# Patient Record
Sex: MALE | Race: WHITE | NOT HISPANIC OR LATINO | Employment: OTHER | URBAN - METROPOLITAN AREA
[De-identification: names, ages, dates, MRNs, and addresses within clinical notes are randomized per-mention and may not be internally consistent; named-entity substitution may affect disease eponyms.]

---

## 2017-03-13 RX ORDER — LEVOTHYROXINE SODIUM 0.05 MG/1
TABLET ORAL
Qty: 90 TABLET | Refills: 0 | Status: SHIPPED | OUTPATIENT
Start: 2017-03-13 | End: 2017-06-12 | Stop reason: SDUPTHER

## 2017-03-13 RX ORDER — ESCITALOPRAM OXALATE 20 MG/1
TABLET ORAL
Qty: 90 TABLET | Refills: 0 | Status: SHIPPED | OUTPATIENT
Start: 2017-03-13 | End: 2017-07-21

## 2017-03-13 RX ORDER — ATORVASTATIN CALCIUM 20 MG/1
TABLET, FILM COATED ORAL
Qty: 90 TABLET | Refills: 0 | Status: SHIPPED | OUTPATIENT
Start: 2017-03-13 | End: 2017-06-12 | Stop reason: SDUPTHER

## 2017-03-13 RX ORDER — LISINOPRIL 10 MG/1
TABLET ORAL
Qty: 90 TABLET | Refills: 0 | Status: SHIPPED | OUTPATIENT
Start: 2017-03-13 | End: 2017-06-12 | Stop reason: SDUPTHER

## 2017-03-13 RX ORDER — AMLODIPINE BESYLATE 5 MG/1
TABLET ORAL
Qty: 90 TABLET | Refills: 0 | Status: SHIPPED | OUTPATIENT
Start: 2017-03-13 | End: 2017-06-12 | Stop reason: SDUPTHER

## 2017-04-05 ENCOUNTER — OFFICE VISIT (OUTPATIENT)
Dept: FAMILY MEDICINE CLINIC | Facility: CLINIC | Age: 62
End: 2017-04-05

## 2017-04-05 VITALS
TEMPERATURE: 98.2 F | BODY MASS INDEX: 28.79 KG/M2 | HEART RATE: 77 BPM | SYSTOLIC BLOOD PRESSURE: 124 MMHG | WEIGHT: 212.6 LBS | HEIGHT: 72 IN | DIASTOLIC BLOOD PRESSURE: 74 MMHG | OXYGEN SATURATION: 98 %

## 2017-04-05 DIAGNOSIS — M70.21 OLECRANON BURSITIS OF RIGHT ELBOW: Primary | ICD-10-CM

## 2017-04-05 PROCEDURE — 99212 OFFICE O/P EST SF 10 MIN: CPT | Performed by: NURSE PRACTITIONER

## 2017-04-05 NOTE — PROGRESS NOTES
"Subjective   Pro Mueller is a 62 y.o. male.     History of Present Illness   Here for evaluation of swelling right elbow x 5 days.  No known injury.   No pain.  He also has a hard bump on his chest he wants evaluated.      The following portions of the patient's history were reviewed and updated as appropriate: allergies, current medications, past family history, past medical history, past social history, past surgical history and problem list.    Review of Systems   Musculoskeletal:        Per HPI   All other systems reviewed and are negative.      Objective   Physical Exam   Constitutional: He appears well-developed and well-nourished.   Cardiovascular: Normal rate.    Pulmonary/Chest: Effort normal.   Musculoskeletal:        Right elbow: He exhibits swelling. He exhibits normal range of motion. No tenderness found.   There is no \"bump\" or abnormality of his chest wall.  It is normal anatomy of his sternum/rib cage.    Right elbow edema, no ecchymosis.         Assessment/Plan   Pro was seen today for joint swelling.    Diagnoses and all orders for this visit:    Olecranon bursitis of right elbow      Assured pt of normal anatomy ant chest.  Assured pt olecranon bursitis no concern.  No tx necessary. RTC if sx's change.            "

## 2017-04-21 RX ORDER — TRAMADOL HYDROCHLORIDE 50 MG/1
TABLET ORAL
Qty: 120 TABLET | Refills: 0 | OUTPATIENT
Start: 2017-04-21 | End: 2017-06-19 | Stop reason: SDUPTHER

## 2017-04-21 RX ORDER — QUETIAPINE FUMARATE 25 MG/1
TABLET, FILM COATED ORAL
Qty: 90 TABLET | Refills: 2 | OUTPATIENT
Start: 2017-04-21 | End: 2018-05-17

## 2017-05-11 DIAGNOSIS — G47.00 INSOMNIA, UNSPECIFIED TYPE: ICD-10-CM

## 2017-05-11 RX ORDER — ALPRAZOLAM 1 MG/1
TABLET ORAL
Qty: 30 TABLET | Refills: 0 | Status: SHIPPED | OUTPATIENT
Start: 2017-05-11 | End: 2017-05-12 | Stop reason: SDUPTHER

## 2017-05-12 DIAGNOSIS — G47.00 INSOMNIA, UNSPECIFIED TYPE: ICD-10-CM

## 2017-05-12 RX ORDER — ALPRAZOLAM 1 MG/1
TABLET ORAL
Qty: 30 TABLET | Refills: 0 | Status: SHIPPED | OUTPATIENT
Start: 2017-05-12 | End: 2017-07-06 | Stop reason: SDUPTHER

## 2017-05-22 RX ORDER — ACYCLOVIR 800 MG/1
TABLET ORAL
Qty: 90 TABLET | Refills: 0 | Status: SHIPPED | OUTPATIENT
Start: 2017-05-22 | End: 2017-11-10 | Stop reason: SDUPTHER

## 2017-06-12 RX ORDER — ESCITALOPRAM OXALATE 10 MG/1
TABLET ORAL
Qty: 30 TABLET | Refills: 4 | Status: SHIPPED | OUTPATIENT
Start: 2017-06-12 | End: 2018-05-03

## 2017-06-12 RX ORDER — AMLODIPINE BESYLATE 5 MG/1
TABLET ORAL
Qty: 90 TABLET | Refills: 0 | Status: SHIPPED | OUTPATIENT
Start: 2017-06-12 | End: 2017-11-10 | Stop reason: SDUPTHER

## 2017-06-12 RX ORDER — LEVOTHYROXINE SODIUM 0.05 MG/1
TABLET ORAL
Qty: 90 TABLET | Refills: 0 | Status: SHIPPED | OUTPATIENT
Start: 2017-06-12 | End: 2017-07-22 | Stop reason: SDUPTHER

## 2017-06-12 RX ORDER — LISINOPRIL 10 MG/1
TABLET ORAL
Qty: 90 TABLET | Refills: 0 | Status: SHIPPED | OUTPATIENT
Start: 2017-06-12 | End: 2017-11-10 | Stop reason: SDUPTHER

## 2017-06-12 RX ORDER — ATORVASTATIN CALCIUM 20 MG/1
TABLET, FILM COATED ORAL
Qty: 90 TABLET | Refills: 0 | Status: SHIPPED | OUTPATIENT
Start: 2017-06-12 | End: 2017-11-20 | Stop reason: SDUPTHER

## 2017-06-20 RX ORDER — TRAMADOL HYDROCHLORIDE 50 MG/1
TABLET ORAL
Qty: 120 TABLET | Refills: 0 | Status: SHIPPED | OUTPATIENT
Start: 2017-06-20 | End: 2017-07-23 | Stop reason: SDUPTHER

## 2017-06-26 RX ORDER — AZELASTINE HCL 205.5 UG/1
SPRAY NASAL
Qty: 30 ML | Refills: 1 | Status: SHIPPED | OUTPATIENT
Start: 2017-06-26 | End: 2019-01-02

## 2017-07-06 DIAGNOSIS — G47.00 INSOMNIA, UNSPECIFIED TYPE: ICD-10-CM

## 2017-07-06 RX ORDER — ALPRAZOLAM 1 MG/1
TABLET ORAL
Qty: 30 TABLET | Refills: 0 | Status: SHIPPED | OUTPATIENT
Start: 2017-07-06 | End: 2017-09-04 | Stop reason: SDUPTHER

## 2017-07-21 ENCOUNTER — OFFICE VISIT (OUTPATIENT)
Dept: FAMILY MEDICINE CLINIC | Facility: CLINIC | Age: 62
End: 2017-07-21

## 2017-07-21 VITALS
WEIGHT: 214 LBS | BODY MASS INDEX: 28.99 KG/M2 | OXYGEN SATURATION: 98 % | HEART RATE: 68 BPM | SYSTOLIC BLOOD PRESSURE: 130 MMHG | HEIGHT: 72 IN | RESPIRATION RATE: 18 BRPM | DIASTOLIC BLOOD PRESSURE: 80 MMHG

## 2017-07-21 DIAGNOSIS — E55.9 HYPOVITAMINOSIS D: ICD-10-CM

## 2017-07-21 DIAGNOSIS — F10.20 ALCOHOLISM (HCC): ICD-10-CM

## 2017-07-21 DIAGNOSIS — Z00.00 ANNUAL PHYSICAL EXAM: Primary | ICD-10-CM

## 2017-07-21 DIAGNOSIS — Z79.899 DRUG THERAPY: ICD-10-CM

## 2017-07-21 DIAGNOSIS — N40.0 BENIGN PROSTATIC HYPERPLASIA WITHOUT LOWER URINARY TRACT SYMPTOMS, UNSPECIFIED MORPHOLOGY: ICD-10-CM

## 2017-07-21 DIAGNOSIS — Z23 ENCOUNTER FOR IMMUNIZATION: ICD-10-CM

## 2017-07-21 DIAGNOSIS — R53.83 FATIGUE, UNSPECIFIED TYPE: ICD-10-CM

## 2017-07-21 DIAGNOSIS — Z12.11 COLON CANCER SCREENING: ICD-10-CM

## 2017-07-21 DIAGNOSIS — Z00.00 HEALTHCARE MAINTENANCE: ICD-10-CM

## 2017-07-21 DIAGNOSIS — E78.5 HYPERLIPIDEMIA, UNSPECIFIED HYPERLIPIDEMIA TYPE: ICD-10-CM

## 2017-07-21 LAB
DEVELOPER EXPIRATION DATE: NORMAL
DEVELOPER LOT NUMBER: NORMAL
EXPIRATION DATE: NORMAL
FECAL OCCULT BLOOD SCREEN, POC: NEGATIVE
Lab: NORMAL
NEGATIVE CONTROL: NEGATIVE
POSITIVE CONTROL: POSITIVE

## 2017-07-21 PROCEDURE — 90736 HZV VACCINE LIVE SUBQ: CPT | Performed by: FAMILY MEDICINE

## 2017-07-21 PROCEDURE — 90471 IMMUNIZATION ADMIN: CPT | Performed by: FAMILY MEDICINE

## 2017-07-21 PROCEDURE — 82274 ASSAY TEST FOR BLOOD FECAL: CPT | Performed by: FAMILY MEDICINE

## 2017-07-21 PROCEDURE — 90670 PCV13 VACCINE IM: CPT | Performed by: FAMILY MEDICINE

## 2017-07-21 PROCEDURE — 90472 IMMUNIZATION ADMIN EACH ADD: CPT | Performed by: FAMILY MEDICINE

## 2017-07-21 PROCEDURE — 99396 PREV VISIT EST AGE 40-64: CPT | Performed by: FAMILY MEDICINE

## 2017-07-21 RX ORDER — MELOXICAM 15 MG/1
TABLET ORAL
Refills: 1 | COMMUNITY
Start: 2017-05-22 | End: 2017-11-02

## 2017-07-21 NOTE — PROGRESS NOTES
"Subjective   Pro Mueller is a 62 y.o. male.     History of Present Illness Here for a CPE. Two uncles with prostate can and one younger than he.  Has been meditating. Admits he is drinking more.   He wants to \"unlearn\" the behavior.    The following portions of the patient's history were reviewed and updated as appropriate: allergies, current medications, past social history and problem list.    Review of Systems   Constitutional: Negative for appetite change, fever and unexpected weight change.   HENT: Negative for ear pain, facial swelling and sore throat.    Eyes: Negative for pain and visual disturbance.   Respiratory: Negative for chest tightness, shortness of breath and wheezing.    Cardiovascular: Negative for chest pain and palpitations.   Gastrointestinal: Negative for abdominal pain and blood in stool.   Endocrine: Negative.    Genitourinary: Negative for difficulty urinating and hematuria.   Musculoskeletal: Negative for joint swelling.   Neurological: Negative for tremors, seizures and syncope.   Hematological: Negative for adenopathy.   Psychiatric/Behavioral: Negative.         Claims only 3 drinks of wine per day       Objective   /80  Pulse 68  Resp 18  Ht 72\" (182.9 cm)  Wt 214 lb (97.1 kg)  SpO2 98%  BMI 29.02 kg/m2  Physical Exam   Constitutional: He is oriented to person, place, and time. He appears well-developed and well-nourished. No distress.   HENT:   Head: Normocephalic and atraumatic. Hair is normal.   Right Ear: Hearing, tympanic membrane, external ear and ear canal normal.   Left Ear: Hearing, tympanic membrane, external ear and ear canal normal.   Nose: No sinus tenderness or nasal deformity.   Mouth/Throat: Uvula is midline, oropharynx is clear and moist and mucous membranes are normal. No oral lesions. No uvula swelling.   Eyes: Conjunctivae, EOM and lids are normal. Pupils are equal, round, and reactive to light. Right eye exhibits no discharge. Left eye exhibits no " discharge. No scleral icterus. Right eye exhibits normal extraocular motion and no nystagmus. Left eye exhibits normal extraocular motion and no nystagmus.   Neck: Normal range of motion. Neck supple. No tracheal deviation present. No thyromegaly present.   Cardiovascular: Normal rate, regular rhythm, normal heart sounds, intact distal pulses and normal pulses.    No murmur heard.  Pulmonary/Chest: Effort normal and breath sounds normal. No respiratory distress. He has no wheezes. He has no rales. He exhibits no tenderness.   Abdominal: Soft. Bowel sounds are normal. He exhibits mass (oiver is enlarged to 5 inches below RCM and is tender). He exhibits no distension. There is no tenderness. There is no guarding. No hernia.   Genitourinary: Rectum normal and prostate normal.   Musculoskeletal: Normal range of motion. He exhibits no edema, tenderness or deformity.   Lymphadenopathy:     He has no cervical adenopathy.   Neurological: He is alert and oriented to person, place, and time. He has normal reflexes. He displays normal reflexes. No cranial nerve deficit. Abnormal muscle tone: moderate intention tremor. Coordination normal.   Skin: Skin is warm and dry. No rash noted. He is not diaphoretic.   Psychiatric: He has a normal mood and affect. His behavior is normal. Judgment and thought content normal.   Vitals reviewed.      Assessment/Plan   Problem List Items Addressed This Visit        Cardiovascular and Mediastinum    Hyperlipidemia    Relevant Orders    Lipid Panel With / Chol / HDL Ratio       Other    Alcoholism      Other Visit Diagnoses     Annual physical exam    -  Primary    Healthcare maintenance        Relevant Orders    Hepatitis C Antibody    Benign prostatic hyperplasia without lower urinary tract symptoms, unspecified morphology        Relevant Orders    PSA    Hypovitaminosis D        Relevant Orders    Vitamin D 25 Hydroxy    Fatigue, unspecified type        Relevant Orders    TSH    Drug therapy         Relevant Orders    CBC & Differential    Comprehensive Metabolic Panel    Colon cancer screening        Relevant Orders    POC Occult Blood Stool    Encounter for immunization        Relevant Orders    Pneumococcal Conjugate Vaccine 13-Valent All (Completed)    Varicella-Zoster Vaccine Subcutaneous (Completed)           Needs to go to AA. He should not be drinking any alcohol again, ever. He is not of that opinion, however, and thinks he is and always will control it. I did tell him that his liver is clearly damaged and he should not damage it further.

## 2017-07-22 DIAGNOSIS — E03.9 HYPOTHYROIDISM, UNSPECIFIED TYPE: Primary | ICD-10-CM

## 2017-07-22 LAB
25(OH)D3+25(OH)D2 SERPL-MCNC: 27.6 NG/ML (ref 30–100)
ALBUMIN SERPL-MCNC: 5 G/DL (ref 3.5–5.2)
ALBUMIN/GLOB SERPL: 2.2 G/DL
ALP SERPL-CCNC: 66 U/L (ref 39–117)
ALT SERPL-CCNC: 24 U/L (ref 1–41)
AST SERPL-CCNC: 36 U/L (ref 1–40)
BASOPHILS # BLD AUTO: 0.02 10*3/MM3 (ref 0–0.2)
BASOPHILS NFR BLD AUTO: 0.3 % (ref 0–1.5)
BILIRUB SERPL-MCNC: 0.5 MG/DL (ref 0.1–1.2)
BUN SERPL-MCNC: 12 MG/DL (ref 8–23)
BUN/CREAT SERPL: 12.9 (ref 7–25)
CALCIUM SERPL-MCNC: 10.2 MG/DL (ref 8.6–10.5)
CHLORIDE SERPL-SCNC: 101 MMOL/L (ref 98–107)
CHOLEST SERPL-MCNC: 221 MG/DL (ref 0–200)
CHOLEST/HDLC SERPL: 4.42 {RATIO}
CO2 SERPL-SCNC: 24.4 MMOL/L (ref 22–29)
CREAT SERPL-MCNC: 0.93 MG/DL (ref 0.76–1.27)
EOSINOPHIL # BLD AUTO: 0.08 10*3/MM3 (ref 0–0.7)
EOSINOPHIL NFR BLD AUTO: 1 % (ref 0.3–6.2)
ERYTHROCYTE [DISTWIDTH] IN BLOOD BY AUTOMATED COUNT: 13.4 % (ref 11.5–14.5)
GLOBULIN SER CALC-MCNC: 2.3 GM/DL
GLUCOSE SERPL-MCNC: 93 MG/DL (ref 65–99)
HCT VFR BLD AUTO: 45.7 % (ref 40.4–52.2)
HCV AB S/CO SERPL IA: 0.1 S/CO RATIO (ref 0–0.9)
HDLC SERPL-MCNC: 50 MG/DL (ref 40–60)
HGB BLD-MCNC: 14.7 G/DL (ref 13.7–17.6)
IMM GRANULOCYTES # BLD: 0.02 10*3/MM3 (ref 0–0.03)
IMM GRANULOCYTES NFR BLD: 0.3 % (ref 0–0.5)
LDLC SERPL CALC-MCNC: 134 MG/DL (ref 0–100)
LYMPHOCYTES # BLD AUTO: 2.15 10*3/MM3 (ref 0.9–4.8)
LYMPHOCYTES NFR BLD AUTO: 27.2 % (ref 19.6–45.3)
MCH RBC QN AUTO: 29.8 PG (ref 27–32.7)
MCHC RBC AUTO-ENTMCNC: 32.2 G/DL (ref 32.6–36.4)
MCV RBC AUTO: 92.5 FL (ref 79.8–96.2)
MONOCYTES # BLD AUTO: 0.58 10*3/MM3 (ref 0.2–1.2)
MONOCYTES NFR BLD AUTO: 7.3 % (ref 5–12)
NEUTROPHILS # BLD AUTO: 5.05 10*3/MM3 (ref 1.9–8.1)
NEUTROPHILS NFR BLD AUTO: 63.9 % (ref 42.7–76)
PLATELET # BLD AUTO: 278 10*3/MM3 (ref 140–500)
POTASSIUM SERPL-SCNC: 4.4 MMOL/L (ref 3.5–5.2)
PROT SERPL-MCNC: 7.3 G/DL (ref 6–8.5)
PSA SERPL-MCNC: 0.43 NG/ML (ref 0–4)
RBC # BLD AUTO: 4.94 10*6/MM3 (ref 4.6–6)
SODIUM SERPL-SCNC: 141 MMOL/L (ref 136–145)
TRIGL SERPL-MCNC: 187 MG/DL (ref 0–150)
TSH SERPL DL<=0.005 MIU/L-ACNC: 4.61 MIU/ML (ref 0.27–4.2)
VLDLC SERPL CALC-MCNC: 37.4 MG/DL (ref 5–40)
WBC # BLD AUTO: 7.9 10*3/MM3 (ref 4.5–10.7)

## 2017-07-22 RX ORDER — LEVOTHYROXINE SODIUM 0.07 MG/1
TABLET ORAL
Qty: 90 TABLET | Refills: 3 | Status: SHIPPED | OUTPATIENT
Start: 2017-07-22 | End: 2018-09-25 | Stop reason: SDUPTHER

## 2017-07-24 RX ORDER — TRAMADOL HYDROCHLORIDE 50 MG/1
TABLET ORAL
Qty: 120 TABLET | Refills: 2 | Status: SHIPPED | OUTPATIENT
Start: 2017-07-24 | End: 2017-10-24 | Stop reason: SDUPTHER

## 2017-09-04 DIAGNOSIS — G47.00 INSOMNIA, UNSPECIFIED TYPE: ICD-10-CM

## 2017-09-05 RX ORDER — ALPRAZOLAM 1 MG/1
TABLET ORAL
Qty: 30 TABLET | Refills: 5 | Status: SHIPPED | OUTPATIENT
Start: 2017-09-05 | End: 2017-12-15 | Stop reason: SDUPTHER

## 2017-10-24 RX ORDER — TRAMADOL HYDROCHLORIDE 50 MG/1
TABLET ORAL
Qty: 120 TABLET | Refills: 2 | Status: SHIPPED | OUTPATIENT
Start: 2017-10-24 | End: 2018-03-09 | Stop reason: SDUPTHER

## 2017-11-02 ENCOUNTER — OFFICE VISIT (OUTPATIENT)
Dept: FAMILY MEDICINE CLINIC | Facility: CLINIC | Age: 62
End: 2017-11-02

## 2017-11-02 VITALS
HEART RATE: 70 BPM | HEIGHT: 72 IN | DIASTOLIC BLOOD PRESSURE: 70 MMHG | RESPIRATION RATE: 18 BRPM | SYSTOLIC BLOOD PRESSURE: 140 MMHG | OXYGEN SATURATION: 98 % | WEIGHT: 222 LBS | BODY MASS INDEX: 30.07 KG/M2

## 2017-11-02 DIAGNOSIS — M75.41 IMPINGEMENT SYNDROME OF RIGHT SHOULDER REGION: Primary | ICD-10-CM

## 2017-11-02 DIAGNOSIS — E03.9 HYPOTHYROIDISM, UNSPECIFIED TYPE: ICD-10-CM

## 2017-11-02 PROCEDURE — 99213 OFFICE O/P EST LOW 20 MIN: CPT | Performed by: FAMILY MEDICINE

## 2017-11-02 NOTE — PROGRESS NOTES
"Subjective   Pro Mueller is a 62 y.o. male.     History of Present Illness bilateral shoulder pain, R>L. This has only been for about 2 weeks. Ant shoulder and burning shoulder blade muscle. Doing more, lifting heavy objects out of the car.Just wants to do more since he is not drinking  Taking 12 ibuprofen a day..    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  The following portions of the patient's history were reviewed and updated as appropriate: allergies, current medications, past social history and problem list.    Review of Systems   Constitutional: Negative for activity change, appetite change and unexpected weight change.   HENT: Negative for nosebleeds and trouble swallowing.    Eyes: Negative for pain and visual disturbance.   Respiratory: Negative for chest tightness, shortness of breath and wheezing.    Cardiovascular: Negative for chest pain and palpitations.   Gastrointestinal: Negative for abdominal pain and blood in stool.   Endocrine: Negative.    Genitourinary: Negative for difficulty urinating and hematuria.   Musculoskeletal: Positive for arthralgias (R shoulder, j luis ant) and myalgias (around R shoulder and upper arm.). Negative for joint swelling.   Skin: Negative for color change and rash.   Allergic/Immunologic: Negative.    Neurological: Negative for syncope and speech difficulty.   Hematological: Negative for adenopathy.   Psychiatric/Behavioral: Negative for agitation and confusion.   All other systems reviewed and are negative.      Objective   /70  Pulse 70  Resp 18  Ht 72\" (182.9 cm)  Wt 222 lb (101 kg)  SpO2 98%  BMI 30.11 kg/m2  Physical Exam   Constitutional: He appears well-developed.   Cardiovascular: Normal rate.    Pulmonary/Chest: Effort normal.   Musculoskeletal:   AC joint tender, also biceps tendon tender. SIT rotator cuff muscles tender, though less so. " FROM shoulder with pain . R rhomboid tender.   Neurological:   Typical intention tremor   Nursing note and vitals reviewed.      Assessment/Plan   Problem List Items Addressed This Visit        Endocrine    Hypothyroidism    Relevant Orders    TSH    T4, Free      Other Visit Diagnoses     Impingement syndrome of right shoulder region    -  Primary    Relevant Orders    Ambulatory Referral to Orthopedic Surgery            Ice to shoulder.  Neck and upper back exercise sheet taught.  He needs to temporarily stop loading equipment from his car, or rig up something to help him lift and move it (pulley system?) He understands this.

## 2017-11-03 LAB
T4 FREE SERPL-MCNC: 1.29 NG/DL (ref 0.93–1.7)
TSH SERPL DL<=0.005 MIU/L-ACNC: 3.04 MIU/ML (ref 0.27–4.2)

## 2017-11-13 RX ORDER — ACYCLOVIR 800 MG/1
TABLET ORAL
Qty: 90 TABLET | Refills: 0 | Status: SHIPPED | OUTPATIENT
Start: 2017-11-13 | End: 2018-07-24 | Stop reason: SDUPTHER

## 2017-11-13 RX ORDER — LISINOPRIL 10 MG/1
TABLET ORAL
Qty: 90 TABLET | Refills: 0 | Status: SHIPPED | OUTPATIENT
Start: 2017-11-13 | End: 2018-01-31 | Stop reason: SDUPTHER

## 2017-11-13 RX ORDER — AMLODIPINE BESYLATE 5 MG/1
TABLET ORAL
Qty: 90 TABLET | Refills: 0 | Status: SHIPPED | OUTPATIENT
Start: 2017-11-13 | End: 2018-01-31 | Stop reason: SDUPTHER

## 2017-11-20 ENCOUNTER — OFFICE VISIT (OUTPATIENT)
Dept: ORTHOPEDIC SURGERY | Facility: CLINIC | Age: 62
End: 2017-11-20

## 2017-11-20 VITALS — TEMPERATURE: 99 F | HEIGHT: 72 IN | WEIGHT: 225 LBS | BODY MASS INDEX: 30.48 KG/M2

## 2017-11-20 DIAGNOSIS — M75.101 TEAR OF RIGHT ROTATOR CUFF, UNSPECIFIED TEAR EXTENT: Primary | ICD-10-CM

## 2017-11-20 PROCEDURE — 99203 OFFICE O/P NEW LOW 30 MIN: CPT | Performed by: ORTHOPAEDIC SURGERY

## 2017-11-21 RX ORDER — ATORVASTATIN CALCIUM 20 MG/1
TABLET, FILM COATED ORAL
Qty: 90 TABLET | Refills: 0 | Status: SHIPPED | OUTPATIENT
Start: 2017-11-21 | End: 2018-02-16 | Stop reason: SDUPTHER

## 2017-11-26 NOTE — PROGRESS NOTES
Patient: Pro Mueller    YOB: 1955    Medical Record Number: [id number]    Chief Complaints:   Bilateral shoulder pain, weakness    History of Present Illness:     62 y.o. male patient who comes in today for evaluation of both shoulders, particularly the right.  He is right-hand-dominant and his right shoulder symptoms have significantly limited him.  He reports a several year history of intermittent pain in both shoulders, particularly the right.  He does not recall any specific inciting event or factor.  The symptoms have gotten significantly worse over the past couple of months.  He describes moderate, intermittent stabbing pain.  He especially notices the pain with reaching and lifting out away from his body.  He localizes the pain to the lateral aspect of both shoulders.  He has not noticed any alleviating factors other than rest.  Denies any radicular symptoms down the arm.    Allergies:   Allergies   Allergen Reactions   • Penicillins        Home Medications:    Current Outpatient Prescriptions:   •  acyclovir (ZOVIRAX) 800 MG tablet, TAKE 1 TABLET BY MOUTH THREE TIMES DAILY AS NEEDED, Disp: 90 tablet, Rfl: 0  •  ALPRAZolam (XANAX) 1 MG tablet, TAKE 1 TABLET BY MOUTH EVERY NIGHT AT BEDTIME AS NEEDED FOR ANXIETY, Disp: 30 tablet, Rfl: 5  •  amLODIPine (NORVASC) 5 MG tablet, TAKE 1 TABLET BY MOUTH DAILY, Disp: 90 tablet, Rfl: 0  •  azelastine (ASTEPRO) 0.15 % solution nasal spray, 2 squirts twice a day, Disp: 30 mL, Rfl: 1  •  escitalopram (LEXAPRO) 10 MG tablet, TAKE 1 TABLET BY MOUTH EVERY MORNING, Disp: 30 tablet, Rfl: 4  •  levothyroxine (SYNTHROID, LEVOTHROID) 75 MCG tablet, 1 per day, Disp: 90 tablet, Rfl: 3  •  lisinopril (PRINIVIL,ZESTRIL) 10 MG tablet, TAKE 1 TABLET BY MOUTH DAILY, Disp: 90 tablet, Rfl: 0  •  mirtazapine (REMERON) 15 MG tablet, Take  by mouth., Disp: , Rfl:   •  pantoprazole (PROTONIX) 40 MG EC tablet, Take 1 tablet by mouth Daily., Disp: 30 tablet, Rfl: 1  •   QUEtiapine (SEROquel) 25 MG tablet, TAKE 1 TABLET BY MOUTH EVERY EVENING, Disp: 90 tablet, Rfl: 2  •  SUMAtriptan (IMITREX) 100 MG tablet, 1 at start of migraine and repeat 2 hours later if nec., Disp: 13 tablet, Rfl: 11  •  traMADol (ULTRAM) 50 MG tablet, TAKE 1 TABLET BY MOUTH EVERY 6 HOURS AS NEEDED, Disp: 120 tablet, Rfl: 2  •  atorvastatin (LIPITOR) 20 MG tablet, TAKE 1 TABLET BY MOUTH DAILY, Disp: 90 tablet, Rfl: 0    Past Medical History:   Diagnosis Date   • Alcohol abuse    • Anxiety    • Arthritis    • Depression    • Encounter for removal of sutures    • Hyperlipidemia    • Hypertension    • Kidney stone    • Motion sickness    • Withdrawal symptoms, alcohol        Past Surgical History:   Procedure Laterality Date   • CYST REMOVAL     • TONSILLECTOMY         Social History     Occupational History   • Not on file.     Social History Main Topics   • Smoking status: Current Some Day Smoker   • Smokeless tobacco: Not on file   • Alcohol use 3.6 oz/week     6 Shots of liquor per week   • Drug use: No   • Sexual activity: Yes     Partners: Female      Social History     Social History Narrative       Family History   Problem Relation Age of Onset   • Alzheimer's disease Mother    • Pancreatic cancer Father        Review of Systems:      Constitutional: Denies fever, shaking or chills   Eyes: Denies change in visual acuity   HEENT: Denies nasal congestion or sore throat   Respiratory: Denies cough or shortness of breath   Cardiovascular: Denies chest pain or edema  Endocrine: Denies tremors, palpitations, intolerance of heat or cold, polyuria, polydipsia.  GI: Denies abdominal pain, nausea, vomiting, bloody stools or diarrhea  : Denies frequency, urgency, incontinence, retention, or nocturia.  Musculoskeletal: Denies numbness tingling or loss of motor function except as above  Integument: Denies rash, lesion or ulceration   Neurologic: Denies headache or focal weakness, deficits  Heme: Denies epistaxis,  "spontaneous or excessive bleeding, epistaxis, hematuria, melena, fatigue, enlarged or tender lymph nodes.      All other pertinent positives and negatives as noted above in HPI.    Physical Exam: 62 y.o. male  Vitals:    11/20/17 1343   Temp: 99 °F (37.2 °C)   TempSrc: Temporal Artery    Weight: 225 lb (102 kg)   Height: 72\" (182.9 cm)     General:  Patient is awake and alert.  Appears in no acute distress or discomfort.    Psych:  Affect and demeanor are appropriate.    Eyes:  Conjunctiva and sclera appear grossly normal.  Eyes track well and EOM seem to be intact.    Ears:  No gross abnormalities.  Hearing adequate for the exam.    Cardiovascular:  Regular rate and rhythm.    Lungs:  Good chest expansion.  Breathing unlabored.    Lymph:  No palpable adenopathy about neck or axilla.    Neck:  Supple.  Normal ROM.  Negative Spurling's for shoulder or arm pain.    Right upper extremity:  Skin benign and intact without evidence for swelling, masses or atrophy.  No palpable masses.  No focal areas of exquisite tenderness.  Full active ROM.  No evident instability or apprehension.  Positive Neer and Villareal impingement maneuvers.  Negative Speeds, Yergason's and active compression maneuvers.  Pain and weakness with resistive testing of elevation in scapular plane and external rotation.  Negative Hornblower's and ER lag sign.  Good strength in wrist and hand.  Intact sensation in arm, hand.  Palpable radial pulse with brisk cap refill.         Radiology:   Patient refused x-rays    Assessment/Plan:   Right rotator cuff tear    We discussed options for him.  His history and exam are both consistent with a diagnosis of a rotator cuff tear.  With his age, hand dominance, and activity level, he is potentially a candidate for repair.  He is interested in pursuing further workup and potentially surgery.  I'm going to set him up for an MRI of the right shoulder.  I told him to call for the results and then we will come up with " a plan.    Aj Mancilla MD    11/20/2017    CC to Mayela Babcock MD

## 2017-12-05 ENCOUNTER — TELEPHONE (OUTPATIENT)
Dept: ORTHOPEDIC SURGERY | Facility: CLINIC | Age: 62
End: 2017-12-05

## 2017-12-06 ENCOUNTER — TELEPHONE (OUTPATIENT)
Dept: ORTHOPEDIC SURGERY | Facility: CLINIC | Age: 62
End: 2017-12-06

## 2017-12-06 NOTE — TELEPHONE ENCOUNTER
I spoke with him and went over the results.  We discussed options.  I recommend an injection for the acromioclavicular joint and subacromial space.  I will have the office contact him about setting this up.

## 2017-12-15 ENCOUNTER — TELEPHONE (OUTPATIENT)
Dept: FAMILY MEDICINE CLINIC | Facility: CLINIC | Age: 62
End: 2017-12-15

## 2017-12-15 DIAGNOSIS — G47.00 INSOMNIA, UNSPECIFIED TYPE: ICD-10-CM

## 2017-12-15 RX ORDER — ALPRAZOLAM 1 MG/1
1 TABLET ORAL NIGHTLY PRN
Qty: 30 TABLET | Refills: 3 | OUTPATIENT
Start: 2017-12-15 | End: 2018-04-30 | Stop reason: SDUPTHER

## 2017-12-15 NOTE — TELEPHONE ENCOUNTER
Patient will be a transfer. No openings until March, 2018, he is on controlled meds will run out Tariq. 3, 2018. Please let me know if you will refill his meds.

## 2017-12-18 ENCOUNTER — CLINICAL SUPPORT (OUTPATIENT)
Dept: ORTHOPEDIC SURGERY | Facility: CLINIC | Age: 62
End: 2017-12-18

## 2017-12-18 VITALS — HEIGHT: 72 IN | WEIGHT: 229 LBS | BODY MASS INDEX: 31.02 KG/M2 | TEMPERATURE: 98.8 F

## 2017-12-18 DIAGNOSIS — G89.29 CHRONIC RIGHT SHOULDER PAIN: Primary | ICD-10-CM

## 2017-12-18 DIAGNOSIS — M25.511 CHRONIC RIGHT SHOULDER PAIN: Primary | ICD-10-CM

## 2017-12-18 PROCEDURE — 20610 DRAIN/INJ JOINT/BURSA W/O US: CPT | Performed by: ORTHOPAEDIC SURGERY

## 2017-12-18 PROCEDURE — 99214 OFFICE O/P EST MOD 30 MIN: CPT | Performed by: ORTHOPAEDIC SURGERY

## 2017-12-18 PROCEDURE — 20550 NJX 1 TENDON SHEATH/LIGAMENT: CPT | Performed by: ORTHOPAEDIC SURGERY

## 2017-12-18 RX ORDER — BUPIVACAINE HYDROCHLORIDE 5 MG/ML
1 INJECTION, SOLUTION PERINEURAL
Status: COMPLETED | OUTPATIENT
Start: 2017-12-18 | End: 2017-12-18

## 2017-12-18 RX ORDER — BUPIVACAINE HYDROCHLORIDE 5 MG/ML
2 INJECTION, SOLUTION PERINEURAL
Status: COMPLETED | OUTPATIENT
Start: 2017-12-18 | End: 2017-12-18

## 2017-12-18 RX ORDER — LIDOCAINE HYDROCHLORIDE 10 MG/ML
1 INJECTION, SOLUTION INFILTRATION; PERINEURAL
Status: COMPLETED | OUTPATIENT
Start: 2017-12-18 | End: 2017-12-18

## 2017-12-18 RX ORDER — METHYLPREDNISOLONE ACETATE 80 MG/ML
160 INJECTION, SUSPENSION INTRA-ARTICULAR; INTRALESIONAL; INTRAMUSCULAR; SOFT TISSUE
Status: COMPLETED | OUTPATIENT
Start: 2017-12-18 | End: 2017-12-18

## 2017-12-18 RX ORDER — LIDOCAINE HYDROCHLORIDE 10 MG/ML
2 INJECTION, SOLUTION INFILTRATION; PERINEURAL
Status: COMPLETED | OUTPATIENT
Start: 2017-12-18 | End: 2017-12-18

## 2017-12-18 RX ORDER — METHYLPREDNISOLONE ACETATE 80 MG/ML
80 INJECTION, SUSPENSION INTRA-ARTICULAR; INTRALESIONAL; INTRAMUSCULAR; SOFT TISSUE
Status: COMPLETED | OUTPATIENT
Start: 2017-12-18 | End: 2017-12-18

## 2017-12-18 RX ADMIN — BUPIVACAINE HYDROCHLORIDE 2 ML: 5 INJECTION, SOLUTION PERINEURAL at 19:17

## 2017-12-18 RX ADMIN — METHYLPREDNISOLONE ACETATE 160 MG: 80 INJECTION, SUSPENSION INTRA-ARTICULAR; INTRALESIONAL; INTRAMUSCULAR; SOFT TISSUE at 19:17

## 2017-12-18 RX ADMIN — METHYLPREDNISOLONE ACETATE 80 MG: 80 INJECTION, SUSPENSION INTRA-ARTICULAR; INTRALESIONAL; INTRAMUSCULAR; SOFT TISSUE at 19:17

## 2017-12-18 RX ADMIN — LIDOCAINE HYDROCHLORIDE 2 ML: 10 INJECTION, SOLUTION INFILTRATION; PERINEURAL at 19:17

## 2017-12-18 RX ADMIN — BUPIVACAINE HYDROCHLORIDE 1 ML: 5 INJECTION, SOLUTION PERINEURAL at 19:17

## 2017-12-18 RX ADMIN — LIDOCAINE HYDROCHLORIDE 1 ML: 10 INJECTION, SOLUTION INFILTRATION; PERINEURAL at 19:17

## 2017-12-18 NOTE — PROGRESS NOTES
Patient:Pro Mueller    YOB: 1955    Medical Record Number:[id number]    Chief Complaints:  Right shoulder pain    History of Present Illness:     62 y.o. male patient who presents for follow-up of his right shoulder.  He continues to have moderate intermittent stabbing pain in the shoulder which is worse with reaching and lifting.  He has not noticed any alleviating factors.  He did get his recent MRI and presents today to review that study.    Allergies:  Allergies   Allergen Reactions   • Penicillins        Home Medications:    Current Outpatient Prescriptions:   •  acyclovir (ZOVIRAX) 800 MG tablet, TAKE 1 TABLET BY MOUTH THREE TIMES DAILY AS NEEDED, Disp: 90 tablet, Rfl: 0  •  ALPRAZolam (XANAX) 1 MG tablet, Take 1 tablet by mouth At Night As Needed for Anxiety., Disp: 30 tablet, Rfl: 3  •  amLODIPine (NORVASC) 5 MG tablet, TAKE 1 TABLET BY MOUTH DAILY, Disp: 90 tablet, Rfl: 0  •  atorvastatin (LIPITOR) 20 MG tablet, TAKE 1 TABLET BY MOUTH DAILY, Disp: 90 tablet, Rfl: 0  •  azelastine (ASTEPRO) 0.15 % solution nasal spray, 2 squirts twice a day, Disp: 30 mL, Rfl: 1  •  escitalopram (LEXAPRO) 10 MG tablet, TAKE 1 TABLET BY MOUTH EVERY MORNING, Disp: 30 tablet, Rfl: 4  •  levothyroxine (SYNTHROID, LEVOTHROID) 75 MCG tablet, 1 per day, Disp: 90 tablet, Rfl: 3  •  lisinopril (PRINIVIL,ZESTRIL) 10 MG tablet, TAKE 1 TABLET BY MOUTH DAILY, Disp: 90 tablet, Rfl: 0  •  mirtazapine (REMERON) 15 MG tablet, Take  by mouth., Disp: , Rfl:   •  pantoprazole (PROTONIX) 40 MG EC tablet, Take 1 tablet by mouth Daily., Disp: 30 tablet, Rfl: 1  •  QUEtiapine (SEROquel) 25 MG tablet, TAKE 1 TABLET BY MOUTH EVERY EVENING, Disp: 90 tablet, Rfl: 2  •  SUMAtriptan (IMITREX) 100 MG tablet, 1 at start of migraine and repeat 2 hours later if nec., Disp: 13 tablet, Rfl: 11  •  traMADol (ULTRAM) 50 MG tablet, TAKE 1 TABLET BY MOUTH EVERY 6 HOURS AS NEEDED, Disp: 120 tablet, Rfl: 2    Past Medical History:   Diagnosis Date   •  "Alcohol abuse    • Anxiety    • Arthritis    • Depression    • Encounter for removal of sutures    • Hyperlipidemia    • Hypertension    • Kidney stone    • Motion sickness    • Withdrawal symptoms, alcohol        Past Surgical History:   Procedure Laterality Date   • CYST REMOVAL     • TONSILLECTOMY         Social History     Occupational History   • Not on file.     Social History Main Topics   • Smoking status: Current Some Day Smoker   • Smokeless tobacco: Not on file   • Alcohol use 3.6 oz/week     6 Shots of liquor per week   • Drug use: No   • Sexual activity: Yes     Partners: Female      Social History     Social History Narrative       Family History   Problem Relation Age of Onset   • Alzheimer's disease Mother    • Pancreatic cancer Father        Review of Systems:      Constitutional: Denies fever, shaking or chills   Eyes: Denies change in visual acuity   HEENT: Denies nasal congestion or sore throat   Respiratory: Denies cough or shortness of breath   Cardiovascular: Denies chest pain or edema  Endocrine: Denies tremors, palpitations, intolerance of heat or cold, polyuria, polydipsia.  GI: Denies abdominal pain, nausea, vomiting, bloody stools or diarrhea  : Denies frequency, urgency, incontinence, retention, or nocturia.  Musculoskeletal: Denies numbness tingling or loss of motor function except as above  Integument: Denies rash, lesion or ulceration   Neurologic: Denies headache or focal weakness, deficits  Heme: Denies epistaxis, spontaneous or excessive bleeding, epistaxis, hematuria, melena, fatigue, enlarged or tender lymph nodes.      All other pertinent positives and negatives as noted above in HPI.    Physical Exam:62 y.o. male  Vitals:    12/18/17 1519   Temp: 98.8 °F (37.1 °C)   TempSrc: Temporal Artery    Weight: 104 kg (229 lb)   Height: 182.9 cm (72.01\")       General:  Patient is awake and alert.  Appears in no acute distress or discomfort.    Psych:  Affect and demeanor are " appropriate.    Extremities:  Right shoulder is examined.  Skin is benign.  Moderate tenderness of the biceps.  Full motion.  Positive Neer and Villareal maneuvers.  Positive speeds maneuver.  Of note, I did examine his acromioclavicular joint.  No tenderness there.  Negative active compression maneuver.    Imaging:  A report of an outside MRI of the right shoulder is available for review.  The images themselves are not available at this time.  The report mentions diffuse rotator cuff tendinopathy but no jessee tears.  The report mentions moderate acromioclavicular arthritis with mild subarticular cystic change and edema.    Assessment/Plan:  Right rotator cuff tendonopathy, bicep tendonitis    I was unable to access the images of his MRI.  He has them on a disc and will bring them by the office for me to review at a later time.  The MRI seems to suggest that his acromioclavicular joint is inflamed.  That does not seem to match up with his exam.  His exam suggests that the rotator cuff is the primary source of his pain although he also seems to have some biceps symptoms today as well.  I think a combination of injections and therapy could be beneficial for him.  The risks, benefits, and alternatives to the injections were discussed.  He consented.  I've given him a referral to formal physical therapy.  Going forward, he will follow-up with me as needed.  If his symptoms persist, despite the injections, I told him I'll be happy to see him back at any point.    Large Joint Arthrocentesis  Date/Time: 12/18/2017 7:17 PM  Consent given by: patient  Site marked: site marked  Timeout: Immediately prior to procedure a time out was called to verify the correct patient, procedure, equipment, support staff and site/side marked as required   Supporting Documentation  Indications: pain and joint swelling   Procedure Details  Location: shoulder - R subacromial bursa  Preparation: Patient was prepped and draped in the usual sterile  fashion  Needle size: 25 G  Approach: posterior  Medications administered: 2 mL lidocaine 1 %; 160 mg methylPREDNISolone acetate 80 MG/ML; 2 mL bupivacaine 0.5 %  Patient tolerance: patient tolerated the procedure well with no immediate complications    Medium Joint Arthrocentesis  Date/Time: 12/18/2017 7:17 PM  Consent given by: patient  Site marked: site marked  Timeout: Immediately prior to procedure a time out was called to verify the correct patient, procedure, equipment, support staff and site/side marked as required   Supporting Documentation  Indications: pain   Procedure Details  Location: shoulder (Right biceps tendon sheath) -   Preparation: Patient was prepped and draped in the usual sterile fashion  Needle size: 25 G  Approach: anterolateral  Medications administered: 1 mL lidocaine 1 %; 80 mg methylPREDNISolone acetate 80 MG/ML; 1 mL bupivacaine 0.5 %  Patient tolerance: patient tolerated the procedure well with no immediate complications          Aj Mancilla MD    12/18/2017

## 2018-01-22 ENCOUNTER — TREATMENT (OUTPATIENT)
Dept: PHYSICAL THERAPY | Facility: CLINIC | Age: 63
End: 2018-01-22

## 2018-01-22 DIAGNOSIS — M25.511 CHRONIC RIGHT SHOULDER PAIN: Primary | ICD-10-CM

## 2018-01-22 DIAGNOSIS — G89.29 CHRONIC RIGHT SHOULDER PAIN: Primary | ICD-10-CM

## 2018-01-22 PROCEDURE — 97110 THERAPEUTIC EXERCISES: CPT | Performed by: PHYSICAL THERAPIST

## 2018-01-22 PROCEDURE — 97161 PT EVAL LOW COMPLEX 20 MIN: CPT | Performed by: PHYSICAL THERAPIST

## 2018-01-22 NOTE — PATIENT INSTRUCTIONS
Access Code: [email or web address]   URL: [email or web address]   Date: 01/22/2018   Prepared by: Margo Harden     Exercises  Shoulder External Rotation with Anchored Resistance - 10 reps - 2 sets - 1x daily  Shoulder Internal Rotation with Resistance - 10 reps - 2 sets - 1x daily  Shoulder External Rotation Reactive Isometrics - 10 reps - 1 sets - 1x daily  Shoulder External Rotation and Scapular Retraction - 15 reps - 1 sets - 5 second Hold - 2x daily  Seated Shoulder Inferior Glide - 2 reps - 1 sets - 20 hold - 1x daily  Gentle Levator Scapulae Stretch - 2 reps - 1 sets - 20 hold - 1x daily    Pt issued a green TB for HEP

## 2018-01-22 NOTE — PROGRESS NOTES
Physical Therapy Initial Evaluation and Plan of Care    Patient: Pro Mueller   : 1955  Diagnosis/ICD-10 Code:  Chronic right shoulder pain [M25.511, G89.29]  Referring practitioner: Aj Mancilla MD    Subjective Evaluation    History of Present Illness  Mechanism of injury: HX of B shoulder pain.  Recently had injections in R shoulder which has helped.  Began noticing symptoms last October about doing a lot of heavy lifting.  + sleep disturbance; likes to sleep on side.  Pt denies numbness/tingling    PMH of L hip bursitis, OA in B knees     30% Quick DASH      Patient Occupation: desk job - computer work Pain  Current pain ratin  At worst pain ratin  Location: R shoulder blade and ant/sup/post shoulder  Quality: burning  Relieving factors: medications (meloxicam and tramadol)  Aggravating factors: lifting and outstretched reach    Hand dominance: right    Diagnostic Tests  MRI studies: abnormal (RTC tendinopathy, AC OA and labral changes)    Treatments  Previous treatment: injection treatment and medication  Patient Goals  Patient goals for therapy: decreased pain             Objective       Palpation     Right Tenderness of the rhomboids.     Tenderness     Right Shoulder  Tenderness in the AC joint, biceps tendon (proximal), infraspinatus tendon, scapular spine and supraspinatus tendon. No tenderness in the clavicle.     Active Range of Motion     Right Shoulder   Flexion: 145 degrees with pain  Abduction: 158 degrees   External rotation 45°: WFL  Internal rotation 45°: WFL    Strength/Myotome Testing     Right Shoulder     Planes of Motion   Flexion: 5   Abduction: 4 (pain)   External rotation at 0°: 4+   Internal rotation at 0°: 4     Isolated Muscles   Supraspinatus: 4     Tests     Right Shoulder   Negative active compression (Bartow), drop arm, empty can and Speed's.          Assessment & Plan     Assessment  Impairments: abnormal or restricted ROM, activity intolerance, impaired  physical strength and pain with function  Assessment details:  Pro Mueller is a pleasant 63 y.o. male that presents with signs and symptoms consistent with the above diagnosis. Pt would benefit from skilled PT services in order to address listed impairments and increase tolerance to normal daily activities including ADLs, work and recreational activities.       Prognosis: good  Functional Limitations: carrying objects, lifting, sleeping, pushing, uncomfortable because of pain, reaching behind back and reaching overhead  Goals  Plan Goals: STG In 2-6 weeks  1. Pt to exhibit compliance/independence with HEP.  2. Pt to perform closed-chain strengthening activities with < = minimal increased pain   3.  Improved sleep tolerance  4.  Pt to report improved tolerance to reaching activities    LTG In 6-12 weeks  1. R shoulder ER/IR 5/5 and non-painful to allow for push/pull and lifting activities.  2. Pain not > than 2/10 with ADLs  3. Pt no longer exhibiting + impingement signs to allow for tolerance to OH activities.  4. Quick DASH < 15%  5. Pt to fasten seatbelt in passenger seat of car without pain.      Plan  Therapy options: will be seen for skilled physical therapy services  Planned modality interventions: electrical stimulation/Russian stimulation and cryotherapy  Planned therapy interventions: manual therapy, joint mobilization, neuromuscular re-education, strengthening, stretching and home exercise program  Frequency: 2x week  Duration in weeks: 12  Treatment plan discussed with: patient        Manual Therapy:    -     mins  95944;  Therapeutic Exercise:    12     mins  53033;     Neuromuscular Ryan:    -    mins  10587;    Therapeutic Activity:     -     mins  46689;     Gait Training:      -     mins  89434;     Ultrasound:     -     mins  40899;    Electrical Stimulation:    -     mins  79664 ( );  Dry Needling     -     mins self-pay        Timed Treatment:   12   mins   Total Treatment:     50    mins    PT SIGNATURE: Frieda Harden, PT   [place] License # [id number]  DATE TREATMENT INITIATED: 1/22/2018    Initial Certification  Certification Period: 4/22/2018  I certify that the therapy services are furnished while this patient is under my care.  The services outlined above are required by this patient, and will be reviewed every 90 days.     PHYSICIAN: Aj Mancilla MD      DATE:     Please sign and return via fax to [phone].. Thank you, [facility].

## 2018-01-26 ENCOUNTER — TREATMENT (OUTPATIENT)
Dept: PHYSICAL THERAPY | Facility: CLINIC | Age: 63
End: 2018-01-26

## 2018-01-26 DIAGNOSIS — M25.511 CHRONIC RIGHT SHOULDER PAIN: Primary | ICD-10-CM

## 2018-01-26 DIAGNOSIS — G89.29 CHRONIC RIGHT SHOULDER PAIN: Primary | ICD-10-CM

## 2018-01-26 PROCEDURE — 97014 ELECTRIC STIMULATION THERAPY: CPT | Performed by: PHYSICAL THERAPIST

## 2018-01-26 PROCEDURE — 97110 THERAPEUTIC EXERCISES: CPT | Performed by: PHYSICAL THERAPIST

## 2018-01-26 NOTE — PATIENT INSTRUCTIONS
Access Code: [email or web address]   URL: [email or web address]   Date: 01/26/2018   Prepared by: Margo Harden     Program Notes   elastogel ice packs     Exercises  Shoulder External Rotation with Anchored Resistance - 10 reps - 2 sets - 1x daily  Shoulder Internal Rotation with Resistance - 10 reps - 2 sets - 1x daily  Shoulder External Rotation Reactive Isometrics - 10 reps - 1 sets - 1x daily  Shoulder External Rotation and Scapular Retraction - 15 reps - 1 sets - 5 second Hold - 2x daily  Supine Shoulder Flexion with Dowel AAROM - Palms Up - 20 reps                   - 1 sets - 5 hold - 1x daily  Seated Shoulder Inferior Glide - 2 reps - 1 sets - 20 hold - 1x daily  Gentle Levator Scapulae Stretch - 2 reps - 1 sets - 20 hold - 1x daily

## 2018-01-26 NOTE — PROGRESS NOTES
Physical Therapy Daily Progress Note    Visit # : 2  Pro Mueller reports: shoulder has been achy rated constant 3/10 with ADLs.    Subjective     Objective   See Exercise, Manual, and Modality Logs for complete treatment.       Assessment/Plan  Pt exhibited good form with exercise program today; pt was cautioned to avoid exercising into pain.  Added estim today due to persistent pain.  Progress per Plan of Care           Manual Therapy:    5     mins  64367;  Therapeutic Exercise:    25     mins  39466;     Neuromuscular Ryan:    -    mins  37707;    Therapeutic Activity:     -     mins  60719;     Gait Training:      -     mins  56310;     Ultrasound:     -     mins  75246;    Electrical Stimulation:    15     mins  07302 ( );  Dry Needling     -     mins self-pay      Timed Treatment:   30   mins   Total Treatment:     48   mins        Frieda Harden PT  Physical Therapist  [place] License # [id number]

## 2018-01-29 ENCOUNTER — TREATMENT (OUTPATIENT)
Dept: PHYSICAL THERAPY | Facility: CLINIC | Age: 63
End: 2018-01-29

## 2018-01-29 DIAGNOSIS — G89.29 CHRONIC RIGHT SHOULDER PAIN: Primary | ICD-10-CM

## 2018-01-29 DIAGNOSIS — M25.511 CHRONIC RIGHT SHOULDER PAIN: Primary | ICD-10-CM

## 2018-01-29 PROCEDURE — 97014 ELECTRIC STIMULATION THERAPY: CPT | Performed by: PHYSICAL THERAPIST

## 2018-01-29 PROCEDURE — 97110 THERAPEUTIC EXERCISES: CPT | Performed by: PHYSICAL THERAPIST

## 2018-01-29 NOTE — PATIENT INSTRUCTIONS
Access Code: [email or web address]   URL: [email or web address]   Date: 01/29/2018   Prepared by: Margo Harden     Program Notes   elastogel ice packs     Exercises  Shoulder External Rotation with Anchored Resistance - 10 reps - 2 sets - 1x daily  Shoulder Internal Rotation with Resistance - 10 reps - 2 sets - 1x daily  Shoulder External Rotation Reactive Isometrics - 10 reps - 1 sets - 1x daily  Standing Shoulder Row with Anchored Resistance - 20 reps - 1 sets - 3 hold - 1x daily  Dynamic Hug with Resistance - 20 reps - 1 sets - 3 hold - 1x daily  Shoulder External Rotation and Scapular Retraction - 15 reps - 1 sets - 5 second Hold - 2x daily  Supine Shoulder Flexion with Dowel AAROM - Palms Up - 20 reps                   - 1 sets - 5 hold - 1x daily  Seated Shoulder Inferior Glide - 2 reps - 1 sets - 20 hold - 1x daily  Gentle Levator Scapulae Stretch - 2 reps - 1 sets - 20 hold - 1x daily    Pt issued blue TB for HEP

## 2018-01-29 NOTE — PROGRESS NOTES
Physical Therapy Daily Progress Note    Visit # : 3  Pro Mueller reports: worst pain is in the morning.  Overall better; e stim helped last visit with pain    Subjective     Objective   See Exercise, Manual, and Modality Logs for complete treatment.       Assessment/Plan  Good tolerance to exercise progression without exacerbation of symptoms.  Pt exhibiting improved postural awareness.  Progress per Plan of Care           Manual Therapy:    3     mins  24569;  Therapeutic Exercise:    27     mins  75883;     Neuromuscular Ryan:    -    mins  38242;    Therapeutic Activity:     -     mins  70261;     Gait Training:      -     mins  23409;     Ultrasound:     -     mins  28973;    Electrical Stimulation:    15     mins  26345 ( );  Dry Needling     -     mins self-pay      Timed Treatment:   30   mins   Total Treatment:     47   mins        Frieda Harden PT  Physical Therapist  [place] License # [id number]

## 2018-01-31 ENCOUNTER — TREATMENT (OUTPATIENT)
Dept: PHYSICAL THERAPY | Facility: CLINIC | Age: 63
End: 2018-01-31

## 2018-01-31 DIAGNOSIS — M25.511 CHRONIC RIGHT SHOULDER PAIN: Primary | ICD-10-CM

## 2018-01-31 DIAGNOSIS — G89.29 CHRONIC RIGHT SHOULDER PAIN: Primary | ICD-10-CM

## 2018-01-31 PROCEDURE — 97110 THERAPEUTIC EXERCISES: CPT | Performed by: PHYSICAL THERAPIST

## 2018-01-31 PROCEDURE — 97014 ELECTRIC STIMULATION THERAPY: CPT | Performed by: PHYSICAL THERAPIST

## 2018-01-31 PROCEDURE — 97140 MANUAL THERAPY 1/> REGIONS: CPT | Performed by: PHYSICAL THERAPIST

## 2018-01-31 RX ORDER — AMLODIPINE BESYLATE 5 MG/1
5 TABLET ORAL DAILY
Qty: 90 TABLET | Refills: 0 | Status: SHIPPED | OUTPATIENT
Start: 2018-01-31 | End: 2018-09-12 | Stop reason: SDUPTHER

## 2018-01-31 RX ORDER — LISINOPRIL 10 MG/1
10 TABLET ORAL DAILY
Qty: 90 TABLET | Refills: 0 | Status: SHIPPED | OUTPATIENT
Start: 2018-01-31 | End: 2018-11-14 | Stop reason: SDUPTHER

## 2018-01-31 NOTE — PATIENT INSTRUCTIONS
Access Code: [email or web address]   URL: [email or web address]   Date: 01/31/2018   Prepared by: Margo Harden     Exercises  Thoracic Mobilization on Foam Roll - 10 reps - 1 sets - 1x daily

## 2018-01-31 NOTE — PROGRESS NOTES
Physical Therapy Daily Progress Note    Visit # : 4  Pro Mueller reports: some increased soreness since last visit.  Pain rated 2.5/10 today.      Subjective     Objective   See Exercise, Manual, and Modality Logs for complete treatment.   Decreased segmental mobility T-S with T4 tenderness    Assessment/Plan  Good tolerance to exercises today with minimal verbal cuing.  Jt restriction at T4 likely contributing to shoulder pain.  Progress per Plan of Care           Manual Therapy:    8     mins  44287;  Therapeutic Exercise:    27     mins  33454;     Neuromuscular Ryan:    -    mins  93717;    Therapeutic Activity:     -     mins  43978;     Gait Training:      -     mins  73160;     Ultrasound:     -     mins  68259;    Electrical Stimulation:    15     mins  28048 ( );  Dry Needling     -     mins self-pay      Timed Treatment:   35   mins   Total Treatment:     50   mins        Frieda Harden PT  Physical Therapist  [place] License # [id number]

## 2018-02-05 ENCOUNTER — TREATMENT (OUTPATIENT)
Dept: PHYSICAL THERAPY | Facility: CLINIC | Age: 63
End: 2018-02-05

## 2018-02-05 DIAGNOSIS — G89.29 CHRONIC RIGHT SHOULDER PAIN: Primary | ICD-10-CM

## 2018-02-05 DIAGNOSIS — M25.511 CHRONIC RIGHT SHOULDER PAIN: Primary | ICD-10-CM

## 2018-02-05 PROCEDURE — 97014 ELECTRIC STIMULATION THERAPY: CPT | Performed by: PHYSICAL THERAPIST

## 2018-02-05 PROCEDURE — 97140 MANUAL THERAPY 1/> REGIONS: CPT | Performed by: PHYSICAL THERAPIST

## 2018-02-05 PROCEDURE — 97110 THERAPEUTIC EXERCISES: CPT | Performed by: PHYSICAL THERAPIST

## 2018-02-05 NOTE — PROGRESS NOTES
Physical Therapy Daily Progress Note    Visit # : 5  Pro Mueller reports: shoulder is feeling better.      Subjective     Objective   See Exercise, Manual, and Modality Logs for complete treatment.       Assessment/Plan  Pt reported tenderness with PA glides at T67.  Pt is responding favorably to treatment.     Progress per Plan of Care           Manual Therapy:    8     mins  49928;  Therapeutic Exercise:    26     mins  27461;     Neuromuscular Ryan:    -    mins  96228;    Therapeutic Activity:     -     mins  08602;     Gait Training:      -     mins  12583;     Ultrasound:     -     mins  86611;    Electrical Stimulation:    15     mins  99789 ( );  Dry Needling     -     mins self-pay      Timed Treatment:   34   mins   Total Treatment:     50   mins        Frieda Harden PT  Physical Therapist  [place] License # [id number]

## 2018-02-16 RX ORDER — ATORVASTATIN CALCIUM 20 MG/1
20 TABLET, FILM COATED ORAL DAILY
Qty: 90 TABLET | Refills: 0 | Status: SHIPPED | OUTPATIENT
Start: 2018-02-16 | End: 2018-05-15 | Stop reason: SDUPTHER

## 2018-02-19 ENCOUNTER — TREATMENT (OUTPATIENT)
Dept: PHYSICAL THERAPY | Facility: CLINIC | Age: 63
End: 2018-02-19

## 2018-02-19 DIAGNOSIS — M25.511 CHRONIC RIGHT SHOULDER PAIN: Primary | ICD-10-CM

## 2018-02-19 DIAGNOSIS — G89.29 CHRONIC RIGHT SHOULDER PAIN: Primary | ICD-10-CM

## 2018-02-19 PROCEDURE — 97110 THERAPEUTIC EXERCISES: CPT | Performed by: PHYSICAL THERAPIST

## 2018-02-19 PROCEDURE — 97014 ELECTRIC STIMULATION THERAPY: CPT | Performed by: PHYSICAL THERAPIST

## 2018-02-19 NOTE — PROGRESS NOTES
Physical Therapy Daily Progress Note    Visit # : 6  Pro Mueller reports: shoulder has been achy; thinks injection has worn off.      Subjective     Objective   See Exercise, Manual, and Modality Logs for complete treatment.       Assessment/Plan  Good tolerance to progression to CKC exercises.  Pt's HEP was updated to reflect changes.   Progress per Plan of Care           Manual Therapy:    5     mins  51596;  Therapeutic Exercise:    30     mins  93022;     Neuromuscular Ryan:    -    mins  13180;    Therapeutic Activity:     -     mins  93228;     Gait Training:      -     mins  00748;     Ultrasound:     -     mins  23930;    Electrical Stimulation:    15     mins  47850 ( );  Dry Needling     -     mins self-pay      Timed Treatment:   35   mins   Total Treatment:     50   mins        Frieda Harden PT  Physical Therapist  [place] License # [id number]

## 2018-02-19 NOTE — PATIENT INSTRUCTIONS
Access Code: [email or web address]   URL: [email or web address]   Date: 02/19/2018   Prepared by: Margo Harden     Program Notes   elastogel ice packs     Exercises  Shoulder External Rotation with Anchored Resistance - 10 reps - 2 sets - 1x daily  Shoulder Internal Rotation with Resistance - 10 reps - 2 sets - 1x daily  Shoulder External Rotation Reactive Isometrics - 10 reps - 1 sets - 1x daily  Standing Shoulder Row with Anchored Resistance - 20 reps - 1 sets - 3 hold - 1x daily  Dynamic Hug with Resistance - 20 reps - 1 sets - 3 hold - 1x daily  Shoulder Flexion Wall Slide with Towel - 15 reps - 1 sets - 1x daily                            Scaption Wall Slide with Towel - 15 reps - 1 sets - 1x daily  Shoulder External Rotation and Scapular Retraction - 15 reps - 1 sets - 5 second Hold - 2x daily  Seated Shoulder Inferior Glide - 2 reps - 1 sets - 20 hold - 1x daily  Gentle Levator Scapulae Stretch - 2 reps - 1 sets - 20 hold - 1x daily

## 2018-03-09 ENCOUNTER — OFFICE VISIT (OUTPATIENT)
Dept: FAMILY MEDICINE CLINIC | Facility: CLINIC | Age: 63
End: 2018-03-09

## 2018-03-09 VITALS
DIASTOLIC BLOOD PRESSURE: 78 MMHG | HEIGHT: 72 IN | WEIGHT: 234 LBS | HEART RATE: 78 BPM | SYSTOLIC BLOOD PRESSURE: 132 MMHG | OXYGEN SATURATION: 99 % | BODY MASS INDEX: 31.69 KG/M2

## 2018-03-09 DIAGNOSIS — E03.9 HYPOTHYROIDISM, UNSPECIFIED TYPE: ICD-10-CM

## 2018-03-09 DIAGNOSIS — M15.9 PRIMARY OSTEOARTHRITIS INVOLVING MULTIPLE JOINTS: ICD-10-CM

## 2018-03-09 DIAGNOSIS — F51.01 PRIMARY INSOMNIA: Primary | ICD-10-CM

## 2018-03-09 PROBLEM — M70.21 OLECRANON BURSITIS OF RIGHT ELBOW: Status: RESOLVED | Noted: 2017-04-05 | Resolved: 2018-03-09

## 2018-03-09 PROCEDURE — 99214 OFFICE O/P EST MOD 30 MIN: CPT | Performed by: FAMILY MEDICINE

## 2018-03-09 RX ORDER — TRAMADOL HYDROCHLORIDE 50 MG/1
50 TABLET ORAL EVERY 6 HOURS PRN
Qty: 120 TABLET | Refills: 2 | Status: SHIPPED | OUTPATIENT
Start: 2018-03-09 | End: 2018-05-03

## 2018-03-09 RX ORDER — RANITIDINE 150 MG/1
150 CAPSULE ORAL 2 TIMES DAILY PRN
Qty: 120 CAPSULE | Refills: 2 | Status: SHIPPED | OUTPATIENT
Start: 2018-03-09 | End: 2018-03-09 | Stop reason: SDUPTHER

## 2018-03-09 RX ORDER — MELOXICAM 15 MG/1
15 TABLET ORAL DAILY
COMMUNITY
End: 2018-06-12

## 2018-03-09 RX ORDER — MELOXICAM 15 MG/1
TABLET ORAL
COMMUNITY
Start: 2003-08-15 | End: 2018-03-09 | Stop reason: SDUPTHER

## 2018-03-09 NOTE — PROGRESS NOTES
Subjective   Pro Mueller is a 63 y.o. male. Previously seen by Dr. Babcock. Pt is new to me, problems are new to me.      Chief Complaint   Patient presents with   • Establish Care     dicuss xanax and tramadol increase        History of Present Illness  Has hx of alcoholism with alcohol withdrawal, insomnia, chronic pain, HTN and HLD.    Insomnia  Taking every night, taking for 5 years or more and does not work any more. Has tried TCAs, trazodone worked for about 3 months, temazepam went up on dose and was ineffective, ambien worked for a while but not well for long, lunesta didn't work at all. Sleeping has always been a problem for him.      Arthritis Pain  Arthritis in the knees, taking tramadol. Not helping much anymore. 12/2017 saw Dr. Mancilla and was diagnosed with arthritis in his bilateral shoulders. Did some PT after that to see if it would help, got steroid shots which helped for couple of months. PT went well, still hurts, unable to carry his camera equipment and go hiking. This is because of the shoulders and the knees. Pain when standing or stationary feels like there on fire, walking or sitting is not bad. Shoulders are painful when he tries to sleep, hard to go to sleep and can wake him up after he goes to sleep. Goes back and forth because sleeps on his side. Was seen by rheumatology for arthritis when having a lot of time, went on meloxicam and tramadol. This was about 15 years ago. Posterior patellar arthritis.    Herpes takes acyclovir suppressive therapy.    Hypothyroidism several years ago, was increased to the 75 mcg.  Hx of heavy drinking but several years ago just returned to recreational use again.  The following portions of the patient's history were reviewed and updated as appropriate: allergies, current medications, past family history, past medical history, past social history, past surgical history and problem list.    Review of Systems   Constitutional: Positive for activity change  "and unexpected weight change. Negative for appetite change.   Respiratory: Negative for shortness of breath.    Cardiovascular: Negative for chest pain and leg swelling.   Gastrointestinal: Negative for blood in stool, constipation and diarrhea.   Musculoskeletal: Positive for arthralgias.   Psychiatric/Behavioral: Positive for sleep disturbance. Negative for dysphoric mood. The patient is not nervous/anxious.        Objective   Blood pressure 132/78, pulse 78, height 182.9 cm (72.01\"), weight 106 kg (234 lb), SpO2 99 %.  Physical Exam   Constitutional: He is oriented to person, place, and time. He appears well-nourished. No distress.   HENT:   Mouth/Throat: Oropharynx is clear and moist.   Fair dentition   Eyes: Conjunctivae are normal. Right eye exhibits no discharge. Left eye exhibits no discharge. No scleral icterus.   Neck: Neck supple. No thyromegaly present.   Cardiovascular: Normal rate, regular rhythm, normal heart sounds and intact distal pulses.  Exam reveals no gallop and no friction rub.    No murmur heard.  Pulmonary/Chest: Effort normal and breath sounds normal. No respiratory distress. He has no wheezes.   Musculoskeletal: He exhibits no edema.   Lymphadenopathy:     He has no cervical adenopathy.   Neurological: He is alert and oriented to person, place, and time.   Psychiatric: He has a normal mood and affect. His behavior is normal.   Vitals reviewed.      Assessment/Plan   Pro was seen today for establish care.    Diagnoses and all orders for this visit:    Primary insomnia  -     Ambulatory Referral to Sleep Medicine  Patient wanted to try an increased dose of his alprazolam as he has become tolerant on the current dose in order to get better sleep.  He does have underlying insomnia.  She did see a neurologist but has never had a sleep study.  After extensive discussion with his neurologist was been formally diagnosed with late onset sleep.  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  Now he does on that lower extremity as he works days but has a very good job that he likes.  Patient plans to continue working for 6 more years before [employer] and so does require his sleep in order to do his job.  I expressed concern about increasing dose of alprazolam from 1 mg and even for him to be on this medication in general because of the risks that this class carries and those risks increase as his age does.  I assured him that I would not discontinue the medication but also that I did not feel comfortable increasing his dose.  As he has been on numerous sleep aids I recommended he see a sleep specialist.  Whether he needs a sleep study for more formal evaluation, alternative medication, or in fact increased doses of benzodiazepines I would recommend that come from a specialist.  He is agreeable to this plan.  I also expressed concern that his worsening arthralgias from osteoarthritis are limiting to his poor sleep and he should be reevaluated with orthopedics.  Whether he could benefit from further injections or maybe need to consider surgery at this time because of the significant problem he should be reevaluated.  His arthritis is affecting his daily living and therefore quality of life and his sleep quality.  Patient voiced understanding and was agreeable to the plan.    Hypothyroidism, unspecified type      Primary osteoarthritis involving multiple joints  Patient agrees because of worsening joint pain and he did have some benefit from steroid injections in the shoulders at least that it would be appropriate to go see the orthopedist again.  He's been wanting to get knee replacements for 15 years and has been counseled to wait as long as he can.  He is wondering if this is as long as he can.  He can't enjoy his hobbies the way he wants to.  Also his sleep is significantly affected by the joint pain.  He is already established with Dr. Mancilla's office  and voiced that he will on his way out go to their office and try to schedule follow-up to be seen there.  He wanted an increase in his tramadol dose to help with pain relief.  I was concerned regarding dependence and therefore need for increased dosing of an opioid that can be problematic to be on as he gets older.  Especially when the dose increases.  Also dangerous to have benzodiazepines and opioids coadministered.  Recommended he be seen by orthopedics.  I will not discontinue the medications but I also will not raise the doses at this time.  Patient voiced understanding and was agreeable to the plan.    Other orders  -     traMADol (ULTRAM) 50 MG tablet; Take 1 tablet by mouth Every 6 (Six) Hours As Needed for Moderate Pain .  -     ranitidine (ZANTAC) 150 MG capsule; Take 1 capsule by mouth 2 (Two) Times a Day As Needed for Indigestion or Heartburn.

## 2018-03-13 RX ORDER — RANITIDINE 150 MG/1
CAPSULE ORAL
Qty: 180 CAPSULE | Refills: 2 | Status: SHIPPED | OUTPATIENT
Start: 2018-03-13 | End: 2018-08-04

## 2018-03-16 ENCOUNTER — DOCUMENTATION (OUTPATIENT)
Dept: PHYSICAL THERAPY | Facility: CLINIC | Age: 63
End: 2018-03-16

## 2018-03-16 NOTE — PROGRESS NOTES
Discharge Summary  Discharge Summary from Physical Therapy Report      Dates  PT visit: 1/22-2/19/18  Number of Visits: 6     Discharge Status of Patient: See progress Note dated 2/19/18    Goals: Partially Met    Discharge Plan: Continue with current home exercise program as instructed    Comments pt did not make additional PT appts so will d/c at this time    Date of Discharge 3/16/18        Frieda Harden, PT  Physical Therapist

## 2018-03-19 ENCOUNTER — OFFICE VISIT (OUTPATIENT)
Dept: ORTHOPEDIC SURGERY | Facility: CLINIC | Age: 63
End: 2018-03-19

## 2018-03-19 VITALS — HEIGHT: 72 IN | TEMPERATURE: 99 F | BODY MASS INDEX: 32.1 KG/M2 | WEIGHT: 237 LBS

## 2018-03-19 DIAGNOSIS — M76.892 HIP TENDINITIS, LEFT: ICD-10-CM

## 2018-03-19 DIAGNOSIS — M19.019 AC (ACROMIOCLAVICULAR) ARTHRITIS: Primary | ICD-10-CM

## 2018-03-19 DIAGNOSIS — IMO0002 BURSITIS/TENDONITIS, SHOULDER: ICD-10-CM

## 2018-03-19 PROCEDURE — 20610 DRAIN/INJ JOINT/BURSA W/O US: CPT | Performed by: ORTHOPAEDIC SURGERY

## 2018-03-19 PROCEDURE — 73030 X-RAY EXAM OF SHOULDER: CPT | Performed by: ORTHOPAEDIC SURGERY

## 2018-03-19 PROCEDURE — 72170 X-RAY EXAM OF PELVIS: CPT | Performed by: ORTHOPAEDIC SURGERY

## 2018-03-19 PROCEDURE — 99214 OFFICE O/P EST MOD 30 MIN: CPT | Performed by: ORTHOPAEDIC SURGERY

## 2018-03-19 PROCEDURE — 20605 DRAIN/INJ JOINT/BURSA W/O US: CPT | Performed by: ORTHOPAEDIC SURGERY

## 2018-03-19 RX ORDER — LIDOCAINE HYDROCHLORIDE 10 MG/ML
1 INJECTION, SOLUTION EPIDURAL; INFILTRATION; INTRACAUDAL; PERINEURAL
Status: COMPLETED | OUTPATIENT
Start: 2018-03-19 | End: 2018-03-19

## 2018-03-19 RX ORDER — METHYLPREDNISOLONE ACETATE 80 MG/ML
80 INJECTION, SUSPENSION INTRA-ARTICULAR; INTRALESIONAL; INTRAMUSCULAR; SOFT TISSUE
Status: COMPLETED | OUTPATIENT
Start: 2018-03-19 | End: 2018-03-19

## 2018-03-19 RX ORDER — TRAMADOL HYDROCHLORIDE 50 MG/1
50 TABLET ORAL EVERY 4 HOURS PRN
Qty: 60 TABLET | Refills: 0 | Status: SHIPPED | OUTPATIENT
Start: 2018-03-19 | End: 2018-04-18 | Stop reason: SDUPTHER

## 2018-03-19 RX ORDER — LIDOCAINE HYDROCHLORIDE 10 MG/ML
2 INJECTION, SOLUTION EPIDURAL; INFILTRATION; INTRACAUDAL; PERINEURAL
Status: COMPLETED | OUTPATIENT
Start: 2018-03-19 | End: 2018-03-19

## 2018-03-19 RX ORDER — MELOXICAM 15 MG/1
15 TABLET ORAL DAILY PRN
Qty: 30 TABLET | Refills: 2 | Status: SHIPPED | OUTPATIENT
Start: 2018-03-19 | End: 2018-10-23 | Stop reason: SDUPTHER

## 2018-03-19 RX ADMIN — METHYLPREDNISOLONE ACETATE 80 MG: 80 INJECTION, SUSPENSION INTRA-ARTICULAR; INTRALESIONAL; INTRAMUSCULAR; SOFT TISSUE at 14:39

## 2018-03-19 RX ADMIN — LIDOCAINE HYDROCHLORIDE 1 ML: 10 INJECTION, SOLUTION EPIDURAL; INFILTRATION; INTRACAUDAL; PERINEURAL at 14:39

## 2018-03-19 RX ADMIN — METHYLPREDNISOLONE ACETATE 80 MG: 80 INJECTION, SUSPENSION INTRA-ARTICULAR; INTRALESIONAL; INTRAMUSCULAR; SOFT TISSUE at 14:40

## 2018-03-19 RX ADMIN — LIDOCAINE HYDROCHLORIDE 2 ML: 10 INJECTION, SOLUTION EPIDURAL; INFILTRATION; INTRACAUDAL; PERINEURAL at 14:40

## 2018-03-19 RX ADMIN — METHYLPREDNISOLONE ACETATE 80 MG: 80 INJECTION, SUSPENSION INTRA-ARTICULAR; INTRALESIONAL; INTRAMUSCULAR; SOFT TISSUE at 14:46

## 2018-03-19 RX ADMIN — LIDOCAINE HYDROCHLORIDE 2 ML: 10 INJECTION, SOLUTION EPIDURAL; INFILTRATION; INTRACAUDAL; PERINEURAL at 14:46

## 2018-03-19 NOTE — PROGRESS NOTES
"  Patient: Pro Mueller    YOB: 1955    Medical Record Number: [id number]    Chief Complaints:  New complaint of left shoulder and left hip pain    History of Present Illness:     63 y.o. male patient who presents for a couple of issues.  The first is his left shoulder.  He reports that he is now experiencing similar symptoms in the left shoulder as those that he was having in the right shoulder prior to his injection.  He describes moderate constant aching pain which is worse with reaching and lifting.  The pain is both over the top and side of the shoulder.  Meloxicam and Ultram both helped.  He has requested refills of both of these medicines.    He also mentions a new complaint of lateral sided left hip pain.  This is been an issue for over 3 months, intermittently.  He does not recall any specific inciting event or factor.  He was told by his rheumatologist that this was \"bursitis\".  The pain as moderate, constant, and aching.  He has not noticed any alleviating factors.    Allergies:   Allergies   Allergen Reactions   • Penicillins        Home Medications:    Current Outpatient Prescriptions:   •  acyclovir (ZOVIRAX) 800 MG tablet, TAKE 1 TABLET BY MOUTH THREE TIMES DAILY AS NEEDED, Disp: 90 tablet, Rfl: 0  •  ALPRAZolam (XANAX) 1 MG tablet, Take 1 tablet by mouth At Night As Needed for Anxiety., Disp: 30 tablet, Rfl: 3  •  amLODIPine (NORVASC) 5 MG tablet, Take 1 tablet by mouth Daily., Disp: 90 tablet, Rfl: 0  •  atorvastatin (LIPITOR) 20 MG tablet, Take 1 tablet by mouth Daily., Disp: 90 tablet, Rfl: 0  •  azelastine (ASTEPRO) 0.15 % solution nasal spray, 2 squirts twice a day, Disp: 30 mL, Rfl: 1  •  escitalopram (LEXAPRO) 10 MG tablet, TAKE 1 TABLET BY MOUTH EVERY MORNING, Disp: 30 tablet, Rfl: 4  •  levothyroxine (SYNTHROID, LEVOTHROID) 75 MCG tablet, 1 per day, Disp: 90 tablet, Rfl: 3  •  lisinopril (PRINIVIL,ZESTRIL) 10 MG tablet, Take 1 tablet by mouth Daily., Disp: 90 tablet, Rfl: 0  • "  meloxicam (MOBIC) 15 MG tablet, Take 15 mg by mouth Daily., Disp: , Rfl:   •  QUEtiapine (SEROquel) 25 MG tablet, TAKE 1 TABLET BY MOUTH EVERY EVENING, Disp: 90 tablet, Rfl: 2  •  ranitidine (ZANTAC) 150 MG capsule, TAKE 1 CAPSULE BY MOUTH TWICE DAILY AS NEEDED FOR INDIGESTION OR HEARTBURN, Disp: 180 capsule, Rfl: 2  •  SUMAtriptan (IMITREX) 100 MG tablet, 1 at start of migraine and repeat 2 hours later if nec., Disp: 13 tablet, Rfl: 11  •  traMADol (ULTRAM) 50 MG tablet, Take 1 tablet by mouth Every 6 (Six) Hours As Needed for Moderate Pain ., Disp: 120 tablet, Rfl: 2    Past Medical History:   Diagnosis Date   • Alcohol abuse    • Anxiety    • Arthritis    • Depression    • Encounter for removal of sutures    • Hyperlipidemia    • Hypertension    • Kidney stone    • Motion sickness    • Motion sickness    • Olecranon bursitis, right elbow    • Withdrawal symptoms, alcohol        Past Surgical History:   Procedure Laterality Date   • CYST REMOVAL     • TONSILLECTOMY         Social History     Occupational History   • Not on file.     Social History Main Topics   • Smoking status: Former Smoker     Packs/day: 0.50     Years: 25.00   • Smokeless tobacco: Never Used   • Alcohol use 3.6 oz/week     6 Shots of liquor per week      Comment: several night weekly, few drinks per occasion   • Drug use: No   • Sexual activity: Yes     Partners: Female      Social History     Social History Narrative   • No narrative on file       Family History   Problem Relation Age of Onset   • Alzheimer's disease Mother    • Pancreatic cancer Father        Review of Systems:      Constitutional: Denies fever, shaking or chills   Eyes: Denies change in visual acuity   HEENT: Denies nasal congestion or sore throat   Respiratory: Denies cough or shortness of breath   Cardiovascular: Denies chest pain or edema  Endocrine: Denies tremors, palpitations, intolerance of heat or cold, polyuria, polydipsia.  GI: Denies abdominal pain, nausea,  "vomiting, bloody stools or diarrhea  : Denies frequency, urgency, incontinence, retention, or nocturia.  Musculoskeletal: Denies numbness tingling or loss of motor function except as above  Integument: Denies rash, lesion or ulceration   Neurologic: Denies headache or focal weakness, deficits  Heme: Denies epistaxis, spontaneous or excessive bleeding, epistaxis, hematuria, melena, fatigue, enlarged or tender lymph nodes.      All other pertinent positives and negatives as noted above in HPI.    Physical Exam: 63 y.o. male  Vitals:    03/19/18 1412   Temp: 99 °F (37.2 °C)   TempSrc: Temporal Artery    Weight: 108 kg (237 lb)   Height: 182.9 cm (72\")       General:  Patient is awake and alert.  Appears in no acute distress or discomfort.    Psych:  Affect and demeanor are appropriate.    Eyes:  Conjunctiva and sclera appear grossly normal.  Eyes track well and EOM seem to be intact.    Ears:  No gross abnormalities.  Hearing adequate for the exam.    Cardiovascular:  Regular rate and rhythm.    Lungs:  Good chest expansion.  Breathing unlabored.    Extremities: Left shoulder is examined.  Skin is benign.  No gross abnormalities on inspection including any atrophy, swellings, or masses.  No palpable masses or adenopathy.  Moderate tenderness over the acromioclavicular joint.  Full shoulder motion.  No evident instability or apprehension.  Positive Neer and Villareal manuevers.  Positive active compression maneuver.  Negative Speeds and Yergasons manuevers.  Resisted elevation in the scapular plane is uncomfortable in his strength is 5 minus out of 5.  Good internal and external rotation strength.  Good strength in the deltoid, biceps, triceps, and .  Intact sensation throughout the arm.  Brisk cap refill.    Left hip is also examined.  Skin is benign.  Focal tenderness into the piriformis fossa.  No effusion.  Good hip motion.  Negative Stinchfield maneuver.  Good strength with hip flexion and abduction.  No " instability.    Imaging:  AP, scapular Y and axillary views of the left shoulder are ordered and reviewed.  No comparison films are available.  There is moderate acromioclavicular osteoarthritis.  No significant glenohumeral arthrosis.  Acromiohumeral interval measures normal.    An AP pelvis is also ordered and reviewed.  Again, no comparison films are available.  The hip joint spaces are well-preserved.  No profound arthrosis.  No lesions, masses, or other concerning findings.    Assessment/Plan:  1.  Left shoulder symptomatic acromioclavicular osteoarthritis and rotator cuff tendinopathy versus tear  2.  Left piriformis tendinitis    We discussed options for him.  I did agree to refill the meloxicam and tramadol.  The risks were discussed.  He also requested that I agree to continue to refill his tramadol on an ongoing basis.  I told him that I will be happy to send him to pain management for that purpose but I cannot keep him on long-term pain medicine.    I recommended injections for both the shoulder and hip.  The risks, benefits, and alternatives were discussed and he consented.  Going forward, he will follow-up as needed.    Medium Joint Arthrocentesis  Date/Time: 3/19/2018 2:39 PM  Consent given by: patient  Site marked: site marked  Timeout: Immediately prior to procedure a time out was called to verify the correct patient, procedure, equipment, support staff and site/side marked as required   Supporting Documentation  Indications: pain   Procedure Details  Location: shoulder - L acromioclavicular  Preparation: Patient was prepped and draped in the usual sterile fashion  Needle size: 25 G  Approach: superior  Medications administered: 80 mg methylPREDNISolone acetate 80 MG/ML; 1 mL lidocaine PF 1% 1 %  Patient tolerance: patient tolerated the procedure well with no immediate complications    Large Joint Arthrocentesis  Date/Time: 3/19/2018 2:40 PM  Consent given by: patient  Site marked: site  marked  Timeout: Immediately prior to procedure a time out was called to verify the correct patient, procedure, equipment, support staff and site/side marked as required   Supporting Documentation  Indications: pain   Procedure Details  Location: shoulder - L subacromial bursa  Preparation: Patient was prepped and draped in the usual sterile fashion  Needle size: 25 G  Approach: posterior  Medications administered: 80 mg methylPREDNISolone acetate 80 MG/ML; 2 mL lidocaine PF 1% 1 %  Patient tolerance: patient tolerated the procedure well with no immediate complications    Large Joint Arthrocentesis  Date/Time: 3/19/2018 2:46 PM  Consent given by: patient  Site marked: site marked  Timeout: Immediately prior to procedure a time out was called to verify the correct patient, procedure, equipment, support staff and site/side marked as required   Supporting Documentation  Indications: pain   Procedure Details  Location: hip (Left piriformis fossa) -   Preparation: Patient was prepped and draped in the usual sterile fashion  Needle size: 25 G  Approach: anterolateral  Medications administered: 2 mL lidocaine PF 1% 1 %; 80 mg methylPREDNISolone acetate 80 MG/ML  Patient tolerance: patient tolerated the procedure well with no immediate complications          Aj Mancilla MD    03/19/2018

## 2018-04-10 DIAGNOSIS — G43.011 INTRACTABLE MIGRAINE WITHOUT AURA AND WITH STATUS MIGRAINOSUS: ICD-10-CM

## 2018-04-10 RX ORDER — SUMATRIPTAN 100 MG/1
TABLET, FILM COATED ORAL
Qty: 13 TABLET | Refills: 3 | Status: SHIPPED | OUTPATIENT
Start: 2018-04-10 | End: 2019-01-02

## 2018-04-18 RX ORDER — TRAMADOL HYDROCHLORIDE 50 MG/1
50 TABLET ORAL EVERY 4 HOURS PRN
Qty: 60 TABLET | Refills: 0 | Status: SHIPPED | OUTPATIENT
Start: 2018-04-18 | End: 2018-05-17 | Stop reason: SDUPTHER

## 2018-04-27 ENCOUNTER — OFFICE VISIT (OUTPATIENT)
Dept: SLEEP MEDICINE | Facility: HOSPITAL | Age: 63
End: 2018-04-27
Attending: INTERNAL MEDICINE

## 2018-04-27 VITALS
WEIGHT: 216 LBS | HEIGHT: 72 IN | BODY MASS INDEX: 29.26 KG/M2 | HEART RATE: 80 BPM | SYSTOLIC BLOOD PRESSURE: 172 MMHG | DIASTOLIC BLOOD PRESSURE: 89 MMHG

## 2018-04-27 DIAGNOSIS — G47.33 OSA (OBSTRUCTIVE SLEEP APNEA): Primary | ICD-10-CM

## 2018-04-27 DIAGNOSIS — G47.00 INSOMNIA, UNSPECIFIED TYPE: ICD-10-CM

## 2018-04-27 DIAGNOSIS — F41.9 ANXIETY: ICD-10-CM

## 2018-04-27 DIAGNOSIS — G47.10 HYPERSOMNOLENCE: ICD-10-CM

## 2018-04-27 DIAGNOSIS — E03.9 HYPOTHYROIDISM, UNSPECIFIED TYPE: ICD-10-CM

## 2018-04-27 PROCEDURE — G0463 HOSPITAL OUTPT CLINIC VISIT: HCPCS

## 2018-04-27 RX ORDER — ALPRAZOLAM 1 MG/1
1 TABLET ORAL NIGHTLY PRN
Qty: 30 TABLET | Refills: 0 | Status: CANCELLED | OUTPATIENT
Start: 2018-04-27

## 2018-04-30 DIAGNOSIS — G47.00 INSOMNIA, UNSPECIFIED TYPE: ICD-10-CM

## 2018-04-30 RX ORDER — ALPRAZOLAM 1 MG/1
1 TABLET ORAL NIGHTLY PRN
Qty: 30 TABLET | Refills: 0 | OUTPATIENT
Start: 2018-04-30 | End: 2018-05-24 | Stop reason: SDUPTHER

## 2018-05-02 ENCOUNTER — TELEPHONE (OUTPATIENT)
Dept: FAMILY MEDICINE CLINIC | Facility: CLINIC | Age: 63
End: 2018-05-02

## 2018-05-02 NOTE — TELEPHONE ENCOUNTER
Patient called stating a few days ago his wife told him he wants a divorce, since then the patient has had a hard time, panic attacks, cant eat, sleep, and running out of sick days at work due to not being able to focus on anything. Patient is scheduled with the NP for 5/3

## 2018-05-03 ENCOUNTER — OFFICE VISIT (OUTPATIENT)
Dept: FAMILY MEDICINE CLINIC | Facility: CLINIC | Age: 63
End: 2018-05-03

## 2018-05-03 VITALS
HEART RATE: 97 BPM | SYSTOLIC BLOOD PRESSURE: 145 MMHG | HEIGHT: 72 IN | DIASTOLIC BLOOD PRESSURE: 80 MMHG | TEMPERATURE: 98.5 F | WEIGHT: 216.5 LBS | OXYGEN SATURATION: 95 % | BODY MASS INDEX: 29.32 KG/M2

## 2018-05-03 DIAGNOSIS — F41.0 PANIC ATTACKS: ICD-10-CM

## 2018-05-03 DIAGNOSIS — F41.8 MIXED ANXIETY DEPRESSIVE DISORDER: Primary | ICD-10-CM

## 2018-05-03 PROCEDURE — 99213 OFFICE O/P EST LOW 20 MIN: CPT | Performed by: NURSE PRACTITIONER

## 2018-05-03 RX ORDER — HYDROXYZINE HYDROCHLORIDE 25 MG/1
25 TABLET, FILM COATED ORAL 2 TIMES DAILY PRN
Qty: 60 TABLET | Refills: 3 | Status: SHIPPED | OUTPATIENT
Start: 2018-05-03 | End: 2018-08-04

## 2018-05-03 RX ORDER — ESCITALOPRAM OXALATE 20 MG/1
20 TABLET ORAL DAILY
Qty: 30 TABLET | Refills: 3 | Status: SHIPPED | OUTPATIENT
Start: 2018-05-03 | End: 2019-01-02

## 2018-05-03 NOTE — PROGRESS NOTES
Subjective   Pro Mueller is a 63 y.o. male presents with a history of anxiety and depression, currently prescribed seroquel, escitalopram and xanax. Recently found out his wife wanted a divorce. Works as an  for [employer]. He is unable to complete mathematical equations. Has been missing work frequently. Feels down and depressed, having frequent panic attacks. Feels like his heart is bounding, mild shortness of breath but denies hyperventilation. Difficulty sleeping. Sleeps from 2-5 hours at night time. Denies any suicidal thoughts, plans or homicidal thoughts.     Panic Attack   This is a new problem. The current episode started 1 to 4 weeks ago. The problem occurs 2 to 4 times per day. The problem has been unchanged. Associated symptoms include fatigue. Pertinent negatives include no abdominal pain or chest pain. Nothing aggravates the symptoms. Treatments tried: seroquel, xanax at bedtime, escitalopram. The treatment provided no relief.   Anxiety   Presents for follow-up visit. Symptoms include decreased concentration, depressed mood, excessive worry, insomnia, nervous/anxious behavior, panic, restlessness and shortness of breath (infrequent). Patient reports no chest pain, hyperventilation, palpitations or suicidal ideas. Primary symptoms comment: decreased appetite, weight loss 20 lbs. Symptoms occur constantly. The severity of symptoms is interfering with daily activities and incapacitating. The quality of sleep is poor. Nighttime awakenings: several.     His past medical history is significant for anxiety/panic attacks. Compliance with medications is %.        The following portions of the patient's history were reviewed and updated as appropriate: allergies, current medications, past family history, past medical history, past social history, past surgical history and problem list.    Review of Systems   Constitutional: Positive for fatigue.   HENT: Negative.    Eyes: Negative.    Respiratory:  Positive for shortness of breath (infrequent).    Cardiovascular: Negative.  Negative for chest pain and palpitations.   Gastrointestinal: Negative.  Negative for abdominal pain.   Endocrine: Negative.    Genitourinary: Negative.    Musculoskeletal: Negative.    Skin: Negative.    Allergic/Immunologic: Negative.    Neurological: Negative.    Hematological: Negative.    Psychiatric/Behavioral: Positive for agitation, behavioral problems, decreased concentration, dysphoric mood and sleep disturbance. Negative for hallucinations, self-injury and suicidal ideas. The patient is nervous/anxious and has insomnia. The patient is not hyperactive.        Objective   Physical Exam   Constitutional: He is oriented to person, place, and time. He appears well-developed and well-nourished.   HENT:   Head: Normocephalic and atraumatic.   Cardiovascular: Normal rate, regular rhythm and normal heart sounds.  Exam reveals no gallop and no friction rub.    No murmur heard.  Pulmonary/Chest: Effort normal and breath sounds normal. No respiratory distress. He has no wheezes. He has no rales.   Neurological: He is alert and oriented to person, place, and time.   Psychiatric: His speech is normal and behavior is normal. Thought content normal. His mood appears anxious.   flat       Assessment/Plan   Pro was seen today for panic attack.    Diagnoses and all orders for this visit:    Mixed anxiety depressive disorder  -     Ambulatory Referral to Psychiatry    Panic attacks  -     Ambulatory Referral to Psychiatry    Other orders  -     escitalopram (LEXAPRO) 20 MG tablet; Take 1 tablet by mouth Daily.  -     hydrOXYzine (ATARAX) 25 MG tablet; Take 1 tablet by mouth 2 (Two) Times a Day As Needed for Itching or Anxiety.        Recommend short term disabillity, psych referral. During that time, will increase escitalopram to 20 mg, try atarax for acute anxiety attacks and have patient follow up with PCP in one week. Patient denies SI or HI,  will keep in contact and if need be, go to hospital for urgent mental health evaluation if symptoms worsen. Previous hospitalization in the past.  Patient to call [facility] or Dr. Lewis for an appointment and notify office of appointment date/time

## 2018-05-11 ENCOUNTER — HOSPITAL ENCOUNTER (OUTPATIENT)
Dept: SLEEP MEDICINE | Facility: HOSPITAL | Age: 63
End: 2018-05-11
Attending: INTERNAL MEDICINE

## 2018-05-11 ENCOUNTER — HOSPITAL ENCOUNTER (EMERGENCY)
Facility: HOSPITAL | Age: 63
Discharge: HOME | End: 2018-05-11
Attending: EMERGENCY MEDICINE | Admitting: EMERGENCY MEDICINE

## 2018-05-11 ENCOUNTER — TELEPHONE (OUTPATIENT)
Dept: FAMILY MEDICINE CLINIC | Facility: CLINIC | Age: 63
End: 2018-05-11

## 2018-05-11 VITALS
WEIGHT: 216 LBS | TEMPERATURE: 97.5 F | OXYGEN SATURATION: 97 % | HEIGHT: 72 IN | SYSTOLIC BLOOD PRESSURE: 134 MMHG | RESPIRATION RATE: 18 BRPM | DIASTOLIC BLOOD PRESSURE: 92 MMHG | BODY MASS INDEX: 29.26 KG/M2 | HEART RATE: 86 BPM

## 2018-05-11 DIAGNOSIS — F32.A DEPRESSION, UNSPECIFIED DEPRESSION TYPE: Primary | ICD-10-CM

## 2018-05-11 LAB
AMPHET+METHAMPHET UR QL: NEGATIVE
BARBITURATES UR QL SCN: NEGATIVE
BENZODIAZ UR QL SCN: POSITIVE
CANNABINOIDS SERPL QL: NEGATIVE
COCAINE UR QL: NEGATIVE
ETHANOL BLD-MCNC: <10 MG/DL (ref 0–10)
ETHANOL UR QL: <0.01 %
METHADONE UR QL SCN: NEGATIVE
OPIATES UR QL: NEGATIVE
OXYCODONE UR QL SCN: NEGATIVE

## 2018-05-11 PROCEDURE — 80307 DRUG TEST PRSMV CHEM ANLYZR: CPT

## 2018-05-11 PROCEDURE — 99283 EMERGENCY DEPT VISIT LOW MDM: CPT

## 2018-05-11 PROCEDURE — 36415 COLL VENOUS BLD VENIPUNCTURE: CPT

## 2018-05-11 PROCEDURE — 90791 PSYCH DIAGNOSTIC EVALUATION: CPT

## 2018-05-11 NOTE — TELEPHONE ENCOUNTER
Patient called stating that he is still having problems, cant eat,sleep, take care of his self, becomes disoriented at times, patient wants to know what he should do.

## 2018-05-11 NOTE — TELEPHONE ENCOUNTER
I called and spoke with the pt. Since it is late on Friday and I would not be able to see him myself I recommend he go to ED to be evaluated by psychiatric team. He is a risk to himself he feels like he is unable to care for himself and there is no one in the home to help him. I told him to ask for the psychiatry team and if he does not need to be admitted they can help set up an intensive outpatient therapy plan with counseling to help him. He voiced understanding and said he would go to [facility] it is closest to him. Encouraged him to go as soon as he was able.

## 2018-05-12 NOTE — ED PROVIDER NOTES
EMERGENCY DEPARTMENT ENCOUNTER    CHIEF COMPLAINT  Chief Complaint: Increased Depression  History given by: Patient  History limited by: None  Room Number: 35/35  PMD: Alla Beal MD      HPI:  Pt is a 63 y.o. male who presents complaining of increased depression from baseline since pt began dealing with divorce of his spouse one week ago. Pt c/o decreased appetite and decreased sleep, but denies SI or HI. Pt takes Xanax with no relief of sxs. Pt hx of insomnia and depression.    Duration: Since one week ago  Onset: Gradual  Timing: Constant  Intensity/Severity: Moderate  Progression: Increased  Associated Symptoms: Decreased appetite and decreased sleep  Aggravating Factors: Pt recent divorce with spouse has caused increased depression from basline  Previous Episodes: Pt hx of depression and insomnia  Treatment before arrival: Pt takes Xanax with no relief of sxs    PAST MEDICAL HISTORY  Active Ambulatory Problems     Diagnosis Date Noted   • Alcoholism 08/08/2016   • Atopic rhinitis 08/08/2016   • Mixed anxiety depressive disorder 08/08/2016   • Genital herpes simplex 08/08/2016   • Hyperlipidemia 08/08/2016   • Hypertension 08/08/2016   • Hypothyroidism 08/08/2016   • Insomnia 08/08/2016   • Panic disorder without agoraphobia 08/08/2016   • Persistent insomnia 08/08/2016   • Vitamin D deficiency 08/08/2016   • Alcohol withdrawal 11/04/2016   • Headache 11/05/2016   • Low back pain 11/11/2016     Resolved Ambulatory Problems     Diagnosis Date Noted   • Accidental fall 08/08/2016   • Impacted cerumen 08/08/2016   • Influenza 08/08/2016   • Motion sickness 08/08/2016   • Seasonal allergic rhinitis 08/08/2016   • Upper respiratory tract infection 08/08/2016   • Gastritis 11/05/2016   • Olecranon bursitis of right elbow 04/05/2017     Past Medical History:   Diagnosis Date   • Alcohol abuse    • Anxiety    • Arthritis    • Depression    • Encounter for removal of sutures    • Hyperlipidemia    • Hypertension    •  Kidney stone    • Motion sickness    • Motion sickness    • Olecranon bursitis, right elbow    • Withdrawal symptoms, alcohol        PAST SURGICAL HISTORY  Past Surgical History:   Procedure Laterality Date   • CYST REMOVAL     • EXTRACORPOREAL SHOCK WAVE LITHOTRIPSY (ESWL) Right 2002   • TONSILLECTOMY         FAMILY HISTORY  Family History   Problem Relation Age of Onset   • Alzheimer's disease Mother    • Pancreatic cancer Father        SOCIAL HISTORY  Social History     Social History   • Marital status:      Spouse name: N/A   • Number of children: N/A   • Years of education: N/A     Occupational History   • Not on file.     Social History Main Topics   • Smoking status: Former Smoker     Packs/day: 0.50     Years: 25.00   • Smokeless tobacco: Never Used   • Alcohol use 3.6 oz/week     6 Shots of liquor per week      Comment: several night weekly, few drinks per occasion   • Drug use: No   • Sexual activity: Yes     Partners: Female     Other Topics Concern   • Not on file     Social History Narrative   • No narrative on file       ALLERGIES  Penicillins    REVIEW OF SYSTEMS  Review of Systems   Constitutional: Positive for appetite change (decreased). Negative for activity change and fever.   HENT: Negative for congestion and sore throat.    Eyes: Negative.    Respiratory: Negative for cough and shortness of breath.    Cardiovascular: Negative for chest pain and leg swelling.   Gastrointestinal: Negative for abdominal pain, diarrhea and vomiting.   Endocrine: Negative.    Genitourinary: Negative for decreased urine volume and dysuria.   Musculoskeletal: Negative for neck pain.   Skin: Negative for rash and wound.   Allergic/Immunologic: Negative.    Neurological: Negative for weakness, numbness and headaches.   Hematological: Negative.    Psychiatric/Behavioral: Positive for sleep disturbance (decreased). Negative for suicidal ideas.        Increased depression   All other systems reviewed and are  negative.      PHYSICAL EXAM  ED Triage Vitals   Temp Heart Rate Resp BP SpO2   05/11/18 1657 05/11/18 1657 05/11/18 1657 05/11/18 1730 05/11/18 1657   97.4 °F (36.3 °C) 115 18 150/84 94 %      Temp src Heart Rate Source Patient Position BP Location FiO2 (%)   05/11/18 1657 05/11/18 1657 05/11/18 1730 05/11/18 1730 --   Tympanic Monitor Lying Left arm        Physical Exam   Constitutional: He is oriented to person, place, and time and well-developed, well-nourished, and in no distress. No distress.   HENT:   Head: Normocephalic and atraumatic.   Eyes: EOM are normal. Pupils are equal, round, and reactive to light.   Neck: Normal range of motion.   Musculoskeletal: Normal range of motion. He exhibits no deformity.   Neurological: He is alert and oriented to person, place, and time.   Skin: Skin is warm and dry.   Psychiatric: He exhibits a depressed mood. He has a flat affect.   Nursing note and vitals reviewed.      LAB RESULTS  Lab Results (last 24 hours)     Procedure Component Value Units Date/Time    Ethanol [[id number]] Collected:  05/11/18 1750    Specimen:  Blood Updated:  05/11/18 1821     Ethanol <10 mg/dL      Ethanol % <0.010 %     Urine Drug Screen - Urine, Clean Catch [[id number]]  (Abnormal) Collected:  05/11/18 1750    Specimen:  Urine from Urine, Clean Catch Updated:  05/11/18 1828     Amphet/Methamphet, Screen Negative     Barbiturates Screen, Urine Negative     Benzodiazepine Screen, Urine Positive (A)     Cocaine Screen, Urine Negative     Opiate Screen Negative     THC, Screen, Urine Negative     Methadone Screen, Urine Negative     Oxycodone Screen, Urine Negative    Narrative:       Negative Thresholds For Drugs Screened:     Amphetamines               500 ng/ml   Barbiturates               200 ng/ml   Benzodiazepines            100 ng/ml   Cocaine                    300 ng/ml   Methadone                  300 ng/ml   Opiates                    300 ng/ml   Oxycodone                  100 ng/ml    THC                        50 ng/ml    The Normal Value for all drugs tested is negative. This report includes final unconfirmed screening results to be used for medical treatment purposes only. Unconfirmed results must not be used for non-medical purposes such as employment or legal testing. Clinical consideration should be applied to any drug of abuse test, particulary when unconfirmed results are used.          I ordered the above labs and reviewed the results    PROCEDURES  Procedures      PROGRESS AND CONSULTS  ED Course   2045 Placed call to [facility] for pt consult.    2139 Discussed pt with Dr. Titus who agrees with plan of care.    2303 Pt has consulted with [facility] and will be discharged for outpatient f/u. Pt understands and agrees with the plan, all questions answered.    MEDICAL DECISION MAKING  Results were reviewed/discussed with the patient and they were also made aware of online access. Pt also made aware that some labs, such as cultures, will not be resulted during ER visit and follow up with PMD is necessary.     MDM  Number of Diagnoses or Management Options  Depression, unspecified depression type:      Amount and/or Complexity of Data Reviewed  Clinical lab tests: ordered and reviewed (UDS is positive for Benzodiazepine and EtOH is <10)  Decide to obtain previous medical records or to obtain history from someone other than the patient: yes  Review and summarize past medical records: yes    Patient Progress  Patient progress: stable         DIAGNOSIS  Final diagnoses:   Depression, unspecified depression type       DISPOSITION  DISCHARGE    Patient discharged in stable condition.    Reviewed implications of results, diagnosis, meds, responsibility to follow up, warning signs and symptoms of possible worsening, potential complications and reasons to return to ER, including new or worsening sxs.    Patient/Family voiced understanding of above instructions.    Discussed plan for discharge, as there is  no emergent indication for admission. Patient referred to primary care provider for BP management due to today's BP. Pt/family is agreeable and understands need for follow up and repeat testing.  Pt is aware that discharge does not mean that nothing is wrong but it indicates no emergency is present that requires admission and they must continue care with follow-up as given below or physician of their choice.     FOLLOW-UP  Contacts Provided    Schedule an appointment as soon as possible for a visit   For further evaluation and treatment         Medication List      No changes were made to your prescriptions during this visit.         Latest Documented Vital Signs:  As of 5:57 AM  BP- 134/92 HR- 86 Temp- 97.5 °F (36.4 °C) (Oral) O2 sat- 97%    --  Documentation assistance provided by priscila Iqbal for Nickolas PEGUERO.  Information recorded by the scribe was done at my direction and has been verified and validated by me.     Constanza Iqbal  05/11/18 5799       ONEIDA Solorzano III  05/12/18 5131

## 2018-05-12 NOTE — CONSULTS
"62 yo white male evaluated in ED (Room#35). Patient's wife Beth at bedside but stepped out of room during evaluation. Beth asked for a divorce during [place] and patient states he has been more depressed since then. States this is his 3rd wife. Patient with wife 12 years,  8 years. No children. Patient alert and oriented times 4. Pleasant, calm and cooperative. States his second divorce was very traumatic and \"we actually threw things at each other\". States he's been thinking about previous relationships and how they ended also. Patient denies alcohol abuse stating he only drinks 4-5 beer per week. Denies history of self harm. History of SI but has never acted on thoughts. Denies psychosis. Denies HI. 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 History of 30 days inpatient in [place] during his second divorce due to depression. Patient states he is not suicidal and would not harm himself. States he would like to speak with a therapist and psychiatrist outpatient. Patient also states he has been taking xanax 1mg at  for over a decade and it's not working anymore. Patient requesting more xanax.Spoke with patient re importance of following up with a psychiatrist re request for medication. Patient does not meet criteria for inpatient psychiatric admission. Gave patient referrals for Dr. Clark and [facility]. Patient verbalized understanding of dispo.  "

## 2018-05-12 NOTE — ED NOTES
"Pt states that he suffers from depression, insomnia and it is usually is controlled by medications. Pt denies SI/HI but states that his wife is wanting a divorce which is why his depression is worse today.  \"We are in the early stages and still trying to work things out.\"   Pt states wife is in lobby but left to get food.  Pt states \"I have also had no appetite for over a week now too.\"      Nayeli Baires, MARTI  05/11/18 2030    " Alert and oriented to person, place and time

## 2018-05-16 RX ORDER — ATORVASTATIN CALCIUM 20 MG/1
20 TABLET, FILM COATED ORAL DAILY
Qty: 90 TABLET | Refills: 0 | Status: SHIPPED | OUTPATIENT
Start: 2018-05-16 | End: 2019-01-02

## 2018-05-17 ENCOUNTER — OFFICE VISIT (OUTPATIENT)
Dept: FAMILY MEDICINE CLINIC | Facility: CLINIC | Age: 63
End: 2018-05-17

## 2018-05-17 VITALS
SYSTOLIC BLOOD PRESSURE: 148 MMHG | WEIGHT: 211 LBS | BODY MASS INDEX: 28.58 KG/M2 | OXYGEN SATURATION: 99 % | HEART RATE: 89 BPM | DIASTOLIC BLOOD PRESSURE: 88 MMHG | HEIGHT: 72 IN

## 2018-05-17 DIAGNOSIS — F41.8 MIXED ANXIETY DEPRESSIVE DISORDER: Primary | ICD-10-CM

## 2018-05-17 DIAGNOSIS — F51.04 PSYCHOPHYSIOLOGICAL INSOMNIA: ICD-10-CM

## 2018-05-17 DIAGNOSIS — M19.011 PRIMARY OSTEOARTHRITIS OF RIGHT SHOULDER: ICD-10-CM

## 2018-05-17 PROCEDURE — 99214 OFFICE O/P EST MOD 30 MIN: CPT | Performed by: FAMILY MEDICINE

## 2018-05-17 RX ORDER — TRAMADOL HYDROCHLORIDE 50 MG/1
50 TABLET ORAL EVERY 4 HOURS PRN
Qty: 180 TABLET | Refills: 0 | Status: SHIPPED | OUTPATIENT
Start: 2018-05-17 | End: 2018-07-11 | Stop reason: SDUPTHER

## 2018-05-17 RX ORDER — QUETIAPINE FUMARATE 100 MG/1
100 TABLET, FILM COATED ORAL NIGHTLY
Qty: 90 TABLET | Refills: 1 | Status: SHIPPED | OUTPATIENT
Start: 2018-05-17 | End: 2018-11-26

## 2018-05-17 NOTE — PROGRESS NOTES
Subjective   Pro Mueller is a 63 y.o. male.     Chief Complaint   Patient presents with   • Depression   • Anxiety   • Med Refill     zovirax        History of Present Illness  Depression and anxiety  Pt reports that his mind is on a loop. Can't think of anything productive. He is on short term disability. Forcing himself to eat about  Has a couple of friends here, one since high school, they check on him. Mostly he is staying alone. He does not feel much of anything. He denies suicidality. Even discussed trying to stay safe by avoiding his hobby of race car driving because it takes a lot of concentration and he does not have that right now, said in one moment he could really have a bad accident and does not want to risk it. He went to the ED on 5/11/18 after I spoke to him and there were concerns of him being able to take care of himself. There they made a plan for him to contact behavioral healt with Dr. Varner but pt stated he has not been able to get through to set something up to this point. Has tried the numbers he was given.     Insomnia   Already has lots of trouble sleeping. Was seen by sleep, likely sleep apnea and discussed this treatment but agreed that he would need psych evaluation because the anxiety affects the sleep significantly. The ED gave hydroxyzine and also recommended melatonin.     Arthritis  Shoulder and knee pain. Seen by ortho, had imaging and shoulder injections. Pt reported may get another injection but no more beyond that, will need an alternative plan to help the shoulder. Increased the tramadol from q6 to q4. He takes it about every 4-6 hours.     The following portions of the patient's history were reviewed and updated as appropriate: allergies, current medications and problem list.      Review of Systems   Constitutional: Positive for activity change, appetite change and unexpected weight change.        Pt is down 23 lbs since 3/2018   Gastrointestinal: Negative for constipation  "and diarrhea.   Psychiatric/Behavioral: Positive for decreased concentration, dysphoric mood and sleep disturbance. Negative for suicidal ideas. The patient is nervous/anxious.        Objective   Blood pressure 148/88, pulse 89, height 182.9 cm (72\"), weight 95.7 kg (211 lb), SpO2 99 %.  Physical Exam   Constitutional: He is oriented to person, place, and time. He appears well-nourished. No distress.   Pulmonary/Chest: Effort normal. No respiratory distress.   Neurological: He is alert and oriented to person, place, and time.   Psychiatric: He has a normal mood and affect. His behavior is normal. Judgment normal.   Vitals reviewed.      Assessment/Plan   Pro was seen today for depression, anxiety and med refill.    Diagnoses and all orders for this visit:    Mixed anxiety depressive disorder  Pt was started in the office earlier this month on the escitalopram. He is not well controlled and would benefit from intensive therapy and psych evaluation for help with anxiety control.  Psychophysiological insomnia  Will increase the quetiapine at night. Titrate up by 25 mg as often as daily to 100 mg then to call with update. Could go up to 200 mg to treat tx resistant anxiety and help with sleep.  Primary osteoarthritis of right shoulder  Seeing ortho, increased frequency of tramadol, discussed constipation.  Other orders  -     QUEtiapine (SEROquel) 100 MG tablet; Take 1 tablet by mouth Every Night.  -     traMADol (ULTRAM) 50 MG tablet; Take 1 tablet by mouth Every 4 (Four) Hours As Needed for Moderate Pain .               "

## 2018-05-24 DIAGNOSIS — G47.00 INSOMNIA, UNSPECIFIED TYPE: ICD-10-CM

## 2018-05-29 RX ORDER — ALPRAZOLAM 1 MG/1
TABLET ORAL
Qty: 30 TABLET | Refills: 0 | Status: SHIPPED | OUTPATIENT
Start: 2018-05-29 | End: 2018-06-27 | Stop reason: SDUPTHER

## 2018-06-12 ENCOUNTER — OFFICE VISIT (OUTPATIENT)
Dept: FAMILY MEDICINE CLINIC | Facility: CLINIC | Age: 63
End: 2018-06-12

## 2018-06-12 VITALS
DIASTOLIC BLOOD PRESSURE: 88 MMHG | OXYGEN SATURATION: 100 % | BODY MASS INDEX: 29.39 KG/M2 | HEART RATE: 88 BPM | WEIGHT: 217 LBS | SYSTOLIC BLOOD PRESSURE: 158 MMHG | HEIGHT: 72 IN

## 2018-06-12 DIAGNOSIS — F41.8 MIXED ANXIETY DEPRESSIVE DISORDER: ICD-10-CM

## 2018-06-12 DIAGNOSIS — F51.04 PSYCHOPHYSIOLOGICAL INSOMNIA: ICD-10-CM

## 2018-06-12 DIAGNOSIS — M54.32 SCIATICA OF LEFT SIDE: Primary | ICD-10-CM

## 2018-06-12 DIAGNOSIS — I10 ESSENTIAL HYPERTENSION: ICD-10-CM

## 2018-06-12 PROCEDURE — 99214 OFFICE O/P EST MOD 30 MIN: CPT | Performed by: FAMILY MEDICINE

## 2018-06-12 NOTE — PROGRESS NOTES
Subjective   Pro Mueller is a 63 y.o. male.     Chief Complaint   Patient presents with   • Hip Pain     shooting pain from hip to knee   • Med Refill     acyclovir        History of Present Illness  Left leg pain  New problem to me. Dull numb ache, sometimes shooting pain from hip down to ankle. Started several years ago but so intermittent it wasn't a major problem. Asked ortho Dr. Mancilla to do imaging of the hip and said there was arthritis, did steroid injection back in 2/2018 but this didn't help. Can prevent him for easily going to sleep at night but not waking him up once he is sleeping. Can't pinpoint aggravating factor for it to get worse other than getting up from seated or lying position. Does not have alleviating factors. meloxicam and tramadol do not make a difference. This increase in pain and frequency has been going on for about 4-5 months.     [facility] going to see Nacho Earl for one on one counseling, going to see Say Coats today with psychiatry. Reports panic attacks and forcing himself to eat, no interest in doing any activities. He did say he was in his garage working on his car the other day, nice little 20 year old BMW.      Insomnia  Tried up to 200 mg quetiapine for one week but has not improved. Pt asking for increase to the alprazolam but was evaluated by sleep and is going to see Dr. Coats with psychiatry today that was requested by PCP and sleep to help address medications for sleep.    The following portions of the patient's history were reviewed and updated as appropriate: allergies, current medications and problem list.    Review of Systems   Constitutional: Positive for activity change, appetite change and fatigue.   Musculoskeletal: Negative for back pain and gait problem.   Neurological: Positive for numbness.   Psychiatric/Behavioral: Positive for dysphoric mood and sleep disturbance. Negative for suicidal ideas. The patient is nervous/anxious.   "      Objective   Blood pressure 158/88, pulse 88, height 182.9 cm (72\"), weight 98.4 kg (217 lb), SpO2 100 %.  Physical Exam   Constitutional: He is oriented to person, place, and time. He appears well-nourished. No distress.   Pulmonary/Chest: Effort normal. No respiratory distress.   Neurological: He is alert and oriented to person, place, and time.   Psychiatric: He has a normal mood and affect. His behavior is normal.   Vitals reviewed.    Repeat /90  Assessment/Plan   Pro was seen today for hip pain and med refill.    Diagnoses and all orders for this visit:    Sciatica of left side  -     Ambulatory Referral to Physical Therapy Evaluate and treat; Heat, Electrotherapy; Tens (Home); Stretching, Strengthening, ROM; Full weight bearing  Symptoms are chronic and consistent with sciatica, no injury or concerning neurological symptoms, will send for PT, encouraged to continue the PT at home and follow up in the office in 4-6 weeks.   Mixed anxiety depressive disorder  Continues to have difficult time. Not getting to go to counseling routinely to this point. Going to see psychiatrist today and encouraged him to address the frequency of counseling. Pt prefers one on one to group. He did gain weight and has been working on his car in the garage so maybe some slight improvements.   Essential hypertension  Elevated today, improved on repeat. He is having pain with the sciatica symptoms. Will not make any changes today but if elevated again in one month will increase the amlodipine to 10 mg for better control  Psychophysiological insomnia  Pt requesting 2 mg of alprazolam. Explained that both sleep medicine doctor and myself do not think that is the best long term plan and we both wanted him to be evaluated by psychiatry to weigh in on sleep medication/psych medication changes to help with sleep most effectively. The increase in quetiapine did not help.             "

## 2018-06-27 DIAGNOSIS — G47.00 INSOMNIA, UNSPECIFIED TYPE: ICD-10-CM

## 2018-06-28 RX ORDER — ALPRAZOLAM 1 MG/1
TABLET ORAL
Qty: 30 TABLET | Refills: 0 | Status: SHIPPED | OUTPATIENT
Start: 2018-06-28 | End: 2018-07-24

## 2018-07-12 RX ORDER — TRAMADOL HYDROCHLORIDE 50 MG/1
TABLET ORAL
Qty: 180 TABLET | Refills: 0 | Status: SHIPPED | OUTPATIENT
Start: 2018-07-12 | End: 2018-10-22 | Stop reason: SDUPTHER

## 2018-07-24 ENCOUNTER — OFFICE VISIT (OUTPATIENT)
Dept: FAMILY MEDICINE CLINIC | Facility: CLINIC | Age: 63
End: 2018-07-24

## 2018-07-24 VITALS
OXYGEN SATURATION: 98 % | HEIGHT: 72 IN | BODY MASS INDEX: 28.44 KG/M2 | DIASTOLIC BLOOD PRESSURE: 78 MMHG | SYSTOLIC BLOOD PRESSURE: 160 MMHG | WEIGHT: 210 LBS | HEART RATE: 88 BPM

## 2018-07-24 DIAGNOSIS — F41.8 MIXED ANXIETY DEPRESSIVE DISORDER: ICD-10-CM

## 2018-07-24 DIAGNOSIS — G47.00 INSOMNIA, UNSPECIFIED TYPE: ICD-10-CM

## 2018-07-24 DIAGNOSIS — F33.2 SEVERE EPISODE OF RECURRENT MAJOR DEPRESSIVE DISORDER, WITHOUT PSYCHOTIC FEATURES (HCC): Primary | ICD-10-CM

## 2018-07-24 PROCEDURE — 99214 OFFICE O/P EST MOD 30 MIN: CPT | Performed by: FAMILY MEDICINE

## 2018-07-24 RX ORDER — ALPRAZOLAM 2 MG/1
2 TABLET ORAL NIGHTLY
Refills: 2 | COMMUNITY
Start: 2018-07-11 | End: 2018-09-25 | Stop reason: ALTCHOICE

## 2018-07-24 RX ORDER — ACYCLOVIR 800 MG/1
800 TABLET ORAL 3 TIMES DAILY
Qty: 90 TABLET | Refills: 0 | Status: SHIPPED | OUTPATIENT
Start: 2018-07-24 | End: 2019-01-02

## 2018-07-24 RX ORDER — ALPRAZOLAM 1 MG/1
1 TABLET ORAL NIGHTLY PRN
Qty: 30 TABLET | Refills: 0 | Status: CANCELLED | OUTPATIENT
Start: 2018-07-24

## 2018-07-24 NOTE — PROGRESS NOTES
"Subjective   Pro Mueller is a 63 y.o. male.     Chief Complaint   Patient presents with   • Anxiety   • Med Refill     xanax, acyclovir        History of Present Illness  Anxiety   Have return to work date as 8/3, recommended part time. He is seeing the psychiatrist and counselor. Had 7 lb weight loss since last visit. Still having trouble getting more regular appointments still. Wishing he could see someone more often. Concerned about his degree of depression on some days he does not even get up to take care of self, or eat. Not suicide, extreme ambivalence. Took him off the lexapro and started 10 mg of Trintellix 10 mg by Dr. Coats with psychiatry on 7/10. Pt does not eat most of the day. Trouble with sleep. Sleeping more during the day. Can't get on sleep schedule. Feel apathetic.    The following portions of the patient's history were reviewed and updated as appropriate: allergies, current medications and problem list.          Review of Systems   Constitutional: Positive for activity change, appetite change and unexpected weight change.   Psychiatric/Behavioral: Positive for dysphoric mood and sleep disturbance. Negative for self-injury and suicidal ideas. The patient is nervous/anxious.        Objective   Blood pressure 160/78, pulse 88, height 182.9 cm (72\"), weight 95.3 kg (210 lb), SpO2 98 %.  Physical Exam   Constitutional: He is oriented to person, place, and time. He appears well-nourished. No distress.   Pulmonary/Chest: Effort normal. No respiratory distress.   Neurological: He is alert and oriented to person, place, and time.   Psychiatric: His behavior is normal.   Flat affect, speech is slow.   Vitals reviewed.      Assessment/Plan   Pro was seen today for anxiety and med refill.    Diagnoses and all orders for this visit:    Severe episode of recurrent major depressive disorder, without psychotic features (CMS/HCC)    Insomnia, unspecified type    Mixed anxiety depressive disorder    Other " orders  -     Cancel: ALPRAZolam (XANAX) 1 MG tablet; Take 1 tablet by mouth At Night As Needed for Anxiety or Sleep.  -     acyclovir (ZOVIRAX) 800 MG tablet; Take 1 tablet by mouth 3 (Three) Times a Day.    I am more concerned about the pt report and appearance today. He appears and reports to be doing worse than last visit. He has lost 7 lbs and still not having very frequent counseling appointments set up. I have placed a call to his psychiatry office to help get him enrolled in an intensive outpatient program. I think he needs this and it would be helpful for his treatment while psychiatry is working on medication changes. He requires more structure and more talk therapy while going through emotional hardship and working on medication. I left a message with Dr. Jorge L Andrade' nurse in this regard. I will follow up with their office and the pt tomorrow to complete the plan. I am recommending we restart the clock on his time away from work and give another 2 months while the above is going into effect and taking action for improving pt outcome.    7/25/18 Could not get through to psychiatry office but note that pt met with his therapist 7/24/18 in evening [facility] and discussed options for [facility] outpatient therapy, [facility]. Spoke with pt. He is going to go most likely with [facility] and is glad to be going to an intensive program. We will follow up in 2-3 weeks.

## 2018-08-04 ENCOUNTER — HOSPITAL ENCOUNTER (EMERGENCY)
Facility: HOSPITAL | Age: 63
Discharge: HOME | End: 2018-08-04
Attending: EMERGENCY MEDICINE | Admitting: EMERGENCY MEDICINE

## 2018-08-04 VITALS
DIASTOLIC BLOOD PRESSURE: 99 MMHG | HEART RATE: 85 BPM | OXYGEN SATURATION: 92 % | WEIGHT: 220 LBS | BODY MASS INDEX: 29.8 KG/M2 | RESPIRATION RATE: 18 BRPM | SYSTOLIC BLOOD PRESSURE: 168 MMHG | HEIGHT: 72 IN | TEMPERATURE: 98.6 F

## 2018-08-04 DIAGNOSIS — F10.10 ALCOHOL ABUSE: ICD-10-CM

## 2018-08-04 DIAGNOSIS — F33.9 EPISODE OF RECURRENT MAJOR DEPRESSIVE DISORDER, UNSPECIFIED DEPRESSION EPISODE SEVERITY (HCC): Primary | ICD-10-CM

## 2018-08-04 LAB
ALBUMIN SERPL-MCNC: 4.7 G/DL (ref 3.5–5.2)
ALBUMIN/GLOB SERPL: 1.8 G/DL
ALP SERPL-CCNC: 83 U/L (ref 39–117)
ALT SERPL W P-5'-P-CCNC: 63 U/L (ref 1–41)
AMPHET+METHAMPHET UR QL: NEGATIVE
ANION GAP SERPL CALCULATED.3IONS-SCNC: 21.9 MMOL/L
AST SERPL-CCNC: 160 U/L (ref 1–40)
BACTERIA UR QL AUTO: ABNORMAL /HPF
BARBITURATES UR QL SCN: NEGATIVE
BASOPHILS # BLD AUTO: 0.02 10*3/MM3 (ref 0–0.2)
BASOPHILS NFR BLD AUTO: 0.4 % (ref 0–1.5)
BENZODIAZ UR QL SCN: POSITIVE
BILIRUB SERPL-MCNC: 0.9 MG/DL (ref 0.1–1.2)
BILIRUB UR QL STRIP: NEGATIVE
BUN BLD-MCNC: 10 MG/DL (ref 8–23)
BUN/CREAT SERPL: 14.9 (ref 7–25)
CALCIUM SPEC-SCNC: 9.7 MG/DL (ref 8.6–10.5)
CANNABINOIDS SERPL QL: NEGATIVE
CHLORIDE SERPL-SCNC: 94 MMOL/L (ref 98–107)
CLARITY UR: ABNORMAL
CO2 SERPL-SCNC: 22.1 MMOL/L (ref 22–29)
COCAINE UR QL: NEGATIVE
COLOR UR: ABNORMAL
CREAT BLD-MCNC: 0.67 MG/DL (ref 0.76–1.27)
DEPRECATED RDW RBC AUTO: 50.7 FL (ref 37–54)
EOSINOPHIL # BLD AUTO: 0.03 10*3/MM3 (ref 0–0.7)
EOSINOPHIL NFR BLD AUTO: 0.6 % (ref 0.3–6.2)
ERYTHROCYTE [DISTWIDTH] IN BLOOD BY AUTOMATED COUNT: 14.7 % (ref 11.5–14.5)
ETHANOL BLD-MCNC: <10 MG/DL (ref 0–10)
ETHANOL UR QL: <0.01 %
GFR SERPL CREATININE-BSD FRML MDRD: 121 ML/MIN/1.73
GLOBULIN UR ELPH-MCNC: 2.6 GM/DL
GLUCOSE BLD-MCNC: 107 MG/DL (ref 65–99)
GLUCOSE UR STRIP-MCNC: NEGATIVE MG/DL
HCT VFR BLD AUTO: 44.3 % (ref 40.4–52.2)
HGB BLD-MCNC: 14.6 G/DL (ref 13.7–17.6)
HGB UR QL STRIP.AUTO: NEGATIVE
HYALINE CASTS UR QL AUTO: ABNORMAL /LPF
IMM GRANULOCYTES # BLD: 0 10*3/MM3 (ref 0–0.03)
IMM GRANULOCYTES NFR BLD: 0 % (ref 0–0.5)
KETONES UR QL STRIP: ABNORMAL
LEUKOCYTE ESTERASE UR QL STRIP.AUTO: NEGATIVE
LYMPHOCYTES # BLD AUTO: 1.28 10*3/MM3 (ref 0.9–4.8)
LYMPHOCYTES NFR BLD AUTO: 26.4 % (ref 19.6–45.3)
MCH RBC QN AUTO: 31.3 PG (ref 27–32.7)
MCHC RBC AUTO-ENTMCNC: 33 G/DL (ref 32.6–36.4)
MCV RBC AUTO: 95.1 FL (ref 79.8–96.2)
METHADONE UR QL SCN: POSITIVE
MONOCYTES # BLD AUTO: 0.35 10*3/MM3 (ref 0.2–1.2)
MONOCYTES NFR BLD AUTO: 7.2 % (ref 5–12)
NEUTROPHILS # BLD AUTO: 3.16 10*3/MM3 (ref 1.9–8.1)
NEUTROPHILS NFR BLD AUTO: 65.4 % (ref 42.7–76)
NITRITE UR QL STRIP: NEGATIVE
OPIATES UR QL: NEGATIVE
OXYCODONE UR QL SCN: NEGATIVE
PH UR STRIP.AUTO: 7 [PH] (ref 5–8)
PLATELET # BLD AUTO: 127 10*3/MM3 (ref 140–500)
PMV BLD AUTO: 9.9 FL (ref 6–12)
POTASSIUM BLD-SCNC: 3.6 MMOL/L (ref 3.5–5.2)
PROT SERPL-MCNC: 7.3 G/DL (ref 6–8.5)
PROT UR QL STRIP: ABNORMAL
RBC # BLD AUTO: 4.66 10*6/MM3 (ref 4.6–6)
RBC # UR: ABNORMAL /HPF
REF LAB TEST METHOD: ABNORMAL
SODIUM BLD-SCNC: 138 MMOL/L (ref 136–145)
SP GR UR STRIP: 1.02 (ref 1–1.03)
SQUAMOUS #/AREA URNS HPF: ABNORMAL /HPF
UROBILINOGEN UR QL STRIP: ABNORMAL
WBC NRBC COR # BLD: 4.84 10*3/MM3 (ref 4.5–10.7)
WBC UR QL AUTO: ABNORMAL /HPF

## 2018-08-04 PROCEDURE — 81001 URINALYSIS AUTO W/SCOPE: CPT | Performed by: NURSE PRACTITIONER

## 2018-08-04 PROCEDURE — 80307 DRUG TEST PRSMV CHEM ANLYZR: CPT | Performed by: NURSE PRACTITIONER

## 2018-08-04 PROCEDURE — 25010000002 LORAZEPAM PER 2 MG: Performed by: NURSE PRACTITIONER

## 2018-08-04 PROCEDURE — 96365 THER/PROPH/DIAG IV INF INIT: CPT

## 2018-08-04 PROCEDURE — 96375 TX/PRO/DX INJ NEW DRUG ADDON: CPT

## 2018-08-04 PROCEDURE — 25010000002 THIAMINE PER 100 MG: Performed by: NURSE PRACTITIONER

## 2018-08-04 PROCEDURE — 96366 THER/PROPH/DIAG IV INF ADDON: CPT

## 2018-08-04 PROCEDURE — 99283 EMERGENCY DEPT VISIT LOW MDM: CPT

## 2018-08-04 PROCEDURE — 80053 COMPREHEN METABOLIC PANEL: CPT | Performed by: NURSE PRACTITIONER

## 2018-08-04 PROCEDURE — 90791 PSYCH DIAGNOSTIC EVALUATION: CPT

## 2018-08-04 PROCEDURE — 85025 COMPLETE CBC W/AUTO DIFF WBC: CPT | Performed by: NURSE PRACTITIONER

## 2018-08-04 RX ORDER — CHLORDIAZEPOXIDE HYDROCHLORIDE 25 MG/1
50 CAPSULE, GELATIN COATED ORAL ONCE
Status: COMPLETED | OUTPATIENT
Start: 2018-08-04 | End: 2018-08-04

## 2018-08-04 RX ORDER — LORAZEPAM 2 MG/ML
1 INJECTION INTRAMUSCULAR ONCE
Status: COMPLETED | OUTPATIENT
Start: 2018-08-04 | End: 2018-08-04

## 2018-08-04 RX ORDER — TRAMADOL HYDROCHLORIDE 50 MG/1
50 TABLET ORAL ONCE
Status: COMPLETED | OUTPATIENT
Start: 2018-08-04 | End: 2018-08-04

## 2018-08-04 RX ORDER — CHLORDIAZEPOXIDE HYDROCHLORIDE 25 MG/1
25 CAPSULE, GELATIN COATED ORAL 4 TIMES DAILY PRN
Qty: 12 CAPSULE | Refills: 0 | Status: SHIPPED | OUTPATIENT
Start: 2018-08-04 | End: 2019-01-02

## 2018-08-04 RX ADMIN — TRAMADOL HYDROCHLORIDE 50 MG: 50 TABLET, FILM COATED ORAL at 20:51

## 2018-08-04 RX ADMIN — CHLORDIAZEPOXIDE HYDROCHLORIDE 50 MG: 25 CAPSULE ORAL at 20:51

## 2018-08-04 RX ADMIN — FOLIC ACID 1000 ML/HR: 5 INJECTION, SOLUTION INTRAMUSCULAR; INTRAVENOUS; SUBCUTANEOUS at 20:11

## 2018-08-04 RX ADMIN — LORAZEPAM 1 MG: 2 INJECTION INTRAMUSCULAR; INTRAVENOUS at 18:29

## 2018-08-04 NOTE — ED NOTES
"Pt states \"I'm really depressed and feeling lightheaded. I can't seem to eat. It's been going on for several days.\" Patient denies SI and HI.     Karen Gale, RN  08/04/18 [id number]    "

## 2018-08-04 NOTE — CONSULTS
"Pt has friend of 20 years, Linwood Barba, [phone]. Pt reports he was BIB friend today, \"I been really, really depressed, haven't been able to eat much of anything for the past several days. It was trigged by a divorce\". Pt reports that he and wife are still in the process of divorce, dividing assets, but living separately since March - prior to [place], wife went to [place] for a month, and then flew back into [place] on day of Thunder and told him she wanted divorce.     Pt reports he sees Dr. Coats, psychiatrist, last saw about a month ago. Also sees a therapist, can't remember his name (looks up on phone), Nacho Earl - last saw \"a couple of weeks ago, I think\". Reports he has a f/u with Dr. Coats in next weeks, and plans to go (future oriented).     Reports he was admitted to [facility] a few years ago for depression and alcoholism, and \"that was the scariest darn place I've ever been\", and that employees screamed at patients, and at each other - his only inpatient psychiatric (dual diagnosis) admit. Reports that [facility] and here in 2016, are his only 2 detox admissions.     Denies hx of self harm behavior or suicide attempts. Asked if he has ever thought about killing himself, \"in a fit of rage or something\" - last time was \"5, 6 years, maybe\" ago.     Reports that the last time he went to  was \"a couple of years ago, probably, it's just not my thing\". Reports that \"I tend to be Mandaen\", and uncomfortable with higher power language.     Reports his primary support is Florentin (above), \"I've managed to piss off, just about everyone in my family\", largely because of the drinking. 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     Reports last drink was yesterday. Reports he was drinking \"3 to 4\" vodkas daily, but on f/u, reports that there were \"4 or 5\" shots per glass (12-20 drinks daily). Reports he was buying a 1.75 L vodka, " "\"a few times a week\". Denies street drug use.     I spoke with pt about our IOP program, pt replies, \"I'm not a morning person\" and declines IOP. Also states he doesn't want to go back to [facility], but asks me to enquire about whether [facility] has an evening IOP.     I then spoke with Linwood (by above number), who reports that pt called friend at work today, and that all he told Florentin was that pt  from wife d/t pt's alcoholism. Florentin denies any recent statements indicating SI/HI. Reports pt told Florentin he hasn't been eating or drinking much water.     [facility] confirms to me that they have an evening IOP program.     Plan to d/c with IOP referrals and recommendation to f/u with current mental health care providers. Dr Titus agrees with plan.                             "

## 2018-08-04 NOTE — ED NOTES
"Pt appears shaky, tremors. C/o headache, no light sensitivity, not diaphoretic. Pt states last drink was yesterday. States \"shaking and headaches\" when detoxing. Pt states he usually drinks 3 - 4 vodka drinks per day.      Carly Wilson RN  08/04/18 [id number]    "

## 2018-08-05 NOTE — DISCHARGE INSTRUCTIONS
Start IOP on Monday as discussed with Shilo from [facility].

## 2018-08-05 NOTE — ED PROVIDER NOTES
" EMERGENCY DEPARTMENT ENCOUNTER    CHIEF COMPLAINT  Chief Complaint: psychiatric evaluation   History given by: pt  History limited by: none  Room Number: 21/21  PMD: Alla Beal MD      HPI:  Pt is a 63 y.o. male who presents for a psychiatric evaluation due to depression and alcoholism.   Pt states that he has been dizzy, headache, and generalized weakness. Reports pretty heavy alcohol consumption and is now feeling shaky.  No specific SI.  No current plan to harm self or others.      Duration:  Since today  Onset: gradual  Timing: constant  Radiation: none stated   Quality: \" Dizzy and weak\"  Intensity/Severity: moderate   Progression: unchanged  Associated Symptoms: generalized weakness, headache and dizziness  Aggravating Factors: none stated  Alleviating Factors: none stated   Previous Episodes: Pt has history of drinking EtOH  Treatment before arrival: none stated    PAST MEDICAL HISTORY  Active Ambulatory Problems     Diagnosis Date Noted   • Alcoholism (CMS/[facility]) 08/08/2016   • Atopic rhinitis 08/08/2016   • Mixed anxiety depressive disorder 08/08/2016   • Genital herpes simplex 08/08/2016   • Hyperlipidemia 08/08/2016   • Hypertension 08/08/2016   • Hypothyroidism 08/08/2016   • Insomnia 08/08/2016   • Panic disorder without agoraphobia 08/08/2016   • Persistent insomnia 08/08/2016   • Vitamin D deficiency 08/08/2016   • Alcohol withdrawal (CMS/[facility]) 11/04/2016   • Headache 11/05/2016   • Low back pain 11/11/2016   • Sciatica of left side 06/12/2018     Resolved Ambulatory Problems     Diagnosis Date Noted   • Accidental fall 08/08/2016   • Impacted cerumen 08/08/2016   • Influenza 08/08/2016   • Motion sickness 08/08/2016   • Seasonal allergic rhinitis 08/08/2016   • Upper respiratory tract infection 08/08/2016   • Gastritis 11/05/2016   • Olecranon bursitis of right elbow 04/05/2017     Past Medical History:   Diagnosis Date   • Alcohol abuse    • Anxiety    • Arthritis    • Depression    • Encounter " for removal of sutures    • Hyperlipidemia    • Hypertension    • Kidney stone    • Motion sickness    • Motion sickness    • Olecranon bursitis, right elbow    • Withdrawal symptoms, alcohol (CMS/HCC)        PAST SURGICAL HISTORY  Past Surgical History:   Procedure Laterality Date   • CYST REMOVAL     • EXTRACORPOREAL SHOCK WAVE LITHOTRIPSY (ESWL) Right 2002   • TONSILLECTOMY         FAMILY HISTORY  Family History   Problem Relation Age of Onset   • Alzheimer's disease Mother    • Pancreatic cancer Father        SOCIAL HISTORY  Social History     Social History   • Marital status:      Spouse name: N/A   • Number of children: N/A   • Years of education: N/A     Occupational History   • Not on file.     Social History Main Topics   • Smoking status: Former Smoker     Packs/day: 0.50     Years: 25.00   • Smokeless tobacco: Never Used   • Alcohol use 3.6 oz/week     6 Shots of liquor per week      Comment: several night weekly, few drinks per occasion   • Drug use: No   • Sexual activity: Yes     Partners: Female     Other Topics Concern   • Not on file     Social History Narrative   • No narrative on file       ALLERGIES  Penicillins    REVIEW OF SYSTEMS  Review of Systems   Constitutional: Negative for activity change, appetite change and fever.   HENT: Negative for congestion and sore throat.    Eyes: Negative.    Respiratory: Negative for cough and shortness of breath.    Cardiovascular: Negative for chest pain and leg swelling.   Gastrointestinal: Negative for abdominal pain, diarrhea and vomiting.   Endocrine: Negative.    Genitourinary: Negative for decreased urine volume and dysuria.   Musculoskeletal: Negative for neck pain.   Skin: Negative for rash and wound.   Allergic/Immunologic: Negative.    Neurological: Positive for dizziness, weakness (generalized) and headaches. Negative for numbness.   Hematological: Negative.    Psychiatric/Behavioral: Negative for suicidal ideas.        Depressed     All  other systems reviewed and are negative.      PHYSICAL EXAM  ED Triage Vitals   Temp Heart Rate Resp BP SpO2   08/04/18 1745 08/04/18 1744 08/04/18 1744 08/04/18 1803 08/04/18 1744   98.6 °F (37 °C) 103 20 (!) 177/102 92 %      Temp src Heart Rate Source Patient Position BP Location FiO2 (%)   08/04/18 1745 -- -- -- --   Tympanic           Physical Exam   Constitutional: He is oriented to person, place, and time. No distress.   HENT:   Head: Normocephalic and atraumatic.   Eyes: Pupils are equal, round, and reactive to light. EOM are normal.   Neck: Normal range of motion. Neck supple.   Cardiovascular: Normal rate, regular rhythm and normal heart sounds.    Pulmonary/Chest: Effort normal and breath sounds normal. No respiratory distress.   Abdominal: Soft. There is no tenderness. There is no rebound and no guarding.   Musculoskeletal: Normal range of motion. He exhibits no edema.   Neurological: He is alert and oriented to person, place, and time. He has normal sensation and normal strength. He displays tremor.   Skin: Skin is warm and dry.   Psychiatric: Mood and affect normal.   Nursing note and vitals reviewed.      LAB RESULTS  Lab Results (last 24 hours)     Procedure Component Value Units Date/Time    Ethanol [[id number]] Collected:  08/04/18 1804    Specimen:  Blood Updated:  08/04/18 1837     Ethanol <10 mg/dL      Ethanol % <0.010 %     Comprehensive Metabolic Panel [[id number]]  (Abnormal) Collected:  08/04/18 1804    Specimen:  Blood Updated:  08/04/18 1907     Glucose 107 (H) mg/dL      BUN 10 mg/dL      Creatinine 0.67 (L) mg/dL      Sodium 138 mmol/L      Potassium 3.6 mmol/L      Chloride 94 (L) mmol/L      CO2 22.1 mmol/L      Calcium 9.7 mg/dL      Total Protein 7.3 g/dL      Albumin 4.70 g/dL      ALT (SGPT) 63 (H) U/L      AST (SGOT) 160 (H) U/L      Alkaline Phosphatase 83 U/L      Total Bilirubin 0.9 mg/dL      eGFR Non African Amer 121 mL/min/1.73      Globulin 2.6 gm/dL      A/G Ratio 1.8 g/dL       BUN/Creatinine Ratio 14.9     Anion Gap 21.9 mmol/L     Urine Drug Screen - Urine, Clean Catch [[id number]]  (Abnormal) Collected:  08/04/18 1826    Specimen:  Urine from Urine, Clean Catch Updated:  08/04/18 1930     Amphet/Methamphet, Screen Negative     Barbiturates Screen, Urine Negative     Benzodiazepine Screen, Urine Positive (A)     Cocaine Screen, Urine Negative     Opiate Screen Negative     THC, Screen, Urine Negative     Methadone Screen, Urine Positive (A)     Oxycodone Screen, Urine Negative    Narrative:       Negative Thresholds For Drugs Screened:     Amphetamines               500 ng/ml   Barbiturates               200 ng/ml   Benzodiazepines            100 ng/ml   Cocaine                    300 ng/ml   Methadone                  300 ng/ml   Opiates                    300 ng/ml   Oxycodone                  100 ng/ml   THC                        50 ng/ml    The Normal Value for all drugs tested is negative. This report includes final unconfirmed screening results to be used for medical treatment purposes only. Unconfirmed results must not be used for non-medical purposes such as employment or legal testing. Clinical consideration should be applied to any drug of abuse test, particulary when unconfirmed results are used.    Urinalysis With Microscopic If Indicated (No Culture) - Urine, Clean Catch [[id number]]  (Abnormal) Collected:  08/04/18 1826    Specimen:  Urine from Urine, Clean Catch Updated:  08/04/18 1921     Color, UA Dark Yellow (A)     Appearance, UA Cloudy (A)     pH, UA 7.0     Specific Gravity, UA 1.024     Glucose, UA Negative     Ketones, UA 80 mg/dL (3+) (A)     Bilirubin, UA Negative     Blood, UA Negative     Protein, UA 30 mg/dL (1+) (A)     Leuk Esterase, UA Negative     Nitrite, UA Negative     Urobilinogen, UA 1.0 E.U./dL    Urinalysis, Microscopic Only - Urine, Clean Catch [[id number]]  (Abnormal) Collected:  08/04/18 1826    Specimen:  Urine from Urine, Clean Catch Updated:   08/04/18 1950     RBC, UA 0-2 /HPF      WBC, UA 3-5 (A) /HPF      Bacteria, UA None Seen /HPF      Squamous Epithelial Cells, UA 0-2 /HPF      Hyaline Casts, UA None Seen /LPF      Methodology Manual Light Microscopy    CBC & Differential [[id number]] Collected:  08/04/18 1858    Specimen:  Blood Updated:  08/04/18 1901    Narrative:       The following orders were created for panel order CBC & Differential.  Procedure                               Abnormality         Status                     ---------                               -----------         ------                     CBC Auto Differential[[id number]]        Abnormal            Final result                 Please view results for these tests on the individual orders.    CBC Auto Differential [[id number]]  (Abnormal) Collected:  08/04/18 1858    Specimen:  Blood Updated:  08/04/18 1901     WBC 4.84 10*3/mm3      RBC 4.66 10*6/mm3      Hemoglobin 14.6 g/dL      Hematocrit 44.3 %      MCV 95.1 fL      MCH 31.3 pg      MCHC 33.0 g/dL      RDW 14.7 (H) %      RDW-SD 50.7 fl      MPV 9.9 fL      Platelets 127 (L) 10*3/mm3      Neutrophil % 65.4 %      Lymphocyte % 26.4 %      Monocyte % 7.2 %      Eosinophil % 0.6 %      Basophil % 0.4 %      Immature Grans % 0.0 %      Neutrophils, Absolute 3.16 10*3/mm3      Lymphocytes, Absolute 1.28 10*3/mm3      Monocytes, Absolute 0.35 10*3/mm3      Eosinophils, Absolute 0.03 10*3/mm3      Basophils, Absolute 0.02 10*3/mm3      Immature Grans, Absolute 0.00 10*3/mm3           I ordered the above labs and reviewed the results        PROCEDURES  Procedures      PROGRESS AND CONSULTS  ED Course as of Aug 05 0039   Sat Aug 04, 2018   1832 Pt c/o worsening depression and wanting help with alcoholism.  Pt states that he is an every day drinker and his last drink was yesterday.  Pt states that he is currently going through a divorce.  Pt denies SI/HI.  Exam: Dry mucous membranes  Lungs clear  Tachycardic  Tremulous   Abdomen:  soft, non-tender, + BS  [MS]      ED Course User Index  [MS] Carly Matthews, APRN     2046- Ordered Librium for EtOH withdrawal and Ultram for the headache.     2046- Rechecked pt. Notified pt of the EtOH test and drug test was negative. Discussed the plan to discharge home with IOP follow up on Monday.. Pt understands and agrees with the plan, all questions answered.        MEDICAL DECISION MAKING  Results were reviewed/discussed with the patient and they were also made aware of online access. Pt also made aware that some labs, such as cultures, will not be resulted during ER visit and follow up with PMD is necessary.     MDM  Number of Diagnoses or Management Options     Amount and/or Complexity of Data Reviewed  Clinical lab tests: ordered and reviewed (EtOH negative  Drug test- positive for Benzodiazepine and Methadone  UA- negative  )    Patient Progress  Patient progress: stable         DIAGNOSIS  Final diagnoses:   Episode of recurrent major depressive disorder, unspecified depression episode severity (CMS/HCC)   Alcohol abuse       DISPOSITION  DISCHARGE    Patient discharged in stable condition.    Reviewed implications of results, diagnosis, meds, responsibility to follow up, warning signs and symptoms of possible worsening, potential complications and reasons to return to ER.    Patient/Family voiced understanding of above instructions.    Discussed plan for discharge, as there is no emergent indication for admission. Patient referred to primary care provider for BP management due to today's BP. Pt/family is agreeable and understands need for follow up and repeat testing.  Pt is aware that discharge does not mean that nothing is wrong but it indicates no emergency is present that requires admission and they must continue care with follow-up as given below or physician of their choice.     FOLLOW-UP  Alla Beal MD  [address] [facility]  [address] [place] [address]  [id number]  [phone]               Medication List      New Prescriptions    chlordiazePOXIDE 25 MG capsule  Commonly known as:  LIBRIUM  Take 1 capsule by mouth 4 (Four) Times a Day As Needed for Withdrawal.        Stop    hydrOXYzine 25 MG tablet  Commonly known as:  ATARAX     ranitidine 150 MG capsule  Commonly known as:  ZANTAC              Latest Documented Vital Signs:  As of 12:39 AM  BP- 168/99 HR- 85 Temp- 98.6 °F (37 °C) (Tympanic) O2 sat- 92%    --  Documentation assistance provided by priscila Hernandez for Dr. Titus.  Information recorded by the priscila was done at my direction and has been verified and validated by me.     Aj Hernandez  08/04/18 [id number]       Justin Titus MD  08/06/18 [id number]

## 2018-08-06 ENCOUNTER — TELEPHONE (OUTPATIENT)
Dept: SOCIAL WORK | Facility: HOSPITAL | Age: 63
End: 2018-08-06

## 2018-09-12 RX ORDER — AMLODIPINE BESYLATE 5 MG/1
5 TABLET ORAL DAILY
Qty: 90 TABLET | Refills: 0 | Status: SHIPPED | OUTPATIENT
Start: 2018-09-12 | End: 2018-12-22 | Stop reason: SDUPTHER

## 2018-09-25 ENCOUNTER — OFFICE VISIT (OUTPATIENT)
Dept: FAMILY MEDICINE CLINIC | Facility: CLINIC | Age: 63
End: 2018-09-25

## 2018-09-25 VITALS
OXYGEN SATURATION: 99 % | HEART RATE: 88 BPM | HEIGHT: 72 IN | SYSTOLIC BLOOD PRESSURE: 158 MMHG | DIASTOLIC BLOOD PRESSURE: 78 MMHG | BODY MASS INDEX: 27.22 KG/M2 | WEIGHT: 201 LBS

## 2018-09-25 DIAGNOSIS — E03.9 HYPOTHYROIDISM, UNSPECIFIED TYPE: ICD-10-CM

## 2018-09-25 DIAGNOSIS — G57.93 NEUROPATHIC PAIN OF BOTH FEET: Primary | ICD-10-CM

## 2018-09-25 PROCEDURE — 99214 OFFICE O/P EST MOD 30 MIN: CPT | Performed by: FAMILY MEDICINE

## 2018-09-25 RX ORDER — CHLORHEXIDINE GLUCONATE 0.12 MG/ML
RINSE ORAL
COMMUNITY
Start: 2018-09-18 | End: 2019-01-02

## 2018-09-25 RX ORDER — LEVOTHYROXINE SODIUM 0.07 MG/1
TABLET ORAL
Qty: 90 TABLET | Refills: 3 | Status: SHIPPED | OUTPATIENT
Start: 2018-09-25 | End: 2019-01-02

## 2018-09-25 RX ORDER — CLONAZEPAM 2 MG/1
2 TABLET ORAL NIGHTLY
COMMUNITY
Start: 2018-09-21 | End: 2020-07-02

## 2018-09-25 RX ORDER — HYDROCODONE BITARTRATE AND ACETAMINOPHEN 10; 325 MG/1; MG/1
TABLET ORAL
COMMUNITY
Start: 2018-09-19 | End: 2018-11-26

## 2018-09-25 RX ORDER — VORTIOXETINE 20 MG/1
TABLET, FILM COATED ORAL
COMMUNITY
Start: 2018-09-19 | End: 2019-01-02

## 2018-09-25 NOTE — PROGRESS NOTES
"Subjective   Pro Mueller is a 63 y.o. male.     Chief Complaint   Patient presents with   • Numbness     in both feet         History of Present Illness  Numbness in both feet  New problem to me. About one week ago, in the AM when woke up had numbness and tingling in both of his feet. Had dental surgery day prior and thought it might have been related but the symptoms have persisted. No skin changes or swelling. No increase in back pain, has baseline discomfort he just attributes to being 64 yo. Feels weird and annoying but not intrusive. If on carpet ok but if on tile feels like he is on ice, with burning cold sensation.     Depression   Improving with trintellix and over the time. He is drinking daily, about 2 oz of vodka in two drinks. He is working to find a new therapist.     Insomnia   Improving with clonazepam. Has been helping, takes 2 mg one hour before bed.     ED on 8/24/18  Was having depression and increased alcohol use. Was given librium with this episode. He had UDS + for benzodiazepines and methadone.    The following portions of the patient's history were reviewed and updated as appropriate: allergies, current medications and problem list.    Review of Systems   Constitutional: Positive for activity change, appetite change and unexpected weight change.   Endocrine: Negative for polydipsia and polyuria.   Genitourinary: Negative for frequency.   Musculoskeletal: Negative for back pain and gait problem.   Skin: Negative for color change, rash and wound.   Neurological: Positive for numbness.   Psychiatric/Behavioral: Positive for dysphoric mood and sleep disturbance.        Sleep and mood improving.       Objective   Blood pressure 158/78, pulse 88, height 182.9 cm (72\"), weight 91.2 kg (201 lb), SpO2 99 %.  Physical Exam   Constitutional: He is oriented to person, place, and time. He appears well-nourished. No distress.   Eyes: Conjunctivae are normal. No scleral icterus.   Pulmonary/Chest: Effort " normal. No respiratory distress.   Neurological: He is alert and oriented to person, place, and time. A sensory deficit is present. He exhibits normal muscle tone.   Noted to feel pressure in the toes and distal plantar portion of the bilateral feet.    Skin: Skin is warm and dry.   Psychiatric: He has a normal mood and affect. His behavior is normal.   Vitals reviewed.      Assessment/Plan   Pro was seen today for numbness.    Diagnoses and all orders for this visit:    Neuropathic pain of both feet  -     Hemoglobin A1c  -     Comprehensive Metabolic Panel  -     TSH Rfx On Abnormal To Free T4  -     HIV-1 / O / 2 Ag / Antibody 4th Generation  -     Vitamin B12  -     Protein Elec + Interp, Serum  -     Methylmalonic Acid, Serum; Future    Hypothyroidism, unspecified type  -     levothyroxine (SYNTHROID, LEVOTHROID) 75 MCG tablet; 1 per day  -     TSH Rfx On Abnormal To Free T4      Consider back imaging. On the way [place] the [place] pt reported that he had fall couple of weeks ago related to new bifocals and looking down, missed step, fell on his tailbone, said it rattled him. Was reportedly fine after that but could potentially be related to change after fall, increased inflammation in back. No back pain to sciatica symptoms. He does have hx of alcohol use and has been drinking recently, now reporting only 2 drinks per night, but regular alcohol use. Counseled that alcohol use can lead to neuropathy.

## 2018-09-26 LAB
ALBUMIN SERPL ELPH-MCNC: 4.1 G/DL (ref 2.9–4.4)
ALBUMIN SERPL-MCNC: 5 G/DL (ref 3.5–5.2)
ALBUMIN/GLOB SERPL: 1.1 {RATIO} (ref 0.7–1.7)
ALBUMIN/GLOB SERPL: 1.9 G/DL
ALP SERPL-CCNC: 96 U/L (ref 39–117)
ALPHA1 GLOB SERPL ELPH-MCNC: 0.3 G/DL (ref 0–0.4)
ALPHA2 GLOB SERPL ELPH-MCNC: 1.1 G/DL (ref 0.4–1)
ALT SERPL-CCNC: 62 U/L (ref 1–41)
AST SERPL-CCNC: 105 U/L (ref 1–40)
B-GLOBULIN SERPL ELPH-MCNC: 1.2 G/DL (ref 0.7–1.3)
BILIRUB SERPL-MCNC: 0.3 MG/DL (ref 0.1–1.2)
BUN SERPL-MCNC: 9 MG/DL (ref 8–23)
BUN/CREAT SERPL: 11.3 (ref 7–25)
CALCIUM SERPL-MCNC: 9.6 MG/DL (ref 8.6–10.5)
CHLORIDE SERPL-SCNC: 105 MMOL/L (ref 98–107)
CO2 SERPL-SCNC: 26.5 MMOL/L (ref 22–29)
CREAT SERPL-MCNC: 0.8 MG/DL (ref 0.76–1.27)
GAMMA GLOB SERPL ELPH-MCNC: 1 G/DL (ref 0.4–1.8)
GLOBULIN SER CALC-MCNC: 2.7 GM/DL
GLOBULIN SER CALC-MCNC: 3.6 G/DL (ref 2.2–3.9)
GLUCOSE SERPL-MCNC: 111 MG/DL (ref 65–99)
HBA1C MFR BLD: 5.1 % (ref 4.8–5.6)
HIV 1+2 AB+HIV1 P24 AG SERPL QL IA: NON REACTIVE
LABORATORY COMMENT REPORT: ABNORMAL
M PROTEIN SERPL ELPH-MCNC: ABNORMAL G/DL
POTASSIUM SERPL-SCNC: 4.4 MMOL/L (ref 3.5–5.2)
PROT PATTERN SERPL ELPH-IMP: ABNORMAL
PROT SERPL-MCNC: 7.7 G/DL (ref 6–8.5)
SODIUM SERPL-SCNC: 149 MMOL/L (ref 136–145)
TSH SERPL DL<=0.005 MIU/L-ACNC: 1.06 MIU/ML (ref 0.27–4.2)
VIT B12 SERPL-MCNC: 588 PG/ML (ref 211–946)

## 2018-09-27 DIAGNOSIS — D47.2 NEUROPATHY ASSOCIATED WITH MONOCLONAL GAMMOPATHY OF UNKNOWN SIGNIFICANCE (MGUS) (HCC): Primary | ICD-10-CM

## 2018-09-27 DIAGNOSIS — Z87.39 HX OF LOW BACK PAIN: ICD-10-CM
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
## 2018-09-27 DIAGNOSIS — G63 NEUROPATHY ASSOCIATED WITH MONOCLONAL GAMMOPATHY OF UNKNOWN SIGNIFICANCE (MGUS) (HCC): Primary | ICD-10-CM

## 2018-09-30 ENCOUNTER — RESULTS ENCOUNTER (OUTPATIENT)
Dept: FAMILY MEDICINE CLINIC | Facility: CLINIC | Age: 63
End: 2018-09-30

## 2018-09-30 DIAGNOSIS — G57.93 NEUROPATHIC PAIN OF BOTH FEET: ICD-10-CM

## 2018-10-09 ENCOUNTER — TELEPHONE (OUTPATIENT)
Dept: FAMILY MEDICINE CLINIC | Facility: CLINIC | Age: 63
End: 2018-10-09

## 2018-10-15 ENCOUNTER — TELEPHONE (OUTPATIENT)
Dept: FAMILY MEDICINE CLINIC | Facility: CLINIC | Age: 63
End: 2018-10-15

## 2018-10-15 NOTE — TELEPHONE ENCOUNTER
THIS ORDER WAS PUT IN ON 9/27 Methylmalonic Acid, Serum DOES THE PT STILL NEED THIS?  THANK YOU   THIS IS IN OVERDUE TASKS

## 2018-10-16 ENCOUNTER — TELEPHONE (OUTPATIENT)
Dept: FAMILY MEDICINE CLINIC | Facility: CLINIC | Age: 63
End: 2018-10-16

## 2018-10-16 NOTE — TELEPHONE ENCOUNTER
There is an order for XR Spine Lumbar 2 or 3 View on 9/27/18 does the pt still need this?  Thank you   This is in overdue task

## 2018-10-20 RX ORDER — TRAMADOL HYDROCHLORIDE 50 MG/1
TABLET ORAL
Qty: 180 TABLET | Refills: 0 | Status: CANCELLED | OUTPATIENT
Start: 2018-10-20

## 2018-10-22 RX ORDER — TRAMADOL HYDROCHLORIDE 50 MG/1
50 TABLET ORAL EVERY 8 HOURS PRN
Qty: 64 TABLET | Refills: 0 | Status: SHIPPED | OUTPATIENT
Start: 2018-10-22 | End: 2018-11-15 | Stop reason: SDUPTHER

## 2018-10-22 NOTE — TELEPHONE ENCOUNTER
Please get a JAMEY to update the chart. I did reduce the number of pills based on last refill and amount of time that  Has passed.

## 2018-10-23 ENCOUNTER — TELEPHONE (OUTPATIENT)
Dept: FAMILY MEDICINE CLINIC | Facility: CLINIC | Age: 63
End: 2018-10-23

## 2018-10-23 RX ORDER — MELOXICAM 15 MG/1
15 TABLET ORAL DAILY PRN
Qty: 30 TABLET | Refills: 2 | Status: SHIPPED | OUTPATIENT
Start: 2018-10-23 | End: 2019-06-27 | Stop reason: SDUPTHER

## 2018-10-23 NOTE — TELEPHONE ENCOUNTER
Patient called wanting to know why the decrease on his TRAMADOL, as well as the neuropathy in his feet has gotten worse and wants to know how to deal with it or fix it.

## 2018-10-25 DIAGNOSIS — G57.93 NEUROPATHY INVOLVING BOTH LOWER EXTREMITIES: Primary | ICD-10-CM

## 2018-11-01 ENCOUNTER — TELEPHONE (OUTPATIENT)
Dept: FAMILY MEDICINE CLINIC | Facility: CLINIC | Age: 63
End: 2018-11-01

## 2018-11-01 NOTE — TELEPHONE ENCOUNTER
Spoke with patient about his arthritis pain and my correspondence with Dr. Mancilla.  We discussed options for the treatment of arthritis in his shoulders.  We also discussed his interest in having arthritis in his knees addressed.  Reported that both knees are fairly equally involved in causing pain.  I expressed concern about him being on an opioid and benzodiazepine as this is a combination that is recommended against.  Also given that there is a tendency to develop tolerance and dependence on opioids requiring increases in dosage I am hesitant to increase the dose at a rate faster than we really need to.  Patient voiced understanding of this concern and plans to call Dr. Mancilla and discuss options for his knees.  Reported he did have a corticosteroid injection and Synvisc injection in his left knee which neither led to a great deal of relief.  Patient also reported that when he is working and sitting most of the time at the desk without much activity he does have less arthritic pain and seems to get by with just Mobicox for this relief.  Reports that when he is very active a couple days of the week is when he really needs the tramadol more regularly and so he is not necessarily even dosing tramadol daily.  He does carefully use the tramadol as needed for pain.

## 2018-11-14 RX ORDER — LISINOPRIL 10 MG/1
10 TABLET ORAL DAILY
Qty: 90 TABLET | Refills: 0 | Status: SHIPPED | OUTPATIENT
Start: 2018-11-14 | End: 2019-09-22 | Stop reason: SDUPTHER

## 2018-11-15 ENCOUNTER — HOSPITAL ENCOUNTER (OUTPATIENT)
Dept: INFUSION THERAPY | Facility: HOSPITAL | Age: 63
Discharge: HOME | End: 2018-11-15
Attending: PSYCHIATRY & NEUROLOGY | Admitting: PSYCHIATRY & NEUROLOGY

## 2018-11-15 DIAGNOSIS — G57.93 NEUROPATHY INVOLVING BOTH LOWER EXTREMITIES: ICD-10-CM

## 2018-11-15 PROCEDURE — 95910 NRV CNDJ TEST 7-8 STUDIES: CPT

## 2018-11-15 PROCEDURE — 95886 MUSC TEST DONE W/N TEST COMP: CPT | Performed by: PSYCHIATRY & NEUROLOGY

## 2018-11-15 PROCEDURE — 95910 NRV CNDJ TEST 7-8 STUDIES: CPT | Performed by: PSYCHIATRY & NEUROLOGY

## 2018-11-15 PROCEDURE — 95886 MUSC TEST DONE W/N TEST COMP: CPT

## 2018-11-15 NOTE — PROCEDURES
EMG and Nerve Conduction Studies    Please see data sheets for details on methods, temperatures and lab standards. EMG muscles tested for upper extremity studies include the deltoid, biceps, triceps, pronator teres, extensor digitorum communis, first dorsal interosseous and abductor pollicis brevis.  EMG muscles tested for lower extremity studies include the vastus lateralis, tibialis anterior, peroneus longus, medial gastrocnemius and extensor digitorum brevis.  Additional muscles tested as needed.  Paraspinal muscles tested as needed.  The complete report includes the data sheets.    Indication: Numbness tingling burning on the soles of the feet with weight loading  History: 63-year-old male with numbness tingling and burning on the soles of the weight loading area      Ht: 182.9 cm  Wt: Not reported  HbA1C:   Lab Results   Component Value Date    HGBA1C 5.10 09/25/2018     TSH:   Lab Results   Component Value Date    TSH 1.06 09/25/2018       Technical summary:  Nerve conduction studies were obtained in both lower extremities.  Skin temperatures were at least 32°C measured on the soles or the ankles.  Needle examination was obtained on selected muscles in both legs.    Results:  1.  Normal left sural sensory study.  2.  Normal left superficial peroneal sensory latency with mildly low amplitude of 4.7 µV.  Normal right superficial peroneal sensory study.  3.  Normal medial orthodromic mixed plantar latencies bilaterally.  4.  Normal left peroneal motor conduction velocities below the knee at 41.7 m/s and in the short segment across the fibular head at 46.2 m/s.  Normal distal latency.  Low amplitude of 1.2 mV.  5.  Normal tibial motor studies bilaterally with distal latencies along the medial and lateral plantar motor nerves.  6.  Needle examination of selected muscles in both legs showed some increased insertional activity in the left extensor digitorum brevis.  The motor units were normal in size.  Recruitment  was reduced in a ratcheting fashion with complaints of pain.  Remaining muscles tested showed normal insertional activities, motor units and normal firing patterns but reduced recruitment with complaints of pain    Impression:  Essentially normal study.  The left peroneal motor and sensory study showed low amplitudes without other evidence of a more diffuse polyneuropathy or more focal peroneal neuropathy.  No convincing evidence of a lumbosacral radiculopathy on either side.  No evidence of a tibial neuropathy at either ankle.  Study results were discussed with the patient.    Vivek Langston M.D.              Dictated utilizing Dragon dictation.

## 2018-11-16 RX ORDER — TRAMADOL HYDROCHLORIDE 50 MG/1
50 TABLET ORAL EVERY 6 HOURS PRN
Qty: 80 TABLET | Refills: 0 | Status: SHIPPED | OUTPATIENT
Start: 2018-11-16 | End: 2018-12-17 | Stop reason: SDUPTHER

## 2018-11-26 ENCOUNTER — OFFICE VISIT (OUTPATIENT)
Dept: FAMILY MEDICINE CLINIC | Facility: CLINIC | Age: 63
End: 2018-11-26

## 2018-11-26 VITALS
OXYGEN SATURATION: 97 % | BODY MASS INDEX: 27.77 KG/M2 | TEMPERATURE: 98.2 F | DIASTOLIC BLOOD PRESSURE: 62 MMHG | HEIGHT: 72 IN | SYSTOLIC BLOOD PRESSURE: 90 MMHG | HEART RATE: 74 BPM | WEIGHT: 205 LBS

## 2018-11-26 DIAGNOSIS — Z13.220 SCREENING FOR LIPID DISORDERS: ICD-10-CM

## 2018-11-26 DIAGNOSIS — Z00.00 ANNUAL PHYSICAL EXAM: Primary | ICD-10-CM

## 2018-11-26 DIAGNOSIS — Z12.5 SCREENING FOR PROSTATE CANCER: ICD-10-CM

## 2018-11-26 DIAGNOSIS — Z13.1 SCREENING FOR DIABETES MELLITUS: ICD-10-CM

## 2018-11-26 DIAGNOSIS — I95.1 ORTHOSTATIC HYPOTENSION: ICD-10-CM

## 2018-11-26 PROCEDURE — 90714 TD VACC NO PRESV 7 YRS+ IM: CPT | Performed by: NURSE PRACTITIONER

## 2018-11-26 PROCEDURE — 90471 IMMUNIZATION ADMIN: CPT | Performed by: NURSE PRACTITIONER

## 2018-11-26 PROCEDURE — 99396 PREV VISIT EST AGE 40-64: CPT | Performed by: NURSE PRACTITIONER

## 2018-11-26 PROCEDURE — 90472 IMMUNIZATION ADMIN EACH ADD: CPT | Performed by: NURSE PRACTITIONER

## 2018-11-26 PROCEDURE — 90674 CCIIV4 VAC NO PRSV 0.5 ML IM: CPT | Performed by: NURSE PRACTITIONER

## 2018-11-26 RX ORDER — PSEUDOEPHED/ACETAMINOPH/DIPHEN 30MG-500MG
TABLET ORAL
Refills: 0 | COMMUNITY
Start: 2018-09-18 | End: 2019-01-02

## 2018-11-26 NOTE — PROGRESS NOTES
Subjective   Pro Mueller is a 63 y.o. male presents for annual physical exam. He is overall doing well. He does report a syncopal episode. Upon waking at 4am, stood to go to the restroom and passed out. He believes he lost consciousness because he woke to carpet in his mouth. He did hit his head, minimal bleeding reported. He had not eaten after 9 or 10, states he usually has a bedtime snack around 10 but did not last night. Thought it may have been his blood sugar. Recently he has been elevated for hypertension and medications were increased. This occurred around the time of increased stress. He is currently taking amlodipine 5 and lisinopril 10 mg. Tolerating both well. States 126/74 is low for him.     He is here for a biometric screening for his insurance. He is fasting for labs. No other concerns today besides passing out upon standing early this am. Blood pressure upon standing revealed decreased readings at 90/62.    Hypertension   This is a chronic problem. The current episode started more than 1 month ago. The problem has been rapidly improving since onset. The problem is controlled. Associated symptoms include anxiety and headaches (due to sinus, at baseline). Pertinent negatives include no blurred vision, chest pain, malaise/fatigue, neck pain, orthopnea, palpitations, peripheral edema, PND, shortness of breath or sweats. Agents associated with hypertension include thyroid hormones. Risk factors for coronary artery disease include male gender. Past treatments include ACE inhibitors and calcium channel blockers. Current antihypertension treatment includes ACE inhibitors and calcium channel blockers. The current treatment provides significant improvement. There are no compliance problems.         The following portions of the patient's history were reviewed and updated as appropriate: allergies, current medications, past family history, past medical history, past social history, past surgical history and  problem list.    Review of Systems   Constitutional: Negative.  Negative for malaise/fatigue.   Eyes: Negative.  Negative for blurred vision.   Respiratory: Negative for shortness of breath.    Cardiovascular: Negative for chest pain, palpitations, orthopnea and PND.   Endocrine: Negative.    Genitourinary: Negative.    Musculoskeletal: Negative for neck pain.   Skin: Positive for wound (above left eye, due to fall this morning).   Allergic/Immunologic: Negative.    Neurological: Positive for headaches (due to sinus, at baseline).   Psychiatric/Behavioral: Positive for dysphoric mood (improved on trintellix) and sleep disturbance (chronic).       Objective   Physical Exam   Constitutional: He is oriented to person, place, and time. He appears well-developed and well-nourished.   Eyes: Conjunctivae are normal. Pupils are equal, round, and reactive to light.   Neck: Neck supple.   Cardiovascular: Normal rate, regular rhythm and normal heart sounds. Exam reveals no gallop and no friction rub.   No murmur heard.  Pulmonary/Chest: Effort normal and breath sounds normal. No stridor. No respiratory distress. He has no wheezes.   Abdominal: Soft. Bowel sounds are normal. He exhibits no distension. There is no tenderness.   Neurological: He is alert and oriented to person, place, and time.   Skin: Skin is warm and dry.   Psychiatric: He has a normal mood and affect.   Vitals reviewed.      Assessment/Plan   Pro was seen today for annual exam.    Diagnoses and all orders for this visit:    Annual physical exam  -     Comprehensive metabolic panel    Screening for diabetes mellitus  -     Hemoglobin A1c    Screening for lipid disorders  -     Lipid Panel With LDL / HDL Ratio    Screening for prostate cancer  -     PSA Screen    Orthostatic hypotension    Other orders  -     Flucelvax Quad=>4Years ([id number])  -     Td (adult) Vaccine Not Adsorbed        D/c amlodipine, check blood pressure over the next few weeks and call  office with readings.  If BP is increased, would consider amlodipine 2.5 or increasing lisinopril. Pt verbalized understanding.   Would like flu shot today. AGreeable to tetanus vaccine.   Up to date on colonoscopy, discussed next test due in 2023

## 2018-11-27 LAB
ALBUMIN SERPL-MCNC: 4.7 G/DL (ref 3.5–5.2)
ALBUMIN/GLOB SERPL: 1.6 G/DL
ALP SERPL-CCNC: 80 U/L (ref 39–117)
ALT SERPL-CCNC: 54 U/L (ref 1–41)
AST SERPL-CCNC: 82 U/L (ref 1–40)
BILIRUB SERPL-MCNC: 0.6 MG/DL (ref 0.1–1.2)
BUN SERPL-MCNC: 19 MG/DL (ref 8–23)
BUN/CREAT SERPL: 24.1 (ref 7–25)
CALCIUM SERPL-MCNC: 10.3 MG/DL (ref 8.6–10.5)
CHLORIDE SERPL-SCNC: 97 MMOL/L (ref 98–107)
CHOLEST SERPL-MCNC: 273 MG/DL (ref 0–200)
CO2 SERPL-SCNC: 25.8 MMOL/L (ref 22–29)
CREAT SERPL-MCNC: 0.79 MG/DL (ref 0.76–1.27)
GLOBULIN SER CALC-MCNC: 2.9 GM/DL
GLUCOSE SERPL-MCNC: 96 MG/DL (ref 65–99)
HBA1C MFR BLD: 5.33 % (ref 4.8–5.6)
HDLC SERPL-MCNC: 60 MG/DL (ref 40–60)
LDLC SERPL CALC-MCNC: 168 MG/DL (ref 0–100)
LDLC/HDLC SERPL: 2.8 {RATIO}
POTASSIUM SERPL-SCNC: 4.8 MMOL/L (ref 3.5–5.2)
PROT SERPL-MCNC: 7.6 G/DL (ref 6–8.5)
PSA SERPL-MCNC: 0.9 NG/ML (ref 0–4)
SODIUM SERPL-SCNC: 139 MMOL/L (ref 136–145)
TRIGL SERPL-MCNC: 225 MG/DL (ref 0–150)
VLDLC SERPL CALC-MCNC: 45 MG/DL (ref 5–40)

## 2018-12-05 ENCOUNTER — OFFICE VISIT (OUTPATIENT)
Dept: ORTHOPEDIC SURGERY | Facility: CLINIC | Age: 63
End: 2018-12-05

## 2018-12-05 VITALS — BODY MASS INDEX: 27.77 KG/M2 | HEIGHT: 72 IN | WEIGHT: 205 LBS

## 2018-12-05 DIAGNOSIS — M25.561 CHRONIC PAIN OF BOTH KNEES: Primary | ICD-10-CM

## 2018-12-05 DIAGNOSIS — M19.011 ARTHRITIS OF RIGHT ACROMIOCLAVICULAR JOINT: ICD-10-CM

## 2018-12-05 DIAGNOSIS — M19.012 ARTHRITIS OF LEFT ACROMIOCLAVICULAR JOINT: ICD-10-CM

## 2018-12-05 DIAGNOSIS — G89.29 CHRONIC PAIN OF BOTH KNEES: Primary | ICD-10-CM

## 2018-12-05 DIAGNOSIS — M25.562 CHRONIC PAIN OF BOTH KNEES: Primary | ICD-10-CM

## 2018-12-05 PROCEDURE — 20605 DRAIN/INJ JOINT/BURSA W/O US: CPT | Performed by: ORTHOPAEDIC SURGERY

## 2018-12-05 PROCEDURE — 99214 OFFICE O/P EST MOD 30 MIN: CPT | Performed by: ORTHOPAEDIC SURGERY

## 2018-12-05 PROCEDURE — 73562 X-RAY EXAM OF KNEE 3: CPT | Performed by: ORTHOPAEDIC SURGERY

## 2018-12-05 RX ORDER — LIDOCAINE HYDROCHLORIDE 20 MG/ML
1 INJECTION, SOLUTION EPIDURAL; INFILTRATION; INTRACAUDAL; PERINEURAL
Status: COMPLETED | OUTPATIENT
Start: 2018-12-05 | End: 2018-12-05

## 2018-12-05 RX ORDER — METHYLPREDNISOLONE ACETATE 80 MG/ML
80 INJECTION, SUSPENSION INTRA-ARTICULAR; INTRALESIONAL; INTRAMUSCULAR; SOFT TISSUE
Status: COMPLETED | OUTPATIENT
Start: 2018-12-05 | End: 2018-12-05

## 2018-12-05 RX ADMIN — METHYLPREDNISOLONE ACETATE 80 MG: 80 INJECTION, SUSPENSION INTRA-ARTICULAR; INTRALESIONAL; INTRAMUSCULAR; SOFT TISSUE at 11:19

## 2018-12-05 RX ADMIN — LIDOCAINE HYDROCHLORIDE 1 ML: 20 INJECTION, SOLUTION EPIDURAL; INFILTRATION; INTRACAUDAL; PERINEURAL at 11:18

## 2018-12-05 RX ADMIN — LIDOCAINE HYDROCHLORIDE 1 ML: 20 INJECTION, SOLUTION EPIDURAL; INFILTRATION; INTRACAUDAL; PERINEURAL at 11:19

## 2018-12-05 RX ADMIN — METHYLPREDNISOLONE ACETATE 80 MG: 80 INJECTION, SUSPENSION INTRA-ARTICULAR; INTRALESIONAL; INTRAMUSCULAR; SOFT TISSUE at 11:18

## 2018-12-05 NOTE — PROGRESS NOTES
"  Patient: Pro Mueller    YOB: 1955    Medical Record Number: [id number]    Chief Complaints:  Bilateral shoulder pain, new complaint of bilateral knee pain    History of Present Illness:     63 y.o. male patient who presents for evaluation of both shoulders and both knees.  The shoulders are his biggest issue.  He reports moderate to severe intermittent aching pain over the top of both shoulders.  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  He has tried therapy and injections in the past.  The injections have helped.  Denies any weakness, numbness or paresthesias.    He also comes in for evaluation of both knees.  He reports a 15 year history of problems.  He has seen another physician in the past.  He tells me that he was told that his x-rays were negative but his MRI confirmed \"wearing of the cartilage on the kneecaps\".  His pain is moderate, intermittent and burning.  He localizes the pain to the front of both knees.  Mobic, tramadol and Synvisc have helped in the past.  Denies any mechanical symptoms or instability.    Allergies:   Allergies   Allergen Reactions   • Penicillins        Home Medications:    Current Outpatient Medications:   •  ACETAMINOPHEN EXTRA STRENGTH 500 MG tablet, TK 2 TS PO IN BETWEEN MELOXICAM DOSES UTD, Disp: , Rfl: 0  •  acyclovir (ZOVIRAX) 800 MG tablet, Take 1 tablet by mouth 3 (Three) Times a Day., Disp: 90 tablet, Rfl: 0  •  amLODIPine (NORVASC) 5 MG tablet, Take 1 tablet by mouth Daily., Disp: 90 tablet, Rfl: 0  •  atorvastatin (LIPITOR) 20 MG tablet, TAKE 1 TABLET BY MOUTH DAILY, Disp: 90 tablet, Rfl: 0  •  azelastine (ASTEPRO) 0.15 % solution nasal spray, 2 squirts twice a day, Disp: 30 mL, Rfl: 1  •  chlordiazePOXIDE (LIBRIUM) 25 MG capsule, Take 1 capsule by mouth 4 (Four) Times a Day As Needed for Withdrawal., Disp: 12 capsule, Rfl: 0  •  chlorhexidine (PERIDEX) 0.12 % solution, , Disp: , " Rfl:   •  clonazePAM (KlonoPIN) 2 MG tablet, , Disp: , Rfl:   •  escitalopram (LEXAPRO) 20 MG tablet, Take 1 tablet by mouth Daily., Disp: 30 tablet, Rfl: 3  •  levothyroxine (SYNTHROID, LEVOTHROID) 75 MCG tablet, 1 per day, Disp: 90 tablet, Rfl: 3  •  lisinopril (PRINIVIL,ZESTRIL) 10 MG tablet, Take 1 tablet by mouth Daily., Disp: 90 tablet, Rfl: 0  •  meloxicam (MOBIC) 15 MG tablet, Take 1 tablet by mouth Daily As Needed for Moderate Pain . Take as directed with food., Disp: 30 tablet, Rfl: 2  •  SUMAtriptan (IMITREX) 100 MG tablet, 1 at start of migraine and repeat 2 hours later if nec., Disp: 13 tablet, Rfl: 3  •  traMADol (ULTRAM) 50 MG tablet, Take 1 tablet by mouth Every 6 (Six) Hours As Needed for Moderate Pain ., Disp: 80 tablet, Rfl: 0  •  TRINTELLIX 20 MG tablet, , Disp: , Rfl:     Past Medical History:   Diagnosis Date   • Alcohol abuse    • Anxiety    • Arthritis    • Depression    • Encounter for removal of sutures    • Hyperlipidemia    • Hypertension    • Kidney stone    • Motion sickness    • Motion sickness    • Olecranon bursitis, right elbow    • Withdrawal symptoms, alcohol (CMS/HCC)        Past Surgical History:   Procedure Laterality Date   • CYST REMOVAL     • EXTRACORPOREAL SHOCK WAVE LITHOTRIPSY (ESWL) Right 2002   • TONSILLECTOMY         Social History     Occupational History   • Not on file   Tobacco Use   • Smoking status: Former Smoker     Packs/day: 0.50     Years: 25.00     Pack years: 12.50   • Smokeless tobacco: Never Used   Substance and Sexual Activity   • Alcohol use: Yes     Alcohol/week: 3.6 oz     Types: 6 Shots of liquor per week     Comment: several night weekly, few drinks per occasion   • Drug use: No   • Sexual activity: Yes     Partners: Female      Social History     Social History Narrative   • Not on file       Family History   Problem Relation Age of Onset   • Alzheimer's disease Mother    • Pancreatic cancer Father        Review of Systems:      Constitutional:  "Denies fever, shaking or chills   Eyes: Denies change in visual acuity   HEENT: Denies nasal congestion or sore throat   Respiratory: Denies cough or shortness of breath   Cardiovascular: Denies chest pain or edema  Endocrine: Denies tremors, palpitations, intolerance of heat or cold, polyuria, polydipsia.  GI: Denies abdominal pain, nausea, vomiting, bloody stools or diarrhea  : Denies frequency, urgency, incontinence, retention, or nocturia.  Musculoskeletal: Denies numbness, tingling or loss of motor function except as above  Integument: Denies rash, lesion or ulceration   Neurologic: Denies headache or focal weakness, deficits  Heme: Denies spontaneous or excessive bleeding, epistaxis, hematuria, melena, fatigue, enlarged or tender lymph nodes.      All other pertinent positives and negatives as noted above in HPI.    Physical Exam: 63 y.o. male  Vitals:    12/05/18 1024   Weight: 93 kg (205 lb)   Height: 182.9 cm (72\")     General:  Patient is awake and alert.  Appears in no acute distress or discomfort.    Psych:  Affect and demeanor are appropriate.    Eyes:  Conjunctiva and sclera appear grossly normal.  Eyes track well and EOM seem to be intact.    Ears:  No gross abnormalities.  Hearing adequate for the exam.    Cardiovascular:  Regular rate and rhythm.    Lungs:  Good chest expansion.  Breathing unlabored.    Extremities: Both shoulders are examined and his exam is roughly symmetric.  Skin is benign.  Moderate tenderness over the acromioclavicular joint bilaterally.  Positive active compression maneuvers bilaterally.  Full symmetric motion.  Good strength in his rotator cuff bilaterally.  Good motor and sensory function of the lower arms and hands.  Palpable radial pulses.    Both knees are also examined.  Skin is benign.  No effusion.  Moderate tenderness over the patellofemoral retinaculum bilaterally.  He does have some anterior crepitus with knee range of motion.  Full symmetric motion.  No " instability.  Negative medial and lateral Melia's test.  Good motor and sensory function in both lower legs and feet.  He has palpable pulses bilaterally.    Imaging:  Bilateral AP, merchants and lateral views of the knees are ordered and reviewed.  No comparison films are available.  I do not see any significant degenerative changes.  No acute abnormalities, lesions, masses, malalignment or other concerning findings.    Assessment/Plan:  1.  Bilateral symptomatic acromioclavicular arthritis  2.  Bilateral patellofemoral chondromalacia    He says that the shoulders are by far his biggest issue today.  He requested injections of both shoulders.  The risks, benefits and alternatives were discussed.  He consented.  Both injections were performed as described below without complication.  He expressed interest in getting both knees injected as well.  I will set him up to come back in a couple weeks.    Aj Mancilla MD    12/05/2018    Medium Joint Arthrocentesis: R acromioclavicular  Date/Time: 12/5/2018 11:18 AM  Consent given by: patient  Site marked: site marked  Timeout: Immediately prior to procedure a time out was called to verify the correct patient, procedure, equipment, support staff and site/side marked as required   Supporting Documentation  Indications: pain   Procedure Details  Location: shoulder - R acromioclavicular  Preparation: Patient was prepped and draped in the usual sterile fashion  Needle size: 25 G  Approach: superior  Medications administered: 80 mg methylPREDNISolone acetate 80 MG/ML; 1 mL lidocaine PF 2% 2 %      Medium Joint Arthrocentesis: L acromioclavicular  Date/Time: 12/5/2018 11:19 AM  Consent given by: patient  Site marked: site marked  Timeout: Immediately prior to procedure a time out was called to verify the correct patient, procedure, equipment, support staff and site/side marked as required   Supporting Documentation  Indications: pain   Procedure Details  Location:  shoulder - L acromioclavicular  Preparation: Patient was prepped and draped in the usual sterile fashion  Needle size: 25 G  Approach: superior  Medications administered: 80 mg methylPREDNISolone acetate 80 MG/ML; 1 mL lidocaine PF 2% 2 %

## 2018-12-17 ENCOUNTER — CLINICAL SUPPORT (OUTPATIENT)
Dept: ORTHOPEDIC SURGERY | Facility: CLINIC | Age: 63
End: 2018-12-17

## 2018-12-17 VITALS — HEIGHT: 72 IN | WEIGHT: 205 LBS | BODY MASS INDEX: 27.77 KG/M2 | TEMPERATURE: 98.2 F

## 2018-12-17 DIAGNOSIS — M17.11 PRIMARY OSTEOARTHRITIS OF RIGHT KNEE: Primary | ICD-10-CM

## 2018-12-17 DIAGNOSIS — M17.12 PRIMARY OSTEOARTHRITIS OF LEFT KNEE: ICD-10-CM

## 2018-12-17 PROCEDURE — 20610 DRAIN/INJ JOINT/BURSA W/O US: CPT | Performed by: NURSE PRACTITIONER

## 2018-12-17 RX ORDER — METHYLPREDNISOLONE ACETATE 80 MG/ML
80 INJECTION, SUSPENSION INTRA-ARTICULAR; INTRALESIONAL; INTRAMUSCULAR; SOFT TISSUE
Status: COMPLETED | OUTPATIENT
Start: 2018-12-17 | End: 2018-12-17

## 2018-12-17 RX ORDER — LIDOCAINE HYDROCHLORIDE 20 MG/ML
2 INJECTION, SOLUTION EPIDURAL; INFILTRATION; INTRACAUDAL; PERINEURAL
Status: COMPLETED | OUTPATIENT
Start: 2018-12-17 | End: 2018-12-17

## 2018-12-17 RX ADMIN — METHYLPREDNISOLONE ACETATE 80 MG: 80 INJECTION, SUSPENSION INTRA-ARTICULAR; INTRALESIONAL; INTRAMUSCULAR; SOFT TISSUE at 08:18

## 2018-12-17 RX ADMIN — METHYLPREDNISOLONE ACETATE 80 MG: 80 INJECTION, SUSPENSION INTRA-ARTICULAR; INTRALESIONAL; INTRAMUSCULAR; SOFT TISSUE at 08:17

## 2018-12-17 RX ADMIN — LIDOCAINE HYDROCHLORIDE 2 ML: 20 INJECTION, SOLUTION EPIDURAL; INFILTRATION; INTRACAUDAL; PERINEURAL at 08:17

## 2018-12-17 RX ADMIN — LIDOCAINE HYDROCHLORIDE 2 ML: 20 INJECTION, SOLUTION EPIDURAL; INFILTRATION; INTRACAUDAL; PERINEURAL at 08:18

## 2018-12-17 NOTE — PROGRESS NOTES
Mr. Mueller comes in today for follow-up of bilateral knee pain.  Reports injections by another provider have worked well in the past.  The patient would like to get injections today.  The risks, benefits and alternatives were discussed and the patient consented.  Going forward, the patient will follow-up as needed.    BERONICA Ivory    12/17/2018      Large Joint Arthrocentesis: R knee  Date/Time: 12/17/2018 8:17 AM  Consent given by: patient  Site marked: site marked  Timeout: Immediately prior to procedure a time out was called to verify the correct patient, procedure, equipment, support staff and site/side marked as required   Supporting Documentation  Indications: pain and joint swelling   Procedure Details  Location: knee - R knee  Preparation: Patient was prepped and draped in the usual sterile fashion  Needle gauge: 21 G.  Approach: anterolateral  Medications administered: 2 mL lidocaine PF 2% 2 %; 80 mg methylPREDNISolone acetate 80 MG/ML  Patient tolerance: patient tolerated the procedure well with no immediate complications    Large Joint Arthrocentesis: L knee  Date/Time: 12/17/2018 8:18 AM  Consent given by: patient  Site marked: site marked  Timeout: Immediately prior to procedure a time out was called to verify the correct patient, procedure, equipment, support staff and site/side marked as required   Supporting Documentation  Indications: pain and joint swelling   Procedure Details  Location: knee - L knee  Preparation: Patient was prepped and draped in the usual sterile fashion  Needle gauge: 21 G.  Approach: anterolateral  Medications administered: 2 mL lidocaine PF 2% 2 %; 80 mg methylPREDNISolone acetate 80 MG/ML  Patient tolerance: patient tolerated the procedure well with no immediate complications

## 2018-12-18 ENCOUNTER — OFFICE VISIT (OUTPATIENT)
Dept: NEUROLOGY | Facility: CLINIC | Age: 63
End: 2018-12-18

## 2018-12-18 VITALS
BODY MASS INDEX: 28.44 KG/M2 | DIASTOLIC BLOOD PRESSURE: 90 MMHG | SYSTOLIC BLOOD PRESSURE: 152 MMHG | HEIGHT: 72 IN | WEIGHT: 210 LBS | OXYGEN SATURATION: 94 % | HEART RATE: 93 BPM

## 2018-12-18 DIAGNOSIS — G60.9 IDIOPATHIC PERIPHERAL NEUROPATHY: Primary | ICD-10-CM

## 2018-12-18 PROCEDURE — 99214 OFFICE O/P EST MOD 30 MIN: CPT | Performed by: PSYCHIATRY & NEUROLOGY

## 2018-12-18 RX ORDER — GABAPENTIN 300 MG/1
CAPSULE ORAL
Qty: 90 CAPSULE | Refills: 3 | Status: SHIPPED | OUTPATIENT
Start: 2018-12-18 | End: 2019-01-02

## 2018-12-18 RX ORDER — TRAMADOL HYDROCHLORIDE 50 MG/1
TABLET ORAL
Qty: 80 TABLET | Refills: 0 | Status: SHIPPED | OUTPATIENT
Start: 2018-12-18 | End: 2019-01-02

## 2018-12-18 RX ORDER — QUETIAPINE FUMARATE 100 MG/1
100 TABLET, FILM COATED ORAL NIGHTLY
COMMUNITY
End: 2019-01-02

## 2018-12-18 NOTE — PROGRESS NOTES
CC: Peripheral neuropathy    HPI:  Pro Mueller is a  63 y.o.  right-handed white male was sent for neurological consultation by Dr. Beal regarding peripheral neuropathy.  The patient describes on the soles of his feet numbness tingling burning and shooting pains for 3 or 4 months.  Symptoms are exacerbated by weightbearing.  He denies symptoms in the hands.  He denies symptoms on the tops of the feet or in the lower legs.  He has some numbness in the left lateral 5 at times.  He denies any low back pain and denies a previous history of surgery on the lumbar spine.  He does have osteoarthritis of the knees and shoulders and yesterday had cortisone shots in both knees.    The patient was sent for an EMG which I performed with nerve conductions and needle examination in both legs.  The left peroneal sensory and motor amplitudes were mildly low without other clear evidence of a more diffuse peripheral neuropathy.  Specifically the tibial motor studies included distal latencies along the medial and lateral plantar motor nerves which were normal and medial orthodromic plantar mixed studies were normal bilaterally.    The patient denies a family history of neuropathy and denies anyone in the family with stroke brain tumor brain hemorrhage or aneurysm of a brain artery.  He states that he has had a couple of syncopal episodes due to low blood pressure.  No history of meningitis seizures or stroke.        Past Medical History:   Diagnosis Date   • Alcohol abuse    • Anxiety    • Arthritis    • Depression    • Encounter for removal of sutures    • Headache, tension-type    • Hyperlipidemia    • Hypertension    • Kidney stone    • Migraine    • Motion sickness    • Motion sickness    • Olecranon bursitis, right elbow    • Peripheral neuropathy    • Withdrawal symptoms, alcohol (CMS/HCC)          Past Surgical History:   Procedure Laterality Date   • CYST REMOVAL     • EXTRACORPOREAL SHOCK WAVE LITHOTRIPSY (ESWL) Right 2002    • TONSILLECTOMY             Current Outpatient Medications:   •  acyclovir (ZOVIRAX) 800 MG tablet, Take 1 tablet by mouth 3 (Three) Times a Day., Disp: 90 tablet, Rfl: 0  •  amLODIPine (NORVASC) 5 MG tablet, Take 1 tablet by mouth Daily., Disp: 90 tablet, Rfl: 0  •  atorvastatin (LIPITOR) 20 MG tablet, TAKE 1 TABLET BY MOUTH DAILY, Disp: 90 tablet, Rfl: 0  •  clonazePAM (KlonoPIN) 2 MG tablet, , Disp: , Rfl:   •  levothyroxine (SYNTHROID, LEVOTHROID) 75 MCG tablet, 1 per day, Disp: 90 tablet, Rfl: 3  •  lisinopril (PRINIVIL,ZESTRIL) 10 MG tablet, Take 1 tablet by mouth Daily., Disp: 90 tablet, Rfl: 0  •  meloxicam (MOBIC) 15 MG tablet, Take 1 tablet by mouth Daily As Needed for Moderate Pain . Take as directed with food., Disp: 30 tablet, Rfl: 2  •  QUEtiapine (SEROquel) 100 MG tablet, Take 100 mg by mouth Every Night., Disp: , Rfl:   •  traMADol (ULTRAM) 50 MG tablet, TAKE 1 TABLET BY MOUTH EVERY 6 HOURS AS NEEDED FOR MODERATE PAIN, Disp: 80 tablet, Rfl: 0  •  TRINTELLIX 20 MG tablet, , Disp: , Rfl:   •  ACETAMINOPHEN EXTRA STRENGTH 500 MG tablet, TK 2 TS PO IN BETWEEN MELOXICAM DOSES UTD, Disp: , Rfl: 0  •  azelastine (ASTEPRO) 0.15 % solution nasal spray, 2 squirts twice a day, Disp: 30 mL, Rfl: 1  •  chlordiazePOXIDE (LIBRIUM) 25 MG capsule, Take 1 capsule by mouth 4 (Four) Times a Day As Needed for Withdrawal., Disp: 12 capsule, Rfl: 0  •  chlorhexidine (PERIDEX) 0.12 % solution, , Disp: , Rfl:   •  escitalopram (LEXAPRO) 20 MG tablet, Take 1 tablet by mouth Daily., Disp: 30 tablet, Rfl: 3  •  gabapentin (NEURONTIN) 300 MG capsule, 1 po nightly for 1 week then 1 po twice daily for 1 week then 1 po 3 times daily, Disp: 90 capsule, Rfl: 3  •  SUMAtriptan (IMITREX) 100 MG tablet, 1 at start of migraine and repeat 2 hours later if nec., Disp: 13 tablet, Rfl: 3      Family History   Problem Relation Age of Onset   • Alzheimer's disease Mother    • Pancreatic cancer Father          Social History     Socioeconomic  History   • Marital status:      Spouse name: Not on file   • Number of children: Not on file   • Years of education: Not on file   • Highest education level: Not on file   Social Needs   • Financial resource strain: Not on file   • Food insecurity - worry: Not on file   • Food insecurity - inability: Not on file   • Transportation needs - medical: Not on file   • Transportation needs - non-medical: Not on file   Occupational History   • Not on file   Tobacco Use   • Smoking status: Former Smoker     Packs/day: 0.50     Years: 25.00     Pack years: 12.50   • Smokeless tobacco: Never Used   Substance and Sexual Activity   • Alcohol use: Yes     Alcohol/week: 3.6 oz     Types: 6 Shots of liquor per week     Comment: several night weekly, few drinks per occasion   • Drug use: No   • Sexual activity: Yes     Partners: Female   Other Topics Concern   • Not on file   Social History Narrative   • Not on file         Allergies   Allergen Reactions   • Penicillins          Pain Scale: 4/10 joints        ROS:  Review of Systems   Constitutional: Positive for appetite change and unexpected weight change. Negative for activity change and fatigue.   HENT: Positive for dental problem and trouble swallowing. Negative for ear pain, facial swelling and hearing loss.    Eyes: Negative for pain, redness and itching.   Respiratory: Negative for cough, choking and shortness of breath.    Cardiovascular: Negative for chest pain and leg swelling.   Gastrointestinal: Positive for vomiting. Negative for abdominal pain and nausea.   Endocrine: Negative for cold intolerance and heat intolerance.   Musculoskeletal: Positive for arthralgias. Negative for back pain and myalgias.   Skin: Negative for color change, pallor, rash and wound.   Allergic/Immunologic: Negative for environmental allergies and food allergies.   Neurological: Negative for dizziness, tremors, seizures, syncope, facial asymmetry, speech difficulty, weakness,  "light-headedness, numbness and headaches.   Hematological: Negative for adenopathy. Does not bruise/bleed easily.   Psychiatric/Behavioral: Positive for decreased concentration. Negative for agitation, behavioral problems, confusion, dysphoric mood, hallucinations, self-injury, sleep disturbance and suicidal ideas. The patient is nervous/anxious. The patient is not hyperactive.            Physical Exam:  Vitals:    12/18/18 1521   BP: 152/90   Pulse: 93   SpO2: 94%   Weight: 95.3 kg (210 lb)   Height: 182.9 cm (72\")     Body mass index is 28.48 kg/m².    Physical Exam  Gen.: Overweight white male no acute distress  HEENT: Normocephalic.  Abrasion on the forehead..  Discs flat.  No A-V nicking.  Throat negative.  Neck: Supple.  No thyromegaly.  No cervical bruits.  Radial pulses were strong and simultaneous.  Heart: Regular rate and rhythm no murmurs.  No pedal edema.      Neurological Exam:   Mental status: Awake alert oriented and conversant without evidence of an affective disorder thought disorder delusions or hallucinations.  HCF: No aphasia or apraxia or dysarthria.  Recent and remote memory intact.  Knowledge of recent events intact.  Cranial nerves: 2-12 intact  Motor: Normal tone and bulk with full power in all muscles tested in the arms and legs.  Reflexes: Symmetrical +1 with downgoing toe signs  Sensory: Normal light touch and pinprick throughout the arms and legs.  Monofilament testing on the soles was normal bilaterally.  Vibration and position senses were normal in the feet.  Cerebellar: Finger to nose rapid movements heel-to-shin normal  Gait and Station: Casual toe heel and tandem walk normal no Romberg no drift.        Results:      Lab Results   Component Value Date    GLUCOSE 107 (H) 08/04/2018    BUN 19 11/26/2018    CREATININE 0.79 11/26/2018    EGFRIFNONA 100 11/26/2018    EGFRIFAFRI 121 11/26/2018    BCR 24.1 11/26/2018    CO2 25.8 11/26/2018    CALCIUM 10.3 11/26/2018    PROTENTOTREF 7.6 " 11/26/2018    ALBUMIN 4.70 11/26/2018    LABIL2 1.6 11/26/2018    AST 82 (H) 11/26/2018    ALT 54 (H) 11/26/2018       Lab Results   Component Value Date    WBC 4.84 08/04/2018    HGB 14.6 08/04/2018    HCT 44.3 08/04/2018    MCV 95.1 08/04/2018     (L) 08/04/2018         .No results found for: RPR      Lab Results   Component Value Date    TSH 1.06 09/25/2018         Lab Results   Component Value Date    NTRVYBJT11 588 09/25/2018         No results found for: FOLATE      Lab Results   Component Value Date    HGBA1C 5.33 11/26/2018       EMG report and data sheets reviewed as noted above        Assessment:   1.  Bilateral numbness tingling burning and shooting pains in the soles of the feet-suspect peripheral neuropathy.  With normal physical exam and normal electrodiagnostic study there is suspicion it may simply be plantar fasciitis instead.  Also pain is notable with weightbearing which argues more for plantar fasciitis then peripheral neuropathy.  However alcohol use history is a risk factor for small fiber neuropathy early on.          Plan:  1.  Check additional labs for common/treatable causes of peripheral neuropathy.  Sedimentation rate, RPR, immunofixation, heavy metal screen, cryoglobulins, copper level   2.  Trial of gabapentin 300 mg nightly for 1 week then twice daily for 1 week then 3 times daily.  Side effects were reviewed.  He was told if this wasn't helpful then escalating the dosage in 3 or 4 weeks in a stepwise fashion to 2 tablets 3 times a day would be recommended.  He can call in for this change in prescription.  3.  Follow-up with the nurse practitioner in about 3 months.                        Dictated utilizing Dragon dictation.

## 2018-12-24 RX ORDER — AMLODIPINE BESYLATE 5 MG/1
5 TABLET ORAL DAILY
Qty: 90 TABLET | Refills: 0 | Status: SHIPPED | OUTPATIENT
Start: 2018-12-24 | End: 2019-01-02

## 2018-12-24 RX ORDER — QUETIAPINE FUMARATE 100 MG/1
100 TABLET, FILM COATED ORAL NIGHTLY
Qty: 90 TABLET | Refills: 0 | OUTPATIENT
Start: 2018-12-24

## 2019-01-02 ENCOUNTER — HOSPITAL ENCOUNTER (OUTPATIENT)
Facility: HOSPITAL | Age: 64
Setting detail: OBSERVATION
Discharge: HOME | End: 2019-01-04
Attending: EMERGENCY MEDICINE | Admitting: EMERGENCY MEDICINE

## 2019-01-02 ENCOUNTER — APPOINTMENT (OUTPATIENT)
Dept: GENERAL RADIOLOGY | Facility: HOSPITAL | Age: 64
End: 2019-01-02

## 2019-01-02 ENCOUNTER — APPOINTMENT (OUTPATIENT)
Dept: CT IMAGING | Facility: HOSPITAL | Age: 64
End: 2019-01-02

## 2019-01-02 DIAGNOSIS — S01.01XA OCCIPITAL SCALP LACERATION, INITIAL ENCOUNTER: ICD-10-CM

## 2019-01-02 DIAGNOSIS — I95.9 HYPOTENSION, UNSPECIFIED HYPOTENSION TYPE: ICD-10-CM

## 2019-01-02 DIAGNOSIS — R55 SYNCOPE AND COLLAPSE: Primary | ICD-10-CM

## 2019-01-02 LAB
ALBUMIN SERPL-MCNC: 4.9 G/DL (ref 3.5–5.2)
ALBUMIN/GLOB SERPL: 1.7 G/DL
ALP SERPL-CCNC: 70 U/L (ref 39–117)
ALT SERPL W P-5'-P-CCNC: 52 U/L (ref 1–41)
AMPHET+METHAMPHET UR QL: POSITIVE
ANION GAP SERPL CALCULATED.3IONS-SCNC: 17.8 MMOL/L
AST SERPL-CCNC: 86 U/L (ref 1–40)
BACTERIA UR QL AUTO: ABNORMAL /HPF
BARBITURATES UR QL SCN: NEGATIVE
BASOPHILS # BLD AUTO: 0.02 10*3/MM3 (ref 0–0.2)
BASOPHILS NFR BLD AUTO: 0.2 % (ref 0–1.5)
BENZODIAZ UR QL SCN: POSITIVE
BILIRUB SERPL-MCNC: 1 MG/DL (ref 0.1–1.2)
BILIRUB UR QL STRIP: NEGATIVE
BUN BLD-MCNC: 28 MG/DL (ref 8–23)
BUN/CREAT SERPL: 28.9 (ref 7–25)
CALCIUM SPEC-SCNC: 10.2 MG/DL (ref 8.6–10.5)
CALCIUM SPEC-SCNC: 11.2 MG/DL (ref 8.6–10.5)
CANNABINOIDS SERPL QL: NEGATIVE
CHLORIDE SERPL-SCNC: 92 MMOL/L (ref 98–107)
CLARITY UR: ABNORMAL
CO2 SERPL-SCNC: 25.2 MMOL/L (ref 22–29)
COCAINE UR QL: NEGATIVE
COD CRY URNS QL: ABNORMAL /HPF
COLOR UR: ABNORMAL
CREAT BLD-MCNC: 0.97 MG/DL (ref 0.76–1.27)
DEPRECATED RDW RBC AUTO: 48.2 FL (ref 37–54)
EOSINOPHIL # BLD AUTO: 0.06 10*3/MM3 (ref 0–0.7)
EOSINOPHIL NFR BLD AUTO: 0.7 % (ref 0.3–6.2)
ERYTHROCYTE [DISTWIDTH] IN BLOOD BY AUTOMATED COUNT: 13.5 % (ref 11.5–14.5)
ETHANOL BLD-MCNC: <10 MG/DL (ref 0–10)
ETHANOL UR QL: <0.01 %
GFR SERPL CREATININE-BSD FRML MDRD: 79 ML/MIN/1.73
GLOBULIN UR ELPH-MCNC: 2.9 GM/DL
GLUCOSE BLD-MCNC: 111 MG/DL (ref 65–99)
GLUCOSE UR STRIP-MCNC: NEGATIVE MG/DL
GRAN CASTS URNS QL MICRO: ABNORMAL /LPF
HCT VFR BLD AUTO: 46.5 % (ref 40.4–52.2)
HGB BLD-MCNC: 15.5 G/DL (ref 13.7–17.6)
HGB UR QL STRIP.AUTO: NEGATIVE
HOLD SPECIMEN: NORMAL
HYALINE CASTS UR QL AUTO: ABNORMAL /LPF
IMM GRANULOCYTES # BLD AUTO: 0.03 10*3/MM3 (ref 0–0.03)
IMM GRANULOCYTES NFR BLD AUTO: 0.4 % (ref 0–0.5)
KETONES UR QL STRIP: ABNORMAL
LEUKOCYTE ESTERASE UR QL STRIP.AUTO: ABNORMAL
LYMPHOCYTES # BLD AUTO: 1.77 10*3/MM3 (ref 0.9–4.8)
LYMPHOCYTES NFR BLD AUTO: 20.7 % (ref 19.6–45.3)
MCH RBC QN AUTO: 32.4 PG (ref 27–32.7)
MCHC RBC AUTO-ENTMCNC: 33.3 G/DL (ref 32.6–36.4)
MCV RBC AUTO: 97.3 FL (ref 79.8–96.2)
METHADONE UR QL SCN: POSITIVE
MONOCYTES # BLD AUTO: 0.68 10*3/MM3 (ref 0.2–1.2)
MONOCYTES NFR BLD AUTO: 7.9 % (ref 5–12)
MUCOUS THREADS URNS QL MICRO: ABNORMAL /HPF
NEUTROPHILS # BLD AUTO: 6 10*3/MM3 (ref 1.9–8.1)
NEUTROPHILS NFR BLD AUTO: 70.1 % (ref 42.7–76)
NITRITE UR QL STRIP: NEGATIVE
NT-PROBNP SERPL-MCNC: 186.8 PG/ML (ref 0–900)
OPIATES UR QL: NEGATIVE
OXYCODONE UR QL SCN: NEGATIVE
PH UR STRIP.AUTO: 5.5 [PH] (ref 5–8)
PLATELET # BLD AUTO: 146 10*3/MM3 (ref 140–500)
PMV BLD AUTO: 10.3 FL (ref 6–12)
POTASSIUM BLD-SCNC: 3.8 MMOL/L (ref 3.5–5.2)
PROT SERPL-MCNC: 7.8 G/DL (ref 6–8.5)
PROT UR QL STRIP: ABNORMAL
PTH-INTACT SERPL-MCNC: 19.2 PG/ML (ref 15–65)
RBC # BLD AUTO: 4.78 10*6/MM3 (ref 4.6–6)
RBC # UR: ABNORMAL /HPF
REF LAB TEST METHOD: ABNORMAL
SODIUM BLD-SCNC: 135 MMOL/L (ref 136–145)
SP GR UR STRIP: >=1.03 (ref 1–1.03)
SQUAMOUS #/AREA URNS HPF: ABNORMAL /HPF
T4 FREE SERPL-MCNC: 1.55 NG/DL (ref 0.93–1.7)
TRANS CELLS #/AREA URNS HPF: ABNORMAL /HPF
TROPONIN T SERPL-MCNC: <0.01 NG/ML (ref 0–0.03)
TSH SERPL DL<=0.05 MIU/L-ACNC: 7.36 MIU/ML (ref 0.27–4.2)
UROBILINOGEN UR QL STRIP: ABNORMAL
WBC NRBC COR # BLD: 8.56 10*3/MM3 (ref 4.5–10.7)
WBC UR QL AUTO: ABNORMAL /HPF
WHOLE BLOOD HOLD SPECIMEN: NORMAL

## 2019-01-02 PROCEDURE — 93010 ELECTROCARDIOGRAM REPORT: CPT | Performed by: INTERNAL MEDICINE

## 2019-01-02 PROCEDURE — 99284 EMERGENCY DEPT VISIT MOD MDM: CPT

## 2019-01-02 PROCEDURE — 84439 ASSAY OF FREE THYROXINE: CPT | Performed by: EMERGENCY MEDICINE

## 2019-01-02 PROCEDURE — 93005 ELECTROCARDIOGRAM TRACING: CPT

## 2019-01-02 PROCEDURE — 93005 ELECTROCARDIOGRAM TRACING: CPT | Performed by: EMERGENCY MEDICINE

## 2019-01-02 PROCEDURE — 84443 ASSAY THYROID STIM HORMONE: CPT | Performed by: NURSE PRACTITIONER

## 2019-01-02 PROCEDURE — 80307 DRUG TEST PRSMV CHEM ANLYZR: CPT | Performed by: NURSE PRACTITIONER

## 2019-01-02 PROCEDURE — 83970 ASSAY OF PARATHORMONE: CPT | Performed by: HOSPITALIST

## 2019-01-02 PROCEDURE — 83880 ASSAY OF NATRIURETIC PEPTIDE: CPT | Performed by: NURSE PRACTITIONER

## 2019-01-02 PROCEDURE — 81001 URINALYSIS AUTO W/SCOPE: CPT | Performed by: NURSE PRACTITIONER

## 2019-01-02 PROCEDURE — 84484 ASSAY OF TROPONIN QUANT: CPT | Performed by: NURSE PRACTITIONER

## 2019-01-02 PROCEDURE — 80053 COMPREHEN METABOLIC PANEL: CPT | Performed by: NURSE PRACTITIONER

## 2019-01-02 PROCEDURE — G0378 HOSPITAL OBSERVATION PER HR: HCPCS

## 2019-01-02 PROCEDURE — 36415 COLL VENOUS BLD VENIPUNCTURE: CPT | Performed by: HOSPITALIST

## 2019-01-02 PROCEDURE — 70450 CT HEAD/BRAIN W/O DYE: CPT

## 2019-01-02 PROCEDURE — 96360 HYDRATION IV INFUSION INIT: CPT

## 2019-01-02 PROCEDURE — 71046 X-RAY EXAM CHEST 2 VIEWS: CPT

## 2019-01-02 PROCEDURE — 85025 COMPLETE CBC W/AUTO DIFF WBC: CPT | Performed by: NURSE PRACTITIONER

## 2019-01-02 PROCEDURE — 96361 HYDRATE IV INFUSION ADD-ON: CPT

## 2019-01-02 RX ORDER — SODIUM CHLORIDE 0.9 % (FLUSH) 0.9 %
10 SYRINGE (ML) INJECTION AS NEEDED
Status: DISCONTINUED | OUTPATIENT
Start: 2019-01-02 | End: 2019-01-04 | Stop reason: HOSPADM

## 2019-01-02 RX ORDER — THIAMINE MONONITRATE (VIT B1) 100 MG
100 TABLET ORAL DAILY
Status: DISCONTINUED | OUTPATIENT
Start: 2019-01-03 | End: 2019-01-03

## 2019-01-02 RX ORDER — LEVOTHYROXINE SODIUM 0.07 MG/1
75 TABLET ORAL DAILY
COMMUNITY
End: 2020-01-14 | Stop reason: HOSPADM

## 2019-01-02 RX ORDER — SODIUM CHLORIDE 0.9 % (FLUSH) 0.9 %
3-10 SYRINGE (ML) INJECTION AS NEEDED
Status: DISCONTINUED | OUTPATIENT
Start: 2019-01-02 | End: 2019-01-04 | Stop reason: HOSPADM

## 2019-01-02 RX ORDER — VORTIOXETINE 20 MG/1
20 TABLET, FILM COATED ORAL DAILY
COMMUNITY
End: 2020-02-11

## 2019-01-02 RX ORDER — ACETAMINOPHEN 325 MG/1
650 TABLET ORAL EVERY 4 HOURS PRN
Status: DISCONTINUED | OUTPATIENT
Start: 2019-01-02 | End: 2019-01-04 | Stop reason: HOSPADM

## 2019-01-02 RX ORDER — SODIUM CHLORIDE 9 MG/ML
100 INJECTION, SOLUTION INTRAVENOUS CONTINUOUS
Status: DISCONTINUED | OUTPATIENT
Start: 2019-01-02 | End: 2019-01-04 | Stop reason: HOSPADM

## 2019-01-02 RX ORDER — AMLODIPINE BESYLATE 5 MG/1
5 TABLET ORAL EVERY MORNING
COMMUNITY
End: 2020-11-03 | Stop reason: SDUPTHER

## 2019-01-02 RX ORDER — TRAMADOL HYDROCHLORIDE 50 MG/1
50 TABLET ORAL EVERY 6 HOURS PRN
COMMUNITY
End: 2019-01-04 | Stop reason: HOSPADM

## 2019-01-02 RX ORDER — DIPHENOXYLATE HYDROCHLORIDE AND ATROPINE SULFATE 2.5; .025 MG/1; MG/1
1 TABLET ORAL DAILY
Status: DISCONTINUED | OUTPATIENT
Start: 2019-01-03 | End: 2019-01-04 | Stop reason: HOSPADM

## 2019-01-02 RX ORDER — LORAZEPAM 1 MG/1
1 TABLET ORAL EVERY 8 HOURS
Status: DISCONTINUED | OUTPATIENT
Start: 2019-01-03 | End: 2019-01-03

## 2019-01-02 RX ORDER — LORAZEPAM 1 MG/1
1 TABLET ORAL EVERY 6 HOURS
Status: DISCONTINUED | OUTPATIENT
Start: 2019-01-02 | End: 2019-01-03

## 2019-01-02 RX ORDER — ACETAMINOPHEN 500 MG
1000 TABLET ORAL EVERY 6 HOURS PRN
COMMUNITY
End: 2019-03-07

## 2019-01-02 RX ORDER — THIAMINE MONONITRATE (VIT B1) 100 MG
100 TABLET ORAL ONCE
Status: COMPLETED | OUTPATIENT
Start: 2019-01-02 | End: 2019-01-02

## 2019-01-02 RX ORDER — SODIUM CHLORIDE, SODIUM LACTATE, POTASSIUM CHLORIDE, CALCIUM CHLORIDE 600; 310; 30; 20 MG/100ML; MG/100ML; MG/100ML; MG/100ML
75 INJECTION, SOLUTION INTRAVENOUS CONTINUOUS
Status: DISCONTINUED | OUTPATIENT
Start: 2019-01-02 | End: 2019-01-02

## 2019-01-02 RX ORDER — SODIUM CHLORIDE 0.9 % (FLUSH) 0.9 %
3 SYRINGE (ML) INJECTION EVERY 12 HOURS SCHEDULED
Status: DISCONTINUED | OUTPATIENT
Start: 2019-01-02 | End: 2019-01-04 | Stop reason: HOSPADM

## 2019-01-02 RX ORDER — FOLIC ACID 1 MG/1
1 TABLET ORAL DAILY
Status: DISCONTINUED | OUTPATIENT
Start: 2019-01-03 | End: 2019-01-04 | Stop reason: HOSPADM

## 2019-01-02 RX ORDER — CLONAZEPAM 1 MG/1
2 TABLET ORAL NIGHTLY
Status: DISCONTINUED | OUTPATIENT
Start: 2019-01-02 | End: 2019-01-03

## 2019-01-02 RX ORDER — GABAPENTIN 300 MG/1
300 CAPSULE ORAL 3 TIMES DAILY
COMMUNITY
End: 2019-08-20 | Stop reason: SDUPTHER

## 2019-01-02 RX ORDER — ATORVASTATIN CALCIUM 20 MG/1
20 TABLET, FILM COATED ORAL DAILY
COMMUNITY
End: 2019-12-12 | Stop reason: SDUPTHER

## 2019-01-02 RX ORDER — SUMATRIPTAN 100 MG/1
100 TABLET, FILM COATED ORAL
COMMUNITY
End: 2019-08-20

## 2019-01-02 RX ADMIN — LORAZEPAM 1 MG: 1 TABLET ORAL at 17:59

## 2019-01-02 RX ADMIN — CLONAZEPAM 2 MG: 1 TABLET ORAL at 21:14

## 2019-01-02 RX ADMIN — SODIUM CHLORIDE 1000 ML: 9 INJECTION, SOLUTION INTRAVENOUS at 12:54

## 2019-01-02 RX ADMIN — SODIUM CHLORIDE, PRESERVATIVE FREE 3 ML: 5 INJECTION INTRAVENOUS at 21:14

## 2019-01-02 RX ADMIN — Medication 100 MG: at 18:00

## 2019-01-02 RX ADMIN — LORAZEPAM 1 MG: 1 TABLET ORAL at 23:38

## 2019-01-02 RX ADMIN — SODIUM CHLORIDE 100 ML/HR: 9 INJECTION, SOLUTION INTRAVENOUS at 17:59

## 2019-01-02 NOTE — ED PROVIDER NOTES
EMERGENCY DEPARTMENT ENCOUNTER    Room Number:  30/30  Date seen:  1/2/2019  Time seen: 12:32 PM  PCP: Alla Beal MD    HPI:  Chief complaint: syncope  Context:Pro Mueller is a 63 y.o. male who presents to the ED for further evaluation s/p syncopal episode. He states that he woke up on the floor in a puddle of blood last night. He is unsure how long he was unconscious but it was at least several minutes. Pt hit his head when he fell and thinks that the bleeding came from that. Pt states that he fell due to a drop in his BP. He was taken off of his BP but resumed taking it a few months ago because his BP was high. This was the third time that he has had a syncopal episode in the last few months. He denies HA, confusion, focal weakness, or N/V since the episode. However, he has had trouble balancing and walking normally since he woke up on the floor last night. He has no other complaints at this time.     Timing: intermittent  Duration: unknown  Location: back of head  Radiation: n/a  Quality: n/a  Intensity/Severity: moderate  Associated Symptoms: laceration on back of head, gait trouble  Aggravating Factors: none  Alleviating Factors: none  Previous Episodes: Pt has had three syncopal episodes in the last several months  Treatment before arrival: none      ALLERGIES  Penicillins    PAST MEDICAL HISTORY  Active Ambulatory Problems     Diagnosis Date Noted   • Alcoholism (CMS/[facility]) 08/08/2016   • Atopic rhinitis 08/08/2016   • Mixed anxiety depressive disorder 08/08/2016   • Genital herpes simplex 08/08/2016   • Hyperlipidemia 08/08/2016   • Hypertension 08/08/2016   • Hypothyroidism 08/08/2016   • Insomnia 08/08/2016   • Panic disorder without agoraphobia 08/08/2016   • Persistent insomnia 08/08/2016   • Vitamin D deficiency 08/08/2016   • Alcohol withdrawal (CMS/[facility]) 11/04/2016   • Headache 11/05/2016   • Low back pain 11/11/2016   • Sciatica of left side 06/12/2018   • Neuropathy involving both lower  extremities 10/25/2018     Resolved Ambulatory Problems     Diagnosis Date Noted   • Accidental fall 08/08/2016   • Impacted cerumen 08/08/2016   • Influenza 08/08/2016   • Motion sickness 08/08/2016   • Seasonal allergic rhinitis 08/08/2016   • Upper respiratory tract infection 08/08/2016   • Gastritis 11/05/2016   • Olecranon bursitis of right elbow 04/05/2017     Past Medical History:   Diagnosis Date   • Alcohol abuse    • Anxiety    • Arthritis    • Depression    • Encounter for removal of sutures    • Headache, tension-type    • Hyperlipidemia    • Hypertension    • Kidney stone    • Migraine    • Motion sickness    • Motion sickness    • Olecranon bursitis, right elbow    • Peripheral neuropathy    • Withdrawal symptoms, alcohol (CMS/HCC)        PAST SURGICAL HISTORY  Past Surgical History:   Procedure Laterality Date   • CYST REMOVAL     • EXTRACORPOREAL SHOCK WAVE LITHOTRIPSY (ESWL) Right 2002   • TONSILLECTOMY         FAMILY HISTORY  Family History   Problem Relation Age of Onset   • Alzheimer's disease Mother    • Pancreatic cancer Father        SOCIAL HISTORY  Social History     Socioeconomic History   • Marital status:      Spouse name: Not on file   • Number of children: Not on file   • Years of education: Not on file   • Highest education level: Not on file   Social Needs   • Financial resource strain: Not on file   • Food insecurity - worry: Not on file   • Food insecurity - inability: Not on file   • Transportation needs - medical: Not on file   • Transportation needs - non-medical: Not on file   Occupational History   • Not on file   Tobacco Use   • Smoking status: Former Smoker     Packs/day: 0.50     Years: 25.00     Pack years: 12.50   • Smokeless tobacco: Never Used   Substance and Sexual Activity   • Alcohol use: Yes     Alcohol/week: 3.6 oz     Types: 6 Shots of liquor per week     Comment: several night weekly, few drinks per occasion   • Drug use: No   • Sexual activity: Yes      Partners: Female   Other Topics Concern   • Not on file   Social History Narrative   • Not on file       REVIEW OF SYSTEMS  Review of Systems   Constitutional: Negative for activity change, appetite change, diaphoresis and fever.   HENT: Negative for trouble swallowing.    Eyes: Negative for visual disturbance.   Respiratory: Negative for cough, chest tightness, shortness of breath and wheezing.    Cardiovascular: Negative for chest pain, palpitations and leg swelling.   Gastrointestinal: Negative for abdominal pain, diarrhea, nausea and vomiting.   Genitourinary: Negative for dysuria.   Musculoskeletal: Positive for gait problem (trouble balancing). Negative for back pain.   Skin: Positive for wound (laceration to back of head). Negative for rash.   Allergic/Immunologic: Negative for food allergies.   Neurological: Positive for syncope. Negative for dizziness, speech difficulty and light-headedness.       PHYSICAL EXAM  ED Triage Vitals [01/02/19 1226]   Temp Heart Rate Resp BP SpO2   98.3 °F (36.8 °C) 113 18 -- 97 %      Temp src Heart Rate Source Patient Position BP Location FiO2 (%)   Tympanic Monitor -- -- --     Physical Exam   Constitutional: He is oriented to person, place, and time and well-developed, well-nourished, and in no distress. No distress.   Pt seems slightly tremulous   HENT:   Head: Normocephalic. Head is with laceration (left occiput, 1.5cm linear laceration, will defer repair due to time of injury. there is also a laceration on his righ tlower lip).   Eyes: Pupils are equal, round, and reactive to light.   Neck: Normal range of motion. Neck supple.   Cardiovascular: Normal rate, S1 normal, S2 normal and normal heart sounds. Exam reveals no gallop and no friction rub.   No murmur heard.  Pulmonary/Chest: Effort normal. No respiratory distress. He has no wheezes. He has no rales. He exhibits no tenderness.   Abdominal: Soft. There is no rebound and no guarding.   Musculoskeletal: Normal range of  motion. He exhibits no deformity.   Lymphadenopathy:     He has no cervical adenopathy.   Neurological: He is alert and oriented to person, place, and time.   Cranial nerves 2-12 intact as tested.  Sensation intact.  5/5 strength in all extremities.  Normal cerebellar testing.  No drift in any extremity.  No dysarthria.  No aphasia.  No neglect/extinction.    Skin: Skin is warm and dry.   Psychiatric: Affect normal.   Nursing note and vitals reviewed.      LAB RESULTS  Recent Results (from the past 24 hour(s))   Comprehensive Metabolic Panel    Collection Time: 01/02/19 12:50 PM   Result Value Ref Range    Glucose 111 (H) 65 - 99 mg/dL    BUN 28 (H) 8 - 23 mg/dL    Creatinine 0.97 0.76 - 1.27 mg/dL    Sodium 135 (L) 136 - 145 mmol/L    Potassium 3.8 3.5 - 5.2 mmol/L    Chloride 92 (L) 98 - 107 mmol/L    CO2 25.2 22.0 - 29.0 mmol/L    Calcium 11.2 (H) 8.6 - 10.5 mg/dL    Total Protein 7.8 6.0 - 8.5 g/dL    Albumin 4.90 3.50 - 5.20 g/dL    ALT (SGPT) 52 (H) 1 - 41 U/L    AST (SGOT) 86 (H) 1 - 40 U/L    Alkaline Phosphatase 70 39 - 117 U/L    Total Bilirubin 1.0 0.1 - 1.2 mg/dL    eGFR Non African Amer 79 >60 mL/min/1.73    Globulin 2.9 gm/dL    A/G Ratio 1.7 g/dL    BUN/Creatinine Ratio 28.9 (H) 7.0 - 25.0    Anion Gap 17.8 mmol/L   TSH    Collection Time: 01/02/19 12:50 PM   Result Value Ref Range    TSH 7.360 (H) 0.270 - 4.200 mIU/mL   BNP    Collection Time: 01/02/19 12:50 PM   Result Value Ref Range    proBNP 186.8 0.0 - 900.0 pg/mL   Troponin    Collection Time: 01/02/19 12:50 PM   Result Value Ref Range    Troponin T <0.010 0.000 - 0.030 ng/mL   CBC Auto Differential    Collection Time: 01/02/19 12:50 PM   Result Value Ref Range    WBC 8.56 4.50 - 10.70 10*3/mm3    RBC 4.78 4.60 - 6.00 10*6/mm3    Hemoglobin 15.5 13.7 - 17.6 g/dL    Hematocrit 46.5 40.4 - 52.2 %    MCV 97.3 (H) 79.8 - 96.2 fL    MCH 32.4 27.0 - 32.7 pg    MCHC 33.3 32.6 - 36.4 g/dL    RDW 13.5 11.5 - 14.5 %    RDW-SD 48.2 37.0 - 54.0 fl    MPV  10.3 6.0 - 12.0 fL    Platelets 146 140 - 500 10*3/mm3    Neutrophil % 70.1 42.7 - 76.0 %    Lymphocyte % 20.7 19.6 - 45.3 %    Monocyte % 7.9 5.0 - 12.0 %    Eosinophil % 0.7 0.3 - 6.2 %    Basophil % 0.2 0.0 - 1.5 %    Immature Grans % 0.4 0.0 - 0.5 %    Neutrophils, Absolute 6.00 1.90 - 8.10 10*3/mm3    Lymphocytes, Absolute 1.77 0.90 - 4.80 10*3/mm3    Monocytes, Absolute 0.68 0.20 - 1.20 10*3/mm3    Eosinophils, Absolute 0.06 0.00 - 0.70 10*3/mm3    Basophils, Absolute 0.02 0.00 - 0.20 10*3/mm3    Immature Grans, Absolute 0.03 0.00 - 0.03 10*3/mm3   Light Blue Top    Collection Time: 01/02/19 12:50 PM   Result Value Ref Range    Extra Tube hold for add-on    Gold Top - SST    Collection Time: 01/02/19 12:50 PM   Result Value Ref Range    Extra Tube Hold for add-ons.    Urinalysis With Microscopic If Indicated (No Culture) - Urine, Clean Catch    Collection Time: 01/02/19  2:45 PM   Result Value Ref Range    Color, UA Dark Yellow (A) Yellow, Straw    Appearance, UA Cloudy (A) Clear    pH, UA 5.5 5.0 - 8.0    Specific Gravity, UA >=1.030 1.005 - 1.030    Glucose, UA Negative Negative    Ketones, UA 15 mg/dL (1+) (A) Negative    Bilirubin, UA Negative Negative    Blood, UA Negative Negative    Protein, UA 30 mg/dL (1+) (A) Negative    Leuk Esterase, UA Small (1+) (A) Negative    Nitrite, UA Negative Negative    Urobilinogen, UA 1.0 E.U./dL 0.2 - 1.0 E.U./dL   Urine Drug Screen - Urine, Clean Catch    Collection Time: 01/02/19  2:45 PM   Result Value Ref Range    Amphet/Methamphet, Screen Positive (A) Negative    Barbiturates Screen, Urine Negative Negative    Benzodiazepine Screen, Urine Positive (A) Negative    Cocaine Screen, Urine Negative Negative    Opiate Screen Negative Negative    THC, Screen, Urine Negative Negative    Methadone Screen, Urine Positive (A) Negative    Oxycodone Screen, Urine Negative Negative       I ordered the above labs and reviewed the results    RADIOLOGY  CT Head Without Contrast    Preliminary Result   1. There is mild small vessel disease in the cerebral white matter and   there are calcified atherosclerotic plaques in the intracranial segments   of the distal vertebral arteries and there is a tiny amount of fluid in   the posterior right mastoid air cells   2. There is a small scalp hematoma over the posterior parietal occipital   region from yesterday's head trauma. The remainder of the head CT is   normal. Specifically no acute skull fracture or intracranial hemorrhage   is seen.       Radiation dose reduction techniques were utilized, including automated   exposure control and exposure modulation based on body size.              XR Chest 2 View   Final Result   No evidence for acute pulmonary process. Follow-up as   clinical indications persist.       This report was finalized on 1/2/2019 1:41 PM by Dr. Jarod Devries M.D.            I ordered the above noted radiological studies and reviewed the images on the PACS system.  Spoke with Dr. 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 No ST elevation     Interpreted Contemporaneously by me, independently viewed  unchanged compared to prior 4/28/10      PROCEDURES  Procedures    COURSE & MEDICAL DECISION MAKING  Pertinent Labs and Imaging studies that were ordered and reviewed are noted above.  Results were reviewed/discussed with the patient and they were also made aware of online access.  Pt also made aware that some labs, such as cultures, will not be resulted during ER visit and follow up with PMD is necessary.     PROGRESS AND CONSULTS    Progress Notes:         1227  Ordered EKG for further evaluation.  "Pt denies alcohol use or illicit substance abuse.     1239  Discussed plans to admit Pt for additional workup, even if his workup here is unremarkable. He has had multiple episodes over the past three weeks and a cause has not yet been determined. Pt understands and agrees to all plans. All questions answered.     1241  Ordered labs, UA, CXR, and CT head for further evaluation.     1444  Per Pt's RN, Pt does exhibit orthostatic hypotension. Placed call to [facility] for admission.     1517  Reviewed pt's history and workup with Dr. Carrillo.  After a bedside evaluation, Dr. Carrillo agrees with the plan of care.    1524  Discussed Pt's case with Dr. Jackson ([facility]) who agrees to admit Pt to telemetry from further evaluation and treatment.     Based on the patient's lab findings and presenting symptoms, the doctor and I feel it is appropriate to admit the patient for further management, evaluation, and treatment.  I have discussed this with the admitting team.  I have also discussed this with the patient/family.  They are in agreement with admission.        Disposition vitals:  /70 (Patient Position: Standing)   Pulse 113   Temp 98.3 °F (36.8 °C) (Tympanic)   Resp 18   Ht 182.9 cm (72\")   Wt 89.8 kg (198 lb)   SpO2 97%   BMI 26.85 kg/m²       DIAGNOSIS  Final diagnoses:   Syncope and collapse   Hypotension, unspecified hypotension type   Occipital scalp laceration, initial encounter         Documentation assistance provided by priscila Corrigan for BERONICA Dewey.  Information recorded by the scribe was done at my direction and has been verified and validated by me.  Electronically signed by Nida Corrigan on 1/2/2019 at time 3:28 PM       Nida Corrigan  01/02/19 1537       France Martínez APRN  01/02/19 1546    "

## 2019-01-02 NOTE — PLAN OF CARE
Problem: Patient Care Overview  Goal: Plan of Care Review  Outcome: Ongoing (interventions implemented as appropriate)   01/02/19 4332   Coping/Psychosocial   Plan of Care Reviewed With patient   Plan of Care Review   Progress no change   OTHER   Outcome Summary PATIENT ADMITTED TO THE UNIT WITH DX OF SYNCOPE AND HYPOTENSION. PATIENT ALERT AND ORIENTED. NO BED IN ROOM AT THIS TIME PATIENT UP IN CHAIR. C/O HA 2-3 WITH COSTANT THROBBING. NO ACUTE DISTRESS NOTED, WILL CONTINUE TO MONITOR.     Goal: Individualization and Mutuality  Outcome: Ongoing (interventions implemented as appropriate)    Goal: Discharge Needs Assessment  Outcome: Ongoing (interventions implemented as appropriate)    Goal: Interprofessional Rounds/Family Conf  Outcome: Ongoing (interventions implemented as appropriate)      Problem: Pain, Acute (Adult)  Goal: Identify Related Risk Factors and Signs and Symptoms  Outcome: Ongoing (interventions implemented as appropriate)    Goal: Acceptable Pain Control/Comfort Level  Outcome: Ongoing (interventions implemented as appropriate)      Problem: Fall Risk (Adult)  Goal: Identify Related Risk Factors and Signs and Symptoms  Outcome: Ongoing (interventions implemented as appropriate)    Goal: Absence of Fall  Outcome: Ongoing (interventions implemented as appropriate)

## 2019-01-02 NOTE — PROGRESS NOTES
"Clinical Pharmacy Services: Medication History    Pro Mueller is a 63 y.o. male presenting to [facility] for   Chief Complaint   Patient presents with   • Syncope     \"I woke up in a pool of blood on the kitchen floor\"       He  has a past medical history of Alcohol abuse, Anxiety, Arthritis, Depression, Encounter for removal of sutures, Headache, tension-type, Hyperlipidemia, Hypertension, Kidney stone, Migraine, Motion sickness, Motion sickness, Olecranon bursitis, right elbow, Peripheral neuropathy, and Withdrawal symptoms, alcohol (CMS/[facility]).    Allergies as of 01/02/2019 - Reviewed 01/02/2019   Allergen Reaction Noted   • Penicillins  01/04/2016       Medication information was obtained from: Pharmacy, patient  Pharmacy and Phone Number: [facility] [phone]    Prior to Admission Medications     Prescriptions Last Dose Informant Patient Reported? Taking?    acetaminophen (TYLENOL) 500 MG tablet  Self Yes Yes    Take 1,000 mg by mouth Every 6 (Six) Hours As Needed for Mild Pain .    amLODIPine (NORVASC) 5 MG tablet  Pharmacy Yes Yes    Take 5 mg by mouth Daily.    atorvastatin (LIPITOR) 20 MG tablet  Self Yes Yes    Take 20 mg by mouth Daily.    clonazePAM (KlonoPIN) 2 MG tablet  Pharmacy Yes Yes    Take 2 mg by mouth Every Night.    gabapentin (NEURONTIN) 300 MG capsule  Pharmacy Yes Yes    Take 300 mg by mouth 2 (Two) Times a Day.    levothyroxine (SYNTHROID, LEVOTHROID) 75 MCG tablet  Pharmacy Yes Yes    Take 75 mcg by mouth Daily.    lisinopril (PRINIVIL,ZESTRIL) 10 MG tablet  Pharmacy No Yes    Take 1 tablet by mouth Daily.    meloxicam (MOBIC) 15 MG tablet  Self No Yes    Take 1 tablet by mouth Daily As Needed for Moderate Pain . Take as directed with food.    SUMAtriptan (IMITREX) 100 MG tablet  Pharmacy Yes Yes    Take one tablet at onset of headache. May repeat dose one time in 2 hours if headache not relieved. Take one tablet at onset of headache. May repeat dose one time in 2 hours " if headache not relieved.    traMADol (ULTRAM) 50 MG tablet  Pharmacy Yes Yes    Take 50 mg by mouth Every 6 (Six) Hours As Needed for Moderate Pain .    Vortioxetine HBr (TRINTELLIX) 20 MG tablet  Pharmacy Yes Yes    Take 20 mg by mouth Daily.            Medication notes: Removed per pharmacy records:  Acyclovir, Azelastine, Librium, Peridex, Lexapro, Seroquel    This medication list is complete to the best of my knowledge as of 1/2/2019    Please call if questions.    Fernanda Mayfield, Medication History Technician  1/2/2019 4:37 PM

## 2019-01-02 NOTE — H&P
Patient Care Team:  Alla Beal MD as PCP - General (Family Medicine)    Chief complaint syncope    Subjective     History of Present Illness     62 yo who comes in the hospital because of syncopal spell at home.  Patient states he woke up last night with blood on his head.  He has a laceration on the occipital area of his scalp.  The patient is unsure how long he was out for but probably for about 20-30 minutes.  Patient was recently restarted on blood pressure medication a few months ago.  Patient states this is the third time he's had an episode in the last few months.  The patient denies any fevers or chills.  He does have difficulty with balance.    In the emergency room the patient's AST is slightly higher than his ALt.  The patient denies alcohol problems.    Review of Systems   Constitutional: Negative for chills, diaphoresis, fatigue, fever and unexpected weight change.   HENT: Negative for congestion and hearing loss.    Eyes: Negative for redness and visual disturbance.   Respiratory: Negative for cough, chest tightness and shortness of breath.    Cardiovascular: Negative for chest pain and palpitations.   Gastrointestinal: Negative for abdominal distention, abdominal pain and blood in stool.   Endocrine: Negative for cold intolerance and heat intolerance.   Genitourinary: Negative for difficulty urinating, dysuria and frequency.   Musculoskeletal: Negative for arthralgias, back pain and myalgias.   Skin: Negative for color change, rash and wound.   Neurological: Positive for syncope. Negative for weakness and headaches.   Hematological: Negative for adenopathy. Does not bruise/bleed easily.   Psychiatric/Behavioral: Negative for confusion. The patient is not nervous/anxious.         Past Medical History:   Diagnosis Date   • Alcohol abuse    • Anxiety    • Arthritis    • Depression    • Encounter for removal of sutures    • Headache, tension-type    • Hyperlipidemia    • Hypertension    • Kidney  stone    • Migraine    • Motion sickness    • Motion sickness    • Olecranon bursitis, right elbow    • Peripheral neuropathy    • Withdrawal symptoms, alcohol (CMS/HCC)      Past Surgical History:   Procedure Laterality Date   • CYST REMOVAL     • EXTRACORPOREAL SHOCK WAVE LITHOTRIPSY (ESWL) Right 2002   • TONSILLECTOMY       Family History   Problem Relation Age of Onset   • Alzheimer's disease Mother    • Pancreatic cancer Father      Social History     Tobacco Use   • Smoking status: Former Smoker     Packs/day: 0.50     Years: 25.00     Pack years: 12.50   • Smokeless tobacco: Never Used   Substance Use Topics   • Alcohol use: Yes     Alcohol/week: 3.6 oz     Types: 6 Shots of liquor per week     Comment: several night weekly, few drinks per occasion   • Drug use: No       (Not in a hospital admission)  Allergies:  Penicillins    Objective      Vital Signs  Temp:  [98.3 °F (36.8 °C)] 98.3 °F (36.8 °C)  Heart Rate:  [] 80  Resp:  [17-18] 17  BP: ()/() 151/100    Physical Exam   Constitutional: He appears well-developed and well-nourished. No distress.   HENT:   Head: Normocephalic and atraumatic.   Eyes: Conjunctivae and EOM are normal.   Cardiovascular: Normal rate, regular rhythm and normal heart sounds. Exam reveals no gallop and no friction rub.   No murmur heard.  Pulmonary/Chest: Effort normal and breath sounds normal. He has no wheezes. He has no rales.   Abdominal: Soft. Bowel sounds are normal. He exhibits no distension and no mass. There is no tenderness. There is no rebound and no guarding.   Musculoskeletal: He exhibits no edema.   Neurological: He is alert.   Skin: Skin is warm and dry. He is not diaphoretic.   Psychiatric: He has a normal mood and affect. His behavior is normal.       Results Review:   I reviewed the patient's new clinical results.  I reviewed the patient's new imaging results and agree with the interpretation.  I personally viewed and interpreted the patient's  EKG/Telemetry data      Assessment/Plan       Syncope and collapse    Alcoholism (CMS/HCC)    Hyperlipidemia    Hypertension    Hypothyroidism      Assessment & Plan    Syncope and collapse  -cardiology consult  -neurology  -ekg is unremarkable  -concern for ETOH w/d seizures     concern for Alcoholism (CMS/HCC)  -protocal; ciwa, ativan prn, thiamine, mv      Hyperlipidemia      Hypertension      Hypothyroidism    Hypercalcemia previously elevated-check pth      I discussed the patients findings and my recommendations with patient and consulting provider    Esdras Jackson MD  01/02/19  4:44 PM    Time:

## 2019-01-02 NOTE — ED PROVIDER NOTES
Pt is a 63 y.o. male who presents to the ED complaining of syncopal episode that began last night. Pt states he went into the kitchen to grab a glass of milk when the episode occurred. Pt states he eventually came back around sometime later and noticed there was a puddle of blood in the floor. Pt also c/o dizziness, light-headedness, and tremors. Pt denies N/V, new numbness or weakness in extremities. Pt states he drinks alcohol occasionally. Pt denies Hx of seizures. Pt states he has had three syncopal episodes in the past three months.     On exam,  Constitutional: Mild distress  HENT: Area of tenderness, swelling, and abrasion on left occipital scalp. TMs normal bilaterally. Healing 1 cm laceration on lower lip that is through and through. Parotid hypertrophy  Cardiovascular: HRRR. No murmur  Pulmonary: lungs CTAB chest wall nontender  Abdomen: soft, normoactive BS, non-tender, non-distended  Musculoskeletal: C-, L-, and T-spine non-tender, extremities non-tender  Neurological: No facial droop. Pt is tremulous. No asterixis    Labs and imaging reviewed.   CT Head shows no acute Fx  CXR negative acute.     Plan: Admit      MD ATTESTATION NOTE    The LUBA and I have discussed this patient's history, physical exam, and treatment plan.  I have reviewed the documentation and personally had a face to face interaction with the patient. I affirm the documentation and agree with the treatment and plan.  The attached note describes my personal findings.      Documentation assistance provided by priscila Aguilar for Dr. Carrillo. Information recorded by the priscila was done at my direction and has been verified and validated by me.             Kristian Aguilar  01/02/19 9531       Matt Carrillo MD  01/02/19 1682

## 2019-01-03 ENCOUNTER — APPOINTMENT (OUTPATIENT)
Dept: CT IMAGING | Facility: HOSPITAL | Age: 64
End: 2019-01-03

## 2019-01-03 ENCOUNTER — APPOINTMENT (OUTPATIENT)
Dept: NEUROLOGY | Facility: HOSPITAL | Age: 64
End: 2019-01-03
Attending: HOSPITALIST

## 2019-01-03 ENCOUNTER — APPOINTMENT (OUTPATIENT)
Dept: CARDIOLOGY | Facility: HOSPITAL | Age: 64
End: 2019-01-03
Attending: HOSPITALIST

## 2019-01-03 PROBLEM — M54.32 SCIATICA OF LEFT SIDE: Status: RESOLVED | Noted: 2018-06-12 | Resolved: 2019-01-03

## 2019-01-03 PROBLEM — R94.31 ABNORMAL EKG: Status: ACTIVE | Noted: 2019-01-03

## 2019-01-03 LAB
ANION GAP SERPL CALCULATED.3IONS-SCNC: 11.6 MMOL/L
BASOPHILS # BLD AUTO: 0.01 10*3/MM3 (ref 0–0.2)
BASOPHILS NFR BLD AUTO: 0.3 % (ref 0–1.5)
BH CV ECHO MEAS - ACS: 2.2 CM
BH CV ECHO MEAS - AO MAX PG (FULL): 1.7 MMHG
BH CV ECHO MEAS - AO MAX PG: 6 MMHG
BH CV ECHO MEAS - AO MEAN PG (FULL): 2 MMHG
BH CV ECHO MEAS - AO MEAN PG: 4 MMHG
BH CV ECHO MEAS - AO ROOT AREA (BSA CORRECTED): 1.7
BH CV ECHO MEAS - AO ROOT AREA: 10.2 CM^2
BH CV ECHO MEAS - AO ROOT DIAM: 3.6 CM
BH CV ECHO MEAS - AO V2 MAX: 122 CM/SEC
BH CV ECHO MEAS - AO V2 MEAN: 89.4 CM/SEC
BH CV ECHO MEAS - AO V2 VTI: 27.4 CM
BH CV ECHO MEAS - AVA(I,A): 4.6 CM^2
BH CV ECHO MEAS - AVA(I,D): 4.6 CM^2
BH CV ECHO MEAS - AVA(V,A): 4.8 CM^2
BH CV ECHO MEAS - AVA(V,D): 4.8 CM^2
BH CV ECHO MEAS - BSA(HAYCOCK): 2.1 M^2
BH CV ECHO MEAS - BSA: 2.1 M^2
BH CV ECHO MEAS - BZI_BMI: 26.4 KILOGRAMS/M^2
BH CV ECHO MEAS - BZI_METRIC_HEIGHT: 182.9 CM
BH CV ECHO MEAS - BZI_METRIC_WEIGHT: 88.5 KG
BH CV ECHO MEAS - EDV(CUBED): 103.8 ML
BH CV ECHO MEAS - EDV(MOD-SP2): 76 ML
BH CV ECHO MEAS - EDV(MOD-SP4): 102 ML
BH CV ECHO MEAS - EDV(TEICH): 102.4 ML
BH CV ECHO MEAS - EF(CUBED): 88.3 %
BH CV ECHO MEAS - EF(MOD-BP): 58 %
BH CV ECHO MEAS - EF(MOD-SP2): 60.5 %
BH CV ECHO MEAS - EF(MOD-SP4): 53.9 %
BH CV ECHO MEAS - EF(TEICH): 82.3 %
BH CV ECHO MEAS - ESV(CUBED): 12.2 ML
BH CV ECHO MEAS - ESV(MOD-SP2): 30 ML
BH CV ECHO MEAS - ESV(MOD-SP4): 47 ML
BH CV ECHO MEAS - ESV(TEICH): 18.1 ML
BH CV ECHO MEAS - FS: 51.1 %
BH CV ECHO MEAS - IVS/LVPW: 1
BH CV ECHO MEAS - IVSD: 1 CM
BH CV ECHO MEAS - LAT PEAK E' VEL: 7 CM/SEC
BH CV ECHO MEAS - LV DIASTOLIC VOL/BSA (35-75): 48.4 ML/M^2
BH CV ECHO MEAS - LV MASS(C)D: 164.5 GRAMS
BH CV ECHO MEAS - LV MASS(C)DI: 78 GRAMS/M^2
BH CV ECHO MEAS - LV MAX PG: 4.2 MMHG
BH CV ECHO MEAS - LV MEAN PG: 2 MMHG
BH CV ECHO MEAS - LV SYSTOLIC VOL/BSA (12-30): 22.3 ML/M^2
BH CV ECHO MEAS - LV V1 MAX: 103 CM/SEC
BH CV ECHO MEAS - LV V1 MEAN: 61.9 CM/SEC
BH CV ECHO MEAS - LV V1 VTI: 22 CM
BH CV ECHO MEAS - LVIDD: 4.7 CM
BH CV ECHO MEAS - LVIDS: 2.3 CM
BH CV ECHO MEAS - LVLD AP2: 7.9 CM
BH CV ECHO MEAS - LVLD AP4: 7.5 CM
BH CV ECHO MEAS - LVLS AP2: 6.9 CM
BH CV ECHO MEAS - LVLS AP4: 7 CM
BH CV ECHO MEAS - LVOT AREA (M): 5.7 CM^2
BH CV ECHO MEAS - LVOT AREA: 5.7 CM^2
BH CV ECHO MEAS - LVOT DIAM: 2.7 CM
BH CV ECHO MEAS - LVPWD: 1 CM
BH CV ECHO MEAS - MED PEAK E' VEL: 6 CM/SEC
BH CV ECHO MEAS - MV A DUR: 0.12 SEC
BH CV ECHO MEAS - MV A MAX VEL: 95.6 CM/SEC
BH CV ECHO MEAS - MV DEC SLOPE: 493 CM/SEC^2
BH CV ECHO MEAS - MV DEC TIME: 0.28 SEC
BH CV ECHO MEAS - MV E MAX VEL: 68.9 CM/SEC
BH CV ECHO MEAS - MV E/A: 0.72
BH CV ECHO MEAS - MV MAX PG: 4.2 MMHG
BH CV ECHO MEAS - MV MEAN PG: 2 MMHG
BH CV ECHO MEAS - MV P1/2T MAX VEL: 89.6 CM/SEC
BH CV ECHO MEAS - MV P1/2T: 53.2 MSEC
BH CV ECHO MEAS - MV V2 MAX: 102 CM/SEC
BH CV ECHO MEAS - MV V2 MEAN: 58.1 CM/SEC
BH CV ECHO MEAS - MV V2 VTI: 30.9 CM
BH CV ECHO MEAS - MVA P1/2T LCG: 2.5 CM^2
BH CV ECHO MEAS - MVA(P1/2T): 4.1 CM^2
BH CV ECHO MEAS - MVA(VTI): 4.1 CM^2
BH CV ECHO MEAS - PA ACC TIME: 0.15 SEC
BH CV ECHO MEAS - PA MAX PG (FULL): 2 MMHG
BH CV ECHO MEAS - PA MAX PG: 3.5 MMHG
BH CV ECHO MEAS - PA PR(ACCEL): 12.4 MMHG
BH CV ECHO MEAS - PA V2 MAX: 93.7 CM/SEC
BH CV ECHO MEAS - PULM A REVS DUR: 0.1 SEC
BH CV ECHO MEAS - PULM A REVS VEL: 31.5 CM/SEC
BH CV ECHO MEAS - PULM DIAS VEL: 40.7 CM/SEC
BH CV ECHO MEAS - PULM S/D: 1.8
BH CV ECHO MEAS - PULM SYS VEL: 71.3 CM/SEC
BH CV ECHO MEAS - PVA(V,A): 3.8 CM^2
BH CV ECHO MEAS - PVA(V,D): 3.8 CM^2
BH CV ECHO MEAS - QP/QS: 0.65
BH CV ECHO MEAS - RV MAX PG: 1.5 MMHG
BH CV ECHO MEAS - RV MEAN PG: 1 MMHG
BH CV ECHO MEAS - RV V1 MAX: 62.1 CM/SEC
BH CV ECHO MEAS - RV V1 MEAN: 39 CM/SEC
BH CV ECHO MEAS - RV V1 VTI: 14.4 CM
BH CV ECHO MEAS - RVOT AREA: 5.7 CM^2
BH CV ECHO MEAS - RVOT DIAM: 2.7 CM
BH CV ECHO MEAS - SI(AO): 132.3 ML/M^2
BH CV ECHO MEAS - SI(CUBED): 43.5 ML/M^2
BH CV ECHO MEAS - SI(LVOT): 59.8 ML/M^2
BH CV ECHO MEAS - SI(MOD-SP2): 21.8 ML/M^2
BH CV ECHO MEAS - SI(MOD-SP4): 26.1 ML/M^2
BH CV ECHO MEAS - SI(TEICH): 40 ML/M^2
BH CV ECHO MEAS - SV(AO): 278.9 ML
BH CV ECHO MEAS - SV(CUBED): 91.7 ML
BH CV ECHO MEAS - SV(LVOT): 126 ML
BH CV ECHO MEAS - SV(MOD-SP2): 46 ML
BH CV ECHO MEAS - SV(MOD-SP4): 55 ML
BH CV ECHO MEAS - SV(RVOT): 82.4 ML
BH CV ECHO MEAS - SV(TEICH): 84.2 ML
BH CV ECHO MEAS - TAPSE (>1.6): 2.1 CM2
BH CV ECHO MEASUREMENTS AVERAGE E/E' RATIO: 10.6
BH CV VAS BP RIGHT ARM: NORMAL MMHG
BH CV XLRA - RV BASE: 4 CM
BH CV XLRA - TDI S': 14 CM/SEC
BUN BLD-MCNC: 21 MG/DL (ref 8–23)
BUN/CREAT SERPL: 33.3 (ref 7–25)
CALCIUM SPEC-SCNC: 9 MG/DL (ref 8.6–10.5)
CHLORIDE SERPL-SCNC: 102 MMOL/L (ref 98–107)
CO2 SERPL-SCNC: 25.4 MMOL/L (ref 22–29)
CREAT BLD-MCNC: 0.63 MG/DL (ref 0.76–1.27)
DEPRECATED RDW RBC AUTO: 47.7 FL (ref 37–54)
EOSINOPHIL # BLD AUTO: 0.07 10*3/MM3 (ref 0–0.7)
EOSINOPHIL NFR BLD AUTO: 1.8 % (ref 0.3–6.2)
ERYTHROCYTE [DISTWIDTH] IN BLOOD BY AUTOMATED COUNT: 13.7 % (ref 11.5–14.5)
GFR SERPL CREATININE-BSD FRML MDRD: 129 ML/MIN/1.73
GLUCOSE BLD-MCNC: 96 MG/DL (ref 65–99)
HCT VFR BLD AUTO: 37.9 % (ref 40.4–52.2)
HGB BLD-MCNC: 12.6 G/DL (ref 13.7–17.6)
IMM GRANULOCYTES # BLD AUTO: 0.01 10*3/MM3 (ref 0–0.03)
IMM GRANULOCYTES NFR BLD AUTO: 0.3 % (ref 0–0.5)
LEFT ATRIUM VOLUME INDEX: 20 ML/M2
LYMPHOCYTES # BLD AUTO: 1.21 10*3/MM3 (ref 0.9–4.8)
LYMPHOCYTES NFR BLD AUTO: 31.7 % (ref 19.6–45.3)
MAXIMAL PREDICTED HEART RATE: 159 BPM
MCH RBC QN AUTO: 31.7 PG (ref 27–32.7)
MCHC RBC AUTO-ENTMCNC: 33.2 G/DL (ref 32.6–36.4)
MCV RBC AUTO: 95.2 FL (ref 79.8–96.2)
MONOCYTES # BLD AUTO: 0.46 10*3/MM3 (ref 0.2–1.2)
MONOCYTES NFR BLD AUTO: 12 % (ref 5–12)
NEUTROPHILS # BLD AUTO: 2.07 10*3/MM3 (ref 1.9–8.1)
NEUTROPHILS NFR BLD AUTO: 54.2 % (ref 42.7–76)
PLATELET # BLD AUTO: 127 10*3/MM3 (ref 140–500)
PMV BLD AUTO: 10.6 FL (ref 6–12)
POTASSIUM BLD-SCNC: 3.7 MMOL/L (ref 3.5–5.2)
RBC # BLD AUTO: 3.98 10*6/MM3 (ref 4.6–6)
SODIUM BLD-SCNC: 139 MMOL/L (ref 136–145)
STRESS TARGET HR: 135 BPM
WBC NRBC COR # BLD: 3.82 10*3/MM3 (ref 4.5–10.7)

## 2019-01-03 PROCEDURE — 80048 BASIC METABOLIC PNL TOTAL CA: CPT | Performed by: HOSPITALIST

## 2019-01-03 PROCEDURE — 25010000002 IOPAMIDOL 61 % SOLUTION: Performed by: HOSPITALIST

## 2019-01-03 PROCEDURE — 70496 CT ANGIOGRAPHY HEAD: CPT

## 2019-01-03 PROCEDURE — 85025 COMPLETE CBC W/AUTO DIFF WBC: CPT | Performed by: HOSPITALIST

## 2019-01-03 PROCEDURE — 95819 EEG AWAKE AND ASLEEP: CPT | Performed by: PSYCHIATRY & NEUROLOGY

## 2019-01-03 PROCEDURE — G0378 HOSPITAL OBSERVATION PER HR: HCPCS

## 2019-01-03 PROCEDURE — 93306 TTE W/DOPPLER COMPLETE: CPT | Performed by: INTERNAL MEDICINE

## 2019-01-03 PROCEDURE — 99244 OFF/OP CNSLTJ NEW/EST MOD 40: CPT | Performed by: INTERNAL MEDICINE

## 2019-01-03 PROCEDURE — 93306 TTE W/DOPPLER COMPLETE: CPT

## 2019-01-03 PROCEDURE — 97162 PT EVAL MOD COMPLEX 30 MIN: CPT

## 2019-01-03 PROCEDURE — 95816 EEG AWAKE AND DROWSY: CPT

## 2019-01-03 PROCEDURE — 70498 CT ANGIOGRAPHY NECK: CPT

## 2019-01-03 PROCEDURE — 25010000002 THIAMINE PER 100 MG: Performed by: PSYCHIATRY & NEUROLOGY

## 2019-01-03 PROCEDURE — 99244 OFF/OP CNSLTJ NEW/EST MOD 40: CPT | Performed by: PSYCHIATRY & NEUROLOGY

## 2019-01-03 PROCEDURE — 96361 HYDRATE IV INFUSION ADD-ON: CPT

## 2019-01-03 RX ORDER — GABAPENTIN 300 MG/1
300 CAPSULE ORAL 2 TIMES DAILY
Status: DISCONTINUED | OUTPATIENT
Start: 2019-01-03 | End: 2019-01-04 | Stop reason: HOSPADM

## 2019-01-03 RX ORDER — ATORVASTATIN CALCIUM 20 MG/1
20 TABLET, FILM COATED ORAL DAILY
Status: DISCONTINUED | OUTPATIENT
Start: 2019-01-03 | End: 2019-01-04 | Stop reason: HOSPADM

## 2019-01-03 RX ORDER — CLONAZEPAM 1 MG/1
2 TABLET ORAL NIGHTLY
Status: DISCONTINUED | OUTPATIENT
Start: 2019-01-03 | End: 2019-01-04 | Stop reason: HOSPADM

## 2019-01-03 RX ORDER — LEVOTHYROXINE SODIUM 0.07 MG/1
75 TABLET ORAL DAILY
Status: DISCONTINUED | OUTPATIENT
Start: 2019-01-03 | End: 2019-01-04 | Stop reason: HOSPADM

## 2019-01-03 RX ORDER — CLONAZEPAM 1 MG/1
1 TABLET ORAL EVERY MORNING
Status: DISCONTINUED | OUTPATIENT
Start: 2019-01-04 | End: 2019-01-04 | Stop reason: HOSPADM

## 2019-01-03 RX ORDER — AMLODIPINE BESYLATE 2.5 MG/1
2.5 TABLET ORAL DAILY
Status: DISCONTINUED | OUTPATIENT
Start: 2019-01-03 | End: 2019-01-04 | Stop reason: HOSPADM

## 2019-01-03 RX ADMIN — SODIUM CHLORIDE, PRESERVATIVE FREE 3 ML: 5 INJECTION INTRAVENOUS at 21:21

## 2019-01-03 RX ADMIN — SODIUM CHLORIDE, PRESERVATIVE FREE 3 ML: 5 INJECTION INTRAVENOUS at 09:19

## 2019-01-03 RX ADMIN — FOLIC ACID 1 MG: 1 TABLET ORAL at 09:18

## 2019-01-03 RX ADMIN — AMLODIPINE BESYLATE 2.5 MG: 2.5 TABLET ORAL at 09:18

## 2019-01-03 RX ADMIN — THIAMINE HYDROCHLORIDE 100 MG: 100 INJECTION, SOLUTION INTRAMUSCULAR; INTRAVENOUS at 16:24

## 2019-01-03 RX ADMIN — ATORVASTATIN CALCIUM 20 MG: 20 TABLET, FILM COATED ORAL at 09:18

## 2019-01-03 RX ADMIN — SODIUM CHLORIDE 100 ML/HR: 9 INJECTION, SOLUTION INTRAVENOUS at 16:01

## 2019-01-03 RX ADMIN — CLONAZEPAM 2 MG: 1 TABLET ORAL at 21:21

## 2019-01-03 RX ADMIN — IOPAMIDOL 95 ML: 612 INJECTION, SOLUTION INTRAVENOUS at 16:00

## 2019-01-03 RX ADMIN — Medication 100 MG: at 09:18

## 2019-01-03 RX ADMIN — GABAPENTIN 300 MG: 300 CAPSULE ORAL at 09:18

## 2019-01-03 RX ADMIN — SODIUM CHLORIDE 100 ML/HR: 9 INJECTION, SOLUTION INTRAVENOUS at 04:36

## 2019-01-03 RX ADMIN — GABAPENTIN 300 MG: 300 CAPSULE ORAL at 21:21

## 2019-01-03 RX ADMIN — Medication 1 TABLET: at 09:18

## 2019-01-03 RX ADMIN — LEVOTHYROXINE SODIUM 75 MCG: 75 TABLET ORAL at 09:18

## 2019-01-03 RX ADMIN — LORAZEPAM 1 MG: 1 TABLET ORAL at 05:53

## 2019-01-03 NOTE — THERAPY EVALUATION
Acute Care - Physical Therapy Initial Evaluation  [facility]     Patient Name: Pro Mueller  : 1955  MRN: [id number]  Today's Date: 1/3/2019   Onset of Illness/Injury or Date of Surgery: 19  Date of Referral to PT: 19  Referring Physician: Esdras Jackson MD      Admit Date: 2019    Visit Dx:     ICD-10-CM ICD-9-CM   1. Syncope and collapse R55 780.2   2. Hypotension, unspecified hypotension type I95.9 458.9   3. Occipital scalp laceration, initial encounter S01.01XA 873.0     Patient Active Problem List   Diagnosis   • Alcoholism (CMS/HCC)   • Atopic rhinitis   • Mixed anxiety depressive disorder   • Genital herpes simplex   • Hyperlipidemia   • Hypertension   • Hypothyroidism   • Insomnia   • Panic disorder without agoraphobia   • Persistent insomnia   • Vitamin D deficiency   • Alcohol withdrawal (CMS/HCC)   • Neuropathy involving both lower extremities   • Syncope and collapse   • Abnormal EKG     Past Medical History:   Diagnosis Date   • Alcohol abuse    • Anxiety    • Arthritis    • Atopic rhinitis 2016   • Depression    • Encounter for removal of sutures    • Genital herpes simplex 2016   • Headache, tension-type    • Hyperlipidemia    • Hypertension    • Kidney stone    • Migraine    • Motion sickness    • Olecranon bursitis, right elbow    • Panic disorder without agoraphobia 2016   • Peripheral neuropathy    • Vitamin D deficiency 2016   • Withdrawal symptoms, alcohol (CMS/HCC)      Past Surgical History:   Procedure Laterality Date   • CYST REMOVAL     • EXTRACORPOREAL SHOCK WAVE LITHOTRIPSY (ESWL) Right    • TONSILLECTOMY          PT ASSESSMENT (last 12 hours)      Physical Therapy Evaluation     Row Name 19 1453          PT Evaluation Time/Intention    Subjective Information  no complaints  -CA     Document Type  evaluation  -CA     Mode of Treatment  individual therapy;physical therapy  -CA     Patient Effort  excellent  -CA     Symptoms Noted  "During/After Treatment  none  -CA     Row Name 01/03/19 1453          General Information    Patient Profile Reviewed?  yes  -CA     Onset of Illness/Injury or Date of Surgery  01/02/19  -CA     Referring Physician  Esdras Jackson MD  -CA     Patient Observations  alert;cooperative;agree to therapy  -CA     Prior Level of Function  independent:  -CA     Pertinent History of Current Functional Problem  presents s/p syncopal episode, woke up in \"puddle of blood\", first CT grossly unremarkable for acute changes, awaiting 2nd CT  -CA     Existing Precautions/Restrictions  fall  -CA     Row Name 01/03/19 1453          Relationship/Environment    Lives With  alone  -CA     Row Name 01/03/19 1453          Resource/Environmental Concerns    Current Living Arrangements  home/apartment/condo  -CA     Row Name 01/03/19 1453          Stairs Within Home, Primary    Stairs Comment, Within Home, Primary  1 flight to bedroom  -CA     Row Name 01/03/19 1453          Cognitive Assessment/Intervention- PT/OT    Orientation Status (Cognition)  oriented x 4  -CA     Follows Commands (Cognition)  WFL  -CA     Personal Safety Interventions  fall prevention program maintained;gait belt;nonskid shoes/slippers when out of bed  -CA     Row Name 01/03/19 1453          Bed Mobility Assessment/Treatment    Comment (Bed Mobility)  NT, sitting EOB at start of session, with transport at end of session  -CA     Row Name 01/03/19 1453          Transfer Assessment/Treatment    Transfer Assessment/Treatment  sit-stand transfer;stand-sit transfer  -CA     Sit-Stand [place] (Transfers)  independent  -CA     Stand-Sit [place] (Transfers)  independent  -CA     Row Name 01/03/19 1453          Gait/Stairs Assessment/Training    Gait/Stairs Assessment/Training  gait/ambulation independence  -CA     [place] Level (Gait)  independent  -CA     Distance in Feet (Gait)  200  -CA     Pattern (Gait)  step-through  -CA     [place] Level " (Stairs)  contact guard  -CA     Handrail Location (Stairs)  right side (ascending)  -CA     Number of Steps (Stairs)  10  -CA     Ascending Technique (Stairs)  step-over-step  -CA     Descending Technique (Stairs)  step-over-step  -CA     Comment (Gait/Stairs)  no gross gait or balance deficits  -CA     Row Name 01/03/19 1453          General ROM    GENERAL ROM COMMENTS  grossly wfl  -CA     Row Name 01/03/19 1453          MMT (Manual Muscle Testing)    General MMT Comments  grossly wfl  -CA     Row Name 01/03/19 1453          Sensory Assessment/Intervention    Sensory General Assessment  no sensation deficits identified  -CA     Row Name 01/03/19 1453          Physical Therapy Clinical Impression    Date of Referral to PT  01/03/19  -CA     Criteria for Skilled Interventions Met (PT Clinical Impression)  current level of function same as previous level of function  -CA     Row Name 01/03/19 1453          Positioning and Restraints    Pre-Treatment Position  in bed  -CA     Post Treatment Position  other  -CA     Other Position  with other staff transport  -CA     Row Name 01/03/19 1453          Living Environment    Home Accessibility  stairs within home  -CA       User Key  (r) = Recorded By, (t) = Taken By, (c) = Cosigned By    Initials Name Provider Type    Chrissy Tsang, PT Physical Therapist        Physical Therapy Education     Title: PT OT SLP Therapies (Done)     Topic: Physical Therapy (Done)     Point: Mobility training (Done)     Learning Progress Summary           Patient Acceptance, E, VU by CA at 1/3/2019  3:00 PM                   Point: Home exercise program (Done)     Learning Progress Summary           Patient Acceptance, E, VU by CA at 1/3/2019  3:00 PM                   Point: Body mechanics (Done)     Learning Progress Summary           Patient Acceptance, E, VU by CA at 1/3/2019  3:00 PM                   Point: Precautions (Done)     Learning Progress Summary           Patient  Acceptance, E, VU by CA at 1/3/2019  3:00 PM                               User Key     Initials Effective Dates Name Provider Type Discipline    CA 06/13/18 -  Chrissy Mckinnon, LUCILA Physical Therapist PT              PT Recommendation and Plan  Anticipated Discharge Disposition (PT): home  Therapy Frequency (PT Clinical Impression): evaluation only  Outcome Summary/Treatment Plan (PT)  Anticipated Discharge Disposition (PT): home  Plan of Care Reviewed With: patient  Outcome Summary: Pt is a 63 y.o. male admitted [facility] for syncopal episode/LOC on 1/2/2019. Pt is currently functioning at or near their reported baseline ind for STS transfers, and ind for amb with no AD for 200 ft. Pt ascends/descends 10 steps with CGA and reciprocal pattern. Pt denies any questions or concerns for PT at this time. Pt has no further acute skilled PT needs at this time and is appropriate for DC home with assist once medically appropriate.   Outcome Measures     Row Name 01/03/19 1500             How much help from another person do you currently need...    Turning from your back to your side while in flat bed without using bedrails?  4  -CA      Moving from lying on back to sitting on the side of a flat bed without bedrails?  4  -CA      Moving to and from a bed to a chair (including a wheelchair)?  4  -CA      Standing up from a chair using your arms (e.g., wheelchair, bedside chair)?  4  -CA      Climbing 3-5 steps with a railing?  3  -CA      To walk in hospital room?  4  -CA      AM-PAC 6 Clicks Score  23  -CA         Functional Assessment    Outcome Measure Options  AM-PAC 6 Clicks Basic Mobility (PT)  -CA        User Key  (r) = Recorded By, (t) = Taken By, (c) = Cosigned By    Initials Name Provider Type    CA Chrissy Mckinnon, LUCILA Physical Therapist         Time Calculation:   PT Charges     Row Name 01/03/19 1501             Time Calculation    Start Time  1443  -CA      Stop Time  1455  -CA      Time Calculation (min)  12 min   -CA      PT Received On  01/03/19  -CA        User Key  (r) = Recorded By, (t) = Taken By, (c) = Cosigned By    Initials Name Provider Type    Chrissy Tsang, PT Physical Therapist        Therapy Suggested Charges     Code   Minutes Charges    None           Therapy Charges for Today     Code Description Service Date Service Provider Modifiers Qty    89629405955 HC PT EVAL MOD COMPLEXITY 1 1/3/2019 Chrissy Mckinnon, PT GP 1          PT G-Codes  Outcome Measure Options: AM-PAC 6 Clicks Basic Mobility (PT)  AM-PAC 6 Clicks Score: 23      Chrissy Mckinnon PT  1/3/2019

## 2019-01-03 NOTE — PLAN OF CARE
Problem: Patient Care Overview  Goal: Plan of Care Review  Outcome: Ongoing (interventions implemented as appropriate)   01/03/19 0413   Coping/Psychosocial   Plan of Care Reviewed With patient   Plan of Care Review   Progress no change   OTHER   Outcome Summary no sign of withdrawal, denies hx, continues on routine ativan, up with assist, tolerates well, NSR on monitor, Eyes closed at long interval, resting quielty, will continue to monitor     Goal: Individualization and Mutuality  Outcome: Ongoing (interventions implemented as appropriate)    Goal: Discharge Needs Assessment  Outcome: Ongoing (interventions implemented as appropriate)    Goal: Interprofessional Rounds/Family Conf  Outcome: Ongoing (interventions implemented as appropriate)      Problem: Pain, Acute (Adult)  Goal: Identify Related Risk Factors and Signs and Symptoms  Outcome: Ongoing (interventions implemented as appropriate)    Goal: Acceptable Pain Control/Comfort Level  Outcome: Ongoing (interventions implemented as appropriate)      Problem: Fall Risk (Adult)  Goal: Identify Related Risk Factors and Signs and Symptoms  Outcome: Ongoing (interventions implemented as appropriate)    Goal: Absence of Fall  Outcome: Ongoing (interventions implemented as appropriate)

## 2019-01-03 NOTE — CONSULTS
"  Patient Identification:  NAME:  Pro Mueller  Age:  63 y.o.   Sex:  male   :  1955   MRN:  [id number]       Chief complaint: He doesn't have one/ reason for consult of syncope    History of present illness:  This patient is a 63-year-old right-handed white male with a long-standing history of alcoholism hypertension hyperlipidemia who comes in after apparent syncope he states he felt that it was coming on felt lightheaded hot and sweaty he cannot give any other history the duration is not known quality was true syncope no witnessed seizure activity he did end up falling and states he \"woke up in a pool of blood\" and in fact his CAT scan by my independent eyeball review shows a scalp hematoma but intracranially atrophy only.  He is okay now and has no complaints other than a very mild headache.  He does have some associated symptoms of tremor modifying factors benzodiazepines location tremor in both upper extremities duration of the syncope not known  He did test positive for methadone and is not prescribed methadone at home.  He also tested positive for  Amphetamines/methamphetamines as well as benzodiazepines.    Past medical history:  Past Medical History:   Diagnosis Date   • Alcohol abuse    • Anxiety    • Arthritis    • Atopic rhinitis 2016   • Depression    • Encounter for removal of sutures    • Genital herpes simplex 2016   • Headache, tension-type    • Hyperlipidemia    • Hypertension    • Kidney stone    • Migraine    • Motion sickness    • Olecranon bursitis, right elbow    • Panic disorder without agoraphobia 2016   • Peripheral neuropathy    • Vitamin D deficiency 2016   • Withdrawal symptoms, alcohol (CMS/[facility])        Past surgical history:  Past Surgical History:   Procedure Laterality Date   • CYST REMOVAL     • EXTRACORPOREAL SHOCK WAVE LITHOTRIPSY (ESWL) Right    • TONSILLECTOMY         Allergies:  Penicillins    Home medications:  Medications Prior to Admission "   Medication Sig Dispense Refill Last Dose   • amLODIPine (NORVASC) 5 MG tablet Take 5 mg by mouth Daily.   1/2/2019 at Unknown time   • atorvastatin (LIPITOR) 20 MG tablet Take 20 mg by mouth Daily.   1/2/2019 at Unknown time   • clonazePAM (KlonoPIN) 2 MG tablet Take 2 mg by mouth Every Night.   1/1/2019 at Unknown time   • gabapentin (NEURONTIN) 300 MG capsule Take 300 mg by mouth 2 (Two) Times a Day.   1/1/2019 at Unknown time   • levothyroxine (SYNTHROID, LEVOTHROID) 75 MCG tablet Take 75 mcg by mouth Daily.   1/2/2019 at Unknown time   • lisinopril (PRINIVIL,ZESTRIL) 10 MG tablet Take 1 tablet by mouth Daily. 90 tablet 0 1/2/2019 at Unknown time   • meloxicam (MOBIC) 15 MG tablet Take 1 tablet by mouth Daily As Needed for Moderate Pain . Take as directed with food. 30 tablet 2 1/2/2019 at Unknown time   • traMADol (ULTRAM) 50 MG tablet Take 50 mg by mouth Every 6 (Six) Hours As Needed for Moderate Pain .   1/1/2019 at Unknown time   • Vortioxetine HBr (TRINTELLIX) 20 MG tablet Take 20 mg by mouth Daily.   1/1/2019 at Unknown time   • acetaminophen (TYLENOL) 500 MG tablet Take 1,000 mg by mouth Every 6 (Six) Hours As Needed for Mild Pain .   More than a month at Unknown time   • SUMAtriptan (IMITREX) 100 MG tablet Take one tablet at onset of headache. May repeat dose one time in 2 hours if headache not relieved. Take one tablet at onset of headache. May repeat dose one time in 2 hours if headache not relieved.   More than a month at Unknown time        Hospital medications:    amLODIPine 2.5 mg Oral Daily   atorvastatin 20 mg Oral Daily   clonazePAM 2 mg Oral Nightly   vitamin B-1 100 mg Oral Daily   And      multivitamin 1 tablet Oral Daily   And      folic acid 1 mg Oral Daily   gabapentin 300 mg Oral BID   [COMPLETED] iopamidol 100 mL Intravenous Once in imaging   levothyroxine 75 mcg Oral Daily   LORazepam 1 mg Oral Q6H   Followed by      LORazepam 1 mg Oral Q8H   sodium chloride 3 mL Intravenous Q12H        Sodium chloride 100 mL/hr Last Rate: 100 mL/hr (01/03/19 1250)     •  acetaminophen  •  [COMPLETED] Insert peripheral IV **AND** sodium chloride  •  sodium chloride    Family history:  Family History   Problem Relation Age of Onset   • Alzheimer's disease Mother    • Pancreatic cancer Father        Social history:  Social History     Tobacco Use   • Smoking status: Former Smoker     Packs/day: 0.50     Years: 25.00     Pack years: 12.50   • Smokeless tobacco: Never Used   Substance Use Topics   • Alcohol use: Yes     Alcohol/week: 3.6 oz     Types: 6 Shots of liquor per week     Comment: several night weekly, few drinks per occasion   • Drug use: No       Review of systems:    He states he has a little bit of a headache where he hit his head a couple days ago he cannot give much history regarding all of that happened he has had syncope he recalls feeling sick before this no hair eyes ears nose throat skin bone joint  lymphatic hematologic or oncologic complaints no chest pain abdominal pain bowel bladder incontinence fever chills or rash    Objective:  Vitals Ranges:   Temp:  [98.4 °F (36.9 °C)-98.9 °F (37.2 °C)] 98.4 °F (36.9 °C)  Heart Rate:  [80-89] 83  Resp:  [17-20] 20  BP: (119-158)/() 142/87      Physical Exam:  Awake moderately alert reddened face looks like a drinker, oriented ×2 fund of knowledge poor attention span and concentration fair recent and remote memory fair language function normal elderly appearing in no distress looks disheveled pupils to constricting to 1 normal disc retinas visual fields extraocular movements full without nystagmus nasolabial folds palate tongue symmetrical decreased hearing normal facial sensation head turning shoulder shrug motor fine tremor no rigidity atrophy or fasciculations overall strength 5 minus out of 5 times for reflexes absent throughout toes downgoing bilaterally sensation normal light touch face both arms both legs coordination normal in the  upper and lower extremities he's able to ambulate without ataxia heart regular without murmur neck supple without bruits extremities some peripheral edema no clubbing or cyanosis visual acuity normal at 3 feet    Results review:   I reviewed the patient's new clinical results.    Data review:  Lab Results (last 24 hours)     Procedure Component Value Units Date/Time    CBC & Differential [[id number]] Collected:  01/03/19 0626    Specimen:  Blood Updated:  01/03/19 0748    Narrative:       The following orders were created for panel order CBC & Differential.  Procedure                               Abnormality         Status                     ---------                               -----------         ------                     CBC Auto Differential[[id number]]        Abnormal            Final result                 Please view results for these tests on the individual orders.    CBC Auto Differential [[id number]]  (Abnormal) Collected:  01/03/19 0626    Specimen:  Blood Updated:  01/03/19 0748     WBC 3.82 10*3/mm3      RBC 3.98 10*6/mm3      Hemoglobin 12.6 g/dL      Hematocrit 37.9 %      MCV 95.2 fL      MCH 31.7 pg      MCHC 33.2 g/dL      RDW 13.7 %      RDW-SD 47.7 fl      MPV 10.6 fL      Platelets 127 10*3/mm3      Neutrophil % 54.2 %      Lymphocyte % 31.7 %      Monocyte % 12.0 %      Eosinophil % 1.8 %      Basophil % 0.3 %      Immature Grans % 0.3 %      Neutrophils, Absolute 2.07 10*3/mm3      Lymphocytes, Absolute 1.21 10*3/mm3      Monocytes, Absolute 0.46 10*3/mm3      Eosinophils, Absolute 0.07 10*3/mm3      Basophils, Absolute 0.01 10*3/mm3      Immature Grans, Absolute 0.01 10*3/mm3     Basic Metabolic Panel [[id number]]  (Abnormal) Collected:  01/03/19 0658    Specimen:  Blood Updated:  01/03/19 0728     Glucose 96 mg/dL      BUN 21 mg/dL      Creatinine 0.63 mg/dL      Sodium 139 mmol/L      Potassium 3.7 mmol/L      Chloride 102 mmol/L      CO2 25.4 mmol/L      Calcium 9.0 mg/dL      eGFR Non   Amer 129 mL/min/1.73      BUN/Creatinine Ratio 33.3     Anion Gap 11.6 mmol/L     Narrative:       GFR Normal >60  Chronic Kidney Disease <60  Kidney Failure <15    PTH, Intact & Calcium [[id number]]  (Normal) Collected:  01/02/19 2034    Specimen:  Blood Updated:  01/02/19 2218     PTH, Intact 19.2 pg/mL      Calcium 10.2 mg/dL     Ethanol [[id number]] Collected:  01/02/19 1250    Specimen:  Blood Updated:  01/02/19 1712     Ethanol <10 mg/dL      Ethanol % <0.010 %     T4, Free [[id number]]  (Normal) Collected:  01/02/19 1250    Specimen:  Blood Updated:  01/02/19 1613     Free T4 1.55 ng/dL     Urinalysis, Microscopic Only - Urine, Clean Catch [[id number]]  (Abnormal) Collected:  01/02/19 1445    Specimen:  Urine, Clean Catch Updated:  01/02/19 1547     RBC, UA 0-2 /HPF      WBC, UA 6-12 /HPF      Bacteria, UA None Seen /HPF      Squamous Epithelial Cells, UA 7-12 /HPF      Transitional Epithelial Cells, UA 3-6 /HPF      Hyaline Casts, UA Too Numerous to Count /LPF      Granular Casts, UA 3-6 /LPF      Calcium Oxalate Crystals, UA Moderate/2+ /HPF      Mucus, UA Moderate/2+ /HPF      Methodology Manual Light Microscopy    Urinalysis With Microscopic If Indicated (No Culture) - Urine, Clean Catch [[id number]]  (Abnormal) Collected:  01/02/19 1445    Specimen:  Urine, Clean Catch Updated:  01/02/19 1532     Color, UA Dark Yellow     Appearance, UA Cloudy     pH, UA 5.5     Specific Gravity, UA >=1.030     Glucose, UA Negative     Ketones, UA 15 mg/dL (1+)     Bilirubin, UA Negative     Blood, UA Negative     Protein, UA 30 mg/dL (1+)     Leuk Esterase, UA Small (1+)     Nitrite, UA Negative     Urobilinogen, UA 1.0 E.U./dL    Urine Drug Screen - Urine, Clean Catch [[id number]]  (Abnormal) Collected:  01/02/19 1445    Specimen:  Urine, Clean Catch Updated:  01/02/19 1530     Amphet/Methamphet, Screen Positive     Barbiturates Screen, Urine Negative     Benzodiazepine Screen, Urine Positive     Cocaine Screen,  Urine Negative     Opiate Screen Negative     THC, Screen, Urine Negative     Methadone Screen, Urine Positive     Oxycodone Screen, Urine Negative    Narrative:       Negative Thresholds For Drugs Screened:     Amphetamines               500 ng/ml   Barbiturates               200 ng/ml   Benzodiazepines            100 ng/ml   Cocaine                    300 ng/ml   Methadone                  300 ng/ml   Opiates                    300 ng/ml   Oxycodone                  100 ng/ml   THC                        50 ng/ml    The Normal Value for all drugs tested is negative. This report includes final unconfirmed screening results to be used for medical treatment purposes only. Unconfirmed results must not be used for non-medical purposes such as employment or legal testing. Clinical consideration should be applied to any drug of abuse test, particulary when unconfirmed results are used.           Imaging:  Imaging Results (last 24 hours)     Procedure Component Value Units Date/Time    CT Angiogram Neck With & Without Contrast [[id number]] Updated:  01/03/19 1510    CT Angiogram Head With Contrast [[id number]] Updated:  01/03/19 1510             Assessment and Plan:       Syncope and collapse    Alcoholism (CMS/HCC)    Hyperlipidemia    Hypertension    Hypothyroidism    Abnormal EKG    As patient has had significant polysubstance abuse problems including alcohol and he test positive for methadone but is not on it at home.  He is had a fall with concussion scalp hematoma but there is no evidence of subdural hematoma or brain contusion I do not see evidence of any acute progressive neurologic condition he is with metabolic encephalopathy and some degree of alcohol withdrawal.  He will be covered with benzodiazepines extra thiamine but I would not recommend further neurologic workup      Bob Gracia MD  01/03/19  3:18 PM

## 2019-01-03 NOTE — CONSULTS
"Date of Hospital Visit: 19  Encounter Provider: vKng Haskins MD  Place of Service: [facility] CARDIOLOGY  Patient Name: Pro Mueller  :1955  Referral Provider: Esdras Jackson MD    Chief complaint: Syncope    History of Present Illness:  Mr. Mueller is a 64 yo man with treated hypertension and alcohol abuse.  I strongly suspect he underplays the amount of alcohol he drinks.      In the last two months, he's had three episodes of syncope.  Two occurred after standing up and walking to another room.  One occurred when he quickly turned his head.  All three resulted in abrupt LOC without much prodrome; LOC was of unknown duration.  The last event occurred on New Year's Day; he says \"I drank a lot more on .\"  He hit his head and awoke in a \"puddle of blood.\"      He has not had chest pain ,dyspnea, orthopnea, lightheadedness, or palpitations.  He denies any family history of early sudden death or CAD.  He doesn't smoke cigarettes.      He has peripheral neuropathy and pancytopenia which are likely due to EtOH.  He reported one episode of syncope to his neurologist who thought his BP medications may be the cause; his amlodipine was decreased in dose but was recently increased as his BP increased.     Current testing:    CT of head:    IMPRESSION:  1. There is mild small vessel disease in the cerebral white matter and  there are calcified atherosclerotic plaques in the intracranial segments  of the distal vertebral arteries and there is a tiny amount of fluid in  the posterior right mastoid air cells  2. There is a small scalp hematoma over the posterior parietal occipital  region from yesterday's head trauma. The remainder of the head CT is  normal. Specifically no acute skull fracture or intracranial hemorrhage  is seen.    CXR:    IMPRESSION:  No evidence for acute pulmonary process. Follow-up as  clinical indications persist.    Past Medical History:   Diagnosis Date "   • Alcohol abuse    • Anxiety    • Arthritis    • Atopic rhinitis 8/8/2016   • Depression    • Encounter for removal of sutures    • Genital herpes simplex 8/8/2016   • Headache, tension-type    • Hyperlipidemia    • Hypertension    • Kidney stone    • Migraine    • Motion sickness    • Olecranon bursitis, right elbow    • Panic disorder without agoraphobia 8/8/2016   • Peripheral neuropathy    • Vitamin D deficiency 8/8/2016   • Withdrawal symptoms, alcohol (CMS/HCC)        Past Surgical History:   Procedure Laterality Date   • CYST REMOVAL     • EXTRACORPOREAL SHOCK WAVE LITHOTRIPSY (ESWL) Right 2002   • TONSILLECTOMY         Prior to Admission medications    Medication Sig Start Date End Date Taking? Authorizing Provider   amLODIPine (NORVASC) 5 MG tablet Take 5 mg by mouth Daily.   Yes Davis Burton MD   atorvastatin (LIPITOR) 20 MG tablet Take 20 mg by mouth Daily.   Yes Davis Burton MD   clonazePAM (KlonoPIN) 2 MG tablet Take 2 mg by mouth Every Night. 9/21/18  Yes Davis Burton MD   gabapentin (NEURONTIN) 300 MG capsule Take 300 mg by mouth 2 (Two) Times a Day.   Yes Davis Burton MD   levothyroxine (SYNTHROID, LEVOTHROID) 75 MCG tablet Take 75 mcg by mouth Daily.   Yes Davis Burton MD   lisinopril (PRINIVIL,ZESTRIL) 10 MG tablet Take 1 tablet by mouth Daily. 11/14/18  Yes Alla Beal MD   meloxicam (MOBIC) 15 MG tablet Take 1 tablet by mouth Daily As Needed for Moderate Pain . Take as directed with food. 10/23/18  Yes Aj Mancilla MD   traMADol (ULTRAM) 50 MG tablet Take 50 mg by mouth Every 6 (Six) Hours As Needed for Moderate Pain .   Yes Davis Burton MD   Vortioxetine HBr (TRINTELLIX) 20 MG tablet Take 20 mg by mouth Daily.   Yes Davis Burton MD   acetaminophen (TYLENOL) 500 MG tablet Take 1,000 mg by mouth Every 6 (Six) Hours As Needed for Mild Pain .    Davis Burton MD   SUMAtriptan (IMITREX) 100 MG tablet Take one tablet at  "onset of headache. May repeat dose one time in 2 hours if headache not relieved. Take one tablet at onset of headache. May repeat dose one time in 2 hours if headache not relieved.    Provider, Davis, MD       Social History     Socioeconomic History   • Marital status:      Spouse name: Not on file   • Number of children: Not on file   • Years of education: Not on file   • Highest education level: Not on file   Social Needs   • Financial resource strain: Not on file   • Food insecurity - worry: Not on file   • Food insecurity - inability: Not on file   • Transportation needs - medical: Not on file   • Transportation needs - non-medical: Not on file   Occupational History   • Not on file   Tobacco Use   • Smoking status: Former Smoker     Packs/day: 0.50     Years: 25.00     Pack years: 12.50   • Smokeless tobacco: Never Used   Substance and Sexual Activity   • Alcohol use: Yes     Alcohol/week: 3.6 oz     Types: 6 Shots of liquor per week     Comment: several night weekly, few drinks per occasion   • Drug use: No   • Sexual activity: Yes     Partners: Female   Other Topics Concern   • Not on file   Social History Narrative   • Not on file       Family History   Problem Relation Age of Onset   • Alzheimer's disease Mother    • Pancreatic cancer Father        Review of Systems     Objective:     Vitals:    01/02/19 1733 01/02/19 1951 01/02/19 2334 01/03/19 0834   BP: 158/92 119/70 129/86 142/87   BP Location: Right arm Right arm Right arm Right arm   Patient Position: Sitting Lying Sitting Lying   Pulse: 89 87 83    Resp: 18 18 18 20   Temp: 98.7 °F (37.1 °C) 98.4 °F (36.9 °C) 98.9 °F (37.2 °C) 98.4 °F (36.9 °C)   TempSrc: Oral Oral Oral Oral   SpO2: 96% 95% 95%    Weight: 88.5 kg (195 lb)      Height:         Body mass index is 26.45 kg/m².  Flowsheet Rows      First Filed Value   Admission Height  182.9 cm (72\") Documented at 01/02/2019 1237   Admission Weight  89.8 kg (198 lb) Documented at 01/02/2019 " "1237          Physical Exam            Lab Review:                Results from last 7 days   Lab Units  01/03/19   0658   SODIUM mmol/L  139   POTASSIUM mmol/L  3.7   CHLORIDE mmol/L  102   CO2 mmol/L  25.4   BUN mg/dL  21   CREATININE mg/dL  0.63*   GLUCOSE mg/dL  96   CALCIUM mg/dL  9.0     Results from last 7 days   Lab Units  01/02/19   1250   TROPONIN T ng/mL  <0.010     Results from last 7 days   Lab Units  01/03/19   0626   WBC 10*3/mm3  3.82*   HEMOGLOBIN g/dL  12.6*   HEMATOCRIT %  37.9*   PLATELETS 10*3/mm3  127*                               I personally viewed and interpreted the patient's EKG/Telemetry data -- SR, Q's V-4 and inferior leads, no prior for comparison    Assessment/Plan:     1.  Syncope and collapse -- relatively abrupt LOC when turning his head or changing positions.  This is concerning for posterior circulatory disease; I'll check a CTA head/neck.  Obviously, with his history of peripheral neuropathy and ongoing EtOH, there are numerous other differential diagnoses, including seizures, alcoholic \"blackouts,\" autonomic neuropathy, and arrhythmias due to electrolyte abnormalities.  However, arrhythmias are lowest on my differential.  I'll check an echo as well.    2.  Abnormal EKG -- he has Q waves in V1-V4 and inferiorly.  Has he had previous infarcts?  I'll check an echocardiogarm.    3.   Alcoholism (CMS/HCC) -- he's tremulous on exam.    4.   Pancytopenia -- likely due to #3    5.   Peripheral neuropathy -- likely due to #3          "

## 2019-01-03 NOTE — CONSULTS
Reviewed chart and interviewed pt. This pt is well known to this therapist as I used to be his primary therapist.  Motivational interviewing to help pt see that his life could be better with out drinking and needs PETER treatment. Educated pt on disease of PETER and reviewed how much he has suffered and lost because of his drinking. Pt lost two wives from drinking, his physical health is declining and ETOH, and pt is constantly craving ETOH.( Many more negative consequences too)   Educated pt how severely the body and mind are effected by his alcoholism. Educated pt on PETER program at [facility] and other places. Urged pt to go to PETER treatment. Pt said he will talk to his boss on Monday to be able to come to he program. Gave pt PETER [facility] pt hand book and an AA directory. Urged pt to start Monday and educated pt that the longer the time frame from d/c from [facility] before treatment risk of relapse is high. Urged pt to get treatment somewhere if not at [facility]. Discussed options.

## 2019-01-03 NOTE — PROGRESS NOTES
Discharge Planning Assessment  [facility]     Patient Name: Pro Mueller  MRN: [id number]  Today's Date: 1/3/2019    Admit Date: 1/2/2019    Discharge Needs Assessment     Row Name 01/03/19 1559       Living Environment    Lives With  alone    Current Living Arrangements  home/apartment/condo    Primary Care Provided by  self    Provides Primary Care For  no one    Family Caregiver if Needed  none    Quality of Family Relationships  unable to assess    Able to Return to Prior Arrangements  yes       Resource/Environmental Concerns    Resource/Environmental Concerns  none    Transportation Concerns  car, none       Transition Planning    Patient/Family Anticipates Transition to  home    Patient/Family Anticipated Services at Transition  none    Transportation Anticipated  family or friend will provide;public transportation       Discharge Needs Assessment    Readmission Within the Last 30 Days  no previous admission in last 30 days    Concerns to be Addressed  substance/tobacco abuse/use    Concerns Comments  assessed by psychiatry    Equipment Currently Used at Home  none    Anticipated Changes Related to Illness  none    Equipment Needed After Discharge  none    Outpatient/Agency/Support Group Needs  outpatient substance abuse treatment    Offered/Gave Vendor List  no    Current Discharge Risk  substance use/abuse        Discharge Plan     Row Name 01/03/19 5061       Plan    Plan  Home    Patient/Family in Agreement with Plan  yes    Plan Comments  CCP spoke with pt at bedside.  Introduced self and explained role.  CCP contact information provided.  Face sheet updated.  Pt states that he is independent with all ADLs.  He still works full time from home and drives himself where needed.  He denies any difficulty obtaining or affording medication.  He states that either friends will take him home or he will call a cab or Uber.  He denies any needs at this time.  PT has assessed and signed off.  CCP will continue  to follow.        Destination      No service coordination in this encounter.      Durable Medical Equipment      No service coordination in this encounter.      Dialysis/Infusion      No service coordination in this encounter.      Home Medical Care      No service coordination in this encounter.      Community Resources      No service coordination in this encounter.          Demographic Summary     Row Name 01/03/19 1556       General Information    Admission Type  observation    Arrived From  home    Referral Source  admission list    Reason for Consult  discharge planning    Preferred Language  English     Used During This Interaction  no       Contact Information    Permission Granted to Share Info With  family/designee permission granted to speak with pt's father Saul Oscar        Functional Status     Row Name 01/03/19 1558       Functional Status    Usual Activity Tolerance  good    Current Activity Tolerance  good       Functional Status, IADL    Medications  independent    Meal Preparation  independent    Housekeeping  independent    Laundry  independent    Shopping  independent       Mental Status    General Appearance WDL  WDL    General Appearance  unkempt       Mental Status Summary    Recent Changes in Mental Status/Cognitive Functioning  no changes       Employment/    Employment Status  employed full time    Shift Worked  first shift    Current or Previous Occupation  professional        Psychosocial    No documentation.       Abuse/Neglect    No documentation.       Legal    No documentation.       Substance Abuse    No documentation.       Patient Forms    No documentation.           Meli Gunderson RN

## 2019-01-03 NOTE — PROGRESS NOTES
[facility]     LOS: 0 days     Subjective:    CC:Syncope (\"I woke up in a pool of blood on the kitchen floor\")    DIET:  Diet Order   Procedures   • Diet Regular     No cp  No soa  Eating some  No n/v    Objective:    Vital Signs:  Temp:  [98.3 °F (36.8 °C)-98.9 °F (37.2 °C)] 98.9 °F (37.2 °C)  Heart Rate:  [] 83  Resp:  [17-18] 18  BP: ()/() 129/86    SpO2:  [95 %-97 %] 95 %  on   ;   Device (Oxygen Therapy): room air  Body mass index is 26.45 kg/m².    Physical Exam   Constitutional: He appears well-developed and well-nourished.   Some vocal tremor   HENT:   Head: Normocephalic and atraumatic.   Cardiovascular: Exam reveals no friction rub.   No murmur heard.  Pulmonary/Chest: Effort normal and breath sounds normal.   Abdominal: Soft. Bowel sounds are normal. He exhibits no distension. There is no tenderness.   Neurological: He is alert.   Skin: Skin is warm and dry.       Results Review:    Glucose   Date Value Ref Range Status   01/02/2019 111 (H) 65 - 99 mg/dL Final     Results from last 7 days   Lab Units  01/02/19   1250   WBC 10*3/mm3  8.56   HEMOGLOBIN g/dL  15.5   HEMATOCRIT %  46.5   PLATELETS 10*3/mm3  146     Results from last 7 days   Lab Units  01/02/19   2034  01/02/19   1250   SODIUM mmol/L   --   135*   POTASSIUM mmol/L   --   3.8   CHLORIDE mmol/L   --   92*   CO2 mmol/L   --   25.2   BUN mg/dL   --   28*   CREATININE mg/dL   --   0.97   CALCIUM mg/dL  10.2  11.2*   BILIRUBIN mg/dL   --   1.0   ALK PHOS U/L   --   70   ALT (SGPT) U/L   --   52*   AST (SGOT) U/L   --   86*   GLUCOSE mg/dL   --   111*             Results from last 7 days   Lab Units  01/02/19   1250   TROPONIN T ng/mL  <0.010     Cultures:       I have reviewed daily medications and changes in CPOE    Scheduled meds    amLODIPine 2.5 mg Oral Daily   atorvastatin 20 mg Oral Daily   clonazePAM 2 mg Oral Nightly   vitamin B-1 100 mg Oral Daily   And      multivitamin 1 tablet Oral Daily "   And      folic acid 1 mg Oral Daily   gabapentin 300 mg Oral BID   levothyroxine 75 mcg Oral Daily   LORazepam 1 mg Oral Q6H   Followed by      LORazepam 1 mg Oral Q8H   sodium chloride 3 mL Intravenous Q12H         Sodium chloride 100 mL/hr Last Rate: 100 mL/hr (01/03/19 0436)     PRN meds  •  acetaminophen  •  [COMPLETED] Insert peripheral IV **AND** sodium chloride  •  sodium chloride        Syncope and collapse    Alcoholism (CMS/HCC)    Hyperlipidemia    Hypertension    Hypothyroidism        Assessment/Plan:    Syncope and collapse  -cardiology consult  -neurology  -ekg is unremarkable  -concern for ETOH w/d seizures   -sinus on the monitor  -echo     concern for Alcoholism (CMS/HCC)  -protocal; ciwa, ativan prn, thiamine, mv   -klonpine last night, aivan x 3 overnight      Hyperlipidemia       Hypertension   lower dose of norvasc      Hypothyroidism   -levothyroxine    Depression-trintellex held, only on it for few months and possible seizures have started since then  -access to see  -psychiatry to see    Hypercalcemia previously elevated-check pth, calcium is better     DVT PPX: scd    Echo , eeg, neurology, cardiology, access and psychiatry      Esdras Jackson MD  01/03/19  7:16 AM

## 2019-01-03 NOTE — CONSULTS
"Pt is a 63 year old,  male seen in room 664. Pt has been  twice, and  twice. Most recent marital issues have been strung out over past several years including wife leaving, moving back, leaving again and moving to another state, and ultimately wife ended up requesting a divorce this past summer. Pt states this has been a huge emotional and financial stressor for him. Pt has no children, he is an  at [employer]. PT is an extremely poor historian. Telling clinician that the only previous PETER treatment he had was one previous detox/rehab admission for 3 weeks back in 1993 in [place] after his first divorce when he was drinking heavily. However chart is indicative of multiple failed attempts at controlling his use, with notes reporting pt being admitted at the [facility], going to AA meetings (which he did not like because of his Buddhism affiliation) as well as IOP visits with therapist, Zoey Cosme. In all of pt's visit notes he seems to minimize his drinking, as he does with this clinician today. He states he only drinks 2 drinks a night, and \"not every single night, but probably 3/4 of the days in a month.\" Pt states he does not have trouble discontinuing his use once he has started, and he does not neglect any of his daily responsibilities due to his drinking, so therefore he does not think his drinking is a problem and does not show any interest in treatment options. Important to note that PCP told pt on 7/21/2017 that his drinking had damaged his liver and he should not drink anymore, at all, ever again.     Pt denies ever having experienced any type of withdrawal symptoms, however presents tremulous currently. Also important to note, pt has had a long history of relying on benzos for sleep aids. Chart is indicative of previous use of Xanax, Dalmane, and he is currently on scheduled klonopin at night. Pt has also used Lunesta, Ambien, Remeron, Seroquel, and Trazodone for sleep in the " "past. His use of benzos as a sleep aid goes back multiple years.     Pt sees Dr. Say Coats at [facility] for his psychiatric care. He was last seen on 12/28/18. Pt has been taking Trintellix 20mg since July, and Dr. Coats recently added Wellbutrin on this last visit. Pt states he has not been able to  the Wellbutrin from the pharmacy yet. Pt is currently taking Klonopin 2mg QHS, as well as Nuerontin 300mg BID, and Tramadol 50mg Q6HRS PRN pain.  During this admission, Trintellix has been held, and they have initiated Ativan 1 MG scheduled Q6Hrs.    Pt originally presented to ED yesterday due to third syncopy episode in the past 2.5 months. States he was getting up to get a drink and \"the next thing I remember my hair was stuck to the floor because of dried blood.\"  Pt appears to not take current illness serious. Pt often gives sarcastic or, \"humurous\" remarks in response to my questions. For instance, when asked if he ever desired to go to sleep and not wake up, he states, \"Oh yeah, I want to go to sleep and not wake up....until noon.\" with a laugh. Pt states that he has previously thought of hurting himself, but only because \"I'm an  and I work out the details of things.\" PT states he has thought of \"One quick trip to [employer] and a stop at [facility] and I could make an explosive that would level my house. Or I could just start the car and leave the garage closed, that would be less messy.\" Pt laughs with this response as well. Pt denies ever having any actual intent on carrying out these plans, or any previous attempts. Pt shows future orientation by talking about wanting to watch new episodes of TV series that come on tonight, as well as his plans for his Birthday this weekend (they involve alcohol). Pt denies any current wish to be dead, or any current SI. Pt reports not having a very significant social support. He does not talk to his mother, step father, or half sister, and his biological dad lives " "a couple hours away. Pt seems lonely, rates his depression at a 9.5/10, but states he hasn't been struggling too much with anxiety. When asked about any other psychiatric inpatient admissions, he states that \"I was here for three days for a psych evaluation\" but states this was 5 years ago. However chart shows that he was here in August for a psychiatric eval.     Order for Dr. Cheek to see for recommendations on medications. AC will continue to follow, and offer PETER treatment options should pt request them. Pt is encouraged to continue following up with his psychiatrist,  on a regular basis as well.   "

## 2019-01-03 NOTE — PLAN OF CARE
Problem: Patient Care Overview  Goal: Plan of Care Review   01/03/19 1500   Coping/Psychosocial   Plan of Care Reviewed With patient   OTHER   Outcome Summary Pt is a 63 y.o. male admitted to [facility] for syncopal episode/LOC on 1/2/2019. Pt is currently functioning at or near their reported baseline ind for STS transfers, and ind for amb with no AD for 200 ft. Pt ascends/descends 10 steps with CGA and reciprocal pattern. Pt denies any questions or concerns for PT at this time. Pt has no further acute skilled PT needs at this time and is appropriate for DC home with assist once medically appropriate.

## 2019-01-03 NOTE — PLAN OF CARE
Problem: Patient Care Overview  Goal: Plan of Care Review  Outcome: Ongoing (interventions implemented as appropriate)   01/03/19 5190   Coping/Psychosocial   Plan of Care Reviewed With patient   Plan of Care Review   Progress improving   OTHER   Outcome Summary VSS. Telemetry monitor, SR. CIWA. Up with assist. IV fluids. Will continue to monitor.        Problem: Pain, Acute (Adult)  Goal: Identify Related Risk Factors and Signs and Symptoms  Outcome: Ongoing (interventions implemented as appropriate)    Goal: Acceptable Pain Control/Comfort Level  Outcome: Ongoing (interventions implemented as appropriate)      Problem: Fall Risk (Adult)  Goal: Identify Related Risk Factors and Signs and Symptoms  Outcome: Outcome(s) achieved Date Met: 01/03/19    Goal: Absence of Fall  Outcome: Ongoing (interventions implemented as appropriate)      Problem: Suicide Risk (Adult)  Goal: Identify Related Risk Factors and Signs and Symptoms  Outcome: Outcome(s) achieved Date Met: 01/03/19    Goal: Strength-Based Wellness/Recovery  Outcome: Ongoing (interventions implemented as appropriate)    Goal: Physical Safety  Outcome: Ongoing (interventions implemented as appropriate)

## 2019-01-04 ENCOUNTER — APPOINTMENT (OUTPATIENT)
Dept: CARDIOLOGY | Facility: HOSPITAL | Age: 64
End: 2019-01-04
Attending: INTERNAL MEDICINE

## 2019-01-04 VITALS
SYSTOLIC BLOOD PRESSURE: 163 MMHG | BODY MASS INDEX: 26.41 KG/M2 | HEIGHT: 72 IN | WEIGHT: 195 LBS | HEART RATE: 74 BPM | DIASTOLIC BLOOD PRESSURE: 95 MMHG | OXYGEN SATURATION: 95 % | TEMPERATURE: 98 F | RESPIRATION RATE: 16 BRPM

## 2019-01-04 LAB
ANION GAP SERPL CALCULATED.3IONS-SCNC: 11.5 MMOL/L
BASOPHILS # BLD AUTO: 0.02 10*3/MM3 (ref 0–0.2)
BASOPHILS NFR BLD AUTO: 0.5 % (ref 0–1.5)
BUN BLD-MCNC: 11 MG/DL (ref 8–23)
BUN/CREAT SERPL: 16.7 (ref 7–25)
CALCIUM SPEC-SCNC: 9 MG/DL (ref 8.6–10.5)
CHLORIDE SERPL-SCNC: 106 MMOL/L (ref 98–107)
CO2 SERPL-SCNC: 23.5 MMOL/L (ref 22–29)
CREAT BLD-MCNC: 0.66 MG/DL (ref 0.76–1.27)
DEPRECATED RDW RBC AUTO: 46.5 FL (ref 37–54)
EOSINOPHIL # BLD AUTO: 0.06 10*3/MM3 (ref 0–0.7)
EOSINOPHIL NFR BLD AUTO: 1.5 % (ref 0.3–6.2)
ERYTHROCYTE [DISTWIDTH] IN BLOOD BY AUTOMATED COUNT: 13.5 % (ref 11.5–14.5)
GFR SERPL CREATININE-BSD FRML MDRD: 123 ML/MIN/1.73
GLUCOSE BLD-MCNC: 99 MG/DL (ref 65–99)
HCT VFR BLD AUTO: 37.3 % (ref 40.4–52.2)
HGB BLD-MCNC: 12.4 G/DL (ref 13.7–17.6)
IMM GRANULOCYTES # BLD AUTO: 0.01 10*3/MM3 (ref 0–0.03)
IMM GRANULOCYTES NFR BLD AUTO: 0.2 % (ref 0–0.5)
LYMPHOCYTES # BLD AUTO: 1.38 10*3/MM3 (ref 0.9–4.8)
LYMPHOCYTES NFR BLD AUTO: 33.6 % (ref 19.6–45.3)
MCH RBC QN AUTO: 31.6 PG (ref 27–32.7)
MCHC RBC AUTO-ENTMCNC: 33.2 G/DL (ref 32.6–36.4)
MCV RBC AUTO: 95.2 FL (ref 79.8–96.2)
MONOCYTES # BLD AUTO: 0.41 10*3/MM3 (ref 0.2–1.2)
MONOCYTES NFR BLD AUTO: 10 % (ref 5–12)
NEUTROPHILS # BLD AUTO: 2.24 10*3/MM3 (ref 1.9–8.1)
NEUTROPHILS NFR BLD AUTO: 54.4 % (ref 42.7–76)
PLATELET # BLD AUTO: 127 10*3/MM3 (ref 140–500)
PMV BLD AUTO: 10.2 FL (ref 6–12)
POTASSIUM BLD-SCNC: 3.8 MMOL/L (ref 3.5–5.2)
RBC # BLD AUTO: 3.92 10*6/MM3 (ref 4.6–6)
SODIUM BLD-SCNC: 141 MMOL/L (ref 136–145)
WBC NRBC COR # BLD: 4.11 10*3/MM3 (ref 4.5–10.7)

## 2019-01-04 PROCEDURE — 36415 COLL VENOUS BLD VENIPUNCTURE: CPT | Performed by: HOSPITALIST

## 2019-01-04 PROCEDURE — 80048 BASIC METABOLIC PNL TOTAL CA: CPT | Performed by: HOSPITALIST

## 2019-01-04 PROCEDURE — 85025 COMPLETE CBC W/AUTO DIFF WBC: CPT | Performed by: HOSPITALIST

## 2019-01-04 PROCEDURE — G0378 HOSPITAL OBSERVATION PER HR: HCPCS

## 2019-01-04 PROCEDURE — 96361 HYDRATE IV INFUSION ADD-ON: CPT

## 2019-01-04 PROCEDURE — 0296T HC EXT ECG > 48HR TO 21 DAY RCRD W/CONECT INTL RCRD: CPT

## 2019-01-04 RX ADMIN — ATORVASTATIN CALCIUM 20 MG: 20 TABLET, FILM COATED ORAL at 09:13

## 2019-01-04 RX ADMIN — SODIUM CHLORIDE, PRESERVATIVE FREE 3 ML: 5 INJECTION INTRAVENOUS at 09:13

## 2019-01-04 RX ADMIN — GABAPENTIN 300 MG: 300 CAPSULE ORAL at 09:13

## 2019-01-04 RX ADMIN — CLONAZEPAM 1 MG: 1 TABLET ORAL at 07:22

## 2019-01-04 RX ADMIN — AMLODIPINE BESYLATE 2.5 MG: 2.5 TABLET ORAL at 09:13

## 2019-01-04 RX ADMIN — LEVOTHYROXINE SODIUM 75 MCG: 75 TABLET ORAL at 09:12

## 2019-01-04 RX ADMIN — Medication 1 TABLET: at 09:12

## 2019-01-04 RX ADMIN — FOLIC ACID 1 MG: 1 TABLET ORAL at 09:13

## 2019-01-04 RX ADMIN — SODIUM CHLORIDE 100 ML/HR: 9 INJECTION, SOLUTION INTRAVENOUS at 01:47

## 2019-01-04 NOTE — PLAN OF CARE
Problem: Patient Care Overview  Goal: Plan of Care Review  Outcome: Ongoing (interventions implemented as appropriate)   01/04/19 5362   Coping/Psychosocial   Plan of Care Reviewed With patient   OTHER   Outcome Summary pt denies pain, given ivf, ciwa score 0, up ad geoffrey, vss, will ctm.     Goal: Individualization and Mutuality  Outcome: Ongoing (interventions implemented as appropriate)    Goal: Discharge Needs Assessment  Outcome: Ongoing (interventions implemented as appropriate)    Goal: Interprofessional Rounds/Family Conf  Outcome: Ongoing (interventions implemented as appropriate)      Problem: Pain, Acute (Adult)  Goal: Identify Related Risk Factors and Signs and Symptoms  Outcome: Ongoing (interventions implemented as appropriate)    Goal: Acceptable Pain Control/Comfort Level  Outcome: Ongoing (interventions implemented as appropriate)      Problem: Fall Risk (Adult)  Goal: Absence of Fall  Outcome: Ongoing (interventions implemented as appropriate)      Problem: Suicide Risk (Adult)  Goal: Strength-Based Wellness/Recovery  Outcome: Ongoing (interventions implemented as appropriate)    Goal: Physical Safety  Outcome: Ongoing (interventions implemented as appropriate)

## 2019-01-04 NOTE — PROGRESS NOTES
Malnutrition Severity Assessment    Patient Name:  Pro Mueller  YOB: 1955  MRN: [id number]  Admit Date:  1/2/2019    Patient meets criteria for : Moderate malnutrition    Comments:  81.3% UBW, 45# (18.8%) weight loss x 10 months, decreased PO intake.    Malnutrition Type: Social/Environmental Circumstance Malnutrition     Malnutrition Type (last 8 hours)      Malnutrition Severity Assessment     Row Name 01/04/19 1449       Malnutrition Severity Assessment    Malnutrition Type  Social/Environmental Circumstance Malnutrition    Row Name 01/04/19 1449       Physical Signs of Malnutrition (Social/Environmental)    Fat Loss  Mild mild orbital region    Row Name 01/04/19 1449       Weight Status (Social/Environmental)    %UBW  Mod (75-85%)    Weight Loss  Mild (>10% / 1 yr)    Row Name 01/04/19 1449       Energy Intake Status (Social/Environmental)    Energy Intake  Mod (<75% / > or equal to 3 mo)    Row Name 01/04/19 1449       Criteria Met (Must meet criteria for severity in at least 2 of these categories: M Wasting, Fat Loss, Fluid, Secondary Signs, Wt. Status, Intake)    Patient meets criteria for   Moderate malnutrition          Electronically signed by:  Kitty Roche RD  01/04/19 2:56 PM

## 2019-01-04 NOTE — PROGRESS NOTES
Adult Nutrition  Assessment/PES    Patient Name:  Pro Mueller  YOB: 1955  MRN: [id number]  Admit Date:  1/2/2019    Assessment Date:  1/4/2019    Comments:  Assessment triggered by MST score of 3 per nurse admission screen.  100% x 3 meals.  ETOH abuse, depression.  Per chart, here in August for psych eval.  Admitted with syncope and collapse.    Patient reports weighing 240# in March.  Says wife left him around that time and he became more depressed and started losing weight.      He reports good appetite currently, but says this is dependent on if he is feeling depressed or not.  45# (18.8%) weight loss x 10 months.  MSA completed for moderate social/environmental malnutrition.    Adding Boost Plus for patient to sample.  Discussed possibly keeping these in the house for those days that he did not feel like eating anything.    Will continue to follow.    Reason for Assessment     Row Name 01/04/19 1441          Reason for Assessment    Reason For Assessment  identified at risk by screening criteria     Diagnosis  substance use/abuse;psychosocial;cardiac disease ETOH abuse, anxiety, depression, arthritis, herpes, HTN, panic d/o; adm with syncope and collapse     Identified At Risk by Screening Criteria  MST SCORE 2+         Nutrition/Diet History     Row Name 01/04/19 1441          Nutrition/Diet History    Typical Food/Fluid Intake  patient reports good appetite at this time, but says appetite decreases when he has bouts of depression     Supplemental Drinks/Foods/Additives  would like to try Boost during admission         Anthropometrics     Row Name 01/04/19 1442          Admit Weight    Admit Weight  -- 195# 1/3        Usual Body Weight (UBW)    Usual Body Weight  109 kg (240 lb)     % of Usual Body Weight Assessment  75-84% - moderate deficit 81.3%     Weight Loss  unintentional 45# (18.8%)     Weight Loss Time Frame  10 months        Body Mass Index (BMI)    BMI Assessment  BMI 25-29.9:  overweight         Labs/Tests/Procedures/Meds     Row Name 01/04/19 1444          Labs/Procedures/Meds    Lab Results Reviewed  reviewed, pertinent     Lab Results Comments  Creat, WBC, Hgb, Hct, Platelets        Diagnostic Tests/Procedures    Diagnostic Test/Procedure Reviewed  reviewed, pertinent        Medications    Pertinent Medications Reviewed  reviewed, pertinent     Pertinent Medications Comments  MVI, folic acid, neurontin, synthroid         Physical Findings     Row Name 01/04/19 1445          Physical Findings    Overall Physical Appearance  overweight     Skin  other (see comments) B=19, abrasion           Nutrition Prescription Ordered     Row Name 01/04/19 1448          Nutrition Prescription PO    Current PO Diet  Regular         Evaluation of Received Nutrient/Fluid Intake     Row Name 01/04/19 1449          PO Evaluation    Number of Meals  3     % PO Intake  100           Malnutrition Severity Assessment     Row Name 01/04/19 1449          Malnutrition Severity Assessment    Malnutrition Type  Social/Environmental Circumstance Malnutrition        Physical Signs of Malnutrition (Social/Environmental)    Fat Loss  Mild mild orbital region        Weight Status (Social/Environmental)    %UBW  Mod (75-85%)     Weight Loss  Mild (>10% / 1 yr)        Energy Intake Status (Social/Environmental)    Energy Intake  Mod (<75% / > or equal to 3 mo)        Criteria Met (Must meet criteria for severity in at least 2 of these categories: M Wasting, Fat Loss, Fluid, Secondary Signs, Wt. Status, Intake)    Patient meets criteria for   Moderate malnutrition           Problem/Interventions:  Problem 1     Row Name 01/04/19 1451          Nutrition Diagnoses Problem 1    Problem 1  Malnutrition     Etiology (related to)  Medical Diagnosis     Psychosocial  Depression     Substance Use  ETOH     Signs/Symptoms (evidenced by)  Report/Observation;Unintended Weight Change;% UBW     Percent (%) UBW  81.3 %     Unintended  Weight Change  Loss     Number of Pounds Lost  47 wife left him in March, that is when depression got worse and weight loss started per his report     Weight loss time period  10 months     Reported/Observed By  Patient                 Intervention Goal     Row Name 01/04/19 1452          Intervention Goal    General  Maintain nutrition;Reduce/improve symptoms;Disease management/therapy     PO  Maintain intake     Weight  Maintain weight         Nutrition Intervention     Row Name 01/04/19 1452          Nutrition Intervention    RD/Tech Action  Follow Tx progress;Care plan reviewd;Encourage intake;Recommend/ordered     Recommended/Ordered  Supplement         Nutrition Prescription     Row Name 01/04/19 1452          Nutrition Prescription PO    PO Prescription  Begin/change supplement     Supplement  Boost Plus     Supplement Frequency  Daily     New PO Prescription Ordered?  Yes         Education/Evaluation     Row Name 01/04/19 1452          Education    Education  Will Instruct as appropriate        Monitor/Evaluation    Monitor  Per protocol;PO intake;Supplement intake;Pertinent labs;Weight           Electronically signed by:  Kitty Roche RD  01/04/19 2:53 PM

## 2019-01-04 NOTE — DISCHARGE SUMMARY
[facility]    ASSOCIATES  [phone]    DISCHARGE SUMMARY  [facility]    Patient Identification:  Name: Pro Mueller  Age: 63 y.o.  Sex: male  :  1955  MRN: [id number]  Primary Care Physician: Alla Beal MD    Admit date: 2019  Discharge date and time:      Discharge Diagnoses:  Syncope and collapse    Alcoholism (CMS/HCC)    Hyperlipidemia    Hypertension    Hypothyroidism    Abnormal EKG       History of present illness from H&P:    64 yo who comes in the hospital because of syncopal spell at home.  Patient states he woke up last night with blood on his head.  He has a laceration on the occipital area of his scalp.  The patient is unsure how long he was out for but probably for about 20-30 minutes.  Patient was recently restarted on blood pressure medication a few months ago.  Patient states this is the third time he's had an episode in the last few months.  The patient denies any fevers or chills.  He does have difficulty with balance.     In the emergency room the patient's AST is slightly higher than his ALt.  The patient denies alcohol problems.    Hospital Course:     The patient was admitted the hospital to monitor for signs of alcohol withdrawal.  Currently his ciwa is low.  He is a little tremulous.  He did receive some when necessary Ativan.  Today he received his Klonopin this morning was doing well.    Major concern is cause for the patient's syncope.  Seems less likely that this is ventricular arrhythmias.  The history sounds more like alcohol withdrawal seizures.  The patient's echocardiogram was within normal limits his EKG was fairly unremarkable and his monitor during the hospitalization was also unremarkable according to cardiology but did show grade 1 diastolic dysfunction.  The patient will go out with a prolonged monitor and will need to follow-up with cardiology for this.    Patient was also seen by neurology had an EEG performed which  was unremarkable. Also had a CTA to evaluate neck and cerebral circulation as he had some symptoms with turning his had (maybe caused his passing out)     The patient is eating well and is anxious for discharge this point.  The patient is slightly hypertensive during the hospitalization probably related to alcohol withdrawal.  He will need to monitor his blood pressures at home and follow-up with his primary care doctor.    Mild pancytopenia form ETOH.   Consults:   Consults     Date and Time Order Name Status Description    1/3/2019 0726 Inpatient Psychiatrist Consult Completed     1/2/2019 1946 Inpatient Neurology Consult General Completed     1/2/2019 1649 Inpatient Cardiology Consult      1/2/2019 1443 [facility] (on-call MD unless specified) Completed           Results from last 7 days   Lab Units  01/04/19   0645   WBC 10*3/mm3  4.11*   HEMOGLOBIN g/dL  12.4*   HEMATOCRIT %  37.3*   PLATELETS 10*3/mm3  127*       Results from last 7 days   Lab Units  01/04/19   0645   SODIUM mmol/L  141   POTASSIUM mmol/L  3.8   CHLORIDE mmol/L  106   CO2 mmol/L  23.5   BUN mg/dL  11   CREATININE mg/dL  0.66*   GLUCOSE mg/dL  99   CALCIUM mg/dL  9.0       Significant Diagnostic Studies:   Lab Results   Component Value Date    WBC 4.11 (L) 01/04/2019    HGB 12.4 (L) 01/04/2019    HCT 37.3 (L) 01/04/2019     (L) 01/04/2019     Lab Results   Component Value Date     01/04/2019    K 3.8 01/04/2019     01/04/2019    CO2 23.5 01/04/2019    BUN 11 01/04/2019    CREATININE 0.66 (L) 01/04/2019    GLUCOSE 99 01/04/2019     Lab Results   Component Value Date    CALCIUM 9.0 01/04/2019     Lab Results   Component Value Date    AST 86 (H) 01/02/2019    ALT 52 (H) 01/02/2019    ALKPHOS 70 01/02/2019     No results found for: APTT, INR  Lab Results   Component Value Date    COLORU Dark Yellow (A) 01/02/2019    CLARITYU Cloudy (A) 01/02/2019    PHUR 5.5 01/02/2019    GLUCOSEU Negative 01/02/2019    KETONESU 15 mg/dL (1+) (A)  01/02/2019    BLOODU Negative 01/02/2019    LEUKOCYTESUR Small (1+) (A) 01/02/2019    BILIRUBINUR Negative 01/02/2019    UROBILINOGEN 1.0 E.U./dL 01/02/2019    RBCUA 0-2 01/02/2019    WBCUA 6-12 (A) 01/02/2019    BACTERIA None Seen 01/02/2019     Lab Results   Component Value Date    TROPONINT <0.010 01/02/2019     No components found for: HGBA1C;2  No components found for: TSH;2    Imaging Results (all)     Procedure Component Value Units Date/Time    CT Angiogram Neck With & Without Contrast [[id number]] Collected:  01/03/19 2141     Updated:  01/04/19 0856    Narrative:       CONTRAST-ENHANCED CT ANGIOGRAM OF THE HEAD AND NECK 01/03/2019.      CLINICAL HISTORY: Syncope last night, confusion, patient had a posterior  head contusion.     TECHNIQUE: Spiral CT images were obtained from the base of the skull to  the vertex both pre and post intravenous contrast and these images were  reformatted and submitted in 3 mm thick axial CT sections with brain  algorithm, additional spiral CT images were obtained from the top of the  aortic arch up through the great vessels of the head and neck during the  arterial phase of contrast and images were reformatted and submitted in  1 mm thick axial, sagittal and coronal CT sections and additional 3-D  reconstructions were performed to complete the CT angiogram of the head  and neck.      COMPARISON: This is correlated to yesterday's noncontrast head CT,  01/02/2019 at 1:25 PM.     FINDINGS: There is some mild patchy low density in the periventricular  white matter of the cerebral hemispheres most consistent with mild small  vessel disease. The remainder of the brain parenchyma is normal in  attenuation. The ventricles are normal in size. I see no focal mass  effect and no midline shift and no extra-axial fluid collections are  identified. There is no evidence of acute intracranial hemorrhage. There  are calcified atherosclerotic plaques and intracranial segments of  distal  vertebral arteries. Paranasal sinuses and mastoid air cells and  middle ear cavities are clear. The nasopharynx, oropharynx, hypopharynx,  true cords and subglottic airway are normal in appearance. The thyroid  gland is unremarkable. The lung apices are clear. The parotid,  , parapharyngeal and submandibular spaces are symmetric and  are normal in appearance. There is mild cervical spondylosis with mild  canal and left foraminal narrowing at C4-5 and there is posterior  spurring and uncovertebral joint hypertrophy contributing to mild canal  and moderate bilateral bony foraminal narrowing at C5-6, mild canal and  moderate left foraminal narrowing at C6-7.     CT angiogram images through the neck demonstrates common origin left  common carotid artery and brachiocephalic artery off the aortic arch  constituting a bovine configuration of the aortic arch which is a normal  anatomic variation. The left subclavian artery origin is normal in  appearance and no stenosis is seen in the left subclavian artery. There  is calcified atherosclerotic plaque that mildly narrows the left  vertebral artery origin. The remainder of the left vertebral artery is  widely patent without stenosis in its cervical course. There is  calcified plaque mildly narrowing the proximal intracranial segment of  the distal left vertebral artery. The left common carotid origin is  normal in appearance and no stenosis is seen in the left common carotid  artery to the level of the left common carotid bifurcation where there  is calcified plaque involving the posterior wall of the left common  carotid bifurcation minimally narrowing the bifurcation. There is no  stenosis in the left internal carotid artery using the NASCET criteria.  The brachycephalic artery origin is normal in appearance and no stenosis  seen in the brachiocephalic artery and its bifurcation in the right  common carotid and subclavian arteries normal in appearance. No  stenosis  is seen in the right subclavian artery. The right vertebral artery  origin is normal in appearance and no stenosis seen in the right  vertebral artery from its origin to its intracranial segment and there  are calcified atherosclerotic plaques at least mildly narrowing the  intracranial segment of the distal right vertebral artery. The right  common carotid origin is normal in appearance and no stenosis seen in  the right common carotid artery, its bifurcation in the right  internal/external carotid arteries normal in appearance. No stenosis  seen in the right internal carotid artery using the NASCET criteria. CT  angiogram images through the head again demonstrate calcified plaque  mildly narrowing the proximal intracranial segment of the distal left  vertebral artery. The calcified plaques mild to moderately narrow the  proximal intracranial segment of the distal right vertebral artery. The  basilar artery is normal in appearance. The basilar tip is normal in  appearance. The posterior cerebral and superior cerebellar arteries are  normal in appearance. The upper cervical petrous, cavernous and  supracavernous segments of the internal carotid arteries are within  normal limits. There is a hypoplastic A1 segment of the left anterior  cerebral artery which is a normal anatomic variation. The anterior and  middle cerebral arteries are normal in appearance. The anterior  communicating artery origin, middle cerebral artery trifurcations are  normal in appearance.       Impression:       1. There is mild cervical spondylosis with mild left bony foraminal  narrowing at C4-5, mild canal moderate bilateral bony foraminal  narrowing at C5-6 and mild canal and moderate left bony foraminal  narrowing at C6-7.  2. There is mild small vessel disease in the cerebral white matter.  3. Calcified plaques mildly narrow the left vertebral artery origin and  there is calcified plaque mildly narrowing the proximal  intracranial  segment of the distal left vertebral artery and calcified plaques mild  to moderately narrow the proximal intracranial segment of the distal  right vertebral artery. No additional stenosis is seen in the great  vessels of the head and neck. Specifically, no stenosis is seen in the  internal carotid arteries using the NASCET criteria. The remainder of  the CT angiogram of the head and neck is normal           Radiation dose reduction techniques were utilized, including automated  exposure control and exposure modulation based on body size.     This report was finalized on 1/4/2019 8:53 AM by Dr. Justino Linares M.D.       CT Angiogram Head With Contrast [[id number]] Collected:  01/03/19 2141     Updated:  01/04/19 0856    Narrative:       CONTRAST-ENHANCED CT ANGIOGRAM OF THE HEAD AND NECK 01/03/2019.      CLINICAL HISTORY: Syncope last night, confusion, patient had a posterior  head contusion.     TECHNIQUE: Spiral CT images were obtained from the base of the skull to  the vertex both pre and post intravenous contrast and these images were  reformatted and submitted in 3 mm thick axial CT sections with brain  algorithm, additional spiral CT images were obtained from the top of the  aortic arch up through the great vessels of the head and neck during the  arterial phase of contrast and images were reformatted and submitted in  1 mm thick axial, sagittal and coronal CT sections and additional 3-D  reconstructions were performed to complete the CT angiogram of the head  and neck.      COMPARISON: This is correlated to yesterday's noncontrast head CT,  01/02/2019 at 1:25 PM.     FINDINGS: There is some mild patchy low density in the periventricular  white matter of the cerebral hemispheres most consistent with mild small  vessel disease. The remainder of the brain parenchyma is normal in  attenuation. The ventricles are normal in size. I see no focal mass  effect and no midline shift and no extra-axial fluid  collections are  identified. There is no evidence of acute intracranial hemorrhage. There  are calcified atherosclerotic plaques and intracranial segments of  distal vertebral arteries. Paranasal sinuses and mastoid air cells and  middle ear cavities are clear. The nasopharynx, oropharynx, hypopharynx,  true cords and subglottic airway are normal in appearance. The thyroid  gland is unremarkable. The lung apices are clear. The parotid,  , parapharyngeal and submandibular spaces are symmetric and  are normal in appearance. There is mild cervical spondylosis with mild  canal and left foraminal narrowing at C4-5 and there is posterior  spurring and uncovertebral joint hypertrophy contributing to mild canal  and moderate bilateral bony foraminal narrowing at C5-6, mild canal and  moderate left foraminal narrowing at C6-7.     CT angiogram images through the neck demonstrates common origin left  common carotid artery and brachiocephalic artery off the aortic arch  constituting a bovine configuration of the aortic arch which is a normal  anatomic variation. The left subclavian artery origin is normal in  appearance and no stenosis is seen in the left subclavian artery. There  is calcified atherosclerotic plaque that mildly narrows the left  vertebral artery origin. The remainder of the left vertebral artery is  widely patent without stenosis in its cervical course. There is  calcified plaque mildly narrowing the proximal intracranial segment of  the distal left vertebral artery. The left common carotid origin is  normal in appearance and no stenosis is seen in the left common carotid  artery to the level of the left common carotid bifurcation where there  is calcified plaque involving the posterior wall of the left common  carotid bifurcation minimally narrowing the bifurcation. There is no  stenosis in the left internal carotid artery using the NASCET criteria.  The brachycephalic artery origin is normal in  appearance and no stenosis  seen in the brachiocephalic artery and its bifurcation in the right  common carotid and subclavian arteries normal in appearance. No stenosis  is seen in the right subclavian artery. The right vertebral artery  origin is normal in appearance and no stenosis seen in the right  vertebral artery from its origin to its intracranial segment and there  are calcified atherosclerotic plaques at least mildly narrowing the  intracranial segment of the distal right vertebral artery. The right  common carotid origin is normal in appearance and no stenosis seen in  the right common carotid artery, its bifurcation in the right  internal/external carotid arteries normal in appearance. No stenosis  seen in the right internal carotid artery using the NASCET criteria. CT  angiogram images through the head again demonstrate calcified plaque  mildly narrowing the proximal intracranial segment of the distal left  vertebral artery. The calcified plaques mild to moderately narrow the  proximal intracranial segment of the distal right vertebral artery. The  basilar artery is normal in appearance. The basilar tip is normal in  appearance. The posterior cerebral and superior cerebellar arteries are  normal in appearance. The upper cervical petrous, cavernous and  supracavernous segments of the internal carotid arteries are within  normal limits. There is a hypoplastic A1 segment of the left anterior  cerebral artery which is a normal anatomic variation. The anterior and  middle cerebral arteries are normal in appearance. The anterior  communicating artery origin, middle cerebral artery trifurcations are  normal in appearance.       Impression:       1. There is mild cervical spondylosis with mild left bony foraminal  narrowing at C4-5, mild canal moderate bilateral bony foraminal  narrowing at C5-6 and mild canal and moderate left bony foraminal  narrowing at C6-7.  2. There is mild small vessel disease in the  cerebral white matter.  3. Calcified plaques mildly narrow the left vertebral artery origin and  there is calcified plaque mildly narrowing the proximal intracranial  segment of the distal left vertebral artery and calcified plaques mild  to moderately narrow the proximal intracranial segment of the distal  right vertebral artery. No additional stenosis is seen in the great  vessels of the head and neck. Specifically, no stenosis is seen in the  internal carotid arteries using the NASCET criteria. The remainder of  the CT angiogram of the head and neck is normal           Radiation dose reduction techniques were utilized, including automated  exposure control and exposure modulation based on body size.     This report was finalized on 1/4/2019 8:53 AM by Dr. Justino Linares M.D.       CT Head Without Contrast [[id number]] Collected:  01/02/19 1419     Updated:  01/04/19 0559    Narrative:       EMERGENCY NONCONTRAST HEAD CT 01/02/2019     CLINICAL HISTORY: Syncopal episode last night and has confusion,  posterior head contusion     TECHNIQUE: Spiral CT images were obtained from the base of the skull to  the vertex without intravenous contrast and images were reformatted and  submitted in 3 mm thick axial CT sections with brain algorithm, 2 mm  thick axial CT sections with high-resolution bone algorithm and 2 mm  thick sagittal and coronal reconstructions were performed with brain  algorithm     COMPARISON: This is correlated to a noncontrast head CT from [facility] 11/04/2016.      FINDINGS: There is mild patchy low density in periventricular white  matter of the cerebral hemispheres consistent with mild small vessel  disease. The remainder of the brain parenchyma is normal in attenuation.  The ventricles are normal in size. I see no focal mass effect. There is  no midline shift. No extra-axial fluid collections are identified. There  is no evidence of acute intracranial hemorrhage. No acute skull  fracture  is identified. Paranasal sinuses are clear. There is a small amount of  fluid in the posterior right mastoid air cells. The left mastoid and  middle ear cavity are clear, calcified atherosclerotic plaques are  present in the intracranial segments of the distal vertebral arteries.       Impression:       1. There is mild small vessel disease in the cerebral white matter and  there are calcified atherosclerotic plaques in the intracranial segments  of the distal vertebral arteries and there is a tiny amount of fluid in  the posterior right mastoid air cells  2. There is a small scalp hematoma over the posterior parietal occipital  region from yesterday's head trauma. The remainder of the head CT is  normal. Specifically no acute skull fracture or intracranial hemorrhage  is seen.     Radiation dose reduction techniques were utilized, including automated  exposure control and exposure modulation based on body size.     This report was finalized on 1/4/2019 5:56 AM by Dr. Justino Linares M.D.       XR Chest 2 View [[id number]] Collected:  01/02/19 1340     Updated:  01/02/19 1344    Narrative:       XR CHEST 2 VW-     HISTORY: Male who is 63 years-old,  short of breath     TECHNIQUE: Frontal and lateral views of the chest     COMPARISON: None available     FINDINGS: Heart, mediastinum and pulmonary vasculature are unremarkable.  No focal pulmonary consolidation, pleural effusion, or pneumothorax. No  acute osseous process.       Impression:       No evidence for acute pulmonary process. Follow-up as  clinical indications persist.     This report was finalized on 1/2/2019 1:41 PM by Dr. Jarod Devries M.D.         No results found for: SITE, ALLENTEST, PHART, DNS7QRP, PO2ART, WOC0LGB, BASEEXCESS, T0NYRVCI, HGBBG, HCTABG, OXYHEMOGLOBI, METHHGBN, CARBOXYHGB, CO2CT, BAROMETRIC, MODALITY, FIO2       Discharge Medications      ASK your doctor about these medications      Instructions Start Date   acetaminophen 500  MG tablet  Commonly known as:  TYLENOL   1,000 mg, Oral, Every 6 Hours PRN      amLODIPine 5 MG tablet  Commonly known as:  NORVASC   5 mg, Oral, Daily      atorvastatin 20 MG tablet  Commonly known as:  LIPITOR   20 mg, Oral, Daily      clonazePAM 2 MG tablet  Commonly known as:  KlonoPIN   2 mg, Oral, Nightly      gabapentin 300 MG capsule  Commonly known as:  NEURONTIN   300 mg, Oral, 2 Times Daily      levothyroxine 75 MCG tablet  Commonly known as:  SYNTHROID, LEVOTHROID   75 mcg, Oral, Daily      lisinopril 10 MG tablet  Commonly known as:  PRINIVIL,ZESTRIL   10 mg, Oral, Daily      meloxicam 15 MG tablet  Commonly known as:  MOBIC   15 mg, Oral, Daily PRN, Take as directed with food.      SUMAtriptan 100 MG tablet  Commonly known as:  IMITREX   100 mg, Oral, Every 2 Hours PRN, Take one tablet at onset of headache. May repeat dose one time in 2 hours if headache not relieved.      traMADol 50 MG tablet  Commonly known as:  ULTRAM   50 mg, Oral, Every 6 Hours PRN      TRINTELLIX 20 MG tablet  Generic drug:  Vortioxetine HBr   20 mg, Oral, Daily               Patient Instructions:       Future Appointments   Date Time Provider Department Center   3/1/2019 10:00 AM Alla Beal MD [facility] None            Discharge Order (From admission, onward)    None          Discharge instructions:  Follow up with your primary care provider in 1-2 weeks with a cbc and cmp     F/u cardiology 2-3 weeks  Follow up Zio with pmd outpatient    Total time spent discharging patient including evaluation, post hospitalization follow up,  medication and post hospitalization instructions and education, total time exceeds 30 minutes.    Signed:  Esdras Jackson MD  1/4/2019  6:13 PM

## 2019-01-04 NOTE — PROGRESS NOTES
Echo is WNL.  Tele shows no major events.  BP is hypertensive.     Plan Zio at discharge.  Defer BP medications to primary team as EtOH withdrawal may be playing a role in current elevation, and his syncope may be due to low BP in the setting of EtOH intoxication.    Will see as needed.

## 2019-01-04 NOTE — CONSULTS
IDENTIFYING INFORMATION: The patient is a 63-year-old white male admitted after suffering a fall at home.    CHIEF COMPLAINT:  None given    INFORMANT:  Patient and chart    RELIABILITY:  Good    HISTORY OF PRESENT ILLNESS: The patient is a 63-year-old white male admitted on 1/2/2019 after he had suffered a fall at home.  The patient reports little recollection of this event.  He denies abuse of alcohol or other psychoactive substances related to this episode.  His blood alcohol admission was 0.  His drug screen was positive for methadone and amphetamines, though the patient denies abuse of these substances.  The patient is followed by Dr. Say Coats at [facility].  His current psychotropic medication regimen includes trintellix and recently ordered Wellbutrin, though the patient is not started this medication at this point.  Patient denies current suicidal or homicidal ideation.  He denies abuse of alcohol prior to his admission stating that he is maintaining his primary for several months.  He vehemently denies use of methadone or methamphetamine.  He denies suicidal homicidal ideation or any psychotic symptoms.  He is pleasant cooperative during today's interview.  The patient is employed as a  for [employer].  He works mainly from home.    PAST PSYCHIATRIC HISTORY: The patient for follow-up in Dr. Coats for several years.  He does have a history of alcohol dependence.    PAST MEDICAL HISTORY:  Significant for the recent syncopal episode, hyperlipidemia hypertension hypothyroidism    MEDICATIONS:   Medications Prior to Admission   Medication Sig Dispense Refill Last Dose   • amLODIPine (NORVASC) 5 MG tablet Take 5 mg by mouth Daily.   1/2/2019 at Unknown time   • atorvastatin (LIPITOR) 20 MG tablet Take 20 mg by mouth Daily.   1/2/2019 at Unknown time   • clonazePAM (KlonoPIN) 2 MG tablet Take 2 mg by mouth Every Night.   1/1/2019 at Unknown time   • gabapentin  (NEURONTIN) 300 MG capsule Take 300 mg by mouth 2 (Two) Times a Day.   1/1/2019 at Unknown time   • levothyroxine (SYNTHROID, LEVOTHROID) 75 MCG tablet Take 75 mcg by mouth Daily.   1/2/2019 at Unknown time   • lisinopril (PRINIVIL,ZESTRIL) 10 MG tablet Take 1 tablet by mouth Daily. 90 tablet 0 1/2/2019 at Unknown time   • meloxicam (MOBIC) 15 MG tablet Take 1 tablet by mouth Daily As Needed for Moderate Pain . Take as directed with food. 30 tablet 2 1/2/2019 at Unknown time   • traMADol (ULTRAM) 50 MG tablet Take 50 mg by mouth Every 6 (Six) Hours As Needed for Moderate Pain .   1/1/2019 at Unknown time   • Vortioxetine HBr (TRINTELLIX) 20 MG tablet Take 20 mg by mouth Daily.   1/1/2019 at Unknown time   • acetaminophen (TYLENOL) 500 MG tablet Take 1,000 mg by mouth Every 6 (Six) Hours As Needed for Mild Pain .   More than a month at Unknown time   • SUMAtriptan (IMITREX) 100 MG tablet Take one tablet at onset of headache. May repeat dose one time in 2 hours if headache not relieved. Take one tablet at onset of headache. May repeat dose one time in 2 hours if headache not relieved.   More than a month at Unknown time     Field    ALLERGIES:  Penicillin    FAMILY HISTORY:  Noncontributory    SOCIAL HISTORY: The patient lives alone.  He works from home as a  for [employer].  He has an extensive substance abuse history but denies current abuse of any psychoactive substances.    MENTAL STATUS EXAM: The patient is well-developed well-nourished white male appearing his stated age.  He is no apparent physical distress of family examination.  He is awake alert and oriented all spheres.  His mood is euthymic his affect full range.  Speech is relevant and coherent.  There are no deficits memory cognition noted.  Intelligence is judged to be in the average range based on fund of knowledge, the patient's cooperative throughout the interview.  He denies current suicidal homicidal ideation or psychotic features.   His judgment and insight are intact.  No signs of alcohol withdrawal are in evidence at this time.    ASSETS/LIABILITIES: High level of functioning, motivation for change    DIAGNOSTIC IMPRESSION: Major depressive disorder recurrent in partial remission, alcohol use disorder by history, recent syncopal episode, hyperlipidemia, hypertension, hypothyroidism    PLAN:  I had an opportunity to speak with Dr. Coats regarding the patient yesterday.  He does not recommend any changes in the patient's current psychotropic medication regimen.  I would suggest that the patient follow through the auspices of [facility] once medically stable.

## 2019-01-04 NOTE — PLAN OF CARE
Problem: Patient Care Overview  Goal: Plan of Care Review  Outcome: Ongoing (interventions implemented as appropriate)   01/04/19 0424   Coping/Psychosocial   Plan of Care Reviewed With patient   Plan of Care Review   Progress improving   OTHER   Outcome Summary No complaints voiced, NSR on monitor, to signs or symptoms of withdrawl, eyes closed at long interval, resting quietly     Goal: Individualization and Mutuality  Outcome: Ongoing (interventions implemented as appropriate)    Goal: Discharge Needs Assessment  Outcome: Ongoing (interventions implemented as appropriate)    Goal: Interprofessional Rounds/Family Conf  Outcome: Ongoing (interventions implemented as appropriate)      Problem: Pain, Acute (Adult)  Goal: Identify Related Risk Factors and Signs and Symptoms  Outcome: Ongoing (interventions implemented as appropriate)    Goal: Acceptable Pain Control/Comfort Level  Outcome: Ongoing (interventions implemented as appropriate)      Problem: Fall Risk (Adult)  Goal: Absence of Fall  Outcome: Ongoing (interventions implemented as appropriate)      Problem: Suicide Risk (Adult)  Goal: Strength-Based Wellness/Recovery  Outcome: Ongoing (interventions implemented as appropriate)    Goal: Physical Safety  Outcome: Ongoing (interventions implemented as appropriate)

## 2019-01-07 NOTE — PROGRESS NOTES
Case Management Discharge Note    Final Note: Pt discharged home on 1/4.   MARTI Ricks    Destination      No service has been selected for the patient.      Durable Medical Equipment      No service has been selected for the patient.      Dialysis/Infusion      No service has been selected for the patient.      Home Medical Care      No service has been selected for the patient.      Community Resources      No service has been selected for the patient.             Final Discharge Disposition Code: 01 - home or self-care

## 2019-02-01 RX ORDER — TRAMADOL HYDROCHLORIDE 50 MG/1
TABLET ORAL
Qty: 80 TABLET | Refills: 0 | Status: SHIPPED | OUTPATIENT
Start: 2019-02-01 | End: 2019-03-05 | Stop reason: SDUPTHER

## 2019-02-18 PROCEDURE — 0298T HOLTER MONITOR - 72 HOUR UP TO 21 DAY: CPT | Performed by: INTERNAL MEDICINE

## 2019-02-19 ENCOUNTER — TELEPHONE (OUTPATIENT)
Dept: CARDIOLOGY | Facility: CLINIC | Age: 64
End: 2019-02-19

## 2019-02-19 NOTE — TELEPHONE ENCOUNTER
"Dr. Haskins Pt:  Out of office: returning next wk.  [phone]    Please review results and contact pt.  Assessment, plan and results may below but are available.         Assessment/Plan:      1.  Syncope and collapse -- relatively abrupt LOC when turning his head or changing positions.  This is concerning for posterior circulatory disease; I'll check a CTA head/neck.  Obviously, with his history of peripheral neuropathy and ongoing EtOH, there are numerous other differential diagnoses, including seizures, alcoholic \"blackouts,\" autonomic neuropathy, and arrhythmias due to electrolyte abnormalities.  However, arrhythmias are lowest on my differential.  I'll check an echo as well.     2.  Abnormal EKG -- he has Q waves in V1-V4 and inferiorly.  Has he had previous infarcts?  I'll check an echocardiogarm.     3.   Alcoholism (CMS/HCC) -- he's tremulous on exam.     4.   Pancytopenia -- likely due to #3     5.   Peripheral neuropathy -- likely due to #3    RESULTS:   Study Description Monitor placed on patient by  on 1/4/2019 at 18:22 EST. The monitor was scanned on 2/13/2019. The patient was monitored for 14 days. Indications for this exam include syncope. Average HR: 73. Min HR: 50. Max HR: 176.   Study Findings No symptoms reported during the monitoring period. No complications noted. The predominant rhythm noted during the testing period was sinus rhythm. Premature atrial contractions occured rarely. There were 2 episodes of supraventricular tachycardia, the longest and fastest lasted 2 minutes with a rate of 176 bpm. Premature ventricular contractions occured rarely. Ventricular couplets, bigeminy and trigeminy. There were no episodes of ventricular tachycardia. Sinoatrial node conduction was normal. No atrioventricular block noted.   Study Impressions An abnormal monitor study.       "

## 2019-02-19 NOTE — TELEPHONE ENCOUNTER
I called and discussed the results with the patient.  He denies any symptoms while wearing ZIO to suggest symptomatic SVT.  I explained that the rhythm was benign and would not change his management at this time as long as he remained asymptomatic.  Will defer need for follow up to Dr. Haskins.

## 2019-03-04 RX ORDER — TRAMADOL HYDROCHLORIDE 50 MG/1
TABLET ORAL
Qty: 80 TABLET | Refills: 0 | Status: CANCELLED | OUTPATIENT
Start: 2019-03-04

## 2019-03-05 RX ORDER — TRAMADOL HYDROCHLORIDE 50 MG/1
50 TABLET ORAL EVERY 6 HOURS PRN
Qty: 80 TABLET | Refills: 0 | Status: SHIPPED | OUTPATIENT
Start: 2019-03-05 | End: 2019-04-16 | Stop reason: SDUPTHER

## 2019-03-06 NOTE — TELEPHONE ENCOUNTER
Please call the pt and let him know I sent the refill but he needs a follow up with me before the next refill is needed. Law with controlled substance prescriptions.  Thanks

## 2019-03-07 ENCOUNTER — OFFICE VISIT (OUTPATIENT)
Dept: FAMILY MEDICINE CLINIC | Facility: CLINIC | Age: 64
End: 2019-03-07

## 2019-03-07 VITALS
HEIGHT: 72 IN | BODY MASS INDEX: 26.19 KG/M2 | HEART RATE: 88 BPM | TEMPERATURE: 98.5 F | RESPIRATION RATE: 19 BRPM | WEIGHT: 193.4 LBS | SYSTOLIC BLOOD PRESSURE: 132 MMHG | OXYGEN SATURATION: 99 % | DIASTOLIC BLOOD PRESSURE: 78 MMHG

## 2019-03-07 DIAGNOSIS — D64.9 ANEMIA, UNSPECIFIED TYPE: ICD-10-CM

## 2019-03-07 DIAGNOSIS — F43.23 ADJUSTMENT REACTION WITH ANXIETY AND DEPRESSION: ICD-10-CM

## 2019-03-07 DIAGNOSIS — Z09 HOSPITAL DISCHARGE FOLLOW-UP: Primary | ICD-10-CM

## 2019-03-07 PROCEDURE — 99214 OFFICE O/P EST MOD 30 MIN: CPT | Performed by: FAMILY MEDICINE

## 2019-03-07 RX ORDER — BUPROPION HYDROCHLORIDE 150 MG/1
300 TABLET, EXTENDED RELEASE ORAL DAILY
Refills: 2 | COMMUNITY
Start: 2018-12-28 | End: 2020-02-11

## 2019-03-07 NOTE — PROGRESS NOTES
"Chief Complaint   Patient presents with   • Med Management     controlled rx needs contract    • Weight Loss     depression and severe loss       Subjective   Pro Mueller is a 64 y.o. male.     History of Present Illness   Depression and panic attacks  He is on meds from psych, follows with counselor once weekly. Started back in October. Trouble with sleep. Still having outbursts of crying. Did have positive screen for amphetamines and methadone. Said they talked about that in the hospital and he has no idea why the screen in positive for amphetamines. Wondering why he is having the black outs. He had neuro and cardiac work up in the hospital. Echo, ECG, holter monitor at d/c, EEG, CXR, CT angio head and neck. His Blood alcohol lab in the hospital was negative.    Had 3 black outs prior to hospitalization.none since he has been home.   Drinking 2 martinis per night or one beer. Says he is drinking a lot less than he had been.    The following portions of the patient's history were reviewed and updated as appropriate: allergies, current medications, past medical history, past social history and problem list.    Review of Systems   Constitutional: Positive for appetite change and unexpected weight change.   Respiratory: Negative.    Cardiovascular: Negative.    Neurological: Positive for light-headedness.   Psychiatric/Behavioral: The patient is nervous/anxious.        Vitals:    03/07/19 1031   BP: 132/78   BP Location: Left arm   Patient Position: Sitting   Cuff Size: Adult   Pulse: 88   Resp: 19   Temp: 98.5 °F (36.9 °C)   TempSrc: Oral   SpO2: 99%   Weight: 87.7 kg (193 lb 6.4 oz)   Height: 182.9 cm (72\")       Objective   Physical Exam   Constitutional: He is oriented to person, place, and time. He appears well-nourished. No distress.   Eyes: Conjunctivae are normal. Right eye exhibits no discharge. Left eye exhibits no discharge. No scleral icterus.   Cardiovascular: Normal rate, regular rhythm, normal heart " sounds and intact distal pulses. Exam reveals no gallop and no friction rub.   No murmur heard.  Pulmonary/Chest: Effort normal and breath sounds normal. No respiratory distress. He has no wheezes.   Musculoskeletal: He exhibits no edema.   Neurological: He is alert and oriented to person, place, and time.   Psychiatric: He has a normal mood and affect. His behavior is normal.   Vitals reviewed.      Assessment/Plan   Pro was seen today for med management and weight loss.    Diagnoses and all orders for this visit:    Hospital discharge follow-up  Reviewed admission. Had overall normal cardiac and neuro work up. Did have anemia and we will follow up. To avoid alcohol, he is on benzo and opioid, reports using as prescribed. He should stay well hydrated. Work on sleep by regular bedtime. Continue counseling.  Anemia, unspecified type  -     CBC & Differential    Adjustment reaction with anxiety and depression  Continues to have a very hard time related to his recent divorce, effect financial stability. He has the expectation to return to work toward the end of the month. We discussed returning part time related to the mental demands he will have at work and his current depression and distractibility with this. He will discuss with his boss, does not really want to go part time related to his financial situation. He said he has gone through this emotional and mental state previously with another divorce and what helped is at the time he had a new job opportunity, less stress with it and move to different state so he did that and did much better after moving.

## 2019-03-29 RX ORDER — ACYCLOVIR 800 MG/1
TABLET ORAL
Qty: 90 TABLET | Refills: 0 | Status: SHIPPED | OUTPATIENT
Start: 2019-03-29 | End: 2019-09-22 | Stop reason: SDUPTHER

## 2019-04-18 RX ORDER — TRAMADOL HYDROCHLORIDE 50 MG/1
TABLET ORAL
Qty: 80 TABLET | Refills: 0 | Status: SHIPPED | OUTPATIENT
Start: 2019-04-18 | End: 2019-06-03 | Stop reason: SDUPTHER

## 2019-05-20 NOTE — PROGRESS NOTES
"CC: Follow-up for peripheral neuropathy  Pt seen in follow up today, however the problem is new to the examiner.      HPI:  Pro Mueller is a  64 y.o.  right-handed male with PMH of HTN, HLP, kidney stone, migraine, daily alcohol use/abuse, syncopal spells, anxiety/depression, and arthritis who is being seen in follow-up for peripheral neuropathy.  The patient was evaluated by Dr. Langston on 12/18/2018 after Dr. Langston did an EMG and NCS of both legs. The left peroneal sensory and motor amplitudes were mildly low without other clear evidence of a more diffuse peripheral neuropathy.  Patient described numbness, tingling, burning, and occasional shooting pains in the soles of his feet that started mid to late 2018.  Symptoms are made worse by weightbearing.  The patient does not have any symptoms in his hands.  He does not have back pain.    LDL was 168, hemoglobin A1c was 5.33% in November 2018.  In September 2018 his vitamins with B12 level was 588, his TSH level was 1.06, HIV screen was nonreactive. Additional labs for treatable causes of peripheral neuropathy were ordered by Dr. Langston but not completed.  Patient was started on gabapentin 300 mg nightly which was escalated up to 3 times daily.  The patient states that her symptoms have not progressed since he docked saw Dr. Langston.  He has improvement in his painful symptoms and denies any side effects related to the gabapentin.    The patient was seen by Dr. Bob Gracia in the hospital on January 3 of 2019 when the patient presented with syncope.  Patient felt lightheaded, hot, and sweaty like he was going to pass out then the patient woke up in a \"pool of blood.\"  Patient denies any preceding symptoms to me.  He denies urine or fecal incontinence.  He denies tongue bite.  He was felt to have had a concussion with scalp hematoma but CT of the brain was unremarkable.  CTA head and neck 1/3/2019 showed calcified plaques mildly narrowing the left vertebral artery and " "and mild to moderately narrowing the right vertebral artery.  There was no witnessed seizure activity. EEG was normal in awake and light sleep states.  The patient was seen by cardiology and echocardiogram was unremarkable.  Outpatient Zile patch ordered by cardiology showed normal sinus rhythm with rare PACs, 2 episodes of SVT, and rare PVCs.  Patient tested positive for methadone, amphetamines/methamphetamines, as well as benzodiazepines.  The patient is not prescribed methadone or amphetamines however per an [employer] article Wellbutrin can cause a false positive for amphetamine. He denies illicit drug use.  He states he drinks approximately 2 cocktails a day.    The patient states he has had significant depression and anxiety since his wife left him approximately a year ago.  He has lost 60 pounds since then and he states his primary care physician is aware.  He is under the care of psychiatry and a therapist.  His psychiatrist recently decreased his Wellbutrin from 450 mg daily to 300 mg daily.  The patient notes frequent difficulty swallowing larger pills causing him to gag.  He attributes this to \"nerves.\"      Luis report from May 2018 to May 2019 was reviewed.  He has multiple controlled substances prescribed including Xanax, tramadol, chlordiazepoxide, hydrocodone, and clonazepam in addition to the gabapentin prescribed by Dr. Langston.        Past Medical History:   Diagnosis Date   • Alcohol abuse    • Anxiety    • Arthritis    • Atopic rhinitis 8/8/2016   • Depression    • Encounter for removal of sutures    • Genital herpes simplex 8/8/2016   • Headache, tension-type    • Hyperlipidemia    • Hypertension    • Kidney stone    • Migraine    • Motion sickness    • Olecranon bursitis, right elbow    • Panic disorder without agoraphobia 8/8/2016   • Peripheral neuropathy    • Vitamin D deficiency 8/8/2016   • Withdrawal symptoms, alcohol (CMS/[facility])          Past Surgical History:   Procedure Laterality Date   • " CYST REMOVAL     • EXTRACORPOREAL SHOCK WAVE LITHOTRIPSY (ESWL) Right 2002   • TONSILLECTOMY             Current Outpatient Medications:   •  acyclovir (ZOVIRAX) 800 MG tablet, TAKE 1 TABLET BY MOUTH THREE TIMES DAILY, Disp: 90 tablet, Rfl: 0  •  amLODIPine (NORVASC) 5 MG tablet, Take 5 mg by mouth Daily., Disp: , Rfl:   •  atorvastatin (LIPITOR) 20 MG tablet, Take 20 mg by mouth Daily., Disp: , Rfl:   •  buPROPion SR (WELLBUTRIN SR) 150 MG 12 hr tablet, Take 300 mg by mouth Daily., Disp: , Rfl: 2  •  clonazePAM (KlonoPIN) 2 MG tablet, Take 2 mg by mouth Every Night., Disp: , Rfl:   •  gabapentin (NEURONTIN) 300 MG capsule, Take 300 mg by mouth 3 (Three) Times a Day., Disp: , Rfl:   •  levothyroxine (SYNTHROID, LEVOTHROID) 75 MCG tablet, Take 75 mcg by mouth Daily., Disp: , Rfl:   •  lisinopril (PRINIVIL,ZESTRIL) 10 MG tablet, Take 1 tablet by mouth Daily., Disp: 90 tablet, Rfl: 0  •  meloxicam (MOBIC) 15 MG tablet, Take 1 tablet by mouth Daily As Needed for Moderate Pain . Take as directed with food., Disp: 30 tablet, Rfl: 2  •  SUMAtriptan (IMITREX) 100 MG tablet, Take one tablet at onset of headache. May repeat dose one time in 2 hours if headache not relieved. Take one tablet at onset of headache. May repeat dose one time in 2 hours if headache not relieved., Disp: , Rfl:   •  traMADol (ULTRAM) 50 MG tablet, TAKE 1 TABLET BY MOUTH EVERY 6 HOURS AS NEEDED FOR MODERATE PAIN, Disp: 80 tablet, Rfl: 0  •  Vortioxetine HBr (TRINTELLIX) 20 MG tablet, Take 20 mg by mouth Daily., Disp: , Rfl:       Family History   Problem Relation Age of Onset   • Alzheimer's disease Mother    • Pancreatic cancer Father          Social History     Socioeconomic History   • Marital status:      Spouse name: Not on file   • Number of children: Not on file   • Years of education: Not on file   • Highest education level: Not on file   Tobacco Use   • Smoking status: Former Smoker     Packs/day: 0.50     Years: 25.00     Pack years:  12.50   • Smokeless tobacco: Never Used   Substance and Sexual Activity   • Alcohol use: Yes     Alcohol/week: 3.6 oz     Types: 6 Shots of liquor per week     Comment: several night weekly, few drinks per occasion   • Drug use: No   • Sexual activity: Yes     Partners: Female         Allergies   Allergen Reactions   • Penicillins          ROS:  Review of Systems   Constitutional: Positive for unexpected weight change. Negative for activity change and appetite change.   HENT: Negative for facial swelling, trouble swallowing and voice change.    Eyes: Negative for photophobia, pain and visual disturbance.   Respiratory: Negative for chest tightness, shortness of breath and wheezing.    Cardiovascular: Negative for chest pain, palpitations and leg swelling.   Gastrointestinal: Negative for abdominal pain, nausea and vomiting.   Endocrine: Negative for cold intolerance and heat intolerance.   Musculoskeletal: Positive for arthralgias (arthritis knees and shoulders). Negative for back pain, gait problem, joint swelling, myalgias, neck pain and neck stiffness.   Neurological: Positive for dizziness and light-headedness. Negative for tremors, seizures, syncope, facial asymmetry, speech difficulty, weakness, numbness (feet) and headaches.   Hematological: Does not bruise/bleed easily.   Psychiatric/Behavioral: Positive for decreased concentration (depression related) and sleep disturbance. Negative for agitation, behavioral problems, confusion, dysphoric mood, hallucinations, self-injury and suicidal ideas. The patient is nervous/anxious. The patient is not hyperactive.          I have review and agree with the above ROS completed by the medical assistant.    Physical Exam:  Vitals:    05/23/19 1455   BP: 156/98   BP Location: Left arm   Patient Position: Sitting   Cuff Size: Adult   Pulse: 90   SpO2: 95%   Weight: 86.6 kg (191 lb)       Body mass index is 25.9 kg/m².    General appearance: Well developed, well nourished,  well groomed, alert and cooperative. Appears anxious.  HEENT: Normocephalic.   Neck and spine: Normal range of motion. Normal alignment. No mass or tenderness.    Cardiac: Regular rate and rhythm. No murmurs.   Peripheral Vasculature: Peripheral pulses are equal and symmetric.  Chest Exam: Clear to auscultation bilaterally, no wheezes, no rhonchi.  Extremities: Normal, no edema.   Skin: No rashes or birthmarks.     Higher integrative function: Oriented to time, place, person, intact recent and remote memory, attention span, concentration and language. Spontaneous speech, fund of vocabulary are normal.   CN II: Normal visual fields.   CN III IV VI: Extraocular movements are full without nystagmus. Pupils are equal, round, and reactive to light. No relative afferent pupillary defect. No internuclear ophthalmoplegia.   CN V: Normal facial sensation and strength of muscles of mastication.   CN VII: Facial movements are symmetric, no weakness.   CN VIII: Auditory acuity is normal.   CN IX & X: Symmetric palatal movement.   CN XI: Sternocleidomastoid and trapezius are normal. No weakness.   CN XII: The tongue is midline. No atrophy or fasciculations.   Motor: Normal muscle strength, bulk, and tone in upper and lower extremities. Fine postural/action tremor.  Sensation: Normal light touch and pinprick and hands and feet.  Station and gait: Normal gait and station.   Muscle stretch reflexes: Reflexes are 1+.  Coordination: Finger to nose test showed no dysmetria. Rapid alternating movements were normal. Heel to shin normal.       Results:      Lab Results   Component Value Date    GLUCOSE 99 01/04/2019    BUN 11 01/04/2019    CREATININE 0.66 (L) 01/04/2019    EGFRIFNONA 123 01/04/2019    EGFRIFAFRI 121 11/26/2018    BCR 16.7 01/04/2019    CO2 23.5 01/04/2019    CALCIUM 9.0 01/04/2019    PROTENTOTREF 7.6 11/26/2018    ALBUMIN 4.90 01/02/2019    LABIL2 1.6 11/26/2018    AST 86 (H) 01/02/2019    ALT 52 (H) 01/02/2019       Lab  Results   Component Value Date    WBC 4.11 (L) 01/04/2019    HGB 12.4 (L) 01/04/2019    HCT 37.3 (L) 01/04/2019    MCV 95.2 01/04/2019     (L) 01/04/2019         .No results found for: RPR      Lab Results   Component Value Date    TSH 7.360 (H) 01/02/2019         Lab Results   Component Value Date    QKRZLJXM50 588 09/25/2018         No results found for: FOLATE      Lab Results   Component Value Date    HGBA1C 5.33 11/26/2018       EMERGENCY NONCONTRAST HEAD CT 01/02/2019 CT head images viewed by me, no acute findings in the brain.     CLINICAL HISTORY: Syncopal episode last night and has confusion,  posterior head contusion     TECHNIQUE: Spiral CT images were obtained from the base of the skull to  the vertex without intravenous contrast and images were reformatted and  submitted in 3 mm thick axial CT sections with brain algorithm, 2 mm  thick axial CT sections with high-resolution bone algorithm and 2 mm  thick sagittal and coronal reconstructions were performed with brain  algorithm     COMPARISON: This is correlated to a noncontrast head CT from [facility] 11/04/2016.      FINDINGS: There is mild patchy low density in periventricular white  matter of the cerebral hemispheres consistent with mild small vessel  disease. The remainder of the brain parenchyma is normal in attenuation.  The ventricles are normal in size. I see no focal mass effect. There is  no midline shift. No extra-axial fluid collections are identified. There  is no evidence of acute intracranial hemorrhage. No acute skull fracture  is identified. Paranasal sinuses are clear. There is a small amount of  fluid in the posterior right mastoid air cells. The left mastoid and  middle ear cavity are clear, calcified atherosclerotic plaques are  present in the intracranial segments of the distal vertebral arteries.     IMPRESSION:  1. There is mild small vessel disease in the cerebral white matter and  there are calcified  atherosclerotic plaques in the intracranial segments  of the distal vertebral arteries and there is a tiny amount of fluid in  the posterior right mastoid air cells  2. There is a small scalp hematoma over the posterior parietal occipital  region from yesterday's head trauma. The remainder of the head CT is  normal. Specifically no acute skull fracture or intracranial hemorrhage  is seen.     Radiation dose reduction techniques were utilized, including automated  exposure control and exposure modulation based on body size.     This report was finalized on 1/4/2019 5:56 AM by Dr. Justino Linares M.D.     CONTRAST-ENHANCED CT ANGIOGRAM OF THE HEAD AND NECK 01/03/2019.      CLINICAL HISTORY: Syncope last night, confusion, patient had a posterior  head contusion.     TECHNIQUE: Spiral CT images were obtained from the base of the skull to  the vertex both pre and post intravenous contrast and these images were  reformatted and submitted in 3 mm thick axial CT sections with brain  algorithm, additional spiral CT images were obtained from the top of the  aortic arch up through the great vessels of the head and neck during the  arterial phase of contrast and images were reformatted and submitted in  1 mm thick axial, sagittal and coronal CT sections and additional 3-D  reconstructions were performed to complete the CT angiogram of the head  and neck.      COMPARISON: This is correlated to yesterday's noncontrast head CT,  01/02/2019 at 1:25 PM.     FINDINGS: There is some mild patchy low density in the periventricular  white matter of the cerebral hemispheres most consistent with mild small  vessel disease. The remainder of the brain parenchyma is normal in  attenuation. The ventricles are normal in size. I see no focal mass  effect and no midline shift and no extra-axial fluid collections are  identified. There is no evidence of acute intracranial hemorrhage. There  are calcified atherosclerotic plaques and intracranial  segments of  distal vertebral arteries. Paranasal sinuses and mastoid air cells and  middle ear cavities are clear. The nasopharynx, oropharynx, hypopharynx,  true cords and subglottic airway are normal in appearance. The thyroid  gland is unremarkable. The lung apices are clear. The parotid,  , parapharyngeal and submandibular spaces are symmetric and  are normal in appearance. There is mild cervical spondylosis with mild  canal and left foraminal narrowing at C4-5 and there is posterior  spurring and uncovertebral joint hypertrophy contributing to mild canal  and moderate bilateral bony foraminal narrowing at C5-6, mild canal and  moderate left foraminal narrowing at C6-7.     CT angiogram images through the neck demonstrates common origin left  common carotid artery and brachiocephalic artery off the aortic arch  constituting a bovine configuration of the aortic arch which is a normal  anatomic variation. The left subclavian artery origin is normal in  appearance and no stenosis is seen in the left subclavian artery. There  is calcified atherosclerotic plaque that mildly narrows the left  vertebral artery origin. The remainder of the left vertebral artery is  widely patent without stenosis in its cervical course. There is  calcified plaque mildly narrowing the proximal intracranial segment of  the distal left vertebral artery. The left common carotid origin is  normal in appearance and no stenosis is seen in the left common carotid  artery to the level of the left common carotid bifurcation where there  is calcified plaque involving the posterior wall of the left common  carotid bifurcation minimally narrowing the bifurcation. There is no  stenosis in the left internal carotid artery using the NASCET criteria.  The brachycephalic artery origin is normal in appearance and no stenosis  seen in the brachiocephalic artery and its bifurcation in the right  common carotid and subclavian arteries normal in  appearance. No stenosis  is seen in the right subclavian artery. The right vertebral artery  origin is normal in appearance and no stenosis seen in the right  vertebral artery from its origin to its intracranial segment and there  are calcified atherosclerotic plaques at least mildly narrowing the  intracranial segment of the distal right vertebral artery. The right  common carotid origin is normal in appearance and no stenosis seen in  the right common carotid artery, its bifurcation in the right  internal/external carotid arteries normal in appearance. No stenosis  seen in the right internal carotid artery using the NASCET criteria. CT  angiogram images through the head again demonstrate calcified plaque  mildly narrowing the proximal intracranial segment of the distal left  vertebral artery. The calcified plaques mild to moderately narrow the  proximal intracranial segment of the distal right vertebral artery. The  basilar artery is normal in appearance. The basilar tip is normal in  appearance. The posterior cerebral and superior cerebellar arteries are  normal in appearance. The upper cervical petrous, cavernous and  supracavernous segments of the internal carotid arteries are within  normal limits. There is a hypoplastic A1 segment of the left anterior  cerebral artery which is a normal anatomic variation. The anterior and  middle cerebral arteries are normal in appearance. The anterior  communicating artery origin, middle cerebral artery trifurcations are  normal in appearance.     IMPRESSION:  1. There is mild cervical spondylosis with mild left bony foraminal  narrowing at C4-5, mild canal moderate bilateral bony foraminal  narrowing at C5-6 and mild canal and moderate left bony foraminal  narrowing at C6-7.  2. There is mild small vessel disease in the cerebral white matter.  3. Calcified plaques mildly narrow the left vertebral artery origin and  there is calcified plaque mildly narrowing the proximal  intracranial  segment of the distal left vertebral artery and calcified plaques mild  to moderately narrow the proximal intracranial segment of the distal  right vertebral artery. No additional stenosis is seen in the great  vessels of the head and neck. Specifically, no stenosis is seen in the  internal carotid arteries using the NASCET criteria. The remainder of  the CT angiogram of the head and neck is normal           Radiation dose reduction techniques were utilized, including automated  exposure control and exposure modulation based on body size.     This report was finalized on 1/4/2019 8:53 AM by Dr. Justino Linares M.D.       EEG REPORT 1-3-19    Indication: Seizures, syncope    Duration: 30 minutes and 53 seconds    Findings: Initially the patient is awake and symmetric beta activity   predominates the background activity.  Initially some 9 cps activity is   noted posteriorly.  Amplitudes are symmetric.  No focal slowing is noted.    Eye movement and muscle artifact are noted.  Hyperventilation was   performed for 3 minutes produced no abnormality.  Photic stimulation   evoked no abnormality.  The patient was and light sleep briefly as well.    Impression: Normal EEG during awake and light sleep states.    Thank you for sending this patient for further evaluation.  Kristian Wu M.D.              Assessment:   Mr Mueller was seen today for peripheral neuropathy. Symptoms are stable and the gabapentin 300 mg TID is helping without side effects.          Plan:  Finish lab work for evaluation of treatable causes of neuropathy. If no cause identified his daily alcohol use may be responsible.  Will refill gabapentin when due.  Patient instructed to notify me if any further episodes of loss of consciousness.  F/U in 6 months or sooner if needed.                          Dictated utilizing Dragon dictation.

## 2019-05-23 ENCOUNTER — LAB (OUTPATIENT)
Dept: LAB | Facility: HOSPITAL | Age: 64
End: 2019-05-23

## 2019-05-23 ENCOUNTER — OFFICE VISIT (OUTPATIENT)
Dept: NEUROLOGY | Facility: CLINIC | Age: 64
End: 2019-05-23

## 2019-05-23 VITALS
HEART RATE: 90 BPM | WEIGHT: 191 LBS | SYSTOLIC BLOOD PRESSURE: 156 MMHG | DIASTOLIC BLOOD PRESSURE: 98 MMHG | OXYGEN SATURATION: 95 % | BODY MASS INDEX: 25.9 KG/M2

## 2019-05-23 DIAGNOSIS — G57.93 NEUROPATHY INVOLVING BOTH LOWER EXTREMITIES: Primary | ICD-10-CM

## 2019-05-23 DIAGNOSIS — G60.9 IDIOPATHIC PERIPHERAL NEUROPATHY: ICD-10-CM

## 2019-05-23 LAB — ERYTHROCYTE [SEDIMENTATION RATE] IN BLOOD: 6 MM/HR (ref 0–20)

## 2019-05-23 PROCEDURE — 83825 ASSAY OF MERCURY: CPT | Performed by: PSYCHIATRY & NEUROLOGY

## 2019-05-23 PROCEDURE — 85652 RBC SED RATE AUTOMATED: CPT | Performed by: PSYCHIATRY & NEUROLOGY

## 2019-05-23 PROCEDURE — 36415 COLL VENOUS BLD VENIPUNCTURE: CPT | Performed by: PSYCHIATRY & NEUROLOGY

## 2019-05-23 PROCEDURE — 82784 ASSAY IGA/IGD/IGG/IGM EACH: CPT

## 2019-05-23 PROCEDURE — 83655 ASSAY OF LEAD: CPT | Performed by: PSYCHIATRY & NEUROLOGY

## 2019-05-23 PROCEDURE — 82175 ASSAY OF ARSENIC: CPT | Performed by: PSYCHIATRY & NEUROLOGY

## 2019-05-23 PROCEDURE — 99215 OFFICE O/P EST HI 40 MIN: CPT | Performed by: NURSE PRACTITIONER

## 2019-05-23 PROCEDURE — 82525 ASSAY OF COPPER: CPT

## 2019-05-23 PROCEDURE — 82595 ASSAY OF CRYOGLOBULIN: CPT

## 2019-05-23 PROCEDURE — 86334 IMMUNOFIX E-PHORESIS SERUM: CPT

## 2019-05-24 LAB
IGA SERPL-MCNC: 148 MG/DL (ref 61–437)
IGG SERPL-MCNC: 694 MG/DL (ref 700–1600)
IGM SERPL-MCNC: 111 MG/DL (ref 20–172)
PROT PATTERN SERPL IFE-IMP: ABNORMAL

## 2019-05-25 LAB — COPPER SERPL-MCNC: 105 UG/DL (ref 72–166)

## 2019-05-25 PROCEDURE — 36415 COLL VENOUS BLD VENIPUNCTURE: CPT | Performed by: PSYCHIATRY & NEUROLOGY

## 2019-05-25 PROCEDURE — 86592 SYPHILIS TEST NON-TREP QUAL: CPT | Performed by: PSYCHIATRY & NEUROLOGY

## 2019-05-26 LAB — RPR SER QL: NORMAL

## 2019-05-28 LAB — CRYOGLOB SER QL 1D COLD INC: NORMAL

## 2019-05-29 LAB
ARSENIC BLD-MCNC: 8 UG/L (ref 2–23)
LEAD BLD-MCNC: NORMAL UG/DL (ref 0–4)
MERCURY BLD-MCNC: 2.7 UG/L (ref 0–14.9)

## 2019-06-04 RX ORDER — TRAMADOL HYDROCHLORIDE 50 MG/1
TABLET ORAL
Qty: 80 TABLET | Refills: 0 | Status: SHIPPED | OUTPATIENT
Start: 2019-06-04 | End: 2019-07-11 | Stop reason: SDUPTHER

## 2019-06-27 RX ORDER — MELOXICAM 15 MG/1
TABLET ORAL
Qty: 30 TABLET | Refills: 0 | Status: SHIPPED | OUTPATIENT
Start: 2019-06-27 | End: 2019-07-27 | Stop reason: SDUPTHER

## 2019-07-03 ENCOUNTER — TELEPHONE (OUTPATIENT)
Dept: FAMILY MEDICINE CLINIC | Facility: CLINIC | Age: 64
End: 2019-07-03

## 2019-07-03 RX ORDER — SILDENAFIL 25 MG/1
25 TABLET, FILM COATED ORAL DAILY PRN
Qty: 10 TABLET | Refills: 0 | Status: SHIPPED | OUTPATIENT
Start: 2019-07-03 | End: 2019-08-22 | Stop reason: SDUPTHER

## 2019-07-03 NOTE — TELEPHONE ENCOUNTER
Pt called office today r/t wanting an order for  Viagra. Advised this patient lian out of office and would send it to her partner and asked if she would look at his chart and advise on this medication. This nurse advised patient that this may not be dealt with until Lian returns. Pt voice his opinion on this matter and I advised him situation was not emergent.

## 2019-07-12 RX ORDER — TRAMADOL HYDROCHLORIDE 50 MG/1
TABLET ORAL
Qty: 80 TABLET | Refills: 0 | Status: SHIPPED | OUTPATIENT
Start: 2019-07-12 | End: 2019-08-20 | Stop reason: SDUPTHER

## 2019-07-20 ENCOUNTER — HOSPITAL ENCOUNTER (EMERGENCY)
Facility: HOSPITAL | Age: 64
Discharge: HOME | End: 2019-07-20
Attending: EMERGENCY MEDICINE | Admitting: EMERGENCY MEDICINE

## 2019-07-20 ENCOUNTER — APPOINTMENT (OUTPATIENT)
Dept: CT IMAGING | Facility: HOSPITAL | Age: 64
End: 2019-07-20

## 2019-07-20 VITALS
HEART RATE: 85 BPM | OXYGEN SATURATION: 93 % | WEIGHT: 195 LBS | DIASTOLIC BLOOD PRESSURE: 64 MMHG | TEMPERATURE: 96.9 F | BODY MASS INDEX: 26.41 KG/M2 | HEIGHT: 72 IN | SYSTOLIC BLOOD PRESSURE: 98 MMHG | RESPIRATION RATE: 17 BRPM

## 2019-07-20 DIAGNOSIS — R51.9 HEADACHE AROUND THE EYES: Primary | ICD-10-CM

## 2019-07-20 LAB
ALBUMIN SERPL-MCNC: 5 G/DL (ref 3.5–5.2)
ALBUMIN/GLOB SERPL: 1.9 G/DL
ALP SERPL-CCNC: 83 U/L (ref 39–117)
ALT SERPL W P-5'-P-CCNC: 39 U/L (ref 1–41)
ANION GAP SERPL CALCULATED.3IONS-SCNC: 32.4 MMOL/L (ref 5–15)
AST SERPL-CCNC: 75 U/L (ref 1–40)
BASOPHILS # BLD AUTO: 0.03 10*3/MM3 (ref 0–0.2)
BASOPHILS NFR BLD AUTO: 0.5 % (ref 0–1.5)
BILIRUB SERPL-MCNC: 1 MG/DL (ref 0.2–1.2)
BUN BLD-MCNC: 16 MG/DL (ref 8–23)
BUN/CREAT SERPL: 17.6 (ref 7–25)
CALCIUM SPEC-SCNC: 8.8 MG/DL (ref 8.6–10.5)
CHLORIDE SERPL-SCNC: 91 MMOL/L (ref 98–107)
CO2 SERPL-SCNC: 14.6 MMOL/L (ref 22–29)
CREAT BLD-MCNC: 0.91 MG/DL (ref 0.76–1.27)
DEPRECATED RDW RBC AUTO: 49 FL (ref 37–54)
EOSINOPHIL # BLD AUTO: 0.02 10*3/MM3 (ref 0–0.4)
EOSINOPHIL NFR BLD AUTO: 0.3 % (ref 0.3–6.2)
ERYTHROCYTE [DISTWIDTH] IN BLOOD BY AUTOMATED COUNT: 13.3 % (ref 12.3–15.4)
ETHANOL BLD-MCNC: 88 MG/DL (ref 0–10)
ETHANOL UR QL: 0.09 %
GFR SERPL CREATININE-BSD FRML MDRD: 84 ML/MIN/1.73
GLOBULIN UR ELPH-MCNC: 2.7 GM/DL
GLUCOSE BLD-MCNC: 80 MG/DL (ref 65–99)
HCT VFR BLD AUTO: 47.3 % (ref 37.5–51)
HGB BLD-MCNC: 15.9 G/DL (ref 13–17.7)
IMM GRANULOCYTES # BLD AUTO: 0.01 10*3/MM3 (ref 0–0.05)
IMM GRANULOCYTES NFR BLD AUTO: 0.2 % (ref 0–0.5)
LYMPHOCYTES # BLD AUTO: 1.51 10*3/MM3 (ref 0.7–3.1)
LYMPHOCYTES NFR BLD AUTO: 24.2 % (ref 19.6–45.3)
MCH RBC QN AUTO: 33.1 PG (ref 26.6–33)
MCHC RBC AUTO-ENTMCNC: 33.6 G/DL (ref 31.5–35.7)
MCV RBC AUTO: 98.5 FL (ref 79–97)
MONOCYTES # BLD AUTO: 0.46 10*3/MM3 (ref 0.1–0.9)
MONOCYTES NFR BLD AUTO: 7.4 % (ref 5–12)
NEUTROPHILS # BLD AUTO: 4.22 10*3/MM3 (ref 1.7–7)
NEUTROPHILS NFR BLD AUTO: 67.4 % (ref 42.7–76)
NRBC BLD AUTO-RTO: 0 /100 WBC (ref 0–0.2)
PLATELET # BLD AUTO: 205 10*3/MM3 (ref 140–450)
PMV BLD AUTO: 9.4 FL (ref 6–12)
POTASSIUM BLD-SCNC: 4.1 MMOL/L (ref 3.5–5.2)
PROT SERPL-MCNC: 7.7 G/DL (ref 6–8.5)
RBC # BLD AUTO: 4.8 10*6/MM3 (ref 4.14–5.8)
SODIUM BLD-SCNC: 138 MMOL/L (ref 136–145)
WBC NRBC COR # BLD: 6.25 10*3/MM3 (ref 3.4–10.8)

## 2019-07-20 PROCEDURE — 70450 CT HEAD/BRAIN W/O DYE: CPT

## 2019-07-20 PROCEDURE — 85025 COMPLETE CBC W/AUTO DIFF WBC: CPT | Performed by: EMERGENCY MEDICINE

## 2019-07-20 PROCEDURE — 99283 EMERGENCY DEPT VISIT LOW MDM: CPT

## 2019-07-20 PROCEDURE — 25010000002 PROCHLORPERAZINE EDISYLATE PER 10 MG: Performed by: EMERGENCY MEDICINE

## 2019-07-20 PROCEDURE — 25010000002 KETOROLAC TROMETHAMINE PER 15 MG: Performed by: EMERGENCY MEDICINE

## 2019-07-20 PROCEDURE — 96374 THER/PROPH/DIAG INJ IV PUSH: CPT

## 2019-07-20 PROCEDURE — 80307 DRUG TEST PRSMV CHEM ANLYZR: CPT | Performed by: EMERGENCY MEDICINE

## 2019-07-20 PROCEDURE — 96375 TX/PRO/DX INJ NEW DRUG ADDON: CPT

## 2019-07-20 PROCEDURE — 80053 COMPREHEN METABOLIC PANEL: CPT | Performed by: EMERGENCY MEDICINE

## 2019-07-20 PROCEDURE — 25010000002 LORAZEPAM PER 2 MG: Performed by: EMERGENCY MEDICINE

## 2019-07-20 RX ORDER — KETOROLAC TROMETHAMINE 30 MG/ML
30 INJECTION, SOLUTION INTRAMUSCULAR; INTRAVENOUS ONCE
Status: COMPLETED | OUTPATIENT
Start: 2019-07-20 | End: 2019-07-20

## 2019-07-20 RX ORDER — ONDANSETRON 8 MG/1
8 TABLET, ORALLY DISINTEGRATING ORAL EVERY 8 HOURS PRN
Qty: 10 TABLET | Refills: 0 | Status: SHIPPED | OUTPATIENT
Start: 2019-07-20 | End: 2019-08-20 | Stop reason: SDUPTHER

## 2019-07-20 RX ORDER — LORAZEPAM 2 MG/ML
1 INJECTION INTRAMUSCULAR ONCE
Status: COMPLETED | OUTPATIENT
Start: 2019-07-20 | End: 2019-07-20

## 2019-07-20 RX ORDER — PROCHLORPERAZINE EDISYLATE 5 MG/ML
10 INJECTION INTRAMUSCULAR; INTRAVENOUS EVERY 6 HOURS PRN
Status: DISCONTINUED | OUTPATIENT
Start: 2019-07-20 | End: 2019-07-20 | Stop reason: HOSPADM

## 2019-07-20 RX ADMIN — PROCHLORPERAZINE EDISYLATE 10 MG: 5 INJECTION INTRAMUSCULAR; INTRAVENOUS at 10:55

## 2019-07-20 RX ADMIN — LORAZEPAM 1 MG: 2 INJECTION INTRAMUSCULAR; INTRAVENOUS at 10:54

## 2019-07-20 RX ADMIN — KETOROLAC TROMETHAMINE 30 MG: 30 INJECTION, SOLUTION INTRAMUSCULAR at 10:54

## 2019-07-29 RX ORDER — MELOXICAM 15 MG/1
TABLET ORAL
Qty: 30 TABLET | Refills: 0 | Status: SHIPPED | OUTPATIENT
Start: 2019-07-29 | End: 2019-09-22 | Stop reason: SDUPTHER

## 2019-08-06 RX ORDER — GABAPENTIN 300 MG/1
300 CAPSULE ORAL 3 TIMES DAILY
Qty: 90 CAPSULE | Refills: 5 | Status: SHIPPED | OUTPATIENT
Start: 2019-08-06 | End: 2020-04-13

## 2019-08-07 RX ORDER — ATORVASTATIN CALCIUM 20 MG/1
20 TABLET, FILM COATED ORAL DAILY
Qty: 90 TABLET | Refills: 0 | Status: SHIPPED | OUTPATIENT
Start: 2019-08-07 | End: 2019-08-20 | Stop reason: SDUPTHER

## 2019-08-20 ENCOUNTER — OFFICE VISIT (OUTPATIENT)
Dept: FAMILY MEDICINE CLINIC | Facility: CLINIC | Age: 64
End: 2019-08-20

## 2019-08-20 VITALS
DIASTOLIC BLOOD PRESSURE: 88 MMHG | WEIGHT: 196.4 LBS | SYSTOLIC BLOOD PRESSURE: 122 MMHG | HEART RATE: 82 BPM | TEMPERATURE: 98.3 F | BODY MASS INDEX: 26.6 KG/M2 | RESPIRATION RATE: 14 BRPM | OXYGEN SATURATION: 98 % | HEIGHT: 72 IN

## 2019-08-20 DIAGNOSIS — E78.5 HYPERLIPIDEMIA, UNSPECIFIED HYPERLIPIDEMIA TYPE: Primary | ICD-10-CM

## 2019-08-20 DIAGNOSIS — Z80.42 FAMILY HISTORY OF PROSTATE CANCER IN FATHER: ICD-10-CM

## 2019-08-20 DIAGNOSIS — Z12.5 SCREENING FOR PROSTATE CANCER: ICD-10-CM

## 2019-08-20 DIAGNOSIS — G43.009 MIGRAINE WITHOUT AURA AND WITHOUT STATUS MIGRAINOSUS, NOT INTRACTABLE: ICD-10-CM

## 2019-08-20 DIAGNOSIS — E03.9 HYPOTHYROIDISM, UNSPECIFIED TYPE: ICD-10-CM

## 2019-08-20 DIAGNOSIS — I10 ESSENTIAL HYPERTENSION: ICD-10-CM

## 2019-08-20 PROCEDURE — 90632 HEPA VACCINE ADULT IM: CPT | Performed by: FAMILY MEDICINE

## 2019-08-20 PROCEDURE — 90471 IMMUNIZATION ADMIN: CPT | Performed by: FAMILY MEDICINE

## 2019-08-20 PROCEDURE — 99214 OFFICE O/P EST MOD 30 MIN: CPT | Performed by: FAMILY MEDICINE

## 2019-08-20 RX ORDER — RIZATRIPTAN BENZOATE 10 MG/1
10 TABLET ORAL ONCE AS NEEDED
Qty: 12 TABLET | Refills: 1 | Status: SHIPPED | OUTPATIENT
Start: 2019-08-20 | End: 2020-06-19

## 2019-08-20 RX ORDER — ONDANSETRON 8 MG/1
8 TABLET, ORALLY DISINTEGRATING ORAL EVERY 8 HOURS PRN
Qty: 20 TABLET | Refills: 2 | Status: ON HOLD | OUTPATIENT
Start: 2019-08-20 | End: 2021-01-18

## 2019-08-20 RX ORDER — TRAMADOL HYDROCHLORIDE 50 MG/1
50 TABLET ORAL EVERY 6 HOURS PRN
Qty: 110 TABLET | Refills: 0 | Status: SHIPPED | OUTPATIENT
Start: 2019-08-20 | End: 2019-09-22 | Stop reason: SDUPTHER

## 2019-08-20 RX ORDER — TRAMADOL HYDROCHLORIDE 50 MG/1
50 TABLET ORAL EVERY 6 HOURS PRN
Qty: 90 TABLET | Refills: 0 | Status: SHIPPED | OUTPATIENT
Start: 2019-08-20 | End: 2019-08-20 | Stop reason: SDUPTHER

## 2019-08-20 NOTE — PROGRESS NOTES
Subjective    Chief Complaint   Patient presents with   • Med Management     controlled appt needs curt    • Migraine       Pro Mueller is a 64 y.o. male.     History of Present Illness   Prefers cologuard for when he needs colon cancer screening. From documentation in the chart on 8/7/14 from prior PCP Dr. Babcock says he had colonoscopy 6/2014 and all that was reported was hemorrhoids. We will contact [facility] to see if we can get the report for his chart.   Work is requesting labs for biometric screening. His LEILA was 0.88 and he had CT scan of the head which was without acute abnormality. He does have atherosclerotic calcification in the bilateral vertebral arteries.     He would like to continue with PSA screening family hx is positive, father and PU  He would like to do the hep A he eats out a lot. He does not ever cook. Also interested in the shingrix vaccine.     Migraines  Has lot of nausea migraines one per month but they last for few days.  He says sumatriptan not helping much he has not been on any other medication. He also has a lot of nausea with his headaches and can't take pills when he is nauseated. The ondansertron helps a lot with this. He was seen in the ED on 7/20/19 for 3 day of migraine and n/v. Pt also fell and hit the table, broke the table in the living room upon fall.     Arthritis pain, tramadol visit  For his arthritis he is taking 3 per evening when he is up [place] the conserv[place].   He takes 5 per day on the days he is up a lot.   He tries to take the medication proactively to stay ahead of the pain if takes. For the most part during this time of year he takes 3-5 tablets per day. In the winter when he is much less active he does not have this same need.      HLD  He is on a statin. Tolerating it well. He has atherosclerotic vessels on scans.     HTN  He is on lisinopril and amlodipine. BP well controlled toay. He does take meloxicam daily on 15 mg for the bilateral shoulder arthritis,  "gets from ortho.   He is not clear how much it helps since he has just taken it everyday for so long.     The following portions of the patient's history were reviewed and updated as appropriate: allergies, current medications, past medical history, past social history and problem list.    Review of Systems   Respiratory: Negative.    Cardiovascular: Negative.    Neurological: Positive for headaches.       Vitals:    08/20/19 1146   BP: 122/88   BP Location: Left arm   Patient Position: Sitting   Cuff Size: Adult   Pulse: 82   Resp: 14   Temp: 98.3 °F (36.8 °C)   TempSrc: Oral   SpO2: 98%   Weight: 89.1 kg (196 lb 6.4 oz)   Height: 182.9 cm (72\")       Objective   Physical Exam   Constitutional: He is oriented to person, place, and time. He appears well-nourished. No distress.   Eyes: Conjunctivae are normal. Right eye exhibits no discharge. Left eye exhibits no discharge. No scleral icterus.   Cardiovascular: Normal rate, regular rhythm, normal heart sounds and intact distal pulses. Exam reveals no gallop and no friction rub.   No murmur heard.  Pulmonary/Chest: Effort normal and breath sounds normal. No respiratory distress. He has no wheezes.   Musculoskeletal: He exhibits no edema.   Neurological: He is alert and oriented to person, place, and time.   Psychiatric: He has a normal mood and affect. His behavior is normal.   Vitals reviewed.      Assessment/Plan   Pro was seen today for med management and migraine.    Diagnoses and all orders for this visit:    Hyperlipidemia, unspecified hyperlipidemia type  -     Lipid Panel    Essential hypertension  -     CBC & Differential  -     Comprehensive Metabolic Panel    Hypothyroidism, unspecified type  -     TSH  -     T4    Migraine without aura and without status migrainosus, not intractable    Screening for prostate cancer  -     PSA Screen    Family history of prostate cancer in father  -     PSA Screen    Other orders  -     rizatriptan (MAXALT) 10 MG tablet; " Take 1 tablet by mouth 1 (One) Time As Needed for Migraine for up to 1 dose. May repeat in 2 hours if needed  -     ondansetron ODT (ZOFRAN-ODT) 8 MG disintegrating tablet; Take 1 tablet by mouth Every 8 (Eight) Hours As Needed for Nausea or Vomiting.  -     traMADol (ULTRAM) 50 MG tablet; Take 1 tablet by mouth Every 6 (Six) Hours As Needed for Moderate Pain .  -     Hepatitis A Vaccine Adult IM        HLD due for labs.   HTN well controlled on chronic NSAID.   PSA pt would like to screen related to fam hx on father's side of family including father.   For his migraines we will refill the zofran since helpful for the nausea so he can continue medications during the days with headache.   We will also try an alternative triptan.   I also recommended daily dose of magnesium.     He has relief with the tramadol. Uses more when he is active with being on his feet and more active hiking in the warm weather. Will increase the number of tablets available during this season and anticipate that during the winter months the need will decrease again.

## 2019-08-21 LAB
ALBUMIN SERPL-MCNC: 5.3 G/DL (ref 3.5–5.2)
ALBUMIN/GLOB SERPL: 2.9 G/DL
ALP SERPL-CCNC: 78 U/L (ref 39–117)
ALT SERPL-CCNC: 54 U/L (ref 1–41)
AST SERPL-CCNC: 99 U/L (ref 1–40)
BASOPHILS # BLD AUTO: 0.06 10*3/MM3 (ref 0–0.2)
BASOPHILS NFR BLD AUTO: 1.4 % (ref 0–1.5)
BILIRUB SERPL-MCNC: 0.6 MG/DL (ref 0.2–1.2)
BUN SERPL-MCNC: 9 MG/DL (ref 8–23)
BUN/CREAT SERPL: 13.4 (ref 7–25)
CALCIUM SERPL-MCNC: 9.5 MG/DL (ref 8.6–10.5)
CHLORIDE SERPL-SCNC: 103 MMOL/L (ref 98–107)
CHOLEST SERPL-MCNC: 257 MG/DL (ref 0–200)
CO2 SERPL-SCNC: 22.7 MMOL/L (ref 22–29)
CREAT SERPL-MCNC: 0.67 MG/DL (ref 0.76–1.27)
EOSINOPHIL # BLD AUTO: 0.08 10*3/MM3 (ref 0–0.4)
EOSINOPHIL NFR BLD AUTO: 1.9 % (ref 0.3–6.2)
ERYTHROCYTE [DISTWIDTH] IN BLOOD BY AUTOMATED COUNT: 14.2 % (ref 12.3–15.4)
GLOBULIN SER CALC-MCNC: 1.8 GM/DL
GLUCOSE SERPL-MCNC: 70 MG/DL (ref 65–99)
HCT VFR BLD AUTO: 46.8 % (ref 37.5–51)
HDLC SERPL-MCNC: 95 MG/DL (ref 40–60)
HGB BLD-MCNC: 14.7 G/DL (ref 13–17.7)
IMM GRANULOCYTES # BLD AUTO: 0.01 10*3/MM3 (ref 0–0.05)
IMM GRANULOCYTES NFR BLD AUTO: 0.2 % (ref 0–0.5)
LDLC SERPL CALC-MCNC: 141 MG/DL (ref 0–100)
LYMPHOCYTES # BLD AUTO: 1.09 10*3/MM3 (ref 0.7–3.1)
LYMPHOCYTES NFR BLD AUTO: 26.2 % (ref 19.6–45.3)
MCH RBC QN AUTO: 32.1 PG (ref 26.6–33)
MCHC RBC AUTO-ENTMCNC: 31.4 G/DL (ref 31.5–35.7)
MCV RBC AUTO: 102.2 FL (ref 79–97)
MONOCYTES # BLD AUTO: 0.37 10*3/MM3 (ref 0.1–0.9)
MONOCYTES NFR BLD AUTO: 8.9 % (ref 5–12)
NEUTROPHILS # BLD AUTO: 2.55 10*3/MM3 (ref 1.7–7)
NEUTROPHILS NFR BLD AUTO: 61.4 % (ref 42.7–76)
NRBC BLD AUTO-RTO: 0 /100 WBC (ref 0–0.2)
PLATELET # BLD AUTO: 307 10*3/MM3 (ref 140–450)
POTASSIUM SERPL-SCNC: 4.1 MMOL/L (ref 3.5–5.2)
PROT SERPL-MCNC: 7.1 G/DL (ref 6–8.5)
PSA SERPL-MCNC: 0.83 NG/ML (ref 0–4)
RBC # BLD AUTO: 4.58 10*6/MM3 (ref 4.14–5.8)
SODIUM SERPL-SCNC: 145 MMOL/L (ref 136–145)
T4 SERPL-MCNC: 6.7 MCG/DL (ref 4.5–11.7)
TRIGL SERPL-MCNC: 103 MG/DL (ref 0–150)
TSH SERPL DL<=0.005 MIU/L-ACNC: 4.32 MIU/ML (ref 0.27–4.2)
VLDLC SERPL CALC-MCNC: 20.6 MG/DL (ref 5–40)
WBC # BLD AUTO: 4.16 10*3/MM3 (ref 3.4–10.8)

## 2019-08-22 RX ORDER — SILDENAFIL 25 MG/1
25 TABLET, FILM COATED ORAL DAILY PRN
Qty: 10 TABLET | Refills: 0 | Status: SHIPPED | OUTPATIENT
Start: 2019-08-22 | End: 2019-12-06

## 2019-08-23 DIAGNOSIS — D75.89 MACROCYTOSIS: Primary | ICD-10-CM

## 2019-08-23 DIAGNOSIS — D75.89 MACROCYTOSIS: ICD-10-CM

## 2019-08-23 LAB
FOLATE SERPL-MCNC: <2 NG/ML (ref 4.78–24.2)
Lab: NORMAL
VIT B12 SERPL-MCNC: 222 PG/ML (ref 211–946)
WRITTEN AUTHORIZATION: NORMAL

## 2019-09-23 RX ORDER — LISINOPRIL 10 MG/1
10 TABLET ORAL DAILY
Qty: 90 TABLET | Refills: 0 | Status: SHIPPED | OUTPATIENT
Start: 2019-09-23 | End: 2019-12-10 | Stop reason: SDUPTHER

## 2019-09-23 RX ORDER — TRAMADOL HYDROCHLORIDE 50 MG/1
TABLET ORAL
Qty: 110 TABLET | Refills: 0 | Status: SHIPPED | OUTPATIENT
Start: 2019-09-23 | End: 2019-11-14 | Stop reason: SDUPTHER

## 2019-09-23 RX ORDER — AMLODIPINE BESYLATE 5 MG/1
5 TABLET ORAL DAILY
Qty: 90 TABLET | Refills: 0 | Status: SHIPPED | OUTPATIENT
Start: 2019-09-23 | End: 2019-11-22

## 2019-09-23 RX ORDER — MELOXICAM 15 MG/1
TABLET ORAL
Qty: 30 TABLET | Refills: 0 | Status: SHIPPED | OUTPATIENT
Start: 2019-09-23 | End: 2019-11-13 | Stop reason: SDUPTHER

## 2019-09-23 RX ORDER — ACYCLOVIR 800 MG/1
TABLET ORAL
Qty: 90 TABLET | Refills: 0 | Status: SHIPPED | OUTPATIENT
Start: 2019-09-23 | End: 2019-12-06

## 2019-11-01 RX ORDER — ATORVASTATIN CALCIUM 20 MG/1
20 TABLET, FILM COATED ORAL DAILY
Qty: 90 TABLET | Refills: 0 | Status: SHIPPED | OUTPATIENT
Start: 2019-11-01 | End: 2019-11-22

## 2019-11-04 ENCOUNTER — OFFICE VISIT (OUTPATIENT)
Dept: ORTHOPEDIC SURGERY | Facility: CLINIC | Age: 64
End: 2019-11-04

## 2019-11-04 VITALS — TEMPERATURE: 97.2 F | BODY MASS INDEX: 26.41 KG/M2 | WEIGHT: 195 LBS | HEIGHT: 72 IN

## 2019-11-04 DIAGNOSIS — G89.29 CHRONIC RIGHT SHOULDER PAIN: ICD-10-CM

## 2019-11-04 DIAGNOSIS — M25.511 CHRONIC RIGHT SHOULDER PAIN: ICD-10-CM

## 2019-11-04 DIAGNOSIS — M25.512 LEFT SHOULDER PAIN, UNSPECIFIED CHRONICITY: ICD-10-CM

## 2019-11-04 DIAGNOSIS — R52 PAIN: Primary | ICD-10-CM

## 2019-11-04 PROCEDURE — 73030 X-RAY EXAM OF SHOULDER: CPT | Performed by: ORTHOPAEDIC SURGERY

## 2019-11-04 PROCEDURE — 99214 OFFICE O/P EST MOD 30 MIN: CPT | Performed by: ORTHOPAEDIC SURGERY

## 2019-11-07 ENCOUNTER — PREP FOR SURGERY (OUTPATIENT)
Dept: OTHER | Facility: HOSPITAL | Age: 64
End: 2019-11-07

## 2019-11-14 RX ORDER — TRAMADOL HYDROCHLORIDE 50 MG/1
TABLET ORAL
Qty: 80 TABLET | Refills: 0 | Status: SHIPPED | OUTPATIENT
Start: 2019-11-14 | End: 2020-04-16

## 2019-11-14 RX ORDER — MELOXICAM 15 MG/1
TABLET ORAL
Qty: 30 TABLET | Refills: 0 | Status: SHIPPED | OUTPATIENT
Start: 2019-11-14 | End: 2019-12-06

## 2019-11-14 NOTE — PROGRESS NOTES
CC: Follow-up for peripheral neuropathy    HPI:  Pro Mueller is a  64 y.o.  right-handed male with HTN, HLP, kidney stone, migraine, daily alcohol use/abuse, syncopal spells, anxiety/depression, and arthritis who is being seen in follow-up for peripheral neuropathy.  The patient is previously been evaluated by Dr. Langston and was most recently seen by me in May 2018.  Patient has numbness, tingling, burning, and occasional shooting pains in the soles of his feet that started mid to late 2018.  Symptoms are made worse by weightbearing.  He only very occasionally has tingling in his ears.  EMG of both legs completed by Dr. Langston in December 2018 showed left peroneal sensory motor amplitudes were mildly low but study was without other clear evidence for more diffuse peripheral neuropathy.    In September 2018 HIV 1 and 2 were nonreactive, B12 level was 588, and SPEP was suggestive of a subacute inflammatory response, in November 2018 LDL was 168.  In December 2018 hemoglobin A1c was 5.33%.  In January 2019 TSH was 7.36, free T4 was 1.55, UDS was positive for benzos, methadone, and amphetamine, alcohol level was not elevated.  In May 2019 sedimentation rate was 6, heavy metal screen was not negative, cryoglobulin was not detected, copper level was 105, RPR was nonreactive, IgG was slightly low on immunofixation but there is no MGUS.    He has been on gabapentin 300 mg 3 times daily for his neuropathic symptoms.  He frequently forgets to take the middle dose.  Symptoms are fairly well controlled on this.  Neuropathy is stable.    In January 2018 the patient was admitted to the hospital with syncope and was noted to be positive for methadone and amphetamines which are not prescribed.  However per an NIH article the Wellbutrin can cause a false positive for amphetamine, however access consult note 1/3/19 also noted he had not yet started wellbutrin. He was also positive for benzodiazepines which is prescribed in the form  of clonazepam.  CTA head neck showed calcified plaques mildly narrowing the left vertebral artery and mild to moderately narrowing the right vertebral artery.  EEG was normal.  Patient was seen by cardiology and echocardiogram was normal.  ZIO Patch showed normal sinus rhythm with rare PACs, 2 episodes of SVT, and rare PVCs.    Per report from 11/20/2018 through 11/20/2019 reviewed by me.  Controlled substances include clonazepam prescribed by patient's psychiatrist Dr. Say Coats, tramadol prescribed by patient's PCP, and gabapentin 300 mg 3 times daily prescribed by our office.    He is getting his right shoulder placed in December and hopefully his left shoulder after that.  He states he has significant pain in his shoulders.    There is no family history of stroke, cerebral aneurysm, or neuropathy.  His mother had Alzheimer's disease and his father had pancreatic cancer.  Past Medical History:   Diagnosis Date   • Alcohol abuse    • Anxiety    • Arthritis    • Atopic rhinitis 8/8/2016   • Depression    • Encounter for removal of sutures    • Genital herpes simplex 8/8/2016   • Headache, tension-type    • Hyperlipidemia    • Hypertension    • Kidney stone    • Migraine    • Motion sickness    • Olecranon bursitis, right elbow    • Panic disorder without agoraphobia 8/8/2016   • Peripheral neuropathy    • Vitamin D deficiency 8/8/2016   • Withdrawal symptoms, alcohol (CMS/HCC)          Past Surgical History:   Procedure Laterality Date   • CYST REMOVAL     • EXTRACORPOREAL SHOCK WAVE LITHOTRIPSY (ESWL) Right 2002   • TONSILLECTOMY             Current Outpatient Medications:   •  acyclovir (ZOVIRAX) 800 MG tablet, TAKE 1 TABLET BY MOUTH THREE TIMES DAILY (Patient taking differently: TAKE 1 TABLET BY MOUTH THREE TIMES DAILY; pt reports one daily), Disp: 90 tablet, Rfl: 0  •  amLODIPine (NORVASC) 5 MG tablet, Take 5 mg by mouth Daily., Disp: , Rfl:   •  atorvastatin (LIPITOR) 20 MG tablet, Take 20 mg by mouth  Daily., Disp: , Rfl:   •  buPROPion SR (WELLBUTRIN SR) 150 MG 12 hr tablet, Take 300 mg by mouth Daily., Disp: , Rfl: 2  •  clonazePAM (KlonoPIN) 2 MG tablet, Take 2 mg by mouth Every Night., Disp: , Rfl:   •  gabapentin (NEURONTIN) 300 MG capsule, Take 1 capsule by mouth 3 (Three) Times a Day., Disp: 90 capsule, Rfl: 5  •  levothyroxine (SYNTHROID, LEVOTHROID) 75 MCG tablet, Take 75 mcg by mouth Daily., Disp: , Rfl:   •  lisinopril (PRINIVIL,ZESTRIL) 10 MG tablet, TAKE 1 TABLET BY MOUTH DAILY, Disp: 90 tablet, Rfl: 0  •  meloxicam (MOBIC) 15 MG tablet, TAKE 1 TABLET BY MOUTH DAILY WITH FOOD AS NEEDED AS DIRECTED FOR MODERATE PAIN, Disp: 30 tablet, Rfl: 0  •  mirtazapine (REMERON) 15 MG tablet, 1 tablet Daily., Disp: , Rfl: 5  •  ondansetron ODT (ZOFRAN-ODT) 8 MG disintegrating tablet, Take 1 tablet by mouth Every 8 (Eight) Hours As Needed for Nausea or Vomiting., Disp: 20 tablet, Rfl: 2  •  rizatriptan (MAXALT) 10 MG tablet, Take 1 tablet by mouth 1 (One) Time As Needed for Migraine for up to 1 dose. May repeat in 2 hours if needed, Disp: 12 tablet, Rfl: 1  •  sildenafil (VIAGRA) 25 MG tablet, Take 1 tablet by mouth Daily As Needed for erectile dysfunction., Disp: 10 tablet, Rfl: 0  •  traMADol (ULTRAM) 50 MG tablet, TAKE 1 TABLET BY MOUTH EVERY 6 HOURS AS NEEDED FOR MODERATE PAIN, Disp: 80 tablet, Rfl: 0  •  Vortioxetine HBr (TRINTELLIX) 20 MG tablet, Take 20 mg by mouth Daily., Disp: , Rfl:   •  magnesium oxide (MAGOX) 400 (241.3 Mg) MG tablet tablet, Take 400 mg by mouth Daily., Disp: , Rfl:   I did not stop or change the above medications.  Patient's medication list was updated to reflect medications they have reported as currently taking, including medication changes made by other providers.          Family History   Problem Relation Age of Onset   • Alzheimer's disease Mother    • Pancreatic cancer Father          Social History     Socioeconomic History   • Marital status:      Spouse name: Not on  file   • Number of children: Not on file   • Years of education: Not on file   • Highest education level: Not on file   Tobacco Use   • Smoking status: Former Smoker     Packs/day: 0.50     Years: 25.00     Pack years: 12.50   • Smokeless tobacco: Never Used   Substance and Sexual Activity   • Alcohol use: Yes     Alcohol/week: 3.6 oz     Types: 6 Shots of liquor per week     Comment: several night weekly, few drinks per occasion   • Drug use: No   • Sexual activity: Yes     Partners: Female         Allergies   Allergen Reactions   • Penicillins              ROS:  Review of Systems   Constitutional: Negative for activity change, appetite change and unexpected weight change.   HENT: Positive for trouble swallowing. Negative for facial swelling and voice change.    Eyes: Negative for photophobia, pain and visual disturbance.   Respiratory: Negative for chest tightness, shortness of breath and wheezing.    Cardiovascular: Negative for chest pain, palpitations and leg swelling.   Gastrointestinal: Positive for nausea (with migraine) and vomiting (with migraine). Negative for abdominal pain.   Endocrine: Negative for cold intolerance and heat intolerance.   Musculoskeletal: Negative for arthralgias, back pain, gait problem, joint swelling, myalgias, neck pain and neck stiffness.   Neurological: Positive for numbness (feet) and headaches. Negative for dizziness, tremors, seizures, syncope, facial asymmetry, speech difficulty, weakness and light-headedness.   Hematological: Does not bruise/bleed easily.   Psychiatric/Behavioral: Positive for agitation, decreased concentration and sleep disturbance. Negative for behavioral problems, confusion, dysphoric mood, hallucinations, self-injury and suicidal ideas. The patient is nervous/anxious (nervous). The patient is not hyperactive.      ROS completed by MA reviewed by me and I agree.    Physical Exam:  Vitals:    11/22/19 1336   BP: 160/96   BP Location: Left arm   Patient  "Position: Sitting   Cuff Size: Adult   Pulse: 109   SpO2: 97%   Weight: 88.9 kg (196 lb)   Height: 182.9 cm (72\")         Body mass index is 26.58 kg/m².    General appearance: Well developed, well nourished, well groomed, alert and cooperative. Appears agitated/anxious.  HEENT: Normocephalic.   Neck and spine: Normal range of motion. Normal alignment. No mass or tenderness.    Cardiac: Regular rate and rhythm. No murmurs.   Peripheral Vasculature: Radial pulses are equal and symmetric.  Chest Exam: Clear to auscultation bilaterally, no wheezes, no rhonchi.  Extremities: Normal, no edema.   Skin: No rashes or birthmarks.     Higher integrative function: Oriented to time, place, person, intact recent and remote memory, attention span, concentration and language. Spontaneous speech, fund of vocabulary are normal.   CN II: Normal visual fields.   CN III IV VI: Extraocular movements are full without nystagmus. Pupils are equal, round, and reactive to light.   CN V: Normal facial sensation.  CN VII: Facial movements are symmetric, no weakness.   CN VIII: Auditory acuity is normal.   CN IX & X: Symmetric palatal movement.   CN XI: Sternocleidomastoid and trapezius are normal. No weakness.   CN XII: The tongue is midline. No atrophy or fasciculations.   Motor: Normal muscle strength, bulk, and tone in upper and lower extremities.  Fine postural/action tremor.  Sensation: Normal light touch and pinprick in hands and feet. Vibration sensation decreased at the ankles.  Station and gait: Normal gait and station.   Muscle stretch reflexes: Reflexes are 1+ diffusely.  Coordination: Finger to nose test showed no dysmetria. Rapid alternating movements were normal. Heel to shin normal.     Results:      Lab Results   Component Value Date    GLUCOSE 80 07/20/2019    BUN 9 08/20/2019    CREATININE 0.67 (L) 08/20/2019    EGFRIFNONA 120 08/20/2019    EGFRIFAFRI 146 08/20/2019    BCR 13.4 08/20/2019    CO2 22.7 08/20/2019    CALCIUM 9.5 " 08/20/2019    PROTENTOTREF 7.1 08/20/2019    ALBUMIN 5.30 (H) 08/20/2019    LABIL2 2.9 08/20/2019    AST 99 (H) 08/20/2019    ALT 54 (H) 08/20/2019       Lab Results   Component Value Date    WBC 4.16 08/20/2019    HGB 14.7 08/20/2019    HCT 46.8 08/20/2019    .2 (H) 08/20/2019     08/20/2019         .  Lab Results   Component Value Date    RPR Non-Reactive 05/25/2019         Lab Results   Component Value Date    TSH 4.320 (H) 08/20/2019    C7LBMPF 6.70 08/20/2019         Lab Results   Component Value Date    WIRUYLCQ85 222 08/20/2019         Lab Results   Component Value Date    FOLATE <2.00 (L) 08/20/2019         Lab Results   Component Value Date    HGBA1C 5.33 11/26/2018         Lab Results   Component Value Date    GLUCOSE 80 07/20/2019    BUN 9 08/20/2019    CREATININE 0.67 (L) 08/20/2019    EGFRIFNONA 120 08/20/2019    EGFRIFAFRI 146 08/20/2019    BCR 13.4 08/20/2019    K 4.1 08/20/2019    CO2 22.7 08/20/2019    CALCIUM 9.5 08/20/2019    PROTENTOTREF 7.1 08/20/2019    ALBUMIN 5.30 (H) 08/20/2019    LABIL2 2.9 08/20/2019    AST 99 (H) 08/20/2019    ALT 54 (H) 08/20/2019         Lab Results   Component Value Date    WBC 4.16 08/20/2019    HGB 14.7 08/20/2019    HCT 46.8 08/20/2019    .2 (H) 08/20/2019     08/20/2019             Assessment:   She was seen for peripheral neuropathy, idiopathic versus alcoholic.          Plan:  Continue gabapentin 300 mg 3 times daily, will refill when due.                          Dictated utilizing Dragon dictation.

## 2019-11-19 PROBLEM — M25.512 LEFT SHOULDER PAIN: Status: ACTIVE | Noted: 2019-11-19

## 2019-11-22 ENCOUNTER — OFFICE VISIT (OUTPATIENT)
Dept: NEUROLOGY | Facility: CLINIC | Age: 64
End: 2019-11-22

## 2019-11-22 VITALS
DIASTOLIC BLOOD PRESSURE: 96 MMHG | SYSTOLIC BLOOD PRESSURE: 160 MMHG | HEART RATE: 109 BPM | WEIGHT: 196 LBS | HEIGHT: 72 IN | OXYGEN SATURATION: 97 % | BODY MASS INDEX: 26.55 KG/M2

## 2019-11-22 DIAGNOSIS — G57.93 NEUROPATHY INVOLVING BOTH LOWER EXTREMITIES: Primary | ICD-10-CM

## 2019-11-22 PROCEDURE — 99213 OFFICE O/P EST LOW 20 MIN: CPT | Performed by: NURSE PRACTITIONER

## 2019-11-22 RX ORDER — MIRTAZAPINE 15 MG/1
15 TABLET, FILM COATED ORAL DAILY
Refills: 5 | COMMUNITY
Start: 2019-11-04 | End: 2020-07-02

## 2019-12-06 ENCOUNTER — APPOINTMENT (OUTPATIENT)
Dept: PREADMISSION TESTING | Facility: HOSPITAL | Age: 64
End: 2019-12-06

## 2019-12-06 VITALS
TEMPERATURE: 98.2 F | RESPIRATION RATE: 20 BRPM | DIASTOLIC BLOOD PRESSURE: 104 MMHG | OXYGEN SATURATION: 95 % | HEART RATE: 92 BPM | BODY MASS INDEX: 27.09 KG/M2 | SYSTOLIC BLOOD PRESSURE: 168 MMHG | WEIGHT: 200 LBS | HEIGHT: 72 IN

## 2019-12-06 DIAGNOSIS — M25.512 LEFT SHOULDER PAIN, UNSPECIFIED CHRONICITY: ICD-10-CM

## 2019-12-06 LAB
ANION GAP SERPL CALCULATED.3IONS-SCNC: 14.9 MMOL/L (ref 5–15)
BILIRUB UR QL STRIP: NEGATIVE
BUN BLD-MCNC: 8 MG/DL (ref 8–23)
BUN/CREAT SERPL: 10.7 (ref 7–25)
CALCIUM SPEC-SCNC: 9.2 MG/DL (ref 8.6–10.5)
CHLORIDE SERPL-SCNC: 104 MMOL/L (ref 98–107)
CLARITY UR: CLEAR
CO2 SERPL-SCNC: 23.1 MMOL/L (ref 22–29)
COLOR UR: YELLOW
CREAT BLD-MCNC: 0.75 MG/DL (ref 0.76–1.27)
DEPRECATED RDW RBC AUTO: 51.6 FL (ref 37–54)
ERYTHROCYTE [DISTWIDTH] IN BLOOD BY AUTOMATED COUNT: 14.3 % (ref 12.3–15.4)
GFR SERPL CREATININE-BSD FRML MDRD: 106 ML/MIN/1.73
GLUCOSE BLD-MCNC: 85 MG/DL (ref 65–99)
GLUCOSE UR STRIP-MCNC: NEGATIVE MG/DL
HCT VFR BLD AUTO: 40.7 % (ref 37.5–51)
HGB BLD-MCNC: 14.1 G/DL (ref 13–17.7)
HGB UR QL STRIP.AUTO: NEGATIVE
KETONES UR QL STRIP: NEGATIVE
LEUKOCYTE ESTERASE UR QL STRIP.AUTO: NEGATIVE
MCH RBC QN AUTO: 34.3 PG (ref 26.6–33)
MCHC RBC AUTO-ENTMCNC: 34.6 G/DL (ref 31.5–35.7)
MCV RBC AUTO: 99 FL (ref 79–97)
NITRITE UR QL STRIP: NEGATIVE
PH UR STRIP.AUTO: <=5 [PH] (ref 5–8)
PLATELET # BLD AUTO: 254 10*3/MM3 (ref 140–450)
PMV BLD AUTO: 8.8 FL (ref 6–12)
POTASSIUM BLD-SCNC: 4.2 MMOL/L (ref 3.5–5.2)
PROT UR QL STRIP: NEGATIVE
RBC # BLD AUTO: 4.11 10*6/MM3 (ref 4.14–5.8)
SODIUM BLD-SCNC: 142 MMOL/L (ref 136–145)
SP GR UR STRIP: 1.02 (ref 1–1.03)
UROBILINOGEN UR QL STRIP: NORMAL
WBC NRBC COR # BLD: 3.58 10*3/MM3 (ref 3.4–10.8)

## 2019-12-06 PROCEDURE — 93010 ELECTROCARDIOGRAM REPORT: CPT | Performed by: INTERNAL MEDICINE

## 2019-12-06 PROCEDURE — 85027 COMPLETE CBC AUTOMATED: CPT | Performed by: ORTHOPAEDIC SURGERY

## 2019-12-06 PROCEDURE — 81003 URINALYSIS AUTO W/O SCOPE: CPT | Performed by: ORTHOPAEDIC SURGERY

## 2019-12-06 PROCEDURE — 80048 BASIC METABOLIC PNL TOTAL CA: CPT | Performed by: ORTHOPAEDIC SURGERY

## 2019-12-06 PROCEDURE — 93005 ELECTROCARDIOGRAM TRACING: CPT

## 2019-12-06 PROCEDURE — 36415 COLL VENOUS BLD VENIPUNCTURE: CPT

## 2019-12-06 RX ORDER — MELOXICAM 15 MG/1
15 TABLET ORAL DAILY
COMMUNITY
End: 2020-01-14 | Stop reason: HOSPADM

## 2019-12-06 RX ORDER — B-COMPLEX WITH VITAMIN C
1 TABLET ORAL EVERY MORNING
Status: ON HOLD | COMMUNITY
End: 2021-01-18

## 2019-12-06 RX ORDER — ACYCLOVIR 800 MG/1
800 TABLET ORAL EVERY MORNING
COMMUNITY
End: 2020-11-10 | Stop reason: ALTCHOICE

## 2019-12-06 NOTE — DISCHARGE INSTRUCTIONS
Take the following medications the morning of surgery with a small sip of water:    amlodipine    General Instructions:  • Do not eat solid food after midnight the night before surgery.  • You may drink clear liquids day of surgery but must stop at least one hour before your hospital arrival time.  • It is beneficial for you to have a clear drink that contains carbohydrates the day of surgery.  We suggest a 12 to 20 ounce bottle of Gatorade or Powerade for non-diabetic patients or a 12 to 20 ounce bottle of G2 or Powerade Zero for diabetic patients. (Pediatric patients, are not advised to drink a 12 to 20 ounce carbohydrate drink)    Clear liquids are liquids you can see through.  Nothing red in color.     Plain water                               Sports drinks  Sodas                                   Gelatin (Jell-O)  Fruit juices without pulp such as white grape juice and apple juice  Popsicles that contain no fruit or yogurt  Tea or coffee (no cream or milk added)  Gatorade / Powerade  G2 / Powerade Zero    • Infants may have breast milk up to four hours before surgery.  • Infants drinking formula may drink formula up to six hours before surgery.   • Patients who avoid smoking, chewing tobacco and alcohol for 4 weeks prior to surgery have a reduced risk of post-operative complications.  Quit smoking as many days before surgery as you can.  • Do not smoke, use chewing tobacco or drink alcohol the day of surgery.   • If applicable bring your C-PAP/ BI-PAP machine.  • Bring any papers given to you in the doctor’s office.  • Wear clean comfortable clothes.  • Do not wear contact lenses, false eyelashes or make-up.  Bring a case for your glasses.   • Bring crutches or walker if applicable.  • Remove all piercings.  Leave jewelry and any other valuables at home.  • Hair extensions with metal clips must be removed prior to surgery.  • The Pre-Admission Testing nurse will instruct you to bring medications if unable to  obtain an accurate list in Pre-Admission Testing.        If you were given a blood bank ID arm band remember to bring it with you the day of surgery.    Preventing a Surgical Site Infection:  • For 2 to 3 days before surgery, avoid shaving with a razor because the razor can irritate skin and make it easier to develop an infection.    • Any areas of open skin can increase the risk of a post-operative wound infection by allowing bacteria to enter and travel throughout the body.  Notify your surgeon if you have any skin wounds / rashes even if it is not near the expected surgical site.  The area will need assessed to determine if surgery should be delayed until it is healed.  • The night prior to surgery sleep in a clean bed with clean clothing.  Do not allow pets to sleep with you.  • Shower on the morning of surgery using a fresh bar of anti-bacterial soap (such as Dial) and clean washcloth.  Dry with a clean towel and dress in clean clothing.  • Ask your surgeon if you will be receiving antibiotics prior to surgery.  • Make sure you, your family, and all healthcare providers clean their hands with soap and water or an alcohol based hand  before caring for you or your wound.    Day of surgery:12/17/19   Hospital to call day before and give time of  arrival  Your arrival time is approximately two hours before your scheduled surgery time.  Upon arrival, a Pre-op nurse and Anesthesiologist will review your health history, obtain vital signs, and answer questions you may have.  The only belongings needed at this time will be a list of your home medications and if applicable your C-PAP/BI-PAP machine.  If you are staying overnight your family can leave the rest of your belongings in the car and bring them to your room later.  A Pre-op nurse will start an IV and you may receive medication in preparation for surgery, including something to help you relax.  Your family will be able to see you in the Pre-op area.   Two visitors at a time will be allowed in the Pre-op room.  While you are in surgery your family should notify the waiting room  if they leave the waiting room area and provide a contact phone number.    Please be aware that surgery does come with discomfort.  We want to make every effort to control your discomfort so please discuss any uncontrolled symptoms with your nurse.   Your doctor will most likely have prescribed pain medications.      If you are going home after surgery you will receive individualized written care instructions before being discharged.  A responsible adult must drive you to and from the hospital on the day of your surgery and stay with you for 24 hours.    If you are staying overnight following surgery, you will be transported to your hospital room following the recovery period.  [facility] has all private rooms.    You have received a list of surgical assistants for your reference.  If you have any questions please call Pre-Admission Testing at [phone].  Deductibles and co-payments are collected on the day of service. Please be prepared to pay the required co-pay, deductible or deposit on the day of service as defined by your plan.

## 2019-12-10 DIAGNOSIS — E03.9 HYPOTHYROIDISM, UNSPECIFIED TYPE: ICD-10-CM

## 2019-12-10 DIAGNOSIS — Z98.890 STATUS POST ARTHROSCOPY OF SHOULDER: Primary | ICD-10-CM

## 2019-12-11 RX ORDER — MELOXICAM 15 MG/1
TABLET ORAL
Qty: 90 TABLET | Refills: 0 | Status: SHIPPED | OUTPATIENT
Start: 2019-12-11 | End: 2020-01-14 | Stop reason: HOSPADM

## 2019-12-12 RX ORDER — ATORVASTATIN CALCIUM 20 MG/1
20 TABLET, FILM COATED ORAL DAILY
Qty: 90 TABLET | Refills: 0 | Status: SHIPPED | OUTPATIENT
Start: 2019-12-12 | End: 2020-11-03 | Stop reason: SDUPTHER

## 2019-12-12 RX ORDER — LISINOPRIL 10 MG/1
10 TABLET ORAL DAILY
Qty: 90 TABLET | Refills: 0 | Status: SHIPPED | OUTPATIENT
Start: 2019-12-12 | End: 2020-11-03 | Stop reason: SDUPTHER

## 2019-12-12 RX ORDER — ACYCLOVIR 800 MG/1
TABLET ORAL
Qty: 90 TABLET | Refills: 0 | Status: SHIPPED | OUTPATIENT
Start: 2019-12-12 | End: 2020-01-14 | Stop reason: HOSPADM

## 2019-12-12 RX ORDER — LEVOTHYROXINE SODIUM 0.07 MG/1
TABLET ORAL
Qty: 90 TABLET | Refills: 0 | Status: SHIPPED | OUTPATIENT
Start: 2019-12-12 | End: 2020-02-14 | Stop reason: SDUPTHER

## 2019-12-17 ENCOUNTER — HOSPITAL ENCOUNTER (OUTPATIENT)
Facility: HOSPITAL | Age: 64
Setting detail: HOSPITAL OUTPATIENT SURGERY
Discharge: HOME | End: 2019-12-17
Attending: ORTHOPAEDIC SURGERY | Admitting: ORTHOPAEDIC SURGERY

## 2019-12-17 ENCOUNTER — ANESTHESIA EVENT (OUTPATIENT)
Dept: PERIOP | Facility: HOSPITAL | Age: 64
End: 2019-12-17

## 2019-12-17 ENCOUNTER — ANESTHESIA (OUTPATIENT)
Dept: PERIOP | Facility: HOSPITAL | Age: 64
End: 2019-12-17

## 2019-12-17 VITALS
SYSTOLIC BLOOD PRESSURE: 106 MMHG | TEMPERATURE: 97.2 F | HEART RATE: 85 BPM | RESPIRATION RATE: 16 BRPM | DIASTOLIC BLOOD PRESSURE: 73 MMHG | OXYGEN SATURATION: 92 %

## 2019-12-17 DIAGNOSIS — M25.512 LEFT SHOULDER PAIN, UNSPECIFIED CHRONICITY: ICD-10-CM

## 2019-12-17 PROCEDURE — 25010000002 EPINEPHRINE PER 0.1 MG: Performed by: ORTHOPAEDIC SURGERY

## 2019-12-17 PROCEDURE — 25010000002 MIDAZOLAM PER 1 MG: Performed by: ANESTHESIOLOGY

## 2019-12-17 PROCEDURE — 25010000002 PROPOFOL 10 MG/ML EMULSION: Performed by: NURSE ANESTHETIST, CERTIFIED REGISTERED

## 2019-12-17 PROCEDURE — 25010000002 HYDROMORPHONE PER 4 MG: Performed by: ANESTHESIOLOGY

## 2019-12-17 PROCEDURE — C9290 INJ, BUPIVACAINE LIPOSOME: HCPCS | Performed by: ANESTHESIOLOGY

## 2019-12-17 PROCEDURE — 25010000002 FENTANYL CITRATE (PF) 100 MCG/2ML SOLUTION: Performed by: ANESTHESIOLOGY

## 2019-12-17 PROCEDURE — 25010000003 CEFAZOLIN IN DEXTROSE 2-4 GM/100ML-% SOLUTION: Performed by: ORTHOPAEDIC SURGERY

## 2019-12-17 PROCEDURE — 29824 SHO ARTHRS SRG DSTL CLAVICLC: CPT | Performed by: ORTHOPAEDIC SURGERY

## 2019-12-17 PROCEDURE — 25010000003 BUPIVACAINE LIPOSOME 1.3 % SUSPENSION: Performed by: ANESTHESIOLOGY

## 2019-12-17 PROCEDURE — 29826 SHO ARTHRS SRG DECOMPRESSION: CPT | Performed by: NURSE PRACTITIONER

## 2019-12-17 PROCEDURE — 29826 SHO ARTHRS SRG DECOMPRESSION: CPT | Performed by: ORTHOPAEDIC SURGERY

## 2019-12-17 PROCEDURE — 29824 SHO ARTHRS SRG DSTL CLAVICLC: CPT | Performed by: NURSE PRACTITIONER

## 2019-12-17 PROCEDURE — 25010000002 PHENYLEPHRINE PER 1 ML: Performed by: NURSE ANESTHETIST, CERTIFIED REGISTERED

## 2019-12-17 PROCEDURE — 25010000002 ONDANSETRON PER 1 MG: Performed by: NURSE ANESTHETIST, CERTIFIED REGISTERED

## 2019-12-17 PROCEDURE — 25010000002 NEOSTIGMINE PER 0.5 MG: Performed by: NURSE ANESTHETIST, CERTIFIED REGISTERED

## 2019-12-17 RX ORDER — OXYCODONE AND ACETAMINOPHEN 7.5; 325 MG/1; MG/1
1 TABLET ORAL EVERY 4 HOURS PRN
Status: DISCONTINUED | OUTPATIENT
Start: 2019-12-17 | End: 2019-12-17 | Stop reason: HOSPADM

## 2019-12-17 RX ORDER — PROPOFOL 10 MG/ML
VIAL (ML) INTRAVENOUS AS NEEDED
Status: DISCONTINUED | OUTPATIENT
Start: 2019-12-17 | End: 2019-12-17 | Stop reason: SURG

## 2019-12-17 RX ORDER — PROMETHAZINE HYDROCHLORIDE 25 MG/1
25 TABLET ORAL ONCE AS NEEDED
Status: DISCONTINUED | OUTPATIENT
Start: 2019-12-17 | End: 2019-12-17 | Stop reason: HOSPADM

## 2019-12-17 RX ORDER — CEFAZOLIN SODIUM 2 G/100ML
2 INJECTION, SOLUTION INTRAVENOUS ONCE
Status: COMPLETED | OUTPATIENT
Start: 2019-12-17 | End: 2019-12-17

## 2019-12-17 RX ORDER — SODIUM CHLORIDE, SODIUM LACTATE, POTASSIUM CHLORIDE, AND CALCIUM CHLORIDE .6; .31; .03; .02 G/100ML; G/100ML; G/100ML; G/100ML
INJECTION, SOLUTION INTRAVENOUS AS NEEDED
Status: DISCONTINUED | OUTPATIENT
Start: 2019-12-17 | End: 2019-12-17 | Stop reason: HOSPADM

## 2019-12-17 RX ORDER — FENTANYL CITRATE 50 UG/ML
100 INJECTION, SOLUTION INTRAMUSCULAR; INTRAVENOUS
Status: DISCONTINUED | OUTPATIENT
Start: 2019-12-17 | End: 2019-12-17 | Stop reason: HOSPADM

## 2019-12-17 RX ORDER — SODIUM CHLORIDE 0.9 % (FLUSH) 0.9 %
3 SYRINGE (ML) INJECTION EVERY 12 HOURS SCHEDULED
Status: DISCONTINUED | OUTPATIENT
Start: 2019-12-17 | End: 2019-12-17 | Stop reason: HOSPADM

## 2019-12-17 RX ORDER — SODIUM CHLORIDE, SODIUM LACTATE, POTASSIUM CHLORIDE, CALCIUM CHLORIDE 600; 310; 30; 20 MG/100ML; MG/100ML; MG/100ML; MG/100ML
9 INJECTION, SOLUTION INTRAVENOUS CONTINUOUS
Status: DISCONTINUED | OUTPATIENT
Start: 2019-12-17 | End: 2019-12-17 | Stop reason: HOSPADM

## 2019-12-17 RX ORDER — EPHEDRINE SULFATE 50 MG/ML
INJECTION, SOLUTION INTRAVENOUS AS NEEDED
Status: DISCONTINUED | OUTPATIENT
Start: 2019-12-17 | End: 2019-12-17 | Stop reason: SURG

## 2019-12-17 RX ORDER — HYDRALAZINE HYDROCHLORIDE 20 MG/ML
5 INJECTION INTRAMUSCULAR; INTRAVENOUS
Status: DISCONTINUED | OUTPATIENT
Start: 2019-12-17 | End: 2019-12-17 | Stop reason: HOSPADM

## 2019-12-17 RX ORDER — HYDROMORPHONE HYDROCHLORIDE 1 MG/ML
0.5 INJECTION, SOLUTION INTRAMUSCULAR; INTRAVENOUS; SUBCUTANEOUS
Status: DISCONTINUED | OUTPATIENT
Start: 2019-12-17 | End: 2019-12-17 | Stop reason: HOSPADM

## 2019-12-17 RX ORDER — ONDANSETRON 2 MG/ML
INJECTION INTRAMUSCULAR; INTRAVENOUS AS NEEDED
Status: DISCONTINUED | OUTPATIENT
Start: 2019-12-17 | End: 2019-12-17 | Stop reason: SURG

## 2019-12-17 RX ORDER — EPHEDRINE SULFATE 50 MG/ML
5 INJECTION, SOLUTION INTRAVENOUS ONCE AS NEEDED
Status: DISCONTINUED | OUTPATIENT
Start: 2019-12-17 | End: 2019-12-17 | Stop reason: HOSPADM

## 2019-12-17 RX ORDER — GLYCOPYRROLATE 0.2 MG/ML
INJECTION INTRAMUSCULAR; INTRAVENOUS AS NEEDED
Status: DISCONTINUED | OUTPATIENT
Start: 2019-12-17 | End: 2019-12-17 | Stop reason: SURG

## 2019-12-17 RX ORDER — LIDOCAINE HYDROCHLORIDE 10 MG/ML
0.5 INJECTION, SOLUTION EPIDURAL; INFILTRATION; INTRACAUDAL; PERINEURAL ONCE AS NEEDED
Status: DISCONTINUED | OUTPATIENT
Start: 2019-12-17 | End: 2019-12-17 | Stop reason: HOSPADM

## 2019-12-17 RX ORDER — MIDAZOLAM HYDROCHLORIDE 1 MG/ML
1 INJECTION INTRAMUSCULAR; INTRAVENOUS
Status: DISCONTINUED | OUTPATIENT
Start: 2019-12-17 | End: 2019-12-17 | Stop reason: HOSPADM

## 2019-12-17 RX ORDER — FLUMAZENIL 0.1 MG/ML
0.2 INJECTION INTRAVENOUS AS NEEDED
Status: DISCONTINUED | OUTPATIENT
Start: 2019-12-17 | End: 2019-12-17 | Stop reason: HOSPADM

## 2019-12-17 RX ORDER — FENTANYL CITRATE 50 UG/ML
50 INJECTION, SOLUTION INTRAMUSCULAR; INTRAVENOUS
Status: DISCONTINUED | OUTPATIENT
Start: 2019-12-17 | End: 2019-12-17 | Stop reason: HOSPADM

## 2019-12-17 RX ORDER — ACETAMINOPHEN 650 MG
TABLET, EXTENDED RELEASE ORAL AS NEEDED
Status: DISCONTINUED | OUTPATIENT
Start: 2019-12-17 | End: 2019-12-17 | Stop reason: HOSPADM

## 2019-12-17 RX ORDER — LIDOCAINE HYDROCHLORIDE 20 MG/ML
INJECTION, SOLUTION INFILTRATION; PERINEURAL AS NEEDED
Status: DISCONTINUED | OUTPATIENT
Start: 2019-12-17 | End: 2019-12-17 | Stop reason: SURG

## 2019-12-17 RX ORDER — HYDROCODONE BITARTRATE AND ACETAMINOPHEN 7.5; 325 MG/1; MG/1
1 TABLET ORAL ONCE AS NEEDED
Status: COMPLETED | OUTPATIENT
Start: 2019-12-17 | End: 2019-12-17

## 2019-12-17 RX ORDER — ROCURONIUM BROMIDE 10 MG/ML
INJECTION, SOLUTION INTRAVENOUS AS NEEDED
Status: DISCONTINUED | OUTPATIENT
Start: 2019-12-17 | End: 2019-12-17 | Stop reason: SURG

## 2019-12-17 RX ORDER — ONDANSETRON 2 MG/ML
4 INJECTION INTRAMUSCULAR; INTRAVENOUS ONCE AS NEEDED
Status: DISCONTINUED | OUTPATIENT
Start: 2019-12-17 | End: 2019-12-17 | Stop reason: HOSPADM

## 2019-12-17 RX ORDER — PROMETHAZINE HYDROCHLORIDE 25 MG/ML
6.25 INJECTION, SOLUTION INTRAMUSCULAR; INTRAVENOUS ONCE AS NEEDED
Status: DISCONTINUED | OUTPATIENT
Start: 2019-12-17 | End: 2019-12-17 | Stop reason: HOSPADM

## 2019-12-17 RX ORDER — BUPIVACAINE HYDROCHLORIDE 2.5 MG/ML
INJECTION, SOLUTION EPIDURAL; INFILTRATION; INTRACAUDAL
Status: COMPLETED | OUTPATIENT
Start: 2019-12-17 | End: 2019-12-17

## 2019-12-17 RX ORDER — ONDANSETRON 4 MG/1
4 TABLET, FILM COATED ORAL EVERY 8 HOURS PRN
Qty: 30 TABLET | Refills: 0 | Status: SHIPPED | OUTPATIENT
Start: 2019-12-17 | End: 2020-02-11 | Stop reason: SDUPTHER

## 2019-12-17 RX ORDER — SODIUM CHLORIDE 0.9 % (FLUSH) 0.9 %
3-10 SYRINGE (ML) INJECTION AS NEEDED
Status: DISCONTINUED | OUTPATIENT
Start: 2019-12-17 | End: 2019-12-17 | Stop reason: HOSPADM

## 2019-12-17 RX ORDER — PROMETHAZINE HYDROCHLORIDE 25 MG/1
25 SUPPOSITORY RECTAL ONCE AS NEEDED
Status: DISCONTINUED | OUTPATIENT
Start: 2019-12-17 | End: 2019-12-17 | Stop reason: HOSPADM

## 2019-12-17 RX ORDER — FAMOTIDINE 10 MG/ML
20 INJECTION, SOLUTION INTRAVENOUS ONCE
Status: COMPLETED | OUTPATIENT
Start: 2019-12-17 | End: 2019-12-17

## 2019-12-17 RX ORDER — DOCUSATE SODIUM 100 MG/1
100 CAPSULE, LIQUID FILLED ORAL 2 TIMES DAILY
Qty: 60 CAPSULE | Refills: 0 | Status: SHIPPED | OUTPATIENT
Start: 2019-12-17 | End: 2020-01-14 | Stop reason: HOSPADM

## 2019-12-17 RX ORDER — MIDAZOLAM HYDROCHLORIDE 1 MG/ML
2 INJECTION INTRAMUSCULAR; INTRAVENOUS
Status: DISCONTINUED | OUTPATIENT
Start: 2019-12-17 | End: 2019-12-17 | Stop reason: HOSPADM

## 2019-12-17 RX ORDER — HYDROCODONE BITARTRATE AND ACETAMINOPHEN 7.5; 325 MG/1; MG/1
1-2 TABLET ORAL EVERY 4 HOURS PRN
Qty: 42 TABLET | Refills: 0 | Status: SHIPPED | OUTPATIENT
Start: 2019-12-17 | End: 2020-01-14 | Stop reason: HOSPADM

## 2019-12-17 RX ADMIN — Medication 2 MG: at 11:56

## 2019-12-17 RX ADMIN — CEFAZOLIN SODIUM 2 G: 2 INJECTION, SOLUTION INTRAVENOUS at 11:13

## 2019-12-17 RX ADMIN — FENTANYL CITRATE 50 MCG: 50 INJECTION, SOLUTION INTRAMUSCULAR; INTRAVENOUS at 10:03

## 2019-12-17 RX ADMIN — FAMOTIDINE 20 MG: 10 INJECTION INTRAVENOUS at 10:03

## 2019-12-17 RX ADMIN — ROCURONIUM BROMIDE 50 MG: 10 INJECTION, SOLUTION INTRAVENOUS at 11:08

## 2019-12-17 RX ADMIN — SODIUM CHLORIDE, POTASSIUM CHLORIDE, SODIUM LACTATE AND CALCIUM CHLORIDE: 600; 310; 30; 20 INJECTION, SOLUTION INTRAVENOUS at 11:53

## 2019-12-17 RX ADMIN — GLYCOPYRROLATE 0.2 MG: 0.2 INJECTION INTRAMUSCULAR; INTRAVENOUS at 11:13

## 2019-12-17 RX ADMIN — BUPIVACAINE 10 ML: 13.3 INJECTION, SUSPENSION, LIPOSOMAL INFILTRATION at 10:16

## 2019-12-17 RX ADMIN — PROPOFOL 200 MG: 10 INJECTION, EMULSION INTRAVENOUS at 11:08

## 2019-12-17 RX ADMIN — BUPIVACAINE HYDROCHLORIDE 10 ML: 2.5 INJECTION, SOLUTION EPIDURAL; INFILTRATION; INTRACAUDAL; PERINEURAL at 10:16

## 2019-12-17 RX ADMIN — PHENYLEPHRINE HYDROCHLORIDE 100 MCG: 10 INJECTION INTRAVENOUS at 11:26

## 2019-12-17 RX ADMIN — GLYCOPYRROLATE 0.4 MG: 0.2 INJECTION INTRAMUSCULAR; INTRAVENOUS at 11:56

## 2019-12-17 RX ADMIN — EPHEDRINE SULFATE 10 MG: 50 INJECTION INTRAVENOUS at 11:12

## 2019-12-17 RX ADMIN — MIDAZOLAM 2 MG: 1 INJECTION INTRAMUSCULAR; INTRAVENOUS at 10:03

## 2019-12-17 RX ADMIN — HYDROCODONE BITARTRATE AND ACETAMINOPHEN 1 TABLET: 7.5; 325 TABLET ORAL at 12:57

## 2019-12-17 RX ADMIN — SODIUM CHLORIDE, POTASSIUM CHLORIDE, SODIUM LACTATE AND CALCIUM CHLORIDE 9 ML/HR: 600; 310; 30; 20 INJECTION, SOLUTION INTRAVENOUS at 10:02

## 2019-12-17 RX ADMIN — LIDOCAINE HYDROCHLORIDE 40 MG: 20 INJECTION, SOLUTION INFILTRATION; PERINEURAL at 11:08

## 2019-12-17 RX ADMIN — HYDROMORPHONE HYDROCHLORIDE 0.5 MG: 1 INJECTION, SOLUTION INTRAMUSCULAR; INTRAVENOUS; SUBCUTANEOUS at 12:39

## 2019-12-17 RX ADMIN — PHENYLEPHRINE HYDROCHLORIDE 100 MCG: 10 INJECTION INTRAVENOUS at 11:38

## 2019-12-17 RX ADMIN — FENTANYL CITRATE 50 MCG: 50 INJECTION, SOLUTION INTRAMUSCULAR; INTRAVENOUS at 12:24

## 2019-12-17 RX ADMIN — ONDANSETRON HYDROCHLORIDE 4 MG: 2 SOLUTION INTRAMUSCULAR; INTRAVENOUS at 11:05

## 2019-12-17 RX ADMIN — PHENYLEPHRINE HYDROCHLORIDE 100 MCG: 10 INJECTION INTRAVENOUS at 11:15

## 2019-12-17 NOTE — ANESTHESIA PREPROCEDURE EVALUATION
Anesthesia Evaluation     Patient summary reviewed and Nursing notes reviewed   NPO Solid Status: > 8 hours             Airway   Mallampati: II  TM distance: >3 FB  Neck ROM: full  no difficulty expected  Dental - normal exam     Pulmonary - negative pulmonary ROS and normal exam   Cardiovascular - normal exam    (+) hypertension,       Neuro/Psych  (+) psychiatric history,     GI/Hepatic/Renal/Endo - negative ROS     Musculoskeletal (-) negative ROS    Abdominal  - normal exam   Substance History - negative use     OB/GYN negative ob/gyn ROS         Other                        Anesthesia Plan    ASA 2     general   (Isb popc    Hx etoh abuse  Panic d/o)  intravenous induction     Anesthetic plan, all risks, benefits, and alternatives have been provided, discussed and informed consent has been obtained with: patient.    Plan discussed with CRNA.

## 2019-12-17 NOTE — OP NOTE
Pre-Operative Diagnosis:  Right shoulder symptomatic acromioclavicular osteoarthritis and impingement syndrome    Post-Operative Diagnosis:  Right shoulder symptomatic acromioclavicular osteoarthritis and impingement syndrome    Procedure Performed: 1.  Arthroscopic subacromial decompression and extensive bursectomy  2.  Arthroscopic distal clavicle excision    Surgeon: Aj Mancilla MD     Assistant: Raquel Jefferson    Anesthesia: Regional followed general.     Complications: None.     Estimated Blood Loss: Less than 50 mL.     Implants: none    Specimens: * No orders in the log *    Brief Operative Indication:  The patient had a history of a right shoulder pain which had been refractory to prolonged conservative treatment.  We talked about surgical and non-surgical treatment options.  The patient was felt to be a candidate for operative intervention given the failure of conservative treatment.   I explained that surgical risks include infection, hematoma, hardware related complications including failure of fixation, cutout, arthrofibrosis, persistent pain and/or loss of motion, iatrogenic nerve and/or blood vessel injury resulting in permanent weakness, numbness or dysfunction, DVT, PE, positioning related neuropraxia, and anesthesia related complications resulting in death.     Description of Procedure in Detail:  The patient and operative site were identified in the preoperative holding area.  The surgical site was marked with the patient's confirmation.  Adequate regional anesthesia of the right upper extremity was administered by the anesthesiologist.  The patient was then taken to the operating room and placed in the supine position.  Adequate general anesthesia was then administered.  The patient was repositioned into the lateral decubitus position.  All bony prominences were carefully padded and protected.  The left upper extremity was prepped and draped in the standard sterile fashion.  I cleaned the  extremity with an alcohol solution.  A Hibiclens scrub was performed.  Lastly, the extremity was prepped with 2 ChloraPrep preps.  I allowed those to dry for approximately 3 minutes before the draping procedure was carried out.  A timeout was taken and preoperative antibiotics administered prior to surgical incision.      The arm was placed into 10 pounds of lateral traction.  A standard posterior portal was established.  The scope was inserted into the joint and directed anteriorly to the rotator interval where a standard anterior portal was established.  A 7 mm cannula was inserted into the joint and then a diagnostic arthroscopy performed.  There was no significant cuff pathology.  His rotator cuff was intact on the articular side including the visible portions of the subscapularis, supraspinatus, infraspinatus and teres minor.  The biceps tendon was intact and stable.  There was some fraying of the superior labrum but no significant tear.  This limited fraying was debrided with a shaver.  The remainder the labrum was probed and confirmed to be intact and stable.  His articular cartilage was well preserved with the exception of a small area of chondromalacia adjacent to the postero-superior glenoid.  Overall, no significant pathology was noted in the joint.    Having completed the work in the joint, I directed my attention to the subacromial space.  A standard lateral portal was established.  There was an extensive amount of inflamed appearing bursal tissue which was removed with a shaver.  There was a moderate subacromial spur and fraying of the coracoacromial ligament consistent with impingement syndrome.  An Arthrex Apollo device was used to release the coracoacromial ligament and then a barrel tip samantha used to level the undersurface of the acromion back to the level of the scapular spine.  At this point the rotator cuff was examined from the bursal side.  This was completely intact.     Next, I directed my  attention to the distal clavicle excision.  The acromioclavicular joint was exposed.  There was extensive arthropathy and spurring off of the inferior portion of the clavicle.  The Arthrex Apollo device was inserted through the anterior portal and used to release the inferior capsule.  The distal clavicle was exposed.  I removed approximately 7-9 mm of bone from the distal clavicle under direct visualization.  Several large cysts were encountered and these were debrided.  1 of the larger cysts anteriorly had to be curetted out.  Once I had completed the distal clavicle excision, I confirmed that there were no amputated fragments or remaining areas that warranted debridement by inserting the scope in both the lateral and anterior portals to visualize directly into the acromioclavicular joint.  I confirmed that this space was completely decompressed.  Final images were taken and saved.    The wounds were copiously irrigated out with sterile saline and closed in a layered fashion using Monocryl for the deep tissues and nylon for the skin.  Sterile dressings were applied.  The drapes were withdrawn.  The arm was placed in a sling.  The patient was awakened and taken to the recovery room in good condition.    Aj Mancilla MD    12/17/19

## 2019-12-17 NOTE — ANESTHESIA POSTPROCEDURE EVALUATION
Patient: Pro Mueller    Procedure Summary     Date:  12/17/19 Room / Location:   [facility]  /  [facility]    Anesthesia Start:  1054 Anesthesia Stop:  1209    Procedure:  SHOULDER ARTHROSCOPY, decompression, distal clavicle excision (Right Shoulder) Diagnosis:       Left shoulder pain, unspecified chronicity      (Left shoulder pain, unspecified chronicity [M25.512])    Surgeon:  Aj Mancilla MD Provider:  Prashant Crook MD    Anesthesia Type:  general ASA Status:  2          Anesthesia Type: general    Vitals  Vitals Value Taken Time   /66 12/17/2019  1:16 PM   Temp 36.2 °C (97.2 °F) 12/17/2019  1:00 PM   Pulse 80 12/17/2019  1:17 PM   Resp 16 12/17/2019  1:15 PM   SpO2 94 % 12/17/2019  1:17 PM   Vitals shown include unvalidated device data.        Post Anesthesia Care and Evaluation    Patient location during evaluation: bedside  Patient participation: complete - patient participated  Level of consciousness: awake  Pain score: 1  Pain management: adequate  Airway patency: patent  Anesthetic complications: No anesthetic complications  PONV Status: controlled  Cardiovascular status: acceptable  Respiratory status: acceptable  Hydration status: acceptable    Comments: --------------------            12/17/19               1315     --------------------   BP:       115/66     Pulse:      77       Resp:       16       Temp:                SpO2:      95%      --------------------

## 2019-12-17 NOTE — H&P
Patient: Pro Mueller    YOB: 1955    Medical Record Number: [id number]    Chief Complaints: Preop for right shoulder    History of Present Illness:     64 y.o. male patient who comes in today in anticipation of his right shoulder arthroscopy.  Denies any changes to his symptomatology.  Denies any changes to his medical history.  Of note, with regards to his penicillin allergy.  He tells me that he had some sort of reaction at the age of 5.  He is not sure what this was.  His grandmother told him that he should not have penicillins.  We discussed the risk with Kefzol and the fact that the risk should be low.  He is amenable to trying that medicine.    Allergies:   Allergies   Allergen Reactions   • Penicillins Anaphylaxis and Other (See Comments)     Seizure. childhood       Home Medications:    Current Facility-Administered Medications:   •  ceFAZolin in dextrose (ANCEF) IVPB solution 2 g, 2 g, Intravenous, Once, Aj Mancilla MD  •  fentaNYL citrate (PF) (SUBLIMAZE) injection 100 mcg, 100 mcg, Intravenous, Q15 Min PRN, Bob Dunaway MD, 50 mcg at 12/17/19 1003  •  lactated ringers infusion, 9 mL/hr, Intravenous, Continuous, Bob Dunaway MD, Last Rate: 9 mL/hr at 12/17/19 1002, 9 mL/hr at 12/17/19 1002  •  lidocaine PF 1% (XYLOCAINE) injection 0.5 mL, 0.5 mL, Injection, Once PRN, Bob Dunaway MD  •  midazolam (VERSED) injection 1 mg, 1 mg, Intravenous, Q5 Min PRN **OR** midazolam (VERSED) injection 2 mg, 2 mg, Intravenous, Q5 Min PRN, Bob Dunaway MD, 2 mg at 12/17/19 1003  •  sodium chloride 0.9 % flush 3 mL, 3 mL, Intravenous, Q12H, Bob Dunaway MD  •  sodium chloride 0.9 % flush 3-10 mL, 3-10 mL, Intravenous, PRN, Bob Dunaway MD    Past Medical History:   Diagnosis Date   • Alcohol abuse    • Anxiety    • Arthritis    • Atopic rhinitis 8/8/2016   • Depression    • Disease of thyroid gland    • Encounter for removal of sutures    • Genital herpes simplex 8/8/2016   •  Headache, tension-type    • Hyperlipidemia    • Hypertension    • Kidney stone    • Migraine    • Motion sickness    • Olecranon bursitis, right elbow    • Panic disorder without agoraphobia 2016   • Peripheral neuropathy    • Vitamin D deficiency 2016   • Withdrawal symptoms, alcohol (CMS/HCC)        Past Surgical History:   Procedure Laterality Date   • COLONOSCOPY     • CYST REMOVAL     • EXTRACORPOREAL SHOCK WAVE LITHOTRIPSY (ESWL) Right    • TONSILLECTOMY         Social History     Occupational History   • Not on file   Tobacco Use   • Smoking status: Former Smoker     Packs/day: 0.50     Years: 25.00     Pack years: 12.50     Types: Cigarettes     Last attempt to quit:      Years since quittin.9   • Smokeless tobacco: Never Used   Substance and Sexual Activity   • Alcohol use: Yes     Alcohol/week: 6.0 standard drinks     Types: 6 Shots of liquor per week     Comment: several night weekly,    • Drug use: No   • Sexual activity: Not on file      Social History     Social History Narrative   • Not on file       Family History   Problem Relation Age of Onset   • Alzheimer's disease Mother    • Pancreatic cancer Father    • Malig Hyperthermia Neg Hx        Review of Systems:      Constitutional: Denies fever, shaking or chills   Eyes: Denies change in visual acuity   HEENT: Denies nasal congestion or sore throat   Respiratory: Denies cough or shortness of breath   Cardiovascular: Denies chest pain or edema  Endocrine: Denies tremors, palpitations, intolerance of heat or cold, polyuria, polydipsia.  GI: Denies abdominal pain, nausea, vomiting, bloody stools or diarrhea  : Denies frequency, urgency, incontinence, retention, or nocturia.  Musculoskeletal: Denies numbness, tingling or loss of motor function except as above  Integument: Denies rash, lesion or ulceration   Neurologic: Denies headache or focal weakness, deficits  Heme: Denies spontaneous or excessive bleeding, epistaxis, hematuria,  melena, fatigue, enlarged or tender lymph nodes.      All other pertinent positives and negatives as noted above in HPI.    Physical Exam: 64 y.o. male  Vitals:    12/17/19 0930 12/17/19 1008 12/17/19 1013 12/17/19 1025   BP: 124/81  137/95    Pulse: 87 79 81 81   Resp: 18   20   Temp: 98.1 °F (36.7 °C)      TempSrc: Oral      SpO2: 96% 98% 98% 96%       General:  Patient is awake and alert.  Appears in no acute distress or discomfort.    Psych:  Affect and demeanor are appropriate.    Eyes:  Conjunctiva and sclera appear grossly normal.  Eyes track well and EOM seem to be intact.    Ears:  No gross abnormalities.  Hearing adequate for the exam.    Cardiovascular:  Regular rate and rhythm.    Lungs:  Good chest expansion.  Breathing unlabored.      Assessment/Plan: Right shoulder symptomatic acromioclavicular arthritis and associated impingement syndrome     I explained that surgical risks include infection, hematoma, persistent pain and/or loss of motion, iatrogenic nerve and/or blood vessel injury resulting in permanent weakness, numbness or dysfunction, RSD, DVT, PE, positioning related neuropraxia, and anesthesia related complications resulting in death.  We further discussed the possible need to address any rotator cuff, labral and/or biceps pathology, if found, at the time of the surgery.  I explained to patient that I would certainly plan to address this in the same surgical setting if we were to identify any unexpected pathology.  We discussed the risk associated with this including possible anchor related complications including failure of fixation, loosening, cutout of the anchors, chondrolysis, re-tear necessitating revision surgery.  We also discussed a possible tenotomy versus tenodesis of the biceps, just depending on where the pathology is located.  We discussed the fact that if the biceps demonstrates significant pathology in the groove that I would plan to perform a tenotomy which could result in  rui deformity or persistent pain, weakness or cramping.  We also discussed possible risks with a tenodesis as well including screw related complications including cutout, chondrolysis, failure of fixation with re-tear of the biceps, fracture and possible iatrogenic nerve injury.  The patient voiced understanding of the risks, benefits, and alternative forms of treatment that were discussed and the patient consents to proceed with an arthroscopic decompression and distal clavicle excision and addressing any unexpected pathology as indicated.  No guarantees were given regarding the results of this procedure.  I did answer all the questions posed by the patient in detail.

## 2019-12-17 NOTE — ANESTHESIA PROCEDURE NOTES
Peripheral Block    Pre-sedation assessment completed: 12/17/2019 10:05 AM    Patient reassessed immediately prior to procedure    Patient location during procedure: pre-op  Start time: 12/17/2019 10:05 AM  Stop time: 12/17/2019 10:15 AM  Reason for block: at surgeon's request and post-op pain management  Performed by  Anesthesiologist: Bob Dunaway MD  Preanesthetic Checklist  Completed: patient identified, site marked, surgical consent, pre-op evaluation, timeout performed, IV checked, risks and benefits discussed and monitors and equipment checked  Prep:  Pt Position: supine  Sterile barriers:cap, gloves, mask and sterile barriers  Prep: ChloraPrep  Patient monitoring: blood pressure monitoring, continuous pulse oximetry and EKG  Procedure  Sedation:yes  Performed under: local infiltration  Guidance:ultrasound guided  ULTRASOUND INTERPRETATION.  Using ultrasound guidance a 22 G gauge needle was placed in close proximity to the brachial plexus nerve, at which point, under ultrasound guidance anesthetic was injected in the area of the nerve and spread of the anesthesia was seen on ultrasound in close proximity thereto.  There were no abnormalities seen on ultrasound; a digital image was taken; and the patient tolerated the procedure with no complications. Images:still images obtained    Laterality:right  Block Type:interscalene  Injection Technique:single-shot  Needle Type:echogenic  Needle Gauge:22 G  Resistance on Injection: less than 15 psi    Medications Used: bupivacaine liposome (EXPAREL) 1.3 % injection, 10 mL  bupivacaine PF (MARCAINE) 0.25 % injection, 10 mL  Med admintered at 12/17/2019 10:16 AM      Medications  Comment:exparel - 10cc  bup 0.25 % - 10cc    Post Assessment  Injection Assessment: negative aspiration for heme, no paresthesia on injection and incremental injection  Patient Tolerance:comfortable throughout block  Complications:no

## 2019-12-17 NOTE — BRIEF OP NOTE
SHOULDER ARTHROSCOPY  Progress Note    Pro Mueller  12/17/2019    Pre-op Diagnosis:   Left shoulder pain, unspecified chronicity [M25.512]       Post-Op Diagnosis Codes:     * Left shoulder pain, unspecified chronicity [M25.512]    Procedure/CPT® Codes:      Procedure(s):  SHOULDER ARTHROSCOPY, decompression, distal clavicle excision    Surgeon(s):  Aj Mancilla MD    Anesthesia: General with Block    Staff:   Circulator: France Leavitt RN; Salud Granados RN  Scrub Person: Jinny Luke Stephanie A  Vendor Representative: Say Chin  Assistant: Raquel Jefferson APRN    Estimated Blood Loss: minimal    Urine Voided: * No values recorded between 12/17/2019 10:49 AM and 12/17/2019 11:59 AM *    Specimens:                None          Drains: * No LDAs found *    Findings: SEE DICTATION    Complications: NONE      Aj Mancilla MD     Date: 12/17/2019  Time: 11:59 AM

## 2019-12-17 NOTE — BRIEF OP NOTE
SHOULDER ARTHROSCOPY  Progress Note    Pro Mueller  12/17/2019    Pre-op Diagnosis:   Left shoulder pain, unspecified chronicity [M25.512]       Post-Op Diagnosis Codes:     * Left shoulder pain, unspecified chronicity [M25.512]    Procedure/CPT® Codes:      Procedure(s):  SHOULDER ARTHROSCOPY, decompression, distal clavicle excision    Surgeon(s):  Aj Mancilla MD    Anesthesia: General with Block    Staff:   Circulator: France Leavitt RN; Salud Granados RN  Scrub Person: Jinny Luke Stephanie A  Vendor Representative: Say Chin  Assistant: Raquel Jefferson APRN    Estimated Blood Loss: minimal    Urine Voided: * No values recorded between 12/17/2019 10:49 AM and 12/17/2019 12:06 PM *    Specimens:                None          Drains: * No LDAs found *    Findings: see dictation    Complications: none      Aj Mancilla MD     Date: 12/17/2019  Time: 12:28 PM

## 2019-12-17 NOTE — ANESTHESIA PROCEDURE NOTES
Airway  Urgency: elective    Date/Time: 12/17/2019 11:10 AM  Airway not difficult    General Information and Staff    Patient location during procedure: OR  Anesthesiologist: Prashant Crook MD  CRNA: Apryl Flores CRNA    Indications and Patient Condition  Indications for airway management: airway protection    Preoxygenated: yes  Mask difficulty assessment: 1 - vent by mask    Final Airway Details  Final airway type: endotracheal airway      Successful airway: ETT  Cuffed: yes   Successful intubation technique: direct laryngoscopy  Facilitating devices/methods: intubating stylet and anterior pressure/BURP  Endotracheal tube insertion site: oral  Blade: Long  Blade size: 2  ETT size (mm): 7.5  Cormack-Lehane Classification: grade IIa - partial view of glottis  Placement verified by: chest auscultation and capnometry   Cuff volume (mL): 7  Measured from: teeth  ETT/EBT  to teeth (cm): 21  Number of attempts at approach: 1  Assessment: lips, teeth, and gum same as pre-op and atraumatic intubation

## 2019-12-17 NOTE — NURSING NOTE
Call placed to Dr Dunaway regarding patient pain level of 5, was instructed to treat with medication .

## 2019-12-18 ENCOUNTER — TELEPHONE (OUTPATIENT)
Dept: ORTHOPEDIC SURGERY | Facility: CLINIC | Age: 64
End: 2019-12-18

## 2019-12-18 NOTE — TELEPHONE ENCOUNTER
Called patient post op and he is doing ok. He is scheduled for a f/u appt on 12/27 ([facility]) and was advised to call with any questions or problems.

## 2020-01-02 ENCOUNTER — TELEPHONE (OUTPATIENT)
Dept: ORTHOPEDIC SURGERY | Facility: CLINIC | Age: 65
End: 2020-01-02

## 2020-01-11 ENCOUNTER — HOSPITAL ENCOUNTER (INPATIENT)
Facility: HOSPITAL | Age: 65
LOS: 3 days | Discharge: HOME | End: 2020-01-14
Attending: EMERGENCY MEDICINE | Admitting: INTERNAL MEDICINE

## 2020-01-11 ENCOUNTER — APPOINTMENT (OUTPATIENT)
Dept: CT IMAGING | Facility: HOSPITAL | Age: 65
End: 2020-01-11

## 2020-01-11 DIAGNOSIS — Z98.890 STATUS POST ARTHROSCOPY OF SHOULDER: ICD-10-CM

## 2020-01-11 DIAGNOSIS — R11.2 NAUSEA AND VOMITING, INTRACTABILITY OF VOMITING NOT SPECIFIED, UNSPECIFIED VOMITING TYPE: Primary | ICD-10-CM

## 2020-01-11 DIAGNOSIS — F10.930 ALCOHOL WITHDRAWAL SYNDROME WITHOUT COMPLICATION (HCC): ICD-10-CM

## 2020-01-11 DIAGNOSIS — E87.29 ALCOHOLIC KETOACIDOSIS: ICD-10-CM

## 2020-01-11 LAB
ALBUMIN SERPL-MCNC: 5.1 G/DL (ref 3.5–5.2)
ALBUMIN/GLOB SERPL: 1.7 G/DL
ALP SERPL-CCNC: 87 U/L (ref 39–117)
ALT SERPL W P-5'-P-CCNC: 123 U/L (ref 1–41)
AMPHET+METHAMPHET UR QL: NEGATIVE
ANION GAP SERPL CALCULATED.3IONS-SCNC: 36.8 MMOL/L (ref 5–15)
AST SERPL-CCNC: 348 U/L (ref 1–40)
BARBITURATES UR QL SCN: NEGATIVE
BASOPHILS # BLD AUTO: 0.03 10*3/MM3 (ref 0–0.2)
BASOPHILS NFR BLD AUTO: 0.9 % (ref 0–1.5)
BENZODIAZ UR QL SCN: POSITIVE
BILIRUB SERPL-MCNC: 0.8 MG/DL (ref 0.2–1.2)
BUN BLD-MCNC: 11 MG/DL (ref 8–23)
BUN/CREAT SERPL: 12.1 (ref 7–25)
CALCIUM SPEC-SCNC: 8.6 MG/DL (ref 8.6–10.5)
CANNABINOIDS SERPL QL: NEGATIVE
CHLORIDE SERPL-SCNC: 85 MMOL/L (ref 98–107)
CO2 SERPL-SCNC: 11.2 MMOL/L (ref 22–29)
COCAINE UR QL: NEGATIVE
CREAT BLD-MCNC: 0.91 MG/DL (ref 0.76–1.27)
D-LACTATE SERPL-SCNC: 3.4 MMOL/L (ref 0.5–2)
D-LACTATE SERPL-SCNC: 3.7 MMOL/L (ref 0.5–2)
DEPRECATED RDW RBC AUTO: 47.4 FL (ref 37–54)
EOSINOPHIL # BLD AUTO: 0 10*3/MM3 (ref 0–0.4)
EOSINOPHIL NFR BLD AUTO: 0 % (ref 0.3–6.2)
ERYTHROCYTE [DISTWIDTH] IN BLOOD BY AUTOMATED COUNT: 13.2 % (ref 12.3–15.4)
ETHANOL BLD-MCNC: 209 MG/DL (ref 0–10)
ETHANOL UR QL: 0.21 %
GFR SERPL CREATININE-BSD FRML MDRD: 84 ML/MIN/1.73
GLOBULIN UR ELPH-MCNC: 3 GM/DL
GLUCOSE BLD-MCNC: 93 MG/DL (ref 65–99)
HCT VFR BLD AUTO: 45.7 % (ref 37.5–51)
HGB BLD-MCNC: 15.4 G/DL (ref 13–17.7)
HOLD SPECIMEN: NORMAL
IMM GRANULOCYTES # BLD AUTO: 0.01 10*3/MM3 (ref 0–0.05)
IMM GRANULOCYTES NFR BLD AUTO: 0.3 % (ref 0–0.5)
LIPASE SERPL-CCNC: 113 U/L (ref 13–60)
LYMPHOCYTES # BLD AUTO: 1.42 10*3/MM3 (ref 0.7–3.1)
LYMPHOCYTES NFR BLD AUTO: 40.8 % (ref 19.6–45.3)
MCH RBC QN AUTO: 32.7 PG (ref 26.6–33)
MCHC RBC AUTO-ENTMCNC: 33.7 G/DL (ref 31.5–35.7)
MCV RBC AUTO: 97 FL (ref 79–97)
METHADONE UR QL SCN: NEGATIVE
MONOCYTES # BLD AUTO: 0.39 10*3/MM3 (ref 0.1–0.9)
MONOCYTES NFR BLD AUTO: 11.2 % (ref 5–12)
NEUTROPHILS # BLD AUTO: 1.63 10*3/MM3 (ref 1.7–7)
NEUTROPHILS NFR BLD AUTO: 46.8 % (ref 42.7–76)
NRBC BLD AUTO-RTO: 0 /100 WBC (ref 0–0.2)
OPIATES UR QL: NEGATIVE
OXYCODONE UR QL SCN: NEGATIVE
PLATELET # BLD AUTO: 103 10*3/MM3 (ref 140–450)
PMV BLD AUTO: 10.5 FL (ref 6–12)
POTASSIUM BLD-SCNC: 3.7 MMOL/L (ref 3.5–5.2)
PROCALCITONIN SERPL-MCNC: 0.22 NG/ML (ref 0.1–0.25)
PROT SERPL-MCNC: 8.1 G/DL (ref 6–8.5)
RBC # BLD AUTO: 4.71 10*6/MM3 (ref 4.14–5.8)
SALICYLATES SERPL-MCNC: <0.3 MG/DL
SODIUM BLD-SCNC: 133 MMOL/L (ref 136–145)
TROPONIN T SERPL-MCNC: <0.01 NG/ML (ref 0–0.03)
WBC NRBC COR # BLD: 3.48 10*3/MM3 (ref 3.4–10.8)
WHOLE BLOOD HOLD SPECIMEN: NORMAL
WHOLE BLOOD HOLD SPECIMEN: NORMAL

## 2020-01-11 PROCEDURE — 25010000002 THIAMINE PER 100 MG: Performed by: EMERGENCY MEDICINE

## 2020-01-11 PROCEDURE — 84484 ASSAY OF TROPONIN QUANT: CPT | Performed by: EMERGENCY MEDICINE

## 2020-01-11 PROCEDURE — 80307 DRUG TEST PRSMV CHEM ANLYZR: CPT | Performed by: EMERGENCY MEDICINE

## 2020-01-11 PROCEDURE — 25010000002 ONDANSETRON PER 1 MG: Performed by: EMERGENCY MEDICINE

## 2020-01-11 PROCEDURE — 74177 CT ABD & PELVIS W/CONTRAST: CPT

## 2020-01-11 PROCEDURE — 83605 ASSAY OF LACTIC ACID: CPT | Performed by: EMERGENCY MEDICINE

## 2020-01-11 PROCEDURE — 80053 COMPREHEN METABOLIC PANEL: CPT | Performed by: EMERGENCY MEDICINE

## 2020-01-11 PROCEDURE — 85025 COMPLETE CBC W/AUTO DIFF WBC: CPT | Performed by: EMERGENCY MEDICINE

## 2020-01-11 PROCEDURE — 93005 ELECTROCARDIOGRAM TRACING: CPT | Performed by: EMERGENCY MEDICINE

## 2020-01-11 PROCEDURE — 36415 COLL VENOUS BLD VENIPUNCTURE: CPT

## 2020-01-11 PROCEDURE — 25010000002 MAGNESIUM SULFATE PER 500 MG OF MAGNESIUM: Performed by: EMERGENCY MEDICINE

## 2020-01-11 PROCEDURE — 83690 ASSAY OF LIPASE: CPT | Performed by: EMERGENCY MEDICINE

## 2020-01-11 PROCEDURE — 25010000002 LORAZEPAM PER 2 MG: Performed by: EMERGENCY MEDICINE

## 2020-01-11 PROCEDURE — 93010 ELECTROCARDIOGRAM REPORT: CPT | Performed by: INTERNAL MEDICINE

## 2020-01-11 PROCEDURE — 25010000002 IOPAMIDOL 61 % SOLUTION: Performed by: EMERGENCY MEDICINE

## 2020-01-11 PROCEDURE — 99284 EMERGENCY DEPT VISIT MOD MDM: CPT

## 2020-01-11 PROCEDURE — 84145 PROCALCITONIN (PCT): CPT | Performed by: EMERGENCY MEDICINE

## 2020-01-11 PROCEDURE — 87040 BLOOD CULTURE FOR BACTERIA: CPT | Performed by: EMERGENCY MEDICINE

## 2020-01-11 RX ORDER — LEVOTHYROXINE SODIUM 0.07 MG/1
75 TABLET ORAL
Status: DISCONTINUED | OUTPATIENT
Start: 2020-01-12 | End: 2020-01-14 | Stop reason: HOSPADM

## 2020-01-11 RX ORDER — LISINOPRIL 10 MG/1
10 TABLET ORAL DAILY
Status: DISCONTINUED | OUTPATIENT
Start: 2020-01-12 | End: 2020-01-14 | Stop reason: HOSPADM

## 2020-01-11 RX ORDER — ATORVASTATIN CALCIUM 20 MG/1
20 TABLET, FILM COATED ORAL DAILY
Status: DISCONTINUED | OUTPATIENT
Start: 2020-01-12 | End: 2020-01-13

## 2020-01-11 RX ORDER — ONDANSETRON 2 MG/ML
4 INJECTION INTRAMUSCULAR; INTRAVENOUS ONCE
Status: COMPLETED | OUTPATIENT
Start: 2020-01-11 | End: 2020-01-11

## 2020-01-11 RX ORDER — BUPROPION HYDROCHLORIDE 150 MG/1
300 TABLET, EXTENDED RELEASE ORAL DAILY
Status: DISCONTINUED | OUTPATIENT
Start: 2020-01-12 | End: 2020-01-14 | Stop reason: HOSPADM

## 2020-01-11 RX ORDER — CLONAZEPAM 1 MG/1
2 TABLET ORAL NIGHTLY
Status: DISCONTINUED | OUTPATIENT
Start: 2020-01-12 | End: 2020-01-14 | Stop reason: HOSPADM

## 2020-01-11 RX ORDER — LORAZEPAM 2 MG/ML
1 INJECTION INTRAMUSCULAR ONCE
Status: COMPLETED | OUTPATIENT
Start: 2020-01-11 | End: 2020-01-11

## 2020-01-11 RX ORDER — ACYCLOVIR 800 MG/1
800 TABLET ORAL EVERY MORNING
Status: DISCONTINUED | OUTPATIENT
Start: 2020-01-12 | End: 2020-01-14 | Stop reason: HOSPADM

## 2020-01-11 RX ORDER — SODIUM CHLORIDE 0.9 % (FLUSH) 0.9 %
10 SYRINGE (ML) INJECTION AS NEEDED
Status: DISCONTINUED | OUTPATIENT
Start: 2020-01-11 | End: 2020-01-14 | Stop reason: HOSPADM

## 2020-01-11 RX ORDER — AMLODIPINE BESYLATE 5 MG/1
5 TABLET ORAL EVERY MORNING
Status: DISCONTINUED | OUTPATIENT
Start: 2020-01-12 | End: 2020-01-14 | Stop reason: HOSPADM

## 2020-01-11 RX ORDER — ONDANSETRON 4 MG/1
8 TABLET, ORALLY DISINTEGRATING ORAL EVERY 8 HOURS PRN
Status: DISCONTINUED | OUTPATIENT
Start: 2020-01-11 | End: 2020-01-14 | Stop reason: HOSPADM

## 2020-01-11 RX ORDER — TRAMADOL HYDROCHLORIDE 50 MG/1
50 TABLET ORAL EVERY 6 HOURS PRN
Status: DISCONTINUED | OUTPATIENT
Start: 2020-01-11 | End: 2020-01-14 | Stop reason: HOSPADM

## 2020-01-11 RX ORDER — MIRTAZAPINE 15 MG/1
15 TABLET, FILM COATED ORAL NIGHTLY
Status: DISCONTINUED | OUTPATIENT
Start: 2020-01-12 | End: 2020-01-14 | Stop reason: HOSPADM

## 2020-01-11 RX ORDER — GABAPENTIN 300 MG/1
300 CAPSULE ORAL 3 TIMES DAILY
Status: DISCONTINUED | OUTPATIENT
Start: 2020-01-12 | End: 2020-01-14 | Stop reason: HOSPADM

## 2020-01-11 RX ADMIN — THIAMINE HYDROCHLORIDE 1000 ML/HR: 100 INJECTION, SOLUTION INTRAMUSCULAR; INTRAVENOUS at 18:11

## 2020-01-11 RX ADMIN — ONDANSETRON 4 MG: 2 INJECTION INTRAMUSCULAR; INTRAVENOUS at 17:47

## 2020-01-11 RX ADMIN — SODIUM CHLORIDE, POTASSIUM CHLORIDE, SODIUM LACTATE AND CALCIUM CHLORIDE 2790 ML: 600; 310; 30; 20 INJECTION, SOLUTION INTRAVENOUS at 19:40

## 2020-01-11 RX ADMIN — LORAZEPAM 1 MG: 2 INJECTION INTRAMUSCULAR; INTRAVENOUS at 20:56

## 2020-01-11 RX ADMIN — SODIUM CHLORIDE, PRESERVATIVE FREE 10 ML: 5 INJECTION INTRAVENOUS at 19:43

## 2020-01-11 RX ADMIN — IOPAMIDOL 85 ML: 612 INJECTION, SOLUTION INTRAVENOUS at 18:19

## 2020-01-11 NOTE — ED PROVIDER NOTES
"EMERGENCY DEPARTMENT ENCOUNTER    Room Number:  09/09  PCP: Alla Beal MD  Historian: patient  History Limited By: intoxication      HPI  Chief Complaint: lightheadedness and LLQ abdominal pain  Context: Pro Mueller is a 65 y.o. male who presents to the ED c/o gradual onset of lightheadedness and LLQ abdominal pain that began a couple weeks ago. Pt reports that he has been drinking \"pretty nonstop\" for the last several weeks but not consuming any solid food and admits that he drinks about an 8oz glass of vodka per day. He notes last drink was about 2-3 hours ago. Pt reports recent Hx of laparoscopic surgery on R shoulder.       Location: LLQ abdomen; general  Radiation: none  Character: pain; lightheadedness  Duration: couple of weeks  Severity: mild  Progression: unchanged  Aggravating Factors: n/a  Alleviating Factors: n/a        PAST MEDICAL HISTORY  Active Ambulatory Problems     Diagnosis Date Noted   • Alcoholism (CMS/Formerly [facility]) 08/08/2016   • Atopic rhinitis 08/08/2016   • Mixed anxiety depressive disorder 08/08/2016   • Genital herpes simplex 08/08/2016   • Hyperlipidemia 08/08/2016   • Hypertension 08/08/2016   • Hypothyroidism 08/08/2016   • Insomnia 08/08/2016   • Panic disorder without agoraphobia 08/08/2016   • Persistent insomnia 08/08/2016   • Vitamin D deficiency 08/08/2016   • Alcohol withdrawal (CMS/Formerly [facility]) 11/04/2016   • Neuropathy involving both lower extremities 10/25/2018   • Syncope and collapse 01/02/2019   • Abnormal EKG 01/03/2019   • Left shoulder pain 11/19/2019     Resolved Ambulatory Problems     Diagnosis Date Noted   • Accidental fall 08/08/2016   • Impacted cerumen 08/08/2016   • Influenza 08/08/2016   • Motion sickness 08/08/2016   • Seasonal allergic rhinitis 08/08/2016   • Upper respiratory tract infection 08/08/2016   • Headache 11/05/2016   • Gastritis 11/05/2016   • Low back pain 11/11/2016   • Olecranon bursitis of right elbow 04/05/2017   • Sciatica of left side 06/12/2018 "     Past Medical History:   Diagnosis Date   • Alcohol abuse    • Anxiety    • Arthritis    • Depression    • Disease of thyroid gland    • Encounter for removal of sutures    • Headache, tension-type    • Kidney stone    • Migraine    • Olecranon bursitis, right elbow    • Peripheral neuropathy    • Withdrawal symptoms, alcohol (CMS/HCC)          PAST SURGICAL HISTORY  Past Surgical History:   Procedure Laterality Date   • COLONOSCOPY     • CYST REMOVAL     • EXTRACORPOREAL SHOCK WAVE LITHOTRIPSY (ESWL) Right 2002   • SHOULDER ARTHROSCOPY Right 12/17/2019    Procedure: SHOULDER ARTHROSCOPY, decompression, distal clavicle excision;  Surgeon: Aj Mancilla MD;  Location: [facility] OR [facility];  Service: Orthopedics   • TONSILLECTOMY           FAMILY HISTORY  Family History   Problem Relation Age of Onset   • Alzheimer's disease Mother    • Pancreatic cancer Father    • Malig Hyperthermia Neg Hx          SOCIAL HISTORY  Social History     Socioeconomic History   • Marital status:      Spouse name: Not on file   • Number of children: Not on file   • Years of education: Not on file   • Highest education level: Not on file   Tobacco Use   • Smoking status: Former Smoker     Packs/day: 0.50     Years: 25.00     Pack years: 12.50     Types: Cigarettes     Last attempt to quit: 2010     Years since quitting: 10.0   • Smokeless tobacco: Never Used   Substance and Sexual Activity   • Alcohol use: Yes     Alcohol/week: 42.0 standard drinks     Types: 42 Shots of liquor per week     Comment: Pt reports drinking alot of vodka daily.    • Drug use: No         ALLERGIES  Penicillins        REVIEW OF SYSTEMS  Review of Systems   Constitutional: Negative for activity change, appetite change and fever.   HENT: Negative for congestion and sore throat.    Eyes: Negative.    Respiratory: Negative for cough and shortness of breath.    Cardiovascular: Negative for chest pain and leg swelling.   Gastrointestinal: Positive for abdominal  pain ( LLQ). Negative for diarrhea and vomiting.   Endocrine: Negative.    Genitourinary: Negative for decreased urine volume and dysuria.   Musculoskeletal: Negative for neck pain.   Skin: Negative for rash and wound.   Allergic/Immunologic: Negative.    Neurological: Positive for light-headedness. Negative for weakness, numbness and headaches.   Hematological: Negative.    Psychiatric/Behavioral: Negative.    All other systems reviewed and are negative.           PHYSICAL EXAM  ED Triage Vitals   Temp Heart Rate Resp BP SpO2   01/11/20 1640 01/11/20 1640 01/11/20 1702 01/11/20 1702 01/11/20 [facility]   98.3 °F (36.8 °C) 111 16 (!) 163/101 95 %      Temp src Heart Rate Source Patient Position BP Location FiO2 (%)   01/11/20 [facility] 01/11/20 [facility] -- -- --   Tympanic Monitor          Physical Exam   Constitutional: He is oriented to person, place, and time. No distress.   Appears intoxicated   HENT:   Head: Normocephalic and atraumatic.   Eyes: Pupils are equal, round, and reactive to light. EOM are normal.   Neck: Normal range of motion. Neck supple.   Cardiovascular: Normal rate, regular rhythm and normal heart sounds.   Pulmonary/Chest: Effort normal and breath sounds normal. No respiratory distress.   Abdominal: Soft. There is generalized tenderness. There is no rebound and no guarding.   Musculoskeletal: Normal range of motion. He exhibits no edema.   stitches in R shoulder   Neurological: He is alert and oriented to person, place, and time. He has normal sensation and normal strength.   Skin: Skin is warm and dry.   Psychiatric: Mood and affect normal.   Nursing note and vitals reviewed.          LAB RESULTS  Recent Results (from the past 24 hour(s))   Light Blue Top    Collection Time: 01/11/20  5:15 PM   Result Value Ref Range    Extra Tube hold for add-on    Green Top (Gel)    Collection Time: 01/11/20  5:15 PM   Result Value Ref Range    Extra Tube Hold for add-ons.    Lavender Top    Collection Time: 01/11/20   5:15 PM   Result Value Ref Range    Extra Tube hold for add-on    Gold Top - SST    Collection Time: 01/11/20  5:15 PM   Result Value Ref Range    Extra Tube Hold for add-ons.    Comprehensive Metabolic Panel    Collection Time: 01/11/20  5:15 PM   Result Value Ref Range    Glucose 93 65 - 99 mg/dL    BUN 11 8 - 23 mg/dL    Creatinine 0.91 0.76 - 1.27 mg/dL    Sodium 133 (L) 136 - 145 mmol/L    Potassium 3.7 3.5 - 5.2 mmol/L    Chloride 85 (L) 98 - 107 mmol/L    CO2 11.2 (L) 22.0 - 29.0 mmol/L    Calcium 8.6 8.6 - 10.5 mg/dL    Total Protein 8.1 6.0 - 8.5 g/dL    Albumin 5.10 3.50 - 5.20 g/dL    ALT (SGPT) 123 (H) 1 - 41 U/L    AST (SGOT) 348 (H) 1 - 40 U/L    Alkaline Phosphatase 87 39 - 117 U/L    Total Bilirubin 0.8 0.2 - 1.2 mg/dL    eGFR Non African Amer 84 >60 mL/min/1.73    Globulin 3.0 gm/dL    A/G Ratio 1.7 g/dL    BUN/Creatinine Ratio 12.1 7.0 - 25.0    Anion Gap 36.8 (H) 5.0 - 15.0 mmol/L   Lipase    Collection Time: 01/11/20  5:15 PM   Result Value Ref Range    Lipase 113 (H) 13 - 60 U/L   Ethanol    Collection Time: 01/11/20  5:15 PM   Result Value Ref Range    Ethanol 209 (H) 0 - 10 mg/dL    Ethanol % 0.209 %   Troponin    Collection Time: 01/11/20  5:15 PM   Result Value Ref Range    Troponin T <0.010 0.000 - 0.030 ng/mL   CBC Auto Differential    Collection Time: 01/11/20  5:15 PM   Result Value Ref Range    WBC 3.48 3.40 - 10.80 10*3/mm3    RBC 4.71 4.14 - 5.80 10*6/mm3    Hemoglobin 15.4 13.0 - 17.7 g/dL    Hematocrit 45.7 37.5 - 51.0 %    MCV 97.0 79.0 - 97.0 fL    MCH 32.7 26.6 - 33.0 pg    MCHC 33.7 31.5 - 35.7 g/dL    RDW 13.2 12.3 - 15.4 %    RDW-SD 47.4 37.0 - 54.0 fl    MPV 10.5 6.0 - 12.0 fL    Platelets 103 (L) 140 - 450 10*3/mm3    Neutrophil % 46.8 42.7 - 76.0 %    Lymphocyte % 40.8 19.6 - 45.3 %    Monocyte % 11.2 5.0 - 12.0 %    Eosinophil % 0.0 (L) 0.3 - 6.2 %    Basophil % 0.9 0.0 - 1.5 %    Immature Grans % 0.3 0.0 - 0.5 %    Neutrophils, Absolute 1.63 (L) 1.70 - 7.00 10*3/mm3     Lymphocytes, Absolute 1.42 0.70 - 3.10 10*3/mm3    Monocytes, Absolute 0.39 0.10 - 0.90 10*3/mm3    Eosinophils, Absolute 0.00 0.00 - 0.40 10*3/mm3    Basophils, Absolute 0.03 0.00 - 0.20 10*3/mm3    Immature Grans, Absolute 0.01 0.00 - 0.05 10*3/mm3    nRBC 0.0 0.0 - 0.2 /100 WBC   Salicylate Level    Collection Time: 01/11/20  5:15 PM   Result Value Ref Range    Salicylate <0.3 <=30.0 mg/dL   Lactic Acid, Plasma    Collection Time: 01/11/20  6:11 PM   Result Value Ref Range    Lactate 3.4 (C) 0.5 - 2.0 mmol/L       Ordered the above labs and reviewed the results.        RADIOLOGY  CT Abdomen Pelvis With Contrast   Final Result   1. Urinary bladder is mostly decompressed and appears mildly   thick-walled and this is most likely chronic. There is mild prostate   gland enlargement.   2. 6 mm right lower pole renal stone. Bilateral renal cysts.   3. Diffuse fat infiltration of the liver.   4. Cholelithiasis.   5. Colonic diverticulosis without evidence for diverticulitis. There is   fat density within the wall of the colon and this finding may be normal   thought can have a correlation with previous episodes of colitis. This   is chronic and there is no acute colitis.       Discussed with Dr. Ramirez in the emergency department 01/11/2020 at   6:30 PM.        Radiation dose reduction techniques were utilized, including automated   exposure control and exposure modulation based on body size.       This report was finalized on 1/11/2020 7:00 PM by Dr. Evelio Barnard M.D.               Ordered the above noted radiological studies. Reviewed by me in PACS.        PROCEDURES  Procedures      EKG:          EKG time: 1732  Rhythm/Rate: NSR rate 89  P waves and CA: nl  QRS, axis: nl   ST and T waves: nonspecific ST and T wave changes      Interpreted Contemporaneously by me, independently viewed  unchanged compared to prior 12/06/19        MEDICATIONS GIVEN IN ER  Medications   sodium chloride 0.9 % flush 10 mL (10 mL  "Intravenous Given 1/11/20 1943)   multiple vitamin (M.V.I. Adult) 10 mL, thiamine (B-1) 100 mg, folic acid 1 mg, magnesium sulfate 2 g in sodium chloride 0.9 % 1,000 mL infusion (0 mL/hr Intravenous Stopped 1/11/20 1938)   ondansetron (ZOFRAN) injection 4 mg (4 mg Intravenous Given 1/11/20 1747)   iopamidol (ISOVUE-300) 61 % injection 100 mL (85 mL Intravenous Given by Other 1/11/20 1819)   lactated ringers bolus 2,790 mL (2,790 mL Intravenous New Bag 1/11/20 1940)   LORazepam (ATIVAN) injection 1 mg (1 mg Intravenous Given 1/11/20 2056)             PROGRESS AND CONSULTS  ED Course as of Jan 11 2105   Sat Jan 11, 2020 2101 9:01 PM  Patient here for vomiting and abdominal pain. Heavy drinker.  Ct negative for obstruction.  Patient most likely in AKA.  No DKA.  No history of other ingestions.  No evidence of infection.  Blood cultures.  Given banana bag and fluids.  Given ativan as well.  Discussed with Dr. Jackson and will admit.    [SL]      ED Course User Index  [SL] Hossein Ramirez MD       1725: Ordered labs for further evaluation. Ordered Zofran as an anti-emetic.    1757: Ordered CT abd for further evaluation.    1934: Pt rechecked and resting comfortably. Informed pt of labs and discussed likelihood of Sx due to alcoholic ketoacidosis and plan for admission for treatment.    1936: Ordered sepsis bolus for elevated lactate level.    2032: Spoke with Dr. Jackson ([facility]) about pt's case including history,medications given in ED, and lab findings.    2050: Ordered ativan.    MEDICAL DECISION MAKING      MDM  Number of Diagnoses or Management Options     Amount and/or Complexity of Data Reviewed  Clinical lab tests: ordered and reviewed (Lactate 3.4; ethanol 209; lipase 113; anion gap 36.8; negative troponin; nl salicylate)  Tests in the radiology section of CPT®: reviewed and ordered (See \"radiology\" section of note)  Tests in the medicine section of CPT®: ordered and reviewed (See \"procedures\" section of " note)  Discuss the patient with other providers: yes (Dr. Jackson ([facility]))               DIAGNOSIS  Final diagnoses:   Nausea and vomiting, intractability of vomiting not specified, unspecified vomiting type   Alcoholic ketoacidosis   Alcohol withdrawal syndrome without complication (CMS/HCC)           DISPOSITION  ADMISSION    Discussed treatment plan and reason for admission with pt/family and admitting physician.  Pt/family voiced understanding of the plan for admission for further testing/treatment as needed.             Latest Documented Vital Signs:  As of 9:05 PM  BP- 155/83 HR- 79 Temp- 98.3 °F (36.8 °C) (Tympanic) O2 sat- 97%        --  Documentation assistance provided by priscila Ron for Dr. James MD.  Information recorded by the scribe was done at my direction and has been verified and validated by me.         Sandra Ron  01/11/20 [id number]       Hossein Ramirez MD  01/11/20 [id number]

## 2020-01-11 NOTE — ED NOTES
Pt reports having suicidal thoughts a couple weeks ago. C/o depression. Denies SI and HI at this time.      Amarilis Mcdaniel RN  01/11/20 [id number]

## 2020-01-11 NOTE — ED TRIAGE NOTES
Pt c/o lightheadedness, lower abd pain, and decrease PO intake x 1 week.    Pt had right shoulder surgery 4 weeks ago and still has stitches in place.

## 2020-01-12 ENCOUNTER — APPOINTMENT (OUTPATIENT)
Dept: GENERAL RADIOLOGY | Facility: HOSPITAL | Age: 65
End: 2020-01-12

## 2020-01-12 PROBLEM — E87.29 ALCOHOLIC KETOACIDOSIS: Status: ACTIVE | Noted: 2020-01-12

## 2020-01-12 LAB
ANION GAP SERPL CALCULATED.3IONS-SCNC: 26.9 MMOL/L (ref 5–15)
BUN BLD-MCNC: 6 MG/DL (ref 8–23)
BUN/CREAT SERPL: 9.5 (ref 7–25)
CALCIUM SPEC-SCNC: 7.3 MG/DL (ref 8.6–10.5)
CHLORIDE SERPL-SCNC: 94 MMOL/L (ref 98–107)
CO2 SERPL-SCNC: 13.1 MMOL/L (ref 22–29)
CREAT BLD-MCNC: 0.63 MG/DL (ref 0.76–1.27)
DEPRECATED RDW RBC AUTO: 49.1 FL (ref 37–54)
ERYTHROCYTE [DISTWIDTH] IN BLOOD BY AUTOMATED COUNT: 13.9 % (ref 12.3–15.4)
GFR SERPL CREATININE-BSD FRML MDRD: 129 ML/MIN/1.73
GLUCOSE BLD-MCNC: 84 MG/DL (ref 65–99)
HCT VFR BLD AUTO: 34.3 % (ref 37.5–51)
HGB BLD-MCNC: 11.8 G/DL (ref 13–17.7)
MCH RBC QN AUTO: 33 PG (ref 26.6–33)
MCHC RBC AUTO-ENTMCNC: 34.4 G/DL (ref 31.5–35.7)
MCV RBC AUTO: 95.8 FL (ref 79–97)
PLATELET # BLD AUTO: 67 10*3/MM3 (ref 140–450)
PMV BLD AUTO: 10.5 FL (ref 6–12)
POTASSIUM BLD-SCNC: 3.7 MMOL/L (ref 3.5–5.2)
RBC # BLD AUTO: 3.58 10*6/MM3 (ref 4.14–5.8)
SODIUM BLD-SCNC: 134 MMOL/L (ref 136–145)
WBC NRBC COR # BLD: 2.19 10*3/MM3 (ref 3.4–10.8)

## 2020-01-12 PROCEDURE — 73030 X-RAY EXAM OF SHOULDER: CPT

## 2020-01-12 PROCEDURE — 85027 COMPLETE CBC AUTOMATED: CPT | Performed by: NURSE PRACTITIONER

## 2020-01-12 PROCEDURE — 80048 BASIC METABOLIC PNL TOTAL CA: CPT | Performed by: NURSE PRACTITIONER

## 2020-01-12 PROCEDURE — 25010000002 ONDANSETRON PER 1 MG: Performed by: NURSE PRACTITIONER

## 2020-01-12 PROCEDURE — 90791 PSYCH DIAGNOSTIC EVALUATION: CPT | Performed by: SOCIAL WORKER

## 2020-01-12 RX ORDER — CALCIUM CARBONATE 200(500)MG
2 TABLET,CHEWABLE ORAL 2 TIMES DAILY PRN
Status: DISCONTINUED | OUTPATIENT
Start: 2020-01-12 | End: 2020-01-14 | Stop reason: HOSPADM

## 2020-01-12 RX ORDER — SODIUM CHLORIDE 0.9 % (FLUSH) 0.9 %
10 SYRINGE (ML) INJECTION EVERY 12 HOURS SCHEDULED
Status: DISCONTINUED | OUTPATIENT
Start: 2020-01-12 | End: 2020-01-14 | Stop reason: HOSPADM

## 2020-01-12 RX ORDER — ONDANSETRON 4 MG/1
4 TABLET, FILM COATED ORAL EVERY 6 HOURS PRN
Status: DISCONTINUED | OUTPATIENT
Start: 2020-01-12 | End: 2020-01-14

## 2020-01-12 RX ORDER — ACETAMINOPHEN 325 MG/1
650 TABLET ORAL EVERY 4 HOURS PRN
Status: DISCONTINUED | OUTPATIENT
Start: 2020-01-12 | End: 2020-01-14

## 2020-01-12 RX ORDER — DIPHENOXYLATE HYDROCHLORIDE AND ATROPINE SULFATE 2.5; .025 MG/1; MG/1
1 TABLET ORAL DAILY
Status: DISCONTINUED | OUTPATIENT
Start: 2020-01-13 | End: 2020-01-12

## 2020-01-12 RX ORDER — FOLIC ACID 1 MG/1
1 TABLET ORAL DAILY
Status: DISCONTINUED | OUTPATIENT
Start: 2020-01-13 | End: 2020-01-13

## 2020-01-12 RX ORDER — LORAZEPAM 2 MG/ML
1 INJECTION INTRAMUSCULAR
Status: DISCONTINUED | OUTPATIENT
Start: 2020-01-12 | End: 2020-01-13

## 2020-01-12 RX ORDER — LORAZEPAM 1 MG/1
2 TABLET ORAL
Status: DISCONTINUED | OUTPATIENT
Start: 2020-01-12 | End: 2020-01-13

## 2020-01-12 RX ORDER — THIAMINE MONONITRATE (VIT B1) 100 MG
100 TABLET ORAL DAILY
Status: DISCONTINUED | OUTPATIENT
Start: 2020-01-13 | End: 2020-01-13

## 2020-01-12 RX ORDER — LORAZEPAM 2 MG/ML
0.5 INJECTION INTRAMUSCULAR
Status: DISCONTINUED | OUTPATIENT
Start: 2020-01-12 | End: 2020-01-13

## 2020-01-12 RX ORDER — NITROGLYCERIN 0.4 MG/1
0.4 TABLET SUBLINGUAL
Status: DISCONTINUED | OUTPATIENT
Start: 2020-01-12 | End: 2020-01-14 | Stop reason: HOSPADM

## 2020-01-12 RX ORDER — DEXTROSE, SODIUM CHLORIDE, SODIUM LACTATE, POTASSIUM CHLORIDE, AND CALCIUM CHLORIDE 5; .6; .31; .03; .02 G/100ML; G/100ML; G/100ML; G/100ML; G/100ML
125 INJECTION, SOLUTION INTRAVENOUS CONTINUOUS
Status: DISCONTINUED | OUTPATIENT
Start: 2020-01-12 | End: 2020-01-13

## 2020-01-12 RX ORDER — LORAZEPAM 0.5 MG/1
0.5 TABLET ORAL EVERY 8 HOURS
Status: DISCONTINUED | OUTPATIENT
Start: 2020-01-13 | End: 2020-01-12

## 2020-01-12 RX ORDER — DEXTROSE, SODIUM CHLORIDE, SODIUM LACTATE, POTASSIUM CHLORIDE, AND CALCIUM CHLORIDE 5; .6; .31; .03; .02 G/100ML; G/100ML; G/100ML; G/100ML; G/100ML
100 INJECTION, SOLUTION INTRAVENOUS CONTINUOUS
Status: CANCELLED | OUTPATIENT
Start: 2020-01-11

## 2020-01-12 RX ORDER — LORAZEPAM 0.5 MG/1
0.5 TABLET ORAL
Status: DISCONTINUED | OUTPATIENT
Start: 2020-01-12 | End: 2020-01-13

## 2020-01-12 RX ORDER — THIAMINE MONONITRATE (VIT B1) 100 MG
100 TABLET ORAL DAILY
Status: DISCONTINUED | OUTPATIENT
Start: 2020-01-13 | End: 2020-01-12

## 2020-01-12 RX ORDER — SODIUM CHLORIDE 0.9 % (FLUSH) 0.9 %
10 SYRINGE (ML) INJECTION AS NEEDED
Status: DISCONTINUED | OUTPATIENT
Start: 2020-01-12 | End: 2020-01-14 | Stop reason: HOSPADM

## 2020-01-12 RX ORDER — ONDANSETRON 2 MG/ML
4 INJECTION INTRAMUSCULAR; INTRAVENOUS EVERY 6 HOURS PRN
Status: DISCONTINUED | OUTPATIENT
Start: 2020-01-12 | End: 2020-01-14

## 2020-01-12 RX ORDER — DIPHENOXYLATE HYDROCHLORIDE AND ATROPINE SULFATE 2.5; .025 MG/1; MG/1
1 TABLET ORAL DAILY
Status: DISCONTINUED | OUTPATIENT
Start: 2020-01-13 | End: 2020-01-13

## 2020-01-12 RX ORDER — LORAZEPAM 0.5 MG/1
0.5 TABLET ORAL EVERY 6 HOURS
Status: COMPLETED | OUTPATIENT
Start: 2020-01-12 | End: 2020-01-12

## 2020-01-12 RX ORDER — BISACODYL 5 MG/1
5 TABLET, DELAYED RELEASE ORAL DAILY PRN
Status: DISCONTINUED | OUTPATIENT
Start: 2020-01-12 | End: 2020-01-14 | Stop reason: HOSPADM

## 2020-01-12 RX ORDER — FOLIC ACID 1 MG/1
1 TABLET ORAL DAILY
Status: DISCONTINUED | OUTPATIENT
Start: 2020-01-13 | End: 2020-01-12

## 2020-01-12 RX ADMIN — MIRTAZAPINE 15 MG: 15 TABLET, FILM COATED ORAL at 20:41

## 2020-01-12 RX ADMIN — AMLODIPINE BESYLATE 5 MG: 5 TABLET ORAL at 06:23

## 2020-01-12 RX ADMIN — TRAMADOL HYDROCHLORIDE 50 MG: 50 TABLET, FILM COATED ORAL at 01:05

## 2020-01-12 RX ADMIN — SODIUM CHLORIDE, SODIUM LACTATE, POTASSIUM CHLORIDE, CALCIUM CHLORIDE AND DEXTROSE MONOHYDRATE 125 ML/HR: 5; 600; 310; 30; 20 INJECTION, SOLUTION INTRAVENOUS at 04:49

## 2020-01-12 RX ADMIN — GABAPENTIN 300 MG: 300 CAPSULE ORAL at 09:47

## 2020-01-12 RX ADMIN — BUPROPION HYDROCHLORIDE 300 MG: 150 TABLET, EXTENDED RELEASE ORAL at 09:47

## 2020-01-12 RX ADMIN — LISINOPRIL 10 MG: 10 TABLET ORAL at 09:47

## 2020-01-12 RX ADMIN — LORAZEPAM 0.5 MG: 0.5 TABLET ORAL at 16:16

## 2020-01-12 RX ADMIN — SODIUM CHLORIDE, SODIUM LACTATE, POTASSIUM CHLORIDE, CALCIUM CHLORIDE AND DEXTROSE MONOHYDRATE 125 ML/HR: 5; 600; 310; 30; 20 INJECTION, SOLUTION INTRAVENOUS at 13:43

## 2020-01-12 RX ADMIN — LORAZEPAM 0.5 MG: 0.5 TABLET ORAL at 09:47

## 2020-01-12 RX ADMIN — CLONAZEPAM 2 MG: 1 TABLET ORAL at 20:41

## 2020-01-12 RX ADMIN — SODIUM CHLORIDE, PRESERVATIVE FREE 10 ML: 5 INJECTION INTRAVENOUS at 01:05

## 2020-01-12 RX ADMIN — LEVOTHYROXINE SODIUM 75 MCG: 75 TABLET ORAL at 06:23

## 2020-01-12 RX ADMIN — ATORVASTATIN CALCIUM 20 MG: 20 TABLET, FILM COATED ORAL at 09:47

## 2020-01-12 RX ADMIN — SODIUM CHLORIDE, PRESERVATIVE FREE 10 ML: 5 INJECTION INTRAVENOUS at 09:47

## 2020-01-12 RX ADMIN — TRAMADOL HYDROCHLORIDE 50 MG: 50 TABLET, FILM COATED ORAL at 20:42

## 2020-01-12 RX ADMIN — CLONAZEPAM 2 MG: 1 TABLET ORAL at 01:05

## 2020-01-12 RX ADMIN — GABAPENTIN 300 MG: 300 CAPSULE ORAL at 16:16

## 2020-01-12 RX ADMIN — SODIUM CHLORIDE, PRESERVATIVE FREE 10 ML: 5 INJECTION INTRAVENOUS at 20:42

## 2020-01-12 RX ADMIN — ACYCLOVIR 800 MG: 800 TABLET ORAL at 06:23

## 2020-01-12 RX ADMIN — LORAZEPAM 0.5 MG: 0.5 TABLET ORAL at 02:27

## 2020-01-12 RX ADMIN — SODIUM CHLORIDE, SODIUM LACTATE, POTASSIUM CHLORIDE, CALCIUM CHLORIDE AND DEXTROSE MONOHYDRATE 125 ML/HR: 5; 600; 310; 30; 20 INJECTION, SOLUTION INTRAVENOUS at 21:50

## 2020-01-12 RX ADMIN — TRAMADOL HYDROCHLORIDE 50 MG: 50 TABLET, FILM COATED ORAL at 12:50

## 2020-01-12 RX ADMIN — ONDANSETRON 4 MG: 2 INJECTION INTRAMUSCULAR; INTRAVENOUS at 10:50

## 2020-01-12 RX ADMIN — LORAZEPAM 0.5 MG: 0.5 TABLET ORAL at 20:41

## 2020-01-12 RX ADMIN — GABAPENTIN 300 MG: 300 CAPSULE ORAL at 20:41

## 2020-01-12 RX ADMIN — MIRTAZAPINE 15 MG: 15 TABLET, FILM COATED ORAL at 01:33

## 2020-01-12 NOTE — H&P
"      Patient Name:  Pro Mueller  YOB: 1955  MRN:  [id number]  Admit Date:  1/11/2020  Patient Care Team:  Alla Beal MD as PCP - General (Family Medicine)  Say Coats MD (Psychiatry)      Subjective   History Present Illness     Chief Complaint   Patient presents with   • Dizziness   • Abdominal Pain   • Suture / Staple Removal       Mr. Mueller is a 65 y.o. former smoker with a history of alcohol abuse, hypothyroidism, hyperlipidemia, and hypertension that presents to [facility] complaining of dizziness, abdominal pain, nausea, and vomiting.  He reports that he had about 1.5 liters of vodka earlier today, and then became nauseous and dizzy.  He states \"I could barely walk from one room to the other, I think I had alcohol poisoning.\"  He states that his niece showed up soon after and brought him to the ED.  He c/o abdominal pain that is described as intermittent, moderate, dull, and achy in nature.  He states palpation of the abdomen exacerbates the pain and he denies alleviating factors.  He reports two episodes of vomiting today and poor po intake for the past 2 days.  He denies headache, paresthesias, and speech disturbance.  He denies chest pain, shortness of breath, and orthopnea.  He c/o right shoulder pain and reports a recent right shoulder surgery.  He describes the pain as intermittent, moderate, and sharp in nature.  He states movement of the shoulder exacerbates the pain and he denies alleviating factors.     Lab work in the ED revealed Na 133, CO2 11.2, Cl 85, anion gap 36.8, lactate 3.4, and lipase 113.  His blood ethanol level was 209.  Urine tox screen was positive for benzodiazepines.  Blood cultures were drawn and are pending.  CT of the abdomen/pelvis showed a mildly thick-walled, decompressed bladder that is most likely chronic, mild prostate gland enlargement, a 6 mm right lower pole renal stone, bilateral renal cysts, diffuse fat infiltration of the " liver, cholelithiasis, colonic diverticulosis without evidence for diverticulitis, and fat density within the wall of the colon that is thought to be correlated to previous episodes of colitis.  EKG showed sinus rhythm with an old inferior infarct and an age indeterminate anteroseptal infarct.  He received an IV fluid bolus and a banana bag in the ED.           History of Present Illness  Review of Systems   Constitutional: Negative.    HENT: Negative.    Eyes: Negative.    Respiratory: Negative.    Cardiovascular: Negative.    Gastrointestinal: Positive for abdominal pain.   Genitourinary: Negative.    Musculoskeletal: Positive for arthralgias (right shoulder pain) and gait problem.   Skin: Negative.    Neurological: Positive for dizziness and light-headedness.   Psychiatric/Behavioral: Negative.         Personal History     Past Medical History:   Diagnosis Date   • Alcohol abuse    • Anxiety    • Arthritis    • Atopic rhinitis 8/8/2016   • Depression    • Disease of thyroid gland    • Elevated cholesterol    • Encounter for removal of sutures    • Genital herpes simplex 8/8/2016   • GERD (gastroesophageal reflux disease)    • Headache, tension-type    • Hyperlipidemia    • Hypertension    • Kidney stone    • Migraine    • Motion sickness    • Olecranon bursitis, right elbow    • Panic disorder without agoraphobia 8/8/2016   • Peripheral neuropathy    • Sleep apnea    • Vitamin D deficiency 8/8/2016   • Withdrawal symptoms, alcohol (CMS/HCC)      Past Surgical History:   Procedure Laterality Date   • BACK SURGERY     • COLONOSCOPY     • CYST REMOVAL     • EXTRACORPOREAL SHOCK WAVE LITHOTRIPSY (ESWL) Right 2002   • SHOULDER ARTHROSCOPY Right 12/17/2019    Procedure: SHOULDER ARTHROSCOPY, decompression, distal clavicle excision;  Surgeon: Aj Mancilla MD;  [place] [facility] [place] [facility] [place]: [place]opedics   • TONSILLECTOMY       Family History   Problem Relation Age of Onset   • Alzheimer's disease Mother    •  Pancreatic cancer Father    • Malig Hyperthermia Neg Hx      Social History     Tobacco Use   • Smoking status: Former Smoker     Packs/day: 0.50     Years: 25.00     Pack years: 12.50     Types: Cigarettes     Start date: 1987     Last attempt to quit: 2010     Years since quitting: 10.0   • Smokeless tobacco: Never Used   Substance Use Topics   • Alcohol use: Yes     Alcohol/week: 44.0 standard drinks     Types: 42 Shots of liquor, 2 Cans of beer per week     Comment: Pt reports drinking alot of vodka daily.    • Drug use: No     Medications Prior to Admission   Medication Sig Dispense Refill Last Dose   • acyclovir (ZOVIRAX) 800 MG tablet Take 800 mg by mouth Every Morning.   12/15/2019   • amLODIPine (NORVASC) 5 MG tablet Take 5 mg by mouth Every Morning.   12/17/2019 at 0400   • atorvastatin (LIPITOR) 20 MG tablet TAKE 1 TABLET BY MOUTH DAILY 90 tablet 0 12/16/2019   • buPROPion SR (WELLBUTRIN SR) 150 MG 12 hr tablet Take 300 mg by mouth Daily.  2 12/16/2019   • clonazePAM (KlonoPIN) 2 MG tablet Take 2 mg by mouth Every Night.   12/16/2019   • gabapentin (NEURONTIN) 300 MG capsule Take 1 capsule by mouth 3 (Three) Times a Day. 90 capsule 5 12/16/2019   • levothyroxine (SYNTHROID, LEVOTHROID) 75 MCG tablet TAKE 1 TABLET BY MOUTH EVERY DAY 90 tablet 0    • lisinopril (PRINIVIL,ZESTRIL) 10 MG tablet TAKE 1 TABLET BY MOUTH DAILY 90 tablet 0 12/17/2019 at 0400   • meloxicam (MOBIC) 15 MG tablet Take 15 mg by mouth Daily.   12/9/2019   • mirtazapine (REMERON) 15 MG tablet Take 15 mg by mouth Daily.  5 12/16/2019   • ondansetron (ZOFRAN) 4 MG tablet Take 1 tablet by mouth Every 8 (Eight) Hours As Needed for Nausea or Vomiting. 30 tablet 0    • rizatriptan (MAXALT) 10 MG tablet Take 1 tablet by mouth 1 (One) Time As Needed for Migraine for up to 1 dose. May repeat in 2 hours if needed 12 tablet 1 12/3/2019   • traMADol (ULTRAM) 50 MG tablet TAKE 1 TABLET BY MOUTH EVERY 6 HOURS AS NEEDED FOR MODERATE PAIN 80 tablet 0  12/15/2019   • VITAMIN D PO Take 1 capsule by mouth Every Morning.   12/9/2019   • Vortioxetine HBr (TRINTELLIX) 20 MG tablet Take 20 mg by mouth Daily.   12/16/2019   • acyclovir (ZOVIRAX) 800 MG tablet TAKE 1 TABLET BY MOUTH THREE TIMES DAILY 90 tablet 0    • B Complex Vitamins (VITAMIN B COMPLEX) tablet Take 1 tablet by mouth Every Morning.   12/9/2019   • docusate sodium (COLACE) 100 MG capsule Take 1 capsule by mouth 2 (Two) Times a Day. 60 capsule 0    • HYDROcodone-acetaminophen (NORCO) 7.5-325 MG per tablet Take 1-2 tablets by mouth Every 4 (Four) Hours As Needed for Moderate Pain  (Pain). 42 tablet 0    • levothyroxine (SYNTHROID, LEVOTHROID) 75 MCG tablet Take 75 mcg by mouth Daily.   12/16/2019   • meloxicam (MOBIC) 15 MG tablet TAKE 1 TABLET BY MOUTH DAILY WITH FOOD AS NEEDED AS DIRECTED FOR MODERATE PAIN 90 tablet 0    • ondansetron ODT (ZOFRAN-ODT) 8 MG disintegrating tablet Take 1 tablet by mouth Every 8 (Eight) Hours As Needed for Nausea or Vomiting. 20 tablet 2 12/3/2019     Allergies:    Allergies   Allergen Reactions   • Penicillins Anaphylaxis and Other (See Comments)     Seizure. childhood       Objective    Objective     Vital Signs  Temp:  [98 °F (36.7 °C)-98.3 °F (36.8 °C)] 98 °F (36.7 °C)  Heart Rate:  [] 84  Resp:  [16-18] 18  BP: (138-163)/() 138/95  SpO2:  [90 %-97 %] 95 %  on   ;   Device (Oxygen Therapy): room air  Body mass index is 25.74 kg/m².    Physical Exam   Constitutional: He is oriented to person, place, and time. He appears distressed.   HENT:   Head: Normocephalic and atraumatic.   Eyes: Conjunctivae and EOM are normal.   Neck: Normal range of motion. Neck supple.   Cardiovascular: Normal rate, regular rhythm and normal heart sounds.   No murmur heard.  Pulmonary/Chest: Effort normal and breath sounds normal.   Abdominal: Soft. Bowel sounds are normal. There is tenderness. There is no rebound and no guarding.   Musculoskeletal: Normal range of motion. He exhibits  no edema.   Neurological: He is alert and oriented to person, place, and time.   Slight tremor of    Skin: Skin is warm and dry.   Sutures to right shoulder   Psychiatric: He has a normal mood and affect. His behavior is normal.   Nursing note and vitals reviewed.      Results Review:  I reviewed the patient's new clinical results.  I reviewed the patient's new imaging results and agree with the interpretation.  I reviewed the patient's other test results and agree with the interpretation  I personally viewed and interpreted the patient's EKG/Telemetry data  Discussed with ED provider.    Lab Results (last 24 hours)     Procedure Component Value Units Date/Time    CBC & Differential [[id number]] Collected:  01/11/20 1715    Specimen:  Blood Updated:  01/11/20 1738    Narrative:       The following orders were created for panel order CBC & Differential.  Procedure                               Abnormality         Status                     ---------                               -----------         ------                     CBC Auto Differential[[id number]]        Abnormal            Final result                 Please view results for these tests on the individual orders.    Comprehensive Metabolic Panel [[id number]]  (Abnormal) Collected:  01/11/20 1715    Specimen:  Blood Updated:  01/11/20 1752     Glucose 93 mg/dL      BUN 11 mg/dL      Creatinine 0.91 mg/dL      Sodium 133 mmol/L      Potassium 3.7 mmol/L      Chloride 85 mmol/L      CO2 11.2 mmol/L      Calcium 8.6 mg/dL      Total Protein 8.1 g/dL      Albumin 5.10 g/dL      ALT (SGPT) 123 U/L      AST (SGOT) 348 U/L      Alkaline Phosphatase 87 U/L      Total Bilirubin 0.8 mg/dL      eGFR Non African Amer 84 mL/min/1.73      Globulin 3.0 gm/dL      A/G Ratio 1.7 g/dL      BUN/Creatinine Ratio 12.1     Anion Gap 36.8 mmol/L     Narrative:       GFR Normal >60  Chronic Kidney Disease <60  Kidney Failure <15      Lipase [[id number]]  (Abnormal) Collected:   01/11/20 1715    Specimen:  Blood Updated:  01/11/20 1752     Lipase 113 U/L     Ethanol [[id number]]  (Abnormal) Collected:  01/11/20 1715    Specimen:  Blood Updated:  01/11/20 1752     Ethanol 209 mg/dL      Ethanol % 0.209 %     Troponin [[id number]]  (Normal) Collected:  01/11/20 1715    Specimen:  Blood Updated:  01/11/20 1752     Troponin T <0.010 ng/mL     Narrative:       Troponin T Reference Range:  <= 0.03 ng/mL-   Negative for AMI  >0.03 ng/mL-     Abnormal for myocardial necrosis.  Clinicians would have to utilize clinical acumen, EKG, Troponin and serial changes to determine if it is an Acute Myocardial Infarction or myocardial injury due to an underlying chronic condition.       Results may be falsely decreased if patient taking Biotin.      CBC Auto Differential [[id number]]  (Abnormal) Collected:  01/11/20 1715    Specimen:  Blood Updated:  01/11/20 1738     WBC 3.48 10*3/mm3      RBC 4.71 10*6/mm3      Hemoglobin 15.4 g/dL      Hematocrit 45.7 %      MCV 97.0 fL      MCH 32.7 pg      MCHC 33.7 g/dL      RDW 13.2 %      RDW-SD 47.4 fl      MPV 10.5 fL      Platelets 103 10*3/mm3      Neutrophil % 46.8 %      Lymphocyte % 40.8 %      Monocyte % 11.2 %      Eosinophil % 0.0 %      Basophil % 0.9 %      Immature Grans % 0.3 %      Neutrophils, Absolute 1.63 10*3/mm3      Lymphocytes, Absolute 1.42 10*3/mm3      Monocytes, Absolute 0.39 10*3/mm3      Eosinophils, Absolute 0.00 10*3/mm3      Basophils, Absolute 0.03 10*3/mm3      Immature Grans, Absolute 0.01 10*3/mm3      nRBC 0.0 /100 WBC     Salicylate Level [[id number]]  (Normal) Collected:  01/11/20 1715    Specimen:  Blood Updated:  01/11/20 1824     Salicylate <0.3 mg/dL     Narrative:       Therapeutic range for Salicylates:  3.0 - 10.0 mg/dL for antipyretic/analgesic conditions  15.0 - 30.0 mg/dL for anti-inflammatory conditions    Procalcitonin [[id number]]  (Normal) Collected:  01/11/20 1718    Specimen:  Blood Updated:  01/11/20 5895      Procalcitonin 0.22 ng/mL     Narrative:       Results may be falsely decreased if patient taking Biotin.     Lactic Acid, Plasma [[id number]]  (Abnormal) Collected:  01/11/20 1811    Specimen:  Blood Updated:  01/11/20 1840     Lactate 3.4 mmol/L     Lactic Acid, Reflex Timer (This will reflex a repeat order 3-3:15 hours after ordered.) [[id number]] Collected:  01/11/20 1811    Specimen:  Blood Updated:  01/11/20 2145     Extra Tube Hold for add-ons.     Comment: Auto resulted.       Blood Culture - Blood, Arm, Left [[id number]] Collected:  01/11/20 1951    Specimen:  Blood from Arm, Left Updated:  01/11/20 1956    Blood Culture - Blood, Arm, Left [[id number]] Collected:  01/11/20 1951    Specimen:  Blood from Arm, Left Updated:  01/11/20 1956    Lactic Acid, Reflex [[id number]]  (Abnormal) Collected:  01/11/20 2158    Specimen:  Blood Updated:  01/11/20 2238     Lactate 3.7 mmol/L     Urine Drug Screen - Urine, Clean Catch [[id number]]  (Abnormal) Collected:  01/11/20 2211    Specimen:  Urine, Clean Catch Updated:  01/11/20 2243     Amphet/Methamphet, Screen Negative     Barbiturates Screen, Urine Negative     Benzodiazepine Screen, Urine Positive     Cocaine Screen, Urine Negative     Opiate Screen Negative     THC, Screen, Urine Negative     Methadone Screen, Urine Negative     Oxycodone Screen, Urine Negative    Narrative:       Negative Thresholds For Drugs Screened:     Amphetamines               500 ng/ml   Barbiturates               200 ng/ml   Benzodiazepines            100 ng/ml   Cocaine                    300 ng/ml   Methadone                  300 ng/ml   Opiates                    300 ng/ml   Oxycodone                  100 ng/ml   THC                        50 ng/ml    The Normal Value for all drugs tested is negative. This report includes final unconfirmed screening results to be used for medical treatment purposes only. Unconfirmed results must not be used for non-medical purposes such as employment or  legal testing. Clinical consideration should be applied to any drug of abuse test, particulary when unconfirmed results are used.          Imaging Results (Last 24 Hours)     Procedure Component Value Units Date/Time    CT Abdomen Pelvis With Contrast [[id number]] Collected:  01/11/20 1854     Updated:  01/11/20 1903    Narrative:       CT ABDOMEN AND PELVIS WITH IV CONTRAST     HISTORY: 65-year-old male with left lower quadrant pain that began a  couple of weeks ago.     TECHNIQUE: CT abdomen and pelvis with IV and without oral contrast.     COMPARISON: CT abdomen and pelvis without contrast 11/28/2007.     FINDINGS: There is linear subsegmental atelectasis within the lingula.  There is no basilar effusion. There is diffuse fat infiltration of the  liver without focal hepatic abnormality. Several gallstones or sludge  layer dependently within the gallbladder. Splenic size is within normal  limits. Adrenal glands and pancreas appear within normal limits. A right  lower pole renal stone measures 6 mm. An anterior right renal partially  exophytic cyst measures 3 cm. There is a left upper pole parenchymal  cyst measuring 0.6 cm. A medial left renal parenchymal cyst measures 9  mm. There is also a low density within the left renal lower pole, most  likely representing a small cyst though difficult to characterize. There  is no renal or ureteral stone. Urinary bladder is partially decompressed  and there is mild generalized urinary bladder wall thickening. There is  mild prostate gland enlargement with median lobe hypertrophy.     There is no emelia enlargement in the abdomen or pelvis. There is fat  density within the wall of the colon which may be normal though can also  indicate that the patient has had previous episodes of colitis. There is  no evidence for acute colitis. There is sigmoid diverticulosis without  evidence for diverticulitis. There is no evidence for appendicitis.  Atherosclerotic calcifications are  present involving the abdominal aorta  and iliac vasculature without aneurysm.       Impression:       1. Urinary bladder is mostly decompressed and appears mildly  thick-walled and this is most likely chronic. There is mild prostate  gland enlargement.  2. 6 mm right lower pole renal stone. Bilateral renal cysts.  3. Diffuse fat infiltration of the liver.  4. Cholelithiasis.  5. Colonic diverticulosis without evidence for diverticulitis. There is  fat density within the wall of the colon and this finding may be normal  thought can have a correlation with previous episodes of colitis. This  is chronic and there is no acute colitis.     Discussed with Dr. Ramirez in the emergency department 01/11/2020 at  6:30 PM.      Radiation dose reduction techniques were utilized, including automated  exposure control and exposure modulation based on body size.     This report was finalized on 1/11/2020 7:00 PM by Dr. Evelio Barnard M.D.             Results for orders placed during the hospital encounter of 01/02/19   Adult Transthoracic Echo Complete W/ Cont if Necessary Per Protocol    Narrative · Left ventricular systolic function is normal. Calculated EF = 58.0%.   Estimated EF was in agreement with the calculated EF. Normal left   ventricular cavity size and wall thickness noted. All left ventricular   wall segments contract normally. Left ventricular diastolic dysfunction is   noted (grade I) consistent with impaired relaxation.          ECG 12 Lead   Final Result   HEART RATE= 89  bpm   RR Interval= 672  ms   IN Interval= 173  ms   P Horizontal Axis= 12  deg   P Front Axis= 43  deg   QRSD Interval= 88  ms   QT Interval= 388  ms   QRS Axis= -83  deg   T Wave Axis= 87  deg   - ABNORMAL ECG -   Sinus rhythm   Inferior infarct, old   Anteroseptal infarct, age indeterminate   When compared with ECG of 06-Dec-2019 13:17:16,   No significant change   Electronically Signed By: Jamari Modi ([facility]) 11-Jan-2020 19:08:10      Date and Time of Study: 2020-01-11 17:32:28           Assessment/Plan     Active Hospital Problems    Diagnosis POA   • **Alcoholic ketoacidosis [E87.2] Unknown   • Nausea and vomiting [R11.2] Yes   • Hyperlipidemia [E78.5] Yes   • Hypertension [I10] Yes   • Hypothyroidism [E03.9] Yes   • Mixed anxiety depressive disorder [F41.8] Yes     Alcoholic/Starvation Ketoacidosis  -Anion gap 36.8, CO2 11.2  -Lactate elevated 3.4, recheck 3.7  -Aggressive IVF  -CIWA protocol, seizure precautions  -Replace thiamine, folic acid  -Neuro checks  -[facility] consult  -Abdominal pain, CT negative for obstruction. Pain control    Hypertension  -Hypertensive on arrival  -Continue home blood pressure medications  -Monitor blood pressures closely    Hypothyroidism  -Continue Levothyroxine    Anxiety/Depression  -Continue Wellbutrin, Remeron, and Klonipin    -I discussed the patients findings and my recommendations with patient.    VTE Prophylaxis - SCDs.  Code Status - Full code.       BERONICA Alfaro  [facility]  01/12/20  1:14 AM

## 2020-01-12 NOTE — CONSULTS
"[facility] evaluated pt. Pt has been evaluated several times by [facility] in the past. The last time was 1/03/2019. Pt denies current SI but states he was feeling so bad at home the last few weeks that he had SI with a plan. Pt states he was thinking of sitting in his car, in the garage with his car running. Pt states he was too weak to get out to the garage and was not sure he would have done it. Pt states he is feeling better since he has eaten because he had not had solid food for the last few weeks. Pt admits he has been drinking at least 8 oz of vodka daily, \"sometimes more.\" Pt also states he had not taken his antidepressant in the last few weeks. Pt sees Dr Coats, psychiatrist. Pt states he last saw him on Dec 10,2019. Pt denies any hx of suicide attempts. Pt has had thoughts in the past. Pt rates his current anxiety as a \"7\" and his depressed mood as a \"8.\" Pt states the amount of alcohol he drinks is more than he used to drink. Pt states he has had blackouts in the past. Pt states his depressed mood increased after his wife asked for a divorce 18 months ago. Pt states the divorce was official in Sept 2018. Pt states he and Dr Coats are talking about him doing Transcranial Magnetic Stimulation treatments for his depressed mood. (forward thinking). [facility] let pt know he would not have much success without getting sober. Pt agreed. Pt is interested in the 12 step recovery mtgs that are Christian based.    Pt is quite tremulous during evaluation and complains of being \"hot\". Pt came to ED very weak with nausea and vomiting. Pt states he has no previous [facility] tx other than seeing a counselor in the past and attending some AA mtgs which he did not continue as he does not like the Orthodoxy aspect of the mtgs. Pt stated he was in [facility] and [facility] in the past for depression.     Pt lives alone in a house. Pt is a  at [employer] and has been there 19 yrs. Pt states he went on short term " disability for depression last November(?). Pt states he had shoulder surgery while he was off but did not go to the follow up check up and still has stiches. Pt states it is painful. Pt states his sister, niece and niece's  are his support system.     Pt states he would not try to harm self while in the hospital as he states he does not feel suicidal currently. [facility] will follow.

## 2020-01-12 NOTE — ED NOTES
/  Nursing report ED to floor  Pro Mueller  65 y.o.  male    HPI (triage note):   Chief Complaint   Patient presents with   • Dizziness   • Abdominal Pain   • Suture / Staple Removal       Admitting doctor:   Esdras Jackson MD    Admitting diagnosis:   The primary encounter diagnosis was Nausea and vomiting, intractability of vomiting not specified, unspecified vomiting type. Diagnoses of Alcoholic ketoacidosis and Alcohol withdrawal syndrome without complication (CMS/HCC) were also pertinent to this visit.    Code status:   Current Code Status     Date Active Code Status Order ID Comments User Context       Prior          Allergies:   Penicillins    Weight:       01/11/20  1702   Weight: 93 kg (205 lb)       Most recent vitals:   Vitals:    01/11/20 1900 01/11/20 1930 01/11/20 2000 01/11/20 2058   BP: 142/87 140/85 144/87 155/83   BP Location:    Left arm   Patient Position:    Lying   Pulse:  84 78 79   Resp:    16   Temp:       TempSrc:       SpO2:  90% 95% 97%   Weight:       Height:           Active LDAs/IV Access:   Lines, Drains & Airways    Active LDAs     Name:   Placement date:   Placement time:   Site:   Days:    Peripheral IV 01/11/20 1715 Right Antecubital   01/11/20 1715    Antecubital   less than 1                Labs (abnormal labs have a star):   Labs Reviewed   COMPREHENSIVE METABOLIC PANEL - Abnormal; Notable for the following components:       Result Value    Sodium 133 (*)     Chloride 85 (*)     CO2 11.2 (*)     ALT (SGPT) 123 (*)     AST (SGOT) 348 (*)     Anion Gap 36.8 (*)     All other components within normal limits    Narrative:     GFR Normal >60  Chronic Kidney Disease <60  Kidney Failure <15     LIPASE - Abnormal; Notable for the following components:    Lipase 113 (*)     All other components within normal limits   ETHANOL - Abnormal; Notable for the following components:    Ethanol 209 (*)     All other components within normal limits   CBC WITH AUTO DIFFERENTIAL - Abnormal;  Notable for the following components:    Platelets 103 (*)     Eosinophil % 0.0 (*)     Neutrophils, Absolute 1.63 (*)     All other components within normal limits   LACTIC ACID, PLASMA - Abnormal; Notable for the following components:    Lactate 3.4 (*)     All other components within normal limits   TROPONIN (IN-HOUSE) - Normal    Narrative:     Troponin T Reference Range:  <= 0.03 ng/mL-   Negative for AMI  >0.03 ng/mL-     Abnormal for myocardial necrosis.  Clinicians would have to utilize clinical acumen, EKG, Troponin and serial changes to determine if it is an Acute Myocardial Infarction or myocardial injury due to an underlying chronic condition.       Results may be falsely decreased if patient taking Biotin.     SALICYLATE LEVEL - Normal    Narrative:     Therapeutic range for Salicylates:  3.0 - 10.0 mg/dL for antipyretic/analgesic conditions  15.0 - 30.0 mg/dL for anti-inflammatory conditions   BLOOD CULTURE   BLOOD CULTURE   RAINBOW DRAW    Narrative:     The following orders were created for panel order [place] Draw.  Procedure                               Abnormality         Status                     ---------                               -----------         ------                     Light Blue Top[[id number]]                                   Final result               Green Top (Gel)[[id number]]                                  Final result               Lavender Top[[id number]]                                     Final result               Gold Top - SST[[id number]]                                   Final result                 Please view results for these tests on the individual orders.   URINE DRUG SCREEN   LACTIC ACID REFLEX TIMER   PROCALCITONIN   LIGHT BLUE TOP   GREEN TOP   LAVENDER TOP   GOLD TOP - SST   CBC AND DIFFERENTIAL    Narrative:     The following orders were created for panel order CBC & Differential.  Procedure                               Abnormality         Status                      ---------                               -----------         ------                     CBC Auto Differential[[id number]]        Abnormal            Final result                 Please view results for these tests on the individual orders.       EKG:   ECG 12 Lead   Final Result   HEART RATE= 89  bpm   RR Interval= 672  ms   RI Interval= 173  ms   P Horizontal Axis= 12  deg   P Front Axis= 43  deg   QRSD Interval= 88  ms   QT Interval= 388  ms   QRS Axis= -83  deg   T Wave Axis= 87  deg   - ABNORMAL ECG -   Sinus rhythm   Inferior infarct, old   Anteroseptal infarct, age indeterminate   When compared with ECG of 06-Dec-2019 13:17:16,   No significant change   Electronically Signed By: Jamari Modi ([facility]) 11-Jan-2020 19:08:10   Date and Time of Study: 2020-01-11 17:32:28          Meds given in ED:   Medications   sodium chloride 0.9 % flush 10 mL (10 mL Intravenous Given 1/11/20 1943)   multiple vitamin (M.V.I. Adult) 10 mL, thiamine (B-1) 100 mg, folic acid 1 mg, magnesium sulfate 2 g in sodium chloride 0.9 % 1,000 mL infusion (0 mL/hr Intravenous Stopped 1/11/20 1938)   ondansetron (ZOFRAN) injection 4 mg (4 mg Intravenous Given 1/11/20 1747)   iopamidol (ISOVUE-300) 61 % injection 100 mL (85 mL Intravenous Given by Other 1/11/20 1819)   lactated ringers bolus 2,790 mL (2,790 mL Intravenous New Bag 1/11/20 1940)   LORazepam (ATIVAN) injection 1 mg (1 mg Intravenous Given 1/11/20 2056)       Imaging results:  Ct Abdomen Pelvis With Contrast    Result Date: 1/11/2020  1. Urinary bladder is mostly decompressed and appears mildly thick-walled and this is most likely chronic. There is mild prostate gland enlargement. 2. 6 mm right lower pole renal stone. Bilateral renal cysts. 3. Diffuse fat infiltration of the liver. 4. Cholelithiasis. 5. Colonic diverticulosis without evidence for diverticulitis. There is fat density within the wall of the colon and this finding may be normal thought can have a correlation  with previous episodes of colitis. This is chronic and there is no acute colitis.  Discussed with Dr. Ramirez in the emergency department 01/11/2020 at 6:30 PM.  Radiation dose reduction techniques were utilized, including automated exposure control and exposure modulation based on body size.  This report was finalized on 1/11/2020 7:00 PM by Dr. Evelio Barnard M.D.        Ambulatory status:   - up with assist    Social issues:   Social History     Socioeconomic History   • Marital status:      Spouse name: Not on file   • Number of children: Not on file   • Years of education: Not on file   • Highest education level: Not on file   Tobacco Use   • Smoking status: Former Smoker     Packs/day: 0.50     Years: 25.00     Pack years: 12.50     Types: Cigarettes     Last attempt to quit: 2010     Years since quitting: 10.0   • Smokeless tobacco: Never Used   Substance and Sexual Activity   • Alcohol use: Yes     Alcohol/week: 42.0 standard drinks     Types: 42 Shots of liquor per week     Comment: Pt reports drinking alot of vodka daily.    • Drug use: No        Eden Lieberman RN  01/11/20 5155

## 2020-01-12 NOTE — PROGRESS NOTES
Name: Pro Mueller ADMIT: 2020   : 1955  PCP: Alla Beal MD    MRN: [id number] LOS: 1 days   AGE/SEX: 65 y.o. male  ROOM: [facility]   Subjective   Chief Complaint   Patient presents with   • Dizziness   • Abdominal Pain   • Suture / Staple Removal      He reports right shoulder pain since recent procedure. No CP SOA. No NVD currently.LLQ pain present.    Objective   Vital Signs  Temp:  [98 °F (36.7 °C)-99.1 °F (37.3 °C)] 98.6 °F (37 °C)  Heart Rate:  [] 87  Resp:  [16-18] 18  BP: (134-163)/() 134/72  SpO2:  [90 %-97 %] 95 %  on   ;   Device (Oxygen Therapy): room air  Body mass index is 25.74 kg/m².    Physical Exam   Constitutional: He appears well-developed. No distress.   HENT:   Head: Atraumatic.   Nose: Nose normal.   Eyes: Conjunctivae and EOM are normal.   Neck: Neck supple. No tracheal deviation present.   Cardiovascular: Normal rate, regular rhythm and intact distal pulses.   Pulmonary/Chest: Effort normal. He has no wheezes.   Abdominal: Soft. He exhibits no distension. There is tenderness. There is no rebound and no guarding.   Musculoskeletal: He exhibits tenderness (right shoulder, surgical sites cdi). He exhibits no edema.   Neurological: He is alert. He displays tremor. No cranial nerve deficit.   Skin: Skin is warm and dry. He is not diaphoretic.   Psychiatric: He has a normal mood and affect. His behavior is normal.   Nursing note and vitals reviewed.      Results Review:       I reviewed the patient's new clinical results.     I reviewed imaging, agree with interpretation.     I reviewed telemetry/EKG results, sinus      Results from last 7 days   Lab Units 20  0336 20  1715   WBC 10*3/mm3 2.19* 3.48   HEMOGLOBIN g/dL 11.8* 15.4   PLATELETS 10*3/mm3 67* 103*     Results from last 7 days   Lab Units 20  0336 20  1715   SODIUM mmol/L 134* 133*   POTASSIUM mmol/L 3.7 3.7   CHLORIDE mmol/L 94* 85*   CO2 mmol/L 13.1* 11.2*   BUN mg/dL 6* 11      CREATININE mg/dL 0.63* 0.91   GLUCOSE mg/dL 84 93   Estimated Creatinine Clearance: 146.2 mL/min (A) (by C-G formula based on SCr of 0.63 mg/dL (L)).  Results from last 7 days   Lab Units 01/12/20  0336 01/11/20  1715   CALCIUM mg/dL 7.3* 8.6   ALBUMIN g/dL  --  5.10         acyclovir 800 mg Oral QAM   amLODIPine 5 mg Oral QAM   atorvastatin 20 mg Oral Daily   buPROPion  mg Oral Daily   clonazePAM 2 mg Oral Nightly   [START ON 1/13/2020] vitamin B-1 100 mg Oral Daily   And      [START ON 1/13/2020] multivitamin 1 tablet Oral Daily   And      [START ON 1/13/2020] folic acid 1 mg Oral Daily   gabapentin 300 mg Oral TID   levothyroxine 75 mcg Oral Q AM   lisinopril 10 mg Oral Daily   LORazepam 0.5 mg Oral Q6H   Followed by      [START ON 1/13/2020] LORazepam 0.5 mg Oral Q8H   mirtazapine 15 mg Oral Nightly   sodium chloride 10 mL Intravenous Q12H       dextrose 5 % and lactated Ringer's 125 mL/hr Last Rate: 125 mL/hr (01/12/20 1343)   Diet Regular    Assessment/Plan      Active Hospital Problems    Diagnosis  POA   • **Alcoholic ketoacidosis [E87.2]  Unknown   • Nausea and vomiting [R11.2]  Yes   • Hyperlipidemia [E78.5]  Yes   • Hypertension [I10]  Yes   • Hypothyroidism [E03.9]  Yes   • Mixed anxiety depressive disorder [F41.8]  Yes      Resolved Hospital Problems   No resolved problems to display.       · Alcoholic ketoacidosis, withdrawal, and hepatitis without ascites: No acute colitis or diverticulitis on CT abdomen. Acidosis is improving some with resuscitation. No ascites present. Repeat CMP to monitor and continue IVF. He is quite tremulous on exam. Continue vitamin supplementation and ativan prn ciwa.  · LLQ Pain  · R Shoulder Pain: Check XR. Can ask orthopedics to see if issues continue.  · Hypothyroid: Synthroid  · Disposition: TBD    Ritchie Corona MD  [facility]  01/12/20  3:10 PM    Dictated portions using Dragon dictation software.

## 2020-01-12 NOTE — PLAN OF CARE
Problem: Patient Care Overview  Goal: Plan of Care Review  Outcome: Ongoing (interventions implemented as appropriate)  Flowsheets (Taken 1/12/2020 1631)  Progress: improving  Plan of Care Reviewed With: patient  Outcome Summary: pt vss, complains of right shoulder pain, managed with po ultram. up with assistance ambulating in sanders. iv fluids, ciwa 4 or less. no PRN ativan given. pt up ambulating in sanders with assistance.pt resting well, will continue to monitor.  Goal: Individualization and Mutuality  Outcome: Ongoing (interventions implemented as appropriate)  Goal: Discharge Needs Assessment  Outcome: Ongoing (interventions implemented as appropriate)  Goal: Interprofessional Rounds/Family Conf  Outcome: Ongoing (interventions implemented as appropriate)     Problem: Suicide Risk (Adult)  Goal: Identify Related Risk Factors and Signs and Symptoms  Outcome: Ongoing (interventions implemented as appropriate)  Goal: Strength-Based Wellness/Recovery  Outcome: Ongoing (interventions implemented as appropriate)  Goal: Physical Safety  Outcome: Ongoing (interventions implemented as appropriate)     Problem: Fall Risk (Adult)  Goal: Identify Related Risk Factors and Signs and Symptoms  Outcome: Ongoing (interventions implemented as appropriate)  Goal: Absence of Fall  Outcome: Ongoing (interventions implemented as appropriate)     Problem: Skin Injury Risk (Adult)  Goal: Identify Related Risk Factors and Signs and Symptoms  Outcome: Ongoing (interventions implemented as appropriate)  Goal: Skin Health and Integrity  Outcome: Ongoing (interventions implemented as appropriate)

## 2020-01-12 NOTE — ED NOTES
Pt is aware of need for urine sample. States he will notify staff when he is able to provide one. IVF's infusing. Call light in reach.     Eden Lieberman RN  01/11/20 1954

## 2020-01-12 NOTE — NURSING NOTE
I received phone call from pt's RN, Shwetha that pt has insomnia and was able to speak with Access. On approach, I asked pt if he would prefer to speak to me now, or to another Access clinician in daytime hours. Pt replied that he had just taken his HS clonazepam and ultram, and would like to try to sleep now and speak to someone in the AM. I encouraged Shwetha to call me back if pt remains awake and requests to speak with me tonight. Safety sitter at bedside for suicide precautions. Access to return when pt able to be interviewed.

## 2020-01-13 PROBLEM — E87.1 HYPONATREMIA: Status: ACTIVE | Noted: 2020-01-13

## 2020-01-13 PROBLEM — E87.6 HYPOKALEMIA: Status: ACTIVE | Noted: 2020-01-13

## 2020-01-13 LAB
ALBUMIN SERPL-MCNC: 3.6 G/DL (ref 3.5–5.2)
ALBUMIN/GLOB SERPL: 2.1 G/DL
ALP SERPL-CCNC: 78 U/L (ref 39–117)
ALT SERPL W P-5'-P-CCNC: 77 U/L (ref 1–41)
ANION GAP SERPL CALCULATED.3IONS-SCNC: 10.6 MMOL/L (ref 5–15)
AST SERPL-CCNC: 255 U/L (ref 1–40)
BILIRUB SERPL-MCNC: 0.8 MG/DL (ref 0.2–1.2)
BUN BLD-MCNC: 7 MG/DL (ref 8–23)
BUN/CREAT SERPL: 10 (ref 7–25)
CALCIUM SPEC-SCNC: 8.7 MG/DL (ref 8.6–10.5)
CHLORIDE SERPL-SCNC: 94 MMOL/L (ref 98–107)
CO2 SERPL-SCNC: 25.4 MMOL/L (ref 22–29)
CREAT BLD-MCNC: 0.7 MG/DL (ref 0.76–1.27)
D-LACTATE SERPL-SCNC: 1.2 MMOL/L (ref 0.5–2)
DEPRECATED RDW RBC AUTO: 44.2 FL (ref 37–54)
ERYTHROCYTE [DISTWIDTH] IN BLOOD BY AUTOMATED COUNT: 12.9 % (ref 12.3–15.4)
GFR SERPL CREATININE-BSD FRML MDRD: 114 ML/MIN/1.73
GLOBULIN UR ELPH-MCNC: 1.7 GM/DL
GLUCOSE BLD-MCNC: 190 MG/DL (ref 65–99)
HCT VFR BLD AUTO: 32 % (ref 37.5–51)
HGB BLD-MCNC: 10.9 G/DL (ref 13–17.7)
INR PPP: 0.95 (ref 0.9–1.1)
MAGNESIUM SERPL-MCNC: 1.6 MG/DL (ref 1.6–2.4)
MCH RBC QN AUTO: 32 PG (ref 26.6–33)
MCHC RBC AUTO-ENTMCNC: 34.1 G/DL (ref 31.5–35.7)
MCV RBC AUTO: 93.8 FL (ref 79–97)
PLATELET # BLD AUTO: 59 10*3/MM3 (ref 140–450)
PMV BLD AUTO: 11.1 FL (ref 6–12)
POTASSIUM BLD-SCNC: 2.8 MMOL/L (ref 3.5–5.2)
PROT SERPL-MCNC: 5.3 G/DL (ref 6–8.5)
PROTHROMBIN TIME: 12.4 SECONDS (ref 11.7–14.2)
RBC # BLD AUTO: 3.41 10*6/MM3 (ref 4.14–5.8)
SODIUM BLD-SCNC: 130 MMOL/L (ref 136–145)
WBC NRBC COR # BLD: 2.21 10*3/MM3 (ref 3.4–10.8)

## 2020-01-13 PROCEDURE — 80053 COMPREHEN METABOLIC PANEL: CPT | Performed by: INTERNAL MEDICINE

## 2020-01-13 PROCEDURE — 85027 COMPLETE CBC AUTOMATED: CPT | Performed by: INTERNAL MEDICINE

## 2020-01-13 PROCEDURE — 83735 ASSAY OF MAGNESIUM: CPT | Performed by: INTERNAL MEDICINE

## 2020-01-13 PROCEDURE — 85610 PROTHROMBIN TIME: CPT | Performed by: INTERNAL MEDICINE

## 2020-01-13 PROCEDURE — 25810000003 SODIUM CHLORIDE 0.9 % WITH KCL 20 MEQ 20-0.9 MEQ/L-% SOLUTION: Performed by: INTERNAL MEDICINE

## 2020-01-13 PROCEDURE — 25010000002 MAGNESIUM SULFATE IN D5W 1G/100ML (PREMIX) 1-5 GM/100ML-% SOLUTION: Performed by: INTERNAL MEDICINE

## 2020-01-13 PROCEDURE — 83605 ASSAY OF LACTIC ACID: CPT | Performed by: INTERNAL MEDICINE

## 2020-01-13 RX ORDER — LORAZEPAM 2 MG/ML
1 INJECTION INTRAMUSCULAR
Status: DISCONTINUED | OUTPATIENT
Start: 2020-01-13 | End: 2020-01-14 | Stop reason: HOSPADM

## 2020-01-13 RX ORDER — LORAZEPAM 2 MG/ML
2 INJECTION INTRAMUSCULAR
Status: DISCONTINUED | OUTPATIENT
Start: 2020-01-13 | End: 2020-01-14 | Stop reason: HOSPADM

## 2020-01-13 RX ORDER — THIAMINE MONONITRATE (VIT B1) 100 MG
100 TABLET ORAL DAILY
Status: DISCONTINUED | OUTPATIENT
Start: 2020-01-14 | End: 2020-01-14 | Stop reason: HOSPADM

## 2020-01-13 RX ORDER — SODIUM CHLORIDE AND POTASSIUM CHLORIDE 150; 900 MG/100ML; MG/100ML
100 INJECTION, SOLUTION INTRAVENOUS CONTINUOUS
Status: DISCONTINUED | OUTPATIENT
Start: 2020-01-13 | End: 2020-01-14

## 2020-01-13 RX ORDER — MAGNESIUM SULFATE 1 G/100ML
1 INJECTION INTRAVENOUS ONCE
Status: COMPLETED | OUTPATIENT
Start: 2020-01-13 | End: 2020-01-13

## 2020-01-13 RX ORDER — FOLIC ACID 1 MG/1
1 TABLET ORAL DAILY
Status: DISCONTINUED | OUTPATIENT
Start: 2020-01-14 | End: 2020-01-14 | Stop reason: HOSPADM

## 2020-01-13 RX ORDER — LORAZEPAM 1 MG/1
1 TABLET ORAL
Status: DISCONTINUED | OUTPATIENT
Start: 2020-01-13 | End: 2020-01-14 | Stop reason: HOSPADM

## 2020-01-13 RX ORDER — POTASSIUM CHLORIDE 750 MG/1
40 CAPSULE, EXTENDED RELEASE ORAL AS NEEDED
Status: DISCONTINUED | OUTPATIENT
Start: 2020-01-13 | End: 2020-01-14 | Stop reason: HOSPADM

## 2020-01-13 RX ORDER — POTASSIUM CHLORIDE 7.45 MG/ML
10 INJECTION INTRAVENOUS
Status: DISCONTINUED | OUTPATIENT
Start: 2020-01-13 | End: 2020-01-14 | Stop reason: HOSPADM

## 2020-01-13 RX ORDER — POTASSIUM CHLORIDE 1.5 G/1.77G
40 POWDER, FOR SOLUTION ORAL AS NEEDED
Status: DISCONTINUED | OUTPATIENT
Start: 2020-01-13 | End: 2020-01-14 | Stop reason: HOSPADM

## 2020-01-13 RX ORDER — LORAZEPAM 1 MG/1
2 TABLET ORAL
Status: DISCONTINUED | OUTPATIENT
Start: 2020-01-13 | End: 2020-01-14 | Stop reason: HOSPADM

## 2020-01-13 RX ORDER — DIPHENOXYLATE HYDROCHLORIDE AND ATROPINE SULFATE 2.5; .025 MG/1; MG/1
1 TABLET ORAL DAILY
Status: DISCONTINUED | OUTPATIENT
Start: 2020-01-14 | End: 2020-01-14 | Stop reason: HOSPADM

## 2020-01-13 RX ADMIN — Medication 1 TABLET: at 08:32

## 2020-01-13 RX ADMIN — GABAPENTIN 300 MG: 300 CAPSULE ORAL at 08:31

## 2020-01-13 RX ADMIN — GABAPENTIN 300 MG: 300 CAPSULE ORAL at 16:47

## 2020-01-13 RX ADMIN — MAGNESIUM SULFATE 1 G: 1 INJECTION INTRAVENOUS at 08:31

## 2020-01-13 RX ADMIN — POTASSIUM CHLORIDE AND SODIUM CHLORIDE 100 ML/HR: 900; 150 INJECTION, SOLUTION INTRAVENOUS at 10:38

## 2020-01-13 RX ADMIN — ATORVASTATIN CALCIUM 20 MG: 20 TABLET, FILM COATED ORAL at 08:32

## 2020-01-13 RX ADMIN — LEVOTHYROXINE SODIUM 75 MCG: 75 TABLET ORAL at 06:35

## 2020-01-13 RX ADMIN — ACYCLOVIR 800 MG: 800 TABLET ORAL at 08:32

## 2020-01-13 RX ADMIN — MIRTAZAPINE 15 MG: 15 TABLET, FILM COATED ORAL at 20:29

## 2020-01-13 RX ADMIN — CLONAZEPAM 2 MG: 1 TABLET ORAL at 20:29

## 2020-01-13 RX ADMIN — Medication 100 MG: at 08:32

## 2020-01-13 RX ADMIN — POTASSIUM CHLORIDE 40 MEQ: 750 CAPSULE, EXTENDED RELEASE ORAL at 12:51

## 2020-01-13 RX ADMIN — AMLODIPINE BESYLATE 5 MG: 5 TABLET ORAL at 06:35

## 2020-01-13 RX ADMIN — POTASSIUM CHLORIDE 40 MEQ: 750 CAPSULE, EXTENDED RELEASE ORAL at 08:31

## 2020-01-13 RX ADMIN — LISINOPRIL 10 MG: 10 TABLET ORAL at 08:32

## 2020-01-13 RX ADMIN — SODIUM CHLORIDE, PRESERVATIVE FREE 10 ML: 5 INJECTION INTRAVENOUS at 08:35

## 2020-01-13 RX ADMIN — FOLIC ACID 1 MG: 1 TABLET ORAL at 08:31

## 2020-01-13 RX ADMIN — BUPROPION HYDROCHLORIDE 300 MG: 150 TABLET, EXTENDED RELEASE ORAL at 08:32

## 2020-01-13 RX ADMIN — TRAMADOL HYDROCHLORIDE 50 MG: 50 TABLET, FILM COATED ORAL at 23:46

## 2020-01-13 RX ADMIN — TRAMADOL HYDROCHLORIDE 50 MG: 50 TABLET, FILM COATED ORAL at 16:47

## 2020-01-13 RX ADMIN — SODIUM CHLORIDE, SODIUM LACTATE, POTASSIUM CHLORIDE, CALCIUM CHLORIDE AND DEXTROSE MONOHYDRATE 125 ML/HR: 5; 600; 310; 30; 20 INJECTION, SOLUTION INTRAVENOUS at 06:35

## 2020-01-13 RX ADMIN — GABAPENTIN 300 MG: 300 CAPSULE ORAL at 20:29

## 2020-01-13 RX ADMIN — TRAMADOL HYDROCHLORIDE 50 MG: 50 TABLET, FILM COATED ORAL at 08:32

## 2020-01-13 RX ADMIN — POTASSIUM CHLORIDE 40 MEQ: 750 CAPSULE, EXTENDED RELEASE ORAL at 16:47

## 2020-01-13 NOTE — NURSING NOTE
Pt denies any sx of ETOH w/d.  He appears tremulous, states he has had tremors for a long time.  Gave him Smart Recovery meeting list.      Left message for Chaplain Shiatl to provide [facility] recovery meetings - pt is aware of the consult to [facility].

## 2020-01-13 NOTE — PROGRESS NOTES
Name: Pro Mueller ADMIT: 2020   : 1955  PCP: Alla Beal MD    MRN: [id number] LOS: 2 days   AGE/SEX: 65 y.o. male  ROOM: [facility]   Subjective   Chief Complaint   Patient presents with   • Dizziness   • Abdominal Pain   • Suture / Staple Removal      No chest pain shortness of breath nausea vomiting or diarrhea.  His left lower quadrant pain is improved today.  Tremor is less noticeable and he reports he was scheduled to see Dr. Mancilla today for postop follow-up.    Objective   Vital Signs  Temp:  [98.6 °F (37 °C)-99.3 °F (37.4 °C)] 99.3 °F (37.4 °C)  Heart Rate:  [76-99] 99  Resp:  [18] 18  BP: (111-134)/(69-77) 119/77     on   ;   Device (Oxygen Therapy): room air  Body mass index is 25.74 kg/m².    Physical Exam   Constitutional: He appears well-developed. No distress.   HENT:   Head: Atraumatic.   Nose: Nose normal.   Eyes: Conjunctivae and EOM are normal.   Neck: Neck supple. No tracheal deviation present.   Cardiovascular: Normal rate, regular rhythm and intact distal pulses.   Pulmonary/Chest: Effort normal. He has no wheezes.   Abdominal: Soft. He exhibits no distension. There is tenderness. There is no rebound and no guarding.   Musculoskeletal: He exhibits tenderness (right shoulder, surgical sites cdi). He exhibits no edema.   Neurological: He is alert. He displays tremor. No cranial nerve deficit.   Skin: Skin is warm and dry. He is not diaphoretic.   Psychiatric: He has a normal mood and affect. His behavior is normal.   Nursing note and vitals reviewed.      Results Review:       I reviewed the patient's new clinical results.     I reviewed imaging, agree with interpretation.     I reviewed telemetry/EKG results, sinus      Results from last 7 days   Lab Units 20  1715   WBC 10*3/mm3 2.21* 2.19* 3.48   HEMOGLOBIN g/dL 10.9* 11.8* 15.4   PLATELETS 10*3/mm3 59* 67* 103*     Results from last 7 days   Lab Units 20  0343 01/12/20  0336  01/11/20  1715   SODIUM mmol/L 130* 134* 133*   POTASSIUM mmol/L 2.8* 3.7 3.7   CHLORIDE mmol/L 94* 94* 85*   CO2 mmol/L 25.4 13.1* 11.2*   BUN mg/dL 7* 6* 11   CREATININE mg/dL 0.70* 0.63* 0.91   GLUCOSE mg/dL 190* 84 93   Estimated Creatinine Clearance: 131.5 mL/min (A) (by C-G formula based on SCr of 0.7 mg/dL (L)).  Results from last 7 days   Lab Units 01/13/20  0343 01/12/20  0336 01/11/20  1715   CALCIUM mg/dL 8.7 7.3* 8.6   ALBUMIN g/dL 3.60  --  5.10   MAGNESIUM mg/dL 1.6  --   --          acyclovir 800 mg Oral QAM   amLODIPine 5 mg Oral QAM   atorvastatin 20 mg Oral Daily   buPROPion  mg Oral Daily   clonazePAM 2 mg Oral Nightly   vitamin B-1 100 mg Oral Daily   And      multivitamin 1 tablet Oral Daily   And      folic acid 1 mg Oral Daily   gabapentin 300 mg Oral TID   levothyroxine 75 mcg Oral Q AM   lisinopril 10 mg Oral Daily   mirtazapine 15 mg Oral Nightly   sodium chloride 10 mL Intravenous Q12H       sodium chloride 0.9 % with KCl 20 mEq 100 mL/hr   Diet Regular    Assessment/Plan      Active Hospital Problems    Diagnosis  POA   • **Alcoholic ketoacidosis [E87.2]  Unknown   • Nausea and vomiting [R11.2]  Yes   • Hyperlipidemia [E78.5]  Yes   • Hypertension [I10]  Yes   • Hypothyroidism [E03.9]  Yes   • Mixed anxiety depressive disorder [F41.8]  Yes      Resolved Hospital Problems   No resolved problems to display.       · Alcoholic ketoacidosis, withdrawal, and hepatitis without ascites: Tremor improved today.  He is alert.  Lactic acidosis resolved and his anion gap has closed.  He has developed thrombocytopenia along with anemia and low white count from his alcohol use.  Does not have any bleeding or infectious signs.  Will monitor.  Change fluids to normal saline with potassium and will also give magnesium replacement.  LFTs are improving and consistent with alcoholic hepatitis.  Bilirubin is not elevated so not expect elevated discriminant function but will check PT to confirm.  Have  stopped the scheduled Ativan and will continue with Ativan as needed along with vitamin supplementation.  · LLQ Pain  · R Shoulder Pain: XR okay.  Will consult orthopedic surgery  · Hypothyroid: Synthroid  · Anxiety/depression: Access following  · Disposition: TBD    Ritchei Corona MD  [facility]  01/13/20  9:03 AM    Dictated portions using Dragon dictation software.

## 2020-01-13 NOTE — PLAN OF CARE
Problem: Patient Care Overview  Goal: Plan of Care Review  Outcome: Ongoing (interventions implemented as appropriate)  Flowsheets  Taken 1/13/2020 1506  Progress: improving  Outcome Summary: pt vss, complains of right shoulder pain, ultram given. sitter D/C this am. pt resting well. no suicidal thoughts or ideations. will continue to closely monitor.  Taken 1/13/2020 1415  Plan of Care Reviewed With: patient  Goal: Individualization and Mutuality  Outcome: Ongoing (interventions implemented as appropriate)  Goal: Discharge Needs Assessment  Outcome: Ongoing (interventions implemented as appropriate)  Goal: Interprofessional Rounds/Family Conf  Outcome: Ongoing (interventions implemented as appropriate)     Problem: Fall Risk (Adult)  Goal: Absence of Fall  Outcome: Ongoing (interventions implemented as appropriate)     Problem: Skin Injury Risk (Adult)  Goal: Skin Health and Integrity  Outcome: Ongoing (interventions implemented as appropriate)

## 2020-01-13 NOTE — PROGRESS NOTES
Continued Stay Note  [facility]     Patient Name: Pro Mueller  MRN: [id number]  Today's Date: 1/13/2020    Admit Date: 1/11/2020    Discharge Plan     Row Name 01/13/20 1823       Plan    Plan  Home     Patient/Family in Agreement with Plan  yes    Plan Comments  Facesheet information was verfied with patient.  He is IADLs and lives alone.  He plans to return home and will have a friend provide transport.  His PCP and pharmacy are verified.  [facility] provided him with outpatient resources for ETOH abuse.  He has been to the [facility] and declines inpatient needs.  He declines any other needs at this time.........................Kaylynn Vidal RN        Discharge Codes    No documentation.             Kaylynn Vidal RN

## 2020-01-14 VITALS
SYSTOLIC BLOOD PRESSURE: 116 MMHG | BODY MASS INDEX: 25.71 KG/M2 | OXYGEN SATURATION: 95 % | TEMPERATURE: 98.3 F | DIASTOLIC BLOOD PRESSURE: 88 MMHG | HEIGHT: 72 IN | HEART RATE: 88 BPM | WEIGHT: 189.8 LBS | RESPIRATION RATE: 20 BRPM

## 2020-01-14 DIAGNOSIS — Z98.890 STATUS POST ARTHROSCOPY OF SHOULDER: Primary | ICD-10-CM

## 2020-01-14 PROBLEM — R11.2 NAUSEA AND VOMITING: Status: RESOLVED | Noted: 2020-01-11 | Resolved: 2020-01-14

## 2020-01-14 PROBLEM — D61.818 PANCYTOPENIA (HCC): Status: ACTIVE | Noted: 2020-01-14

## 2020-01-14 PROBLEM — E87.1 HYPONATREMIA: Status: RESOLVED | Noted: 2020-01-13 | Resolved: 2020-01-14

## 2020-01-14 PROBLEM — E87.6 HYPOKALEMIA: Status: RESOLVED | Noted: 2020-01-13 | Resolved: 2020-01-14

## 2020-01-14 PROBLEM — F10.230 ALCOHOL DEPENDENCE WITH UNCOMPLICATED WITHDRAWAL (HCC): Status: ACTIVE | Noted: 2020-01-14

## 2020-01-14 LAB
ANION GAP SERPL CALCULATED.3IONS-SCNC: 10.3 MMOL/L (ref 5–15)
BASOPHILS # BLD AUTO: 0.03 10*3/MM3 (ref 0–0.2)
BASOPHILS NFR BLD AUTO: 1.3 % (ref 0–1.5)
BUN BLD-MCNC: 4 MG/DL (ref 8–23)
BUN/CREAT SERPL: 6.8 (ref 7–25)
CALCIUM SPEC-SCNC: 8.6 MG/DL (ref 8.6–10.5)
CHLORIDE SERPL-SCNC: 103 MMOL/L (ref 98–107)
CO2 SERPL-SCNC: 24.7 MMOL/L (ref 22–29)
CREAT BLD-MCNC: 0.59 MG/DL (ref 0.76–1.27)
DEPRECATED RDW RBC AUTO: 44.4 FL (ref 37–54)
EOSINOPHIL # BLD AUTO: 0.04 10*3/MM3 (ref 0–0.4)
EOSINOPHIL NFR BLD AUTO: 1.7 % (ref 0.3–6.2)
ERYTHROCYTE [DISTWIDTH] IN BLOOD BY AUTOMATED COUNT: 12.8 % (ref 12.3–15.4)
GFR SERPL CREATININE-BSD FRML MDRD: 139 ML/MIN/1.73
GLUCOSE BLD-MCNC: 93 MG/DL (ref 65–99)
HCT VFR BLD AUTO: 32.9 % (ref 37.5–51)
HGB BLD-MCNC: 11.4 G/DL (ref 13–17.7)
IMM GRANULOCYTES # BLD AUTO: 0.02 10*3/MM3 (ref 0–0.05)
IMM GRANULOCYTES NFR BLD AUTO: 0.8 % (ref 0–0.5)
LYMPHOCYTES # BLD AUTO: 0.82 10*3/MM3 (ref 0.7–3.1)
LYMPHOCYTES NFR BLD AUTO: 34.7 % (ref 19.6–45.3)
MAGNESIUM SERPL-MCNC: 1.5 MG/DL (ref 1.6–2.4)
MCH RBC QN AUTO: 32.8 PG (ref 26.6–33)
MCHC RBC AUTO-ENTMCNC: 34.7 G/DL (ref 31.5–35.7)
MCV RBC AUTO: 94.5 FL (ref 79–97)
MONOCYTES # BLD AUTO: 0.26 10*3/MM3 (ref 0.1–0.9)
MONOCYTES NFR BLD AUTO: 11 % (ref 5–12)
NEUTROPHILS # BLD AUTO: 1.19 10*3/MM3 (ref 1.7–7)
NEUTROPHILS NFR BLD AUTO: 50.5 % (ref 42.7–76)
NRBC BLD AUTO-RTO: 0.4 /100 WBC (ref 0–0.2)
PLATELET # BLD AUTO: 54 10*3/MM3 (ref 140–450)
PMV BLD AUTO: 12 FL (ref 6–12)
POTASSIUM BLD-SCNC: 4.2 MMOL/L (ref 3.5–5.2)
POTASSIUM BLD-SCNC: 4.2 MMOL/L (ref 3.5–5.2)
RBC # BLD AUTO: 3.48 10*6/MM3 (ref 4.14–5.8)
SODIUM BLD-SCNC: 138 MMOL/L (ref 136–145)
WBC NRBC COR # BLD: 2.36 10*3/MM3 (ref 3.4–10.8)

## 2020-01-14 PROCEDURE — 85025 COMPLETE CBC W/AUTO DIFF WBC: CPT | Performed by: INTERNAL MEDICINE

## 2020-01-14 PROCEDURE — 83735 ASSAY OF MAGNESIUM: CPT | Performed by: INTERNAL MEDICINE

## 2020-01-14 PROCEDURE — 25810000003 SODIUM CHLORIDE 0.9 % WITH KCL 20 MEQ 20-0.9 MEQ/L-% SOLUTION: Performed by: INTERNAL MEDICINE

## 2020-01-14 PROCEDURE — 80048 BASIC METABOLIC PNL TOTAL CA: CPT | Performed by: INTERNAL MEDICINE

## 2020-01-14 PROCEDURE — 84132 ASSAY OF SERUM POTASSIUM: CPT | Performed by: HOSPITALIST

## 2020-01-14 RX ORDER — HYDROCODONE BITARTRATE AND ACETAMINOPHEN 10; 325 MG/1; MG/1
1 TABLET ORAL EVERY 4 HOURS PRN
Qty: 42 TABLET | Refills: 0 | Status: SHIPPED | OUTPATIENT
Start: 2020-01-14 | End: 2020-07-02

## 2020-01-14 RX ADMIN — LEVOTHYROXINE SODIUM 75 MCG: 75 TABLET ORAL at 05:33

## 2020-01-14 RX ADMIN — Medication 100 MG: at 09:03

## 2020-01-14 RX ADMIN — FOLIC ACID 1 MG: 1 TABLET ORAL at 09:04

## 2020-01-14 RX ADMIN — GABAPENTIN 300 MG: 300 CAPSULE ORAL at 15:07

## 2020-01-14 RX ADMIN — POTASSIUM CHLORIDE AND SODIUM CHLORIDE 100 ML/HR: 900; 150 INJECTION, SOLUTION INTRAVENOUS at 05:31

## 2020-01-14 RX ADMIN — Medication 1 TABLET: at 09:03

## 2020-01-14 RX ADMIN — GABAPENTIN 300 MG: 300 CAPSULE ORAL at 09:05

## 2020-01-14 RX ADMIN — BUPROPION HYDROCHLORIDE 300 MG: 150 TABLET, EXTENDED RELEASE ORAL at 09:04

## 2020-01-14 RX ADMIN — TRAMADOL HYDROCHLORIDE 50 MG: 50 TABLET, FILM COATED ORAL at 09:05

## 2020-01-14 RX ADMIN — LISINOPRIL 10 MG: 10 TABLET ORAL at 09:03

## 2020-01-14 RX ADMIN — SODIUM CHLORIDE, PRESERVATIVE FREE 10 ML: 5 INJECTION INTRAVENOUS at 09:05

## 2020-01-14 RX ADMIN — AMLODIPINE BESYLATE 5 MG: 5 TABLET ORAL at 09:04

## 2020-01-14 RX ADMIN — ACYCLOVIR 800 MG: 800 TABLET ORAL at 09:03

## 2020-01-14 NOTE — PROGRESS NOTES
Mr. Mueller is now almost a month out from his decompression and distal clavicle excision.  He was admitted for alcoholic ketoacidosis.  He has not followed up yet from a shoulder scope.  He says the shoulder seems to be doing okay.  He still has some soreness.      His wounds are healed.  The sutures were in place still.  His arm is soft.  Pendulums are well-tolerated.  Passive motion is well-tolerated but his active motion remains limited.  Good motor and sensory function in his hand.    I spoke with the nurse and we are going to get his stitches out before he leaves.  I want to get him into physical therapy.  We need to get him moving because he is behind.  I have ordered that therapy and I am going to have [facility] contact him about setting this up.  I did agree to refill his pain medicine.  I am going to have him follow-up with me in 1 month.  I will have the office contact him to set this up.    Aj Mancilla MD

## 2020-01-14 NOTE — DISCHARGE SUMMARY
Patient Name: Pro Mueller  : 1955  MRN: [id number]    Date of Admission: 2020  Date of Discharge:  2020  Primary Care Physician: Alla Beal MD      Chief Complaint:   Dizziness; Abdominal Pain; and Suture / Staple Removal      Discharge Diagnoses     Active Hospital Problems    Diagnosis  POA   • **Alcoholic ketoacidosis [E87.2]  Yes   • Alcohol dependence with uncomplicated withdrawal (CMS/HCC) [F10.230]  Yes   • Pancytopenia (CMS/HCC) [D61.818]  No   • Hyperlipidemia [E78.5]  Yes   • Hypertension [I10]  Yes   • Hypothyroidism [E03.9]  Yes   • Mixed anxiety depressive disorder [F41.8]  Yes      Resolved Hospital Problems    Diagnosis Date Resolved POA   • Hyponatremia [E87.1] 2020 Yes   • Hypokalemia [E87.6] 2020 No   • Nausea and vomiting [R11.2] 2020 Yes        Hospital Course     Mr. Mueller is a 65 y.o. male with a history of alcohol abuse who presented to [facility] initially complaining of nausea, vomiting and weakness.  Please see the admitting history and physical for further details.  He was found to have dehydration and alcoholic ketoacidosis and was admitted to the hospital for further evaluation and treatment.  He was given IV fluids and his electrolyte abnormalities have been corrected.  With supportive care he seems to have improved.  Today he is tolerating a normal diet with no further emesis.  He was seen by [facility] during his hospital stay for his ongoing alcohol abuse.  He had mild alcohol withdrawals while in hospital treated with Ativan.  Today he is alert and oriented x3 with no hallucinations.  Only complaining of mild tremor which she says is not new.  I discussed the importance with him of alcohol cessation and he voices understanding and is agreeable to outpatient follow-up with a alcohol treatment program.  Information given to him by the [facility].  I reiterated to him that his ongoing alcohol abuse is affecting his  "health.  I explained he has pancytopenia and evidence of liver disease that would only worsen with continued alcohol use.  He has been instructed to avoid NSAIDs.  He will need to follow-up with his primary care provider in about 1 week to have repeat labs drawn.  His platelet count is 54 but he has no signs of bleeding.  He has ongoing shoulder pain after recent surgery.  His orthopedic surgeon was asked to evaluate him in hospital prior to discharge.  Patient requested \"something stronger\" for pain other than Ultram.  I instructed him I would defer this to his orthopedic provider.  We also had discussion that Klonopin is likely not a good drug of choice for \"sleep\" as it is short acting and habit forming.  We discussed trying melatonin which he says he has never used before.  I advised that he discuss this further with his psychiatrist who has prescribed the Klonopin.  Pending clearance from orthopedic surgery he will be discharged today to follow-up with his PCP and outpatient alcohol treatment program.      Day of Discharge     He denies any chest pain, SOA, nausea, vomiting or diarrhea. Tolerating diet w/o difficulty.     Physical Exam:  Temp:  [97.8 °F (36.6 °C)-98.7 °F (37.1 °C)] 98.5 °F (36.9 °C)  Heart Rate:  [] 100  Resp:  [18] 18  BP: (122-138)/(76-87) 133/87  Body mass index is 25.74 kg/m².  Physical Exam   Constitutional: He is oriented to person, place, and time. No distress.   Cardiovascular: Normal rate and regular rhythm.   No murmur heard.  Pulmonary/Chest: Effort normal and breath sounds normal.   Abdominal: Soft. Bowel sounds are normal. He exhibits no distension. There is no tenderness.   Musculoskeletal: Normal range of motion. He exhibits no edema.   Neurological: He is alert and oriented to person, place, and time. He displays tremor (not new).   Skin: Skin is warm and dry. He is not diaphoretic.       Consultants     Consult Orders (all) (From admission, onward)     Start     Ordered   "    01/13/20 0905  Inpatient Orthopedic Surgery Consult  Once     Specialty:  Orthopedic Surgery  Provider:  Aj Mancilla MD    01/13/20 0905 01/12/20 0035  [facility]patient [facility] Consult  Once     Provider:  (Not yet assigned)    01/12/20 0034              Procedures     Imaging Results (All)     Procedure Component Value Units Date/Time    XR Shoulder 2+ View Right [[id number]] Collected:  01/12/20 1504     Updated:  01/12/20 1541    Narrative:       TWO-VIEW RIGHT SHOULDER     HISTORY: Recent shoulder surgery. Pain.     FINDINGS: The patient has had recent surgical removal of a portion of  the lateral head of the clavicle and no complicating features are seen  by plain film. The remainder of the shoulder is unremarkable.       CT Abdomen Pelvis With Contrast [[id number]] Collected:  01/11/20 1854     Updated:  01/11/20 1903    Narrative:       CT ABDOMEN AND PELVIS WITH IV CONTRAST     HISTORY: 65-year-old male with left lower quadrant pain that began a  couple of weeks ago.     TECHNIQUE: CT abdomen and pelvis with IV and without oral contrast.     COMPARISON: CT abdomen and pelvis without contrast 11/28/2007.     FINDINGS: There is linear subsegmental atelectasis within the lingula.  There is no basilar effusion. There is diffuse fat infiltration of the  liver without focal hepatic abnormality. Several gallstones or sludge  layer dependently within the gallbladder. Splenic size is within normal  limits. Adrenal glands and pancreas appear within normal limits. A right  lower pole renal stone measures 6 mm. An anterior right renal partially  exophytic cyst measures 3 cm. There is a left upper pole parenchymal  cyst measuring 0.6 cm. A medial left renal parenchymal cyst measures 9  mm. There is also a low density within the left renal lower pole, most  likely representing a small cyst though difficult to characterize. There  is no renal or ureteral stone. Urinary bladder is partially decompressed  and there  is mild generalized urinary bladder wall thickening. There is  mild prostate gland enlargement with median lobe hypertrophy.     There is no emelia enlargement in the abdomen or pelvis. There is fat  density within the wall of the colon which may be normal though can also  indicate that the patient has had previous episodes of colitis. There is  no evidence for acute colitis. There is sigmoid diverticulosis without  evidence for diverticulitis. There is no evidence for appendicitis.  Atherosclerotic calcifications are present involving the abdominal aorta  and iliac vasculature without aneurysm.       Impression:       1. Urinary bladder is mostly decompressed and appears mildly  thick-walled and this is most likely chronic. There is mild prostate  gland enlargement.  2. 6 mm right lower pole renal stone. Bilateral renal cysts.  3. Diffuse fat infiltration of the liver.  4. Cholelithiasis.  5. Colonic diverticulosis without evidence for diverticulitis. There is  fat density within the wall of the colon and this finding may be normal  thought can have a correlation with previous episodes of colitis. This  is chronic and there is no acute colitis.             Pertinent Labs     Results from last 7 days   Lab Units 01/14/20  0310 01/13/20 0343 01/12/20  0336 01/11/20  1715   WBC 10*3/mm3 2.36* 2.21* 2.19* 3.48   HEMOGLOBIN g/dL 11.4* 10.9* 11.8* 15.4   PLATELETS 10*3/mm3 54* 59* 67* 103*     Results from last 7 days   Lab Units 01/14/20  0509 01/14/20 0310 01/13/20 0343 01/12/20  0336 01/11/20  1715   SODIUM mmol/L  --  138 130* 134* 133*   POTASSIUM mmol/L 4.2 4.2 2.8* 3.7 3.7   CHLORIDE mmol/L  --  103 94* 94* 85*   CO2 mmol/L  --  24.7 25.4 13.1* 11.2*   BUN mg/dL  --  4* 7* 6* 11   CREATININE mg/dL  --  0.59* 0.70* 0.63* 0.91   GLUCOSE mg/dL  --  93 190* 84 93   Estimated Creatinine Clearance: 156.1 mL/min (A) (by C-G formula based on SCr of 0.59 mg/dL (L)).  Results from last 7 days   Lab Units 01/13/20 0343  01/11/20  1715   ALBUMIN g/dL 3.60 5.10   BILIRUBIN mg/dL 0.8 0.8   ALK PHOS U/L 78 87   AST (SGOT) U/L 255* 348*   ALT (SGPT) U/L 77* 123*     Results from last 7 days   Lab Units 01/14/20  0310 01/13/20  0343 01/12/20  0336 01/11/20  1715   CALCIUM mg/dL 8.6 8.7 7.3* 8.6   ALBUMIN g/dL  --  3.60  --  5.10   MAGNESIUM mg/dL 1.5* 1.6  --   --      Results from last 7 days   Lab Units 01/11/20  1715   LIPASE U/L 113*     Results from last 7 days   Lab Units 01/11/20  1715   TROPONIN T ng/mL <0.010           Invalid input(s): LDLCALC  Results from last 7 days   Lab Units 01/11/20 1951   BLOODCX  No growth at 2 days  No growth at 2 days       Test Results Pending at Discharge      Order Current Status    Blood Culture - Blood, Arm, Left Preliminary result    Blood Culture - Blood, Arm, Left Preliminary result          Discharge Details        Discharge Medications      Changes to Medications      Instructions Start Date   acyclovir 800 MG tablet  Commonly known as:  ZOVIRAX  What changed:  Another medication with the same name was removed. Continue taking this medication, and follow the directions you see here.   800 mg, Oral, Every Morning      levothyroxine 75 MCG tablet  Commonly known as:  SYNTHROID, LEVOTHROID  What changed:  Another medication with the same name was removed. Continue taking this medication, and follow the directions you see here.   TAKE 1 TABLET BY MOUTH EVERY DAY         Continue These Medications      Instructions Start Date   amLODIPine 5 MG tablet  Commonly known as:  NORVASC   5 mg, Oral, Every Morning      atorvastatin 20 MG tablet  Commonly known as:  LIPITOR   20 mg, Oral, Daily      buPROPion  MG 12 hr tablet  Commonly known as:  WELLBUTRIN SR   300 mg, Oral, Daily      clonazePAM 2 MG tablet  Commonly known as:  KlonoPIN   2 mg, Oral, Nightly      gabapentin 300 MG capsule  Commonly known as:  NEURONTIN   300 mg, Oral, 3 Times Daily      lisinopril 10 MG tablet  Commonly known  as:  PRINIVIL,ZESTRIL   10 mg, Oral, Daily      mirtazapine 15 MG tablet  Commonly known as:  REMERON   15 mg, Oral, Daily      ondansetron 4 MG tablet  Commonly known as:  ZOFRAN   4 mg, Oral, Every 8 Hours PRN      ondansetron ODT 8 MG disintegrating tablet  Commonly known as:  ZOFRAN-ODT   8 mg, Oral, Every 8 Hours PRN      rizatriptan 10 MG tablet  Commonly known as:  MAXALT   10 mg, Oral, Once As Needed, May repeat in 2 hours if needed      traMADol 50 MG tablet  Commonly known as:  ULTRAM   TAKE 1 TABLET BY MOUTH EVERY 6 HOURS AS NEEDED FOR MODERATE PAIN      TRINTELLIX 20 MG tablet  Generic drug:  Vortioxetine HBr   20 mg, Oral, Daily      Vitamin B Complex tablet   1 tablet, Oral, Every Morning      VITAMIN D PO   1 capsule, Oral, Every Morning         Stop These Medications    docusate sodium 100 MG capsule  Commonly known as:  COLACE     HYDROcodone-acetaminophen 7.5-325 MG per tablet  Commonly known as:  NORCO     meloxicam 15 MG tablet  Commonly known as:  MOBIC            Allergies   Allergen Reactions   • Penicillins Anaphylaxis and Other (See Comments)     Seizure. childhood         Discharge Disposition:  Home or Self Care    Discharge Diet:  Diet Order   Procedures   • Diet Regular       Discharge Activity:   Activity Instructions     Activity as Tolerated            CODE STATUS:    Code Status and Medical Interventions:   Ordered at: 01/12/20 0024     Code Status:    CPR     Medical Interventions (Level of Support Prior to Arrest):    Full       Future Appointments   Date Time Provider Department Center   2/20/2020  1:00 PM [facility] None   5/27/2020  2:00 PM Sary Pugh APRN [facility] None     Follow-up Information     Alla Beal MD Follow up in 1 week(s).    Specialty:  Family Medicine  Contact information:  [address] [facility]  [address] [place] [address]  [phone]             Aj Mancilla MD Follow up.    Specialty:  Orthopedic  Surgery  Contact information:  [address] [place] [address]  [phone]                   Time Spent on Discharge:  Greater than 30 minutes      Neftali Billings MD  [facility]  01/14/20  10:49 AM

## 2020-01-14 NOTE — CONSULTS
Met with pt to share information about [facility] as pt is Rastafari.  Shared the following:       [facility] is a mindfulness-based addiction recovery community that practices and utilizes Rastafari philosophy as the foundation of the recovery process. Drawing inspiration from the core teachings of the Four Noble Truths, emphasis is placed on both knowledge and empathy as a means for overcoming addiction and its causes. Those struggling with any form of addiction greatly benefit when they are able to understand the suffering that addiction has created while developing compassion for the pain they have experienced.       Pt open to learning about the integration of Rastafari thought with the 12 steps of recovery and wants to find a meeting where his worldview is honored and respected. Shared meeting time and address.   Pt states his wish is to stop drinking.  Stressed 1) the need for an accountability partner, 2)  Relapse prevention plan  3)  Replacing drinking with positive activities.  Chaplain Mateus Pina

## 2020-01-14 NOTE — DISCHARGE INSTR - LAB
Follow-up with Dr Aj Mancilla in 1 month    [facility] will contact about setting up therapy for right shoulder

## 2020-01-14 NOTE — PROGRESS NOTES
Chaplain Pina is with patient at this time.  Ms Vlad requested Chaplain Pina to see pt to assist with additional PETER support systems.

## 2020-01-15 NOTE — PROGRESS NOTES
Case Management Discharge Note      Final Note: home         Destination      No service has been selected for the patient.      Durable Medical Equipment      No service has been selected for the patient.      Dialysis/Infusion      No service has been selected for the patient.      Home Medical Care      No service has been selected for the patient.      Therapy      No service has been selected for the patient.      Community Resources      No service has been selected for the patient.        Transportation Services  Private: Car    Final Discharge Disposition Code: 01 - home or self-care

## 2020-01-16 LAB
BACTERIA SPEC AEROBE CULT: NORMAL
BACTERIA SPEC AEROBE CULT: NORMAL

## 2020-01-17 ENCOUNTER — TREATMENT (OUTPATIENT)
Dept: PHYSICAL THERAPY | Facility: CLINIC | Age: 65
End: 2020-01-17

## 2020-01-17 DIAGNOSIS — M25.511 CHRONIC RIGHT SHOULDER PAIN: Primary | ICD-10-CM

## 2020-01-17 DIAGNOSIS — M25.611 DECREASED RANGE OF MOTION OF RIGHT SHOULDER: ICD-10-CM

## 2020-01-17 DIAGNOSIS — G89.29 CHRONIC RIGHT SHOULDER PAIN: Primary | ICD-10-CM

## 2020-01-17 PROCEDURE — 97110 THERAPEUTIC EXERCISES: CPT | Performed by: PHYSICAL THERAPIST

## 2020-01-17 PROCEDURE — 97014 ELECTRIC STIMULATION THERAPY: CPT | Performed by: PHYSICAL THERAPIST

## 2020-01-17 PROCEDURE — 97140 MANUAL THERAPY 1/> REGIONS: CPT | Performed by: PHYSICAL THERAPIST

## 2020-01-17 PROCEDURE — 97161 PT EVAL LOW COMPLEX 20 MIN: CPT | Performed by: PHYSICAL THERAPIST

## 2020-01-17 NOTE — PROGRESS NOTES
Physical Therapy Initial Evaluation and Plan of Care        Patient: Pro Mueller   : 1955  Diagnosis/ICD-10 Code:  Chronic right shoulder pain [M25.511, G89.29]  Referring practitioner: Aj Mancilla MD  Date of Initial Visit: 2020  Today's Date: 2020  Patient seen for 1 sessions           Subjective Questionnaire: QuickDASH: 72.73%      Subjective Evaluation    History of Present Illness  Date of onset: 2019  Mechanism of injury: Pt had R distal clavicle excision and subacromial decompression 19.   Pt has a 5 speed car and had great difficulty driving because of the pain but he currently has severe pain and the tramadol is not helping. ER relieves the pain a bit but flexion increases the pain and he has a lot of pain at rest.     Subjective comment: R shldr arthroscopy  Patient Occupation: short term disability Quality of life: fair    Pain  Current pain ratin  At best pain ratin  At worst pain rating: 10  Location: R shldr at the acromion anterior to posterior  Quality: pressure, dull ache, throbbing and sharp  Aggravating factors: repetitive movement, outstretched reach and overhead activity    Hand dominance: right    Diagnostic Tests  MRI studies: abnormal    Treatments  Previous treatment: medication  Current treatment: physical therapy  Patient Goals  Patient goals for therapy: increased strength, increased motion and decreased pain             Objective       Static Posture     Head  Forward.    Postural Observations  Seated posture: fair  Standing posture: fair  Correction of posture: has no consistent effect        Palpation     Additional Palpation Details  No muscle TTP    Tenderness     Right Shoulder  Tenderness in the AC joint, acromion and clavicle.     Cervical/Thoracic Screen   Cervical range of motion within normal limits    Neurological Testing     Sensation     Shoulder     Right Shoulder   Intact: light touch    Active Range of Motion     Right  Shoulder   Flexion: 75 degrees with pain  Abduction: 79 degrees with pain  External rotation 0°: WFL  Internal rotation BTB: L5 with pain  Horizontal abduction: with pain    Additional Active Range of Motion Details  Pain least in ER and abduction    Tests     Additional Tests Details  NA secondary to surgery         Assessment & Plan     Assessment  Impairments: abnormal muscle firing, abnormal or restricted ROM, activity intolerance, impaired physical strength, lacks appropriate home exercise program and pain with function  Assessment details: Pt presents pain following subacromial decompression and distal clavicle excision at the R arm .  He has with defecits in strength, ROM at the R shldr with impaired scapulohumeral rhythm, increased pain that limits sleep and functional use of the arm for work and ADL tasks.  Pt would benefit from skilled PT to address his deficits, reduce pain and help him return to max functional level.     Functional Limitations: carrying objects, lifting, sleeping, pulling, pushing, uncomfortable because of pain, moving in bed, reaching behind back, reaching overhead and unable to perform repetitive tasks  Goals  Plan Goals: SHORT TERM GOALS:    4 weeks  1. Pt I w/ HEP with minimal increase in pain  2. Pt to demonstrate PROM of the left shoulder to WFL to improve ability to perform ADL's  3. Pt to improved AROM in flex to 90 degrees and pain less than 5/10    LONG TERM GOALS:   8 weeks  1. Pt to demonstrate AROM of the right shoulder to WNL to allow ability to perform all necessary functional activities  2. Pt to demonstrate ability to lift 5# OH with the right arm without increase in pain  3. Pt to report being able to lift 10# off a shelf with pain at 3/10 max  4. Pt to tolerate 60 minutes continuous activity in the clinic without increase in pain   5.  Pt to score 25% lower on the Quick Dash     Plan  Therapy options: will be seen for skilled physical therapy services  Planned modality  interventions: cryotherapy, electrical stimulation/Russian stimulation, high voltage pulsed current (pain management), high voltage pulsed current (spasm management), ultrasound, thermotherapy (hydrocollator packs), TENS and microcurrent electrical stimulation  Planned therapy interventions: ADL retraining, balance/weight-bearing training, flexibility, functional ROM exercises, home exercise program, joint mobilization, manual therapy, neuromuscular re-education, postural training, therapeutic activities, stretching, strengthening, soft tissue mobilization and spinal/joint mobilization  Frequency: 2x week  Duration in weeks: 8  Treatment plan discussed with: patient        Manual Therapy:    14     mins  99056;  Therapeutic Exercise:    8     mins  11021;     Neuromuscular Ryan:    0    mins  72052;    Therapeutic Activity:     0     mins  46048;     Gait Trainin     mins  98140;     Ultrasound:     0     mins  58525;    Electrical Stimulation:    15     mins  03451 ( );  Dry Needling     0     mins self-pay    Timed Treatment:   22   mins   Total Treatment:     52   mins    PT SIGNATURE: Fernanda Graves, PT   DATE TREATMENT INITIATED: 2020    Initial Certification  Certification Period: 2020  I certify that the therapy services are furnished while this patient is under my care.  The services outlined above are required by this patient, and will be reviewed every 90 days.     PHYSICIAN: Aj Mancilla MD      DATE:     Please sign and return via fax to [phone].. Thank you, [facility].

## 2020-01-20 ENCOUNTER — TELEPHONE (OUTPATIENT)
Dept: ORTHOPEDIC SURGERY | Facility: CLINIC | Age: 65
End: 2020-01-20

## 2020-01-20 ENCOUNTER — TREATMENT (OUTPATIENT)
Dept: PHYSICAL THERAPY | Facility: CLINIC | Age: 65
End: 2020-01-20

## 2020-01-20 DIAGNOSIS — G89.29 CHRONIC RIGHT SHOULDER PAIN: Primary | ICD-10-CM

## 2020-01-20 DIAGNOSIS — M25.511 CHRONIC RIGHT SHOULDER PAIN: Primary | ICD-10-CM

## 2020-01-20 DIAGNOSIS — M25.611 DECREASED RANGE OF MOTION OF RIGHT SHOULDER: ICD-10-CM

## 2020-01-20 PROCEDURE — 97110 THERAPEUTIC EXERCISES: CPT | Performed by: PHYSICAL THERAPIST

## 2020-01-20 PROCEDURE — 97530 THERAPEUTIC ACTIVITIES: CPT | Performed by: PHYSICAL THERAPIST

## 2020-01-20 PROCEDURE — 97140 MANUAL THERAPY 1/> REGIONS: CPT | Performed by: PHYSICAL THERAPIST

## 2020-01-20 PROCEDURE — 97014 ELECTRIC STIMULATION THERAPY: CPT | Performed by: PHYSICAL THERAPIST

## 2020-01-20 NOTE — TELEPHONE ENCOUNTER
Called patient and scheduled post op appt per [facility] for right shoulder with [facility] on 2/12/20 (patient wants EP office).

## 2020-01-20 NOTE — TELEPHONE ENCOUNTER
----- Message from BERONICA Ivory sent at 1/20/2020  8:06 AM EST -----  Please call patient to schedule appointment with [facility] around 2/14.  F/U Right shoulder surgery.  Thanks

## 2020-01-24 ENCOUNTER — TELEPHONE (OUTPATIENT)
Dept: FAMILY MEDICINE CLINIC | Facility: CLINIC | Age: 65
End: 2020-01-24

## 2020-01-24 NOTE — TELEPHONE ENCOUNTER
Called and left VM. Pt was hospitalized and cancelled his follow up. Just calling to do a quick check in and see how he is doing, see if he had any needs. I see that he has early Feb appt so look forward to seeing him then. He should call in between now and then if anything comes up.

## 2020-01-27 ENCOUNTER — TREATMENT (OUTPATIENT)
Dept: PHYSICAL THERAPY | Facility: CLINIC | Age: 65
End: 2020-01-27

## 2020-01-27 DIAGNOSIS — M25.511 CHRONIC RIGHT SHOULDER PAIN: Primary | ICD-10-CM

## 2020-01-27 DIAGNOSIS — M25.611 DECREASED RANGE OF MOTION OF RIGHT SHOULDER: ICD-10-CM

## 2020-01-27 DIAGNOSIS — G89.29 CHRONIC RIGHT SHOULDER PAIN: Primary | ICD-10-CM

## 2020-01-27 PROCEDURE — 97014 ELECTRIC STIMULATION THERAPY: CPT | Performed by: PHYSICAL THERAPIST

## 2020-01-27 PROCEDURE — 97140 MANUAL THERAPY 1/> REGIONS: CPT | Performed by: PHYSICAL THERAPIST

## 2020-01-27 PROCEDURE — 97110 THERAPEUTIC EXERCISES: CPT | Performed by: PHYSICAL THERAPIST

## 2020-01-28 NOTE — PROGRESS NOTES
Physical Therapy Daily Progress Note      Patient: Pro Mueller   : 1955  Referring practitioner: Aj Mancilla MD  Date of Initial Visit: Type: THERAPY  Noted: 2020  Today's Date: 2020  Patient seen for 2 sessions               Subjective   Pt was late because [facility] did not pick him up.  He has mild pain during the day but greatest pain is why trying to sleep on that side.     Objective   See Exercise, Manual, and Modality Logs for complete treatment.     Assessment/Plan Pt is progressing well and ROM is looking good.      Visit Diagnoses:    ICD-10-CM ICD-9-CM   1. Chronic right shoulder pain M25.511 719.41    G89.29 338.29   2. Decreased range of motion of right shoulder M25.611 719.51                  Timed:  Manual Therapy:    12     mins  16404;  Therapeutic Exercise:    15     mins  80360;     Neuromuscular Ryan:    0    mins  10439;    Therapeutic Activity:     0     mins  25977;     Gait Trainin     mins  41468;     Ultrasound:     0     mins  43336;    Electrical Stimulation:    15   mins  58248 ( );    Untimed:  Electrical Stimulation:    15     mins  92323 ( );  Mechanical Traction:    0     mins  75391;     Timed Treatment:   27   mins   Total Treatment:     42   mins  Fernanda Graves, PT  Physical Therapist

## 2020-01-29 ENCOUNTER — TREATMENT (OUTPATIENT)
Dept: PHYSICAL THERAPY | Facility: CLINIC | Age: 65
End: 2020-01-29

## 2020-01-29 DIAGNOSIS — M25.611 DECREASED RANGE OF MOTION OF RIGHT SHOULDER: ICD-10-CM

## 2020-01-29 DIAGNOSIS — G89.29 CHRONIC RIGHT SHOULDER PAIN: Primary | ICD-10-CM

## 2020-01-29 DIAGNOSIS — M25.511 CHRONIC RIGHT SHOULDER PAIN: Primary | ICD-10-CM

## 2020-01-29 PROCEDURE — 97110 THERAPEUTIC EXERCISES: CPT | Performed by: PHYSICAL THERAPIST

## 2020-01-29 PROCEDURE — 97140 MANUAL THERAPY 1/> REGIONS: CPT | Performed by: PHYSICAL THERAPIST

## 2020-01-29 PROCEDURE — 97014 ELECTRIC STIMULATION THERAPY: CPT | Performed by: PHYSICAL THERAPIST

## 2020-01-29 NOTE — PROGRESS NOTES
Physical Therapy Daily Progress Note      Patient: Pro Mueller   : 1955  Referring practitioner: Aj Mancilla MD  Date of Initial Visit: Type: THERAPY  Noted: 2020  Today's Date: 2020  Patient seen for 3 sessions             Subjective Pt states he is feeling better each day and ready to drive    Objective   See Exercise, Manual, and Modality Logs for complete treatment.        Assessment/Plan Tolerated new ex well.     Visit Diagnoses:         Timed:  Manual Therapy:    12     mins  00438;  Therapeutic Exercise:    19     mins  21977;     Neuromuscular Ryan:    0    mins  10946;    Therapeutic Activity:     0     mins  75539;     Gait Trainin     mins  51305;     Ultrasound:     0     mins  49373;    Electrical Stimulation:    15     mins  16919 ( );    Untimed:  Electrical Stimulation:    15     mins  41199 ( );  Mechanical Traction:    0     mins  15507;     Timed Treatment:   31   mins   Total Treatment:     46   mins  Fernanda Graves, PT  Physical Therapist

## 2020-02-03 ENCOUNTER — TREATMENT (OUTPATIENT)
Dept: PHYSICAL THERAPY | Facility: CLINIC | Age: 65
End: 2020-02-03

## 2020-02-03 DIAGNOSIS — M25.611 DECREASED RANGE OF MOTION OF RIGHT SHOULDER: ICD-10-CM

## 2020-02-03 DIAGNOSIS — G89.29 CHRONIC RIGHT SHOULDER PAIN: Primary | ICD-10-CM

## 2020-02-03 DIAGNOSIS — M25.511 CHRONIC RIGHT SHOULDER PAIN: Primary | ICD-10-CM

## 2020-02-03 PROCEDURE — 97014 ELECTRIC STIMULATION THERAPY: CPT | Performed by: PHYSICAL THERAPIST

## 2020-02-03 PROCEDURE — 97110 THERAPEUTIC EXERCISES: CPT | Performed by: PHYSICAL THERAPIST

## 2020-02-03 NOTE — PROGRESS NOTES
Physical Therapy Daily Progress Note      Patient: Pro Mueller   : 1955  Referring practitioner: Aj Mancilla MD  Date of Initial Visit: No linked episodes  Today's Date: 2/3/2020  Patient seen for Visit count could not be calculated. Make sure you are using a visit which is associated with an episode. sessions               Subjective Pt is feeling better but no real words to expand on his pain.      Objective   See Exercise, Manual, and Modality Logs for complete treatment.       Assessment/Plan Pt is progressing well.  Tolerated new ex well but scaption above 90 degrees caused some pain so pt encouraged to stay below pain     Visit Diagnoses:    ICD-10-CM ICD-9-CM   1. Chronic right shoulder pain M25.511 719.41    G89.29 338.29   2. Decreased range of motion of right shoulder M25.611 719.51              Timed:  Manual Therapy:    0     mins  57833;  Therapeutic Exercise:    26     mins  36825;     Neuromuscular Ryan:    0    mins  37315;    Therapeutic Activity:     0     mins  70709;     Gait Trainin     mins  00752;     Ultrasound:     0     mins  91509;    Electrical Stimulation:    15     mins  03996 ( );    Untimed:  Electrical Stimulation:    15     mins  40010 ( );  Mechanical Traction:    0     mins  40286;     Timed Treatment:   26   mins   Total Treatment:     41   mins  Fernanda Gravse, PT  Physical Therapist

## 2020-02-03 NOTE — PROGRESS NOTES
Physical Therapy Daily Progress Note      Pro Mueller reports: exercises feel helpful for right shoulder    Subjective     Objective   See Exercise, Manual, and Modality Logs for complete treatment.   Exercise rationale/ pain free exercise performance  Anatomy and structure of affected musculature  Posture/Postural awareness  Ice application  Sleeping positions with pillows  Alternate exercise positions  Verbal/Tactile cues to ensure correct exercise performance/technique    Added: 3 way, painfree pulleys x 2 min each direction, pain free cervical rom(ROT and Lat flex), cane assisted flex, scap, ER(neutral)    Therapeutic exercises performed consisting of:  Post shoulder rolls x 10, right sh pendulums x 30 each direction.    Manual therapy x 15 min consisting of PROM right shoulder all planes with gentle available overpressure followed by PROM stretching all planes with static hold; joint mobs/oscillations/distractions(grade 2)     Modality application: Ice and IFC right shoulder post exercise x 15 mins    Assessment/Plan    Progress strengthening /stabilization /functional activity as protocol allow.           Manual Therapy:    15     mins  92939;  Therapeutic Exercise:    20     mins  28013;     Neuromuscular Ryan:    mins  81355;    Therapeutic Activity:     12    mins  68397;     Gait Training:           mins  22068;     Ultrasound:        mins  96124;    Electrical Stimulation: 15        mins  13945 ( );      Timed Treatment: 47   mins   Total Treatment:   62     mins    Andres Castillo PTA    Physical Therapist Assistant [place] [id number]

## 2020-02-05 ENCOUNTER — TREATMENT (OUTPATIENT)
Dept: PHYSICAL THERAPY | Facility: CLINIC | Age: 65
End: 2020-02-05

## 2020-02-05 DIAGNOSIS — M25.511 CHRONIC RIGHT SHOULDER PAIN: Primary | ICD-10-CM

## 2020-02-05 DIAGNOSIS — G89.29 CHRONIC RIGHT SHOULDER PAIN: Primary | ICD-10-CM

## 2020-02-05 DIAGNOSIS — M25.611 DECREASED RANGE OF MOTION OF RIGHT SHOULDER: ICD-10-CM

## 2020-02-05 PROCEDURE — 97110 THERAPEUTIC EXERCISES: CPT | Performed by: PHYSICAL THERAPIST

## 2020-02-05 PROCEDURE — 97035 APP MDLTY 1+ULTRASOUND EA 15: CPT | Performed by: PHYSICAL THERAPIST

## 2020-02-05 PROCEDURE — 97014 ELECTRIC STIMULATION THERAPY: CPT | Performed by: PHYSICAL THERAPIST

## 2020-02-05 NOTE — PROGRESS NOTES
Physical Therapy Daily Progress Note      Patient: Pro Mueller   : 1955  Referring practitioner: Aj Mancilla MD  Date of Initial Visit: Type: THERAPY  Noted: 2020  Today's Date: 2020  Patient seen for 5 sessions               Subjective Pt states he had a dramatic increase in pain after last session and had trouble raising his arm.  The pain has eased some but he thinks its from the two new exercises we did last session.    Objective   See Exercise, Manual, and Modality Logs for complete treatment.   No TTP at shoulder but reports pain along supraspinatus with pendulums when completely relaxing the shoulder    Assessment/Plan I held the corner stretch and wall walks and focused only on AAROM today and US to reduce inflammation    Visit Diagnoses:    ICD-10-CM ICD-9-CM   1. Chronic right shoulder pain M25.511 719.41    G89.29 338.29   2. Decreased range of motion of right shoulder M25.611 719.51              Timed:  Manual Therapy:    0     mins  09032;  Therapeutic Exercise:    25     mins  20193;     Neuromuscular Ryan:    0    mins  05481;    Therapeutic Activity:     0     mins  16042;     Gait Trainin     mins  51129;     Ultrasound:     8     mins  43025;    Electrical Stimulation:    15     mins  70950 ( );    Untimed:  Electrical Stimulation:    15     mins  65734 ( );  Mechanical Traction:    0     mins  17036;     Timed Treatment:   33   mins   Total Treatment:     48   mins  Fernanda Graves, PT  Physical Therapist

## 2020-02-10 ENCOUNTER — TREATMENT (OUTPATIENT)
Dept: PHYSICAL THERAPY | Facility: CLINIC | Age: 65
End: 2020-02-10

## 2020-02-10 DIAGNOSIS — M25.611 DECREASED RANGE OF MOTION OF RIGHT SHOULDER: ICD-10-CM

## 2020-02-10 DIAGNOSIS — M25.511 CHRONIC RIGHT SHOULDER PAIN: Primary | ICD-10-CM

## 2020-02-10 DIAGNOSIS — G89.29 CHRONIC RIGHT SHOULDER PAIN: Primary | ICD-10-CM

## 2020-02-10 PROCEDURE — 97014 ELECTRIC STIMULATION THERAPY: CPT | Performed by: PHYSICAL THERAPIST

## 2020-02-10 PROCEDURE — 97110 THERAPEUTIC EXERCISES: CPT | Performed by: PHYSICAL THERAPIST

## 2020-02-10 NOTE — PROGRESS NOTES
Physical Therapy Daily Progress Note      Patient: Pro Mueller   : 1955  Referring practitioner: Aj Mancilla MD  Date of Initial Visit: Type: THERAPY  Noted: 2020  Today's Date: 2/10/2020  Patient seen for 6 sessions               Subjective Pt is feeling better today and is making sure he doesn't lift any gallons of milk    Objective   See Exercise, Manual, and Modality Logs for complete treatment.       Assessment/Plan Pt is progressing as expected.     Visit Diagnoses:    ICD-10-CM ICD-9-CM   1. Chronic right shoulder pain M25.511 719.41    G89.29 338.29   2. Decreased range of motion of right shoulder M25.611 719.51            Timed:  Manual Therapy:    0     mins  87020;  Therapeutic Exercise:    26     mins  76438;     Neuromuscular Ryan:    0    mins  16671;    Therapeutic Activity:     0     mins  32302;     Gait Trainin     mins  46481;     Ultrasound:     0     mins  33290;    Electrical Stimulation:    15     mins  12122 ( );    Untimed:  Electrical Stimulation:    15     mins  52117 ( );  Mechanical Traction:    0     mins  71805;     Timed Treatment:   26   mins   Total Treatment:     41   mins  Fernanda Graves, PT  Physical Therapist

## 2020-02-11 ENCOUNTER — OFFICE VISIT (OUTPATIENT)
Dept: FAMILY MEDICINE CLINIC | Facility: CLINIC | Age: 65
End: 2020-02-11

## 2020-02-11 VITALS
WEIGHT: 211 LBS | SYSTOLIC BLOOD PRESSURE: 120 MMHG | HEART RATE: 109 BPM | TEMPERATURE: 98.3 F | OXYGEN SATURATION: 98 % | DIASTOLIC BLOOD PRESSURE: 72 MMHG | RESPIRATION RATE: 18 BRPM | HEIGHT: 72 IN | BODY MASS INDEX: 28.58 KG/M2

## 2020-02-11 DIAGNOSIS — F41.8 MIXED ANXIETY DEPRESSIVE DISORDER: Primary | ICD-10-CM

## 2020-02-11 DIAGNOSIS — E03.9 HYPOTHYROIDISM, UNSPECIFIED TYPE: ICD-10-CM

## 2020-02-11 DIAGNOSIS — D61.818 PANCYTOPENIA (HCC): ICD-10-CM

## 2020-02-11 PROCEDURE — 99214 OFFICE O/P EST MOD 30 MIN: CPT | Performed by: FAMILY MEDICINE

## 2020-02-11 RX ORDER — FOLIC ACID 1 MG/1
1 TABLET ORAL DAILY
COMMUNITY
End: 2022-03-15 | Stop reason: HOSPADM

## 2020-02-11 RX ORDER — LANOLIN ALCOHOL/MO/W.PET/CERES
1000 CREAM (GRAM) TOPICAL DAILY
COMMUNITY
End: 2022-01-07

## 2020-02-11 NOTE — PROGRESS NOTES
Chief Complaint   Patient presents with   • Vomiting     hospital encounter       Subjective   Pro Mueller is a 65 y.o. male.     History of Present Illness   Hospital admission 1/11-1/14/20  He was admitted for alcoholic ketoacidosis and withdrawal.  He had been heavily drinking alcohol and not taking in much nutrition.  He developed dizziness, abdominal pain with nausea and vomiting and he had fallen.  He had mild alcohol withdrawal in the hospital treated with Ativan.  At discharge he was agreeable to alcohol treatment program and had information from the access center, however, he has been drinking 100 proof vodka daily.  He says he goes through a 1.75 L container every couple of days.  Patient says his last drink of alcohol was this morning.  He denies drinking and driving since the 1990s because he got a DUI at that time.  He says he does not have withdrawal symptoms unless he goes over 24 hours without drinking.      Pancytopenia/thrombocytopenia  Related to his alcohol abuse he has pancytopenia.  His thrombocytopenia is significant at 54,000 and needs to be rechecked.    Depression  Trying to get TMS started. He says there has been a couple of weeks of going back and forth with the insurance company and the [facility] office.  He is not understanding what the hold-up is at this point but hoping it will be resolved soon so he can get started on the TMS.  He is off the trintellix said it didn't help and it was really expensive.  He was off also advised to get off the Wellbutrin related to starting TMS in the risk of seizure.  Patient reports that he does have a plan for suicide and he has had it for a long time.  He says he is constantly in land[place] with his emotional side that just wants to die and his logical side that knows that is the wrong choice to make and wants to get better.  He is really counting on the DMS to help improve his depression.  He has not done well on a number of  "medications that have been tried previously and tried recently with psychiatry.  Patient reports his niece Martha Galvan comes by every couple of weeks to help clean up the house.  She was over last night and said they talked for over an hour after she was finished cleaning of the house and he feels like she is a close family member he could turn to for help.     He takes multi with B12.    Shoulder pain  He is really limited with the range of motion on his right shoulder which has impacted his daily activities.  He does not have as much upward movement capabilities of the shoulder and his right arm mostly rests at his side.  He was seen by Dr. Mancilla and at this time they have stopped the meloxicam related to his pancytopenia.  On 12/17/2019 he had shoulder arthroscopy with decompression and distal clavicle excision.  He was seen by Dr. Mancilla during his admission and had x-ray of the shoulder.  It showed the surgical changes but no complicating features.  He requested something stronger than tramadol in the hospital for the shoulder pain and was given number 42 tablets of hydrocodone-acetaminophen  mg by Dr. Mancilla.    The following portions of the patient's history were reviewed and updated as appropriate: allergies, current medications, past medical history, past social history and problem list.    Review of Systems   Respiratory: Negative.    Cardiovascular: Negative.    Neurological: Positive for weakness and light-headedness.       /72 (BP Location: Left arm, Patient Position: Sitting, Cuff Size: Adult)   Pulse 109   Temp 98.3 °F (36.8 °C) (Oral)   Resp 18   Ht 182.9 cm (72\")   Wt 95.7 kg (211 lb)   SpO2 98%   BMI 28.62 kg/m²       Objective   Physical Exam   Constitutional: He is oriented to person, place, and time. He appears well-nourished. No distress.   Eyes: Conjunctivae are normal. Right eye exhibits no discharge. Left eye exhibits no discharge. No scleral icterus. "   Cardiovascular: Normal rate, regular rhythm, normal heart sounds and intact distal pulses. Exam reveals no gallop and no friction rub.   No murmur heard.  Pulmonary/Chest: Effort normal and breath sounds normal. No respiratory distress. He has no wheezes.   Abdominal: Soft. Bowel sounds are normal. He exhibits no distension and no mass. There is no tenderness. There is no guarding.   Musculoskeletal: He exhibits no edema.   Neurological: He is alert and oriented to person, place, and time.   Psychiatric: He has a normal mood and affect. His behavior is normal.   Vitals reviewed.      Assessment/Plan   Pro was seen today for vomiting.    Diagnoses and all orders for this visit:    Mixed anxiety depressive disorder    Hypothyroidism, unspecified type  -     T4  -     TSH    Pancytopenia (CMS/HCC)  -     CBC & Differential      Today we did a contract for safety and both signed the agreement.  Patient and I discussed if he has stronger feelings of acting on his plan for suicide that he will instead call his niece Martha Galvan, call 911, or go to the hospital if he is safe to drive.  I will send my note to Dr. Arnett at [facility] with a letter to inquire about advancing with TMS therapy.  Patient has significant and severe depression.  He has loneliness and helplessness.  He has a plan for suicide and has had one for a long time and would really like to proceed with TMS so he could feel better.  He is currently on mirtazapine, gabapentin, and clonazepam from psychiatry.    Therapist Lisa Poe just found her card again and going to call. [facility].  I will also send my note and letter to his therapist.  Said he finally relocated her business card and plans to call for upcoming visits.  He said that he got along well with her and would like to see her again to help him at this time.    Alcohol use continues but he says he is eating this time which helps keep him from getting sick and keeps  him strong he reports.  He denies drinking and driving.  He was little tachycardic in the office today.  His pulse was 106 on exam.  This is newer for him and I explained that this could be related to his significant alcohol use just like the low counts on his blood cells.  I encouraged him to make sure he is taking 1000 mcg of B12 daily and that he is also taking 1 mg of folic acid daily.  He had not started these when it was recommended on previous labs.  He voiced understanding and the medications were put on his medication list so it have a frame of reference.  He would like to get these from over-the-counter.    Of note some urinary bladder thickening of the wall which was thought to be chronic noted on CT of the abdomen and pelvis.  He also has a 6 mm right kidney stone and diffuse fat infiltration of the liver.

## 2020-02-12 ENCOUNTER — TREATMENT (OUTPATIENT)
Dept: PHYSICAL THERAPY | Facility: CLINIC | Age: 65
End: 2020-02-12

## 2020-02-12 ENCOUNTER — OFFICE VISIT (OUTPATIENT)
Dept: ORTHOPEDIC SURGERY | Facility: CLINIC | Age: 65
End: 2020-02-12

## 2020-02-12 VITALS — WEIGHT: 211 LBS | HEIGHT: 72 IN | BODY MASS INDEX: 28.58 KG/M2

## 2020-02-12 DIAGNOSIS — G89.29 CHRONIC RIGHT SHOULDER PAIN: Primary | ICD-10-CM

## 2020-02-12 DIAGNOSIS — Z09 SURGERY FOLLOW-UP: Primary | ICD-10-CM

## 2020-02-12 DIAGNOSIS — M25.611 DECREASED RANGE OF MOTION OF RIGHT SHOULDER: ICD-10-CM

## 2020-02-12 DIAGNOSIS — M25.511 CHRONIC RIGHT SHOULDER PAIN: Primary | ICD-10-CM

## 2020-02-12 PROBLEM — N20.0 NEPHROLITHIASIS: Status: ACTIVE | Noted: 2020-02-12

## 2020-02-12 LAB
T4 SERPL-MCNC: 6 UG/DL (ref 4.5–12)
TSH SERPL DL<=0.005 MIU/L-ACNC: 8.33 UIU/ML (ref 0.45–4.5)

## 2020-02-12 PROCEDURE — 99024 POSTOP FOLLOW-UP VISIT: CPT | Performed by: ORTHOPAEDIC SURGERY

## 2020-02-12 PROCEDURE — 97110 THERAPEUTIC EXERCISES: CPT | Performed by: PHYSICAL THERAPIST

## 2020-02-12 PROCEDURE — 97014 ELECTRIC STIMULATION THERAPY: CPT | Performed by: PHYSICAL THERAPIST

## 2020-02-12 RX ORDER — HYDROCODONE BITARTRATE AND ACETAMINOPHEN 7.5; 325 MG/1; MG/1
1 TABLET ORAL EVERY 6 HOURS PRN
Qty: 40 TABLET | Refills: 0 | Status: SHIPPED | OUTPATIENT
Start: 2020-02-12 | End: 2020-07-02

## 2020-02-12 NOTE — PROGRESS NOTES
Mr. Mueller is now about 2 months out from his right shoulder decompression, distal clavicle excision.  He has been going to therapy.  He says it is helping.  He is still having some pain and he request a refill of the pain medicine.  He feels like things are getting better.    Portals are healed.  Motion is better but still not full.  He lacks 10 degrees in all planes relative to the contralateral side.    Assessment: 2-month status post right shoulder decompression, distal clavicle excision    Plan: Continue PT.  I did agree to give him a refill of the pain medicine.  I will see him back in another month.

## 2020-02-12 NOTE — PROGRESS NOTES
Physical Therapy Daily Progress Note      Patient: Pro Mueller   : 1955  Referring practitioner: Aj Mancilla MD  Date of Initial Visit: Type: THERAPY  Noted: 2020  Today's Date: 2020  Patient seen for 7 sessions               Subjective Pt is feeling better today than last week   Objective   See Exercise, Manual, and Modality Logs for complete treatment.     Assessment/Plan Pt is progressing well with exercises and ROM is near normal so  No manual today  Visit Diagnoses:    ICD-10-CM ICD-9-CM   1. Chronic right shoulder pain M25.511 719.41    G89.29 338.29   2. Decreased range of motion of right shoulder M25.611 719.51          Timed:  Manual Therapy:    0     mins  72330;  Therapeutic Exercise:    26     mins  97914;     Neuromuscular Ryan:    0    mins  36791;    Therapeutic Activity:     0     mins  76227;     Gait Trainin     mins  18608;     Ultrasound:     0     mins  73759;    Electrical Stimulation:    15   mins  92960 ( );    Untimed:  Electrical Stimulation:    15     mins  90229 ( );  Mechanical Traction:    0     mins  52736;     Timed Treatment:   26   mins   Total Treatment:     41   mins  Fernanda Graves, PT  Physical Therapist

## 2020-02-14 DIAGNOSIS — E03.9 HYPOTHYROIDISM, UNSPECIFIED TYPE: ICD-10-CM

## 2020-02-14 RX ORDER — LEVOTHYROXINE SODIUM 0.1 MG/1
TABLET ORAL
Qty: 90 TABLET | Refills: 0 | Status: SHIPPED | OUTPATIENT
Start: 2020-02-14 | End: 2020-05-19

## 2020-02-17 LAB
SPECIMEN STATUS: NORMAL
WRITTEN AUTHORIZATION: NORMAL

## 2020-02-24 ENCOUNTER — TREATMENT (OUTPATIENT)
Dept: PHYSICAL THERAPY | Facility: CLINIC | Age: 65
End: 2020-02-24

## 2020-02-24 DIAGNOSIS — G89.29 CHRONIC RIGHT SHOULDER PAIN: Primary | ICD-10-CM

## 2020-02-24 DIAGNOSIS — M25.511 CHRONIC RIGHT SHOULDER PAIN: Primary | ICD-10-CM

## 2020-02-24 DIAGNOSIS — M25.611 DECREASED RANGE OF MOTION OF RIGHT SHOULDER: ICD-10-CM

## 2020-02-24 PROCEDURE — 97014 ELECTRIC STIMULATION THERAPY: CPT | Performed by: PHYSICAL THERAPIST

## 2020-02-24 PROCEDURE — 97110 THERAPEUTIC EXERCISES: CPT | Performed by: PHYSICAL THERAPIST

## 2020-02-24 PROCEDURE — 97140 MANUAL THERAPY 1/> REGIONS: CPT | Performed by: PHYSICAL THERAPIST

## 2020-02-24 NOTE — PROGRESS NOTES
Re-assessment/Physical Therapy Daily Progress Note      Patient: Pro Mueller   : 1955  Referring practitioner: Aj Mancilla MD  Date of Initial Visit: Type: THERAPY  Noted: 2020  Today's Date: 2020  Patient seen for 8 sessions         Quick Dash 34.09%      Subjective Pt states he is feeling better but has pain still when rolling on R side at night.  No complaints about pain with HEP. Pain 2/10 currently and 4/10 at night and achy. No sharp pains the past couple of days.    Objective   See Exercise, Manual, and Modality Logs for complete treatment.   Recommended pt tape a marble or tennis ball to his R sleeve to prevent rolling on it at night.     AROM  Flex 141 degrees  Abd 133 degrees  ER arm behind head but restriction causing excessive trunk ext to complete    Strength   Flex and abd 4/5  ER/IR arm at side 4+/5    Assessment/Plan Strength was emphasized more as his ROM is normal in ER and IR arm at side and the only limitation is all overhead motion. His Quick Dash has improved significantly but sleep is still his largest complaint.  Goals  Plan Goals: SHORT TERM GOALS:    4 weeks  1. Pt I w/ HEP with minimal increase in pain MET  2. Pt to demonstrate PROM of the left shoulder to WFL to improve ability to perform ADL's  3. Pt to improved AROM in flex to 90 degrees and pain less than 5/10    LONG TERM GOALS:   8 weeks  1. Pt to demonstrate AROM of the right shoulder to WNL to allow ability to perform all necessary functional activities  2. Pt to demonstrate ability to lift 5# OH with the right arm without increase in pain  3. Pt to report being able to lift 10# off a shelf with pain at 3/10 max  4. Pt to tolerate 60 minutes continuous activity in the clinic without increase in pain   5.  Pt to score 25% lower on the Quick Dash   Visit Diagnoses:    ICD-10-CM ICD-9-CM   1. Chronic right shoulder pain M25.511 719.41    G89.29 338.29   2. Decreased range of motion of right shoulder M25.611  719.51                Timed:  Manual Therapy:    12     mins  47023;  Therapeutic Exercise:    19     mins  78508;     Neuromuscular Ryan:    0    mins  80130;    Therapeutic Activity:     0     mins  59824;     Gait Trainin     mins  70069;     Ultrasound:     0     mins  91604;    Electrical Stimulation:    15     mins  88424 ( );    Untimed:  Electrical Stimulation:    15     mins  90595 ( );  Mechanical Traction:    0     mins  46293;     Timed Treatment:   31   mins   Total Treatment:     46   mins  Fernanda Graves, PT  Physical Therapist

## 2020-03-09 ENCOUNTER — TREATMENT (OUTPATIENT)
Dept: PHYSICAL THERAPY | Facility: CLINIC | Age: 65
End: 2020-03-09

## 2020-03-09 DIAGNOSIS — G89.29 CHRONIC RIGHT SHOULDER PAIN: Primary | ICD-10-CM

## 2020-03-09 DIAGNOSIS — M25.511 CHRONIC RIGHT SHOULDER PAIN: Primary | ICD-10-CM

## 2020-03-09 DIAGNOSIS — M25.611 DECREASED RANGE OF MOTION OF RIGHT SHOULDER: ICD-10-CM

## 2020-03-09 PROCEDURE — 97110 THERAPEUTIC EXERCISES: CPT | Performed by: PHYSICAL THERAPIST

## 2020-03-09 NOTE — PROGRESS NOTES
Physical Therapy Daily Progress Note      Patient: Pro Mueller   : 1955  Referring practitioner: No ref. provider found  Date of Initial Visit: Type: THERAPY  Noted: 2020  Today's Date: 3/9/2020  Patient seen for 9 sessions               Subjective Pt states the arm continues to hurt and prevents good sleeping    Objective   See Exercise, Manual, and Modality Logs for complete treatment.   Pt missed last appt and showed up today without a scheduled appt so only exercises completed today due to time constraints    Assessment/Plan  Pt appears to be getting his HEP near I with few cues needed to initiate but several cues for technique.  Visit Diagnoses:    ICD-10-CM ICD-9-CM   1. Chronic right shoulder pain M25.511 719.41    G89.29 338.29   2. Decreased range of motion of right shoulder M25.611 719.51          Timed:  Manual Therapy:    0     mins  17036;  Therapeutic Exercise:    25     mins  69108;     Neuromuscular Ryan:    0    mins  96612;    Therapeutic Activity:     0     mins  17959;     Gait Trainin     mins  62279;     Ultrasound:     0     mins  13524;    Electrical Stimulation:    0     mins  72284 ( );    Untimed:  Electrical Stimulation:    0     mins  21148 ( );  Mechanical Traction:    0     mins  64238;     Timed Treatment:   25   mins   Total Treatment:     25   mins  Fernanda Graves PT  Physical Therapist

## 2020-04-13 DIAGNOSIS — G60.9 IDIOPATHIC PERIPHERAL NEUROPATHY: Primary | ICD-10-CM

## 2020-04-13 NOTE — TELEPHONE ENCOUNTER
Pt last seen 11/22/19. Follow up scheduled 5/27/2020. Luis ran and uploaded.     Please review.  Thank you.

## 2020-04-15 DIAGNOSIS — M12.9 ARTHRITIS/ARTHROPATHY OF MULTIPLE JOINTS: Primary | ICD-10-CM

## 2020-04-15 RX ORDER — GABAPENTIN 300 MG/1
CAPSULE ORAL
Qty: 90 CAPSULE | Refills: 1 | Status: SHIPPED | OUTPATIENT
Start: 2020-04-15 | End: 2020-06-19

## 2020-04-15 NOTE — TELEPHONE ENCOUNTER
Patient called in stating that the rx TRAMADOL that he takes for his arthritis.   Needs a refill of the tramadol, needs a 30 day supply, and needs the directions to say   Take 1 tablet by mouth once every 4 hours  For 30 days

## 2020-04-16 RX ORDER — TRAMADOL HYDROCHLORIDE 50 MG/1
TABLET ORAL
Qty: 80 TABLET | Refills: 0 | Status: SHIPPED | OUTPATIENT
Start: 2020-04-16 | End: 2020-06-09

## 2020-05-13 ENCOUNTER — HOSPITAL ENCOUNTER (EMERGENCY)
Facility: HOSPITAL | Age: 65
Discharge: HOME | End: 2020-05-14
Attending: EMERGENCY MEDICINE | Admitting: EMERGENCY MEDICINE

## 2020-05-13 ENCOUNTER — APPOINTMENT (OUTPATIENT)
Dept: GENERAL RADIOLOGY | Facility: HOSPITAL | Age: 65
End: 2020-05-13

## 2020-05-13 DIAGNOSIS — B34.9 VIRAL SYNDROME: Primary | ICD-10-CM

## 2020-05-13 LAB
ALBUMIN SERPL-MCNC: 4.9 G/DL (ref 3.5–5.2)
ALBUMIN/GLOB SERPL: 1.7 G/DL
ALP SERPL-CCNC: 77 U/L (ref 39–117)
ALT SERPL W P-5'-P-CCNC: 48 U/L (ref 1–41)
ANION GAP SERPL CALCULATED.3IONS-SCNC: 19.8 MMOL/L (ref 5–15)
AST SERPL-CCNC: 108 U/L (ref 1–40)
BASOPHILS # BLD AUTO: 0.04 10*3/MM3 (ref 0–0.2)
BASOPHILS NFR BLD AUTO: 1 % (ref 0–1.5)
BILIRUB SERPL-MCNC: 0.4 MG/DL (ref 0.2–1.2)
BUN BLD-MCNC: 7 MG/DL (ref 8–23)
BUN/CREAT SERPL: 6.7 (ref 7–25)
CALCIUM SPEC-SCNC: 9.8 MG/DL (ref 8.6–10.5)
CHLORIDE SERPL-SCNC: 96 MMOL/L (ref 98–107)
CO2 SERPL-SCNC: 21.2 MMOL/L (ref 22–29)
CREAT BLD-MCNC: 1.05 MG/DL (ref 0.76–1.27)
D-LACTATE SERPL-SCNC: 2.4 MMOL/L (ref 0.5–2)
DEPRECATED RDW RBC AUTO: 44.6 FL (ref 37–54)
EOSINOPHIL # BLD AUTO: 0.01 10*3/MM3 (ref 0–0.4)
EOSINOPHIL NFR BLD AUTO: 0.3 % (ref 0.3–6.2)
ERYTHROCYTE [DISTWIDTH] IN BLOOD BY AUTOMATED COUNT: 13.1 % (ref 12.3–15.4)
GFR SERPL CREATININE-BSD FRML MDRD: 71 ML/MIN/1.73
GLOBULIN UR ELPH-MCNC: 2.9 GM/DL
GLUCOSE BLD-MCNC: 112 MG/DL (ref 65–99)
HCT VFR BLD AUTO: 46.7 % (ref 37.5–51)
HGB BLD-MCNC: 16.1 G/DL (ref 13–17.7)
HOLD SPECIMEN: NORMAL
HOLD SPECIMEN: NORMAL
IMM GRANULOCYTES # BLD AUTO: 0.01 10*3/MM3 (ref 0–0.05)
IMM GRANULOCYTES NFR BLD AUTO: 0.3 % (ref 0–0.5)
LYMPHOCYTES # BLD AUTO: 1.22 10*3/MM3 (ref 0.7–3.1)
LYMPHOCYTES NFR BLD AUTO: 31.4 % (ref 19.6–45.3)
MCH RBC QN AUTO: 31.9 PG (ref 26.6–33)
MCHC RBC AUTO-ENTMCNC: 34.5 G/DL (ref 31.5–35.7)
MCV RBC AUTO: 92.7 FL (ref 79–97)
MONOCYTES # BLD AUTO: 0.53 10*3/MM3 (ref 0.1–0.9)
MONOCYTES NFR BLD AUTO: 13.6 % (ref 5–12)
NEUTROPHILS # BLD AUTO: 2.08 10*3/MM3 (ref 1.7–7)
NEUTROPHILS NFR BLD AUTO: 53.4 % (ref 42.7–76)
NRBC BLD AUTO-RTO: 0 /100 WBC (ref 0–0.2)
PLATELET # BLD AUTO: 197 10*3/MM3 (ref 140–450)
PMV BLD AUTO: 9.1 FL (ref 6–12)
POTASSIUM BLD-SCNC: 4 MMOL/L (ref 3.5–5.2)
PROCALCITONIN SERPL-MCNC: 0.37 NG/ML (ref 0.1–0.25)
PROT SERPL-MCNC: 7.8 G/DL (ref 6–8.5)
RBC # BLD AUTO: 5.04 10*6/MM3 (ref 4.14–5.8)
S PYO AG THROAT QL: NEGATIVE
SODIUM BLD-SCNC: 137 MMOL/L (ref 136–145)
WBC NRBC COR # BLD: 3.89 10*3/MM3 (ref 3.4–10.8)
WHOLE BLOOD HOLD SPECIMEN: NORMAL
WHOLE BLOOD HOLD SPECIMEN: NORMAL

## 2020-05-13 PROCEDURE — 87040 BLOOD CULTURE FOR BACTERIA: CPT | Performed by: EMERGENCY MEDICINE

## 2020-05-13 PROCEDURE — 99284 EMERGENCY DEPT VISIT MOD MDM: CPT

## 2020-05-13 PROCEDURE — 87081 CULTURE SCREEN ONLY: CPT | Performed by: EMERGENCY MEDICINE

## 2020-05-13 PROCEDURE — 87880 STREP A ASSAY W/OPTIC: CPT | Performed by: EMERGENCY MEDICINE

## 2020-05-13 PROCEDURE — 85025 COMPLETE CBC W/AUTO DIFF WBC: CPT | Performed by: EMERGENCY MEDICINE

## 2020-05-13 PROCEDURE — U0004 COV-19 TEST NON-CDC HGH THRU: HCPCS | Performed by: EMERGENCY MEDICINE

## 2020-05-13 PROCEDURE — 83605 ASSAY OF LACTIC ACID: CPT | Performed by: EMERGENCY MEDICINE

## 2020-05-13 PROCEDURE — 80053 COMPREHEN METABOLIC PANEL: CPT | Performed by: EMERGENCY MEDICINE

## 2020-05-13 PROCEDURE — 0100U HC BIOFIRE FILMARRAY RESP PANEL 2: CPT | Performed by: EMERGENCY MEDICINE

## 2020-05-13 PROCEDURE — 84145 PROCALCITONIN (PCT): CPT | Performed by: EMERGENCY MEDICINE

## 2020-05-13 PROCEDURE — 71045 X-RAY EXAM CHEST 1 VIEW: CPT

## 2020-05-13 RX ORDER — SODIUM CHLORIDE 0.9 % (FLUSH) 0.9 %
10 SYRINGE (ML) INJECTION AS NEEDED
Status: DISCONTINUED | OUTPATIENT
Start: 2020-05-13 | End: 2020-05-14 | Stop reason: HOSPADM

## 2020-05-13 RX ORDER — ACETAMINOPHEN 500 MG
1000 TABLET ORAL ONCE
Status: COMPLETED | OUTPATIENT
Start: 2020-05-13 | End: 2020-05-13

## 2020-05-13 RX ADMIN — SODIUM CHLORIDE, PRESERVATIVE FREE 10 ML: 5 INJECTION INTRAVENOUS at 22:28

## 2020-05-13 RX ADMIN — SODIUM CHLORIDE 1000 ML: 9 INJECTION, SOLUTION INTRAVENOUS at 23:08

## 2020-05-13 RX ADMIN — ACETAMINOPHEN 1000 MG: 500 TABLET, FILM COATED ORAL at 22:44

## 2020-05-14 VITALS
HEART RATE: 99 BPM | TEMPERATURE: 99.1 F | RESPIRATION RATE: 17 BRPM | DIASTOLIC BLOOD PRESSURE: 89 MMHG | SYSTOLIC BLOOD PRESSURE: 134 MMHG | BODY MASS INDEX: 28.44 KG/M2 | OXYGEN SATURATION: 94 % | HEIGHT: 72 IN | WEIGHT: 210 LBS

## 2020-05-14 LAB
B PARAPERT DNA SPEC QL NAA+PROBE: NOT DETECTED
B PERT DNA SPEC QL NAA+PROBE: NOT DETECTED
C PNEUM DNA NPH QL NAA+NON-PROBE: NOT DETECTED
FLUAV H1 2009 PAND RNA NPH QL NAA+PROBE: NOT DETECTED
FLUAV H1 HA GENE NPH QL NAA+PROBE: NOT DETECTED
FLUAV H3 RNA NPH QL NAA+PROBE: NOT DETECTED
FLUAV SUBTYP SPEC NAA+PROBE: NOT DETECTED
FLUBV RNA ISLT QL NAA+PROBE: NOT DETECTED
HADV DNA SPEC NAA+PROBE: NOT DETECTED
HCOV 229E RNA SPEC QL NAA+PROBE: NOT DETECTED
HCOV HKU1 RNA SPEC QL NAA+PROBE: NOT DETECTED
HCOV NL63 RNA SPEC QL NAA+PROBE: NOT DETECTED
HCOV OC43 RNA SPEC QL NAA+PROBE: NOT DETECTED
HMPV RNA NPH QL NAA+NON-PROBE: NOT DETECTED
HPIV1 RNA SPEC QL NAA+PROBE: NOT DETECTED
HPIV2 RNA SPEC QL NAA+PROBE: NOT DETECTED
HPIV3 RNA NPH QL NAA+PROBE: NOT DETECTED
HPIV4 P GENE NPH QL NAA+PROBE: NOT DETECTED
LACTATE HOLD SPECIMEN: NORMAL
M PNEUMO IGG SER IA-ACNC: NOT DETECTED
RHINOVIRUS RNA SPEC NAA+PROBE: NOT DETECTED
RSV RNA NPH QL NAA+NON-PROBE: NOT DETECTED

## 2020-05-14 NOTE — ED NOTES
Pt states he drinks 1/2 to 3/4 L of vodka daily and has had a 1/4 L today.    Pt states he came in today d/t having a fv that has been raising since yesterday.  Pt also c/o stuffy/runny nose and a sore throat and phlegm in the morning. Pt states he never had a good sense of smell.      Sarah Feliz, RN  05/13/20 1740

## 2020-05-14 NOTE — ED PROVIDER NOTES
EMERGENCY DEPARTMENT ENCOUNTER    Room Number:  19/19  Date of encounter:  5/14/2020  PCP: Alla Beal MD  Historian: Patient      HPI:  Chief Complaint: Fever, sore throat, myalgias  A complete HPI/ROS/PMH/PSH/SH/FH are unobtainable due to: N/A    Context: Pro Mueller is a 65 y.o. male who presents to the ED c/o 1 days worth of temperature to 100.8 associated with myalgias and arthralgias.  He also has had an intermittent sore throat.  No dyspnea, no cough.  No known exposure to coronavirus.  No chest pain, abdominal pain.  No nausea, vomiting, diarrhea.  No dysuria or urinary frequency.  Of note, patient states that he is a heavy drinker.      PAST MEDICAL HISTORY  Active Ambulatory Problems     Diagnosis Date Noted   • Alcoholism (CMS/[facility], located within [facility]) 08/08/2016   • Atopic rhinitis 08/08/2016   • Mixed anxiety depressive disorder 08/08/2016   • Genital herpes simplex 08/08/2016   • Hyperlipidemia 08/08/2016   • Hypertension 08/08/2016   • Hypothyroidism 08/08/2016   • Insomnia 08/08/2016   • Panic disorder without agoraphobia 08/08/2016   • Persistent insomnia 08/08/2016   • Vitamin D deficiency 08/08/2016   • Alcohol withdrawal (CMS/[facility], located within [facility]) 11/04/2016   • Neuropathy involving both lower extremities 10/25/2018   • Syncope and collapse 01/02/2019   • Abnormal EKG 01/03/2019   • Left shoulder pain 11/19/2019   • Alcoholic ketoacidosis 01/12/2020   • Alcohol dependence with uncomplicated withdrawal (CMS/[facility], located within [facility]) 01/14/2020   • Pancytopenia (CMS/[facility], located within [facility]) 01/14/2020   • Nephrolithiasis 02/12/2020     Resolved Ambulatory Problems     Diagnosis Date Noted   • Accidental fall 08/08/2016   • Impacted cerumen 08/08/2016   • Influenza 08/08/2016   • Motion sickness 08/08/2016   • Seasonal allergic rhinitis 08/08/2016   • Upper respiratory tract infection 08/08/2016   • Headache 11/05/2016   • Gastritis 11/05/2016   • Low back pain 11/11/2016   • Olecranon bursitis of right elbow 04/05/2017   • Sciatica of left side 06/12/2018   • Nausea and  vomiting 01/11/2020   • Hyponatremia 01/13/2020   • Hypokalemia 01/13/2020     Past Medical History:   Diagnosis Date   • Alcohol abuse    • Anxiety    • Arthritis    • Depression    • Disease of thyroid gland    • Elevated cholesterol    • Encounter for removal of sutures    • GERD (gastroesophageal reflux disease)    • Headache, tension-type    • Kidney stone    • Migraine    • Olecranon bursitis, right elbow    • Peripheral neuropathy    • Sleep apnea    • Withdrawal symptoms, alcohol (CMS/HCC)          PAST SURGICAL HISTORY  Past Surgical History:   Procedure Laterality Date   • BACK SURGERY      Pt denies.   • COLONOSCOPY     • CYST REMOVAL     • EXTRACORPOREAL SHOCK WAVE LITHOTRIPSY (ESWL) Right 2002   • SHOULDER ARTHROSCOPY Right 12/17/2019    Procedure: SHOULDER ARTHROSCOPY, decompression, distal clavicle excision;  Surgeon: Aj Mancilla MD;  Location: [facility];  Service: Orthopedics   • TONSILLECTOMY           FAMILY HISTORY  Family History   Problem Relation Age of Onset   • Alzheimer's disease Mother    • Pancreatic cancer Father    • Malig Hyperthermia Neg Hx          SOCIAL HISTORY  Social History     Socioeconomic History   • Marital status:      Spouse name: Not on file   • Number of children: Not on file   • Years of education: Not on file   • Highest education level: Not on file   Tobacco Use   • Smoking status: Former Smoker     Packs/day: 0.50     Years: 25.00     Pack years: 12.50     Types: Cigarettes     Start date: 1987     Last attempt to quit: 2010     Years since quitting: 10.3   • Smokeless tobacco: Never Used   Substance and Sexual Activity   • Alcohol use: Yes     Comment: 1/2 - 3/4 L per day of vodka   • Drug use: No   • Sexual activity: Defer         ALLERGIES  Penicillins        REVIEW OF SYSTEMS  Review of Systems   Constitutional: Positive for fatigue and fever. Negative for activity change and appetite change.   HENT: Negative for congestion and sore throat.     Eyes: Negative.    Respiratory: Negative for cough and shortness of breath.    Cardiovascular: Negative for chest pain and leg swelling.   Gastrointestinal: Negative for abdominal pain, diarrhea and vomiting.   Endocrine: Negative.    Genitourinary: Negative for decreased urine volume and dysuria.   Musculoskeletal: Positive for arthralgias and myalgias. Negative for neck pain.   Skin: Negative for rash and wound.   Allergic/Immunologic: Negative.    Neurological: Negative for weakness, numbness and headaches.   Hematological: Negative.    Psychiatric/Behavioral: Negative.    All other systems reviewed and are negative.       All systems reviewed and negative except for those discussed in HPI.       PHYSICAL EXAM    I have reviewed the triage vital signs and nursing notes.    ED Triage Vitals   Temp Heart Rate Resp BP SpO2   05/13/20 2207 05/13/20 2207 05/13/20 2227 05/13/20 2220 05/13/20 2207   99.8 °F (37.7 °C) 120 20 (!) 147/101 96 %      Temp src Heart Rate Source Patient Position BP Location FiO2 (%)   05/13/20 2207 05/13/20 2207 -- -- --   Tympanic Monitor          Physical Exam   Constitutional: Pt. is oriented to person, place, and time and well-developed, well-nourished, and in no distress. No distress.   HENT: Normocephalic and atraumatic,  EOM are normal. Pupils are equal, round, and reactive to light. Oropharynx moist/nonerythematous.  Neck: Normal range of motion. Neck supple. No JVD present. No tracheal deviation present. No thyromegaly present.   Cardiovascular: Slightly tachycardic rate, regular rhythm and normal heart sounds. Exam reveals no gallop and no friction rub.   No murmur heard.  Pulmonary/Chest: Effort normal and breath sounds normal. No stridor. No respiratory distress. No wheezes, no rales.   Abdominal: Soft. Bowel sounds are normal. No distension. There is no tenderness. There is no rebound and no guarding.   Musculoskeletal: Normal range of motion. No edema, tenderness or deformity.    Neurological: Pt. is alert and oriented to person, place, and time. Pt. has normal sensation and normal strength. No cranial nerve deficit. GCS score is 15.   Skin: Skin is warm and dry. No rash noted. Pt. is not diaphoretic. No erythema.   Psychiatric: Mood, affect and judgment normal.   Nursing note and vitals reviewed.        LAB RESULTS  Recent Results (from the past 24 hour(s))   Comprehensive Metabolic Panel    Collection Time: 05/13/20 10:26 PM   Result Value Ref Range    Glucose 112 (H) 65 - 99 mg/dL    BUN 7 (L) 8 - 23 mg/dL    Creatinine 1.05 0.76 - 1.27 mg/dL    Sodium 137 136 - 145 mmol/L    Potassium 4.0 3.5 - 5.2 mmol/L    Chloride 96 (L) 98 - 107 mmol/L    CO2 21.2 (L) 22.0 - 29.0 mmol/L    Calcium 9.8 8.6 - 10.5 mg/dL    Total Protein 7.8 6.0 - 8.5 g/dL    Albumin 4.90 3.50 - 5.20 g/dL    ALT (SGPT) 48 (H) 1 - 41 U/L    AST (SGOT) 108 (H) 1 - 40 U/L    Alkaline Phosphatase 77 39 - 117 U/L    Total Bilirubin 0.4 0.2 - 1.2 mg/dL    eGFR Non African Amer 71 >60 mL/min/1.73    Globulin 2.9 gm/dL    A/G Ratio 1.7 g/dL    BUN/Creatinine Ratio 6.7 (L) 7.0 - 25.0    Anion Gap 19.8 (H) 5.0 - 15.0 mmol/L   Light Blue Top    Collection Time: 05/13/20 10:26 PM   Result Value Ref Range    Extra Tube hold for add-on    Green Top (Gel)    Collection Time: 05/13/20 10:26 PM   Result Value Ref Range    Extra Tube Hold for add-ons.    Lavender Top    Collection Time: 05/13/20 10:26 PM   Result Value Ref Range    Extra Tube hold for add-on    Gold Top - SST    Collection Time: 05/13/20 10:26 PM   Result Value Ref Range    Extra Tube Hold for add-ons.    CBC Auto Differential    Collection Time: 05/13/20 10:26 PM   Result Value Ref Range    WBC 3.89 3.40 - 10.80 10*3/mm3    RBC 5.04 4.14 - 5.80 10*6/mm3    Hemoglobin 16.1 13.0 - 17.7 g/dL    Hematocrit 46.7 37.5 - 51.0 %    MCV 92.7 79.0 - 97.0 fL    MCH 31.9 26.6 - 33.0 pg    MCHC 34.5 31.5 - 35.7 g/dL    RDW 13.1 12.3 - 15.4 %    RDW-SD 44.6 37.0 - 54.0 fl    MPV  9.1 6.0 - 12.0 fL    Platelets 197 140 - 450 10*3/mm3    Neutrophil % 53.4 42.7 - 76.0 %    Lymphocyte % 31.4 19.6 - 45.3 %    Monocyte % 13.6 (H) 5.0 - 12.0 %    Eosinophil % 0.3 0.3 - 6.2 %    Basophil % 1.0 0.0 - 1.5 %    Immature Grans % 0.3 0.0 - 0.5 %    Neutrophils, Absolute 2.08 1.70 - 7.00 10*3/mm3    Lymphocytes, Absolute 1.22 0.70 - 3.10 10*3/mm3    Monocytes, Absolute 0.53 0.10 - 0.90 10*3/mm3    Eosinophils, Absolute 0.01 0.00 - 0.40 10*3/mm3    Basophils, Absolute 0.04 0.00 - 0.20 10*3/mm3    Immature Grans, Absolute 0.01 0.00 - 0.05 10*3/mm3    nRBC 0.0 0.0 - 0.2 /100 WBC   Procalcitonin    Collection Time: 05/13/20 10:26 PM   Result Value Ref Range    Procalcitonin 0.37 (H) 0.10 - 0.25 ng/mL   Lactic Acid, Plasma    Collection Time: 05/13/20 10:37 PM   Result Value Ref Range    Lactate 2.4 (C) 0.5 - 2.0 mmol/L   Rapid Strep A Screen - Swab, Throat    Collection Time: 05/13/20 10:49 PM   Result Value Ref Range    Strep A Ag Negative Negative   Respiratory Panel, PCR - Swab, Nasopharynx    Collection Time: 05/13/20 10:49 PM   Result Value Ref Range    ADENOVIRUS, PCR Not Detected Not Detected    Coronavirus 229E Not Detected Not Detected    Coronavirus HKU1 Not Detected Not Detected    Coronavirus NL63 Not Detected Not Detected    Coronavirus OC43 Not Detected Not Detected    Human Metapneumovirus Not Detected Not Detected    Human Rhinovirus/Enterovirus Not Detected Not Detected    Influenza B PCR Not Detected Not Detected    Parainfluenza Virus 1 Not Detected Not Detected    Parainfluenza Virus 2 Not Detected Not Detected    Parainfluenza Virus 3 Not Detected Not Detected    Parainfluenza Virus 4 Not Detected Not Detected    Bordetella pertussis pcr Not Detected Not Detected    Influenza A H1 2009 PCR Not Detected Not Detected    Chlamydophila pneumoniae PCR Not Detected Not Detected    Mycoplasma pneumo by PCR Not Detected Not Detected    Influenza A PCR Not Detected Not Detected    Influenza A H3  Not Detected Not Detected    Influenza A H1 Not Detected Not Detected    RSV, PCR Not Detected Not Detected    Bordetella parapertussis PCR Not Detected Not Detected       Ordered the above labs and independently reviewed the results.        RADIOLOGY  Xr Chest Ap    Result Date: 5/13/2020  PORTABLE CHEST 05/13/2020 AT 10:34 PM  CLINICAL HISTORY: Cough. Fever. COVID-19 evaluation  Compared to the previous chest dated 01/02/2019.  The lungs are well-expanded and appear free of infiltrates. There are no pleural effusions. The cardiomediastinal silhouette is unremarkable.  IMPRESSIONS: No evidence of active disease within the chest.  This report was finalized on 5/13/2020 11:06 PM by Dr. Shilo Anderson M.D.        I ordered the above noted radiological studies. Reviewed by me and discussed with radiologist.  See dictation for official radiology interpretation.      PROCEDURES    Procedures      MEDICATIONS GIVEN IN ER    Medications   sodium chloride 0.9 % flush 10 mL (10 mL Intravenous Given 5/13/20 2228)   acetaminophen (TYLENOL) tablet 1,000 mg (1,000 mg Oral Given 5/13/20 2244)   sodium chloride 0.9 % bolus 1,000 mL (1,000 mL Intravenous New Bag 5/13/20 2308)         PROGRESS, DATA ANALYSIS, CONSULTS, AND MEDICAL DECISION MAKING    Any/all labs have been independently reviewed by me.  Any/all radiology studies have been reviewed by me and discussed with radiologist dictating the report.   EKG's independently viewed and interpreted by me.  Discussion below represents my analysis of pertinent findings related to patient's condition, differential diagnosis, treatment plan and final disposition.      ED Course as of May 14 0011   Thu May 14, 2020   0007 His white count is normal.  His CMP is unremarkable except for mildly elevated LFTs-likely due to his alcohol use.  X-ray is unremarkable.  Respiratory viral panel was unremarkable.  Lactate slightly elevated.  His tachycardia has resolved.  I think it safe for him to  go home.  Coronavirus testing is pending at this time-he was advised to quarantine at home until he knows these results.    [WC]      ED Course User Index  [WC] Obed Suggs MD       AS OF 00:11 VITALS:    BP - 137/91  HR - 108  TEMP - 99.8 °F (37.7 °C) (Tympanic)  02 SATS - 93%        DIAGNOSIS  Final diagnoses:   Viral syndrome         DISPOSITION  Discharged           Obed Suggs MD  05/14/20 0011

## 2020-05-14 NOTE — ED TRIAGE NOTES
Pt ambulated to triage desk with fever, tmax 100.8 this evening, c/o muscle aches, sore throat and fatigue. Pt in mask, triage RN's in masks.

## 2020-05-15 LAB
BACTERIA SPEC AEROBE CULT: NORMAL
REF LAB TEST METHOD: NORMAL
SARS-COV-2 RNA RESP QL NAA+PROBE: NOT DETECTED

## 2020-05-18 DIAGNOSIS — E03.9 HYPOTHYROIDISM, UNSPECIFIED TYPE: ICD-10-CM

## 2020-05-18 LAB
BACTERIA SPEC AEROBE CULT: NORMAL
BACTERIA SPEC AEROBE CULT: NORMAL

## 2020-05-19 RX ORDER — LEVOTHYROXINE SODIUM 0.1 MG/1
TABLET ORAL
Qty: 90 TABLET | Refills: 0 | Status: SHIPPED | OUTPATIENT
Start: 2020-05-19 | End: 2020-08-16

## 2020-05-27 ENCOUNTER — DOCUMENTATION (OUTPATIENT)
Dept: NEUROLOGY | Facility: CLINIC | Age: 65
End: 2020-05-27

## 2020-05-27 NOTE — PROGRESS NOTES
Neurology appointment scheduled for today at 2 PM for gabapentin refill.  Per his Luis report #[id number] his gabapentin was last filled on 5/19/2020.  Shortly before his appointment today patient called and canceled.

## 2020-06-09 DIAGNOSIS — M12.9 ARTHRITIS/ARTHROPATHY OF MULTIPLE JOINTS: ICD-10-CM

## 2020-06-09 RX ORDER — TRAMADOL HYDROCHLORIDE 50 MG/1
TABLET ORAL
Qty: 80 TABLET | Refills: 0 | Status: SHIPPED | OUTPATIENT
Start: 2020-06-09 | End: 2020-08-11

## 2020-06-18 DIAGNOSIS — G60.9 IDIOPATHIC PERIPHERAL NEUROPATHY: ICD-10-CM

## 2020-06-19 RX ORDER — RIZATRIPTAN BENZOATE 10 MG/1
TABLET ORAL
Qty: 12 TABLET | Refills: 1 | Status: ON HOLD | OUTPATIENT
Start: 2020-06-19 | End: 2021-01-18

## 2020-06-19 RX ORDER — GABAPENTIN 300 MG/1
CAPSULE ORAL
Qty: 90 CAPSULE | Refills: 0 | Status: SHIPPED | OUTPATIENT
Start: 2020-06-19 | End: 2020-08-13

## 2020-06-19 NOTE — TELEPHONE ENCOUNTER
Okay to refill? Pt is scheduled with you in July. Pt was to f/u in May, had an appt, but cancelled. JAMEY ran 06/09/20 in media.

## 2020-07-02 ENCOUNTER — OFFICE VISIT (OUTPATIENT)
Dept: NEUROLOGY | Facility: CLINIC | Age: 65
End: 2020-07-02

## 2020-07-02 VITALS
SYSTOLIC BLOOD PRESSURE: 122 MMHG | WEIGHT: 208 LBS | HEART RATE: 105 BPM | OXYGEN SATURATION: 96 % | BODY MASS INDEX: 28.17 KG/M2 | HEIGHT: 72 IN | DIASTOLIC BLOOD PRESSURE: 76 MMHG

## 2020-07-02 DIAGNOSIS — G57.93 NEUROPATHY INVOLVING BOTH LOWER EXTREMITIES: Primary | ICD-10-CM

## 2020-07-02 PROCEDURE — 99213 OFFICE O/P EST LOW 20 MIN: CPT | Performed by: NURSE PRACTITIONER

## 2020-08-08 DIAGNOSIS — M12.9 ARTHRITIS/ARTHROPATHY OF MULTIPLE JOINTS: ICD-10-CM

## 2020-08-08 DIAGNOSIS — G60.9 IDIOPATHIC PERIPHERAL NEUROPATHY: ICD-10-CM

## 2020-08-11 RX ORDER — TRAMADOL HYDROCHLORIDE 50 MG/1
TABLET ORAL
Qty: 80 TABLET | Refills: 0 | Status: SHIPPED | OUTPATIENT
Start: 2020-08-11 | End: 2020-11-10 | Stop reason: SDUPTHER

## 2020-08-13 RX ORDER — GABAPENTIN 300 MG/1
CAPSULE ORAL
Qty: 90 CAPSULE | Refills: 5 | Status: ON HOLD | OUTPATIENT
Start: 2020-08-13 | End: 2022-01-09 | Stop reason: SDUPTHER

## 2020-08-14 DIAGNOSIS — E03.9 HYPOTHYROIDISM, UNSPECIFIED TYPE: ICD-10-CM

## 2020-08-16 RX ORDER — LEVOTHYROXINE SODIUM 0.1 MG/1
TABLET ORAL
Qty: 90 TABLET | Refills: 0 | Status: SHIPPED | OUTPATIENT
Start: 2020-08-16 | End: 2021-08-26 | Stop reason: SDUPTHER

## 2020-10-19 ENCOUNTER — TELEPHONE (OUTPATIENT)
Dept: FAMILY MEDICINE CLINIC | Facility: CLINIC | Age: 65
End: 2020-10-19

## 2020-10-19 NOTE — TELEPHONE ENCOUNTER
Pt had a physical that was cancelled this week due to Dr. Beal being out of office. He has to have this physical completed by 11/13/2020 for work purposes. He is wanting to know if anyone else in the office would be able to do this for him.

## 2020-11-03 RX ORDER — LISINOPRIL 10 MG/1
10 TABLET ORAL DAILY
Qty: 90 TABLET | Refills: 0 | Status: SHIPPED | OUTPATIENT
Start: 2020-11-03 | End: 2021-02-02

## 2020-11-03 RX ORDER — AMLODIPINE BESYLATE 5 MG/1
5 TABLET ORAL EVERY MORNING
Qty: 90 TABLET | Refills: 0 | Status: SHIPPED | OUTPATIENT
Start: 2020-11-03 | End: 2021-02-02

## 2020-11-03 RX ORDER — ATORVASTATIN CALCIUM 20 MG/1
20 TABLET, FILM COATED ORAL DAILY
Qty: 90 TABLET | Refills: 0 | Status: SHIPPED | OUTPATIENT
Start: 2020-11-03 | End: 2021-02-02

## 2020-11-03 NOTE — TELEPHONE ENCOUNTER
Can we please move his 11/10 visit to 9:30. It says CPE and he is really someone who generally needs 30 min? Thanks.

## 2020-11-10 ENCOUNTER — OFFICE VISIT (OUTPATIENT)
Dept: FAMILY MEDICINE CLINIC | Facility: CLINIC | Age: 65
End: 2020-11-10

## 2020-11-10 VITALS
TEMPERATURE: 97.1 F | OXYGEN SATURATION: 97 % | RESPIRATION RATE: 17 BRPM | DIASTOLIC BLOOD PRESSURE: 88 MMHG | BODY MASS INDEX: 29.57 KG/M2 | SYSTOLIC BLOOD PRESSURE: 140 MMHG | HEIGHT: 72 IN | HEART RATE: 103 BPM | WEIGHT: 218.3 LBS

## 2020-11-10 DIAGNOSIS — M12.9 ARTHRITIS/ARTHROPATHY OF MULTIPLE JOINTS: ICD-10-CM

## 2020-11-10 DIAGNOSIS — E03.9 HYPOTHYROIDISM, UNSPECIFIED TYPE: ICD-10-CM

## 2020-11-10 DIAGNOSIS — E55.9 VITAMIN D DEFICIENCY: ICD-10-CM

## 2020-11-10 DIAGNOSIS — Z12.5 PROSTATE CANCER SCREENING: ICD-10-CM

## 2020-11-10 DIAGNOSIS — E78.5 HYPERLIPIDEMIA, UNSPECIFIED HYPERLIPIDEMIA TYPE: ICD-10-CM

## 2020-11-10 DIAGNOSIS — Z00.00 ANNUAL PHYSICAL EXAM: Primary | ICD-10-CM

## 2020-11-10 DIAGNOSIS — R79.89 INCREASED AMMONIA LEVEL: ICD-10-CM

## 2020-11-10 DIAGNOSIS — I10 ESSENTIAL HYPERTENSION: ICD-10-CM

## 2020-11-10 DIAGNOSIS — F10.20 ALCOHOLISM (HCC): ICD-10-CM

## 2020-11-10 PROCEDURE — 99396 PREV VISIT EST AGE 40-64: CPT | Performed by: FAMILY MEDICINE

## 2020-11-10 RX ORDER — ACYCLOVIR 200 MG/1
400 CAPSULE ORAL DAILY
Qty: 180 CAPSULE | Refills: 1 | Status: SHIPPED | OUTPATIENT
Start: 2020-11-10 | End: 2021-08-18

## 2020-11-10 RX ORDER — TRAMADOL HYDROCHLORIDE 50 MG/1
50 TABLET ORAL EVERY 6 HOURS PRN
Qty: 80 TABLET | Refills: 0 | Status: SHIPPED | OUTPATIENT
Start: 2020-11-10 | End: 2020-12-11 | Stop reason: SDUPTHER

## 2020-11-11 LAB
25(OH)D3+25(OH)D2 SERPL-MCNC: 26.5 NG/ML (ref 30–100)
ALBUMIN SERPL-MCNC: 4.9 G/DL (ref 3.5–5.2)
ALBUMIN/GLOB SERPL: 2 G/DL
ALP SERPL-CCNC: 85 U/L (ref 39–117)
ALT SERPL-CCNC: 29 U/L (ref 1–41)
AMMONIA PLAS-MCNC: 33 UMOL/L (ref 16–60)
AST SERPL-CCNC: 48 U/L (ref 1–40)
BASOPHILS # BLD AUTO: 0.04 10*3/MM3 (ref 0–0.2)
BASOPHILS NFR BLD AUTO: 0.8 % (ref 0–1.5)
BILIRUB SERPL-MCNC: 0.4 MG/DL (ref 0–1.2)
BUN SERPL-MCNC: 12 MG/DL (ref 8–23)
BUN/CREAT SERPL: 13.6 (ref 7–25)
CALCIUM SERPL-MCNC: 9.5 MG/DL (ref 8.6–10.5)
CHLORIDE SERPL-SCNC: 99 MMOL/L (ref 98–107)
CHOLEST SERPL-MCNC: 240 MG/DL (ref 0–200)
CO2 SERPL-SCNC: 21.6 MMOL/L (ref 22–29)
CREAT SERPL-MCNC: 0.88 MG/DL (ref 0.76–1.27)
EOSINOPHIL # BLD AUTO: 0.08 10*3/MM3 (ref 0–0.4)
EOSINOPHIL NFR BLD AUTO: 1.6 % (ref 0.3–6.2)
ERYTHROCYTE [DISTWIDTH] IN BLOOD BY AUTOMATED COUNT: 13.1 % (ref 12.3–15.4)
FOLATE SERPL-MCNC: 9.91 NG/ML (ref 4.78–24.2)
GLOBULIN SER CALC-MCNC: 2.5 GM/DL
GLUCOSE SERPL-MCNC: 97 MG/DL (ref 65–99)
HCT VFR BLD AUTO: 42.5 % (ref 37.5–51)
HDLC SERPL-MCNC: 53 MG/DL (ref 40–60)
HGB BLD-MCNC: 14.5 G/DL (ref 13–17.7)
IMM GRANULOCYTES # BLD AUTO: 0.01 10*3/MM3 (ref 0–0.05)
IMM GRANULOCYTES NFR BLD AUTO: 0.2 % (ref 0–0.5)
LDLC SERPL CALC-MCNC: 121 MG/DL (ref 0–100)
LYMPHOCYTES # BLD AUTO: 2.63 10*3/MM3 (ref 0.7–3.1)
LYMPHOCYTES NFR BLD AUTO: 53 % (ref 19.6–45.3)
MCH RBC QN AUTO: 31 PG (ref 26.6–33)
MCHC RBC AUTO-ENTMCNC: 34.1 G/DL (ref 31.5–35.7)
MCV RBC AUTO: 90.8 FL (ref 79–97)
MONOCYTES # BLD AUTO: 0.45 10*3/MM3 (ref 0.1–0.9)
MONOCYTES NFR BLD AUTO: 9.1 % (ref 5–12)
NEUTROPHILS # BLD AUTO: 1.75 10*3/MM3 (ref 1.7–7)
NEUTROPHILS NFR BLD AUTO: 35.3 % (ref 42.7–76)
NRBC BLD AUTO-RTO: 0 /100 WBC (ref 0–0.2)
PLATELET # BLD AUTO: 309 10*3/MM3 (ref 140–450)
POTASSIUM SERPL-SCNC: 4.3 MMOL/L (ref 3.5–5.2)
PROT SERPL-MCNC: 7.4 G/DL (ref 6–8.5)
PSA SERPL-MCNC: 0.73 NG/ML (ref 0–4)
RBC # BLD AUTO: 4.68 10*6/MM3 (ref 4.14–5.8)
SODIUM SERPL-SCNC: 136 MMOL/L (ref 136–145)
T4 SERPL-MCNC: 7.43 MCG/DL (ref 4.5–11.7)
TRIGL SERPL-MCNC: 380 MG/DL (ref 0–150)
TSH SERPL DL<=0.005 MIU/L-ACNC: 4.36 UIU/ML (ref 0.27–4.2)
VIT B12 SERPL-MCNC: 1360 PG/ML (ref 211–946)
VLDLC SERPL CALC-MCNC: 66 MG/DL (ref 5–40)
WBC # BLD AUTO: 4.96 10*3/MM3 (ref 3.4–10.8)

## 2020-12-11 DIAGNOSIS — M12.9 ARTHRITIS/ARTHROPATHY OF MULTIPLE JOINTS: ICD-10-CM

## 2020-12-12 RX ORDER — TRAMADOL HYDROCHLORIDE 50 MG/1
50 TABLET ORAL EVERY 6 HOURS PRN
Qty: 80 TABLET | Refills: 0 | Status: ON HOLD | OUTPATIENT
Start: 2020-12-12 | End: 2021-01-18

## 2021-01-05 NOTE — ED PROVIDER NOTES
AMD (Wet) Counseling: : I have reviewed with the patient the diagnosis of wet macular degeneration and its pathophysiology.  I further explained that this condition is a severe eye disease which should be monitored and treated by a retinal specialist. The patient presents complaining of increased depression since the pt's wife stated she wanted a divorce 1 week ago. Pt reports insomnia.    Old Records:  The pt was seen by BERONICA Roberts on 5-3-18 for depression. The pt was discharged with Lexapro and Hydroxyzine.    Physical Exam:  Patient is nontoxic appearing and in NAD. The pt is alert and oriented X 3. The pt appears to be anxious.    Discussed the plan to discharge the pt with the plan to f/u with outpatient referrals. I advised the pt to f/u with his PCP if he is concerned about his BP. The pt is also asking for Xanax because Dr. Beal prescribes him with 1 mg Xanax, but he has taken it for years and the 1 mg dose does not work anymore. I advised the pt to take Melatonin and Hydroxyzine to improve his insomnia. I informed the pt that he could f/u with Dr. Beal to discuss treatment of his insomnia. The pt understands and agrees with the plan.    MD ATTESTATION NOTE    The LUBA and I have discussed this patient's history, physical exam, and treatment plan.  I have reviewed the documentation and personally had a face to face interaction with the patient. I affirm the documentation and agree with the treatment and plan.  The attached note describes my personal findings.    Documentation assistance provided by priscila Long for Dr. Titus. Information recorded by the priscila was done at my direction and has been verified and validated by me.                 Deshaun Long  05/11/18 [id number]       Justin Titus MD  05/13/18 [id number]

## 2021-01-16 ENCOUNTER — APPOINTMENT (OUTPATIENT)
Dept: CT IMAGING | Facility: HOSPITAL | Age: 66
End: 2021-01-16

## 2021-01-16 ENCOUNTER — APPOINTMENT (OUTPATIENT)
Dept: GENERAL RADIOLOGY | Facility: HOSPITAL | Age: 66
End: 2021-01-16

## 2021-01-16 ENCOUNTER — HOSPITAL ENCOUNTER (INPATIENT)
Facility: HOSPITAL | Age: 66
LOS: 6 days | Discharge: HOME | End: 2021-01-22
Attending: EMERGENCY MEDICINE | Admitting: INTERNAL MEDICINE

## 2021-01-16 DIAGNOSIS — R33.9 URINE RETENTION: Primary | ICD-10-CM

## 2021-01-16 DIAGNOSIS — F10.230 ALCOHOL DEPENDENCE WITH UNCOMPLICATED WITHDRAWAL (HCC): ICD-10-CM

## 2021-01-16 DIAGNOSIS — R53.1 GENERALIZED WEAKNESS: ICD-10-CM

## 2021-01-16 DIAGNOSIS — F10.920 ALCOHOLIC INTOXICATION WITHOUT COMPLICATION (HCC): ICD-10-CM

## 2021-01-16 PROBLEM — E83.42 HYPOMAGNESEMIA: Status: ACTIVE | Noted: 2021-01-16

## 2021-01-16 PROBLEM — I51.89 DIASTOLIC DYSFUNCTION: Status: ACTIVE | Noted: 2021-01-16

## 2021-01-16 LAB
ALBUMIN SERPL-MCNC: 4.6 G/DL (ref 3.5–5.2)
ALBUMIN/GLOB SERPL: 1.6 G/DL
ALP SERPL-CCNC: 86 U/L (ref 39–117)
ALT SERPL W P-5'-P-CCNC: 43 U/L (ref 1–41)
ANION GAP SERPL CALCULATED.3IONS-SCNC: 20.3 MMOL/L (ref 5–15)
AST SERPL-CCNC: 108 U/L (ref 1–40)
BACTERIA UR QL AUTO: ABNORMAL /HPF
BASOPHILS # BLD AUTO: 0.03 10*3/MM3 (ref 0–0.2)
BASOPHILS NFR BLD AUTO: 0.5 % (ref 0–1.5)
BILIRUB SERPL-MCNC: 0.6 MG/DL (ref 0–1.2)
BILIRUB UR QL STRIP: ABNORMAL
BUN SERPL-MCNC: 18 MG/DL (ref 8–23)
BUN/CREAT SERPL: 18 (ref 7–25)
CALCIUM SPEC-SCNC: 8.7 MG/DL (ref 8.6–10.5)
CHLORIDE SERPL-SCNC: 91 MMOL/L (ref 98–107)
CLARITY UR: ABNORMAL
CO2 SERPL-SCNC: 21.7 MMOL/L (ref 22–29)
COLOR UR: ABNORMAL
CREAT SERPL-MCNC: 1 MG/DL (ref 0.76–1.27)
DEPRECATED RDW RBC AUTO: 47.3 FL (ref 37–54)
EOSINOPHIL # BLD AUTO: 0.01 10*3/MM3 (ref 0–0.4)
EOSINOPHIL NFR BLD AUTO: 0.2 % (ref 0.3–6.2)
ERYTHROCYTE [DISTWIDTH] IN BLOOD BY AUTOMATED COUNT: 14.9 % (ref 12.3–15.4)
ETHANOL BLD-MCNC: 288 MG/DL (ref 0–10)
ETHANOL UR QL: 0.29 %
GFR SERPL CREATININE-BSD FRML MDRD: 75 ML/MIN/1.73
GLOBULIN UR ELPH-MCNC: 2.8 GM/DL
GLUCOSE SERPL-MCNC: 116 MG/DL (ref 65–99)
GLUCOSE UR STRIP-MCNC: NEGATIVE MG/DL
HCT VFR BLD AUTO: 48.8 % (ref 37.5–51)
HGB BLD-MCNC: 16.5 G/DL (ref 13–17.7)
HGB UR QL STRIP.AUTO: ABNORMAL
HYALINE CASTS UR QL AUTO: ABNORMAL /LPF
IMM GRANULOCYTES # BLD AUTO: 0.01 10*3/MM3 (ref 0–0.05)
IMM GRANULOCYTES NFR BLD AUTO: 0.2 % (ref 0–0.5)
INR PPP: 0.94 (ref 0.9–1.1)
KETONES UR QL STRIP: ABNORMAL
LEUKOCYTE ESTERASE UR QL STRIP.AUTO: NEGATIVE
LIPASE SERPL-CCNC: 114 U/L (ref 13–60)
LYMPHOCYTES # BLD AUTO: 0.62 10*3/MM3 (ref 0.7–3.1)
LYMPHOCYTES NFR BLD AUTO: 10.4 % (ref 19.6–45.3)
MAGNESIUM SERPL-MCNC: 1.5 MG/DL (ref 1.6–2.4)
MCH RBC QN AUTO: 29.9 PG (ref 26.6–33)
MCHC RBC AUTO-ENTMCNC: 33.8 G/DL (ref 31.5–35.7)
MCV RBC AUTO: 88.4 FL (ref 79–97)
MONOCYTES # BLD AUTO: 0.45 10*3/MM3 (ref 0.1–0.9)
MONOCYTES NFR BLD AUTO: 7.6 % (ref 5–12)
NEUTROPHILS NFR BLD AUTO: 4.84 10*3/MM3 (ref 1.7–7)
NEUTROPHILS NFR BLD AUTO: 81.1 % (ref 42.7–76)
NITRITE UR QL STRIP: NEGATIVE
NRBC BLD AUTO-RTO: 0 /100 WBC (ref 0–0.2)
PH UR STRIP.AUTO: 5.5 [PH] (ref 5–8)
PLATELET # BLD AUTO: 255 10*3/MM3 (ref 140–450)
PMV BLD AUTO: 9.9 FL (ref 6–12)
POTASSIUM SERPL-SCNC: 3.6 MMOL/L (ref 3.5–5.2)
PROT SERPL-MCNC: 7.4 G/DL (ref 6–8.5)
PROT UR QL STRIP: ABNORMAL
PROTHROMBIN TIME: 12.4 SECONDS (ref 11.7–14.2)
RBC # BLD AUTO: 5.52 10*6/MM3 (ref 4.14–5.8)
RBC # UR: ABNORMAL /HPF
REF LAB TEST METHOD: ABNORMAL
SODIUM SERPL-SCNC: 133 MMOL/L (ref 136–145)
SP GR UR STRIP: >=1.03 (ref 1–1.03)
SQUAMOUS #/AREA URNS HPF: ABNORMAL /HPF
UROBILINOGEN UR QL STRIP: ABNORMAL
WBC # BLD AUTO: 5.96 10*3/MM3 (ref 3.4–10.8)
WBC UR QL AUTO: ABNORMAL /HPF

## 2021-01-16 PROCEDURE — 70450 CT HEAD/BRAIN W/O DYE: CPT

## 2021-01-16 PROCEDURE — U0004 COV-19 TEST NON-CDC HGH THRU: HCPCS | Performed by: EMERGENCY MEDICINE

## 2021-01-16 PROCEDURE — 80053 COMPREHEN METABOLIC PANEL: CPT | Performed by: NURSE PRACTITIONER

## 2021-01-16 PROCEDURE — 85025 COMPLETE CBC W/AUTO DIFF WBC: CPT | Performed by: NURSE PRACTITIONER

## 2021-01-16 PROCEDURE — 82077 ASSAY SPEC XCP UR&BREATH IA: CPT | Performed by: EMERGENCY MEDICINE

## 2021-01-16 PROCEDURE — 81001 URINALYSIS AUTO W/SCOPE: CPT | Performed by: INTERNAL MEDICINE

## 2021-01-16 PROCEDURE — 71045 X-RAY EXAM CHEST 1 VIEW: CPT

## 2021-01-16 PROCEDURE — 93005 ELECTROCARDIOGRAM TRACING: CPT | Performed by: NURSE PRACTITIONER

## 2021-01-16 PROCEDURE — 93010 ELECTROCARDIOGRAM REPORT: CPT | Performed by: INTERNAL MEDICINE

## 2021-01-16 PROCEDURE — 85610 PROTHROMBIN TIME: CPT | Performed by: NURSE PRACTITIONER

## 2021-01-16 PROCEDURE — 99285 EMERGENCY DEPT VISIT HI MDM: CPT

## 2021-01-16 PROCEDURE — 25010000002 MAGNESIUM SULFATE 2 GM/50ML SOLUTION: Performed by: EMERGENCY MEDICINE

## 2021-01-16 PROCEDURE — 25010000002 LORAZEPAM PER 2 MG: Performed by: EMERGENCY MEDICINE

## 2021-01-16 PROCEDURE — 25010000002 PROCHLORPERAZINE 10 MG/2ML SOLUTION: Performed by: INTERNAL MEDICINE

## 2021-01-16 PROCEDURE — 25010000002 THIAMINE PER 100 MG: Performed by: NURSE PRACTITIONER

## 2021-01-16 PROCEDURE — 83690 ASSAY OF LIPASE: CPT | Performed by: NURSE PRACTITIONER

## 2021-01-16 PROCEDURE — 83735 ASSAY OF MAGNESIUM: CPT | Performed by: NURSE PRACTITIONER

## 2021-01-16 RX ORDER — CHOLECALCIFEROL (VITAMIN D3) 125 MCG
1000 CAPSULE ORAL DAILY
Status: DISCONTINUED | OUTPATIENT
Start: 2021-01-17 | End: 2021-01-22 | Stop reason: HOSPADM

## 2021-01-16 RX ORDER — LORAZEPAM 2 MG/ML
1 INJECTION INTRAMUSCULAR ONCE
Status: COMPLETED | OUTPATIENT
Start: 2021-01-16 | End: 2021-01-16

## 2021-01-16 RX ORDER — ONDANSETRON 2 MG/ML
4 INJECTION INTRAMUSCULAR; INTRAVENOUS EVERY 6 HOURS PRN
Status: CANCELLED | OUTPATIENT
Start: 2021-01-16

## 2021-01-16 RX ORDER — LEVOTHYROXINE SODIUM 0.1 MG/1
100 TABLET ORAL DAILY
Status: DISCONTINUED | OUTPATIENT
Start: 2021-01-17 | End: 2021-01-22 | Stop reason: HOSPADM

## 2021-01-16 RX ORDER — LORAZEPAM 2 MG/ML
0.5 INJECTION INTRAMUSCULAR ONCE
Status: COMPLETED | OUTPATIENT
Start: 2021-01-16 | End: 2021-01-16

## 2021-01-16 RX ORDER — LORAZEPAM 2 MG/ML
2 INJECTION INTRAMUSCULAR
Status: DISCONTINUED | OUTPATIENT
Start: 2021-01-16 | End: 2021-01-22 | Stop reason: HOSPADM

## 2021-01-16 RX ORDER — GABAPENTIN 300 MG/1
300 CAPSULE ORAL 3 TIMES DAILY
Status: DISCONTINUED | OUTPATIENT
Start: 2021-01-16 | End: 2021-01-17

## 2021-01-16 RX ORDER — ACETAMINOPHEN 650 MG/1
650 SUPPOSITORY RECTAL EVERY 4 HOURS PRN
Status: DISCONTINUED | OUTPATIENT
Start: 2021-01-16 | End: 2021-01-22 | Stop reason: HOSPADM

## 2021-01-16 RX ORDER — PROCHLORPERAZINE EDISYLATE 5 MG/ML
5 INJECTION INTRAMUSCULAR; INTRAVENOUS EVERY 6 HOURS PRN
Status: DISCONTINUED | OUTPATIENT
Start: 2021-01-16 | End: 2021-01-22 | Stop reason: HOSPADM

## 2021-01-16 RX ORDER — SODIUM CHLORIDE 0.9 % (FLUSH) 0.9 %
3 SYRINGE (ML) INJECTION EVERY 12 HOURS SCHEDULED
Status: DISCONTINUED | OUTPATIENT
Start: 2021-01-16 | End: 2021-01-22 | Stop reason: HOSPADM

## 2021-01-16 RX ORDER — ONDANSETRON 4 MG/1
4 TABLET, FILM COATED ORAL EVERY 6 HOURS PRN
Status: CANCELLED | OUTPATIENT
Start: 2021-01-16

## 2021-01-16 RX ORDER — POTASSIUM CHLORIDE 7.45 MG/ML
10 INJECTION INTRAVENOUS
Status: DISCONTINUED | OUTPATIENT
Start: 2021-01-16 | End: 2021-01-22 | Stop reason: HOSPADM

## 2021-01-16 RX ORDER — MAGNESIUM SULFATE HEPTAHYDRATE 40 MG/ML
4 INJECTION, SOLUTION INTRAVENOUS AS NEEDED
Status: DISCONTINUED | OUTPATIENT
Start: 2021-01-16 | End: 2021-01-22 | Stop reason: HOSPADM

## 2021-01-16 RX ORDER — DOCUSATE SODIUM 100 MG/1
100 CAPSULE, LIQUID FILLED ORAL 2 TIMES DAILY PRN
Status: DISCONTINUED | OUTPATIENT
Start: 2021-01-16 | End: 2021-01-22 | Stop reason: HOSPADM

## 2021-01-16 RX ORDER — ACETAMINOPHEN 325 MG/1
650 TABLET ORAL EVERY 4 HOURS PRN
Status: DISCONTINUED | OUTPATIENT
Start: 2021-01-16 | End: 2021-01-22 | Stop reason: HOSPADM

## 2021-01-16 RX ORDER — LORAZEPAM 1 MG/1
2 TABLET ORAL
Status: DISCONTINUED | OUTPATIENT
Start: 2021-01-16 | End: 2021-01-22 | Stop reason: HOSPADM

## 2021-01-16 RX ORDER — AMLODIPINE BESYLATE 5 MG/1
5 TABLET ORAL EVERY MORNING
Status: DISCONTINUED | OUTPATIENT
Start: 2021-01-17 | End: 2021-01-22 | Stop reason: HOSPADM

## 2021-01-16 RX ORDER — SUMATRIPTAN 50 MG/1
50 TABLET, FILM COATED ORAL
Status: DISCONTINUED | OUTPATIENT
Start: 2021-01-16 | End: 2021-01-22 | Stop reason: HOSPADM

## 2021-01-16 RX ORDER — FAMOTIDINE 20 MG/1
20 TABLET, FILM COATED ORAL 2 TIMES DAILY PRN
Status: DISCONTINUED | OUTPATIENT
Start: 2021-01-16 | End: 2021-01-22 | Stop reason: HOSPADM

## 2021-01-16 RX ORDER — LORAZEPAM 1 MG/1
1 TABLET ORAL
Status: DISCONTINUED | OUTPATIENT
Start: 2021-01-16 | End: 2021-01-22 | Stop reason: HOSPADM

## 2021-01-16 RX ORDER — CHLORDIAZEPOXIDE HYDROCHLORIDE 25 MG/1
25 CAPSULE, GELATIN COATED ORAL ONCE
Status: COMPLETED | OUTPATIENT
Start: 2021-01-16 | End: 2021-01-16

## 2021-01-16 RX ORDER — POTASSIUM CHLORIDE 750 MG/1
40 TABLET, FILM COATED, EXTENDED RELEASE ORAL AS NEEDED
Status: DISCONTINUED | OUTPATIENT
Start: 2021-01-16 | End: 2021-01-22 | Stop reason: HOSPADM

## 2021-01-16 RX ORDER — MAGNESIUM SULFATE HEPTAHYDRATE 40 MG/ML
2 INJECTION, SOLUTION INTRAVENOUS ONCE
Status: COMPLETED | OUTPATIENT
Start: 2021-01-16 | End: 2021-01-16

## 2021-01-16 RX ORDER — CHOLECALCIFEROL (VITAMIN D3) 125 MCG
5 CAPSULE ORAL NIGHTLY PRN
Status: DISCONTINUED | OUTPATIENT
Start: 2021-01-16 | End: 2021-01-22 | Stop reason: HOSPADM

## 2021-01-16 RX ORDER — PROCHLORPERAZINE MALEATE 5 MG/1
5 TABLET ORAL EVERY 6 HOURS PRN
Status: DISCONTINUED | OUTPATIENT
Start: 2021-01-16 | End: 2021-01-22 | Stop reason: HOSPADM

## 2021-01-16 RX ORDER — MAGNESIUM SULFATE HEPTAHYDRATE 40 MG/ML
2 INJECTION, SOLUTION INTRAVENOUS AS NEEDED
Status: DISCONTINUED | OUTPATIENT
Start: 2021-01-16 | End: 2021-01-22 | Stop reason: HOSPADM

## 2021-01-16 RX ORDER — SODIUM CHLORIDE 0.9 % (FLUSH) 0.9 %
10 SYRINGE (ML) INJECTION AS NEEDED
Status: DISCONTINUED | OUTPATIENT
Start: 2021-01-16 | End: 2021-01-22 | Stop reason: HOSPADM

## 2021-01-16 RX ORDER — LORAZEPAM 2 MG/ML
1 INJECTION INTRAMUSCULAR
Status: DISCONTINUED | OUTPATIENT
Start: 2021-01-16 | End: 2021-01-22 | Stop reason: HOSPADM

## 2021-01-16 RX ORDER — LISINOPRIL 10 MG/1
10 TABLET ORAL DAILY
Status: DISCONTINUED | OUTPATIENT
Start: 2021-01-17 | End: 2021-01-22 | Stop reason: HOSPADM

## 2021-01-16 RX ORDER — TRAMADOL HYDROCHLORIDE 50 MG/1
50 TABLET ORAL EVERY 6 HOURS PRN
Status: DISCONTINUED | OUTPATIENT
Start: 2021-01-16 | End: 2021-01-22 | Stop reason: HOSPADM

## 2021-01-16 RX ORDER — ACETAMINOPHEN 160 MG/5ML
650 SOLUTION ORAL EVERY 4 HOURS PRN
Status: DISCONTINUED | OUTPATIENT
Start: 2021-01-16 | End: 2021-01-22 | Stop reason: HOSPADM

## 2021-01-16 RX ORDER — SODIUM CHLORIDE 0.9 % (FLUSH) 0.9 %
3-10 SYRINGE (ML) INJECTION AS NEEDED
Status: DISCONTINUED | OUTPATIENT
Start: 2021-01-16 | End: 2021-01-22 | Stop reason: HOSPADM

## 2021-01-16 RX ORDER — FOLIC ACID 1 MG/1
1 TABLET ORAL DAILY
Status: DISCONTINUED | OUTPATIENT
Start: 2021-01-17 | End: 2021-01-22 | Stop reason: HOSPADM

## 2021-01-16 RX ORDER — PROCHLORPERAZINE 25 MG
25 SUPPOSITORY, RECTAL RECTAL EVERY 12 HOURS PRN
Status: DISCONTINUED | OUTPATIENT
Start: 2021-01-16 | End: 2021-01-22 | Stop reason: HOSPADM

## 2021-01-16 RX ORDER — POTASSIUM CHLORIDE 1.5 G/1.77G
40 POWDER, FOR SOLUTION ORAL AS NEEDED
Status: DISCONTINUED | OUTPATIENT
Start: 2021-01-16 | End: 2021-01-22 | Stop reason: HOSPADM

## 2021-01-16 RX ORDER — ATORVASTATIN CALCIUM 20 MG/1
20 TABLET, FILM COATED ORAL DAILY
Status: DISCONTINUED | OUTPATIENT
Start: 2021-01-17 | End: 2021-01-22 | Stop reason: HOSPADM

## 2021-01-16 RX ADMIN — MAGNESIUM SULFATE HEPTAHYDRATE 2 G: 40 INJECTION, SOLUTION INTRAVENOUS at 19:44

## 2021-01-16 RX ADMIN — PROCHLORPERAZINE EDISYLATE 5 MG: 5 INJECTION INTRAMUSCULAR; INTRAVENOUS at 23:05

## 2021-01-16 RX ADMIN — THIAMINE HYDROCHLORIDE 1000 ML/HR: 100 INJECTION, SOLUTION INTRAMUSCULAR; INTRAVENOUS at 19:46

## 2021-01-16 RX ADMIN — LORAZEPAM 1 MG: 2 INJECTION INTRAMUSCULAR; INTRAVENOUS at 22:04

## 2021-01-16 RX ADMIN — CHLORDIAZEPOXIDE HYDROCHLORIDE 25 MG: 25 CAPSULE ORAL at 19:41

## 2021-01-16 RX ADMIN — LORAZEPAM 0.5 MG: 2 INJECTION INTRAMUSCULAR; INTRAVENOUS at 19:42

## 2021-01-16 NOTE — ED NOTES
Patient notes he normally drinks alcohol everyday, per patient he has been drinking 3/4 of a fifth of vodka for the past 3 days.  Patient notes last drink was 2 hours prior to arrival.  Patient CIWA is 8.  VSS.  ABC's intact.  Denies any nausea, is alert and oriented x4.  Patient wearing mask, nurse wearing mask and protective eyewear for care and assessment.  Side rails up x2, padded, suction available at bedside.       Yohan Aguirre RN  01/16/21 [id number]

## 2021-01-16 NOTE — ED TRIAGE NOTES
Pt comes to ER from home for altered mental status. Call originally came out as possible stroke by pts girlfriend due to slurred speech, EMS states no neuro deficits. Pt states that he has been on a 3 day vodka binge drinking 1/5 to a half gallon a day. Pt complains of lethargy. Pt and RN wearing mask upon triage.

## 2021-01-17 PROBLEM — I51.9 LEFT VENTRICULAR DIASTOLIC DYSFUNCTION: Status: ACTIVE | Noted: 2021-01-16

## 2021-01-17 LAB
ALBUMIN SERPL-MCNC: 3.7 G/DL (ref 3.5–5.2)
ALBUMIN/GLOB SERPL: 1.9 G/DL
ALP SERPL-CCNC: 68 U/L (ref 39–117)
ALT SERPL W P-5'-P-CCNC: 32 U/L (ref 1–41)
ANION GAP SERPL CALCULATED.3IONS-SCNC: 20.8 MMOL/L (ref 5–15)
AST SERPL-CCNC: 89 U/L (ref 1–40)
BASOPHILS # BLD AUTO: 0.02 10*3/MM3 (ref 0–0.2)
BASOPHILS NFR BLD AUTO: 0.3 % (ref 0–1.5)
BILIRUB SERPL-MCNC: 0.6 MG/DL (ref 0–1.2)
BUN SERPL-MCNC: 19 MG/DL (ref 8–23)
BUN/CREAT SERPL: 24.7 (ref 7–25)
CALCIUM SPEC-SCNC: 7.4 MG/DL (ref 8.6–10.5)
CHLORIDE SERPL-SCNC: 94 MMOL/L (ref 98–107)
CO2 SERPL-SCNC: 16.2 MMOL/L (ref 22–29)
CREAT SERPL-MCNC: 0.77 MG/DL (ref 0.76–1.27)
DEPRECATED RDW RBC AUTO: 47.7 FL (ref 37–54)
EOSINOPHIL # BLD AUTO: 0.01 10*3/MM3 (ref 0–0.4)
EOSINOPHIL NFR BLD AUTO: 0.1 % (ref 0.3–6.2)
ERYTHROCYTE [DISTWIDTH] IN BLOOD BY AUTOMATED COUNT: 14.5 % (ref 12.3–15.4)
GFR SERPL CREATININE-BSD FRML MDRD: 102 ML/MIN/1.73
GLOBULIN UR ELPH-MCNC: 1.9 GM/DL
GLUCOSE SERPL-MCNC: 95 MG/DL (ref 65–99)
HCT VFR BLD AUTO: 39.5 % (ref 37.5–51)
HGB BLD-MCNC: 13.9 G/DL (ref 13–17.7)
IMM GRANULOCYTES # BLD AUTO: 0.02 10*3/MM3 (ref 0–0.05)
IMM GRANULOCYTES NFR BLD AUTO: 0.3 % (ref 0–0.5)
LYMPHOCYTES # BLD AUTO: 0.78 10*3/MM3 (ref 0.7–3.1)
LYMPHOCYTES NFR BLD AUTO: 11.5 % (ref 19.6–45.3)
MAGNESIUM SERPL-MCNC: 1.6 MG/DL (ref 1.6–2.4)
MCH RBC QN AUTO: 31.6 PG (ref 26.6–33)
MCHC RBC AUTO-ENTMCNC: 35.2 G/DL (ref 31.5–35.7)
MCV RBC AUTO: 89.8 FL (ref 79–97)
MONOCYTES # BLD AUTO: 0.49 10*3/MM3 (ref 0.1–0.9)
MONOCYTES NFR BLD AUTO: 7.2 % (ref 5–12)
NEUTROPHILS NFR BLD AUTO: 5.49 10*3/MM3 (ref 1.7–7)
NEUTROPHILS NFR BLD AUTO: 80.6 % (ref 42.7–76)
NRBC BLD AUTO-RTO: 0 /100 WBC (ref 0–0.2)
PLATELET # BLD AUTO: 168 10*3/MM3 (ref 140–450)
PMV BLD AUTO: 9.9 FL (ref 6–12)
POTASSIUM SERPL-SCNC: 3.4 MMOL/L (ref 3.5–5.2)
POTASSIUM SERPL-SCNC: 4.4 MMOL/L (ref 3.5–5.2)
PROT SERPL-MCNC: 5.6 G/DL (ref 6–8.5)
QT INTERVAL: 434 MS
RBC # BLD AUTO: 4.4 10*6/MM3 (ref 4.14–5.8)
SARS-COV-2 ORF1AB RESP QL NAA+PROBE: NOT DETECTED
SODIUM SERPL-SCNC: 131 MMOL/L (ref 136–145)
WBC # BLD AUTO: 6.81 10*3/MM3 (ref 3.4–10.8)

## 2021-01-17 PROCEDURE — 84132 ASSAY OF SERUM POTASSIUM: CPT | Performed by: INTERNAL MEDICINE

## 2021-01-17 PROCEDURE — 85025 COMPLETE CBC W/AUTO DIFF WBC: CPT | Performed by: INTERNAL MEDICINE

## 2021-01-17 PROCEDURE — 25010000002 ENOXAPARIN PER 10 MG: Performed by: INTERNAL MEDICINE

## 2021-01-17 PROCEDURE — 25010000003 MAGNESIUM SULFATE 4 GM/100ML SOLUTION: Performed by: INTERNAL MEDICINE

## 2021-01-17 PROCEDURE — 83735 ASSAY OF MAGNESIUM: CPT | Performed by: INTERNAL MEDICINE

## 2021-01-17 PROCEDURE — 25010000002 LORAZEPAM PER 2 MG: Performed by: INTERNAL MEDICINE

## 2021-01-17 PROCEDURE — 80053 COMPREHEN METABOLIC PANEL: CPT | Performed by: INTERNAL MEDICINE

## 2021-01-17 PROCEDURE — 90791 PSYCH DIAGNOSTIC EVALUATION: CPT

## 2021-01-17 RX ORDER — GABAPENTIN 100 MG/1
100 CAPSULE ORAL EVERY 12 HOURS SCHEDULED
Status: DISCONTINUED | OUTPATIENT
Start: 2021-01-17 | End: 2021-01-22 | Stop reason: HOSPADM

## 2021-01-17 RX ADMIN — LORAZEPAM 2 MG: 1 TABLET ORAL at 01:03

## 2021-01-17 RX ADMIN — AMLODIPINE BESYLATE 5 MG: 5 TABLET ORAL at 08:42

## 2021-01-17 RX ADMIN — SODIUM CHLORIDE, PRESERVATIVE FREE 3 ML: 5 INJECTION INTRAVENOUS at 22:08

## 2021-01-17 RX ADMIN — FAMOTIDINE 20 MG: 20 TABLET, FILM COATED ORAL at 02:23

## 2021-01-17 RX ADMIN — MAGNESIUM SULFATE HEPTAHYDRATE 4 G: 40 INJECTION, SOLUTION INTRAVENOUS at 13:55

## 2021-01-17 RX ADMIN — SODIUM CHLORIDE, PRESERVATIVE FREE 3 ML: 5 INJECTION INTRAVENOUS at 08:43

## 2021-01-17 RX ADMIN — TRAMADOL HYDROCHLORIDE 50 MG: 50 TABLET, FILM COATED ORAL at 08:21

## 2021-01-17 RX ADMIN — POTASSIUM CHLORIDE 40 MEQ: 1.5 POWDER, FOR SOLUTION ORAL at 18:19

## 2021-01-17 RX ADMIN — POTASSIUM CHLORIDE 40 MEQ: 1.5 POWDER, FOR SOLUTION ORAL at 13:55

## 2021-01-17 RX ADMIN — LORAZEPAM 1 MG: 1 TABLET ORAL at 14:11

## 2021-01-17 RX ADMIN — GABAPENTIN 100 MG: 100 CAPSULE ORAL at 22:04

## 2021-01-17 RX ADMIN — ACETAMINOPHEN 650 MG: 325 TABLET, FILM COATED ORAL at 00:06

## 2021-01-17 RX ADMIN — DOXEPIN HYDROCHLORIDE 100 MG: 75 CAPSULE ORAL at 22:04

## 2021-01-17 RX ADMIN — GABAPENTIN 300 MG: 300 CAPSULE ORAL at 08:42

## 2021-01-17 RX ADMIN — ATORVASTATIN CALCIUM 20 MG: 20 TABLET, FILM COATED ORAL at 08:42

## 2021-01-17 RX ADMIN — GABAPENTIN 300 MG: 300 CAPSULE ORAL at 00:06

## 2021-01-17 RX ADMIN — LORAZEPAM 2 MG: 1 TABLET ORAL at 08:42

## 2021-01-17 RX ADMIN — ENOXAPARIN SODIUM 40 MG: 40 INJECTION SUBCUTANEOUS at 00:06

## 2021-01-17 RX ADMIN — LISINOPRIL 10 MG: 10 TABLET ORAL at 08:42

## 2021-01-17 RX ADMIN — CARIPRAZINE 3 MG: 3 CAPSULE, GELATIN COATED ORAL at 08:42

## 2021-01-17 RX ADMIN — DOXEPIN HYDROCHLORIDE 100 MG: 75 CAPSULE ORAL at 02:23

## 2021-01-17 RX ADMIN — LEVOTHYROXINE SODIUM 100 MCG: 0.1 TABLET ORAL at 08:42

## 2021-01-17 RX ADMIN — LORAZEPAM 2 MG: 2 INJECTION INTRAMUSCULAR; INTRAVENOUS at 03:23

## 2021-01-17 RX ADMIN — LORAZEPAM 1 MG: 1 TABLET ORAL at 18:07

## 2021-01-17 RX ADMIN — FOLIC ACID 1 MG: 1 TABLET ORAL at 08:42

## 2021-01-17 RX ADMIN — LORAZEPAM 1 MG: 1 TABLET ORAL at 02:23

## 2021-01-17 RX ADMIN — Medication 1000 MCG: at 08:42

## 2021-01-17 RX ADMIN — Medication 200 MG: at 08:42

## 2021-01-17 RX ADMIN — LORAZEPAM 2 MG: 1 TABLET ORAL at 00:06

## 2021-01-17 RX ADMIN — SODIUM CHLORIDE, PRESERVATIVE FREE 3 ML: 5 INJECTION INTRAVENOUS at 01:03

## 2021-01-17 RX ADMIN — TRAMADOL HYDROCHLORIDE 50 MG: 50 TABLET, FILM COATED ORAL at 01:03

## 2021-01-17 NOTE — PLAN OF CARE
Goal Outcome Evaluation:  Plan of Care Reviewed With: patient  Progress: no change  Outcome Summary: VSS. Titrated oxygen SATs on 2LNC 88- 89%. SATs on 3LNC 96-97%. Very drowsy this am. More alert at dinner. Able to feed self. Ativan PO given for increased CIWA. CIWA 6-9 today. Magnesium and Potassium replaced per protocol. Monitor labs, CIWA and vital signs.

## 2021-01-17 NOTE — ED NOTES
Pt has tried to provide a urine sample but has been unable to go.      Daysi James  01/16/21 [id number]       Daysi James  01/16/21 220

## 2021-01-17 NOTE — CONSULTS
"Pt is a 67 yo male seen on [facility] [address] for alcohol use.  He has been evaluated by [facility] several times in the past, most recently January 2020.  Upon approach, pt is tremulous and sweaty.  He has a flat affect, reports pain in right shoulder.  He has significant bruising from fall at home.  Pt is currently laid off from work as a  and lives with his friend Katty.  Pt is cooperative with interview, but is drowsy from recent Ativan dose for withdrawal symptoms.  Pt reports that he drinks 3/4 L of vodka daily.  He has been drinking daily for \"a long time\".  He has had 3 marriages end in divorce, and his drinking was the main factor in all of the divorces.  Pt cannot remember his last period of sobriety.  He states he is \"good and ready\" to quit drinking but insists he only wants to \"go to meetings\".  He is not interested in inpatient treatment.  In the past, he has refused AA because of the God connection and \"higher power\".  Explained [facility] program and will provide resources for such.  Pt has been educated about [facility] before by [facility], but did not follow thru with treatment.    Pt denies SI and HI.  He has had thoughts of suicide in the past and [facility] center has seen him for such.  Pt sees Dr Coats, psychiatrist, but is unsure of his last appointment.  Pt is dozing off at this time.  [facility] will follow and provide resources for pt.  "

## 2021-01-17 NOTE — H&P
[facility] Medicine Services  HISTORY AND PHYSICAL    Patient Name: Pro Mueller  : 1955  MRN: [id number]  Primary Care Physician: Alla Beal MD  Date of admission: 2021      Subjective   Subjective     Chief Complaint:  Alcohol withdrawal    HPI:  Pro Mueller is a 66 y.o. male with past medical history of alcohol dependence and reportedly previous alcohol withdrawal episodes in the past presents to the hospital with complaint of 5-hour history of worsening progressive severe tremors that is exacerbated since he stopped drinking and improved by Ativan in the emergency room.  He reports he has been aggressively abusing alcohol over the last 3 days drinking over 1/5 of vodka a day.  He also complains of generalized weakness and symptoms similar to previous alcohol withdrawal episodes.    Review of Systems   Constitutional: Positive for fatigue. Negative for chills and fever.   HENT: Negative for sinus pressure and sinus pain.    Eyes: Negative.    Respiratory: Negative for cough and shortness of breath.    Cardiovascular: Negative for chest pain and palpitations.   Gastrointestinal: Negative for diarrhea, nausea and vomiting.   Endocrine: Negative.    Genitourinary: Negative for dysuria and hematuria.   Musculoskeletal: Negative for neck pain and neck stiffness.   Skin: Negative for pallor and rash.   Allergic/Immunologic: Negative.    Neurological: Positive for tremors and weakness. Negative for light-headedness and headaches.   Hematological: Negative for adenopathy. Does not bruise/bleed easily.   Psychiatric/Behavioral: Positive for agitation. Negative for hallucinations.          Personal History     Past Medical History:   Diagnosis Date   • Alcohol abuse    • Anxiety    • Arthritis    • Atopic rhinitis 2016   • Depression    • Disease of thyroid gland    • Elevated cholesterol    • Encounter for removal of sutures    • Genital herpes simplex 2016   • GERD (gastroesophageal  reflux disease)    • Headache, tension-type    • Hyperlipidemia    • Hypertension    • Kidney stone    • Migraine    • Motion sickness    • Olecranon bursitis, right elbow    • Panic disorder without agoraphobia 8/8/2016   • Peripheral neuropathy    • Sleep apnea    • Vitamin D deficiency 8/8/2016   • Withdrawal symptoms, alcohol (CMS/HCC)        Past Surgical History:   Procedure Laterality Date   • BACK SURGERY      Pt denies.   • COLONOSCOPY     • CYST REMOVAL     • EXTRACORPOREAL SHOCK WAVE LITHOTRIPSY (ESWL) Right 2002   • SHOULDER ARTHROSCOPY Right 12/17/2019    Procedure: SHOULDER ARTHROSCOPY, decompression, distal clavicle excision;  Surgeon: Aj Mancilla MD;  Location: [facility];  Service: Orthopedics   • TONSILLECTOMY         Family History: family history includes Alzheimer's disease in his mother; Pancreatic cancer in his father.     Social History:  reports that he quit smoking about 11 years ago. His smoking use included cigarettes. He started smoking about 34 years ago. He has a 12.50 pack-year smoking history. He has never used smokeless tobacco. He reports current alcohol use. He reports current drug use. Drug: Methamphetamines.  Social History     Social History Narrative   • Not on file       Medications:  Available home medication information reviewed.      Allergies   Allergen Reactions   • Penicillins Anaphylaxis and Other (See Comments)     Seizure. childhood       Objective   Objective     Vital Signs:   Temp:  [97.6 °F (36.4 °C)] 97.6 °F (36.4 °C)  Heart Rate:  [91-96] 96  Resp:  [18] 18  BP: (136-147)/(84-93) 147/84        Physical Exam   Constitutional: Awake, alert  Eyes: Pupils equal, sclerae anicteric, no conjunctival injection  HENT: NCAT, mucous membranes moist  Neck: Supple, no thyromegaly, no lymphadenopathy, trachea midline  Respiratory: Clear to auscultation bilaterally, nonlabored respirations   Cardiovascular: RRR, no murmurs, rubs, or gallops, palpable pedal pulses  bilaterally  Gastrointestinal: Positive bowel sounds, soft, nontender, nondistended  Musculoskeletal: No bilateral ankle edema, no clubbing or cyanosis to extremities  Psychiatric: Anxious agitated affect, somewhat cooperative  Neurologic: Oriented x 3, diffuse tremors, no slurred speech or facial droop, able to follow simple commands  Skin: No rashes or jaundice on exposed extremities        Results from last 7 days   Lab Units 01/16/21  1850   WBC 10*3/mm3 5.96   HEMOGLOBIN g/dL 16.5   HEMATOCRIT % 48.8   PLATELETS 10*3/mm3 255   INR  0.94     Results from last 7 days   Lab Units 01/16/21  1850   SODIUM mmol/L 133*   POTASSIUM mmol/L 3.6   CHLORIDE mmol/L 91*   CO2 mmol/L 21.7*   BUN mg/dL 18   CREATININE mg/dL 1.00   GLUCOSE mg/dL 116*   CALCIUM mg/dL 8.7   ALT (SGPT) U/L 43*   AST (SGOT) U/L 108*     Estimated Creatinine Clearance: 91.3 mL/min (by C-G formula based on SCr of 1 mg/dL).  Brief Urine Lab Results     None        Imaging Results (Last 24 Hours)     Procedure Component Value Units Date/Time    CT Head Without Contrast [[id number]] Collected: 01/16/21 2116     Updated: 01/16/21 2116    Narrative:        Patient: HERB LUNA  Time Out: 21:15  Exam(s): CT HEAD Without Contrast     EXAM:    CT Head Without Intravenous Contrast    CLINICAL HISTORY:     weakness, multiple falls, intoxicated  Reason for exam: weakness,   multiple falls, intoxicated. STEREOTACTIC GUIDANCE PROTOCOL?->No. Will   fiducial markers be needed for this procedure?->No.. Additional notes:   Contrast none    TECHNIQUE:    Axial computed tomography images of the head brain without intravenous   contrast.  CTDI is 54.8 mGy and DLP is 1023.8 mGy-cm.  This CT exam was   performed according to the principle of ALARA (As Low As Reasonably   Achievable) by using one or more of the following dose reduction   techniques: automated exposure control, adjustment of the mA and or kV   according to patient size, and or use of iterative  reconstruction   technique.    COMPARISON:    CT head on 7 20 2019    FINDINGS:    Brain: No acute infarct or hemorrhage identified. No extra-axial fluid   collection. No mass effect or midline shift.  Stable areas of   hypoattenuation in the supratentorial white matter likely represent   chronic small vessel ischemic changes.      Ventricles and sulci: Stable mild prominence of the ventricles and   sulci is likely secondary to cerebral volume loss.      Bones:  Normal. No bony lesion or acute fracture.      Subcutaneous tissues: Normal.      Sinuses: Normal. No air-fluid levels or mucosal thickening.      Mastoid air cells: Small amount of fluid in the right mastoid air cells.      Orbits: Grossly unremarkable.      Other: Atherosclerotic calcifications in the intracranial vasculature.      Impression:      1.  No acute intracranial abnormality.  2.  Stable mild chronic small vessel ischemic changes and cerebral volume   loss.        Electronically signed by Jesus Siegel M.D. on 01-16-21 at 2115        Results for orders placed during the hospital encounter of 01/02/19   Adult Transthoracic Echo Complete W/ Cont if Necessary Per Protocol    Narrative · Left ventricular systolic function is normal. Calculated EF = 58.0%.   Estimated EF was in agreement with the calculated EF. Normal left   ventricular cavity size and wall thickness noted. All left ventricular   wall segments contract normally. Left ventricular diastolic dysfunction is   noted (grade I) consistent with impaired relaxation.          Assessment/Plan   Assessment & Plan     Active Hospital Problems    Diagnosis POA   • **Alcohol dependence with uncomplicated withdrawal (CMS/HCC) [F10.230] Yes   • Hypomagnesemia [E83.42] Yes   • Diastolic dysfunction [I51.89] Yes   • Hyponatremia [E87.1] Yes   • Neuropathy involving both lower extremities [G57.93] Yes   • Insomnia [G47.00] Yes   • Hypertension [I10] Yes   • Panic disorder without agoraphobia [F41.0] Yes      66-year-old male with alcohol dependence and recent binge drinking presents the hospital with alcohol withdrawal.    Alcohol dependence and alcohol withdrawal:  Management per withdrawal protocol with as needed Ativan.  Symptoms have already improved with Ativan in the emergency room.  Access center consult for alcohol resources as patient states he is motivated to quit.  Supportive care and symptom treatment.    Hypomagnesemia:  Replacement per protocol.    Hyponatremia:  Mild, monitor for now.    Neuropathy bilateral lower extremity:  Possibly alcohol related.  Continue vitamins.  Gabapentin.  Tramadol.    Insomnia:  Continue home medications.  Add melatonin as needed.    Essential hypertension:  Continue lisinopril and amlodipine.  Monitor blood pressure and adjust as needed.    Panic disorder:  Continue home medications.  Stable.    Migraines:   Home triptan as needed.  No current headache.    Prolonged QT:  Telemetry monitor.  Replace electrolytes.    Weakness/debility:   Consult PT OT    DVT prophylaxis: Lovenox      CODE STATUS:     Code Status and Medical Interventions:   Ordered at: 01/16/21 2222     Code Status:    CPR     Medical Interventions (Level of Support Prior to Arrest):    Full       Admission Status:  I believe this patient meets INPATIENT status due to acute alcohol withdrawal.  I feel patient’s risk for adverse outcomes and need for care warrant INPATIENT evaluation and I predict the patient’s care encounter to likely last beyond 2 midnights.      Esdras Pleitez MD  01/16/21

## 2021-01-17 NOTE — PROGRESS NOTES
Name: Pro Mueller ADMIT: 2021   : 1955  PCP: Alla Beal MD    MRN: [id number] LOS: 1 days   AGE/SEX: 66 y.o. male  ROOM: [facility]     Subjective   Subjective   CC: alcohol withdrawal  No acute events. Patient has no new complaints. He has been lethargic this AM. No CP/dyspnea/f/c/n/v/d.    Objective   Objective   Vital Signs  Temp:  [97.6 °F (36.4 °C)-98.8 °F (37.1 °C)] 98.8 °F (37.1 °C)  Heart Rate:  [86-96] 87  Resp:  [18-20] 18  BP: (120-147)/(71-93) 120/71  SpO2:  [87 %-97 %] 96 %  on  Flow (L/min):  [2-5] 4.5;   Device (Oxygen Therapy): nasal cannula  Body mass index is 27.8 kg/m².  Physical Exam  Vitals signs and nursing note reviewed.   Constitutional:       General: He is not in acute distress.     Appearance: He is not toxic-appearing.   HENT:      Head: Normocephalic and atraumatic.      Nose: Nose normal.      Mouth/Throat:      Mouth: Mucous membranes are moist.      Pharynx: Oropharynx is clear.   Eyes:      Conjunctiva/sclera: Conjunctivae normal.      Pupils: Pupils are equal, round, and reactive to light.   Neck:      Musculoskeletal: Normal range of motion and neck supple.   Cardiovascular:      Rate and Rhythm: Normal rate and regular rhythm.      Pulses: Normal pulses.   Pulmonary:      Effort: Pulmonary effort is normal.      Breath sounds: Normal breath sounds.   Abdominal:      General: Bowel sounds are normal.      Palpations: Abdomen is soft.      Tenderness: There is no abdominal tenderness.   Musculoskeletal:         General: No swelling or tenderness.   Skin:     General: Skin is warm and dry.      Capillary Refill: Capillary refill takes less than 2 seconds.   Neurological:      General: No focal deficit present.      Mental Status: He is oriented to person, place, and time. He is lethargic.   Psychiatric:         Mood and Affect: Affect is flat.         Behavior: Behavior is slowed.       Results Review     I reviewed the patient's new clinical results.  I reviewed  the patient's telemetry.  Results from last 7 days   Lab Units 01/17/21  0429 01/16/21  1850   WBC 10*3/mm3 6.81 5.96   HEMOGLOBIN g/dL 13.9 16.5   PLATELETS 10*3/mm3 168 255     Results from last 7 days   Lab Units 01/17/21  0429 01/16/21  1850   SODIUM mmol/L 131* 133*   POTASSIUM mmol/L 3.4* 3.6   CHLORIDE mmol/L 94* 91*   CO2 mmol/L 16.2* 21.7*   BUN mg/dL 19 18   CREATININE mg/dL 0.77 1.00   GLUCOSE mg/dL 95 116*   Estimated Creatinine Clearance: 127.5 mL/min (by C-G formula based on SCr of 0.77 mg/dL).  Results from last 7 days   Lab Units 01/17/21  0429 01/16/21  1850   ALBUMIN g/dL 3.70 4.60   BILIRUBIN mg/dL 0.6 0.6   ALK PHOS U/L 68 86   AST (SGOT) U/L 89* 108*   ALT (SGPT) U/L 32 43*     Results from last 7 days   Lab Units 01/17/21  0429 01/16/21  1850   CALCIUM mg/dL 7.4* 8.7   ALBUMIN g/dL 3.70 4.60   MAGNESIUM mg/dL 1.6 1.5*       COVID19   Date Value Ref Range Status   01/16/2021 Not Detected Not Detected - Ref. Range Final   05/13/2020 Not Detected Not Detected - Ref. Range Final     No results found for: HGBA1C, POCGLU    XR Chest 1 View  Narrative: Patient: HERB LUNA  Time Out: 00:01  Exam(s): FILM CXR 1 VIEW     EXAM:    XR Chest, 1 View    CLINICAL HISTORY:     crackles in bases and oxygen needs  Reason for exam: crackles in bases   and oxygen needs. Reason for Exam:->crackles in bases and oxygen needs.   Portable->Yes. Does this patient have a diabetic monitoring medication   delivering device on?->No.. Additional notes: OXYGEN NEEDS    TECHNIQUE:    Frontal view of the chest.    COMPARISON:    5 13 20    FINDINGS:      No significant cardiac enlargement.  Lungs are hypoinflated with vascular   crowding.  Negative for evidence of focal consolidation, pneumothorax or   pleural fluid collections.  Recommend upright PA and lateral views of the   chest when clinically feasible.  Impression: Electronically signed by Oren Rodgers DO on 01-17-21 at 0001    Scheduled Medications  amLODIPine, 5  mg, Oral, QAM  atorvastatin, 20 mg, Oral, Daily  Cariprazine HCl, 3 mg, Oral, Daily  doxepin, 100 mg, Oral, Nightly  enoxaparin, 40 mg, Subcutaneous, Q24H  folic acid, 1 mg, Oral, Daily  gabapentin, 100 mg, Oral, Q12H  levothyroxine, 100 mcg, Oral, Daily  lisinopril, 10 mg, Oral, Daily  sodium chloride, 3 mL, Intravenous, Q12H  thiamine, 200 mg, Oral, Daily  vitamin B-12, 1,000 mcg, Oral, Daily    Infusions   Diet  Diet Regular       Assessment/Plan     Active Hospital Problems    Diagnosis  POA   • **Alcohol dependence with uncomplicated withdrawal (CMS/HCC) [F10.230]  Yes   • Hypomagnesemia [E83.42]  Yes   • Left ventricular diastolic dysfunction [I51.9]  Yes   • Hyponatremia [E87.1]  Yes   • Hypokalemia [E87.6]  Yes   • QT prolongation [R94.31]  Yes   • Neuropathy involving both lower extremities [G57.93]  Yes   • Insomnia [G47.00]  Yes   • Hypertension [I10]  Yes   • Panic disorder without agoraphobia [F41.0]  Yes      Resolved Hospital Problems   No resolved problems to display.   EtOH Dependence in Withdrawal  - continue on PRN ativan per CIWA protocol  - vitamin replacement  - access center consulted    Lethargy  - will hold off on any long-acting benzodiazepines  - decrease gabapentin dose (takes for peripheral neuropathy)    Hyponatremia/Hypokalemia  - replace    Hypoxia  - occurs when sleeping  - CXR noted nothing acute   - encourage pulmonary toilet and wean oxygen as tolerated  - will likely need outpatient sleep study    Panic Disorder  - continue on current regimen      Lovenox 40 mg SC daily for DVT prophylaxis.  Full code.  Discussed with patient and nursing staff.  Anticipate discharge TBD.  timing yet to be determined.      Gerber Costello MD  [facility]  01/17/21  12:54 EST    Patient was wearing facemask when I entered the room and throughout our encounter.  I wore protective equipment throughout this patient encounter including a face mask, gloves and protective eyewear.   Hand hygiene was performed before donning protective equipment and after removal when leaving the room.

## 2021-01-17 NOTE — PLAN OF CARE
Goal Outcome Evaluation:  Plan of Care Reviewed With: patient      Medicated per CIWA protocol, patient cooperative, safety maintained, continue plan of care

## 2021-01-17 NOTE — ED PROVIDER NOTES
EMERGENCY DEPARTMENT ENCOUNTER    Room Number:  N439/1  Date seen:  1/17/2021  PCP: Alla Beal MD  Historian: Patient      HPI:  Chief Complaint: Altered mental status  A complete HPI/ROS/PMH/PSH/SH/FH are unobtainable due to: Nothing  Context: Pro Mueller is a 66 y.o. male who presents to the ED for evaluation of altered mental status.  Apparently his girlfriend at his home called EMS due to concerns he may have had a stroke due to slurred speech.  The patient admits that he has been drinking excessive amount of vodka for the last 3 days and has had hardly anything to eat for several days.  He is complaining of severe generalized weakness.  He denies fever, chills, cough.  He denies abdominal pain, nausea, vomiting, diarrhea, black or bloody stool.  He believes he has had multiple recent falls recently.  He denies known head trauma or loss of consciousness.  Patient reports history of alcohol dependence and previous alcohol withdrawals.  He reports that his last drink was just a few hours ago.            PAST MEDICAL HISTORY  Active Ambulatory Problems     Diagnosis Date Noted   • Alcoholism (CMS/[facility]) 08/08/2016   • Atopic rhinitis 08/08/2016   • Mixed anxiety depressive disorder 08/08/2016   • Genital herpes simplex 08/08/2016   • Hyperlipidemia 08/08/2016   • Hypertension 08/08/2016   • Hypothyroidism 08/08/2016   • Insomnia 08/08/2016   • Panic disorder without agoraphobia 08/08/2016   • Persistent insomnia 08/08/2016   • Vitamin D deficiency 08/08/2016   • Alcohol withdrawal (CMS/[facility]) 11/04/2016   • Neuropathy involving both lower extremities 10/25/2018   • Syncope and collapse 01/02/2019   • QT prolongation 01/03/2019   • Left shoulder pain 11/19/2019   • Alcoholic ketoacidosis 01/12/2020   • Hyponatremia 01/13/2020   • Alcohol dependence with uncomplicated withdrawal (CMS/[facility]) 01/14/2020   • Pancytopenia (CMS/[facility]) 01/14/2020   • Nephrolithiasis 02/12/2020     Resolved Ambulatory Problems     Diagnosis  Date Noted   • Accidental fall 2016   • Impacted cerumen 2016   • Influenza 2016   • Motion sickness 2016   • Seasonal allergic rhinitis 2016   • Upper respiratory tract infection 2016   • Headache 2016   • Gastritis 2016   • Low back pain 2016   • Olecranon bursitis of right elbow 2017   • Sciatica of left side 2018   • Nausea and vomiting 2020   • Hypokalemia 2020     Past Medical History:   Diagnosis Date   • Alcohol abuse    • Anxiety    • Arthritis    • Depression    • Disease of thyroid gland    • Elevated cholesterol    • Encounter for removal of sutures    • GERD (gastroesophageal reflux disease)    • Headache, tension-type    • Kidney stone    • Migraine    • Olecranon bursitis, right elbow    • Peripheral neuropathy    • Sleep apnea    • Withdrawal symptoms, alcohol (CMS/HCC)          PAST SURGICAL HISTORY  Past Surgical History:   Procedure Laterality Date   • BACK SURGERY      Pt denies.   • COLONOSCOPY     • CYST REMOVAL     • EXTRACORPOREAL SHOCK WAVE LITHOTRIPSY (ESWL) Right    • SHOULDER ARTHROSCOPY Right 2019    Procedure: SHOULDER ARTHROSCOPY, decompression, distal clavicle excision;  Surgeon: Aj Mancilla MD;  Location: [facility] [place];  Service: Orthopedics   • TONSILLECTOMY           FAMILY HISTORY  Family History   Problem Relation Age of Onset   • Alzheimer's disease Mother    • Pancreatic cancer Father    • Malig Hyperthermia Neg Hx          SOCIAL HISTORY  Social History     Socioeconomic History   • Marital status:      Spouse name: Not on file   • Number of children: Not on file   • Years of education: Not on file   • Highest education level: Not on file   Tobacco Use   • Smoking status: Former Smoker     Packs/day: 0.50     Years: 25.00     Pack years: 12.50     Types: Cigarettes     Start date:      Quit date:      Years since quittin.0   • Smokeless tobacco: Never Used  "  Substance and Sexual Activity   • Alcohol use: Yes     Comment: 1/2 - 3/4 L per day of vodka   • Drug use: Yes     Types: Methamphetamines     Comment: STATES \"I HAVE SMOKED METH IN PAST WEEK.\"   • Sexual activity: Defer         ALLERGIES  Penicillins        REVIEW OF SYSTEMS  Review of Systems   Review of all 14 systems is negative other than stated in the HPI above.      PHYSICAL EXAM  ED Triage Vitals   Temp Heart Rate Resp BP SpO2   01/16/21 1826 01/16/21 1825 01/16/21 1825 01/16/21 1825 01/16/21 1825   97.6 °F (36.4 °C) 95 18 144/93 94 %      Temp src Heart Rate Source Patient Position BP Location FiO2 (%)   01/16/21 1826 -- -- -- --   Tympanic             GENERAL: Awake and alert, unkempt, appears mildly anxious, mild distress  HENT: nares patent, no scalp hematoma  EYES: no scleral icterus, pupils 3 mm reactive bilaterally  CV: regular rhythm, normal rate  RESPIRATORY: normal effort, lungs clear to auscultation bilaterally  ABDOMEN: soft, nondistended, nontender throughout  MUSCULOSKELETAL: no deformity.  There is mild soft tissue swelling present over the right proximal leg anteriorly with some yellow bruising noted.  NEURO: alert, moves all extremities, follows commands, fine tremor noted in bilateral upper extremities.  Speech is fluent and clear.  PSYCH: Cooperative, anxious  SKIN: warm, dry    Vital signs and nursing notes reviewed.          LAB RESULTS  Recent Results (from the past 24 hour(s))   Comprehensive Metabolic Panel    Collection Time: 01/16/21  6:50 PM    Specimen: Blood   Result Value Ref Range    Glucose 116 (H) 65 - 99 mg/dL    BUN 18 8 - 23 mg/dL    Creatinine 1.00 0.76 - 1.27 mg/dL    Sodium 133 (L) 136 - 145 mmol/L    Potassium 3.6 3.5 - 5.2 mmol/L    Chloride 91 (L) 98 - 107 mmol/L    CO2 21.7 (L) 22.0 - 29.0 mmol/L    Calcium 8.7 8.6 - 10.5 mg/dL    Total Protein 7.4 6.0 - 8.5 g/dL    Albumin 4.60 3.50 - 5.20 g/dL    ALT (SGPT) 43 (H) 1 - 41 U/L    AST (SGOT) 108 (H) 1 - 40 U/L    " Alkaline Phosphatase 86 39 - 117 U/L    Total Bilirubin 0.6 0.0 - 1.2 mg/dL    eGFR Non African Amer 75 >60 mL/min/1.73    Globulin 2.8 gm/dL    A/G Ratio 1.6 g/dL    BUN/Creatinine Ratio 18.0 7.0 - 25.0    Anion Gap 20.3 (H) 5.0 - 15.0 mmol/L   Protime-INR    Collection Time: 01/16/21  6:50 PM    Specimen: Blood   Result Value Ref Range    Protime 12.4 11.7 - 14.2 Seconds    INR 0.94 0.90 - 1.10   Lipase    Collection Time: 01/16/21  6:50 PM    Specimen: Blood   Result Value Ref Range    Lipase 114 (H) 13 - 60 U/L   Magnesium    Collection Time: 01/16/21  6:50 PM    Specimen: Blood   Result Value Ref Range    Magnesium 1.5 (L) 1.6 - 2.4 mg/dL   CBC Auto Differential    Collection Time: 01/16/21  6:50 PM    Specimen: Blood   Result Value Ref Range    WBC 5.96 3.40 - 10.80 10*3/mm3    RBC 5.52 4.14 - 5.80 10*6/mm3    Hemoglobin 16.5 13.0 - 17.7 g/dL    Hematocrit 48.8 37.5 - 51.0 %    MCV 88.4 79.0 - 97.0 fL    MCH 29.9 26.6 - 33.0 pg    MCHC 33.8 31.5 - 35.7 g/dL    RDW 14.9 12.3 - 15.4 %    RDW-SD 47.3 37.0 - 54.0 fl    MPV 9.9 6.0 - 12.0 fL    Platelets 255 140 - 450 10*3/mm3    Neutrophil % 81.1 (H) 42.7 - 76.0 %    Lymphocyte % 10.4 (L) 19.6 - 45.3 %    Monocyte % 7.6 5.0 - 12.0 %    Eosinophil % 0.2 (L) 0.3 - 6.2 %    Basophil % 0.5 0.0 - 1.5 %    Immature Grans % 0.2 0.0 - 0.5 %    Neutrophils, Absolute 4.84 1.70 - 7.00 10*3/mm3    Lymphocytes, Absolute 0.62 (L) 0.70 - 3.10 10*3/mm3    Monocytes, Absolute 0.45 0.10 - 0.90 10*3/mm3    Eosinophils, Absolute 0.01 0.00 - 0.40 10*3/mm3    Basophils, Absolute 0.03 0.00 - 0.20 10*3/mm3    Immature Grans, Absolute 0.01 0.00 - 0.05 10*3/mm3    nRBC 0.0 0.0 - 0.2 /100 WBC   Ethanol    Collection Time: 01/16/21  6:50 PM    Specimen: Blood   Result Value Ref Range    Ethanol 288 (H) 0 - 10 mg/dL    Ethanol % 0.288 %   ECG 12 Lead    Collection Time: 01/16/21  7:01 PM   Result Value Ref Range    QT Interval 434 ms   COVID-19,[facility], NP/OP SWAB IN  UTM/VTM/[facility] TRANSPORT MEDIA,24 HR TAT - Swab, Nasopharynx    Collection Time: 01/16/21  8:03 PM    Specimen: Nasopharynx; Swab   Result Value Ref Range    COVID19 Not Detected Not Detected - Ref. Range   Urinalysis With Culture If Indicated - Urine, Catheter In/Out    Collection Time: 01/16/21 11:34 PM    Specimen: Urine, Catheter In/Out   Result Value Ref Range    Color, UA Dark Yellow (A) Yellow, Straw    Appearance, UA Cloudy (A) Clear    pH, UA 5.5 5.0 - 8.0    Specific Gravity, UA >=1.030 1.005 - 1.030    Glucose, UA Negative Negative    Ketones, UA Trace (A) Negative    Bilirubin, UA Small (1+) (A) Negative    Blood, UA Moderate (2+) (A) Negative    Protein, UA >=300 mg/dL (3+) (A) Negative    Leuk Esterase, UA Negative Negative    Nitrite, UA Negative Negative    Urobilinogen, UA 1.0 E.U./dL 0.2 - 1.0 E.U./dL   Urinalysis, Microscopic Only - Urine, Catheter In/Out    Collection Time: 01/16/21 11:34 PM    Specimen: Urine, Catheter In/Out   Result Value Ref Range    RBC, UA 3-5 (A) None Seen, 0-2 /HPF    WBC, UA 0-2 None Seen, 0-2 /HPF    Bacteria, UA None Seen None Seen /HPF    Squamous Epithelial Cells, UA 0-2 None Seen, 0-2 /HPF    Hyaline Casts, UA None Seen None Seen /LPF    Methodology Automated Microscopy        Ordered the above labs and reviewed the results.        RADIOLOGY  Ct Head Without Contrast    Result Date: 1/16/2021  Patient: HERB LUNA  Time Out: 21:15 Exam(s): CT HEAD Without Contrast EXAM:   CT Head Without Intravenous Contrast CLINICAL HISTORY:    weakness, multiple falls, intoxicated  Reason for exam: weakness, multiple falls, intoxicated. STEREOTACTIC GUIDANCE PROTOCOL?->No. Will fiducial markers be needed for this procedure?->No.. Additional notes: Contrast none TECHNIQUE:   Axial computed tomography images of the head brain without intravenous contrast.  CTDI is 54.8 mGy and DLP is 1023.8 mGy-cm.  This CT exam was performed according to the principle of ALARA (As Low As  Reasonably Achievable) by using one or more of the following dose reduction techniques: automated exposure control, adjustment of the mA and or kV according to patient size, and or use of iterative reconstruction technique. COMPARISON:   CT head on 7 20 2019 FINDINGS:   Brain: No acute infarct or hemorrhage identified. No extra-axial fluid collection. No mass effect or midline shift.  Stable areas of hypoattenuation in the supratentorial white matter likely represent chronic small vessel ischemic changes.   Ventricles and sulci: Stable mild prominence of the ventricles and sulci is likely secondary to cerebral volume loss.   Bones:  Normal. No bony lesion or acute fracture.   Subcutaneous tissues: Normal.   Sinuses: Normal. No air-fluid levels or mucosal thickening.   Mastoid air cells: Small amount of fluid in the right mastoid air cells.   Orbits: Grossly unremarkable.   Other: Atherosclerotic calcifications in the intracranial vasculature.     1.  No acute intracranial abnormality. 2.  Stable mild chronic small vessel ischemic changes and cerebral volume loss. Electronically signed by Jesus Siegel M.D. on 01-16-21 at 2115    Xr Chest 1 View    Result Date: 1/17/2021  Patient: HERB LUNA  Time Out: 00:01 Exam(s): FILM CXR 1 VIEW EXAM:   XR Chest, 1 View CLINICAL HISTORY:    crackles in bases and oxygen needs  Reason for exam: crackles in bases and oxygen needs. Reason for Exam:->crackles in bases and oxygen needs. Portable->Yes. Does this patient have a diabetic monitoring medication delivering device on?->No.. Additional notes: OXYGEN NEEDS TECHNIQUE:   Frontal view of the chest. COMPARISON:   5 13 20 FINDINGS:   No significant cardiac enlargement.  Lungs are hypoinflated with vascular crowding.  Negative for evidence of focal consolidation, pneumothorax or pleural fluid collections.  Recommend upright PA and lateral views of the chest when clinically feasible.     Electronically signed by Oren Rodgers DO  on 01-17-21 at [facility]      Ordered the above noted radiological studies. Reviewed by me in PACS.            PROCEDURES  Procedures            MEDICATIONS GIVEN IN ER  Medications   sodium chloride 0.9 % flush 10 mL (has no administration in time range)   amLODIPine (NORVASC) tablet 5 mg (has no administration in time range)   atorvastatin (LIPITOR) tablet 20 mg (has no administration in time range)   doxepin (SINEquan) capsule 100 mg (has no administration in time range)   folic acid (FOLVITE) tablet 1 mg (has no administration in time range)   thiamine (VITAMIN B-1) tablet 200 mg (has no administration in time range)   gabapentin (NEURONTIN) capsule 300 mg (300 mg Oral Given 1/17/21 0006)   levothyroxine (SYNTHROID, LEVOTHROID) tablet 100 mcg (has no administration in time range)   lisinopril (PRINIVIL,ZESTRIL) tablet 10 mg (has no administration in time range)   SUMAtriptan (IMITREX) tablet 50 mg (has no administration in time range)   traMADol (ULTRAM) tablet 50 mg (has no administration in time range)   vitamin B-12 (CYANOCOBALAMIN) tablet 1,000 mcg (has no administration in time range)   Cariprazine HCl (VRAYLAR) capsule capsule 3 mg (has no administration in time range)   sodium chloride 0.9 % flush 3 mL (has no administration in time range)   sodium chloride 0.9 % flush 3-10 mL (has no administration in time range)   acetaminophen (TYLENOL) tablet 650 mg (650 mg Oral Given 1/17/21 0006)     Or   acetaminophen (TYLENOL) 160 MG/5ML solution 650 mg ( Oral Not Given:  See Alt 1/17/21 0006)     Or   acetaminophen (TYLENOL) suppository 650 mg ( Rectal Not Given:  See Alt 1/17/21 0006)   melatonin tablet 5 mg (has no administration in time range)   famotidine (PEPCID) tablet 20 mg (has no administration in time range)   enoxaparin (LOVENOX) syringe 40 mg (40 mg Subcutaneous Given 1/17/21 0006)   docusate sodium (COLACE) capsule 100 mg (has no administration in time range)   Magnesium Sulfate 2 gram Bolus, followed by  8 gram infusion (total Mg dose 10 grams)- Mg less than or equal to 1mg/dL (has no administration in time range)     Or   Magnesium Sulfate 2 gram / 50mL Infusion (GIVE X 3 BAGS TO EQUAL 6GM TOTAL DOSE) - Mg 1.1 - 1.5 mg/dl (has no administration in time range)     Or   Magnesium Sulfate 4 gram infusion- Mg 1.6-1.9 mg/dL (has no administration in time range)   potassium chloride (K-DUR,KLOR-CON) ER tablet 40 mEq (has no administration in time range)     Or   potassium chloride (KLOR-CON) packet 40 mEq (has no administration in time range)     Or   potassium chloride 10 mEq in 100 mL IVPB (has no administration in time range)   LORazepam (ATIVAN) tablet 1 mg ( Oral Not Given:  See Alt 1/17/21 0006)     Or   LORazepam (ATIVAN) injection 1 mg ( Intravenous Not Given:  See Alt 1/17/21 0006)     Or   LORazepam (ATIVAN) tablet 2 mg (2 mg Oral Given 1/17/21 0006)     Or   LORazepam (ATIVAN) injection 2 mg ( Intravenous Not Given:  See Alt 1/17/21 0006)     Or   LORazepam (ATIVAN) injection 2 mg ( Intravenous Not Given:  See Alt 1/17/21 0006)     Or   LORazepam (ATIVAN) injection 2 mg ( Intramuscular Not Given:  See Alt 1/17/21 0006)   prochlorperazine (COMPAZINE) injection 5 mg (5 mg Intravenous Given 1/16/21 2305)     Or   prochlorperazine (COMPAZINE) tablet 5 mg ( Oral Not Given:  See Alt 1/16/21 2305)     Or   prochlorperazine (COMPAZINE) suppository 25 mg ( Rectal Not Given:  See Alt 1/16/21 2305)   thiamine (B-1) 100 mg, folic acid 1 mg in sodium chloride 0.9 % 1,000 mL infusion (0 mL/hr Intravenous Stopped 1/16/21 2206)   magnesium sulfate 2g/50 mL (PREMIX) infusion (0 g Intravenous Stopped 1/16/21 2020)   LORazepam (ATIVAN) injection 0.5 mg (0.5 mg Intravenous Given 1/16/21 1942)   chlordiazePOXIDE (LIBRIUM) capsule 25 mg (25 mg Oral Given 1/16/21 1941)   LORazepam (ATIVAN) injection 1 mg (1 mg Intravenous Given 1/16/21 2204)                   MEDICAL DECISION MAKING, PROGRESS, and CONSULTS    All labs have been  independently reviewed by me.  All radiology studies have been reviewed by me and discussed with radiologist dictating the report.   EKG's independently viewed and interpreted by me.  Discussion below represents my analysis of pertinent findings related to patient's condition, differential diagnosis, treatment plan and final disposition.    ED Course as of Jan 17 0031   Sat Jan 16, 2021 1934 EKG          EKG time: 1901  Rhythm/Rate: Sinus rhythm, 93  P waves and ID: Normal  QRS, axis: Left axis deviation  ST and T waves: No acute ischemic changes, QT prolongation present    Interpreted Contemporaneously by me, independently viewed  Similar compared to prior 1/11/2020          [JR]   2117 Discussed with Dr. Pleitez, [facility], who agrees to admit.    [JR]   Sun Jan 17, 2021   0030 Sodium(!): 133 [JR]   0030 CO2(!): 21.7 [JR]   0030 Creatinine: 1.00 [JR]   0030 ALT (SGPT)(!): 43 [JR]   0030 AST (SGOT)(!): 108 [JR]   0030 Ethanol(!): 288 [JR]   0030 Lipase(!): 114 [JR]   0030 INR: 0.94 [JR]   0030 Magnesium(!): 1.5 [JR]   0030 WBC: 5.96 [JR]   0031 Hemoglobin: 16.5 [JR]      ED Course User Index  [JR] Yohan Iverson MD              I wore a surgical mask, gloves, and eye protection during this patient encounter.  Patient also wearing a surgical mask.  Hand hygeine performed before and after seeing the patient.    DIAGNOSIS  Final diagnoses:   Alcohol dependence with uncomplicated withdrawal (CMS/HCC)   Alcoholic intoxication without complication (CMS/HCC)         DISPOSITION  ADMIT            Latest Documented Vital Signs:  As of 00:31 EST  BP- 139/85 HR- 90 Temp- 97.6 °F (36.4 °C) (Oral) O2 sat- 94%        --    Please note that portions of this were completed with a voice recognition program.          Yohan Iverson MD  01/17/21 0031

## 2021-01-17 NOTE — ED NOTES
Katty chung, s/o/roommate, [phone], can be called for transport when discharged     Purnima Sutton RN  01/16/21 1935       Purnima Sutton RN  01/16/21 1936

## 2021-01-18 PROBLEM — M62.82 NON-TRAUMATIC RHABDOMYOLYSIS: Status: ACTIVE | Noted: 2021-01-18

## 2021-01-18 LAB
ANION GAP SERPL CALCULATED.3IONS-SCNC: 10 MMOL/L (ref 5–15)
BASOPHILS # BLD AUTO: 0.02 10*3/MM3 (ref 0–0.2)
BASOPHILS NFR BLD AUTO: 0.3 % (ref 0–1.5)
BUN SERPL-MCNC: 17 MG/DL (ref 8–23)
BUN/CREAT SERPL: 20.7 (ref 7–25)
CALCIUM SPEC-SCNC: 7.8 MG/DL (ref 8.6–10.5)
CHLORIDE SERPL-SCNC: 92 MMOL/L (ref 98–107)
CK SERPL-CCNC: 1640 U/L (ref 20–200)
CO2 SERPL-SCNC: 25 MMOL/L (ref 22–29)
CREAT SERPL-MCNC: 0.82 MG/DL (ref 0.76–1.27)
DEPRECATED RDW RBC AUTO: 44.4 FL (ref 37–54)
EOSINOPHIL # BLD AUTO: 0.09 10*3/MM3 (ref 0–0.4)
EOSINOPHIL NFR BLD AUTO: 1.6 % (ref 0.3–6.2)
ERYTHROCYTE [DISTWIDTH] IN BLOOD BY AUTOMATED COUNT: 13.8 % (ref 12.3–15.4)
GFR SERPL CREATININE-BSD FRML MDRD: 95 ML/MIN/1.73
GLUCOSE SERPL-MCNC: 88 MG/DL (ref 65–99)
HCT VFR BLD AUTO: 37 % (ref 37.5–51)
HGB BLD-MCNC: 12.8 G/DL (ref 13–17.7)
IMM GRANULOCYTES # BLD AUTO: 0.02 10*3/MM3 (ref 0–0.05)
IMM GRANULOCYTES NFR BLD AUTO: 0.3 % (ref 0–0.5)
LYMPHOCYTES # BLD AUTO: 1.08 10*3/MM3 (ref 0.7–3.1)
LYMPHOCYTES NFR BLD AUTO: 18.8 % (ref 19.6–45.3)
MAGNESIUM SERPL-MCNC: 2.2 MG/DL (ref 1.6–2.4)
MCH RBC QN AUTO: 30.8 PG (ref 26.6–33)
MCHC RBC AUTO-ENTMCNC: 34.6 G/DL (ref 31.5–35.7)
MCV RBC AUTO: 88.9 FL (ref 79–97)
MONOCYTES # BLD AUTO: 0.45 10*3/MM3 (ref 0.1–0.9)
MONOCYTES NFR BLD AUTO: 7.8 % (ref 5–12)
NEUTROPHILS NFR BLD AUTO: 4.1 10*3/MM3 (ref 1.7–7)
NEUTROPHILS NFR BLD AUTO: 71.2 % (ref 42.7–76)
NRBC BLD AUTO-RTO: 0.2 /100 WBC (ref 0–0.2)
PLATELET # BLD AUTO: 144 10*3/MM3 (ref 140–450)
PMV BLD AUTO: 10.6 FL (ref 6–12)
POTASSIUM SERPL-SCNC: 3.8 MMOL/L (ref 3.5–5.2)
RBC # BLD AUTO: 4.16 10*6/MM3 (ref 4.14–5.8)
SODIUM SERPL-SCNC: 127 MMOL/L (ref 136–145)
URATE SERPL-MCNC: 7.6 MG/DL (ref 3.4–7)
WBC # BLD AUTO: 5.76 10*3/MM3 (ref 3.4–10.8)

## 2021-01-18 PROCEDURE — 80048 BASIC METABOLIC PNL TOTAL CA: CPT | Performed by: INTERNAL MEDICINE

## 2021-01-18 PROCEDURE — 25810000003 SODIUM CHLORIDE 0.9 % WITH KCL 20 MEQ 20-0.9 MEQ/L-% SOLUTION: Performed by: INTERNAL MEDICINE

## 2021-01-18 PROCEDURE — 84550 ASSAY OF BLOOD/URIC ACID: CPT | Performed by: INTERNAL MEDICINE

## 2021-01-18 PROCEDURE — 97535 SELF CARE MNGMENT TRAINING: CPT

## 2021-01-18 PROCEDURE — 97110 THERAPEUTIC EXERCISES: CPT

## 2021-01-18 PROCEDURE — 85025 COMPLETE CBC W/AUTO DIFF WBC: CPT | Performed by: INTERNAL MEDICINE

## 2021-01-18 PROCEDURE — 83735 ASSAY OF MAGNESIUM: CPT | Performed by: INTERNAL MEDICINE

## 2021-01-18 PROCEDURE — 97162 PT EVAL MOD COMPLEX 30 MIN: CPT

## 2021-01-18 PROCEDURE — 25010000002 LORAZEPAM PER 2 MG: Performed by: INTERNAL MEDICINE

## 2021-01-18 PROCEDURE — 82550 ASSAY OF CK (CPK): CPT | Performed by: INTERNAL MEDICINE

## 2021-01-18 PROCEDURE — 25010000002 ENOXAPARIN PER 10 MG: Performed by: INTERNAL MEDICINE

## 2021-01-18 PROCEDURE — 97166 OT EVAL MOD COMPLEX 45 MIN: CPT

## 2021-01-18 RX ORDER — DOXEPIN HYDROCHLORIDE 100 MG/1
100 CAPSULE ORAL NIGHTLY
COMMUNITY
End: 2021-02-09

## 2021-01-18 RX ORDER — CARIPRAZINE 4.5 MG/1
4.5 CAPSULE, GELATIN COATED ORAL NIGHTLY
COMMUNITY
End: 2021-05-11 | Stop reason: DRUGHIGH

## 2021-01-18 RX ORDER — CLONAZEPAM 1 MG/1
2 TABLET ORAL
COMMUNITY
End: 2021-02-09 | Stop reason: DRUGHIGH

## 2021-01-18 RX ORDER — SODIUM CHLORIDE AND POTASSIUM CHLORIDE 150; 900 MG/100ML; MG/100ML
125 INJECTION, SOLUTION INTRAVENOUS CONTINUOUS
Status: DISCONTINUED | OUTPATIENT
Start: 2021-01-18 | End: 2021-01-21

## 2021-01-18 RX ORDER — MIRTAZAPINE 15 MG/1
15 TABLET, FILM COATED ORAL NIGHTLY
COMMUNITY
End: 2021-02-09

## 2021-01-18 RX ADMIN — LORAZEPAM 1 MG: 1 TABLET ORAL at 01:10

## 2021-01-18 RX ADMIN — SODIUM CHLORIDE, PRESERVATIVE FREE 3 ML: 5 INJECTION INTRAVENOUS at 08:59

## 2021-01-18 RX ADMIN — LORAZEPAM 1 MG: 2 INJECTION INTRAMUSCULAR; INTRAVENOUS at 04:06

## 2021-01-18 RX ADMIN — AMLODIPINE BESYLATE 5 MG: 5 TABLET ORAL at 08:59

## 2021-01-18 RX ADMIN — LISINOPRIL 10 MG: 10 TABLET ORAL at 08:59

## 2021-01-18 RX ADMIN — GABAPENTIN 100 MG: 100 CAPSULE ORAL at 20:14

## 2021-01-18 RX ADMIN — FOLIC ACID 1 MG: 1 TABLET ORAL at 08:59

## 2021-01-18 RX ADMIN — LORAZEPAM 2 MG: 1 TABLET ORAL at 23:04

## 2021-01-18 RX ADMIN — Medication 200 MG: at 08:59

## 2021-01-18 RX ADMIN — ATORVASTATIN CALCIUM 20 MG: 20 TABLET, FILM COATED ORAL at 08:59

## 2021-01-18 RX ADMIN — ENOXAPARIN SODIUM 40 MG: 40 INJECTION SUBCUTANEOUS at 23:07

## 2021-01-18 RX ADMIN — Medication 1000 MCG: at 08:59

## 2021-01-18 RX ADMIN — LORAZEPAM 1 MG: 1 TABLET ORAL at 18:13

## 2021-01-18 RX ADMIN — SODIUM CHLORIDE, PRESERVATIVE FREE 10 ML: 5 INJECTION INTRAVENOUS at 03:41

## 2021-01-18 RX ADMIN — LORAZEPAM 1 MG: 1 TABLET ORAL at 15:34

## 2021-01-18 RX ADMIN — POTASSIUM CHLORIDE AND SODIUM CHLORIDE 125 ML/HR: 900; 150 INJECTION, SOLUTION INTRAVENOUS at 15:03

## 2021-01-18 RX ADMIN — GABAPENTIN 100 MG: 100 CAPSULE ORAL at 08:58

## 2021-01-18 RX ADMIN — SODIUM CHLORIDE, PRESERVATIVE FREE 3 ML: 5 INJECTION INTRAVENOUS at 22:55

## 2021-01-18 RX ADMIN — LORAZEPAM 1 MG: 1 TABLET ORAL at 13:23

## 2021-01-18 RX ADMIN — CARIPRAZINE 3 MG: 3 CAPSULE, GELATIN COATED ORAL at 08:58

## 2021-01-18 RX ADMIN — ENOXAPARIN SODIUM 40 MG: 40 INJECTION SUBCUTANEOUS at 00:50

## 2021-01-18 RX ADMIN — LEVOTHYROXINE SODIUM 100 MCG: 0.1 TABLET ORAL at 08:59

## 2021-01-18 RX ADMIN — LORAZEPAM 1 MG: 1 TABLET ORAL at 10:42

## 2021-01-18 RX ADMIN — LORAZEPAM 1 MG: 1 TABLET ORAL at 20:14

## 2021-01-18 RX ADMIN — LORAZEPAM 2 MG: 2 INJECTION INTRAMUSCULAR; INTRAVENOUS at 03:41

## 2021-01-18 RX ADMIN — SODIUM CHLORIDE, PRESERVATIVE FREE 10 ML: 5 INJECTION INTRAVENOUS at 04:06

## 2021-01-18 RX ADMIN — DOXEPIN HYDROCHLORIDE 100 MG: 75 CAPSULE ORAL at 20:13

## 2021-01-18 NOTE — THERAPY EVALUATION
Patient Name: Pro Mueller  : 1955    MRN: [id number]                              Today's Date: 2021       Admit Date: 2021    Visit Dx:     ICD-10-CM ICD-9-CM   1. Alcohol dependence with uncomplicated withdrawal (CMS/[facility])  F10.230 303.90     291.81   2. Alcoholic intoxication without complication (CMS/[facility])  F10.920 305.00     Patient Active Problem List   Diagnosis   • Alcoholism (CMS/[facility])   • Atopic rhinitis   • Mixed anxiety depressive disorder   • Genital herpes simplex   • Hyperlipidemia   • Hypertension   • Hypothyroidism   • Insomnia   • Panic disorder without agoraphobia   • Persistent insomnia   • Vitamin D deficiency   • Alcohol withdrawal (CMS/[facility])   • Neuropathy involving both lower extremities   • Syncope and collapse   • QT prolongation   • Left shoulder pain   • Alcoholic ketoacidosis   • Hyponatremia   • Hypokalemia   • Alcohol dependence with uncomplicated withdrawal (CMS/[facility])   • Pancytopenia (CMS/[facility])   • Nephrolithiasis   • Hypomagnesemia   • Left ventricular diastolic dysfunction   • Non-traumatic rhabdomyolysis     Past Medical History:   Diagnosis Date   • Alcohol abuse    • Anxiety    • Arthritis    • Atopic rhinitis 2016   • Depression    • Disease of thyroid gland    • Elevated cholesterol    • Encounter for removal of sutures    • Genital herpes simplex 2016   • GERD (gastroesophageal reflux disease)    • Headache, tension-type    • Hyperlipidemia    • Hypertension    • Kidney stone    • Migraine    • Motion sickness    • Olecranon bursitis, right elbow    • Panic disorder without agoraphobia 2016   • Peripheral neuropathy    • Sleep apnea    • Vitamin D deficiency 2016   • Withdrawal symptoms, alcohol (CMS/[facility])      Past Surgical History:   Procedure Laterality Date   • BACK SURGERY      Pt denies.   • COLONOSCOPY     • CYST REMOVAL     • EXTRACORPOREAL SHOCK WAVE LITHOTRIPSY (ESWL) Right    • SHOULDER ARTHROSCOPY Right 2019    Procedure:  SHOULDER ARTHROSCOPY, decompression, distal clavicle excision;  Surgeon: Aj Mancilla MD;  Location: [facility] OR [facility] [place];  Service: Orthopedics   • TONSILLECTOMY       General Information     Row Name 01/18/21 1531          Physical Therapy Time and Intention    Document Type  evaluation  -EJ     Mode of Treatment  physical therapy  -EJ     Row Name 01/18/21 1531          General Information    Patient Profile Reviewed  yes  -EJ     Prior Level of Function  independent:;ADL's;all household mobility  -EJ     Existing Precautions/Restrictions  fall;oxygen therapy device and L/min  -EJ     Barriers to Rehab  medically complex;cognitive status  -EJ     Row Name 01/18/21 1531          Living Environment    Lives With  significant other  -EJ     Row Name 01/18/21 1531          Cognition    Orientation Status (Cognition)  oriented x 3;unable/difficult to assess  -EJ     Row Name 01/18/21 1531          Safety Issues, Functional Mobility    Impairments Affecting Function (Mobility)  balance;cognition;coordination;endurance/activity tolerance;strength;motor control;postural/trunk control;shortness of breath;range of motion (ROM)  -EJ       User Key  (r) = Recorded By, (t) = Taken By, (c) = Cosigned By    Initials Name Provider Type    EJ Doris Clark, PT Physical Therapist        Mobility     Row Name 01/18/21 1532          Bed Mobility    Bed Mobility  supine-sit;sit-supine  -EJ     Supine-Sit Terry (Bed Mobility)  verbal cues;minimum assist (75% patient effort)  -EJ     Sit-Supine Terry (Bed Mobility)  verbal cues;minimum assist (75% patient effort)  -EJ     Assistive Device (Bed Mobility)  bed rails;head of bed elevated  -     Row Name 01/18/21 1532          Transfers    Comment (Transfers)  c/o dizziness upon standing, unsteady  -     Row Name 01/18/21 1532          Sit-Stand Transfer    Sit-Stand Terry (Transfers)  verbal cues;minimum assist (75% patient effort);2 person assist  -EJ      Assistive Device (Sit-Stand Transfers)  walker, front-wheeled  -EJ     Row Name 01/18/21 1532          Gait/Stairs (Locomotion)    Hood Level (Gait)  verbal cues;minimum assist (75% patient effort);2 person assist  -EJ     Assistive Device (Gait)  walker, front-wheeled  -EJ     Distance in Feet (Gait)  50  -EJ     Deviations/Abnormal Patterns (Gait)  godfrey decreased;stride length decreased  -EJ     Bilateral Gait Deviations  heel strike decreased;forward flexed posture  -EJ     Comment (Gait/Stairs)  unsteady, cues for upright posture, assist for walker management at times  -EJ       User Key  (r) = Recorded By, (t) = Taken By, (c) = Cosigned By    Initials Name Provider Type    Doris Artis, PT Physical Therapist        Obj/Interventions     Row Name 01/18/21 1533          Range of Motion Comprehensive    General Range of Motion  no range of motion deficits identified  -[facility] Name 01/18/21 1533          Strength Comprehensive (MMT)    Comment, General Manual Muscle Testing (MMT) Assessment  generalized weakness, difficult to formally assess  -     Row Name 01/18/21 1533          Balance    Balance Assessment  standing dynamic balance  -EJ     Static Sitting Balance  WFL  -EJ     Dynamic Sitting Balance  mild impairment  -EJ     Static Standing Balance  mild impairment  -EJ     Dynamic Standing Balance  moderate impairment  -EJ       User Key  (r) = Recorded By, (t) = Taken By, (c) = Cosigned By    Initials Name Provider Type    Doris Artis, PT Physical Therapist        Goals/Plan     Row Name 01/18/21 1539          Bed Mobility Goal 1 (PT)    Activity/Assistive Device (Bed Mobility Goal 1, PT)  bed mobility activities, all  -EJ     Hood Level/Cues Needed (Bed Mobility Goal 1, PT)  contact guard assist  -EJ     Time Frame (Bed Mobility Goal 1, PT)  1 week  -     Row Name 01/18/21 1539          Transfer Goal 1 (PT)    Activity/Assistive Device (Transfer Goal 1, PT)   transfers, all;walker, rolling  -EJ     Murray Level/Cues Needed (Transfer Goal 1, PT)  contact guard assist  -EJ     Time Frame (Transfer Goal 1, PT)  1 week  -EJ     Row Name 01/18/21 1539          Gait Training Goal 1 (PT)    Activity/Assistive Device (Gait Training Goal 1, PT)  gait (walking locomotion);walker, rolling  -EJ     Murray Level (Gait Training Goal 1, PT)  contact guard assist  -EJ     Distance (Gait Training Goal 1, PT)  150  -EJ     Time Frame (Gait Training Goal 1, PT)  1 week  -EJ       User Key  (r) = Recorded By, (t) = Taken By, (c) = Cosigned By    Initials Name Provider Type    EJ Doris Clark, PT Physical Therapist        Clinical Impression     Row Name 01/18/21 1534          Pain Scale: Numbers Pre/Post-Treatment    Pretreatment Pain Rating  0/10 - no pain  -EJ     Posttreatment Pain Rating  0/10 - no pain  -EJ     Row Name 01/18/21 1534          Plan of Care Review    Plan of Care Reviewed With  patient  -EJ     Progress  improving  -EJ     Outcome Summary  Pt agreeable to PT eval this afternoon. He was admitted to [facility] on 1/16/21 with ETOH withdrawal. Pt w hx of ETOH and substances abuse. Pt reports livng with his significant other and reports he is typically independent with mobility and ADLs. Today, pt presents with increased confusion, generalized weakness, decreased activity tolerance, impaired balance, and overall decreased functional mobility. Pt currently requiring min A for bed mobility, min A x 2 to stand with wx, and min A x 2 to ambulate approx 50 ft with Rwx. Pt is unsteady, but did not demonstrate any gross LOB. Unsure of [place] plans, pt [place] need rehab at [place]. He will continue to benefit from skilled PT to maximize safety, balance, and independence with mobility.  -     Row Name 01/18/21 1534          Therapy Assessment/Plan (PT)    Patient/Family Therapy Goals Statement (PT)  did not state  -     Rehab Potential (PT)  fair, will monitor progress closely   -EJ     Criteria for Skilled Interventions Met (PT)  yes  -     Row Name 01/18/21 1534          Positioning and Restraints    Pre-Treatment Position  in bed  -EJ     Post Treatment Position  bed  -EJ     In Bed  notified nsg;supine;call light within reach;encouraged to call for assist;exit alarm on  -EJ       User Key  (r) = Recorded By, (t) = Taken By, (c) = Cosigned By    Initials Name Provider Type    Doris Artis, PT Physical Therapist        Outcome Measures     Row Name 01/18/21 1540          How much help from another person do you currently need...    Turning from your back to your side while in flat bed without using bedrails?  3  -EJ     Moving from lying on back to sitting on the side of a flat bed without bedrails?  3  -EJ     Moving to and from a bed to a chair (including a wheelchair)?  3  -EJ     Standing up from a chair using your arms (e.g., wheelchair, bedside chair)?  3  -EJ     Climbing 3-5 steps with a railing?  2  -EJ     To walk in hospital room?  3  -EJ     AM-PAC 6 Clicks Score (PT)  17  -EJ     Row Name 01/18/21 1540          Functional Assessment    Outcome Measure Options  AM-PAC 6 Clicks Basic Mobility (PT)  -EJ       User Key  (r) = Recorded By, (t) = Taken By, (c) = Cosigned By    Initials Name Provider Type    Doris Artis, PT Physical Therapist        Physical Therapy Education                 Title: PT OT SLP Therapies (In Progress)     Topic: Physical Therapy (In Progress)     Point: Mobility training (Done)     Learning Progress Summary           Patient Acceptance, E,TB,D, VU,NR by SHARAD at 1/18/2021 1540                   Point: Home exercise program (Not Started)     Learner Progress:  Not documented in this visit.          Point: Body mechanics (Not Started)     Learner Progress:  Not documented in this visit.          Point: Precautions (Not Started)     Learner Progress:  Not documented in this visit.                      User Key     Initials Effective  Dates Name Provider Type Discipline     04/03/18 -  Doris Clark, PT Physical Therapist PT              PT Recommendation and Plan  Planned Therapy Interventions (PT): bed mobility training, balance training, gait training, home exercise program, patient/family education, strengthening, ROM (range of motion), stair training, transfer training  Plan of Care Reviewed With: patient  Progress: improving  Outcome Summary: Pt agreeable to PT eval this afternoon. He was admitted to [facility] on 1/16/21 with ETOH withdrawal. Pt w hx of ETOH and substances abuse. Pt reports livng with his significant other and reports he is typically independent with mobility and ADLs. Today, pt presents with increased confusion, generalized weakness, decreased activity tolerance, impaired balance, and overall decreased functional mobility. Pt currently requiring min A for bed mobility, min A x 2 to stand with wx, and min A x 2 to ambulate approx 50 ft with Rwx. Pt is unsteady, but did not demonstrate any gross LOB. Unsure of [place] plans, pt may need rehab at [place]. He will continue to benefit from skilled PT to maximize safety, balance, and independence with mobility.     Time Calculation:   PT Charges     Row Name 01/18/21 1541             Time Calculation    Start Time  1505  -EJ      Stop Time  1527  -EJ      Time Calculation (min)  22 min  -EJ      PT Received On  01/18/21  -EJ      PT - Next Appointment  01/19/21  -EJ      PT Goal Re-Cert Due Date  01/25/21  -         Time Calculation- PT    Total Timed Code Minutes- PT  12 minute(s)  -EJ        User Key  (r) = Recorded By, (t) = Taken By, (c) = Cosigned By    Initials Name Provider Type     Doris Clark, PT Physical Therapist        Therapy Charges for Today     Code Description Service Date Service Provider Modifiers Qty    27306272722 HC PT EVAL MOD COMPLEXITY 2 1/18/2021 Doris Clark, PT GP 1    78282970523 HC PT THER PROC EA 15 MIN 1/18/2021 Doris Clark, PT GP 1     [id number]  PT THER SUPP EA 15 MIN 1/18/2021 Doris Clark, PT GP 1          PT G-Codes  Outcome Measure Options: AM-PAC 6 Clicks Basic Mobility (PT)  AM-PAC 6 Clicks Score (PT): 17  AM-PAC 6 Clicks Score (OT): 12    Doris Clark, PT  1/18/2021

## 2021-01-18 NOTE — PROGRESS NOTES
Clinical Pharmacy Services: Medication History    Pro Mueller is a 66 y.o. male presenting to [facility] for Alcohol dependence with uncomplicated withdrawal (CMS/[facility]) [F10.230]  Alcoholic intoxication without complication (CMS/[facility]) [F10.920]     He  has a past medical history of Alcohol abuse, Anxiety, Arthritis, Atopic rhinitis (8/8/2016), Depression, Disease of thyroid gland, Elevated cholesterol, Encounter for removal of sutures, Genital herpes simplex (8/8/2016), GERD (gastroesophageal reflux disease), Headache, tension-type, Hyperlipidemia, Hypertension, Kidney stone, Migraine, Motion sickness, Olecranon bursitis, right elbow, Panic disorder without agoraphobia (8/8/2016), Peripheral neuropathy, Sleep apnea, Vitamin D deficiency (8/8/2016), and Withdrawal symptoms, alcohol (CMS/[facility]).    Allergies as of 01/16/2021 - Reviewed 01/16/2021   Allergen Reaction Noted   • Penicillins Anaphylaxis and Other (See Comments) 01/04/2016       Medication information was obtained from: patients pharmacy   Pharmacy and Phone Number:     [id number] - [place], [place] - [address] AT [facility] - [phone]  - [phone]   [address]  [facility] [address]  Phone: [phone] Fax: [phone]        Prior to Admission Medications     Prescriptions Last Dose Informant Patient Reported? Taking?    acyclovir (ZOVIRAX) 200 MG capsule Past Week Pharmacy No Yes    Take 2 capsules by mouth Daily.    amLODIPine (NORVASC) 5 MG tablet  Pharmacy No Yes    Take 1 tablet by mouth Every Morning.    atorvastatin (LIPITOR) 20 MG tablet  Pharmacy No Yes    Take 1 tablet by mouth Daily.    Cariprazine HCl (Vraylar) 4.5 MG capsule  Pharmacy Yes Yes    Take 4.5 mg by mouth Every Night.    clonazePAM (KlonoPIN) 1 MG tablet  Pharmacy Yes Yes    Take 2 mg by mouth every night at bedtime.    doxepin (SINEquan) 100 MG capsule  Pharmacy Yes Yes    Take 100 mg by mouth  Every Night.    gabapentin (NEURONTIN) 300 MG capsule  Pharmacy No Yes    TAKE 1 CAPSULE BY MOUTH THREE TIMES DAILY    levothyroxine (SYNTHROID, LEVOTHROID) 100 MCG tablet  Pharmacy No Yes    TAKE 1 TABLET BY MOUTH EVERY DAY    lisinopril (PRINIVIL,ZESTRIL) 10 MG tablet  Pharmacy No Yes    Take 1 tablet by mouth Daily.    mirtazapine (REMERON) 15 MG tablet  Pharmacy Yes Yes    Take 15 mg by mouth Every Night.    folic acid (FOLVITE) 1 MG tablet  Pharmacy Yes No    Take 1 mg by mouth Daily.    vitamin B-12 (CYANOCOBALAMIN) 1000 MCG tablet  Pharmacy Yes No    Take 1,000 mcg by mouth Daily.    VITAMIN D PO  Pharmacy Yes No    Take 1 capsule by mouth Every Morning.            Medication notes:     This medication list is complete to the best of my knowledge as of 1/18/2021    Please call if questions.    Lidia Carroll, PharmD, BCPS  1/18/2021 13:59 EST

## 2021-01-18 NOTE — THERAPY EVALUATION
Patient Name: Pro Mueller  : 1955    MRN: [id number]                              Today's Date: 2021       Admit Date: 2021    Visit Dx:     ICD-10-CM ICD-9-CM   1. Alcohol dependence with uncomplicated withdrawal (CMS/[facility])  F10.230 303.90     291.81   2. Alcoholic intoxication without complication (CMS/[facility])  F10.920 305.00     Patient Active Problem List   Diagnosis   • Alcoholism (CMS/[facility])   • Atopic rhinitis   • Mixed anxiety depressive disorder   • Genital herpes simplex   • Hyperlipidemia   • Hypertension   • Hypothyroidism   • Insomnia   • Panic disorder without agoraphobia   • Persistent insomnia   • Vitamin D deficiency   • Alcohol withdrawal (CMS/[facility])   • Neuropathy involving both lower extremities   • Syncope and collapse   • QT prolongation   • Left shoulder pain   • Alcoholic ketoacidosis   • Hyponatremia   • Hypokalemia   • Alcohol dependence with uncomplicated withdrawal (CMS/[facility])   • Pancytopenia (CMS/[facility])   • Nephrolithiasis   • Hypomagnesemia   • Left ventricular diastolic dysfunction   • Non-traumatic rhabdomyolysis     Past Medical History:   Diagnosis Date   • Alcohol abuse    • Anxiety    • Arthritis    • Atopic rhinitis 2016   • Depression    • Disease of thyroid gland    • Elevated cholesterol    • Encounter for removal of sutures    • Genital herpes simplex 2016   • GERD (gastroesophageal reflux disease)    • Headache, tension-type    • Hyperlipidemia    • Hypertension    • Kidney stone    • Migraine    • Motion sickness    • Olecranon bursitis, right elbow    • Panic disorder without agoraphobia 2016   • Peripheral neuropathy    • Sleep apnea    • Vitamin D deficiency 2016   • Withdrawal symptoms, alcohol (CMS/[facility])      Past Surgical History:   Procedure Laterality Date   • BACK SURGERY      Pt denies.   • COLONOSCOPY     • CYST REMOVAL     • EXTRACORPOREAL SHOCK WAVE LITHOTRIPSY (ESWL) Right    • SHOULDER ARTHROSCOPY Right 2019    Procedure:  SHOULDER ARTHROSCOPY, decompression, distal clavicle excision;  Surgeon: Aj Mancilla MD;  Location: [facility] OR [facility];  Service: Orthopedics   • TONSILLECTOMY       General Information     Row Name 01/18/21 1247          OT Time and Intention    Document Type  evaluation  -RB     Mode of Treatment  individual therapy;occupational therapy  -RB     Row Name 01/18/21 1247          General Information    Patient Profile Reviewed  yes  -RB     Prior Level of Function  independent:;ADL's;transfer  -RB     Existing Precautions/Restrictions  fall;oxygen therapy device and L/min  -RB     Barriers to Rehab  medically complex;cognitive status  -RB     Row Name 01/18/21 1247          Living Environment    Lives With  significant other  -RB     Row Name 01/18/21 1247          Home Main Entrance    Number of Stairs, Main Entrance  two  -RB     Row Name 01/18/21 1247          Cognition    Orientation Status (Cognition)  oriented x 3 Pt disoriented at times. Answers with cues.  -RB     Row Name 01/18/21 1247          Safety Issues, Functional Mobility    Safety Issues Affecting Function (Mobility)  ability to follow commands;awareness of need for assistance;insight into deficits/self-awareness;problem-solving;safety precaution awareness;safety precautions follow-through/compliance  -RB     Impairments Affecting Function (Mobility)  balance;cognition;coordination;endurance/activity tolerance;strength;motor control;postural/trunk control;shortness of breath  -RB       User Key  (r) = Recorded By, (t) = Taken By, (c) = Cosigned By    Initials Name Provider Type    RB Maria Elena Meza OT Occupational Therapist          Mobility/ADL's     Row Name 01/18/21 1251          Bed Mobility    Bed Mobility  bed mobility (all) activities  -RB     All Activities, [place] (Bed Mobility)  minimum assist (75% patient effort)  -RB     Assistive Device (Bed Mobility)  bed rails  -RB     Row Name 01/18/21 1251          Transfers    Transfers   sit-stand transfer  -RB     Comment (Transfers)  Pt with full body mild tremors limiting his steadiness  -RB     Sit-Stand [place] (Transfers)  minimum assist (75% patient effort)  -RB     Row Name 01/18/21 1251          Sit-Stand Transfer    Assistive Device (Sit-Stand Transfers)  walker, front-wheeled  -RB     Row Name 01/18/21 1254 01/18/21 1251       Activities of Daily Living    BADL Assessment/Intervention  toileting  -RB  upper body dressing;lower body dressing;grooming  -RB    Row Name 01/18/21 1251          Upper Body Dressing Assessment/Training    [place] Level (Upper Body Dressing)  upper body dressing skills;moderate assist (50% patient effort)  -RB     Position (Upper Body Dressing)  edge of bed sitting  -RB     Row Name 01/18/21 1251          Lower Body Dressing Assessment/Training    [place] Level (Lower Body Dressing)  lower body dressing skills;moderate assist (50% patient effort)  -RB     Position (Lower Body Dressing)  edge of bed sitting  -RB     Row Name 01/18/21 1251          Grooming Assessment/Training    [place] Level (Grooming)  grooming skills;moderate assist (50% patient effort)  -RB     Position (Grooming)  edge of bed sitting  -RB     Comment (Grooming)  droppage of items due to BUE tremors  -RB     Row Name 01/18/21 1254          Toileting Assessment/Training    [place] Level (Toileting)  toileting skills;moderate assist (50% patient effort)  -RB     Position (Toileting)  edge of bed sitting;supported standing  -RB     Comment (Toileting)  Small bowel movement while standing. Mod A to complete hygiene.  -RB       User Key  (r) = Recorded By, (t) = Taken By, (c) = Cosigned By    Initials Name Provider Type    RB Maria Elena Meza OT Occupational Therapist        Obj/Interventions     Row Name 01/18/21 1253          Sensory Assessment (Somatosensory)    Sensory Assessment (Somatosensory)  sensation intact  -RB     Row Name 01/18/21 1253          Vision  Assessment/Intervention    Visual Impairment/Limitations  WFL  -RB     Row Name 01/18/21 1253          Range of Motion Comprehensive    General Range of Motion  no range of motion deficits identified  -RB     Row Name 01/18/21 1253          Strength Comprehensive (MMT)    General Manual Muscle Testing (MMT) Assessment  no strength deficits identified  -RB     Row Name 01/18/21 1253          Balance    Balance Assessment  sitting static balance;sitting dynamic balance;sit to stand dynamic balance;standing static balance  -RB     Static Sitting Balance  WFL  -RB     Dynamic Sitting Balance  mild impairment  -RB     Static Standing Balance  mild impairment  -RB     Balance Interventions  UE activity with balance activity  -RB       User Key  (r) = Recorded By, (t) = Taken By, (c) = Cosigned By    Initials Name Provider Type    RB Maria Elena Meza, OT Occupational Therapist        Goals/Plan     Row Name 01/18/21 1255          Bed Mobility Goal 1 (OT)    Activity/Assistive Device (Bed Mobility Goal 1, OT)  bed mobility activities, all  -RB     [place] Level/Cues Needed (Bed Mobility Goal 1, OT)  modified independence  -RB     Time Frame (Bed Mobility Goal 1, OT)  short term goal (STG);2 weeks  -RB     Progress/Outcomes (Bed Mobility Goal 1, OT)  continuing progress toward goal  -RB     Row Name 01/18/21 1255          Transfer Goal 1 (OT)    Activity/Assistive Device (Transfer Goal 1, OT)  transfers, all  -RB     [place] Level/Cues Needed (Transfer Goal 1, OT)  modified independence  -RB     Time Frame (Transfer Goal 1, OT)  short term goal (STG);2 weeks  -RB     Progress/Outcome (Transfer Goal 1, OT)  continuing progress toward goal  -RB     Row Name 01/18/21 1255          Bathing Goal 1 (OT)    Activity/Device (Bathing Goal 1, OT)  bathing skills, all  -RB     [place] Level/Cues Needed (Bathing Goal 1, OT)  modified independence  -RB     Time Frame (Bathing Goal 1, OT)  short term goal (STG);2 weeks   -RB     Progress/Outcomes (Bathing Goal 1, OT)  continuing progress toward goal  -RB     Row Name 01/18/21 1255          Dressing Goal 1 (OT)    Activity/Device (Dressing Goal 1, OT)  dressing skills, all  -RB     [place]/Cues Needed (Dressing Goal 1, OT)  modified independence  -RB     Time Frame (Dressing Goal 1, OT)  short term goal (STG);2 weeks  -RB     Progress/Outcome (Dressing Goal 1, OT)  continuing progress toward goal  -RB     Row Name 01/18/21 1255          Toileting Goal 1 (OT)    Activity/Device (Toileting Goal 1, OT)  toileting skills, all  -RB     [place] Level/Cues Needed (Toileting Goal 1, OT)  modified independence  -RB     Time Frame (Toileting Goal 1, OT)  short term goal (STG);2 weeks  -RB     Progress/Outcome (Toileting Goal 1, OT)  continuing progress toward goal  -RB     Row Name 01/18/21 1255          Grooming Goal 1 (OT)    Activity/Device (Grooming Goal 1, OT)  grooming skills, all  -RB     [place] (Grooming Goal 1, OT)  modified independence  -RB     Time Frame (Grooming Goal 1, OT)  short term goal (STG);2 weeks  -RB     Progress/Outcome (Grooming Goal 1, OT)  continuing progress toward goal  -RB     Row Name 01/18/21 1255          Self-Feeding Goal 1 (OT)    Activity/Device (Self-Feeding Goal 1, OT)  self-feeding skills, all  -RB     [place] Level/Cues Needed (Self-Feeding Goal 1, OT)  modified independence  -RB     Time Frame (Self-Feeding Goal 1, OT)  short term goal (STG);2 weeks  -RB     Progress/Outcomes (Self-Feeding Goal 1, OT)  continuing progress toward goal  -RB     Row Name 01/18/21 1255          Therapy Assessment/Plan (OT)    Planned Therapy Interventions (OT)  activity tolerance training;adaptive equipment training;BADL retraining;transfer/mobility retraining;strengthening exercise;ROM/therapeutic exercise;patient/caregiver education/training;functional balance retraining;occupation/activity based interventions;cognitive/visual perception retraining   -RB       User Key  (r) = Recorded By, (t) = Taken By, (c) = Cosigned By    Initials Name Provider Type    Maria Elena Reyes OT Occupational Therapist        Clinical Impression     Row Name 01/18/21 1253          Pain Assessment    Additional Documentation  Pain Scale: Numbers Pre/Post-Treatment (Group)  -RB     Row Name 01/18/21 1253          Pain Scale: Numbers Pre/Post-Treatment    Pretreatment Pain Rating  0/10 - no pain  -RB     Posttreatment Pain Rating  0/10 - no pain  -RB     Row Name 01/18/21 1253          Plan of Care Review    Plan of Care Reviewed With  patient  -RB     Progress  improving  -RB     Row Name 01/18/21 1253          Therapy Assessment/Plan (OT)    Rehab Potential (OT)  good, to achieve stated therapy goals  -RB     Criteria for Skilled Therapeutic Interventions Met (OT)  yes;skilled treatment is necessary  -RB     Therapy Frequency (OT)  5 times/wk  -RB     Row Name 01/18/21 1253          Therapy Plan Review/Discharge Plan (OT)    Anticipated Discharge Disposition (OT)  -- TBD - ETOH rehab vs. inpatient rehab?  -RB     Row Name 01/18/21 1253          Vital Signs    Pre SpO2 (%)  91  -RB     O2 Delivery Pre Treatment  supplemental O2  -RB     Intra SpO2 (%)  94  -RB     O2 Delivery Intra Treatment  supplemental O2  -RB     Post SpO2 (%)  93  -RB     O2 Delivery Post Treatment  supplemental O2  -RB     Pre Patient Position  Supine  -RB     Intra Patient Position  Standing  -RB     Post Patient Position  Supine  -RB     Row Name 01/18/21 1253          Positioning and Restraints    Pre-Treatment Position  in bed  -RB     Post Treatment Position  bed  -RB     In Bed  notified nsg;supine;call light within reach;exit alarm on;encouraged to call for assist  -RB       User Key  (r) = Recorded By, (t) = Taken By, (c) = Cosigned By    Initials Name Provider Type    Maria Elena Reyes OT Occupational Therapist        Outcome Measures     Row Name 01/18/21 1255          How much help from  another is currently needed...    Putting on and taking off regular lower body clothing?  2  -RB     Bathing (including washing, rinsing, and drying)  2  -RB     Toileting (which includes using toilet bed pan or urinal)  2  -RB     Putting on and taking off regular upper body clothing  2  -RB     Taking care of personal grooming (such as brushing teeth)  2  -RB     Eating meals  2  -RB     AM-PAC 6 Clicks Score (OT)  12  -RB     Row Name 01/18/21 1255          Functional Assessment    Outcome Measure Options  AM-PAC 6 Clicks Daily Activity (OT)  -RB       User Key  (r) = Recorded By, (t) = Taken By, (c) = Cosigned By    Initials Name Provider Type    RB Maria Elena Meza OT Occupational Therapist        Occupational Therapy Education                 Title: PT OT SLP Therapies (In Progress)     Topic: Occupational Therapy (Not Started)     Point: ADL training (Not Started)     Description:   Instruct learner(s) on proper safety adaptation and remediation techniques during self care or transfers.   Instruct in proper use of assistive devices.              Learner Progress:  Not documented in this visit.          Point: Home exercise program (Not Started)     Description:   Instruct learner(s) on appropriate technique for monitoring, assisting and/or progressing therapeutic exercises/activities.              Learner Progress:  Not documented in this visit.          Point: Precautions (Not Started)     Description:   Instruct learner(s) on prescribed precautions during self-care and functional transfers.              Learner Progress:  Not documented in this visit.          Point: Body mechanics (Not Started)     Description:   Instruct learner(s) on proper positioning and spine alignment during self-care, functional mobility activities and/or exercises.              Learner Progress:  Not documented in this visit.                          OT Recommendation and Plan  Planned Therapy Interventions (OT): activity  tolerance training, adaptive equipment training, BADL retraining, transfer/mobility retraining, strengthening exercise, ROM/therapeutic exercise, patient/caregiver education/training, functional balance retraining, occupation/activity based interventions, cognitive/visual perception retraining  Therapy Frequency (OT): 5 times/wk  Plan of Care Review  Plan of Care Reviewed With: patient  Progress: improving     Time Calculation:   Time Calculation- OT     Row Name 01/18/21 1247             Time Calculation- OT    OT Start Time  1034  -RB      OT Stop Time  1109  -RB      OT Time Calculation (min)  35 min  -RB      Total Timed Code Minutes- OT  25 minute(s)  -RB      OT Received On  01/18/21  -RB      OT - Next Appointment  01/19/21  -RB      OT Goal Re-Cert Due Date  02/01/21  -        User Key  (r) = Recorded By, (t) = Taken By, (c) = Cosigned By    Initials Name Provider Type    RB Maria Elena Meza OT Occupational Therapist        Therapy Charges for Today     Code Description Service Date Service Provider Modifiers Qty    [id number]  OT SELF CARE/MGMT/TRAIN EA 15 MIN 1/18/2021 Maria Elena Meza OT GO 2    [id number]  OT EVAL MOD COMPLEXITY 2 1/18/2021 Maria Elena Meza OT GO 1               Maria Elena Meza OT  1/18/2021

## 2021-01-18 NOTE — PLAN OF CARE
Goal Outcome Evaluation:  Plan of Care Reviewed With: patient  Progress: improving  Outcome Summary: Pt agreeable to PT eval this afternoon. He was admitted to [facility] on 1/16/21 with ETOH withdrawal. Pt w hx of ETOH and substances abuse. Pt reports livng with his significant other and reports he is typically independent with mobility and ADLs. Today, pt presents with increased confusion, generalized weakness, decreased activity tolerance, impaired balance, and overall decreased functional mobility. Pt currently requiring min A for bed mobility, min A x 2 to stand with wx, and min A x 2 to ambulate approx 50 ft with Rwx. Pt is unsteady, but did not demonstrate any gross LOB. Unsure of DC plans, pt may need rehab at [place]. He will continue to benefit from skilled PT to maximize safety, balance, and independence with mobility.  Patient was intermittently wearing a face mask during this therapy encounter. Therapist used appropriate personal protective equipment including eye protection, mask, and gloves.  Mask used was standard procedure mask. Appropriate PPE was worn during the entire therapy session. Hand hygiene was completed before and after therapy session. Patient is not in enhanced droplet precautions.

## 2021-01-18 NOTE — PLAN OF CARE
Goal Outcome Evaluation:  Plan of Care Reviewed With: patient  Progress: no change  Outcome Summary: Continue to monitor vs, labs, ciwa score 6,9, 18, 9 medicated per protocol, drowsy off and on, K+ 4.4 on recheck, turned q2, safety maintained.

## 2021-01-18 NOTE — PLAN OF CARE
Goal Outcome Evaluation:  Plan of Care Reviewed With: patient  Progress: improving  Outcome Summary: VSS; pt complains of minor back aches; CIWAs every 2 hours; ativan has been given every 2 hours according to CIWA protocol; bladder scan every 6 and straight cath as needed; IV fluids; PT; OT; continue to monitor  Problem: Adult Inpatient Plan of Care  Goal: Plan of Care Review  Outcome: Ongoing, Progressing  Flowsheets  Taken 1/18/2021 1718 by Sary Frazier RN  Progress: improving  Outcome Summary:   VSS   pt complains of minor back aches   CIWAs every 2 hours   ativan has been given every 2 hours according to CIWA protocol   bladder scan every 6 and straight cath as needed   IV fluids   PT   OT   continue to monitor  Taken 1/18/2021 1534 by Doris Clark PT  Plan of Care Reviewed With: patient

## 2021-01-18 NOTE — PROGRESS NOTES
Discharge Planning Assessment  [facility]     Patient Name: Pro Mueller  MRN: [id number]  Today's Date: 1/18/2021    Admit Date: 1/16/2021    Discharge Needs Assessment     Row Name 01/18/21 1350       Living Environment    Lives With  significant other    Current Living Arrangements  home/apartment/condo    Primary Care Provided by  self    Provides Primary Care For  no one    Family Caregiver if Needed  sibling(s);parent(s)    Family Caregiver Names  sister, Jeny Crump; father, Saul Oscar    Quality of Family Relationships  helpful;involved;supportive    Able to Return to Prior Arrangements  yes       Resource/Environmental Concerns    Resource/Environmental Concerns  none       Transition Planning    Patient/Family Anticipates Transition to  home with family    Patient/Family Anticipated Services at Transition  none    Transportation Anticipated  family or friend will provide       Discharge Needs Assessment    Equipment Currently Used at Home  none    Concerns to be Addressed  no discharge needs identified;denies needs/concerns at this time    Discharge Coordination/Progress  Home        Discharge Plan     Row Name 01/18/21 9307       Plan    Plan  Home, follow for needs    Patient/Family in Agreement with Plan  yes    Plan Comments  CCP met with pt to verify information and discuss d/c planning. Pt resides with his SO in a single level home, denies DME use, and reports no past home health or sub-acute rehab. Pt uses [facility] [address], and declines Meds to [facility] enrollment. [facility] for [facility] resources. Pt plans to return home at d/c. PT eval pending. CCP to follow. Jeanne Vivas LCSW        Continued Care and Services - Admitted Since 1/16/2021    Coordination has not been started for this encounter.       Expected Discharge Date and Time     Expected Discharge Date Expected Discharge Time    Jan 19, 2021         Demographic Summary     Row Name 01/18/21  1350       General Information    Admission Type  inpatient    Arrived From  home    Referral Source  admission list    Reason for Consult  discharge planning    Preferred Language  English        Functional Status     Row Name 01/18/21 1350       Functional Status    Usual Activity Tolerance  good    Current Activity Tolerance  moderate       Functional Status, IADL    Medications  independent    Meal Preparation  independent    Housekeeping  independent    Laundry  independent    Shopping  independent       Mental Status Summary    Recent Changes in Mental Status/Cognitive Functioning  no changes        Psychosocial    No documentation.       Abuse/Neglect    No documentation.       Legal    No documentation.       Substance Abuse    No documentation.       Patient Forms    No documentation.           Karen Vivas LCSW

## 2021-01-18 NOTE — PROGRESS NOTES
Name: Pro Mueller ADMIT: 2021   : 1955  PCP: Alla Beal MD    MRN: [id number] LOS: 2 days   AGE/SEX: 66 y.o. male  ROOM: [facility] ([facility])     Subjective   Subjective   CC: alcohol withdrawal  No acute events. Patient complains of muscle aches in his neck and back but otherwise feels okay. Lethargy has improved. No CP/dyspnea/f/c/n/v/d.    Objective   Objective   Vital Signs  Temp:  [97.3 °F (36.3 °C)-98.6 °F (37 °C)] 97.3 °F (36.3 °C)  Heart Rate:  [68-84] 68  Resp:  [18] 18  BP: (104-127)/(61-86) 121/86  SpO2:  [98 %] 98 %  on  Flow (L/min):  [1-3] 1;   Device (Oxygen Therapy): nasal cannula  Body mass index is 27.8 kg/m².  Physical Exam  Vitals signs and nursing note reviewed.   Constitutional:       General: He is not in acute distress.     Appearance: He is not toxic-appearing.   HENT:      Head: Normocephalic and atraumatic.      Nose: Nose normal.      Mouth/Throat:      Mouth: Mucous membranes are moist.      Pharynx: Oropharynx is clear.   Eyes:      Conjunctiva/sclera: Conjunctivae normal.      Pupils: Pupils are equal, round, and reactive to light.   Neck:      Musculoskeletal: Normal range of motion and neck supple.   Cardiovascular:      Rate and Rhythm: Normal rate and regular rhythm.      Pulses: Normal pulses.   Pulmonary:      Effort: Pulmonary effort is normal.      Breath sounds: Normal breath sounds.   Abdominal:      General: Bowel sounds are normal.      Palpations: Abdomen is soft.      Tenderness: There is no abdominal tenderness.   Musculoskeletal:         General: No swelling or tenderness.   Skin:     General: Skin is warm and dry.      Capillary Refill: Capillary refill takes less than 2 seconds.   Neurological:      General: No focal deficit present.      Mental Status: He is alert and oriented to person, place, and time.   Psychiatric:         Mood and Affect: Mood and affect normal.         Behavior: Behavior normal.       Results Review     I reviewed the patient's new  clinical results.  I reviewed the patient's telemetry.  Results from last 7 days   Lab Units 01/18/21  0450 01/17/21  0429 01/16/21  1850   WBC 10*3/mm3 5.76 6.81 5.96   HEMOGLOBIN g/dL 12.8* 13.9 16.5   PLATELETS 10*3/mm3 144 168 255     Results from last 7 days   Lab Units 01/18/21  0450 01/17/21  2240 01/17/21  0429 01/16/21  1850   SODIUM mmol/L 127*  --  131* 133*   POTASSIUM mmol/L 3.8 4.4 3.4* 3.6   CHLORIDE mmol/L 92*  --  94* 91*   CO2 mmol/L 25.0  --  16.2* 21.7*   BUN mg/dL 17  --  19 18   CREATININE mg/dL 0.82  --  0.77 1.00   GLUCOSE mg/dL 88  --  95 116*   Estimated Creatinine Clearance: 119.7 mL/min (by C-G formula based on SCr of 0.82 mg/dL).  Results from last 7 days   Lab Units 01/17/21  0429 01/16/21  1850   ALBUMIN g/dL 3.70 4.60   BILIRUBIN mg/dL 0.6 0.6   ALK PHOS U/L 68 86   AST (SGOT) U/L 89* 108*   ALT (SGPT) U/L 32 43*     Results from last 7 days   Lab Units 01/18/21  0450 01/17/21  0429 01/16/21  1850   CALCIUM mg/dL 7.8* 7.4* 8.7   ALBUMIN g/dL  --  3.70 4.60   MAGNESIUM mg/dL 2.2 1.6 1.5*       COVID19   Date Value Ref Range Status   01/16/2021 Not Detected Not Detected - Ref. Range Final   05/13/2020 Not Detected Not Detected - Ref. Range Final     No results found for: HGBA1C, POCGLU    XR Chest 1 View  Narrative: Patient: HERB LUNA  Time Out: 00:01  Exam(s): FILM CXR 1 VIEW     EXAM:    XR Chest, 1 View    CLINICAL HISTORY:     crackles in bases and oxygen needs  Reason for exam: crackles in bases   and oxygen needs. Reason for Exam:->crackles in bases and oxygen needs.   Portable->Yes. Does this patient have a diabetic monitoring medication   delivering device on?->No.. Additional notes: OXYGEN NEEDS    TECHNIQUE:    Frontal view of the chest.    COMPARISON:    5 13 20    FINDINGS:      No significant cardiac enlargement.  Lungs are hypoinflated with vascular   crowding.  Negative for evidence of focal consolidation, pneumothorax or   pleural fluid collections.  Recommend upright  PA and lateral views of the   chest when clinically feasible.  Impression: Electronically signed by Oren Rodgers DO on 01-17-21 at 0001    Scheduled Medications  amLODIPine, 5 mg, Oral, QAM  atorvastatin, 20 mg, Oral, Daily  Cariprazine HCl, 3 mg, Oral, Daily  doxepin, 100 mg, Oral, Nightly  enoxaparin, 40 mg, Subcutaneous, Q24H  folic acid, 1 mg, Oral, Daily  gabapentin, 100 mg, Oral, Q12H  levothyroxine, 100 mcg, Oral, Daily  lisinopril, 10 mg, Oral, Daily  sodium chloride, 3 mL, Intravenous, Q12H  thiamine, 200 mg, Oral, Daily  vitamin B-12, 1,000 mcg, Oral, Daily    Infusions  sodium chloride 0.9 % with KCl 20 mEq, 125 mL/hr    Diet  Diet Regular       Assessment/Plan     Active Hospital Problems    Diagnosis  POA   • **Alcohol dependence with uncomplicated withdrawal (CMS/HCC) [F10.230]  Yes   • Non-traumatic rhabdomyolysis [M62.82]  No   • Hypomagnesemia [E83.42]  Yes   • Left ventricular diastolic dysfunction [I51.9]  Yes   • Hyponatremia [E87.1]  Yes   • Hypokalemia [E87.6]  Yes   • QT prolongation [R94.31]  Yes   • Neuropathy involving both lower extremities [G57.93]  Yes   • Insomnia [G47.00]  Yes   • Hypertension [I10]  Yes   • Panic disorder without agoraphobia [F41.0]  Yes      Resolved Hospital Problems   No resolved problems to display.   EtOH Dependence in Withdrawal  - continue on PRN ativan per CIWA protocol  - vitamin replacement  - access center consulted    Lethargy  - will hold off on any long-acting benzodiazepines  - decrease gabapentin dose (takes for peripheral neuropathy)  - improved today    Non-Traumatic Rhabdomyolysis  - likely from EtOH abuse  - will start on IVF and monitor CK  - no evidence of compartment syndrome    Hyponatremia  - uric acid is elevated  - will monitor response to IVF    Hypoxia  - occurs when sleeping  - CXR noted nothing acute   - encourage pulmonary toilet and wean oxygen as tolerated  - will likely need outpatient sleep study    Panic Disorder  - continue on  current regimen      Lovenox 40 mg SC daily for DVT prophylaxis.  Full code.  Discussed with patient and nursing staff.  Anticipate discharge TBD.  timing yet to be determined.      Gerber Costello MD  [facility]  01/18/21  13:08 EST    Patient was wearing facemask when I entered the room and throughout our encounter.  I wore protective equipment throughout this patient encounter including a face mask, gloves and protective eyewear.  Hand hygiene was performed before donning protective equipment and after removal when leaving the room.

## 2021-01-18 NOTE — PLAN OF CARE
Pt admitted to [facility] 2/2 to EtOH dependence in withdrawal. Pt pleasant and oriented x3 this morning. Pt completed bed mobility to EOB with Min A. Mod A for UB/LB dressing and grooming. Pt stood with Min A and he was very shaky/unsteady due to tremoring in BUE/BLE during functional tasks. Bowel movement present when standing and Mod-Max A provided to complete hygiene. O2 >88% on 2Lnc. OT recommends continued skilled inpatient OT services with a TBD d/c plan pending progress and plan associated with ETOH withdrawal. Anticipate a short rehab stay due to pt not being at his baseline.     Patient was wearing a face mask during this therapy encounter. Therapist used appropriate personal protective equipment including mask, goggles and gloves.  Mask used was standard procedure mask. Appropriate PPE was worn during the entire therapy session. Hand hygiene was completed before and after therapy session. Patient is not in enhanced droplet precautions.

## 2021-01-19 LAB
ANION GAP SERPL CALCULATED.3IONS-SCNC: 12.2 MMOL/L (ref 5–15)
BASOPHILS # BLD AUTO: 0.01 10*3/MM3 (ref 0–0.2)
BASOPHILS NFR BLD AUTO: 0.2 % (ref 0–1.5)
BUN SERPL-MCNC: 12 MG/DL (ref 8–23)
BUN/CREAT SERPL: 21.8 (ref 7–25)
CALCIUM SPEC-SCNC: 8.2 MG/DL (ref 8.6–10.5)
CHLORIDE SERPL-SCNC: 101 MMOL/L (ref 98–107)
CK SERPL-CCNC: 1136 U/L (ref 20–200)
CO2 SERPL-SCNC: 20.8 MMOL/L (ref 22–29)
CREAT SERPL-MCNC: 0.55 MG/DL (ref 0.76–1.27)
DEPRECATED RDW RBC AUTO: 44.4 FL (ref 37–54)
EOSINOPHIL # BLD AUTO: 0.06 10*3/MM3 (ref 0–0.4)
EOSINOPHIL NFR BLD AUTO: 1 % (ref 0.3–6.2)
ERYTHROCYTE [DISTWIDTH] IN BLOOD BY AUTOMATED COUNT: 13.7 % (ref 12.3–15.4)
GFR SERPL CREATININE-BSD FRML MDRD: 150 ML/MIN/1.73
GLUCOSE SERPL-MCNC: 84 MG/DL (ref 65–99)
HCT VFR BLD AUTO: 36.4 % (ref 37.5–51)
HGB BLD-MCNC: 12.8 G/DL (ref 13–17.7)
IMM GRANULOCYTES # BLD AUTO: 0.03 10*3/MM3 (ref 0–0.05)
IMM GRANULOCYTES NFR BLD AUTO: 0.5 % (ref 0–0.5)
LYMPHOCYTES # BLD AUTO: 1.47 10*3/MM3 (ref 0.7–3.1)
LYMPHOCYTES NFR BLD AUTO: 23.9 % (ref 19.6–45.3)
MCH RBC QN AUTO: 31.3 PG (ref 26.6–33)
MCHC RBC AUTO-ENTMCNC: 35.2 G/DL (ref 31.5–35.7)
MCV RBC AUTO: 89 FL (ref 79–97)
MONOCYTES # BLD AUTO: 0.47 10*3/MM3 (ref 0.1–0.9)
MONOCYTES NFR BLD AUTO: 7.6 % (ref 5–12)
NEUTROPHILS NFR BLD AUTO: 4.12 10*3/MM3 (ref 1.7–7)
NEUTROPHILS NFR BLD AUTO: 66.8 % (ref 42.7–76)
NRBC BLD AUTO-RTO: 0 /100 WBC (ref 0–0.2)
PLATELET # BLD AUTO: 143 10*3/MM3 (ref 140–450)
PMV BLD AUTO: 10.6 FL (ref 6–12)
POTASSIUM SERPL-SCNC: 4.5 MMOL/L (ref 3.5–5.2)
RBC # BLD AUTO: 4.09 10*6/MM3 (ref 4.14–5.8)
SODIUM SERPL-SCNC: 134 MMOL/L (ref 136–145)
WBC # BLD AUTO: 6.16 10*3/MM3 (ref 3.4–10.8)

## 2021-01-19 PROCEDURE — 25010000002 LORAZEPAM PER 2 MG: Performed by: INTERNAL MEDICINE

## 2021-01-19 PROCEDURE — 82550 ASSAY OF CK (CPK): CPT | Performed by: INTERNAL MEDICINE

## 2021-01-19 PROCEDURE — 25810000003 SODIUM CHLORIDE 0.9 % WITH KCL 20 MEQ 20-0.9 MEQ/L-% SOLUTION: Performed by: INTERNAL MEDICINE

## 2021-01-19 PROCEDURE — 25010000002 ENOXAPARIN PER 10 MG: Performed by: INTERNAL MEDICINE

## 2021-01-19 PROCEDURE — 80048 BASIC METABOLIC PNL TOTAL CA: CPT | Performed by: INTERNAL MEDICINE

## 2021-01-19 PROCEDURE — 85025 COMPLETE CBC W/AUTO DIFF WBC: CPT | Performed by: INTERNAL MEDICINE

## 2021-01-19 RX ORDER — LORAZEPAM 2 MG/ML
3 INJECTION INTRAMUSCULAR ONCE
Status: COMPLETED | OUTPATIENT
Start: 2021-01-19 | End: 2021-01-19

## 2021-01-19 RX ORDER — CHLORDIAZEPOXIDE HYDROCHLORIDE 25 MG/1
50 CAPSULE, GELATIN COATED ORAL ONCE
Status: COMPLETED | OUTPATIENT
Start: 2021-01-19 | End: 2021-01-19

## 2021-01-19 RX ORDER — CHLORDIAZEPOXIDE HYDROCHLORIDE 25 MG/1
50 CAPSULE, GELATIN COATED ORAL EVERY 8 HOURS SCHEDULED
Status: DISCONTINUED | OUTPATIENT
Start: 2021-01-19 | End: 2021-01-20

## 2021-01-19 RX ADMIN — LORAZEPAM 1 MG: 1 TABLET ORAL at 03:26

## 2021-01-19 RX ADMIN — Medication 1000 MCG: at 08:10

## 2021-01-19 RX ADMIN — DOXEPIN HYDROCHLORIDE 100 MG: 75 CAPSULE ORAL at 21:10

## 2021-01-19 RX ADMIN — LORAZEPAM 2 MG: 2 INJECTION INTRAMUSCULAR; INTRAVENOUS at 10:41

## 2021-01-19 RX ADMIN — LORAZEPAM 1 MG: 1 TABLET ORAL at 21:11

## 2021-01-19 RX ADMIN — AMLODIPINE BESYLATE 5 MG: 5 TABLET ORAL at 08:10

## 2021-01-19 RX ADMIN — ENOXAPARIN SODIUM 40 MG: 40 INJECTION SUBCUTANEOUS at 23:51

## 2021-01-19 RX ADMIN — SODIUM CHLORIDE, PRESERVATIVE FREE 3 ML: 5 INJECTION INTRAVENOUS at 08:10

## 2021-01-19 RX ADMIN — FOLIC ACID 1 MG: 1 TABLET ORAL at 08:10

## 2021-01-19 RX ADMIN — CHLORDIAZEPOXIDE HYDROCHLORIDE 50 MG: 25 CAPSULE ORAL at 21:11

## 2021-01-19 RX ADMIN — LORAZEPAM 2 MG: 2 INJECTION INTRAMUSCULAR; INTRAVENOUS at 07:44

## 2021-01-19 RX ADMIN — Medication 200 MG: at 08:10

## 2021-01-19 RX ADMIN — LORAZEPAM 2 MG: 2 INJECTION INTRAMUSCULAR; INTRAVENOUS at 23:22

## 2021-01-19 RX ADMIN — POTASSIUM CHLORIDE AND SODIUM CHLORIDE 125 ML/HR: 900; 150 INJECTION, SOLUTION INTRAVENOUS at 01:08

## 2021-01-19 RX ADMIN — LORAZEPAM 1 MG: 2 INJECTION INTRAMUSCULAR; INTRAVENOUS at 13:21

## 2021-01-19 RX ADMIN — LORAZEPAM 2 MG: 2 INJECTION INTRAMUSCULAR; INTRAVENOUS at 23:40

## 2021-01-19 RX ADMIN — CHLORDIAZEPOXIDE HYDROCHLORIDE 50 MG: 25 CAPSULE ORAL at 15:41

## 2021-01-19 RX ADMIN — LORAZEPAM 2 MG: 2 INJECTION INTRAMUSCULAR; INTRAVENOUS at 08:27

## 2021-01-19 RX ADMIN — LORAZEPAM 2 MG: 2 INJECTION INTRAMUSCULAR; INTRAVENOUS at 06:06

## 2021-01-19 RX ADMIN — LORAZEPAM 2 MG: 2 INJECTION INTRAMUSCULAR; INTRAVENOUS at 11:21

## 2021-01-19 RX ADMIN — POTASSIUM CHLORIDE AND SODIUM CHLORIDE 125 ML/HR: 900; 150 INJECTION, SOLUTION INTRAVENOUS at 10:41

## 2021-01-19 RX ADMIN — LEVOTHYROXINE SODIUM 100 MCG: 0.1 TABLET ORAL at 08:10

## 2021-01-19 RX ADMIN — CHLORDIAZEPOXIDE HYDROCHLORIDE 50 MG: 25 CAPSULE ORAL at 08:10

## 2021-01-19 RX ADMIN — LORAZEPAM 3 MG: 2 INJECTION INTRAMUSCULAR; INTRAVENOUS at 09:08

## 2021-01-19 RX ADMIN — LISINOPRIL 10 MG: 10 TABLET ORAL at 08:10

## 2021-01-19 RX ADMIN — GABAPENTIN 100 MG: 100 CAPSULE ORAL at 21:10

## 2021-01-19 RX ADMIN — CARIPRAZINE 3 MG: 3 CAPSULE, GELATIN COATED ORAL at 08:10

## 2021-01-19 RX ADMIN — LORAZEPAM 2 MG: 2 INJECTION INTRAMUSCULAR; INTRAVENOUS at 10:11

## 2021-01-19 RX ADMIN — ATORVASTATIN CALCIUM 20 MG: 20 TABLET, FILM COATED ORAL at 08:10

## 2021-01-19 RX ADMIN — LORAZEPAM 1 MG: 2 INJECTION INTRAMUSCULAR; INTRAVENOUS at 17:48

## 2021-01-19 RX ADMIN — LORAZEPAM 2 MG: 2 INJECTION INTRAMUSCULAR; INTRAVENOUS at 11:47

## 2021-01-19 RX ADMIN — LORAZEPAM 2 MG: 2 INJECTION INTRAMUSCULAR; INTRAVENOUS at 08:10

## 2021-01-19 RX ADMIN — GABAPENTIN 100 MG: 100 CAPSULE ORAL at 08:10

## 2021-01-19 RX ADMIN — POTASSIUM CHLORIDE AND SODIUM CHLORIDE 125 ML/HR: 900; 150 INJECTION, SOLUTION INTRAVENOUS at 19:40

## 2021-01-19 RX ADMIN — SODIUM CHLORIDE, PRESERVATIVE FREE 3 ML: 5 INJECTION INTRAVENOUS at 21:14

## 2021-01-19 NOTE — PROGRESS NOTES
Name: Pro Mueller ADMIT: 2021   : 1955  PCP: Alla Beal MD    MRN: [id number] LOS: 3 days   AGE/SEX: 66 y.o. male  ROOM: [facility]/     Subjective   Subjective   CC: alcohol withdrawal  Patient has been having more agitation and confusion this AM, received 2mg IV ativan x3. He denies other complaints. No CP/dyspnea/f/c/n/v/d.    Objective   Objective   Vital Signs  Temp:  [97.9 °F (36.6 °C)-99.7 °F (37.6 °C)] 97.9 °F (36.6 °C)  Heart Rate:  [] 79  Resp:  [18] 18  BP: (106-142)/(66-83) 117/74  SpO2:  [94 %-96 %] 96 %  on  Flow (L/min):  [1] 1;   Device (Oxygen Therapy): room air  Body mass index is 27.8 kg/m².  Physical Exam  Vitals signs and nursing note reviewed.   Constitutional:       General: He is not in acute distress.     Appearance: He is not toxic-appearing.   HENT:      Head: Normocephalic and atraumatic.      Nose: Nose normal.      Mouth/Throat:      Mouth: Mucous membranes are moist.      Pharynx: Oropharynx is clear.   Eyes:      Conjunctiva/sclera: Conjunctivae normal.      Pupils: Pupils are equal, round, and reactive to light.   Neck:      Musculoskeletal: Normal range of motion and neck supple.   Cardiovascular:      Rate and Rhythm: Normal rate and regular rhythm.      Pulses: Normal pulses.   Pulmonary:      Effort: Pulmonary effort is normal.      Breath sounds: Normal breath sounds.   Abdominal:      General: Bowel sounds are normal.      Palpations: Abdomen is soft.      Tenderness: There is no abdominal tenderness.   Musculoskeletal:         General: Swelling (trace BLE) present. No tenderness.   Skin:     General: Skin is warm and dry.      Capillary Refill: Capillary refill takes less than 2 seconds.   Neurological:      General: No focal deficit present.      Mental Status: He is alert. He is disoriented.   Psychiatric:         Mood and Affect: Affect normal. Mood is anxious.         Speech: Speech normal.         Behavior: Behavior is agitated.         Cognition  and Memory: Cognition is impaired.       Results Review     I reviewed the patient's new clinical results.  I reviewed the patient's telemetry.  Results from last 7 days   Lab Units 01/19/21 0418 01/18/21 0450 01/17/21 0429 01/16/21  1850   WBC 10*3/mm3 6.16 5.76 6.81 5.96   HEMOGLOBIN g/dL 12.8* 12.8* 13.9 16.5   PLATELETS 10*3/mm3 143 144 168 255     Results from last 7 days   Lab Units 01/19/21 0418 01/18/21 0450 01/17/21  2240 01/17/21 0429 01/16/21  1850   SODIUM mmol/L 134* 127*  --  131* 133*   POTASSIUM mmol/L 4.5 3.8 4.4 3.4* 3.6   CHLORIDE mmol/L 101 92*  --  94* 91*   CO2 mmol/L 20.8* 25.0  --  16.2* 21.7*   BUN mg/dL 12 17  --  19 18   CREATININE mg/dL 0.55* 0.82  --  0.77 1.00   GLUCOSE mg/dL 84 88  --  95 116*   Estimated Creatinine Clearance: 178.5 mL/min (A) (by C-G formula based on SCr of 0.55 mg/dL (L)).  Results from last 7 days   Lab Units 01/17/21 0429 01/16/21  1850   ALBUMIN g/dL 3.70 4.60   BILIRUBIN mg/dL 0.6 0.6   ALK PHOS U/L 68 86   AST (SGOT) U/L 89* 108*   ALT (SGPT) U/L 32 43*     Results from last 7 days   Lab Units 01/19/21 0418 01/18/21 0450 01/17/21 0429 01/16/21  1850   CALCIUM mg/dL 8.2* 7.8* 7.4* 8.7   ALBUMIN g/dL  --   --  3.70 4.60   MAGNESIUM mg/dL  --  2.2 1.6 1.5*       COVID19   Date Value Ref Range Status   01/16/2021 Not Detected Not Detected - Ref. Range Final   05/13/2020 Not Detected Not Detected - Ref. Range Final     No results found for: HGBA1C, POCGLU    XR Chest 1 View  Narrative: Patient: HERB LUNA  Time Out: 00:01  Exam(s): FILM CXR 1 VIEW     EXAM:    XR Chest, 1 View    CLINICAL HISTORY:     crackles in bases and oxygen needs  Reason for exam: crackles in bases   and oxygen needs. Reason for Exam:->crackles in bases and oxygen needs.   Portable->Yes. Does this patient have a diabetic monitoring medication   delivering device on?->No.. Additional notes: OXYGEN NEEDS    TECHNIQUE:    Frontal view of the chest.    COMPARISON:    5 13  20    FINDINGS:      No significant cardiac enlargement.  Lungs are hypoinflated with vascular   crowding.  Negative for evidence of focal consolidation, pneumothorax or   pleural fluid collections.  Recommend upright PA and lateral views of the   chest when clinically feasible.  Impression: Electronically signed by Oren Rodgers DO on 01-17-21 at 0001    Scheduled Medications  amLODIPine, 5 mg, Oral, QAM  atorvastatin, 20 mg, Oral, Daily  Cariprazine HCl, 3 mg, Oral, Daily  chlordiazePOXIDE, 50 mg, Oral, Q8H  doxepin, 100 mg, Oral, Nightly  enoxaparin, 40 mg, Subcutaneous, Q24H  folic acid, 1 mg, Oral, Daily  gabapentin, 100 mg, Oral, Q12H  levothyroxine, 100 mcg, Oral, Daily  lisinopril, 10 mg, Oral, Daily  sodium chloride, 3 mL, Intravenous, Q12H  thiamine, 200 mg, Oral, Daily  vitamin B-12, 1,000 mcg, Oral, Daily    Infusions  sodium chloride 0.9 % with KCl 20 mEq, 125 mL/hr, Last Rate: 125 mL/hr (01/19/21 0108)    Diet  Diet Regular       Assessment/Plan     Active Hospital Problems    Diagnosis  POA   • **Alcohol dependence with uncomplicated withdrawal (CMS/HCC) [F10.230]  Yes   • Non-traumatic rhabdomyolysis [M62.82]  No   • Hypomagnesemia [E83.42]  Yes   • Left ventricular diastolic dysfunction [I51.9]  Yes   • Hyponatremia [E87.1]  Yes   • Hypokalemia [E87.6]  Yes   • QT prolongation [R94.31]  Yes   • Neuropathy involving both lower extremities [G57.93]  Yes   • Insomnia [G47.00]  Yes   • Hypertension [I10]  Yes   • Panic disorder without agoraphobia [F41.0]  Yes      Resolved Hospital Problems   No resolved problems to display.   EtOH Dependence in Withdrawal  - worsening today  - continue on PRN ativan per CIWA protocol-I gave an extra 1 time dose of 3mg ativan  - start on librium 50mg Q8H  - vitamin replacement  - access center following    Non-Traumatic Rhabdomyolysis  - likely from EtOH abuse  - will continue IVF and monitor CK  - no evidence of compartment syndrome    Hyponatremia  - improved with  IVF, continue  - monitor BMP    Hypoxia  - occurs when sleeping  - CXR noted nothing acute   - encourage pulmonary toilet and wean oxygen as tolerated  - will likely need outpatient sleep study    Panic Disorder  - continue on current regimen      Lovenox 40 mg SC daily for DVT prophylaxis.  Full code.  Discussed with patient and nursing staff.  Anticipate discharge TBD.  timing yet to be determined.      Gerbre Costello MD  [facility]  01/19/21  09:39 EST    Patient was wearing facemask when I entered the room and throughout our encounter.  I wore protective equipment throughout this patient encounter including a face mask, gloves and protective eyewear.  Hand hygiene was performed before donning protective equipment and after removal when leaving the room.

## 2021-01-19 NOTE — NURSING NOTE
Pt observed in bed with eyes closed, hallucinating and posey vest in place. Spoke with MARTI Okeefe who reported that pt has been agitated and hallucinating with alcohol withdrawal symptoms all morning. Access will follow up at a later date.

## 2021-01-19 NOTE — SIGNIFICANT NOTE
01/19/21 1130   OTHER   Discipline physical therapist   Rehab Time/Intention   Session Not Performed unable to treat, medical status change  (discussed with RN, will hold PT today, pt not appropriate, increased agitation and hallucinations, pt in restraints. will follow up tomorrow.)   Recommendation   PT - Next Appointment 01/20/21

## 2021-01-19 NOTE — PLAN OF CARE
Goal Outcome Evaluation:  Plan of Care Reviewed With: patient  Progress: improving  Outcome Summary: Continut to monitor vs, labs, ciwa score and follow protocol, bladder scan every 6 hours straight cath >350, pt given ativan per protocol, use of bed alarm, safety maintained.

## 2021-01-19 NOTE — PLAN OF CARE
Goal Outcome Evaluation:  Plan of Care Reviewed With: patient  Progress: no change  Outcome Summary: VSS; pt tolerating restraints; ativan given several times for alcohol withdrawal; pt started to calm down towards the afternoon and slept a long while; pt woke up around 5 and seems more alert and calm; continue to monitor

## 2021-01-20 LAB
ANION GAP SERPL CALCULATED.3IONS-SCNC: 7.1 MMOL/L (ref 5–15)
BASOPHILS # BLD AUTO: 0.02 10*3/MM3 (ref 0–0.2)
BASOPHILS NFR BLD AUTO: 0.5 % (ref 0–1.5)
BUN SERPL-MCNC: 7 MG/DL (ref 8–23)
BUN/CREAT SERPL: 11.3 (ref 7–25)
CALCIUM SPEC-SCNC: 8.1 MG/DL (ref 8.6–10.5)
CHLORIDE SERPL-SCNC: 109 MMOL/L (ref 98–107)
CK SERPL-CCNC: 516 U/L (ref 20–200)
CO2 SERPL-SCNC: 20.9 MMOL/L (ref 22–29)
CREAT SERPL-MCNC: 0.62 MG/DL (ref 0.76–1.27)
DEPRECATED RDW RBC AUTO: 43.5 FL (ref 37–54)
EOSINOPHIL # BLD AUTO: 0.08 10*3/MM3 (ref 0–0.4)
EOSINOPHIL NFR BLD AUTO: 1.9 % (ref 0.3–6.2)
ERYTHROCYTE [DISTWIDTH] IN BLOOD BY AUTOMATED COUNT: 13.5 % (ref 12.3–15.4)
GFR SERPL CREATININE-BSD FRML MDRD: 131 ML/MIN/1.73
GLUCOSE SERPL-MCNC: 88 MG/DL (ref 65–99)
HCT VFR BLD AUTO: 36.4 % (ref 37.5–51)
HGB BLD-MCNC: 12.7 G/DL (ref 13–17.7)
IMM GRANULOCYTES # BLD AUTO: 0.04 10*3/MM3 (ref 0–0.05)
IMM GRANULOCYTES NFR BLD AUTO: 1 % (ref 0–0.5)
LYMPHOCYTES # BLD AUTO: 1.19 10*3/MM3 (ref 0.7–3.1)
LYMPHOCYTES NFR BLD AUTO: 28.3 % (ref 19.6–45.3)
MCH RBC QN AUTO: 31.3 PG (ref 26.6–33)
MCHC RBC AUTO-ENTMCNC: 34.9 G/DL (ref 31.5–35.7)
MCV RBC AUTO: 89.7 FL (ref 79–97)
MONOCYTES # BLD AUTO: 0.46 10*3/MM3 (ref 0.1–0.9)
MONOCYTES NFR BLD AUTO: 10.9 % (ref 5–12)
NEUTROPHILS NFR BLD AUTO: 2.42 10*3/MM3 (ref 1.7–7)
NEUTROPHILS NFR BLD AUTO: 57.4 % (ref 42.7–76)
NRBC BLD AUTO-RTO: 0 /100 WBC (ref 0–0.2)
PLATELET # BLD AUTO: 141 10*3/MM3 (ref 140–450)
PMV BLD AUTO: 10.6 FL (ref 6–12)
POTASSIUM SERPL-SCNC: 4.3 MMOL/L (ref 3.5–5.2)
RBC # BLD AUTO: 4.06 10*6/MM3 (ref 4.14–5.8)
SODIUM SERPL-SCNC: 137 MMOL/L (ref 136–145)
WBC # BLD AUTO: 4.21 10*3/MM3 (ref 3.4–10.8)

## 2021-01-20 PROCEDURE — 97530 THERAPEUTIC ACTIVITIES: CPT

## 2021-01-20 PROCEDURE — 97535 SELF CARE MNGMENT TRAINING: CPT

## 2021-01-20 PROCEDURE — 82550 ASSAY OF CK (CPK): CPT | Performed by: INTERNAL MEDICINE

## 2021-01-20 PROCEDURE — 25010000002 ENOXAPARIN PER 10 MG: Performed by: INTERNAL MEDICINE

## 2021-01-20 PROCEDURE — 80048 BASIC METABOLIC PNL TOTAL CA: CPT | Performed by: INTERNAL MEDICINE

## 2021-01-20 PROCEDURE — 85025 COMPLETE CBC W/AUTO DIFF WBC: CPT | Performed by: INTERNAL MEDICINE

## 2021-01-20 PROCEDURE — 25810000003 SODIUM CHLORIDE 0.9 % WITH KCL 20 MEQ 20-0.9 MEQ/L-% SOLUTION: Performed by: INTERNAL MEDICINE

## 2021-01-20 PROCEDURE — 97110 THERAPEUTIC EXERCISES: CPT

## 2021-01-20 RX ORDER — CHLORDIAZEPOXIDE HYDROCHLORIDE 25 MG/1
50 CAPSULE, GELATIN COATED ORAL EVERY 12 HOURS
Status: DISCONTINUED | OUTPATIENT
Start: 2021-01-21 | End: 2021-01-21

## 2021-01-20 RX ADMIN — FOLIC ACID 1 MG: 1 TABLET ORAL at 08:54

## 2021-01-20 RX ADMIN — DOXEPIN HYDROCHLORIDE 100 MG: 75 CAPSULE ORAL at 21:28

## 2021-01-20 RX ADMIN — LORAZEPAM 2 MG: 1 TABLET ORAL at 08:53

## 2021-01-20 RX ADMIN — LORAZEPAM 2 MG: 1 TABLET ORAL at 21:29

## 2021-01-20 RX ADMIN — GABAPENTIN 100 MG: 100 CAPSULE ORAL at 08:54

## 2021-01-20 RX ADMIN — AMLODIPINE BESYLATE 5 MG: 5 TABLET ORAL at 06:18

## 2021-01-20 RX ADMIN — Medication 200 MG: at 08:54

## 2021-01-20 RX ADMIN — Medication 1000 MCG: at 08:54

## 2021-01-20 RX ADMIN — LORAZEPAM 2 MG: 1 TABLET ORAL at 06:18

## 2021-01-20 RX ADMIN — CHLORDIAZEPOXIDE HYDROCHLORIDE 50 MG: 25 CAPSULE ORAL at 15:51

## 2021-01-20 RX ADMIN — LIDOCAINE HYDROCHLORIDE: 20 JELLY TOPICAL at 10:30

## 2021-01-20 RX ADMIN — POTASSIUM CHLORIDE AND SODIUM CHLORIDE 125 ML/HR: 900; 150 INJECTION, SOLUTION INTRAVENOUS at 21:29

## 2021-01-20 RX ADMIN — LISINOPRIL 10 MG: 10 TABLET ORAL at 08:54

## 2021-01-20 RX ADMIN — ENOXAPARIN SODIUM 40 MG: 40 INJECTION SUBCUTANEOUS at 23:14

## 2021-01-20 RX ADMIN — CHLORDIAZEPOXIDE HYDROCHLORIDE 50 MG: 25 CAPSULE ORAL at 08:53

## 2021-01-20 RX ADMIN — GABAPENTIN 100 MG: 100 CAPSULE ORAL at 21:28

## 2021-01-20 RX ADMIN — ATORVASTATIN CALCIUM 20 MG: 20 TABLET, FILM COATED ORAL at 09:02

## 2021-01-20 RX ADMIN — ACETAMINOPHEN 650 MG: 325 TABLET, FILM COATED ORAL at 08:54

## 2021-01-20 RX ADMIN — CARIPRAZINE 3 MG: 3 CAPSULE, GELATIN COATED ORAL at 08:53

## 2021-01-20 RX ADMIN — LEVOTHYROXINE SODIUM 100 MCG: 0.1 TABLET ORAL at 08:54

## 2021-01-20 RX ADMIN — SODIUM CHLORIDE, PRESERVATIVE FREE 3 ML: 5 INJECTION INTRAVENOUS at 21:29

## 2021-01-20 NOTE — THERAPY TREATMENT NOTE
Patient Name: Pro Mueller  : 1955    MRN: [id number]                              Today's Date: 2021       Admit Date: 2021    Visit Dx:     ICD-10-CM ICD-9-CM   1. Alcohol dependence with uncomplicated withdrawal (CMS/[facility])  F10.230 303.90     291.81   2. Alcoholic intoxication without complication (CMS/[facility])  F10.920 305.00     Patient Active Problem List   Diagnosis   • Alcoholism (CMS/[facility])   • Atopic rhinitis   • Mixed anxiety depressive disorder   • Genital herpes simplex   • Hyperlipidemia   • Hypertension   • Hypothyroidism   • Insomnia   • Panic disorder without agoraphobia   • Persistent insomnia   • Vitamin D deficiency   • Alcohol withdrawal (CMS/[facility])   • Neuropathy involving both lower extremities   • Syncope and collapse   • QT prolongation   • Left shoulder pain   • Alcoholic ketoacidosis   • Hyponatremia   • Hypokalemia   • Alcohol dependence with uncomplicated withdrawal (CMS/[facility])   • Pancytopenia (CMS/[facility])   • Nephrolithiasis   • Hypomagnesemia   • Left ventricular diastolic dysfunction   • Non-traumatic rhabdomyolysis     Past Medical History:   Diagnosis Date   • Alcohol abuse    • Anxiety    • Arthritis    • Atopic rhinitis 2016   • Depression    • Disease of thyroid gland    • Elevated cholesterol    • Encounter for removal of sutures    • Genital herpes simplex 2016   • GERD (gastroesophageal reflux disease)    • Headache, tension-type    • Hyperlipidemia    • Hypertension    • Kidney stone    • Migraine    • Motion sickness    • Olecranon bursitis, right elbow    • Panic disorder without agoraphobia 2016   • Peripheral neuropathy    • Sleep apnea    • Vitamin D deficiency 2016   • Withdrawal symptoms, alcohol (CMS/[facility])      Past Surgical History:   Procedure Laterality Date   • BACK SURGERY      Pt denies.   • COLONOSCOPY     • CYST REMOVAL     • EXTRACORPOREAL SHOCK WAVE LITHOTRIPSY (ESWL) Right    • SHOULDER ARTHROSCOPY Right 2019    Procedure:  SHOULDER ARTHROSCOPY, decompression, distal clavicle excision;  Surgeon: Aj Mancilla MD;  [facility];  Service: Orthopedics   • TONSILLECTOMY       General Information     Row Name 01/20/21 1204          Physical Therapy Time and Intention    Document Type  therapy note (daily note)  -EJ     Mode of Treatment  physical therapy  -EJ     Row Name 01/20/21 1204          General Information    Existing Precautions/Restrictions  fall;oxygen therapy device and L/min  -EJ       User Key  (r) = Recorded By, (t) = Taken By, (c) = Cosigned By    Initials Name Provider Type    EJ Doris Clark, PT Physical Therapist        Mobility     Row Name 01/20/21 1205          Bed Mobility    All Activities, [place] (Bed Mobility)  2 person assist;1 person to manage equipment  -EJ     Supine-Sit [place] (Bed Mobility)  verbal cues;minimum assist (75% patient effort);2 person assist  -EJ     Sit-Supine [place] (Bed Mobility)  verbal cues;contact guard;minimum assist (75% patient effort)  -EJ     Assistive Device (Bed Mobility)  bed rails;head of bed elevated  -EJ     Row Name 01/20/21 1205          Sit-Stand Transfer    Sit-Stand [place] (Transfers)  verbal cues;minimum assist (75% patient effort);2 person assist  -EJ     Assistive Device (Sit-Stand Transfers)  walker, front-wheeled  -EJ     Row Name 01/20/21 1205          Gait/Stairs (Locomotion)    [place] Level (Gait)  verbal cues;minimum assist (75% patient effort);2 person assist  -EJ     Assistive Device (Gait)  walker, front-wheeled  -EJ     Distance in Feet (Gait)  20  -EJ     Deviations/Abnormal Patterns (Gait)  godfrey decreased;stride length decreased  -EJ     Bilateral Gait Deviations  heel strike decreased;forward flexed posture  -EJ     Comment (Gait/Stairs)  slightly unsteady, assist with walker management, cues for upright posture and to keep eyes open  -EJ       User Key  (r) = Recorded By, (t) = Taken By, (c) = Cosigned  By    Initials Name Provider Type    Doris Artis, PT Physical Therapist        Obj/Interventions    No documentation.       Goals/Plan    No documentation.       Clinical Impression     Row Name 01/20/21 1210          Pain Scale: Numbers Pre/Post-Treatment    Pretreatment Pain Rating  0/10 - no pain  -EJ     Posttreatment Pain Rating  0/10 - no pain  -EJ     Row Name 01/20/21 1210          Plan of Care Review    Plan of Care Reviewed With  patient  -EJ     Outcome Summary  Pt seen for PT this am. he is doing better today and able to participate, although still with some confusion. Pt able to ambulate approx 20 ft in room with Rwx and min A x 2. He does complain of some nausea and dizziness. Will continue to progress activity as tolerated.  -EJ     Row Name 01/20/21 1210          Positioning and Restraints    Pre-Treatment Position  in bed  -EJ     Post Treatment Position  bed  -EJ     In Bed  notified nsg;supine;call light within reach;encouraged to call for assist;exit alarm on  -EJ     Restraints  reapplied:;vest  -EJ       User Key  (r) = Recorded By, (t) = Taken By, (c) = Cosigned By    Initials Name Provider Type    Doris Artis, PT Physical Therapist        Outcome Measures     Row Name 01/20/21 1212          How much help from another person do you currently need...    Turning from your back to your side while in flat bed without using bedrails?  3  -EJ     Moving from lying on back to sitting on the side of a flat bed without bedrails?  3  -EJ     Moving to and from a bed to a chair (including a wheelchair)?  3  -EJ     Standing up from a chair using your arms (e.g., wheelchair, bedside chair)?  3  -EJ     Climbing 3-5 steps with a railing?  2  -EJ     To walk in hospital room?  3  -EJ     AM-PAC 6 Clicks Score (PT)  17  -EJ       User Key  (r) = Recorded By, (t) = Taken By, (c) = Cosigned By    Initials Name Provider Doris Lee, PT Physical Therapist        Physical  Therapy Education                 Title: PT OT SLP Therapies (In Progress)     Topic: Physical Therapy (In Progress)     Point: Mobility training (Done)     Learning Progress Summary           Patient Acceptance, E,TB,D, VU,NR by  at 1/20/2021 1212    Acceptance, E,TB,D, VU,NR by  at 1/18/2021 1540                   Point: Home exercise program (Not Started)     Learner Progress:  Not documented in this visit.          Point: Body mechanics (Not Started)     Learner Progress:  Not documented in this visit.          Point: Precautions (Not Started)     Learner Progress:  Not documented in this visit.                      User Key     Initials Effective Dates Name Provider Type [facility] 04/03/18 -  Doris Clark, PT Physical Therapist PT              PT Recommendation and Plan  Planned Therapy Interventions (PT): bed mobility training, balance training, gait training, home exercise program, patient/family education, strengthening, ROM (range of motion), stair training, transfer training  Plan of Care Reviewed With: patient  Progress: improving  Outcome Summary: Pt seen for PT this am. he is doing better today and able to participate, although still with some confusion. Pt able to ambulate approx 20 ft in room with Rwx and min A x 2. He does complain of some nausea and dizziness. Will continue to progress activity as tolerated.     Time Calculation:   PT Charges     Row Name 01/20/21 1212             Time Calculation    Start Time  0958  -EJ      Stop Time  1021  -EJ      Time Calculation (min)  23 min  -EJ      PT Received On  01/20/21  -EJ      PT - Next Appointment  01/21/21  -EJ        User Key  (r) = Recorded By, (t) = Taken By, (c) = Cosigned By    Initials Name Provider Type     Doris Clark, PT Physical Therapist        Therapy Charges for Today     Code Description Service Date Service Provider Modifiers Qty    37545502195 HC PT THER PROC EA 15 MIN 1/20/2021 Doris Clark, PT GP 2     [id number]  PT THER SUPP EA 15 MIN 1/20/2021 Doris Clark, PT GP 1          PT G-Codes  Outcome Measure Options: AM-PAC 6 Clicks Basic Mobility (PT)  AM-PAC 6 Clicks Score (PT): 17  AM-PAC 6 Clicks Score (OT): 12    Doris Clark, PT  1/20/2021

## 2021-01-20 NOTE — PLAN OF CARE
Goal Outcome Evaluation:  Plan of Care Reviewed With: patient  Progress: no change   No acute distress noted, straight cath needed twice this shift for retention, safety maintained, continue plan of care

## 2021-01-20 NOTE — PLAN OF CARE
Goal Outcome Evaluation:  Plan of Care Reviewed With: patient  Progress: no change  Outcome Summary: VSS; pt tolerating restraints; ativan given several times for alcohol withdrawal; pt started to calm down towards the afternoon and slept a long while; pt woke up around 5 and seems more alert and calm; continue to monitor  Problem: Adult Inpatient Plan of Care  Goal: Plan of Care Review  Outcome: Ongoing, Progressing  Flowsheets  Taken 1/19/2021 1856 by Sary Frazier, RN  Progress: no change  Outcome Summary:   VSS   pt tolerating restraints   ativan given several times for alcohol withdrawal   pt started to calm down towards the afternoon and slept a long while   pt woke up around 5 and seems more alert and calm   continue to monitor  Taken 1/19/2021 0312 by Vianney Chowdhury, RN  Plan of Care Reviewed With: patient

## 2021-01-20 NOTE — PROGRESS NOTES
Access did follow up on pt. Pt was sleeping and not responding to name. Access talked to nurse who stated pt was likely having hallucinations and it was difficult to score his CIWA. Access left info on [facility] as pt had asked for that when consulted earlier. Access will continue to follow.

## 2021-01-20 NOTE — PLAN OF CARE
Goal Outcome Evaluation:  Plan of Care Reviewed With: patient  Progress: no change  Outcome Summary: VSS; pt tolerating restraints; ativan given several times for alcohol withdrawal; pt started to calm down towards the afternoon and slept a long while; pt woke up around 5 and seems more alert and calm; continue to monitor  Problem: Restraint, Nonbehavioral (Nonviolent)  Goal: Discontinuation Criteria Achieved  Intervention: Implement Least-restrictive Safety Strategies  Flowsheets  Taken 1/19/2021 1948 by Sary Frazier RN  Less Restrictive Alternatives:   bed alarm in use   calming techniques promoted   positive reinforcement provided   safety enhancements provided  Taken 1/19/2021 1855 by Sary Frazier RN  Medical Device Protection: IV pole/bag removed from visual field  Taken 1/19/2021 1600 by Sary Frazier RN  Medical Device Protection: IV pole/bag removed from visual field  Taken 1/19/2021 1317 by Sary Frazier RN  Medical Device Protection: IV pole/bag removed from visual field  Taken 1/19/2021 1210 by Sary Frazier RN  Medical Device Protection: IV pole/bag removed from visual field  Taken 1/19/2021 1000 by Sary Frazeir RN  Medical Device Protection: IV pole/bag removed from visual field  Taken 1/17/2021 0843 by Najma Mendoza RN  Diversional Activities: television

## 2021-01-20 NOTE — THERAPY TREATMENT NOTE
Patient Name: Pro Mueller  : 1955    MRN: [id number]                              Today's Date: 2021       Admit Date: 2021    Visit Dx:     ICD-10-CM ICD-9-CM   1. Alcohol dependence with uncomplicated withdrawal (CMS/[facility])  F10.230 303.90     291.81   2. Alcoholic intoxication without complication (CMS/[facility])  F10.920 305.00     Patient Active Problem List   Diagnosis   • Alcoholism (CMS/[facility])   • Atopic rhinitis   • Mixed anxiety depressive disorder   • Genital herpes simplex   • Hyperlipidemia   • Hypertension   • Hypothyroidism   • Insomnia   • Panic disorder without agoraphobia   • Persistent insomnia   • Vitamin D deficiency   • Alcohol withdrawal (CMS/[facility])   • Neuropathy involving both lower extremities   • Syncope and collapse   • QT prolongation   • Left shoulder pain   • Alcoholic ketoacidosis   • Hyponatremia   • Hypokalemia   • Alcohol dependence with uncomplicated withdrawal (CMS/[facility])   • Pancytopenia (CMS/[facility])   • Nephrolithiasis   • Hypomagnesemia   • Left ventricular diastolic dysfunction   • Non-traumatic rhabdomyolysis     Past Medical History:   Diagnosis Date   • Alcohol abuse    • Anxiety    • Arthritis    • Atopic rhinitis 2016   • Depression    • Disease of thyroid gland    • Elevated cholesterol    • Encounter for removal of sutures    • Genital herpes simplex 2016   • GERD (gastroesophageal reflux disease)    • Headache, tension-type    • Hyperlipidemia    • Hypertension    • Kidney stone    • Migraine    • Motion sickness    • Olecranon bursitis, right elbow    • Panic disorder without agoraphobia 2016   • Peripheral neuropathy    • Sleep apnea    • Vitamin D deficiency 2016   • Withdrawal symptoms, alcohol (CMS/[facility])      Past Surgical History:   Procedure Laterality Date   • BACK SURGERY      Pt denies.   • COLONOSCOPY     • CYST REMOVAL     • EXTRACORPOREAL SHOCK WAVE LITHOTRIPSY (ESWL) Right    • SHOULDER ARTHROSCOPY Right 2019    Procedure:  SHOULDER ARTHROSCOPY, decompression, distal clavicle excision;  Surgeon: Aj Mancilla MD;  [place] [facility] [place] [facility] [place]  Service: [place]opedics   • TONSILLECTOMY       General Information     Row Name 01/20/21 1242          OT Time and Intention    Document Type  therapy note (daily note)  -RB     Mode of Treatment  occupational therapy  -RB     Row Name 01/20/21 1242          General Information    Patient Profile Reviewed  yes  -RB     Existing Precautions/Restrictions  fall  -RB     Barriers to Rehab  medically complex  -RB     Row Name 01/20/21 1242          Living Environment    Lives With  significant other  -RB     Row Name 01/20/21 1242          Home Main Entrance    Number of Stairs, Main Entrance  two  -RB     Row Name 01/20/21 1242          Cognition    Orientation Status (Cognition)  oriented x 3 mild confusion  -RB       User Key  (r) = Recorded By, (t) = Taken By, (c) = Cosigned By    Initials Name Provider Type    RB Maria Elena Meza OT Occupational Therapist          Mobility/ADL's     Row Name 01/20/21 1243          Bed Mobility    Bed Mobility  bed mobility (all) activities  -RB     All Activities, [place] (Bed Mobility)  minimum assist (75% patient effort);verbal cues  -RB     Supine-Sit [place] (Bed Mobility)  minimum assist (75% patient effort);verbal cues  -RB     Sit-Supine [place] (Bed Mobility)  minimum assist (75% patient effort);2 person assist;verbal cues  -RB     Assistive Device (Bed Mobility)  bed rails;head of bed elevated  -RB     Row Name 01/20/21 1243          Transfers    Transfers  sit-stand transfer  -RB     Sit-Stand [place] (Transfers)  minimum assist (75% patient effort);2 person assist;verbal cues  -RB     Row Name 01/20/21 1243          Sit-Stand Transfer    Assistive Device (Sit-Stand Transfers)  walker, front-wheeled  -RB     Row Name 01/20/21 1243          Functional Mobility    Functional Mobility- Ind. Level  minimum assist (75% patient effort);2  person assist required  -RB     Functional Mobility- Device  rolling walker  -RB     Row Name 01/20/21 1243          Activities of Daily Living    BADL Assessment/Intervention  lower body dressing;upper body dressing  -RB     Row Name 01/20/21 1243          Upper Body Dressing Assessment/Training    [place] Level (Upper Body Dressing)  upper body dressing skills;maximum assist (25% patient effort)  -RB     Position (Upper Body Dressing)  edge of bed sitting  -RB     Comment (Upper Body Dressing)  around hospital gown and posey vest.  -RB     Row Name 01/20/21 1243          Lower Body Dressing Assessment/Training    [place] Level (Lower Body Dressing)  lower body dressing skills;dependent (less than 25% patient effort)  -RB     Position (Lower Body Dressing)  supine  -RB     Comment (Lower Body Dressing)  difficult time moving BLE's  -RB       User Key  (r) = Recorded By, (t) = Taken By, (c) = Cosigned By    Initials Name Provider Type    Maria Elena Reyes OT Occupational Therapist        Obj/Interventions     Row Name 01/20/21 1244          Balance    Static Sitting Balance  WFL  -RB     Dynamic Sitting Balance  mild impairment  -RB     Static Standing Balance  mild impairment  -RB     Dynamic Standing Balance  mild impairment  -RB     Balance Interventions  UE activity with balance activity  -RB     Comment, Balance  Pt unsteady and assist x2 provided. OT assisted with walker positioning and following directions during mobility.  -RB       User Key  (r) = Recorded By, (t) = Taken By, (c) = Cosigned By    Initials Name Provider Type    Maria Elena Reyes OT Occupational Therapist        Goals/Plan    No documentation.       Clinical Impression     Row Name 01/20/21 1245          Pain Scale: Numbers Pre/Post-Treatment    Pretreatment Pain Rating  0/10 - no pain  -RB     Posttreatment Pain Rating  0/10 - no pain  -RB     Row Name 01/20/21 1245          Plan of Care Review    Plan of Care Reviewed With   patient  -RB     Progress  no change  -RB     Row Name 01/20/21 1245          Therapy Assessment/Plan (OT)    Rehab Potential (OT)  good, to achieve stated therapy goals  -RB     Criteria for Skilled Therapeutic Interventions Met (OT)  yes;skilled treatment is necessary  -RB     Therapy Frequency (OT)  5 times/wk  -RB     Row Name 01/20/21 1245          Therapy Plan Review/Discharge Plan (OT)    Anticipated Discharge Disposition (OT)  inpatient rehabilitation facility  -RB     Row Name 01/20/21 1245          Vital Signs    Pre SpO2 (%)  94  -RB     O2 Delivery Pre Treatment  room air  -RB     Intra SpO2 (%)  93  -RB     O2 Delivery Intra Treatment  room air  -RB     Post SpO2 (%)  95  -RB     O2 Delivery Post Treatment  room air  -RB     Pre Patient Position  Supine  -RB     Intra Patient Position  Standing  -RB     Post Patient Position  Supine  -RB     Row Name 01/20/21 1245          Positioning and Restraints    Pre-Treatment Position  in bed  -RB     Post Treatment Position  bed  -RB     In Bed  notified nsg;supine;fowlers;call light within reach;encouraged to call for assist;exit alarm on  -RB     Restraints  vest;reapplied:  -RB       User Key  (r) = Recorded By, (t) = Taken By, (c) = Cosigned By    Initials Name Provider Type    RB Maria Elena Meza OT Occupational Therapist        Outcome Measures    No documentation.       Occupational Therapy Education                 Title: PT OT SLP Therapies (In Progress)     Topic: Occupational Therapy (Not Started)     Point: ADL training (Not Started)     Description:   Instruct learner(s) on proper safety adaptation and remediation techniques during self care or transfers.   Instruct in proper use of assistive devices.              Learner Progress:  Not documented in this visit.          Point: Home exercise program (Not Started)     Description:   Instruct learner(s) on appropriate technique for monitoring, assisting and/or progressing therapeutic  exercises/activities.              Learner Progress:  Not documented in this visit.          Point: Precautions (Not Started)     Description:   Instruct learner(s) on prescribed precautions during self-care and functional transfers.              Learner Progress:  Not documented in this visit.          Point: Body mechanics (Not Started)     Description:   Instruct learner(s) on proper positioning and spine alignment during self-care, functional mobility activities and/or exercises.              Learner Progress:  Not documented in this visit.                          OT Recommendation and Plan  Planned Therapy Interventions (OT): activity tolerance training, adaptive equipment training, BADL retraining, transfer/mobility retraining, strengthening exercise, ROM/therapeutic exercise, patient/caregiver education/training, functional balance retraining, occupation/activity based interventions, cognitive/visual perception retraining  Therapy Frequency (OT): 5 times/wk  Plan of Care Review  Plan of Care Reviewed With: patient  Progress: no change     Time Calculation:   Time Calculation- OT     Row Name 01/20/21 1242             Time Calculation- OT    OT Start Time  0953  -RB      OT Stop Time  1023  -RB      OT Time Calculation (min)  30 min  -RB      OT - Next Appointment  01/21/21  -RB        User Key  (r) = Recorded By, (t) = Taken By, (c) = Cosigned By    Initials Name Provider Type    RB Maria Elena Meza OT Occupational Therapist        Therapy Charges for Today     Code Description Service Date Service Provider Modifiers Qty    13313102529  OT THERAPEUTIC ACT EA 15 MIN 1/20/2021 Maria Elena Meza OT GO 1    [id number] HC OT SELF CARE/MGMT/TRAIN EA 15 MIN 1/20/2021 Maria Elena Meza OT GO 1               Maria Elena Meza OT  1/20/2021

## 2021-01-20 NOTE — NURSING NOTE
Patient became very agitated, irritable, and restless. Patient wanted to get up and was becoming very verbally aggressive; reorienting was not working; called security; notified Stella(nurse manager); security came up and talked to pt; vest restraint was put on the pt; ativan has been given since 0730 this morning every 15 minutes for CIWAs in the 30s and 20s.

## 2021-01-20 NOTE — PLAN OF CARE
Pt pleasant and appropriate for OT this morning. He was eager to participate in OOB activity. Pt able to transfer to EOB with Min A. Total A LB dressing. Max A to [facility] gown under posey and around lines. Pt with mild confusion and odd statements verbalized at times. He completed mobility with Min A x2 to doorway and back to bed. Fatigued reported after this activity. He returned to supine with Min A. [place] remained. Pt with blood around boone area. OT recommends acute rehab.     Patient was wearing a face mask during this therapy encounter. Therapist used appropriate personal protective equipment including mask, goggles and gloves. Mask used was standard procedure mask. Appropriate PPE was worn during the entire therapy session. Hand hygiene was completed before and after therapy session. Patient is not in enhanced droplet precautions.

## 2021-01-20 NOTE — PROGRESS NOTES
Name: Pro Mueller ADMIT: 2021   : 1955  PCP: Alla Beal MD    MRN: [id number] LOS: 4 days   AGE/SEX: 66 y.o. male  ROOM: [facility]     Subjective   Subjective   CC: alcohol withdrawal  No acute events. Patient is less agitated but he has some confusion at times. No CP/dyspnea/f/c/n/v/d.  Complains of some neck muscle pain no numbness/tingling/weakness of RUE.    Objective   Objective   Vital Signs  Temp:  [97.4 °F (36.3 °C)-98.3 °F (36.8 °C)] 97.9 °F (36.6 °C)  Heart Rate:  [76-84] 80  Resp:  [18-20] 18  BP: ()/(66-92) 149/92  SpO2:  [92 %-98 %] 98 %  on   ;   Device (Oxygen Therapy): room air  Body mass index is 27.8 kg/m².  Physical Exam  Vitals signs and nursing note reviewed.   Constitutional:       General: He is not in acute distress.     Appearance: He is not toxic-appearing.   HENT:      Head: Normocephalic and atraumatic.      Nose: Nose normal.      Mouth/Throat:      Mouth: Mucous membranes are moist.      Pharynx: Oropharynx is clear.   Eyes:      Conjunctiva/sclera: Conjunctivae normal.      Pupils: Pupils are equal, round, and reactive to light.   Neck:      Musculoskeletal: Normal range of motion and neck supple.   Cardiovascular:      Rate and Rhythm: Normal rate and regular rhythm.      Pulses: Normal pulses.   Pulmonary:      Effort: Pulmonary effort is normal.      Breath sounds: Normal breath sounds.   Abdominal:      General: Bowel sounds are normal.      Palpations: Abdomen is soft.      Tenderness: There is no abdominal tenderness.   Musculoskeletal:         General: Swelling (trace BLE) present. No tenderness.   Skin:     General: Skin is warm and dry.      Capillary Refill: Capillary refill takes less than 2 seconds.   Neurological:      General: No focal deficit present.      Mental Status: He is alert. He is disoriented.   Psychiatric:         Mood and Affect: Mood and affect normal.         Speech: Speech normal.         Cognition and Memory: Cognition is  impaired.       Results Review     I reviewed the patient's new clinical results.  I reviewed the patient's telemetry.  Results from last 7 days   Lab Units 01/20/21 0443 01/19/21 0418 01/18/21 0450 01/17/21 0429   WBC 10*3/mm3 4.21 6.16 5.76 6.81   HEMOGLOBIN g/dL 12.7* 12.8* 12.8* 13.9   PLATELETS 10*3/mm3 141 143 144 168     Results from last 7 days   Lab Units 01/20/21 0443 01/19/21 0418 01/18/21 0450 01/17/21  2240 01/17/21 0429   SODIUM mmol/L 137 134* 127*  --  131*   POTASSIUM mmol/L 4.3 4.5 3.8 4.4 3.4*   CHLORIDE mmol/L 109* 101 92*  --  94*   CO2 mmol/L 20.9* 20.8* 25.0  --  16.2*   BUN mg/dL 7* 12 17  --  19   CREATININE mg/dL 0.62* 0.55* 0.82  --  0.77   GLUCOSE mg/dL 88 84 88  --  95   Estimated Creatinine Clearance: 158.3 mL/min (A) (by C-G formula based on SCr of 0.62 mg/dL (L)).  Results from last 7 days   Lab Units 01/17/21 0429 01/16/21  1850   ALBUMIN g/dL 3.70 4.60   BILIRUBIN mg/dL 0.6 0.6   ALK PHOS U/L 68 86   AST (SGOT) U/L 89* 108*   ALT (SGPT) U/L 32 43*     Results from last 7 days   Lab Units 01/20/21 0443 01/19/21 0418 01/18/21 0450 01/17/21 0429 01/16/21  1850   CALCIUM mg/dL 8.1* 8.2* 7.8* 7.4* 8.7   ALBUMIN g/dL  --   --   --  3.70 4.60   MAGNESIUM mg/dL  --   --  2.2 1.6 1.5*       COVID19   Date Value Ref Range Status   01/16/2021 Not Detected Not Detected - Ref. Range Final   05/13/2020 Not Detected Not Detected - Ref. Range Final     No results found for: HGBA1C, POCGLU    XR Chest 1 View  Narrative: Patient: HERB LUNA  Time Out: 00:01  Exam(s): FILM CXR 1 VIEW     EXAM:    XR Chest, 1 View    CLINICAL HISTORY:     crackles in bases and oxygen needs  Reason for exam: crackles in bases   and oxygen needs. Reason for Exam:->crackles in bases and oxygen needs.   Portable->Yes. Does this patient have a diabetic monitoring medication   delivering device on?->No.. Additional notes: OXYGEN NEEDS    TECHNIQUE:    Frontal view of the chest.    COMPARISON:    5 13  20    FINDINGS:      No significant cardiac enlargement.  Lungs are hypoinflated with vascular   crowding.  Negative for evidence of focal consolidation, pneumothorax or   pleural fluid collections.  Recommend upright PA and lateral views of the   chest when clinically feasible.  Impression: Electronically signed by Oren Rodgers DO on 01-17-21 at 0001    Scheduled Medications  amLODIPine, 5 mg, Oral, QAM  atorvastatin, 20 mg, Oral, Daily  Cariprazine HCl, 3 mg, Oral, Daily  chlordiazePOXIDE, 50 mg, Oral, Q8H  doxepin, 100 mg, Oral, Nightly  enoxaparin, 40 mg, Subcutaneous, Q24H  folic acid, 1 mg, Oral, Daily  gabapentin, 100 mg, Oral, Q12H  levothyroxine, 100 mcg, Oral, Daily  lisinopril, 10 mg, Oral, Daily  sodium chloride, 3 mL, Intravenous, Q12H  thiamine, 200 mg, Oral, Daily  vitamin B-12, 1,000 mcg, Oral, Daily    Infusions  sodium chloride 0.9 % with KCl 20 mEq, 125 mL/hr, Last Rate: 125 mL/hr (01/19/21 1940)    Diet  Diet Regular       Assessment/Plan     Active Hospital Problems    Diagnosis  POA   • **Alcohol dependence with uncomplicated withdrawal (CMS/HCC) [F10.230]  Yes   • Non-traumatic rhabdomyolysis [M62.82]  No   • Hypomagnesemia [E83.42]  Yes   • Left ventricular diastolic dysfunction [I51.9]  Yes   • Hyponatremia [E87.1]  Yes   • Hypokalemia [E87.6]  Yes   • QT prolongation [R94.31]  Yes   • Neuropathy involving both lower extremities [G57.93]  Yes   • Insomnia [G47.00]  Yes   • Hypertension [I10]  Yes   • Panic disorder without agoraphobia [F41.0]  Yes      Resolved Hospital Problems   No resolved problems to display.   EtOH Dependence in Withdrawal  - continue on PRN ativan per CIWA protocol  - reduce librium to 50mg Q12H  - vitamin replacement  - access center following    Non-Traumatic Rhabdomyolysis  - likely from EtOH abuse  - will continue IVF and monitor CK  - no evidence of compartment syndrome    Hyponatremia  - improved with IVF, continue  - monitor BMP    Hypoxia  - occurs on  occasion when sleeping  - CXR noted nothing acute   - encourage pulmonary toilet and wean oxygen as tolerated  - will likely need outpatient sleep study    Panic Disorder  - continue on current regimen    Neck Pain  - muscular in origin  - heating pad PRN    Lovenox 40 mg SC daily for DVT prophylaxis.  Full code.  Discussed with patient and nursing staff.  Anticipate discharge TBD.  timing yet to be determined.      Gerber Costello MD  [facility]  01/20/21  16:53 EST    Patient was wearing facemask when I entered the room and throughout our encounter.  I wore protective equipment throughout this patient encounter including a face mask, gloves and protective eyewear.  Hand hygiene was performed before donning protective equipment and after removal when leaving the room.

## 2021-01-20 NOTE — PLAN OF CARE
Goal Outcome Evaluation:  Plan of Care Reviewed With: patient  Progress: no change  Outcome Summary: Pt seen for PT this am. he is doing better today and able to participate, although still with some confusion. Pt able to ambulate approx 20 ft in room with Rwx and min A x 2. He does complain of some nausea and dizziness. Will continue to progress activity as tolerated.  Patient was intermittently wearing a face mask during this therapy encounter. Therapist used appropriate personal protective equipment including eye protection, mask, and gloves.  Mask used was standard procedure mask. Appropriate PPE was worn during the entire therapy session. Hand hygiene was completed before and after therapy session. Patient is not in enhanced droplet precautions.

## 2021-01-21 PROBLEM — R33.9 URINE RETENTION: Status: ACTIVE | Noted: 2021-01-21

## 2021-01-21 LAB
ANION GAP SERPL CALCULATED.3IONS-SCNC: 10.8 MMOL/L (ref 5–15)
BASOPHILS # BLD AUTO: 0.02 10*3/MM3 (ref 0–0.2)
BASOPHILS NFR BLD AUTO: 0.5 % (ref 0–1.5)
BUN SERPL-MCNC: 5 MG/DL (ref 8–23)
BUN/CREAT SERPL: 6.8 (ref 7–25)
CALCIUM SPEC-SCNC: 8.5 MG/DL (ref 8.6–10.5)
CHLORIDE SERPL-SCNC: 107 MMOL/L (ref 98–107)
CK SERPL-CCNC: 219 U/L (ref 20–200)
CO2 SERPL-SCNC: 22.2 MMOL/L (ref 22–29)
CREAT SERPL-MCNC: 0.74 MG/DL (ref 0.76–1.27)
DEPRECATED RDW RBC AUTO: 43 FL (ref 37–54)
EOSINOPHIL # BLD AUTO: 0.13 10*3/MM3 (ref 0–0.4)
EOSINOPHIL NFR BLD AUTO: 3.3 % (ref 0.3–6.2)
ERYTHROCYTE [DISTWIDTH] IN BLOOD BY AUTOMATED COUNT: 13.2 % (ref 12.3–15.4)
GFR SERPL CREATININE-BSD FRML MDRD: 106 ML/MIN/1.73
GLUCOSE SERPL-MCNC: 94 MG/DL (ref 65–99)
HCT VFR BLD AUTO: 38.8 % (ref 37.5–51)
HGB BLD-MCNC: 13.4 G/DL (ref 13–17.7)
IMM GRANULOCYTES # BLD AUTO: 0.04 10*3/MM3 (ref 0–0.05)
IMM GRANULOCYTES NFR BLD AUTO: 1 % (ref 0–0.5)
LYMPHOCYTES # BLD AUTO: 1.41 10*3/MM3 (ref 0.7–3.1)
LYMPHOCYTES NFR BLD AUTO: 35.8 % (ref 19.6–45.3)
MCH RBC QN AUTO: 31 PG (ref 26.6–33)
MCHC RBC AUTO-ENTMCNC: 34.5 G/DL (ref 31.5–35.7)
MCV RBC AUTO: 89.8 FL (ref 79–97)
MONOCYTES # BLD AUTO: 0.57 10*3/MM3 (ref 0.1–0.9)
MONOCYTES NFR BLD AUTO: 14.5 % (ref 5–12)
NEUTROPHILS NFR BLD AUTO: 1.77 10*3/MM3 (ref 1.7–7)
NEUTROPHILS NFR BLD AUTO: 44.9 % (ref 42.7–76)
NRBC BLD AUTO-RTO: 0 /100 WBC (ref 0–0.2)
PLATELET # BLD AUTO: 144 10*3/MM3 (ref 140–450)
PMV BLD AUTO: 9.5 FL (ref 6–12)
POTASSIUM SERPL-SCNC: 4 MMOL/L (ref 3.5–5.2)
RBC # BLD AUTO: 4.32 10*6/MM3 (ref 4.14–5.8)
SODIUM SERPL-SCNC: 140 MMOL/L (ref 136–145)
WBC # BLD AUTO: 3.94 10*3/MM3 (ref 3.4–10.8)

## 2021-01-21 PROCEDURE — 80048 BASIC METABOLIC PNL TOTAL CA: CPT | Performed by: INTERNAL MEDICINE

## 2021-01-21 PROCEDURE — 25010000002 ENOXAPARIN PER 10 MG: Performed by: INTERNAL MEDICINE

## 2021-01-21 PROCEDURE — 85025 COMPLETE CBC W/AUTO DIFF WBC: CPT | Performed by: INTERNAL MEDICINE

## 2021-01-21 PROCEDURE — 97110 THERAPEUTIC EXERCISES: CPT

## 2021-01-21 PROCEDURE — 25810000003 SODIUM CHLORIDE 0.9 % WITH KCL 20 MEQ 20-0.9 MEQ/L-% SOLUTION: Performed by: INTERNAL MEDICINE

## 2021-01-21 PROCEDURE — 82550 ASSAY OF CK (CPK): CPT | Performed by: INTERNAL MEDICINE

## 2021-01-21 RX ORDER — TAMSULOSIN HYDROCHLORIDE 0.4 MG/1
0.4 CAPSULE ORAL DAILY
Status: DISCONTINUED | OUTPATIENT
Start: 2021-01-21 | End: 2021-01-22 | Stop reason: HOSPADM

## 2021-01-21 RX ORDER — CHLORDIAZEPOXIDE HYDROCHLORIDE 25 MG/1
25 CAPSULE, GELATIN COATED ORAL EVERY 12 HOURS
Status: DISCONTINUED | OUTPATIENT
Start: 2021-01-21 | End: 2021-01-22

## 2021-01-21 RX ADMIN — LORAZEPAM 2 MG: 1 TABLET ORAL at 23:49

## 2021-01-21 RX ADMIN — ENOXAPARIN SODIUM 40 MG: 40 INJECTION SUBCUTANEOUS at 23:49

## 2021-01-21 RX ADMIN — ACETAMINOPHEN 650 MG: 325 TABLET, FILM COATED ORAL at 03:22

## 2021-01-21 RX ADMIN — AMLODIPINE BESYLATE 5 MG: 5 TABLET ORAL at 06:25

## 2021-01-21 RX ADMIN — SODIUM CHLORIDE, PRESERVATIVE FREE 3 ML: 5 INJECTION INTRAVENOUS at 20:11

## 2021-01-21 RX ADMIN — LISINOPRIL 10 MG: 10 TABLET ORAL at 08:19

## 2021-01-21 RX ADMIN — CHLORDIAZEPOXIDE HYDROCHLORIDE 25 MG: 25 CAPSULE ORAL at 16:21

## 2021-01-21 RX ADMIN — GABAPENTIN 100 MG: 100 CAPSULE ORAL at 13:52

## 2021-01-21 RX ADMIN — CHLORDIAZEPOXIDE HYDROCHLORIDE 50 MG: 25 CAPSULE ORAL at 03:22

## 2021-01-21 RX ADMIN — Medication 200 MG: at 08:19

## 2021-01-21 RX ADMIN — POTASSIUM CHLORIDE AND SODIUM CHLORIDE 125 ML/HR: 900; 150 INJECTION, SOLUTION INTRAVENOUS at 07:23

## 2021-01-21 RX ADMIN — SODIUM CHLORIDE, PRESERVATIVE FREE 3 ML: 5 INJECTION INTRAVENOUS at 08:19

## 2021-01-21 RX ADMIN — LORAZEPAM 1 MG: 1 TABLET ORAL at 03:22

## 2021-01-21 RX ADMIN — Medication 1000 MCG: at 08:19

## 2021-01-21 RX ADMIN — DOXEPIN HYDROCHLORIDE 100 MG: 75 CAPSULE ORAL at 23:49

## 2021-01-21 RX ADMIN — GABAPENTIN 100 MG: 100 CAPSULE ORAL at 20:09

## 2021-01-21 RX ADMIN — ACETAMINOPHEN 650 MG: 325 TABLET, FILM COATED ORAL at 20:09

## 2021-01-21 RX ADMIN — TRAMADOL HYDROCHLORIDE 50 MG: 50 TABLET, FILM COATED ORAL at 23:49

## 2021-01-21 RX ADMIN — LEVOTHYROXINE SODIUM 100 MCG: 0.1 TABLET ORAL at 08:19

## 2021-01-21 RX ADMIN — ATORVASTATIN CALCIUM 20 MG: 20 TABLET, FILM COATED ORAL at 08:18

## 2021-01-21 RX ADMIN — LORAZEPAM 2 MG: 1 TABLET ORAL at 20:09

## 2021-01-21 RX ADMIN — FOLIC ACID 1 MG: 1 TABLET ORAL at 08:19

## 2021-01-21 RX ADMIN — CARIPRAZINE 3 MG: 3 CAPSULE, GELATIN COATED ORAL at 08:18

## 2021-01-21 NOTE — PLAN OF CARE
Goal Outcome Evaluation:  Plan of Care Reviewed With: patient  Progress: no change  Urethral catheter in place, no acute distress noted this shift, no aggressive behavior this shift, safety maintained, continue plan of care

## 2021-01-21 NOTE — PLAN OF CARE
Goal Outcome Evaluation:  Plan of Care Reviewed With: patient  Progress: no change  Outcome Summary: Restraints discontinued this am. Tolerated being out. Alert and oriented x 4. More alert. VSS. Walker ordered and at bedside. Monitor orientation, labs and vital signs.

## 2021-01-21 NOTE — PROGRESS NOTES
Name: Pro Mueller ADMIT: 2021   : 1955  PCP: Alla Beal MD    MRN: [id number] LOS: 5 days   AGE/SEX: 66 y.o. male  ROOM: [facility]     Subjective   Subjective   CC: alcohol withdrawal  No acute events. Patient is feeling better overall.  Still tired. No CP/dyspnea/f/c/n/v/d.     Objective   Objective   Vital Signs  Temp:  [97.3 °F (36.3 °C)-98.9 °F (37.2 °C)] 97.9 °F (36.6 °C)  Heart Rate:  [67-77] 71  Resp:  [18-20] 20  BP: (119-137)/(79-89) 131/89  SpO2:  [94 %-97 %] 94 %  on   ;   Device (Oxygen Therapy): room air  Body mass index is 27.8 kg/m².  Physical Exam  Vitals signs and nursing note reviewed.   Constitutional:       General: He is not in acute distress.     Appearance: He is not toxic-appearing.   HENT:      Head: Normocephalic and atraumatic.      Nose: Nose normal.      Mouth/Throat:      Mouth: Mucous membranes are moist.      Pharynx: Oropharynx is clear.   Eyes:      Conjunctiva/sclera: Conjunctivae normal.      Pupils: Pupils are equal, round, and reactive to light.   Neck:      Musculoskeletal: Normal range of motion and neck supple.   Cardiovascular:      Rate and Rhythm: Normal rate and regular rhythm.      Pulses: Normal pulses.   Pulmonary:      Effort: Pulmonary effort is normal.      Breath sounds: Normal breath sounds.   Abdominal:      General: Bowel sounds are normal.      Palpations: Abdomen is soft.      Tenderness: There is no abdominal tenderness.   Musculoskeletal:         General: Swelling (trace BLE) present. No tenderness.   Skin:     General: Skin is warm and dry.      Capillary Refill: Capillary refill takes less than 2 seconds.   Neurological:      General: No focal deficit present.      Mental Status: He is alert and oriented to person, place, and time.   Psychiatric:         Mood and Affect: Mood and affect normal.         Speech: Speech normal.         Cognition and Memory: Cognition normal.       Results Review     I reviewed the patient's new clinical  results.  I reviewed the patient's telemetry.  Results from last 7 days   Lab Units 01/21/21  0902 01/20/21 0443 01/19/21 0418 01/18/21  0450   WBC 10*3/mm3 3.94 4.21 6.16 5.76   HEMOGLOBIN g/dL 13.4 12.7* 12.8* 12.8*   PLATELETS 10*3/mm3 144 141 143 144     Results from last 7 days   Lab Units 01/21/21  0902 01/20/21 0443 01/19/21 0418 01/18/21 0450   SODIUM mmol/L 140 137 134* 127*   POTASSIUM mmol/L 4.0 4.3 4.5 3.8   CHLORIDE mmol/L 107 109* 101 92*   CO2 mmol/L 22.2 20.9* 20.8* 25.0   BUN mg/dL 5* 7* 12 17   CREATININE mg/dL 0.74* 0.62* 0.55* 0.82   GLUCOSE mg/dL 94 88 84 88   Estimated Creatinine Clearance: 132.7 mL/min (A) (by C-G formula based on SCr of 0.74 mg/dL (L)).  Results from last 7 days   Lab Units 01/17/21  0429 01/16/21  1850   ALBUMIN g/dL 3.70 4.60   BILIRUBIN mg/dL 0.6 0.6   ALK PHOS U/L 68 86   AST (SGOT) U/L 89* 108*   ALT (SGPT) U/L 32 43*     Results from last 7 days   Lab Units 01/21/21  0902 01/20/21 0443 01/19/21 0418 01/18/21 0450 01/17/21 0429 01/16/21  1850   CALCIUM mg/dL 8.5* 8.1* 8.2* 7.8* 7.4* 8.7   ALBUMIN g/dL  --   --   --   --  3.70 4.60   MAGNESIUM mg/dL  --   --   --  2.2 1.6 1.5*       COVID19   Date Value Ref Range Status   01/16/2021 Not Detected Not Detected - Ref. Range Final   05/13/2020 Not Detected Not Detected - Ref. Range Final     No results found for: HGBA1C, POCGLU    XR Chest 1 View  Narrative: Patient: LUNA, HERB  Time Out: 00:01  Exam(s): FILM CXR 1 VIEW     EXAM:    XR Chest, 1 View    CLINICAL HISTORY:     crackles in bases and oxygen needs  Reason for exam: crackles in bases   and oxygen needs. Reason for Exam:->crackles in bases and oxygen needs.   Portable->Yes. Does this patient have a diabetic monitoring medication   delivering device on?->No.. Additional notes: OXYGEN NEEDS    TECHNIQUE:    Frontal view of the chest.    COMPARISON:    5 13 20    FINDINGS:      No significant cardiac enlargement.  Lungs are hypoinflated with vascular    crowding.  Negative for evidence of focal consolidation, pneumothorax or   pleural fluid collections.  Recommend upright PA and lateral views of the   chest when clinically feasible.  Impression: Electronically signed by Oren Rodgers DO on 01-17-21 at 0001    Scheduled Medications  amLODIPine, 5 mg, Oral, QAM  atorvastatin, 20 mg, Oral, Daily  Cariprazine HCl, 3 mg, Oral, Daily  chlordiazePOXIDE, 25 mg, Oral, Q12H  doxepin, 100 mg, Oral, Nightly  enoxaparin, 40 mg, Subcutaneous, Q24H  folic acid, 1 mg, Oral, Daily  gabapentin, 100 mg, Oral, Q12H  levothyroxine, 100 mcg, Oral, Daily  lisinopril, 10 mg, Oral, Daily  sodium chloride, 3 mL, Intravenous, Q12H  thiamine, 200 mg, Oral, Daily  vitamin B-12, 1,000 mcg, Oral, Daily    Infusions   Diet  Diet Regular       Assessment/Plan     Active Hospital Problems    Diagnosis  POA   • **Alcohol dependence with uncomplicated withdrawal (CMS/HCC) [F10.230]  Yes   • Urine retention [R33.9]  No   • Non-traumatic rhabdomyolysis [M62.82]  No   • Hypomagnesemia [E83.42]  Yes   • Left ventricular diastolic dysfunction [I51.9]  Yes   • Hyponatremia [E87.1]  Yes   • Hypokalemia [E87.6]  Yes   • QT prolongation [R94.31]  Yes   • Neuropathy involving both lower extremities [G57.93]  Yes   • Insomnia [G47.00]  Yes   • Hypertension [I10]  Yes   • Panic disorder without agoraphobia [F41.0]  Yes      Resolved Hospital Problems   No resolved problems to display.   EtOH Dependence in Withdrawal  - continue on PRN ativan per CIWA protocol  - reduce librium to 25mg Q12H  - vitamin replacement  - access center following    Non-Traumatic Rhabdomyolysis  - likely from EtOH abuse  - now resolved, will SLIV    Hypoxia  - did occur on occasion while sleeping but now resolved  - CXR noted nothing acute   - encourage pulmonary toilet and wean oxygen as tolerated  - will likely need outpatient sleep study    Panic Disorder  - continue on current regimen    Neck Pain  - muscular in origin  -  heating pad PRN    Urine Retention  - miller catheter placed  - urology consulted    Lovenox 40 mg SC daily for DVT prophylaxis.  Full code.  Discussed with patient and nursing staff.  Anticipate discharge home tomorrow.      Gerber Costello MD  [facility]  01/21/21  16:59 EST    Patient was wearing facemask when I entered the room and throughout our encounter.  I wore protective equipment throughout this patient encounter including a face mask, gloves and protective eyewear.  Hand hygiene was performed before donning protective equipment and after removal when leaving the room.

## 2021-01-21 NOTE — NURSING NOTE
"Patient appears asleep. Reviewed chart. Most recent CIWA score \"8\" and medicated with ativan appropriately. No aggression charted this shift. Access will continue to follow.   "

## 2021-01-21 NOTE — PLAN OF CARE
Goal Outcome Evaluation:  Plan of Care Reviewed With: patient  Progress: improving  Outcome Summary: Pt doing much better today and demonstrating significant improvement with activty today. he was able to increase ambulation distance today to 150 ft wtih Rwx and CGAx 2. No LOB noted. Pt tolerated treatment well with no complaints. Will continue to progress as tolerated.  Patient was intermittently wearing a face mask during this therapy encounter. Therapist used appropriate personal protective equipment including eye protection, mask, and gloves.  Mask used was standard procedure mask. Appropriate PPE was worn during the entire therapy session. Hand hygiene was completed before and after therapy session. Patient is not in enhanced droplet precautions.

## 2021-01-21 NOTE — PROGRESS NOTES
Continued Stay Note  [facility]     Patient Name: Pro Mueller  MRN: [id number]  Today's Date: 1/21/2021    Admit Date: 1/16/2021    Discharge Plan     Row Name 01/21/21 1528       Plan    Plan  Home with SO, walker via [employer]    Patient/Family in Agreement with Plan  yes    Plan Comments  CCP followed up with pt who confirms he will return home with his SO to transport at d/c. Pt now agreeable to use [facility] (updated in EPIC), and to having a walker for d/c (no DME company preference). CCP to follow. Jeanne Vivas LCSW        Discharge Codes    No documentation.             Karen Vivas LCSW

## 2021-01-21 NOTE — THERAPY TREATMENT NOTE
Patient Name: Pro Mueller  : 1955    MRN: [id number]                              Today's Date: 2021       Admit Date: 2021    Visit Dx:     ICD-10-CM ICD-9-CM   1. Alcohol dependence with uncomplicated withdrawal (CMS/[facility])  F10.230 303.90     291.81   2. Alcoholic intoxication without complication (CMS/[facility])  F10.920 305.00     Patient Active Problem List   Diagnosis   • Alcoholism (CMS/[facility])   • Atopic rhinitis   • Mixed anxiety depressive disorder   • Genital herpes simplex   • Hyperlipidemia   • Hypertension   • Hypothyroidism   • Insomnia   • Panic disorder without agoraphobia   • Persistent insomnia   • Vitamin D deficiency   • Alcohol withdrawal (CMS/[facility])   • Neuropathy involving both lower extremities   • Syncope and collapse   • QT prolongation   • Left shoulder pain   • Alcoholic ketoacidosis   • Hyponatremia   • Hypokalemia   • Alcohol dependence with uncomplicated withdrawal (CMS/[facility])   • Pancytopenia (CMS/[facility])   • Nephrolithiasis   • Hypomagnesemia   • Left ventricular diastolic dysfunction   • Non-traumatic rhabdomyolysis   • Urine retention     Past Medical History:   Diagnosis Date   • Alcohol abuse    • Anxiety    • Arthritis    • Atopic rhinitis 2016   • Depression    • Disease of thyroid gland    • Elevated cholesterol    • Encounter for removal of sutures    • Genital herpes simplex 2016   • GERD (gastroesophageal reflux disease)    • Headache, tension-type    • Hyperlipidemia    • Hypertension    • Kidney stone    • Migraine    • Motion sickness    • Olecranon bursitis, right elbow    • Panic disorder without agoraphobia 2016   • Peripheral neuropathy    • Sleep apnea    • Vitamin D deficiency 2016   • Withdrawal symptoms, alcohol (CMS/[facility])      Past Surgical History:   Procedure Laterality Date   • BACK SURGERY      Pt denies.   • COLONOSCOPY     • CYST REMOVAL     • EXTRACORPOREAL SHOCK WAVE LITHOTRIPSY (ESWL) Right    • SHOULDER ARTHROSCOPY Right  12/17/2019    Procedure: SHOULDER ARTHROSCOPY, decompression, distal clavicle excision;  Surgeon: Aj Mancilla MD;  Location: [facility] OR [facility];  Service: Orthopedics   • TONSILLECTOMY       General Information     Row Name 01/21/21 1200          Physical Therapy Time and Intention    Document Type  therapy note (daily note)  -EJ     Mode of Treatment  physical therapy  -EJ     Row Name 01/21/21 1200          General Information    Existing Precautions/Restrictions  fall  -EJ       User Key  (r) = Recorded By, (t) = Taken By, (c) = Cosigned By    Initials Name Provider Type    EJ Doris Clark, PT Physical Therapist        Mobility     Row Name 01/21/21 1200          Bed Mobility    Supine-Sit [place] (Bed Mobility)  verbal cues;contact guard  -EJ     Sit-Supine [place] (Bed Mobility)  verbal cues;contact guard  -EJ     Assistive Device (Bed Mobility)  head of bed elevated;bed rails  -EJ     Row Name 01/21/21 1200          Sit-Stand Transfer    Sit-Stand [place] (Transfers)  verbal cues;contact guard;2 person assist  -EJ     Assistive Device (Sit-Stand Transfers)  walker, front-wheeled  -EJ     Row Name 01/21/21 1200          Gait/Stairs (Locomotion)    [place]on Level (Gait)  verbal cues;contact guard;2 person assist  -EJ     Assistive Device (Gait)  walker, front-wheeled  -EJ     Distance in Feet (Gait)  150  -EJ     Deviations/Abnormal Patterns (Gait)  godfrey decreased;stride length decreased  -EJ     Bilateral Gait Deviations  forward flexed posture;heel strike decreased  -EJ     Comment (Gait/Stairs)  cues for upright posture  -EJ       User Key  (r) = Recorded By, (t) = Taken By, (c) = Cosigned By    Initials Name Provider Type    EJ Doris Clark, PT Physical Therapist        Obj/Interventions    No documentation.       Goals/Plan    No documentation.       Clinical Impression     Row Name 01/21/21 1201          Pain Scale: Numbers Pre/Post-Treatment    Pretreatment Pain Rating   0/10 - no pain  -EJ     Posttreatment Pain Rating  0/10 - no pain  -EJ     Row Name 01/21/21 1201          Plan of Care Review    Plan of Care Reviewed With  patient  -EJ     Progress  improving  -EJ     Outcome Summary  Pt doing much better today and demonstrating significant improvement with activty today. he was able to increase ambulation distance today to 150 ft wtih Rwx and CGAx 2. No LOB noted. Pt tolerated treatment well with no complaints. Will continue to progress as tolerated.  -EJ     Row Name 01/21/21 1201          Positioning and Restraints    Pre-Treatment Position  in bed  -EJ     Post Treatment Position  bed  -EJ     In Bed  notified nsg;supine;call light within reach;encouraged to call for assist;exit alarm on  -EJ       User Key  (r) = Recorded By, (t) = Taken By, (c) = Cosigned By    Initials Name Provider Type    Doris Artis, PT Physical Therapist        Outcome Measures     Row Name 01/21/21 1203          How much help from another person do you currently need...    Turning from your back to your side while in flat bed without using bedrails?  3  -EJ     Moving from lying on back to sitting on the side of a flat bed without bedrails?  3  -EJ     Moving to and from a bed to a chair (including a wheelchair)?  3  -EJ     Standing up from a chair using your arms (e.g., wheelchair, bedside chair)?  3  -EJ     Climbing 3-5 steps with a railing?  2  -EJ     To walk in hospital room?  3  -EJ     AM-PAC 6 Clicks Score (PT)  17  -EJ       User Key  (r) = Recorded By, (t) = Taken By, (c) = Cosigned By    Initials Name Provider Type    Doris Artis, PT Physical Therapist        Physical Therapy Education                 Title: PT OT SLP Therapies (Done)     Topic: Physical Therapy (Done)     Point: Mobility training (Done)     Learning Progress Summary           Patient Acceptance, E,TB,D, VU,NR by SHARAD at 1/21/2021 1203    Acceptance, E,TB, VU by ULISES at 1/20/2021 1500    Acceptance,  E,TB,D, VU,NR by  at 1/20/2021 1212    Acceptance, E,TB,D, VU,NR by  at 1/18/2021 1540                   Point: Home exercise program (Done)     Learning Progress Summary           Patient Acceptance, E,TB, VU by  at 1/20/2021 1500                   Point: Body mechanics (Done)     Learning Progress Summary           Patient Acceptance, E,TB, VU by  at 1/20/2021 1500                   Point: Precautions (Done)     Learning Progress Summary           Patient Acceptance, E,TB, VU by  at 1/20/2021 1500                               User Key     Initials Effective Dates Name Provider Type Discipline     04/03/18 -  Doris Clark PT Physical Therapist PT    RW 09/14/20 -  Fernanda Warner RN Registered Nurse Nurse              PT Recommendation and Plan  Planned Therapy Interventions (PT): bed mobility training, balance training, gait training, home exercise program, patient/family education, strengthening, ROM (range of motion), stair training, transfer training  Plan of Care Reviewed With: patient  Progress: improving  Outcome Summary: Pt doing much better today and demonstrating significant improvement with activty today. he was able to increase ambulation distance today to 150 ft wtih Rwx and CGAx 2. No LOB noted. Pt tolerated treatment well with no complaints. Will continue to progress as tolerated.     Time Calculation:   PT Charges     Row Name 01/21/21 1203             Time Calculation    Start Time  1115  -EJ      Stop Time  1129  -EJ      Time Calculation (min)  14 min  -EJ      PT Received On  01/21/21  -      PT - Next Appointment  01/22/21  -        User Key  (r) = Recorded By, (t) = Taken By, (c) = Cosigned By    Initials Name Provider Type     Doris Clark, PT Physical Therapist        Therapy Charges for Today     Code Description Service Date Service Provider Modifiers Qty    46166881563 HC PT THER PROC EA 15 MIN 1/20/2021 Doris Clark, PT GP 2    05211144782 HC PT  THER SUPP EA 15 MIN 1/20/2021 Doris Clark, PT GP 1    50906140143 HC PT THER PROC EA 15 MIN 1/21/2021 Doris Clark, PT GP 1    06804161450 HC PT THER SUPP EA 15 MIN 1/21/2021 Doris Clark, PT GP 1          PT G-Codes  Outcome Measure Options: AM-PAC 6 Clicks Basic Mobility (PT)  AM-PAC 6 Clicks Score (PT): 17  AM-PAC 6 Clicks Score (OT): 12    Doris Clark, PT  1/21/2021

## 2021-01-22 ENCOUNTER — READMISSION MANAGEMENT (OUTPATIENT)
Dept: CALL CENTER | Facility: HOSPITAL | Age: 66
End: 2021-01-22

## 2021-01-22 VITALS
OXYGEN SATURATION: 97 % | SYSTOLIC BLOOD PRESSURE: 103 MMHG | TEMPERATURE: 97.4 F | WEIGHT: 205 LBS | DIASTOLIC BLOOD PRESSURE: 62 MMHG | RESPIRATION RATE: 18 BRPM | HEIGHT: 72 IN | HEART RATE: 92 BPM | BODY MASS INDEX: 27.77 KG/M2

## 2021-01-22 PROBLEM — E83.42 HYPOMAGNESEMIA: Status: RESOLVED | Noted: 2021-01-16 | Resolved: 2021-01-22

## 2021-01-22 PROBLEM — F10.230 ALCOHOL DEPENDENCE WITH UNCOMPLICATED WITHDRAWAL (HCC): Status: RESOLVED | Noted: 2020-01-14 | Resolved: 2021-01-22

## 2021-01-22 PROBLEM — E87.1 HYPONATREMIA: Status: RESOLVED | Noted: 2020-01-13 | Resolved: 2021-01-22

## 2021-01-22 PROBLEM — M62.82 NON-TRAUMATIC RHABDOMYOLYSIS: Status: RESOLVED | Noted: 2021-01-18 | Resolved: 2021-01-22

## 2021-01-22 PROBLEM — R33.9 URINE RETENTION: Status: RESOLVED | Noted: 2021-01-21 | Resolved: 2021-01-22

## 2021-01-22 PROBLEM — E87.6 HYPOKALEMIA: Status: RESOLVED | Noted: 2020-01-13 | Resolved: 2021-01-22

## 2021-01-22 LAB
ANION GAP SERPL CALCULATED.3IONS-SCNC: 7.5 MMOL/L (ref 5–15)
BASOPHILS # BLD AUTO: 0.02 10*3/MM3 (ref 0–0.2)
BASOPHILS NFR BLD AUTO: 0.6 % (ref 0–1.5)
BUN SERPL-MCNC: 7 MG/DL (ref 8–23)
BUN/CREAT SERPL: 10.6 (ref 7–25)
CALCIUM SPEC-SCNC: 8.7 MG/DL (ref 8.6–10.5)
CHLORIDE SERPL-SCNC: 108 MMOL/L (ref 98–107)
CK SERPL-CCNC: 138 U/L (ref 20–200)
CO2 SERPL-SCNC: 21.5 MMOL/L (ref 22–29)
CREAT SERPL-MCNC: 0.66 MG/DL (ref 0.76–1.27)
DEPRECATED RDW RBC AUTO: 44.4 FL (ref 37–54)
EOSINOPHIL # BLD AUTO: 0.1 10*3/MM3 (ref 0–0.4)
EOSINOPHIL NFR BLD AUTO: 2.8 % (ref 0.3–6.2)
ERYTHROCYTE [DISTWIDTH] IN BLOOD BY AUTOMATED COUNT: 13.5 % (ref 12.3–15.4)
GFR SERPL CREATININE-BSD FRML MDRD: 122 ML/MIN/1.73
GLUCOSE SERPL-MCNC: 109 MG/DL (ref 65–99)
HCT VFR BLD AUTO: 35.7 % (ref 37.5–51)
HGB BLD-MCNC: 11.9 G/DL (ref 13–17.7)
IMM GRANULOCYTES # BLD AUTO: 0.05 10*3/MM3 (ref 0–0.05)
IMM GRANULOCYTES NFR BLD AUTO: 1.4 % (ref 0–0.5)
LYMPHOCYTES # BLD AUTO: 1.48 10*3/MM3 (ref 0.7–3.1)
LYMPHOCYTES NFR BLD AUTO: 41.3 % (ref 19.6–45.3)
MCH RBC QN AUTO: 30.1 PG (ref 26.6–33)
MCHC RBC AUTO-ENTMCNC: 33.3 G/DL (ref 31.5–35.7)
MCV RBC AUTO: 90.2 FL (ref 79–97)
MONOCYTES # BLD AUTO: 0.6 10*3/MM3 (ref 0.1–0.9)
MONOCYTES NFR BLD AUTO: 16.8 % (ref 5–12)
NEUTROPHILS NFR BLD AUTO: 1.33 10*3/MM3 (ref 1.7–7)
NEUTROPHILS NFR BLD AUTO: 37.1 % (ref 42.7–76)
NRBC BLD AUTO-RTO: 0 /100 WBC (ref 0–0.2)
PLATELET # BLD AUTO: 154 10*3/MM3 (ref 140–450)
PMV BLD AUTO: 9.2 FL (ref 6–12)
POTASSIUM SERPL-SCNC: 4 MMOL/L (ref 3.5–5.2)
RBC # BLD AUTO: 3.96 10*6/MM3 (ref 4.14–5.8)
SODIUM SERPL-SCNC: 137 MMOL/L (ref 136–145)
WBC # BLD AUTO: 3.58 10*3/MM3 (ref 3.4–10.8)

## 2021-01-22 PROCEDURE — 97110 THERAPEUTIC EXERCISES: CPT

## 2021-01-22 PROCEDURE — 82550 ASSAY OF CK (CPK): CPT | Performed by: INTERNAL MEDICINE

## 2021-01-22 PROCEDURE — 80048 BASIC METABOLIC PNL TOTAL CA: CPT | Performed by: INTERNAL MEDICINE

## 2021-01-22 PROCEDURE — 85025 COMPLETE CBC W/AUTO DIFF WBC: CPT | Performed by: INTERNAL MEDICINE

## 2021-01-22 RX ORDER — TAMSULOSIN HYDROCHLORIDE 0.4 MG/1
0.4 CAPSULE ORAL DAILY
Qty: 30 CAPSULE | Refills: 1 | Status: SHIPPED | OUTPATIENT
Start: 2021-01-23 | End: 2021-02-09

## 2021-01-22 RX ADMIN — ATORVASTATIN CALCIUM 20 MG: 20 TABLET, FILM COATED ORAL at 09:49

## 2021-01-22 RX ADMIN — Medication 5 MG: at 01:29

## 2021-01-22 RX ADMIN — TAMSULOSIN HYDROCHLORIDE 0.4 MG: 0.4 CAPSULE ORAL at 09:49

## 2021-01-22 RX ADMIN — LEVOTHYROXINE SODIUM 100 MCG: 0.1 TABLET ORAL at 06:32

## 2021-01-22 RX ADMIN — SODIUM CHLORIDE, PRESERVATIVE FREE 3 ML: 5 INJECTION INTRAVENOUS at 09:50

## 2021-01-22 RX ADMIN — Medication 1000 MCG: at 09:49

## 2021-01-22 RX ADMIN — LISINOPRIL 10 MG: 10 TABLET ORAL at 09:50

## 2021-01-22 RX ADMIN — LORAZEPAM 2 MG: 1 TABLET ORAL at 01:29

## 2021-01-22 RX ADMIN — LORAZEPAM 1 MG: 1 TABLET ORAL at 06:24

## 2021-01-22 RX ADMIN — CHLORDIAZEPOXIDE HYDROCHLORIDE 25 MG: 25 CAPSULE ORAL at 06:24

## 2021-01-22 RX ADMIN — FOLIC ACID 1 MG: 1 TABLET ORAL at 09:50

## 2021-01-22 RX ADMIN — AMLODIPINE BESYLATE 5 MG: 5 TABLET ORAL at 06:24

## 2021-01-22 RX ADMIN — CARIPRAZINE 3 MG: 3 CAPSULE, GELATIN COATED ORAL at 09:51

## 2021-01-22 RX ADMIN — Medication 200 MG: at 09:49

## 2021-01-22 RX ADMIN — GABAPENTIN 100 MG: 100 CAPSULE ORAL at 09:51

## 2021-01-22 NOTE — PROGRESS NOTES
[facility] Progress Note    01/22/21 17:55 EST        Mahoney catheter removed.  Patient is voiding.  He got a dose of tamsulosin last night.  Had a discharge from my standpoint.  I discussed with him I will plan to follow-up in a couple weeks and I want him to stay on his tamsulosin until he returns to the office and then we may stop it if he feels like he does not need it or has not improved his quality life at home.  Call if needed.  Thanks

## 2021-01-22 NOTE — PLAN OF CARE
Goal Outcome Evaluation:  Plan of Care Reviewed With: patient  Progress: improving  Outcome Summary: Pt doing well today, and agreeable to PT. Pt seems to be improving daily with therapy and was able to increase ambulation distance to 220 ft with Rwx and CGA.  No LOB with activity. He is hopeful to DC home tomorrow. Will continue to progress as tolerated.  Patient was intermittently wearing a face mask during this therapy encounter. Therapist used appropriate personal protective equipment including eye protection, mask, and gloves.  Mask used was standard procedure mask. Appropriate PPE was worn during the entire therapy session. Hand hygiene was completed before and after therapy session. Patient is not in enhanced droplet precautions.

## 2021-01-22 NOTE — THERAPY TREATMENT NOTE
Patient Name: Pro Mueller  : 1955    MRN: [id number]                              Today's Date: 2021       Admit Date: 2021    Visit Dx:     ICD-10-CM ICD-9-CM   1. Alcohol dependence with uncomplicated withdrawal (CMS/[facility])  F10.230 303.90     291.81   2. Alcoholic intoxication without complication (CMS/[facility])  F10.920 305.00     Patient Active Problem List   Diagnosis   • Alcoholism (CMS/[facility])   • Atopic rhinitis   • Mixed anxiety depressive disorder   • Genital herpes simplex   • Hyperlipidemia   • Hypertension   • Hypothyroidism   • Insomnia   • Panic disorder without agoraphobia   • Persistent insomnia   • Vitamin D deficiency   • Alcohol withdrawal (CMS/[facility])   • Neuropathy involving both lower extremities   • Syncope and collapse   • QT prolongation   • Left shoulder pain   • Alcoholic ketoacidosis   • Hyponatremia   • Hypokalemia   • Alcohol dependence with uncomplicated withdrawal (CMS/[facility])   • Pancytopenia (CMS/[facility])   • Nephrolithiasis   • Hypomagnesemia   • Left ventricular diastolic dysfunction   • Non-traumatic rhabdomyolysis   • Urine retention     Past Medical History:   Diagnosis Date   • Alcohol abuse    • Anxiety    • Arthritis    • Atopic rhinitis 2016   • Depression    • Disease of thyroid gland    • Elevated cholesterol    • Encounter for removal of sutures    • Genital herpes simplex 2016   • GERD (gastroesophageal reflux disease)    • Headache, tension-type    • Hyperlipidemia    • Hypertension    • Kidney stone    • Migraine    • Motion sickness    • Olecranon bursitis, right elbow    • Panic disorder without agoraphobia 2016   • Peripheral neuropathy    • Sleep apnea    • Vitamin D deficiency 2016   • Withdrawal symptoms, alcohol (CMS/[facility])      Past Surgical History:   Procedure Laterality Date   • BACK SURGERY      Pt denies.   • COLONOSCOPY     • CYST REMOVAL     • EXTRACORPOREAL SHOCK WAVE LITHOTRIPSY (ESWL) Right    • SHOULDER ARTHROSCOPY Right  12/17/2019    Procedure: SHOULDER ARTHROSCOPY, decompression, distal clavicle excision;  Surgeon: Aj Mancilla MD;  Location: [facility] OR [facility];  Service: Orthopedics   • TONSILLECTOMY       General Information     Row Name 01/22/21 1550          Physical Therapy Time and Intention    Document Type  therapy note (daily note)  -EJ     Mode of Treatment  physical therapy  -EJ     Row Name 01/22/21 1550          General Information    Existing Precautions/Restrictions  fall  -EJ       User Key  (r) = Recorded By, (t) = Taken By, (c) = Cosigned By    Initials Name Provider Type    EJ Doris Clark, PT Physical Therapist        Mobility     Row Name 01/22/21 1551          Bed Mobility    Supine-Sit [place] (Bed Mobility)  verbal cues;contact guard  -EJ     Sit-Supine [place] (Bed Mobility)  verbal cues;contact guard  -EJ     Assistive Device (Bed Mobility)  head of bed elevated;bed rails  -EJ     Row Name 01/22/21 1551          Sit-Stand Transfer    Sit-Stand [place] (Transfers)  verbal cues;contact guard  -EJ     Assistive Device (Sit-Stand Transfers)  walker, front-wheeled  -EJ     Row Name 01/22/21 1551          Gait/Stairs (Locomotion)    Troy Level (Gait)  verbal cues;contact guard;2 person assist  -EJ     Assistive Device (Gait)  walker, front-wheeled  -EJ     Distance in Feet (Gait)  220  -EJ     Deviations/Abnormal Patterns (Gait)  godfrey decreased;stride length decreased  -EJ     Bilateral Gait Deviations  forward flexed posture;heel strike decreased  -EJ     Comment (Gait/Stairs)  slow pace, cues to relax shoulder and to stand upright  -EJ       User Key  (r) = Recorded By, (t) = Taken By, (c) = Cosigned By    Initials Name Provider Type    EJ Doris Clark, PT Physical Therapist        Obj/Interventions    No documentation.       Goals/Plan    No documentation.       Clinical Impression     Row Name 01/22/21 1555          Pain Scale: Numbers Pre/Post-Treatment    Pretreatment  Pain Rating  0/10 - no pain  -EJ     Posttreatment Pain Rating  0/10 - no pain  -EJ     Row Name 01/22/21 1555          Plan of Care Review    Plan of Care Reviewed With  patient  -EJ     Progress  improving  -EJ     Outcome Summary  Pt doing well today, and agreeable to PT. Pt seems to be improving daily with therapy and was able to increase ambulation distance to 220 ft with Rwx and CGA.  No LOB with activity. He is hopeful to DC home tomorrow. Will continue to progress as tolerated.  -EJ     Row Name 01/22/21 1555          Positioning and Restraints    Pre-Treatment Position  in bed  -EJ     Post Treatment Position  bed  -EJ     In Bed  notified nsg;supine;call light within reach;encouraged to call for assist;exit alarm on  -EJ       User Key  (r) = Recorded By, (t) = Taken By, (c) = Cosigned By    Initials Name Provider Type    Doris Artis PT Physical Therapist        Outcome Measures     Row Name 01/22/21 1558          How much help from another person do you currently need...    Turning from your back to your side while in flat bed without using bedrails?  3  -EJ     Moving from lying on back to sitting on the side of a flat bed without bedrails?  3  -EJ     Moving to and from a bed to a chair (including a wheelchair)?  3  -EJ     Standing up from a chair using your arms (e.g., wheelchair, bedside chair)?  3  -EJ     Climbing 3-5 steps with a railing?  3  -EJ     To walk in hospital room?  3  -EJ     AM-PAC 6 Clicks Score (PT)  18  -EJ       User Key  (r) = Recorded By, (t) = Taken By, (c) = Cosigned By    Initials Name Provider Type    Doris Artis PT Physical Therapist        Physical Therapy Education                 Title: PT OT SLP Therapies (Done)     Topic: Physical Therapy (Done)     Point: Mobility training (Done)     Learning Progress Summary           Patient Acceptance, E,JILL SAM, VU,NR by SHARAD at 1/22/2021 1558    Acceptance, E,JILL SAM, JAVIER,NR by SHARAD at 1/21/2021 1203    Acceptance,  E,TB, VU by  at 1/20/2021 1500    Acceptance, E,TB,D, VU,NR by  at 1/20/2021 1212    Acceptance, E,TB,D, VU,NR by  at 1/18/2021 1540                   Point: Home exercise program (Done)     Learning Progress Summary           Patient Acceptance, E,TB, VU by RW at 1/20/2021 1500                   Point: Body mechanics (Done)     Learning Progress Summary           Patient Acceptance, E,TB, VU by  at 1/20/2021 1500                   Point: Precautions (Done)     Learning Progress Summary           Patient Acceptance, E,TB, VU by  at 1/20/2021 1500                               User Key     Initials Effective Dates Name Provider Type Discipline     04/03/18 -  Doris Clark, PT Physical Therapist PT     09/14/20 -  Fernanda Warner RN Registered Nurse Nurse              PT Recommendation and Plan  Planned Therapy Interventions (PT): bed mobility training, balance training, gait training, home exercise program, patient/family education, strengthening, ROM (range of motion), stair training, transfer training  Plan of Care Reviewed With: patient  Progress: improving  Outcome Summary: Pt doing well today, and agreeable to PT. Pt seems to be improving daily with therapy and was able to increase ambulation distance to 220 ft with Rwx and CGA.  No LOB with activity. He is hopeful to DC home tomorrow. Will continue to progress as tolerated.     Time Calculation:   PT Charges     Row Name 01/22/21 1555             Time Calculation    Start Time  1450  -EJ      Stop Time  1516  -      Time Calculation (min)  26 min  -      PT Received On  01/22/21  -      PT - Next Appointment  01/23/21  -        User Key  (r) = Recorded By, (t) = Taken By, (c) = Cosigned By    Initials Name Provider Type    EJ Doris Clark, PT Physical Therapist        Therapy Charges for Today     Code Description Service Date Service Provider Modifiers Qty    89722018074 HC PT THER PROC EA 15 MIN 1/21/2021 Amber  Doris PINTO, PT GP 1    91044595460 HC PT THER SUPP EA 15 MIN 1/21/2021 Doris Clark, PT GP 1    02677404737 HC PT THER PROC EA 15 MIN 1/22/2021 Doris Clark, PT GP 2          PT G-Codes  Outcome Measure Options: AM-PAC 6 Clicks Basic Mobility (PT)  AM-PAC 6 Clicks Score (PT): 18  AM-PAC 6 Clicks Score (OT): 12    Doris Clark, PT  1/22/2021

## 2021-01-22 NOTE — DISCHARGE SUMMARY
Date of Admission: 1/16/2021  Date of Discharge:  1/22/2021  Primary Care Physician: Alla Beal MD     Discharge Diagnosis:  Active Hospital Problems    Diagnosis  POA   • Left ventricular diastolic dysfunction [I51.9]  Yes   • QT prolongation [R94.31]  Yes   • Neuropathy involving both lower extremities [G57.93]  Yes   • Insomnia [G47.00]  Yes   • Hypertension [I10]  Yes   • Panic disorder without agoraphobia [F41.0]  Yes      Resolved Hospital Problems    Diagnosis Date Resolved POA   • **Alcohol dependence with uncomplicated withdrawal (CMS/HCC) [F10.230] 01/22/2021 Yes   • Urine retention [R33.9] 01/22/2021 No   • Non-traumatic rhabdomyolysis [M62.82] 01/22/2021 No   • Hypomagnesemia [E83.42] 01/22/2021 Yes   • Hyponatremia [E87.1] 01/22/2021 Yes   • Hypokalemia [E87.6] 01/22/2021 Yes       Presenting Problem/History of Present Illness:  Alcohol dependence with uncomplicated withdrawal (CMS/HCC) [F10.230]  Alcoholic intoxication without complication (CMS/HCC) [F10.920]     Hospital Course:  The patient is a 66 y.o. male with a history of alcohol abuse and previous withdrawal episodes who presented with progressive tremors since he had stopped drinking. He was found to be in alcohol withdrawal. Please see admission H&P from 1/16/21 for further details. He was treated with PRN ativan as well as scheduled librium. These were later tapered off. His symptoms resolved and he was at baseline mental status at the time of discharge.   Of note he did have some mild nocturnal hypoxemia during the hospitalization which later resolved. An outpatient sleep study could be considered.    He did have some urine retention requiring a miller catheter. He completed a voiding trial on the day of discharge with PVR around 400cc. He was started on flomax. Dr. Langston with urology evaluated him and he will follow up in the office with him in 2 weeks.    The patient is clinically stable and he will be discharged home with his  "significant other. He was provided a walker based on physical therapy recommendations.     Exam Today:  Blood pressure 103/62, pulse 92, temperature 97.4 °F (36.3 °C), temperature source Oral, resp. rate 18, height 182.9 cm (72\"), weight 93 kg (205 lb), SpO2 97 %.  Vitals signs and nursing note reviewed.   Constitutional:       General: He is not in acute distress.     Appearance: He is not toxic-appearing.   HENT:      Head: Normocephalic and atraumatic.      Nose: Nose normal.      Mouth/Throat:      Mouth: Mucous membranes are moist.      Pharynx: Oropharynx is clear.   Eyes:      Conjunctiva/sclera: Conjunctivae normal.      Pupils: Pupils are equal, round, and reactive to light.   Neck:      Musculoskeletal: Normal range of motion and neck supple.   Cardiovascular:      Rate and Rhythm: Normal rate and regular rhythm.      Pulses: Normal pulses.   Pulmonary:      Effort: Pulmonary effort is normal.      Breath sounds: Normal breath sounds.   Abdominal:      General: Bowel sounds are normal.      Palpations: Abdomen is soft.      Tenderness: There is no abdominal tenderness.   Musculoskeletal:         General: No edema present. No tenderness.   Skin:     General: Skin is warm and dry.      Capillary Refill: Capillary refill takes less than 2 seconds.   Neurological:      General: No focal deficit present.      Mental Status: He is alert and oriented to person, place, and time.   Psychiatric:         Mood and Affect: Mood and affect normal.         Speech: Speech normal.         Cognition and Memory: Cognition normal.     Procedures Performed:  CT Head Without Intravenous Contrast-1/16/21     CLINICAL HISTORY:     weakness, multiple falls, intoxicated  Reason for exam: weakness,   multiple falls, intoxicated. STEREOTACTIC GUIDANCE PROTOCOL?->No. Will   fiducial markers be needed for this procedure?->No.. Additional notes:   Contrast none     TECHNIQUE:    Axial computed tomography images of the head brain without " intravenous   contrast.  CTDI is 54.8 mGy and DLP is 1023.8 mGy-cm.  This CT exam was   performed according to the principle of ALARA (As Low As Reasonably   Achievable) by using one or more of the following dose reduction   techniques: automated exposure control, adjustment of the mA and or kV   according to patient size, and or use of iterative reconstruction   technique.     COMPARISON:    CT head on 7 20 2019     FINDINGS:    Brain: No acute infarct or hemorrhage identified. No extra-axial fluid   collection. No mass effect or midline shift.  Stable areas of   hypoattenuation in the supratentorial white matter likely represent   chronic small vessel ischemic changes.       Ventricles and sulci: Stable mild prominence of the ventricles and   sulci is likely secondary to cerebral volume loss.       Bones:  Normal. No bony lesion or acute fracture.       Subcutaneous tissues: Normal.       Sinuses: Normal. No air-fluid levels or mucosal thickening.       Mastoid air cells: Small amount of fluid in the right mastoid air cells.       Orbits: Grossly unremarkable.       Other: Atherosclerotic calcifications in the intracranial vasculature.     IMPRESSION:  1.  No acute intracranial abnormality.  2.  Stable mild chronic small vessel ischemic changes and cerebral volume   loss.    Consults:   Consults     Date and Time Order Name Status Description    1/20/2021 0641 Inpatient Urology Consult Completed     1/16/2021 2102 [facility] (on-call MD unless specified) Details Completed            Discharge Disposition:  Home or Self Care    Discharge Medications:     Discharge Medications      New Medications      Instructions Start Date   tamsulosin 0.4 MG capsule 24 hr capsule  Commonly known as: FLOMAX   0.4 mg, Oral, Daily   Start Date: January 23, 2021        Continue These Medications      Instructions Start Date   acyclovir 200 MG capsule  Commonly known as: ZOVIRAX   400 mg, Oral, Daily      amLODIPine 5 MG tablet  Commonly  known as: NORVASC   5 mg, Oral, Every Morning      atorvastatin 20 MG tablet  Commonly known as: LIPITOR   20 mg, Oral, Daily      clonazePAM 1 MG tablet  Commonly known as: KlonoPIN   2 mg, Oral, Every Night at Bedtime      doxepin 100 MG capsule  Commonly known as: SINEquan   100 mg, Oral, Nightly      folic acid 1 MG tablet  Commonly known as: FOLVITE   1 mg, Oral, Daily      gabapentin 300 MG capsule  Commonly known as: NEURONTIN   TAKE 1 CAPSULE BY MOUTH THREE TIMES DAILY      levothyroxine 100 MCG tablet  Commonly known as: SYNTHROID, LEVOTHROID   TAKE 1 TABLET BY MOUTH EVERY DAY      lisinopril 10 MG tablet  Commonly known as: PRINIVIL,ZESTRIL   10 mg, Oral, Daily      mirtazapine 15 MG tablet  Commonly known as: REMERON   15 mg, Oral, Nightly      vitamin B-12 1000 MCG tablet  Commonly known as: CYANOCOBALAMIN   1,000 mcg, Oral, Daily      VITAMIN D PO   1 capsule, Oral, Every Morning      Vraylar 4.5 MG capsule  Generic drug: Cariprazine HCl   4.5 mg, Oral, Nightly             Discharge Diet:   Diet Instructions     Diet: Regular; Thin      Discharge Diet: Regular    Fluid Consistency: Thin          Activity at Discharge:   Activity Instructions     Activity as Tolerated            Follow-up Appointments:  Future Appointments   Date Time Provider Department Center   5/13/2021  4:00 PM Alla Beal MD [facility]     Additional Instructions for the Follow-ups that You Need to Schedule     Ambulatory Referral to Home Health   As directed      Face to Face Visit Date: 1/22/2021    Follow-up provider for Plan of Care?: I treated the patient in an acute care facility and will not continue treatment after discharge.    Follow-up provider: ALLA BEAL [[id number]]    Reason/Clinical Findings: generalized weakness    Describe mobility limitations that make leaving home difficult: generalized weakness    Nursing/Therapeutic Services Requested: Physical Therapy    PT orders: Strengthening    Frequency: 1 Week 1          Discharge Follow-up with PCP   As directed       Currently Documented PCP:    Alla Beal MD    PCP Phone Number:    [phone]     Follow Up Details: 1 week         Discharge Follow-up with Specified Provider: Dr Douglas Langston; 3 Weeks   As directed      To: Dr Douglas Langston    Follow Up: 3 Weeks         Discharge Follow-up with Specified Provider: Dr. Douglas Langston (urology); 2 Weeks   As directed      To: Dr. Douglas Langston (urology)    Follow Up: 2 Weeks                Gerber Costello MD  01/22/21  19:31 EST    Time Spent on Discharge Activities: Greater than 30 minutes.

## 2021-01-22 NOTE — CONSULTS
FIRST UROLOGY CONSULT      Patient Identification:  NAME:  Pro Mueller  Age:  66 y.o.   Sex:  male   :  1955   MRN:  [id number]       Chief complaint: Difficulty voiding    History of present illness: 66-year-old gentleman who has baseline symptoms of what he relates is a bashful bladder.  He has a history of stones.  He has seen one of our retired partners in the distant past about 12 years ago but no urologic issues since then.  He says he has some difficulty voiding when he is around other people but normally voids fine at home and but has to stand up to void.  Admitted for alcohol abuse and has gone through his withdrawals and recovering well.  Hopefully to be discharged soon.  He was having trouble voiding today so a catheter was placed.      Past medical history:  Past Medical History:   Diagnosis Date   • Alcohol abuse    • Anxiety    • Arthritis    • Atopic rhinitis 2016   • Depression    • Disease of thyroid gland    • Elevated cholesterol    • Encounter for removal of sutures    • Genital herpes simplex 2016   • GERD (gastroesophageal reflux disease)    • Headache, tension-type    • Hyperlipidemia    • Hypertension    • Kidney stone    • Migraine    • Motion sickness    • Olecranon bursitis, right elbow    • Panic disorder without agoraphobia 2016   • Peripheral neuropathy    • Sleep apnea    • Vitamin D deficiency 2016   • Withdrawal symptoms, alcohol (CMS/HCC)        Past surgical history:  Past Surgical History:   Procedure Laterality Date   • BACK SURGERY      Pt denies.   • COLONOSCOPY     • CYST REMOVAL     • EXTRACORPOREAL SHOCK WAVE LITHOTRIPSY (ESWL) Right    • SHOULDER ARTHROSCOPY Right 2019    Procedure: SHOULDER ARTHROSCOPY, decompression, distal clavicle excision;  Surgeon: Aj Mancilla MD;  Location: [facility];  Service: Orthopedics   • TONSILLECTOMY         Allergies:  Penicillins    Home medications:  Medications Prior to Admission    Medication Sig Dispense Refill Last Dose   • acyclovir (ZOVIRAX) 200 MG capsule Take 2 capsules by mouth Daily. 180 capsule 1 Past Week at Unknown time   • amLODIPine (NORVASC) 5 MG tablet Take 1 tablet by mouth Every Morning. 90 tablet 0    • atorvastatin (LIPITOR) 20 MG tablet Take 1 tablet by mouth Daily. 90 tablet 0    • Cariprazine HCl (Vraylar) 4.5 MG capsule Take 4.5 mg by mouth Every Night.      • clonazePAM (KlonoPIN) 1 MG tablet Take 2 mg by mouth every night at bedtime.      • doxepin (SINEquan) 100 MG capsule Take 100 mg by mouth Every Night.      • gabapentin (NEURONTIN) 300 MG capsule TAKE 1 CAPSULE BY MOUTH THREE TIMES DAILY 90 capsule 5    • levothyroxine (SYNTHROID, LEVOTHROID) 100 MCG tablet TAKE 1 TABLET BY MOUTH EVERY DAY 90 tablet 0    • lisinopril (PRINIVIL,ZESTRIL) 10 MG tablet Take 1 tablet by mouth Daily. 90 tablet 0    • mirtazapine (REMERON) 15 MG tablet Take 15 mg by mouth Every Night.      • folic acid (FOLVITE) 1 MG tablet Take 1 mg by mouth Daily.      • vitamin B-12 (CYANOCOBALAMIN) 1000 MCG tablet Take 1,000 mcg by mouth Daily.      • VITAMIN D PO Take 1 capsule by mouth Every Morning.           Hospital medications:  amLODIPine, 5 mg, Oral, QAM  atorvastatin, 20 mg, Oral, Daily  Cariprazine HCl, 3 mg, Oral, Daily  chlordiazePOXIDE, 25 mg, Oral, Q12H  doxepin, 100 mg, Oral, Nightly  enoxaparin, 40 mg, Subcutaneous, Q24H  folic acid, 1 mg, Oral, Daily  gabapentin, 100 mg, Oral, Q12H  levothyroxine, 100 mcg, Oral, Daily  lisinopril, 10 mg, Oral, Daily  sodium chloride, 3 mL, Intravenous, Q12H  tamsulosin, 0.4 mg, Oral, Daily  thiamine, 200 mg, Oral, Daily  vitamin B-12, 1,000 mcg, Oral, Daily         •  acetaminophen **OR** acetaminophen **OR** acetaminophen  •  docusate sodium  •  famotidine  •  LORazepam **OR** LORazepam **OR** LORazepam **OR** LORazepam **OR** LORazepam **OR** LORazepam  •  magnesium sulfate **OR** magnesium sulfate **OR** magnesium sulfate  •  melatonin  •   "potassium chloride **OR** potassium chloride **OR** potassium chloride  •  prochlorperazine **OR** prochlorperazine **OR** prochlorperazine  •  [COMPLETED] Insert peripheral IV **AND** sodium chloride  •  sodium chloride  •  SUMAtriptan  •  traMADol    Family history:  Family History   Problem Relation Age of Onset   • Alzheimer's disease Mother    • Pancreatic cancer Father    • Malig Hyperthermia Neg Hx        Social history:  Social History     Tobacco Use   • Smoking status: Former Smoker     Packs/day: 0.50     Years: 25.00     Pack years: 12.50     Types: Cigarettes     Start date:      Quit date:      Years since quittin.0   • Smokeless tobacco: Never Used   Substance Use Topics   • Alcohol use: Yes     Comment: 1/2 - 3/4 L per day of vodka   • Drug use: Yes     Types: Methamphetamines     Comment: STATES \"I HAVE SMOKED METH IN PAST WEEK.\"       Review of systems:    Negative 12-system ROS except for the following: No baseline obstructive or irritable urinary symptoms that he relates.  No history of any gross hematuria.  No recent stones.      Objective:  TMax 24 hours:   Temp (24hrs), Av.9 °F (36.6 °C), Min:97.3 °F (36.3 °C), Max:98.3 °F (36.8 °C)      Vitals Ranges:   Temp:  [97.3 °F (36.3 °C)-98.3 °F (36.8 °C)] 98.3 °F (36.8 °C)  Heart Rate:  [67-77] 76  Resp:  [18-20] 20  BP: (118-137)/(69-89) 118/69    Intake/Output Last 3 shifts:  I/O last 3 completed shifts:  In: 1170 [P.O.:960; I.V.:210]  Out: 5525 [Urine:5525]     Physical Exam:       General Appearance:    Alert, cooperative, in no acute distress   Head:    Normocephalic, without obvious abnormality, atraumatic   Eyes:          PERRL, conjunctivae and corneas clear   Ears:    Normal external inspection   Throat:   No oral lesions, oral mucosa moist   Neck:   Supple, no LAD, trachea midline   Back:     No CVA tenderness   Lungs:     Respirations unlabored, symmetric excursion    Heart:    RRR, intact peripheral pulses   Abdomen:    " Soft and benign   :   Phallus normal testes normal   CAMILA:  Extremities:  Deferred states it recently was done by his primary care provider.  No edema, no deformity   Skin:   No bleeding, bruising or rashes   Neuro/Psych:   Orientation intact, mood/affect pleasant, no focal findings       Results review:   I reviewed the patient's new clinical results.    Data review:  Lab Results (last 24 hours)     Procedure Component Value Units Date/Time    Basic Metabolic Panel [[id number]]  (Abnormal) Collected: 01/21/21 0902    Specimen: Blood Updated: 01/21/21 0944     Glucose 94 mg/dL      BUN 5 mg/dL      Creatinine 0.74 mg/dL      Sodium 140 mmol/L      Potassium 4.0 mmol/L      Chloride 107 mmol/L      CO2 22.2 mmol/L      Calcium 8.5 mg/dL      eGFR Non African Amer 106 mL/min/1.73      BUN/Creatinine Ratio 6.8     Anion Gap 10.8 mmol/L     Narrative:      GFR Normal >60  Chronic Kidney Disease <60  Kidney Failure <15      CK [[id number]]  (Abnormal) Collected: 01/21/21 0902    Specimen: Blood Updated: 01/21/21 0944     Creatine Kinase 219 U/L     CBC & Differential [[id number]]  (Abnormal) Collected: 01/21/21 0902    Specimen: Blood Updated: 01/21/21 0920    Narrative:      The following orders were created for panel order CBC & Differential.  Procedure                               Abnormality         Status                     ---------                               -----------         ------                     CBC Auto Differential[[id number]]        Abnormal            Final result                 Please view results for these tests on the individual orders.    CBC Auto Differential [[id number]]  (Abnormal) Collected: 01/21/21 0902    Specimen: Blood Updated: 01/21/21 0920     WBC 3.94 10*3/mm3      RBC 4.32 10*6/mm3      Hemoglobin 13.4 g/dL      Hematocrit 38.8 %      MCV 89.8 fL      MCH 31.0 pg      MCHC 34.5 g/dL      RDW 13.2 %      RDW-SD 43.0 fl      MPV 9.5 fL      Platelets 144 10*3/mm3      Neutrophil %  "44.9 %      Lymphocyte % 35.8 %      Monocyte % 14.5 %      Eosinophil % 3.3 %      Basophil % 0.5 %      Immature Grans % 1.0 %      Neutrophils, Absolute 1.77 10*3/mm3      Lymphocytes, Absolute 1.41 10*3/mm3      Monocytes, Absolute 0.57 10*3/mm3      Eosinophils, Absolute 0.13 10*3/mm3      Basophils, Absolute 0.02 10*3/mm3      Immature Grans, Absolute 0.04 10*3/mm3      nRBC 0.0 /100 WBC            Imaging:  Imaging Results (Last 24 Hours)     ** No results found for the last 24 hours. **             Assessment:       Alcohol dependence with uncomplicated withdrawal (CMS/HCC)    Hypertension    Insomnia    Panic disorder without agoraphobia    Neuropathy involving both lower extremities    QT prolongation    Hyponatremia    Hypokalemia    Hypomagnesemia    Left ventricular diastolic dysfunction    Non-traumatic rhabdomyolysis    Urine retention    Urinary retention with no significant baseline obstructive symptoms.  History of \"bashful bladder\"    Plan:     We will start him on Flomax tonight.  Give a voiding trial the next day or or Saturday.  He should be able to void but he probably needs to stand to urinate.    Douglas Langston MD  01/21/21  22:18 EST  "

## 2021-01-22 NOTE — PLAN OF CARE
Goal Outcome Evaluation:  Plan of Care Reviewed With: patient  Progress: improving  Outcome Summary: Discharging home. Voided 400cc.

## 2021-01-22 NOTE — PROGRESS NOTES
Continued Stay Note  [facility]     Patient Name: Pro Mueller  MRN: [id number]  Today's Date: 1/22/2021    Admit Date: 1/16/2021    Discharge Plan     Row Name 01/22/21 1626       Plan    Plan  Home with SO and walker via [facility]    Patient/Family in Agreement with Plan  yes    Plan Comments  Pt's plan remains home with his SO to transport. Walker delivered to pt's bedside via [employer] (Heather). Pt denies additional needs. Jeanne Vivas LCSW        Discharge Codes    No documentation.       Expected Discharge Date and Time     Expected Discharge Date Expected Discharge Time    Jan 23, 2021             Karen Vivas LCSW

## 2021-01-22 NOTE — PLAN OF CARE
Goal Outcome Evaluation:  Plan of Care Reviewed With: patient  Progress: no change   No acute distress, pt cooperative all this shift, safety maintained, continue plan of care

## 2021-01-22 NOTE — NURSING NOTE
"Access center follow up:    Pt asleep upon entering room. Chart review indicates patient improving. Spoke with NA that stated the patient had a better night than previous. No aggression. 1mg Ativan administered approximately 0620, last CIWA score in flow sheet \"12\".     Access will continue to follow.   "

## 2021-01-23 NOTE — OUTREACH NOTE
Prep Survey      Responses   [facility] facility patient discharged from?  [place]   Is LACE score < 7 ?  No   Emergency Room discharge w/ pulse ox?  No   Eligibility  [facility]   Date of Admission  01/16/21   Date of Discharge  01/22/21   Discharge Disposition  Home or Self Care   Discharge diagnosis  Alcohol dependence with uncomplicated withdrawal    Does the patient have one of the following disease processes/diagnoses(primary or secondary)?  Other   Does the patient have Home health ordered?  No   Is there a DME ordered?  Yes   What DME was ordered?  walker via [facility]   Prep survey completed?  Yes          Heather Wilkerson RN

## 2021-01-25 ENCOUNTER — APPOINTMENT (OUTPATIENT)
Dept: GENERAL RADIOLOGY | Facility: HOSPITAL | Age: 66
End: 2021-01-25

## 2021-01-25 ENCOUNTER — TRANSITIONAL CARE MANAGEMENT TELEPHONE ENCOUNTER (OUTPATIENT)
Dept: CALL CENTER | Facility: HOSPITAL | Age: 66
End: 2021-01-25

## 2021-01-25 ENCOUNTER — HOSPITAL ENCOUNTER (EMERGENCY)
Facility: HOSPITAL | Age: 66
Discharge: HOME | End: 2021-01-25
Attending: EMERGENCY MEDICINE | Admitting: EMERGENCY MEDICINE

## 2021-01-25 VITALS
HEART RATE: 92 BPM | SYSTOLIC BLOOD PRESSURE: 141 MMHG | OXYGEN SATURATION: 95 % | DIASTOLIC BLOOD PRESSURE: 84 MMHG | RESPIRATION RATE: 16 BRPM | TEMPERATURE: 98.4 F

## 2021-01-25 DIAGNOSIS — R53.1 GENERALIZED WEAKNESS: ICD-10-CM

## 2021-01-25 DIAGNOSIS — F10.920 ALCOHOLIC INTOXICATION WITHOUT COMPLICATION (HCC): Primary | ICD-10-CM

## 2021-01-25 DIAGNOSIS — F10.20 ALCOHOLISM (HCC): ICD-10-CM

## 2021-01-25 LAB
ALBUMIN SERPL-MCNC: 4.1 G/DL (ref 3.5–5.2)
ALBUMIN/GLOB SERPL: 1.6 G/DL
ALP SERPL-CCNC: 82 U/L (ref 39–117)
ALT SERPL W P-5'-P-CCNC: 76 U/L (ref 1–41)
AMPHET+METHAMPHET UR QL: NEGATIVE
ANION GAP SERPL CALCULATED.3IONS-SCNC: 12.9 MMOL/L (ref 5–15)
ARTERIAL PATENCY WRIST A: POSITIVE
AST SERPL-CCNC: 101 U/L (ref 1–40)
ATMOSPHERIC PRESS: 746.5 MMHG
BARBITURATES UR QL SCN: NEGATIVE
BASE EXCESS BLDA CALC-SCNC: 2.1 MMOL/L (ref 0–2)
BASOPHILS # BLD AUTO: 0.04 10*3/MM3 (ref 0–0.2)
BASOPHILS NFR BLD AUTO: 1.2 % (ref 0–1.5)
BDY SITE: ABNORMAL
BENZODIAZ UR QL SCN: POSITIVE
BILIRUB SERPL-MCNC: <0.2 MG/DL (ref 0–1.2)
BILIRUB UR QL STRIP: NEGATIVE
BUN SERPL-MCNC: 4 MG/DL (ref 8–23)
BUN/CREAT SERPL: 4.4 (ref 7–25)
CALCIUM SPEC-SCNC: 8.9 MG/DL (ref 8.6–10.5)
CANNABINOIDS SERPL QL: NEGATIVE
CHLORIDE SERPL-SCNC: 107 MMOL/L (ref 98–107)
CLARITY UR: CLEAR
CO2 SERPL-SCNC: 26.1 MMOL/L (ref 22–29)
COCAINE UR QL: NEGATIVE
COLOR UR: YELLOW
CREAT SERPL-MCNC: 0.91 MG/DL (ref 0.76–1.27)
DEPRECATED RDW RBC AUTO: 44.8 FL (ref 37–54)
EOSINOPHIL # BLD AUTO: 0.04 10*3/MM3 (ref 0–0.4)
EOSINOPHIL NFR BLD AUTO: 1.2 % (ref 0.3–6.2)
ERYTHROCYTE [DISTWIDTH] IN BLOOD BY AUTOMATED COUNT: 13.3 % (ref 12.3–15.4)
ETHANOL BLD-MCNC: 219 MG/DL (ref 0–10)
ETHANOL UR QL: 0.22 %
GFR SERPL CREATININE-BSD FRML MDRD: 84 ML/MIN/1.73
GLOBULIN UR ELPH-MCNC: 2.5 GM/DL
GLUCOSE SERPL-MCNC: 89 MG/DL (ref 65–99)
GLUCOSE UR STRIP-MCNC: NEGATIVE MG/DL
HCO3 BLDA-SCNC: 27.1 MMOL/L (ref 22–28)
HCT VFR BLD AUTO: 40.8 % (ref 37.5–51)
HGB BLD-MCNC: 13.7 G/DL (ref 13–17.7)
HGB UR QL STRIP.AUTO: NEGATIVE
IMM GRANULOCYTES # BLD AUTO: 0.04 10*3/MM3 (ref 0–0.05)
IMM GRANULOCYTES NFR BLD AUTO: 1.2 % (ref 0–0.5)
INR PPP: 0.92 (ref 0.9–1.1)
KETONES UR QL STRIP: NEGATIVE
LEUKOCYTE ESTERASE UR QL STRIP.AUTO: NEGATIVE
LYMPHOCYTES # BLD AUTO: 1.89 10*3/MM3 (ref 0.7–3.1)
LYMPHOCYTES NFR BLD AUTO: 54.8 % (ref 19.6–45.3)
MAGNESIUM SERPL-MCNC: 1.9 MG/DL (ref 1.6–2.4)
MCH RBC QN AUTO: 30.9 PG (ref 26.6–33)
MCHC RBC AUTO-ENTMCNC: 33.6 G/DL (ref 31.5–35.7)
MCV RBC AUTO: 92.1 FL (ref 79–97)
METHADONE UR QL SCN: NEGATIVE
MODALITY: ABNORMAL
MONOCYTES # BLD AUTO: 0.54 10*3/MM3 (ref 0.1–0.9)
MONOCYTES NFR BLD AUTO: 15.7 % (ref 5–12)
NEUTROPHILS NFR BLD AUTO: 0.9 10*3/MM3 (ref 1.7–7)
NEUTROPHILS NFR BLD AUTO: 25.9 % (ref 42.7–76)
NITRITE UR QL STRIP: NEGATIVE
NRBC BLD AUTO-RTO: 0 /100 WBC (ref 0–0.2)
NT-PROBNP SERPL-MCNC: 32.2 PG/ML (ref 0–900)
OPIATES UR QL: NEGATIVE
OXYCODONE UR QL SCN: NEGATIVE
PCO2 BLDA: 42.4 MM HG (ref 35–45)
PH BLDA: 7.41 PH UNITS (ref 7.35–7.45)
PH UR STRIP.AUTO: 5.5 [PH] (ref 5–8)
PLATELET # BLD AUTO: 305 10*3/MM3 (ref 140–450)
PMV BLD AUTO: 8.3 FL (ref 6–12)
PO2 BLDA: 50.1 MM HG (ref 80–100)
POTASSIUM SERPL-SCNC: 3.7 MMOL/L (ref 3.5–5.2)
PROT SERPL-MCNC: 6.6 G/DL (ref 6–8.5)
PROT UR QL STRIP: NEGATIVE
PROTHROMBIN TIME: 12.1 SECONDS (ref 11.7–14.2)
QT INTERVAL: 354 MS
RBC # BLD AUTO: 4.43 10*6/MM3 (ref 4.14–5.8)
SAO2 % BLDCOA: 85.4 % (ref 92–99)
SODIUM SERPL-SCNC: 146 MMOL/L (ref 136–145)
SP GR UR STRIP: 1.02 (ref 1–1.03)
TOTAL RATE: 18 BREATHS/MINUTE
TROPONIN T SERPL-MCNC: <0.01 NG/ML (ref 0–0.03)
UROBILINOGEN UR QL STRIP: NORMAL
WBC # BLD AUTO: 3.45 10*3/MM3 (ref 3.4–10.8)

## 2021-01-25 PROCEDURE — 71045 X-RAY EXAM CHEST 1 VIEW: CPT

## 2021-01-25 PROCEDURE — 93010 ELECTROCARDIOGRAM REPORT: CPT | Performed by: INTERNAL MEDICINE

## 2021-01-25 PROCEDURE — 80307 DRUG TEST PRSMV CHEM ANLYZR: CPT | Performed by: EMERGENCY MEDICINE

## 2021-01-25 PROCEDURE — 83735 ASSAY OF MAGNESIUM: CPT | Performed by: EMERGENCY MEDICINE

## 2021-01-25 PROCEDURE — 85025 COMPLETE CBC W/AUTO DIFF WBC: CPT | Performed by: EMERGENCY MEDICINE

## 2021-01-25 PROCEDURE — 82077 ASSAY SPEC XCP UR&BREATH IA: CPT | Performed by: EMERGENCY MEDICINE

## 2021-01-25 PROCEDURE — 84484 ASSAY OF TROPONIN QUANT: CPT | Performed by: EMERGENCY MEDICINE

## 2021-01-25 PROCEDURE — 93005 ELECTROCARDIOGRAM TRACING: CPT | Performed by: EMERGENCY MEDICINE

## 2021-01-25 PROCEDURE — 82803 BLOOD GASES ANY COMBINATION: CPT

## 2021-01-25 PROCEDURE — 83880 ASSAY OF NATRIURETIC PEPTIDE: CPT | Performed by: EMERGENCY MEDICINE

## 2021-01-25 PROCEDURE — 81003 URINALYSIS AUTO W/O SCOPE: CPT | Performed by: EMERGENCY MEDICINE

## 2021-01-25 PROCEDURE — 80053 COMPREHEN METABOLIC PANEL: CPT | Performed by: EMERGENCY MEDICINE

## 2021-01-25 PROCEDURE — 85610 PROTHROMBIN TIME: CPT | Performed by: EMERGENCY MEDICINE

## 2021-01-25 PROCEDURE — 36600 WITHDRAWAL OF ARTERIAL BLOOD: CPT

## 2021-01-25 PROCEDURE — 99284 EMERGENCY DEPT VISIT MOD MDM: CPT

## 2021-01-25 RX ORDER — SODIUM CHLORIDE 0.9 % (FLUSH) 0.9 %
10 SYRINGE (ML) INJECTION AS NEEDED
Status: DISCONTINUED | OUTPATIENT
Start: 2021-01-25 | End: 2021-01-25 | Stop reason: HOSPADM

## 2021-01-25 NOTE — ED TRIAGE NOTES
"Daughter called EMS wanting patient to have rehab for ETOH abuse. Pt states \"I havent had much today\".  States has ahd 5 Vodka drinks today. C/O SOA     Mask placed on patient in triage. Triage staff wore appropriate PPE during interaction with patient.     "

## 2021-01-25 NOTE — OUTREACH NOTE
Call Center TCM Note      Responses   [facility] patient discharged from?  [place]   Does the patient have one of the following disease processes/diagnoses(primary or secondary)?  Other   TCM attempt successful?  No   Unsuccessful attempts  Attempt 2          Heather Shore MA    1/25/2021, 17:57 EST

## 2021-01-25 NOTE — PROGRESS NOTES
Case Management Discharge Note      Final Note: Home         Selected Continued Care - Discharged on 1/22/2021 Admission date: 1/16/2021 - Discharge disposition: Home or Self Care    Destination    No services have been selected for the patient.              Durable Medical Equipment    No services have been selected for the patient.              Dialysis/Infusion    No services have been selected for the patient.              Home Medical Care    No services have been selected for the patient.              Therapy    No services have been selected for the patient.              Community Resources    No services have been selected for the patient.                  Transportation Services  Private: Car    Final Discharge Disposition Code: 01 - home or self-care

## 2021-01-25 NOTE — DISCHARGE INSTRUCTIONS
Do not drink alcohol.  Make sure you follow-up with resources provided to help for further treatment for your alcoholism.

## 2021-01-25 NOTE — OUTREACH NOTE
Call Center TCM Note      Responses   [facility] patient discharged from?  [place]   Does the patient have one of the following disease processes/diagnoses(primary or secondary)?  Other   TCM attempt successful?  No   Unsuccessful attempts  Attempt 1   Call Status Comments  Pt is back in ED today, 01/25/2021, NO admit so far. I will try him back tomorrow.           Heather Shore MA    1/25/2021, 17:24 EST

## 2021-01-25 NOTE — ED PROVIDER NOTES
EMERGENCY DEPARTMENT ENCOUNTER    Room Number:  39/39  Date of encounter:  1/25/2021  PCP: Alla Beal MD  Historian: Patient      HPI:  Chief Complaint: Generalized weakness and some shortness of breath  A complete HPI/ROS/PMH/PSH/SH/FH are unobtainable due to: Not applicable  Context: Pro Mueller is a 66 y.o. male who presents to the ED c/o generalized weakness and shortness of breath that started this morning.  Generalized weakness that started maybe 8 hours ago.  He just feels weak all over and no energy.  His shortness of breath started a few hours ago.  He describes that he feels that he is short of breath and feels that he does not get enough air.  He denies any chest pain, coughs or colds.  He is an alcoholic.  He reports his last alcohol drink was late last night early this morning.  He usually drinks vodka.  Denies any vomiting or diarrhea.  He denies any fevers or chills.  Denies any speech change or vision change.  His weakness is a generalized weakness and is described as just feeling no energy all over.  He reports no active blood loss in his urine or in his GI system.  There is a note that mentioned his daughter called EMS.  Patient reports that he has no daughter reports that he called EMS.  I spoke with the charge nurse who entered the note.  She states EMS stated there was a female on the scene that stated that he wanted rehab.  On my evaluation when I asked the patient if he wanted rehab he said what ever makes me feel better.  He was not very convincing that he felt that he needed the alcohol rehab.        Previous Episodes: Yes  Current Symptoms: See above    MEDICAL HISTORY REVIEWED    Patient was discharged from the hospital January 22, 2021.  Please see discharge diagnosis as well as hospital course below.  Date of Admission: 1/16/2021  Date of Discharge:  1/22/2021  Primary Care Physician: Alla Beal MD      Discharge Diagnosis:        Active Hospital Problems     Diagnosis   POA    • Left ventricular diastolic dysfunction [I51.9]   Yes   • QT prolongation [R94.31]   Yes   • Neuropathy involving both lower extremities [G57.93]   Yes   • Insomnia [G47.00]   Yes   • Hypertension [I10]   Yes   • Panic disorder without agoraphobia [F41.0]   Yes       Resolved Hospital Problems     Diagnosis Date Resolved POA   • **Alcohol dependence with uncomplicated withdrawal (CMS/HCC) [F10.230] 01/22/2021 Yes   • Urine retention [R33.9] 01/22/2021 No   • Non-traumatic rhabdomyolysis [M62.82] 01/22/2021 No   • Hypomagnesemia [E83.42] 01/22/2021 Yes   • Hyponatremia [E87.1] 01/22/2021 Yes   • Hypokalemia [E87.6] 01/22/2021 Yes         Presenting Problem/History of Present Illness:  Alcohol dependence with uncomplicated withdrawal (CMS/HCC) [F10.230]  Alcoholic intoxication without complication (CMS/HCC) [F10.920]        Hospital Course:  The patient is a 66 y.o. male with a history of alcohol abuse and previous withdrawal episodes who presented with progressive tremors since he had stopped drinking. He was found to be in alcohol withdrawal. Please see admission H&P from 1/16/21 for further details. He was treated with PRN ativan as well as scheduled librium. These were later tapered off. His symptoms resolved and he was at baseline mental status at the time of discharge.   Of note he did have some mild nocturnal hypoxemia during the hospitalization which later resolved. An outpatient sleep study could be considered.     He did have some urine retention requiring a miller catheter. He completed a voiding trial on the day of discharge with PVR around 400cc. He was started on flomax. Dr. Langston with urology evaluated him and he will follow up in the office with him in 2 weeks.     The patient is clinically stable and he will be discharged home with his significant other. He was provided a walker based on physical therapy recommendations    PAST MEDICAL HISTORY  Active Ambulatory Problems     Diagnosis Date Noted   •  Alcoholism (CMS/[facility]) 08/08/2016   • Atopic rhinitis 08/08/2016   • Mixed anxiety depressive disorder 08/08/2016   • Genital herpes simplex 08/08/2016   • Hyperlipidemia 08/08/2016   • Hypertension 08/08/2016   • Hypothyroidism 08/08/2016   • Insomnia 08/08/2016   • Panic disorder without agoraphobia 08/08/2016   • Persistent insomnia 08/08/2016   • Vitamin D deficiency 08/08/2016   • Alcohol withdrawal (CMS/[facility]) 11/04/2016   • Neuropathy involving both lower extremities 10/25/2018   • Syncope and collapse 01/02/2019   • QT prolongation 01/03/2019   • Left shoulder pain 11/19/2019   • Alcoholic ketoacidosis 01/12/2020   • Pancytopenia (CMS/[facility]) 01/14/2020   • Nephrolithiasis 02/12/2020   • Left ventricular diastolic dysfunction 01/16/2021     Resolved Ambulatory Problems     Diagnosis Date Noted   • Accidental fall 08/08/2016   • Impacted cerumen 08/08/2016   • Influenza 08/08/2016   • Motion sickness 08/08/2016   • Seasonal allergic rhinitis 08/08/2016   • Upper respiratory tract infection 08/08/2016   • Headache 11/05/2016   • Gastritis 11/05/2016   • Low back pain 11/11/2016   • Olecranon bursitis of right elbow 04/05/2017   • Sciatica of left side 06/12/2018   • Nausea and vomiting 01/11/2020   • Hyponatremia 01/13/2020   • Hypokalemia 01/13/2020   • Alcohol dependence with uncomplicated withdrawal (CMS/[facility]) 01/14/2020   • Hypomagnesemia 01/16/2021   • Non-traumatic rhabdomyolysis 01/18/2021   • Urine retention 01/21/2021     Past Medical History:   Diagnosis Date   • Alcohol abuse    • Anxiety    • Arthritis    • Depression    • Disease of thyroid gland    • Elevated cholesterol    • Encounter for removal of sutures    • GERD (gastroesophageal reflux disease)    • Headache, tension-type    • Kidney stone    • Migraine    • Olecranon bursitis, right elbow    • Peripheral neuropathy    • Sleep apnea    • Withdrawal symptoms, alcohol (CMS/[facility])          PAST SURGICAL HISTORY  Past Surgical History:   Procedure  "Laterality Date   • BACK SURGERY      Pt denies.   • COLONOSCOPY     • CYST REMOVAL     • EXTRACORPOREAL SHOCK WAVE LITHOTRIPSY (ESWL) Right    • SHOULDER ARTHROSCOPY Right 2019    Procedure: SHOULDER ARTHROSCOPY, decompression, distal clavicle excision;  Surgeon: Aj Mancilla MD;  Location: [facility];  Service: Orthopedics   • TONSILLECTOMY           FAMILY HISTORY  Family History   Problem Relation Age of Onset   • Alzheimer's disease Mother    • Pancreatic cancer Father    • Malig Hyperthermia Neg Hx          SOCIAL HISTORY  Social History     Socioeconomic History   • Marital status:      Spouse name: Not on file   • Number of children: Not on file   • Years of education: Not on file   • Highest education level: Not on file   Tobacco Use   • Smoking status: Former Smoker     Packs/day: 0.50     Years: 25.00     Pack years: 12.50     Types: Cigarettes     Start date:      Quit date:      Years since quittin.0   • Smokeless tobacco: Never Used   Substance and Sexual Activity   • Alcohol use: Yes     Comment: 1/2 - 3/4 L per day of vodka   • Drug use: Yes     Types: Methamphetamines     Comment: STATES \"I HAVE SMOKED METH IN PAST WEEK.\"   • Sexual activity: Defer         ALLERGIES  Penicillins        REVIEW OF SYSTEMS  Review of Systems     All systems reviewed and negative except for those discussed in HPI.       PHYSICAL EXAM    I have reviewed the triage vital signs and nursing notes.    ED Triage Vitals [21 0840]   Temp Heart Rate Resp BP SpO2   98.4 °F (36.9 °C) 89 16 130/77 96 %      Temp src Heart Rate Source Patient Position BP Location FiO2 (%)   Tympanic -- -- -- --       GENERAL: Elderly female that appears weak and tired.  Appears older than his stated age and appears a little disheveled and poorly kept.  No acute distress.Vital signs on my initial evaluation unremarkable  HENT: nares patent  Head/neck/ face are symmetric without gross deformity, signs of " trauma, or swelling  EYES: no scleral icterus, no conjunctival pallor.  NECK: Supple, no meningismus  CV: regular rhythm, regular rate with intact distal pulses.  No murmur or rub  RESPIRATORY: normal effort and no respiratory distress.  Clear to auscultation bilaterally.  ABDOMEN: soft and non-tender.  MUSCULOSKELETAL: no deformity.  Intact distal pulses to upper and lower extremities that are equal and symmetric.  NEURO: alert and appropriate, moves all extremities, follows commands.  Alert and oriented x3.  Cranial nerves II through XII are grossly intact.  He has no focal motor or sensory changes.  He has generalized diffuse weakness.  SKIN: warm, dry    Vital signs and nursing notes reviewed.        LAB RESULTS  Recent Results (from the past 24 hour(s))   Comprehensive Metabolic Panel    Collection Time: 01/25/21  9:30 AM    Specimen: Blood   Result Value Ref Range    Glucose 89 65 - 99 mg/dL    BUN 4 (L) 8 - 23 mg/dL    Creatinine 0.91 0.76 - 1.27 mg/dL    Sodium 146 (H) 136 - 145 mmol/L    Potassium 3.7 3.5 - 5.2 mmol/L    Chloride 107 98 - 107 mmol/L    CO2 26.1 22.0 - 29.0 mmol/L    Calcium 8.9 8.6 - 10.5 mg/dL    Total Protein 6.6 6.0 - 8.5 g/dL    Albumin 4.10 3.50 - 5.20 g/dL    ALT (SGPT) 76 (H) 1 - 41 U/L    AST (SGOT) 101 (H) 1 - 40 U/L    Alkaline Phosphatase 82 39 - 117 U/L    Total Bilirubin <0.2 0.0 - 1.2 mg/dL    eGFR Non African Amer 84 >60 mL/min/1.73    Globulin 2.5 gm/dL    A/G Ratio 1.6 g/dL    BUN/Creatinine Ratio 4.4 (L) 7.0 - 25.0    Anion Gap 12.9 5.0 - 15.0 mmol/L   Magnesium    Collection Time: 01/25/21  9:30 AM    Specimen: Blood   Result Value Ref Range    Magnesium 1.9 1.6 - 2.4 mg/dL   Ethanol    Collection Time: 01/25/21  9:30 AM    Specimen: Blood   Result Value Ref Range    Ethanol 219 (H) 0 - 10 mg/dL    Ethanol % 0.219 %   Troponin    Collection Time: 01/25/21  9:30 AM    Specimen: Blood   Result Value Ref Range    Troponin T <0.010 0.000 - 0.030 ng/mL   BNP    Collection  Time: 01/25/21  9:30 AM    Specimen: Blood   Result Value Ref Range    proBNP 32.2 0.0 - 900.0 pg/mL   CBC Auto Differential    Collection Time: 01/25/21  9:30 AM    Specimen: Blood   Result Value Ref Range    WBC 3.45 3.40 - 10.80 10*3/mm3    RBC 4.43 4.14 - 5.80 10*6/mm3    Hemoglobin 13.7 13.0 - 17.7 g/dL    Hematocrit 40.8 37.5 - 51.0 %    MCV 92.1 79.0 - 97.0 fL    MCH 30.9 26.6 - 33.0 pg    MCHC 33.6 31.5 - 35.7 g/dL    RDW 13.3 12.3 - 15.4 %    RDW-SD 44.8 37.0 - 54.0 fl    MPV 8.3 6.0 - 12.0 fL    Platelets 305 140 - 450 10*3/mm3    Neutrophil % 25.9 (L) 42.7 - 76.0 %    Lymphocyte % 54.8 (H) 19.6 - 45.3 %    Monocyte % 15.7 (H) 5.0 - 12.0 %    Eosinophil % 1.2 0.3 - 6.2 %    Basophil % 1.2 0.0 - 1.5 %    Immature Grans % 1.2 (H) 0.0 - 0.5 %    Neutrophils, Absolute 0.90 (L) 1.70 - 7.00 10*3/mm3    Lymphocytes, Absolute 1.89 0.70 - 3.10 10*3/mm3    Monocytes, Absolute 0.54 0.10 - 0.90 10*3/mm3    Eosinophils, Absolute 0.04 0.00 - 0.40 10*3/mm3    Basophils, Absolute 0.04 0.00 - 0.20 10*3/mm3    Immature Grans, Absolute 0.04 0.00 - 0.05 10*3/mm3    nRBC 0.0 0.0 - 0.2 /100 WBC   Protime-INR    Collection Time: 01/25/21  9:31 AM    Specimen: Blood   Result Value Ref Range    Protime 12.1 11.7 - 14.2 Seconds    INR 0.92 0.90 - 1.10   Blood Gas, Arterial -    Collection Time: 01/25/21  9:38 AM    Specimen: Arterial Blood   Result Value Ref Range    Site Arterial: right radial     Moses's Test Positive     pH, Arterial 7.413 7.350 - 7.450 pH units    pCO2, Arterial 42.4 35.0 - 45.0 mm Hg    pO2, Arterial 50.1 (C) 80.0 - 100.0 mm Hg    HCO3, Arterial 27.1 22.0 - 28.0 mmol/L    Base Excess, Arterial 2.1 (H) 0.0 - 2.0 mmol/L    O2 Saturation Calculated 85.4 (L) 92.0 - 99.0 %    Barometric Pressure for Blood Gas 746.5 mmHg    Modality Room Air     Rate 18 Breaths/minute   ECG 12 Lead    Collection Time: 01/25/21 10:33 AM   Result Value Ref Range    QT Interval 354 ms   Urinalysis With Microscopic If Indicated (No  Culture) - Urine, Clean Catch    Collection Time: 01/25/21 11:57 AM    Specimen: Urine, Clean Catch   Result Value Ref Range    Color, UA Yellow Yellow, Straw    Appearance, UA Clear Clear    pH, UA 5.5 5.0 - 8.0    Specific Gravity, UA 1.018 1.005 - 1.030    Glucose, UA Negative Negative    Ketones, UA Negative Negative    Bilirubin, UA Negative Negative    Blood, UA Negative Negative    Protein, UA Negative Negative    Leuk Esterase, UA Negative Negative    Nitrite, UA Negative Negative    Urobilinogen, UA 1.0 E.U./dL 0.2 - 1.0 E.U./dL       Ordered the above labs and independently reviewed the results.        RADIOLOGY  Xr Chest 1 View    Result Date: 1/25/2021  PORTABLE CHEST X-RAY  HISTORY: Shortness of breath. Intoxication.  Portable chest x-ray is provided. Correlation: Chest x-ray 1957.  FINDINGS: The cardiomediastinal silhouette is normal. The lungs are clear. The costophrenic sulci are dry and the bones appear normal. There is no pneumothorax.      Negative.  This report was finalized on 1/25/2021 10:09 AM by Dr. Bob Duckworth M.D.        I ordered the above noted radiological studies. Reviewed by me and discussed with radiologist.  See dictation for official radiology interpretation.      PROCEDURES    Procedures      MEDICATIONS GIVEN IN ER    Medications   sodium chloride 0.9 % flush 10 mL (has no administration in time range)         PROGRESS, DATA ANALYSIS, CONSULTS, AND MEDICAL DECISION MAKING    We will check lab work on him as well as EKG and chest x-ray.  Again patient is not convinced or requesting rehab for his alcoholism.  He is here because of his generalized weakness and feelings of shortness of breath.  Informed the test that we will order.  All questions answered.  We are currently under a pandemic from the COVID19 infection.  The patient presented to the emergency department by ambulance or personal vehicle. I followed the current protocols required by Infection Control at [facility]  [facility] in my evaluation and treatment of the patient. The patient was wearing a face mask during my evaluation and throughout my encounter. During my whole encounter with this patient I used appropriate personal protective equipment.  This equipment consisted of eye protection, facemask, gown, and gloves.  I applied this equipment before entering the room.      All labs have been independently reviewed by me.  All radiology studies have been reviewed by me and discussed with radiologist dictating the report.   EKG's independently viewed and interpreted by me.  Discussion below represents my analysis of pertinent findings related to patient's condition, differential diagnosis, treatment plan and final disposition.      ED Course as of Jan 25 1402   Mon Jan 25, 2021   1200 This is a mixed gas.  Patient's O2 saturation on room air is 96%.  I discussed this with the respiratory therapist Cindi.   pO2, Arterial(!!): 50.1 [MM]   1201 Chest x-ray is unremarkable.  I reviewed the radiologist report as well.    [MM]   1201 Ethanol(!): 219 [MM]   1315 On reevaluation.  Patient is eating and drinking.  He states he is feeling much better after eating and drinking.  Currently has a normal heart rate and normal blood pressure.  O2 sat is 97% on room air.  He is in no distress.  He is now stating that he would like to find out some information about rehab.  I will consult access to provide some resources for him.    [MM]   1340 EKG readingEKG was done at 1033It is a rate of 89 it is sinus rhythmThere is some mild intraventricular conduction delayThere is some left axis deviationThere is some Q waves in the inferior leads as well as anterior septal leads and V4Has some nonspecific T wave changesQT looks normalCompared this EKG to the EKG on January 16, 2021 and it looks very similar.    [MM]   1357 I reevaluated the patient again.  Patient has been seen by access and given resources for help for his alcoholism.  He  has ate and drank.  He is feeling better.  He has normal vital signs and no distress.  He has no complaints at this time.  Formed him of the results of his tests and treatment plan.  He has no phone, wallet, shoes, keys.  He is getting need help trying to get home.  Also informed him to follow-up with his primary care doctor as well as use the resources to follow-up to help treat his alcoholism.  All questions answered.    [MM]   1401 Patient is not suicidal or homicidal.    [MM]      ED Course User Index  [MM] Aries Case MD       AS OF 14:02 EST VITALS:    BP - 134/80  HR - 89  TEMP - 98.4 °F (36.9 °C) (Tympanic)  02 SATS - 93%        DIAGNOSIS  Final diagnoses:   Alcoholic intoxication without complication (CMS/HCC)   Generalized weakness   Alcoholism (CMS/HCC)         DISPOSITION  DISCHARGE    Patient discharged in stable condition.    Reviewed implications of results, diagnosis, meds, responsibility to follow up, warning signs and symptoms of possible worsening, potential complications and reasons to return to ER, including worsening of symptoms, chest pain, shortness of breath, fever or chills, any concerns.  Do not drink alcohol..    Patient/Family voiced understanding of above instructions.    Discussed plan for discharge, as there is no emergent indication for admission. Pt/family is agreeable and understands need for follow up and repeat testing.  Pt is aware that discharge does not mean that nothing is wrong but it indicates no emergency is present that requires admission and they must continue care with follow-up as given below or physician of their choice.     FOLLOW-UP  Alla Beal MD  [address] [id number] [phone]    In 3 days  If you develop any pain, shortness of breath, fever, chills, any concerns.         Medication List      No changes were made to your prescriptions during this visit.                  Aries Case MD  01/25/21 [id number]

## 2021-01-26 ENCOUNTER — TRANSITIONAL CARE MANAGEMENT TELEPHONE ENCOUNTER (OUTPATIENT)
Dept: CALL CENTER | Facility: HOSPITAL | Age: 66
End: 2021-01-26

## 2021-01-26 NOTE — OUTREACH NOTE
Call Center TCM Note      Responses   [facility] patient discharged from?  [place]   Does the patient have one of the following disease processes/diagnoses(primary or secondary)?  Other   TCM attempt successful?  No   Unsuccessful attempts  Attempt 3          Heather Ochoa RN    1/26/2021, 11:45 EST

## 2021-01-27 NOTE — PROGRESS NOTES
I called and left a message and asked him to call to let us know how he is doing. I would like to set up an appointment with him for hospital follow up.   I tried Jeny Crump on his contacts and her vm is not set up.

## 2021-01-30 RX ORDER — AMLODIPINE BESYLATE 5 MG/1
5 TABLET ORAL EVERY MORNING
Qty: 90 TABLET | Refills: 0 | Status: CANCELLED | OUTPATIENT
Start: 2021-01-30

## 2021-01-30 RX ORDER — ATORVASTATIN CALCIUM 20 MG/1
20 TABLET, FILM COATED ORAL DAILY
Qty: 90 TABLET | Refills: 0 | Status: CANCELLED | OUTPATIENT
Start: 2021-01-30

## 2021-02-01 DIAGNOSIS — M12.9 ARTHRITIS/ARTHROPATHY OF MULTIPLE JOINTS: ICD-10-CM

## 2021-02-02 RX ORDER — AMLODIPINE BESYLATE 5 MG/1
5 TABLET ORAL EVERY MORNING
Qty: 90 TABLET | Refills: 0 | Status: SHIPPED | OUTPATIENT
Start: 2021-02-02 | End: 2021-04-21

## 2021-02-02 RX ORDER — TRAMADOL HYDROCHLORIDE 50 MG/1
TABLET ORAL
Qty: 80 TABLET | Refills: 0 | Status: SHIPPED | OUTPATIENT
Start: 2021-02-02 | End: 2021-03-30

## 2021-02-02 RX ORDER — ATORVASTATIN CALCIUM 20 MG/1
20 TABLET, FILM COATED ORAL DAILY
Qty: 90 TABLET | Refills: 0 | Status: SHIPPED | OUTPATIENT
Start: 2021-02-02 | End: 2021-05-11 | Stop reason: SDUPTHER

## 2021-02-02 RX ORDER — LISINOPRIL 10 MG/1
10 TABLET ORAL DAILY
Qty: 90 TABLET | Refills: 0 | Status: SHIPPED | OUTPATIENT
Start: 2021-02-02 | End: 2021-05-05

## 2021-02-02 NOTE — TELEPHONE ENCOUNTER
I called this pt and his contact in the chart and was unable to reach them. He needs to come see me since he was in the hospital. I can refill the medication when he has a visit set up. Will you please try to call him and get a visit set up.

## 2021-02-09 ENCOUNTER — OFFICE VISIT (OUTPATIENT)
Dept: FAMILY MEDICINE CLINIC | Facility: CLINIC | Age: 66
End: 2021-02-09

## 2021-02-09 VITALS
BODY MASS INDEX: 27.9 KG/M2 | TEMPERATURE: 96.8 F | RESPIRATION RATE: 18 BRPM | OXYGEN SATURATION: 98 % | HEART RATE: 100 BPM | WEIGHT: 206 LBS | HEIGHT: 72 IN | DIASTOLIC BLOOD PRESSURE: 52 MMHG | SYSTOLIC BLOOD PRESSURE: 86 MMHG

## 2021-02-09 DIAGNOSIS — I10 ESSENTIAL HYPERTENSION: Primary | ICD-10-CM

## 2021-02-09 DIAGNOSIS — F10.20 ALCOHOLISM (HCC): ICD-10-CM

## 2021-02-09 DIAGNOSIS — G89.29 CHRONIC RIGHT SHOULDER PAIN: ICD-10-CM

## 2021-02-09 DIAGNOSIS — I95.1 ORTHOSTATIC HYPOTENSION: ICD-10-CM

## 2021-02-09 DIAGNOSIS — M25.511 CHRONIC RIGHT SHOULDER PAIN: ICD-10-CM

## 2021-02-09 DIAGNOSIS — R33.9 URINE RETENTION: ICD-10-CM

## 2021-02-09 PROCEDURE — 99214 OFFICE O/P EST MOD 30 MIN: CPT | Performed by: FAMILY MEDICINE

## 2021-02-09 RX ORDER — TAMSULOSIN HYDROCHLORIDE 0.4 MG/1
0.4 CAPSULE ORAL NIGHTLY
Qty: 30 CAPSULE | Refills: 1
Start: 2021-02-09 | End: 2021-05-11

## 2021-02-09 RX ORDER — TIZANIDINE 2 MG/1
2-4 TABLET ORAL EVERY 8 HOURS PRN
Qty: 72 TABLET | Refills: 0 | Status: SHIPPED | OUTPATIENT
Start: 2021-02-09 | End: 2021-05-11 | Stop reason: SDUPTHER

## 2021-02-09 RX ORDER — CLONAZEPAM 2 MG/1
2 TABLET ORAL
COMMUNITY
Start: 2021-02-01 | End: 2022-01-07

## 2021-02-09 NOTE — PROGRESS NOTES
"Chief Complaint  Hospital Follow Up Visit    Subjective          Pro Mueller presents to [facility]  History of Present Illness  He says he blacked out when the nurse brought him back. Said everything just went black for a few seconds and he fell. He has not had anything to eat or drink today and last drink was vodka last night.  Says he is not eating right. Not hungry he thinks because of the depression. He is eating about 1 meal per day.   He drinks soda or milk,   He had a drink of alcohol last night before bed. He drank a lot of vodka.  Going to talk to Dr. Coats his psychiatrist on 2/24/21. Pt would like to help with getting a referral to an inpatient alcohol detox/recovery center.   Says he will not likely be successful with outpt help, he can use any excuse to get out of it.   Says he was off alcohol for about 2 weeks 1-2 months ago.   He went somewhere in [facility]. His girlfriend took him there. She is very supportive.     Shoulder pain  Right side, having pain that runs down along the shoulder blade and into the muscle, tense and painful, taught. Feels like you could strum it with a your finger it is so tight.  Can't drive all the time because manual and sometimes not enough strength. He has worked with ortho on his shoulders and his knees.       Objective   Vital Signs:   BP (!) 86/52 (BP Location: Left arm, Patient Position: Sitting, Cuff Size: Adult)   Pulse 100   Temp 96.8 °F (36 °C) (Infrared)   Resp 18   Ht 182.9 cm (72\")   Wt 93.4 kg (206 lb)   SpO2 98%   BMI 27.94 kg/m²     Physical Exam  Vitals signs reviewed.   Constitutional:       General: He is not in acute distress.  Eyes:      General: No scleral icterus.        Right eye: No discharge.         Left eye: No discharge.      Conjunctiva/sclera: Conjunctivae normal.   Neck:      Musculoskeletal: Neck supple. No muscular tenderness.   Cardiovascular:      Rate and Rhythm: Normal rate and regular rhythm. "      Heart sounds: Normal heart sounds. No murmur.   Pulmonary:      Effort: Pulmonary effort is normal. No respiratory distress.      Breath sounds: Normal breath sounds. No wheezing.   Musculoskeletal:         General: Tenderness present.      Right lower leg: No edema.      Left lower leg: No edema.      Comments: He has palpable spasm in the right back T level near the central, medial border of the scapula. TTP   Lymphadenopathy:      Cervical: No cervical adenopathy.   Neurological:      Mental Status: He is oriented to person, place, and time.      Motor: No abnormal muscle tone.   Psychiatric:         Behavior: Behavior normal.        Result Review :                 Assessment and Plan    Diagnoses and all orders for this visit:    1. Essential hypertension (Primary)  -     CBC & Differential  -     Comprehensive Metabolic Panel    2. Urine retention  -     tamsulosin (FLOMAX) 0.4 MG capsule 24 hr capsule; Take 1 capsule by mouth Every Night.  Dispense: 30 capsule; Refill: 1    3. Orthostatic hypotension    4. Alcoholism (CMS/HCC)    5. Chronic right shoulder pain    Other orders  -     tiZANidine (ZANAFLEX) 2 MG tablet; Take 1-2 tablets by mouth Every 8 (Eight) Hours As Needed for Muscle Spasms.  Dispense: 72 tablet; Refill: 0        Follow Up   No follow-ups on file.  Patient was given instructions and counseling regarding his condition or for health maintenance advice. Please see specific information pulled into the AVS if appropriate.     His BP was about the same on repeat. He stood in the office on exam independently and after about 5 seconds began to feel lightheaded. He did not have any LOC, he did return to seated and felt better. I discussed with him that he is dehydrated and would be recommended that he go to the ED. Unlikely he would need to be admitted but to check on electrolytes and kidney and get IV fluids. He said he did not want to do that and just wanted to go get something to eat with his  girlfriend. I told him what to watch for and if he were not improving that he should go to the hospital. He agreed and voiced understanding. I had office staff take him down to the car in a wheelchair so he would not be at risk for a fall.   Don't take the tamsulosin, keep amlodipine in the AM and lisinopril in the PM. Hydrate and eat or may have to address lowering this medication dosing.    I reviewed the procedure notes, notes, reports, labs, and imaging studies from pt's recent admission.   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 He has been on a number of medications and combination of medications without significant improvement. He is on disability and notes that he would really like to work again someday. He really wants to get better. Likely the alcohol effects his depression and effectiveness of the medication but given all the medication that he has been on and continued struggling with the depression and alcoholism I think he would be a great candidate for TMS. He is trying to get approved. Seems that TMS and improvement of depression could really help reduce his alcohol use and get him back toward recovery and working. His multiple hospitalizations for his problems related to alcoholism would be reduced or eliminated potentially after TMS therapy.     He is interested in detox at inpatient facility and wishes to discuss this with his psychiatrist, Dr. Coats. He has support at home again in his girlfriend.    We talked about his nutrition and needing to hydrate with water.     His urinary retention seems to be better. He did take the tamsulosin this morning which did not help his orthostasics in the office. In the notes says that he could try off this medication after at home and doing well. I sent for refill and for him to take at night if he goes off at this time and finds he is having trouble urinating again. He is good with this plan.     He has chronic right  shoulder pain and seems to be having a hard time with acute pain at this time. He is going to take muscle relaxers and work on stretching, do heat and massage for this palpable muscle spasm likely contributing significantly to the tenderness and pain with movements. Also may need to return to ortho and we discussed.

## 2021-02-10 LAB
ALBUMIN SERPL-MCNC: 4 G/DL (ref 3.5–5.2)
ALBUMIN/GLOB SERPL: 1.7 G/DL
ALP SERPL-CCNC: 129 U/L (ref 39–117)
ALT SERPL-CCNC: 24 U/L (ref 1–41)
AST SERPL-CCNC: 51 U/L (ref 1–40)
BASOPHILS # BLD AUTO: 0.03 10*3/MM3 (ref 0–0.2)
BASOPHILS NFR BLD AUTO: 0.5 % (ref 0–1.5)
BILIRUB SERPL-MCNC: 0.5 MG/DL (ref 0–1.2)
BUN SERPL-MCNC: 7 MG/DL (ref 8–23)
BUN/CREAT SERPL: 7.6 (ref 7–25)
CALCIUM SERPL-MCNC: 8.3 MG/DL (ref 8.6–10.5)
CHLORIDE SERPL-SCNC: 102 MMOL/L (ref 98–107)
CO2 SERPL-SCNC: 26.1 MMOL/L (ref 22–29)
CREAT SERPL-MCNC: 0.92 MG/DL (ref 0.76–1.27)
EOSINOPHIL # BLD AUTO: 0.07 10*3/MM3 (ref 0–0.4)
EOSINOPHIL NFR BLD AUTO: 1.2 % (ref 0.3–6.2)
ERYTHROCYTE [DISTWIDTH] IN BLOOD BY AUTOMATED COUNT: 14.1 % (ref 12.3–15.4)
GLOBULIN SER CALC-MCNC: 2.4 GM/DL
GLUCOSE SERPL-MCNC: 89 MG/DL (ref 65–99)
HCT VFR BLD AUTO: 41.4 % (ref 37.5–51)
HGB BLD-MCNC: 14.4 G/DL (ref 13–17.7)
IMM GRANULOCYTES # BLD AUTO: 0.02 10*3/MM3 (ref 0–0.05)
IMM GRANULOCYTES NFR BLD AUTO: 0.3 % (ref 0–0.5)
LYMPHOCYTES # BLD AUTO: 1.33 10*3/MM3 (ref 0.7–3.1)
LYMPHOCYTES NFR BLD AUTO: 22 % (ref 19.6–45.3)
MCH RBC QN AUTO: 31.2 PG (ref 26.6–33)
MCHC RBC AUTO-ENTMCNC: 34.8 G/DL (ref 31.5–35.7)
MCV RBC AUTO: 89.6 FL (ref 79–97)
MONOCYTES # BLD AUTO: 0.85 10*3/MM3 (ref 0.1–0.9)
MONOCYTES NFR BLD AUTO: 14.1 % (ref 5–12)
NEUTROPHILS # BLD AUTO: 3.74 10*3/MM3 (ref 1.7–7)
NEUTROPHILS NFR BLD AUTO: 61.9 % (ref 42.7–76)
NRBC BLD AUTO-RTO: 0 /100 WBC (ref 0–0.2)
PLATELET # BLD AUTO: 277 10*3/MM3 (ref 140–450)
POTASSIUM SERPL-SCNC: 4 MMOL/L (ref 3.5–5.2)
PROT SERPL-MCNC: 6.4 G/DL (ref 6–8.5)
RBC # BLD AUTO: 4.62 10*6/MM3 (ref 4.14–5.8)
SODIUM SERPL-SCNC: 142 MMOL/L (ref 136–145)
WBC # BLD AUTO: 6.04 10*3/MM3 (ref 3.4–10.8)

## 2021-03-05 ENCOUNTER — TELEPHONE (OUTPATIENT)
Dept: FAMILY MEDICINE CLINIC | Facility: CLINIC | Age: 66
End: 2021-03-05

## 2021-03-05 NOTE — TELEPHONE ENCOUNTER
Received a message to contact ej fairbanks about this patient. She is a  and wanted to let Lian know she gave this patient's psychiatrist her number for him to contact her if he needs any info for this patient. She also would like to let her know that they have requested the TMS for the patient.

## 2021-03-16 ENCOUNTER — BULK ORDERING (OUTPATIENT)
Dept: CASE MANAGEMENT | Facility: OTHER | Age: 66
End: 2021-03-16

## 2021-03-16 DIAGNOSIS — Z23 IMMUNIZATION DUE: ICD-10-CM

## 2021-03-19 ENCOUNTER — IMMUNIZATION (OUTPATIENT)
Dept: VACCINE CLINIC | Facility: HOSPITAL | Age: 66
End: 2021-03-19

## 2021-03-19 DIAGNOSIS — Z23 IMMUNIZATION DUE: ICD-10-CM

## 2021-03-19 PROCEDURE — 91300 HC SARSCOV02 VAC 30MCG/0.3ML IM: CPT | Performed by: INTERNAL MEDICINE

## 2021-03-19 PROCEDURE — 0001A: CPT | Performed by: INTERNAL MEDICINE

## 2021-03-30 DIAGNOSIS — M12.9 ARTHRITIS/ARTHROPATHY OF MULTIPLE JOINTS: ICD-10-CM

## 2021-04-02 RX ORDER — TRAMADOL HYDROCHLORIDE 50 MG/1
TABLET ORAL
Qty: 80 TABLET | Refills: 0 | Status: SHIPPED | OUTPATIENT
Start: 2021-04-02 | End: 2021-05-28

## 2021-04-09 ENCOUNTER — IMMUNIZATION (OUTPATIENT)
Dept: VACCINE CLINIC | Facility: HOSPITAL | Age: 66
End: 2021-04-09

## 2021-04-09 PROCEDURE — 0002A: CPT | Performed by: INTERNAL MEDICINE

## 2021-04-09 PROCEDURE — 91300 HC SARSCOV02 VAC 30MCG/0.3ML IM: CPT | Performed by: INTERNAL MEDICINE

## 2021-04-21 RX ORDER — AMLODIPINE BESYLATE 5 MG/1
5 TABLET ORAL EVERY MORNING
Qty: 90 TABLET | Refills: 0 | Status: SHIPPED | OUTPATIENT
Start: 2021-04-21 | End: 2021-07-15

## 2021-05-05 RX ORDER — LISINOPRIL 10 MG/1
10 TABLET ORAL DAILY
Qty: 90 TABLET | Refills: 0 | Status: SHIPPED | OUTPATIENT
Start: 2021-05-05 | End: 2021-08-18

## 2021-05-11 ENCOUNTER — OFFICE VISIT (OUTPATIENT)
Dept: FAMILY MEDICINE CLINIC | Facility: CLINIC | Age: 66
End: 2021-05-11

## 2021-05-11 VITALS
TEMPERATURE: 97.1 F | DIASTOLIC BLOOD PRESSURE: 72 MMHG | OXYGEN SATURATION: 98 % | SYSTOLIC BLOOD PRESSURE: 114 MMHG | HEART RATE: 71 BPM | BODY MASS INDEX: 28.05 KG/M2 | WEIGHT: 207.1 LBS | HEIGHT: 72 IN | RESPIRATION RATE: 16 BRPM

## 2021-05-11 DIAGNOSIS — F41.8 MIXED ANXIETY DEPRESSIVE DISORDER: ICD-10-CM

## 2021-05-11 DIAGNOSIS — G89.29 CHRONIC LEFT SHOULDER PAIN: ICD-10-CM

## 2021-05-11 DIAGNOSIS — M25.512 CHRONIC LEFT SHOULDER PAIN: ICD-10-CM

## 2021-05-11 DIAGNOSIS — I10 ESSENTIAL HYPERTENSION: Primary | ICD-10-CM

## 2021-05-11 PROCEDURE — 99213 OFFICE O/P EST LOW 20 MIN: CPT | Performed by: FAMILY MEDICINE

## 2021-05-11 RX ORDER — CARIPRAZINE 3 MG/1
3 CAPSULE, GELATIN COATED ORAL
COMMUNITY
Start: 2021-05-03 | End: 2022-02-10 | Stop reason: ALTCHOICE

## 2021-05-11 RX ORDER — ATORVASTATIN CALCIUM 20 MG/1
20 TABLET, FILM COATED ORAL DAILY
Qty: 90 TABLET | Refills: 1 | Status: SHIPPED | OUTPATIENT
Start: 2021-05-11 | End: 2021-08-21

## 2021-05-11 RX ORDER — TIZANIDINE 4 MG/1
4-8 TABLET ORAL EVERY 6 HOURS PRN
Qty: 76 TABLET | Refills: 0 | Status: SHIPPED | OUTPATIENT
Start: 2021-05-11 | End: 2021-06-10

## 2021-05-11 NOTE — PROGRESS NOTES
"Chief Complaint  Depression and Hypertension    Subjective          Pro Mueller presents to [facility]  History of Present Illness  Depression  He said he got a letter from [facility] that he needed to go back to work 4/29 he would be terminated and this took precedent.   Going slow and stressful.   He is back full time now. His boss is going easy on him.   He has a new  with [employer] for his behavioral health contact. The one I had communicated with is no longer there.   Now it is Opal loco and her number is [phone]. He spoke with her yesterday. She is new to his case.   Still following with Dr. Coats, psych.   He has not gone back to counseling since the beginning of the year.   Wanting to get back into the [facility], he has been out for one year. Did about 7 sessions.     HTN  BP looking good.     Insomnia  He is still drinking daily. Drinking about 3-4 drinks. Does not htinks this interferes with work.     Shoulder pain  Said this medication for muscle spasm helped needs a little stronger.   He has been out at the park and doing photography.   He is using tramadol more when he is going to out. Says takes 2-3 those days otherwise he is not using it when just relaxing at home.     Objective   Vital Signs:   /72 (BP Location: Right arm, Patient Position: Sitting, Cuff Size: Adult)   Pulse 71   Temp 97.1 °F (36.2 °C) (Infrared)   Resp 16   Ht 182.9 cm (72\")   Wt 93.9 kg (207 lb 1.6 oz)   SpO2 98%   BMI 28.09 kg/m²     Physical Exam  Vitals reviewed.   Constitutional:       General: He is not in acute distress.  Eyes:      General: No scleral icterus.        Right eye: No discharge.         Left eye: No discharge.      Conjunctiva/sclera: Conjunctivae normal.   Cardiovascular:      Rate and Rhythm: Normal rate and regular rhythm.      Heart sounds: Normal heart sounds. No murmur heard.     Pulmonary:      Effort: Pulmonary effort is normal. No respiratory " distress.      Breath sounds: Normal breath sounds. No wheezing.   Musculoskeletal:      Cervical back: Neck supple. No muscular tenderness.      Right lower leg: No edema.      Left lower leg: No edema.   Lymphadenopathy:      Cervical: No cervical adenopathy.   Neurological:      Mental Status: He is oriented to person, place, and time.      Motor: No abnormal muscle tone.   Psychiatric:         Behavior: Behavior normal.      Comments: Flat affect        Result Review :                 Assessment and Plan    Diagnoses and all orders for this visit:    1. Essential hypertension (Primary)    2. Mixed anxiety depressive disorder    3. Chronic left shoulder pain    Other orders  -     tiZANidine (ZANAFLEX) 4 MG tablet; Take 1-2 tablets by mouth Every 6 (Six) Hours As Needed for Muscle Spasms.  Dispense: 76 tablet; Refill: 0  -     atorvastatin (LIPITOR) 20 MG tablet; Take 1 tablet by mouth Daily.  Dispense: 90 tablet; Refill: 1        Follow Up   No follow-ups on file.  Patient was given instructions and counseling regarding his condition or for health maintenance advice. Please see specific information pulled into the AVS if appropriate.     Depression  He has not looked into inpatient alcohol recovery program related to having to go back to work or losing his job.  Says he does not have a lecture of retiring at night right now so he had to go back to work.  This is caused more stress given how many changes have occurred since he has been away from work.  He says he is making it okay and his boss is working with him.  He still follows with psychiatry.  Goal would be to get him back into TMS.  Had reached out to his advocate but she is no longer there.  He just met his new advocate yesterday and has her phone number available for me today someone to give her call us if there is any way I can help to get him reenrolled in coverage for the TMS therapy.  I think he is a great candidate for this given the severity of his  depression.  Organ to increase the dose of tizanidine for his shoulder tightness and see if this will help.  If not we can change it to baclofen.    He is going to return in 3 to 4 months for his annual exam.  He is going to verify with the insurance that he can do his annual exam this early in our system and shows is not due till November.  He is can let us know if he needs to change his appointment.  We will do labs with that next visit.    Reviewed his use of tramadol.  Sounds appropriate.  He uses as needed for when he is more active.  Usually uses the tramadol much less frequently in the warmer months.  His report is consistent with this level of use.

## 2021-05-26 DIAGNOSIS — M12.9 ARTHRITIS/ARTHROPATHY OF MULTIPLE JOINTS: ICD-10-CM

## 2021-05-28 RX ORDER — TRAMADOL HYDROCHLORIDE 50 MG/1
TABLET ORAL
Qty: 80 TABLET | Refills: 0 | Status: SHIPPED | OUTPATIENT
Start: 2021-05-28 | End: 2021-06-29

## 2021-06-10 RX ORDER — TIZANIDINE 4 MG/1
TABLET ORAL
Qty: 76 TABLET | Refills: 0 | Status: SHIPPED | OUTPATIENT
Start: 2021-06-10 | End: 2021-08-26

## 2021-06-29 DIAGNOSIS — M12.9 ARTHRITIS/ARTHROPATHY OF MULTIPLE JOINTS: ICD-10-CM

## 2021-06-29 RX ORDER — TRAMADOL HYDROCHLORIDE 50 MG/1
TABLET ORAL
Qty: 80 TABLET | Refills: 0 | Status: SHIPPED | OUTPATIENT
Start: 2021-06-29 | End: 2021-08-18

## 2021-07-09 ENCOUNTER — TELEPHONE (OUTPATIENT)
Dept: FAMILY MEDICINE CLINIC | Facility: CLINIC | Age: 66
End: 2021-07-09

## 2021-07-09 NOTE — TELEPHONE ENCOUNTER
FENG MCMILLAN CALLED WANTING TO DISCUSS PT WITH EITHER FARHAD OR HER NURSE. PT HAS HAD FREQUENT COMPLAINTS ABOUT DIZZINESS BUT FENG MCMILLAN SAYS THAT AT NIGHT PT HAS BEEN DRINKING ABOUT 3 MARTINIS A NIGHT AND THINKS PT IS BACK TO DRINKING AND THAT COULD BE THE PROBLEM AND REASON WITH THE DIZZINESS; DRINKING WHILE TAKING MEDICATIONS. FENG IS ASKING THAT WE FAX OVER THE LATEST OFFICE VISIT [phone] BUT SHE WANTS TO SPEAK EITHER FARHAD OR HER NURSE ABOUT PT.

## 2021-07-11 NOTE — TELEPHONE ENCOUNTER
I think the person's name is Opal Mo and she is his advocate with a health service he has. I have faxed information to his prior advocate but she left. Opal is new to his care team. Please call her and see what information she has. Thanks.

## 2021-07-15 RX ORDER — AMLODIPINE BESYLATE 5 MG/1
5 TABLET ORAL EVERY MORNING
Qty: 90 TABLET | Refills: 0 | Status: SHIPPED | OUTPATIENT
Start: 2021-07-15 | End: 2021-08-21

## 2021-07-28 ENCOUNTER — APPOINTMENT (OUTPATIENT)
Dept: CT IMAGING | Facility: HOSPITAL | Age: 66
End: 2021-07-28

## 2021-07-28 ENCOUNTER — TELEPHONE (OUTPATIENT)
Dept: NEUROSURGERY | Facility: CLINIC | Age: 66
End: 2021-07-28

## 2021-07-28 ENCOUNTER — HOSPITAL ENCOUNTER (EMERGENCY)
Facility: HOSPITAL | Age: 66
Discharge: HOME | End: 2021-07-28
Attending: EMERGENCY MEDICINE | Admitting: EMERGENCY MEDICINE

## 2021-07-28 VITALS
DIASTOLIC BLOOD PRESSURE: 84 MMHG | BODY MASS INDEX: 28.44 KG/M2 | WEIGHT: 210 LBS | SYSTOLIC BLOOD PRESSURE: 125 MMHG | HEIGHT: 72 IN | TEMPERATURE: 97.2 F | OXYGEN SATURATION: 95 % | RESPIRATION RATE: 20 BRPM | HEART RATE: 80 BPM

## 2021-07-28 DIAGNOSIS — S12.401S CLOSED NONDISPLACED FRACTURE OF FIFTH CERVICAL VERTEBRA, UNSPECIFIED FRACTURE MORPHOLOGY, SEQUELA: ICD-10-CM

## 2021-07-28 DIAGNOSIS — M54.2 NECK PAIN: Primary | ICD-10-CM

## 2021-07-28 DIAGNOSIS — S01.81XA FACIAL LACERATION, INITIAL ENCOUNTER: Primary | ICD-10-CM

## 2021-07-28 DIAGNOSIS — F10.920 ALCOHOLIC INTOXICATION WITHOUT COMPLICATION (HCC): ICD-10-CM

## 2021-07-28 LAB
ALBUMIN SERPL-MCNC: 4.5 G/DL (ref 3.5–5.2)
ALBUMIN/GLOB SERPL: 1.8 G/DL
ALP SERPL-CCNC: 90 U/L (ref 39–117)
ALT SERPL W P-5'-P-CCNC: 16 U/L (ref 1–41)
ANION GAP SERPL CALCULATED.3IONS-SCNC: 15.2 MMOL/L (ref 5–15)
AST SERPL-CCNC: 48 U/L (ref 1–40)
BASOPHILS # BLD AUTO: 0.05 10*3/MM3 (ref 0–0.2)
BASOPHILS NFR BLD AUTO: 0.7 % (ref 0–1.5)
BILIRUB SERPL-MCNC: 0.6 MG/DL (ref 0–1.2)
BUN SERPL-MCNC: 9 MG/DL (ref 8–23)
BUN/CREAT SERPL: 13 (ref 7–25)
CALCIUM SPEC-SCNC: 8.2 MG/DL (ref 8.6–10.5)
CHLORIDE SERPL-SCNC: 102 MMOL/L (ref 98–107)
CO2 SERPL-SCNC: 23.8 MMOL/L (ref 22–29)
CREAT SERPL-MCNC: 0.69 MG/DL (ref 0.76–1.27)
DEPRECATED RDW RBC AUTO: 46.4 FL (ref 37–54)
EOSINOPHIL # BLD AUTO: 0.08 10*3/MM3 (ref 0–0.4)
EOSINOPHIL NFR BLD AUTO: 1.2 % (ref 0.3–6.2)
ERYTHROCYTE [DISTWIDTH] IN BLOOD BY AUTOMATED COUNT: 13.6 % (ref 12.3–15.4)
ETHANOL BLD-MCNC: 353 MG/DL (ref 0–10)
ETHANOL UR QL: 0.35 %
GFR SERPL CREATININE-BSD FRML MDRD: 115 ML/MIN/1.73
GLOBULIN UR ELPH-MCNC: 2.5 GM/DL
GLUCOSE SERPL-MCNC: 80 MG/DL (ref 65–99)
HCT VFR BLD AUTO: 46.9 % (ref 37.5–51)
HGB BLD-MCNC: 15.7 G/DL (ref 13–17.7)
IMM GRANULOCYTES # BLD AUTO: 0.02 10*3/MM3 (ref 0–0.05)
IMM GRANULOCYTES NFR BLD AUTO: 0.3 % (ref 0–0.5)
LYMPHOCYTES # BLD AUTO: 3.08 10*3/MM3 (ref 0.7–3.1)
LYMPHOCYTES NFR BLD AUTO: 44.8 % (ref 19.6–45.3)
MCH RBC QN AUTO: 31.3 PG (ref 26.6–33)
MCHC RBC AUTO-ENTMCNC: 33.5 G/DL (ref 31.5–35.7)
MCV RBC AUTO: 93.6 FL (ref 79–97)
MONOCYTES # BLD AUTO: 0.69 10*3/MM3 (ref 0.1–0.9)
MONOCYTES NFR BLD AUTO: 10 % (ref 5–12)
NEUTROPHILS NFR BLD AUTO: 2.95 10*3/MM3 (ref 1.7–7)
NEUTROPHILS NFR BLD AUTO: 43 % (ref 42.7–76)
NRBC BLD AUTO-RTO: 0 /100 WBC (ref 0–0.2)
PLATELET # BLD AUTO: 207 10*3/MM3 (ref 140–450)
PMV BLD AUTO: 9.4 FL (ref 6–12)
POTASSIUM SERPL-SCNC: 3.7 MMOL/L (ref 3.5–5.2)
PROT SERPL-MCNC: 7 G/DL (ref 6–8.5)
RBC # BLD AUTO: 5.01 10*6/MM3 (ref 4.14–5.8)
SODIUM SERPL-SCNC: 141 MMOL/L (ref 136–145)
WBC # BLD AUTO: 6.87 10*3/MM3 (ref 3.4–10.8)

## 2021-07-28 PROCEDURE — 99284 EMERGENCY DEPT VISIT MOD MDM: CPT

## 2021-07-28 PROCEDURE — 82077 ASSAY SPEC XCP UR&BREATH IA: CPT | Performed by: PHYSICIAN ASSISTANT

## 2021-07-28 PROCEDURE — 80053 COMPREHEN METABOLIC PANEL: CPT | Performed by: PHYSICIAN ASSISTANT

## 2021-07-28 PROCEDURE — 70450 CT HEAD/BRAIN W/O DYE: CPT

## 2021-07-28 PROCEDURE — 90471 IMMUNIZATION ADMIN: CPT | Performed by: PHYSICIAN ASSISTANT

## 2021-07-28 PROCEDURE — 25010000002 TDAP 5-2.5-18.5 LF-MCG/0.5 SUSPENSION: Performed by: PHYSICIAN ASSISTANT

## 2021-07-28 PROCEDURE — 90715 TDAP VACCINE 7 YRS/> IM: CPT | Performed by: PHYSICIAN ASSISTANT

## 2021-07-28 PROCEDURE — 72125 CT NECK SPINE W/O DYE: CPT

## 2021-07-28 PROCEDURE — 85025 COMPLETE CBC W/AUTO DIFF WBC: CPT | Performed by: PHYSICIAN ASSISTANT

## 2021-07-28 RX ORDER — LIDOCAINE HYDROCHLORIDE AND EPINEPHRINE 10; 10 MG/ML; UG/ML
10 INJECTION, SOLUTION INFILTRATION; PERINEURAL ONCE
Status: COMPLETED | OUTPATIENT
Start: 2021-07-28 | End: 2021-07-28

## 2021-07-28 RX ADMIN — TETANUS TOXOID, REDUCED DIPHTHERIA TOXOID AND ACELLULAR PERTUSSIS VACCINE, ADSORBED 0.5 ML: 5; 2.5; 8; 8; 2.5 SUSPENSION INTRAMUSCULAR at 11:42

## 2021-07-28 RX ADMIN — LIDOCAINE HYDROCHLORIDE,EPINEPHRINE BITARTRATE 10 ML: 10; .01 INJECTION, SOLUTION INFILTRATION; PERINEURAL at 10:57

## 2021-07-28 NOTE — TELEPHONE ENCOUNTER
----- Message from BERONICA Olson sent at 7/28/2021  2:39 PM EDT -----  Regarding: f/up  Please schedule a f/up with  any APC, on a day that Dr. Cervantes will be in the office, in 1-2 weeks.  He will need  AP/Lateral, flex/Ext cervical xrays.  Thank you!

## 2021-08-03 ENCOUNTER — OFFICE VISIT (OUTPATIENT)
Dept: FAMILY MEDICINE CLINIC | Facility: CLINIC | Age: 66
End: 2021-08-03

## 2021-08-03 VITALS
WEIGHT: 198.7 LBS | BODY MASS INDEX: 26.91 KG/M2 | DIASTOLIC BLOOD PRESSURE: 84 MMHG | RESPIRATION RATE: 18 BRPM | SYSTOLIC BLOOD PRESSURE: 156 MMHG | TEMPERATURE: 97.5 F | OXYGEN SATURATION: 98 % | HEART RATE: 93 BPM | HEIGHT: 72 IN

## 2021-08-03 DIAGNOSIS — S01.81XD LACERATION OF FOREHEAD, SUBSEQUENT ENCOUNTER: Primary | ICD-10-CM

## 2021-08-03 DIAGNOSIS — S12.401D CLOSED NONDISPLACED FRACTURE OF FIFTH CERVICAL VERTEBRA WITH ROUTINE HEALING, UNSPECIFIED FRACTURE MORPHOLOGY, SUBSEQUENT ENCOUNTER: ICD-10-CM

## 2021-08-03 PROCEDURE — 99213 OFFICE O/P EST LOW 20 MIN: CPT | Performed by: NURSE PRACTITIONER
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
## 2021-08-16 DIAGNOSIS — M12.9 ARTHRITIS/ARTHROPATHY OF MULTIPLE JOINTS: ICD-10-CM

## 2021-08-17 NOTE — TELEPHONE ENCOUNTER
Rx Refill Note  Requested Prescriptions     Pending Prescriptions Disp Refills   • acyclovir (ZOVIRAX) 200 MG capsule [Pharmacy Med Name: ACYCLOVIR 200MG CAPSULES] 180 capsule 1     Sig: TAKE 2 CAPSULES BY MOUTH DAILY   • traMADol (ULTRAM) 50 MG tablet [Pharmacy Med Name: TRAMADOL 50MG TABLETS] 80 tablet      Sig: TAKE 1 TABLET BY MOUTH EVERY 6 HOURS AS NEEDED FOR MODERATE PAIN   • lisinopril (PRINIVIL,ZESTRIL) 10 MG tablet [Pharmacy Med Name: LISINOPRIL 10MG TABLETS] 90 tablet 0     Sig: TAKE 1 TABLET BY MOUTH DAILY      Last office visit with prescribing clinician: 5/11/2021      Next office visit with prescribing clinician: 10/5/2021            Debbie Toledo LPN  08/17/21, 10:10 EDT

## 2021-08-18 ENCOUNTER — TELEPHONE (OUTPATIENT)
Dept: FAMILY MEDICINE CLINIC | Facility: CLINIC | Age: 66
End: 2021-08-18

## 2021-08-18 RX ORDER — ACYCLOVIR 200 MG/1
400 CAPSULE ORAL DAILY
Qty: 180 CAPSULE | Refills: 1 | Status: SHIPPED | OUTPATIENT
Start: 2021-08-18 | End: 2022-03-15 | Stop reason: HOSPADM

## 2021-08-18 RX ORDER — TRAMADOL HYDROCHLORIDE 50 MG/1
TABLET ORAL
Qty: 80 TABLET | Refills: 0 | Status: SHIPPED | OUTPATIENT
Start: 2021-08-18 | End: 2021-09-23

## 2021-08-18 RX ORDER — LISINOPRIL 10 MG/1
10 TABLET ORAL DAILY
Qty: 90 TABLET | Refills: 0 | Status: SHIPPED | OUTPATIENT
Start: 2021-08-18 | End: 2021-08-21

## 2021-08-20 NOTE — TELEPHONE ENCOUNTER
Rx Refill Note  Requested Prescriptions     Pending Prescriptions Disp Refills   • atorvastatin (LIPITOR) 20 MG tablet [Pharmacy Med Name: ATORVASTATIN 20MG TABLETS] 90 tablet 1     Sig: TAKE 1 TABLET BY MOUTH DAILY   • lisinopril (PRINIVIL,ZESTRIL) 10 MG tablet [Pharmacy Med Name: LISINOPRIL 10MG TABLETS] 90 tablet 0     Sig: TAKE 1 TABLET BY MOUTH DAILY   • amLODIPine (NORVASC) 5 MG tablet [Pharmacy Med Name: AMLODIPINE BESYLATE 5MG TABLETS] 90 tablet 0     Sig: TAKE 1 TABLET BY MOUTH EVERY MORNING      Last office visit with prescribing clinician: 5/11/2021      Next office visit with prescribing clinician: 10/5/2021            Debbie Toledo LPN  08/20/21, 08:10 EDT

## 2021-08-21 RX ORDER — ATORVASTATIN CALCIUM 20 MG/1
20 TABLET, FILM COATED ORAL DAILY
Qty: 90 TABLET | Refills: 1 | Status: SHIPPED | OUTPATIENT
Start: 2021-08-21 | End: 2022-08-19

## 2021-08-21 RX ORDER — AMLODIPINE BESYLATE 5 MG/1
5 TABLET ORAL EVERY MORNING
Qty: 90 TABLET | Refills: 0 | Status: SHIPPED | OUTPATIENT
Start: 2021-08-21 | End: 2021-11-18

## 2021-08-21 RX ORDER — LISINOPRIL 10 MG/1
10 TABLET ORAL DAILY
Qty: 90 TABLET | Refills: 0 | Status: SHIPPED | OUTPATIENT
Start: 2021-08-21 | End: 2021-11-18

## 2021-08-26 ENCOUNTER — OFFICE VISIT (OUTPATIENT)
Dept: FAMILY MEDICINE CLINIC | Facility: CLINIC | Age: 66
End: 2021-08-26

## 2021-08-26 ENCOUNTER — HOSPITAL ENCOUNTER (OUTPATIENT)
Dept: GENERAL RADIOLOGY | Facility: HOSPITAL | Age: 66
Discharge: HOME | End: 2021-08-26
Admitting: NURSE PRACTITIONER

## 2021-08-26 VITALS
OXYGEN SATURATION: 99 % | WEIGHT: 204.3 LBS | TEMPERATURE: 97.1 F | RESPIRATION RATE: 18 BRPM | DIASTOLIC BLOOD PRESSURE: 64 MMHG | HEIGHT: 72 IN | BODY MASS INDEX: 27.67 KG/M2 | SYSTOLIC BLOOD PRESSURE: 130 MMHG | HEART RATE: 87 BPM

## 2021-08-26 DIAGNOSIS — M54.50 LUMBAR PAIN: ICD-10-CM

## 2021-08-26 DIAGNOSIS — E03.9 HYPOTHYROIDISM, UNSPECIFIED TYPE: ICD-10-CM

## 2021-08-26 DIAGNOSIS — M54.50 LUMBAR PAIN: Primary | ICD-10-CM

## 2021-08-26 PROCEDURE — 72100 X-RAY EXAM L-S SPINE 2/3 VWS: CPT

## 2021-08-26 PROCEDURE — 99213 OFFICE O/P EST LOW 20 MIN: CPT | Performed by: NURSE PRACTITIONER

## 2021-08-26 RX ORDER — LEVOTHYROXINE SODIUM 0.1 MG/1
100 TABLET ORAL DAILY
Qty: 90 TABLET | Refills: 1 | Status: SHIPPED | OUTPATIENT
Start: 2021-08-26

## 2021-08-26 NOTE — PROGRESS NOTES
"Chief Complaint  Back Pain (wants conslut referral)    Subjective          Pro Mueller presents to [facility]  Pro presents with new onset back pain. No known injury. Did have fainting episode prior to back pain. Landed face down. Did not hit his back or cause. Pain is lower back, bilaterally, on occasional will have radicular symptoms, but not chronic. Pain is 5/10, worse with movement or bending.     Broke crown at gum line. Scheduled for     Back Pain  This is a new problem. The current episode started more than 1 month ago. The problem occurs constantly. The problem is unchanged. The pain is present in the sacro-iliac. The quality of the pain is described as aching and burning. The pain is at a severity of 5/10. The pain is worse during the day. The symptoms are aggravated by bending, position, standing and twisting. Stiffness is present all day. Associated symptoms include numbness, perianal numbness and tingling. Pertinent negatives include no abdominal pain, bladder incontinence, bowel incontinence, chest pain, dysuria, fever, headaches, leg pain, paresis, paresthesias, pelvic pain, weakness or weight loss. Risk factors include lack of exercise and sedentary lifestyle. He has tried analgesics (taking tramadol for arthritis) for the symptoms.       Objective   Vital Signs:   /64   Pulse 87   Temp 97.1 °F (36.2 °C) (Infrared)   Resp 18   Ht 182.9 cm (72\")   Wt 92.7 kg (204 lb 4.8 oz)   SpO2 99%   BMI 27.71 kg/m²     Physical Exam  Vitals reviewed.   Constitutional:       General: He is not in acute distress.     Appearance: Normal appearance. He is well-developed. He is not diaphoretic.   Cardiovascular:      Rate and Rhythm: Normal rate and regular rhythm.      Heart sounds: Normal heart sounds. No murmur heard.   No friction rub. No gallop.    Pulmonary:      Effort: Pulmonary effort is normal. No respiratory distress.      Breath sounds: Normal breath sounds. " No wheezing or rales.   Abdominal:      General: Bowel sounds are normal. There is no distension.      Palpations: Abdomen is soft.      Tenderness: There is no abdominal tenderness.   Musculoskeletal:      Cervical back: Neck supple.   Skin:     General: Skin is warm and dry.   Neurological:      Mental Status: He is alert and oriented to person, place, and time.     Answers for HPI/ROS submitted by the patient on 8/24/2021  What is the primary reason for your visit?: Back Pain       Result Review :                 Assessment and Plan    There are no diagnoses linked to this encounter.    Follow Up   No follow-ups on file.  Patient was given instructions and counseling regarding his condition or for health maintenance advice. Please see specific information pulled into the AVS if appropriate.     Patient requesting referral to ortho, Dr. Mcnulty, referral placed  Given patient had syncopal episode and etoh use, will check lumbar xray   Do not want to use muscle relaxers due to increased CNS effects with etoh  Ok to continue tramadol as previously prescribed

## 2021-09-23 DIAGNOSIS — M12.9 ARTHRITIS/ARTHROPATHY OF MULTIPLE JOINTS: ICD-10-CM

## 2021-09-23 RX ORDER — TRAMADOL HYDROCHLORIDE 50 MG/1
TABLET ORAL
Qty: 80 TABLET | Refills: 0 | Status: SHIPPED | OUTPATIENT
Start: 2021-09-23 | End: 2021-11-09

## 2021-09-23 NOTE — TELEPHONE ENCOUNTER
Rx Refill Note  Requested Prescriptions     Pending Prescriptions Disp Refills   • traMADol (ULTRAM) 50 MG tablet [Pharmacy Med Name: TRAMADOL 50MG TABLETS] 80 tablet      Sig: TAKE 1 TABLET BY MOUTH EVERY 6 HOURS AS NEEDED FOR MODERATE PAIN      Last office visit with prescribing clinician: 5/11/2021      Next office visit with prescribing clinician: 10/5/2021        Urine Drug Screen - Urine, Clean Catch (01/25/2021 11:57)      Tamiko Figueroa LPN  09/23/21, 12:05 EDT

## 2021-10-01 ENCOUNTER — TELEPHONE (OUTPATIENT)
Dept: NEUROSURGERY | Facility: CLINIC | Age: 66
End: 2021-10-01

## 2021-10-01 NOTE — TELEPHONE ENCOUNTER
Caller: HERB    Relationship to patient: SELF    Best call back number: [phone]  Chief complaint:    Type of visit: FOLLOW UP   Requested date:     If rescheduling, when is the original appointment:    Additional notes PT RECEIVED CERTIFIED LETTER TO DO FOLLOWUP  ALSO STATED HE NEEDS XRAYS.  ATTEMPTED TO WARM TRANSFER SPOKE CLAUDE BETH AND WAS TOLD TO SEND AN ENCOUNTER TO ADD TO APRN WAIT LIST.    PLEASE CALL PT AND SCHEDULE    THANKS

## 2021-10-05 ENCOUNTER — OFFICE VISIT (OUTPATIENT)
Dept: FAMILY MEDICINE CLINIC | Facility: CLINIC | Age: 66
End: 2021-10-05

## 2021-10-05 VITALS
OXYGEN SATURATION: 98 % | BODY MASS INDEX: 25.98 KG/M2 | WEIGHT: 191.8 LBS | TEMPERATURE: 96.9 F | DIASTOLIC BLOOD PRESSURE: 78 MMHG | HEIGHT: 72 IN | HEART RATE: 79 BPM | SYSTOLIC BLOOD PRESSURE: 124 MMHG

## 2021-10-05 DIAGNOSIS — I10 PRIMARY HYPERTENSION: ICD-10-CM

## 2021-10-05 DIAGNOSIS — E78.5 HYPERLIPIDEMIA, UNSPECIFIED HYPERLIPIDEMIA TYPE: ICD-10-CM

## 2021-10-05 DIAGNOSIS — Z00.00 ANNUAL PHYSICAL EXAM: Primary | ICD-10-CM

## 2021-10-05 PROCEDURE — 90686 IIV4 VACC NO PRSV 0.5 ML IM: CPT | Performed by: FAMILY MEDICINE

## 2021-10-05 PROCEDURE — 99396 PREV VISIT EST AGE 40-64: CPT | Performed by: FAMILY MEDICINE

## 2021-10-05 PROCEDURE — 90471 IMMUNIZATION ADMIN: CPT | Performed by: FAMILY MEDICINE

## 2021-10-05 NOTE — PROGRESS NOTES
"Chief Complaint  Hypertension, Med Management, and Annual Exam    Subjective          Pro Mueller presents to [facility]  History of Present Illness  Annual exam  Diet and exercise  Psych with Dr. Say Coats still waiting on TCM.  Still drinking most days, equivalent of multiple drinks probably 3. He says he does not measure.  He is still getting out and doing some walking and hiking.   He reports weight loss has helped his back and knees the most. He has pain a lot of the time but says at the current level it is bearable.   He takes the tramadol as needed and refill schedule reflects this. He takes for the knees and shoulders.   Work is not great. He likes working from home but is used to being more hands on and since back from his leave he has not been granted access to that part of the job again. This has been hurtful and he does not enjoy what he does but going to continue working.     Objective   Vital Signs:   /78 (BP Location: Right arm, Patient Position: Sitting, Cuff Size: Adult)   Pulse 79   Temp 96.9 °F (36.1 °C) (Infrared)   Ht 182.9 cm (72\")   Wt 87 kg (191 lb 12.8 oz)   SpO2 98%   BMI 26.01 kg/m²     Physical Exam  Vitals reviewed.   Constitutional:       General: He is not in acute distress.     Appearance: Normal appearance. He is not ill-appearing.   HENT:      Right Ear: Tympanic membrane, ear canal and external ear normal. There is no impacted cerumen.      Left Ear: Tympanic membrane, ear canal and external ear normal. There is no impacted cerumen.      Nose: Nose normal.      Mouth/Throat:      Mouth: Mucous membranes are moist.      Pharynx: Oropharynx is clear. No oropharyngeal exudate.   Eyes:      General: No scleral icterus.        Right eye: No discharge.         Left eye: No discharge.      Conjunctiva/sclera: Conjunctivae normal.   Neck:      Thyroid: No thyromegaly.      Vascular: No carotid bruit.   Cardiovascular:      Rate and Rhythm: " Normal rate and regular rhythm.      Heart sounds: Normal heart sounds. No murmur heard.   No friction rub. No gallop.    Pulmonary:      Effort: Pulmonary effort is normal. No respiratory distress.      Breath sounds: Normal breath sounds. No wheezing or rales.   Chest:      Chest wall: No tenderness.   Abdominal:      General: Bowel sounds are normal. There is no distension.      Palpations: Abdomen is soft. There is no mass.      Tenderness: There is no abdominal tenderness. There is no guarding.   Musculoskeletal:         General: No swelling or deformity.      Cervical back: Neck supple.      Right lower leg: No edema.      Left lower leg: No edema.   Lymphadenopathy:      Cervical: No cervical adenopathy.   Skin:     Findings: Erythema present.      Comments: Some redness to the skin around the nose and mouth.   Neurological:      Mental Status: He is alert and oriented to person, place, and time.      Motor: No abnormal muscle tone.      Coordination: Coordination normal.      Comments: Intention tremor of left hand, mostly seen in second digit when he is moving and reaching.   Psychiatric:         Mood and Affect: Mood normal.         Behavior: Behavior normal.        Result Review :                 Assessment and Plan    Diagnoses and all orders for this visit:    1. Annual physical exam (Primary)    2. Primary hypertension  -     Comprehensive Metabolic Panel  -     CBC (No Diff)    3. Hyperlipidemia, unspecified hyperlipidemia type  -     Lipid Panel    Other orders  -     FluLaval/Fluarix >6 Months ([id number])        Follow Up   No follow-ups on file.  Patient was given instructions and counseling regarding his condition or for health maintenance advice. Please see specific information pulled into the AVS if appropriate.     Preventive counseling  We discussed the importance of regular physical activity.  Cardiovascular activity for the equivalent of 30 minutes 5 days per week.  We also discussed the  importance of healthy diet to avoid weight gain and sugar intolerance.  I recommend predominantly filling the diet with vegetables and lean meats followed by fruits and whole grains.  I also recommend overall avoiding processed foods.  Get back in with dentist.  Reviewed and signed contract for controlled substances.   He has intention tremor that is stable per pt. He is on clonazepam nightly for sleep that he gets from psych. Not completely helping the tremor as it is mild but evident on exam today.   Flu shot given today.   I would like to try to help support his case for having the TCM done. I wrote a letter in support and this was sent to his advocate at [facility] but then she left and he was given a new advocate and there was some disconnect. I am not sure where things are at at this time. I have put in a call and left a message on 10/6/21 around 10:30 am with Tri at [facility] with my personal number to get some questions answered about Mr. Mueller that I share in common with Dr. Coats.

## 2021-10-06 PROBLEM — E87.29 ALCOHOLIC KETOACIDOSIS: Status: RESOLVED | Noted: 2020-01-12 | Resolved: 2021-10-06

## 2021-10-06 PROBLEM — N20.0 NEPHROLITHIASIS: Status: RESOLVED | Noted: 2020-02-12 | Resolved: 2021-10-06

## 2021-10-06 PROBLEM — R55 SYNCOPE AND COLLAPSE: Status: RESOLVED | Noted: 2019-01-02 | Resolved: 2021-10-06

## 2021-10-06 PROBLEM — R25.1 TREMOR: Status: ACTIVE | Noted: 2021-10-06

## 2021-10-06 LAB
ALBUMIN SERPL-MCNC: 5 G/DL (ref 3.5–5.2)
ALBUMIN/GLOB SERPL: 2.2 G/DL
ALP SERPL-CCNC: 92 U/L (ref 39–117)
ALT SERPL-CCNC: 21 U/L (ref 1–41)
AST SERPL-CCNC: 47 U/L (ref 1–40)
BILIRUB SERPL-MCNC: 0.6 MG/DL (ref 0–1.2)
BUN SERPL-MCNC: 12 MG/DL (ref 8–23)
BUN/CREAT SERPL: 11.1 (ref 7–25)
CALCIUM SERPL-MCNC: 9.9 MG/DL (ref 8.6–10.5)
CHLORIDE SERPL-SCNC: 99 MMOL/L (ref 98–107)
CHOLEST SERPL-MCNC: 204 MG/DL (ref 0–200)
CO2 SERPL-SCNC: 28.6 MMOL/L (ref 22–29)
CREAT SERPL-MCNC: 1.08 MG/DL (ref 0.76–1.27)
ERYTHROCYTE [DISTWIDTH] IN BLOOD BY AUTOMATED COUNT: 12.3 % (ref 12.3–15.4)
GLOBULIN SER CALC-MCNC: 2.3 GM/DL
GLUCOSE SERPL-MCNC: 92 MG/DL (ref 65–99)
HCT VFR BLD AUTO: 40.1 % (ref 37.5–51)
HDLC SERPL-MCNC: 88 MG/DL (ref 40–60)
HGB BLD-MCNC: 13.8 G/DL (ref 13–17.7)
LDLC SERPL CALC-MCNC: 103 MG/DL (ref 0–100)
MCH RBC QN AUTO: 31.7 PG (ref 26.6–33)
MCHC RBC AUTO-ENTMCNC: 34.4 G/DL (ref 31.5–35.7)
MCV RBC AUTO: 92.2 FL (ref 79–97)
PLATELET # BLD AUTO: 366 10*3/MM3 (ref 140–450)
POTASSIUM SERPL-SCNC: 4.2 MMOL/L (ref 3.5–5.2)
PROT SERPL-MCNC: 7.3 G/DL (ref 6–8.5)
RBC # BLD AUTO: 4.35 10*6/MM3 (ref 4.14–5.8)
SODIUM SERPL-SCNC: 141 MMOL/L (ref 136–145)
TRIGL SERPL-MCNC: 75 MG/DL (ref 0–150)
VLDLC SERPL CALC-MCNC: 13 MG/DL (ref 5–40)
WBC # BLD AUTO: 6.97 10*3/MM3 (ref 3.4–10.8)

## 2021-10-26 ENCOUNTER — TELEPHONE (OUTPATIENT)
Dept: FAMILY MEDICINE CLINIC | Facility: CLINIC | Age: 66
End: 2021-10-26

## 2021-10-26 NOTE — TELEPHONE ENCOUNTER
This was faxed originally on 10/12/21. Pt states that this was not received. This nurse resent the form again today.

## 2021-10-26 NOTE — TELEPHONE ENCOUNTER
Caller: Pro Mueller    Relationship: Self    Best call back number: [phone] (H)    What is the best time to reach you: ANY TIME     Who are you requesting to speak with (clinical staff, provider,  specific staff member): CLINICAL STAFF     What was the call regarding: PATIENT CALLED IN TODAY WONDERING IF WE SENT HIS BIOMETRIC SCREENING HE HAD DONE ON 10/05/21 TO THE [facility] PLACE IN [place] .     Do you require a callback: YES PLEASE.    PATIENT SPOKE WITH [facility] AND THEY STATE THEY HAVE NOT RECEIVED IT. THIS NEEDS TO BE DONE AS SOON AS POSSIBLE SO HIS INSURANCE RATES DON'T RISE EXPONENTIALLY NEXT YEAR.

## 2021-11-02 ENCOUNTER — OFFICE VISIT (OUTPATIENT)
Dept: ORTHOPEDIC SURGERY | Facility: CLINIC | Age: 66
End: 2021-11-02

## 2021-11-02 VITALS — HEIGHT: 72 IN | BODY MASS INDEX: 26.01 KG/M2 | WEIGHT: 192 LBS | TEMPERATURE: 97.5 F

## 2021-11-02 DIAGNOSIS — M48.061 SPINAL STENOSIS OF LUMBAR REGION WITHOUT NEUROGENIC CLAUDICATION: ICD-10-CM

## 2021-11-02 DIAGNOSIS — M54.50 LUMBAR PAIN: Primary | ICD-10-CM

## 2021-11-02 PROCEDURE — 99213 OFFICE O/P EST LOW 20 MIN: CPT | Performed by: ORTHOPAEDIC SURGERY

## 2021-11-02 NOTE — PROGRESS NOTES
New patient or new problem visit    CC: Low back pain    HPI: He has low back pain for 3 months no history of trauma no fever chills weight loss bowel or bladder complaints.  Tramadol helps somewhat he takes this for his knees.    PFSH: See attached    ROS: As above    PE: On exam mild tenderness palpation of the lumbar spine good strength in the legs bilaterally straight leg raise is negative no evidence of clonus    XRAY: In film x-rays of the lumbar spine are basically normal.  I looked at his CT scan of the abdomen obtained earlier this year it looks like he is got some degree of spinal stenosis at 4 5 in addition to hepatomegaly and some other findings.    Other: His handwriting is quite irregular, actually irregularly irregular and looks like that of a patient with Parkinson's disease.  He does not have this and I suspecting this may be related to what I gleaned from the chart is a history of alcoholism.  I did not confront him about this.  I did note that Dr. Beal has addressed this issue.    Impression: Lumbar disc degeneration and lumbar spinal stenosis.  More back symptoms than leg symptoms.    Plan: Lets get him in for some physical therapy and I will see him back as needed

## 2021-11-03 ENCOUNTER — TELEPHONE (OUTPATIENT)
Dept: NEUROSURGERY | Facility: CLINIC | Age: 66
End: 2021-11-03
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
## 2021-11-08 ENCOUNTER — TELEPHONE (OUTPATIENT)
Dept: NEUROSURGERY | Facility: CLINIC | Age: 66
End: 2021-11-08

## 2021-11-08 DIAGNOSIS — M12.9 ARTHRITIS/ARTHROPATHY OF MULTIPLE JOINTS: ICD-10-CM

## 2021-11-09 ENCOUNTER — HOSPITAL ENCOUNTER (OUTPATIENT)
Dept: GENERAL RADIOLOGY | Facility: HOSPITAL | Age: 66
Discharge: HOME | End: 2021-11-09
Admitting: NURSE PRACTITIONER

## 2021-11-09 ENCOUNTER — OFFICE VISIT (OUTPATIENT)
Dept: NEUROSURGERY | Facility: CLINIC | Age: 66
End: 2021-11-09

## 2021-11-09 ENCOUNTER — TELEPHONE (OUTPATIENT)
Dept: NEUROSURGERY | Facility: CLINIC | Age: 66
End: 2021-11-09

## 2021-11-09 VITALS
TEMPERATURE: 98.2 F | HEIGHT: 72 IN | BODY MASS INDEX: 26.01 KG/M2 | SYSTOLIC BLOOD PRESSURE: 128 MMHG | HEART RATE: 97 BPM | OXYGEN SATURATION: 98 % | DIASTOLIC BLOOD PRESSURE: 72 MMHG | WEIGHT: 192 LBS

## 2021-11-09 DIAGNOSIS — S12.401D CLOSED NONDISPLACED FRACTURE OF FIFTH CERVICAL VERTEBRA WITH ROUTINE HEALING, UNSPECIFIED FRACTURE MORPHOLOGY, SUBSEQUENT ENCOUNTER: Primary | ICD-10-CM

## 2021-11-09 DIAGNOSIS — M54.2 NECK PAIN: ICD-10-CM

## 2021-11-09 PROBLEM — S12.400A C5 CERVICAL FRACTURE: Status: ACTIVE | Noted: 2021-11-09

## 2021-11-09 PROCEDURE — 99213 OFFICE O/P EST LOW 20 MIN: CPT | Performed by: NURSE PRACTITIONER

## 2021-11-09 PROCEDURE — 72050 X-RAY EXAM NECK SPINE 4/5VWS: CPT

## 2021-11-09 RX ORDER — TRAMADOL HYDROCHLORIDE 50 MG/1
TABLET ORAL
Qty: 80 TABLET | Refills: 0 | Status: SHIPPED | OUTPATIENT
Start: 2021-11-09 | End: 2021-12-20

## 2021-11-18 RX ORDER — LISINOPRIL 10 MG/1
10 TABLET ORAL DAILY
Qty: 90 TABLET | Refills: 1 | Status: SHIPPED | OUTPATIENT
Start: 2021-11-18

## 2021-11-18 RX ORDER — AMLODIPINE BESYLATE 5 MG/1
5 TABLET ORAL EVERY MORNING
Qty: 90 TABLET | Refills: 1 | Status: SHIPPED | OUTPATIENT
Start: 2021-11-18

## 2021-11-18 NOTE — TELEPHONE ENCOUNTER
Rx Refill Note  Requested Prescriptions     Pending Prescriptions Disp Refills   • lisinopril (PRINIVIL,ZESTRIL) 10 MG tablet [Pharmacy Med Name: LISINOPRIL 10MG TABLETS] 90 tablet 0     Sig: TAKE 1 TABLET BY MOUTH DAILY   • amLODIPine (NORVASC) 5 MG tablet [Pharmacy Med Name: AMLODIPINE BESYLATE 5MG TABLETS] 90 tablet 0     Sig: TAKE 1 TABLET BY MOUTH EVERY MORNING      Last office visit with prescribing clinician: 10/5/2021      Next office visit with prescribing clinician: 4/7/2022            Debbie Toledo LPN  11/18/21, 17:04 EST

## 2021-11-22 DIAGNOSIS — Z12.5 PROSTATE CANCER SCREENING: ICD-10-CM

## 2021-11-23 LAB — PSA SERPL-MCNC: 1 NG/ML (ref 0–4)

## 2021-11-23 NOTE — TELEPHONE ENCOUNTER
PATIENT IS CALLING IN HE STATES THAT [employer] IS TELLING HIM THAT THE BIOMETRIC SCREENING THAT WAS SENT OVER IS MISSING HIS BMI AND IF THEY DO NOT GET THIS INFORMATION THEY WILL CHARGE HIM MORE FOR HIS INSURANCE NEXT YEAR.      PLEASE ADVISE    CALLBACK NUMBER IS  [phone]

## 2021-11-30 ENCOUNTER — TREATMENT (OUTPATIENT)
Dept: PHYSICAL THERAPY | Facility: CLINIC | Age: 66
End: 2021-11-30

## 2021-11-30 DIAGNOSIS — G89.29 CHRONIC BILATERAL LOW BACK PAIN WITHOUT SCIATICA: Primary | ICD-10-CM

## 2021-11-30 DIAGNOSIS — M54.50 CHRONIC BILATERAL LOW BACK PAIN WITHOUT SCIATICA: Primary | ICD-10-CM

## 2021-11-30 PROCEDURE — 97162 PT EVAL MOD COMPLEX 30 MIN: CPT | Performed by: PHYSICAL THERAPIST

## 2021-11-30 PROCEDURE — 97112 NEUROMUSCULAR REEDUCATION: CPT | Performed by: PHYSICAL THERAPIST

## 2021-11-30 PROCEDURE — 97110 THERAPEUTIC EXERCISES: CPT | Performed by: PHYSICAL THERAPIST

## 2021-11-30 NOTE — PROGRESS NOTES
Physical Therapy Initial Evaluation and Plan of Care      Patient: Pro Mueller   : 1955  Diagnosis/ICD-10 Code:  No primary diagnosis found.  Referring practitioner: Yohan Mcnulty MD  Date of Initial Visit: 2021  Today's Date: 2021  Patient seen for Visit count could not be calculated. Make sure you are using a visit which is associated with an episode. sessions           Subjective Evaluation    History of Present Illness  Mechanism of injury: Worse with lifting heavy things, impossible. Uses a car battery in one of his hobbies, cannot lift that.   Walking can be painful, even for short distances.   Painful going upstairs.   Walks for exercises, couple of times a week, 30 minutes.   Hears snap, crackle, pop rolling over in bed and that is painfu.     Subjective comment: Back pain about six months, insidious onset. Strictly in lower back, symmetrical.   Patient Occupation: computer work Pain  Current pain ratin  At best pain ratin  At worst pain ratin  Quality: throbbing  Relieving factors: heat  Aggravating factors: ambulation and lifting  Progression: worsening    Social Support  Lives in: multiple-level home  Lives with: roomate.    Diagnostic Tests  X-ray: abnormal    Patient Goals  Patient goals for therapy: decreased pain and increased strength       2 lateral views and AP view were obtained. There is slight disc space  narrowing at the L4-L5 level. The remainder the lumbar discs are normal  in height. All of the vertebral bodies are normal in height. There is  very minimal anterolisthesis of L4 with respect to L5. The alignment is  otherwise unremarkable. There is no evidence of recent or old fracture  or subluxation. The AP view demonstrates a 6 mm diameter calcific  density projecting over the right L3 transverse process that is new  since the preceding x-rays. This is most likely material within bowel.  However, it does project along the expected course of the right  ureter  raising the possibility of a right ureteral calculus. Correlation with  clinical findings is recommended. If there is clinical suspicion of  obstructive uropathy further evaluation with a CT scan of the abdomen  and pelvis may be warranted.           Objective        Special Questions      Additional Special Questions  Negative valsalva except intermittently pain with bowel movement.       Neurological Testing     Sensation     Lumbar   Left   Intact: light touch    Right   Intact: light touch    Active Range of Motion     Additional Active Range of Motion Details  Flexion 80% of WNL  Extension 70% with mild pain  B side bending painful and 50% of WNL  B rotation 75% with no pain    Strength/Myotome Testing     Lumbar   Left   Normal strength    Right   Normal strength    Tests     Lumbar     Left   Negative passive SLR.     Right   Negative passive SLR.     Additional Tests Details  Mild decrease in pain with R LAD  Hypomobility noted in lower thoracic and lumbar PIVM, painful L1-L4    Lumbar Flexibility Comments:   Severely decreased B hamstring flexibility, good passive hip IR/Er noted        See Exercise, Manual, and Modality Logs for complete treatment.       Functional Outcome Score: 50% Oswestry        Assessment & Plan     Assessment  Impairments: abnormal or restricted ROM, activity intolerance, lacks appropriate home exercise program and pain with function  Functional Limitations: carrying objects, lifting, walking, pushing, uncomfortable because of pain, moving in bed and standing  Assessment details: Pro Mueller is a 66 y.o. year-old male referred to physical therapy for low back pain for about six months. He presents with a evolving clinical presentation.  He has comorbidities including cervical spine fractures being evaluated and personal factors including alcoholism per chart that may affect his progress in the plan of care.  Signs and symptoms are consistent with physical therapy diagnosis  of lumbar DDD/DJD.     Prognosis: good    Goals  Plan Goals: STGs to be met by 2 weeks  1.) Pt will be independent and compliant with initial home exercise program.   2.) Pt will report low back pain </= 4-5/10 to increase ease of walking for exercise.      LTGs to be met by 4 weeks  1.) Pt will be independent and compliant with advanced home exercise program.   2.) Pt will report low back pain </= 2-3/10 to increase ease of turning over in bed..  3.) Pt will score </=30% on Oswestry indicating decreased perceived functional disability.   4.) Pt will demonstrate proper lifting technique in clinic.       Plan  Therapy options: will be seen for skilled therapy services  Planned modality interventions: cryotherapy, high voltage pulsed current (pain management), ultrasound, traction and thermotherapy (hydrocollator packs)  Planned therapy interventions: manual therapy, neuromuscular re-education, postural training, soft tissue mobilization, spinal/joint mobilization, strengthening, stretching, therapeutic activities, joint mobilization, IADL retraining, home exercise program, functional ROM exercises, flexibility and balance/weight-bearing training  Frequency: 2x week  Duration in visits: 12  Duration in weeks: 20  Treatment plan discussed with: patient  Plan details: Assess response to HEP, progress for core stab. Consider trial of traction.         Timed:  Manual Therapy:         mins  10021;  Therapeutic Exercise:    16     mins  47785;     Neuromuscular Ryan:    10    mins  45754;    Therapeutic Activity:          mins  01875;     Gait Training:           mins  58084;     Ultrasound:          mins  64705;    Iontophoresis         mins 17881  Dry Needling        mins 42399/ 06174 (Self-pay)      Untimed:  Electrical Stimulation:         mins  13053 ( );  Traction:       mins  07101;   Low Eval          Mins  34929  Mod Eval     15     Mins  22184  High Eval                            Mins  91287    Timed  Treatment:   26   mins   Total Treatment:     45   mins    PT SIGNATURE: Patt Ward, PT     License Number: [id number]    Electronically signed by Patt Ward, PT, 11/30/21, 1:37 PM EST    DATE TREATMENT INITIATED: 11/30/2021    Initial Certification  Certification Period: 2/28/2022  I certify that the therapy services are furnished while this patient is under my care.  The services outlined above are required by this patient, and will be reviewed every 90 days.     PHYSICIAN: Yohan Mcnulty MD      DATE:     Please sign and return via fax to [phone] Thank you, [facility].

## 2021-12-03 ENCOUNTER — TREATMENT (OUTPATIENT)
Dept: PHYSICAL THERAPY | Facility: CLINIC | Age: 66
End: 2021-12-03

## 2021-12-03 DIAGNOSIS — M54.50 CHRONIC BILATERAL LOW BACK PAIN WITHOUT SCIATICA: Primary | ICD-10-CM

## 2021-12-03 DIAGNOSIS — G89.29 CHRONIC BILATERAL LOW BACK PAIN WITHOUT SCIATICA: Primary | ICD-10-CM

## 2021-12-03 PROCEDURE — 97014 ELECTRIC STIMULATION THERAPY: CPT | Performed by: PHYSICAL THERAPIST

## 2021-12-03 PROCEDURE — 97530 THERAPEUTIC ACTIVITIES: CPT | Performed by: PHYSICAL THERAPIST

## 2021-12-03 PROCEDURE — 97110 THERAPEUTIC EXERCISES: CPT | Performed by: PHYSICAL THERAPIST

## 2021-12-03 NOTE — PROGRESS NOTES
Physical Therapy Daily Treatment Note      Patient: Pro Mueller   : 1955  Referring practitioner: Yohan Mcnulty MD  Date of Initial Visit: Type: THERAPY  Noted: 2021  Today's Date: 12/3/2021  Patient seen for 2 sessions       Visit Diagnoses:    ICD-10-CM ICD-9-CM   1. Chronic bilateral low back pain without sciatica  M54.50 724.2    G89.29 338.29       Subjective   Pt reports that he is still having midline LBP.  No issues with HEP.    Objective   See Exercise, Manual, and Modality Logs for complete treatment.       Assessment/Plan   Pt exhibits an apical breathing pattern contributing to sympathetic dominance that is likely contributing to LBP with decreased core stability.  Pt was educated pain neuroscience as well as importance of diaphragmatic breathing pattern to facilitate core activation and facilitate parasympathetic system. Pt noted some neck pain with seated trunk flex so modified to keep C-S in neutral position due to healing lower cervical fracture.  Pt was issued updated HEP printout to facilitate compliance and recall with ex progressions today.  Added IFC/MH to address pain complaints.   Progress strengthening /stabilization /functional activity      Timed:         Manual Therapy:    -     mins  21270;     Therapeutic Exercise:    18     mins  17759;     Neuromuscular Ryan:    -    mins  09719;    Therapeutic Activity:     10     mins  14112;     Gait Training:      -     mins  52504;     Ultrasound:     -     mins  27262;    Ionto                               -    mins   04601  Self Care                       -     mins   17091    Un-Timed:  Electrical Stimulation:    15     mins  45377 ( );  Dry Needling     -     mins self-pay  Traction     -     mins 06193      Timed Treatment:   28   mins   Total Treatment:     45   mins    Frieda Harden PT  [place] License: #[id number]

## 2021-12-03 NOTE — PATIENT INSTRUCTIONS
Access Code: [email or web address]  URL: [email or web address]  Date: 12/03/2021  Prepared by: Frieda Harden    Exercises  Supine Diaphragmatic Breathing - 2 x daily - 1 sets - 10 reps - 5 hold  Seated Scapular Retraction - 2 x daily - 10 reps - 1 sets - 5 hold

## 2021-12-06 ENCOUNTER — TREATMENT (OUTPATIENT)
Dept: PHYSICAL THERAPY | Facility: CLINIC | Age: 66
End: 2021-12-06

## 2021-12-06 DIAGNOSIS — M54.50 CHRONIC BILATERAL LOW BACK PAIN WITHOUT SCIATICA: Primary | ICD-10-CM

## 2021-12-06 DIAGNOSIS — G89.29 CHRONIC BILATERAL LOW BACK PAIN WITHOUT SCIATICA: Primary | ICD-10-CM

## 2021-12-06 PROCEDURE — 97110 THERAPEUTIC EXERCISES: CPT | Performed by: PHYSICAL THERAPIST

## 2021-12-06 PROCEDURE — 97530 THERAPEUTIC ACTIVITIES: CPT | Performed by: PHYSICAL THERAPIST

## 2021-12-06 PROCEDURE — 97014 ELECTRIC STIMULATION THERAPY: CPT | Performed by: PHYSICAL THERAPIST

## 2021-12-06 NOTE — PROGRESS NOTES
Physical Therapy Daily Treatment Note      Patient: Pro Mueller   : 1955  Referring practitioner: Yohan Mcnulty MD  Date of Initial Visit: Type: THERAPY  Noted: 2021  Today's Date: 2021  Patient seen for 3 sessions       Visit Diagnoses:    ICD-10-CM ICD-9-CM   1. Chronic bilateral low back pain without sciatica  M54.50 724.2    G89.29 338.29       Subjective   No new complaints; midline LBP rated 2-3/10 with ADLs.  Not currently having neck pain though notes intermittent muscle spasms in R shoulder. The heat and e stim felt great last visit.   Objective   See Exercise, Manual, and Modality Logs for complete treatment.       Assessment/Plan   Pt continues to benefit from verbal/tactile cuing to facilitate diaphragmatic vs apical breathing pattern. No reports of increased neck pain with exercise program including addition of NuStep and core stability progression.   Progress strengthening /stabilization /functional activity            Timed:         Manual Therapy:    -     mins  16662;     Therapeutic Exercise:    18     mins  20651;     Neuromuscular Ryan:    -    mins  69135;    Therapeutic Activity:     10     mins  07606;     Gait Training:      -     mins  94779;     Ultrasound:     -     mins  52645;    Ionto                               -    mins   49691  Self Care                       -     mins   04947    Un-Timed:  Electrical Stimulation:    15     mins  18749 ( );  Dry Needling     -     mins self-pay  Traction     -     mins 83726      Timed Treatment:   28   mins   Total Treatment:     45   mins    LUCILA Alejandro License: #[id number]

## 2021-12-06 NOTE — PATIENT INSTRUCTIONS
Access Code: [email or web address]  URL: [email or web address]  Date: 12/06/2021  Prepared by: Frieda Harden    Exercises  Supine March - 1 x daily - 1 sets - 10 reps - 3 hold  Dead Bug Alternating Arm Extension - 1 x daily - 2 sets - 10 reps - 5 hold

## 2021-12-08 ENCOUNTER — HOSPITAL ENCOUNTER (OUTPATIENT)
Dept: MRI IMAGING | Facility: HOSPITAL | Age: 66
Discharge: HOME | End: 2021-12-08
Admitting: NURSE PRACTITIONER

## 2021-12-08 DIAGNOSIS — S12.401D CLOSED NONDISPLACED FRACTURE OF FIFTH CERVICAL VERTEBRA WITH ROUTINE HEALING, UNSPECIFIED FRACTURE MORPHOLOGY, SUBSEQUENT ENCOUNTER: ICD-10-CM

## 2021-12-08 PROCEDURE — 72141 MRI NECK SPINE W/O DYE: CPT

## 2021-12-09 ENCOUNTER — TREATMENT (OUTPATIENT)
Dept: PHYSICAL THERAPY | Facility: CLINIC | Age: 66
End: 2021-12-09

## 2021-12-09 DIAGNOSIS — M54.50 CHRONIC BILATERAL LOW BACK PAIN WITHOUT SCIATICA: Primary | ICD-10-CM

## 2021-12-09 DIAGNOSIS — G89.29 CHRONIC BILATERAL LOW BACK PAIN WITHOUT SCIATICA: Primary | ICD-10-CM

## 2021-12-09 PROCEDURE — 97110 THERAPEUTIC EXERCISES: CPT | Performed by: PHYSICAL THERAPIST

## 2021-12-09 PROCEDURE — 97530 THERAPEUTIC ACTIVITIES: CPT | Performed by: PHYSICAL THERAPIST

## 2021-12-09 PROCEDURE — 97014 ELECTRIC STIMULATION THERAPY: CPT | Performed by: PHYSICAL THERAPIST

## 2021-12-09 NOTE — PROGRESS NOTES
Physical Therapy Daily Treatment Note      Patient: Pro Mueller   : 1955  Referring practitioner: Yohan Mcnulty MD  Date of Initial Visit: Type: THERAPY  Noted: 2021  Today's Date: 2021  Patient seen for 4 sessions         Pro Mueller reports: still noted back discomfort and stiffness in the morning or after being in one position for long period of time.        Subjective     Objective   See Exercise, Manual, and Modality Logs for complete treatment.   Exercise rationale/ pain free exercise performance  Anatomy and structure of affected musculature  Posture/Postural awareness  Lumbar support/thoracic support  Sleeping positions with pillows  Alternate exercise positions  Verbal/Tactile cues to ensure correct exercise performance/technique    Added: standing heel/toe raises with TA contraction, standing marches with TA contraction, standing ashley sh ext with red t band and TA contraction all times 20 each; balance board(Fwd/bwd and side/side) ROM x 2 min each and balance x 1 min each       Assessment/Plan  Compliant/Cooperative with rehab efforts.  Subjectively reports no increased symptoms or discomfort with therapeutic exercise today.  Able to perform increased exercise/functional activity without increased LBP.  Benefits from verbal/tactile cues to ensure proper exercise technique, performance and positioning.  Positive response with modality application in regards to pain control.          Progress strengthening /stabilization /functional activity as symptoms allow.            Timed:  Manual Therapy:         mins  72213;  Therapeutic Exercise:   24      mins  67334;     Neuromuscular Ryan:        mins  31429;    Therapeutic Activity:    16      mins  76344;     Gait Training:           mins  50574;     Ultrasound:          mins  67351;      Untimed:  Electrical Stimulation:    15     mins  20447 ( );  Mechanical Traction:         mins  33514;     Timed Treatment:  40    mins   Total  Treatment:   55     mins  Andres Castillo, [facility]  Physical Therapist  Assistant  [id number]

## 2021-12-14 ENCOUNTER — TREATMENT (OUTPATIENT)
Dept: PHYSICAL THERAPY | Facility: CLINIC | Age: 66
End: 2021-12-14

## 2021-12-14 DIAGNOSIS — G89.29 CHRONIC BILATERAL LOW BACK PAIN WITHOUT SCIATICA: Primary | ICD-10-CM

## 2021-12-14 DIAGNOSIS — M54.50 CHRONIC BILATERAL LOW BACK PAIN WITHOUT SCIATICA: Primary | ICD-10-CM

## 2021-12-14 PROCEDURE — 97014 ELECTRIC STIMULATION THERAPY: CPT | Performed by: PHYSICAL THERAPIST

## 2021-12-14 PROCEDURE — 97530 THERAPEUTIC ACTIVITIES: CPT | Performed by: PHYSICAL THERAPIST

## 2021-12-14 PROCEDURE — 97110 THERAPEUTIC EXERCISES: CPT | Performed by: PHYSICAL THERAPIST

## 2021-12-20 ENCOUNTER — TREATMENT (OUTPATIENT)
Dept: PHYSICAL THERAPY | Facility: CLINIC | Age: 66
End: 2021-12-20

## 2021-12-20 DIAGNOSIS — M12.9 ARTHRITIS/ARTHROPATHY OF MULTIPLE JOINTS: ICD-10-CM

## 2021-12-20 DIAGNOSIS — G89.29 CHRONIC BILATERAL LOW BACK PAIN WITHOUT SCIATICA: Primary | ICD-10-CM

## 2021-12-20 DIAGNOSIS — M54.50 CHRONIC BILATERAL LOW BACK PAIN WITHOUT SCIATICA: Primary | ICD-10-CM

## 2021-12-20 PROCEDURE — 97014 ELECTRIC STIMULATION THERAPY: CPT | Performed by: PHYSICAL THERAPIST

## 2021-12-20 PROCEDURE — 97530 THERAPEUTIC ACTIVITIES: CPT | Performed by: PHYSICAL THERAPIST

## 2021-12-20 PROCEDURE — 97110 THERAPEUTIC EXERCISES: CPT | Performed by: PHYSICAL THERAPIST

## 2021-12-20 RX ORDER — TRAMADOL HYDROCHLORIDE 50 MG/1
TABLET ORAL
Qty: 80 TABLET | Refills: 0 | Status: SHIPPED | OUTPATIENT
Start: 2021-12-20 | End: 2022-01-26

## 2021-12-23 ENCOUNTER — TREATMENT (OUTPATIENT)
Dept: PHYSICAL THERAPY | Facility: CLINIC | Age: 66
End: 2021-12-23

## 2021-12-23 DIAGNOSIS — M54.50 CHRONIC BILATERAL LOW BACK PAIN WITHOUT SCIATICA: Primary | ICD-10-CM

## 2021-12-23 DIAGNOSIS — G89.29 CHRONIC BILATERAL LOW BACK PAIN WITHOUT SCIATICA: Primary | ICD-10-CM

## 2021-12-23 PROCEDURE — 97014 ELECTRIC STIMULATION THERAPY: CPT | Performed by: PHYSICAL THERAPIST

## 2021-12-23 PROCEDURE — 97530 THERAPEUTIC ACTIVITIES: CPT | Performed by: PHYSICAL THERAPIST

## 2021-12-23 PROCEDURE — 97110 THERAPEUTIC EXERCISES: CPT | Performed by: PHYSICAL THERAPIST

## 2021-12-30 ENCOUNTER — TREATMENT (OUTPATIENT)
Dept: PHYSICAL THERAPY | Facility: CLINIC | Age: 66
End: 2021-12-30

## 2021-12-30 DIAGNOSIS — M54.50 CHRONIC BILATERAL LOW BACK PAIN WITHOUT SCIATICA: Primary | ICD-10-CM

## 2021-12-30 DIAGNOSIS — G89.29 CHRONIC BILATERAL LOW BACK PAIN WITHOUT SCIATICA: Primary | ICD-10-CM

## 2021-12-30 PROCEDURE — 97140 MANUAL THERAPY 1/> REGIONS: CPT | Performed by: PHYSICAL THERAPIST

## 2021-12-30 PROCEDURE — 97014 ELECTRIC STIMULATION THERAPY: CPT | Performed by: PHYSICAL THERAPIST

## 2021-12-30 PROCEDURE — 97110 THERAPEUTIC EXERCISES: CPT | Performed by: PHYSICAL THERAPIST

## 2021-12-30 PROCEDURE — 97112 NEUROMUSCULAR REEDUCATION: CPT | Performed by: PHYSICAL THERAPIST

## 2021-12-30 NOTE — PROGRESS NOTES
Physical Therapy Daily Treatment Note      Patient: Pro Mueller   : 1955  Referring practitioner: Yohan Mcnulty MD  Date of Initial Visit: Type: THERAPY  Noted: 2021  Today's Date: 21Patient seen for 7 sessions         Pro Mueller reports: that he still has some stiffness/tightness in his back but feels like the exercises are helping and that he is a little stronger overall.        Subjective     Objective   See Exercise, Manual, and Modality Logs for complete treatment.   Added red t band pulldowns with B UE with DKTC on red exercise ball.    Modality application x 15 min post exercise consisting of Moist heat    Assessment/Plan  Subjectively he continues to report stiffness/tightness in LB.  Positive response to modality application reported in regards to extended pain relief.  Able to progress exercises without increased discomfort of LBP, just early fatigue of isolated musculature.  Benefits from verbal/tactile cues to ensure proper exercise performance, technique, positioning.  Should continue to improve with PT intervention.            Other  Recert next visit.  Check goals, ROM, etc. Update outcome measure.           Timed:  Manual Therapy:         mins  21486;  Therapeutic Exercise:    34     mins  53132;     Neuromuscular Ryan:        mins  57333;    Therapeutic Activity:    12      mins  19034;     Gait Training:           mins  84506;     Ultrasound:          mins  53413;      Untimed:  Electrical Stimulation:   15      mins  82614 ( );  Mechanical Traction:         mins  61858;     Timed Treatment:  46    mins   Total Treatment:    61    mins  Andres Castillo PTA  Physical Therapist  Assistant  [id number]

## 2021-12-30 NOTE — PROGRESS NOTES
Physical Therapy Daily Treatment Note      Patient: Pro Mueller   : 1955  Referring practitioner: Yohan Mcnulty MD  Date of Initial Visit: Type: THERAPY  Noted: 2021  Today's Date: 2021  Patient seen for 6 sessions         Pro Mueller reports: soreness in along entire back and shoulders.  Nu Step bothering, hard to reach and to do some of the stretches today.        Subjective     Objective   -tender diffusely throughout B thoraco/lumbar pvm.    Slow movement/guarded movement pattern noted with position changes/transistional movement.    See Exercise, Manual, and Modality Logs for complete treatment.   -discussed log roll technique with TA contraction, sitting and pumping ankles when first coming from supine to sit.  -use of cane, walking stick, trek pole for posture and temporary support with ambulation.  -alternating heat and ice to affected area    Manual intervention this session due to pt being unable perform ROM and stretching exercises without increased symptoms.        Assessment/Plan  Limited capability for exercise performance this session due to complaints of shoulder and spine discomfort from recent fall.  Able to tolerate gentle passive ROM and passive stretching without increased symptoms/discomfort.  Positive response post modality application.  Should do well as discomfort from recent fall resolves and allows for return to prior exercise routine/regimen.        Progress per Plan of Care toward all goals.           Timed:  Manual Therapy:    10     mins  10345;  Therapeutic Exercise:    10     mins  91119;     Neuromuscular Ryan:      mins  84993;    Therapeutic Activity:   5    mins  09265;     Gait Training:           mins  94599;     Ultrasound:      mins  15026;      Untimed:  Electrical Stimulation:    15    mins  27156 ( );  Mechanical Traction:      mins  38160;     Timed Treatment:  25   mins   Total Treatment:    40    mins  Andres Castillo [facility] [id number]  Patt SPENCE  Ed, PT  Physical Therapist  Assistant  [id number]

## 2021-12-30 NOTE — PROGRESS NOTES
30-Day / 10-Visit Progress Note         Patient: Pro Mueller   : 1955  Diagnosis/ICD-10 Code:  Chronic bilateral low back pain without sciatica [M54.50, G89.29]  Referring practitioner: Yohan Mcnulty MD  Date of Initial Visit: Type: THERAPY  Noted: 2021  Today's Date: 2021  Patient seen for 6 sessions      Subjective:     Clinical Progress: improved  Home Program Compliance: Yes  Treatment has included:  therapeutic exercise, manual therapy, therapeutic activity, neuro-muscular retraining  and electrical stimulation     Subjective I'm sore because I fainted and fell Monday night. On my right side. Feel sore and like every bone has been farzaneh. Beofre that, I can tell I can bend forward more with reduced pain. Doing the HEP at home. Pain before fall was 2-3/10 at worst.     Objective     See Exercise, Manual, and Modality Logs for complete treatment.     Functional Outcome Score: oswestry 64%  Goals  Plan Goals: STGs to be met by 2 weeks  1.) Pt will be independent and compliant with initial home exercise program. MET  2.) Pt will report low back pain </= 4-5/10 to increase ease of walking for exercise. MET      LTGs to be met by 4 weeks  1.) Pt will be independent and compliant with advanced home exercise program. Progressing  2.) Pt will report low back pain </= 2-3/10 to increase ease of turning over in bed. ONGOING  3.) Pt will score </=30% on Oswestry indicating decreased perceived functional disability. Currently 64% but skewed due to recent fall.   4.) Pt will demonstrate proper lifting technique in clinic.  ONGOING    Assessment/Plan Pt has been seen for a total of six sessions in outpatient physical therapy. He has been progressing well and reporting decreased pain severity as well as increased functional mobility. He had a mild set back this week after falling on Monday night. He will benefit from continued skilled physical therapy to address these issues.      Recommendations:  Continue as planned  Timeframe: 1 month  Prognosis to achieve goals: good    PT Signature: Patt Ward, PT    License Number: [id number]    Electronically signed by Patt Ward, PT, 12/30/21, 2:35 PM EST      Based upon review of the patient's progress and continued therapy plan, it is my medical opinion that Pro Mueller should continue physical therapy treatment at [facility]  [address] [place] [id number]  [phone].    Signature: __________________________________  Yohan Mcnulty MD    Timed:  Manual Therapy:         mins  26251;  Therapeutic Exercise:         mins  54772;     Neuromuscular Ryan:    12    mins  14422;    Therapeutic Activity:          mins  19121;     Gait Training:           mins  49432;     Ultrasound:          mins  65922;    Iontophoresis         mins 59614;  Dry Needling                   mins 35694/41480 (Self-pay)    Untimed:  Electrical Stimulation:         mins  38820 ( );  Traction:         mins  40887;     Timed Treatment:   12   mins   Total Treatment:     12   mins

## 2022-01-04 ENCOUNTER — TELEPHONE (OUTPATIENT)
Dept: ORTHOPEDIC SURGERY | Facility: CLINIC | Age: 67
End: 2022-01-04

## 2022-01-04 NOTE — TELEPHONE ENCOUNTER
Called and informed patient that DIEGO was out of the office until Monday and would not get the message until then. Informed him if it couldn't wait he should go to the ER. He stated he was going to go to the ER.

## 2022-01-04 NOTE — TELEPHONE ENCOUNTER
Provider: NOEL   Caller: HERB  Phone Number: [phone]  Reason for Call: PT THINKS HE MIGHT HAVE DAMAGED A DISK IN HIS FALL AND IS IN EXTREME PAIN.  1ST AVAILABLE IS ON 2/22/22 BUT PT WOULD LIKE TO GET IN TO BE SEEN SOONER   PER HUB WORKFLOW ATTEMPTED TO WARM TRANSFER

## 2022-01-07 ENCOUNTER — APPOINTMENT (OUTPATIENT)
Dept: CT IMAGING | Facility: HOSPITAL | Age: 67
End: 2022-01-07

## 2022-01-07 ENCOUNTER — APPOINTMENT (OUTPATIENT)
Dept: CARDIOLOGY | Facility: HOSPITAL | Age: 67
End: 2022-01-07

## 2022-01-07 ENCOUNTER — HOSPITAL ENCOUNTER (OUTPATIENT)
Facility: HOSPITAL | Age: 67
Setting detail: OBSERVATION
Discharge: HOME | End: 2022-01-09
Attending: EMERGENCY MEDICINE | Admitting: PHYSICIAN ASSISTANT

## 2022-01-07 ENCOUNTER — APPOINTMENT (OUTPATIENT)
Dept: GENERAL RADIOLOGY | Facility: HOSPITAL | Age: 67
End: 2022-01-07

## 2022-01-07 DIAGNOSIS — F10.929 ALCOHOLIC INTOXICATION WITH COMPLICATION: ICD-10-CM

## 2022-01-07 DIAGNOSIS — R55 SYNCOPE AND COLLAPSE: Primary | ICD-10-CM

## 2022-01-07 DIAGNOSIS — G60.9 IDIOPATHIC PERIPHERAL NEUROPATHY: ICD-10-CM

## 2022-01-07 PROBLEM — M54.2 NECK PAIN: Status: ACTIVE | Noted: 2022-01-07

## 2022-01-07 LAB
ALBUMIN SERPL-MCNC: 4.5 G/DL (ref 3.5–5.2)
ALBUMIN/GLOB SERPL: 1.8 G/DL
ALP SERPL-CCNC: 86 U/L (ref 39–117)
ALT SERPL W P-5'-P-CCNC: 16 U/L (ref 1–41)
ANION GAP SERPL CALCULATED.3IONS-SCNC: 13.5 MMOL/L (ref 5–15)
AORTIC DIMENSIONLESS INDEX: 0.8 (DI)
AST SERPL-CCNC: 36 U/L (ref 1–40)
BASOPHILS # BLD AUTO: 0.03 10*3/MM3 (ref 0–0.2)
BASOPHILS NFR BLD AUTO: 0.6 % (ref 0–1.5)
BH CV ECHO MEAS - ACS: 2.1 CM
BH CV ECHO MEAS - AO MAX PG (FULL): 2.2 MMHG
BH CV ECHO MEAS - AO MAX PG: 5.6 MMHG
BH CV ECHO MEAS - AO MEAN PG (FULL): 0.94 MMHG
BH CV ECHO MEAS - AO MEAN PG: 2.9 MMHG
BH CV ECHO MEAS - AO ROOT AREA (BSA CORRECTED): 1.9
BH CV ECHO MEAS - AO ROOT AREA: 12.5 CM^2
BH CV ECHO MEAS - AO ROOT DIAM: 4 CM
BH CV ECHO MEAS - AO V2 MAX: 117.8 CM/SEC
BH CV ECHO MEAS - AO V2 MEAN: 78.4 CM/SEC
BH CV ECHO MEAS - AO V2 VTI: 23.8 CM
BH CV ECHO MEAS - AVA(I,A): 3.9 CM^2
BH CV ECHO MEAS - AVA(I,D): 3.9 CM^2
BH CV ECHO MEAS - AVA(V,A): 3.8 CM^2
BH CV ECHO MEAS - AVA(V,D): 3.8 CM^2
BH CV ECHO MEAS - BSA(HAYCOCK): 2.1 M^2
BH CV ECHO MEAS - BSA: 2.1 M^2
BH CV ECHO MEAS - BZI_BMI: 26.9 KILOGRAMS/M^2
BH CV ECHO MEAS - BZI_METRIC_HEIGHT: 182.9 CM
BH CV ECHO MEAS - BZI_METRIC_WEIGHT: 89.8 KG
BH CV ECHO MEAS - EDV(CUBED): 104.5 ML
BH CV ECHO MEAS - EDV(MOD-SP2): 98 ML
BH CV ECHO MEAS - EDV(MOD-SP4): 122 ML
BH CV ECHO MEAS - EDV(TEICH): 102.9 ML
BH CV ECHO MEAS - EF(CUBED): 65.2 %
BH CV ECHO MEAS - EF(MOD-BP): 66.6 %
BH CV ECHO MEAS - EF(MOD-SP2): 64.3 %
BH CV ECHO MEAS - EF(MOD-SP4): 68 %
BH CV ECHO MEAS - EF(TEICH): 56.7 %
BH CV ECHO MEAS - ESV(CUBED): 36.3 ML
BH CV ECHO MEAS - ESV(MOD-SP2): 35 ML
BH CV ECHO MEAS - ESV(MOD-SP4): 39 ML
BH CV ECHO MEAS - ESV(TEICH): 44.5 ML
BH CV ECHO MEAS - FS: 29.7 %
BH CV ECHO MEAS - IVS/LVPW: 1
BH CV ECHO MEAS - IVSD: 1 CM
BH CV ECHO MEAS - LAT PEAK E' VEL: 11.1 CM/SEC
BH CV ECHO MEAS - LV DIASTOLIC VOL/BSA (35-75): 57.5 ML/M^2
BH CV ECHO MEAS - LV MASS(C)D: 162.2 GRAMS
BH CV ECHO MEAS - LV MASS(C)DI: 76.5 GRAMS/M^2
BH CV ECHO MEAS - LV MAX PG: 3.4 MMHG
BH CV ECHO MEAS - LV MEAN PG: 1.9 MMHG
BH CV ECHO MEAS - LV SYSTOLIC VOL/BSA (12-30): 18.4 ML/M^2
BH CV ECHO MEAS - LV V1 MAX: 91.7 CM/SEC
BH CV ECHO MEAS - LV V1 MEAN: 66.5 CM/SEC
BH CV ECHO MEAS - LV V1 VTI: 19.1 CM
BH CV ECHO MEAS - LVIDD: 4.7 CM
BH CV ECHO MEAS - LVIDS: 3.3 CM
BH CV ECHO MEAS - LVLD AP2: 8.7 CM
BH CV ECHO MEAS - LVLD AP4: 9.2 CM
BH CV ECHO MEAS - LVLS AP2: 7 CM
BH CV ECHO MEAS - LVLS AP4: 6.7 CM
BH CV ECHO MEAS - LVOT AREA (M): 4.9 CM^2
BH CV ECHO MEAS - LVOT AREA: 4.9 CM^2
BH CV ECHO MEAS - LVOT DIAM: 2.5 CM
BH CV ECHO MEAS - LVPWD: 0.97 CM
BH CV ECHO MEAS - MED PEAK E' VEL: 6 CM/SEC
BH CV ECHO MEAS - MV A DUR: 0.21 SEC
BH CV ECHO MEAS - MV A MAX VEL: 98.5 CM/SEC
BH CV ECHO MEAS - MV DEC SLOPE: 323.7 CM/SEC^2
BH CV ECHO MEAS - MV DEC TIME: 298 SEC
BH CV ECHO MEAS - MV E MAX VEL: 75.4 CM/SEC
BH CV ECHO MEAS - MV E/A: 0.77
BH CV ECHO MEAS - MV MAX PG: 4.2 MMHG
BH CV ECHO MEAS - MV MEAN PG: 1.3 MMHG
BH CV ECHO MEAS - MV P1/2T MAX VEL: 83.6 CM/SEC
BH CV ECHO MEAS - MV P1/2T: 75.7 MSEC
BH CV ECHO MEAS - MV V2 MAX: 102.1 CM/SEC
BH CV ECHO MEAS - MV V2 MEAN: 52.3 CM/SEC
BH CV ECHO MEAS - MV V2 VTI: 27.4 CM
BH CV ECHO MEAS - MVA P1/2T LCG: 2.6 CM^2
BH CV ECHO MEAS - MVA(P1/2T): 2.9 CM^2
BH CV ECHO MEAS - MVA(VTI): 3.4 CM^2
BH CV ECHO MEAS - PA ACC TIME: 0.1 SEC
BH CV ECHO MEAS - PA MAX PG (FULL): 0.06 MMHG
BH CV ECHO MEAS - PA MAX PG: 2.2 MMHG
BH CV ECHO MEAS - PA PR(ACCEL): 32.7 MMHG
BH CV ECHO MEAS - PA V2 MAX: 74.7 CM/SEC
BH CV ECHO MEAS - PULM A REVS DUR: 0.14 SEC
BH CV ECHO MEAS - PULM A REVS VEL: 23.9 CM/SEC
BH CV ECHO MEAS - PULM DIAS VEL: 27 CM/SEC
BH CV ECHO MEAS - PULM S/D: 1.8
BH CV ECHO MEAS - PULM SYS VEL: 47.4 CM/SEC
BH CV ECHO MEAS - RAP SYSTOLE: 3 MMHG
BH CV ECHO MEAS - RV MAX PG: 2.2 MMHG
BH CV ECHO MEAS - RV MEAN PG: 0.99 MMHG
BH CV ECHO MEAS - RV V1 MAX: 73.7 CM/SEC
BH CV ECHO MEAS - RV V1 MEAN: 46.4 CM/SEC
BH CV ECHO MEAS - RV V1 VTI: 13.1 CM
BH CV ECHO MEAS - SI(AO): 140.7 ML/M^2
BH CV ECHO MEAS - SI(CUBED): 32.1 ML/M^2
BH CV ECHO MEAS - SI(LVOT): 44 ML/M^2
BH CV ECHO MEAS - SI(MOD-SP2): 29.7 ML/M^2
BH CV ECHO MEAS - SI(MOD-SP4): 39.1 ML/M^2
BH CV ECHO MEAS - SI(TEICH): 27.5 ML/M^2
BH CV ECHO MEAS - SV(AO): 298.6 ML
BH CV ECHO MEAS - SV(CUBED): 68.2 ML
BH CV ECHO MEAS - SV(LVOT): 93.3 ML
BH CV ECHO MEAS - SV(MOD-SP2): 63 ML
BH CV ECHO MEAS - SV(MOD-SP4): 83 ML
BH CV ECHO MEAS - SV(TEICH): 58.4 ML
BH CV ECHO MEAS - TAPSE (>1.6): 2.1 CM
BH CV ECHO MEASUREMENTS AVERAGE E/E' RATIO: 8.82
BH CV VAS BP RIGHT ARM: NORMAL MMHG
BH CV XLRA - RV BASE: 3.2 CM
BH CV XLRA - RV LENGTH: 7.7 CM
BH CV XLRA - RV MID: 2.3 CM
BH CV XLRA - TDI S': 14.3 CM/SEC
BILIRUB SERPL-MCNC: 0.5 MG/DL (ref 0–1.2)
BUN SERPL-MCNC: 11 MG/DL (ref 8–23)
BUN/CREAT SERPL: 11.1 (ref 7–25)
CALCIUM SPEC-SCNC: 9.1 MG/DL (ref 8.6–10.5)
CHLORIDE SERPL-SCNC: 102 MMOL/L (ref 98–107)
CO2 SERPL-SCNC: 27.5 MMOL/L (ref 22–29)
CREAT SERPL-MCNC: 0.99 MG/DL (ref 0.76–1.27)
DEPRECATED RDW RBC AUTO: 47.5 FL (ref 37–54)
EOSINOPHIL # BLD AUTO: 0.11 10*3/MM3 (ref 0–0.4)
EOSINOPHIL NFR BLD AUTO: 2.1 % (ref 0.3–6.2)
ERYTHROCYTE [DISTWIDTH] IN BLOOD BY AUTOMATED COUNT: 13.1 % (ref 12.3–15.4)
ETHANOL BLD-MCNC: 284 MG/DL (ref 0–10)
ETHANOL UR QL: 0.28 %
GFR SERPL CREATININE-BSD FRML MDRD: 76 ML/MIN/1.73
GLOBULIN UR ELPH-MCNC: 2.5 GM/DL
GLUCOSE SERPL-MCNC: 85 MG/DL (ref 65–99)
HCT VFR BLD AUTO: 44 % (ref 37.5–51)
HGB BLD-MCNC: 14 G/DL (ref 13–17.7)
IMM GRANULOCYTES # BLD AUTO: 0.01 10*3/MM3 (ref 0–0.05)
IMM GRANULOCYTES NFR BLD AUTO: 0.2 % (ref 0–0.5)
LEFT ATRIUM VOLUME INDEX: 25 ML/M2
LV EF 2D ECHO EST: 67 %
LYMPHOCYTES # BLD AUTO: 2.75 10*3/MM3 (ref 0.7–3.1)
LYMPHOCYTES NFR BLD AUTO: 51.8 % (ref 19.6–45.3)
MAGNESIUM SERPL-MCNC: 2.1 MG/DL (ref 1.6–2.4)
MAXIMAL PREDICTED HEART RATE: 155 BPM
MCH RBC QN AUTO: 30.5 PG (ref 26.6–33)
MCHC RBC AUTO-ENTMCNC: 31.8 G/DL (ref 31.5–35.7)
MCV RBC AUTO: 95.9 FL (ref 79–97)
MONOCYTES # BLD AUTO: 0.46 10*3/MM3 (ref 0.1–0.9)
MONOCYTES NFR BLD AUTO: 8.7 % (ref 5–12)
NEUTROPHILS NFR BLD AUTO: 1.95 10*3/MM3 (ref 1.7–7)
NEUTROPHILS NFR BLD AUTO: 36.6 % (ref 42.7–76)
NRBC BLD AUTO-RTO: 0 /100 WBC (ref 0–0.2)
PLATELET # BLD AUTO: 303 10*3/MM3 (ref 140–450)
PMV BLD AUTO: 9.1 FL (ref 6–12)
POTASSIUM SERPL-SCNC: 3.8 MMOL/L (ref 3.5–5.2)
PROT SERPL-MCNC: 7 G/DL (ref 6–8.5)
RBC # BLD AUTO: 4.59 10*6/MM3 (ref 4.14–5.8)
SARS-COV-2 RNA RESP QL NAA+PROBE: NOT DETECTED
SINUS: 3.9 CM
SODIUM SERPL-SCNC: 143 MMOL/L (ref 136–145)
STRESS TARGET HR: 132 BPM
TROPONIN T SERPL-MCNC: <0.01 NG/ML (ref 0–0.03)
WBC NRBC COR # BLD: 5.31 10*3/MM3 (ref 3.4–10.8)

## 2022-01-07 PROCEDURE — 90791 PSYCH DIAGNOSTIC EVALUATION: CPT

## 2022-01-07 PROCEDURE — 93306 TTE W/DOPPLER COMPLETE: CPT

## 2022-01-07 PROCEDURE — 70450 CT HEAD/BRAIN W/O DYE: CPT

## 2022-01-07 PROCEDURE — C9803 HOPD COVID-19 SPEC COLLECT: HCPCS

## 2022-01-07 PROCEDURE — 93005 ELECTROCARDIOGRAM TRACING: CPT | Performed by: PHYSICIAN ASSISTANT

## 2022-01-07 PROCEDURE — 96365 THER/PROPH/DIAG IV INF INIT: CPT

## 2022-01-07 PROCEDURE — 99284 EMERGENCY DEPT VISIT MOD MDM: CPT

## 2022-01-07 PROCEDURE — 93306 TTE W/DOPPLER COMPLETE: CPT | Performed by: INTERNAL MEDICINE

## 2022-01-07 PROCEDURE — G0378 HOSPITAL OBSERVATION PER HR: HCPCS

## 2022-01-07 PROCEDURE — 72125 CT NECK SPINE W/O DYE: CPT

## 2022-01-07 PROCEDURE — 96361 HYDRATE IV INFUSION ADD-ON: CPT

## 2022-01-07 PROCEDURE — U0003 INFECTIOUS AGENT DETECTION BY NUCLEIC ACID (DNA OR RNA); SEVERE ACUTE RESPIRATORY SYNDROME CORONAVIRUS 2 (SARS-COV-2) (CORONAVIRUS DISEASE [COVID-19]), AMPLIFIED PROBE TECHNIQUE, MAKING USE OF HIGH THROUGHPUT TECHNOLOGIES AS DESCRIBED BY CMS-2020-01-R: HCPCS | Performed by: PHYSICIAN ASSISTANT

## 2022-01-07 PROCEDURE — 84484 ASSAY OF TROPONIN QUANT: CPT | Performed by: PHYSICIAN ASSISTANT

## 2022-01-07 PROCEDURE — 96375 TX/PRO/DX INJ NEW DRUG ADDON: CPT

## 2022-01-07 PROCEDURE — 99285 EMERGENCY DEPT VISIT HI MDM: CPT

## 2022-01-07 PROCEDURE — 82077 ASSAY SPEC XCP UR&BREATH IA: CPT | Performed by: PHYSICIAN ASSISTANT

## 2022-01-07 PROCEDURE — 96374 THER/PROPH/DIAG INJ IV PUSH: CPT

## 2022-01-07 PROCEDURE — 25010000002 PERFLUTREN (DEFINITY) 8.476 MG IN SODIUM CHLORIDE (PF) 0.9 % 10 ML INJECTION: Performed by: NURSE PRACTITIONER

## 2022-01-07 PROCEDURE — 83735 ASSAY OF MAGNESIUM: CPT | Performed by: PHYSICIAN ASSISTANT

## 2022-01-07 PROCEDURE — 85025 COMPLETE CBC W/AUTO DIFF WBC: CPT | Performed by: PHYSICIAN ASSISTANT

## 2022-01-07 PROCEDURE — 25010000002 THIAMINE PER 100 MG: Performed by: NURSE PRACTITIONER

## 2022-01-07 PROCEDURE — 25010000002 MORPHINE PER 10 MG: Performed by: EMERGENCY MEDICINE

## 2022-01-07 PROCEDURE — 71045 X-RAY EXAM CHEST 1 VIEW: CPT

## 2022-01-07 PROCEDURE — 99213 OFFICE O/P EST LOW 20 MIN: CPT | Performed by: NURSE PRACTITIONER

## 2022-01-07 PROCEDURE — 99204 OFFICE O/P NEW MOD 45 MIN: CPT | Performed by: INTERNAL MEDICINE

## 2022-01-07 PROCEDURE — 25010000002 ONDANSETRON PER 1 MG: Performed by: NURSE PRACTITIONER

## 2022-01-07 PROCEDURE — 80053 COMPREHEN METABOLIC PANEL: CPT | Performed by: PHYSICIAN ASSISTANT

## 2022-01-07 RX ORDER — LIDOCAINE HYDROCHLORIDE AND EPINEPHRINE 10; 10 MG/ML; UG/ML
10 INJECTION, SOLUTION INFILTRATION; PERINEURAL ONCE
Status: COMPLETED | OUTPATIENT
Start: 2022-01-07 | End: 2022-01-07

## 2022-01-07 RX ORDER — DIPHENOXYLATE HYDROCHLORIDE AND ATROPINE SULFATE 2.5; .025 MG/1; MG/1
1 TABLET ORAL DAILY
Status: DISCONTINUED | OUTPATIENT
Start: 2022-01-08 | End: 2022-01-09 | Stop reason: HOSPADM

## 2022-01-07 RX ORDER — LORAZEPAM 2 MG/ML
1 INJECTION INTRAMUSCULAR
Status: DISCONTINUED | OUTPATIENT
Start: 2022-01-07 | End: 2022-01-09 | Stop reason: HOSPADM

## 2022-01-07 RX ORDER — LEVOTHYROXINE SODIUM 0.1 MG/1
100 TABLET ORAL DAILY
Status: DISCONTINUED | OUTPATIENT
Start: 2022-01-07 | End: 2022-01-09 | Stop reason: HOSPADM

## 2022-01-07 RX ORDER — MORPHINE SULFATE 2 MG/ML
4 INJECTION, SOLUTION INTRAMUSCULAR; INTRAVENOUS ONCE
Status: COMPLETED | OUTPATIENT
Start: 2022-01-07 | End: 2022-01-07

## 2022-01-07 RX ORDER — AMLODIPINE BESYLATE 5 MG/1
5 TABLET ORAL EVERY MORNING
Status: DISCONTINUED | OUTPATIENT
Start: 2022-01-08 | End: 2022-01-09 | Stop reason: HOSPADM

## 2022-01-07 RX ORDER — LORAZEPAM 1 MG/1
1 TABLET ORAL
Status: DISCONTINUED | OUTPATIENT
Start: 2022-01-07 | End: 2022-01-09 | Stop reason: HOSPADM

## 2022-01-07 RX ORDER — ONDANSETRON 2 MG/ML
4 INJECTION INTRAMUSCULAR; INTRAVENOUS EVERY 6 HOURS PRN
Status: DISCONTINUED | OUTPATIENT
Start: 2022-01-07 | End: 2022-01-09 | Stop reason: HOSPADM

## 2022-01-07 RX ORDER — FOLIC ACID 1 MG/1
1 TABLET ORAL DAILY
Status: DISCONTINUED | OUTPATIENT
Start: 2022-01-08 | End: 2022-01-09 | Stop reason: HOSPADM

## 2022-01-07 RX ORDER — CALCIUM CARBONATE 200(500)MG
2 TABLET,CHEWABLE ORAL 2 TIMES DAILY PRN
Status: DISCONTINUED | OUTPATIENT
Start: 2022-01-07 | End: 2022-01-09 | Stop reason: HOSPADM

## 2022-01-07 RX ORDER — NITROGLYCERIN 0.4 MG/1
0.4 TABLET SUBLINGUAL
Status: DISCONTINUED | OUTPATIENT
Start: 2022-01-07 | End: 2022-01-09 | Stop reason: HOSPADM

## 2022-01-07 RX ORDER — ATORVASTATIN CALCIUM 20 MG/1
20 TABLET, FILM COATED ORAL DAILY
Status: DISCONTINUED | OUTPATIENT
Start: 2022-01-07 | End: 2022-01-09 | Stop reason: HOSPADM

## 2022-01-07 RX ORDER — SODIUM CHLORIDE 0.9 % (FLUSH) 0.9 %
10 SYRINGE (ML) INJECTION EVERY 12 HOURS SCHEDULED
Status: DISCONTINUED | OUTPATIENT
Start: 2022-01-07 | End: 2022-01-09 | Stop reason: HOSPADM

## 2022-01-07 RX ORDER — ACETAMINOPHEN 325 MG/1
650 TABLET ORAL EVERY 4 HOURS PRN
Status: DISCONTINUED | OUTPATIENT
Start: 2022-01-07 | End: 2022-01-09 | Stop reason: HOSPADM

## 2022-01-07 RX ORDER — ZOLPIDEM TARTRATE 5 MG/1
5 TABLET ORAL NIGHTLY PRN
Status: DISCONTINUED | OUTPATIENT
Start: 2022-01-07 | End: 2022-01-09 | Stop reason: HOSPADM

## 2022-01-07 RX ORDER — LISINOPRIL 10 MG/1
10 TABLET ORAL DAILY
Status: DISCONTINUED | OUTPATIENT
Start: 2022-01-07 | End: 2022-01-09 | Stop reason: HOSPADM

## 2022-01-07 RX ORDER — HYDROCODONE BITARTRATE AND ACETAMINOPHEN 5; 325 MG/1; MG/1
1 TABLET ORAL EVERY 6 HOURS PRN
Status: DISCONTINUED | OUTPATIENT
Start: 2022-01-07 | End: 2022-01-09 | Stop reason: HOSPADM

## 2022-01-07 RX ORDER — LORAZEPAM 2 MG/ML
0.5 INJECTION INTRAMUSCULAR
Status: DISCONTINUED | OUTPATIENT
Start: 2022-01-07 | End: 2022-01-09 | Stop reason: HOSPADM

## 2022-01-07 RX ORDER — ONDANSETRON 4 MG/1
4 TABLET, FILM COATED ORAL EVERY 6 HOURS PRN
Status: DISCONTINUED | OUTPATIENT
Start: 2022-01-07 | End: 2022-01-09 | Stop reason: HOSPADM

## 2022-01-07 RX ORDER — LORAZEPAM 0.5 MG/1
0.5 TABLET ORAL
Status: DISCONTINUED | OUTPATIENT
Start: 2022-01-07 | End: 2022-01-09 | Stop reason: HOSPADM

## 2022-01-07 RX ORDER — ACETAMINOPHEN 650 MG/1
650 SUPPOSITORY RECTAL EVERY 4 HOURS PRN
Status: DISCONTINUED | OUTPATIENT
Start: 2022-01-07 | End: 2022-01-09 | Stop reason: HOSPADM

## 2022-01-07 RX ORDER — ONDANSETRON 2 MG/ML
4 INJECTION INTRAMUSCULAR; INTRAVENOUS ONCE
Status: DISCONTINUED | OUTPATIENT
Start: 2022-01-07 | End: 2022-01-09 | Stop reason: HOSPADM

## 2022-01-07 RX ORDER — SODIUM CHLORIDE 9 MG/ML
100 INJECTION, SOLUTION INTRAVENOUS CONTINUOUS
Status: DISCONTINUED | OUTPATIENT
Start: 2022-01-07 | End: 2022-01-09

## 2022-01-07 RX ORDER — ACETAMINOPHEN 160 MG/5ML
650 SOLUTION ORAL EVERY 4 HOURS PRN
Status: DISCONTINUED | OUTPATIENT
Start: 2022-01-07 | End: 2022-01-09 | Stop reason: HOSPADM

## 2022-01-07 RX ORDER — TRAMADOL HYDROCHLORIDE 50 MG/1
50 TABLET ORAL EVERY 6 HOURS PRN
Status: DISCONTINUED | OUTPATIENT
Start: 2022-01-07 | End: 2022-01-09 | Stop reason: HOSPADM

## 2022-01-07 RX ORDER — ZOLPIDEM TARTRATE 10 MG/1
10 TABLET ORAL
COMMUNITY
Start: 2021-12-07 | End: 2022-03-15 | Stop reason: HOSPADM

## 2022-01-07 RX ORDER — SODIUM CHLORIDE 0.9 % (FLUSH) 0.9 %
10 SYRINGE (ML) INJECTION AS NEEDED
Status: DISCONTINUED | OUTPATIENT
Start: 2022-01-07 | End: 2022-01-09 | Stop reason: HOSPADM

## 2022-01-07 RX ADMIN — HYDROCODONE BITARTRATE AND ACETAMINOPHEN 1 TABLET: 5; 325 TABLET ORAL at 13:51

## 2022-01-07 RX ADMIN — SODIUM CHLORIDE, PRESERVATIVE FREE 10 ML: 5 INJECTION INTRAVENOUS at 22:15

## 2022-01-07 RX ADMIN — PERFLUTREN 1.5 ML: 6.52 INJECTION, SUSPENSION INTRAVENOUS at 10:57

## 2022-01-07 RX ADMIN — ACETAMINOPHEN 650 MG: 325 TABLET ORAL at 11:36

## 2022-01-07 RX ADMIN — LEVOTHYROXINE SODIUM 100 MCG: 0.1 TABLET ORAL at 18:15

## 2022-01-07 RX ADMIN — MORPHINE SULFATE 4 MG: 2 INJECTION, SOLUTION INTRAMUSCULAR; INTRAVENOUS at 05:02

## 2022-01-07 RX ADMIN — SODIUM CHLORIDE 1000 ML: 9 INJECTION, SOLUTION INTRAVENOUS at 05:04

## 2022-01-07 RX ADMIN — THIAMINE HYDROCHLORIDE 100 MG: 100 INJECTION, SOLUTION INTRAMUSCULAR; INTRAVENOUS at 16:52

## 2022-01-07 RX ADMIN — SODIUM CHLORIDE 100 ML/HR: 9 INJECTION, SOLUTION INTRAVENOUS at 14:36

## 2022-01-07 RX ADMIN — LISINOPRIL 10 MG: 10 TABLET ORAL at 22:16

## 2022-01-07 RX ADMIN — SODIUM CHLORIDE 100 ML/HR: 9 INJECTION, SOLUTION INTRAVENOUS at 05:51

## 2022-01-07 RX ADMIN — LIDOCAINE HYDROCHLORIDE,EPINEPHRINE BITARTRATE 10 ML: 10; .01 INJECTION, SOLUTION INFILTRATION; PERINEURAL at 04:59

## 2022-01-07 RX ADMIN — CARIPRAZINE 3 MG: 3 CAPSULE, GELATIN COATED ORAL at 18:15

## 2022-01-07 RX ADMIN — SODIUM CHLORIDE 100 ML/HR: 9 INJECTION, SOLUTION INTRAVENOUS at 22:16

## 2022-01-07 RX ADMIN — ONDANSETRON 4 MG: 2 INJECTION INTRAMUSCULAR; INTRAVENOUS at 04:59

## 2022-01-07 RX ADMIN — ATORVASTATIN CALCIUM 20 MG: 20 TABLET, FILM COATED ORAL at 18:15

## 2022-01-07 RX ADMIN — HYDROCODONE BITARTRATE AND ACETAMINOPHEN 1 TABLET: 5; 325 TABLET ORAL at 22:28
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
## 2022-01-07 NOTE — CASE MANAGEMENT/SOCIAL WORK
Discharge Planning Assessment  [facility]     Patient Name: Pro Mueller  MRN: [id number]  Today's Date: 1/7/2022    Admit Date: 1/7/2022     Discharge Needs Assessment     Row Name 01/07/22 1609       Living Environment    Lives With other (see comments)    Unique Family Situation Roommate  (Katty Soliman)    Current Living Arrangements home/apartment/condo    Primary Care Provided by self    Provides Primary Care For no one    Family Caregiver if Needed other (see comments); sibling(s)    Family Caregiver Names Sister  ( Jeny Leiva [phone]) and Roommate  (Katty Soliman)    Quality of Family Relationships helpful; involved; supportive    Able to Return to Prior Arrangements yes    Living Arrangement Comments Pt lives in a two story house with his roommate  ( Katty Soliman).       Resource/Environmental Concerns    Resource/Environmental Concerns none    Transportation Concerns car, none       Transition Planning    Patient/Family Anticipates Transition to home    Patient/Family Anticipated Services at Transition none    Transportation Anticipated car, drives self; family or friend will provide       Discharge Needs Assessment    Readmission Within the Last 30 Days no previous admission in last 30 days    Equipment Currently Used at Home other (see comments); grab bar  Neck Brace    Concerns to be Addressed no discharge needs identified; denies needs/concerns at this time    Anticipated Changes Related to Illness none    Equipment Needed After Discharge grab bar, tub/shower; other (see comments)  Neck Brace               Discharge Plan     Row Name 01/07/22 1612       Plan    Plan Plan home with assistance from his roommate.   BEBE De La Torre RN    Patient/Family in Agreement with Plan yes    Plan Comments FACE SHEET VERIFIED.  Spoke with pt at bedside.  Pt's PCP is Dr. Alla Beal.  Pt lives in a two story house with a roommate  (Katty Soliman).  Pt is independent with ADLs.  Pt has a neck  brace and grab bars in  for home use.  Pt gets his prescriptions at [facility]  ([address]  Pt denies any issues affording his medications. Pt is not current with HH.  Pt has not been in SNF.  Pt states his roommate can assist him at home.   Plan home with assistance from roommate if needed.   BEBE De La Torre RN              Continued Care and Services - Admitted Since 1/7/2022    Coordination has not been started for this encounter.          Demographic Summary     Row Name 01/07/22 1609       General Information    Admission Type observation    Arrived From emergency department    Referral Source admission list    Reason for Consult discharge planning    Preferred Language English     Used During This Interaction no               Functional Status     Row Name 01/07/22 1609       Functional Status    Usual Activity Tolerance good    Current Activity Tolerance good       Functional Status, IADL    Medications independent    Meal Preparation independent    Housekeeping independent    Laundry independent    Shopping independent       Mental Status    General Appearance WDL WDL  Simultaneous filing. User may be unaware of other data.               Psychosocial    No documentation.                Abuse/Neglect    No documentation.                Legal    No documentation.                Substance Abuse    No documentation.                Patient Forms    No documentation.                   Ju De La Torre, RN

## 2022-01-07 NOTE — ED NOTES
"Spoke with pt privately concerning alcohol results. Pt asked if he was a daily drinker and asked if he has ever experienced withdrawal symptoms with the cessation of alcohol. Pt admits to drinking daily, denies any withdrawal seizures but endorses getting \"shaky\" at times. PA aware.       Eden Lieberman, RN  01/07/22 0616       Eden Lieberman RN  01/07/22 0618    "

## 2022-01-07 NOTE — ED NOTES
Patient was placed in face mask in first look. Patient was wearing facemask when I entered the room and throughout our encounter. I wore full protective equipment throughout this patient encounter including a face mask, and gloves. Hand hygiene was performed before donning protective equipment and after removal when leaving the room.     Eden Lieberman, RN  01/07/22 0414

## 2022-01-07 NOTE — ED NOTES
This ERT placed the AM consult via Annabelle  with the answering service for Cardiology.      Medardo Barajas  01/07/22 0628

## 2022-01-07 NOTE — PLAN OF CARE
Goal Outcome Evaluation:  Plan of Care Reviewed With: patient           Outcome Summary: Admitted to room 660 from ED. Amb to BR  without dizziness but gait unsteady. Staples intact to posterior head. Wearing C- collar. Neuro consult completed, waiting to go to CT scan.CIWA score 3. Medicated for head and back pain. Telemetry SR.

## 2022-01-07 NOTE — ED NOTES
"Pt to triage from home with c/o fall and laceration posterior scalp. Pt states he thinks he \"blacked out then fell.\"  Pt states tetanus is up to date. Pt states brief LOC, denies blood thinners.  Pt wearing mask in triage. Triage personnel wore appropriate PPE       Purnima Sutton RN  01/07/22 9303    "
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
## 2022-01-07 NOTE — CASE MANAGEMENT/SOCIAL WORK
Continued Stay Note  [facility]     Patient Name: Pro Mueller  MRN: [id number]  Today's Date: 1/7/2022    Admit Date: 1/7/2022     Discharge Plan     Row Name 01/07/22 1612       Plan    Plan Plan home with assistance from his roommate.   BEBE De La Torre RN    Patient/Family in Agreement with Plan yes    Plan Comments FACE SHEET VERIFIED.  Spoke with pt at bedside.  Pt's PCP is Dr. Alla Beal.  Pt lives in a two story house with a roommate  (Katty [place]phard).  Pt is independent with ADLs.  Pt has a neck brace and grab bars in  for home use.  Pt gets his prescriptions at [facility]  ([address]  Pt denies any issues affording his medications. Pt is not current with HH.  Pt has not been in SNF.  Pt states his roommate can assist him at home.   Plan home with assistance from roommate if needed.   BEBE De La Torre RN               Discharge Codes    No documentation.                     Ju De La Torre RN

## 2022-01-07 NOTE — ED PROVIDER NOTES
The LUBA and I have discussed this patients history, physical exam, and treatment plan. I have reviewed the documentation and personally had a face to face interaction with the patient. I affirm the documentation and agree with the treatment and plan.  The following note describes my personal findings    I provided a substantive portion of the care of this patient.  I personally performed the physical exam in its entirety for this encounter.      This patient is a 67-year-old male presenting to the emergency room today secondary to a syncopal event.  The patient has had previous syncopal events and currently is in a cervical collar secondary to a known C5 fracture.  He denied any preceding symptoms including chest pain, dizziness, or shortness of breath.    Physical Exam  Vitals and nursing note reviewed.   Constitutional:       General: He is not in acute distress.     Appearance: He is well-developed.   HENT:      Head: Normocephalic.      Comments: Large posterior scalp laceration  Eyes:      Pupils: Pupils are equal, round, and reactive to light.   Neck:      Comments: In a cervical collar  Cardiovascular:      Rate and Rhythm: Normal rate and regular rhythm.      Heart sounds: Normal heart sounds. No murmur heard.      Pulmonary:      Effort: Pulmonary effort is normal. No respiratory distress.      Breath sounds: Normal breath sounds. No rales.   Abdominal:      General: Bowel sounds are normal.      Palpations: Abdomen is soft.      Tenderness: There is no abdominal tenderness. There is no guarding or rebound.   Musculoskeletal:         General: Normal range of motion.   Skin:     General: Skin is warm and dry.      Findings: No rash.   Neurological:      Mental Status: He is alert and oriented to person, place, and time.       Plan: We will obtain labs as well as a CT scan of the head and cervical spine.  We will clean the patient's wound and repair appropriately.  We will monitor and reassess  following.      The patient was wearing a facemask upon entrance into the room and remained in such throughout their visit.  I was wearing PPE including a facemask, eye protection, as well as gloves at any point entering the room and throughout the visit       Atif Long MD  01/10/22 9249

## 2022-01-07 NOTE — CONSULTS
Patient Name: Pro Mueller  :1955  67 y.o.    Date of Admission: 2022  Encounter Provider: Nilda Stephens MD  Date of Encounter Visit: 22  Place of Service: [facility] CARDIOLOGY  Referring Provider: Gerber Quinteros*  Patient Care Team:  Alla Beal MD as PCP - General (Family Medicine)  Say Coats MD (Psychiatry)      Chief complaint: syncope    Reason for Consult: syncope    History of Present Illness:     This is a gentleman with hypertension, hyperlipidemia and alcohol abuse.  He has had several episodes of syncope and was seen by Dr. Haskins in 2019 and had a normal echo except for grade 1 diastolic dysfunction.  He had a Zio patch which showed some short bursts of SVT but nothing significant.    He says he got up from his couch and apparently went to the kitchen.  He does not remember anything.  He woke up on the kitchen floor and had hit his head.  He has a history of alcohol abuse.  He says he drinks vodka.  He does not keep up with how much he drinks.  He says he does not drink while he is working.  He has never had a syncopal spell at work.  Currently complains of pain in his head and his low back.  No chest pain, shortness of breath, dizziness.      Previous Cardiac Testing:    Holter 19  Study Findings    No symptoms reported during the monitoring period. No complications noted. The predominant rhythm noted during the testing period was sinus rhythm. Premature atrial contractions occured rarely. There were 2 episodes of supraventricular tachycardia, the longest and fastest lasted 2 minutes with a rate of 176 bpm. Premature ventricular contractions occured rarely. Ventricular couplets, bigeminy and trigeminy. There were no episodes of ventricular tachycardia. Sinoatrial node conduction was normal. No atrioventricular block noted.     Study Impressions    An abnormal monitor study.     Echocardiogram 1/3/19  · Left ventricular systolic  function is normal. Calculated EF = 58.0%. Estimated EF was in agreement with the calculated EF. Normal left ventricular cavity size and wall thickness noted. All left ventricular wall segments contract normally. Left ventricular diastolic dysfunction is noted (grade I) consistent with impaired relaxation.        Past Medical History:   Diagnosis Date   • Alcohol abuse    • Alcohol withdrawal (HCC) 11/4/2016   • Alcoholic ketoacidosis 1/12/2020   • Anxiety    • Arthritis    • Atopic rhinitis 8/8/2016   • Depression    • Disease of thyroid gland    • Elevated cholesterol    • Encounter for removal of sutures    • Genital herpes simplex 8/8/2016   • GERD (gastroesophageal reflux disease)    • Headache, tension-type    • Hyperlipidemia    • Hypertension    • Kidney stone    • Migraine    • Motion sickness    • Nephrolithiasis 2/12/2020   • Olecranon bursitis, right elbow    • Panic disorder without agoraphobia 8/8/2016   • Peripheral neuropathy    • Sleep apnea    • Syncope and collapse 1/2/2019   • Vitamin D deficiency 8/8/2016   • Withdrawal symptoms, alcohol (HCC)        Past Surgical History:   Procedure Laterality Date   • BACK SURGERY      Pt denies.   • COLONOSCOPY     • CYST REMOVAL     • EXTRACORPOREAL SHOCK WAVE LITHOTRIPSY (ESWL) Right 2002   • SHOULDER ARTHROSCOPY Right 12/17/2019    Procedure: SHOULDER ARTHROSCOPY, decompression, distal clavicle excision;  Surgeon: Aj Mancilla MD;  [place] [facility] [place] [facility] [place]  Service: Orthopedics   • TONSILLECTOMY           Prior to Admission medications    Medication Sig Start Date End Date Taking? Authorizing Provider   acyclovir (ZOVIRAX) 200 MG capsule TAKE 2 CAPSULES BY MOUTH DAILY 8/18/21   Alla Beal MD   amLODIPine (NORVASC) 5 MG tablet TAKE 1 TABLET BY MOUTH EVERY MORNING 11/18/21   Alla Beal MD   atorvastatin (LIPITOR) 20 MG tablet TAKE 1 TABLET BY MOUTH DAILY 8/21/21   Alla Beal MD   clonazePAM (KlonoPIN) 2 MG tablet Take 2 mg by mouth every  "night at bedtime. 21   Davis Burton MD   folic acid (FOLVITE) 1 MG tablet Take 1 mg by mouth Daily.    Davis Burton MD   gabapentin (NEURONTIN) 300 MG capsule TAKE 1 CAPSULE BY MOUTH THREE TIMES DAILY  Patient taking differently: Takes as needed 20   Sary Pugh APRN   levothyroxine (SYNTHROID, LEVOTHROID) 100 MCG tablet Take 1 tablet by mouth Daily. 21   Lupe Roberts APRN   lisinopril (PRINIVIL,ZESTRIL) 10 MG tablet TAKE 1 TABLET BY MOUTH DAILY 21   Alla Beal MD   traMADol (ULTRAM) 50 MG tablet TAKE 1 TABLET BY MOUTH EVERY 6 HOURS AS NEEDED FOR MODERATE PAIN 21   Alla Beal MD   vitamin B-12 (CYANOCOBALAMIN) 1000 MCG tablet Take 1,000 mcg by mouth Daily.    Davis Burton MD   VITAMIN D PO Take 1 capsule by mouth Every Morning.    Davis Bruton MD   Vraylar 3 MG capsule capsule Take 3 mg by mouth every night at bedtime. 5/3/21   Davis Burton MD       Allergies   Allergen Reactions   • Penicillins Anaphylaxis and Other (See Comments)     Seizure. childhood       Social History     Socioeconomic History   • Marital status:    Tobacco Use   • Smoking status: Former Smoker     Packs/day: 0.50     Years: 25.00     Pack years: 12.50     Types: Cigarettes     Start date:      Quit date:      Years since quittin.0   • Smokeless tobacco: Never Used   Substance and Sexual Activity   • Alcohol use: Yes     Comment: 1/2 - 3/4 L per day of vodka   • Drug use: Yes     Types: Methamphetamines     Comment: STATES \"I HAVE SMOKED METH IN PAST WEEK.\"   • Sexual activity: Defer       Family History   Problem Relation Age of Onset   • Alzheimer's disease Mother    • Pancreatic cancer Father    • Malig Hyperthermia Neg Hx        REVIEW OF SYSTEMS:   All other systems reviewed and negative.        Objective:     Vitals:    22 0636 22 0701 22 0731 22 0758   BP:  135/83 136/90 123/78   BP " Location:       Pulse:   70 70   Resp:    16   Temp:       TempSrc:       SpO2:   94% 96%   Weight: 89.9 kg (198 lb 4.8 oz)      Height:         Body mass index is 26.89 kg/m².    Intake/Output Summary (Last 24 hours) at 1/7/2022 0939  Last data filed at 1/7/2022 0547  Gross per 24 hour   Intake 1000 ml   Output --   Net 1000 ml     Constitutional: He is oriented to person, place, and time. He appears well-developed. He does not appear ill.   HENT:   Head: Normocephalic and atraumatic. Head is without contusion.   Right Ear: Hearing normal. No drainage.   Left Ear: Hearing normal. No drainage.   Nose: No nasal deformity. No epistaxis.   Eyes: Lids are normal. Right eye exhibits no exudate. Left eye exhibits no exudate.  Neck: No JVD present. Carotid bruit is not present. No tracheal deviation present. No thyroid mass and no thyromegaly present.   Cardiovascular: Normal rate, regular rhythm and normal heart sounds.    Pulses:       Posterior tibial pulses are 2+ on the right side, and 2+ on the left side.   Pulmonary/Chest: Effort normal and breath sounds normal.   Abdominal: Soft. Normal appearance and bowel sounds are normal. There is no tenderness.   Musculoskeletal: Normal range of motion.        Right shoulder: He exhibits no deformity.        Left shoulder: He exhibits no deformity.   Neurological: He is alert and oriented to person, place, and time. He has normal strength.   Skin: Skin is warm, dry and intact. No rash noted.   Psychiatric: He has a normal mood and affect. His behavior is normal. Thought content normal.   Vitals reviewed        Lab Review:     Results from last 7 days   Lab Units 01/07/22  0434   SODIUM mmol/L 143   POTASSIUM mmol/L 3.8   CHLORIDE mmol/L 102   CO2 mmol/L 27.5   BUN mg/dL 11   CREATININE mg/dL 0.99   CALCIUM mg/dL 9.1   BILIRUBIN mg/dL 0.5   ALK PHOS U/L 86   ALT (SGPT) U/L 16   AST (SGOT) U/L 36   GLUCOSE mg/dL 85     Results from last 7 days   Lab Units 01/07/22  8958    TROPONIN T ng/mL <0.010     Results from last 7 days   Lab Units 01/07/22  0434   WBC 10*3/mm3 5.31   HEMOGLOBIN g/dL 14.0   HEMATOCRIT % 44.0   PLATELETS 10*3/mm3 303         Results from last 7 days   Lab Units 01/07/22  0434   MAGNESIUM mg/dL 2.1            Previous EKG 1/25/21      I personally viewed and interpreted the patient's EKG/Telemetry data.        Assessment and Plan:       1.  Syncope.  I agree with checking an echocardiogram.  He has had a Zio patch in the past which showed only some short bursts of SVT.  His syncopal episodes have always occurred while he has been drinking and are probably related.  I think I will recommend sending him home with a another Zio patch at discharge.  We could think about an implantable monitor at some point.  2.  Hypertension  3.  Hyperlipidemia  4.  Alcohol abuse    Nilda Stephens MD  01/07/22  09:39 EST

## 2022-01-07 NOTE — ED PROVIDER NOTES
EMERGENCY DEPARTMENT ENCOUNTER    Room Number:  08/08  Date of encounter:  1/7/2022  PCP: Alla Beal MD  Historian: Patient      HPI:  Chief Complaint: Syncope and collapse  A complete HPI/ROS/PMH/PSH/SH/FH are unobtainable due to: Nothing    Context: Pro Mueller is a 67 y.o. male who presents to the ED c/o syncope and collapse.  Patient states he does not remember anything in the moments leading up to the incident.  He believes he may have just left his bedroom because he found himself on the floor collapsed in his kitchen.  There was no preceding nausea, vomiting, chest pain, palpitations, lightheadedness, dizziness leading up to the event.  He states the next thing he knew he was on the kitchen floor.  He does not recall tripping, falling, losing balance.  He does endorse some neck pain from the fall and states prior to the fall he was diagnosed with a C5 fracture and was to follow-up with Dr. Cervantes.    He states he has had this happen a few times in the past and the culprit for his syncopal episodes has never been found.    Reviewing the patient's chart he has a past medical history insomnia, EtOH abuse, hypertension, hyperlipidemia, pancytopenia, left ventricular diastolic dysfunction.      PAST MEDICAL HISTORY  Active Ambulatory Problems     Diagnosis Date Noted   • Alcoholism (HCC) 08/08/2016   • Atopic rhinitis 08/08/2016   • Mixed anxiety depressive disorder 08/08/2016   • Genital herpes simplex 08/08/2016   • Hyperlipidemia 08/08/2016   • Hypertension 08/08/2016   • Hypothyroidism 08/08/2016   • Insomnia 08/08/2016   • Panic disorder without agoraphobia 08/08/2016   • Persistent insomnia 08/08/2016   • Vitamin D deficiency 08/08/2016   • Neuropathy involving both lower extremities 10/25/2018   • QT prolongation 01/03/2019   • Left shoulder pain 11/19/2019   • Pancytopenia (HCC) 01/14/2020   • Left ventricular diastolic dysfunction 01/16/2021   • Tremor 10/06/2021   • C5 cervical fracture ([facility])  11/09/2021     Resolved Ambulatory Problems     Diagnosis Date Noted   • Accidental fall 08/08/2016   • Impacted cerumen 08/08/2016   • Influenza 08/08/2016   • Motion sickness 08/08/2016   • Seasonal allergic rhinitis 08/08/2016   • Upper respiratory tract infection 08/08/2016   • Alcohol withdrawal (HCC) 11/04/2016   • Headache 11/05/2016   • Gastritis 11/05/2016   • Low back pain 11/11/2016   • Olecranon bursitis of right elbow 04/05/2017   • Sciatica of left side 06/12/2018   • Syncope and collapse 01/02/2019   • Nausea and vomiting 01/11/2020   • Alcoholic ketoacidosis 01/12/2020   • Hyponatremia 01/13/2020   • Hypokalemia 01/13/2020   • Alcohol dependence with uncomplicated withdrawal (HCC) 01/14/2020   • Nephrolithiasis 02/12/2020   • Hypomagnesemia 01/16/2021   • Non-traumatic rhabdomyolysis 01/18/2021   • Urine retention 01/21/2021     Past Medical History:   Diagnosis Date   • Alcohol abuse    • Anxiety    • Arthritis    • Depression    • Disease of thyroid gland    • Elevated cholesterol    • Encounter for removal of sutures    • GERD (gastroesophageal reflux disease)    • Headache, tension-type    • Kidney stone    • Migraine    • Olecranon bursitis, right elbow    • Peripheral neuropathy    • Sleep apnea    • Withdrawal symptoms, alcohol (HCC)          PAST SURGICAL HISTORY  Past Surgical History:   Procedure Laterality Date   • BACK SURGERY      Pt denies.   • COLONOSCOPY     • CYST REMOVAL     • EXTRACORPOREAL SHOCK WAVE LITHOTRIPSY (ESWL) Right 2002   • SHOULDER ARTHROSCOPY Right 12/17/2019    Procedure: SHOULDER ARTHROSCOPY, decompression, distal clavicle excision;  Surgeon: Aj Mancilla MD;  [place] [facility] [place] [facility] [place]  Service: Orthopedics   • TONSILLECTOMY           FAMILY HISTORY  Family History   Problem Relation Age of Onset   • Alzheimer's disease Mother    • Pancreatic cancer Father    • Malig Hyperthermia Neg Hx          SOCIAL HISTORY  Social History     Socioeconomic History   • Marital  "status:    Tobacco Use   • Smoking status: Former Smoker     Packs/day: 0.50     Years: 25.00     Pack years: 12.50     Types: Cigarettes     Start date:      Quit date:      Years since quittin.0   • Smokeless tobacco: Never Used   Substance and Sexual Activity   • Alcohol use: Yes     Comment: 1/2 - 3/4 L per day of vodka   • Drug use: Yes     Types: Methamphetamines     Comment: STATES \"I HAVE SMOKED METH IN PAST WEEK.\"   • Sexual activity: Defer         ALLERGIES  Penicillins        REVIEW OF SYSTEMS  Review of Systems   Constitutional: Negative for chills and fever.   Eyes: Negative.    Respiratory: Negative for cough and shortness of breath.    Cardiovascular: Negative for chest pain and palpitations.   Gastrointestinal: Negative for abdominal pain.   Genitourinary: Negative.    Musculoskeletal: Positive for neck pain.   Skin:        Scalp laceration   Neurological: Positive for syncope and headaches.   Psychiatric/Behavioral: Negative.         All systems reviewed and negative except for those discussed in HPI.       PHYSICAL EXAM    I have reviewed the triage vital signs and nursing notes.    ED Triage Vitals [22 0406]   Temp Heart Rate Resp BP SpO2   97.6 °F (36.4 °C) 70 16 135/85 98 %      Temp src Heart Rate Source Patient Position BP Location FiO2 (%)   Oral Monitor -- Left arm --       Physical Exam  GENERAL: Appears slightly uncomfortable, anxious, no acute distress  HENT: nares patent  Neck: TTP C5-C6 vicinity, no obvious step-off deformity  EYES: no scleral icterus  CV: regular rhythm, regular rate, no rubs murmurs gallops  RESPIRATORY: normal effort, lungs CTA B   ABDOMEN: soft, nontender  MUSCULOSKELETAL: no deformity strength 5 of 5 bilateral upper and lower extremity  NEURO: alert, moves all extremities, follows commands, bilateral upper extremities sensation intact C5-C6-C7, radial, median, ulnar motor function intact bilaterally.  SKIN: 8 cm irregular laceration to " the occipital region of the scalp.        LAB RESULTS  Recent Results (from the past 24 hour(s))   Comprehensive Metabolic Panel    Collection Time: 01/07/22  4:34 AM    Specimen: Blood   Result Value Ref Range    Glucose 85 65 - 99 mg/dL    BUN 11 8 - 23 mg/dL    Creatinine 0.99 0.76 - 1.27 mg/dL    Sodium 143 136 - 145 mmol/L    Potassium 3.8 3.5 - 5.2 mmol/L    Chloride 102 98 - 107 mmol/L    CO2 27.5 22.0 - 29.0 mmol/L    Calcium 9.1 8.6 - 10.5 mg/dL    Total Protein 7.0 6.0 - 8.5 g/dL    Albumin 4.50 3.50 - 5.20 g/dL    ALT (SGPT) 16 1 - 41 U/L    AST (SGOT) 36 1 - 40 U/L    Alkaline Phosphatase 86 39 - 117 U/L    Total Bilirubin 0.5 0.0 - 1.2 mg/dL    eGFR Non African Amer 76 >60 mL/min/1.73    Globulin 2.5 gm/dL    A/G Ratio 1.8 g/dL    BUN/Creatinine Ratio 11.1 7.0 - 25.0    Anion Gap 13.5 5.0 - 15.0 mmol/L   Troponin    Collection Time: 01/07/22  4:34 AM    Specimen: Blood   Result Value Ref Range    Troponin T <0.010 0.000 - 0.030 ng/mL   CBC Auto Differential    Collection Time: 01/07/22  4:34 AM    Specimen: Blood   Result Value Ref Range    WBC 5.31 3.40 - 10.80 10*3/mm3    RBC 4.59 4.14 - 5.80 10*6/mm3    Hemoglobin 14.0 13.0 - 17.7 g/dL    Hematocrit 44.0 37.5 - 51.0 %    MCV 95.9 79.0 - 97.0 fL    MCH 30.5 26.6 - 33.0 pg    MCHC 31.8 31.5 - 35.7 g/dL    RDW 13.1 12.3 - 15.4 %    RDW-SD 47.5 37.0 - 54.0 fl    MPV 9.1 6.0 - 12.0 fL    Platelets 303 140 - 450 10*3/mm3    Neutrophil % 36.6 (L) 42.7 - 76.0 %    Lymphocyte % 51.8 (H) 19.6 - 45.3 %    Monocyte % 8.7 5.0 - 12.0 %    Eosinophil % 2.1 0.3 - 6.2 %    Basophil % 0.6 0.0 - 1.5 %    Immature Grans % 0.2 0.0 - 0.5 %    Neutrophils, Absolute 1.95 1.70 - 7.00 10*3/mm3    Lymphocytes, Absolute 2.75 0.70 - 3.10 10*3/mm3    Monocytes, Absolute 0.46 0.10 - 0.90 10*3/mm3    Eosinophils, Absolute 0.11 0.00 - 0.40 10*3/mm3    Basophils, Absolute 0.03 0.00 - 0.20 10*3/mm3    Immature Grans, Absolute 0.01 0.00 - 0.05 10*3/mm3    nRBC 0.0 0.0 - 0.2 /100 WBC    Ethanol    Collection Time: 01/07/22  4:34 AM    Specimen: Blood   Result Value Ref Range    Ethanol 284 (H) 0 - 10 mg/dL    Ethanol % 0.284 %   Magnesium    Collection Time: 01/07/22  4:34 AM    Specimen: Blood   Result Value Ref Range    Magnesium 2.1 1.6 - 2.4 mg/dL   COVID-19,[facility] IN-HOUSE CEPHEID/JUD NP SWAB IN TRANSPORT MEDIA 8-12 HR TAT - Swab, Nasopharynx    Collection Time: 01/07/22  5:49 AM    Specimen: Nasopharynx; Swab   Result Value Ref Range    COVID19 Not Detected Not Detected - Ref. Range       Ordered the above labs and independently reviewed the results.        RADIOLOGY  XR Chest 1 View    Result Date: 1/7/2022  PORTABLE CHEST  HISTORY: Syncope.  COMPARISON: 01/25/2021.  FINDINGS: A portable view of the chest demonstrates the heart to be within normal limits in size. There is no evidence of focal infiltrate, effusion or congestive failure.        I ordered the above noted radiological studies. Reviewed by me and discussed with radiologist.  See dictation for official radiology interpretation.      PROCEDURES    Laceration Repair    Date/Time: 1/7/2022 5:57 AM  Performed by: Juan Olmos III PA  Authorized by: Berry Varela MD     Consent:     Consent obtained:  Verbal    Consent given by:  Patient    Risks discussed:  Infection and pain  Anesthesia (see MAR for exact dosages):     Anesthesia method:  Local infiltration    Local anesthetic:  Lidocaine 1% WITH epi  Laceration details:     Location:  Scalp    Scalp location:  Occipital    Length (cm):  8  Repair type:     Repair type:  Simple  Pre-procedure details:     Preparation:  Patient was prepped and draped in usual sterile fashion and imaging obtained to evaluate for foreign bodies  Exploration:     Wound exploration: wound explored through full range of motion and entire depth of wound probed and visualized    Treatment:     Area cleansed with:  Hibiclens and saline    Amount of cleaning:  Extensive    Irrigation  solution:  Sterile saline    Irrigation volume:  1000    Irrigation method:  Pressure wash  Skin repair:     Repair method:  Staples    Number of staples:  14  Approximation:     Approximation:  Close  Post-procedure details:     Patient tolerance of procedure:  Tolerated well, no immediate complications          MEDICATIONS GIVEN IN ER    Medications   sodium chloride 0.9 % infusion (100 mL/hr Intravenous New Bag 1/7/22 0585)   ondansetron (ZOFRAN) injection 4 mg (has no administration in time range)   lidocaine 1% - EPINEPHrine 1:490653 (XYLOCAINE W/EPI) 1 %-1:267849 injection 10 mL (10 mL Injection Given by Other 1/7/22 7259)   morphine injection 4 mg (4 mg Intravenous Given 1/7/22 0502)   sodium chloride 0.9 % bolus 1,000 mL (0 mL Intravenous Stopped 1/7/22 0593)         PROGRESS, DATA ANALYSIS, CONSULTS, AND MEDICAL DECISION MAKING    All labs have been independently reviewed by me.  All radiology studies have been reviewed by me and discussed with radiologist dictating the report.   EKG's independently viewed and interpreted by me.  Discussion below represents my analysis of pertinent findings related to patient's condition, differential diagnosis, treatment plan and final disposition.    DDx syncope includes but is not limited to: Orthostatic, vasovagal, cardiogenic, seizure disorder, TIA, EtOH and substance abuse.  Will obtain CBC, CMP, troponin, EKG, CT head without contrast, CXR, ETOH to further evaluate the patient.    ED Course as of 01/07/22 0651   Fri Jan 07, 2022   0440 Patient had a syncopal event without any any preceding prodrome.  Although EKG, CBC, CMP, troponin, magnesium unremarkable.  It is felt he be better served by observation in the hospital.  Because of syncope at this time is unknown.  I suspect likely orthostatic.  EtOH is pending. [RC]   0500 Discussed patient's case with BERONICA Hopper with [facility].  To place in a telemetry bed for further observation under Dr. Quinteros's care.  [RC]   0553 Ethanol(!): 284 [RC]   0554 WBC: 5.31 [RC]   0554 RBC: 4.59 [RC]   0554 Hemoglobin: 14.0 [RC]   0554 Hematocrit: 44.0 [RC]   0554 Platelets: 303 [RC]   0554 Troponin T: <0.010 [RC]   0554 Magnesium: 2.1 [RC]   0554 Glucose: 85 [RC]   0554 BUN: 11 [RC]   0554 Creatinine: 0.99 [RC]   0554 Sodium: 143 [RC]   0554 Potassium: 3.8 [RC]   0554 CO2: 27.5 [RC]   0554 Anion Gap: 13.5 [RC]   0554 ALT (SGPT): 16 [RC]   0554 AST (SGOT): 36 [RC]   0554 Alkaline Phosphatase: 86 [RC]   0554 Total Bilirubin: 0.5 [RC]      ED Course User Index  [RC] Juan Olmos III, PA           PPE: The patient wore a surgical mask throughout the entire patient encounter. I wore an N95.    AS OF 06:51 EST VITALS:    BP - 129/77  HR - 66  TEMP - 97.6 °F (36.4 °C) (Oral)  O2 SATS - 96%        DIAGNOSIS  Final diagnoses:   Syncope and collapse   Alcoholic intoxication with complication (HCC)         DISPOSITION  ADMISSION    Discussed treatment plan and reason for admission with pt/family and admitting physician.  Pt/family voiced understanding of the plan for admission for further testing/treatment as needed.              Juan Olmos III, PA  01/07/22 0651

## 2022-01-07 NOTE — PROGRESS NOTES
Clinical Pharmacy Services: Medication History    Pro Mueller is a 67 y.o. male presenting to [facility] for   Chief Complaint   Patient presents with   • Fall   • Head Laceration       He  has a past medical history of Alcohol abuse, Alcohol withdrawal ([facility]) (11/4/2016), Alcoholic ketoacidosis (1/12/2020), Anxiety, Arthritis, Atopic rhinitis (8/8/2016), Depression, Disease of thyroid gland, Elevated cholesterol, Encounter for removal of sutures, Genital herpes simplex (8/8/2016), GERD (gastroesophageal reflux disease), Headache, tension-type, Hyperlipidemia, Hypertension, Kidney stone, Migraine, Motion sickness, Nephrolithiasis (2/12/2020), Olecranon bursitis, right elbow, Panic disorder without agoraphobia (8/8/2016), Peripheral neuropathy, Sleep apnea, Syncope and collapse (1/2/2019), Vitamin D deficiency (8/8/2016), and Withdrawal symptoms, alcohol ([facility]).    Allergies as of 01/07/2022 - Reviewed 01/07/2022   Allergen Reaction Noted   • Penicillins Anaphylaxis and Other (See Comments) 01/04/2016       Medication information was obtained from: Patient/Pharmacy  Pharmacy and Phone Number:   [id number] - [place], [place] - [address] AT [facility] - [phone] [facility] [phone]   [address]  [facility] [address]  Phone: [phone] Fax: [phone]    [facility] - [place] [id number]  Phone: [phone] Fax: [phone]      Prior to Admission Medications     Prescriptions Last Dose Informant Patient Reported? Taking?    acyclovir (ZOVIRAX) 200 MG capsule Past Week Self No Yes    TAKE 2 CAPSULES BY MOUTH DAILY    amLODIPine (NORVASC) 5 MG tablet Past Week Self No Yes    TAKE 1 TABLET BY MOUTH EVERY MORNING    atorvastatin (LIPITOR) 20 MG tablet Past Week Self No Yes    TAKE 1 TABLET BY MOUTH DAILY    folic acid (FOLVITE) 1 MG tablet Past Week Self Yes Yes    Take 1 mg by mouth Daily.     gabapentin (NEURONTIN) 300 MG capsule Past Month Self No Yes    TAKE 1 CAPSULE BY MOUTH THREE TIMES DAILY    Patient taking differently:  Takes as needed    levothyroxine (SYNTHROID, LEVOTHROID) 100 MCG tablet Past Week Self No Yes    Take 1 tablet by mouth Daily.    lisinopril (PRINIVIL,ZESTRIL) 10 MG tablet Past Week Self No Yes    TAKE 1 TABLET BY MOUTH DAILY    traMADol (ULTRAM) 50 MG tablet Past Week Self No Yes    TAKE 1 TABLET BY MOUTH EVERY 6 HOURS AS NEEDED FOR MODERATE PAIN    Vraylar 3 MG capsule capsule Past Week Self Yes Yes    Take 3 mg by mouth every night at bedtime.    zolpidem (AMBIEN) 10 MG tablet Past Week Self Yes Yes    Take 10 mg by mouth every night at bedtime.            Medication notes: The patient stated he had taken his medications the other day, and is no longer taking Clonazepam 2mg. The patient takes Gabapentin 300mg PRN and last dose was last month. The patient picked up Peridex 0.12% on 12/23 and 11/12.     This medication list is complete to the best of my knowledge as of 1/7/2022    Please call if questions.    Raine Pavon  Medication History Technician  [phone]    1/7/2022 12:42 EST

## 2022-01-08 ENCOUNTER — APPOINTMENT (OUTPATIENT)
Dept: CT IMAGING | Facility: HOSPITAL | Age: 67
End: 2022-01-08

## 2022-01-08 LAB
BACTERIA UR QL AUTO: ABNORMAL /HPF
BILIRUB UR QL STRIP: NEGATIVE
CLARITY UR: CLEAR
COLOR UR: YELLOW
GLUCOSE UR STRIP-MCNC: NEGATIVE MG/DL
HGB UR QL STRIP.AUTO: NEGATIVE
HYALINE CASTS UR QL AUTO: ABNORMAL /LPF
KETONES UR QL STRIP: NEGATIVE
LEUKOCYTE ESTERASE UR QL STRIP.AUTO: ABNORMAL
NITRITE UR QL STRIP: NEGATIVE
PH UR STRIP.AUTO: 7.5 [PH] (ref 5–8)
PROT UR QL STRIP: NEGATIVE
RBC # UR STRIP: ABNORMAL /HPF
REF LAB TEST METHOD: ABNORMAL
SP GR UR STRIP: 1.01 (ref 1–1.03)
SQUAMOUS #/AREA URNS HPF: ABNORMAL /HPF
UROBILINOGEN UR QL STRIP: ABNORMAL
WBC # UR STRIP: ABNORMAL /HPF

## 2022-01-08 PROCEDURE — G0378 HOSPITAL OBSERVATION PER HR: HCPCS

## 2022-01-08 PROCEDURE — 87077 CULTURE AEROBIC IDENTIFY: CPT | Performed by: HOSPITALIST

## 2022-01-08 PROCEDURE — 87186 SC STD MICRODIL/AGAR DIL: CPT | Performed by: HOSPITALIST

## 2022-01-08 PROCEDURE — 72128 CT CHEST SPINE W/O DYE: CPT

## 2022-01-08 PROCEDURE — 72131 CT LUMBAR SPINE W/O DYE: CPT

## 2022-01-08 PROCEDURE — 99214 OFFICE O/P EST MOD 30 MIN: CPT | Performed by: NURSE PRACTITIONER

## 2022-01-08 PROCEDURE — 81001 URINALYSIS AUTO W/SCOPE: CPT | Performed by: HOSPITALIST

## 2022-01-08 PROCEDURE — 99213 OFFICE O/P EST LOW 20 MIN: CPT | Performed by: NURSE PRACTITIONER

## 2022-01-08 PROCEDURE — 87086 URINE CULTURE/COLONY COUNT: CPT | Performed by: HOSPITALIST

## 2022-01-08 PROCEDURE — 97110 THERAPEUTIC EXERCISES: CPT

## 2022-01-08 PROCEDURE — 96361 HYDRATE IV INFUSION ADD-ON: CPT

## 2022-01-08 PROCEDURE — 97161 PT EVAL LOW COMPLEX 20 MIN: CPT

## 2022-01-08 RX ADMIN — LISINOPRIL 10 MG: 10 TABLET ORAL at 08:37

## 2022-01-08 RX ADMIN — Medication 100 MG: at 08:38

## 2022-01-08 RX ADMIN — ZOLPIDEM TARTRATE 5 MG: 5 TABLET ORAL at 01:52

## 2022-01-08 RX ADMIN — AMLODIPINE BESYLATE 5 MG: 5 TABLET ORAL at 08:38

## 2022-01-08 RX ADMIN — FOLIC ACID 1 MG: 1 TABLET ORAL at 08:38

## 2022-01-08 RX ADMIN — LORAZEPAM 1 MG: 1 TABLET ORAL at 20:13

## 2022-01-08 RX ADMIN — SODIUM CHLORIDE, PRESERVATIVE FREE 10 ML: 5 INJECTION INTRAVENOUS at 20:13

## 2022-01-08 RX ADMIN — ATORVASTATIN CALCIUM 20 MG: 20 TABLET, FILM COATED ORAL at 08:38

## 2022-01-08 RX ADMIN — Medication 1 TABLET: at 08:38

## 2022-01-08 RX ADMIN — SODIUM CHLORIDE 100 ML/HR: 9 INJECTION, SOLUTION INTRAVENOUS at 01:52

## 2022-01-08 RX ADMIN — CARIPRAZINE 3 MG: 3 CAPSULE, GELATIN COATED ORAL at 08:38

## 2022-01-08 RX ADMIN — HYDROCODONE BITARTRATE AND ACETAMINOPHEN 1 TABLET: 5; 325 TABLET ORAL at 20:13

## 2022-01-08 RX ADMIN — LORAZEPAM 1 MG: 1 TABLET ORAL at 01:52

## 2022-01-08 RX ADMIN — LEVOTHYROXINE SODIUM 100 MCG: 0.1 TABLET ORAL at 08:38

## 2022-01-08 RX ADMIN — SODIUM CHLORIDE, PRESERVATIVE FREE 10 ML: 5 INJECTION INTRAVENOUS at 08:38

## 2022-01-08 NOTE — PROGRESS NOTES
"[facility]    Patient Name: Pro Mueller  :1955  67 y.o.  LOS: 0  Encounter Provider: BERONICA Martinez      Patient Care Team:  Alla Beal MD as PCP - General (Family Medicine)  Say Coats MD (Psychiatry)    Chief Complaint: Syncope, hypertension    Interval History: Patient states that he has some pain from his syncopal episode but denies any further episodes of lightheadedness, no syncope, chest pain, dyspnea.       Objective   Vital Signs  Temp:  [98 °F (36.7 °C)-98.5 °F (36.9 °C)] 98.3 °F (36.8 °C)  Heart Rate:  [68-77] 74  Resp:  [16] 16  BP: (127-147)/(66-89) 139/85    Intake/Output Summary (Last 24 hours) at 2022 0919  Last data filed at 2022 0640  Gross per 24 hour   Intake 2581.66 ml   Output --   Net 2581.66 ml     Flowsheet Rows      First Filed Value   Admission Height 182.9 cm (72\") Documented at 2022 0406   Admission Weight 89.9 kg (198 lb 4.8 oz) Documented at 2022 0636            Constitutional:       Appearance: Normal appearance. Well-developed.   Eyes:      Conjunctiva/sclera: Conjunctivae normal.   Neck:      Vascular: No carotid bruit.   Pulmonary:      Effort: Pulmonary effort is normal.      Breath sounds: Normal breath sounds.   Cardiovascular:      Normal rate. Regular rhythm. Normal S1. Normal S2.      Murmurs: There is no murmur.      No gallop. No click. No rub.   Edema:     Peripheral edema absent.   Musculoskeletal: Normal range of motion. Skin:     General: Skin is warm and dry.   Neurological:      Mental Status: Alert and oriented to person, place, and time.      GCS: GCS eye subscore is 4. GCS verbal subscore is 5. GCS motor subscore is 6.   Psychiatric:         Speech: Speech normal.         Behavior: Behavior normal.         Thought Content: Thought content normal.         Judgment: Judgment normal.           Pertinent Test Results:  Results from last 7 days   Lab Units 22  0434   SODIUM mmol/L 143 "   POTASSIUM mmol/L 3.8   CHLORIDE mmol/L 102   CO2 mmol/L 27.5   BUN mg/dL 11   CREATININE mg/dL 0.99   GLUCOSE mg/dL 85   CALCIUM mg/dL 9.1   AST (SGOT) U/L 36   ALT (SGPT) U/L 16     Results from last 7 days   Lab Units 01/07/22  0434   TROPONIN T ng/mL <0.010     Results from last 7 days   Lab Units 01/07/22  0434   WBC 10*3/mm3 5.31   HEMOGLOBIN g/dL 14.0   HEMATOCRIT % 44.0   PLATELETS 10*3/mm3 303         Results from last 7 days   Lab Units 01/07/22  0434   MAGNESIUM mg/dL 2.1           Invalid input(s): LDLCALC                Medication Review:   amLODIPine, 5 mg, Oral, QAM  atorvastatin, 20 mg, Oral, Daily  Cariprazine HCl, 3 mg, Oral, Daily  thiamine, 100 mg, Oral, Daily   And  multivitamin, 1 tablet, Oral, Daily   And  folic acid, 1 mg, Oral, Daily  levothyroxine, 100 mcg, Oral, Daily  lisinopril, 10 mg, Oral, Daily  ondansetron, 4 mg, Intravenous, Once  sodium chloride, 10 mL, Intravenous, Q12H         sodium chloride, 100 mL/hr, Last Rate: 100 mL/hr (01/08/22 0849)        Assessment/Plan   1. Syncope  2. HTN  3. HLD  4. Alcohol abuse    -Echocardiogram revealed preserved LVEF and normal structure and function of the valves.    -Syncope is always occurred secondary to alcohol abuse.  Would recommend discharging with a ZIO monitor, consider implantable monitor in the future.    -Cardiovascular services will sign off.  Patient will follow up in clinic with BERONICA Clark in 4 weeks to review monitor results.  Our office will call to arrange follow up appointment.     BERONICA Martinez  [facility]  01/08/22  09:19 EST

## 2022-01-08 NOTE — PLAN OF CARE
Goal Outcome Evaluation:      Patient alert follows commands family at bedside. No c/o pain or nausea noted. No acute distress noted iv fluids infusing will continue to monitor

## 2022-01-08 NOTE — PLAN OF CARE
Goal Outcome Evaluation:  Plan of Care Reviewed With: patient           Outcome Summary: pt adm after a syncopal episode at home, suffering a head laceration, pt with hx of C5 fracture last July and had worn a C collar until Dec 8, C collar put back on and pt has had further testing to reevaluate due to this new fall. Pt with hx of ETOH abuse. Today pt presents with good strength and was able to walk 300 ft with no loss of balance, no dizziness, he has a wide based shuffling type gait. Pt is at his baseline level and can cont to walk with nursing staff until discharge home, no further PT indicated, PT can be reordererd if situation changes  Patient was intermittently wearing a face mask during this therapy encounter. Therapist used appropriate personal protective equipment including eye protection, mask, and gloves.  Mask used was standard procedure mask. Appropriate PPE was worn during the entire therapy session. Hand hygiene was completed before and after therapy session. Patient is not in enhanced droplet precautions.

## 2022-01-08 NOTE — PLAN OF CARE
Problem: Fall Injury Risk  Goal: Absence of Fall and Fall-Related Injury  Outcome: Ongoing, Progressing  Intervention: Promote Injury-Free Environment  Recent Flowsheet Documentation  Taken 1/8/2022 1345 by Say Diallo RN  Safety Promotion/Fall Prevention:   assistive device/personal items within reach   fall prevention program maintained   nonskid shoes/slippers when out of bed   safety round/check completed  Taken 1/8/2022 1200 by Say Diallo RN  Safety Promotion/Fall Prevention:   assistive device/personal items within reach   fall prevention program maintained   nonskid shoes/slippers when out of bed   safety round/check completed  Taken 1/8/2022 1000 by Say Diallo RN  Safety Promotion/Fall Prevention:   assistive device/personal items within reach   fall prevention program maintained   nonskid shoes/slippers when out of bed   safety round/check completed  Taken 1/8/2022 0839 by Say Diallo RN  Safety Promotion/Fall Prevention:   assistive device/personal items within reach   fall prevention program maintained   nonskid shoes/slippers when out of bed   safety round/check completed     Problem: Adult Inpatient Plan of Care  Goal: Plan of Care Review  Outcome: Ongoing, Progressing  Flowsheets (Taken 1/8/2022 1420)  Progress: improving  Plan of Care Reviewed With: patient  Outcome Summary: Patient has been pleasant and cooperative during shift. Patient complains of mild pain in head and back. No nausea or SOA. Patient is stand-by assist and ambulated 300 feet with PT. CT of lumbar and thoracic spine completed. Urology consulted for right kidney stone with right renal obstruction. UA needed. Zio patch to be placed at D/C. AOx4 and on room air. Will continue to monitor and assist patient as needed.  Goal: Patient-Specific Goal (Individualized)  Outcome: Ongoing, Progressing  Goal: Absence of Hospital-Acquired Illness or Injury  Outcome: Ongoing, Progressing  Intervention: Identify and  Manage Fall Risk  Recent Flowsheet Documentation  Taken 1/8/2022 1345 by Say Diallo RN  Safety Promotion/Fall Prevention:   assistive device/personal items within reach   fall prevention program maintained   nonskid shoes/slippers when out of bed   safety round/check completed  Taken 1/8/2022 1200 by Say Diallo RN  Safety Promotion/Fall Prevention:   assistive device/personal items within [facility]   fall prevention program maintained   nonskid shoes/slippers when out of bed   safety round/check completed  Taken 1/8/2022 1000 by Say Diallo RN  Safety Promotion/Fall Prevention:   assistive device/personal items within [facility]   fall prevention program maintained   nonskid shoes/slippers when out of bed   safety round/check completed  Taken 1/8/2022 0839 by Say Diallo RN  Safety Promotion/Fall Prevention:   assistive device/personal items within reach   fall prevention program maintained   nonskid shoes/slippers when out of bed   safety round/check completed  Intervention: Prevent Skin Injury  Recent Flowsheet Documentation  Taken 1/8/2022 1345 by Say Diallo RN  Body Position: supine  Taken 1/8/2022 1200 by Say Diallo RN  Body Position: supine  Taken 1/8/2022 1000 by Say Diallo RN  Body Position: supine  Taken 1/8/2022 0839 by Say Diallo RN  Body Position: supine  Goal: Optimal Comfort and Wellbeing  Outcome: Ongoing, Progressing  Goal: Readiness for Transition of Care  Outcome: Ongoing, Progressing     Problem: Hypertension Comorbidity  Goal: Blood Pressure in Desired Range  Outcome: Ongoing, Progressing     Problem: Pain Chronic (Persistent) (Comorbidity Management)  Goal: Acceptable Pain Control and Functional Ability  Outcome: Ongoing, Progressing     Problem: Syncope  Goal: Absence of Syncopal Symptoms  Outcome: Ongoing, Progressing     Problem: Activity and Energy Impairment (Excessive Substance Use)  Goal: Optimized Energy Level (Excessive Substance  Use)  Outcome: Ongoing, Progressing     Problem: Behavior Regulation Impairment (Excessive Substance Use)  Goal: Improved Behavioral Control (Excessive Substance Use)  Outcome: Ongoing, Progressing     Problem: Decreased Participation and Engagement (Excessive Substance Use)  Goal: Increased Participation and Engagement (Excessive Substance Use)  Outcome: Ongoing, Progressing     Problem: Physiologic Impairment (Excessive Substance Use)  Goal: Improved Physiologic Symptoms (Excessive Substance Use)  Outcome: Ongoing, Progressing   Goal Outcome Evaluation:  Plan of Care Reviewed With: patient        Progress: improving  Outcome Summary: Patient has been pleasant and cooperative during shift. Patient complains of mild pain in head and back. No nausea or SOA. Patient is stand-by assist and ambulated 300 feet with PT. CT of lumbar and thoracic spine completed. Urology consulted for right kidney stone with right renal obstruction. UA needed. Zio patch to be placed at D/C. AOx4 and on room air. Will continue to monitor and assist patient as needed.

## 2022-01-08 NOTE — PROGRESS NOTES
NEUROSURGERY PROGRESS NOTE     LOS: 0 days   Patient Care Team:  Alla Beal MD as PCP - General (Family Medicine)  Say Coats MD (Psychiatry)    Chief Complaint: Back pain    Subjective       Interval History:     History taken from: patient chart    Objective        Vital Signs  Temp:  [98 °F (36.7 °C)-98.5 °F (36.9 °C)] 98.3 °F (36.8 °C)  Heart Rate:  [68-77] 74  Resp:  [16] 16  BP: (127-147)/(80-89) 139/85     Physical Exam:     AA&Ox3. In no acute distress.   Removed hard cervical collar.   Moves all extremities x 4 without difficulty       Results Review:     I reviewed the patient's new clinical results.  I reviewed the patient's new imaging results and agree with the interpretation.  Discussed with Dr. Cervantes and Dr. Varela    CT SCAN OF THE THORACIC AND LUMBAR SPINE WITHOUT CONTRAST     No evidence of acute compression fracture in the thoracic or lumbar spine. There are some degenerative changes at multiple levels with minimal degrees of facet hypertrophy at L1-2, L2-3 and L3-4 with no evidence of canal stenosis or foraminal narrowing. Facet spurring at L4-5 with approximately 3 mm of slight anterior subluxation of L4 on L5 with mild central stenosis. No evidence of foraminal narrowing at L4-5. Slight posterior disc bulge noted with left-sided facet spurs at L5-S1.    Incidental finding of a 5 x 10 mm stone in the right ureteropelvic junction that is producing mild to moderate right renal obstruction -this was reported to the admitting hospitalist; Dr. Varela.       Assessment/Plan       Syncope and collapse    Alcoholism (HCC)    Mixed anxiety depressive disorder    Hyperlipidemia    Hypertension    Hypothyroidism    Chronic low back pain    Neuropathy involving both lower extremities    Left ventricular diastolic dysfunction    C5 cervical fracture (HCC)    Neck pain    Recent fall after syncopal episode  ETOH abuse  Cervical, thoracic and lumbar pain  Hx of prior C5 endplate fractures  after a fall last July R renal stone witih mild/mod obstruction found incidentally    CT thoracic and lumbar spine shows no evidence of fracture or acute spinal abnormality. Pain is likely musculoskeletal - recommend symptom management  Urology consulted to address renal stone    Previous C5 fracture has healed based on recent cervical CT - hard cervical collar removed.  Informed patient he does not need to come to office next week as we have evaluated his cervical spine while here.   Nothing further to add from DENISE perspective  Please call for questions or concerns.      Neena Verdin, BERONICA  01/08/22  11:03 EST

## 2022-01-08 NOTE — PROGRESS NOTES
Name: Pro Mueller ADMIT: 2022   : 1955  PCP: Alla Beal MD    MRN: [id number] LOS: 0 days   AGE/SEX: 67 y.o. male  ROOM: [facility]/     Subjective   Subjective   Complaining of back pain. Gradual if he has any specific flank pain.    Review of Systems   Constitutional: Negative for fever.   Respiratory: Negative for cough and shortness of breath.    Cardiovascular: Negative for chest pain.   Gastrointestinal: Negative for abdominal pain, diarrhea, nausea and vomiting.   Genitourinary: Negative for dysuria.   Musculoskeletal: Positive for back pain.      Objective   Objective   Vital Signs  Temp:  [98 °F (36.7 °C)-98.5 °F (36.9 °C)] 98.3 °F (36.8 °C)  Heart Rate:  [68-74] 74  Resp:  [16] 16  BP: (127-147)/(84-89) 139/85  SpO2:  [92 %-98 %] 98 %  on   ;   Device (Oxygen Therapy): room air  Body mass index is 25.1 kg/m².    Physical Exam  Constitutional:       General: He is not in acute distress.     Appearance: Normal appearance. He is not toxic-appearing.   HENT:      Head:      Comments: Staples back of his head     Right Ear: External ear normal.      Left Ear: External ear normal.      Nose: Nose normal.   Neck:      Comments: Hard collar  Cardiovascular:      Rate and Rhythm: Normal rate and regular rhythm.   Pulmonary:      Effort: Pulmonary effort is normal. No respiratory distress.      Breath sounds: Normal breath sounds. No wheezing or rhonchi.   Abdominal:      General: Bowel sounds are normal. There is no distension.      Palpations: Abdomen is soft.      Tenderness: There is no abdominal tenderness. There is no guarding or rebound.   Musculoskeletal:         General: No swelling.      Right lower leg: No edema.      Left lower leg: No edema.   Skin:     General: Skin is warm and dry.   Neurological:      General: No focal deficit present.      Mental Status: He is alert and oriented to person, place, and time.   Psychiatric:         Mood and Affect: Mood normal.         Behavior:  Behavior normal. Behavior is not agitated or aggressive.     Results Review  I reviewed the patient's new clinical results.  Results from last 7 days   Lab Units 01/07/22  0434   WBC 10*3/mm3 5.31   HEMOGLOBIN g/dL 14.0   PLATELETS 10*3/mm3 303     Results from last 7 days   Lab Units 01/07/22  0434   SODIUM mmol/L 143   POTASSIUM mmol/L 3.8   CHLORIDE mmol/L 102   CO2 mmol/L 27.5   BUN mg/dL 11   CREATININE mg/dL 0.99   GLUCOSE mg/dL 85     Lab Results   Component Value Date    ANIONGAP 13.5 01/07/2022     Estimated Creatinine Clearance: 88.4 mL/min (by C-G formula based on SCr of 0.99 mg/dL).    Results from last 7 days   Lab Units 01/07/22  0434   ALBUMIN g/dL 4.50   BILIRUBIN mg/dL 0.5   ALK PHOS U/L 86   AST (SGOT) U/L 36   ALT (SGPT) U/L 16     Results from last 7 days   Lab Units 01/07/22  0434   CALCIUM mg/dL 9.1   ALBUMIN g/dL 4.50   MAGNESIUM mg/dL 2.1       No results found for: HGBA1C, POCGLU    Scheduled Meds  amLODIPine, 5 mg, Oral, QAM  atorvastatin, 20 mg, Oral, Daily  Cariprazine HCl, 3 mg, Oral, Daily  thiamine, 100 mg, Oral, Daily   And  multivitamin, 1 tablet, Oral, Daily   And  folic acid, 1 mg, Oral, Daily  levothyroxine, 100 mcg, Oral, Daily  lisinopril, 10 mg, Oral, Daily  ondansetron, 4 mg, Intravenous, Once  sodium chloride, 10 mL, Intravenous, Q12H    Continuous Infusions  sodium chloride, 100 mL/hr, Last Rate: 100 mL/hr (01/08/22 1325)    PRN Meds  •  acetaminophen **OR** acetaminophen **OR** acetaminophen  •  calcium carbonate  •  HYDROcodone-acetaminophen  •  LORazepam **OR** LORazepam **OR** LORazepam **OR** LORazepam **OR** LORazepam **OR** LORazepam  •  nitroglycerin  •  ondansetron **OR** ondansetron  •  sodium chloride  •  traMADol  •  zolpidem    sodium chloride, 100 mL/hr, Last Rate: 100 mL/hr (01/08/22 1325)    Diet  Diet Regular; Cardiac    I have personally reviewed:  [x]  Radiology   [x]  EKG/Telemetry   [x]  Cardiology/Vascular     Results for orders placed during the  hospital encounter of 01/07/22    Adult Transthoracic Echo Complete W/ Cont if Necessary Per Protocol    Interpretation Summary  · Estimated left ventricular EF = 67% Left ventricular systolic function is normal.  · Left ventricular diastolic function was normal.  · Mild dilation of the aortic root is present.        Assessment/Plan     Active Hospital Problems    Diagnosis  POA   • **Syncope and collapse [R55]  Yes   • Neck pain [M54.2]  Yes   • C5 cervical fracture (HCC) [S12.400A]  Yes   • Left ventricular diastolic dysfunction [I51.9]  Yes   • Neuropathy involving both lower extremities [G57.93]  Yes   • Chronic low back pain [M54.50, G89.29]  Yes   • Mixed anxiety depressive disorder [F41.8]  Yes   • Hypothyroidism [E03.9]  Yes   • Hyperlipidemia [E78.5]  Yes   • Alcoholism (HCC) [F10.20]  Yes   • Hypertension [I10]  Yes      Resolved Hospital Problems   No resolved problems to display.     67 y.o. male admitted with Syncope and collapse.    · Syncope: Echocardiogram above. Cardiology recommends discharging with ZIO monitor, possible implantable monitor in the future. Syncope is noted to be always associated with alcohol use. Cardiology will follow up in 4 weeks.  · Back pain, history of cervical fracture: Await further recommendations from neurosurgery  · 5 x 10 mm stone right UPJ producing mild to moderate right renal obstruction. Patient complains of back pain cannot tell if he has any flank pain. Urology consult. Check UA  · SCDs for DVT prophylaxis  · Discussed with patient and nursing staff and Neena Verdin neurosurgery APRN  · Discharge: GINNY Varela MD  [facility]  01/08/22  13:46 EST    I wore protective equipment throughout this patient encounter including a face mask, gloves, and protective eyewear.  Hand hygiene was performed before donning protective equipment and after removal when leaving the room.

## 2022-01-09 ENCOUNTER — READMISSION MANAGEMENT (OUTPATIENT)
Dept: CALL CENTER | Facility: HOSPITAL | Age: 67
End: 2022-01-09

## 2022-01-09 ENCOUNTER — APPOINTMENT (OUTPATIENT)
Dept: CARDIOLOGY | Facility: HOSPITAL | Age: 67
End: 2022-01-09

## 2022-01-09 VITALS
WEIGHT: 189.82 LBS | DIASTOLIC BLOOD PRESSURE: 83 MMHG | RESPIRATION RATE: 17 BRPM | HEART RATE: 69 BPM | SYSTOLIC BLOOD PRESSURE: 144 MMHG | OXYGEN SATURATION: 96 % | HEIGHT: 72 IN | BODY MASS INDEX: 25.71 KG/M2 | TEMPERATURE: 98.1 F

## 2022-01-09 PROCEDURE — G0378 HOSPITAL OBSERVATION PER HR: HCPCS

## 2022-01-09 PROCEDURE — 93246 EXT ECG>7D<15D RECORDING: CPT

## 2022-01-09 RX ORDER — GABAPENTIN 300 MG/1
CAPSULE ORAL
Start: 2022-01-09 | End: 2022-02-10 | Stop reason: ALTCHOICE

## 2022-01-09 RX ADMIN — LISINOPRIL 10 MG: 10 TABLET ORAL at 08:00

## 2022-01-09 RX ADMIN — CARIPRAZINE 3 MG: 3 CAPSULE, GELATIN COATED ORAL at 08:00

## 2022-01-09 RX ADMIN — Medication 1 TABLET: at 08:00

## 2022-01-09 RX ADMIN — HYDROCODONE BITARTRATE AND ACETAMINOPHEN 1 TABLET: 5; 325 TABLET ORAL at 10:18

## 2022-01-09 RX ADMIN — SODIUM CHLORIDE 100 ML/HR: 9 INJECTION, SOLUTION INTRAVENOUS at 04:20

## 2022-01-09 RX ADMIN — SODIUM CHLORIDE, PRESERVATIVE FREE 10 ML: 5 INJECTION INTRAVENOUS at 08:00

## 2022-01-09 RX ADMIN — AMLODIPINE BESYLATE 5 MG: 5 TABLET ORAL at 08:00

## 2022-01-09 RX ADMIN — HYDROCODONE BITARTRATE AND ACETAMINOPHEN 1 TABLET: 5; 325 TABLET ORAL at 16:27

## 2022-01-09 RX ADMIN — LEVOTHYROXINE SODIUM 100 MCG: 0.1 TABLET ORAL at 08:00

## 2022-01-09 RX ADMIN — Medication 100 MG: at 08:00

## 2022-01-09 RX ADMIN — HYDROCODONE BITARTRATE AND ACETAMINOPHEN 1 TABLET: 5; 325 TABLET ORAL at 04:20

## 2022-01-09 RX ADMIN — FOLIC ACID 1 MG: 1 TABLET ORAL at 08:00

## 2022-01-09 RX ADMIN — ATORVASTATIN CALCIUM 20 MG: 20 TABLET, FILM COATED ORAL at 08:00

## 2022-01-09 NOTE — PLAN OF CARE
Problem: Fall Injury Risk  Goal: Absence of Fall and Fall-Related Injury  Outcome: Ongoing, Progressing  Intervention: Promote Injury-Free Environment  Recent Flowsheet Documentation  Taken 1/9/2022 1345 by Say Diallo RN  Safety Promotion/Fall Prevention:   assistive device/personal items within reach   fall prevention program maintained   nonskid shoes/slippers when out of bed   safety round/check completed  Taken 1/9/2022 1200 by Say Diallo RN  Safety Promotion/Fall Prevention:   assistive device/personal items within reach   fall prevention program maintained   nonskid shoes/slippers when out of bed   safety round/check completed  Taken 1/9/2022 0958 by Say Diallo RN  Safety Promotion/Fall Prevention:   assistive device/personal items within reach   fall prevention program maintained   nonskid shoes/slippers when out of bed   safety round/check completed  Taken 1/9/2022 0801 by Say Diallo RN  Safety Promotion/Fall Prevention:   assistive device/personal items within reach   fall prevention program maintained   nonskid shoes/slippers when out of bed   safety round/check completed     Problem: Adult Inpatient Plan of Care  Goal: Plan of Care Review  Outcome: Ongoing, Progressing  Flowsheets (Taken 1/9/2022 1420)  Progress: improving  Plan of Care Reviewed With: patient  Outcome Summary: Patient has been pleasant and cooperative during shift. Urology is suggesting a ESWL but unsure if this will be inpatient or outpatient. Dr. Varela will address with urology. Patient treated for pain once today. No nausea or SOA. IVFs stopped. Patient is AOx4, room air, assist stand-by to bathroom. Will continue to monitor and assist patient as needed.  Goal: Patient-Specific Goal (Individualized)  Outcome: Ongoing, Progressing  Goal: Absence of Hospital-Acquired Illness or Injury  Outcome: Ongoing, Progressing  Intervention: Identify and Manage Fall Risk  Recent Flowsheet Documentation  Taken 1/9/2022  1345 by Say Diallo RN  Safety Promotion/Fall Prevention:   assistive device/personal items within reach   fall prevention program maintained   nonskid shoes/slippers when out of bed   safety round/check completed  Taken 1/9/2022 1200 by Say Diallo RN  Safety Promotion/Fall Prevention:   assistive device/personal items within reach   fall prevention program maintained   nonskid shoes/slippers when out of bed   safety round/check completed  Taken 1/9/2022 0958 by Say Diallo RN  Safety Promotion/Fall Prevention:   assistive device/personal items within reach   fall prevention program maintained   nonskid shoes/slippers when out of bed   safety round/check completed  Taken 1/9/2022 0801 by Say Diallo RN  Safety Promotion/Fall Prevention:   assistive device/personal items within reach   fall prevention program maintained   nonskid shoes/slippers when out of bed   safety round/check completed  Intervention: Prevent Skin Injury  Recent Flowsheet Documentation  Taken 1/9/2022 1345 by Say iDallo RN  Body Position:   supine   position changed independently  Taken 1/9/2022 1200 by Say Diallo RN  Body Position: supine  Taken 1/9/2022 0958 by Say Diallo RN  Body Position: supine  Taken 1/9/2022 0801 by Say Diallo RN  Body Position: supine  Goal: Optimal Comfort and Wellbeing  Outcome: Ongoing, Progressing  Goal: Readiness for Transition of Care  Outcome: Ongoing, Progressing     Problem: Hypertension Comorbidity  Goal: Blood Pressure in Desired Range  Outcome: Ongoing, Progressing     Problem: Pain Chronic (Persistent) (Comorbidity Management)  Goal: Acceptable Pain Control and Functional Ability  Outcome: Ongoing, Progressing     Problem: Syncope  Goal: Absence of Syncopal Symptoms  Outcome: Ongoing, Progressing     Problem: Activity and Energy Impairment (Excessive Substance Use)  Goal: Optimized Energy Level (Excessive Substance Use)  Outcome: Ongoing, Progressing      Problem: Behavior Regulation Impairment (Excessive Substance Use)  Goal: Improved Behavioral Control (Excessive Substance Use)  Outcome: Ongoing, Progressing     Problem: Decreased Participation and Engagement (Excessive Substance Use)  Goal: Increased Participation and Engagement (Excessive Substance Use)  Outcome: Ongoing, Progressing     Problem: Physiologic Impairment (Excessive Substance Use)  Goal: Improved Physiologic Symptoms (Excessive Substance Use)  Outcome: Ongoing, Progressing   Goal Outcome Evaluation:  Plan of Care Reviewed With: patient        Progress: improving  Outcome Summary: Patient has been pleasant and cooperative during shift. Urology is suggesting a ESWL but unsure if this will be inpatient or outpatient. Dr. Varela will address with urology. Patient treated for pain once today. No nausea or SOA. IVFs stopped. Patient is AOx4, room air, assist stand-by to bathroom. Will continue to monitor and assist patient as needed.

## 2022-01-09 NOTE — OUTREACH NOTE
Prep Survey      Responses   [facility] patient discharged from? [place]   Is LACE score < 7 ? Yes   Emergency Room discharge w/ pulse ox? No   Eligibility [facility]   Date of Admission 01/07/22   Date of Discharge 01/09/22   Discharge Disposition Home or Self Care   Discharge diagnosis Syncope and collapse    Does the patient have one of the following disease processes/diagnoses(primary or secondary)? Other   Does the patient have Home health ordered? No   Is there a DME ordered? No   Prep survey completed? Yes          Deandra Ro RN

## 2022-01-09 NOTE — DISCHARGE SUMMARY
[facility]    Date of Discharge:  1/9/2022    PCP: Alla Beal MD    Discharge Diagnosis:   Active Hospital Problems    Diagnosis  POA   • **Syncope and collapse [R55]  Yes   • Neck pain [M54.2]  Yes   • C5 cervical fracture (HCC) [S12.400A]  Yes   • Left ventricular diastolic dysfunction [I51.9]  Yes   • Neuropathy involving both lower extremities [G57.93]  Yes   • Chronic low back pain [M54.50, G89.29]  Yes   • Mixed anxiety depressive disorder [F41.8]  Yes   • Hypothyroidism [E03.9]  Yes   • Hyperlipidemia [E78.5]  Yes   • Alcoholism (HCC) [F10.20]  Yes   • Hypertension [I10]  Yes      Resolved Hospital Problems   No resolved problems to display.      Consults     Date and Time Order Name Status Description    1/8/2022 10:11 AM Inpatient Urology Consult Completed     1/8/2022 10:10 AM Inpatient Urology Consult      1/7/2022 12:10 PM Inpatient Neurosurgery Consult Completed     1/7/2022  4:39 AM Inpatient Cardiology Consult Completed     1/7/2022  4:30 AM [facility] (on-call MD unless specified) Details          Hospital Course  67 y.o. male admitted with a syncopal episode. Echocardiogram showed preserved LVEF and normal structure and function of valves. He has a prior history of syncope associated with alcohol abuse. Cardiology recommended sending him home with a ZIO monitor and they are considering an implantable monitor in the future. They will see him in 4 weeks.    He also had a prior history of C5 fracture recently out of cervical collar. Neurosurgery was asked to see since he was complaining of some spine pain. He had CT of the cervical thoracic and lumbar spine. He had no evidence of acute fracture or acute spinal abnormality. Previous C5 fracture was healed on the cervical CT.    He was noted to have incidental finding of right UPJ stone. Urology is going to call him tomorrow to set up outpatient ESWL. Urinalysis showed pyuria but no bacteria and he  denies any urinary complaints.    I discussed the patient's findings and my recommendations with patient and nursing staff and Dr. Patton.    Condition on Discharge: Improved. Patient would like to go home today    Temp:  [98.1 °F (36.7 °C)-98.6 °F (37 °C)] 98.1 °F (36.7 °C)  Heart Rate:  [64-69] 69  Resp:  [17-19] 17  BP: (141-157)/(77-92) 144/83  Body mass index is 25.74 kg/m².    Physical Exam  Constitutional:       General: He is not in acute distress.     Appearance: Normal appearance. He is not toxic-appearing.   HENT:      Head: Normocephalic and atraumatic.      Right Ear: External ear normal.      Left Ear: External ear normal.      Nose: Nose normal.   Eyes:      Conjunctiva/sclera: Conjunctivae normal.   Cardiovascular:      Rate and Rhythm: Normal rate and regular rhythm.   Pulmonary:      Effort: Pulmonary effort is normal. No respiratory distress.      Breath sounds: Normal breath sounds. No wheezing or rhonchi.   Abdominal:      General: Bowel sounds are normal. There is no distension.      Palpations: Abdomen is soft.      Tenderness: There is no abdominal tenderness. There is no guarding or rebound.   Musculoskeletal:         General: No swelling.      Right lower leg: No edema.      Left lower leg: No edema.   Skin:     General: Skin is warm and dry.   Neurological:      General: No focal deficit present.      Mental Status: He is alert and oriented to person, place, and time.      Motor: No tremor.   Psychiatric:         Mood and Affect: Mood normal.         Behavior: Behavior normal. Behavior is not agitated or aggressive.     While in the room and during my examination of the patient I wore gloves, mask, eye protection.  I washed my hands before and after this patient encounter.     Disposition: Home or Self Care       Discharge Medications      Changes to Medications      Instructions Start Date   gabapentin 300 MG capsule  Commonly known as: NEURONTIN  What changed:   · how much to take  · how  to take this  · when to take this  · additional instructions   Takes as needed (previous home medications)         Continue These Medications      Instructions Start Date   acyclovir 200 MG capsule  Commonly known as: ZOVIRAX   400 mg, Oral, Daily      amLODIPine 5 MG tablet  Commonly known as: NORVASC   5 mg, Oral, Every Morning      atorvastatin 20 MG tablet  Commonly known as: LIPITOR   20 mg, Oral, Daily      folic acid 1 MG tablet  Commonly known as: FOLVITE   1 mg, Oral, Daily      levothyroxine 100 MCG tablet  Commonly known as: SYNTHROID, LEVOTHROID   100 mcg, Oral, Daily      lisinopril 10 MG tablet  Commonly known as: PRINIVIL,ZESTRIL   10 mg, Oral, Daily      traMADol 50 MG tablet  Commonly known as: ULTRAM   TAKE 1 TABLET BY MOUTH EVERY 6 HOURS AS NEEDED FOR MODERATE PAIN      Vraylar 3 MG capsule capsule  Generic drug: Cariprazine HCl   3 mg, Oral, Every Night at Bedtime      zolpidem 10 MG tablet  Commonly known as: AMBIEN   10 mg, Oral, Every Night at Bedtime            Diet Instructions     Diet: Regular, Cardiac      Discharge Diet:  Regular  Cardiac            Activity Instructions     Activity as Tolerated           Additional Instructions for the Follow-ups that You Need to Schedule     Call MD for problems / concerns.   As directed         Follow-up Information     Alla Beal MD Follow up in 1 week(s).    Specialty: Family Medicine  Contact information:  [address]  [place] [id number] [phone]             Bob Patton Jr., MD Follow up.    Specialty: Urology  Contact information:  [address] [place] [id number] [phone]             Chantale Adams APRN Follow up in 4 week(s).    Specialties: Cardiology, Nurse Practitioner  Contact information:  [address]  [place] [id number] [phone]                        Pending Labs     Order Current Status    Urine Culture - Urine, Urine, Random Void Preliminary result         Berry Varela  MD  01/09/22  15:39 EST    Discharge time spent greater than 30 minutes.

## 2022-01-09 NOTE — NURSING NOTE
Access Center note.  Unable to speak with patient at this time; he is in the BR.      AC will continue to follow.

## 2022-01-09 NOTE — CONSULTS
FIRST UROLOGY CONSULT      Patient Identification:  NAME:  Pro Mueller  Age:  67 y.o.   Sex:  male   :  1955   MRN:  [id number]       Chief complaint: right upj stone    History of present illness:  68yo man with recent syncope and fall possibly etoh related.  He has had a recent cspine fracture.  Incidental finding of a right upj stone      Past medical history:  Past Medical History:   Diagnosis Date   • Alcohol abuse    • Alcohol withdrawal (HCC) 2016   • Alcoholic ketoacidosis 2020   • Anxiety    • Arthritis    • Atopic rhinitis 2016   • Depression    • Disease of thyroid gland    • Elevated cholesterol    • Encounter for removal of sutures    • Genital herpes simplex 2016   • GERD (gastroesophageal reflux disease)    • Headache, tension-type    • Hyperlipidemia    • Hypertension    • Kidney stone    • Migraine    • Motion sickness    • Nephrolithiasis 2020   • Olecranon bursitis, right elbow    • Panic disorder without agoraphobia 2016   • Peripheral neuropathy    • Sleep apnea    • Syncope and collapse 2019   • Vitamin D deficiency 2016   • Withdrawal symptoms, alcohol (HCC)        Past surgical history:  Past Surgical History:   Procedure Laterality Date   • BACK SURGERY      Pt denies.   • COLONOSCOPY     • CYST REMOVAL     • EXTRACORPOREAL SHOCK WAVE LITHOTRIPSY (ESWL) Right    • SHOULDER ARTHROSCOPY Right 2019    Procedure: SHOULDER ARTHROSCOPY, decompression, distal clavicle excision;  Surgeon: Aj Mancilla MD;  [facility];  Service: Orthopedics   • TONSILLECTOMY         Allergies:  Penicillins    Home medications:  Medications Prior to Admission   Medication Sig Dispense Refill Last Dose   • acyclovir (ZOVIRAX) 200 MG capsule TAKE 2 CAPSULES BY MOUTH DAILY 180 capsule 1 Past Week at Unknown time   • amLODIPine (NORVASC) 5 MG tablet TAKE 1 TABLET BY MOUTH EVERY MORNING 90 tablet 1 Past Week at Unknown time   • atorvastatin  (LIPITOR) 20 MG tablet TAKE 1 TABLET BY MOUTH DAILY 90 tablet 1 Past Week at Unknown time   • folic acid (FOLVITE) 1 MG tablet Take 1 mg by mouth Daily.   Past Week at Unknown time   • gabapentin (NEURONTIN) 300 MG capsule TAKE 1 CAPSULE BY MOUTH THREE TIMES DAILY (Patient taking differently: Takes as needed) 90 capsule 5 Past Month at Unknown time   • levothyroxine (SYNTHROID, LEVOTHROID) 100 MCG tablet Take 1 tablet by mouth Daily. 90 tablet 1 Past Week at Unknown time   • lisinopril (PRINIVIL,ZESTRIL) 10 MG tablet TAKE 1 TABLET BY MOUTH DAILY 90 tablet 1 Past Week at Unknown time   • traMADol (ULTRAM) 50 MG tablet TAKE 1 TABLET BY MOUTH EVERY 6 HOURS AS NEEDED FOR MODERATE PAIN 80 tablet 0 Past Week at Unknown time   • Vraylar 3 MG capsule capsule Take 3 mg by mouth every night at bedtime.   Past Week at Unknown time   • zolpidem (AMBIEN) 10 MG tablet Take 10 mg by mouth every night at bedtime.   Past Week at Unknown time        Hospital medications:  amLODIPine, 5 mg, Oral, QAM  atorvastatin, 20 mg, Oral, Daily  Cariprazine HCl, 3 mg, Oral, Daily  thiamine, 100 mg, Oral, Daily   And  multivitamin, 1 tablet, Oral, Daily   And  folic acid, 1 mg, Oral, Daily  levothyroxine, 100 mcg, Oral, Daily  lisinopril, 10 mg, Oral, Daily  ondansetron, 4 mg, Intravenous, Once  sodium chloride, 10 mL, Intravenous, Q12H      sodium chloride, 100 mL/hr, Last Rate: 100 mL/hr (01/08/22 2014)      •  acetaminophen **OR** acetaminophen **OR** acetaminophen  •  calcium carbonate  •  HYDROcodone-acetaminophen  •  LORazepam **OR** LORazepam **OR** LORazepam **OR** LORazepam **OR** LORazepam **OR** LORazepam  •  nitroglycerin  •  ondansetron **OR** ondansetron  •  sodium chloride  •  traMADol  •  zolpidem    Family history:  Family History   Problem Relation Age of Onset   • Alzheimer's disease Mother    • Pancreatic cancer Father    • Malig Hyperthermia Neg Hx        Social history:  Social History     Tobacco Use   • Smoking status:  "Former Smoker     Packs/day: 0.50     Years: 25.00     Pack years: 12.50     Types: Cigarettes     Start date:      Quit date: 2010     Years since quittin.0   • Smokeless tobacco: Never Used   Vaping Use   • Vaping Use: Never used   Substance Use Topics   • Alcohol use: Yes     Comment: 1/2 - 3/4 L per day of vodka   • Drug use: Yes     Types: Methamphetamines     Comment: \"once in a rare while\"       Review of systems:    Negative 12-system ROS except for the following:        Objective:  TMax 24 hours:   Temp (24hrs), Av.3 °F (36.8 °C), Min:97.8 °F (36.6 °C), Max:98.6 °F (37 °C)      Vitals Ranges:   Temp:  [97.8 °F (36.6 °C)-98.6 °F (37 °C)] 98.4 °F (36.9 °C)  Heart Rate:  [64-74] 64  Resp:  [16-19] 17  BP: (134-154)/(77-92) 154/92    Intake/Output Last 3 shifts:  I/O last 3 completed shifts:  In: 2581.7 [I.V.:2481.7; IV Piggyback:100]  Out: -      Physical Exam:       General Appearance:    Alert, cooperative, in no acute distress   Head:    Normocephalic, without obvious abnormality, atraumatic   Eyes:          PERRL, conjunctivae and corneas clear   Ears:    Normal external inspection   Throat:   No oral lesions, oral mucosa moist   Neck:   Supple, no LAD, trachea midline   Back:     No CVA tenderness   Lungs:     Respirations unlabored, symmetric excursion    Heart:    RRR, intact peripheral pulses   Abdomen:        :       CAMILA:  Extremities:   No edema, no deformity   Skin:   No bleeding, bruising or rashes   Neuro/Psych:   Orientation intact, mood/affect pleasant, no focal findings       Results review:   I reviewed the patient's new clinical results.    Data review:  Lab Results (last 24 hours)     Procedure Component Value Units Date/Time    Urinalysis, Microscopic Only - Urine, Random Void [[id number]]  (Abnormal) Collected: 22 1520    Specimen: Urine, Random Void Updated: 22 1533     RBC, UA 0-2 /HPF      WBC, UA Too Numerous to Count /HPF      Bacteria, UA None Seen /HPF      " Squamous Epithelial Cells, UA None Seen /HPF      Hyaline Casts, UA None Seen /LPF      Methodology Automated Microscopy    Urinalysis With Culture If Indicated - Urine, Random Void [[id number]]  (Abnormal) Collected: 01/08/22 1520    Specimen: Urine, Random Void Updated: 01/08/22 1533     Color, UA Yellow     Appearance, UA Clear     pH, UA 7.5     Specific Gravity, UA 1.007     Glucose, UA Negative     Ketones, UA Negative     Bilirubin, UA Negative     Blood, UA Negative     Protein, UA Negative     Leuk Esterase, UA Large (3+)     Nitrite, UA Negative     Urobilinogen, UA 1.0 E.U./dL    Urine Culture - Urine, Urine, Random Void [[id number]] Collected: 01/08/22 1520    Specimen: Urine, Random Void Updated: 01/08/22 1533           Imaging:  Imaging Results (Last 24 Hours)     Procedure Component Value Units Date/Time    CT Lumbar Spine Without Contrast [[id number]] Collected: 01/08/22 0913     Updated: 01/08/22 1028    Narrative:      CT SCAN OF THE THORACIC AND LUMBAR SPINE WITHOUT CONTRAST     HISTORY: Fell. Mid and low back pain.     The CT scans of the thoracic and lumbar spine were performed without  contrast. The following findings are present:  1. There is no evidence of acute compression fracture throughout the  thoracic or lumbar spine. The visualized ribs are also intact.  2. The vertebral height and disc spaces in the thoracic spine are  well-maintained. There is no central or foraminal encroachment  throughout the thoracic spine. The paraspinal soft tissues appear  normal.  3. The T12-L1 level is unremarkable. At L1-2 and L2-3 there is minimal  annular disc bulge at these levels and minimal facet hypertrophy without  central or foraminal encroachment. At L3-4 there is also minimal annular  disc bulge and facet hypertrophy without central or foraminal  encroachment.  4. At L4-5 there is prominent facet spurring with slight anterior  subluxation of L4 measuring 3 mm and mild annular disc bulge  combined  with thickening of the ligamentum flavum. This produces mild central  stenosis. There is no foraminal encroachment.  5. At L5-S1 there is a slight posterior disc bulge and moderate facet  spurring on the left. There is no central or foraminal encroachment.  6. There is a 5 x 10 mm stone at the right ureteropelvic junction  producing mild to moderate right renal obstruction.     This report was called directly to Puma Cervantes MD, at the time of  the dictation.                 Radiation dose reduction techniques were utilized, including automated  exposure control and exposure modulation based on body size.     This report was finalized on 1/8/2022 10:25 AM by Dr. Lencho Espinosa M.D.       CT Thoracic Spine Without Contrast [[id number]] Collected: 01/08/22 0913     Updated: 01/08/22 1028    Narrative:      CT SCAN OF THE THORACIC AND LUMBAR SPINE WITHOUT CONTRAST     HISTORY: Fell. Mid and low back pain.     The CT scans of the thoracic and lumbar spine were performed without  contrast. The following findings are present:  1. There is no evidence of acute compression fracture throughout the  thoracic or lumbar spine. The visualized ribs are also intact.  2. The vertebral height and disc spaces in the thoracic spine are  well-maintained. There is no central or foraminal encroachment  throughout the thoracic spine. The paraspinal soft tissues appear  normal.  3. The T12-L1 level is unremarkable. At L1-2 and L2-3 there is minimal  annular disc bulge at these levels and minimal facet hypertrophy without  central or foraminal encroachment. At L3-4 there is also minimal annular  disc bulge and facet hypertrophy without central or foraminal  encroachment.  4. At L4-5 there is prominent facet spurring with slight anterior  subluxation of L4 measuring 3 mm and mild annular disc bulge combined  with thickening of the ligamentum flavum. This produces mild central  stenosis. There is no foraminal encroachment.  5.  At L5-S1 there is a slight posterior disc bulge and moderate facet  spurring on the left. There is no central or foraminal encroachment.  6. There is a 5 x 10 mm stone at the right ureteropelvic junction  producing mild to moderate right renal obstruction.     This report was called directly to Puma Cervantes MD, at the time of  the dictation.                 Radiation dose reduction techniques were utilized, including automated  exposure control and exposure modulation based on body size.     This report was finalized on 1/8/2022 10:25 AM by Dr. Lencho Espinosa M.D.                Assessment:       Syncope and collapse    Alcoholism (HCC)    Mixed anxiety depressive disorder    Hyperlipidemia    Hypertension    Hypothyroidism    Chronic low back pain    Neuropathy involving both lower extremities    Left ventricular diastolic dysfunction    C5 cervical fracture (HCC)    Neck pain    Incidental finding of right upj stone.  WBC and renal function ok.    Plan:     We will set up for eswl this week.  Does not appear to need a stent urgently    Bob Patton Jr., MD  01/09/22  00:15 EST

## 2022-01-09 NOTE — PLAN OF CARE
Goal Outcome Evaluation:   Patient alert follows commands ciwa protocol ativan x1 prn prn pain meds given. No nausea noted. Iv fluids infusing no acute distress noted will continue to monitor

## 2022-01-10 ENCOUNTER — TELEPHONE (OUTPATIENT)
Dept: NEUROSURGERY | Facility: CLINIC | Age: 67
End: 2022-01-10

## 2022-01-10 ENCOUNTER — TRANSITIONAL CARE MANAGEMENT TELEPHONE ENCOUNTER (OUTPATIENT)
Dept: CALL CENTER | Facility: HOSPITAL | Age: 67
End: 2022-01-10

## 2022-01-10 NOTE — CASE MANAGEMENT/SOCIAL WORK
Case Management Discharge Note      Final Note: Pt discharged home on 1/9.   BEBE De La Torre RN         Selected Continued Care - Discharged on 1/9/2022 Admission date: 1/7/2022 - Discharge disposition: Home or Self Care    Destination    No services have been selected for the patient.              Durable Medical Equipment    No services have been selected for the patient.              Dialysis/Infusion    No services have been selected for the patient.              Home Medical Care    No services have been selected for the patient.              Therapy    No services have been selected for the patient.              Community Resources    No services have been selected for the patient.              Community & DME    No services have been selected for the patient.                  Transportation Services  Private: Car    Final Discharge Disposition Code: 01 - home or self-care

## 2022-01-10 NOTE — OUTREACH NOTE
Call Center TCM Note      Responses   [facility] patient discharged from? [place]   Does the patient have one of the following disease processes/diagnoses(primary or secondary)? Other   TCM attempt successful? No   Unsuccessful attempts Attempt 2          Heather Ochoa RN    1/10/2022, 13:57 EST

## 2022-01-10 NOTE — TELEPHONE ENCOUNTER
LVM for patient regarding cancellation of appointment on 1/13/22, per Neena the needs of the patient were addressed in the hospital.

## 2022-01-10 NOTE — OUTREACH NOTE
Call Center TCM Note      Responses   [facility] patient discharged from? [place]   Does the patient have one of the following disease processes/diagnoses(primary or secondary)? Other   TCM attempt successful? No   Unsuccessful attempts Attempt 1          Heather Ochoa RN    1/10/2022, 11:03 EST

## 2022-01-10 NOTE — TELEPHONE ENCOUNTER
----- Message from BERONICA Stevenson sent at 1/8/2022  3:46 PM EST -----  Regarding: please schedule  Please cancel appt on 1/13 since we addressed his needs while in hospital.

## 2022-01-11 ENCOUNTER — TRANSITIONAL CARE MANAGEMENT TELEPHONE ENCOUNTER (OUTPATIENT)
Dept: CALL CENTER | Facility: HOSPITAL | Age: 67
End: 2022-01-11

## 2022-01-11 LAB — BACTERIA SPEC AEROBE CULT: ABNORMAL

## 2022-01-11 NOTE — OUTREACH NOTE
Call Center TCM Note      Responses   [facility] patient discharged from? [place]   Does the patient have one of the following disease processes/diagnoses(primary or secondary)? Other   TCM attempt successful? No   Unsuccessful attempts Attempt 3          Tami Arrington RN    1/11/2022, 09:48 EST       You can access the FollowMyHealth Patient Portal offered by [facility] by registering at the following website: [email or web address]. By joining [facility]’s FollowMyHealth portal, you will also be able to view your health information using other applications (apps) compatible with our system.

## 2022-01-13 NOTE — TELEPHONE ENCOUNTER
Per DIEGO, pt was seen by Neena Verdin while in the hospital and he should follow up with her.  Pt informed and voiced understanding.

## 2022-01-16 NOTE — PROGRESS NOTES
Physical Therapy Daily Treatment Note      Patient: Pro Mueller   : 1955  Referring practitioner: Yohan Mcnulty MD  Date of Initial Visit: Type: THERAPY  Noted: 2021  Today's Date: 21  Patient seen for 5 sessions         Pro Mueller reports: stretches seem to help.  I still have some pain in my back but I get relief after I leave therapy from heat and electric machine.     Subjective     Objective   See Exercise, Manual, and Modality Logs for complete treatment.   Therapeutic exercise performed consisting of:  SKTC, DKTC, LTR, hamstring and piriformis stretch supine; abd bracing with alt LE marches;  3 way seated ball roll outs; Nustep x 8min for mobilization, endurance/strengthening)    Modality application x 15 min post exercise consisting of Moist heat to full back and IFC estim application to Lumbosacral spine in hooklying.    Assessment/Plan  Compliant/Cooperative with rehab efforts.  Subjectively reports no increased symptoms or discomfort with therapeutic exercise today.  Able to perform increased exercise/functional activity without increased LBP.  Benefits from verbal/tactile cues to ensure proper exercise technique and performance.  Positive response to modality application reported.           Progress strengthening /stabilization /functional activity           Timed:  Manual Therapy:         mins  32011;  Therapeutic Exercise:    26     mins  77154;     Neuromuscular Ryan:        mins  71344;    Therapeutic Activity:     10     mins  31351;     Gait Training:           mins  35476;     Ultrasound:          mins  87978;      Untimed:  Electrical Stimulation:  15       mins  50149 ( );  Mechanical Traction:         mins  83124;     Timed Treatment:   36   mins   Total Treatment:     51   mins  Andres Castillo PTA  Physical Therapist  Assistant  [id number]

## 2022-01-18 ENCOUNTER — OFFICE VISIT (OUTPATIENT)
Dept: FAMILY MEDICINE CLINIC | Facility: CLINIC | Age: 67
End: 2022-01-18

## 2022-01-18 VITALS
DIASTOLIC BLOOD PRESSURE: 88 MMHG | OXYGEN SATURATION: 97 % | BODY MASS INDEX: 25.86 KG/M2 | HEART RATE: 75 BPM | SYSTOLIC BLOOD PRESSURE: 145 MMHG | HEIGHT: 72 IN | WEIGHT: 190.9 LBS | TEMPERATURE: 95.1 F

## 2022-01-18 DIAGNOSIS — Z09 HOSPITAL DISCHARGE FOLLOW-UP: Primary | ICD-10-CM

## 2022-01-18 DIAGNOSIS — Z48.02 ENCOUNTER FOR STAPLE REMOVAL: ICD-10-CM

## 2022-01-18 DIAGNOSIS — R55 SYNCOPE AND COLLAPSE: ICD-10-CM

## 2022-01-18 PROCEDURE — 99495 TRANSJ CARE MGMT MOD F2F 14D: CPT | Performed by: NURSE PRACTITIONER

## 2022-01-18 NOTE — PROGRESS NOTES
"Chief Complaint  Suture / Staple Removal (staple removal back of head)    Subjective          Pro Mueller presents to [facility] PRIMARY [facility]ARE  History of Present Illness new pt to me.  Here for hospital follow up and staple removal posterior scalp. D/C on 1/9/22.   Pt had syncope and collapse and went to ER.  Was admitted for w/u.  Head CT was normal.  Pt currently has Zio patch and a follow up scheduled with cardiology.      Has 15 staples in head that need removal today.  No syncope since hospital stay.  No HA, dizziness, chest pain, palpitations.     Pt was to have OP cysto with stent but reports surgery cancelled by anesthesiology due to pending work-up for syncope.    Objective   Vital Signs:   /88   Pulse 75   Temp 95.1 °F (35.1 °C)   Ht 182.9 cm (72\")   Wt 86.6 kg (190 lb 14.4 oz)   SpO2 97%   BMI 25.89 kg/m²     Physical Exam  Vitals and nursing note reviewed.   Constitutional:       General: He is not in acute distress.     Appearance: He is well-developed. He is not ill-appearing or diaphoretic.   HENT:      Head: Normocephalic and atraumatic.   Eyes:      General:         Right eye: No discharge.         Left eye: No discharge.      Conjunctiva/sclera: Conjunctivae normal.   Cardiovascular:      Rate and Rhythm: Normal rate and regular rhythm.      Heart sounds: Normal heart sounds.   Pulmonary:      Effort: Pulmonary effort is normal.      Breath sounds: Normal breath sounds.   Abdominal:      General: Bowel sounds are normal.      Palpations: Abdomen is soft.      Tenderness: There is no abdominal tenderness.   Musculoskeletal:         General: No deformity.      Comments: Gait smooth and steady   Skin:     General: Skin is warm and dry.      Comments: Staples intact posterior scalp, wound well approx, without s/s complications   Neurological:      Mental Status: He is alert and oriented to person, place, and time.   Psychiatric:         Mood and Affect: Mood normal.    "      Behavior: Behavior normal.        Result Review :                 Assessment and Plan    Diagnoses and all orders for this visit:    1. Hospital discharge follow-up (Primary)    2. Syncope and collapse    3. Encounter for staple removal    Hospital stay, cardiology consult, labs, neurosurgery consult, imaging reviewed.  He had a normal CT of his head.  He had CT of cervical, thoracic, lumbar spine while there due to spinal pain.  He had no evidence of any acute abnormalities but he did have a previous C5 fracture that was healed.    He was seen by urology with stent planned for right UPJ stone however this was canceled by anesthesiologist and pending cardiology evaluation.    Patient has Zio patch and upcoming appointment with cardiology for evaluation since no etiology of syncope and clots were found.    It is noted in his chart that he has a history previously of syncope related to alcohol abuse.  He did have a blood alcohol level on admission.  Troponin was negative.  His CMP showed normal liver and renal function, electrolytes.  CBC was grossly normal.  He did have a UA showing large amount of leukocytes and white blood cells.  Which was positive for Enterococcus faecalis-I do not see any antibiotics given and there was nothing mentioned in urology note related to possible UTI.  Has urology follow-up.    Medications reconciled.  Patient has good understanding of treatment plan and follow-up as needed.  We will need to see PCP in about a month and we will get him scheduled.    Staples removed from posterior scalp by medical assistant, #15, without complication.  Patient tolerated well.  Patient instructed on wound care and signs and symptoms that require emergent or follow-up evaluation.      Follow Up   Return if symptoms worsen or fail to improve.  Patient was given instructions and counseling regarding his condition or for health maintenance advice. Please see specific information pulled into the AVS if  appropriate.

## 2022-01-20 ENCOUNTER — TELEPHONE (OUTPATIENT)
Dept: CARDIOLOGY | Facility: CLINIC | Age: 67
End: 2022-01-20

## 2022-01-20 NOTE — TELEPHONE ENCOUNTER
Elena with Dr. Nassar's office called to ask for cardiac clearance.  Patient has an appt with us on 2/10/22 and surgery is scheduled for 2/11/22.  I called her back and left a message to ask what type of surgery patient is having and the extent of the surgery so that we will know how to advise patient.  Patient is not on any blood thinners, ASA or Plavix.     Will await her call. / BRITTNEY

## 2022-01-24 NOTE — TELEPHONE ENCOUNTER
Completed form.  He had a recent echocardiogram which was unremarkable.  He is cleared to proceed as scheduled        BERONICA Clark  [facility]   [address]  [place] [address]  Ph: [phone]  Fax: [phone]

## 2022-01-24 NOTE — TELEPHONE ENCOUNTER
Received faxed cardiac clearance request form from Dr. Renae's office.  Placing in your inbox for review./ BRITTNEY

## 2022-01-24 NOTE — TELEPHONE ENCOUNTER
Faxed completed form along with copy of this telephone thread to Dr. Zhang' office.  Fax# [phone].  Could not get fax to go through to [phone].  Faxed confirmation to [phone] received. / BRITTNEY

## 2022-01-25 DIAGNOSIS — M12.9 ARTHRITIS/ARTHROPATHY OF MULTIPLE JOINTS: ICD-10-CM

## 2022-01-26 NOTE — TELEPHONE ENCOUNTER
Rx Refill Note  Requested Prescriptions     Pending Prescriptions Disp Refills   • traMADol (ULTRAM) 50 MG tablet [Pharmacy Med Name: TRAMADOL 50MG TABLETS] 80 tablet      Sig: TAKE 1 TABLET BY MOUTH EVERY 6 HOURS AS NEEDED FOR MODERATE PAIN      Last office visit with prescribing clinician: 10/5/2021      Next office visit with prescribing clinician: 2/17/2022            Henrietta Coronado MA  01/26/22, 08:18 EST

## 2022-01-27 RX ORDER — TRAMADOL HYDROCHLORIDE 50 MG/1
TABLET ORAL
Qty: 80 TABLET | Refills: 0 | Status: SHIPPED | OUTPATIENT
Start: 2022-01-27 | End: 2022-02-24

## 2022-01-30 LAB
MAXIMAL PREDICTED HEART RATE: 155 BPM
STRESS TARGET HR: 132 BPM

## 2022-01-30 PROCEDURE — 93248 EXT ECG>7D<15D REV&INTERPJ: CPT | Performed by: INTERNAL MEDICINE

## 2022-01-31 NOTE — PROGRESS NOTES
I think I may have received this when I was covering you last week when you are out.  Looks like you have been following this patient did have an upcoming appointment with her.

## 2022-02-06 NOTE — PROGRESS NOTES
Physical Therapy Daily Treatment Note      Patient: Pro Mueller   : 1955  Referring practitioner: oYhan Mcnulty MD  Date of Initial Visit: Type: THERAPY  Noted: 2021  Today's Date: 21  Patient seen for 6 sessions         Pro Mueller reports: low back is still stiff/  Note discomfort takes a little longer to grab hold before I have to sit/stop/change position        Subjective     Objective   See Exercise, Manual, and Modality Logs for complete treatment.     Therapeutic exercises performed consisting of NuStep x 8 min for ROM/mobilization, endurance/strengthening, SKTC, DKTC 5 x 10 sec each; LTR x 10 each direction, stretches consisting of HS and piriformis 4 each/each side x 20 sec; abd bracing with alt LE marches and bridge with ppt 2 x 10 each; seated blue exercise ball roll outs 3 way x 5 each 10 sec hold.    Modality application x 15 min post exercise consisting of moist heat to full back and IFC electrical stimulation to L/S spine in hooklying.    Assessment/Plan  Progressing with exercise regimen with noted improved stretching and core stabilization capability.  Subjectively, he reports improvement of pain with modality application and notices an increase duration of activity before having to change positions.        Progress per Plan of Care toward all goals.  Increase exercise as appropriate/able.           Timed:  Manual Therapy:         mins  33684;  Therapeutic Exercise:    26    mins  00932;     Neuromuscular Ryan:        mins  27165;    Therapeutic Activity:    8      mins  56328;     Gait Training:           mins  17402;     Ultrasound:          mins  11649;      Untimed:  Electrical Stimulation:   15      mins  37030 ( );  Mechanical Traction:         mins  37831;     Timed Treatment:  34  mins   Total Treatment:     49   mins  Andres Castillo PTA  Physical Therapist  Assistant  [id number]

## 2022-02-08 ENCOUNTER — TELEPHONE (OUTPATIENT)
Dept: FAMILY MEDICINE CLINIC | Facility: CLINIC | Age: 67
End: 2022-02-08

## 2022-02-09 ENCOUNTER — TELEPHONE (OUTPATIENT)
Dept: FAMILY MEDICINE CLINIC | Facility: CLINIC | Age: 67
End: 2022-02-09

## 2022-02-09 NOTE — TELEPHONE ENCOUNTER
Caller: Pro Mueller    Relationship: Self    Best call back number:[phone]    What medication are you requesting: PAIN RELIEVER    What are your current symptoms: KIDNEY STONE    How long have you been experiencing symptoms:     Have you had these symptoms before:    [x] Yes  [] No    Have you been treated for these symptoms before:   [x] Yes  [] No    If a prescription is needed, what is your preferred pharmacy and phone number: [email or web address][email or web address] [facility] [address] AT [facility] - [phone] [facility] [address] [phone] FX     Additional notes:PATIENT STATES HE IS HAVING SURGERY ON Friday FOR THIS BUT THE PAIN IS INTENSE AND HE NEEDS SOME RELIEF. PLEASE CALL AND ADVISE

## 2022-02-10 ENCOUNTER — OFFICE VISIT (OUTPATIENT)
Dept: CARDIOLOGY | Facility: CLINIC | Age: 67
End: 2022-02-10

## 2022-02-10 ENCOUNTER — HOSPITAL ENCOUNTER (EMERGENCY)
Facility: HOSPITAL | Age: 67
Discharge: HOME | End: 2022-02-10
Attending: EMERGENCY MEDICINE | Admitting: EMERGENCY MEDICINE

## 2022-02-10 ENCOUNTER — APPOINTMENT (OUTPATIENT)
Dept: CT IMAGING | Facility: HOSPITAL | Age: 67
End: 2022-02-10

## 2022-02-10 VITALS
DIASTOLIC BLOOD PRESSURE: 85 MMHG | BODY MASS INDEX: 24.65 KG/M2 | SYSTOLIC BLOOD PRESSURE: 146 MMHG | HEART RATE: 67 BPM | TEMPERATURE: 98.4 F | RESPIRATION RATE: 16 BRPM | OXYGEN SATURATION: 98 % | HEIGHT: 72 IN | WEIGHT: 182 LBS

## 2022-02-10 VITALS
HEIGHT: 72 IN | WEIGHT: 183 LBS | DIASTOLIC BLOOD PRESSURE: 78 MMHG | SYSTOLIC BLOOD PRESSURE: 128 MMHG | BODY MASS INDEX: 24.79 KG/M2 | HEART RATE: 88 BPM

## 2022-02-10 DIAGNOSIS — N13.30 HYDRONEPHROSIS OF RIGHT KIDNEY: ICD-10-CM

## 2022-02-10 DIAGNOSIS — R55 SYNCOPE AND COLLAPSE: Primary | ICD-10-CM

## 2022-02-10 DIAGNOSIS — N23 RENAL COLIC ON RIGHT SIDE: Primary | ICD-10-CM

## 2022-02-10 DIAGNOSIS — I77.810 ASCENDING AORTA DILATION: ICD-10-CM

## 2022-02-10 LAB
ALBUMIN SERPL-MCNC: 5.2 G/DL (ref 3.5–5.2)
ALBUMIN/GLOB SERPL: 1.8 G/DL
ALP SERPL-CCNC: 98 U/L (ref 39–117)
ALT SERPL W P-5'-P-CCNC: 19 U/L (ref 1–41)
ANION GAP SERPL CALCULATED.3IONS-SCNC: 22 MMOL/L (ref 5–15)
AST SERPL-CCNC: 42 U/L (ref 1–40)
BASOPHILS # BLD AUTO: 0.06 10*3/MM3 (ref 0–0.2)
BASOPHILS NFR BLD AUTO: 0.8 % (ref 0–1.5)
BILIRUB SERPL-MCNC: 0.7 MG/DL (ref 0–1.2)
BUN SERPL-MCNC: 12 MG/DL (ref 8–23)
BUN/CREAT SERPL: 11.8 (ref 7–25)
CALCIUM SPEC-SCNC: 9.9 MG/DL (ref 8.6–10.5)
CHLORIDE SERPL-SCNC: 98 MMOL/L (ref 98–107)
CO2 SERPL-SCNC: 19 MMOL/L (ref 22–29)
CREAT SERPL-MCNC: 1.02 MG/DL (ref 0.76–1.27)
DEPRECATED RDW RBC AUTO: 42.3 FL (ref 37–54)
EOSINOPHIL # BLD AUTO: 0.14 10*3/MM3 (ref 0–0.4)
EOSINOPHIL NFR BLD AUTO: 1.8 % (ref 0.3–6.2)
ERYTHROCYTE [DISTWIDTH] IN BLOOD BY AUTOMATED COUNT: 13.1 % (ref 12.3–15.4)
ETHANOL BLD-MCNC: 13 MG/DL (ref 0–10)
ETHANOL UR QL: 0.01 %
GFR SERPL CREATININE-BSD FRML MDRD: 73 ML/MIN/1.73
GLOBULIN UR ELPH-MCNC: 2.9 GM/DL
GLUCOSE SERPL-MCNC: 79 MG/DL (ref 65–99)
HCT VFR BLD AUTO: 42.9 % (ref 37.5–51)
HGB BLD-MCNC: 14.8 G/DL (ref 13–17.7)
IMM GRANULOCYTES # BLD AUTO: 0.02 10*3/MM3 (ref 0–0.05)
IMM GRANULOCYTES NFR BLD AUTO: 0.3 % (ref 0–0.5)
LIPASE SERPL-CCNC: 43 U/L (ref 13–60)
LYMPHOCYTES # BLD AUTO: 3.26 10*3/MM3 (ref 0.7–3.1)
LYMPHOCYTES NFR BLD AUTO: 42 % (ref 19.6–45.3)
MCH RBC QN AUTO: 30.9 PG (ref 26.6–33)
MCHC RBC AUTO-ENTMCNC: 34.5 G/DL (ref 31.5–35.7)
MCV RBC AUTO: 89.6 FL (ref 79–97)
MONOCYTES # BLD AUTO: 0.74 10*3/MM3 (ref 0.1–0.9)
MONOCYTES NFR BLD AUTO: 9.5 % (ref 5–12)
NEUTROPHILS NFR BLD AUTO: 3.54 10*3/MM3 (ref 1.7–7)
NEUTROPHILS NFR BLD AUTO: 45.6 % (ref 42.7–76)
NRBC BLD AUTO-RTO: 0 /100 WBC (ref 0–0.2)
PLATELET # BLD AUTO: 379 10*3/MM3 (ref 140–450)
PMV BLD AUTO: 9.2 FL (ref 6–12)
POTASSIUM SERPL-SCNC: 3.3 MMOL/L (ref 3.5–5.2)
PROT SERPL-MCNC: 8.1 G/DL (ref 6–8.5)
RBC # BLD AUTO: 4.79 10*6/MM3 (ref 4.14–5.8)
SODIUM SERPL-SCNC: 139 MMOL/L (ref 136–145)
WBC NRBC COR # BLD: 7.76 10*3/MM3 (ref 3.4–10.8)

## 2022-02-10 PROCEDURE — 99214 OFFICE O/P EST MOD 30 MIN: CPT | Performed by: NURSE PRACTITIONER

## 2022-02-10 PROCEDURE — 25010000002 KETOROLAC TROMETHAMINE PER 15 MG: Performed by: EMERGENCY MEDICINE

## 2022-02-10 PROCEDURE — 25010000002 IOPAMIDOL 61 % SOLUTION: Performed by: EMERGENCY MEDICINE

## 2022-02-10 PROCEDURE — 74177 CT ABD & PELVIS W/CONTRAST: CPT

## 2022-02-10 PROCEDURE — 25010000002 MORPHINE PER 10 MG: Performed by: EMERGENCY MEDICINE

## 2022-02-10 PROCEDURE — 25010000002 ONDANSETRON PER 1 MG: Performed by: EMERGENCY MEDICINE

## 2022-02-10 PROCEDURE — 99284 EMERGENCY DEPT VISIT MOD MDM: CPT

## 2022-02-10 PROCEDURE — 80053 COMPREHEN METABOLIC PANEL: CPT | Performed by: EMERGENCY MEDICINE

## 2022-02-10 PROCEDURE — 83690 ASSAY OF LIPASE: CPT | Performed by: EMERGENCY MEDICINE

## 2022-02-10 PROCEDURE — 93000 ELECTROCARDIOGRAM COMPLETE: CPT | Performed by: NURSE PRACTITIONER

## 2022-02-10 PROCEDURE — 85025 COMPLETE CBC W/AUTO DIFF WBC: CPT | Performed by: EMERGENCY MEDICINE

## 2022-02-10 PROCEDURE — 96376 TX/PRO/DX INJ SAME DRUG ADON: CPT

## 2022-02-10 PROCEDURE — 82077 ASSAY SPEC XCP UR&BREATH IA: CPT | Performed by: EMERGENCY MEDICINE

## 2022-02-10 PROCEDURE — 96374 THER/PROPH/DIAG INJ IV PUSH: CPT

## 2022-02-10 PROCEDURE — 96375 TX/PRO/DX INJ NEW DRUG ADDON: CPT

## 2022-02-10 RX ORDER — MORPHINE SULFATE 2 MG/ML
4 INJECTION, SOLUTION INTRAMUSCULAR; INTRAVENOUS ONCE
Status: COMPLETED | OUTPATIENT
Start: 2022-02-10 | End: 2022-02-10

## 2022-02-10 RX ORDER — ONDANSETRON 2 MG/ML
4 INJECTION INTRAMUSCULAR; INTRAVENOUS ONCE
Status: COMPLETED | OUTPATIENT
Start: 2022-02-10 | End: 2022-02-10

## 2022-02-10 RX ORDER — HYDROCODONE BITARTRATE AND ACETAMINOPHEN 5; 325 MG/1; MG/1
1 TABLET ORAL EVERY 6 HOURS PRN
Qty: 4 TABLET | Refills: 0 | Status: SHIPPED | OUTPATIENT
Start: 2022-02-10 | End: 2022-02-17 | Stop reason: SDUPTHER

## 2022-02-10 RX ORDER — MORPHINE SULFATE 2 MG/ML
2 INJECTION, SOLUTION INTRAMUSCULAR; INTRAVENOUS ONCE
Status: COMPLETED | OUTPATIENT
Start: 2022-02-10 | End: 2022-02-10

## 2022-02-10 RX ORDER — ONDANSETRON 4 MG/1
4 TABLET, ORALLY DISINTEGRATING ORAL EVERY 8 HOURS PRN
Qty: 10 TABLET | Refills: 0 | Status: SHIPPED | OUTPATIENT
Start: 2022-02-10 | End: 2022-02-13

## 2022-02-10 RX ORDER — HYDROCODONE BITARTRATE AND ACETAMINOPHEN 5; 325 MG/1; MG/1
1 TABLET ORAL ONCE
Status: COMPLETED | OUTPATIENT
Start: 2022-02-10 | End: 2022-02-10

## 2022-02-10 RX ORDER — KETOROLAC TROMETHAMINE 15 MG/ML
15 INJECTION, SOLUTION INTRAMUSCULAR; INTRAVENOUS ONCE
Status: COMPLETED | OUTPATIENT
Start: 2022-02-10 | End: 2022-02-10

## 2022-02-10 RX ADMIN — MORPHINE SULFATE 4 MG: 2 INJECTION, SOLUTION INTRAMUSCULAR; INTRAVENOUS at 13:43

## 2022-02-10 RX ADMIN — HYDROCODONE BITARTRATE AND ACETAMINOPHEN 1 TABLET: 5; 325 TABLET ORAL at 14:52

## 2022-02-10 RX ADMIN — IOPAMIDOL 85 ML: 612 INJECTION, SOLUTION INTRAVENOUS at 13:08

## 2022-02-10 RX ADMIN — LIDOCAINE HYDROCHLORIDE 125 MG: 10 INJECTION, SOLUTION INFILTRATION; PERINEURAL at 13:50

## 2022-02-10 RX ADMIN — SODIUM CHLORIDE 1000 ML: 9 INJECTION, SOLUTION INTRAVENOUS at 12:14

## 2022-02-10 RX ADMIN — MORPHINE SULFATE 4 MG: 2 INJECTION, SOLUTION INTRAMUSCULAR; INTRAVENOUS at 12:21

## 2022-02-10 RX ADMIN — ONDANSETRON 4 MG: 2 INJECTION INTRAMUSCULAR; INTRAVENOUS at 12:20

## 2022-02-10 RX ADMIN — MORPHINE SULFATE 2 MG: 2 INJECTION, SOLUTION INTRAMUSCULAR; INTRAVENOUS at 15:33

## 2022-02-10 RX ADMIN — KETOROLAC TROMETHAMINE 15 MG: 15 INJECTION, SOLUTION INTRAMUSCULAR; INTRAVENOUS at 14:23

## 2022-02-10 NOTE — ED PROVIDER NOTES
EMERGENCY DEPARTMENT ENCOUNTER    Room Number:  31/31  Date of encounter:  2/10/2022  PCP: Alla Beal MD  Historian: Patient      HPI:  Chief Complaint: Abdominal pain  A complete HPI/ROS/PMH/PSH/SH/FH are unobtainable due to: None    Context: Pro Mueller is a 67 y.o. male who presents to the ED via private vehicle for evaluation for increasing periumbilical abdominal pain with radiation to low back since last night.  Patient states that he was diagnosed with a kidney stone last week.  Symptoms significantly increased yesterday.  Denies any fevers, chills, cough, nausea, vomiting, diarrhea, reports normal bowel movements without black or bloody stools.  Denies any dysuria, hematuria, urinary urgency or frequency.  Patient states that this feels similar to prior kidney stone.      MEDICAL RECORD REVIEW    I do not see any emergency department visits for kidney stone within the last week or 2.  I do see a discharge summary from January 9, 2022 with a urology consult on that same day due to an incidental right UPJ stone.  Plan was to set him up for an ESWL that week with no emergent plans for stent placement.  There was a scheduled procedure on January 14, 2022 for cystoscopy with stent insertion and right ESWL that was called off due to the patient being in a Holter monitor and needing cardiology clearance prior to the procedure.  Notes in epic show that there is plans for may be retrying this procedure sometime in the next week.    PAST MEDICAL HISTORY  Active Ambulatory Problems     Diagnosis Date Noted   • Alcoholism (HCC) 08/08/2016   • Atopic rhinitis 08/08/2016   • Mixed anxiety depressive disorder 08/08/2016   • Genital herpes simplex 08/08/2016   • Hyperlipidemia 08/08/2016   • Hypertension 08/08/2016   • Hypothyroidism 08/08/2016   • Insomnia 08/08/2016   • Panic disorder without agoraphobia 08/08/2016   • Persistent insomnia 08/08/2016   • Vitamin D deficiency 08/08/2016   • Chronic low back pain  11/11/2016   • Neuropathy involving both lower extremities 10/25/2018   • QT prolongation 01/03/2019   • Left shoulder pain 11/19/2019   • Pancytopenia ([facility]) 01/14/2020   • Left ventricular diastolic dysfunction 01/16/2021   • Tremor 10/06/2021   • C5 cervical fracture ([facility]) 11/09/2021   • Syncope and collapse 01/07/2022   • Neck pain 01/07/2022     Resolved Ambulatory Problems     Diagnosis Date Noted   • Accidental fall 08/08/2016   • Impacted cerumen 08/08/2016   • Influenza 08/08/2016   • Motion sickness 08/08/2016   • Seasonal allergic rhinitis 08/08/2016   • Upper respiratory tract infection 08/08/2016   • Alcohol withdrawal ([facility]) 11/04/2016   • Headache 11/05/2016   • Gastritis 11/05/2016   • Olecranon bursitis of right elbow 04/05/2017   • Sciatica of left side 06/12/2018   • Syncope and collapse 01/02/2019   • Nausea and vomiting 01/11/2020   • Alcoholic ketoacidosis 01/12/2020   • Hyponatremia 01/13/2020   • Hypokalemia 01/13/2020   • Alcohol dependence with uncomplicated withdrawal ([facility]) 01/14/2020   • Nephrolithiasis 02/12/2020   • Hypomagnesemia 01/16/2021   • Non-traumatic rhabdomyolysis 01/18/2021   • Urine retention 01/21/2021     Past Medical History:   Diagnosis Date   • Alcohol abuse    • Anxiety    • Arthritis    • Depression    • Disease of thyroid gland    • Elevated cholesterol    • Encounter for removal of sutures    • GERD (gastroesophageal reflux disease)    • Headache, tension-type    • Kidney stone    • Migraine    • Olecranon bursitis, right elbow    • Peripheral neuropathy    • Sleep apnea    • Withdrawal symptoms, alcohol ([facility])          PAST SURGICAL HISTORY  Past Surgical History:   Procedure Laterality Date   • BACK SURGERY      Pt denies.   • COLONOSCOPY     • CYST REMOVAL     • EXTRACORPOREAL SHOCK WAVE LITHOTRIPSY (ESWL) Right 2002   • SHOULDER ARTHROSCOPY Right 12/17/2019    Procedure: SHOULDER ARTHROSCOPY, decompression, distal clavicle excision;  Surgeon: Aj Mancilla MD;   "Location: [facility] OR [facility];  Service: Orthopedics   • TONSILLECTOMY           FAMILY HISTORY  Family History   Problem Relation Age of Onset   • Alzheimer's disease Mother    • Pancreatic cancer Father    • Malig Hyperthermia Neg Hx          SOCIAL HISTORY  Social History     Socioeconomic History   • Marital status:    Tobacco Use   • Smoking status: Former Smoker     Packs/day: 0.50     Years: 25.00     Pack years: 12.50     Types: Cigarettes     Start date:      Quit date:      Years since quittin.1   • Smokeless tobacco: Never Used   • Tobacco comment: caffeine - 3 cans of coke daily    Vaping Use   • Vaping Use: Never used   Substance and Sexual Activity   • Alcohol use: Yes     Comment: 1/2 - 3/4 L per day of vodka   • Drug use: Yes     Types: Methamphetamines     Comment: \"once in a rare while\"   • Sexual activity: Defer         ALLERGIES  Penicillins        REVIEW OF SYSTEMS  Review of Systems     All systems reviewed and negative except for those discussed in HPI.       PHYSICAL EXAM    I have reviewed the triage vital signs and nursing notes.    ED Triage Vitals   Temp Heart Rate Resp BP SpO2   02/10/22 1154 02/10/22 1154 02/10/22 1205 02/10/22 1206 02/10/22 1154   98.4 °F (36.9 °C) 80 25 154/94 99 %      Temp src Heart Rate Source Patient Position BP Location FiO2 (%)   -- -- 02/10/22 1206 02/10/22 1206 --     Sitting Right arm        Physical Exam  General: Appears uncomfortable, nontoxic, nondiaphoretic  HEENT: Mucous membranes moist, atraumatic, EOMI  Neck: Full ROM  Pulm: Symmetric chest rise, nonlabored, lungs CTAB  Cardiovascular: Regular rate and rhythm, intact distal pulses  GI: Soft, periumbilical tenderness to palpation, nondistended, no rebound, no guarding, bowel sounds present  MSK: Full ROM, no deformity  Skin: Warm, dry  Neuro: Awake, alert, oriented x 4, GCS 15, moving all extremities, no focal deficits  Psych: Calm, cooperative      N95, protective eye goggles, and " gloves used during this encounter. Patient in surgical mask.      LAB RESULTS  Recent Results (from the past 24 hour(s))   Comprehensive Metabolic Panel    Collection Time: 02/10/22 12:03 PM    Specimen: Blood   Result Value Ref Range    Glucose 79 65 - 99 mg/dL    BUN 12 8 - 23 mg/dL    Creatinine 1.02 0.76 - 1.27 mg/dL    Sodium 139 136 - 145 mmol/L    Potassium 3.3 (L) 3.5 - 5.2 mmol/L    Chloride 98 98 - 107 mmol/L    CO2 19.0 (L) 22.0 - 29.0 mmol/L    Calcium 9.9 8.6 - 10.5 mg/dL    Total Protein 8.1 6.0 - 8.5 g/dL    Albumin 5.20 3.50 - 5.20 g/dL    ALT (SGPT) 19 1 - 41 U/L    AST (SGOT) 42 (H) 1 - 40 U/L    Alkaline Phosphatase 98 39 - 117 U/L    Total Bilirubin 0.7 0.0 - 1.2 mg/dL    eGFR Non African Amer 73 >60 mL/min/1.73    Globulin 2.9 gm/dL    A/G Ratio 1.8 g/dL    BUN/Creatinine Ratio 11.8 7.0 - 25.0    Anion Gap 22.0 (H) 5.0 - 15.0 mmol/L   Lipase    Collection Time: 02/10/22 12:03 PM    Specimen: Blood   Result Value Ref Range    Lipase 43 13 - 60 U/L   CBC Auto Differential    Collection Time: 02/10/22 12:03 PM    Specimen: Blood   Result Value Ref Range    WBC 7.76 3.40 - 10.80 10*3/mm3    RBC 4.79 4.14 - 5.80 10*6/mm3    Hemoglobin 14.8 13.0 - 17.7 g/dL    Hematocrit 42.9 37.5 - 51.0 %    MCV 89.6 79.0 - 97.0 fL    MCH 30.9 26.6 - 33.0 pg    MCHC 34.5 31.5 - 35.7 g/dL    RDW 13.1 12.3 - 15.4 %    RDW-SD 42.3 37.0 - 54.0 fl    MPV 9.2 6.0 - 12.0 fL    Platelets 379 140 - 450 10*3/mm3    Neutrophil % 45.6 42.7 - 76.0 %    Lymphocyte % 42.0 19.6 - 45.3 %    Monocyte % 9.5 5.0 - 12.0 %    Eosinophil % 1.8 0.3 - 6.2 %    Basophil % 0.8 0.0 - 1.5 %    Immature Grans % 0.3 0.0 - 0.5 %    Neutrophils, Absolute 3.54 1.70 - 7.00 10*3/mm3    Lymphocytes, Absolute 3.26 (H) 0.70 - 3.10 10*3/mm3    Monocytes, Absolute 0.74 0.10 - 0.90 10*3/mm3    Eosinophils, Absolute 0.14 0.00 - 0.40 10*3/mm3    Basophils, Absolute 0.06 0.00 - 0.20 10*3/mm3    Immature Grans, Absolute 0.02 0.00 - 0.05 10*3/mm3    nRBC 0.0 0.0  - 0.2 /100 WBC   Ethanol    Collection Time: 02/10/22 12:03 PM    Specimen: Blood   Result Value Ref Range    Ethanol 13 (H) 0 - 10 mg/dL    Ethanol % 0.013 %       Ordered the above labs and independently reviewed the results.        RADIOLOGY  CT Abdomen Pelvis With Contrast    Result Date: 2/10/2022  CT OF THE ABDOMEN AND PELVIS WITH CONTRAST 02/10/2022  HISTORY: Periumbilical pain.  Axial images were obtained from the lung bases to the symphysis pubis after intravenous contrast. No oral contrast was given.  Multiple low-density gallstones are seen.  The liver, spleen, pancreas and adrenals appear unremarkable except for tiny right adrenal nodule most likely a small adrenal adenoma. It measures approximately 12 mm.  There is moderate right hydronephrosis with some stranding around the right renal pelvis. There is an approximately 13 mm in greatest dimension stone at the right ureteropelvic junction. An approximately 3.1 cm right renal cyst is seen. A few tiny left renal cysts are seen.  There is some aortoiliac calcification. There is colonic diverticulosis.  There is mild-to-moderate prostate gland enlargement. Urinary bladder is unremarkable.      1. Moderate obstructive uropathy on the right from an approximately 13 mm stone at the right ureteropelvic junction. 2. Cholelithiasis. 3. Right renal cyst. 4. Colonic diverticulosis. 5. Prostate gland enlargement.  Radiation dose reduction techniques were utilized, including automated exposure control and exposure modulation based on body size.  This report was finalized on 2/10/2022 1:27 PM by Dr. Willie Alegre M.D.        I ordered the above noted radiological studies. Reviewed by me.  See dictation for official radiology interpretation.      PROCEDURES    Procedures      MEDICATIONS GIVEN IN ER    Medications   morphine injection 4 mg (4 mg Intravenous Given 2/10/22 1221)   ondansetron (ZOFRAN) injection 4 mg (4 mg Intravenous Given 2/10/22 1220)   sodium  chloride 0.9 % bolus 1,000 mL (0 mL Intravenous Stopped 2/10/22 1345)   iopamidol (ISOVUE-300) 61 % injection 100 mL (85 mL Intravenous Given 2/10/22 1308)   morphine injection 4 mg (4 mg Intravenous Given 2/10/22 1343)   lidocaine (XYLOCAINE) 1 % 125 mg in sodium chloride 0.9 % 250 mL IVPB (125 mg Intravenous Given 2/10/22 1350)   ketorolac (TORADOL) injection 15 mg (15 mg Intravenous Given 2/10/22 1423)   HYDROcodone-acetaminophen (NORCO) 5-325 MG per tablet 1 tablet (1 tablet Oral Given 2/10/22 1452)   morphine injection 2 mg (2 mg Intravenous Given 2/10/22 1533)         PROGRESS, DATA ANALYSIS, CONSULTS, AND MEDICAL DECISION MAKING    All labs have been independently reviewed by me.  All radiology studies have been reviewed by me and discussed with radiologist dictating the report.   EKG's independently viewed and interpreted by me.  Discussion below represents my analysis of pertinent findings related to patient's condition, differential diagnosis, treatment plan and final disposition.    Initial concern for kidney stone, pancreatitis, gastritis, colitis, bowel obstruction, renal failure, electrolyte abnormalities, among others.    ED Course as of 02/10/22 1707   Thu Feb 10, 2022   1254 WBC: 7.76 [DC]   1254 Hemoglobin: 14.8 [DC]   1254 Glucose: 79 [DC]   1254 BUN: 12 [DC]   1254 Creatinine: 1.02 [DC]   1254 Potassium(!): 3.3 [DC]   1254 CO2(!): 19.0 [DC]   1254 Anion Gap(!): 22.0 [DC]   1254 Lipase: 43 [DC]   1400 I spoke with Dr. Painting, urology, he states that best case scenario would be we can get his pain under better control and discharged with continuance of the outpatient procedure scheduled for tomorrow, he is okay with IV Toradol. [DC]      ED Course User Index  [DC] Laureano Mccoy MD     Patient is feeling improved on reevaluation, he is comfortable with plan for discharge on a short course of pain medications, he does understand the need not to take tramadol with the hydrocodone.  He is to choose 1  or the other.  Follow-up with prescheduled appointment for tomorrow.  ED return for worsening symptoms as needed.    AS OF 17:07 EST VITALS:    BP - 146/85  HR - 67  TEMP - 98.4 °F (36.9 °C)  02 SATS - 98%        DIAGNOSIS  Final diagnoses:   Renal colic on right side   Hydronephrosis of right kidney         DISPOSITION  DISCHARGE    Patient discharged in stable condition.    Reviewed implications of results, diagnosis, meds, responsibility to follow up, warning signs and symptoms of possible worsening, potential complications and reasons to return to ER.    Patient/Family voiced understanding of above instructions.    Discussed plan for discharge, as there is no emergent indication for admission. Patient referred to primary care provider for BP management due to today's BP. Pt/family is agreeable and understands need for follow up and repeat testing.  Pt is aware that discharge does not mean that nothing is wrong but it indicates no emergency is present that requires admission and they must continue care with follow-up as given below or physician of their choice.     FOLLOW-UP  [facility] Emergency Department  [address] [facility] [address] [phone]    As needed, If symptoms worsen    [facility]  [address] [facility] [address]  [phone]  Go on 2/11/2022  as scheduled         Medication List      New Prescriptions    HYDROcodone-acetaminophen 5-325 MG per tablet  Commonly known as: NORCO  Take 1 tablet by mouth Every 6 (Six) Hours As Needed for Severe Pain . Do not drive or mix with alcohol     ondansetron ODT 4 MG disintegrating tablet  Commonly known as: ZOFRAN-ODT  Place 1 tablet on the tongue Every 8 (Eight) Hours As Needed for Nausea or Vomiting for up to 3 days.           Where to Get Your Medications      These medications were sent to [facility][id number] - [place], [place] - [address] AT [facility]  [phone] [facility] [phone] FX  [address], [place] [address]    Phone: [phone]   · HYDROcodone-acetaminophen 5-325 MG per tablet  · ondansetron ODT 4 MG disintegrating tablet                    Laureano Mccoy MD  02/10/22 2000

## 2022-02-10 NOTE — ED NOTES
Umbilical abd pain and back pain since last night. Pt states that he has diagnosed kidney stones last week.      Patient was placed in face mask during triage process. Patient was wearing facemask when I entered the room and throughout our encounter. I wore full protective equipment throughout this patient encounter including a face mask, eye protection, and gloves. Hand hygiene was performed before donning protective equipment and again following doffing of PPE after leaving the room.        Raine Scott, RN  02/10/22 7946

## 2022-02-10 NOTE — DISCHARGE INSTRUCTIONS
Follow-up with urology as scheduled for your previously scheduled appointment tomorrow, take medications as prescribed, do not take tramadol and hydrocodone together, choose 1 or the other, ED return for worsening symptoms as needed

## 2022-02-10 NOTE — PROGRESS NOTES
Date of Office Visit: 02/10/22  Encounter Provider: BERONICA Clark  Place of Service: [facility] CARDIOLOGY  Patient Name: Pro Mueller  :1955    Chief Complaint   Patient presents with   • Loss of Consciousness   • Hypertension   • Hyperlipidemia   • Dizziness   • Follow-up   :     HPI: Pro Mueller is a 67 y.o. male  with   Hypertension, hyperlipidemia, alcohol misuse, recurrent syncope and paroxysmal SVT.    He is followed by Dr. Stephens. I will visit with him for the first time and have reviewed his medical record.     In the past had several episodes of syncope and was seen by Dr. Haskins is 2019.  Echocardiogram was unremarkable despite grade 1 diastolic dysfunction.  Zio patch showed some short burst of SVT but this was not significant.  He was admitted in 2022 and reportedly got up from his couch to go into the Kitchen but he did not remember anything because he woke up on the kitchen floor and hit his head.  His Ethanol level was elevated which was consistent with prior episode. Repeat echo showed normal left ventricular systolic function and mild dilation of the aortic root.  12-day mobile telemetry was relatively benign.  He had syncope reported during the monitoring.  Which correlated with an ectopic atrial rhythm.    He presents today for reassessment.  He is in significant pain and scheduled to have lithotripsy tomorrow.  He is moaning in pain and actually near tears.  He is with people in his head.  He states his urologist is aware of his significant pain but he is ran out of pain medicine.  He was taking up to 2 and 3 tramadol at a time without significant relief.  Reportedly was walking 1 to 2 days a week up to 15 minutes but not since he has been in so much pain.  Allergies   Allergen Reactions   • Penicillins Anaphylaxis and Other (See Comments)     Seizure. childhood           Family and social history reviewed.     Review of Systems   Gastrointestinal:  "Positive for abdominal pain.     All other systems were reviewed and are negative          Objective:     Vitals:    02/10/22 1045   BP: 128/78   BP Location: Left arm   Patient Position: Sitting   Pulse: 88   Weight: 83 kg (183 lb)   Height: 182.9 cm (72\")     Body mass index is 24.82 kg/m².    PHYSICAL EXAM:  Cardiovascular:      Normal rate. Regular rhythm.           ECG 12 Lead    Date/Time: 2/10/2022 12:58 PM  Performed by: Chantale Adams APRN  Authorized by: Chantale Adams APRN   Comparison: compared with previous ECG   Similar to previous ECG  Rhythm: sinus rhythm  Q waves: V1 and V2    Comments: No significant change              No current facility-administered medications for this visit.     Current Outpatient Medications   Medication Sig Dispense Refill   • acyclovir (ZOVIRAX) 200 MG capsule TAKE 2 CAPSULES BY MOUTH DAILY 180 capsule 1   • amLODIPine (NORVASC) 5 MG tablet TAKE 1 TABLET BY MOUTH EVERY MORNING 90 tablet 1   • atorvastatin (LIPITOR) 20 MG tablet TAKE 1 TABLET BY MOUTH DAILY 90 tablet 1   • folic acid (FOLVITE) 1 MG tablet Take 1 mg by mouth Daily.     • levothyroxine (SYNTHROID, LEVOTHROID) 100 MCG tablet Take 1 tablet by mouth Daily. 90 tablet 1   • lisinopril (PRINIVIL,ZESTRIL) 10 MG tablet TAKE 1 TABLET BY MOUTH DAILY 90 tablet 1   • traMADol (ULTRAM) 50 MG tablet TAKE 1 TABLET BY MOUTH EVERY 6 HOURS AS NEEDED FOR MODERATE PAIN 80 tablet 0   • zolpidem (AMBIEN) 10 MG tablet Take 10 mg by mouth every night at bedtime.       Assessment:      No diagnosis found.     Orders Placed This Encounter   Procedures   • ECG 12 Lead     This order was created via procedure documentation     Order Specific Question:   Release to patient     Answer:   Immediate         Plan:   1.  67-year-old gentleman with recurrent syncope.  Most recent episode January 2022.  Echocardiogram showed normal left ventricular systolic function no significant valvular disease and just mildly dilated aorta.  He wore a 12-day " mobile telemetry which  was relatively benign.  He had syncope reported during the monitoring which correlated with an ectopic atrial rhythm which is insignificant.   2.  Paroxysmal SVT  3.  Hypertension blood pressure appears adequately controlled  4.  Hyperlipidemia   5.  Alcohol abuse  6.  Mildly dilated aorta  7.Need for lithotripsy scheduled tomorrow-he has an significant abdominal pain today and moaning in pain.  He is requesting pain medicine.  I called Dr. Ted Zhang office and his  said she would reach out to him for pain medicine.  I also advised that he go to the ER if his symptoms worsen.  He is cleared to proceed with lithotripsy as scheduled.     I will have him return in 6 to 8 weeks after he is recovered from lithotripsy and kidney stone to further discuss possible implantable loop recorder.          It has been a pleasure to participate in this patient's care.      Thank you,  BERONICA Clark      **I used Dragon to dictate this note:**

## 2022-02-11 NOTE — TELEPHONE ENCOUNTER
I did not see this message. I did see pt had presented to the ED. He was evaluated and had nephrolithiasis with hydronephrosis. He has hydrocodone from the ED.

## 2022-02-17 ENCOUNTER — OFFICE VISIT (OUTPATIENT)
Dept: FAMILY MEDICINE CLINIC | Facility: CLINIC | Age: 67
End: 2022-02-17

## 2022-02-17 VITALS
HEART RATE: 80 BPM | WEIGHT: 190.5 LBS | SYSTOLIC BLOOD PRESSURE: 124 MMHG | RESPIRATION RATE: 16 BRPM | BODY MASS INDEX: 25.8 KG/M2 | OXYGEN SATURATION: 95 % | DIASTOLIC BLOOD PRESSURE: 72 MMHG | TEMPERATURE: 98.6 F | HEIGHT: 72 IN

## 2022-02-17 DIAGNOSIS — R61 NIGHT SWEATS: ICD-10-CM

## 2022-02-17 DIAGNOSIS — N23 RENAL COLIC ON RIGHT SIDE: ICD-10-CM

## 2022-02-17 DIAGNOSIS — R30.0 DYSURIA: Primary | ICD-10-CM

## 2022-02-17 DIAGNOSIS — M54.9 SPINAL PAIN: ICD-10-CM

## 2022-02-17 LAB
BILIRUB BLD-MCNC: NEGATIVE MG/DL
CLARITY, POC: CLEAR
COLOR UR: ABNORMAL
EXPIRATION DATE: ABNORMAL
GLUCOSE UR STRIP-MCNC: NEGATIVE MG/DL
KETONES UR QL: NEGATIVE
LEUKOCYTE EST, POC: ABNORMAL
Lab: ABNORMAL
NITRITE UR-MCNC: NEGATIVE MG/ML
PH UR: 6 [PH] (ref 5–8)
PROT UR STRIP-MCNC: NEGATIVE MG/DL
RBC # UR STRIP: ABNORMAL /UL
SP GR UR: 1.03 (ref 1–1.03)
UROBILINOGEN UR QL: NORMAL

## 2022-02-17 PROCEDURE — 81003 URINALYSIS AUTO W/O SCOPE: CPT | Performed by: FAMILY MEDICINE

## 2022-02-17 PROCEDURE — 99214 OFFICE O/P EST MOD 30 MIN: CPT | Performed by: FAMILY MEDICINE

## 2022-02-17 RX ORDER — HYDROCODONE BITARTRATE AND ACETAMINOPHEN 5; 325 MG/1; MG/1
1 TABLET ORAL 2 TIMES DAILY PRN
Qty: 10 TABLET | Refills: 0 | Status: ON HOLD | OUTPATIENT
Start: 2022-02-17 | End: 2022-03-15 | Stop reason: SDUPTHER

## 2022-02-17 RX ORDER — CHLORHEXIDINE GLUCONATE 0.12 MG/ML
5 RINSE ORAL
COMMUNITY
Start: 2021-12-23 | End: 2022-03-15 | Stop reason: HOSPADM

## 2022-02-18 LAB
BASOPHILS # BLD AUTO: 0 X10E3/UL (ref 0–0.2)
BASOPHILS NFR BLD AUTO: 1 %
EOSINOPHIL # BLD AUTO: 0.1 X10E3/UL (ref 0–0.4)
EOSINOPHIL NFR BLD AUTO: 2 %
ERYTHROCYTE [DISTWIDTH] IN BLOOD BY AUTOMATED COUNT: 13.3 % (ref 11.6–15.4)
HCT VFR BLD AUTO: 38.7 % (ref 37.5–51)
HGB BLD-MCNC: 13 G/DL (ref 13–17.7)
IMM GRANULOCYTES # BLD AUTO: 0 X10E3/UL (ref 0–0.1)
IMM GRANULOCYTES NFR BLD AUTO: 0 %
LYMPHOCYTES # BLD AUTO: 2.3 X10E3/UL (ref 0.7–3.1)
LYMPHOCYTES NFR BLD AUTO: 46 %
MCH RBC QN AUTO: 30.8 PG (ref 26.6–33)
MCHC RBC AUTO-ENTMCNC: 33.6 G/DL (ref 31.5–35.7)
MCV RBC AUTO: 92 FL (ref 79–97)
MONOCYTES # BLD AUTO: 0.6 X10E3/UL (ref 0.1–0.9)
MONOCYTES NFR BLD AUTO: 13 %
NEUTROPHILS # BLD AUTO: 1.9 X10E3/UL (ref 1.4–7)
NEUTROPHILS NFR BLD AUTO: 38 %
PLATELET # BLD AUTO: 350 X10E3/UL (ref 150–450)
RBC # BLD AUTO: 4.22 X10E6/UL (ref 4.14–5.8)
WBC # BLD AUTO: 5 X10E3/UL (ref 3.4–10.8)

## 2022-02-21 ENCOUNTER — HOSPITAL ENCOUNTER (OUTPATIENT)
Dept: GENERAL RADIOLOGY | Facility: HOSPITAL | Age: 67
Discharge: HOME | End: 2022-02-21
Admitting: UROLOGY

## 2022-02-21 DIAGNOSIS — N20.0 CALCULUS, RENAL: ICD-10-CM

## 2022-02-21 LAB
BACTERIA UR CULT: ABNORMAL
BACTERIA UR CULT: ABNORMAL
OTHER ANTIBIOTIC SUSC ISLT: ABNORMAL

## 2022-02-21 PROCEDURE — 74018 RADEX ABDOMEN 1 VIEW: CPT

## 2022-02-21 RX ORDER — NITROFURANTOIN 25; 75 MG/1; MG/1
100 CAPSULE ORAL 2 TIMES DAILY
Qty: 14 CAPSULE | Refills: 0 | Status: SHIPPED | OUTPATIENT
Start: 2022-02-21 | End: 2022-03-15 | Stop reason: HOSPADM

## 2022-02-22 ENCOUNTER — OFFICE VISIT (OUTPATIENT)
Dept: ORTHOPEDIC SURGERY | Facility: CLINIC | Age: 67
End: 2022-02-22

## 2022-02-22 VITALS — HEIGHT: 72 IN | WEIGHT: 190.1 LBS | BODY MASS INDEX: 25.75 KG/M2 | TEMPERATURE: 97.7 F

## 2022-02-22 DIAGNOSIS — M12.9 ARTHRITIS/ARTHROPATHY OF MULTIPLE JOINTS: ICD-10-CM

## 2022-02-22 DIAGNOSIS — M54.50 LUMBAR PAIN: Primary | ICD-10-CM

## 2022-02-22 DIAGNOSIS — M54.2 NECK PAIN: ICD-10-CM

## 2022-02-22 PROCEDURE — 72070 X-RAY EXAM THORAC SPINE 2VWS: CPT | Performed by: ORTHOPAEDIC SURGERY

## 2022-02-22 PROCEDURE — 72100 X-RAY EXAM L-S SPINE 2/3 VWS: CPT | Performed by: ORTHOPAEDIC SURGERY

## 2022-02-22 PROCEDURE — 99213 OFFICE O/P EST LOW 20 MIN: CPT | Performed by: ORTHOPAEDIC SURGERY

## 2022-02-22 PROCEDURE — 72040 X-RAY EXAM NECK SPINE 2-3 VW: CPT | Performed by: ORTHOPAEDIC SURGERY

## 2022-02-22 NOTE — PROGRESS NOTES
He passed out and fell striking his head and landing on the back of his neck and complains of neck pain and upper back pain.  This happened nearly a month ago and is getting better.  He was seen by Neena kirkpatrick in the hospital for this.  No new numbness tingling weakness.  The low back is stable for now.  Good strength in the upper extremities bilaterally mild local tenderness over the 6 or 7 spinous process without any palpable abnormality otherwise.  Or scapular tenderness.  Cervical films including flexion-extension laterals show spondylosis but no new fractures and certainly no instability.  Thoracic laterals are unremarkable except for DJD but no fracture.  No comparison views for cervical or thoracic films.  Lumbar films show a stable L4-5 spondylolisthesis unchanged from prior films.  Doing well I will prescribe physical therapy and I will see him back as needed.

## 2022-02-24 RX ORDER — TRAMADOL HYDROCHLORIDE 50 MG/1
TABLET ORAL
Qty: 80 TABLET | Refills: 0 | Status: SHIPPED | OUTPATIENT
Start: 2022-02-24 | End: 2022-03-15 | Stop reason: HOSPADM

## 2022-02-24 NOTE — TELEPHONE ENCOUNTER
Rx Refill Note  Requested Prescriptions     Pending Prescriptions Disp Refills   • traMADol (ULTRAM) 50 MG tablet [Pharmacy Med Name: TRAMADOL 50MG TABLETS] 80 tablet      Sig: TAKE 1 TABLET BY MOUTH EVERY 6 HOURS AS NEEDED FOR MODERATE PAIN      Last office visit with prescribing clinician: 2/17/2022      Next office visit with prescribing clinician: 5/9/2022            Debbie Toledo LPN  02/24/22, 12:44 EST

## 2022-03-09 ENCOUNTER — TREATMENT (OUTPATIENT)
Dept: PHYSICAL THERAPY | Facility: CLINIC | Age: 67
End: 2022-03-09

## 2022-03-09 DIAGNOSIS — M54.12 RADICULOPATHY, CERVICAL REGION: ICD-10-CM

## 2022-03-09 DIAGNOSIS — M54.2 CERVICALGIA: ICD-10-CM

## 2022-03-09 DIAGNOSIS — G89.29 CHRONIC BILATERAL LOW BACK PAIN WITHOUT SCIATICA: Primary | ICD-10-CM

## 2022-03-09 DIAGNOSIS — M25.559 PAIN IN HIP: ICD-10-CM

## 2022-03-09 DIAGNOSIS — M54.50 CHRONIC BILATERAL LOW BACK PAIN WITHOUT SCIATICA: Primary | ICD-10-CM

## 2022-03-09 PROCEDURE — 97110 THERAPEUTIC EXERCISES: CPT | Performed by: PHYSICAL THERAPIST

## 2022-03-09 PROCEDURE — 97162 PT EVAL MOD COMPLEX 30 MIN: CPT | Performed by: PHYSICAL THERAPIST

## 2022-03-09 PROCEDURE — 97112 NEUROMUSCULAR REEDUCATION: CPT | Performed by: PHYSICAL THERAPIST

## 2022-03-10 NOTE — PROGRESS NOTES
"  Physical Therapy Initial Evaluation and Plan of Care      Patient: Pro Mueller   : 1955  Diagnosis/ICD-10 Code:  Chronic bilateral low back pain without sciatica [M54.50, G89.29]  Referring practitioner: Yohan Mcnulty MD  Date of Initial Visit: 3/9/2022  Today's Date: 3/10/2022  Patient seen for 1 session       Visit Diagnoses:    ICD-10-CM ICD-9-CM   1. Chronic bilateral low back pain without sciatica  M54.50 724.2    G89.29 338.29   2. Cervicalgia  M54.2 723.1   3. Radiculopathy, cervical region  M54.12 723.4   4. Pain in hip  M25.559 719.45         Subjective  Chief Complaint/Subjective Report: Patient presented to the clinic today with complaints of pain in the neck and low back that have been present chronically. Pt reports a hx of falls since his last bout of PT, resulting in a head injury; pt reports 3 episodes of \"blacking out\" and has been seen by the neurologist and cardiologist with no significant findings. Patient reported no significant medical history today aside from that previously mentioned; no reports of CNS signs or symptoms, or indications of other sinister pathologies were given in the subjective history today.  Mechanism of Injury: Unknown  Functional Limitations: ADLs, work-related activities  Subjective Goals for PT: Return to PLOF, decreased pain with ADLs and community activities  Prior Treatment for Current Condition: MD, PT  Imaging:MRI, CT Scan, Dopler   Pain/VNRS (0-10/10): Worst: 7/10; Average: 4/10  Agg. Factors: Walking, PM, static posture  Relieving Factors: AM  Subjective Questionnaire: NDI:27  Oswestry: 50  PLOF: Independent with all functional tasks, ADLs, and community activities  Occupation:   Social:   PMH: See history section of patient chart  Precautions/Contraindications: No reported contraindications from subjective history today unless otherwise stated above.      Objective  Lumbar ROM: Flex 75%, Ext 25% with pain, SB 50% bilat with pain  MMT: 4/5 for Hip " ABD and Ext bilat, Quad 4/5 bilat    Functional Assessment: Significant impairments with bending to reach objects on ground and with transitional movements  + Quad bilaterally for lumbar spine  + Slump and SLR Bilterally     See Exercise, Manual, and Modality Logs for complete treatment.       Documentation of Assessment Details: Patient presented the clinic with signs and symptoms consistent with radiating referred pain from the lumbar spine and neck. Patient demonstrated limitations and impairments in neurodynamics, strenght, and functional mobility during today's evaluation, and will benefit from skilled PT address current limitations and impairments to help patient regain functional mobility and strength necessary to return to PLOF, reduce pain, and improve current symptoms as patient progresses towards meeting current goals established at therapy today. The patient present with no comorbidities or personal factors that impact my POC and deficit in above mentioned areas. Based on these findings and results from valid tests and measures, I am classifying this patient's presentation as stable with uncomplicated characteristics, and a good prognosis for recovery.     Assessment & Plan     Assessment  Impairments: abnormal gait, abnormal or restricted ROM, activity intolerance, impaired physical strength, lacks appropriate home exercise program and pain with function  Functional Limitations: carrying objects, lifting, sleeping, walking, uncomfortable because of pain, sitting and standingPrognosis details: Based on valid tests and measures performed today I am classifying this patient as presently stable with uncomplicated characteristics and good prognosis for recovery    Goals  Plan Goals: Pt will improve Subjective assessment by >75% within 6 weeks to demonstrate improvements in test and measure outcomes and overall functionality, and to show reduction of symptoms, improved activity tolerance, and ability to  complete ADLs and work-related activities     Pt will improve functional mobility and pain free ROM to ranges that allow for pain free functional activities such as dressing, bathing, and completing ADLs within 8 weeks to demonstrate improvements in functional independence, mobility, and community participation to allow for a return to PLOF.    Pt will demonstrate 80% full ROM for all measured ROMs within 8 weeks to demonstrate improvements in functional mobility and ability to complete ADLs and work-related activities Independently.    Pt will sleep through the night without waking d/t current symptoms >5/7 nights per week within 8 weeks to demonstrate improvements restful sleep, overall function, and symptom reduction    Pt will report pain <2/10 at worst with activity and at rest within 8 weeks to demonstrate improvements in pain-free ROM and function to improve functional mobility, activities tolerance, and ability to complete ADLs and work-related activities    Pt will be able to lift and carry objects >30lbs without worsening of symptoms within 8 weeks to demonstrate improvements in functional mobility for ease of home and community tasks and improved functional independence.    Pt will be able to ambulate >30 mins independently without worsening of symptoms within 8 weeks to demonstrate improvements in functional mobility for ease of home and community ambulation and improved functional independence        Plan  Planned modality interventions: dry needling, TENS, high voltage pulsed current (pain management), electrical stimulation/Russian stimulation and cryotherapy  Planned therapy interventions: ADL retraining, abdominal trunk stabilization, manual therapy, neuromuscular re-education, balance/weight-bearing training, body mechanics training, soft tissue mobilization, spinal/joint mobilization, joint mobilization, home exercise program, gait training, functional ROM exercises, strengthening, therapeutic  activities and transfer training  Plan details: Duration: 2-3x/Wk for 4 Weeks - Upon completion of 4 weeks further evaluation and assessment with determine ongoing plan for continued care.    Continue with skilled physical therapy addressing previously mentioned limitations and impairments; progress HEP as tolerated; progress functional strengthening interventions to tolerance.        History # of Personal Factors and/or Comorbidities: MODERATE (1-2)  Examination of Body System(s): # of elements: MODERATE (3)  Clinical Presentation: STABLE   Clinical Decision Making: MODERATE      Timed:         Manual Therapy:    5     mins  66823;     Therapeutic Exercise:    10     mins  98621;     Neuromuscular Ryan:    10    mins  34543;    Therapeutic Activity:     -     mins  68296;     Gait Training:           mins  90861;     Ultrasound:          mins  04520;    Ionto                                  mins   97771  Self Care                           mins   80899  Canalith Repos         mins 20600    Un-Timed:  Electrical Stimulation:          mins  56013 ( );  Dry Needling         mins self-pay  Traction         mins 68139  Low Eval    -     Mins  32471  Mod Eval     25    Mins  20588  High Eval                            Mins  32443    Timed Treatment:   30   mins   Total Treatment:     50   mins      PT: Alvaro Leon PT     License Number: [id number]  Electronically signed by Alvaro Leon PT, 03/10/22, 6:28 AM EST    Certification Period: 3/10/2022 thru 6/7/2022  I certify that the therapy services are furnished while this patient is under my care.  The services outlined above are required by this patient, and will be reviewed every 90 days.         Physician Signature:__________________________________________________    PHYSICIAN: Yohan Mcnulty MD  NPI: [id number]                                      DATE:      Please sign and return via fax to [email or web address] . Thank you, [facility].

## 2022-03-13 ENCOUNTER — HOSPITAL ENCOUNTER (OUTPATIENT)
Facility: HOSPITAL | Age: 67
Setting detail: OBSERVATION
Discharge: HOME | End: 2022-03-15
Attending: EMERGENCY MEDICINE | Admitting: HOSPITALIST

## 2022-03-13 ENCOUNTER — APPOINTMENT (OUTPATIENT)
Dept: CT IMAGING | Facility: HOSPITAL | Age: 67
End: 2022-03-13
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
## 2022-03-13 DIAGNOSIS — N23 RENAL COLIC ON RIGHT SIDE: ICD-10-CM

## 2022-03-13 DIAGNOSIS — G89.29 CHRONIC LOW BACK PAIN WITH SCIATICA, SCIATICA LATERALITY UNSPECIFIED, UNSPECIFIED BACK PAIN LATERALITY: ICD-10-CM

## 2022-03-13 DIAGNOSIS — M54.40 CHRONIC LOW BACK PAIN WITH SCIATICA, SCIATICA LATERALITY UNSPECIFIED, UNSPECIFIED BACK PAIN LATERALITY: ICD-10-CM

## 2022-03-13 DIAGNOSIS — M54.9 INTRACTABLE BACK PAIN: ICD-10-CM

## 2022-03-13 DIAGNOSIS — F10.929 ALCOHOLIC INTOXICATION WITH COMPLICATION: Primary | ICD-10-CM

## 2022-03-13 LAB
AMPHET+METHAMPHET UR QL: POSITIVE
ANION GAP SERPL CALCULATED.3IONS-SCNC: 12.5 MMOL/L (ref 5–15)
ANION GAP SERPL CALCULATED.3IONS-SCNC: 15 MMOL/L (ref 5–15)
BARBITURATES UR QL SCN: NEGATIVE
BASOPHILS # BLD AUTO: 0.04 10*3/MM3 (ref 0–0.2)
BASOPHILS NFR BLD AUTO: 0.6 % (ref 0–1.5)
BENZODIAZ UR QL SCN: NEGATIVE
BUN SERPL-MCNC: 8 MG/DL (ref 8–23)
BUN SERPL-MCNC: 9 MG/DL (ref 8–23)
BUN/CREAT SERPL: 11.9 (ref 7–25)
BUN/CREAT SERPL: 12.7 (ref 7–25)
CALCIUM SPEC-SCNC: 8.6 MG/DL (ref 8.6–10.5)
CALCIUM SPEC-SCNC: 9 MG/DL (ref 8.6–10.5)
CANNABINOIDS SERPL QL: NEGATIVE
CHLORIDE SERPL-SCNC: 102 MMOL/L (ref 98–107)
CHLORIDE SERPL-SCNC: 104 MMOL/L (ref 98–107)
CO2 SERPL-SCNC: 22.5 MMOL/L (ref 22–29)
CO2 SERPL-SCNC: 25 MMOL/L (ref 22–29)
COCAINE UR QL: NEGATIVE
CREAT SERPL-MCNC: 0.67 MG/DL (ref 0.76–1.27)
CREAT SERPL-MCNC: 0.71 MG/DL (ref 0.76–1.27)
DEPRECATED RDW RBC AUTO: 43.5 FL (ref 37–54)
DEPRECATED RDW RBC AUTO: 45.5 FL (ref 37–54)
EGFRCR SERPLBLD CKD-EPI 2021: 101.8 ML/MIN/1.73
EGFRCR SERPLBLD CKD-EPI 2021: 103.6 ML/MIN/1.73
EOSINOPHIL # BLD AUTO: 0.18 10*3/MM3 (ref 0–0.4)
EOSINOPHIL NFR BLD AUTO: 2.5 % (ref 0.3–6.2)
ERYTHROCYTE [DISTWIDTH] IN BLOOD BY AUTOMATED COUNT: 13.4 % (ref 12.3–15.4)
ERYTHROCYTE [DISTWIDTH] IN BLOOD BY AUTOMATED COUNT: 13.6 % (ref 12.3–15.4)
ETHANOL BLD-MCNC: 227 MG/DL (ref 0–10)
ETHANOL UR QL: 0.23 %
GLUCOSE SERPL-MCNC: 102 MG/DL (ref 65–99)
GLUCOSE SERPL-MCNC: 84 MG/DL (ref 65–99)
HCT VFR BLD AUTO: 35.8 % (ref 37.5–51)
HCT VFR BLD AUTO: 38.5 % (ref 37.5–51)
HGB BLD-MCNC: 12 G/DL (ref 13–17.7)
HGB BLD-MCNC: 13.4 G/DL (ref 13–17.7)
IMM GRANULOCYTES # BLD AUTO: 0.02 10*3/MM3 (ref 0–0.05)
IMM GRANULOCYTES NFR BLD AUTO: 0.3 % (ref 0–0.5)
LYMPHOCYTES # BLD AUTO: 3.09 10*3/MM3 (ref 0.7–3.1)
LYMPHOCYTES NFR BLD AUTO: 43 % (ref 19.6–45.3)
MCH RBC QN AUTO: 30.5 PG (ref 26.6–33)
MCH RBC QN AUTO: 31.3 PG (ref 26.6–33)
MCHC RBC AUTO-ENTMCNC: 33.5 G/DL (ref 31.5–35.7)
MCHC RBC AUTO-ENTMCNC: 34.8 G/DL (ref 31.5–35.7)
MCV RBC AUTO: 90 FL (ref 79–97)
MCV RBC AUTO: 90.9 FL (ref 79–97)
METHADONE UR QL SCN: NEGATIVE
MONOCYTES # BLD AUTO: 0.82 10*3/MM3 (ref 0.1–0.9)
MONOCYTES NFR BLD AUTO: 11.4 % (ref 5–12)
NEUTROPHILS NFR BLD AUTO: 3.03 10*3/MM3 (ref 1.7–7)
NEUTROPHILS NFR BLD AUTO: 42.2 % (ref 42.7–76)
NRBC BLD AUTO-RTO: 0 /100 WBC (ref 0–0.2)
OPIATES UR QL: NEGATIVE
OXYCODONE UR QL SCN: NEGATIVE
PLATELET # BLD AUTO: 281 10*3/MM3 (ref 140–450)
PLATELET # BLD AUTO: 347 10*3/MM3 (ref 140–450)
PMV BLD AUTO: 8.9 FL (ref 6–12)
PMV BLD AUTO: 9 FL (ref 6–12)
POTASSIUM SERPL-SCNC: 3.3 MMOL/L (ref 3.5–5.2)
POTASSIUM SERPL-SCNC: 3.6 MMOL/L (ref 3.5–5.2)
RBC # BLD AUTO: 3.94 10*6/MM3 (ref 4.14–5.8)
RBC # BLD AUTO: 4.28 10*6/MM3 (ref 4.14–5.8)
SARS-COV-2 RNA RESP QL NAA+PROBE: NOT DETECTED
SODIUM SERPL-SCNC: 139 MMOL/L (ref 136–145)
SODIUM SERPL-SCNC: 142 MMOL/L (ref 136–145)
TSH SERPL DL<=0.05 MIU/L-ACNC: 3.12 UIU/ML (ref 0.27–4.2)
WBC NRBC COR # BLD: 4.91 10*3/MM3 (ref 3.4–10.8)
WBC NRBC COR # BLD: 7.18 10*3/MM3 (ref 3.4–10.8)

## 2022-03-13 PROCEDURE — 82077 ASSAY SPEC XCP UR&BREATH IA: CPT | Performed by: PHYSICIAN ASSISTANT

## 2022-03-13 PROCEDURE — 25010000002 FENTANYL CITRATE (PF) 50 MCG/ML SOLUTION: Performed by: EMERGENCY MEDICINE

## 2022-03-13 PROCEDURE — 96365 THER/PROPH/DIAG IV INF INIT: CPT

## 2022-03-13 PROCEDURE — 80307 DRUG TEST PRSMV CHEM ANLYZR: CPT | Performed by: PHYSICIAN ASSISTANT

## 2022-03-13 PROCEDURE — 25010000002 HYDROMORPHONE PER 4 MG: Performed by: NURSE PRACTITIONER

## 2022-03-13 PROCEDURE — G0378 HOSPITAL OBSERVATION PER HR: HCPCS

## 2022-03-13 PROCEDURE — 25010000002 ONDANSETRON PER 1 MG: Performed by: EMERGENCY MEDICINE

## 2022-03-13 PROCEDURE — U0003 INFECTIOUS AGENT DETECTION BY NUCLEIC ACID (DNA OR RNA); SEVERE ACUTE RESPIRATORY SYNDROME CORONAVIRUS 2 (SARS-COV-2) (CORONAVIRUS DISEASE [COVID-19]), AMPLIFIED PROBE TECHNIQUE, MAKING USE OF HIGH THROUGHPUT TECHNOLOGIES AS DESCRIBED BY CMS-2020-01-R: HCPCS | Performed by: PHYSICIAN ASSISTANT

## 2022-03-13 PROCEDURE — 80048 BASIC METABOLIC PNL TOTAL CA: CPT | Performed by: PHYSICIAN ASSISTANT

## 2022-03-13 PROCEDURE — 96376 TX/PRO/DX INJ SAME DRUG ADON: CPT

## 2022-03-13 PROCEDURE — 85027 COMPLETE CBC AUTOMATED: CPT | Performed by: NURSE PRACTITIONER

## 2022-03-13 PROCEDURE — 84443 ASSAY THYROID STIM HORMONE: CPT | Performed by: NURSE PRACTITIONER

## 2022-03-13 PROCEDURE — 96361 HYDRATE IV INFUSION ADD-ON: CPT

## 2022-03-13 PROCEDURE — 99284 EMERGENCY DEPT VISIT MOD MDM: CPT

## 2022-03-13 PROCEDURE — 99285 EMERGENCY DEPT VISIT HI MDM: CPT

## 2022-03-13 PROCEDURE — C9803 HOPD COVID-19 SPEC COLLECT: HCPCS

## 2022-03-13 PROCEDURE — 72131 CT LUMBAR SPINE W/O DYE: CPT

## 2022-03-13 PROCEDURE — 96375 TX/PRO/DX INJ NEW DRUG ADDON: CPT

## 2022-03-13 PROCEDURE — 25010000002 THIAMINE PER 100 MG: Performed by: NURSE PRACTITIONER

## 2022-03-13 PROCEDURE — 80048 BASIC METABOLIC PNL TOTAL CA: CPT | Performed by: NURSE PRACTITIONER

## 2022-03-13 PROCEDURE — 90791 PSYCH DIAGNOSTIC EVALUATION: CPT

## 2022-03-13 PROCEDURE — 85025 COMPLETE CBC W/AUTO DIFF WBC: CPT | Performed by: PHYSICIAN ASSISTANT

## 2022-03-13 RX ORDER — SODIUM CHLORIDE 0.9 % (FLUSH) 0.9 %
10 SYRINGE (ML) INJECTION EVERY 12 HOURS SCHEDULED
Status: DISCONTINUED | OUTPATIENT
Start: 2022-03-13 | End: 2022-03-15 | Stop reason: HOSPADM

## 2022-03-13 RX ORDER — ONDANSETRON 2 MG/ML
4 INJECTION INTRAMUSCULAR; INTRAVENOUS EVERY 6 HOURS PRN
Status: DISCONTINUED | OUTPATIENT
Start: 2022-03-13 | End: 2022-03-15 | Stop reason: HOSPADM

## 2022-03-13 RX ORDER — HYDROMORPHONE HYDROCHLORIDE 1 MG/ML
0.5 INJECTION, SOLUTION INTRAMUSCULAR; INTRAVENOUS; SUBCUTANEOUS
Status: DISCONTINUED | OUTPATIENT
Start: 2022-03-13 | End: 2022-03-15 | Stop reason: HOSPADM

## 2022-03-13 RX ORDER — LORAZEPAM 2 MG/ML
1 INJECTION INTRAMUSCULAR
Status: DISCONTINUED | OUTPATIENT
Start: 2022-03-13 | End: 2022-03-15 | Stop reason: HOSPADM

## 2022-03-13 RX ORDER — HYDROCODONE BITARTRATE AND ACETAMINOPHEN 7.5; 325 MG/1; MG/1
1 TABLET ORAL EVERY 6 HOURS PRN
Status: DISCONTINUED | OUTPATIENT
Start: 2022-03-13 | End: 2022-03-15 | Stop reason: HOSPADM

## 2022-03-13 RX ORDER — FENTANYL CITRATE 50 UG/ML
50 INJECTION, SOLUTION INTRAMUSCULAR; INTRAVENOUS ONCE
Status: COMPLETED | OUTPATIENT
Start: 2022-03-13 | End: 2022-03-13

## 2022-03-13 RX ORDER — POTASSIUM CHLORIDE 1.5 G/1.77G
40 POWDER, FOR SOLUTION ORAL AS NEEDED
Status: DISCONTINUED | OUTPATIENT
Start: 2022-03-13 | End: 2022-03-15 | Stop reason: HOSPADM

## 2022-03-13 RX ORDER — CHLORHEXIDINE GLUCONATE 0.12 MG/ML
5 RINSE ORAL DAILY
Status: DISCONTINUED | OUTPATIENT
Start: 2022-03-13 | End: 2022-03-13

## 2022-03-13 RX ORDER — CYCLOBENZAPRINE HCL 10 MG
5 TABLET ORAL 3 TIMES DAILY
Status: DISCONTINUED | OUTPATIENT
Start: 2022-03-13 | End: 2022-03-15 | Stop reason: HOSPADM

## 2022-03-13 RX ORDER — CHLORHEXIDINE GLUCONATE 0.12 MG/ML
15 RINSE ORAL DAILY
Status: DISCONTINUED | OUTPATIENT
Start: 2022-03-13 | End: 2022-03-13

## 2022-03-13 RX ORDER — ONDANSETRON 4 MG/1
4 TABLET, FILM COATED ORAL EVERY 6 HOURS PRN
Status: DISCONTINUED | OUTPATIENT
Start: 2022-03-13 | End: 2022-03-15 | Stop reason: HOSPADM

## 2022-03-13 RX ORDER — GABAPENTIN 100 MG/1
200 CAPSULE ORAL DAILY
Status: DISCONTINUED | OUTPATIENT
Start: 2022-03-13 | End: 2022-03-13

## 2022-03-13 RX ORDER — CHLORHEXIDINE GLUCONATE 0.12 MG/ML
5 RINSE ORAL DAILY
Status: DISCONTINUED | OUTPATIENT
Start: 2022-03-13 | End: 2022-03-15 | Stop reason: HOSPADM

## 2022-03-13 RX ORDER — POTASSIUM CHLORIDE 750 MG/1
40 TABLET, FILM COATED, EXTENDED RELEASE ORAL AS NEEDED
Status: DISCONTINUED | OUTPATIENT
Start: 2022-03-13 | End: 2022-03-15 | Stop reason: HOSPADM

## 2022-03-13 RX ORDER — LORAZEPAM 2 MG/ML
0.5 INJECTION INTRAMUSCULAR
Status: DISCONTINUED | OUTPATIENT
Start: 2022-03-13 | End: 2022-03-15 | Stop reason: HOSPADM

## 2022-03-13 RX ORDER — LISINOPRIL 10 MG/1
10 TABLET ORAL DAILY
Status: DISCONTINUED | OUTPATIENT
Start: 2022-03-14 | End: 2022-03-15 | Stop reason: HOSPADM

## 2022-03-13 RX ORDER — LORAZEPAM 1 MG/1
1 TABLET ORAL
Status: DISCONTINUED | OUTPATIENT
Start: 2022-03-13 | End: 2022-03-15 | Stop reason: HOSPADM

## 2022-03-13 RX ORDER — LISINOPRIL 10 MG/1
10 TABLET ORAL DAILY
Status: DISCONTINUED | OUTPATIENT
Start: 2022-03-13 | End: 2022-03-13

## 2022-03-13 RX ORDER — CALCIUM CARBONATE 200(500)MG
2 TABLET,CHEWABLE ORAL 2 TIMES DAILY PRN
Status: DISCONTINUED | OUTPATIENT
Start: 2022-03-13 | End: 2022-03-15 | Stop reason: HOSPADM

## 2022-03-13 RX ORDER — ONDANSETRON 2 MG/ML
4 INJECTION INTRAMUSCULAR; INTRAVENOUS ONCE
Status: COMPLETED | OUTPATIENT
Start: 2022-03-13 | End: 2022-03-13

## 2022-03-13 RX ORDER — ACYCLOVIR 200 MG/1
400 CAPSULE ORAL DAILY
Status: COMPLETED | OUTPATIENT
Start: 2022-03-13 | End: 2022-03-15

## 2022-03-13 RX ORDER — ACETAMINOPHEN 160 MG/5ML
650 SOLUTION ORAL EVERY 4 HOURS PRN
Status: DISCONTINUED | OUTPATIENT
Start: 2022-03-13 | End: 2022-03-15 | Stop reason: HOSPADM

## 2022-03-13 RX ORDER — CHLORHEXIDINE GLUCONATE 0.12 MG/ML
15 RINSE ORAL EVERY 12 HOURS SCHEDULED
Status: DISCONTINUED | OUTPATIENT
Start: 2022-03-13 | End: 2022-03-13

## 2022-03-13 RX ORDER — ATORVASTATIN CALCIUM 20 MG/1
20 TABLET, FILM COATED ORAL DAILY
Status: DISCONTINUED | OUTPATIENT
Start: 2022-03-13 | End: 2022-03-15 | Stop reason: HOSPADM

## 2022-03-13 RX ORDER — POTASSIUM CHLORIDE 7.45 MG/ML
10 INJECTION INTRAVENOUS
Status: DISCONTINUED | OUTPATIENT
Start: 2022-03-13 | End: 2022-03-15 | Stop reason: HOSPADM

## 2022-03-13 RX ORDER — FOLIC ACID 1 MG/1
1 TABLET ORAL DAILY
Status: DISCONTINUED | OUTPATIENT
Start: 2022-03-14 | End: 2022-03-15 | Stop reason: HOSPADM

## 2022-03-13 RX ORDER — SODIUM CHLORIDE 0.9 % (FLUSH) 0.9 %
10 SYRINGE (ML) INJECTION AS NEEDED
Status: DISCONTINUED | OUTPATIENT
Start: 2022-03-13 | End: 2022-03-15 | Stop reason: HOSPADM

## 2022-03-13 RX ORDER — SODIUM CHLORIDE 9 MG/ML
100 INJECTION, SOLUTION INTRAVENOUS CONTINUOUS
Status: DISCONTINUED | OUTPATIENT
Start: 2022-03-13 | End: 2022-03-15 | Stop reason: HOSPADM

## 2022-03-13 RX ORDER — GABAPENTIN 100 MG/1
200 CAPSULE ORAL 2 TIMES DAILY
Status: DISCONTINUED | OUTPATIENT
Start: 2022-03-13 | End: 2022-03-14

## 2022-03-13 RX ORDER — LORAZEPAM 0.5 MG/1
0.5 TABLET ORAL
Status: DISCONTINUED | OUTPATIENT
Start: 2022-03-13 | End: 2022-03-15 | Stop reason: HOSPADM

## 2022-03-13 RX ORDER — LEVOTHYROXINE SODIUM 0.1 MG/1
100 TABLET ORAL
Status: DISCONTINUED | OUTPATIENT
Start: 2022-03-13 | End: 2022-03-15 | Stop reason: HOSPADM

## 2022-03-13 RX ORDER — NITROGLYCERIN 0.4 MG/1
0.4 TABLET SUBLINGUAL
Status: DISCONTINUED | OUTPATIENT
Start: 2022-03-13 | End: 2022-03-15 | Stop reason: HOSPADM

## 2022-03-13 RX ORDER — ACETAMINOPHEN 325 MG/1
650 TABLET ORAL EVERY 4 HOURS PRN
Status: DISCONTINUED | OUTPATIENT
Start: 2022-03-13 | End: 2022-03-15 | Stop reason: HOSPADM

## 2022-03-13 RX ORDER — AMLODIPINE BESYLATE 5 MG/1
5 TABLET ORAL EVERY MORNING
Status: DISCONTINUED | OUTPATIENT
Start: 2022-03-14 | End: 2022-03-15 | Stop reason: HOSPADM

## 2022-03-13 RX ORDER — DIPHENOXYLATE HYDROCHLORIDE AND ATROPINE SULFATE 2.5; .025 MG/1; MG/1
1 TABLET ORAL DAILY
Status: DISCONTINUED | OUTPATIENT
Start: 2022-03-14 | End: 2022-03-15 | Stop reason: HOSPADM

## 2022-03-13 RX ORDER — ACETAMINOPHEN 650 MG/1
650 SUPPOSITORY RECTAL EVERY 4 HOURS PRN
Status: DISCONTINUED | OUTPATIENT
Start: 2022-03-13 | End: 2022-03-15 | Stop reason: HOSPADM

## 2022-03-13 RX ADMIN — FENTANYL CITRATE 50 MCG: 50 INJECTION, SOLUTION INTRAMUSCULAR; INTRAVENOUS at 05:02

## 2022-03-13 RX ADMIN — ATORVASTATIN CALCIUM 20 MG: 20 TABLET, FILM COATED ORAL at 17:34

## 2022-03-13 RX ADMIN — ONDANSETRON 4 MG: 2 INJECTION INTRAMUSCULAR; INTRAVENOUS at 01:52

## 2022-03-13 RX ADMIN — HYDROMORPHONE HYDROCHLORIDE 0.5 MG: 1 INJECTION, SOLUTION INTRAMUSCULAR; INTRAVENOUS; SUBCUTANEOUS at 13:08

## 2022-03-13 RX ADMIN — THIAMINE HYDROCHLORIDE 100 MG: 100 INJECTION, SOLUTION INTRAMUSCULAR; INTRAVENOUS at 20:15

## 2022-03-13 RX ADMIN — CYCLOBENZAPRINE 5 MG: 10 TABLET, FILM COATED ORAL at 20:34

## 2022-03-13 RX ADMIN — ACYCLOVIR 400 MG: 200 CAPSULE ORAL at 17:34

## 2022-03-13 RX ADMIN — LEVOTHYROXINE SODIUM 100 MCG: 0.1 TABLET ORAL at 17:34

## 2022-03-13 RX ADMIN — SODIUM CHLORIDE 100 ML/HR: 9 INJECTION, SOLUTION INTRAVENOUS at 05:24

## 2022-03-13 RX ADMIN — FENTANYL CITRATE 50 MCG: 50 INJECTION, SOLUTION INTRAMUSCULAR; INTRAVENOUS at 01:54

## 2022-03-13 RX ADMIN — HYDROCODONE BITARTRATE AND ACETAMINOPHEN 1 TABLET: 7.5; 325 TABLET ORAL at 20:34

## 2022-03-13 RX ADMIN — CHLORHEXIDINE GLUCONATE 5 ML: 1.2 RINSE ORAL at 17:34

## 2022-03-13 RX ADMIN — GABAPENTIN 200 MG: 100 CAPSULE ORAL at 13:08

## 2022-03-13 RX ADMIN — Medication 10 ML: at 21:00

## 2022-03-13 RX ADMIN — SODIUM CHLORIDE 100 ML/HR: 9 INJECTION, SOLUTION INTRAVENOUS at 13:07

## 2022-03-13 RX ADMIN — CYCLOBENZAPRINE 5 MG: 10 TABLET, FILM COATED ORAL at 17:34

## 2022-03-13 RX ADMIN — GABAPENTIN 200 MG: 100 CAPSULE ORAL at 20:34

## 2022-03-13 RX ADMIN — HYDROCODONE BITARTRATE AND ACETAMINOPHEN 1 TABLET: 7.5; 325 TABLET ORAL at 07:59

## 2022-03-13 RX ADMIN — SODIUM CHLORIDE 100 ML/HR: 9 INJECTION, SOLUTION INTRAVENOUS at 20:15

## 2022-03-13 NOTE — ED NOTES
Pt reports unbearable back pain in the lumbar region tonight. Pt has back pain for 2 months. Pt reports little relief with pain medications. Pt reports consuming 1/2 liter of alcohol tonight. A and o x4.     .This RN  in PPE for all patient interactions including face mask and goggles.

## 2022-03-13 NOTE — ED PROVIDER NOTES
The LUBA and I have discussed this patient's history, physical exam, and treatment plan.  I have reviewed the documentation and personally had a face to face interaction with the patient. I affirm the documentation and agree with the treatment and plan.  The attached note describes my personal findings.      I provided a substantive portion of the care of the patient.  I personally performed the physical exam in its entirety, and below are my findings.     Brief history of present illness: 67-year-old male who admits to regular heavy consumption of alcohol has had several falls at home over the last 2 months complains of worsening and now intractable low back pain.  He denies saddle anesthesia, incontinence, dysuria, hematuria, black or bloody stools.    EMR review reveals several visits for alcohol intoxication.  Patient was also seen February of 2022 for renal colic.  CT abdomen pelvis revealed no AAA.    Physical exam:    /61   Pulse 75   Temp 97.7 °F (36.5 °C) (Oral)   Resp 16   SpO2 98%       Physical Exam   Constitutional: No distress.  Nontoxic.  Evaluation consistent with alcohol intoxication with some slurring of speech  HENT:  Head: Normocephalic and atraumatic.   Oropharynx: Mucous membranes are moist.   Eyes: . No scleral icterus. No conjunctival pallor.  Neck: Normal range of motion. Neck supple.   Cardiovascular: Pink warm and well perfused throughout.    Pulmonary/Chest: No respiratory distress.  No tachypnea or increased work of breathing appreciated.    Abdominal: Soft. There is no tenderness. There is no rebound and no guarding.   Musculoskeletal: Moves all extremities equally.  L2-3-4 area discomfort with palpation with no external findings of trauma.  No CVA tenderness to percussion.  No obvious step-off.  Neurological: Alert and oriented.  No acute focal deficit appreciated.  Sensation intact bilateral feet with strong dorsalis pedis pulses noted.  DTRs 1+ bilateral patellar and  Achilles  Skin: Skin is pink, warm, and dry.   Psychiatric: Mood and affect normal.   Nursing note and vitals reviewed.        MDM: Agree with plan for laboratory and radiologic evaluation this patient to assess for source of discomfort.       Ky Langston MD  03/13/22 0150
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
## 2022-03-13 NOTE — H&P
Patient Name:  Pro Mueller  YOB: 1955  MRN:  [id number]  Admit Date:  3/13/2022  Patient Care Team:  Alla Beal MD as PCP - General (Family Medicine)  Say Coats MD (Psychiatry)      Subjective   History Present Illness     Chief Complaint   Patient presents with   • Back Pain     History of present illness:      Mr. Mueller is a 67 y.o.  with a history of alcoholism, anxiety/depression, hypothyroidism, hyperlipidemia, hypertension, urolithiasis and C5 cervical fracture that presents to [facility] complaining of severe lumbar pain.  He is followed by Dr. Mcnulty as an outpatient and was recently evaluated in the office several weeks ago and it was recommended that he start physical therapy for management.  He states he has been controlling his pain mostly with alcohol.  States he drinks 1/5 of vodka every 1 to 2 days takes tramadol and states his pain has been uncontrolled and he has had limited ability to perform ADLs.  EtOH level 227.  CT of the lumbar spine revealed no lumbar compression fractures or burst fractures.  There was mild multilevel degenerative disc disease and facet arthrosis throughout the lower lumbar spine.  L4-L5 demonstrate 4 mm posterior disc bulge and bilateral facet hypertrophy narrowing of the thecal sac.  Noted to have moderate to severe bilateral neural foraminal narrowing he has a history of multiple falls/syncope and has had several over the course the last few months.  He has a history of previous is C5 fracture and had been followed by neurosurgery but had been released late last year out of his cervical collar.  States the pain is gotten so severe that he felt he needed to come into the hospital for further evaluation and management.  He denies any incontinence, dysuria, hematuria, hematochezia, or melena.  Reports pain radiates around to lower abdomen.  Patient has a history of renal colic and underwent lithotripsy 2/11/2022.  BMP on  initial ED evaluation showed a mildly elevated glucose at 102.  Electrolytes were normal.  Creatinine was 0.67 with a normal BUN.  CBC on initial presentation was normal however after some volume resuscitation hemoglobin did trended down to 12 with a hematocrit of 36.8.  Platelet count is normal.    Patient states he is concerned about his safety at home.  He reports his roommate that lives in his home is allegedly having psychotic type behaviors with hallucinations and has had increasing agitation.  He is working with his  to try to evict her from his home but as of now does not feel safe in his home environment.  Has a sister who lives locally but provides full-time care to his aging father so he is unable to rely on her for support.        Review of Systems   Constitutional: Negative for chills and fever.   HENT: Negative for sore throat and trouble swallowing.    Respiratory: Negative for cough and shortness of breath.    Cardiovascular: Negative for chest pain and leg swelling.   Gastrointestinal: Positive for abdominal distention (Right upper quadrant) and abdominal pain (Reports intermittent right upper quadrant abdominal pain). Negative for blood in stool, constipation, diarrhea, nausea and vomiting.   Genitourinary: Negative for difficulty urinating, dysuria, hematuria and urgency.   Musculoskeletal: Positive for back pain and gait problem. Negative for neck pain.   Neurological: Positive for weakness. Negative for tremors and headaches.   Psychiatric/Behavioral: Negative for agitation and confusion.          Personal History     Past Medical History:   Diagnosis Date   • Alcohol abuse    • Alcohol withdrawal (HCC) 11/4/2016   • Alcoholic ketoacidosis 1/12/2020   • Allergic 1970   • Anxiety    • Arthritis    • Atopic rhinitis 8/8/2016   • Depression    • Disease of thyroid gland    • Elevated cholesterol    • Encounter for removal of sutures    • Genital herpes simplex 8/8/2016   • GERD  "(gastroesophageal reflux disease)    • Headache, tension-type    • Hyperlipidemia    • Hypertension    • Kidney stone    • Migraine    • Motion sickness    • Nephrolithiasis 2020   • Olecranon bursitis, right elbow    • Panic disorder without agoraphobia 2016   • Peripheral neuropathy    • Sleep apnea    • Syncope and collapse 2019   • Vitamin D deficiency 2016   • Withdrawal symptoms, alcohol (HCC)      Past Surgical History:   Procedure Laterality Date   • COLONOSCOPY     • CYST REMOVAL     • CYSTOSCOPY BLADDER STONE LITHOTRIPSY  2022   • EXTRACORPOREAL SHOCK WAVE LITHOTRIPSY (ESWL) Right    • SHOULDER ARTHROSCOPY Right 2019    Procedure: SHOULDER ARTHROSCOPY, decompression, distal clavicle excision;  Surgeon: Aj Mancilla MD;  Location: [facility] [place]  Service: Orthopedics   • TONSILLECTOMY       Family History   Problem Relation Age of Onset   • Alzheimer's disease Mother    • Mental illness Mother    • Pancreatic cancer Father    • Hearing loss Father    • Malig Hyperthermia Neg Hx      Social History     Tobacco Use   • Smoking status: Former Smoker     Packs/day: 0.50     Years: 15.00     Pack years: 7.50     Types: Cigarettes     Start date:      Quit date:      Years since quittin.2   • Smokeless tobacco: Never Used   • Tobacco comment: caffeine - 3 cans of coke daily    Vaping Use   • Vaping Use: Never used   Substance Use Topics   • Alcohol use: Yes     Alcohol/week: 7.0 standard drinks     Types: 7 Shots of liquor per week     Comment: 1/2 - 3/4 L per day of vodka   • Drug use: Yes     Types: Methamphetamines     Comment: \"once in a rare while\"     No current facility-administered medications on file prior to encounter.     Current Outpatient Medications on File Prior to Encounter   Medication Sig Dispense Refill   • acyclovir (ZOVIRAX) 200 MG capsule TAKE 2 CAPSULES BY MOUTH DAILY 180 capsule 1   • amLODIPine (NORVASC) 5 MG tablet TAKE 1 TABLET BY MOUTH " EVERY MORNING 90 tablet 1   • atorvastatin (LIPITOR) 20 MG tablet TAKE 1 TABLET BY MOUTH DAILY 90 tablet 1   • chlorhexidine (PERIDEX) 0.12 % solution Take 5 mL by mouth.     • folic acid (FOLVITE) 1 MG tablet Take 1 mg by mouth Daily.     • levothyroxine (SYNTHROID, LEVOTHROID) 100 MCG tablet Take 1 tablet by mouth Daily. 90 tablet 1   • lisinopril (PRINIVIL,ZESTRIL) 10 MG tablet TAKE 1 TABLET BY MOUTH DAILY 90 tablet 1   • traMADol (ULTRAM) 50 MG tablet TAKE 1 TABLET BY MOUTH EVERY 6 HOURS AS NEEDED FOR MODERATE PAIN 80 tablet 0   • zolpidem (AMBIEN) 10 MG tablet Take 10 mg by mouth every night at bedtime.     • HYDROcodone-acetaminophen (NORCO) 5-325 MG per tablet Take 1 tablet by mouth 2 (Two) Times a Day As Needed for Severe Pain . Do not drive or mix with alcohol 10 tablet 0   • nitrofurantoin, macrocrystal-monohydrate, (Macrobid) 100 MG capsule Take 1 capsule by mouth 2 (Two) Times a Day. 14 capsule 0     Allergies   Allergen Reactions   • Penicillins Anaphylaxis and Other (See Comments)     Seizure. childhood       Study Result    Narrative & Impression      Patient: HERB LUNA  Time Out: 05:13  Exam(s): CT L SPINE      EXAM:    CT Lumbar Spine Without Intravenous Contrast     CLINICAL HISTORY:     Reason for exam: R o Comp fx.     TECHNIQUE:    Axial computed tomography images of the lumbar spine without   intravenous contrast.  CTDI is 28.1 mGy and DLP is 1073.2 mGy-cm.  This   CT exam was performed according to the principle of ALARA (As Low As   Reasonably Achievable) by using one or more of the following dose   reduction techniques: automated exposure control, adjustment of the mA   and or kV according to patient size, and or use of iterative   reconstruction technique.     COMPARISON:    No relevant prior studies available.     FINDINGS:    Vertebrae:  The lumbar spine vertebral body heights are normally   maintained.  No compression fracture or burst fracture is seen.  Mild   multilevel degenerative  disc disease and facet arthrosis throughout the   lower lumbar spine.  No acute fracture or traumatic subluxation is seen.    Discs spinal canal neural foramina:  L4-5 demonstrates 4 mm broad-based   posterior disc bulge and bilateral facet hypertrophy narrowing the thecal   sac to 8 mm.  There is moderate to severe bilateral neural foraminal   narrowing.    Soft tissues:  Unremarkable.     IMPRESSION:       1.  The lumbar spine vertebral body heights are normally maintained.  No   compression fracture or burst fracture is seen.  2.  Mild multilevel degenerative disc disease and facet arthrosis   throughout the lower lumbar spine.  No acute fracture or traumatic   subluxation is seen.  3.  L4-5 demonstrates 4 mm broad-based posterior disc bulge and bilateral   facet hypertrophy narrowing the thecal sac to 8 mm.  There is moderate to   severe bilateral neural foraminal narrowing.     IMPRESSION:        Electronically signed by Dutch Chu MD on 03-13-22 at 0513     Imaging    CT Lumbar Spine Without Contrast (Order: [id number]) - 3/13/2022    Result History    CT Lumbar Spine Without Contrast (Order #[id number]) on 3/13/2022 - Order Result History Report        Objective    Objective     Vital Signs  Temp:  [97.7 °F (36.5 °C)-98 °F (36.7 °C)] 98 °F (36.7 °C)  Heart Rate:  [70-75] 70  Resp:  [16] 16  BP: (103-126)/(61-73) 120/72  SpO2:  [95 %-98 %] 98 %  on   ;   Device (Oxygen Therapy): room air  Body mass index is 24.55 kg/m².    Physical Exam  Vitals and nursing note reviewed.   Constitutional:       General: He is not in acute distress.     Appearance: Normal appearance. He is ill-appearing (Chronically ill-appearing).   HENT:      Mouth/Throat:      Mouth: Mucous membranes are moist.      Pharynx: Oropharynx is clear.   Eyes:      General: No scleral icterus.     Conjunctiva/sclera: Conjunctivae normal.   Cardiovascular:      Rate and Rhythm: Normal rate and regular rhythm.      Pulses: Normal pulses.      Heart  sounds: Normal heart sounds.   Pulmonary:      Effort: Pulmonary effort is normal.      Breath sounds: Normal breath sounds.   Abdominal:      General: Bowel sounds are normal. There is no distension.      Palpations: Abdomen is soft.      Tenderness: There is abdominal tenderness (Mild right upper quadrant abdominal pain.). There is no guarding.   Musculoskeletal:         General: Tenderness (lower lumbar tenderness ) present.      Right lower leg: No edema.      Left lower leg: No edema.   Skin:     General: Skin is warm and dry.      Capillary Refill: Capillary refill takes less than 2 seconds.   Neurological:      General: No focal deficit present.      Mental Status: He is alert and oriented to person, place, and time. Mental status is at baseline.      Motor: Weakness (Generalized weakness) present.      Comments: Nonfocal on exam   Psychiatric:      Comments: Flat affect bit anxious.            Results Review:  I reviewed the patient's new clinical results.  I reviewed the patient's new imaging results and agree with the interpretation.  I reviewed the patient's other test results and agree with the interpretation  I personally viewed and interpreted the patient's EKG/Telemetry data  Discussed with ED provider.    Lab Results (last 24 hours)     Procedure Component Value Units Date/Time    Ethanol [[id number]]  (Abnormal) Collected: 03/13/22 0157    Specimen: Blood Updated: 03/13/22 0336     Ethanol 227 mg/dL      Ethanol % 0.227 %     CBC & Differential [[id number]]  (Abnormal) Collected: 03/13/22 0157    Specimen: Blood Updated: 03/13/22 0308    Narrative:      The following orders were created for panel order CBC & Differential.  Procedure                               Abnormality         Status                     ---------                               -----------         ------                     CBC Auto Differential[[id number]]        Abnormal            Final result                 Please view results  for these tests on the individual orders.    Basic Metabolic Panel [[id number]]  (Abnormal) Collected: 03/13/22 0157    Specimen: Blood Updated: 03/13/22 0336     Glucose 84 mg/dL      BUN 9 mg/dL      Creatinine 0.71 mg/dL      Sodium 142 mmol/L      Potassium 3.3 mmol/L      Chloride 102 mmol/L      CO2 25.0 mmol/L      Calcium 9.0 mg/dL      BUN/Creatinine Ratio 12.7     Anion Gap 15.0 mmol/L      eGFR 101.8 mL/min/1.73      Comment: National Kidney Foundation and American Society of Nephrology (ASN) Task Force recommended calculation based on the Chronic Kidney Disease Epidemiology Collaboration (CKD-EPI) equation refit without adjustment for race.       Narrative:      GFR Normal >60  Chronic Kidney Disease <60  Kidney Failure <15      CBC Auto Differential [[id number]]  (Abnormal) Collected: 03/13/22 0157    Specimen: Blood Updated: 03/13/22 0308     WBC 7.18 10*3/mm3      RBC 4.28 10*6/mm3      Hemoglobin 13.4 g/dL      Hematocrit 38.5 %      MCV 90.0 fL      MCH 31.3 pg      MCHC 34.8 g/dL      RDW 13.4 %      RDW-SD 43.5 fl      MPV 9.0 fL      Platelets 347 10*3/mm3      Neutrophil % 42.2 %      Lymphocyte % 43.0 %      Monocyte % 11.4 %      Eosinophil % 2.5 %      Basophil % 0.6 %      Immature Grans % 0.3 %      Neutrophils, Absolute 3.03 10*3/mm3      Lymphocytes, Absolute 3.09 10*3/mm3      Monocytes, Absolute 0.82 10*3/mm3      Eosinophils, Absolute 0.18 10*3/mm3      Basophils, Absolute 0.04 10*3/mm3      Immature Grans, Absolute 0.02 10*3/mm3      nRBC 0.0 /100 WBC     Urine Drug Screen - Urine, Clean Catch [[id number]]  (Abnormal) Collected: 03/13/22 0300    Specimen: Urine, Clean Catch Updated: 03/13/22 0356     Amphet/Methamphet, Screen Positive     Barbiturates Screen, Urine Negative     Benzodiazepine Screen, Urine Negative     Cocaine Screen, Urine Negative     Opiate Screen Negative     THC, Screen, Urine Negative     Methadone Screen, Urine Negative     Oxycodone Screen, Urine Negative     Narrative:      Negative Thresholds Per Drugs Screened:    Amphetamines                 500 ng/ml  Barbiturates                 200 ng/ml  Benzodiazepines              100 ng/ml  Cocaine                      300 ng/ml  Methadone                    300 ng/ml  Opiates                      300 ng/ml  Oxycodone                    100 ng/ml  THC                           50 ng/ml    The Normal Value for all drugs tested is negative. This report includes final unconfirmed screening results to be used for medical treatment purposes only. Unconfirmed results must not be used for non-medical purposes such as employment or legal testing. Clinical consideration should be applied to any drug of abuse test, particularly when unconfirmed results are used.            COVID PRE-OP / PRE-PROCEDURE SCREENING ORDER (NO ISOLATION) - Swab, Nasopharynx [[id number]]  (Normal) Collected: 03/13/22 0339    Specimen: Swab from Nasopharynx Updated: 03/13/22 0425    Narrative:      The following orders were created for panel order COVID PRE-OP / PRE-PROCEDURE SCREENING ORDER (NO ISOLATION) - Swab, Nasopharynx.  Procedure                               Abnormality         Status                     ---------                               -----------         ------                     COVID-19,[facility]-HOUSE...[[id number]]  Normal              Final result                 Please view results for these tests on the individual orders.    COVID-19,[facility] IN-HOUSE CEPHEID/JUD NP SWAB IN TRANSPORT MEDIA 8-12 HR TAT - Swab, Nasopharynx [[id number]]  (Normal) Collected: 03/13/22 0339    Specimen: Swab from Nasopharynx Updated: 03/13/22 0425     COVID19 Not Detected    Narrative:      Fact sheet for providers: https://www.fda.gov/media/631378/download     Fact sheet for patients: https://www.fda.gov/media/912003/download    Basic Metabolic Panel [[id number]]  (Abnormal) Collected: 03/13/22 0619    Specimen: Blood Updated: 03/13/22 0706     Glucose 102 mg/dL       BUN 8 mg/dL      Creatinine 0.67 mg/dL      Sodium 139 mmol/L      Potassium 3.6 mmol/L      Chloride 104 mmol/L      CO2 22.5 mmol/L      Calcium 8.6 mg/dL      BUN/Creatinine Ratio 11.9     Anion Gap 12.5 mmol/L      eGFR 103.6 mL/min/1.73      Comment: National Kidney Foundation and American Society of Nephrology (ASN) Task Force recommended calculation based on the Chronic Kidney Disease Epidemiology Collaboration (CKD-EPI) equation refit without adjustment for race.       Narrative:      GFR Normal >60  Chronic Kidney Disease <60  Kidney Failure <15      CBC (No Diff) [[id number]]  (Abnormal) Collected: 03/13/22 0619    Specimen: Blood Updated: 03/13/22 0636     WBC 4.91 10*3/mm3      RBC 3.94 10*6/mm3      Hemoglobin 12.0 g/dL      Hematocrit 35.8 %      MCV 90.9 fL      MCH 30.5 pg      MCHC 33.5 g/dL      RDW 13.6 %      RDW-SD 45.5 fl      MPV 8.9 fL      Platelets 281 10*3/mm3           Imaging Results (Last 24 Hours)     Procedure Component Value Units Date/Time    CT Lumbar Spine Without Contrast [[id number]] Collected: 03/13/22 0513     Updated: 03/13/22 0513    Narrative:        Patient: HERB LUNA  Time Out: 05:13  Exam(s): CT L SPINE     EXAM:    CT Lumbar Spine Without Intravenous Contrast    CLINICAL HISTORY:     Reason for exam: R o Comp fx.    TECHNIQUE:    Axial computed tomography images of the lumbar spine without   intravenous contrast.  CTDI is 28.1 mGy and DLP is 1073.2 mGy-cm.  This   CT exam was performed according to the principle of ALARA (As Low As   Reasonably Achievable) by using one or more of the following dose   reduction techniques: automated exposure control, adjustment of the mA   and or kV according to patient size, and or use of iterative   reconstruction technique.    COMPARISON:    No relevant prior studies available.    FINDINGS:    Vertebrae:  The lumbar spine vertebral body heights are normally   maintained.  No compression fracture or burst fracture is seen.  Mild    multilevel degenerative disc disease and facet arthrosis throughout the   lower lumbar spine.  No acute fracture or traumatic subluxation is seen.    Discs spinal canal neural foramina:  L4-5 demonstrates 4 mm broad-based   posterior disc bulge and bilateral facet hypertrophy narrowing the thecal   sac to 8 mm.  There is moderate to severe bilateral neural foraminal   narrowing.    Soft tissues:  Unremarkable.    IMPRESSION:       1.  The lumbar spine vertebral body heights are normally maintained.  No   compression fracture or burst fracture is seen.  2.  Mild multilevel degenerative disc disease and facet arthrosis   throughout the lower lumbar spine.  No acute fracture or traumatic   subluxation is seen.  3.  L4-5 demonstrates 4 mm broad-based posterior disc bulge and bilateral   facet hypertrophy narrowing the thecal sac to 8 mm.  There is moderate to   severe bilateral neural foraminal narrowing.      Impression:          Electronically signed by Dutch Chu MD on 03-13-22 at 0513          Results for orders placed during the hospital encounter of 01/07/22    Adult Transthoracic Echo Complete W/ Cont if Necessary Per Protocol    Interpretation Summary  · Estimated left ventricular EF = 67% Left ventricular systolic function is normal.  · Left ventricular diastolic function was normal.  · Mild dilation of the aortic root is present.      No orders to display        Assessment/Plan     Active Hospital Problems    Diagnosis  POA   • Alcoholic intoxication with complication (HCC) [F10.929]  Yes   • Chronic low back pain [M54.50, G89.29]  Yes   • Hypertension [I10]  Yes   • Hyperlipidemia [E78.5]  Yes   • Hypothyroidism [E03.9]  Yes   • Genital herpes simplex [A60.00]  Yes   • Mixed anxiety depressive disorder [F41.8]  Yes      Resolved Hospital Problems   No resolved problems to display.       Mr. Mueller is a 67 y.o.   with a history of alcoholism, anxiety/depression, hypothyroidism, hyperlipidemia,  hypertension, urolithiasis and C5 cervical fracture that presents to [facility] complaining of severe intractable lumbar pain.     Alcohol intoxication with complication  -He has been placed on CIWA protocol.  Patient drinks 1/5 of vodka straight approximately every 1 to 2 days.  Has a longstanding history of alcoholism.  -He has no desire to quit  -We will ask access to see  -B12, folate, thiamine supplementation has been initiated    Chronic low back pain  -Dr. Mcnulty has been consulted await recommendations  -We will start low-dose Flexeril, once daily low-dose Neurontin, and pain control.    Hypertension/Hyperlipidemia  -Blood pressures appear well controlled  -Continue home atorvastatin, amlodipine, and lisinopril    Hypothyroidism  -continue home levothyroxine  -Last TSH noted 11/10/2020.  He does have some generalized weakness.  We will just screen check a TSH with reflex free T4 does not appear that he is very consistent with outpatient follow-up.    Herpes simplex -continue home acyclovir    Mixed anxiety depressive disorder  -Does not appear that he is on any type of outpatient medication management for this based on home medication review.      I discussed the patient's findings and my recommendations with Dr. Jackson and patient    VTE Prophylaxis -SCDs  Code Status -full code full support.       BERONICA Woods  [facility]  3/13/22

## 2022-03-14 LAB
ALBUMIN SERPL-MCNC: 3.6 G/DL (ref 3.5–5.2)
ALBUMIN/GLOB SERPL: 1.6 G/DL
ALP SERPL-CCNC: 70 U/L (ref 39–117)
ALT SERPL W P-5'-P-CCNC: 18 U/L (ref 1–41)
ANION GAP SERPL CALCULATED.3IONS-SCNC: 10.4 MMOL/L (ref 5–15)
AST SERPL-CCNC: 30 U/L (ref 1–40)
BILIRUB SERPL-MCNC: 0.4 MG/DL (ref 0–1.2)
BUN SERPL-MCNC: 8 MG/DL (ref 8–23)
BUN/CREAT SERPL: 10.5 (ref 7–25)
CALCIUM SPEC-SCNC: 8.6 MG/DL (ref 8.6–10.5)
CHLORIDE SERPL-SCNC: 105 MMOL/L (ref 98–107)
CO2 SERPL-SCNC: 26.6 MMOL/L (ref 22–29)
CREAT SERPL-MCNC: 0.76 MG/DL (ref 0.76–1.27)
DEPRECATED RDW RBC AUTO: 46.5 FL (ref 37–54)
EGFRCR SERPLBLD CKD-EPI 2021: 99.7 ML/MIN/1.73
ERYTHROCYTE [DISTWIDTH] IN BLOOD BY AUTOMATED COUNT: 13.8 % (ref 12.3–15.4)
GLOBULIN UR ELPH-MCNC: 2.3 GM/DL
GLUCOSE SERPL-MCNC: 100 MG/DL (ref 65–99)
HCT VFR BLD AUTO: 34.4 % (ref 37.5–51)
HGB BLD-MCNC: 11.6 G/DL (ref 13–17.7)
MCH RBC QN AUTO: 30.9 PG (ref 26.6–33)
MCHC RBC AUTO-ENTMCNC: 33.7 G/DL (ref 31.5–35.7)
MCV RBC AUTO: 91.7 FL (ref 79–97)
PLATELET # BLD AUTO: 251 10*3/MM3 (ref 140–450)
PMV BLD AUTO: 8.7 FL (ref 6–12)
POTASSIUM SERPL-SCNC: 3.9 MMOL/L (ref 3.5–5.2)
PROT SERPL-MCNC: 5.9 G/DL (ref 6–8.5)
RBC # BLD AUTO: 3.75 10*6/MM3 (ref 4.14–5.8)
SODIUM SERPL-SCNC: 142 MMOL/L (ref 136–145)
WBC NRBC COR # BLD: 5.29 10*3/MM3 (ref 3.4–10.8)

## 2022-03-14 PROCEDURE — 80053 COMPREHEN METABOLIC PANEL: CPT | Performed by: NURSE PRACTITIONER

## 2022-03-14 PROCEDURE — 99214 OFFICE O/P EST MOD 30 MIN: CPT | Performed by: ORTHOPAEDIC SURGERY

## 2022-03-14 PROCEDURE — 96372 THER/PROPH/DIAG INJ SC/IM: CPT

## 2022-03-14 PROCEDURE — G0378 HOSPITAL OBSERVATION PER HR: HCPCS

## 2022-03-14 PROCEDURE — 25010000002 HYDROMORPHONE PER 4 MG: Performed by: NURSE PRACTITIONER

## 2022-03-14 PROCEDURE — 96361 HYDRATE IV INFUSION ADD-ON: CPT

## 2022-03-14 PROCEDURE — 25010000002 ENOXAPARIN PER 10 MG: Performed by: HOSPITALIST

## 2022-03-14 PROCEDURE — 96376 TX/PRO/DX INJ SAME DRUG ADON: CPT

## 2022-03-14 PROCEDURE — 85027 COMPLETE CBC AUTOMATED: CPT | Performed by: NURSE PRACTITIONER

## 2022-03-14 PROCEDURE — 97162 PT EVAL MOD COMPLEX 30 MIN: CPT

## 2022-03-14 RX ORDER — GABAPENTIN 300 MG/1
300 CAPSULE ORAL 2 TIMES DAILY
Status: DISCONTINUED | OUTPATIENT
Start: 2022-03-14 | End: 2022-03-15 | Stop reason: HOSPADM

## 2022-03-14 RX ADMIN — LEVOTHYROXINE SODIUM 100 MCG: 0.1 TABLET ORAL at 06:33

## 2022-03-14 RX ADMIN — CYCLOBENZAPRINE 5 MG: 10 TABLET, FILM COATED ORAL at 09:03

## 2022-03-14 RX ADMIN — HYDROMORPHONE HYDROCHLORIDE 0.5 MG: 1 INJECTION, SOLUTION INTRAMUSCULAR; INTRAVENOUS; SUBCUTANEOUS at 23:38

## 2022-03-14 RX ADMIN — ACYCLOVIR 400 MG: 200 CAPSULE ORAL at 09:03

## 2022-03-14 RX ADMIN — Medication 1 TABLET: at 09:02

## 2022-03-14 RX ADMIN — Medication 100 MG: at 09:02

## 2022-03-14 RX ADMIN — HYDROCODONE BITARTRATE AND ACETAMINOPHEN 1 TABLET: 7.5; 325 TABLET ORAL at 18:26

## 2022-03-14 RX ADMIN — GABAPENTIN 200 MG: 100 CAPSULE ORAL at 09:03

## 2022-03-14 RX ADMIN — SODIUM CHLORIDE 100 ML/HR: 9 INJECTION, SOLUTION INTRAVENOUS at 06:34

## 2022-03-14 RX ADMIN — Medication 10 ML: at 21:00

## 2022-03-14 RX ADMIN — HYDROCODONE BITARTRATE AND ACETAMINOPHEN 1 TABLET: 7.5; 325 TABLET ORAL at 09:03

## 2022-03-14 RX ADMIN — FOLIC ACID 1 MG: 1 TABLET ORAL at 09:02

## 2022-03-14 RX ADMIN — ATORVASTATIN CALCIUM 20 MG: 20 TABLET, FILM COATED ORAL at 09:02

## 2022-03-14 RX ADMIN — AMLODIPINE BESYLATE 5 MG: 5 TABLET ORAL at 06:33

## 2022-03-14 RX ADMIN — GABAPENTIN 300 MG: 300 CAPSULE ORAL at 21:00

## 2022-03-14 RX ADMIN — LISINOPRIL 10 MG: 10 TABLET ORAL at 09:02

## 2022-03-14 RX ADMIN — CYCLOBENZAPRINE 5 MG: 10 TABLET, FILM COATED ORAL at 18:19

## 2022-03-14 RX ADMIN — ENOXAPARIN SODIUM 40 MG: 100 INJECTION SUBCUTANEOUS at 18:19

## 2022-03-14 RX ADMIN — CYCLOBENZAPRINE 5 MG: 10 TABLET, FILM COATED ORAL at 21:00

## 2022-03-14 RX ADMIN — Medication 10 ML: at 09:06

## 2022-03-14 RX ADMIN — HYDROMORPHONE HYDROCHLORIDE 0.5 MG: 1 INJECTION, SOLUTION INTRAMUSCULAR; INTRAVENOUS; SUBCUTANEOUS at 13:37
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
## 2022-03-14 NOTE — CONSULTS
Inpatient Orthopedic Surgery Consult  Consult performed by: Yohan Mcnulty MD  Consult ordered by: Nasreen Cruz APRN        Orthopedic Consult      Patient: Pro Mueller    Date of Admission: 3/13/2022 12:55 AM    YOB: 1955    Medical Record Number: [id number]    Attending Physician: Esdras Jackson MD    Consulting Physician: Esdras Jackson MD      Chief Complaints: Low back pain      History of Present Illness: 67 y.o. male admitted to [facility] to services of Esdras Jackson MD with neck pain, thoracic pain, but mostly low back pain.  I saw him for all of these complaints in my office on February 22.  The pain is axial, nonradiating and not associated with bowel or bladder complaints.  He has had multiple falls but states that not all of them have been related to alcoholism.  He fell last summer breaking his neck with a minor C5 fracture that was treated by Dr. Cervantes.  He started physical therapy at my recommendation, about a week ago but only was able to attend once.  He states that the pain became overwhelming so he came to the ER.  Here he was noted to have elevated alcohol and amphetamine levels.      Allergies:   Allergies   Allergen Reactions   • Penicillins Anaphylaxis and Other (See Comments)     Seizure. childhood       Medications:   Home Medications:  No current facility-administered medications on file prior to encounter.     Current Outpatient Medications on File Prior to Encounter   Medication Sig   • acyclovir (ZOVIRAX) 200 MG capsule TAKE 2 CAPSULES BY MOUTH DAILY   • amLODIPine (NORVASC) 5 MG tablet TAKE 1 TABLET BY MOUTH EVERY MORNING   • atorvastatin (LIPITOR) 20 MG tablet TAKE 1 TABLET BY MOUTH DAILY   • chlorhexidine (PERIDEX) 0.12 % solution Take 5 mL by mouth.   • folic acid (FOLVITE) 1 MG tablet Take 1 mg by mouth Daily.   • levothyroxine (SYNTHROID, LEVOTHROID) 100 MCG tablet Take 1 tablet by mouth Daily.   • lisinopril (PRINIVIL,ZESTRIL) 10  MG tablet TAKE 1 TABLET BY MOUTH DAILY   • traMADol (ULTRAM) 50 MG tablet TAKE 1 TABLET BY MOUTH EVERY 6 HOURS AS NEEDED FOR MODERATE PAIN   • zolpidem (AMBIEN) 10 MG tablet Take 10 mg by mouth every night at bedtime.   • HYDROcodone-acetaminophen (NORCO) 5-325 MG per tablet Take 1 tablet by mouth 2 (Two) Times a Day As Needed for Severe Pain . Do not drive or mix with alcohol   • nitrofurantoin, macrocrystal-monohydrate, (Macrobid) 100 MG capsule Take 1 capsule by mouth 2 (Two) Times a Day.     Current Medications:  Scheduled Meds:acyclovir, 400 mg, Oral, Daily  amLODIPine, 5 mg, Oral, QAM  atorvastatin, 20 mg, Oral, Daily  chlorhexidine, 5 mL, Mouth/Throat, Daily  cyclobenzaprine, 5 mg, Oral, TID  thiamine, 100 mg, Oral, Daily   And  multivitamin, 1 tablet, Oral, Daily   And  folic acid, 1 mg, Oral, Daily  gabapentin, 200 mg, Oral, BID  levothyroxine, 100 mcg, Oral, Q AM  lisinopril, 10 mg, Oral, Daily  sodium chloride, 10 mL, Intravenous, Q12H  sodium chloride, 10 mL, Intravenous, Q12H      Continuous Infusions:sodium chloride, 100 mL/hr, Last Rate: 100 mL/hr (03/14/22 0634)      PRN Meds:.•  acetaminophen **OR** acetaminophen **OR** acetaminophen  •  calcium carbonate  •  HYDROcodone-acetaminophen  •  HYDROmorphone  •  LORazepam **OR** LORazepam **OR** LORazepam **OR** LORazepam **OR** LORazepam **OR** LORazepam  •  nitroglycerin  •  ondansetron **OR** ondansetron  •  potassium chloride **OR** potassium chloride **OR** potassium chloride  •  sodium chloride  •  sodium chloride    Past Medical History:   Diagnosis Date   • Alcohol abuse    • Alcohol withdrawal ([facility]) 11/4/2016   • Alcoholic ketoacidosis 1/12/2020   • Allergic 1970   • Anxiety    • Arthritis    • Atopic rhinitis 8/8/2016   • Depression    • Disease of thyroid gland    • Elevated cholesterol    • Encounter for removal of sutures    • Genital herpes simplex 8/8/2016   • GERD (gastroesophageal reflux disease)    • Headache, tension-type    •  "Hyperlipidemia    • Hypertension    • Kidney stone    • Migraine    • Motion sickness    • Nephrolithiasis 2020   • Olecranon bursitis, right elbow    • Panic disorder without agoraphobia 2016   • Peripheral neuropathy    • Sleep apnea    • Syncope and collapse 2019   • Vitamin D deficiency 2016   • Withdrawal symptoms, alcohol (HCC)      Past Surgical History:   Procedure Laterality Date   • COLONOSCOPY     • CYST REMOVAL     • CYSTOSCOPY BLADDER STONE LITHOTRIPSY  2022   • EXTRACORPOREAL SHOCK WAVE LITHOTRIPSY (ESWL) Right    • SHOULDER ARTHROSCOPY Right 2019    Procedure: SHOULDER ARTHROSCOPY, decompression, distal clavicle excision;  Surgeon: Aj Mancilla MD;  [place] [facility] [place]  Service: Orthopedics   • TONSILLECTOMY       Social History     Occupational History   • Not on file   Tobacco Use   • Smoking status: Former Smoker     Packs/day: 0.50     Years: 15.00     Pack years: 7.50     Types: Cigarettes     Start date:      Quit date:      Years since quittin.2   • Smokeless tobacco: Never Used   • Tobacco comment: caffeine - 3 cans of coke daily    Vaping Use   • Vaping Use: Never used   Substance and Sexual Activity   • Alcohol use: Yes     Alcohol/week: 7.0 standard drinks     Types: 7 Shots of liquor per week     Comment: 1/2 - 3/4 L per day of vodka   • Drug use: Yes     Types: Methamphetamines     Comment: \"once in a rare while\"   • Sexual activity: Yes     Partners: Female     Birth control/protection: Condom      Social History     Social History Narrative   • Not on file     Family History   Problem Relation Age of Onset   • Alzheimer's disease Mother    • Mental illness Mother    • Pancreatic cancer Father    • Hearing loss Father    • Malig Hyperthermia Neg Hx          Review of Systems:   I pertinent as above  Physical Exam: 67 y.o. male    General:  Awake, alert. No acute distress.    The neck is nontender to palpation supple and mobile.  The " thoracic spine is mildly tender diffusely with no focal abnormalities.  Mild lumbar tenderness to palpation.  Good strength in the legs bilaterally and sensation appears grossly intact.  Did not have him leave the bed for further testing.    Diagnostic Tests:    Admission on 03/13/2022   Component Date Value Ref Range Status   • Ethanol 03/13/2022 227 (A) 0 - 10 mg/dL Final   • Ethanol % 03/13/2022 0.227  % Final   • Amphet/Methamphet, Screen 03/13/2022 Positive (A) Negative Final   • Barbiturates Screen, Urine 03/13/2022 Negative  Negative Final   • Benzodiazepine Screen, Urine 03/13/2022 Negative  Negative Final   • Cocaine Screen, Urine 03/13/2022 Negative  Negative Final   • Opiate Screen 03/13/2022 Negative  Negative Final   • THC, Screen, Urine 03/13/2022 Negative  Negative Final   • Methadone Screen, Urine 03/13/2022 Negative  Negative Final   • Oxycodone Screen, Urine 03/13/2022 Negative  Negative Final   • Glucose 03/13/2022 84  65 - 99 mg/dL Final   • BUN 03/13/2022 9  8 - 23 mg/dL Final   • Creatinine 03/13/2022 0.71 (A) 0.76 - 1.27 mg/dL Final   • Sodium 03/13/2022 142  136 - 145 mmol/L Final   • Potassium 03/13/2022 3.3 (A) 3.5 - 5.2 mmol/L Final   • Chloride 03/13/2022 102  98 - 107 mmol/L Final   • CO2 03/13/2022 25.0  22.0 - 29.0 mmol/L Final   • Calcium 03/13/2022 9.0  8.6 - 10.5 mg/dL Final   • BUN/Creatinine Ratio 03/13/2022 12.7  7.0 - 25.0 Final   • Anion Gap 03/13/2022 15.0  5.0 - 15.0 mmol/L Final   • eGFR 03/13/2022 101.8  >60.0 mL/min/1.73 Final    National Kidney Foundation and American Society of Nephrology (ASN) Task Force recommended calculation based on the Chronic Kidney Disease Epidemiology Collaboration (CKD-EPI) equation refit without adjustment for race.   • WBC 03/13/2022 7.18  3.40 - 10.80 10*3/mm3 Final   • RBC 03/13/2022 4.28  4.14 - 5.80 10*6/mm3 Final   • Hemoglobin 03/13/2022 13.4  13.0 - 17.7 g/dL Final   • Hematocrit 03/13/2022 38.5  37.5 - 51.0 % Final   • MCV 03/13/2022  90.0  79.0 - 97.0 fL Final   • MCH 03/13/2022 31.3  26.6 - 33.0 pg Final   • MCHC 03/13/2022 34.8  31.5 - 35.7 g/dL Final   • RDW 03/13/2022 13.4  12.3 - 15.4 % Final   • RDW-SD 03/13/2022 43.5  37.0 - 54.0 fl Final   • MPV 03/13/2022 9.0  6.0 - 12.0 fL Final   • Platelets 03/13/2022 347  140 - 450 10*3/mm3 Final   • Neutrophil % 03/13/2022 42.2 (A) 42.7 - 76.0 % Final   • Lymphocyte % 03/13/2022 43.0  19.6 - 45.3 % Final   • Monocyte % 03/13/2022 11.4  5.0 - 12.0 % Final   • Eosinophil % 03/13/2022 2.5  0.3 - 6.2 % Final   • Basophil % 03/13/2022 0.6  0.0 - 1.5 % Final   • Immature Grans % 03/13/2022 0.3  0.0 - 0.5 % Final   • Neutrophils, Absolute 03/13/2022 3.03  1.70 - 7.00 10*3/mm3 Final   • Lymphocytes, Absolute 03/13/2022 3.09  0.70 - 3.10 10*3/mm3 Final   • Monocytes, Absolute 03/13/2022 0.82  0.10 - 0.90 10*3/mm3 Final   • Eosinophils, Absolute 03/13/2022 0.18  0.00 - 0.40 10*3/mm3 Final   • Basophils, Absolute 03/13/2022 0.04  0.00 - 0.20 10*3/mm3 Final   • Immature Grans, Absolute 03/13/2022 0.02  0.00 - 0.05 10*3/mm3 Final   • nRBC 03/13/2022 0.0  0.0 - 0.2 /100 WBC Final   • COVID19 03/13/2022 Not Detected  Not Detected - Ref. Range Final   • Glucose 03/13/2022 102 (A) 65 - 99 mg/dL Final   • BUN 03/13/2022 8  8 - 23 mg/dL Final   • Creatinine 03/13/2022 0.67 (A) 0.76 - 1.27 mg/dL Final   • Sodium 03/13/2022 139  136 - 145 mmol/L Final   • Potassium 03/13/2022 3.6  3.5 - 5.2 mmol/L Final   • Chloride 03/13/2022 104  98 - 107 mmol/L Final   • CO2 03/13/2022 22.5  22.0 - 29.0 mmol/L Final   • Calcium 03/13/2022 8.6  8.6 - 10.5 mg/dL Final   • BUN/Creatinine Ratio 03/13/2022 11.9  7.0 - 25.0 Final   • Anion Gap 03/13/2022 12.5  5.0 - 15.0 mmol/L Final   • eGFR 03/13/2022 103.6  >60.0 mL/min/1.73 Final    National Kidney Foundation and American Society of Nephrology (ASN) Task Force recommended calculation based on the Chronic Kidney Disease Epidemiology Collaboration (CKD-EPI) equation refit without  adjustment for race.   • WBC 03/13/2022 4.91  3.40 - 10.80 10*3/mm3 Final   • RBC 03/13/2022 3.94 (A) 4.14 - 5.80 10*6/mm3 Final   • Hemoglobin 03/13/2022 12.0 (A) 13.0 - 17.7 g/dL Final   • Hematocrit 03/13/2022 35.8 (A) 37.5 - 51.0 % Final   • MCV 03/13/2022 90.9  79.0 - 97.0 fL Final   • MCH 03/13/2022 30.5  26.6 - 33.0 pg Final   • MCHC 03/13/2022 33.5  31.5 - 35.7 g/dL Final   • RDW 03/13/2022 13.6  12.3 - 15.4 % Final   • RDW-SD 03/13/2022 45.5  37.0 - 54.0 fl Final   • MPV 03/13/2022 8.9  6.0 - 12.0 fL Final   • Platelets 03/13/2022 281  140 - 450 10*3/mm3 Final   • TSH 03/13/2022 3.120  0.270 - 4.200 uIU/mL Final   • Glucose 03/14/2022 100 (A) 65 - 99 mg/dL Final   • BUN 03/14/2022 8  8 - 23 mg/dL Final   • Creatinine 03/14/2022 0.76  0.76 - 1.27 mg/dL Final   • Sodium 03/14/2022 142  136 - 145 mmol/L Final   • Potassium 03/14/2022 3.9  3.5 - 5.2 mmol/L Final   • Chloride 03/14/2022 105  98 - 107 mmol/L Final   • CO2 03/14/2022 26.6  22.0 - 29.0 mmol/L Final   • Calcium 03/14/2022 8.6  8.6 - 10.5 mg/dL Final   • Total Protein 03/14/2022 5.9 (A) 6.0 - 8.5 g/dL Final   • Albumin 03/14/2022 3.60  3.50 - 5.20 g/dL Final   • ALT (SGPT) 03/14/2022 18  1 - 41 U/L Final   • AST (SGOT) 03/14/2022 30  1 - 40 U/L Final   • Alkaline Phosphatase 03/14/2022 70  39 - 117 U/L Final   • Total Bilirubin 03/14/2022 0.4  0.0 - 1.2 mg/dL Final   • Globulin 03/14/2022 2.3  gm/dL Final   • A/G Ratio 03/14/2022 1.6  g/dL Final   • BUN/Creatinine Ratio 03/14/2022 10.5  7.0 - 25.0 Final   • Anion Gap 03/14/2022 10.4  5.0 - 15.0 mmol/L Final   • eGFR 03/14/2022 99.7  >60.0 mL/min/1.73 Final    National Kidney Foundation and American Society of Nephrology (ASN) Task Force recommended calculation based on the Chronic Kidney Disease Epidemiology Collaboration (CKD-EPI) equation refit without adjustment for race.   • WBC 03/14/2022 5.29  3.40 - 10.80 10*3/mm3 Final   • RBC 03/14/2022 3.75 (A) 4.14 - 5.80 10*6/mm3 Final   • Hemoglobin  03/14/2022 11.6 (A) 13.0 - 17.7 g/dL Final   • Hematocrit 03/14/2022 34.4 (A) 37.5 - 51.0 % Final   • MCV 03/14/2022 91.7  79.0 - 97.0 fL Final   • MCH 03/14/2022 30.9  26.6 - 33.0 pg Final   • MCHC 03/14/2022 33.7  31.5 - 35.7 g/dL Final   • RDW 03/14/2022 13.8  12.3 - 15.4 % Final   • RDW-SD 03/14/2022 46.5  37.0 - 54.0 fl Final   • MPV 03/14/2022 8.7  6.0 - 12.0 fL Final   • Platelets 03/14/2022 251  140 - 450 10*3/mm3 Final     Lab Results (last 24 hours)     Procedure Component Value Units Date/Time    Comprehensive Metabolic Panel [[id number]]  (Abnormal) Collected: 03/14/22 0610    Specimen: Blood Updated: 03/14/22 0638     Glucose 100 mg/dL      BUN 8 mg/dL      Creatinine 0.76 mg/dL      Sodium 142 mmol/L      Potassium 3.9 mmol/L      Chloride 105 mmol/L      CO2 26.6 mmol/L      Calcium 8.6 mg/dL      Total Protein 5.9 g/dL      Albumin 3.60 g/dL      ALT (SGPT) 18 U/L      AST (SGOT) 30 U/L      Alkaline Phosphatase 70 U/L      Total Bilirubin 0.4 mg/dL      Globulin 2.3 gm/dL      A/G Ratio 1.6 g/dL      BUN/Creatinine Ratio 10.5     Anion Gap 10.4 mmol/L      eGFR 99.7 mL/min/1.73      Comment: National Kidney Foundation and American Society of Nephrology (ASN) Task Force recommended calculation based on the Chronic Kidney Disease Epidemiology Collaboration (CKD-EPI) equation refit without adjustment for race.       Narrative:      GFR Normal >60  Chronic Kidney Disease <60  Kidney Failure <15      CBC (No Diff) [[id number]]  (Abnormal) Collected: 03/14/22 0610    Specimen: Blood Updated: 03/14/22 0620     WBC 5.29 10*3/mm3      RBC 3.75 10*6/mm3      Hemoglobin 11.6 g/dL      Hematocrit 34.4 %      MCV 91.7 fL      MCH 30.9 pg      MCHC 33.7 g/dL      RDW 13.8 %      RDW-SD 46.5 fl      MPV 8.7 fL      Platelets 251 10*3/mm3     TSH Rfx On Abnormal To Free T4 [[id number]]  (Normal) Collected: 03/13/22 0619    Specimen: Blood Updated: 03/13/22 1632     TSH 3.120 uIU/mL           Imaging:   The radiologist  summary of the CT is as follows: 1.  The lumbar spine vertebral body heights are normally maintained.  No   compression fracture or burst fracture is seen.  2.  Mild multilevel degenerative disc disease and facet arthrosis   throughout the lower lumbar spine.  No acute fracture or traumatic   subluxation is seen.  3.  L4-5 demonstrates 4 mm broad-based posterior disc bulge and bilateral   facet hypertrophy narrowing the thecal sac to 8 mm.  There is moderate to   severe bilateral neural foraminal narrowing.  I reviewed the studies personally and agree with their findings.  No change from prior findings as I determined from bone windows on abdominal   CT last year.  Plain film x-rays from my office on February 22 of this year of the cervical spine showed mid cervical spondylosis some loss of lordosis and no evidence of instability on flexion-extension films.  Thoracic films showed spondylosis and were otherwise unremarkable.    Assessment: Cervical and thoracic spondylosis.  Lumbar spondylosis and L4-5 spondylolisthesis with asymptomatic mild to moderate canal stenosis.    Plan: He has garden-variety degenerative disc degeneration of the cervical thoracic and lumbar spine.  His continued falls, certainly significantly related to his alcoholism are a bigger problem than the underlying degenerative changes.  He would be a very poor candidate for surgical intervention and as his pain is primarily axial even epidural steroid injections are unlikely to help and may further foster the notion that anything short of complete sobriety is likely to help him.  I have recommended continued physical therapy and I will have physical therapy outfit him with a warm-and-form corset which may be worn a few hours a day for some pain relief.  I will not prescribe narcotic pain medication given the circumstances.  He can follow-up in my office in 6 or 8 weeks but unless he treats his alcoholism anything I do for his back will be a waste  of time.    Date: 3/14/2022    Yohan Mcunlty MD    CC: Esdras Jackson MD

## 2022-03-14 NOTE — PLAN OF CARE
Goal Outcome Evaluation:  Plan of Care Reviewed With: patient        Progress: improving  Outcome Evaluation: Pt is A&Ox4, room air, reg diet, urinal @ bedside, education given for falls prevention- bed alarm refused/agrees to call for assistance, prn pain meds given x1 with good relief, vitals as charted- stable, CIWA score 2/0, COWS score 0/0, iv fluids continued per orders, Dr. Mcnulty office consulted for eval today, pt pleasant and open about symptoms for withdrawl, side rails padded per protocal

## 2022-03-14 NOTE — PLAN OF CARE
Goal Outcome Evaluation:    Patient is a 67 y.o. male admitted to [facility] for intractable lower back pain on 3/13/2022. PMHx includes alcoholism, anxiety/depression, hypothyroidism, hyperlipidemia, hypertension, urolithiasis and C5 cervical fracture . Patient reported he was current with OPPT and some of his sx may have been exacerbated by the exercises he was attempting to do. Patient is independent at baseline and lives at home with roommate- bedroom is on second floor. Today, patient performed bed mobility with SBA-transfers or ambulation deferred today due to back pain. Pain also reported in neck and scapular region. Although pain appears to be primary limiting factor, patient may benefit from skilled PT services acutely to address functional deficits as well as improve level of independence prior to discharge. Anticipate returning home upon DC.

## 2022-03-14 NOTE — CASE MANAGEMENT/SOCIAL WORK
Discharge Planning Assessment  [facility]     Patient Name: Pro Mueller  MRN: [id number]  Today's Date: 3/14/2022    Admit Date: 3/13/2022     Discharge Needs Assessment     Row Name 03/14/22 1059       Living Environment    People in Home friend(s)    Current Living Arrangements home    Primary Care Provided by self    Provides Primary Care For no one    Family Caregiver if Needed sibling(s)    Able to Return to Prior Arrangements yes       Resource/Environmental Concerns    Resource/Environmental Concerns none       Transition Planning    Patient/Family Anticipates Transition to home    Patient/Family Anticipated Services at Transition none    Transportation Anticipated family or friend will provide       Discharge Needs Assessment    Equipment Currently Used at Home none    Concerns to be Addressed denies needs/concerns at this time;no discharge needs identified    Equipment Needed After Discharge none               Discharge Plan     Row Name 03/14/22 1102       Plan    Plan Home, family transport vs cab    Provided Post Acute Provider List? Refused    Provided Post Acute Provider Quality & Resource List? Refused    Patient/Family in Agreement with Plan yes    Plan Comments CSW spoke to patient; explained role, verified facesheet, and discussed d/c plan. Plan is home, family transport vs cab. Patient uses no DME at home and is independent for mobility. He lives with a roommate in a 2 level home with 1 step to get inside. He denies any issues with mobility. He has used HH in the past but cannot remember what agency. He has no history of SNF. He denies any further needs. VIANEY Huston              Continued Care and Services - Admitted Since 3/13/2022    Coordination has not been started for this encounter.          Demographic Summary     Row Name 03/14/22 1059       General Information    Admission Type observation    Arrived From home    Referral Source admission list    Reason for Consult discharge planning     Preferred Language English               Functional Status     Row Name 03/14/22 1059       Functional Status    Usual Activity Tolerance moderate    Current Activity Tolerance moderate       Functional Status, IADL    Medications independent    Meal Preparation independent    Housekeeping independent    Laundry independent    Shopping independent       Mental Status    General Appearance WDL WDL               Psychosocial    No documentation.                Abuse/Neglect    No documentation.                Legal    No documentation.                Substance Abuse    No documentation.                Patient Forms    No documentation.                   VIANEY CRAIG

## 2022-03-14 NOTE — PROGRESS NOTES
[facility]     LOS: 0 days     Subjective:    CC:Back Pain    DIET:  Diet Order   Procedures   • Diet Regular     Back now is 1/10  Pain 6/10 prior to IV pain medication  Tremulous hands, denies hallucination    Objective:    Vital Signs:  Temp:  [97.1 °F (36.2 °C)-98.7 °F (37.1 °C)] 98.1 °F (36.7 °C)  Heart Rate:  [61-73] 73  Resp:  [22-24] 22  BP: (116-147)/(66-87) 116/66    SpO2:  [94 %-96 %] 94 %  on   ;   Device (Oxygen Therapy): room air  Body mass index is 24.55 kg/m².    Physical Exam  Constitutional:       Appearance: Normal appearance.   HENT:      Head: Normocephalic and atraumatic.   Cardiovascular:      Rate and Rhythm: Normal rate and regular rhythm.      Heart sounds: No murmur heard.    No friction rub.   Pulmonary:      Effort: Pulmonary effort is normal.      Breath sounds: Normal breath sounds.   Abdominal:      General: Bowel sounds are normal. There is no distension.      Palpations: Abdomen is soft.      Tenderness: There is no abdominal tenderness.   Musculoskeletal:      Comments: Slightly tremulous   Skin:     General: Skin is warm and dry.   Neurological:      Mental Status: He is alert.   Psychiatric:         Mood and Affect: Mood normal.         Behavior: Behavior normal.         Results Review:    Glucose   Date Value Ref Range Status   03/14/2022 100 (H) 65 - 99 mg/dL Final   03/13/2022 102 (H) 65 - 99 mg/dL Final   03/13/2022 84 65 - 99 mg/dL Final     Results from last 7 days   Lab Units 03/14/22  0610   WBC 10*3/mm3 5.29   HEMOGLOBIN g/dL 11.6*   HEMATOCRIT % 34.4*   PLATELETS 10*3/mm3 251     Results from last 7 days   Lab Units 03/14/22  0610   SODIUM mmol/L 142   POTASSIUM mmol/L 3.9   CHLORIDE mmol/L 105   CO2 mmol/L 26.6   BUN mg/dL 8   CREATININE mg/dL 0.76   CALCIUM mg/dL 8.6   BILIRUBIN mg/dL 0.4   ALK PHOS U/L 70   ALT (SGPT) U/L 18   AST (SGOT) U/L 30   GLUCOSE mg/dL 100*                 Cultures:  No results found for: BLOODCX, URINECX,  WOUNDCX, MRSACX, RESPCX, STOOLCX    I have reviewed daily medications and changes in CPOE    Scheduled meds  acyclovir, 400 mg, Oral, Daily  amLODIPine, 5 mg, Oral, QAM  atorvastatin, 20 mg, Oral, Daily  chlorhexidine, 5 mL, Mouth/Throat, Daily  cyclobenzaprine, 5 mg, Oral, TID  thiamine, 100 mg, Oral, Daily   And  multivitamin, 1 tablet, Oral, Daily   And  folic acid, 1 mg, Oral, Daily  gabapentin, 300 mg, Oral, BID  levothyroxine, 100 mcg, Oral, Q AM  lisinopril, 10 mg, Oral, Daily  sodium chloride, 10 mL, Intravenous, Q12H  sodium chloride, 10 mL, Intravenous, Q12H        sodium chloride, 100 mL/hr, Last Rate: 100 mL/hr (03/14/22 1200)      PRN meds  •  acetaminophen **OR** acetaminophen **OR** acetaminophen  •  calcium carbonate  •  HYDROcodone-acetaminophen  •  HYDROmorphone  •  LORazepam **OR** LORazepam **OR** LORazepam **OR** LORazepam **OR** LORazepam **OR** LORazepam  •  nitroglycerin  •  ondansetron **OR** ondansetron  •  potassium chloride **OR** potassium chloride **OR** potassium chloride  •  sodium chloride  •  sodium chloride        Mixed anxiety depressive disorder    Genital herpes simplex    Hyperlipidemia    Hypertension    Hypothyroidism    Chronic low back pain    Alcoholic intoxication with complication (HCC)        Assessment/Plan:  EtOH intoxication and possible withdrawal  -Symptoms fairly mild, currently not requiring any Ativan, but he is having tremors    Acute on chronic back pain  -Seen by Dr. Mcnulty patient will follow up outpatient, Dr. Souza emphasizes with the patient he needs to stop drinking alcohol in order to be considered for back surgery  -Low-dose Flexeril, Neurontin, Norco    Hypothyroidism  Stable    Anxiety depression  Stable    DVT PPX: Lovenox 40 given his immobility from back pain      Esdras Jackson MD  03/14/22  14:16 EDT

## 2022-03-15 ENCOUNTER — READMISSION MANAGEMENT (OUTPATIENT)
Dept: CALL CENTER | Facility: HOSPITAL | Age: 67
End: 2022-03-15

## 2022-03-15 VITALS
OXYGEN SATURATION: 99 % | SYSTOLIC BLOOD PRESSURE: 144 MMHG | DIASTOLIC BLOOD PRESSURE: 83 MMHG | WEIGHT: 181 LBS | BODY MASS INDEX: 24.52 KG/M2 | RESPIRATION RATE: 22 BRPM | HEART RATE: 71 BPM | HEIGHT: 72 IN | TEMPERATURE: 98.1 F

## 2022-03-15 PROCEDURE — 96376 TX/PRO/DX INJ SAME DRUG ADON: CPT

## 2022-03-15 PROCEDURE — G0378 HOSPITAL OBSERVATION PER HR: HCPCS

## 2022-03-15 PROCEDURE — 96361 HYDRATE IV INFUSION ADD-ON: CPT

## 2022-03-15 PROCEDURE — 25010000002 HYDROMORPHONE PER 4 MG: Performed by: NURSE PRACTITIONER

## 2022-03-15 RX ORDER — HYDROCODONE BITARTRATE AND ACETAMINOPHEN 5; 325 MG/1; MG/1
1 TABLET ORAL EVERY 6 HOURS PRN
Qty: 10 TABLET | Refills: 0 | Status: SHIPPED | OUTPATIENT
Start: 2022-03-15 | End: 2022-04-01

## 2022-03-15 RX ORDER — DIPHENOXYLATE HYDROCHLORIDE AND ATROPINE SULFATE 2.5; .025 MG/1; MG/1
1 TABLET ORAL DAILY
Qty: 30 TABLET | Refills: 11 | Status: SHIPPED | OUTPATIENT
Start: 2022-03-15 | End: 2022-09-01 | Stop reason: SDUPTHER

## 2022-03-15 RX ORDER — ACETAMINOPHEN 325 MG/1
650 TABLET ORAL EVERY 4 HOURS PRN
Start: 2022-03-15

## 2022-03-15 RX ORDER — CYCLOBENZAPRINE HCL 5 MG
5 TABLET ORAL 3 TIMES DAILY PRN
Qty: 20 TABLET | Refills: 0 | Status: SHIPPED | OUTPATIENT
Start: 2022-03-15 | End: 2022-04-12 | Stop reason: SDUPTHER

## 2022-03-15 RX ORDER — GABAPENTIN 300 MG/1
300 CAPSULE ORAL 3 TIMES DAILY
Qty: 90 CAPSULE | Refills: 0 | Status: ON HOLD | OUTPATIENT
Start: 2022-03-15 | End: 2022-07-01

## 2022-03-15 RX ADMIN — CYCLOBENZAPRINE 5 MG: 10 TABLET, FILM COATED ORAL at 16:32

## 2022-03-15 RX ADMIN — ACYCLOVIR 400 MG: 200 CAPSULE ORAL at 09:12

## 2022-03-15 RX ADMIN — SODIUM CHLORIDE 100 ML/HR: 9 INJECTION, SOLUTION INTRAVENOUS at 06:33

## 2022-03-15 RX ADMIN — FOLIC ACID 1 MG: 1 TABLET ORAL at 09:12

## 2022-03-15 RX ADMIN — HYDROMORPHONE HYDROCHLORIDE 0.5 MG: 1 INJECTION, SOLUTION INTRAMUSCULAR; INTRAVENOUS; SUBCUTANEOUS at 09:18

## 2022-03-15 RX ADMIN — LISINOPRIL 10 MG: 10 TABLET ORAL at 09:13

## 2022-03-15 RX ADMIN — Medication 1 TABLET: at 09:13

## 2022-03-15 RX ADMIN — HYDROCODONE BITARTRATE AND ACETAMINOPHEN 1 TABLET: 7.5; 325 TABLET ORAL at 06:33

## 2022-03-15 RX ADMIN — Medication 10 ML: at 09:13

## 2022-03-15 RX ADMIN — LEVOTHYROXINE SODIUM 100 MCG: 0.1 TABLET ORAL at 06:33

## 2022-03-15 RX ADMIN — ATORVASTATIN CALCIUM 20 MG: 20 TABLET, FILM COATED ORAL at 09:12

## 2022-03-15 RX ADMIN — Medication 100 MG: at 09:13

## 2022-03-15 RX ADMIN — CYCLOBENZAPRINE 5 MG: 10 TABLET, FILM COATED ORAL at 09:12

## 2022-03-15 RX ADMIN — Medication 10 ML: at 09:14

## 2022-03-15 RX ADMIN — AMLODIPINE BESYLATE 5 MG: 5 TABLET ORAL at 09:13

## 2022-03-15 RX ADMIN — GABAPENTIN 300 MG: 300 CAPSULE ORAL at 09:12

## 2022-03-15 NOTE — PLAN OF CARE
Goal Outcome Evaluation:  Plan of Care Reviewed With: patient        Progress: no change  Outcome Evaluation: Pt is A&OX4, room air, up to toilet with stanndby assist as needed, prn pain meds given with acceptible relief, vitals as charted, iv fluids continued per orders, to be fitted for lumbar support brace, reg diet

## 2022-03-15 NOTE — NURSING NOTE
Spoke with [employer] rep this morning regarding a warm-and-form brace, that patient has to be fitted for. They said they would be here today to fit him, and have not arrived. Called [facility] for an ETA, and was unable to get a definitive answer. Spoke with Dr. Mcnulty, patient okay to be discharged, and will give order so he can be fitted for brace outpatient.

## 2022-03-15 NOTE — NURSING NOTE
Patient states he feels much better. Affect brighter. CIWA scores 0. Patient was seen by Dr. Souza and was told he needs to get sober in order to be considered for back surgery. Again, mention PETER tx options and pt. Continues to decline stating he wants to wait until he retires to get sober; states he has approximately 12 months left. Patient states his work is his main source of stress and believes this is why he drinks so wants to drink until he retires. Patient agreeable to accept PETER resources prior to discharge.     Access will continue to follow.

## 2022-03-15 NOTE — OUTREACH NOTE
Prep Survey    Flowsheet Row Responses   [facility] patient discharged from? [place]   Is LACE score < 7 ? Yes   Emergency Room discharge w/ pulse ox? No   Eligibility [facility]   Date of Admission 03/13/22   Date of Discharge 03/15/22   Discharge Disposition Home or Self Care   Discharge diagnosis ETOH intoxication,   Acute on chronic back pain   Does the patient have one of the following disease processes/diagnoses(primary or secondary)? Other   Does the patient have Home health ordered? No   Is there a DME ordered? No   Prep survey completed? Yes          DANIEL PINTO - Registered Nurse

## 2022-03-15 NOTE — PLAN OF CARE
Goal Outcome Evaluation:  Plan of Care Reviewed With: patient        Progress: improving  Outcome Evaluation: VSS. Telemetry monitor, SR. Alert and oriented x4, room air. Up ad geoffrey. Will proceed with discharge.

## 2022-03-16 ENCOUNTER — TRANSITIONAL CARE MANAGEMENT TELEPHONE ENCOUNTER (OUTPATIENT)
Dept: CALL CENTER | Facility: HOSPITAL | Age: 67
End: 2022-03-16

## 2022-03-16 NOTE — OUTREACH NOTE
Call Center TCM Note    Flowsheet Row Responses   [facility] patient discharged from? [place]   Does the patient have one of the following disease processes/diagnoses(primary or secondary)? Other   TCM attempt successful? No   Unsuccessful attempts Attempt 1          Ofelia Mendez LPN    3/16/2022, 08:52 EDT

## 2022-03-16 NOTE — CASE MANAGEMENT/SOCIAL WORK
Case Management Discharge Note      Final Note: Per notes [facility]    Provided Post Acute Provider List?: Refused  Provided Post Acute Provider Quality & Resource List?: Refused    Selected Continued Care - Discharged on 3/15/2022 Admission date: 3/13/2022 - Discharge disposition: Home or Self Care    Destination    No services have been selected for the patient.              Durable Medical Equipment    No services have been selected for the patient.              Dialysis/Infusion    No services have been selected for the patient.              Home Medical Care    No services have been selected for the patient.              Therapy    No services have been selected for the patient.              Community Resources    No services have been selected for the patient.              Community & DME    No services have been selected for the patient.                  Transportation Services  Private: Car    Final Discharge Disposition Code: 01 - home or self-care

## 2022-03-16 NOTE — OUTREACH NOTE
Call Center TCM Note    Flowsheet Row Responses   [facility] patient discharged from? [place]   Does the patient have one of the following disease processes/diagnoses(primary or secondary)? Other   TCM attempt successful? Yes   Call start time 1324   Call end time 1326   Discharge diagnosis ETOH intoxication,   Acute on chronic back pain   Person spoke with today (if not patient) and relationship Patient   Meds reviewed with patient/caregiver? Yes   Is the patient having any side effects they believe may be caused by any medication additions or changes? No   Does the patient have all medications ordered at discharge? Yes   Is the patient taking all medications as directed (includes completed medication regime)? Yes   Does the patient have a primary care provider?  Yes   Does the patient have an appointment with their PCP within 7 days of discharge? Yes   Comments regarding [facility] fu appt on 3/23/22 at 8:15 AM   Has the patient kept scheduled appointments due by today? N/A   Psychosocial issues? No   Did the patient receive a copy of their discharge instructions? Yes   Nursing interventions Reviewed instructions with patient   What is the patient's perception of their health status since discharge? Improving   Is the patient/caregiver able to teach back signs and symptoms related to disease process for when to call PCP? Yes   Is the patient/caregiver able to teach back signs and symptoms related to disease process for when to call 911? Yes   Is the patient/caregiver able to teach back the hierarchy of who to call/visit for symptoms/problems? PCP, Specialist, Home health nurse, Urgent Care, ED, 911 Yes   If the patient is a current smoker, are they able to teach back resources for cessation? Not a smoker   TCM call completed? Yes   Wrap up additional comments Pt states he is doing better. Pt verified [facility] appt on 3/23/22. No questions/concerns.          Wendi Szymanski RN    3/16/2022,  13:28 EDT
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
## 2022-03-24 ENCOUNTER — TREATMENT (OUTPATIENT)
Dept: PHYSICAL THERAPY | Facility: CLINIC | Age: 67
End: 2022-03-24

## 2022-03-24 DIAGNOSIS — M54.12 RADICULOPATHY, CERVICAL REGION: ICD-10-CM

## 2022-03-24 DIAGNOSIS — M25.559 PAIN IN HIP: ICD-10-CM

## 2022-03-24 DIAGNOSIS — M54.50 CHRONIC BILATERAL LOW BACK PAIN WITHOUT SCIATICA: Primary | ICD-10-CM

## 2022-03-24 DIAGNOSIS — G89.29 CHRONIC BILATERAL LOW BACK PAIN WITHOUT SCIATICA: Primary | ICD-10-CM

## 2022-03-24 DIAGNOSIS — M54.2 CERVICALGIA: ICD-10-CM

## 2022-03-24 PROCEDURE — 97110 THERAPEUTIC EXERCISES: CPT | Performed by: PHYSICAL THERAPIST

## 2022-03-24 PROCEDURE — 97530 THERAPEUTIC ACTIVITIES: CPT | Performed by: PHYSICAL THERAPIST

## 2022-03-24 PROCEDURE — 97112 NEUROMUSCULAR REEDUCATION: CPT | Performed by: PHYSICAL THERAPIST

## 2022-03-24 NOTE — PROGRESS NOTES
Physical Therapy Daily Progress Note    Patient: Pro Mueller   : 1955  Diagnosis/ICD-10 Code:  Chronic bilateral low back pain without sciatica [M54.50, G89.29]  Referring practitioner: Yohan Mcnulty MD  Date of Initial Visit: 3/24/2022  Today's Date: 3/24/2022  Patient seen for 2 session       Visit Diagnoses:    ICD-10-CM ICD-9-CM   1. Chronic bilateral low back pain without sciatica  M54.50 724.2    G89.29 338.29   2. Cervicalgia  M54.2 723.1   3. Radiculopathy, cervical region  M54.12 723.4   4. Pain in hip  M25.559 719.45            Subjective  Pro Mueller reported today that he's doing okay, nothing new since last session    Objective   No functional measures updated at today's visit unless otherwise stated. For functional assessment and documentation of patient progressions referred to the assessment section.    See Exercise, Manual, and Modality Logs for complete treatments.       Assessment/Plan  Tx today continued with emphasis of neurodyanmic mobility and functional strngthening to address current limitations and impairments in mobility and function strength/stability of involved areas. Pt appears to be progressing well with current treatment plan; appears to be progressing well with functional strength and mobility, per gross assessment and patient report. Pt continues to have limitations in the above mentioned areas, but is progressing well towards current goals and will continue to benefit from skilled PT to help pt regain functional mobility and strength necessary to reduce symptoms and return to PLOF.      Continue with current treatment plan and ongoing assessment; progress interventions to tolerance         Timed:         Manual Therapy:    -     mins  20610;     Therapeutic Exercise:    25     mins  56769;     Neuromuscular Ryan:    10    mins  43588;    Therapeutic Activity:     10     mins  63637;     Gait Training:           mins  85638;     Ultrasound:          mins  76607;     Ionto                                   mins   30042  Self Care                            mins   07121  Canalith Repos         mins 67778    Un-Timed:  Electrical Stimulation:         mins  39772 ( );  Dry Needling     -     mins self-pay  Traction          mins 22536  Low Eval          Mins  93715  Mod Eval          Mins  72350  High Eval                            Mins  47861      Timed Treatment:   45   mins   Total Treatment:     45   mins      PT: Alvaro Leon PT     License Number: [id number]  Electronically signed by Alvaro Leon PT, 03/24/22, 2:39 PM EDT

## 2022-03-28 ENCOUNTER — TELEPHONE (OUTPATIENT)
Dept: PHYSICAL THERAPY | Facility: CLINIC | Age: 67
End: 2022-03-28

## 2022-03-30 ENCOUNTER — APPOINTMENT (OUTPATIENT)
Dept: CT IMAGING | Facility: HOSPITAL | Age: 67
End: 2022-03-30

## 2022-03-30 ENCOUNTER — HOSPITAL ENCOUNTER (EMERGENCY)
Facility: HOSPITAL | Age: 67
Discharge: HOME | End: 2022-03-30
Attending: EMERGENCY MEDICINE | Admitting: EMERGENCY MEDICINE

## 2022-03-30 ENCOUNTER — APPOINTMENT (OUTPATIENT)
Dept: GENERAL RADIOLOGY | Facility: HOSPITAL | Age: 67
End: 2022-03-30

## 2022-03-30 VITALS
TEMPERATURE: 98.1 F | DIASTOLIC BLOOD PRESSURE: 96 MMHG | OXYGEN SATURATION: 97 % | RESPIRATION RATE: 16 BRPM | HEART RATE: 90 BPM | SYSTOLIC BLOOD PRESSURE: 172 MMHG

## 2022-03-30 DIAGNOSIS — I10 PRIMARY HYPERTENSION: ICD-10-CM

## 2022-03-30 DIAGNOSIS — E78.5 HYPERLIPIDEMIA, UNSPECIFIED HYPERLIPIDEMIA TYPE: ICD-10-CM

## 2022-03-30 DIAGNOSIS — E03.9 HYPOTHYROIDISM, UNSPECIFIED TYPE: ICD-10-CM
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
## 2022-03-30 DIAGNOSIS — F10.11 HISTORY OF ALCOHOL ABUSE: ICD-10-CM

## 2022-03-30 DIAGNOSIS — T07.XXXA MULTIPLE CONTUSIONS: ICD-10-CM

## 2022-03-30 DIAGNOSIS — S09.90XA MINOR HEAD INJURY, INITIAL ENCOUNTER: ICD-10-CM

## 2022-03-30 LAB
ALBUMIN SERPL-MCNC: 4.5 G/DL (ref 3.5–5.2)
ALBUMIN/GLOB SERPL: 1.6 G/DL
ALP SERPL-CCNC: 97 U/L (ref 39–117)
ALT SERPL W P-5'-P-CCNC: 27 U/L (ref 1–41)
ANION GAP SERPL CALCULATED.3IONS-SCNC: 25.1 MMOL/L (ref 5–15)
AST SERPL-CCNC: 80 U/L (ref 1–40)
BASOPHILS # BLD AUTO: 0.03 10*3/MM3 (ref 0–0.2)
BASOPHILS NFR BLD AUTO: 0.5 % (ref 0–1.5)
BILIRUB SERPL-MCNC: 0.5 MG/DL (ref 0–1.2)
BILIRUB UR QL STRIP: NEGATIVE
BUN SERPL-MCNC: 8 MG/DL (ref 8–23)
BUN/CREAT SERPL: 10.4 (ref 7–25)
CALCIUM SPEC-SCNC: 9.5 MG/DL (ref 8.6–10.5)
CHLORIDE SERPL-SCNC: 99 MMOL/L (ref 98–107)
CK SERPL-CCNC: 1383 U/L (ref 20–200)
CLARITY UR: CLEAR
CO2 SERPL-SCNC: 19.9 MMOL/L (ref 22–29)
COLOR UR: YELLOW
CREAT SERPL-MCNC: 0.77 MG/DL (ref 0.76–1.27)
DEPRECATED RDW RBC AUTO: 52.2 FL (ref 37–54)
EGFRCR SERPLBLD CKD-EPI 2021: 99.4 ML/MIN/1.73
EOSINOPHIL # BLD AUTO: 0.04 10*3/MM3 (ref 0–0.4)
EOSINOPHIL NFR BLD AUTO: 0.7 % (ref 0.3–6.2)
ERYTHROCYTE [DISTWIDTH] IN BLOOD BY AUTOMATED COUNT: 15.1 % (ref 12.3–15.4)
GLOBULIN UR ELPH-MCNC: 2.9 GM/DL
GLUCOSE SERPL-MCNC: 96 MG/DL (ref 65–99)
GLUCOSE UR STRIP-MCNC: NEGATIVE MG/DL
HCT VFR BLD AUTO: 39.9 % (ref 37.5–51)
HGB BLD-MCNC: 13.2 G/DL (ref 13–17.7)
HGB UR QL STRIP.AUTO: NEGATIVE
IMM GRANULOCYTES # BLD AUTO: 0.02 10*3/MM3 (ref 0–0.05)
IMM GRANULOCYTES NFR BLD AUTO: 0.3 % (ref 0–0.5)
KETONES UR QL STRIP: ABNORMAL
LEUKOCYTE ESTERASE UR QL STRIP.AUTO: NEGATIVE
LYMPHOCYTES # BLD AUTO: 2.04 10*3/MM3 (ref 0.7–3.1)
LYMPHOCYTES NFR BLD AUTO: 33.4 % (ref 19.6–45.3)
MCH RBC QN AUTO: 30.9 PG (ref 26.6–33)
MCHC RBC AUTO-ENTMCNC: 33.1 G/DL (ref 31.5–35.7)
MCV RBC AUTO: 93.4 FL (ref 79–97)
MONOCYTES # BLD AUTO: 0.54 10*3/MM3 (ref 0.1–0.9)
MONOCYTES NFR BLD AUTO: 8.9 % (ref 5–12)
NEUTROPHILS NFR BLD AUTO: 3.43 10*3/MM3 (ref 1.7–7)
NEUTROPHILS NFR BLD AUTO: 56.2 % (ref 42.7–76)
NITRITE UR QL STRIP: NEGATIVE
NRBC BLD AUTO-RTO: 0 /100 WBC (ref 0–0.2)
PH UR STRIP.AUTO: 5.5 [PH] (ref 5–8)
PLATELET # BLD AUTO: 215 10*3/MM3 (ref 140–450)
PMV BLD AUTO: 9.1 FL (ref 6–12)
POTASSIUM SERPL-SCNC: 3.9 MMOL/L (ref 3.5–5.2)
PROT SERPL-MCNC: 7.4 G/DL (ref 6–8.5)
PROT UR QL STRIP: ABNORMAL
RBC # BLD AUTO: 4.27 10*6/MM3 (ref 4.14–5.8)
SODIUM SERPL-SCNC: 144 MMOL/L (ref 136–145)
SP GR UR STRIP: 1.02 (ref 1–1.03)
UROBILINOGEN UR QL STRIP: ABNORMAL
WBC NRBC COR # BLD: 6.1 10*3/MM3 (ref 3.4–10.8)

## 2022-03-30 PROCEDURE — 70450 CT HEAD/BRAIN W/O DYE: CPT

## 2022-03-30 PROCEDURE — 72050 X-RAY EXAM NECK SPINE 4/5VWS: CPT

## 2022-03-30 PROCEDURE — 85025 COMPLETE CBC W/AUTO DIFF WBC: CPT | Performed by: PHYSICIAN ASSISTANT

## 2022-03-30 PROCEDURE — 73560 X-RAY EXAM OF KNEE 1 OR 2: CPT

## 2022-03-30 PROCEDURE — 72110 X-RAY EXAM L-2 SPINE 4/>VWS: CPT

## 2022-03-30 PROCEDURE — 99284 EMERGENCY DEPT VISIT MOD MDM: CPT

## 2022-03-30 PROCEDURE — 81003 URINALYSIS AUTO W/O SCOPE: CPT | Performed by: PHYSICIAN ASSISTANT

## 2022-03-30 PROCEDURE — 80053 COMPREHEN METABOLIC PANEL: CPT | Performed by: PHYSICIAN ASSISTANT

## 2022-03-30 PROCEDURE — 82550 ASSAY OF CK (CPK): CPT | Performed by: PHYSICIAN ASSISTANT

## 2022-03-30 RX ADMIN — SODIUM CHLORIDE, POTASSIUM CHLORIDE, SODIUM LACTATE AND CALCIUM CHLORIDE 1000 ML: 600; 310; 30; 20 INJECTION, SOLUTION INTRAVENOUS at 06:51

## 2022-03-30 RX ADMIN — SODIUM CHLORIDE 1000 ML: 9 INJECTION, SOLUTION INTRAVENOUS at 04:56

## 2022-04-01 DIAGNOSIS — M54.50 CHRONIC MIDLINE LOW BACK PAIN WITHOUT SCIATICA: Primary | ICD-10-CM

## 2022-04-01 DIAGNOSIS — M25.512 CHRONIC LEFT SHOULDER PAIN: ICD-10-CM

## 2022-04-01 DIAGNOSIS — G89.29 CHRONIC MIDLINE LOW BACK PAIN WITHOUT SCIATICA: Primary | ICD-10-CM

## 2022-04-01 DIAGNOSIS — G89.29 CHRONIC LEFT SHOULDER PAIN: ICD-10-CM

## 2022-04-01 RX ORDER — TRAMADOL HYDROCHLORIDE 50 MG/1
50 TABLET ORAL EVERY 6 HOURS PRN
Qty: 30 TABLET | Refills: 0 | Status: SHIPPED | OUTPATIENT
Start: 2022-04-01 | End: 2022-04-12

## 2022-04-01 NOTE — PROGRESS NOTES
Called patient's preferred number and the phone.  Phone went straight to voicemail.  I left a message that I was calling to talk about one of his prescriptions.  Since I got a generic voicemail and was not sure if this was a voice message center for any other individuals I was going to go ahead and send him a message through [employer] with regard to the prescription.

## 2022-04-07 ENCOUNTER — TREATMENT (OUTPATIENT)
Dept: PHYSICAL THERAPY | Facility: CLINIC | Age: 67
End: 2022-04-07

## 2022-04-07 DIAGNOSIS — G89.29 CHRONIC BILATERAL LOW BACK PAIN WITHOUT SCIATICA: Primary | ICD-10-CM

## 2022-04-07 DIAGNOSIS — M54.50 CHRONIC BILATERAL LOW BACK PAIN WITHOUT SCIATICA: Primary | ICD-10-CM

## 2022-04-07 DIAGNOSIS — M54.2 CERVICALGIA: ICD-10-CM

## 2022-04-07 PROCEDURE — 97110 THERAPEUTIC EXERCISES: CPT | Performed by: PHYSICAL THERAPIST
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
## 2022-04-11 ENCOUNTER — TREATMENT (OUTPATIENT)
Dept: PHYSICAL THERAPY | Facility: CLINIC | Age: 67
End: 2022-04-11

## 2022-04-11 DIAGNOSIS — M54.12 RADICULOPATHY, CERVICAL REGION: ICD-10-CM

## 2022-04-11 DIAGNOSIS — M25.559 PAIN IN HIP: ICD-10-CM

## 2022-04-11 DIAGNOSIS — G89.29 CHRONIC MIDLINE LOW BACK PAIN WITHOUT SCIATICA: ICD-10-CM

## 2022-04-11 DIAGNOSIS — M54.50 CHRONIC BILATERAL LOW BACK PAIN WITHOUT SCIATICA: Primary | ICD-10-CM

## 2022-04-11 DIAGNOSIS — G89.29 CHRONIC LEFT SHOULDER PAIN: ICD-10-CM

## 2022-04-11 DIAGNOSIS — M54.2 CERVICALGIA: ICD-10-CM

## 2022-04-11 DIAGNOSIS — M25.512 CHRONIC LEFT SHOULDER PAIN: ICD-10-CM

## 2022-04-11 DIAGNOSIS — G89.29 CHRONIC BILATERAL LOW BACK PAIN WITHOUT SCIATICA: Primary | ICD-10-CM

## 2022-04-11 DIAGNOSIS — M54.50 CHRONIC MIDLINE LOW BACK PAIN WITHOUT SCIATICA: ICD-10-CM

## 2022-04-11 PROCEDURE — 97530 THERAPEUTIC ACTIVITIES: CPT | Performed by: PHYSICAL THERAPIST

## 2022-04-11 PROCEDURE — 97110 THERAPEUTIC EXERCISES: CPT | Performed by: PHYSICAL THERAPIST

## 2022-04-11 NOTE — PROGRESS NOTES
Physical Therapy Daily Progress Note    Patient: Pro Mueller   : 1955  Diagnosis/ICD-10 Code:  Chronic bilateral low back pain without sciatica [M54.50, G89.29]  Referring practitioner: Yohan Mcnulty MD  Date of Initial Visit: Type: THERAPY  Noted: 3/9/2022  Today's Date: 2022  Patient seen for 4 sessions         Pro Mueller reports: doing okay today.     Subjective     Objective   See Exercise, Manual, and Modality Logs for complete treatment.       Assessment/Plan  Subjectively, pt reports no increase of pain or discomfort with interventions performed today. Performed well with slightly increased lumbar and LE mobility interventions. Continues to demonstrate limited activity tolerance due to pain and slow transitional movments. Pt reported episode of dizziness when attempting LE wall  exercise, recovered after sitting with some water after a few minutes. Continues to benefit from verbal/tactile cues to ensure proper form and technique for exercise performance.     Progress per Plan of Care           Manual Therapy:         mins  48750;  Therapeutic Exercise:    20     mins  52854;     Neuromuscular Ryan:        mins  71396;    Therapeutic Activity:     10     mins  91536;     Gait Training:           mins  44562;     Ultrasound:          mins  31951;    Electrical Stimulation:         mins  90955 ( );  Dry Needling          mins self-pay    Timed Treatment:   30   mins   Total Treatment:     40   mins    Solange Stern PTA  Physical Therapist Assistant [id number]

## 2022-04-12 ENCOUNTER — TELEPHONE (OUTPATIENT)
Dept: FAMILY MEDICINE CLINIC | Facility: CLINIC | Age: 67
End: 2022-04-12

## 2022-04-12 DIAGNOSIS — M54.50 CHRONIC BILATERAL LOW BACK PAIN WITHOUT SCIATICA: Primary | ICD-10-CM

## 2022-04-12 DIAGNOSIS — M25.561 CHRONIC PAIN OF BOTH KNEES: ICD-10-CM

## 2022-04-12 DIAGNOSIS — G89.29 CHRONIC BILATERAL LOW BACK PAIN WITHOUT SCIATICA: Primary | ICD-10-CM

## 2022-04-12 DIAGNOSIS — M25.512 CHRONIC LEFT SHOULDER PAIN: ICD-10-CM

## 2022-04-12 DIAGNOSIS — M54.2 NECK PAIN: ICD-10-CM

## 2022-04-12 DIAGNOSIS — G89.29 CHRONIC LEFT SHOULDER PAIN: ICD-10-CM

## 2022-04-12 DIAGNOSIS — G89.29 CHRONIC PAIN OF BOTH KNEES: ICD-10-CM

## 2022-04-12 DIAGNOSIS — M25.562 CHRONIC PAIN OF BOTH KNEES: ICD-10-CM

## 2022-04-12 RX ORDER — ZOLPIDEM TARTRATE 10 MG/1
TABLET ORAL
COMMUNITY
Start: 2022-04-03 | End: 2022-07-04 | Stop reason: HOSPADM

## 2022-04-12 RX ORDER — CYCLOBENZAPRINE HCL 5 MG
5 TABLET ORAL 3 TIMES DAILY PRN
Qty: 90 TABLET | Refills: 0 | Status: SHIPPED | OUTPATIENT
Start: 2022-04-12 | End: 2022-05-20

## 2022-04-12 RX ORDER — MELOXICAM 15 MG/1
15 TABLET ORAL DAILY
Qty: 90 TABLET | Refills: 0 | Status: SHIPPED | OUTPATIENT
Start: 2022-04-12 | End: 2022-07-04 | Stop reason: HOSPADM

## 2022-04-12 RX ORDER — TRAMADOL HYDROCHLORIDE 50 MG/1
50 TABLET ORAL EVERY 6 HOURS PRN
Qty: 30 TABLET | Refills: 0 | OUTPATIENT
Start: 2022-04-12 | End: 2023-04-12

## 2022-04-12 NOTE — TELEPHONE ENCOUNTER
Advised this patient that Lian will call him later this afternoon to speak with him about his concerns. Voiced understanding.
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
## 2022-04-12 NOTE — TELEPHONE ENCOUNTER
PATIENT IS UPSET ABOUT THE CHANGE IN HIS TRAMADOL PRESCRIPTION AND WOULD LIKE TO TALK TO DR LLAMAS DIRECTLY.    PLEASE CALL  [phone]

## 2022-04-14 ENCOUNTER — TREATMENT (OUTPATIENT)
Dept: PHYSICAL THERAPY | Facility: CLINIC | Age: 67
End: 2022-04-14

## 2022-04-14 DIAGNOSIS — M54.2 CERVICALGIA: ICD-10-CM

## 2022-04-14 DIAGNOSIS — M54.50 CHRONIC BILATERAL LOW BACK PAIN WITHOUT SCIATICA: Primary | ICD-10-CM

## 2022-04-14 DIAGNOSIS — M54.12 RADICULOPATHY, CERVICAL REGION: ICD-10-CM

## 2022-04-14 DIAGNOSIS — G89.29 CHRONIC BILATERAL LOW BACK PAIN WITHOUT SCIATICA: Primary | ICD-10-CM

## 2022-04-14 PROCEDURE — 97110 THERAPEUTIC EXERCISES: CPT | Performed by: PHYSICAL THERAPIST

## 2022-04-14 PROCEDURE — 97530 THERAPEUTIC ACTIVITIES: CPT | Performed by: PHYSICAL THERAPIST

## 2022-04-14 NOTE — PATIENT INSTRUCTIONS
Access Code: [email or web address]  URL: [email or web address]  Date: 04/14/2022  Prepared by: Solange Stern    Exercises  Seated Cervical Rotation AROM - 1 x daily - 10 reps  Seated Cervical Flexion AROM - 1 x daily - 10 reps  Seated Cervical Extension AROM - 1 x daily - 10 reps  Standing Upper Trapezius Stretch - 1 x daily - 3 reps - 20s hold  Seated Scapular Retraction - 1 x daily - 10 reps - 5s hold  Supine Lower Trunk Rotation - 1 x daily - 2 sets - 10 reps  Hooklying Single Knee to Chest Stretch - 1 x daily - 1 sets - 6 reps - 10s hold

## 2022-04-14 NOTE — PROGRESS NOTES
Physical Therapy Daily Progress Note    Patient: Pro Mueller   : 1955  Diagnosis/ICD-10 Code:  Chronic bilateral low back pain without sciatica [M54.50, G89.29]  Referring practitioner: Yohan Mcnulty MD  Date of Initial Visit: Type: THERAPY  Noted: 3/9/2022  Today's Date: 2022  Patient seen for 5 sessions         Pro Mueller reports: doing okay today.    Subjective     Objective   See Exercise, Manual, and Modality Logs for complete treatment.       Assessment/Plan  Subjectively, pt reports no increase of pain or discomfort with interventions performed today. Performed well with cervical and lumbar mobility interventions with increased exercise tolerance today. Updated HEP and gave printout for further compliance. Continues to demonstrate slow transitional movements.  Continues to benefit from verbal/tactile cues to ensure proper form and technique for exercise performance.     Progress per Plan of Care           Manual Therapy:         mins  05035;  Therapeutic Exercise:    26     mins  54231;     Neuromuscular Ryan:        mins  60234;    Therapeutic Activity:     12     mins  67090;     Gait Training:           mins  94396;     Ultrasound:          mins  11941;    Electrical Stimulation:         mins  10091 ( );  Dry Needling          mins self-pay    Timed Treatment:   38   mins   Total Treatment:     48   mins    Solange Stern PTA  Physical Therapist Assistant [id number]

## 2022-04-18 ENCOUNTER — TREATMENT (OUTPATIENT)
Dept: PHYSICAL THERAPY | Facility: CLINIC | Age: 67
End: 2022-04-18

## 2022-04-18 DIAGNOSIS — M25.559 PAIN IN HIP: ICD-10-CM

## 2022-04-18 DIAGNOSIS — G89.29 CHRONIC BILATERAL LOW BACK PAIN WITHOUT SCIATICA: Primary | ICD-10-CM

## 2022-04-18 DIAGNOSIS — M54.2 CERVICALGIA: ICD-10-CM

## 2022-04-18 DIAGNOSIS — M54.12 RADICULOPATHY, CERVICAL REGION: ICD-10-CM

## 2022-04-18 DIAGNOSIS — M54.50 CHRONIC BILATERAL LOW BACK PAIN WITHOUT SCIATICA: Primary | ICD-10-CM

## 2022-04-18 PROCEDURE — 97110 THERAPEUTIC EXERCISES: CPT | Performed by: PHYSICAL THERAPIST

## 2022-04-18 PROCEDURE — 97530 THERAPEUTIC ACTIVITIES: CPT | Performed by: PHYSICAL THERAPIST

## 2022-04-18 NOTE — PROGRESS NOTES
Physical Therapy Daily Progress Note    Patient: Pro Mueller   : 1955  Diagnosis/ICD-10 Code:  Chronic bilateral low back pain without sciatica [M54.50, G89.29]  Referring practitioner: Yohan Mcnulty MD  Date of Initial Visit: Type: THERAPY  Noted: 3/9/2022  Today's Date: 2022  Patient seen for 6 sessions         Pro Mueller reports: sore today, I think I went too hard with exercises yesterday.    Subjective     Objective   See Exercise, Manual, and Modality Logs for complete treatment.       Assessment/Plan  Subjectively, pt reports no increase of pain or discomfort with interventions performed today. Pt felt short of breath after 7 minutes on the NuStep and needed to stop and rest for about 2 minutes before continuing with the treatment session. Continues to demonstrate very limited tolerance for exercise progressions due to pt reports of increased pain. Continues to benefit from verbal/tactile cues to ensure proper form and technique for exercise performance.     Progress per Plan of Care           Manual Therapy:         mins  51302;  Therapeutic Exercise:    25     mins  38238;     Neuromuscular Ryan:        mins  88522;    Therapeutic Activity:     10     mins  42937;     Gait Training:           mins  34852;     Ultrasound:          mins  58548;    Electrical Stimulation:         mins  13408 ( );  Dry Needling          mins self-pay    Timed Treatment:   35   mins   Total Treatment:     45   mins    Solange Stern PTA  Physical Therapist Assistant [id number]

## 2022-05-02 ENCOUNTER — TELEPHONE (OUTPATIENT)
Dept: FAMILY MEDICINE CLINIC | Facility: CLINIC | Age: 67
End: 2022-05-02

## 2022-05-02 NOTE — TELEPHONE ENCOUNTER
Caller: Pro Mueller    Relationship: Self    Best call back number: [phone]    Who are you requesting to speak with (clinical staff, provider,  specific staff member): ANA MARIA    What was the call regarding: PATIENT WAS RETURNING CALL REGARDING SCHEDULING, PATIENT WAS MADE AWARE HIS APPT IS AT 1245 NEXT Monday WITH DR LLAMAS. PLEASE CALL BACK IF HE NEEDS ANY OTHER INFO.     Do you require a callback: YES

## 2022-05-19 NOTE — TELEPHONE ENCOUNTER
Rx Refill Note  Requested Prescriptions     Pending Prescriptions Disp Refills   • cyclobenzaprine (FLEXERIL) 5 MG tablet [Pharmacy Med Name: CYCLOBENZAPRINE 5MG TABLETS] 90 tablet 0     Sig: TAKE 1 TABLET BY MOUTH THREE TIMES DAILY AS NEEDED FOR MUSCLE SPASMS      Last office visit with prescribing clinician: 2/17/2022      Next office visit with prescribing clinician: 10/6/2022            Henrietta Coronado MA  05/19/22, 08:13 EDT

## 2022-05-20 RX ORDER — CYCLOBENZAPRINE HCL 5 MG
TABLET ORAL
Qty: 90 TABLET | Refills: 0 | Status: SHIPPED | OUTPATIENT
Start: 2022-05-20 | End: 2022-07-04 | Stop reason: HOSPADM

## 2022-05-20 RX ORDER — ACYCLOVIR 200 MG/1
400 CAPSULE ORAL DAILY
Qty: 180 CAPSULE | Refills: 1 | OUTPATIENT
Start: 2022-05-20

## 2022-06-30 ENCOUNTER — HOSPITAL ENCOUNTER (OUTPATIENT)
Facility: HOSPITAL | Age: 67
Discharge: HOME | End: 2022-07-04
Attending: EMERGENCY MEDICINE | Admitting: INTERNAL MEDICINE

## 2022-06-30 DIAGNOSIS — R53.1 GENERALIZED WEAKNESS: ICD-10-CM

## 2022-06-30 DIAGNOSIS — F10.930 ALCOHOL WITHDRAWAL SYNDROME WITHOUT COMPLICATION: Primary | ICD-10-CM

## 2022-06-30 DIAGNOSIS — F41.8 ANXIETY ASSOCIATED WITH DEPRESSION: ICD-10-CM

## 2022-06-30 LAB
ALBUMIN SERPL-MCNC: 4.5 G/DL (ref 3.5–5.2)
ALBUMIN/GLOB SERPL: 1.6 G/DL
ALP SERPL-CCNC: 89 U/L (ref 39–117)
ALT SERPL W P-5'-P-CCNC: 62 U/L (ref 1–41)
ANION GAP SERPL CALCULATED.3IONS-SCNC: 18.2 MMOL/L (ref 5–15)
AST SERPL-CCNC: 181 U/L (ref 1–40)
BASOPHILS # BLD AUTO: 0.03 10*3/MM3 (ref 0–0.2)
BASOPHILS NFR BLD AUTO: 1.1 % (ref 0–1.5)
BILIRUB SERPL-MCNC: 0.7 MG/DL (ref 0–1.2)
BUN SERPL-MCNC: 10 MG/DL (ref 8–23)
BUN/CREAT SERPL: 12.7 (ref 7–25)
CALCIUM SPEC-SCNC: 8.5 MG/DL (ref 8.6–10.5)
CHLORIDE SERPL-SCNC: 99 MMOL/L (ref 98–107)
CO2 SERPL-SCNC: 23.8 MMOL/L (ref 22–29)
CREAT SERPL-MCNC: 0.79 MG/DL (ref 0.76–1.27)
DEPRECATED RDW RBC AUTO: 47.3 FL (ref 37–54)
EGFRCR SERPLBLD CKD-EPI 2021: 98.6 ML/MIN/1.73
EOSINOPHIL # BLD AUTO: 0.03 10*3/MM3 (ref 0–0.4)
EOSINOPHIL NFR BLD AUTO: 1.1 % (ref 0.3–6.2)
ERYTHROCYTE [DISTWIDTH] IN BLOOD BY AUTOMATED COUNT: 14.4 % (ref 12.3–15.4)
ETHANOL BLD-MCNC: 259 MG/DL (ref 0–10)
ETHANOL UR QL: 0.26 %
GLOBULIN UR ELPH-MCNC: 2.9 GM/DL
GLUCOSE SERPL-MCNC: 206 MG/DL (ref 65–99)
HCT VFR BLD AUTO: 44.6 % (ref 37.5–51)
HGB BLD-MCNC: 15 G/DL (ref 13–17.7)
IMM GRANULOCYTES # BLD AUTO: 0.01 10*3/MM3 (ref 0–0.05)
IMM GRANULOCYTES NFR BLD AUTO: 0.4 % (ref 0–0.5)
LYMPHOCYTES # BLD AUTO: 1.12 10*3/MM3 (ref 0.7–3.1)
LYMPHOCYTES NFR BLD AUTO: 41.5 % (ref 19.6–45.3)
MCH RBC QN AUTO: 30.6 PG (ref 26.6–33)
MCHC RBC AUTO-ENTMCNC: 33.6 G/DL (ref 31.5–35.7)
MCV RBC AUTO: 91 FL (ref 79–97)
MONOCYTES # BLD AUTO: 0.36 10*3/MM3 (ref 0.1–0.9)
MONOCYTES NFR BLD AUTO: 13.3 % (ref 5–12)
NEUTROPHILS NFR BLD AUTO: 1.15 10*3/MM3 (ref 1.7–7)
NEUTROPHILS NFR BLD AUTO: 42.6 % (ref 42.7–76)
NRBC BLD AUTO-RTO: 0 /100 WBC (ref 0–0.2)
PLATELET # BLD AUTO: 85 10*3/MM3 (ref 140–450)
PMV BLD AUTO: 9.5 FL (ref 6–12)
POTASSIUM SERPL-SCNC: 3.1 MMOL/L (ref 3.5–5.2)
PROT SERPL-MCNC: 7.4 G/DL (ref 6–8.5)
RBC # BLD AUTO: 4.9 10*6/MM3 (ref 4.14–5.8)
SODIUM SERPL-SCNC: 141 MMOL/L (ref 136–145)
TROPONIN T SERPL-MCNC: <0.01 NG/ML (ref 0–0.03)
WBC NRBC COR # BLD: 2.7 10*3/MM3 (ref 3.4–10.8)

## 2022-06-30 PROCEDURE — 84439 ASSAY OF FREE THYROXINE: CPT | Performed by: NURSE PRACTITIONER

## 2022-06-30 PROCEDURE — 93005 ELECTROCARDIOGRAM TRACING: CPT | Performed by: EMERGENCY MEDICINE

## 2022-06-30 PROCEDURE — 93010 ELECTROCARDIOGRAM REPORT: CPT | Performed by: INTERNAL MEDICINE

## 2022-06-30 PROCEDURE — 85025 COMPLETE CBC W/AUTO DIFF WBC: CPT | Performed by: EMERGENCY MEDICINE

## 2022-06-30 PROCEDURE — 84443 ASSAY THYROID STIM HORMONE: CPT | Performed by: NURSE PRACTITIONER

## 2022-06-30 PROCEDURE — 83735 ASSAY OF MAGNESIUM: CPT | Performed by: NURSE PRACTITIONER

## 2022-06-30 PROCEDURE — 80053 COMPREHEN METABOLIC PANEL: CPT | Performed by: EMERGENCY MEDICINE

## 2022-06-30 PROCEDURE — 99285 EMERGENCY DEPT VISIT HI MDM: CPT

## 2022-06-30 PROCEDURE — 84484 ASSAY OF TROPONIN QUANT: CPT | Performed by: EMERGENCY MEDICINE

## 2022-06-30 PROCEDURE — 82077 ASSAY SPEC XCP UR&BREATH IA: CPT | Performed by: EMERGENCY MEDICINE

## 2022-06-30 RX ORDER — SODIUM CHLORIDE 0.9 % (FLUSH) 0.9 %
10 SYRINGE (ML) INJECTION AS NEEDED
Status: DISCONTINUED | OUTPATIENT
Start: 2022-06-30 | End: 2022-07-04 | Stop reason: HOSPADM

## 2022-07-01 ENCOUNTER — APPOINTMENT (OUTPATIENT)
Dept: GENERAL RADIOLOGY | Facility: HOSPITAL | Age: 67
End: 2022-07-01

## 2022-07-01 PROBLEM — F10.930 ALCOHOL WITHDRAWAL SYNDROME WITHOUT COMPLICATION (HCC): Status: ACTIVE | Noted: 2022-07-01

## 2022-07-01 PROBLEM — R74.01 TRANSAMINITIS: Status: ACTIVE | Noted: 2022-07-01

## 2022-07-01 LAB
AMPHET+METHAMPHET UR QL: NEGATIVE
BARBITURATES UR QL SCN: NEGATIVE
BENZODIAZ UR QL SCN: POSITIVE
CANNABINOIDS SERPL QL: NEGATIVE
COCAINE UR QL: NEGATIVE
HOLD SPECIMEN: NORMAL
MAGNESIUM SERPL-MCNC: 1.4 MG/DL (ref 1.6–2.4)
METHADONE UR QL SCN: NEGATIVE
OPIATES UR QL: POSITIVE
OXYCODONE UR QL SCN: NEGATIVE
QT INTERVAL: 418 MS
SARS-COV-2 RNA RESP QL NAA+PROBE: NOT DETECTED
T4 FREE SERPL-MCNC: 1 NG/DL (ref 0.93–1.7)
TROPONIN T SERPL-MCNC: <0.01 NG/ML (ref 0–0.03)
TSH SERPL DL<=0.05 MIU/L-ACNC: 2.69 UIU/ML (ref 0.27–4.2)
WHOLE BLOOD HOLD COAG: NORMAL

## 2022-07-01 PROCEDURE — HZ2ZZZZ DETOXIFICATION SERVICES FOR SUBSTANCE ABUSE TREATMENT: ICD-10-PCS | Performed by: EMERGENCY MEDICINE

## 2022-07-01 PROCEDURE — U0003 INFECTIOUS AGENT DETECTION BY NUCLEIC ACID (DNA OR RNA); SEVERE ACUTE RESPIRATORY SYNDROME CORONAVIRUS 2 (SARS-COV-2) (CORONAVIRUS DISEASE [COVID-19]), AMPLIFIED PROBE TECHNIQUE, MAKING USE OF HIGH THROUGHPUT TECHNOLOGIES AS DESCRIBED BY CMS-2020-01-R: HCPCS | Performed by: EMERGENCY MEDICINE

## 2022-07-01 PROCEDURE — 25010000002 ONDANSETRON PER 1 MG: Performed by: NURSE PRACTITIONER

## 2022-07-01 PROCEDURE — 84484 ASSAY OF TROPONIN QUANT: CPT | Performed by: NURSE PRACTITIONER

## 2022-07-01 PROCEDURE — 25010000002 DIAZEPAM PER 5 MG: Performed by: EMERGENCY MEDICINE

## 2022-07-01 PROCEDURE — 25010000002 ONDANSETRON PER 1 MG

## 2022-07-01 PROCEDURE — 80307 DRUG TEST PRSMV CHEM ANLYZR: CPT | Performed by: EMERGENCY MEDICINE

## 2022-07-01 PROCEDURE — 90791 PSYCH DIAGNOSTIC EVALUATION: CPT | Performed by: SOCIAL WORKER

## 2022-07-01 PROCEDURE — 96376 TX/PRO/DX INJ SAME DRUG ADON: CPT

## 2022-07-01 PROCEDURE — 25010000002 DIAZEPAM PER 5 MG: Performed by: NURSE PRACTITIONER

## 2022-07-01 PROCEDURE — 96361 HYDRATE IV INFUSION ADD-ON: CPT

## 2022-07-01 PROCEDURE — 25010000002 LORAZEPAM PER 2 MG: Performed by: INTERNAL MEDICINE

## 2022-07-01 PROCEDURE — 25010000002 THIAMINE PER 100 MG: Performed by: NURSE PRACTITIONER

## 2022-07-01 PROCEDURE — 71045 X-RAY EXAM CHEST 1 VIEW: CPT

## 2022-07-01 PROCEDURE — 96374 THER/PROPH/DIAG INJ IV PUSH: CPT

## 2022-07-01 PROCEDURE — 96375 TX/PRO/DX INJ NEW DRUG ADDON: CPT

## 2022-07-01 RX ORDER — FOLIC ACID 1 MG/1
1 TABLET ORAL DAILY
Status: COMPLETED | OUTPATIENT
Start: 2022-07-02 | End: 2022-07-04

## 2022-07-01 RX ORDER — LORAZEPAM 1 MG/1
0.5 TABLET ORAL
Status: DISCONTINUED | OUTPATIENT
Start: 2022-07-01 | End: 2022-07-01

## 2022-07-01 RX ORDER — LORAZEPAM 2 MG/ML
1 INJECTION INTRAMUSCULAR
Status: DISCONTINUED | OUTPATIENT
Start: 2022-07-01 | End: 2022-07-01

## 2022-07-01 RX ORDER — SODIUM CHLORIDE 0.9 % (FLUSH) 0.9 %
10 SYRINGE (ML) INJECTION AS NEEDED
Status: DISCONTINUED | OUTPATIENT
Start: 2022-07-01 | End: 2022-07-04 | Stop reason: HOSPADM

## 2022-07-01 RX ORDER — LORAZEPAM 1 MG/1
1 TABLET ORAL
Status: DISCONTINUED | OUTPATIENT
Start: 2022-07-01 | End: 2022-07-04 | Stop reason: HOSPADM

## 2022-07-01 RX ORDER — SODIUM CHLORIDE 0.9 % (FLUSH) 0.9 %
10 SYRINGE (ML) INJECTION EVERY 12 HOURS SCHEDULED
Status: DISCONTINUED | OUTPATIENT
Start: 2022-07-01 | End: 2022-07-04 | Stop reason: HOSPADM

## 2022-07-01 RX ORDER — ONDANSETRON 2 MG/ML
4 INJECTION INTRAMUSCULAR; INTRAVENOUS EVERY 6 HOURS PRN
Status: DISCONTINUED | OUTPATIENT
Start: 2022-07-01 | End: 2022-07-04 | Stop reason: HOSPADM

## 2022-07-01 RX ORDER — ACETAMINOPHEN 160 MG/5ML
650 SOLUTION ORAL EVERY 4 HOURS PRN
Status: DISCONTINUED | OUTPATIENT
Start: 2022-07-01 | End: 2022-07-04 | Stop reason: HOSPADM

## 2022-07-01 RX ORDER — DIAZEPAM 5 MG/ML
5 INJECTION, SOLUTION INTRAMUSCULAR; INTRAVENOUS ONCE
Status: COMPLETED | OUTPATIENT
Start: 2022-07-01 | End: 2022-07-01

## 2022-07-01 RX ORDER — LORAZEPAM 2 MG/ML
0.5 INJECTION INTRAMUSCULAR
Status: DISCONTINUED | OUTPATIENT
Start: 2022-07-01 | End: 2022-07-01

## 2022-07-01 RX ORDER — NITROGLYCERIN 0.4 MG/1
0.4 TABLET SUBLINGUAL
Status: DISCONTINUED | OUTPATIENT
Start: 2022-07-01 | End: 2022-07-04 | Stop reason: HOSPADM

## 2022-07-01 RX ORDER — LORAZEPAM 2 MG/ML
2 INJECTION INTRAMUSCULAR ONCE
Status: COMPLETED | OUTPATIENT
Start: 2022-07-01 | End: 2022-07-01

## 2022-07-01 RX ORDER — DIAZEPAM 5 MG/ML
2.5 INJECTION, SOLUTION INTRAMUSCULAR; INTRAVENOUS
Status: DISCONTINUED | OUTPATIENT
Start: 2022-07-01 | End: 2022-07-04 | Stop reason: HOSPADM

## 2022-07-01 RX ORDER — POTASSIUM CHLORIDE 1.5 G/1.77G
40 POWDER, FOR SOLUTION ORAL AS NEEDED
Status: DISCONTINUED | OUTPATIENT
Start: 2022-07-01 | End: 2022-07-04 | Stop reason: HOSPADM

## 2022-07-01 RX ORDER — DIAZEPAM 5 MG/ML
5 INJECTION, SOLUTION INTRAMUSCULAR; INTRAVENOUS
Status: DISCONTINUED | OUTPATIENT
Start: 2022-07-01 | End: 2022-07-04 | Stop reason: HOSPADM

## 2022-07-01 RX ORDER — ACETAMINOPHEN 650 MG/1
650 SUPPOSITORY RECTAL EVERY 4 HOURS PRN
Status: DISCONTINUED | OUTPATIENT
Start: 2022-07-01 | End: 2022-07-04 | Stop reason: HOSPADM

## 2022-07-01 RX ORDER — ACETAMINOPHEN 325 MG/1
650 TABLET ORAL EVERY 4 HOURS PRN
Status: DISCONTINUED | OUTPATIENT
Start: 2022-07-01 | End: 2022-07-04 | Stop reason: HOSPADM

## 2022-07-01 RX ORDER — ZOLPIDEM TARTRATE 5 MG/1
5 TABLET ORAL NIGHTLY PRN
Status: DISCONTINUED | OUTPATIENT
Start: 2022-07-01 | End: 2022-07-02

## 2022-07-01 RX ORDER — DIAZEPAM 5 MG/1
10 TABLET ORAL EVERY 8 HOURS
Status: DISCONTINUED | OUTPATIENT
Start: 2022-07-01 | End: 2022-07-02

## 2022-07-01 RX ORDER — DIPHENOXYLATE HYDROCHLORIDE AND ATROPINE SULFATE 2.5; .025 MG/1; MG/1
1 TABLET ORAL DAILY
Status: COMPLETED | OUTPATIENT
Start: 2022-07-02 | End: 2022-07-04

## 2022-07-01 RX ORDER — POTASSIUM CHLORIDE 750 MG/1
40 TABLET, FILM COATED, EXTENDED RELEASE ORAL AS NEEDED
Status: DISCONTINUED | OUTPATIENT
Start: 2022-07-01 | End: 2022-07-04 | Stop reason: HOSPADM

## 2022-07-01 RX ORDER — ONDANSETRON 2 MG/ML
4 INJECTION INTRAMUSCULAR; INTRAVENOUS EVERY 6 HOURS PRN
Status: DISCONTINUED | OUTPATIENT
Start: 2022-07-01 | End: 2022-07-01

## 2022-07-01 RX ORDER — LISINOPRIL 10 MG/1
10 TABLET ORAL DAILY
Status: DISCONTINUED | OUTPATIENT
Start: 2022-07-01 | End: 2022-07-04 | Stop reason: HOSPADM

## 2022-07-01 RX ORDER — LORAZEPAM 0.5 MG/1
0.5 TABLET ORAL
Status: DISCONTINUED | OUTPATIENT
Start: 2022-07-01 | End: 2022-07-04 | Stop reason: HOSPADM

## 2022-07-01 RX ORDER — POTASSIUM CHLORIDE 7.45 MG/ML
10 INJECTION INTRAVENOUS
Status: DISCONTINUED | OUTPATIENT
Start: 2022-07-01 | End: 2022-07-04 | Stop reason: HOSPADM

## 2022-07-01 RX ORDER — SODIUM CHLORIDE 9 MG/ML
100 INJECTION, SOLUTION INTRAVENOUS CONTINUOUS
Status: DISCONTINUED | OUTPATIENT
Start: 2022-07-01 | End: 2022-07-02

## 2022-07-01 RX ORDER — LORAZEPAM 1 MG/1
1 TABLET ORAL
Status: DISCONTINUED | OUTPATIENT
Start: 2022-07-01 | End: 2022-07-01

## 2022-07-01 RX ORDER — THIAMINE HYDROCHLORIDE 100 MG/ML
100 INJECTION, SOLUTION INTRAMUSCULAR; INTRAVENOUS ONCE
Status: COMPLETED | OUTPATIENT
Start: 2022-07-01 | End: 2022-07-01

## 2022-07-01 RX ORDER — CALCIUM CARBONATE 200(500)MG
2 TABLET,CHEWABLE ORAL 2 TIMES DAILY PRN
Status: DISCONTINUED | OUTPATIENT
Start: 2022-07-01 | End: 2022-07-04 | Stop reason: HOSPADM

## 2022-07-01 RX ORDER — LEVOTHYROXINE SODIUM 0.1 MG/1
100 TABLET ORAL DAILY
Status: DISCONTINUED | OUTPATIENT
Start: 2022-07-01 | End: 2022-07-04 | Stop reason: HOSPADM

## 2022-07-01 RX ORDER — ONDANSETRON 4 MG/1
4 TABLET, FILM COATED ORAL EVERY 6 HOURS PRN
Status: DISCONTINUED | OUTPATIENT
Start: 2022-07-01 | End: 2022-07-01

## 2022-07-01 RX ORDER — AMLODIPINE BESYLATE 5 MG/1
5 TABLET ORAL EVERY MORNING
Status: DISCONTINUED | OUTPATIENT
Start: 2022-07-01 | End: 2022-07-04 | Stop reason: HOSPADM

## 2022-07-01 RX ADMIN — DIAZEPAM 10 MG: 5 TABLET ORAL at 12:28

## 2022-07-01 RX ADMIN — DIAZEPAM 5 MG: 5 INJECTION, SOLUTION INTRAMUSCULAR; INTRAVENOUS at 03:53

## 2022-07-01 RX ADMIN — LORAZEPAM 1 MG: 1 TABLET ORAL at 10:19

## 2022-07-01 RX ADMIN — ONDANSETRON 4 MG: 2 INJECTION INTRAMUSCULAR; INTRAVENOUS at 10:26

## 2022-07-01 RX ADMIN — DIAZEPAM 5 MG: 5 INJECTION, SOLUTION INTRAMUSCULAR; INTRAVENOUS at 14:08

## 2022-07-01 RX ADMIN — POTASSIUM CHLORIDE 40 MEQ: 750 TABLET, EXTENDED RELEASE ORAL at 10:26

## 2022-07-01 RX ADMIN — SODIUM CHLORIDE 100 ML/HR: 9 INJECTION, SOLUTION INTRAVENOUS at 08:37

## 2022-07-01 RX ADMIN — Medication 10 ML: at 08:52

## 2022-07-01 RX ADMIN — SODIUM CHLORIDE 100 ML/HR: 9 INJECTION, SOLUTION INTRAVENOUS at 20:01

## 2022-07-01 RX ADMIN — LISINOPRIL 10 MG: 10 TABLET ORAL at 12:28

## 2022-07-01 RX ADMIN — DIAZEPAM 5 MG: 5 INJECTION, SOLUTION INTRAMUSCULAR; INTRAVENOUS at 10:49

## 2022-07-01 RX ADMIN — Medication 10 ML: at 20:01

## 2022-07-01 RX ADMIN — ONDANSETRON 4 MG: 2 INJECTION INTRAMUSCULAR; INTRAVENOUS at 22:08

## 2022-07-01 RX ADMIN — DIAZEPAM 5 MG: 5 INJECTION, SOLUTION INTRAMUSCULAR; INTRAVENOUS at 15:32

## 2022-07-01 RX ADMIN — LEVOTHYROXINE SODIUM 100 MCG: 0.1 TABLET ORAL at 10:26

## 2022-07-01 RX ADMIN — DIAZEPAM 5 MG: 5 INJECTION, SOLUTION INTRAMUSCULAR; INTRAVENOUS at 22:08

## 2022-07-01 RX ADMIN — THIAMINE HYDROCHLORIDE 100 MG: 100 INJECTION, SOLUTION INTRAMUSCULAR; INTRAVENOUS at 08:52

## 2022-07-01 RX ADMIN — LORAZEPAM 2 MG: 2 INJECTION INTRAMUSCULAR; INTRAVENOUS at 08:33

## 2022-07-01 RX ADMIN — ACETAMINOPHEN 650 MG: 325 TABLET, FILM COATED ORAL at 15:31

## 2022-07-01 RX ADMIN — DIAZEPAM 5 MG: 5 INJECTION, SOLUTION INTRAMUSCULAR; INTRAVENOUS at 16:40

## 2022-07-01 RX ADMIN — SODIUM CHLORIDE 1000 ML: 9 INJECTION, SOLUTION INTRAVENOUS at 00:10

## 2022-07-01 RX ADMIN — DIAZEPAM 10 MG: 5 TABLET ORAL at 20:01

## 2022-07-01 RX ADMIN — AMLODIPINE BESYLATE 5 MG: 5 TABLET ORAL at 10:26

## 2022-07-01 RX ADMIN — POTASSIUM CHLORIDE 40 MEQ: 1.5 POWDER, FOR SOLUTION ORAL at 20:18

## 2022-07-02 LAB
ALBUMIN SERPL-MCNC: 3.9 G/DL (ref 3.5–5.2)
ALBUMIN/GLOB SERPL: 2.1 G/DL
ALP SERPL-CCNC: 72 U/L (ref 39–117)
ALT SERPL W P-5'-P-CCNC: 36 U/L (ref 1–41)
ANION GAP SERPL CALCULATED.3IONS-SCNC: 11 MMOL/L (ref 5–15)
AST SERPL-CCNC: 115 U/L (ref 1–40)
BILIRUB SERPL-MCNC: 0.9 MG/DL (ref 0–1.2)
BUN SERPL-MCNC: 8 MG/DL (ref 8–23)
BUN/CREAT SERPL: 11.1 (ref 7–25)
CALCIUM SPEC-SCNC: 8 MG/DL (ref 8.6–10.5)
CHLORIDE SERPL-SCNC: 102 MMOL/L (ref 98–107)
CO2 SERPL-SCNC: 23 MMOL/L (ref 22–29)
CREAT SERPL-MCNC: 0.72 MG/DL (ref 0.76–1.27)
DEPRECATED RDW RBC AUTO: 43.9 FL (ref 37–54)
EGFRCR SERPLBLD CKD-EPI 2021: 101.4 ML/MIN/1.73
ERYTHROCYTE [DISTWIDTH] IN BLOOD BY AUTOMATED COUNT: 13.9 % (ref 12.3–15.4)
GLOBULIN UR ELPH-MCNC: 1.9 GM/DL
GLUCOSE SERPL-MCNC: 92 MG/DL (ref 65–99)
HCT VFR BLD AUTO: 34.7 % (ref 37.5–51)
HGB BLD-MCNC: 12 G/DL (ref 13–17.7)
MCH RBC QN AUTO: 30.9 PG (ref 26.6–33)
MCHC RBC AUTO-ENTMCNC: 34.6 G/DL (ref 31.5–35.7)
MCV RBC AUTO: 89.4 FL (ref 79–97)
PLATELET # BLD AUTO: 83 10*3/MM3 (ref 140–450)
PMV BLD AUTO: 10.1 FL (ref 6–12)
POTASSIUM SERPL-SCNC: 4.5 MMOL/L (ref 3.5–5.2)
POTASSIUM SERPL-SCNC: 4.5 MMOL/L (ref 3.5–5.2)
PROT SERPL-MCNC: 5.8 G/DL (ref 6–8.5)
RBC # BLD AUTO: 3.88 10*6/MM3 (ref 4.14–5.8)
SODIUM SERPL-SCNC: 136 MMOL/L (ref 136–145)
WBC NRBC COR # BLD: 3.66 10*3/MM3 (ref 3.4–10.8)

## 2022-07-02 PROCEDURE — 97530 THERAPEUTIC ACTIVITIES: CPT

## 2022-07-02 PROCEDURE — 84132 ASSAY OF SERUM POTASSIUM: CPT | Performed by: INTERNAL MEDICINE

## 2022-07-02 PROCEDURE — 80053 COMPREHEN METABOLIC PANEL: CPT | Performed by: NURSE PRACTITIONER

## 2022-07-02 PROCEDURE — 25010000002 DIAZEPAM PER 5 MG: Performed by: NURSE PRACTITIONER

## 2022-07-02 PROCEDURE — 97161 PT EVAL LOW COMPLEX 20 MIN: CPT

## 2022-07-02 PROCEDURE — 97165 OT EVAL LOW COMPLEX 30 MIN: CPT

## 2022-07-02 PROCEDURE — 85027 COMPLETE CBC AUTOMATED: CPT | Performed by: NURSE PRACTITIONER

## 2022-07-02 RX ORDER — LOPERAMIDE HYDROCHLORIDE 2 MG/1
4 CAPSULE ORAL 4 TIMES DAILY PRN
Status: DISCONTINUED | OUTPATIENT
Start: 2022-07-02 | End: 2022-07-04 | Stop reason: HOSPADM

## 2022-07-02 RX ORDER — DIAZEPAM 5 MG/1
10 TABLET ORAL EVERY 12 HOURS
Status: DISCONTINUED | OUTPATIENT
Start: 2022-07-03 | End: 2022-07-04

## 2022-07-02 RX ORDER — ZOLPIDEM TARTRATE 5 MG/1
10 TABLET ORAL NIGHTLY
Status: DISCONTINUED | OUTPATIENT
Start: 2022-07-02 | End: 2022-07-02

## 2022-07-02 RX ADMIN — Medication 100 MG: at 09:31

## 2022-07-02 RX ADMIN — POTASSIUM CHLORIDE 40 MEQ: 1.5 POWDER, FOR SOLUTION ORAL at 00:33

## 2022-07-02 RX ADMIN — LOPERAMIDE HYDROCHLORIDE 4 MG: 2 CAPSULE ORAL at 09:31

## 2022-07-02 RX ADMIN — Medication 1 TABLET: at 09:31

## 2022-07-02 RX ADMIN — LISINOPRIL 10 MG: 10 TABLET ORAL at 09:31

## 2022-07-02 RX ADMIN — Medication 10 ML: at 09:31

## 2022-07-02 RX ADMIN — DIAZEPAM 10 MG: 5 TABLET ORAL at 04:25

## 2022-07-02 RX ADMIN — LEVOTHYROXINE SODIUM 100 MCG: 0.1 TABLET ORAL at 09:31

## 2022-07-02 RX ADMIN — LOPERAMIDE HYDROCHLORIDE 4 MG: 2 CAPSULE ORAL at 15:50

## 2022-07-02 RX ADMIN — DIAZEPAM 10 MG: 5 TABLET ORAL at 12:19

## 2022-07-02 RX ADMIN — FOLIC ACID 1 MG: 1 TABLET ORAL at 09:31

## 2022-07-02 RX ADMIN — DIAZEPAM 5 MG: 5 INJECTION, SOLUTION INTRAMUSCULAR; INTRAVENOUS at 00:39

## 2022-07-02 RX ADMIN — AMLODIPINE BESYLATE 5 MG: 5 TABLET ORAL at 15:50

## 2022-07-02 RX ADMIN — DIAZEPAM 5 MG: 5 INJECTION, SOLUTION INTRAMUSCULAR; INTRAVENOUS at 22:43

## 2022-07-03 RX ADMIN — FOLIC ACID 1 MG: 1 TABLET ORAL at 09:47

## 2022-07-03 RX ADMIN — LEVOTHYROXINE SODIUM 100 MCG: 0.1 TABLET ORAL at 09:47

## 2022-07-03 RX ADMIN — Medication 100 MG: at 09:47

## 2022-07-03 RX ADMIN — AMLODIPINE BESYLATE 5 MG: 5 TABLET ORAL at 09:47

## 2022-07-03 RX ADMIN — Medication 1 TABLET: at 09:47

## 2022-07-03 RX ADMIN — LISINOPRIL 10 MG: 10 TABLET ORAL at 09:47

## 2022-07-03 RX ADMIN — LORAZEPAM 0.5 MG: 0.5 TABLET ORAL at 02:31

## 2022-07-03 RX ADMIN — Medication 10 ML: at 09:47

## 2022-07-03 RX ADMIN — DIAZEPAM 10 MG: 5 TABLET ORAL at 17:19

## 2022-07-04 ENCOUNTER — READMISSION MANAGEMENT (OUTPATIENT)
Dept: CALL CENTER | Facility: HOSPITAL | Age: 67
End: 2022-07-04

## 2022-07-04 VITALS
SYSTOLIC BLOOD PRESSURE: 120 MMHG | TEMPERATURE: 97.9 F | HEIGHT: 72 IN | RESPIRATION RATE: 18 BRPM | OXYGEN SATURATION: 95 % | DIASTOLIC BLOOD PRESSURE: 79 MMHG | HEART RATE: 83 BPM | WEIGHT: 189.6 LBS | BODY MASS INDEX: 25.68 KG/M2

## 2022-07-04 RX ORDER — FOLIC ACID 1 MG/1
1 TABLET ORAL DAILY
Qty: 30 TABLET | Refills: 0 | Status: SHIPPED | OUTPATIENT
Start: 2022-07-04

## 2022-07-04 RX ORDER — DIAZEPAM 5 MG/1
5 TABLET ORAL EVERY 12 HOURS
Status: DISCONTINUED | OUTPATIENT
Start: 2022-07-04 | End: 2022-07-04 | Stop reason: HOSPADM

## 2022-07-04 RX ORDER — FOLIC ACID 1 MG/1
1 TABLET ORAL DAILY
Qty: 30 TABLET | Refills: 0 | Status: SHIPPED | OUTPATIENT
Start: 2022-07-04 | End: 2022-07-04 | Stop reason: SDUPTHER

## 2022-07-04 RX ORDER — MULTIVITAMIN
1 CAPSULE ORAL DAILY
Qty: 30 CAPSULE | Refills: 0 | Status: SHIPPED | OUTPATIENT
Start: 2022-07-04 | End: 2023-07-04

## 2022-07-04 RX ORDER — METHION/INOS/CHOL BT/B COM/LIV 110MG-86MG
100 CAPSULE ORAL DAILY
Qty: 30 TABLET | Refills: 0 | Status: SHIPPED | OUTPATIENT
Start: 2022-07-04 | End: 2022-07-04 | Stop reason: SDUPTHER

## 2022-07-04 RX ORDER — MULTIVITAMIN
1 CAPSULE ORAL DAILY
Qty: 30 CAPSULE | Refills: 0 | Status: SHIPPED | OUTPATIENT
Start: 2022-07-04 | End: 2022-07-04 | Stop reason: SDUPTHER

## 2022-07-04 RX ORDER — METHION/INOS/CHOL BT/B COM/LIV 110MG-86MG
100 CAPSULE ORAL DAILY
Qty: 30 TABLET | Refills: 0 | Status: SHIPPED | OUTPATIENT
Start: 2022-07-04

## 2022-07-04 RX ADMIN — Medication 10 ML: at 08:46

## 2022-07-04 RX ADMIN — FOLIC ACID 1 MG: 1 TABLET ORAL at 08:45

## 2022-07-04 RX ADMIN — DIAZEPAM 10 MG: 5 TABLET ORAL at 06:28

## 2022-07-04 RX ADMIN — Medication 100 MG: at 08:45

## 2022-07-04 RX ADMIN — LISINOPRIL 10 MG: 10 TABLET ORAL at 08:45

## 2022-07-04 RX ADMIN — LEVOTHYROXINE SODIUM 100 MCG: 0.1 TABLET ORAL at 08:45

## 2022-07-04 RX ADMIN — LORAZEPAM 0.5 MG: 0.5 TABLET ORAL at 00:10

## 2022-07-04 RX ADMIN — Medication 1 TABLET: at 08:45

## 2022-07-04 RX ADMIN — AMLODIPINE BESYLATE 5 MG: 5 TABLET ORAL at 06:28

## 2022-07-04 NOTE — OUTREACH NOTE
Prep Survey    Flowsheet Row Responses   [facility] patient discharged from? [place]   Is LACE score < 7 ? No   Emergency Room discharge w/ pulse ox? No   Eligibility TCM   Discharge diagnosis Alcohol withdrawal syndrome without complication    Does the patient have one of the following disease processes/diagnoses(primary or secondary)? Other   Does the patient have Home health ordered? No   Is there a DME ordered? No   Prep survey completed? Yes          LIZZY ARCE - Registered Nurse

## 2022-07-05 ENCOUNTER — TRANSITIONAL CARE MANAGEMENT TELEPHONE ENCOUNTER (OUTPATIENT)
Dept: CALL CENTER | Facility: HOSPITAL | Age: 67
End: 2022-07-05

## 2022-07-05 NOTE — OUTREACH NOTE
Call Center TCM Note    Flowsheet Row Responses   [facility] patient discharged from? [place]   Does the patient have one of the following disease processes/diagnoses(primary or secondary)? Other   TCM attempt successful? No   Unsuccessful attempts Attempt 1          Ofelia Mendez LPN    7/5/2022, 08:32 EDT

## 2022-07-05 NOTE — OUTREACH NOTE
Call Center TCM Note    Flowsheet Row Responses   [facility] [place] patient discharged from? [place]   Does the patient have one of the following disease processes/diagnoses(primary or secondary)? Other   TCM attempt successful? Yes   Call start time 1700   Call end time 1713   Discharge diagnosis Alcohol withdrawal syndrome without complication    Meds reviewed with patient/caregiver? Yes   Is the patient having any side effects they believe may be caused by any medication additions or changes? No   Does the patient have all medications ordered at discharge? Yes   Is the patient taking all medications as directed (includes completed medication regime)? Yes   Does the patient have a primary care provider?  Yes   Does the patient have an appointment with their PCP within 7 days of discharge? No   Comments regarding PCP PCP APPOINTMENT SCHEDULED FOR 7/8/22   What is preventing the patient from scheduling follow up appointments within 7 days of discharge? Haven't had time   Nursing Interventions Educated patient on importance of making appointment   Urgent appointment interventions Facilitated patient appointment   Has the patient kept scheduled appointments due by today? N/A   Has home health visited the patient within 72 hours of discharge? N/A   Psychosocial issues? Yes   Psychosocial comments PATIENT IN ALCOHOL RECOVERY   Did the patient receive a copy of their discharge instructions? Yes   Nursing interventions Reviewed instructions with patient   What is the patient's perception of their health status since discharge? Improving   Is the patient/caregiver able to teach back signs and symptoms related to disease process for when to call PCP? Yes   Is the patient/caregiver able to teach back signs and symptoms related to disease process for when to call 911? Yes   Is the patient/caregiver able to teach back the hierarchy of who to call/visit for symptoms/problems? PCP, Specialist, Home health nurse, Urgent Care,  ED, 911 Yes   If the patient is a current smoker, are they able to teach back resources for cessation? Not a smoker   TCM call completed? Yes          Ofelia Mendez LPN    7/5/2022, 17:15 EDT

## 2022-07-06 ENCOUNTER — PREP FOR SURGERY (OUTPATIENT)
Dept: SURGERY | Facility: SURGERY CENTER | Age: 67
End: 2022-07-06

## 2022-07-06 ENCOUNTER — OFFICE VISIT (OUTPATIENT)
Dept: PAIN MEDICINE | Facility: CLINIC | Age: 67
End: 2022-07-06

## 2022-07-06 VITALS
DIASTOLIC BLOOD PRESSURE: 95 MMHG | WEIGHT: 191.4 LBS | OXYGEN SATURATION: 97 % | BODY MASS INDEX: 25.92 KG/M2 | HEIGHT: 72 IN | HEART RATE: 92 BPM | SYSTOLIC BLOOD PRESSURE: 162 MMHG | RESPIRATION RATE: 12 BRPM

## 2022-07-06 DIAGNOSIS — M47.816 LUMBAR FACET ARTHROPATHY: ICD-10-CM

## 2022-07-06 DIAGNOSIS — M47.816 LUMBAR FACET ARTHROPATHY: Primary | ICD-10-CM

## 2022-07-06 DIAGNOSIS — G89.4 CHRONIC PAIN SYNDROME: Primary | ICD-10-CM

## 2022-07-06 PROCEDURE — 99204 OFFICE O/P NEW MOD 45 MIN: CPT | Performed by: ANESTHESIOLOGY

## 2022-07-06 NOTE — PROGRESS NOTES
"CHIEF COMPLAINT  New patient ref by Alla Beal MD.M54.50,G89.29 (ICD-10-CM) - Chronic bilateral low back pain without sciatica,M54.2 (ICD-10-CM) - Neck pain,M25.512,G89.29 (ICD-10-CM) - Chronic left shoulder pain,M25.561,M25.562,G89.29 (ICD-10-CM) - Chronic pain of both knees.  Patient in office evaluation of Lumbar,cervical,chronic left shoulder and bilateral knee pain onset pain a year ago,states he had a fall back in 1/2022 and landed on his back split back of scalp and had 15 staples to repair opening  . Describes pain as burning,throbbing along with constant aching . localized bilateral lower back with bilateral radiating pain patient states pain does not shoot into his legs . Cervical pain \"seems to be healing, a bit of a burning sensation with no radiating pain\". Patient states most of his pain is BURNING SENSATION\" . Denies pain clinic treatment states he has never had an epidural in the past . Patient states he did PT 12/2021 for his back pain and has not seen a chiropractor recently but has in the past . Also states he tried heat therapy with mild relief along with use of TENS UNIT DEVICE . Currently takes cyclobenzaprine and Mobic to help with pain . Patient has hx of alcoholism stated he went through detox a week ago.      Subjective   Pro Mueller is a 67 y.o. male.   He presents to the office for evaluation of back pain. He was referred here by Dr. Alla Beal.         History of Present Illness     LOW BACK PAIN:  Low back pain has been present for years.  Has been worse in the past 9 to 10 months without a clear inciting event but he has had worsening back pain after a fall 6 months ago.  Constant aching and there is some stabbing sensations, and there is some dull burning in the lower lumbar area that increases with activity..  Pain may radiate like a bandlike distribution across the low back but there is no significant tailbone or buttock radiation nor sciatica symptoms.  He got some mild " relief from physical therapy but overall he feels like he is painful condition is still a severe suffering and the back pain is worsening.  He is doing the exercises he learned in physical therapy on almost daily basis.  He gets some mild relief with use of Mobic and also does occasionally note some muscle spasm and get some mild extra relief from use of cyclobenzaprine.  He uses heat.  He has had some benefit mildly from use of a TENS unit in the past also.  Most any activity is an aggravating factor with regards to bending or standing or walking.  He does not find his pain to be flared with sitting and gets some alleviation of pain with sitting or with lying down to rest.    PEG Assessment   What number best describes your pain on average in the past week?5  What number best describes how, during the past week, pain has interfered with your enjoyment of life?5  What number best describes how, during the past week, pain has interfered with your general activity?  5    --  The aforementioned information the Chief Complaint section and above subjective data including any HPI data, and also the Review of Systems data, has been personally reviewed and affirmed.  --        Current Outpatient Medications:   •  acetaminophen (TYLENOL) 325 MG tablet, Take 2 tablets by mouth Every 4 (Four) Hours As Needed for Mild Pain ., Disp: , Rfl:   •  amLODIPine (NORVASC) 5 MG tablet, TAKE 1 TABLET BY MOUTH EVERY MORNING, Disp: 90 tablet, Rfl: 1  •  atorvastatin (LIPITOR) 20 MG tablet, TAKE 1 TABLET BY MOUTH DAILY, Disp: 90 tablet, Rfl: 1  •  folic acid (FOLVITE) 1 MG tablet, Take 1 tablet by mouth Daily., Disp: 30 tablet, Rfl: 0  •  levothyroxine (SYNTHROID, LEVOTHROID) 100 MCG tablet, Take 1 tablet by mouth Daily., Disp: 90 tablet, Rfl: 1  •  lisinopril (PRINIVIL,ZESTRIL) 10 MG tablet, TAKE 1 TABLET BY MOUTH DAILY, Disp: 90 tablet, Rfl: 1  •  Multiple Vitamin (multivitamin) capsule, Take 1 capsule by mouth Daily., Disp: 30 capsule,  Rfl: 0  •  multivitamin (Daily Vitamin) tablet tablet, Take 1 tablet by mouth Daily., Disp: 30 tablet, Rfl: 11  •  thiamine (thiamine) 100 MG tablet tablet, Take 1 tablet by mouth Daily., Disp: 30 tablet, Rfl: 0    The following portions of the patient's history were reviewed and updated as appropriate: allergies, current medications, past family history, past medical history, past social history, past surgical history and problem list.    -------    The following portions of the patient's history were reviewed and updated as appropriate: allergies, current medications, past family history, past medical history, past social history, past surgical history and problem list.    Allergies   Allergen Reactions   • Penicillins Anaphylaxis and Other (See Comments)     Seizure. childhood       Current Outpatient Medications on File Prior to Visit   Medication Sig Dispense Refill   • acetaminophen (TYLENOL) 325 MG tablet Take 2 tablets by mouth Every 4 (Four) Hours As Needed for Mild Pain .     • amLODIPine (NORVASC) 5 MG tablet TAKE 1 TABLET BY MOUTH EVERY MORNING 90 tablet 1   • atorvastatin (LIPITOR) 20 MG tablet TAKE 1 TABLET BY MOUTH DAILY 90 tablet 1   • folic acid (FOLVITE) 1 MG tablet Take 1 tablet by mouth Daily. 30 tablet 0   • levothyroxine (SYNTHROID, LEVOTHROID) 100 MCG tablet Take 1 tablet by mouth Daily. 90 tablet 1   • lisinopril (PRINIVIL,ZESTRIL) 10 MG tablet TAKE 1 TABLET BY MOUTH DAILY 90 tablet 1   • Multiple Vitamin (multivitamin) capsule Take 1 capsule by mouth Daily. 30 capsule 0   • multivitamin (Daily Vitamin) tablet tablet Take 1 tablet by mouth Daily. 30 tablet 11   • thiamine (thiamine) 100 MG tablet tablet Take 1 tablet by mouth Daily. 30 tablet 0     No current facility-administered medications on file prior to visit.       Patient Active Problem List   Diagnosis   • Alcoholism (HCC)   • Atopic rhinitis   • Mixed anxiety depressive disorder   • Genital herpes simplex   • Hyperlipidemia   •  Hypertension   • Hypothyroidism   • Insomnia   • Panic disorder without agoraphobia   • Persistent insomnia   • Vitamin D deficiency   • Chronic low back pain   • Neuropathy involving both lower extremities   • QT prolongation   • Left shoulder pain   • Hypokalemia   • Pancytopenia ([facility])   • Left ventricular diastolic dysfunction   • Tremor   • C5 cervical fracture ([facility])   • Syncope and collapse   • Neck pain   • Alcoholic intoxication with complication ([facility])   • Withdrawal symptoms, alcohol ([facility])   • Transaminitis       Past Medical History:   Diagnosis Date   • Alcohol abuse    • Alcohol withdrawal ([facility]) 11/4/2016   • Alcoholic ketoacidosis 1/12/2020   • Allergic 1970   • Anxiety    • Arthritis    • Atopic rhinitis 8/8/2016   • Depression    • Disease of thyroid gland    • Elevated cholesterol    • Encounter for removal of sutures    • Genital herpes simplex 8/8/2016   • GERD (gastroesophageal reflux disease)    • Headache, tension-type    • Hyperlipidemia    • Hypertension    • Kidney stone    • Migraine    • Motion sickness    • Nephrolithiasis 2/12/2020   • Olecranon bursitis, right elbow    • Panic disorder without agoraphobia 8/8/2016   • Peripheral neuropathy    • Sleep apnea    • Syncope and collapse 1/2/2019   • Vitamin D deficiency 8/8/2016   • Withdrawal symptoms, alcohol ([facility])        Past Surgical History:   Procedure Laterality Date   • COLONOSCOPY     • CYST REMOVAL     • CYSTOSCOPY BLADDER STONE LITHOTRIPSY  02/2022   • EXTRACORPOREAL SHOCK WAVE LITHOTRIPSY (ES[facility]) Right 2002   • SHOULDER ARTHROSCOPY Right 12/17/2019    Procedure: SHOULDER ARTHROSCOPY, decompression, distal clavicle excision;  Surgeon: Aj Mancilla MD;  Location: [facility]  Service: Orthopedics   • TONSILLECTOMY         Family History   Problem Relation Age of Onset   • Alzheimer's disease Mother    • Mental illness Mother    • Pancreatic cancer Father    • Hearing loss Father    • Malig Hyperthermia Neg Hx        Social  "History     Socioeconomic History   • Marital status: Single   Tobacco Use   • Smoking status: Former Smoker     Packs/day: 0.50     Years: 15.00     Pack years: 7.50     Types: Cigarettes     Start date:      Quit date:      Years since quittin.5   • Smokeless tobacco: Never Used   • Tobacco comment: caffeine - 3 cans of coke daily    Vaping Use   • Vaping Use: Never used   Substance and Sexual Activity   • Alcohol use: Not Currently     Alcohol/week: 15.0 standard drinks     Types: 15 Shots of liquor per week     Comment: went through detox last week 2022   • Drug use: Yes     Types: Methamphetamines     Comment: \"once in a rare while\"   • Sexual activity: Yes     Partners: Female     Birth control/protection: Condom       -------      REVIEW OF PERTINENT MEDICAL DATA    E-curt report is reviewed:  I reviewed the document in the electronic form under the PDMP tab in the Epic EMR...  - In this function, the report is a current report in as close to real-time as possible.  - The report was available for immediate review.    - I did allegra the report as reviewed.  - There is pertinent data on the last page of the report.      Review of Systems   Constitutional: Positive for activity change (less).   HENT: Negative for congestion.    Eyes: Negative for visual disturbance.   Respiratory: Negative for cough and chest tightness.    Cardiovascular: Negative for chest pain.   Gastrointestinal: Positive for diarrhea. Negative for abdominal pain and constipation.   Genitourinary: Negative for difficulty urinating and dysuria.   Musculoskeletal: Positive for back pain and neck pain.   Neurological: Negative for dizziness, weakness, light-headedness, numbness and headaches.   Psychiatric/Behavioral: Positive for sleep disturbance. Negative for agitation and suicidal ideas. The patient is not nervous/anxious.          Vitals:    22 1504   BP: 162/95   BP Location: Left arm   Patient Position: Sitting " "  Pulse: 92   Resp: 12   SpO2: 97%   Weight: 86.8 kg (191 lb 6.4 oz)   Height: 182.9 cm (72\")   PainSc:   4   PainLoc: Back  Comment: neck,shoulder,knee         Objective   Physical Exam  Vitals and nursing note reviewed.   Constitutional:       General: He is not in acute distress.     Appearance: Normal appearance. He is well-developed. He is not toxic-appearing.   HENT:      Head: Normocephalic and atraumatic.      Right Ear: Hearing and external ear normal.      Left Ear: Hearing and external ear normal.      Nose: Nose normal.   Eyes:      General: Lids are normal.      Conjunctiva/sclera: Conjunctivae normal.      Pupils: Pupils are equal, round, and reactive to light.   Pulmonary:      Effort: Pulmonary effort is normal. No respiratory distress.   Musculoskeletal:      Lumbar back: Spasms, tenderness and bony tenderness present. Normal range of motion. Negative right straight leg raise test and negative left straight leg raise test.      Comments: Pain on extension as well as with lumbar loading   Neurological:      Mental Status: He is alert and oriented to person, place, and time.      Cranial Nerves: Cranial nerves are intact. No cranial nerve deficit.      Sensory: Sensation is intact.      Motor: No weakness or atrophy.      Coordination: Coordination is intact. Romberg sign negative.      Gait: Gait is intact.      Deep Tendon Reflexes:      Reflex Scores:       Patellar reflexes are 1+ on the right side and 1+ on the left side.       Achilles reflexes are 1+ on the right side and 1+ on the left side.  Psychiatric:         Behavior: Behavior normal.         Assessment & Plan   Diagnoses and all orders for this visit:    1. Chronic pain syndrome (Primary)    2. Lumbar facet arthropathy  -     CBC & Differential; Future      In summary this 67-year-old gentleman has low back pain this is worse painful problem among other painful problems.  He has some previously noted L4-5 facet arthropathy.  Lower lumbar " "area pain is noted.  Impairing his ability to exercise as well as engage well in activities of daily living.  In addition to painful problems he also has had some significant medical problems recently.  He has been sober about a week after going through detoxification from alcohol abuse.      --- Follow-up for procedure... bilateral L35 LMBB then office soon after, with plan to repeat LMBB as indicated.  Order set x2 placed    -- Long-term opiate therapy is contraindicated    -- In review, he has several different painful problems but his low back pain and is the one that he consider in the most debilitating and causing the most severe suffering and causing the most functional impairment so we are focusing on that worst item today.    -- Addendum:  He had low platelets on CBC recently.  WILL RECHECK PRIOR TO INTERVENTIONAL NEURAXIAL PROCEDURE    -------  Education about Medial Branch Blockade and RF Therapy:    This medial branch blockade (MBB) suggested is intended for diagnostic purposes, with the intent of offering the patient Radiofrequency thermal rhizotomy (RF) if the MBB is diagnostically effective.  The diagnostic blockade is necessary to determine the likelihood that RF therapy could be efficacious in providing long term relief to the patient.    Medial branches are sensory nerve branches that connect to a facet joint and transmit sensations & pain signals from that joint.  Facet is a term for the type of joints found in the spine.  Medial branches are the nerves that go to a facet, and therefore are also sometimes called \"facet joint nerves\" (FJNs).      In a medial branch blockade procedure, xray fluoroscopy is used to verify the locations of the outside of the joint lines which are being targeted.  Under xray guidance, needles are placed to these areas.  Contrast dye is injected to confirm proper placement, with dye flowing over the joint area, and to ensure that the dye does not flow into unintended " areas such as a vein.  When this is confirmed, local anesthetic is injected to block the medial branch at that joint level.      If MBBs are diagnostically successful in blocking pain, then the patient is most likely a great candidate for Radiofrequency of those facet joint nerves.  In the RF procedure, needles are placed to the joint lines in the same fashion, and after testing, the needle tips are heated to thermally treat the nerves, blocking the nerves by in essence damaging the nerves with the heat treatment(non-pulsed).       Medically, a successful RF procedure should provide a patient with 50% pain relief or more for at least 6 months.  Clinical experience suggests that successful patients receive relief more in the range of 12 months on average.  We also discussed that a fortunate minority of patients receive therapeutic success from the MBB, and may not require RF ablation.  If a patient receives more than 8 weeks of relief from MBB, then occasional repeat MBB for therapeutic purposes is a very reasonable alternative therapy.  This course of therapy is consistent with our LCDs according to our CMS  in the area, and therefore other insurance providers should follow accordingly.  We will monitor our patients to screen for these therapeutic responders and will offer RF therapy only when necessary.        We discussed that MBB & RF are not without risks.  Guidelines regarding anticoagulant use & neuraxial procedures will be respected.  Patients that are ill or otherwise may be at risk for sepsis will not have their spines accessed by neuraxial injections of any type.  This patient will not be offered these therapies if there is an increased risk.   We discussed that there is a risk of postprocedural pain and also a risk of worsening of clinical picture with these procedures as with any neuraxial procedure.    -------                   JAMEY REPORT  JAMEY report has been reviewed and scanned into  the patient's chart.  Date of last JAMEY : as above.  No current use of opioids.           Dictated utilizing Dragon dictation.     This document is intended for medical expert use only. Reading of this document by patients and/or patient's family without participating medical staff guidance may result in misinterpretation and unintended morbidity.   Any interpretation of such data is the responsibility of the patient and/or family member responsible for the patient in concert with their primary or specialist providers, not to be left for sources of online searches such as [employer], [employer] or similar queries. Relying on these approaches to knowledge may result in misinterpretation, misguided goals of care and even death should patients or family members try recommendations outside of the realm of professional medical care in a supervised way.    ---        Vitals:    07/06/22 1504   PainSc:   4   PainLoc: Back  Comment: neck,shoulder,knee          Pro Mueller reports a pain score of 4.  Given his pain assessment as noted, treatment options were discussed and the following options were decided upon as a follow-up plan to address the patient's pain: educational materials on pain management.      Current outpatient and discharge medications have been reconciled for the patient.  This is just placing a smart phrase to fill the quality measure.  Reviewed by: Grant Fortune MD

## 2022-07-06 NOTE — PATIENT INSTRUCTIONS
"-------  Education about Medial Branch Blockade and RF Therapy:    This medial branch blockade (MBB) suggested is intended for diagnostic purposes, with the intent of offering the patient Radiofrequency thermal rhizotomy (RF) if the MBB is diagnostically effective.  The diagnostic blockade is necessary to determine the likelihood that RF therapy could be efficacious in providing long term relief to the patient.    Medial branches are sensory nerve branches that connect to a facet joint and transmit sensations & pain signals from that joint.  Facet is a term for the type of joints found in the spine.  Medial branches are the nerves that go to a facet, and therefore are also sometimes called \"facet joint nerves\" (FJNs).      In a medial branch blockade procedure, xray fluoroscopy is used to verify the locations of the outside of the joint lines which are being targeted.  Under xray guidance, needles are placed to these areas.  Contrast dye is injected to confirm proper placement, with dye flowing over the joint area, and to ensure that the dye does not flow into unintended areas such as a vein.  When this is confirmed, local anesthetic is injected to block the medial branch at that joint level.      If MBBs are diagnostically successful in blocking pain, then the patient is most likely a great candidate for Radiofrequency of those facet joint nerves.  In the RF procedure, needles are placed to the joint lines in the same fashion, and after testing, the needle tips are heated to thermally treat the nerves, blocking the nerves by in essence damaging the nerves with the heat treatment(non-pulsed).       Medically, a successful RF procedure should provide a patient with 50% pain relief or more for at least 6 months.  Clinical experience suggests that successful patients receive relief more in the range of 12 months on average.  We also discussed that a fortunate minority of patients receive therapeutic success from the " MBB, and may not require RF ablation.  If a patient receives more than 8 weeks of relief from MBB, then occasional repeat MBB for therapeutic purposes is a very reasonable alternative therapy.  This course of therapy is consistent with our LCDs according to our CMS  in the area, and therefore other insurance providers should follow accordingly.  We will monitor our patients to screen for these therapeutic responders and will offer RF therapy only when necessary.        We discussed that MBB & RF are not without risks.  Guidelines regarding anticoagulant use & neuraxial procedures will be respected.  Patients that are ill or otherwise may be at risk for sepsis will not have their spines accessed by neuraxial injections of any type.  This patient will not be offered these therapies if there is an increased risk.   We discussed that there is a risk of postprocedural pain and also a risk of worsening of clinical picture with these procedures as with any neuraxial procedure.    -------

## 2022-07-08 ENCOUNTER — OFFICE VISIT (OUTPATIENT)
Dept: FAMILY MEDICINE CLINIC | Facility: CLINIC | Age: 67
End: 2022-07-08

## 2022-07-08 ENCOUNTER — PATIENT ROUNDING (BHMG ONLY) (OUTPATIENT)
Dept: PAIN MEDICINE | Facility: CLINIC | Age: 67
End: 2022-07-08

## 2022-07-08 VITALS
OXYGEN SATURATION: 99 % | HEIGHT: 72 IN | DIASTOLIC BLOOD PRESSURE: 92 MMHG | TEMPERATURE: 97 F | SYSTOLIC BLOOD PRESSURE: 129 MMHG | RESPIRATION RATE: 12 BRPM | HEART RATE: 87 BPM | BODY MASS INDEX: 26.14 KG/M2 | WEIGHT: 193 LBS

## 2022-07-08 DIAGNOSIS — F10.20 ALCOHOLISM: Primary | ICD-10-CM

## 2022-07-08 DIAGNOSIS — F10.930 ALCOHOL WITHDRAWAL SYNDROME WITHOUT COMPLICATION: ICD-10-CM

## 2022-07-08 PROBLEM — F10.939 WITHDRAWAL SYMPTOMS, ALCOHOL: Status: ACTIVE | Noted: 2022-07-01

## 2022-07-08 PROCEDURE — 99214 OFFICE O/P EST MOD 30 MIN: CPT | Performed by: NURSE PRACTITIONER

## 2022-07-08 NOTE — PROGRESS NOTES
July 8, 2022    Hello, may I speak with Pro Mueller?    My name is Marian      I am  with [facility]  [address] [address] [phone].    Before we get started may I verify your date of birth? 1955    I am calling to officially welcome you to our practice and ask about your recent visit. Is this a good time to talk? No- I am at an appointment upstairs on the 5th floor with my PCP.     Tell me about your visit with us. What things went well?         We're always looking for ways to make our patients' experiences even better. Do you have recommendations on ways we may improve?      Overall were you satisfied with your first visit to our practice?        I appreciate you taking the time to speak with me today. Is there anything else I can do for you?       Thank you, and have a great day.
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
## 2022-07-08 NOTE — PATIENT INSTRUCTIONS
Discharge instructions  Strongly encourage you would group therapy for recovery  Surround yourself friends family people around you that helped lift you up  Consider out reach program for other persons struggling with similar situation.  Walking daily some type of fun activity daily  Increase more activities with fun such as astronomy.  Consider naltrexone monthly injection follow-up with psychiatry    Work with your diet healthy diet lots of vegetables chicken fish 64 ounces of water daily  Work on your mind body spirit.  Pleasant music positive music positive aromas sunshine therapy.  Treat depression and work with psychiatrist.  Focus on others their interest reach out to your family your sisters  See which she can do for others including and especially her family, try to get your mind off of yourself off of your own personal injuries and pains and project your thoughts and your worries on others sometimes can be beneficial    Consider other activities in the future such as big brother programs or other out reach programs or volunteer work for [employer] etc. especially after you retire these things may give you great we wards.

## 2022-07-13 ENCOUNTER — LAB (OUTPATIENT)
Dept: LAB | Facility: HOSPITAL | Age: 67
End: 2022-07-13

## 2022-07-13 DIAGNOSIS — M47.816 LUMBAR FACET ARTHROPATHY: ICD-10-CM

## 2022-07-13 LAB
BASOPHILS # BLD AUTO: 0.09 10*3/MM3 (ref 0–0.2)
BASOPHILS NFR BLD AUTO: 1.6 % (ref 0–1.5)
DEPRECATED RDW RBC AUTO: 48.7 FL (ref 37–54)
EOSINOPHIL # BLD AUTO: 0.04 10*3/MM3 (ref 0–0.4)
EOSINOPHIL NFR BLD AUTO: 0.7 % (ref 0.3–6.2)
ERYTHROCYTE [DISTWIDTH] IN BLOOD BY AUTOMATED COUNT: 14 % (ref 12.3–15.4)
HCT VFR BLD AUTO: 36.1 % (ref 37.5–51)
HGB BLD-MCNC: 12 G/DL (ref 13–17.7)
IMM GRANULOCYTES # BLD AUTO: 0.06 10*3/MM3 (ref 0–0.05)
IMM GRANULOCYTES NFR BLD AUTO: 1.1 % (ref 0–0.5)
LYMPHOCYTES # BLD AUTO: 1.61 10*3/MM3 (ref 0.7–3.1)
LYMPHOCYTES NFR BLD AUTO: 28.2 % (ref 19.6–45.3)
MCH RBC QN AUTO: 31.4 PG (ref 26.6–33)
MCHC RBC AUTO-ENTMCNC: 33.2 G/DL (ref 31.5–35.7)
MCV RBC AUTO: 94.5 FL (ref 79–97)
MONOCYTES # BLD AUTO: 0.57 10*3/MM3 (ref 0.1–0.9)
MONOCYTES NFR BLD AUTO: 10 % (ref 5–12)
NEUTROPHILS NFR BLD AUTO: 3.34 10*3/MM3 (ref 1.7–7)
NEUTROPHILS NFR BLD AUTO: 58.4 % (ref 42.7–76)
NRBC BLD AUTO-RTO: 0 /100 WBC (ref 0–0.2)
PLATELET # BLD AUTO: 425 10*3/MM3 (ref 140–450)
PMV BLD AUTO: 8.3 FL (ref 6–12)
RBC # BLD AUTO: 3.82 10*6/MM3 (ref 4.14–5.8)
WBC NRBC COR # BLD: 5.71 10*3/MM3 (ref 3.4–10.8)

## 2022-07-13 PROCEDURE — 85025 COMPLETE CBC W/AUTO DIFF WBC: CPT

## 2022-07-13 PROCEDURE — 36415 COLL VENOUS BLD VENIPUNCTURE: CPT

## 2022-07-20 ENCOUNTER — TRANSCRIBE ORDERS (OUTPATIENT)
Dept: SURGERY | Facility: SURGERY CENTER | Age: 67
End: 2022-07-20

## 2022-07-20 DIAGNOSIS — M47.816 LUMBAR FACET ARTHROPATHY: Primary | ICD-10-CM

## 2022-07-29 RX ORDER — MELOXICAM 15 MG/1
15 TABLET ORAL DAILY
Qty: 90 TABLET | Refills: 1 | Status: SHIPPED | OUTPATIENT
Start: 2022-07-29 | End: 2023-03-14 | Stop reason: SDUPTHER

## 2022-08-02 ENCOUNTER — APPOINTMENT (OUTPATIENT)
Dept: GENERAL RADIOLOGY | Facility: SURGERY CENTER | Age: 67
End: 2022-08-02

## 2022-08-18 NOTE — TELEPHONE ENCOUNTER
Rx Refill Note  Requested Prescriptions     Pending Prescriptions Disp Refills   • atorvastatin (LIPITOR) 20 MG tablet [Pharmacy Med Name: ATORVASTATIN 20MG TABLETS] 90 tablet 1     Sig: TAKE 1 TABLET BY MOUTH DAILY      Last office visit with prescribing clinician: 2/17/2022      Next office visit with prescribing clinician: 10/6/2022            Debbie Toledo LPN  08/18/22, 16:55 EDT

## 2022-08-19 RX ORDER — ATORVASTATIN CALCIUM 20 MG/1
20 TABLET, FILM COATED ORAL DAILY
Qty: 90 TABLET | Refills: 1 | Status: SHIPPED | OUTPATIENT
Start: 2022-08-19

## 2022-08-30 ENCOUNTER — HOSPITAL ENCOUNTER (EMERGENCY)
Facility: HOSPITAL | Age: 67
Discharge: HOME | End: 2022-08-30
Attending: EMERGENCY MEDICINE | Admitting: EMERGENCY MEDICINE

## 2022-08-30 ENCOUNTER — APPOINTMENT (OUTPATIENT)
Dept: GENERAL RADIOLOGY | Facility: HOSPITAL | Age: 67
End: 2022-08-30

## 2022-08-30 ENCOUNTER — TELEPHONE (OUTPATIENT)
Dept: FAMILY MEDICINE CLINIC | Facility: CLINIC | Age: 67
End: 2022-08-30

## 2022-08-30 ENCOUNTER — APPOINTMENT (OUTPATIENT)
Dept: CT IMAGING | Facility: HOSPITAL | Age: 67
End: 2022-08-30

## 2022-08-30 VITALS
RESPIRATION RATE: 16 BRPM | TEMPERATURE: 98.8 F | DIASTOLIC BLOOD PRESSURE: 88 MMHG | OXYGEN SATURATION: 98 % | SYSTOLIC BLOOD PRESSURE: 142 MMHG | HEART RATE: 84 BPM

## 2022-08-30 DIAGNOSIS — M25.551 RIGHT HIP PAIN: ICD-10-CM

## 2022-08-30 DIAGNOSIS — M48.00 SPINAL STENOSIS, UNSPECIFIED SPINAL REGION: Primary | ICD-10-CM

## 2022-08-30 DIAGNOSIS — M54.50 ACUTE RIGHT-SIDED LOW BACK PAIN WITHOUT SCIATICA: ICD-10-CM

## 2022-08-30 PROCEDURE — 73502 X-RAY EXAM HIP UNI 2-3 VIEWS: CPT

## 2022-08-30 PROCEDURE — 72131 CT LUMBAR SPINE W/O DYE: CPT

## 2022-08-30 PROCEDURE — 99283 EMERGENCY DEPT VISIT LOW MDM: CPT

## 2022-08-30 PROCEDURE — 63710000001 ONDANSETRON ODT 4 MG TABLET DISPERSIBLE: Performed by: NURSE PRACTITIONER

## 2022-08-30 RX ORDER — HYDROCODONE BITARTRATE AND ACETAMINOPHEN 7.5; 325 MG/1; MG/1
1 TABLET ORAL ONCE
Status: COMPLETED | OUTPATIENT
Start: 2022-08-30 | End: 2022-08-30

## 2022-08-30 RX ORDER — METHYLPREDNISOLONE 4 MG/1
TABLET ORAL
Qty: 1 EACH | Refills: 0 | Status: SHIPPED | OUTPATIENT
Start: 2022-08-30 | End: 2022-09-15 | Stop reason: HOSPADM

## 2022-08-30 RX ORDER — ONDANSETRON 4 MG/1
4 TABLET, ORALLY DISINTEGRATING ORAL ONCE
Status: COMPLETED | OUTPATIENT
Start: 2022-08-30 | End: 2022-08-30

## 2022-08-30 RX ADMIN — ONDANSETRON 4 MG: 4 TABLET, ORALLY DISINTEGRATING ORAL at 16:10

## 2022-08-30 RX ADMIN — HYDROCODONE BITARTRATE AND ACETAMINOPHEN 1 TABLET: 7.5; 325 TABLET ORAL at 16:10

## 2022-08-30 NOTE — TELEPHONE ENCOUNTER
[facility] CALLED REGARDING A HOME HEALTH REQUEST/REFERRAL  THAT  SHE RECEIVED TODAY FOR THE PATIENT     PATIENT WAS IN [place] AT [place]  -  [address] [facility] - AND DISCHARGED ON 29TH -     (THEY ARE THE ONES WHO SENT REFERRAL/ORDER )    DIAGNOSIS WAS  ALCOHOL DEPENDENCE AND DEPRESSION         HE DOES HAVE PAIN IN HIP AND NEEDING HOME HEALTH- BUT [facility] CANNOT USE THE ONE SENT BY [place]    DELISA AGUDELO IS WANTING A CALL BACK FROM THE OFFICE TO GET A REFERRAL FOR HIS PHYSICAL PAIN ALSO AND DISCUSS IN DEPTH WITH OFFICE       NEEDS MEDICAL HISTORY  MEDICATION       PLEASE CALL AND DISCUSS    DELISA AGUDELO [phone]

## 2022-08-30 NOTE — TELEPHONE ENCOUNTER
Spoke with Lian on this. At the time of chart review, patient was in the ED. Lian checked chart at 4:40pm 8/30/22. Leaving a message for the practice manager since Lian out of the office tomorrow. Note left on her desk.

## 2022-08-31 NOTE — TELEPHONE ENCOUNTER
LVM patient is already scheduled to see Dr. Beal on 09/06. LVM stating can be seen sooner if needed to call the office back, and ask to speak wit the office.       Dr. Beal said she could see him on Friday at 12:45 or schedule with James Epley sooner.

## 2022-09-01 ENCOUNTER — OFFICE VISIT (OUTPATIENT)
Dept: FAMILY MEDICINE CLINIC | Facility: CLINIC | Age: 67
End: 2022-09-01

## 2022-09-01 VITALS
HEART RATE: 90 BPM | SYSTOLIC BLOOD PRESSURE: 134 MMHG | OXYGEN SATURATION: 98 % | HEIGHT: 72 IN | DIASTOLIC BLOOD PRESSURE: 78 MMHG | BODY MASS INDEX: 26.18 KG/M2 | RESPIRATION RATE: 19 BRPM | TEMPERATURE: 98.7 F

## 2022-09-01 DIAGNOSIS — F10.21 ALCOHOLISM IN RECOVERY: ICD-10-CM

## 2022-09-01 DIAGNOSIS — M54.31 SCIATICA OF RIGHT SIDE: ICD-10-CM

## 2022-09-01 DIAGNOSIS — M48.061 BILATERAL STENOSIS OF LATERAL RECESS OF LUMBAR SPINE: Primary | ICD-10-CM

## 2022-09-01 DIAGNOSIS — Z78.9 NEEDS ASSISTANCE WITH COMMUNITY RESOURCES: ICD-10-CM

## 2022-09-01 PROCEDURE — 99214 OFFICE O/P EST MOD 30 MIN: CPT | Performed by: FAMILY MEDICINE

## 2022-09-01 RX ORDER — ZOLPIDEM TARTRATE 10 MG/1
TABLET ORAL
COMMUNITY
Start: 2022-07-08 | End: 2023-02-20 | Stop reason: HOSPADM

## 2022-09-01 RX ORDER — HYDROCODONE BITARTRATE AND ACETAMINOPHEN 5; 325 MG/1; MG/1
1 TABLET ORAL EVERY 8 HOURS PRN
Qty: 9 TABLET | Refills: 0 | Status: SHIPPED | OUTPATIENT
Start: 2022-09-01 | End: 2022-09-15 | Stop reason: HOSPADM

## 2022-09-01 RX ORDER — MIRTAZAPINE 30 MG/1
30 TABLET, FILM COATED ORAL
Status: ON HOLD | COMMUNITY
Start: 2022-08-03 | End: 2022-10-12

## 2022-09-01 RX ORDER — NALTREXONE HYDROCHLORIDE 50 MG/1
50 TABLET, FILM COATED ORAL
COMMUNITY
Start: 2022-07-08 | End: 2022-10-25 | Stop reason: SDDI

## 2022-09-01 NOTE — PROGRESS NOTES
Chief Complaint  Hospital Follow Up Visit and Pain (HIP AND LEG )    Subjective        Pro Mueller presents to [facility] PRIMARY CARE  History of Present Illness   Mr. Mueller is a 67-year-old gentleman with history of significant multiple joint osteoarthritis, hypertension, hyperlipidemia, vitamin D deficiency, hypothyroidism, alcoholism sober x2 weeks at today's visit, essential tremor, bilateral lower extremity neuropathy, chronic persistent insomnia, major depressive disorder.  He has been admitted a number of times over this past year related to problems stemming from his alcohol intake.  He had a fall at the beginning of the year that resulted in a cervical vertebral fracture.  Additionally he had 1 admission that seem to be associated with a syncopal episode.  He had a facial laceration and a cardiac work-up that was overall reassuring.    He fell a week ago and hurt his right hip.  Explains he was transferring from his stationary chair at rehab into a wheelchair and thinks one of the wheels was not completely locked.  The wheelchair shifted and he lost balance and fell to the floor onto his right posterior hip.  Says when still is at 3/10.   Hurts to bear weight.  Hurts with any movement.  Says when he moves it is a 9/10. His 10/10 is a kidney stone procedure.   Says over the week has gotten worse and not better if anything.   He did not drive to the appointment today because he is concerned he cannot react fast enough with his right foot to get from the gas to the brake or vice versa.  He brought an [employer].  He points the pain out at lateral buttocks.  Says that he does have pain radiating down his right leg.  He did have a couple episodes where it went all the way down to the foot.  This has happened when he is moving or walking.  Went to ED and had imaging.  They did not x-ray of the hip and a CT of the lumbar spine.  No fractures.  X-ray is negative.  CT scan shows nothing acute but  "significant degenerative disease throughout the lumbar spine most notably at L4-L5 with severe right and mild to moderate left foraminal stenosis related to disc bulging and facet hypertrophy.  He has prednisone and it is not helping make a difference.  His pain management team was going to be addressing his lumbar spinal degenerative disease with a procedure lumbar medial branch block bilaterally L3-L5 at the beginning of August but patient was unable to attend related to not having transportation.  He did not know he would be required to have a ride home.    He has been in the [facility]. He was recommended to go to ED for concern for pna on 8/23/2022. He had chest x ray.  This was reassuring.  Had quite an extensive lab work-up.  They called him and informed him that he was positive for mono, acute infection.     At home he says he is not taking the naltrexone.  He is afraid of being in some sort of accident with severe pain and if he is on the naltrexone nothing they give him will relieve his pain.  He continues to take his thiamine.    Objective   Vital Signs:  /78 (BP Location: Right arm, Patient Position: Sitting, Cuff Size: Adult)   Pulse 90   Temp 98.7 °F (37.1 °C) (Infrared)   Resp 19   Ht 182.9 cm (72\")   SpO2 98%   BMI 26.18 kg/m²   Estimated body mass index is 26.18 kg/m² as calculated from the following:    Height as of this encounter: 182.9 cm (72\").    Weight as of 7/8/22: 87.5 kg (193 lb).          Physical Exam  Vitals reviewed.   Constitutional:       General: He is not in acute distress.     Appearance: Normal appearance. He is not ill-appearing or toxic-appearing.      Comments: Pt is in a wheelchair today.    Eyes:      General: No scleral icterus.        Right eye: No discharge.         Left eye: No discharge.      Conjunctiva/sclera: Conjunctivae normal.   Cardiovascular:      Rate and Rhythm: Normal rate and regular rhythm.      Heart sounds: Normal heart sounds. No murmur heard.    " No friction rub. No gallop.   Pulmonary:      Effort: Pulmonary effort is normal. No respiratory distress.      Breath sounds: Normal breath sounds. No wheezing.      Comments: Strong voice, he sounds a little congested today.  Musculoskeletal:         General: Tenderness present.      Right lower leg: No edema.      Left lower leg: No edema.      Comments: He is tender over the lower lumbar spine and tender over the right paraspinals and most lateral portion of the buttocks. Positive straight leg raise on the right.    Neurological:      Mental Status: He is alert and oriented to person, place, and time.   Psychiatric:         Behavior: Behavior normal.        Result Review :                Assessment and Plan   Diagnoses and all orders for this visit:    1. Bilateral stenosis of lateral recess of lumbar spine (Primary)  -     HYDROcodone-acetaminophen (NORCO) 5-325 MG per tablet; Take 1 tablet by mouth Every 8 (Eight) Hours As Needed for Moderate Pain.  Dispense: 9 tablet; Refill: 0  -     Ambulatory Referral to Home Health    2. Sciatica of right side  -     HYDROcodone-acetaminophen (NORCO) 5-325 MG per tablet; Take 1 tablet by mouth Every 8 (Eight) Hours As Needed for Moderate Pain.  Dispense: 9 tablet; Refill: 0  -     Ambulatory Referral to Home Health    3. Needs assistance with community resources  -     Ambulatory Referral to Social Care Services (Amb Case Mgmt)    4. Alcoholism in recovery (HCC)  -     Ambulatory Referral to Home Health             Follow Up   No follow-ups on file.  Patient was given instructions and counseling regarding his condition or for health maintenance advice. Please see specific information pulled into the AVS if appropriate.     He has pain from the spine and sciatica on the right according to his history and exam. The CT of the lumbar spine also supports this. Unfortunately he was not able to have his nerve block with pain related to trouble with transportation. Additionally  he fell on his right backside and this has exacerbated his spinal arthritis. He is going to have home PT through [facility] and I spoke with the nurse Carmela about the referral and his care.   I am giving him a few hydrocodone because he is not responding well to the prednisone. We had an in depth discussion regarding the hydrocodone and the naltrexone. He reports that he was not taking the naltrexone because he is fearful if something were to happen to him, like he was in an accident, there would be nothing in the way of pain medication that would help him. I understand his fears. I explained it was for his cravings for alcohol to keep him sober. He says he wants to stay sober. I asked him about his resources and he says he has documents on who to call for outpt help. I also encouraged him to call Dr. Coats with [facility] to see him very soon as well. We all want him to stay sober and the pt said he really wanted to as well.   Related to his limitations on transportation for his procedure I am going to refer to social care services to see if he is eligible for a ride so he can get the pain management procedure. Because she is going to be calling we also discussed her helping if there are any other resources related to alcoholism support. He was open to any and all available resources.     He could use help with PT and OT related to the hip pain. He says he can order his groceries to be delivered, but he is having trouble collecting them off the porch and moving around the kitchen to even prepare a can of soup. He will need more help with this.     He has mono. He is relatively inactive and not going to be doing any contact sports but needs to avoid falls and trauma. Noted he will have symptoms and lab changes for about 4 weeks. He should not have close contacts with others including sharing drinks and the like. He voiced understanding.

## 2022-09-02 ENCOUNTER — TELEPHONE (OUTPATIENT)
Dept: PAIN MEDICINE | Facility: CLINIC | Age: 67
End: 2022-09-02

## 2022-09-02 ENCOUNTER — REFERRAL TRIAGE (OUTPATIENT)
Dept: CASE MANAGEMENT | Facility: OTHER | Age: 67
End: 2022-09-02

## 2022-09-02 ENCOUNTER — PATIENT OUTREACH (OUTPATIENT)
Dept: CASE MANAGEMENT | Facility: OTHER | Age: 67
End: 2022-09-02

## 2022-09-02 ENCOUNTER — HOME HEALTH ADMISSION (OUTPATIENT)
Dept: HOME HEALTH SERVICES | Facility: HOME HEALTHCARE | Age: 67
End: 2022-09-02

## 2022-09-02 ENCOUNTER — TELEPHONE (OUTPATIENT)
Dept: FAMILY MEDICINE CLINIC | Facility: CLINIC | Age: 67
End: 2022-09-02

## 2022-09-02 NOTE — TELEPHONE ENCOUNTER
Caller: PATIENT    Relationship to patient: SELF     Best call back number: [phone]    Patient is needing: PATIENT WAS CALLING TO RESCHEDULE HIS PROCEDURE THAT IS SCHEDULED WITH DR. AGEE. I ATTEMPTED TO WARM TRANSFER CALL TO THE OFFICE BUT RECEIVED NO ANSWER. THANK YOU!

## 2022-09-02 NOTE — OUTREACH NOTE
Social Work Assessment  Questions/Answers    Flowsheet Row Most Recent Value   Referral Source physician   Reason for Consult community resources, transportation   Preferred Language English   Advance Care Planning Reviewed present on chart   People in Home alone   Current Living Arrangements home   Primary Care Provided by self   Employment/ Comments [employer] employee   Source of Income disability, salary/wages   Transportation Concerns other (see comments)   Transportation Anticipated agency   Patient's Choice of Community Agency(s) [facility] updated and reviewed with the patient during this program:  Financial Resource Strain: Low Risk    • Difficulty of Paying Living Expenses: Not hard at all      Food Insecurity: No Food Insecurity   • Worried About Running Out of Food in the Last Year: Never true   • Ran Out of Food in the Last Year: Never true      Transportation Needs: Unmet Transportation Needs   • Lack of Transportation (Medical): Yes   • Lack of Transportation (Non-Medical): Yes      Housing Stability: Low Risk    • Unable to Pay for Housing in the Last Year: No   • Number of Places Lived in the Last Year: 1   • Unstable Housing in the Last Year: No      Continuing Care   Community & [facility] OR [facility]    [address] [id number]    Phone: [phone]    Resource for: Transportation Needs     Patient Outreach    MSW outreach to patient to discuss community resources available to patient. Patient states due to his medical condition he prefers not to drive to appointments. MSW and patient discussed [facility] to assist patient with transportation. Patient agreeable for MSW to send information to [facility] for referral for [facility]. MSW also provided [employer] information to patient if he needs to follow-up. Patient declined additional financial, housing, or food  resources at this time.     Care Coordination    MSW referral [facility]  for [facility] benefits.    THUY JIMENEZ -   Ambulatory Case Management    9/2/2022, 09:44 EDT

## 2022-09-02 NOTE — TELEPHONE ENCOUNTER
Caller: PATIENT     Relationship to patient: SELF     Best call back number: [phone]    Patient is needing: PATIENT NEEDS TO RESCHEDULE HIS UPCOMING SURGERY WITH DR AGEE DUE TO TRANSPORTATION ISSUES. ATTEMPTED TO [facility] BUT THERE WAS NO ANSWER.

## 2022-09-02 NOTE — TELEPHONE ENCOUNTER
[facility] was going to see patient for home health, but they do not accept patients insurance, therefore patient would have to pay out of pocket. Please advise.

## 2022-09-11 ENCOUNTER — APPOINTMENT (OUTPATIENT)
Dept: GENERAL RADIOLOGY | Facility: HOSPITAL | Age: 67
End: 2022-09-11

## 2022-09-11 ENCOUNTER — HOSPITAL ENCOUNTER (OUTPATIENT)
Facility: HOSPITAL | Age: 67
Setting detail: OBSERVATION
Discharge: HOME HEALTH | End: 2022-09-15
Attending: EMERGENCY MEDICINE | Admitting: STUDENT IN AN ORGANIZED HEALTH CARE EDUCATION/TRAINING PROGRAM

## 2022-09-11 DIAGNOSIS — Z74.09 NEED FOR ASSISTANCE DUE TO REDUCED MOBILITY: ICD-10-CM

## 2022-09-11 DIAGNOSIS — M48.062 SPINAL STENOSIS OF LUMBAR REGION WITH NEUROGENIC CLAUDICATION: ICD-10-CM

## 2022-09-11 DIAGNOSIS — Z74.09 IMPAIRED MOBILITY AND ADLS: ICD-10-CM

## 2022-09-11 DIAGNOSIS — M47.816 LUMBAR FACET ARTHROPATHY: ICD-10-CM

## 2022-09-11 DIAGNOSIS — M43.16 SPONDYLOLISTHESIS AT L4-L5 LEVEL: ICD-10-CM

## 2022-09-11 DIAGNOSIS — Z78.9 IMPAIRED MOBILITY AND ADLS: ICD-10-CM

## 2022-09-11 DIAGNOSIS — F10.939 ALCOHOL WITHDRAWAL SYNDROME WITH COMPLICATION: Primary | ICD-10-CM

## 2022-09-11 DIAGNOSIS — M54.41 MIDLINE LOW BACK PAIN WITH RIGHT-SIDED SCIATICA, UNSPECIFIED CHRONICITY: ICD-10-CM

## 2022-09-11 PROBLEM — F10.229 ALCOHOL DEPENDENCE WITH INTOXICATION (HCC): Status: ACTIVE | Noted: 2022-09-11

## 2022-09-11 LAB
ALBUMIN SERPL-MCNC: 4.5 G/DL (ref 3.5–5.2)
ALBUMIN/GLOB SERPL: 1.6 G/DL
ALP SERPL-CCNC: 94 U/L (ref 39–117)
ALT SERPL W P-5'-P-CCNC: 22 U/L (ref 1–41)
AMPHET+METHAMPHET UR QL: NEGATIVE
ANION GAP SERPL CALCULATED.3IONS-SCNC: 16 MMOL/L (ref 5–15)
AST SERPL-CCNC: 49 U/L (ref 1–40)
BARBITURATES UR QL SCN: NEGATIVE
BASOPHILS # BLD AUTO: 0.02 10*3/MM3 (ref 0–0.2)
BASOPHILS NFR BLD AUTO: 0.4 % (ref 0–1.5)
BENZODIAZ UR QL SCN: POSITIVE
BILIRUB SERPL-MCNC: 0.3 MG/DL (ref 0–1.2)
BUN SERPL-MCNC: 19 MG/DL (ref 8–23)
BUN/CREAT SERPL: 25 (ref 7–25)
CALCIUM SPEC-SCNC: 8.5 MG/DL (ref 8.6–10.5)
CANNABINOIDS SERPL QL: NEGATIVE
CHLORIDE SERPL-SCNC: 106 MMOL/L (ref 98–107)
CO2 SERPL-SCNC: 19 MMOL/L (ref 22–29)
COCAINE UR QL: NEGATIVE
CREAT SERPL-MCNC: 0.76 MG/DL (ref 0.76–1.27)
DEPRECATED RDW RBC AUTO: 46.9 FL (ref 37–54)
EGFRCR SERPLBLD CKD-EPI 2021: 99.7 ML/MIN/1.73
EOSINOPHIL # BLD AUTO: 0.1 10*3/MM3 (ref 0–0.4)
EOSINOPHIL NFR BLD AUTO: 2 % (ref 0.3–6.2)
ERYTHROCYTE [DISTWIDTH] IN BLOOD BY AUTOMATED COUNT: 14 % (ref 12.3–15.4)
ETHANOL BLD-MCNC: 213 MG/DL (ref 0–10)
ETHANOL UR QL: 0.21 %
GLOBULIN UR ELPH-MCNC: 2.8 GM/DL
GLUCOSE SERPL-MCNC: 105 MG/DL (ref 65–99)
HCT VFR BLD AUTO: 39.3 % (ref 37.5–51)
HGB BLD-MCNC: 13.3 G/DL (ref 13–17.7)
IMM GRANULOCYTES # BLD AUTO: 0.01 10*3/MM3 (ref 0–0.05)
IMM GRANULOCYTES NFR BLD AUTO: 0.2 % (ref 0–0.5)
LYMPHOCYTES # BLD AUTO: 1.29 10*3/MM3 (ref 0.7–3.1)
LYMPHOCYTES NFR BLD AUTO: 25.9 % (ref 19.6–45.3)
MAGNESIUM SERPL-MCNC: 2 MG/DL (ref 1.6–2.4)
MCH RBC QN AUTO: 30.6 PG (ref 26.6–33)
MCHC RBC AUTO-ENTMCNC: 33.8 G/DL (ref 31.5–35.7)
MCV RBC AUTO: 90.6 FL (ref 79–97)
METHADONE UR QL SCN: NEGATIVE
MONOCYTES # BLD AUTO: 0.31 10*3/MM3 (ref 0.1–0.9)
MONOCYTES NFR BLD AUTO: 6.2 % (ref 5–12)
NEUTROPHILS NFR BLD AUTO: 3.26 10*3/MM3 (ref 1.7–7)
NEUTROPHILS NFR BLD AUTO: 65.3 % (ref 42.7–76)
NRBC BLD AUTO-RTO: 0 /100 WBC (ref 0–0.2)
OPIATES UR QL: POSITIVE
OXYCODONE UR QL SCN: NEGATIVE
PLATELET # BLD AUTO: 183 10*3/MM3 (ref 140–450)
PMV BLD AUTO: 9.4 FL (ref 6–12)
POTASSIUM SERPL-SCNC: 4.3 MMOL/L (ref 3.5–5.2)
PROT SERPL-MCNC: 7.3 G/DL (ref 6–8.5)
RBC # BLD AUTO: 4.34 10*6/MM3 (ref 4.14–5.8)
SODIUM SERPL-SCNC: 141 MMOL/L (ref 136–145)
WBC NRBC COR # BLD: 4.99 10*3/MM3 (ref 3.4–10.8)

## 2022-09-11 PROCEDURE — 80307 DRUG TEST PRSMV CHEM ANLYZR: CPT | Performed by: PHYSICIAN ASSISTANT

## 2022-09-11 PROCEDURE — 25010000002 LORAZEPAM PER 2 MG: Performed by: NURSE PRACTITIONER

## 2022-09-11 PROCEDURE — 25010000002 ONDANSETRON PER 1 MG: Performed by: PHYSICIAN ASSISTANT

## 2022-09-11 PROCEDURE — 25010000002 HYDROMORPHONE 1 MG/ML SOLUTION: Performed by: EMERGENCY MEDICINE

## 2022-09-11 PROCEDURE — 25010000002 METHYLPREDNISOLONE PER 125 MG: Performed by: PHYSICIAN ASSISTANT

## 2022-09-11 PROCEDURE — 25010000002 LORAZEPAM PER 2 MG: Performed by: EMERGENCY MEDICINE

## 2022-09-11 PROCEDURE — 85025 COMPLETE CBC W/AUTO DIFF WBC: CPT | Performed by: PHYSICIAN ASSISTANT

## 2022-09-11 PROCEDURE — 71045 X-RAY EXAM CHEST 1 VIEW: CPT

## 2022-09-11 PROCEDURE — 25010000002 THIAMINE PER 100 MG: Performed by: PHYSICIAN ASSISTANT

## 2022-09-11 PROCEDURE — G0378 HOSPITAL OBSERVATION PER HR: HCPCS

## 2022-09-11 PROCEDURE — 96376 TX/PRO/DX INJ SAME DRUG ADON: CPT

## 2022-09-11 PROCEDURE — 82077 ASSAY SPEC XCP UR&BREATH IA: CPT | Performed by: PHYSICIAN ASSISTANT

## 2022-09-11 PROCEDURE — 96375 TX/PRO/DX INJ NEW DRUG ADDON: CPT

## 2022-09-11 PROCEDURE — 96365 THER/PROPH/DIAG IV INF INIT: CPT

## 2022-09-11 PROCEDURE — 83735 ASSAY OF MAGNESIUM: CPT | Performed by: PHYSICIAN ASSISTANT

## 2022-09-11 PROCEDURE — 80053 COMPREHEN METABOLIC PANEL: CPT | Performed by: PHYSICIAN ASSISTANT

## 2022-09-11 PROCEDURE — 99284 EMERGENCY DEPT VISIT MOD MDM: CPT

## 2022-09-11 RX ORDER — MELOXICAM 15 MG/1
15 TABLET ORAL DAILY
Status: DISCONTINUED | OUTPATIENT
Start: 2022-09-11 | End: 2022-09-15 | Stop reason: HOSPADM

## 2022-09-11 RX ORDER — ONDANSETRON 2 MG/ML
4 INJECTION INTRAMUSCULAR; INTRAVENOUS EVERY 6 HOURS PRN
Status: DISCONTINUED | OUTPATIENT
Start: 2022-09-11 | End: 2022-09-15 | Stop reason: HOSPADM

## 2022-09-11 RX ORDER — ACETAMINOPHEN 160 MG/5ML
650 SOLUTION ORAL EVERY 4 HOURS PRN
Status: DISCONTINUED | OUTPATIENT
Start: 2022-09-11 | End: 2022-09-15 | Stop reason: HOSPADM

## 2022-09-11 RX ORDER — FOLIC ACID 1 MG/1
1 TABLET ORAL DAILY
Status: DISCONTINUED | OUTPATIENT
Start: 2022-09-12 | End: 2022-09-15 | Stop reason: HOSPADM

## 2022-09-11 RX ORDER — POTASSIUM CHLORIDE 7.45 MG/ML
10 INJECTION INTRAVENOUS
Status: DISCONTINUED | OUTPATIENT
Start: 2022-09-11 | End: 2022-09-15 | Stop reason: HOSPADM

## 2022-09-11 RX ORDER — LORAZEPAM 2 MG/ML
1 INJECTION INTRAMUSCULAR
Status: DISCONTINUED | OUTPATIENT
Start: 2022-09-11 | End: 2022-09-15 | Stop reason: HOSPADM

## 2022-09-11 RX ORDER — MIRTAZAPINE 15 MG/1
15 TABLET, FILM COATED ORAL NIGHTLY
Status: DISCONTINUED | OUTPATIENT
Start: 2022-09-11 | End: 2022-09-12

## 2022-09-11 RX ORDER — MAGNESIUM SULFATE HEPTAHYDRATE 40 MG/ML
2 INJECTION, SOLUTION INTRAVENOUS AS NEEDED
Status: DISCONTINUED | OUTPATIENT
Start: 2022-09-11 | End: 2022-09-15 | Stop reason: HOSPADM

## 2022-09-11 RX ORDER — THIAMINE HYDROCHLORIDE 100 MG/ML
100 INJECTION, SOLUTION INTRAMUSCULAR; INTRAVENOUS ONCE
Status: DISCONTINUED | OUTPATIENT
Start: 2022-09-11 | End: 2022-09-15 | Stop reason: HOSPADM

## 2022-09-11 RX ORDER — DIPHENOXYLATE HYDROCHLORIDE AND ATROPINE SULFATE 2.5; .025 MG/1; MG/1
1 TABLET ORAL DAILY
Status: DISCONTINUED | OUTPATIENT
Start: 2022-09-11 | End: 2022-09-11 | Stop reason: SDUPTHER

## 2022-09-11 RX ORDER — SODIUM CHLORIDE 0.9 % (FLUSH) 0.9 %
10 SYRINGE (ML) INJECTION AS NEEDED
Status: DISCONTINUED | OUTPATIENT
Start: 2022-09-11 | End: 2022-09-15 | Stop reason: HOSPADM

## 2022-09-11 RX ORDER — ONDANSETRON 2 MG/ML
4 INJECTION INTRAMUSCULAR; INTRAVENOUS ONCE
Status: COMPLETED | OUTPATIENT
Start: 2022-09-11 | End: 2022-09-11

## 2022-09-11 RX ORDER — POTASSIUM CHLORIDE 750 MG/1
40 TABLET, FILM COATED, EXTENDED RELEASE ORAL AS NEEDED
Status: DISCONTINUED | OUTPATIENT
Start: 2022-09-11 | End: 2022-09-15 | Stop reason: HOSPADM

## 2022-09-11 RX ORDER — DIPHENOXYLATE HYDROCHLORIDE AND ATROPINE SULFATE 2.5; .025 MG/1; MG/1
1 TABLET ORAL DAILY
Status: DISCONTINUED | OUTPATIENT
Start: 2022-09-12 | End: 2022-09-11 | Stop reason: SDUPTHER

## 2022-09-11 RX ORDER — FOLIC ACID 1 MG/1
1 TABLET ORAL DAILY
Status: DISCONTINUED | OUTPATIENT
Start: 2022-09-12 | End: 2022-09-11

## 2022-09-11 RX ORDER — AMLODIPINE BESYLATE 5 MG/1
5 TABLET ORAL
Status: DISCONTINUED | OUTPATIENT
Start: 2022-09-11 | End: 2022-09-15 | Stop reason: HOSPADM

## 2022-09-11 RX ORDER — NITROGLYCERIN 0.4 MG/1
0.4 TABLET SUBLINGUAL
Status: DISCONTINUED | OUTPATIENT
Start: 2022-09-11 | End: 2022-09-15 | Stop reason: HOSPADM

## 2022-09-11 RX ORDER — ALUMINA, MAGNESIA, AND SIMETHICONE 2400; 2400; 240 MG/30ML; MG/30ML; MG/30ML
15 SUSPENSION ORAL EVERY 6 HOURS PRN
Status: DISCONTINUED | OUTPATIENT
Start: 2022-09-11 | End: 2022-09-15 | Stop reason: HOSPADM

## 2022-09-11 RX ORDER — METHYLPREDNISOLONE SODIUM SUCCINATE 125 MG/2ML
125 INJECTION, POWDER, LYOPHILIZED, FOR SOLUTION INTRAMUSCULAR; INTRAVENOUS ONCE
Status: COMPLETED | OUTPATIENT
Start: 2022-09-11 | End: 2022-09-11

## 2022-09-11 RX ORDER — AMLODIPINE BESYLATE 5 MG/1
5 TABLET ORAL EVERY MORNING
Status: DISCONTINUED | OUTPATIENT
Start: 2022-09-12 | End: 2022-09-11 | Stop reason: SDUPTHER

## 2022-09-11 RX ORDER — ONDANSETRON 4 MG/1
4 TABLET, FILM COATED ORAL EVERY 6 HOURS PRN
Status: DISCONTINUED | OUTPATIENT
Start: 2022-09-11 | End: 2022-09-15 | Stop reason: HOSPADM

## 2022-09-11 RX ORDER — ATORVASTATIN CALCIUM 20 MG/1
20 TABLET, FILM COATED ORAL NIGHTLY
Status: DISCONTINUED | OUTPATIENT
Start: 2022-09-11 | End: 2022-09-11

## 2022-09-11 RX ORDER — LORAZEPAM 2 MG/ML
2 INJECTION INTRAMUSCULAR ONCE
Status: COMPLETED | OUTPATIENT
Start: 2022-09-11 | End: 2022-09-11

## 2022-09-11 RX ORDER — DIPHENOXYLATE HYDROCHLORIDE AND ATROPINE SULFATE 2.5; .025 MG/1; MG/1
1 TABLET ORAL DAILY
Refills: 0 | Status: DISCONTINUED | OUTPATIENT
Start: 2022-09-12 | End: 2022-09-15 | Stop reason: HOSPADM

## 2022-09-11 RX ORDER — LORAZEPAM 2 MG/ML
0.5 INJECTION INTRAMUSCULAR
Status: DISCONTINUED | OUTPATIENT
Start: 2022-09-11 | End: 2022-09-15 | Stop reason: HOSPADM

## 2022-09-11 RX ORDER — SODIUM CHLORIDE, SODIUM LACTATE, POTASSIUM CHLORIDE, CALCIUM CHLORIDE 600; 310; 30; 20 MG/100ML; MG/100ML; MG/100ML; MG/100ML
100 INJECTION, SOLUTION INTRAVENOUS CONTINUOUS
Status: DISCONTINUED | OUTPATIENT
Start: 2022-09-11 | End: 2022-09-12

## 2022-09-11 RX ORDER — HYDROCODONE BITARTRATE AND ACETAMINOPHEN 5; 325 MG/1; MG/1
1 TABLET ORAL EVERY 8 HOURS PRN
Status: DISCONTINUED | OUTPATIENT
Start: 2022-09-11 | End: 2022-09-11 | Stop reason: SDUPTHER

## 2022-09-11 RX ORDER — LEVOTHYROXINE SODIUM 0.1 MG/1
100 TABLET ORAL DAILY
Status: DISCONTINUED | OUTPATIENT
Start: 2022-09-12 | End: 2022-09-15 | Stop reason: HOSPADM

## 2022-09-11 RX ORDER — ACETAMINOPHEN 325 MG/1
650 TABLET ORAL EVERY 4 HOURS PRN
Status: DISCONTINUED | OUTPATIENT
Start: 2022-09-11 | End: 2022-09-15 | Stop reason: HOSPADM

## 2022-09-11 RX ORDER — HYDROCODONE BITARTRATE AND ACETAMINOPHEN 5; 325 MG/1; MG/1
1 TABLET ORAL EVERY 8 HOURS PRN
Status: DISCONTINUED | OUTPATIENT
Start: 2022-09-11 | End: 2022-09-13

## 2022-09-11 RX ORDER — LEVOTHYROXINE SODIUM 0.1 MG/1
100 TABLET ORAL
Status: DISCONTINUED | OUTPATIENT
Start: 2022-09-12 | End: 2022-09-11

## 2022-09-11 RX ORDER — LISINOPRIL 10 MG/1
10 TABLET ORAL
Status: DISCONTINUED | OUTPATIENT
Start: 2022-09-11 | End: 2022-09-11

## 2022-09-11 RX ORDER — LISINOPRIL 10 MG/1
10 TABLET ORAL DAILY
Status: DISCONTINUED | OUTPATIENT
Start: 2022-09-12 | End: 2022-09-15 | Stop reason: HOSPADM

## 2022-09-11 RX ORDER — POTASSIUM CHLORIDE 1.5 G/1.77G
40 POWDER, FOR SOLUTION ORAL AS NEEDED
Status: DISCONTINUED | OUTPATIENT
Start: 2022-09-11 | End: 2022-09-15 | Stop reason: HOSPADM

## 2022-09-11 RX ORDER — LORAZEPAM 1 MG/1
1 TABLET ORAL
Status: DISCONTINUED | OUTPATIENT
Start: 2022-09-11 | End: 2022-09-15 | Stop reason: HOSPADM

## 2022-09-11 RX ORDER — LORAZEPAM 0.5 MG/1
0.5 TABLET ORAL
Status: DISCONTINUED | OUTPATIENT
Start: 2022-09-11 | End: 2022-09-15 | Stop reason: HOSPADM

## 2022-09-11 RX ORDER — MAGNESIUM SULFATE HEPTAHYDRATE 40 MG/ML
4 INJECTION, SOLUTION INTRAVENOUS AS NEEDED
Status: DISCONTINUED | OUTPATIENT
Start: 2022-09-11 | End: 2022-09-15 | Stop reason: HOSPADM

## 2022-09-11 RX ORDER — ACETAMINOPHEN 650 MG/1
650 SUPPOSITORY RECTAL EVERY 4 HOURS PRN
Status: DISCONTINUED | OUTPATIENT
Start: 2022-09-11 | End: 2022-09-15 | Stop reason: HOSPADM

## 2022-09-11 RX ORDER — ATORVASTATIN CALCIUM 20 MG/1
20 TABLET, FILM COATED ORAL DAILY
Status: DISCONTINUED | OUTPATIENT
Start: 2022-09-12 | End: 2022-09-15 | Stop reason: HOSPADM

## 2022-09-11 RX ADMIN — Medication 100 MG: at 20:28

## 2022-09-11 RX ADMIN — LORAZEPAM 1 MG: 1 TABLET ORAL at 16:38

## 2022-09-11 RX ADMIN — MELOXICAM 15 MG: 15 TABLET ORAL at 20:28

## 2022-09-11 RX ADMIN — LORAZEPAM 2 MG: 2 INJECTION INTRAMUSCULAR; INTRAVENOUS at 03:41

## 2022-09-11 RX ADMIN — MIRTAZAPINE 15 MG: 15 TABLET, FILM COATED ORAL at 20:28

## 2022-09-11 RX ADMIN — ONDANSETRON 4 MG: 2 INJECTION INTRAMUSCULAR; INTRAVENOUS at 01:34

## 2022-09-11 RX ADMIN — Medication 1 TABLET: at 20:28

## 2022-09-11 RX ADMIN — METHYLPREDNISOLONE SODIUM SUCCINATE 125 MG: 125 INJECTION, POWDER, FOR SOLUTION INTRAMUSCULAR; INTRAVENOUS at 01:34

## 2022-09-11 RX ADMIN — FOLIC ACID 1000 ML/HR: 5 INJECTION, SOLUTION INTRAMUSCULAR; INTRAVENOUS; SUBCUTANEOUS at 03:48

## 2022-09-11 RX ADMIN — HYDROMORPHONE HYDROCHLORIDE 1 MG: 1 INJECTION, SOLUTION INTRAMUSCULAR; INTRAVENOUS; SUBCUTANEOUS at 01:34

## 2022-09-11 RX ADMIN — LORAZEPAM 1 MG: 2 INJECTION INTRAMUSCULAR; INTRAVENOUS at 13:06

## 2022-09-11 RX ADMIN — ATORVASTATIN CALCIUM 20 MG: 20 TABLET, FILM COATED ORAL at 20:28

## 2022-09-11 RX ADMIN — AMLODIPINE BESYLATE 5 MG: 5 TABLET ORAL at 20:28

## 2022-09-11 RX ADMIN — LORAZEPAM 2 MG: 2 INJECTION INTRAMUSCULAR; INTRAVENOUS at 04:10

## 2022-09-11 RX ADMIN — LISINOPRIL 10 MG: 10 TABLET ORAL at 20:28

## 2022-09-11 NOTE — H&P
"    Patient Name:  Pro Mueller  YOB: 1955  MRN:  [id number]  Admit Date:  9/11/2022  Patient Care Team:  Alla Beal MD as PCP - General (Family Medicine)  Say Coats MD (Psychiatry)  Ting Palafox MSW as  ([facility]) (Population Health)      Subjective   History Present Illness     Chief Complaint   Patient presents with   • Back Pain   • Hip Pain       Mr. Mueller is a 67 y.o. male with history of Alcohol use disorder, hypertension, hyperlipidemia, hypothyroidism who presents to [facility] with complaints of back/hip pain and alcohol withdrawal. Pain located \"at hip joint\" on right, described as \"throbbing and burning\", chronic, intermittent but progressive in nature, rated 3/10 in severity currently, can get as bad as 10/10 at times. Pain associated with intermittent radiation into right leg to level of ankle. Symptoms temporarily alleviated with pain medication, worsened with rotation/flexion of trunk and activity. Denies associated weakness, numbness/tingling, saddle anesthesia, urinary/fecal incontinence. Has underlying spinal stenosis, follows with pain management as outpatient, scheduled to undergo \"procedure\" in October for chronic pain. Is interested in PT/OT and possible rehabilitation after discharge.    Regarding alcohol use,  Currently drinks 1/2 liter of vodka daily and has done so for about 6 years. Last drink ~12 hours prior to arrival. Alcohol level 213 on admission. Patient interested in alcohol cessation.      Review of Systems   Constitutional: Negative for chills and fever.   HENT: Negative for congestion and trouble swallowing.    Eyes: Negative for visual disturbance.   Respiratory: Negative for cough, shortness of breath and wheezing.    Cardiovascular: Negative for chest pain and palpitations.   Gastrointestinal: Negative for abdominal distention, abdominal pain and blood in stool.   Genitourinary: Negative for difficulty " urinating, dysuria and hematuria.   Musculoskeletal: Positive for back pain and gait problem.   Neurological: Negative for dizziness, syncope, weakness and light-headedness.   Psychiatric/Behavioral: Negative for agitation, confusion and hallucinations. The patient is not nervous/anxious.       Past medical history, family history, social history, allergies and medications reviewed and reconciled with patient. Updated as appropriate.    Personal History     Past Medical History:   Diagnosis Date   • Alcohol abuse    • Alcohol withdrawal (HCC) 11/4/2016   • Alcoholic ketoacidosis 1/12/2020   • Allergic 1970   • Anxiety    • Arthritis    • Atopic rhinitis 8/8/2016   • Depression    • Disease of thyroid gland    • Elevated cholesterol    • Encounter for removal of sutures    • Genital herpes simplex 8/8/2016   • GERD (gastroesophageal reflux disease)    • Headache, tension-type    • Hyperlipidemia    • Hypertension    • Kidney stone    • Migraine    • Motion sickness    • Nephrolithiasis 2/12/2020   • Olecranon bursitis, right elbow    • Panic disorder without agoraphobia 8/8/2016   • Peripheral neuropathy    • Sleep apnea    • Syncope and collapse 1/2/2019   • Vitamin D deficiency 8/8/2016   • Withdrawal symptoms, alcohol (HCC)      Past Surgical History:   Procedure Laterality Date   • COLONOSCOPY     • CYST REMOVAL     • CYSTOSCOPY BLADDER STONE LITHOTRIPSY  02/2022   • EXTRACORPOREAL SHOCK WAVE LITHOTRIPSY (ESWL) Right 2002   • SHOULDER ARTHROSCOPY Right 12/17/2019    Procedure: SHOULDER ARTHROSCOPY, decompression, distal clavicle excision;  Surgeon: Aj Mancilla MD;  [place] [facility] [place] [facility] [place]  Service: [place]opedics   • TONSILLECTOMY       Family History   Problem Relation Age of Onset   • Alzheimer's disease Mother    • Mental illness Mother    • Pancreatic cancer Father    • Hearing loss Father    • Malig Hyperthermia Neg Hx      Social History     Tobacco Use   • Smoking status: Former Smoker     Packs/day:  "0.50     Years: 15.00     Pack years: 7.50     Types: Cigarettes     Start date:      Quit date:      Years since quittin.7   • Smokeless tobacco: Never Used   • Tobacco comment: caffeine - 3 cans of coke daily    Vaping Use   • Vaping Use: Never used   Substance Use Topics   • Alcohol use: Not Currently     Alcohol/week: 15.0 standard drinks     Types: 15 Shots of liquor per week     Comment: went through detox last week 2022   • Drug use: Yes     Types: Methamphetamines     Comment: \"once in a rare while\"     No current facility-administered medications on file prior to encounter.     Current Outpatient Medications on File Prior to Encounter   Medication Sig Dispense Refill   • acetaminophen (TYLENOL) 325 MG tablet Take 2 tablets by mouth Every 4 (Four) Hours As Needed for Mild Pain .     • amLODIPine (NORVASC) 5 MG tablet TAKE 1 TABLET BY MOUTH EVERY MORNING 90 tablet 1   • atorvastatin (LIPITOR) 20 MG tablet TAKE 1 TABLET BY MOUTH DAILY 90 tablet 1   • folic acid (FOLVITE) 1 MG tablet Take 1 tablet by mouth Daily. 30 tablet 0   • HYDROcodone-acetaminophen (NORCO) 5-325 MG per tablet Take 1 tablet by mouth Every 8 (Eight) Hours As Needed for Moderate Pain. 9 tablet 0   • levothyroxine (SYNTHROID, LEVOTHROID) 100 MCG tablet Take 1 tablet by mouth Daily. 90 tablet 1   • lisinopril (PRINIVIL,ZESTRIL) 10 MG tablet TAKE 1 TABLET BY MOUTH DAILY 90 tablet 1   • meloxicam (MOBIC) 15 MG tablet Take 1 tablet by mouth Daily. 90 tablet 1   • methylPREDNISolone (MEDROL) 4 MG dose pack Take as directed on package instructions. 1 each 0   • mirtazapine (REMERON) 30 MG tablet Take 30 mg by mouth every night at bedtime.     • Multiple Vitamin (multivitamin) capsule Take 1 capsule by mouth Daily. 30 capsule 0   • naltrexone (DEPADE) 50 MG tablet Take 50 mg by mouth every night at bedtime.     • Pediatric Multivitamins-Fl (Multivitamin/Fluoride) 0.5 MG chewable tablet      • thiamine (thiamine) 100 MG tablet tablet " Take 1 tablet by mouth Daily. 30 tablet 0   • zolpidem (AMBIEN) 10 MG tablet TAKE 1 TO 1 TABLET BY MOUTH AT BEDTIME AS NEEDED FOR INSOMNIA       Allergies   Allergen Reactions   • Penicillins Anaphylaxis and Other (See Comments)     Seizure. childhood       Objective    Objective     Vital Signs  Temp:  [97.6 °F (36.4 °C)] 97.6 °F (36.4 °C)  Heart Rate:  [] 120  Resp:  [16] 16  BP: (139-167)/() 146/91  SpO2:  [90 %-94 %] 94 %  on   ;   Device (Oxygen Therapy): room air  Body mass index is 25.77 kg/m².    Physical Exam  Vitals and nursing note reviewed.   Constitutional:       General: He is not in acute distress.  HENT:      Head: Atraumatic.      Right Ear: External ear normal.      Left Ear: External ear normal.      Mouth/Throat:      Mouth: Mucous membranes are moist.      Pharynx: Oropharynx is clear.   Eyes:      General: No scleral icterus.     Extraocular Movements: Extraocular movements intact.      Pupils: Pupils are equal, round, and reactive to light.   Cardiovascular:      Rate and Rhythm: Normal rate and regular rhythm.      Pulses: Normal pulses.      Heart sounds: Normal heart sounds.   Pulmonary:      Effort: Pulmonary effort is normal. No respiratory distress.      Breath sounds: Normal breath sounds.   Abdominal:      General: Bowel sounds are normal.      Palpations: Abdomen is soft.      Tenderness: There is no abdominal tenderness.   Skin:     General: Skin is warm and dry.      Coloration: Skin is not jaundiced.   Neurological:      General: No focal deficit present.      Mental Status: He is alert and oriented to person, place, and time.      Comments: + bilateral resting tremor with hands outstretched   Psychiatric:         Mood and Affect: Mood normal.         Behavior: Behavior normal.       Results Review:  I reviewed the patient's new clinical results.  I reviewed the patient's new imaging results and agree with the interpretation.  I reviewed the patient's other test results  and agree with the interpretation  I personally viewed and interpreted the patient's EKG/Telemetry data  Discussed with ED provider.    Lab Results (last 24 hours)     Procedure Component Value Units Date/Time    CBC & Differential [[id number]]  (Normal) Collected: 09/11/22 0120    Specimen: Blood Updated: 09/11/22 0137    Narrative:      The following orders were created for panel order CBC & Differential.  Procedure                               Abnormality         Status                     ---------                               -----------         ------                     CBC Auto Differential[[id number]]        Normal              Final result                 Please view results for these tests on the individual orders.    Comprehensive Metabolic Panel [[id number]]  (Abnormal) Collected: 09/11/22 0120    Specimen: Blood Updated: 09/11/22 0155     Glucose 105 mg/dL      BUN 19 mg/dL      Creatinine 0.76 mg/dL      Sodium 141 mmol/L      Potassium 4.3 mmol/L      Chloride 106 mmol/L      CO2 19.0 mmol/L      Calcium 8.5 mg/dL      Total Protein 7.3 g/dL      Albumin 4.50 g/dL      ALT (SGPT) 22 U/L      AST (SGOT) 49 U/L      Alkaline Phosphatase 94 U/L      Total Bilirubin 0.3 mg/dL      Globulin 2.8 gm/dL      A/G Ratio 1.6 g/dL      BUN/Creatinine Ratio 25.0     Anion Gap 16.0 mmol/L      eGFR 99.7 mL/min/1.73      Comment: National Kidney Foundation and American Society of Nephrology (ASN) Task Force recommended calculation based on the Chronic Kidney Disease Epidemiology Collaboration (CKD-EPI) equation refit without adjustment for race.       Narrative:      GFR Normal >60  Chronic Kidney Disease <60  Kidney Failure <15      CBC Auto Differential [[id number]]  (Normal) Collected: 09/11/22 0120    Specimen: Blood Updated: 09/11/22 0137     WBC 4.99 10*3/mm3      RBC 4.34 10*6/mm3      Hemoglobin 13.3 g/dL      Hematocrit 39.3 %      MCV 90.6 fL      MCH 30.6 pg      MCHC 33.8 g/dL      RDW 14.0 %      RDW-SD  46.9 fl      MPV 9.4 fL      Platelets 183 10*3/mm3      Neutrophil % 65.3 %      Lymphocyte % 25.9 %      Monocyte % 6.2 %      Eosinophil % 2.0 %      Basophil % 0.4 %      Immature Grans % 0.2 %      Neutrophils, Absolute 3.26 10*3/mm3      Lymphocytes, Absolute 1.29 10*3/mm3      Monocytes, Absolute 0.31 10*3/mm3      Eosinophils, Absolute 0.10 10*3/mm3      Basophils, Absolute 0.02 10*3/mm3      Immature Grans, Absolute 0.01 10*3/mm3      nRBC 0.0 /100 WBC     Ethanol [[id number]]  (Abnormal) Collected: 09/11/22 0120    Specimen: Blood Updated: 09/11/22 0202     Ethanol 213 mg/dL      Ethanol % 0.213 %     Magnesium [[id number]]  (Normal) Collected: 09/11/22 0120    Specimen: Blood Updated: 09/11/22 0202     Magnesium 2.0 mg/dL     Urine Drug Screen - Urine, Clean Catch [[id number]] Collected: 09/11/22 0732    Specimen: Urine, Clean Catch Updated: 09/11/22 0737          Imaging Results (Last 24 Hours)     Procedure Component Value Units Date/Time    XR Chest 1 View [[id number]] Collected: 09/11/22 0207     Updated: 09/11/22 0210    Narrative:      SINGLE VIEW OF THE CHEST     HISTORY: Shortness of air     COMPARISON: 07/01/2022     FINDINGS:  Heart size is within normal limits. There is elevation of the right  hemidiaphragm, with associated bronchovascular crowding. There is  calcification of the aorta. No pneumothorax or pleural effusion is seen.       Impression:      No acute findings.     This report was finalized on 9/11/2022 2:07 AM by Dr. Hilda Hampton M.D.             Results for orders placed during the hospital encounter of 01/07/22    Adult Transthoracic Echo Complete W/ Cont if Necessary Per Protocol    Interpretation Summary  · Estimated left ventricular EF = 67% Left ventricular systolic function is normal.  · Left ventricular diastolic function was normal.  · Mild dilation of the aortic root is present.      No orders to display        Assessment/Plan     Active Hospital Problems    Diagnosis   POA   • **Alcohol dependence with intoxication (HCC) [F10.229]  Yes   • Withdrawal symptoms, alcohol (HCC) [F10.239]  Yes   • Transaminitis [R74.01]  Yes   • Hyperlipidemia [E78.5]  Yes   • Hypertension [I10]  Yes   • Hypothyroidism [E03.9]  Yes      Resolved Hospital Problems   No resolved problems to display.     Mr. Mueller is a 67 y.o. male with history of Alcohol use disorder, hypertension, hyperlipidemia, hypothyroidism who presents to [facility] with complaints of back pain and alcohol withdrawal.    · Low back pain/spinal stenosis. Symptoms chronic in nature. No evidence of acute red flag symptoms to warrant acute intervention currently. Plan for management with pain control, PT evaluation. Continue to monitor.  · Alcohol use disorder with concern for withdrawal: Currently drinks 1/2 liter of vodka daily and has done so for about 6 years. Last drink ~12 hours prior to arrival. Alcohol level 213 on admission. Patient interested in alcohol cessation. WA protocol, thiamine, folic acid, multi-vitamin for Alcohol use disorder with concern for withdrawal. Closely monitor on telemetry. Close monitoring of electrolytes, with replacement as indicated per electrolyte protocol.  · Hypertension: Stable. Continue home medications as prescribed.  · Hyperlipidemia: Continue Atorvastatin.  · Hypothyroidism: Continue Levothyroxine.    · I discussed the patient's findings and my recommendations with patient, nursing staff and ED provider.  · VTE Prophylaxis - SCDs.  · Code Status - Full code.       Roberth Chino DO  [facility]  09/11/22  08:29 EDT

## 2022-09-11 NOTE — ED NOTES
Pt was up to bedside commode feeling like he was going to have a bm but only urinating. He completed his breakfast tray and I just sat up for lunch tray.

## 2022-09-11 NOTE — ED NOTES
Nursing report ED to floor  Pro Mueller  67 y.o.  male    HPI :   Chief Complaint   Patient presents with   • Back Pain   • Hip Pain       Admitting doctor:   Roberth Chino DO    Admitting diagnosis:   The primary encounter diagnosis was Alcohol withdrawal syndrome with complication (HCC). A diagnosis of Midline low back pain with right-sided sciatica, unspecified chronicity was also pertinent to this visit.    Code status:   Current Code Status     Date Active Code Status Order ID Comments User Context       9/11/2022 0432 CPR (Attempt to Resuscitate) [id number]  Yeni Mcdowell, [facility]     Advance Care Planning Activity      Questions for Current Code Status     Question Answer    Code Status (Patient has no pulse and is not breathing) CPR (Attempt to Resuscitate)    Medical Interventions (Patient has pulse or is breathing) Full Support          Allergies:   Penicillins    Intake and Output  No intake or output data in the 24 hours ending 09/11/22 1414    Weight:       09/11/22  0037   Weight: 86.2 kg (190 lb)       Most recent vitals:   Vitals:    09/11/22 1217 09/11/22 1307 09/11/22 1331 09/11/22 1341   BP:  158/89 168/93    BP Location:       Patient Position:       Pulse: 92 93 92 98   Resp:       Temp:       TempSrc:       SpO2: 96% 94% 92% 93%   Weight:       Height:           Active LDAs/IV Access:   Lines, Drains & Airways     Active LDAs     Name Placement date Placement time Site Days    Peripheral IV 09/11/22 0120 Right Antecubital 09/11/22  0120  Antecubital  less than 1                Labs (abnormal labs have a star):   Labs Reviewed   COMPREHENSIVE METABOLIC PANEL - Abnormal; Notable for the following components:       Result Value    Glucose 105 (*)     CO2 19.0 (*)     Calcium 8.5 (*)     AST (SGOT) 49 (*)     Anion Gap 16.0 (*)     All other components within normal limits    Narrative:     GFR Normal >60  Chronic Kidney Disease <60  Kidney Failure <15     URINE DRUG SCREEN - Abnormal;  Notable for the following components:    Benzodiazepine Screen, Urine Positive (*)     Opiate Screen Positive (*)     All other components within normal limits    Narrative:     Negative Thresholds Per Drugs Screened:    Amphetamines                 500 ng/ml  Barbiturates                 200 ng/ml  Benzodiazepines              100 ng/ml  Cocaine                      300 ng/ml  Methadone                    300 ng/ml  Opiates                      300 ng/ml  Oxycodone                    100 ng/ml  THC                           50 ng/ml    The Normal Value for all drugs tested is negative. This report includes final unconfirmed screening results to be used for medical treatment purposes only. Unconfirmed results must not be used for non-medical purposes such as employment or legal testing. Clinical consideration should be applied to any drug of abuse test, particularly when unconfirmed results are used.           ETHANOL - Abnormal; Notable for the following components:    Ethanol 213 (*)     All other components within normal limits   CBC WITH AUTO DIFFERENTIAL - Normal   MAGNESIUM - Normal   CBC AND DIFFERENTIAL    Narrative:     The following orders were created for panel order CBC & Differential.  Procedure                               Abnormality         Status                     ---------                               -----------         ------                     CBC Auto Differential[[id number]]        Normal              Final result                 Please view results for these tests on the individual orders.       EKG:   No orders to display       Meds given in ED:   Medications   sodium chloride 0.9 % flush 10 mL (has no administration in time range)   nitroglycerin (NITROSTAT) SL tablet 0.4 mg (has no administration in time range)   acetaminophen (TYLENOL) tablet 650 mg (has no administration in time range)     Or   acetaminophen (TYLENOL) 160 MG/5ML solution 650 mg (has no administration in time range)      Or   acetaminophen (TYLENOL) suppository 650 mg (has no administration in time range)   ondansetron (ZOFRAN) tablet 4 mg (has no administration in time range)     Or   ondansetron (ZOFRAN) injection 4 mg (has no administration in time range)   aluminum-magnesium hydroxide-simethicone (MAALOX MAX) 400-400-40 MG/5ML suspension 15 mL (has no administration in time range)   LORazepam (ATIVAN) tablet 0.5 mg ( Oral Not Given:  See Alt 9/11/22 1306)     Or   LORazepam (ATIVAN) injection 0.5 mg ( Intravenous Not Given:  See Alt 9/11/22 1306)     Or   LORazepam (ATIVAN) tablet 1 mg ( Oral Not Given:  See Alt 9/11/22 1306)     Or   LORazepam (ATIVAN) injection 1 mg (1 mg Intravenous Given 9/11/22 1306)     Or   LORazepam (ATIVAN) injection 1 mg ( Intravenous Not Given:  See Alt 9/11/22 1306)     Or   LORazepam (ATIVAN) injection 1 mg ( Intramuscular Not Given:  See Alt 9/11/22 1306)   thiamine (B-1) injection 100 mg (has no administration in time range)     Or   thiamine (VITAMIN B-1) tablet 100 mg (has no administration in time range)   thiamine (VITAMIN B-1) tablet 100 mg (has no administration in time range)     And   multivitamin (THERAGRAN) tablet 1 tablet (has no administration in time range)     And   folic acid (FOLVITE) tablet 1 mg (has no administration in time range)   lactated ringers infusion (has no administration in time range)   HYDROmorphone (DILAUDID) injection 1 mg (1 mg Intravenous Given 9/11/22 0134)   ondansetron (ZOFRAN) injection 4 mg (4 mg Intravenous Given 9/11/22 0134)   methylPREDNISolone sodium succinate (SOLU-Medrol) injection 125 mg (125 mg Intravenous Given 9/11/22 0134)   thiamine (B-1) 100 mg, folic acid 1 mg in sodium chloride 0.9 % 1,000 mL infusion (0 mL/hr Intravenous Stopped 9/11/22 0502)   LORazepam (ATIVAN) injection 2 mg (2 mg Intravenous Given 9/11/22 0341)   LORazepam (ATIVAN) injection 2 mg (2 mg Intravenous Given 9/11/22 0410)       Imaging results:  XR Chest 1 View    Result  "Date: 2022  No acute findings.  This report was finalized on 2022 2:07 AM by Dr. Hilda Hampton M.D.        Ambulatory status:   Partial assist    Social issues:   Social History     Socioeconomic History   • Marital status: Single   Tobacco Use   • Smoking status: Former Smoker     Packs/day: 0.50     Years: 15.00     Pack years: 7.50     Types: Cigarettes     Start date:      Quit date:      Years since quittin.7   • Smokeless tobacco: Never Used   • Tobacco comment: caffeine - 3 cans of coke daily    Vaping Use   • Vaping Use: Never used   Substance and Sexual Activity   • Alcohol use: Not Currently     Alcohol/week: 15.0 standard drinks     Types: 15 Shots of liquor per week     Comment: went through detox last week 2022   • Drug use: Yes     Types: Methamphetamines     Comment: \"once in a rare while\"   • Sexual activity: Yes     Partners: Female     Birth control/protection: Condom       NIH Stroke Scale:        Nursing report ED to floor:    "

## 2022-09-11 NOTE — ED PROVIDER NOTES
EMERGENCY DEPARTMENT ENCOUNTER    Room Number:  E665/1  Date of encounter:  9/12/2022  PCP: Alla Beal MD  Historian: Patient      HPI:  Chief Complaint: Back pain and alcohol withdrawal  A complete HPI/ROS/PMH/PSH/SH/FH are unobtainable due to: Nothing    Context: Pro Mueller is a 67 y.o. male who presents to the ED c/o lower back pain radiating down the right leg and alcohol withdrawal.  Per the patient he was seen here few weeks ago for the same.  He had a CT of the lumbar spine at that time that did not identify any acute process.  He was treated with steroids for his sciatica and outpatient management.  He states over the past few days his back pain is worsened.  He rates it as a 10 out of 10 on the pain scale at this time.  He states he attempted to get up and go to the bathroom earlier this evening to have a bowel movement.  The pain was so severe he did not make it.  He had a fall to the ground and was eventually able to get to the phone and call EMS.  He presents for further evaluation.  He denies saddle anesthesia, fever, chills, remote history of cancer, lower extremity weakness.  Again he does endorse sciatica down the right leg.  He also states he has been a daily drinker for some time.    PAST MEDICAL HISTORY  Active Ambulatory Problems     Diagnosis Date Noted   • Alcoholism in recovery ([facility]) 08/08/2016   • Atopic rhinitis 08/08/2016   • Mixed anxiety depressive disorder 08/08/2016   • Genital herpes simplex 08/08/2016   • Hyperlipidemia 08/08/2016   • Hypertension 08/08/2016   • Hypothyroidism 08/08/2016   • Insomnia 08/08/2016   • Panic disorder without agoraphobia 08/08/2016   • Persistent insomnia 08/08/2016   • Vitamin D deficiency 08/08/2016   • Chronic low back pain 11/11/2016   • Neuropathy involving both lower extremities 10/25/2018   • QT prolongation 01/03/2019   • Left shoulder pain 11/19/2019   • Hypokalemia 01/13/2020   • Pancytopenia (HCC) 01/14/2020   • Left ventricular  diastolic dysfunction 01/16/2021   • Tremor 10/06/2021   • C5 cervical fracture ([facility]) 11/09/2021   • Syncope and collapse 01/07/2022   • Neck pain 01/07/2022   • Alcoholic intoxication with complication ([facility]) 03/13/2022   • Withdrawal symptoms, alcohol ([facility]) 07/01/2022   • Transaminitis 07/01/2022   • Lumbar facet arthropathy 07/20/2022     Resolved Ambulatory Problems     Diagnosis Date Noted   • Accidental fall 08/08/2016   • Impacted cerumen 08/08/2016   • Influenza 08/08/2016   • Motion sickness 08/08/2016   • Seasonal allergic rhinitis 08/08/2016   • Upper respiratory tract infection 08/08/2016   • Alcohol withdrawal ([facility]) 11/04/2016   • Headache 11/05/2016   • Gastritis 11/05/2016   • Olecranon bursitis of right elbow 04/05/2017   • Sciatica of left side 06/12/2018   • Syncope and collapse 01/02/2019   • Nausea and vomiting 01/11/2020   • Alcoholic ketoacidosis 01/12/2020   • Hyponatremia 01/13/2020   • Alcohol dependence with uncomplicated withdrawal ([facility]) 01/14/2020   • Nephrolithiasis 02/12/2020   • Hypomagnesemia 01/16/2021   • Non-traumatic rhabdomyolysis 01/18/2021   • Urine retention 01/21/2021     Past Medical History:   Diagnosis Date   • Alcohol abuse    • Allergic 1970   • Anxiety    • Arthritis    • Depression    • Disease of thyroid gland    • Elevated cholesterol    • Encounter for removal of sutures    • GERD (gastroesophageal reflux disease)    • Headache, tension-type    • Kidney stone    • Migraine    • Olecranon bursitis, right elbow    • Peripheral neuropathy    • Sleep apnea          PAST SURGICAL HISTORY  Past Surgical History:   Procedure Laterality Date   • COLONOSCOPY     • CYST REMOVAL     • CYSTOSCOPY BLADDER STONE LITHOTRIPSY  02/2022   • EXTRACORPOREAL SHOCK WAVE LITHOTRIPSY (ESWL) Right 2002   • SHOULDER ARTHROSCOPY Right 12/17/2019    Procedure: SHOULDER ARTHROSCOPY, decompression, distal clavicle excision;  Surgeon: Aj Mancilla MD;  [place] [facility] [place] [facility] [place]  Service:  "Orthopedics   • TONSILLECTOMY           FAMILY HISTORY  Family History   Problem Relation Age of Onset   • Alzheimer's disease Mother    • Mental illness Mother    • Pancreatic cancer Father    • Hearing loss Father    • Malig Hyperthermia Neg Hx          SOCIAL HISTORY  Social History     Socioeconomic History   • Marital status: Single   Tobacco Use   • Smoking status: Former Smoker     Packs/day: 0.50     Years: 15.00     Pack years: 7.50     Types: Cigarettes     Start date:      Quit date:      Years since quittin.7   • Smokeless tobacco: Never Used   • Tobacco comment: caffeine - 3 cans of coke daily    Vaping Use   • Vaping Use: Never used   Substance and Sexual Activity   • Alcohol use: Not Currently     Alcohol/week: 15.0 standard drinks     Types: 15 Shots of liquor per week     Comment: went through detox last week 2022   • Drug use: Yes     Types: Methamphetamines     Comment: \"once in a rare while\"   • Sexual activity: Yes     Partners: Female     Birth control/protection: Condom         ALLERGIES  Penicillins        REVIEW OF SYSTEMS  Review of Systems   Constitutional: Negative for chills and fever.   HENT: Negative.    Eyes: Negative.    Respiratory: Negative for cough and shortness of breath.    Cardiovascular: Negative for chest pain and palpitations.   Gastrointestinal: Negative for abdominal pain.   Genitourinary: Positive for difficulty urinating.   Musculoskeletal: Positive for back pain.   Skin: Negative.    Neurological: Positive for weakness.   Psychiatric/Behavioral: Negative.         All systems reviewed and negative except for those discussed in HPI.       PHYSICAL EXAM    I have reviewed the triage vital signs and nursing notes.    ED Triage Vitals   Temp Heart Rate Resp BP SpO2   22 0040 22 0037 22 0037 22 0037 22 0037   97.6 °F (36.4 °C) 91 16 (!) 159/129 94 %      Temp src Heart Rate Source Patient Position BP Location FiO2 (%)   22 " 0037 09/11/22 0037 09/11/22 0037 09/11/22 0037 --   Tympanic Monitor Lying Right arm        Physical Exam  GENERAL: Disheveled, patient has dry stool on his feet and lower extremity.    HENT: nares patent, NCAT  EYES: no scleral icterus  CV: regular rhythm, regular rate  RESPIRATORY: normal effort  ABDOMEN: soft  MUSCULOSKELETAL: no deformity, no foot drop or weakness noted in the lower extremities  NEURO: alert, moves all extremities, follows commands, gross sensation intact globally.  Patient is tremorous and appears slightly anxious  SKIN: As above        LAB RESULTS  Recent Results (from the past 24 hour(s))   Urine Drug Screen - Urine, Clean Catch    Collection Time: 09/11/22  7:32 AM    Specimen: Urine, Clean Catch   Result Value Ref Range    Amphet/Methamphet, Screen Negative Negative    Barbiturates Screen, Urine Negative Negative    Benzodiazepine Screen, Urine Positive (A) Negative    Cocaine Screen, Urine Negative Negative    Opiate Screen Positive (A) Negative    THC, Screen, Urine Negative Negative    Methadone Screen, Urine Negative Negative    Oxycodone Screen, Urine Negative Negative       Ordered the above labs and independently reviewed the results.        RADIOLOGY  No Radiology Exams Resulted Within Past 24 Hours    I ordered the above noted radiological studies. Reviewed by me and discussed with radiologist.  See dictation for official radiology interpretation.      PROCEDURES    Procedures      MEDICATIONS GIVEN IN ER    Medications   HYDROmorphone (DILAUDID) injection 1 mg (1 mg Intravenous Given 9/11/22 0134)   ondansetron (ZOFRAN) injection 4 mg (4 mg Intravenous Given 9/11/22 0134)   methylPREDNISolone sodium succinate (SOLU-Medrol) injection 125 mg (125 mg Intravenous Given 9/11/22 0134)   thiamine (B-1) 100 mg, folic acid 1 mg in sodium chloride 0.9 % 1,000 mL infusion (0 mL/hr Intravenous Stopped 9/11/22 0502)   LORazepam (ATIVAN) injection 2 mg (2 mg Intravenous Given 9/11/22 0341)    LORazepam (ATIVAN) injection 2 mg (2 mg Intravenous Given 9/11/22 0410)         PROGRESS, DATA ANALYSIS, CONSULTS, AND MEDICAL DECISION MAKING    All labs have been independently reviewed by me.  All radiology studies have been reviewed by me and discussed with radiologist dictating the report.   EKG's independently viewed and interpreted by me.  Discussion below represents my analysis of pertinent findings related to patient's condition, differential diagnosis, treatment plan and final disposition.    DDx sciatica secondary to HNP, sciatica secondary to stenosis, alcohol withdrawal.  Will obtain CBC, CMP, magnesium, UDS, EtOH to further evaluate.  We will go ahead and order a banana bag.  Patient had CT of the lumbar spine performed 10 days ago that was relatively unremarkable aside degenerative changes.  There has been no new trauma or injury, x-rays not clinically indicated.    ED Course as of 09/12/22 0454   Sun Sep 11, 2022   0151 WBC: 4.99 [RC]   0151 RBC: 4.34 [RC]   0151 Hemoglobin: 13.3 [RC]   0151 Hematocrit: 39.3 [RC]   0151 Platelets: 183 [RC]   0151 BP(!): 159/129 [RC]   0151 Temp: 97.6 °F (36.4 °C) [RC]   0151 Heart Rate: 91 [RC]   0151 SpO2: 94 %  RA [RC]   0313 Ethanol(!): 213  CIWA is up to a 6.  Will order Ativan [RC]   0401 Magnesium: 2.0 [RC]   0402 Glucose(!): 105 [RC]   0402 BUN: 19 [RC]   0402 Creatinine: 0.76 [RC]   0402 Sodium: 141 [RC]   0402 Potassium: 4.3 [RC]   0402 CO2(!): 19.0 [RC]   0402 Anion Gap(!): 16.0 [RC]   0402 Patient is clearly exhibiting alcohol withdrawal.  4 mg of Ativan have been ordered and the patient has improved.  I think he clearly needs to come in for withdrawal and plus or minus MRI of the lumbar spine for further evaluation. [RC]   0450 Discussed patient's case with Yeni LOCO with A.  To place in a telemetry bed under Dr. Collazo's care. [RC]   3172 Recheck the patient prior to moving to the floor.  His alcohol withdrawal and tremors appear  significantly improved after the fourth milligram of Ativan.  He is resting quietly and in no acute distress. [RC]      ED Course User Index  [RC] Juan Olmos III, PA           PPE: The patient wore a surgical mask throughout the entire patient encounter. I wore an N95.    AS OF 04:54 EDT VITALS:    BP - 147/94  HR - 70  TEMP - 97.2 °F (36.2 °C) (Oral)  O2 SATS - 98%        DIAGNOSIS  Final diagnoses:   Alcohol withdrawal syndrome with complication (HCC)   Midline low back pain with right-sided sciatica, unspecified chronicity         DISPOSITION  ADMISSION    Discussed treatment plan and reason for admission with pt/family and admitting physician.  Pt/family voiced understanding of the plan for admission for further testing/treatment as needed.              Juan Olmos III, PA  09/12/22 3278

## 2022-09-11 NOTE — ED NOTES
"Pt arrives via [facility] #[id number] from home.       EMS states \"Chronic back pain, and hip pain from arthritis. Was here a week ago, complains of HTN. Has not taken BP meds.\"    Patient was placed in face mask during first look triage.  Patient was wearing a face mask throughout encounter.  I wore personal protective equipment throughout the encounter.  Hand hygiene was performed before and after patient encounter.    "

## 2022-09-12 LAB
ANION GAP SERPL CALCULATED.3IONS-SCNC: 8.6 MMOL/L (ref 5–15)
BASOPHILS # BLD AUTO: 0.01 10*3/MM3 (ref 0–0.2)
BASOPHILS NFR BLD AUTO: 0.2 % (ref 0–1.5)
BUN SERPL-MCNC: 17 MG/DL (ref 8–23)
BUN/CREAT SERPL: 25.8 (ref 7–25)
CALCIUM SPEC-SCNC: 9 MG/DL (ref 8.6–10.5)
CHLORIDE SERPL-SCNC: 103 MMOL/L (ref 98–107)
CO2 SERPL-SCNC: 24.4 MMOL/L (ref 22–29)
CREAT SERPL-MCNC: 0.66 MG/DL (ref 0.76–1.27)
DEPRECATED RDW RBC AUTO: 46.1 FL (ref 37–54)
EGFRCR SERPLBLD CKD-EPI 2021: 104.1 ML/MIN/1.73
EOSINOPHIL # BLD AUTO: 0 10*3/MM3 (ref 0–0.4)
EOSINOPHIL NFR BLD AUTO: 0 % (ref 0.3–6.2)
ERYTHROCYTE [DISTWIDTH] IN BLOOD BY AUTOMATED COUNT: 13.7 % (ref 12.3–15.4)
GLUCOSE SERPL-MCNC: 123 MG/DL (ref 65–99)
HCT VFR BLD AUTO: 32.7 % (ref 37.5–51)
HGB BLD-MCNC: 10.9 G/DL (ref 13–17.7)
IMM GRANULOCYTES # BLD AUTO: 0.02 10*3/MM3 (ref 0–0.05)
IMM GRANULOCYTES NFR BLD AUTO: 0.3 % (ref 0–0.5)
LYMPHOCYTES # BLD AUTO: 0.73 10*3/MM3 (ref 0.7–3.1)
LYMPHOCYTES NFR BLD AUTO: 11.1 % (ref 19.6–45.3)
MAGNESIUM SERPL-MCNC: 2.1 MG/DL (ref 1.6–2.4)
MCH RBC QN AUTO: 31 PG (ref 26.6–33)
MCHC RBC AUTO-ENTMCNC: 33.3 G/DL (ref 31.5–35.7)
MCV RBC AUTO: 92.9 FL (ref 79–97)
MONOCYTES # BLD AUTO: 0.57 10*3/MM3 (ref 0.1–0.9)
MONOCYTES NFR BLD AUTO: 8.7 % (ref 5–12)
NEUTROPHILS NFR BLD AUTO: 5.22 10*3/MM3 (ref 1.7–7)
NEUTROPHILS NFR BLD AUTO: 79.7 % (ref 42.7–76)
NRBC BLD AUTO-RTO: 0 /100 WBC (ref 0–0.2)
PHOSPHATE SERPL-MCNC: 3.1 MG/DL (ref 2.5–4.5)
PLATELET # BLD AUTO: 143 10*3/MM3 (ref 140–450)
PMV BLD AUTO: 9.9 FL (ref 6–12)
POTASSIUM SERPL-SCNC: 4.2 MMOL/L (ref 3.5–5.2)
RBC # BLD AUTO: 3.52 10*6/MM3 (ref 4.14–5.8)
SODIUM SERPL-SCNC: 136 MMOL/L (ref 136–145)
WBC NRBC COR # BLD: 6.55 10*3/MM3 (ref 3.4–10.8)

## 2022-09-12 PROCEDURE — 84100 ASSAY OF PHOSPHORUS: CPT | Performed by: STUDENT IN AN ORGANIZED HEALTH CARE EDUCATION/TRAINING PROGRAM

## 2022-09-12 PROCEDURE — G0378 HOSPITAL OBSERVATION PER HR: HCPCS

## 2022-09-12 PROCEDURE — 80048 BASIC METABOLIC PNL TOTAL CA: CPT | Performed by: STUDENT IN AN ORGANIZED HEALTH CARE EDUCATION/TRAINING PROGRAM

## 2022-09-12 PROCEDURE — 90791 PSYCH DIAGNOSTIC EVALUATION: CPT

## 2022-09-12 PROCEDURE — 97530 THERAPEUTIC ACTIVITIES: CPT

## 2022-09-12 PROCEDURE — 97162 PT EVAL MOD COMPLEX 30 MIN: CPT

## 2022-09-12 PROCEDURE — 83735 ASSAY OF MAGNESIUM: CPT | Performed by: STUDENT IN AN ORGANIZED HEALTH CARE EDUCATION/TRAINING PROGRAM

## 2022-09-12 PROCEDURE — 96361 HYDRATE IV INFUSION ADD-ON: CPT

## 2022-09-12 PROCEDURE — 85025 COMPLETE CBC W/AUTO DIFF WBC: CPT | Performed by: STUDENT IN AN ORGANIZED HEALTH CARE EDUCATION/TRAINING PROGRAM

## 2022-09-12 RX ORDER — MIRTAZAPINE 30 MG/1
30 TABLET, FILM COATED ORAL NIGHTLY
Status: DISCONTINUED | OUTPATIENT
Start: 2022-09-12 | End: 2022-09-15 | Stop reason: HOSPADM

## 2022-09-12 RX ORDER — LOPERAMIDE HYDROCHLORIDE 2 MG/1
2 CAPSULE ORAL 4 TIMES DAILY PRN
Status: DISCONTINUED | OUTPATIENT
Start: 2022-09-12 | End: 2022-09-15 | Stop reason: HOSPADM

## 2022-09-12 RX ORDER — DULOXETIN HYDROCHLORIDE 30 MG/1
30 CAPSULE, DELAYED RELEASE ORAL DAILY
Status: DISCONTINUED | OUTPATIENT
Start: 2022-09-12 | End: 2022-09-15 | Stop reason: HOSPADM

## 2022-09-12 RX ADMIN — MIRTAZAPINE 30 MG: 30 TABLET, FILM COATED ORAL at 21:32

## 2022-09-12 RX ADMIN — Medication 1 MG: at 08:57

## 2022-09-12 RX ADMIN — AMLODIPINE BESYLATE 5 MG: 5 TABLET ORAL at 08:57

## 2022-09-12 RX ADMIN — DULOXETINE HYDROCHLORIDE 30 MG: 30 CAPSULE, DELAYED RELEASE ORAL at 15:44

## 2022-09-12 RX ADMIN — Medication 10 ML: at 08:57

## 2022-09-12 RX ADMIN — Medication 1 TABLET: at 08:57

## 2022-09-12 RX ADMIN — SODIUM CHLORIDE, POTASSIUM CHLORIDE, SODIUM LACTATE AND CALCIUM CHLORIDE 100 ML/HR: 600; 310; 30; 20 INJECTION, SOLUTION INTRAVENOUS at 02:41

## 2022-09-12 RX ADMIN — LOPERAMIDE HYDROCHLORIDE 2 MG: 2 CAPSULE ORAL at 19:28

## 2022-09-12 RX ADMIN — HYDROCODONE BITARTRATE AND ACETAMINOPHEN 1 TABLET: 5; 325 TABLET ORAL at 21:44

## 2022-09-12 RX ADMIN — ATORVASTATIN CALCIUM 20 MG: 20 TABLET, FILM COATED ORAL at 08:57

## 2022-09-12 RX ADMIN — LISINOPRIL 10 MG: 10 TABLET ORAL at 08:57

## 2022-09-12 RX ADMIN — LEVOTHYROXINE SODIUM 100 MCG: 0.1 TABLET ORAL at 08:57

## 2022-09-12 NOTE — PLAN OF CARE
Goal Outcome Evaluation:               Patient alert and oriented with c/o pain to back, rt hip and down right leg. Patient on room air and sinus on  monitor. Patient up in chair, up to toilet and then requesting BSC- 2 large BM's so far today. Patient able to ambulate to bathroom with walker and assistance, CIWA score- 2-4. LR discontinued per orders. Cymbalta ordered per Dr Cheek.  No acute distress noted, will continue to monitor.

## 2022-09-12 NOTE — PROGRESS NOTES
Reviewed chart and interviewed pt. This pt well known to me as I treated him as OP @ [facility] OP office in the past. Educated pt on PETER and co-occurring disorders. Educated pt that his body not as capable of processing ETOH as it once did. Educated pt PETER is primary,chronic, and fatal disease when there is no cessation of drinking. Invited pt to [facility] PETER IOP. Offered transportation as pt reports he no longer drives. Educated pt and gave him access list of AA, [facility] recovery, [facility] recovery, and [facility] recovery fellowships. Pt interested in [facility] recovery. Both zoom and hybred as well as in person meetings for all fellowships.  Pt taking under submission recommendation pt attend PETER IOP at [facility].

## 2022-09-12 NOTE — CONSULTS
"IDENTIFYING INFORMATION: The patient is a 67-year-old white male seen previously in July of this year under similar circumstances.  He was admitted with back and hip pain and is seen by psychiatry related to a history of chronic alcohol use and vague suicidal ideations    CHIEF COMPLAINT: None given    INFORMANT: Patient and chart    RELIABILITY: Fair    HISTORY OF PRESENT ILLNESS: The patient is a 67-year-old white male with a history of severe hip and back pain which led to his admission to the hospital.  It is the patient's statement that \"no one will give me anything for pain\" and that he therefore drinks 1/2 to 1 L of vodka on a daily basis.  After his discharge from this facility in July the patient did undergo chemical dependence treatment at the [facility] but relapsed shortly thereafter.  He denies abuse of other psychoactive substances.  The patient is scheduled to undergo a \"procedure\" for chronic pain in October of this year but is uncertain of the nature of this procedure.  The patient reports passive suicidal ideation stating that he does not wish to \"continue living like this\" but denies active suicidal ideations and denies prior suicide attempts or gestures.  He is currently followed by Dr. Say Coats at [facility].  His current psychotropic medication regimen includes only mirtazapine 15 mg nightly.  The patient is  and  x3 and lives alone.  He is employed as a .    PAST PSYCHIATRIC HISTORY: As above.  The patient has multiple previous episodes of chemical dependence treatment    PAST MEDICAL HISTORY: Significant for hypertension, dyslipidemia, hypothyroidism and the aforementioned spinal stenosis and hip pain    MEDICATIONS:   Current Facility-Administered Medications   Medication Dose Route Frequency Provider Last Rate Last Admin   • acetaminophen (TYLENOL) tablet 650 mg  650 mg Oral Q4H PRN Roberth Chino, DO        Or   • " acetaminophen (TYLENOL) 160 MG/5ML solution 650 mg  650 mg Oral Q4H PRN Roberth Chino, DO        Or   • acetaminophen (TYLENOL) suppository 650 mg  650 mg Rectal Q4H PRN Roberth Chino, DO       • aluminum-magnesium hydroxide-simethicone (MAALOX MAX) 400-400-40 MG/5ML suspension 15 mL  15 mL Oral Q6H PRN Matti, Roberth, DO       • amLODIPine (NORVASC) tablet 5 mg  5 mg Oral Q24H Roberth Chino, DO   5 mg at 09/12/22 0857   • atorvastatin (LIPITOR) tablet 20 mg  20 mg Oral Daily BirminghamDustin diazmy, DO   20 mg at 09/12/22 0857   • DULoxetine (CYMBALTA) DR capsule 30 mg  30 mg Oral Daily Mariano Cheek III, MD       • folic acid (FOLVITE) tablet 1 mg  1 mg Oral Daily Roberth Chino, DO   1 mg at 09/12/22 0857   • HYDROcodone-acetaminophen (NORCO) 5-325 MG per tablet 1 tablet  1 tablet Oral Q8H PRN Roberth Chino, DO       • levothyroxine (SYNTHROID, LEVOTHROID) tablet 100 mcg  100 mcg Oral Daily Dustin Chinomy, DO   100 mcg at 09/12/22 0857   • lisinopril (PRINIVIL,ZESTRIL) tablet 10 mg  10 mg Oral Daily Dustin Chinomy, DO   10 mg at 09/12/22 0857   • LORazepam (ATIVAN) tablet 0.5 mg  0.5 mg Oral Q2H PRN Roberth Chino, DO        Or   • LORazepam (ATIVAN) injection 0.5 mg  0.5 mg Intravenous Q2H PRN Roberth Chino, DO        Or   • LORazepam (ATIVAN) tablet 1 mg  1 mg Oral Q1H PRN Matti, Roberth, DO   1 mg at 09/11/22 1638    Or   • LORazepam (ATIVAN) injection 1 mg  1 mg Intravenous Q1H PRN Roberth Chino, DO   1 mg at 09/11/22 1306    Or   • LORazepam (ATIVAN) injection 1 mg  1 mg Intravenous Q15 Min PRN Roberth Chino, DO        Or   • LORazepam (ATIVAN) injection 1 mg  1 mg Intramuscular Q15 Min PRN Roberth Chino, DO       • Magnesium Sulfate 2 gram Bolus, followed by 8 gram infusion (total Mg dose 10 grams)- Mg less than or equal to 1mg/dL  2 g Intravenous PRN Roberth Chino, DO        Or   • Magnesium Sulfate 2 gram / 50mL Infusion (GIVE X 3 BAGS TO EQUAL 6GM TOTAL DOSE) - Mg 1.1 - 1.5 mg/dl  2 g Intravenous PRN Roberth Chino, DO         Or   • Magnesium Sulfate 4 gram infusion- Mg 1.6-1.9 mg/dL  4 g Intravenous PRN [place], Roberth, DO       • meloxicam (MOBIC) tablet 15 mg  15 mg Oral Daily Matti, Roberth, DO   15 mg at 09/11/22 2028   • mirtazapine (REMERON) tablet 30 mg  30 mg Oral Nightly Mariano Cheek III, MD       • multivitamin (THERAGRAN) tablet 1 tablet  1 tablet Oral Daily [place], Roberth, DO   1 tablet at 09/12/22 0857   • nitroglycerin (NITROSTAT) SL tablet 0.4 mg  0.4 mg Sublingual Q5 Min PRN [place], Roberth, DO       • ondansetron (ZOFRAN) tablet 4 mg  4 mg Oral Q6H PRN [place], Roberth, DO        Or   • ondansetron (ZOFRAN) injection 4 mg  4 mg Intravenous Q6H PRN [place], Roberth, DO       • potassium & sodium phosphates (PHOS-NAK) 280-160-250 MG packet - for Phosphorus less than 1.25 mg/dL  2 packet Oral Q6H PRN [place], Roberth, DO        Or   • potassium & sodium phosphates (PHOS-NAK) 280-160-250 MG packet - for Phosphorus 1.25 - 2.5 mg/dL  2 packet Oral Q6H PRN [place], Roberth, DO       • potassium chloride (K-DUR,KLOR-CON) ER tablet 40 mEq  40 mEq Oral PRN [place], Roberth, DO        Or   • potassium chloride (KLOR-CON) packet 40 mEq  40 mEq Oral PRN [place], Roberth, DO        Or   • potassium chloride 10 mEq in 100 mL IVPB  10 mEq Intravenous Q1H PRN Matti, Roberth, DO       • sodium chloride 0.9 % flush 10 mL  10 mL Intravenous PRN [place], Roberth, DO   10 mL at 09/12/22 0857   • thiamine (B-1) injection 100 mg  100 mg Intravenous Once Matti, Roberth, DO       • thiamine (VITAMIN B-1) tablet 100 mg  100 mg Oral Daily Matti, Roberth, DO   100 mg at 09/11/22 2028         ALLERGIES: Penicillin    FAMILY HISTORY: Noncontributory    SOCIAL HISTORY: See above    MENTAL STATUS EXAM: Patient is a well-developed well-nourished white male appearing stated age.  He complains of significant back and hip pain throughout interview.  He is awake alert and oriented seers.  His mood is mildly irritable his affect congruent.  Speech is relevant and coherent.  There are  no deficits memory or cognition noted.  Intelligence is judged to be in the average range based on fund of knowledge, the patient's cardiac interview.  He currently denies suicidal or homicidal ideations Myke features.  Judgement and insight are intact.  Vital signs: Heart rate 69, blood pressure 126/77    ASSETS/LIABILITIES: To be assessed/ongoing health and substance abuse issues    DIAGNOSTIC IMPRESSION: Alcohol use disorder, major depressive disorder recurrent moderate, medical problems described previously    PLAN: The patient is agreeable to an increase in his mirtazapine dose to more definitive 1 of 30 mg nightly.  Additionally, I will add duloxetine 30 mg daily with the hope that this medications dose could be optimized to where it could be effective in addressing both depressive and chronic pain symptoms.  I have sternly warned the patient of the potential interactions of alcohol with these medications as well as the deleterious effect that ongoing use of alcohol will have on any potential recovery from his depressive symptoms.  Thank you for the opportunity to see this patient.

## 2022-09-12 NOTE — CONSULTS
"Access center consult, regarding ETOH use disorder. Pt is evaluated alone in Rm 665. Upon entering the room, he is sitting up in bed eating breakfast. BAL was 213 when he presented to the hospital. He is admitted for back pain and ETOH withdrawal. Latest CIWA was 4.     Pt is well known to Access with history of ETOH use disorder, please see previous consults. He has declined long-term residential tx in the past. After the previous admission, he had planned to attend [facility] at [facility], but decided not to go since it is too far from his house. Due to his back pain, he is unable to drive too far. He was hospitalized at the [facility] for 10 days about 3 weeks ago  for ETOH detox and group therapy. He started drinking again because he is \"using ETOH as a pain killer for my spinal stenosis.\" He has surgery scheduled for 10/20/22.      Pt drinks 1 L of vodka every day. He has been drinking heavily for about 6 years. His last drink was about 12 hours before arrival to hospital. Denies cravings. Positive tremors and anxiety. He does not have any long periods of sobriety since his divorce. He was also inpatient at [facility] shortly after his divorce 3 years ago.     Pt rates depression 10/10 and anxiety 9/10. He states that he is \"afraid there is nothing they can do to help before surgery.\" He takes Ambien and Mirtazapine, but he does not feel like Mirtazapine is helping. These are prescribed by Dr. Coats at [facility]. One major stressor is that his father is dying of cancer. His appetite is good, but his sleep is poor. He states he was up last night until 4 am, and that is normal for him. Denies current SI or HI. He does not wish to be dead.     Pt is a 67 year old DWM, no children. He lives at home, and is a  for [employer]. He states \"I have no support\" from family or friends. He isn't speaking to his sister, and his niece has been so busy. Pt is A&O x4, somewhat guarded with a flat affect. Mood is depressed and anxious. "     Consult already placed for Dr. Cheek to see pt today. Pt needs CD IOP after discharge. Will consider going to the [facility].   Access will follow.

## 2022-09-12 NOTE — PROGRESS NOTES
Name: Pro Mueller ADMIT: 2022   : 1955  PCP: Alla Beal MD    MRN: [id number] LOS: 0 days   AGE/SEX: 67 y.o. male  ROOM: [facility]/     Subjective   Subjective   Patient seen and examined this morning. Hospital day 1. No acute events overnight, no acute distress. Patient is awake, alert, oriented x3, resting comfortably and without acute complaints. Sleeping well, having bowel movements, making urine, pain well controlled.  Has required 2 mg Ativan since admission.      Review of Systems   Constitutional: Negative for chills and fever.   Respiratory: Negative for cough and shortness of breath.    Cardiovascular: Negative for chest pain and palpitations.   Gastrointestinal: Negative for abdominal distention and abdominal pain.   Genitourinary: Negative for difficulty urinating.   Musculoskeletal: Positive for back pain and gait problem.   Neurological: Negative for dizziness and light-headedness.        Objective   Objective   Vital Signs  Temp:  [97.2 °F (36.2 °C)-98.5 °F (36.9 °C)] 97.7 °F (36.5 °C)  Heart Rate:  [] 69  Resp:  [18] 18  BP: (126-168)/() 126/77  SpO2:  [92 %-100 %] 97 %  on   ;   Device (Oxygen Therapy): room air  Body mass index is 26.79 kg/m².  Physical Exam  Vitals and nursing note reviewed.   Constitutional:       General: He is awake. He is not in acute distress.  HENT:      Head: Atraumatic.   Eyes:      General: No scleral icterus.     Pupils: Pupils are equal, round, and reactive to light.   Cardiovascular:      Rate and Rhythm: Normal rate and regular rhythm.      Pulses: Normal pulses.      Heart sounds: Normal heart sounds.   Pulmonary:      Effort: Pulmonary effort is normal. No respiratory distress.      Breath sounds: Normal breath sounds.   Abdominal:      General: Bowel sounds are normal. There is no distension.      Palpations: Abdomen is soft.      Tenderness: There is no abdominal tenderness.   Skin:     General: Skin is warm and dry.   Neurological:       General: No focal deficit present.      Mental Status: He is alert and oriented to person, place, and time.   Psychiatric:         Behavior: Behavior is cooperative.       Results Review     I reviewed the patient's new clinical results.  CBC and BMP stable.  Magnesium and phosphorus within normal limits.  Results from last 7 days   Lab Units 09/12/22  0529 09/11/22  0120   WBC 10*3/mm3 6.55 4.99   HEMOGLOBIN g/dL 10.9* 13.3   PLATELETS 10*3/mm3 143 183     Results from last 7 days   Lab Units 09/12/22  0529 09/11/22  0120   SODIUM mmol/L 136 141   POTASSIUM mmol/L 4.2 4.3   CHLORIDE mmol/L 103 106   CO2 mmol/L 24.4 19.0*   BUN mg/dL 17 19   CREATININE mg/dL 0.66* 0.76   GLUCOSE mg/dL 123* 105*   EGFR mL/min/1.73 104.1 99.7     Results from last 7 days   Lab Units 09/11/22  0120   ALBUMIN g/dL 4.50   BILIRUBIN mg/dL 0.3   ALK PHOS U/L 94   AST (SGOT) U/L 49*   ALT (SGPT) U/L 22     Results from last 7 days   Lab Units 09/12/22  0529 09/11/22  0120   CALCIUM mg/dL 9.0 8.5*   ALBUMIN g/dL  --  4.50   MAGNESIUM mg/dL 2.1 2.0   PHOSPHORUS mg/dL 3.1  --        No results found for: HGBA1C, POCGLU    XR Chest 1 View    Result Date: 9/11/2022  No acute findings.  This report was finalized on 9/11/2022 2:07 AM by Dr. iHlda Hampton M.D.        Telemetry personally reviewed by me this morning: Sinus rhythm.      Scheduled Medications  amLODIPine, 5 mg, Oral, Q24H  atorvastatin, 20 mg, Oral, Daily  folic acid, 1 mg, Oral, Daily  levothyroxine, 100 mcg, Oral, Daily  lisinopril, 10 mg, Oral, Daily  meloxicam, 15 mg, Oral, Daily  mirtazapine, 15 mg, Oral, Nightly  multivitamin, 1 tablet, Oral, Daily  thiamine (B-1) IV, 100 mg, Intravenous, Once  thiamine, 100 mg, Oral, Daily    Infusions  lactated ringers, 100 mL/hr, Last Rate: 100 mL/hr (09/12/22 0755)    Diet  Diet Regular; Cardiac       Assessment/Plan     Active Hospital Problems    Diagnosis  POA   • **Alcohol dependence with intoxication (HCC) [F10.229]  Yes   •  Withdrawal symptoms, alcohol ([facility]) [F10.239]  Yes   • Transaminitis [R74.01]  Yes   • Hyperlipidemia [E78.5]  Yes   • Hypertension [I10]  Yes   • Hypothyroidism [E03.9]  Yes      Resolved Hospital Problems   No resolved problems to display.     Mr. Mueller is a 67 y.o. male with history of Alcohol use disorder, hypertension, hyperlipidemia, hypothyroidism who presents to [facility] with complaints of back pain and alcohol withdrawal.     · Low back pain/spinal stenosis: Symptoms chronic in nature. No evidence of acute red flag symptoms to warrant acute intervention currently. Plan for management with pain control, PT evaluation. Continue to monitor.  Discussed with patient this morning (09/12), he does endorse functional impairment at home with ADLs, and is interested possible HHPT after discharge.  Will discuss with CCP regarding trying to get this set up for him once he leaves.  · Alcohol use disorder with concern for withdrawal: Currently drinks 1/2 liter of vodka daily and has done so for about 6 years. Last drink ~12 hours prior to arrival. Alcohol level 213 on admission. Patient interested in alcohol cessation. [facility] protocol, thiamine, folic acid, multi-vitamin for Alcohol use disorder with concern for withdrawal.  Of note, discussed again with patient this morning (09/12), he was recently admitted to the [facility] 3 weeks ago, at which time he stayed for 10 days.  Continues to state that he is interested in alcohol cessation, however, not interested in rehabilitation facility after discharge at this time.  Closely monitor on telemetry. Close monitoring of electrolytes, with replacement as indicated per electrolyte protocol.  · Hypertension: Stable. Continue home medications as prescribed.  · Hyperlipidemia: Continue Atorvastatin.  · Hypothyroidism: Continue Levothyroxine.       · SCDs for DVT prophylaxis.  · Full code.  · Discussed with patient and nursing staff.  · Anticipate discharge Home,  possibly with HHPT in 1-2 days.      Roberth Chino DO  [facility]  09/12/22  08:37 EDT

## 2022-09-12 NOTE — PLAN OF CARE
Goal Outcome Evaluation:  Plan of Care Reviewed With: patient           Outcome Evaluation: Pt is a 68 yo male admitted with back/hip pain. PMH includes chronic alcohol use, HTN and HLD. He lives alone in a multi level home and typically is independent. Pt recently has been using a walker due to his pain. Today he reports as 6/10 as he just got back from the bathroom with nursing staff but was agreeable to get up to the chair. He describes his RLE/hip pain as a constant throb and occassional shooting pain. Pt may benefit from skilled PT services to address functional mobility deficits. DC recs pending - he is hopeful for return home, but states he has limited support/assist available to him.

## 2022-09-12 NOTE — CASE MANAGEMENT/SOCIAL WORK
Discharge Planning Assessment  [facility]     Patient Name: Pro Mueller  MRN: [id number]  Today's Date: 9/12/2022    Admit Date: 9/11/2022     Discharge Needs Assessment     Row Name 09/12/22 1003       Living Environment    People in Home alone    Current Living Arrangements home    Primary Care Provided by self    Provides Primary Care For no one    Family Caregiver if Needed none    Quality of Family Relationships unable to assess    Able to Return to Prior Arrangements yes    Living Arrangement Comments Pt lives alone in a two story house.       Resource/Environmental Concerns    Resource/Environmental Concerns none    Transportation Concerns none       Transition Planning    Patient/Family Anticipates Transition to home    Patient/Family Anticipated Services at Transition medical specialist    Transportation Anticipated car, drives self       Discharge Needs Assessment    Readmission Within the Last 30 Days no previous admission in last 30 days    Equipment Currently Used at Home walker, rolling    Concerns to be Addressed no discharge needs identified;denies needs/concerns at this time    Anticipated Changes Related to Illness none    Equipment Needed After Discharge walker, rolling               Discharge Plan     Row Name 09/12/22 1005       Plan    Plan Plan home.   BEBE De La Torre RN    Patient/Family in Agreement with Plan yes    Plan Comments FACE SHEET VERIFIED.  Spoke with pt at bedside.  Pt's PCP is Dr. Alla Beal.  Pt lives alone in a two story house.  Pt is independent with ADLs.  Pt has a rolling walker for home use.  Pt gets his prescriptions at [facility]  ([facility]  Pt denies any issues affording medications.  Pt is not current with HH.  Pt has not been in SNF.  Pt denies any discharge needs.  Plan home.  BEBE De La Torre RN              Continued Care and Services - Admitted Since 9/11/2022    Coordination has not been started for this encounter.     Selected Continued Care -  Episodes Includes selections from active Coordinated Care Management episodes    High Risk Care Management Episode start date: 9/2/2022   There are no active outsourced providers for this episode.               Expected Discharge Date and Time     Expected Discharge Date Expected Discharge Time    Sep 15, 2022          Demographic Summary     Row Name 09/12/22 1003       General Information    Admission Type observation    Arrived From emergency department    Referral Source admission list    Reason for Consult discharge planning    Preferred Language English               Functional Status     Row Name 09/12/22 1003       Functional Status    Usual Activity Tolerance good    Current Activity Tolerance fair       Functional Status, IADL    Medications independent    Meal Preparation independent    Housekeeping independent    Laundry independent    Shopping independent       Mental Status    General Appearance WDL WDL               Psychosocial    No documentation.                Abuse/Neglect    No documentation.                Legal    No documentation.                Substance Abuse    No documentation.                Patient Forms    No documentation.                   Ju De La Torre RN

## 2022-09-12 NOTE — CASE MANAGEMENT/SOCIAL WORK
Continued Stay Note  [facility]     Patient Name: Pro Mueller  MRN: [id number]  Today's Date: 9/12/2022    Admit Date: 9/11/2022     Discharge Plan     Row Name 09/12/22 1005       Plan    Plan Plan home.   BEBE De La Torre RN    Patient/Family in Agreement with Plan yes    Plan Comments FACE SHEET VERIFIED.  Spoke with pt at bedside.  Pt's PCP is Dr. Alla Beal.  Pt lives alone in a two story house.  Pt is independent with ADLs.  Pt has a rolling walker for home use.  Pt gets his prescriptions at [facility]  ([facility]).  Pt denies any issues affording medications.  Pt is not current with HH.  Pt has not been in SNF.  Pt denies any discharge needs.  Plan home.  BEBE De La Torre RN               Discharge Codes    No documentation.               Expected Discharge Date and Time     Expected Discharge Date Expected Discharge Time    Sep 15, 2022             Ju De La Torre RN

## 2022-09-12 NOTE — THERAPY EVALUATION
Patient Name: Pro Mueller  : 1955    MRN: [id number]                              Today's Date: 2022       Admit Date: 2022    Visit Dx:     ICD-10-CM ICD-9-CM   1. Alcohol withdrawal syndrome with complication ([facility])  F10.239 291.81   2. Midline low back pain with right-sided sciatica, unspecified chronicity  M54.41 724.3     Patient Active Problem List   Diagnosis   • Alcoholism in recovery ([facility])   • Atopic rhinitis   • Mixed anxiety depressive disorder   • Genital herpes simplex   • Hyperlipidemia   • Hypertension   • Hypothyroidism   • Insomnia   • Panic disorder without agoraphobia   • Persistent insomnia   • Vitamin D deficiency   • Chronic low back pain   • Neuropathy involving both lower extremities   • QT prolongation   • Left shoulder pain   • Hypokalemia   • Pancytopenia ([facility])   • Left ventricular diastolic dysfunction   • Tremor   • C5 cervical fracture ([facility])   • Syncope and collapse   • Neck pain   • Alcoholic intoxication with complication ([facility])   • Withdrawal symptoms, alcohol ([facility])   • Transaminitis   • Lumbar facet arthropathy   • Alcohol dependence with intoxication ([facility])     Past Medical History:   Diagnosis Date   • Alcohol abuse    • Alcohol withdrawal ([facility]) 2016   • Alcoholic ketoacidosis 2020   • Allergic 1970   • Anxiety    • Arthritis    • Atopic rhinitis 2016   • Depression    • Disease of thyroid gland    • Elevated cholesterol    • Encounter for removal of sutures    • Genital herpes simplex 2016   • GERD (gastroesophageal reflux disease)    • Headache, tension-type    • Hyperlipidemia    • Hypertension    • Kidney stone    • Migraine    • Motion sickness    • Nephrolithiasis 2020   • Olecranon bursitis, right elbow    • Panic disorder without agoraphobia 2016   • Peripheral neuropathy    • Sleep apnea    • Syncope and collapse 2019   • Vitamin D deficiency 2016   • Withdrawal symptoms, alcohol ([facility])      Past Surgical History:    Procedure Laterality Date   • COLONOSCOPY     • CYST REMOVAL     • CYSTOSCOPY BLADDER STONE LITHOTRIPSY  02/2022   • EXTRACORPOREAL SHOCK WAVE LITHOTRIPSY (ESWL) Right 2002   • SHOULDER ARTHROSCOPY Right 12/17/2019    Procedure: SHOULDER ARTHROSCOPY, decompression, distal clavicle excision;  Surgeon: Aj Mancilla MD;  Location: [facility] OR [facility];  Service: Orthopedics   • TONSILLECTOMY        General Information     Row Name 09/12/22 1602          Physical Therapy Time and Intention    Document Type evaluation  -CF     Mode of Treatment individual therapy;physical therapy  -CF     Row Name 09/12/22 1602          General Information    Patient Profile Reviewed yes  -CF     Prior Level of Function independent:  recent use of walker due to hip pain  -CF     Existing Precautions/Restrictions fall  -CF     Barriers to Rehab previous functional deficit  -CF     Row Name 09/12/22 1602          Living Environment    People in Home alone  -CF     Row Name 09/12/22 1602          Cognition    Orientation Status (Cognition) oriented x 4  -CF     Row Name 09/12/22 1602          Safety Issues, Functional Mobility    Impairments Affecting Function (Mobility) endurance/activity tolerance;pain;strength  -CF           User Key  (r) = Recorded By, (t) = Taken By, (c) = Cosigned By    Initials Name Provider Type    CF Chrissy Hayward, PT Physical Therapist               Mobility     Row Name 09/12/22 1603          Bed Mobility    Bed Mobility supine-sit  -CF     Supine-Sit [place] (Bed Mobility) verbal cues;contact guard  -CF     Assistive Device (Bed Mobility) head of bed elevated  -CF     Comment, (Bed Mobility) extra time due to pain  -CF     Row Name 09/12/22 1603          Transfers    Comment, (Transfers) only agreeable to get up to chair, states he just walked to/from bathroom with nursing staff and is in pain  -CF     Row Name 09/12/22 1603          Bed-Chair Transfer    Bed-Chair [place] (Transfers) contact  guard;verbal cues  -CF     Row Name 09/12/22 1603          Sit-Stand Transfer    Sit-Stand [place] (Transfers) contact guard;verbal cues  -CF     Assistive Device (Sit-Stand Transfers) walker, front-wheeled  -CF           User Key  (r) = Recorded By, (t) = Taken By, (c) = Cosigned By    Initials Name Provider Type    CF Chrissy Hayward PT Physical Therapist               Obj/Interventions     Row Name 09/12/22 1604          Range of Motion Comprehensive    General Range of Motion bilateral lower extremity ROM WFL  -CF     Row Name 09/12/22 1604          Strength Comprehensive (MMT)    Comment, General Manual Muscle Testing (MMT) Assessment generalized weakness but WFL  -CF     Row Name 09/12/22 1604          Balance    Balance Assessment sitting static balance;sitting dynamic balance;standing static balance;standing dynamic balance  -CF     Static Sitting Balance supervision  -CF     Dynamic Sitting Balance supervision  -CF     Static Standing Balance standby assist  -CF     Dynamic Standing Balance contact guard  -CF     Position/Device Used, Standing Balance walker, front-wheeled  -CF           User Key  (r) = Recorded By, (t) = Taken By, (c) = Cosigned By    Initials Name Provider Type     Chrissy Hayward, LUCILA Physical Therapist               Goals/Plan     Row Name 09/12/22 1601          Bed Mobility Goal 1 (PT)    Activity/Assistive Device (Bed Mobility Goal 1, PT) bed mobility activities, all  -CF     [place] Level/Cues Needed (Bed Mobility Goal 1, PT) modified independence  -CF     Time Frame (Bed Mobility Goal 1, PT) 2 weeks  -CF     Row Name 09/12/22 1601          Transfer Goal 1 (PT)    Activity/Assistive Device (Transfer Goal 1, PT) sit-to-stand/stand-to-sit;bed-to-chair/chair-to-bed;walker, rolling  -CF     [place] Level/Cues Needed (Transfer Goal 1, PT) modified independence  -CF     Time Frame (Transfer Goal 1, PT) 2 weeks  -CF     Row Name 09/12/22 1601          Gait Training Goal  1 (PT)    Activity/Assistive Device (Gait Training Goal 1, PT) gait (walking locomotion);walker, rolling  -CF     [place] Level (Gait Training Goal 1, PT) modified independence  -CF     Distance (Gait Training Goal 1, PT) 80'  -CF     Time Frame (Gait Training Goal 1, PT) 2 weeks  -CF     Row Name 09/12/22 1606          Therapy Assessment/Plan (PT)    Planned Therapy Interventions (PT) balance training;bed mobility training;gait training;postural re-education;transfer training;ROM (range of motion);strengthening;patient/family education  -           User Key  (r) = Recorded By, (t) = Taken By, (c) = Cosigned By    Initials Name Provider Type    CF Chrissy Hayward, PT Physical Therapist               Clinical Impression     Row Name 09/12/22 6755          Pain    Pretreatment Pain Rating 6/10  -CF     Posttreatment Pain Rating 6/10  -CF     Pain Location - Side/Orientation Right  -     Pain Location - hip;extremity  -CF     Pre/Posttreatment Pain Comment reports RLE pain in hip that radiates down leg at times. describes as throbbing and shooting  -     Pain Intervention(s) Repositioned;Rest;Ambulation/increased activity  -     Row Name 09/12/22 0638          Plan of Care Review    Plan of Care Reviewed With patient  -CF     Outcome Evaluation Pt is a 66 yo male admitted with back/hip pain. PMH includes chronic alcohol use, HTN and HLD. He lives alone in a multi level home and typically is independent. Pt recently has been using a walker due to his pain. Today he reports as 6/10 as he just got back from the bathroom with nursing staff but was agreeable to get up to the chair. He describes his RLE/hip pain as a constant throb and occassional shooting pain. Pt may benefit from skilled PT services to address functional mobility deficits. DC recs pending - he is hopeful for return home, but states he has limited support/assist available to him.  -     Row Name 09/12/22 3651          Therapy  Assessment/Plan (PT)    Rehab Potential (PT) good, to achieve stated therapy goals  -CF     Criteria for Skilled Interventions Met (PT) yes  -CF     Therapy Frequency (PT) 6 times/wk  -CF     Row Name 09/12/22 1604          Vital Signs    O2 Delivery Pre Treatment room air  -CF     O2 Delivery Intra Treatment room air  -CF     O2 Delivery Post Treatment room air  -CF     Row Name 09/12/22 1604          Positioning and Restraints    Pre-Treatment Position in bed  -CF     Post Treatment Position chair  -CF     In Chair reclined;call light within reach;encouraged to call for assist;exit alarm on;notified nsg;legs elevated  -CF           User Key  (r) = Recorded By, (t) = Taken By, (c) = Cosigned By    Initials Name Provider Type    Chrissy Kruse PT Physical Therapist               Outcome Measures     Row Name 09/12/22 1601          How much help from another person do you currently need...    Turning from your back to your side while in flat bed without using bedrails? 3  -CF     Moving from lying on back to sitting on the side of a flat bed without bedrails? 3  -CF     Moving to and from a bed to a chair (including a wheelchair)? 3  -CF     Standing up from a chair using your arms (e.g., wheelchair, bedside chair)? 3  -CF     Climbing 3-5 steps with a railing? 3  -CF     To walk in hospital room? 3  -CF     AM-PAC 6 Clicks Score (PT) 18  -CF     Highest level of mobility 6 --> Walked 10 steps or more  -CF     Row Name 09/12/22 1601          Functional Assessment    Outcome Measure Options AM-PAC 6 Clicks Basic Mobility (PT)  -CF           User Key  (r) = Recorded By, (t) = Taken By, (c) = Cosigned By    Initials Name Provider Type    Chrissy Kruse, LUCILA Physical Therapist                             Physical Therapy Education                 Title: PT OT SLP Therapies (Done)     Topic: Physical Therapy (Done)     Point: Mobility training (Done)     Learning Progress Summary           Patient Acceptance,  E, VU,NR by  at 9/12/2022 1602                   Point: Home exercise program (Done)     Learning Progress Summary           Patient Acceptance, E, VU,NR by  at 9/12/2022 1602                   Point: Body mechanics (Done)     Learning Progress Summary           Patient Acceptance, E, VU,NR by  at 9/12/2022 1602                   Point: Precautions (Done)     Learning Progress Summary           Patient Acceptance, E, VU,NR by  at 9/12/2022 1602                               User Key     Initials Effective Dates Name Provider Type Discipline     06/16/21 -  Chrissy Hayward, PT Physical Therapist PT              PT Recommendation and Plan  Planned Therapy Interventions (PT): balance training, bed mobility training, gait training, postural re-education, transfer training, ROM (range of motion), strengthening, patient/family education  Plan of Care Reviewed With: patient  Outcome Evaluation: Pt is a 68 yo male admitted with back/hip pain. PMH includes chronic alcohol use, HTN and HLD. He lives alone in a multi level home and typically is independent. Pt recently has been using a walker due to his pain. Today he reports as 6/10 as he just got back from the bathroom with nursing staff but was agreeable to get up to the chair. He describes his RLE/hip pain as a constant throb and occassional shooting pain. Pt may benefit from skilled PT services to address functional mobility deficits. DC recs pending - he is hopeful for return home, but states he has limited support/assist available to him.     Time Calculation:    PT Charges     Row Name 09/12/22 1600             Time Calculation    Start Time 1057  -CF      Stop Time 1114  -CF      Time Calculation (min) 17 min  -CF      PT Received On 09/12/22  -CF      PT - Next Appointment 09/13/22  -CF      PT Goal Re-Cert Due Date 09/26/22  -CF              Time Calculation- PT    Total Timed Code Minutes- PT 10 minute(s)  -CF              Timed Charges    88202 - PT  Therapeutic Activity Minutes 10  -CF              Total Minutes    Timed Charges Total Minutes 10  -CF       Total Minutes 10  -CF            User Key  (r) = Recorded By, (t) = Taken By, (c) = Cosigned By    Initials Name Provider Type    CF Chrissy Hayward, PT Physical Therapist              Therapy Charges for Today     Code Description Service Date Service Provider Modifiers Qty    78120799589  PT EVAL MOD COMPLEXITY 2 9/12/2022 Chrissy Hayward, PT GP 1    [id number]  PT THERAPEUTIC ACT EA 15 MIN 9/12/2022 Chrissy Hayward, PT GP 1          PT G-Codes  Outcome Measure Options: AM-PAC 6 Clicks Basic Mobility (PT)  AM-PAC 6 Clicks Score (PT): 18    Chrissy Hayward, PT  9/12/2022

## 2022-09-13 LAB
ANION GAP SERPL CALCULATED.3IONS-SCNC: 10 MMOL/L (ref 5–15)
BASOPHILS # BLD AUTO: 0.01 10*3/MM3 (ref 0–0.2)
BASOPHILS NFR BLD AUTO: 0.2 % (ref 0–1.5)
BUN SERPL-MCNC: 17 MG/DL (ref 8–23)
BUN/CREAT SERPL: 27.4 (ref 7–25)
CALCIUM SPEC-SCNC: 8.3 MG/DL (ref 8.6–10.5)
CHLORIDE SERPL-SCNC: 107 MMOL/L (ref 98–107)
CO2 SERPL-SCNC: 22 MMOL/L (ref 22–29)
CREAT SERPL-MCNC: 0.62 MG/DL (ref 0.76–1.27)
DEPRECATED RDW RBC AUTO: 46.9 FL (ref 37–54)
EGFRCR SERPLBLD CKD-EPI 2021: 106.1 ML/MIN/1.73
EOSINOPHIL # BLD AUTO: 0.07 10*3/MM3 (ref 0–0.4)
EOSINOPHIL NFR BLD AUTO: 1.7 % (ref 0.3–6.2)
ERYTHROCYTE [DISTWIDTH] IN BLOOD BY AUTOMATED COUNT: 13.8 % (ref 12.3–15.4)
GLUCOSE SERPL-MCNC: 94 MG/DL (ref 65–99)
HCT VFR BLD AUTO: 34.3 % (ref 37.5–51)
HGB BLD-MCNC: 11.4 G/DL (ref 13–17.7)
IMM GRANULOCYTES # BLD AUTO: 0.01 10*3/MM3 (ref 0–0.05)
IMM GRANULOCYTES NFR BLD AUTO: 0.2 % (ref 0–0.5)
LYMPHOCYTES # BLD AUTO: 1.44 10*3/MM3 (ref 0.7–3.1)
LYMPHOCYTES NFR BLD AUTO: 35.5 % (ref 19.6–45.3)
MAGNESIUM SERPL-MCNC: 1.7 MG/DL (ref 1.6–2.4)
MCH RBC QN AUTO: 31.4 PG (ref 26.6–33)
MCHC RBC AUTO-ENTMCNC: 33.2 G/DL (ref 31.5–35.7)
MCV RBC AUTO: 94.5 FL (ref 79–97)
MONOCYTES # BLD AUTO: 0.3 10*3/MM3 (ref 0.1–0.9)
MONOCYTES NFR BLD AUTO: 7.4 % (ref 5–12)
NEUTROPHILS NFR BLD AUTO: 2.23 10*3/MM3 (ref 1.7–7)
NEUTROPHILS NFR BLD AUTO: 55 % (ref 42.7–76)
NRBC BLD AUTO-RTO: 0 /100 WBC (ref 0–0.2)
PHOSPHATE SERPL-MCNC: 3.2 MG/DL (ref 2.5–4.5)
PLATELET # BLD AUTO: 123 10*3/MM3 (ref 140–450)
PMV BLD AUTO: 10.4 FL (ref 6–12)
POTASSIUM SERPL-SCNC: 3.6 MMOL/L (ref 3.5–5.2)
POTASSIUM SERPL-SCNC: 4.6 MMOL/L (ref 3.5–5.2)
RBC # BLD AUTO: 3.63 10*6/MM3 (ref 4.14–5.8)
SODIUM SERPL-SCNC: 139 MMOL/L (ref 136–145)
WBC NRBC COR # BLD: 4.06 10*3/MM3 (ref 3.4–10.8)

## 2022-09-13 PROCEDURE — 84100 ASSAY OF PHOSPHORUS: CPT | Performed by: STUDENT IN AN ORGANIZED HEALTH CARE EDUCATION/TRAINING PROGRAM

## 2022-09-13 PROCEDURE — 84132 ASSAY OF SERUM POTASSIUM: CPT | Performed by: STUDENT IN AN ORGANIZED HEALTH CARE EDUCATION/TRAINING PROGRAM

## 2022-09-13 PROCEDURE — 83735 ASSAY OF MAGNESIUM: CPT | Performed by: STUDENT IN AN ORGANIZED HEALTH CARE EDUCATION/TRAINING PROGRAM

## 2022-09-13 PROCEDURE — 85025 COMPLETE CBC W/AUTO DIFF WBC: CPT | Performed by: STUDENT IN AN ORGANIZED HEALTH CARE EDUCATION/TRAINING PROGRAM

## 2022-09-13 PROCEDURE — 25010000002 HYDROMORPHONE 1 MG/ML SOLUTION: Performed by: STUDENT IN AN ORGANIZED HEALTH CARE EDUCATION/TRAINING PROGRAM

## 2022-09-13 PROCEDURE — 96367 TX/PROPH/DG ADDL SEQ IV INF: CPT

## 2022-09-13 PROCEDURE — 96376 TX/PRO/DX INJ SAME DRUG ADON: CPT

## 2022-09-13 PROCEDURE — 80048 BASIC METABOLIC PNL TOTAL CA: CPT | Performed by: STUDENT IN AN ORGANIZED HEALTH CARE EDUCATION/TRAINING PROGRAM

## 2022-09-13 PROCEDURE — 96366 THER/PROPH/DIAG IV INF ADDON: CPT

## 2022-09-13 PROCEDURE — G0378 HOSPITAL OBSERVATION PER HR: HCPCS

## 2022-09-13 PROCEDURE — 0 MAGNESIUM SULFATE 4 GM/100ML SOLUTION: Performed by: STUDENT IN AN ORGANIZED HEALTH CARE EDUCATION/TRAINING PROGRAM

## 2022-09-13 RX ORDER — HYDROCODONE BITARTRATE AND ACETAMINOPHEN 5; 325 MG/1; MG/1
2 TABLET ORAL EVERY 8 HOURS PRN
Status: DISCONTINUED | OUTPATIENT
Start: 2022-09-13 | End: 2022-09-14

## 2022-09-13 RX ADMIN — LISINOPRIL 10 MG: 10 TABLET ORAL at 08:49

## 2022-09-13 RX ADMIN — ACETAMINOPHEN 650 MG: 325 TABLET, FILM COATED ORAL at 02:14

## 2022-09-13 RX ADMIN — LORAZEPAM 0.5 MG: 0.5 TABLET ORAL at 13:32

## 2022-09-13 RX ADMIN — HYDROCODONE BITARTRATE AND ACETAMINOPHEN 2 TABLET: 5; 325 TABLET ORAL at 16:28

## 2022-09-13 RX ADMIN — MAGNESIUM SULFATE HEPTAHYDRATE 4 G: 40 INJECTION, SOLUTION INTRAVENOUS at 12:18

## 2022-09-13 RX ADMIN — LEVOTHYROXINE SODIUM 100 MCG: 0.1 TABLET ORAL at 08:49

## 2022-09-13 RX ADMIN — AMLODIPINE BESYLATE 5 MG: 5 TABLET ORAL at 08:49

## 2022-09-13 RX ADMIN — POTASSIUM CHLORIDE 40 MEQ: 750 TABLET, EXTENDED RELEASE ORAL at 16:29

## 2022-09-13 RX ADMIN — MIRTAZAPINE 30 MG: 30 TABLET, FILM COATED ORAL at 20:50

## 2022-09-13 RX ADMIN — Medication 10 ML: at 08:49

## 2022-09-13 RX ADMIN — POTASSIUM CHLORIDE 40 MEQ: 750 TABLET, EXTENDED RELEASE ORAL at 12:18

## 2022-09-13 RX ADMIN — ATORVASTATIN CALCIUM 20 MG: 20 TABLET, FILM COATED ORAL at 09:07

## 2022-09-13 RX ADMIN — Medication 1 TABLET: at 08:49

## 2022-09-13 RX ADMIN — Medication 1 MG: at 08:49

## 2022-09-13 RX ADMIN — LOPERAMIDE HYDROCHLORIDE 2 MG: 2 CAPSULE ORAL at 06:31

## 2022-09-13 RX ADMIN — Medication 100 MG: at 08:49

## 2022-09-13 RX ADMIN — HYDROMORPHONE HYDROCHLORIDE 1 MG: 1 INJECTION, SOLUTION INTRAMUSCULAR; INTRAVENOUS; SUBCUTANEOUS at 12:16

## 2022-09-13 RX ADMIN — DULOXETINE HYDROCHLORIDE 30 MG: 30 CAPSULE, DELAYED RELEASE ORAL at 08:49

## 2022-09-13 RX ADMIN — MELOXICAM 15 MG: 15 TABLET ORAL at 08:49

## 2022-09-13 RX ADMIN — HYDROCODONE BITARTRATE AND ACETAMINOPHEN 1 TABLET: 5; 325 TABLET ORAL at 06:31

## 2022-09-13 RX ADMIN — Medication 10 ML: at 12:16

## 2022-09-13 NOTE — PROGRESS NOTES
"    Name: Pro Mueller ADMIT: 2022   : 1955  PCP: Alla Beal MD    MRN: [id number] LOS: 0 days   AGE/SEX: 67 y.o. male  ROOM: [facility]     Subjective   Subjective   Patient seen and examined this morning. Hospital day 2. No acute events overnight.  Has low back/hip pain at baseline, however, today patient states that it feels significantly worse and is not relieved with current pain medication. Located in right low back and right hip, described as \"sharp\", nonradiating, worsened with any type of movement, not currently alleviated with pain medication.  Otherwise, no acute complaints.  Denies noticeable withdrawal symptoms.        Review of Systems   Constitutional: Negative for chills and fever.   Respiratory: Negative for cough and shortness of breath.    Cardiovascular: Negative for chest pain and palpitations.   Gastrointestinal: Negative for abdominal distention and abdominal pain.   Genitourinary: Negative for difficulty urinating.   Musculoskeletal: Positive for arthralgias, back pain and gait problem.   Neurological: Negative for dizziness and light-headedness.        Objective   Objective   Vital Signs  Temp:  [97.5 °F (36.4 °C)-98.5 °F (36.9 °C)] 98.5 °F (36.9 °C)  Heart Rate:  [62-84] 64  Resp:  [18-20] 20  BP: (133-160)/(76-93) 143/89  SpO2:  [93 %-96 %] 96 %  on   ;   Device (Oxygen Therapy): room air  Body mass index is 26.79 kg/m².  Physical Exam  Vitals and nursing note reviewed.   Constitutional:       General: He is awake. He is in acute distress.   HENT:      Head: Atraumatic.   Eyes:      General: No scleral icterus.     Pupils: Pupils are equal, round, and reactive to light.   Cardiovascular:      Rate and Rhythm: Normal rate and regular rhythm.      Pulses: Normal pulses.      Heart sounds: Normal heart sounds.   Pulmonary:      Effort: Pulmonary effort is normal. No respiratory distress.      Breath sounds: Normal breath sounds.   Abdominal:      General: Bowel sounds are normal. " There is no distension.      Palpations: Abdomen is soft.      Tenderness: There is no abdominal tenderness.   Musculoskeletal:         General: Tenderness (To palpation of right lower back) present.      Lumbar back: No swelling. Decreased range of motion.   Skin:     General: Skin is warm and dry.   Neurological:      General: No focal deficit present.      Mental Status: He is alert and oriented to person, place, and time.   Psychiatric:         Behavior: Behavior is cooperative.       Results Review     I reviewed the patient's new clinical results.  CBC and BMP stable.  Magnesium and phosphorus within normal limits.  Results from last 7 days   Lab Units 09/13/22  0654 09/12/22  0529 09/11/22  0120   WBC 10*3/mm3 4.06 6.55 4.99   HEMOGLOBIN g/dL 11.4* 10.9* 13.3   PLATELETS 10*3/mm3 123* 143 183     Results from last 7 days   Lab Units 09/13/22  0654 09/12/22  0529 09/11/22  0120   SODIUM mmol/L 139 136 141   POTASSIUM mmol/L 3.6 4.2 4.3   CHLORIDE mmol/L 107 103 106   CO2 mmol/L 22.0 24.4 19.0*   BUN mg/dL 17 17 19   CREATININE mg/dL 0.62* 0.66* 0.76   GLUCOSE mg/dL 94 123* 105*   EGFR mL/min/1.73 106.1 104.1 99.7     Results from last 7 days   Lab Units 09/11/22  0120   ALBUMIN g/dL 4.50   BILIRUBIN mg/dL 0.3   ALK PHOS U/L 94   AST (SGOT) U/L 49*   ALT (SGPT) U/L 22     Results from last 7 days   Lab Units 09/13/22  0654 09/12/22  0529 09/11/22  0120   CALCIUM mg/dL 8.3* 9.0 8.5*   ALBUMIN g/dL  --   --  4.50   MAGNESIUM mg/dL 1.7 2.1 2.0   PHOSPHORUS mg/dL 3.2 3.1  --        No results found for: HGBA1C, POCGLU    No radiology results for the last day    Telemetry personally reviewed by me this morning: Sinus rhythm.      Scheduled Medications  amLODIPine, 5 mg, Oral, Q24H  atorvastatin, 20 mg, Oral, Daily  DULoxetine, 30 mg, Oral, Daily  folic acid, 1 mg, Oral, Daily  HYDROmorphone, 1 mg, Intravenous, Once  levothyroxine, 100 mcg, Oral, Daily  lisinopril, 10 mg, Oral, Daily  meloxicam, 15 mg, Oral,  Daily  mirtazapine, 30 mg, Oral, Nightly  multivitamin, 1 tablet, Oral, Daily  thiamine (B-1) IV, 100 mg, Intravenous, Once  thiamine, 100 mg, Oral, Daily    Infusions   Diet  Diet Regular; Cardiac       Assessment/Plan     Active Hospital Problems    Diagnosis  POA   • **Alcohol dependence with intoxication (HCC) [F10.229]  Yes   • Withdrawal symptoms, alcohol (HCC) [F10.239]  Yes   • Transaminitis [R74.01]  Yes   • Hyperlipidemia [E78.5]  Yes   • Hypertension [I10]  Yes   • Hypothyroidism [E03.9]  Yes      Resolved Hospital Problems   No resolved problems to display.     Mr. Mueller is a 67 y.o. male with history of Alcohol use disorder, hypertension, hyperlipidemia, hypothyroidism who presents to [facility] with complaints of back pain and alcohol withdrawal.     · Low back pain/spinal stenosis: Symptoms chronic in nature.  However, pain appears to be worse this morning, likely exacerbated by limited mobility causing increased muscle tightness/spasm.  Recently had MRI of the back on August 30, unlikely that repeat imaging would change current management, as patient has no evidence of acute red flag symptoms.  Increase hydrocodone to 10 mg daily 8H as needed for pain.  Give x1 dose of IV Dilaudid.  Continue to work with PT, encourage mobilization ambulation as tolerated.  Previously discussed with patient, he is interested in HHPT after discharge. Will discuss with CCP regarding trying to get this set up for him once he leaves.  · Alcohol use disorder with concern for withdrawal: Currently drinks 1/2 liter of vodka daily and has done so for about 6 years. Last drink ~12 hours prior to arrival. Alcohol level 213 on admission. Patient interested in alcohol cessation. [facility] protocol, thiamine, folic acid, multi-vitamin for Alcohol use disorder with concern for withdrawal.  Of note, discussed again with patient (09/12), he was recently admitted to the [place] [facility] 3 weeks ago, at which time he stayed for 10 days.   Continues to state that he is interested in alcohol cessation, however, not interested in rehabilitation facility after discharge at this time.  Closely monitor on telemetry. Close monitoring of electrolytes, with replacement as indicated per electrolyte protocol.  · Hypertension: Stable. Continue home medications as prescribed.  · Hyperlipidemia: Continue Atorvastatin.  · Hypothyroidism: Continue Levothyroxine.       · SCDs for DVT prophylaxis.  · Full code.  · Discussed with patient and nursing staff.  · Anticipate discharge Home, possibly with HHPT in 1-2 days.      Roberth Chino DO  [facility]  09/13/22  12:03 EDT

## 2022-09-13 NOTE — PLAN OF CARE
Goal Outcome Evaluation:      Pt AOx4. Calm and cooperative. VSS. NSR on tele. CIWA score of 6 no medication as per MAR. C/o pain x2 as per MAR. Up to the BSC assist x1.

## 2022-09-13 NOTE — PLAN OF CARE
Goal Outcome Evaluation:               Patient alert and oriented with c/o pain to back and right leg- medicated per MAR. Magnesium and potassium replaced per protocol.  Up to bedside to use urinal. Patient on CIWA protocol- medicated accordingly. No acute distress noted, will continue to monitor.

## 2022-09-13 NOTE — PROGRESS NOTES
The patient continues to complain of severe unrelieved pain.  He has expressed concern regarding the possible interaction of Cymbalta with rizatriptan which she states that he occasionally needs for migraine headache.  I have assured him that we frequently use these medications in concert and feel as though the benefit of Cymbalta particularly with regards to his mood and chronic pain symptoms outweigh the risk of interaction with that medication.  He is agreeable to its continuation.  The patient will continue follow-up with Dr. Coats following his discharge from the hospital.  I will plan to sign off today.

## 2022-09-13 NOTE — PROGRESS NOTES
[facility] follow up d/t ETOH; this writer reviewed chart, spoke with RN Juvenal, and met with patient in room 665. Per RN, patient is doing okay, complaining of pain and some anxiety; last CIWA is 6 at 02:15.  Per EMR, patient is open to considering the [place] [facility] or [facility] IOP post discharge; he met with [facility] therapist, Zoey, yesterday to discuss substance use disorder resources.  Pt reports no intention of alcohol cessation until after surgery in October to address pain issues; psychiatrist, Dr. Cheek, increased patient's mirtazapine and added Cymbalta 30 mg daily.  Patient complaining of right hip and back pain; RN notified.  Patient states anxiety and depression are connected to pain management issues; he continues to endorse passive suicidal ideation (see psychiatrist, Dr. Cheek, consult), he is future oriented regarding his niece, surgery in October, and maintains his ability to keep himself safe both in the hospital and at home.  No further needs/concerns noted at this time per pt and/or []; [facility] to continue following.    no

## 2022-09-14 LAB
ANION GAP SERPL CALCULATED.3IONS-SCNC: 10 MMOL/L (ref 5–15)
BASOPHILS # BLD AUTO: 0.03 10*3/MM3 (ref 0–0.2)
BASOPHILS NFR BLD AUTO: 0.8 % (ref 0–1.5)
BUN SERPL-MCNC: 14 MG/DL (ref 8–23)
BUN/CREAT SERPL: 24.6 (ref 7–25)
CALCIUM SPEC-SCNC: 8.5 MG/DL (ref 8.6–10.5)
CHLORIDE SERPL-SCNC: 104 MMOL/L (ref 98–107)
CO2 SERPL-SCNC: 23 MMOL/L (ref 22–29)
CREAT SERPL-MCNC: 0.57 MG/DL (ref 0.76–1.27)
DEPRECATED RDW RBC AUTO: 47.6 FL (ref 37–54)
EGFRCR SERPLBLD CKD-EPI 2021: 108.8 ML/MIN/1.73
EOSINOPHIL # BLD AUTO: 0.11 10*3/MM3 (ref 0–0.4)
EOSINOPHIL NFR BLD AUTO: 2.8 % (ref 0.3–6.2)
ERYTHROCYTE [DISTWIDTH] IN BLOOD BY AUTOMATED COUNT: 13.5 % (ref 12.3–15.4)
GLUCOSE SERPL-MCNC: 93 MG/DL (ref 65–99)
HCT VFR BLD AUTO: 34.3 % (ref 37.5–51)
HGB BLD-MCNC: 11.3 G/DL (ref 13–17.7)
IMM GRANULOCYTES # BLD AUTO: 0.01 10*3/MM3 (ref 0–0.05)
IMM GRANULOCYTES NFR BLD AUTO: 0.3 % (ref 0–0.5)
LYMPHOCYTES # BLD AUTO: 1.35 10*3/MM3 (ref 0.7–3.1)
LYMPHOCYTES NFR BLD AUTO: 33.8 % (ref 19.6–45.3)
MAGNESIUM SERPL-MCNC: 2.1 MG/DL (ref 1.6–2.4)
MCH RBC QN AUTO: 31.4 PG (ref 26.6–33)
MCHC RBC AUTO-ENTMCNC: 32.9 G/DL (ref 31.5–35.7)
MCV RBC AUTO: 95.3 FL (ref 79–97)
MONOCYTES # BLD AUTO: 0.31 10*3/MM3 (ref 0.1–0.9)
MONOCYTES NFR BLD AUTO: 7.8 % (ref 5–12)
NEUTROPHILS NFR BLD AUTO: 2.18 10*3/MM3 (ref 1.7–7)
NEUTROPHILS NFR BLD AUTO: 54.5 % (ref 42.7–76)
NRBC BLD AUTO-RTO: 0 /100 WBC (ref 0–0.2)
PHOSPHATE SERPL-MCNC: 3.6 MG/DL (ref 2.5–4.5)
PLATELET # BLD AUTO: 120 10*3/MM3 (ref 140–450)
PMV BLD AUTO: 10.2 FL (ref 6–12)
POTASSIUM SERPL-SCNC: 4.1 MMOL/L (ref 3.5–5.2)
RBC # BLD AUTO: 3.6 10*6/MM3 (ref 4.14–5.8)
SODIUM SERPL-SCNC: 137 MMOL/L (ref 136–145)
WBC NRBC COR # BLD: 3.99 10*3/MM3 (ref 3.4–10.8)

## 2022-09-14 PROCEDURE — 80048 BASIC METABOLIC PNL TOTAL CA: CPT | Performed by: STUDENT IN AN ORGANIZED HEALTH CARE EDUCATION/TRAINING PROGRAM

## 2022-09-14 PROCEDURE — 85025 COMPLETE CBC W/AUTO DIFF WBC: CPT | Performed by: STUDENT IN AN ORGANIZED HEALTH CARE EDUCATION/TRAINING PROGRAM

## 2022-09-14 PROCEDURE — 97530 THERAPEUTIC ACTIVITIES: CPT

## 2022-09-14 PROCEDURE — 84100 ASSAY OF PHOSPHORUS: CPT | Performed by: STUDENT IN AN ORGANIZED HEALTH CARE EDUCATION/TRAINING PROGRAM

## 2022-09-14 PROCEDURE — 83735 ASSAY OF MAGNESIUM: CPT | Performed by: STUDENT IN AN ORGANIZED HEALTH CARE EDUCATION/TRAINING PROGRAM

## 2022-09-14 PROCEDURE — G0378 HOSPITAL OBSERVATION PER HR: HCPCS

## 2022-09-14 RX ORDER — HYDROCODONE BITARTRATE AND ACETAMINOPHEN 5; 325 MG/1; MG/1
2 TABLET ORAL EVERY 6 HOURS PRN
Status: DISCONTINUED | OUTPATIENT
Start: 2022-09-14 | End: 2022-09-15 | Stop reason: HOSPADM

## 2022-09-14 RX ADMIN — HYDROCODONE BITARTRATE AND ACETAMINOPHEN 2 TABLET: 5; 325 TABLET ORAL at 09:06

## 2022-09-14 RX ADMIN — DULOXETINE HYDROCHLORIDE 30 MG: 30 CAPSULE, DELAYED RELEASE ORAL at 09:06

## 2022-09-14 RX ADMIN — LORAZEPAM 0.5 MG: 0.5 TABLET ORAL at 15:37

## 2022-09-14 RX ADMIN — HYDROCODONE BITARTRATE AND ACETAMINOPHEN 2 TABLET: 5; 325 TABLET ORAL at 15:30

## 2022-09-14 RX ADMIN — Medication 10 ML: at 09:06

## 2022-09-14 RX ADMIN — Medication 1 TABLET: at 09:06

## 2022-09-14 RX ADMIN — Medication 100 MG: at 09:06

## 2022-09-14 RX ADMIN — LEVOTHYROXINE SODIUM 100 MCG: 0.1 TABLET ORAL at 06:23

## 2022-09-14 RX ADMIN — MELOXICAM 15 MG: 15 TABLET ORAL at 09:06

## 2022-09-14 RX ADMIN — MIRTAZAPINE 30 MG: 30 TABLET, FILM COATED ORAL at 21:44

## 2022-09-14 RX ADMIN — HYDROCODONE BITARTRATE AND ACETAMINOPHEN 2 TABLET: 5; 325 TABLET ORAL at 01:06

## 2022-09-14 RX ADMIN — HYDROCODONE BITARTRATE AND ACETAMINOPHEN 2 TABLET: 5; 325 TABLET ORAL at 21:44

## 2022-09-14 RX ADMIN — LISINOPRIL 10 MG: 10 TABLET ORAL at 09:06

## 2022-09-14 RX ADMIN — AMLODIPINE BESYLATE 5 MG: 5 TABLET ORAL at 09:06

## 2022-09-14 RX ADMIN — Medication 1 MG: at 09:06

## 2022-09-14 RX ADMIN — ATORVASTATIN CALCIUM 20 MG: 20 TABLET, FILM COATED ORAL at 09:06

## 2022-09-14 NOTE — PLAN OF CARE
Goal Outcome Evaluation:               Patient alert and oriented with c/o pain to back and right hip/sciatica- increases with movement-medicated per MAR. Patient up to bedside commode and up in chair with encouragement. No acute distress noted at this  time, will continue to monitor.

## 2022-09-14 NOTE — PROGRESS NOTES
[facility] follow up d/t EtOH; this writer reviewed chart, spoke with RN Juvenal, and met with patient individually in room 665. Per RN, patient has been doing well, little anxiety; last CIWA was 3 at 00:10. RN reports she only gave pain medication; she reports improved overall pain control. Pt states sleep is poor, pain management better; he rates anxiety at a 6 or 7 out of 10 (with 10 being the worst), depression rated 7 out of 10 (using the same scale).  Patient denies any current or active SI, HI, or A/V/T hallucinations; he is future oriented regarding half-way in 4 months and possible surgery in October.  Patient continues to report no intention of alcohol cessation until after pain relief surgery; he declines need for additional behavioral health and/or substance use resources. Per pt, possible discharge home with  today; he plans on following up with outpatient psychiatrist, Dr. Coats.  No further needs/concerns noted at this time per pt and/or RN; [facility] to continue following.

## 2022-09-14 NOTE — PLAN OF CARE
Goal Outcome Evaluation:      Pt AOx4. VSS. NSR on tele. C/o pain x1 as per MAR. Will continue to monitor.

## 2022-09-14 NOTE — PROGRESS NOTES
Name: Pro Mueller ADMIT: 2022   : 1955  PCP: Alla Beal MD    MRN: [id number] LOS: 0 days   AGE/SEX: 67 y.o. male  ROOM: [facility]     Subjective   Subjective   Patient seen and examined this morning. Hospital day 3. No acute events overnight.  Low back and hip pain slightly improved today compared to prior with increase in dosage of pain medication, however, he states currently that the pain medicine does help initially, however, it does not last long enough. Otherwise, no acute complaints.  Denies noticeable withdrawal symptoms.        Review of Systems   Constitutional: Negative for chills and fever.   Respiratory: Negative for cough and shortness of breath.    Cardiovascular: Negative for chest pain and palpitations.   Gastrointestinal: Negative for abdominal distention and abdominal pain.   Genitourinary: Negative for difficulty urinating.   Musculoskeletal: Positive for back pain and gait problem.   Neurological: Negative for dizziness and light-headedness.        Objective   Objective   Vital Signs  Temp:  [97.6 °F (36.4 °C)-98.6 °F (37 °C)] 97.7 °F (36.5 °C)  Heart Rate:  [64-84] 64  Resp:  [16-20] 18  BP: (126-151)/(77-88) 151/85  SpO2:  [93 %-96 %] 93 %  on   ;   Device (Oxygen Therapy): room air  Body mass index is 26.79 kg/m².  Physical Exam  Vitals and nursing note reviewed.   Constitutional:       General: He is awake.      Comments: Uncomfortable, constantly shifting position in bed   HENT:      Head: Atraumatic.   Eyes:      General: No scleral icterus.     Pupils: Pupils are equal, round, and reactive to light.   Cardiovascular:      Rate and Rhythm: Normal rate and regular rhythm.      Pulses: Normal pulses.      Heart sounds: Normal heart sounds.   Pulmonary:      Effort: Pulmonary effort is normal. No respiratory distress.      Breath sounds: Normal breath sounds.   Abdominal:      General: Bowel sounds are normal. There is no distension.      Palpations: Abdomen is soft.       Tenderness: There is no abdominal tenderness.   Musculoskeletal:         General: Tenderness (To palpation of right lower back) present.      Lumbar back: No swelling. Decreased range of motion.   Skin:     General: Skin is warm and dry.   Neurological:      General: No focal deficit present.      Mental Status: He is alert and oriented to person, place, and time.   Psychiatric:         Behavior: Behavior is cooperative.       Results Review     I reviewed the patient's new clinical results.  CBC and BMP stable.  Magnesium and phosphorus within normal limits.  Results from last 7 days   Lab Units 09/14/22 0558 09/13/22 0654 09/12/22 0529 09/11/22  0120   WBC 10*3/mm3 3.99 4.06 6.55 4.99   HEMOGLOBIN g/dL 11.3* 11.4* 10.9* 13.3   PLATELETS 10*3/mm3 120* 123* 143 183     Results from last 7 days   Lab Units 09/14/22 0558 09/13/22 2047 09/13/22 0654 09/12/22 0529 09/11/22  0120   SODIUM mmol/L 137  --  139 136 141   POTASSIUM mmol/L 4.1 4.6 3.6 4.2 4.3   CHLORIDE mmol/L 104  --  107 103 106   CO2 mmol/L 23.0  --  22.0 24.4 19.0*   BUN mg/dL 14  --  17 17 19   CREATININE mg/dL 0.57*  --  0.62* 0.66* 0.76   GLUCOSE mg/dL 93  --  94 123* 105*   EGFR mL/min/1.73 108.8  --  106.1 104.1 99.7     Results from last 7 days   Lab Units 09/11/22  0120   ALBUMIN g/dL 4.50   BILIRUBIN mg/dL 0.3   ALK PHOS U/L 94   AST (SGOT) U/L 49*   ALT (SGPT) U/L 22     Results from last 7 days   Lab Units 09/14/22 0558 09/13/22 0654 09/12/22 0529 09/11/22  0120   CALCIUM mg/dL 8.5* 8.3* 9.0 8.5*   ALBUMIN g/dL  --   --   --  4.50   MAGNESIUM mg/dL 2.1 1.7 2.1 2.0   PHOSPHORUS mg/dL 3.6 3.2 3.1  --        No results found for: HGBA1C, POCGLU    No radiology results for the last day    Telemetry personally reviewed by me this morning: Sinus rhythm.      Scheduled Medications  amLODIPine, 5 mg, Oral, Q24H  atorvastatin, 20 mg, Oral, Daily  DULoxetine, 30 mg, Oral, Daily  folic acid, 1 mg, Oral, Daily  levothyroxine, 100 mcg, Oral,  Daily  lisinopril, 10 mg, Oral, Daily  meloxicam, 15 mg, Oral, Daily  mirtazapine, 30 mg, Oral, Nightly  multivitamin, 1 tablet, Oral, Daily  thiamine (B-1) IV, 100 mg, Intravenous, Once  thiamine, 100 mg, Oral, Daily    Infusions   Diet  Diet Regular; Cardiac       Assessment/Plan     Active Hospital Problems    Diagnosis  POA   • **Alcohol dependence with intoxication (HCC) [F10.229]  Yes   • Withdrawal symptoms, alcohol (HCC) [F10.239]  Yes   • Transaminitis [R74.01]  Yes   • Hyperlipidemia [E78.5]  Yes   • Hypertension [I10]  Yes   • Hypothyroidism [E03.9]  Yes      Resolved Hospital Problems   No resolved problems to display.     Mr. Mueller is a 67 y.o. male with history of Alcohol use disorder, hypertension, hyperlipidemia, hypothyroidism who presents to [facility] with complaints of back pain and alcohol withdrawal.     · Low back pain/spinal stenosis: Symptoms chronic in nature.  However, pain appears to be worse this morning, likely exacerbated by limited mobility causing increased muscle tightness/spasm.  Recently had MRI of the back on August 30, unlikely that repeat imaging would change current management, as patient has no evidence of acute red flag symptoms.  Increased in dosage appears to be helping, however, frequency does not appear to be soon enough in between doses and patient's pain remains uncontrolled for periods of time in between dosing. Will change hydrocodone to 10 mg daily q6H from q8H as needed for pain.  Continue to work with PT, encourage mobilization ambulation as tolerated.  Previously discussed with patient, he is interested in HHPT after discharge. Will discuss with CCP regarding trying to get this set up for him once he leaves.  · Alcohol use disorder with concern for withdrawal: Currently drinks 1/2 liter of vodka daily and has done so for about 6 years. Last drink ~12 hours prior to arrival. Alcohol level 213 on admission. Patient interested in alcohol cessation.  [facility] protocol, thiamine, folic acid, multi-vitamin for Alcohol use disorder with concern for withdrawal.  Of note, discussed again with patient (09/12), he was recently admitted to the [facility] 3 weeks ago, at which time he stayed for 10 days.  Continues to state that he is interested in alcohol cessation, however, not interested in rehabilitation facility after discharge at this time.  Closely monitor on telemetry. Close monitoring of electrolytes, with replacement as indicated per electrolyte protocol.  · Hypertension: Stable. Continue home medications as prescribed.  · Hyperlipidemia: Continue Atorvastatin.  · Hypothyroidism: Continue Levothyroxine.       · SCDs for DVT prophylaxis.  · Full code.  · Discussed with patient and nursing staff.  · Anticipate discharge Home, possibly with HHPT in 1-2 days.      Roberth Chino DO  [facility]  09/14/22  11:20 EDT

## 2022-09-14 NOTE — PLAN OF CARE
Goal Outcome Evaluation:  Plan of Care Reviewed With: patient           Outcome Evaluation: Pt is a 66 yo male admitted with back/hip pain. PMH includes chronic alcohol use, HTN and HLD. He lives alone in a multi level home and typically is independent. Pt recently has been using a walker due to his pain. Today he reports as 6/10 as he just got back from the bathroom with nursing staff but was agreeable to get up to the chair. He describes his RLE/hip pain as a constant throb and occassional shooting pain. Pt may benefit from skilled PT services to address functional mobility deficits. DC recs pending - he is hopeful for return home, but states he has limited support/assist available to him.

## 2022-09-14 NOTE — THERAPY TREATMENT NOTE
Patient Name: Pro Mueller  : 1955    MRN: [id number]                              Today's Date: 2022       Admit Date: 2022    Visit Dx:     ICD-10-CM ICD-9-CM   1. Alcohol withdrawal syndrome with complication ([facility])  F10.239 291.81   2. Midline low back pain with right-sided sciatica, unspecified chronicity  M54.41 724.3     Patient Active Problem List   Diagnosis   • Alcoholism in recovery ([facility])   • Atopic rhinitis   • Mixed anxiety depressive disorder   • Genital herpes simplex   • Hyperlipidemia   • Hypertension   • Hypothyroidism   • Insomnia   • Panic disorder without agoraphobia   • Persistent insomnia   • Vitamin D deficiency   • Chronic low back pain   • Neuropathy involving both lower extremities   • QT prolongation   • Left shoulder pain   • Hypokalemia   • Pancytopenia ([facility])   • Left ventricular diastolic dysfunction   • Tremor   • C5 cervical fracture ([facility])   • Syncope and collapse   • Neck pain   • Alcoholic intoxication with complication ([facility])   • Withdrawal symptoms, alcohol ([facility])   • Transaminitis   • Lumbar facet arthropathy   • Alcohol dependence with intoxication ([facility])     Past Medical History:   Diagnosis Date   • Alcohol abuse    • Alcohol withdrawal ([facility]) 2016   • Alcoholic ketoacidosis 2020   • Allergic 1970   • Anxiety    • Arthritis    • Atopic rhinitis 2016   • Depression    • Disease of thyroid gland    • Elevated cholesterol    • Encounter for removal of sutures    • Genital herpes simplex 2016   • GERD (gastroesophageal reflux disease)    • Headache, tension-type    • Hyperlipidemia    • Hypertension    • Kidney stone    • Migraine    • Motion sickness    • Nephrolithiasis 2020   • Olecranon bursitis, right elbow    • Panic disorder without agoraphobia 2016   • Peripheral neuropathy    • Sleep apnea    • Syncope and collapse 2019   • Vitamin D deficiency 2016   • Withdrawal symptoms, alcohol ([facility])      Past Surgical History:    Procedure Laterality Date   • COLONOSCOPY     • CYST REMOVAL     • CYSTOSCOPY BLADDER STONE LITHOTRIPSY  02/2022   • EXTRACORPOREAL SHOCK WAVE LITHOTRIPSY (ESWL) Right 2002   • SHOULDER ARTHROSCOPY Right 12/17/2019    Procedure: SHOULDER ARTHROSCOPY, decompression, distal clavicle excision;  Surgeon: Aj Mancilla MD;  Location: [facility];  Service: Orthopedics   • TONSILLECTOMY        General Information     Row Name 09/14/22 1639          Physical Therapy Time and Intention    Document Type therapy note (daily note)  -CF     Mode of Treatment physical therapy;individual therapy  -CF     Row Name 09/14/22 1639          General Information    Patient Profile Reviewed yes  -CF     Existing Precautions/Restrictions fall  -CF     Row Name 09/14/22 1639          Cognition    Orientation Status (Cognition) oriented x 4  -CF     Row Name 09/14/22 1639          Safety Issues, Functional Mobility    Impairments Affecting Function (Mobility) endurance/activity tolerance;pain;strength;range of motion (ROM)  -CF           User Key  (r) = Recorded By, (t) = Taken By, (c) = Cosigned By    Initials Name Provider Type    CF Chrissy Hayward, PT Physical Therapist               Mobility     Row Name 09/14/22 1641          Bed Mobility    Comment, (Bed Mobility) UIC  -CF     Row Name 09/14/22 1641          Bed-Chair Transfer    Bed-Chair Lucas (Transfers) standby assist  -     Row Name 09/14/22 1641          Sit-Stand Transfer    Sit-Stand Lucas (Transfers) verbal cues;standby assist  -     Assistive Device (Sit-Stand Transfers) walker, front-wheeled  -     Row Name 09/14/22 1641          Gait/Stairs (Locomotion)    Lucas Level (Gait) standby assist;verbal cues  -     Assistive Device (Gait) walker, front-wheeled  -     Distance in Feet (Gait) 20'  -CF     Deviations/Abnormal Patterns (Gait) godfrey decreased;gait speed decreased;antalgic;stride length decreased  -CF     Bilateral Gait  Deviations forward flexed posture  -CF     Comment, (Gait/Stairs) Yelling out in pain with each step, reports this is baseline due to spinal stenosis  -CF           User Key  (r) = Recorded By, (t) = Taken By, (c) = Cosigned By    Initials Name Provider Type    Chrissy Kruse, LUCILA Physical Therapist               Obj/Interventions    No documentation.                Goals/Plan    No documentation.                Clinical Impression     Row Name 09/14/22 1645          Pain    Pretreatment Pain Rating 4/10  -CF     Posttreatment Pain Rating 10/10  -CF     Row Name 09/14/22 1645          Plan of Care Review    Plan of Care Reviewed With patient  -CF     Row Name 09/14/22 1645          Vital Signs    O2 Delivery Pre Treatment room air  -CF     O2 Delivery Intra Treatment room air  -CF     O2 Delivery Post Treatment room air  -CF     Row Name 09/14/22 1645          Positioning and Restraints    Pre-Treatment Position sitting in chair/recliner  -CF     Post Treatment Position chair  -CF     In Chair reclined;call light within reach;encouraged to call for assist;exit alarm on;notified nsg;legs elevated  -CF           User Key  (r) = Recorded By, (t) = Taken By, (c) = Cosigned By    Initials Name Provider Type    Chrissy Kruse, LUCILA Physical Therapist               Outcome Measures     Row Name 09/14/22 1650          How much help from another person do you currently need...    Turning from your back to your side while in flat bed without using bedrails? 3  -CF     Moving from lying on back to sitting on the side of a flat bed without bedrails? 3  -CF     Moving to and from a bed to a chair (including a wheelchair)? 3  -CF     Standing up from a chair using your arms (e.g., wheelchair, bedside chair)? 3  -CF     Climbing 3-5 steps with a railing? 2  -CF     To walk in hospital room? 3  -CF     AM-PAC 6 Clicks Score (PT) 17  -CF     Highest level of mobility 5 --> Static standing  -CF     Row Name 09/14/22 1650           Functional Assessment    Outcome Measure Options AM-PAC 6 Clicks Basic Mobility (PT)  -CF           User Key  (r) = Recorded By, (t) = Taken By, (c) = Cosigned By    Initials Name Provider Type    CF Chrissy Hayward, LUCILA Physical Therapist                             Physical Therapy Education                 Title: PT OT SLP Therapies (Done)     Topic: Physical Therapy (Done)     Point: Mobility training (Done)     Learning Progress Summary           Patient Acceptance, E, VU by  at 9/14/2022 1651    Acceptance, E,TB, VU by  at 9/14/2022 0349    Acceptance, E,TB, VU by  at 9/13/2022 0530    Acceptance, E, VU,NR by  at 9/12/2022 1602                   Point: Home exercise program (Done)     Learning Progress Summary           Patient Acceptance, E,TB, VU by  at 9/14/2022 0349    Acceptance, E,TB, VU by  at 9/13/2022 0530    Acceptance, E, VU,NR by  at 9/12/2022 1602                   Point: Body mechanics (Done)     Learning Progress Summary           Patient Acceptance, E,TB, VU by  at 9/14/2022 0349    Acceptance, E,TB, VU by  at 9/13/2022 0530    Acceptance, E, VU,NR by  at 9/12/2022 1602                   Point: Precautions (Done)     Learning Progress Summary           Patient Acceptance, E,TB, VU by  at 9/14/2022 0349    Acceptance, E,TB, VU by  at 9/13/2022 0530    Acceptance, E, VU,NR by  at 9/12/2022 1602                               User Key     Initials Effective Dates Name Provider Type Discipline     06/16/21 -  Chrissy Hayward, LUCILA Physical Therapist PT     02/03/22 -  Juvenal Currie, RN Registered Nurse Nurse              PT Recommendation and Plan  Planned Therapy Interventions (PT): balance training, bed mobility training, gait training, postural re-education, transfer training, ROM (range of motion), strengthening, patient/family education  Plan of Care Reviewed With: patient  Outcome Evaluation: Pt is a 66 yo male admitted with back/hip pain. PMH includes chronic  alcohol use, HTN and HLD. He lives alone in a multi level home and typically is independent. Pt recently has been using a walker due to his pain. Today he reports as 6/10 as he just got back from the bathroom with nursing staff but was agreeable to get up to the chair. He describes his RLE/hip pain as a constant throb and occassional shooting pain. Pt may benefit from skilled PT services to address functional mobility deficits. DC recs pending - he is hopeful for return home, but states he has limited support/assist available to him.     Time Calculation:    PT Charges     Row Name 09/14/22 1638             Time Calculation    Start Time 1542  -CF      Stop Time 1557  -CF      Time Calculation (min) 15 min  -CF      PT Received On 09/14/22  -CF      PT - Next Appointment 09/16/22  -            User Key  (r) = Recorded By, (t) = Taken By, (c) = Cosigned By    Initials Name Provider Type    CF Chrissy Hayward, LUCILA Physical Therapist              Therapy Charges for Today     Code Description Service Date Service Provider Modifiers Qty    [id number]  PT THERAPEUTIC ACT EA 15 MIN 9/14/2022 Chrissy Hayward, PT GP 1          PT G-Codes  Outcome Measure Options: AM-PAC 6 Clicks Basic Mobility (PT)  AM-PAC 6 Clicks Score (PT): 17    Chrissy Hayward PT  9/14/2022

## 2022-09-14 NOTE — CASE MANAGEMENT/SOCIAL WORK
Continued Stay Note  [facility]     Patient Name: Pro Mueller  MRN: [id number]  Today's Date: 9/14/2022    Admit Date: 9/11/2022     Discharge Plan     Row Name 09/14/22 1009       Plan    Plan Plan home.  BEBE De La Torre RN    Patient/Family in Agreement with Plan yes    Plan Comments Spoke with pt at bedside.  Pt denies any discharge needs.  Plan is home.   BEBE De La Torre RN               Discharge Codes    No documentation.               Expected Discharge Date and Time     Expected Discharge Date Expected Discharge Time    Sep 15, 2022             Ju De La Torre RN

## 2022-09-15 ENCOUNTER — READMISSION MANAGEMENT (OUTPATIENT)
Dept: CALL CENTER | Facility: HOSPITAL | Age: 67
End: 2022-09-15

## 2022-09-15 ENCOUNTER — HOME HEALTH ADMISSION (OUTPATIENT)
Dept: HOME HEALTH SERVICES | Facility: HOME HEALTHCARE | Age: 67
End: 2022-09-15

## 2022-09-15 ENCOUNTER — TELEPHONE (OUTPATIENT)
Dept: FAMILY MEDICINE CLINIC | Facility: CLINIC | Age: 67
End: 2022-09-15

## 2022-09-15 VITALS
RESPIRATION RATE: 18 BRPM | BODY MASS INDEX: 26.76 KG/M2 | SYSTOLIC BLOOD PRESSURE: 138 MMHG | WEIGHT: 197.53 LBS | HEIGHT: 72 IN | HEART RATE: 73 BPM | OXYGEN SATURATION: 97 % | DIASTOLIC BLOOD PRESSURE: 94 MMHG | TEMPERATURE: 98.1 F

## 2022-09-15 PROBLEM — Z78.9 IMPAIRED MOBILITY AND ADLS: Status: ACTIVE | Noted: 2022-09-15

## 2022-09-15 PROBLEM — Z71.41 ALCOHOL CESSATION COUNSELING: Status: ACTIVE | Noted: 2022-09-15

## 2022-09-15 PROBLEM — M48.061 LUMBAR CANAL STENOSIS: Status: ACTIVE | Noted: 2022-09-15

## 2022-09-15 PROBLEM — Z74.09: Status: ACTIVE | Noted: 2022-09-15

## 2022-09-15 PROBLEM — M54.41 MIDLINE LOW BACK PAIN WITH RIGHT-SIDED SCIATICA: Status: ACTIVE | Noted: 2022-09-15

## 2022-09-15 PROBLEM — Z74.09 IMPAIRED MOBILITY AND ADLS: Status: ACTIVE | Noted: 2022-09-15

## 2022-09-15 PROBLEM — M43.16 SPONDYLOLISTHESIS AT L4-L5 LEVEL: Status: ACTIVE | Noted: 2022-09-15

## 2022-09-15 LAB
ANION GAP SERPL CALCULATED.3IONS-SCNC: 12 MMOL/L (ref 5–15)
BASOPHILS # BLD AUTO: 0.01 10*3/MM3 (ref 0–0.2)
BASOPHILS NFR BLD AUTO: 0.2 % (ref 0–1.5)
BUN SERPL-MCNC: 12 MG/DL (ref 8–23)
BUN/CREAT SERPL: 17.1 (ref 7–25)
CALCIUM SPEC-SCNC: 9 MG/DL (ref 8.6–10.5)
CHLORIDE SERPL-SCNC: 102 MMOL/L (ref 98–107)
CO2 SERPL-SCNC: 22 MMOL/L (ref 22–29)
CREAT SERPL-MCNC: 0.7 MG/DL (ref 0.76–1.27)
DEPRECATED RDW RBC AUTO: 44.6 FL (ref 37–54)
EGFRCR SERPLBLD CKD-EPI 2021: 102.3 ML/MIN/1.73
EOSINOPHIL # BLD AUTO: 0.11 10*3/MM3 (ref 0–0.4)
EOSINOPHIL NFR BLD AUTO: 2.5 % (ref 0.3–6.2)
ERYTHROCYTE [DISTWIDTH] IN BLOOD BY AUTOMATED COUNT: 13.5 % (ref 12.3–15.4)
GLUCOSE SERPL-MCNC: 94 MG/DL (ref 65–99)
HCT VFR BLD AUTO: 36.3 % (ref 37.5–51)
HGB BLD-MCNC: 12.6 G/DL (ref 13–17.7)
LYMPHOCYTES # BLD AUTO: 1.46 10*3/MM3 (ref 0.7–3.1)
LYMPHOCYTES NFR BLD AUTO: 32.9 % (ref 19.6–45.3)
MAGNESIUM SERPL-MCNC: 1.9 MG/DL (ref 1.6–2.4)
MCH RBC QN AUTO: 31.4 PG (ref 26.6–33)
MCHC RBC AUTO-ENTMCNC: 34.7 G/DL (ref 31.5–35.7)
MCV RBC AUTO: 90.5 FL (ref 79–97)
MONOCYTES # BLD AUTO: 0.4 10*3/MM3 (ref 0.1–0.9)
MONOCYTES NFR BLD AUTO: 9 % (ref 5–12)
NEUTROPHILS NFR BLD AUTO: 2.45 10*3/MM3 (ref 1.7–7)
NEUTROPHILS NFR BLD AUTO: 55.2 % (ref 42.7–76)
PHOSPHATE SERPL-MCNC: 3.9 MG/DL (ref 2.5–4.5)
PLATELET # BLD AUTO: 137 10*3/MM3 (ref 140–450)
PMV BLD AUTO: 10 FL (ref 6–12)
POTASSIUM SERPL-SCNC: 3.8 MMOL/L (ref 3.5–5.2)
RBC # BLD AUTO: 4.01 10*6/MM3 (ref 4.14–5.8)
SODIUM SERPL-SCNC: 136 MMOL/L (ref 136–145)
WBC NRBC COR # BLD: 4.44 10*3/MM3 (ref 3.4–10.8)

## 2022-09-15 PROCEDURE — 84100 ASSAY OF PHOSPHORUS: CPT | Performed by: STUDENT IN AN ORGANIZED HEALTH CARE EDUCATION/TRAINING PROGRAM

## 2022-09-15 PROCEDURE — 83735 ASSAY OF MAGNESIUM: CPT | Performed by: STUDENT IN AN ORGANIZED HEALTH CARE EDUCATION/TRAINING PROGRAM

## 2022-09-15 PROCEDURE — G0378 HOSPITAL OBSERVATION PER HR: HCPCS

## 2022-09-15 PROCEDURE — 85025 COMPLETE CBC W/AUTO DIFF WBC: CPT | Performed by: STUDENT IN AN ORGANIZED HEALTH CARE EDUCATION/TRAINING PROGRAM

## 2022-09-15 PROCEDURE — 80048 BASIC METABOLIC PNL TOTAL CA: CPT | Performed by: STUDENT IN AN ORGANIZED HEALTH CARE EDUCATION/TRAINING PROGRAM

## 2022-09-15 RX ORDER — DULOXETIN HYDROCHLORIDE 30 MG/1
30 CAPSULE, DELAYED RELEASE ORAL DAILY
Qty: 30 CAPSULE | Refills: 1 | Status: SHIPPED | OUTPATIENT
Start: 2022-09-16

## 2022-09-15 RX ORDER — HYDROCODONE BITARTRATE AND ACETAMINOPHEN 5; 325 MG/1; MG/1
2 TABLET ORAL EVERY 6 HOURS PRN
Qty: 15 TABLET | Refills: 0 | Status: SHIPPED | OUTPATIENT
Start: 2022-09-15 | End: 2022-09-23 | Stop reason: SDUPTHER

## 2022-09-15 RX ORDER — ALUMINA, MAGNESIA, AND SIMETHICONE 2400; 2400; 240 MG/30ML; MG/30ML; MG/30ML
15 SUSPENSION ORAL EVERY 6 HOURS PRN
Qty: 355 ML | Refills: 0 | Status: SHIPPED | OUTPATIENT
Start: 2022-09-15

## 2022-09-15 RX ADMIN — DULOXETINE HYDROCHLORIDE 30 MG: 30 CAPSULE, DELAYED RELEASE ORAL at 08:38

## 2022-09-15 RX ADMIN — Medication 100 MG: at 08:38

## 2022-09-15 RX ADMIN — ATORVASTATIN CALCIUM 20 MG: 20 TABLET, FILM COATED ORAL at 08:38

## 2022-09-15 RX ADMIN — AMLODIPINE BESYLATE 5 MG: 5 TABLET ORAL at 08:38

## 2022-09-15 RX ADMIN — LEVOTHYROXINE SODIUM 100 MCG: 0.1 TABLET ORAL at 07:37

## 2022-09-15 RX ADMIN — HYDROCODONE BITARTRATE AND ACETAMINOPHEN 2 TABLET: 5; 325 TABLET ORAL at 04:24

## 2022-09-15 RX ADMIN — Medication 1 TABLET: at 08:38

## 2022-09-15 RX ADMIN — HYDROCODONE BITARTRATE AND ACETAMINOPHEN 2 TABLET: 5; 325 TABLET ORAL at 10:26

## 2022-09-15 RX ADMIN — MELOXICAM 15 MG: 15 TABLET ORAL at 08:38

## 2022-09-15 RX ADMIN — Medication 1 MG: at 08:38

## 2022-09-15 RX ADMIN — LISINOPRIL 10 MG: 10 TABLET ORAL at 08:38

## 2022-09-15 NOTE — CASE MANAGEMENT/SOCIAL WORK
Continued Stay Note  [facility]     Patient Name: Pro Mueller  MRN: [id number]  Today's Date: 9/15/2022    Admit Date: 9/11/2022     Discharge Plan     Row Name 09/15/22 1159       Plan    Plan Plan home with [facility] if accepted.   BEBE De La Torre RN    Patient/Family in Agreement with Plan yes    Plan Comments Spoke with pt at bedside.  Pt would like  for PT if it can be arranged.  Pt was provided a list of HH agencies and his choice was [facility].  Kelly  ( [phone]) with [facility] called to follow.  Plan home with [facility] if accepted.   BEBE De La Torre RN               Discharge Codes    No documentation.               Expected Discharge Date and Time     Expected Discharge Date Expected Discharge Time    Sep 15, 2022             Ju De La Torre RN

## 2022-09-15 NOTE — DISCHARGE PLACEMENT REQUEST
"Herb Luna (67 y.o. Male)             Date of Birth   1955    Social Security Number       Address   [address]  [place] [address]    Home Phone   [phone]    MRN   [id number]       [employer]    Marital Status   Single                            Admission Date   9/11/22    Admission Type   Emergency    Admitting Provider   Roberth Chino DO    Attending Provider   Roberth Chino DO    Department, Room/Bed   [address], [address]       Discharge Date       Discharge Disposition   [place] [facility]    Discharge Destination                               Attending Provider: Roberth Chino DO    Allergies: Penicillins    Isolation: None   Infection: None   Code Status: CPR   Advance Care Planning Activity    Ht: 182.9 cm (72\")   Wt: 89.6 kg (197 lb 8.5 oz)    Admission Cmt: None   Principal Problem: Alcohol dependence with intoxication ([facility]) [F10.229]                 Active Insurance as of 9/11/2022     Primary Coverage     Payor Plan Insurance Group Employer/Plan Group    HUMANA HUMANA [id number]     Payor Plan Address Payor Plan Phone Number Payor Plan Fax Number Effective Dates    [address] [address] [phone]  7/1/2017 - None Entered    [place] [facility] [address]       Subscriber Name Subscriber Birth Date Member ID       HERB LUNA 1955 [id number]                 Emergency Contacts      (Rel.) Home Phone Work Phone Mobile Phone    Jeny Crump (Sister) [phone] -- --    DayneSaul (Father) [phone] -- [phone]    Bran Villar (Other) -- -- [phone]              "

## 2022-09-15 NOTE — PLAN OF CARE
Goal Outcome Evaluation:      Pt calm and cooperative. Alert and oriented. C/o pain x2 as per MAR. VSS. No acute distress noted.

## 2022-09-15 NOTE — DISCHARGE SUMMARY
Patient Name: Pro Mueller  : 1955  MRN: [id number]    Date of Admission: 2022  Date of Discharge:  9/15/2022  Primary Care Physician: Alla Beal MD      Chief Complaint:   Back Pain and Hip Pain      Discharge Diagnoses     Active Hospital Problems    Diagnosis  POA   • **Alcohol dependence with intoxication (HCC) [F10.229]  Yes   • Midline low back pain with right-sided sciatica [M54.41]  Yes   • Spondylolisthesis at L4-L5 level [M43.16]  Not Applicable   • Lumbar canal stenosis [M48.061]  Yes   • Alcohol cessation counseling [Z71.41]  Not Applicable   • Need for assistance due to reduced mobility [Z74.09]  Yes   • Impaired mobility and ADLs [Z74.09, Z78.9]  Yes   • Withdrawal symptoms, alcohol (HCC) [F10.239]  Yes   • Transaminitis [R74.01]  Yes   • Hyperlipidemia [E78.5]  Yes   • Hypertension [I10]  Yes   • Hypothyroidism [E03.9]  Yes      Resolved Hospital Problems   No resolved problems to display.        Hospital Course     Mr. Mueller is a 67 y.o. male with history of Alcohol use disorder, hypertension, hyperlipidemia, hypothyroidism who presented to [facility] with complaints of back pain and alcohol withdrawal. Please see the admitting history and physical for further details. He was subsequently admitted to the hospital for further evaluation and treatment.     · Low back pain/spinal stenosis: Symptoms chronic in nature.  However, during the course of his stay, he felt that the pain became more exacerbated, likely secondary to limited mobility causing increased muscle tightness/spasm.  Recently had MRI of the back on , unlikely that repeat imaging would change current management, as patient had no evidence of acute red flag symptoms.  His home pain medication was continued on admission, however, required multiple adjustments during his inpatient stay to achieve symptomatic control.  The most recent adjustment was on day prior to discharge, at which time Norco  frequency was decreased to every 6 hours as needed rather than every 8 hours as needed, additionally prior to that the dosage of medication was increased from 5 mg to 10 mg.  On day of discharge, patient stated that he felt his pain was well controlled on the current regimen.  Plan to discharge patient home on current regimen as prescribed, will provide 3-day supply.  Patient instructed to follow-up with his PCP closely after discharge for reassessment and to guide ongoing management decisions, he voiced understanding was agreeable.  Per discussion on day of discharge, patient will be that he has follow-up appointment scheduled with his PCP on Friday, September 16, 2022.  Follow-up with PCP as scheduled, follow-up with pain management as scheduled for further evaluation and management.  Also, referral been placed for patient to receive home health physical therapy after discharge, as this were discussed and he was agreeable, I feel this is very appropriate because of his significant impairment in functional status/mobility, his endorsement with limitation in ADLs at home while a result of his significant underlying lumbar spine pathology.  · Alcohol use disorder with concern for withdrawal:  Per discussion on admission, patient endorsed that he currently drinks 1/2 liter of vodka daily and has done so for about 6 years. Last drink ~12 hours prior to arrival. Alcohol level 213 on admission.  On admission, patient was placed on CIWA protocol and continued on treatment with thiamine, multivitamin and folic acid.  Additionally multiple discussions were had with the patient regarding his interest in alcohol cessation.  He stated that he was interested in alcohol cessation, however, not interested in rehabilitation facility after discharge at this time.  Regarding hospital course as it pertains to alcohol withdrawal, the patient remained fairly stable throughout the duration of his stay, he had minimal CIWA requirements  and did not endorse symptoms of withdraw to me during my examinations.  On day of discharge, patient was provided further alcohol cessation counseling including associated risks and the importance of cessation. I encouraged him to remain diligent in his efforts with ongoing alcohol cessation and recommended that he follow-up with his PCP after discharge to discuss this further and to guide further management decisions.  Continue multivitamin, thiamine and folic acid at time of discharge.  · Hypertension: Stable. Continued home medications as prescribed.  Continue at discharge.  · Hyperlipidemia: Continued Atorvastatin.  Continue at discharge.  · Hypothyroidism: Continued Levothyroxine.  Continue at discharge.       Day of Discharge     Subjective:  Patient seen and examined this morning. Hospital day 4. No acute events overnight.  Low back and hip pain improved following additional adjustments to pain medication yesterday.  Symptoms currently well controlled on current regimen.  Otherwise, no acute complaints.  Denies noticeable withdrawal symptoms.      Physical Exam:  Temp:  [98.1 °F (36.7 °C)-98.2 °F (36.8 °C)] 98.1 °F (36.7 °C)  Heart Rate:  [68-73] 73  Resp:  [18] 18  BP: (138-155)/(81-94) 138/94  Body mass index is 26.79 kg/m².  Physical Exam  Vitals and nursing note reviewed.   Constitutional:       General: He is awake.   HENT:      Head: Atraumatic.   Eyes:      General: No scleral icterus.     Pupils: Pupils are equal, round, and reactive to light.   Cardiovascular:      Rate and Rhythm: Normal rate and regular rhythm.      Pulses: Normal pulses.      Heart sounds: Normal heart sounds.   Pulmonary:      Effort: Pulmonary effort is normal. No respiratory distress.      Breath sounds: Normal breath sounds.   Abdominal:      General: Bowel sounds are normal. There is no distension.      Palpations: Abdomen is soft.      Tenderness: There is no abdominal tenderness.   Musculoskeletal:         General:  Tenderness (To palpation of right lower back) present.      Lumbar back: No swelling. Decreased range of motion.   Skin:     General: Skin is warm and dry.   Neurological:      General: No focal deficit present.      Mental Status: He is alert and oriented to person, place, and time.   Psychiatric:         Behavior: Behavior is cooperative.         Consultants     Consult Orders (all) (From admission, onward)     Start     Ordered    09/11/22 1749  Inpatient Psychiatrist Consult  Once        Specialty:  Psychiatry  Provider:  Mariano Cheek III, MD    09/11/22 1750 09/11/22 1748  Inpatient Access Center Consult  Once        Provider:  (Not yet assigned)    09/11/22 1750 09/11/22 0403  [facility] (on-call MD unless specified) Details  Once        Specialty:  Hospitalist  Provider:  (Not yet assigned)    09/11/22 0402              Procedures     * Surgery not found *      Imaging Results (All)     Procedure Component Value Units Date/Time    XR Chest 1 View [[id number]] Collected: 09/11/22 0207     Updated: 09/11/22 0210    Narrative:      SINGLE VIEW OF THE CHEST     HISTORY: Shortness of air     COMPARISON: 07/01/2022     FINDINGS:  Heart size is within normal limits. There is elevation of the right  hemidiaphragm, with associated bronchovascular crowding. There is  calcification of the aorta. No pneumothorax or pleural effusion is seen.       Impression:      No acute findings.     This report was finalized on 9/11/2022 2:07 AM by Dr. Hilda Hampton M.D.             Results for orders placed during the hospital encounter of 01/07/22    Adult Transthoracic Echo Complete W/ Cont if Necessary Per Protocol    Interpretation Summary  · Estimated left ventricular EF = 67% Left ventricular systolic function is normal.  · Left ventricular diastolic function was normal.  · Mild dilation of the aortic root is present.    Pertinent Labs     Results from last 7 days   Lab Units 09/15/22  0704 09/14/22  0511  09/13/22  0654 09/12/22  0529   WBC 10*3/mm3 4.44 3.99 4.06 6.55   HEMOGLOBIN g/dL 12.6* 11.3* 11.4* 10.9*   PLATELETS 10*3/mm3 137* 120* 123* 143     Results from last 7 days   Lab Units 09/15/22  0704 09/14/22 0558 09/13/22 2047 09/13/22 0654 09/12/22  0529   SODIUM mmol/L 136 137  --  139 136   POTASSIUM mmol/L 3.8 4.1 4.6 3.6 4.2   CHLORIDE mmol/L 102 104  --  107 103   CO2 mmol/L 22.0 23.0  --  22.0 24.4   BUN mg/dL 12 14  --  17 17   CREATININE mg/dL 0.70* 0.57*  --  0.62* 0.66*   GLUCOSE mg/dL 94 93  --  94 123*   EGFR mL/min/1.73 102.3 108.8  --  106.1 104.1     Results from last 7 days   Lab Units 09/11/22  0120   ALBUMIN g/dL 4.50   BILIRUBIN mg/dL 0.3   ALK PHOS U/L 94   AST (SGOT) U/L 49*   ALT (SGPT) U/L 22     Results from last 7 days   Lab Units 09/15/22  0704 09/14/22 0558 09/13/22 0654 09/12/22  0529 09/11/22  0120   CALCIUM mg/dL 9.0 8.5* 8.3* 9.0 8.5*   ALBUMIN g/dL  --   --   --   --  4.50   MAGNESIUM mg/dL 1.9 2.1 1.7 2.1 2.0   PHOSPHORUS mg/dL 3.9 3.6 3.2 3.1  --                Invalid input(s): LDLCALC          Test Results Pending at Discharge       Discharge Details        Discharge Medications      New Medications      Instructions Start Date   aluminum-magnesium hydroxide-simethicone 400-400-40 MG/5ML suspension  Commonly known as: MAALOX MAX   15 mL, Oral, Every 6 Hours PRN      DULoxetine 30 MG capsule  Commonly known as: CYMBALTA   30 mg, Oral, Daily   Start Date: September 16, 2022        Changes to Medications      Instructions Start Date   HYDROcodone-acetaminophen 5-325 MG per tablet  Commonly known as: NORCO  What changed:   · how much to take  · when to take this   2 tablets, Oral, Every 6 Hours PRN         Continue These Medications      Instructions Start Date   acetaminophen 325 MG tablet  Commonly known as: TYLENOL   650 mg, Oral, Every 4 Hours PRN      amLODIPine 5 MG tablet  Commonly known as: NORVASC   5 mg, Oral, Every Morning      atorvastatin 20 MG tablet  Commonly  known as: LIPITOR   20 mg, Oral, Daily      folic acid 1 MG tablet  Commonly known as: FOLVITE   1 mg, Oral, Daily      levothyroxine 100 MCG tablet  Commonly known as: SYNTHROID, LEVOTHROID   100 mcg, Oral, Daily      lisinopril 10 MG tablet  Commonly known as: PRINIVIL,ZESTRIL   10 mg, Oral, Daily      meloxicam 15 MG tablet  Commonly known as: MOBIC   15 mg, Oral, Daily      mirtazapine 30 MG tablet  Commonly known as: REMERON   30 mg, Oral, Every Night at Bedtime      multivitamin capsule   1 capsule, Oral, Daily      naltrexone 50 MG tablet  Commonly known as: DEPADE   50 mg, Oral, Every Night at Bedtime      thiamine 100 MG tablet tablet  Commonly known as: VITAMIN B-1   100 mg, Oral, Daily      zolpidem 10 MG tablet  Commonly known as: AMBIEN   TAKE 1 TO 1 TABLET BY MOUTH AT BEDTIME AS NEEDED FOR INSOMNIA         Stop These Medications    methylPREDNISolone 4 MG dose pack  Commonly known as: MEDROL     Multivitamin/Fluoride 0.5 MG chewable tablet            Allergies   Allergen Reactions   • Penicillins Anaphylaxis and Other (See Comments)     Seizure. childhood       Discharge Disposition:  Home-Health Care Svc      Discharge Diet:  Diet Order   Procedures   • Diet Regular; Cardiac       Discharge Activity:   Activity Instructions     Other Activity Instructions      Activity Instructions: As tolerated, per physical therapy recommendations.          CODE STATUS:    Code Status and Medical Interventions:   Ordered at: 09/11/22 0432     Code Status (Patient has no pulse and is not breathing):    CPR (Attempt to Resuscitate)     Medical Interventions (Patient has pulse or is breathing):    Full Support       Future Appointments   Date Time Provider Department Center   9/16/2022 11:00 AM Teresa Nagy APRN [facility]   10/6/2022  2:30 PM Alla Beal MD [facility]   10/20/2022  3:45 PM [facility] MAIN PAIN OR [facility] XR OR None     Additional Instructions for the Follow-ups that You Need to  Schedule     Ambulatory Referral to Home Health   As directed      Face to Face Visit Date: 9/15/2022    Follow-up provider for Plan of Care?: I treated the patient in an acute care facility and will not continue treatment after discharge.    Follow-up provider: ALLA BEAL [[id number]]    Reason/Clinical Findings: Patient has lumbar spinal stenosis, spondylolisthesis of lumbar spine which has led to significant functional impairment, and patient requires ongoing evaluation and assistance following discharge.    Describe mobility limitations that make leaving home difficult: Difficulty with maintaining independence with ADLs at home, impaired mobility secondary to underlying lumbar spine pathology.    Nursing/Therapeutic Services Requested: Physical Therapy    PT orders: Strengthening Therapeutic exercise Gait Training Transfer training Home safety assessment    Weight Bearing Status: As Tolerated    Frequency: 1 Week 1         Call MD With Problems / Concerns   As directed      Following discharge, please contact your PCP to discuss any questions/concerns regarding ongoing medical management of chronic medical problems.    Order Comments: Following discharge, please contact your PCP to discuss any questions/concerns regarding ongoing medical management of chronic medical problems.          Discharge Follow-up with PCP   As directed       Currently Documented PCP:    Alla Beal MD    PCP Phone Number:    [phone]     Follow Up Details: within 1 week after discharge for reassessment            Follow-up Information     Alla Beal MD .    Specialty: Family Medicine  Why: within 1 week after discharge for reassessment  Contact information:  [address]  [place] [address]  [phone]                         Additional Instructions for the Follow-ups that You Need to Schedule     Ambulatory Referral to Home Health   As directed      Face to Face Visit Date: 9/15/2022    Follow-up provider  for Plan of Care?: I treated the patient in an acute care facility and will not continue treatment after discharge.    Follow-up provider: ALLA BEAL [[id number]]    Reason/Clinical Findings: Patient has lumbar spinal stenosis, spondylolisthesis of lumbar spine which has led to significant functional impairment, and patient requires ongoing evaluation and assistance following discharge.    Describe mobility limitations that make leaving home difficult: Difficulty with maintaining independence with ADLs at home, impaired mobility secondary to underlying lumbar spine pathology.    Nursing/Therapeutic Services Requested: Physical Therapy    PT orders: Strengthening Therapeutic exercise Gait Training Transfer training Home safety assessment    Weight Bearing Status: As Tolerated    Frequency: 1 Week 1         Call MD With Problems / Concerns   As directed      Following discharge, please contact your PCP to discuss any questions/concerns regarding ongoing medical management of chronic medical problems.    Order Comments: Following discharge, please contact your PCP to discuss any questions/concerns regarding ongoing medical management of chronic medical problems.          Discharge Follow-up with PCP   As directed       Currently Documented PCP:    Alla Beal MD    PCP Phone Number:    [phone]     Follow Up Details: within 1 week after discharge for reassessment           Time Spent on Discharge:  Greater than 30 minutes      Roberth Chino DO  [facility]  09/15/22  11:47 EDT

## 2022-09-15 NOTE — PROGRESS NOTES
[facility] following for home care needs.  Received verbal auth for home health per Maame for Dr Beal.  Spoke with patient and verified all info on facesheet.

## 2022-09-15 NOTE — TELEPHONE ENCOUNTER
HH called asking for verbal orders to resume PT OT for this patient. Verbal given by . Lian notified.

## 2022-09-15 NOTE — NURSING NOTE
"Patient awake and states \"alcohol is the least of my problems\". States he's been awake all night due to pain; states he has spinal stenosis. Received norco at 0424. Patient sees Dr. Coats at [facility] outpatient and will follow up with him once discharged.     Access following.   "

## 2022-09-15 NOTE — CASE MANAGEMENT/SOCIAL WORK
Case Management Discharge Note      Final Note: Pt discharged home with [facility] TYE De La Torre RN         Selected Continued Care - Discharged on 9/15/2022 Admission date: 9/11/2022 - Discharge disposition: Home-Health Care Svc    Destination    No services have been selected for the patient.              Durable Medical Equipment    No services have been selected for the patient.              Dialysis/Infusion    No services have been selected for the patient.              Home Medical Care    No services have been selected for the patient.              Therapy    No services have been selected for the patient.              Community Resources    No services have been selected for the patient.              Community & DME    No services have been selected for the patient.                Selected Continued Care - Episodes Includes selections from active Coordinated Care Management episodes    High Risk Care Management Episode start date: 9/2/2022   There are no active outsourced providers for this episode.               Transportation Services  Private: Car    Final Discharge Disposition Code: 06 - home with home health care

## 2022-09-15 NOTE — OUTREACH NOTE
Prep Survey    Flowsheet Row Responses   [facility] facility patient discharged from? [facility]   Is LACE score < 7 ? No   Emergency Room discharge w/ pulse ox? No   Eligibility [facility]   Date of Admission 09/11/22   Date of Discharge 09/15/22   Discharge Disposition Home-Health Care Sv   Discharge diagnosis Alcohol dependence with intoxication Midline low back pain with right-sided sciatica    Does the patient have one of the following disease processes/diagnoses(primary or secondary)? Other   Does the patient have Home health ordered? Yes   What is the Home health agency?   [facility] .   Is there a DME ordered? No   Prep survey completed? Yes          LIZZY ARCE - Registered Nurse

## 2022-09-16 ENCOUNTER — HOME CARE VISIT (OUTPATIENT)
Dept: HOME HEALTH SERVICES | Facility: HOME HEALTHCARE | Age: 67
End: 2022-09-16

## 2022-09-16 ENCOUNTER — TRANSITIONAL CARE MANAGEMENT TELEPHONE ENCOUNTER (OUTPATIENT)
Dept: CALL CENTER | Facility: HOSPITAL | Age: 67
End: 2022-09-16

## 2022-09-16 NOTE — OUTREACH NOTE
Call Center TCM Note    Flowsheet Row Responses   [facility] patient discharged from? [facility]   Does the patient have one of the following disease processes/diagnoses(primary or secondary)? Other   TCM attempt successful? Yes   Call start time 1230   Call end time 1236   Discharge diagnosis Alcohol dependence with intoxication Midline low back pain with right-sided sciatica    Prescription comments Pain meds are on 1st floor of home, pt is on 2nd floor and can't walk to get to them. He declines all assistance i have suggested to help him with.    Comments TCM appt not available w/in timeframe. Sent message to office to schedule appt. Pt states that he had appt today but he overslept.    What is the Home health agency?   [facility]    Has home health visited the patient within 72 hours of discharge? Call prior to 72 hours   Comments Pt reports missing f/u appt,  having right hip pain currently. He reports inability to walk, using arms to help go up steps, has walker on 1st floor that helps but can't use upstairs.   Did the patient receive a copy of their discharge instructions? Yes   Nursing interventions Reviewed instructions with patient   What is the patient's perception of their health status since discharge? Same   Is the patient/caregiver able to teach back signs and symptoms related to disease process for when to call PCP? Yes   If the patient is a current smoker, are they able to teach back resources for cessation? Not a smoker   Additional teach back comments urged him to call pcp today   TCM call completed? Yes   Wrap up additional comments Offered to call family member to assist him at home, or call EMS, he declined.          Brittany Boateng RN    9/16/2022, 12:38 EDT

## 2022-09-19 ENCOUNTER — HOSPITAL ENCOUNTER (EMERGENCY)
Facility: HOSPITAL | Age: 67
Discharge: HOME | End: 2022-09-19
Attending: EMERGENCY MEDICINE | Admitting: EMERGENCY MEDICINE

## 2022-09-19 VITALS
SYSTOLIC BLOOD PRESSURE: 158 MMHG | TEMPERATURE: 97 F | RESPIRATION RATE: 17 BRPM | OXYGEN SATURATION: 96 % | HEART RATE: 84 BPM | DIASTOLIC BLOOD PRESSURE: 84 MMHG

## 2022-09-19 DIAGNOSIS — M54.16 RIGHT LUMBAR RADICULOPATHY: Primary | ICD-10-CM

## 2022-09-19 PROCEDURE — 99283 EMERGENCY DEPT VISIT LOW MDM: CPT

## 2022-09-19 RX ORDER — LIDOCAINE 50 MG/G
1 PATCH TOPICAL ONCE
Status: DISCONTINUED | OUTPATIENT
Start: 2022-09-19 | End: 2022-09-19 | Stop reason: HOSPADM

## 2022-09-19 RX ORDER — LIDOCAINE 50 MG/G
1 PATCH TOPICAL EVERY 24 HOURS
Qty: 6 EACH | Refills: 0 | Status: SHIPPED | OUTPATIENT
Start: 2022-09-19

## 2022-09-19 RX ORDER — NAPROXEN 500 MG/1
500 TABLET ORAL ONCE
Status: COMPLETED | OUTPATIENT
Start: 2022-09-19 | End: 2022-09-19

## 2022-09-19 RX ADMIN — NAPROXEN 500 MG: 500 TABLET ORAL at 20:02

## 2022-09-19 RX ADMIN — LIDOCAINE 1 PATCH: 50 PATCH TOPICAL at 20:02

## 2022-09-19 NOTE — ED TRIAGE NOTES
Pt was brought in by ems from home for generalized back pain that started after  Running out of pain pills. Pt was seen here and states that he slept through his follow up appointment on Friday    This RN wore mask and goggles during time of contact

## 2022-09-19 NOTE — ED PROVIDER NOTES
"MD ATTESTATION NOTE    The LUBA and I have discussed this patient's history, physical exam, and treatment plan.  I have reviewed the documentation and personally had a face to face interaction with the patient. I affirm the documentation and agree with the treatment and plan.  The attached note describes my personal findings.      I provided a substantive portion of the care of the patient.  I personally performed the physical exam in its entirety, and below are my findings.  For this patient encounter, the patient wore surgical mask, I wore full protective PPE including N95 and eye protection.      Brief HPI: Patient complains of low back pain.  He has a history of chronic back pain secondary to spinal stenosis.  Pain is constant.  Pain radiates into the right hip.  Denies numbness/weakness in his legs, loss of bowel/bladder control, saddle anesthesia, fever, or previous back surgery.  Patient drinks alcohol daily.  He had an appointment with pain management last week but missed it because he \"slept through it\".  He is scheduled to have some type of pain management procedure on his lumbar spine next month.    PHYSICAL EXAM  ED Triage Vitals [09/19/22 1804]   Temp Heart Rate Resp BP SpO2   97 °F (36.1 °C) 84 17 158/84 96 %      Temp src Heart Rate Source Patient Position BP Location FiO2 (%)   Tympanic Monitor -- -- --         GENERAL: Awake, alert, oriented x3.  Well-developed, well-nourished male.  No acute distress  HENT: nares patent  EYES: no scleral icterus  CV: regular rhythm, normal rate  RESPIRATORY: normal effort, clear to auscultation bilaterally  ABDOMEN: soft, nontender, no CVA tenderness  MUSCULOSKELETAL: There is tenderness over the lumbar spine.  Full range of motion all extremities  NEURO: Normal strength and light touch sensation in both lower extremities.  No saddle anesthesia.  PSYCH:  calm, cooperative  SKIN: warm, dry    Vital signs and nursing notes reviewed.        Plan: Symptomatic treatment " with Naprosyn and lidocaine patch.  Patient was advised to follow-up with pain management.  He drinks alcohol daily so I do not feel comfortable prescribing him opioids or muscle relaxers.     Obed Cunningham MD  09/19/22 2017

## 2022-09-19 NOTE — ED PROVIDER NOTES
EMERGENCY DEPARTMENT ENCOUNTER    Room Number: 01/01  Date Seen: 9/20/2022  Time Seen: 19:42 EDT  PCP: Alla Beal MD    Historian: Patient      HISTORY OF PRESENT ILLNESS    Chief Complaint: Back pain    Context: Pro Mueller is a 67 y.o. male with PMHx of lumbar stenosis with radiculopathy who presents to the ED with c/o worsening low back pain.  He describes the pain as throbbing and is constant in nature.  It is made worse with movement.  He reports mild improvement with flexing his right knee.  He denies any urinary or bowel incontinence.  He has not had any new falls or injuries.  He was recently admitted to the hospital and discharged with pain medication which she has completed.  He states he slept through his pain management appointment recently and has been unable to reschedule.  He denies any fevers, IV drug use, previous surgical history to his back.  He is not taking any additional medications at home for his pain or using ice or heat.  He reports drinking alcohol daily.        MEDICAL RECORD REVIEW:    Reviewed in epic    PAST MEDICAL HISTORY    Active Ambulatory Problems     Diagnosis Date Noted   • Alcoholism in recovery ([facility]) 08/08/2016   • Atopic rhinitis 08/08/2016   • Mixed anxiety depressive disorder 08/08/2016   • Genital herpes simplex 08/08/2016   • Hyperlipidemia 08/08/2016   • Hypertension 08/08/2016   • Hypothyroidism 08/08/2016   • Insomnia 08/08/2016   • Panic disorder without agoraphobia 08/08/2016   • Persistent insomnia 08/08/2016   • Vitamin D deficiency 08/08/2016   • Chronic low back pain 11/11/2016   • Neuropathy involving both lower extremities 10/25/2018   • QT prolongation 01/03/2019   • Left shoulder pain 11/19/2019   • Hypokalemia 01/13/2020   • Pancytopenia ([facility]) 01/14/2020   • Left ventricular diastolic dysfunction 01/16/2021   • Tremor 10/06/2021   • C5 cervical fracture ([facility]) 11/09/2021   • Syncope and collapse 01/07/2022   • Neck pain 01/07/2022   • Alcoholic  intoxication with complication ([facility]) 03/13/2022   • Withdrawal symptoms, alcohol ([facility]) 07/01/2022   • Transaminitis 07/01/2022   • Lumbar facet arthropathy 07/20/2022   • Alcohol dependence with intoxication ([facility]) 09/11/2022   • Midline low back pain with right-sided sciatica 09/15/2022   • Spondylolisthesis at L4-L5 level 09/15/2022   • Lumbar canal stenosis 09/15/2022   • Alcohol cessation counseling 09/15/2022   • Need for assistance due to reduced mobility 09/15/2022   • Impaired mobility and ADLs 09/15/2022     Resolved Ambulatory Problems     Diagnosis Date Noted   • Accidental fall 08/08/2016   • Impacted cerumen 08/08/2016   • Influenza 08/08/2016   • Motion sickness 08/08/2016   • Seasonal allergic rhinitis 08/08/2016   • Upper respiratory tract infection 08/08/2016   • Alcohol withdrawal ([facility]) 11/04/2016   • Headache 11/05/2016   • Gastritis 11/05/2016   • Olecranon bursitis of right elbow 04/05/2017   • Sciatica of left side 06/12/2018   • Syncope and collapse 01/02/2019   • Nausea and vomiting 01/11/2020   • Alcoholic ketoacidosis 01/12/2020   • Hyponatremia 01/13/2020   • Alcohol dependence with uncomplicated withdrawal ([facility]) 01/14/2020   • Nephrolithiasis 02/12/2020   • Hypomagnesemia 01/16/2021   • Non-traumatic rhabdomyolysis 01/18/2021   • Urine retention 01/21/2021     Past Medical History:   Diagnosis Date   • Alcohol abuse    • Allergic 1970   • Anxiety    • Arthritis    • Depression    • Disease of thyroid gland    • Elevated cholesterol    • Encounter for removal of sutures    • GERD (gastroesophageal reflux disease)    • Headache, tension-type    • Kidney stone    • Migraine    • Olecranon bursitis, right elbow    • Peripheral neuropathy    • Sleep apnea          PAST SURGICAL HISTORY    Past Surgical History:   Procedure Laterality Date   • COLONOSCOPY     • CYST REMOVAL     • CYSTOSCOPY BLADDER STONE LITHOTRIPSY  02/2022   • EXTRACORPOREAL SHOCK WAVE LITHOTRIPSY (ESWL) Right 2002   •  "SHOULDER ARTHROSCOPY Right 2019    Procedure: SHOULDER ARTHROSCOPY, decompression, distal clavicle excision;  Surgeon: Aj Mancilla MD;  Location: [facility] OR [facility];  Service: Orthopedics   • TONSILLECTOMY           FAMILY HISTORY    Family History   Problem Relation Age of Onset   • Alzheimer's disease Mother    • Mental illness Mother    • Pancreatic cancer Father    • Hearing loss Father    • Malig Hyperthermia Neg Hx          SOCIAL HISTORY    Social History     Socioeconomic History   • Marital status: Single   Tobacco Use   • Smoking status: Former Smoker     Packs/day: 0.50     Years: 15.00     Pack years: 7.50     Types: Cigarettes     Start date:      Quit date:      Years since quittin.7   • Smokeless tobacco: Never Used   • Tobacco comment: caffeine - 3 cans of coke daily    Vaping Use   • Vaping Use: Never used   Substance and Sexual Activity   • Alcohol use: Not Currently     Alcohol/week: 15.0 standard drinks     Types: 15 Shots of liquor per week     Comment: went through detox last week 2022   • Drug use: Yes     Types: Methamphetamines     Comment: \"once in a rare while\"   • Sexual activity: Yes     Partners: Female     Birth control/protection: Condom         ALLERGIES    Penicillins      REVIEW OF SYSTEMS    Review of Systems   Constitutional: Negative for fever.   Respiratory: Negative for shortness of breath.    Cardiovascular: Negative for chest pain.   Gastrointestinal: Negative for nausea and vomiting.   Genitourinary: Negative for difficulty urinating.   Musculoskeletal: Positive for back pain. Negative for neck pain.       All systems reviewed and negative except those discussed in HPI.      PHYSICAL EXAM    ED Triage Vitals [22 1804]   Temp Heart Rate Resp BP SpO2   97 °F (36.1 °C) 84 17 158/84 96 %      Temp src Heart Rate Source Patient Position BP Location FiO2 (%)   Tympanic Monitor -- -- --       I have reviewed the triage vital signs and nursing " notes.    Constitutional: Well appearing, appears uncomfortable  Head: Atraumatic, normocephalic  Neck: No midline tenderness, Full painless ROM  Eyes: No scleral icterus, no scleral injection  ENT: Nares patent  CV: Regular rate, regular rhythm, distal pulses symmetric  Respiratory/Chest: No distress, CTAB, no chest wall tenderness  Abdomen: Abdomen soft, nontender  Back: Right-sided lumbar tenderness to palpation, no skin changes, decreased range of motion secondary to pain  Extremities: No deformity, soft compartments, no edema  Skin: Warm, dry, no rash  Neuro: A&Ox4, moves all extremities, follows commands, no focal deficits  Psych: Normal mood        LAB RESULTS    No results found for this or any previous visit (from the past 24 hour(s)).      RADIOLOGY RESULTS    No Radiology Exams Resulted Within Past 24 Hours      PROCEDURES    None      MEDICATIONS GIVEN IN ER    Medications   naproxen (NAPROSYN) tablet 500 mg (500 mg Oral Given 9/19/22 2002)         PROGRESS, CONSULTS, and MEDICAL DECISION MAKING    Patient here for evaluation of back pain.  Recently admitted for same.  Missed pain management appointment due to sleeping through it.  Has not taken any over-the-counter medications or used topicals, ice, heat for pain at home.  Alcohol abuse however no signs or symptoms of severe withdrawal at this time.  Not comfortable placing patient on any type of narcotic given history of alcohol use.  He needs to follow-up with pain management and his primary care provider.  No red flag symptoms.  No new falls or injuries.                 DIAGNOSIS  Final diagnoses:   Right lumbar radiculopathy       DISPOSITION  ED Disposition     ED Disposition   Discharge    Condition   Stable    Comment   --             FOLLOW UP  Alla Beal MD  [address]  [place] [address]  [phone]    Schedule an appointment as soon as possible for a visit       Your pain management provider          [facility]  [place] [facility]  [address]  [facility] [address] [phone]    As needed, If symptoms worsen      DISCHARGE RX     Medication List      New Prescriptions    lidocaine 5 %  Commonly known as: LIDODERM  Place 1 patch on the skin as directed by provider Daily. Remove & Discard patch within 12 hours or as directed by MD           Where to Get Your Medications      These medications were sent to [facility] #[id number] - [place], [place] - [address] AT [facility] - [phone]  - [phone] FX  [address], [facility] [address]    Phone: [phone]   · lidocaine 5 %             Patient was placed in face mask in first look. Patient was wearing facemask when I entered the room and throughout our encounter. I wore full protective equipment throughout this patient encounter including a face mask, and gloves. Hand hygiene was performed before donning protective equipment and after removal when leaving the room.    Dictated utilizing Dragon dictation.      Note Disclaimer: At [facility], we believe that sharing information builds trust and better relationships. You are receiving this note because you recently visited [facility]. It is possible you will see health information before a provider has talked with you about it. This kind of information can be easy to misunderstand. To help you fully understand what it means for your health, we urge you to discuss this note with your provider.           Sarah Barrientos PA  09/20/22 0015

## 2022-09-20 ENCOUNTER — TELEPHONE (OUTPATIENT)
Dept: FAMILY MEDICINE CLINIC | Facility: CLINIC | Age: 67
End: 2022-09-20

## 2022-09-20 NOTE — TELEPHONE ENCOUNTER
Caller: Jeny Crump     Relationship: SISTER     Best call back number: [phone]    What is your medical concern? CONCERNED WITH [facility]ERS [facility], HE IS IN SO MUCH PAIN HE CANNOT GET UP TO ANSWER THE DOOR. WOULD LIKE TO KNOW IF HOME HEALTH CAN BE SCHEDULED UNTIL HIS SURGERY. HE IS NOT ABLE TO ORDER FOOD BECAUSE HE CANNOT GET TO THE DOOR EITHER. IS NOT SCHEDULED FOR SURGERY UNTIL MID October. HE ALSO MENTIONED TO HER THAT DR LLAMAS WAS GOING TO ADD HIM TO A TRANSPORTATION LIST.     How long has this issue been going on? ONGOING     Is your provider already aware of this issue? YES     Have you been treated for this issue? YES

## 2022-09-21 NOTE — TELEPHONE ENCOUNTER
Shanita Palafox with care services called pt and gave him the information for transportation. Pt has to call for the transportation giving service dates and times.   I will order home health. Home health can not help him with meals and food services.   I suggest she also call the surgeon to see about his pain management and if surgery can be done any sooner.

## 2022-09-22 ENCOUNTER — PATIENT OUTREACH (OUTPATIENT)
Dept: CASE MANAGEMENT | Facility: OTHER | Age: 67
End: 2022-09-22

## 2022-09-22 NOTE — OUTREACH NOTE
Care Coordination    MSW follow-up with Dayan Nair with [facility] regarding referral sent on 9/2 for transportation services through [facility]. Dayan states she will have [facility] staff follow-up with patient tomorrow regarding referral. MSW follow-up with patient next week.    THUY JIMENEZ -   Ambulatory Case Management    9/22/2022, 14:57 EDT

## 2022-09-23 ENCOUNTER — HOSPITAL ENCOUNTER (EMERGENCY)
Facility: HOSPITAL | Age: 67
Discharge: HOME | End: 2022-09-24
Attending: EMERGENCY MEDICINE | Admitting: EMERGENCY MEDICINE

## 2022-09-23 ENCOUNTER — OFFICE VISIT (OUTPATIENT)
Dept: FAMILY MEDICINE CLINIC | Facility: CLINIC | Age: 67
End: 2022-09-23

## 2022-09-23 DIAGNOSIS — M54.41 CHRONIC RIGHT-SIDED LOW BACK PAIN WITH RIGHT-SIDED SCIATICA: Primary | ICD-10-CM

## 2022-09-23 DIAGNOSIS — M54.41 MIDLINE LOW BACK PAIN WITH RIGHT-SIDED SCIATICA, UNSPECIFIED CHRONICITY: ICD-10-CM

## 2022-09-23 DIAGNOSIS — M48.062 SPINAL STENOSIS OF LUMBAR REGION WITH NEUROGENIC CLAUDICATION: ICD-10-CM

## 2022-09-23 DIAGNOSIS — G89.29 CHRONIC RIGHT-SIDED LOW BACK PAIN WITH RIGHT-SIDED SCIATICA: Primary | ICD-10-CM

## 2022-09-23 DIAGNOSIS — M47.816 LUMBAR FACET ARTHROPATHY: ICD-10-CM

## 2022-09-23 DIAGNOSIS — F10.920 ALCOHOLIC INTOXICATION WITHOUT COMPLICATION: ICD-10-CM

## 2022-09-23 DIAGNOSIS — M43.16 SPONDYLOLISTHESIS AT L4-L5 LEVEL: ICD-10-CM

## 2022-09-23 PROCEDURE — 25010000002 KETOROLAC TROMETHAMINE PER 15 MG: Performed by: PHYSICIAN ASSISTANT

## 2022-09-23 PROCEDURE — 96374 THER/PROPH/DIAG INJ IV PUSH: CPT

## 2022-09-23 PROCEDURE — 99284 EMERGENCY DEPT VISIT MOD MDM: CPT

## 2022-09-23 PROCEDURE — 99442 PR PHYS/QHP TELEPHONE EVALUATION 11-20 MIN: CPT | Performed by: FAMILY MEDICINE

## 2022-09-23 RX ORDER — HYDROCODONE BITARTRATE AND ACETAMINOPHEN 5; 325 MG/1; MG/1
2 TABLET ORAL EVERY 6 HOURS PRN
Qty: 40 TABLET | Refills: 0 | Status: SHIPPED | OUTPATIENT
Start: 2022-09-23 | End: 2022-10-20 | Stop reason: SDUPTHER

## 2022-09-23 RX ORDER — LIDOCAINE 50 MG/G
1 PATCH TOPICAL ONCE
Status: DISCONTINUED | OUTPATIENT
Start: 2022-09-23 | End: 2022-09-24 | Stop reason: HOSPADM

## 2022-09-23 RX ORDER — BACLOFEN 10 MG/1
10 TABLET ORAL 3 TIMES DAILY PRN
Qty: 30 TABLET | Refills: 1 | Status: SHIPPED | OUTPATIENT
Start: 2022-09-23 | End: 2023-03-14 | Stop reason: SDUPTHER

## 2022-09-23 RX ORDER — METHOCARBAMOL 750 MG/1
750 TABLET, FILM COATED ORAL ONCE
Status: COMPLETED | OUTPATIENT
Start: 2022-09-23 | End: 2022-09-23

## 2022-09-23 RX ORDER — KETOROLAC TROMETHAMINE 15 MG/ML
15 INJECTION, SOLUTION INTRAMUSCULAR; INTRAVENOUS ONCE
Status: COMPLETED | OUTPATIENT
Start: 2022-09-23 | End: 2022-09-23

## 2022-09-23 RX ORDER — AMOXICILLIN 250 MG
1 CAPSULE ORAL 2 TIMES DAILY PRN
Qty: 40 TABLET | Refills: 0 | Status: SHIPPED | OUTPATIENT
Start: 2022-09-23

## 2022-09-23 RX ADMIN — KETOROLAC TROMETHAMINE 15 MG: 15 INJECTION, SOLUTION INTRAMUSCULAR; INTRAVENOUS at 20:41

## 2022-09-23 RX ADMIN — METHOCARBAMOL TABLETS 750 MG: 750 TABLET, COATED ORAL at 20:46

## 2022-09-23 RX ADMIN — SODIUM CHLORIDE 1000 ML: 9 INJECTION, SOLUTION INTRAVENOUS at 20:40

## 2022-09-23 RX ADMIN — LIDOCAINE 1 PATCH: 50 PATCH TOPICAL at 20:43

## 2022-09-24 VITALS
HEIGHT: 72 IN | SYSTOLIC BLOOD PRESSURE: 157 MMHG | OXYGEN SATURATION: 97 % | DIASTOLIC BLOOD PRESSURE: 91 MMHG | BODY MASS INDEX: 25.73 KG/M2 | TEMPERATURE: 98.8 F | RESPIRATION RATE: 16 BRPM | HEART RATE: 65 BPM | WEIGHT: 190 LBS

## 2022-09-24 NOTE — ED PROVIDER NOTES
" EMERGENCY DEPARTMENT ENCOUNTER    Room Number:  05/05  Date of encounter:  9/23/2022  PCP: Alla Beal MD  Historian: Patient  Full history not obtainable due to: None    HPI:  Chief Complaint: Hip pain    Context: Pro Mueller is a 67 y.o. male with a PMH significant for hyperlipidemia, alcohol abuse, peripheral neuropathy, anxiety, chronic low back pain who presents to the ED c/o right-sided low back pain that he describes as aching and radiating down the posterior aspect of the right leg.  The patient has a known history of chronic low back pain and he states that the symptoms today are identical to his previous flareups of back pain.  States that he saw his primary care doctor today around 1 PM and after leaving the office he started drinking alcohol through the afternoon and evening to deal with his pain.  He finally called EMS this evening because he became \"overwhelmed\".  States that his symptoms are worse with range of motion of the low back.  He is able to stand and ambulate without assistance.  He denies numbness or weakness to the extremities, bowel or bladder incontinence, saddle paresthesias.      MEDICAL RECORD REVIEW:    Upon review of the medical record it appears the patient was in the office with his family medicine provider today for midline low back pain with right-sided sciatica.  He also was in the emergency department here 4 days ago for right-sided lumbar radiculopathy.      PAST MEDICAL HISTORY    Active Ambulatory Problems     Diagnosis Date Noted   • Alcoholism in recovery ([facility]) 08/08/2016   • Atopic rhinitis 08/08/2016   • Mixed anxiety depressive disorder 08/08/2016   • Genital herpes simplex 08/08/2016   • Hyperlipidemia 08/08/2016   • Hypertension 08/08/2016   • Hypothyroidism 08/08/2016   • Insomnia 08/08/2016   • Panic disorder without agoraphobia 08/08/2016   • Persistent insomnia 08/08/2016   • Vitamin D deficiency 08/08/2016   • Chronic low back pain 11/11/2016   • Neuropathy " involving both lower extremities 10/25/2018   • QT prolongation 01/03/2019   • Left shoulder pain 11/19/2019   • Hypokalemia 01/13/2020   • Pancytopenia ([facility]) 01/14/2020   • Left ventricular diastolic dysfunction 01/16/2021   • Tremor 10/06/2021   • C5 cervical fracture ([facility]) 11/09/2021   • Syncope and collapse 01/07/2022   • Neck pain 01/07/2022   • Alcoholic intoxication with complication ([facility]) 03/13/2022   • Withdrawal symptoms, alcohol ([facility]) 07/01/2022   • Transaminitis 07/01/2022   • Lumbar facet arthropathy 07/20/2022   • Alcohol dependence with intoxication ([facility]) 09/11/2022   • Midline low back pain with right-sided sciatica 09/15/2022   • Spondylolisthesis at L4-L5 level 09/15/2022   • Lumbar canal stenosis 09/15/2022   • Alcohol cessation counseling 09/15/2022   • Need for assistance due to reduced mobility 09/15/2022   • Impaired mobility and ADLs 09/15/2022     Resolved Ambulatory Problems     Diagnosis Date Noted   • Accidental fall 08/08/2016   • Impacted cerumen 08/08/2016   • Influenza 08/08/2016   • Motion sickness 08/08/2016   • Seasonal allergic rhinitis 08/08/2016   • Upper respiratory tract infection 08/08/2016   • Alcohol withdrawal ([facility]) 11/04/2016   • Headache 11/05/2016   • Gastritis 11/05/2016   • Olecranon bursitis of right elbow 04/05/2017   • Sciatica of left side 06/12/2018   • Syncope and collapse 01/02/2019   • Nausea and vomiting 01/11/2020   • Alcoholic ketoacidosis 01/12/2020   • Hyponatremia 01/13/2020   • Alcohol dependence with uncomplicated withdrawal ([facility]) 01/14/2020   • Nephrolithiasis 02/12/2020   • Hypomagnesemia 01/16/2021   • Non-traumatic rhabdomyolysis 01/18/2021   • Urine retention 01/21/2021     Past Medical History:   Diagnosis Date   • Alcohol abuse    • Allergic 1970   • Anxiety    • Arthritis    • Depression    • Disease of thyroid gland    • Elevated cholesterol    • Encounter for removal of sutures    • GERD (gastroesophageal reflux disease)    • Headache,  "tension-type    • Kidney stone    • Migraine    • Olecranon bursitis, right elbow    • Peripheral neuropathy    • Sleep apnea          PAST SURGICAL HISTORY  Past Surgical History:   Procedure Laterality Date   • COLONOSCOPY     • CYST REMOVAL     • CYSTOSCOPY BLADDER STONE LITHOTRIPSY  2022   • EXTRACORPOREAL SHOCK WAVE LITHOTRIPSY (ESWL) Right    • SHOULDER ARTHROSCOPY Right 2019    Procedure: SHOULDER ARTHROSCOPY, decompression, distal clavicle excision;  Surgeon: Aj Mancilla MD;  Location: [facility] [place];  Service: Orthopedics   • TONSILLECTOMY           FAMILY HISTORY  Family History   Problem Relation Age of Onset   • Alzheimer's disease Mother    • Mental illness Mother    • Pancreatic cancer Father    • Hearing loss Father    • Malig Hyperthermia Neg Hx          SOCIAL HISTORY  Social History     Socioeconomic History   • Marital status: Single   Tobacco Use   • Smoking status: Former Smoker     Packs/day: 0.50     Years: 15.00     Pack years: 7.50     Types: Cigarettes     Start date:      Quit date:      Years since quittin.7   • Smokeless tobacco: Never Used   • Tobacco comment: caffeine - 3 cans of coke daily    Vaping Use   • Vaping Use: Never used   Substance and Sexual Activity   • Alcohol use: Not Currently     Alcohol/week: 15.0 standard drinks     Types: 15 Shots of liquor per week     Comment: went through detox last week 2022   • Drug use: Yes     Types: Methamphetamines     Comment: \"once in a rare while\"   • Sexual activity: Yes     Partners: Female     Birth control/protection: Condom         ALLERGIES  Penicillins        REVIEW OF SYSTEMS    All systems reviewed and marked as negative except as listed in HPI     PHYSICAL EXAM    I have reviewed the triage vital signs and nursing notes.    ED Triage Vitals [22]   Temp Heart Rate Resp BP SpO2   98.8 °F (37.1 °C) 84 18 (!) 184/106 95 %      Temp src Heart Rate Source Patient Position BP Location FiO2 " (%)   Oral Monitor -- -- --       Physical Exam  Constitutional:       General: He is not in acute distress.     Appearance: He is well-developed.   HENT:      Head: Normocephalic and atraumatic.   Eyes:      General: No scleral icterus.     Conjunctiva/sclera: Conjunctivae normal.   Neck:      Trachea: No tracheal deviation.   Cardiovascular:      Rate and Rhythm: Normal rate and regular rhythm.      Pulses:           Dorsalis pedis pulses are 2+ on the right side and 2+ on the left side.   Pulmonary:      Effort: Pulmonary effort is normal.      Breath sounds: Normal breath sounds.   Abdominal:      Palpations: Abdomen is soft.      Tenderness: There is no abdominal tenderness. There is no guarding.   Musculoskeletal:         General: No deformity.      Cervical back: Normal range of motion.      Lumbar back: Tenderness present. No bony tenderness. Normal range of motion. Positive right straight leg raise test.   Lymphadenopathy:      Cervical: No cervical adenopathy.   Skin:     General: Skin is warm and dry.   Neurological:      Mental Status: He is alert and oriented to person, place, and time.      Sensory: Sensation is intact.      Motor: Motor function is intact.   Psychiatric:         Speech: Speech is slurred.         Behavior: Behavior normal.         Vital signs and nursing notes reviewed.            LAB RESULTS  No results found for this or any previous visit (from the past 24 hour(s)).    Ordered the above labs and independently reviewed the results.        RADIOLOGY  No Radiology Exams Resulted Within Past 24 Hours    I ordered the above noted radiological studies. Independently reviewed by me and discussed with radiologist.  See dictation above for official radiology interpretation.      PROCEDURES    Procedures        MEDICATIONS GIVEN IN ER    Medications - No data to display      PROGRESS, DATA ANALYSIS, CONSULTS, AND MEDICAL DECISION MAKING    All labs have been independently reviewed by me.  All  radiology studies have been reviewed by me.   EKG's independently reviewed by me.  Discussion below represents my analysis of pertinent findings related to patient's condition, differential diagnosis, treatment plan and final disposition.    DIFFERENTIAL DIAGNOSIS INCLUDE BUT NOT LIMITED TO:     Lumbar spondylosis, lumbar bulging disc, chronic low back pain, alcohol intoxication         AS OF 20:16 EDT VITALS:    BP - (!) 184/106  HR - 84  TEMP - 98.8 °F (37.1 °C) (Oral)  02 SATS - 95%        DIAGNOSIS  Final diagnoses:   Chronic right-sided low back pain with right-sided sciatica   Alcoholic intoxication without complication ([facility])         DISPOSITION  D/c    Pt masked in first look. I wore a surgical mask throughout my encounters with the pt. I performed hand hygiene on entry into the pt room and upon exit.     Dictated utilizing Dragon dictation     Note Disclaimer: At [facility], we believe that sharing information builds trust and better relationships. You are receiving this note because you recently visited [facility]. It is possible you will see health information before a provider has talked with you about it. This kind of information can be easy to misunderstand. To help you fully understand what it means for your health, we urge you to discuss this note with your provider.      Shilo Guillermo PA  09/24/22 2100

## 2022-09-24 NOTE — ED PROVIDER NOTES
MD ATTESTATION NOTE    The LUBA and I have discussed this patient's history, physical exam, and treatment plan.  I have reviewed the documentation and personally had a face to face interaction with the patient. I affirm the documentation and agree with the treatment and plan.  The attached note describes my personal findings.    I provided a substantive portion of the care of this patient. I personally performed the physical exam, in its entirety.    History  67-year-old male with history of multiple medical problems returns the ED with back pain and alcohol usage.    Physical Exam  Vital Signs reviewed  GENERAL: Alert male in no obvious distress.  Triage vitals reviewed  HENT: nares patent  EYES: no scleral icterus  CV: regular rhythm, regular rate  RESPIRATORY: normal effort  ABDOMEN: soft  MUSCULOSKELETAL: Back-mild diffuse lumbar tenderness  NEURO: Strength sensation and coordination are grossly intact.  Speech and mentation are unremarkable  SKIN: warm, dry      Disposition  I discussed treatment and evaluation this patient with ONEIDA Guillermo.  Patient seen in the ED several days ago with lumbar radiculopathy.  He is also been seen by primary care provider.  At this point I do not feel that he needs further evaluation.  Patient does need to avoid alcohol.  Needs to follow-up with pain management.  Patient did have MRI of the back less than 1 month ago.         Shilo Hauser MD  09/24/22 0227

## 2022-09-25 NOTE — TELEPHONE ENCOUNTER
Because he has not been seen for low back pain here or had any imaging that is where he needs to start. A referral to the spine doctor will not go through until he has started his work up with primary care.   
Patient called stating he has been having lower back problems. He is seeking a ref for Doctor Harley.     Patient [phone]   
Pt has been scheduled with APRN.   
mild accessory muscle use

## 2022-09-27 ENCOUNTER — READMISSION MANAGEMENT (OUTPATIENT)
Dept: CALL CENTER | Facility: HOSPITAL | Age: 67
End: 2022-09-27

## 2022-09-27 NOTE — OUTREACH NOTE
Medical Week 2 Survey    Flowsheet Row Responses   [facility] [place] patient discharged from? [place]   Does the patient have one of the following disease processes/diagnoses(primary or secondary)? Other   Week 2 attempt successful? No   Unsuccessful attempts Attempt 1          HARJIT ZARATE - Licensed Nurse

## 2022-09-28 ENCOUNTER — PATIENT OUTREACH (OUTPATIENT)
Dept: CASE MANAGEMENT | Facility: OTHER | Age: 67
End: 2022-09-28

## 2022-09-28 NOTE — OUTREACH NOTE
Care Coordination    MSW review of referral in [facility]. Patient was left message by [facility] staff member Opal Barth yesterday and information regarding [facility] was mailed to patient's home address.    Patient Outreach    MSW outreach to patient to discuss transportation resources and provide patient information on how to reach [facility] for follow-up. Patient requested that [facility] information is mailed to his e-mail address [email or web address]. MSW sent phone number and website for [facility]. Patient will follow-up with MSW as needed.    THUY JIMENEZ -   Ambulatory Case Management    9/28/2022, 11:23 EDT

## 2022-09-29 ENCOUNTER — READMISSION MANAGEMENT (OUTPATIENT)
Dept: CALL CENTER | Facility: HOSPITAL | Age: 67
End: 2022-09-29

## 2022-09-29 ENCOUNTER — TELEPHONE (OUTPATIENT)
Dept: FAMILY MEDICINE CLINIC | Facility: CLINIC | Age: 67
End: 2022-09-29

## 2022-09-29 NOTE — OUTREACH NOTE
Medical Week 2 Survey    Flowsheet Row Responses   [facility] patient discharged from? [place]   Does the patient have one of the following disease processes/diagnoses(primary or secondary)? Other   Week 2 attempt successful? No   Unsuccessful attempts Attempt 2          FENG CANTU - Registered Nurse

## 2022-09-29 NOTE — TELEPHONE ENCOUNTER
Called and spoke with pt, stated he needed a mychart msg for the BRADLEY mj, sent him [employer] message of BRADLEY number.

## 2022-09-29 NOTE — TELEPHONE ENCOUNTER
Caller: Pro Mueller    Relationship: Self    Best call back number: [phone]       What is the best time to reach you: ANY    Who are you requesting to speak with (clinical staff, provider,  specific staff member): CLINICAL    Do you know the name of the person who called:     What was the call regarding: PATIENT CALLED STATING THAT HE NEEDS PCP GLADYS LLAMAS TO SEND DOCUMENTS TO HIS  REGARDING HIS ER VISIT ON 9/11/22. PATIENT NEEDS THE FORMS TO SHOW THAT HE WAS ADMITTED INTO THE HOSPITAL AND IT HAD TO DO WITH ALCOHOL ADDITION. HIS  NAME IS   WILLOW PUCKETT  [address] [place] [address] [address], [place] [address]  OFFICE Phone: [phone]  MOBILE PHONE:[phone]  FAX #: [phone]    Do you require a callback: YES

## 2022-09-30 ENCOUNTER — TELEPHONE (OUTPATIENT)
Dept: FAMILY MEDICINE CLINIC | Facility: CLINIC | Age: 67
End: 2022-09-30

## 2022-09-30 NOTE — TELEPHONE ENCOUNTER
Caller: Jeny Crump    Relationship to patient: Emergency Contact    Best call back number: [phone]    Patient is needing: PATIENT'S SISTER IS CONCERNED ABOUT HIS WELL-BEING    SHE SAID HE IS HAVING A LOT OF DIFFICULTY GETTING AROUND BECAUSE IS IN A LOT OF PAIN AND CAN'T EVEN HARDLY GET TO THE BATHROOM    SHE SAID THAT HE TOLD HER DR. LLAMAS HAD SAID SOMETHING ABOUT TRYING TO MOVE HIS SURGERY UP TO A SOONER DATE BUT SHE IS NOT SURE     SHE IS WANTING TO SEE IF THERE IS A  VERBAL ON FILE THAT HAS HER NAME ON IT, WHERE SHE CAN COMMUNICATE WITH DR. LLAMAS ON HIS BEHALF     SHE IS WORRIED HE IS GOING TO END UP IN A FACILITY OR SOMEWHERE THAT HE CAN BE CARED FOR 24/7 AND SHE IS TRYING TO HELP HIM MAINTAIN HIS WAY OF LIFE FOR RIGHT NOW     REQUESTED CALLBACK

## 2022-10-05 ENCOUNTER — TRANSCRIBE ORDERS (OUTPATIENT)
Dept: SURGERY | Facility: SURGERY CENTER | Age: 67
End: 2022-10-05

## 2022-10-05 DIAGNOSIS — Z41.9 SURGERY, ELECTIVE: Primary | ICD-10-CM

## 2022-10-12 ENCOUNTER — HOSPITAL ENCOUNTER (OUTPATIENT)
Dept: GENERAL RADIOLOGY | Facility: SURGERY CENTER | Age: 67
Setting detail: HOSPITAL OUTPATIENT SURGERY
End: 2022-10-12

## 2022-10-12 ENCOUNTER — HOSPITAL ENCOUNTER (OUTPATIENT)
Facility: SURGERY CENTER | Age: 67
Setting detail: HOSPITAL OUTPATIENT SURGERY
Discharge: HOME | End: 2022-10-12
Attending: ANESTHESIOLOGY | Admitting: ANESTHESIOLOGY

## 2022-10-12 VITALS
HEART RATE: 85 BPM | SYSTOLIC BLOOD PRESSURE: 118 MMHG | RESPIRATION RATE: 20 BRPM | OXYGEN SATURATION: 93 % | TEMPERATURE: 97.5 F | DIASTOLIC BLOOD PRESSURE: 79 MMHG

## 2022-10-12 DIAGNOSIS — Z41.9 SURGERY, ELECTIVE: ICD-10-CM

## 2022-10-12 PROCEDURE — 77002 NEEDLE LOCALIZATION BY XRAY: CPT

## 2022-10-20 ENCOUNTER — APPOINTMENT (OUTPATIENT)
Dept: GENERAL RADIOLOGY | Facility: SURGERY CENTER | Age: 67
End: 2022-10-20

## 2022-10-20 DIAGNOSIS — M47.816 LUMBAR FACET ARTHROPATHY: ICD-10-CM

## 2022-10-20 DIAGNOSIS — M54.41 MIDLINE LOW BACK PAIN WITH RIGHT-SIDED SCIATICA, UNSPECIFIED CHRONICITY: ICD-10-CM

## 2022-10-20 DIAGNOSIS — M48.062 SPINAL STENOSIS OF LUMBAR REGION WITH NEUROGENIC CLAUDICATION: ICD-10-CM

## 2022-10-20 DIAGNOSIS — M43.16 SPONDYLOLISTHESIS AT L4-L5 LEVEL: ICD-10-CM

## 2022-10-20 NOTE — TELEPHONE ENCOUNTER
Caller: Pro Mueller    Relationship: Self    Best call back number:[phone]     Requested Prescriptions:   Requested Prescriptions     Pending Prescriptions Disp Refills   • HYDROcodone-acetaminophen (NORCO) 5-325 MG per tablet 40 tablet 0     Sig: Take 2 tablets by mouth Every 6 (Six) Hours As Needed for Moderate Pain.        Pharmacy where request should be sent: [facility] #[id number] [place], [place] - [address] AT [facility] - [phone] [facility] [phone] FX     Additional details provided by patient: PATIENT HAS TWO TABLETS LEFT FOR TODAY     Does the patient have less than a 3 day supply:  [x] Yes  [] No    Anthony Barajas Rep   10/20/22 12:56 EDT

## 2022-10-21 NOTE — TELEPHONE ENCOUNTER
Rx Refill Note  Requested Prescriptions     Pending Prescriptions Disp Refills   • HYDROcodone-acetaminophen (NORCO) 5-325 MG per tablet 40 tablet 0     Sig: Take 2 tablets by mouth Every 6 (Six) Hours As Needed for Moderate Pain.      Last office visit with prescribing clinician: 9/23/2022      Next office visit with prescribing clinician: 11/10/2022            Debbie Toledo LPN  10/21/22, 07:23 EDT

## 2022-10-24 NOTE — TELEPHONE ENCOUNTER
Rx Refill Note  Requested Prescriptions     Pending Prescriptions Disp Refills   • HYDROcodone-acetaminophen (NORCO) 5-325 MG per tablet 40 tablet 0     Sig: Take 2 tablets by mouth Every 6 (Six) Hours As Needed for Moderate Pain.      Last office visit with prescribing clinician: 9/23/2022      Next office visit with prescribing clinician: 11/10/2022            Debbie Toledo LPN  10/24/22, 14:16 EDT

## 2022-10-24 NOTE — TELEPHONE ENCOUNTER
Caller: Herb Mueller    Relationship to patient: Self    Best call back number: [phone]     Patient is needing:     TRCIIA IS REQUESTING STATUS ON MEDICATION REFILL REQUESTED LAST WEEK.OUT OF MEDICATION AS OF 10/21/2022, HE WOULD LIKE TO GET REFILL UNTIL HE CAN GET HIS SPINAL ABLATION .    HERB WOULD LIKE A CALL BACK IF THERE IS A PROBLEM REFILLING       HYDROcodone-acetaminophen (NORCO) 5-325 MG per tablet  2 tablet, Every 6 Hours PRN 0 ordered  EditCancel Reorder       Summary: Take 2 tablets by mouth Every 6 (Six) Hours As Needed for Moderate Pain., Starting Fri 9/23/2022, [facility] #[id number] [facility] [address] AT [facility] - [phone]  - [phone]   [phone]

## 2022-10-25 RX ORDER — HYDROCODONE BITARTRATE AND ACETAMINOPHEN 5; 325 MG/1; MG/1
2 TABLET ORAL EVERY 6 HOURS PRN
Qty: 25 TABLET | Refills: 0 | Status: SHIPPED | OUTPATIENT
Start: 2022-10-25 | End: 2022-11-28 | Stop reason: SDUPTHER

## 2022-10-25 NOTE — TELEPHONE ENCOUNTER
I communicated directly with pain management Dr. Fortune who scheduled pt for the procedure as soon as provider returned to the office and looks like pt did not have it done on that date after needing as soon as possible. Also have found service for pt to call for a ride that he needs for the procedure through social care services. Pt will need to communicate with pain management directly to get another procedure date scheduled. I will give one more temporary fill.

## 2022-11-08 ENCOUNTER — TELEPHONE (OUTPATIENT)
Dept: FAMILY MEDICINE CLINIC | Facility: CLINIC | Age: 67
End: 2022-11-08

## 2022-11-08 NOTE — TELEPHONE ENCOUNTER
Called patient in regards to 11/10/22 appointment, needing to change visit to video visit as Dr Lian gilmore be out of office. LVM for patient. Can either direct patient over to me to change appointment on 11/10 to video visit or needs to reschedule to different day if he cant do video. Thanks

## 2022-11-28 DIAGNOSIS — M48.062 SPINAL STENOSIS OF LUMBAR REGION WITH NEUROGENIC CLAUDICATION: ICD-10-CM

## 2022-11-28 DIAGNOSIS — M54.41 MIDLINE LOW BACK PAIN WITH RIGHT-SIDED SCIATICA, UNSPECIFIED CHRONICITY: ICD-10-CM

## 2022-11-28 DIAGNOSIS — M43.16 SPONDYLOLISTHESIS AT L4-L5 LEVEL: ICD-10-CM

## 2022-11-28 DIAGNOSIS — M47.816 LUMBAR FACET ARTHROPATHY: ICD-10-CM

## 2022-11-29 ENCOUNTER — TELEPHONE (OUTPATIENT)
Dept: FAMILY MEDICINE CLINIC | Facility: CLINIC | Age: 67
End: 2022-11-29

## 2022-11-29 RX ORDER — HYDROCODONE BITARTRATE AND ACETAMINOPHEN 5; 325 MG/1; MG/1
1-2 TABLET ORAL EVERY 8 HOURS PRN
Qty: 25 TABLET | Refills: 0 | Status: SHIPPED | OUTPATIENT
Start: 2022-11-29 | End: 2023-02-20 | Stop reason: HOSPADM

## 2022-11-29 NOTE — TELEPHONE ENCOUNTER
I called number in the chart [phone] and left a voicemail.  I was checking in with patient because of a refill request for pain medicine and he was supposed to have a procedure with his pain management team.  I discussed with his pain management provider who scheduled him shortly thereafter when he was back in the office and had found transportation patient needed for this procedure but it was not performed.  I cannot tell in the chart at this point with the follow-up is in need to check in with patient on his pain and plan moving forward.  I asked the patient to please send me a Enswers message or call and leave a message with an update on how he is doing and plans for epidural at this point.

## 2022-11-29 NOTE — TELEPHONE ENCOUNTER
Pro Mueller called he stated he had lost his job and health insurance the reason he cancelled the procedure w/ Dr. Fortune. He applied for Medicare waiting on the approval. He was wanting to see if he could get his pain medication refilled to hold him over.

## 2022-12-20 ENCOUNTER — TELEPHONE (OUTPATIENT)
Dept: FAMILY MEDICINE CLINIC | Facility: CLINIC | Age: 67
End: 2022-12-20

## 2022-12-20 NOTE — TELEPHONE ENCOUNTER
Recommend a bland diet, advance as tolerated, increase fluids. If he is having nausea, can send in nausea medication.

## 2022-12-20 NOTE — TELEPHONE ENCOUNTER
Caller: Pro Mueller    Relationship to patient: Self    Best call back number:     Patient is needing: PATIENT STATES THAT FOR THE PAST 48 HOURS, HE HAS HAD AN UPSET STOMACH, GAS AND DIARRHEA AND WOULD LIKE TO SEE IF DR. LLAMAS WILL CALL SOMETHING IN TO HIS PHARMACY.     PATIENT WOULD LIKE IT CALLED IN TO [facility] LISTED ON HIS PROFILE.   PATIENT STATES HE IS HAVING CELL PHONE ISSUES IN THE EVENT THE OFFICE CANNOT REACH HIM.

## 2022-12-21 RX ORDER — ONDANSETRON 4 MG/1
4 TABLET, FILM COATED ORAL EVERY 8 HOURS PRN
Qty: 20 TABLET | Refills: 0 | Status: SHIPPED | OUTPATIENT
Start: 2022-12-21

## 2022-12-21 NOTE — TELEPHONE ENCOUNTER
PT STATES HE IS HAVING NAUSEA AND WOULD LIKE A NAUSEA MEDICATION SENT TO HIS PHARMACY ON FILE. INFORMED PT TO KEEP A BLAND DIET AND DRINK PLENTY OF FLUIDS. 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     PLEASE ADVISE. THANK YOU.

## 2023-01-25 RX ORDER — ONDANSETRON 4 MG/1
TABLET, FILM COATED ORAL
Qty: 20 TABLET | Refills: 0 | OUTPATIENT
Start: 2023-01-25

## 2023-01-25 NOTE — TELEPHONE ENCOUNTER
Rx Refill Note  Requested Prescriptions     Pending Prescriptions Disp Refills   • ondansetron (ZOFRAN) 4 MG tablet [Pharmacy Med Name: ONDANSETRON 4MG TABLETS] 20 tablet 0     Sig: TAKE 1 TABLET BY MOUTH EVERY 8 HOURS AS NEEDED FOR NAUSEA OR VOMITING      Last office visit with prescribing clinician: 8/26/2021   Last telemedicine visit with prescribing clinician: Visit date not found   Next office visit with prescribing clinician: Visit date not found                         Would you like a call back once the refill request has been completed: [] Yes [] No    If the office needs to give you a call back, can they leave a voicemail: [] Yes [] No    Debbie Toledo LPN  01/25/23, 08:46 EST

## 2023-02-18 ENCOUNTER — APPOINTMENT (OUTPATIENT)
Dept: CT IMAGING | Facility: HOSPITAL | Age: 68
DRG: 642 | End: 2023-02-18
Payer: MEDICARE

## 2023-02-18 ENCOUNTER — APPOINTMENT (OUTPATIENT)
Dept: GENERAL RADIOLOGY | Facility: HOSPITAL | Age: 68
DRG: 642 | End: 2023-02-18
Payer: MEDICARE

## 2023-02-18 ENCOUNTER — HOSPITAL ENCOUNTER (INPATIENT)
Facility: HOSPITAL | Age: 68
LOS: 2 days | Discharge: HOME | DRG: 642 | End: 2023-02-20
Attending: EMERGENCY MEDICINE | Admitting: STUDENT IN AN ORGANIZED HEALTH CARE EDUCATION/TRAINING PROGRAM
Payer: MEDICARE

## 2023-02-18 DIAGNOSIS — S39.012A LUMBAR STRAIN, INITIAL ENCOUNTER: ICD-10-CM

## 2023-02-18 DIAGNOSIS — S16.1XXA CERVICAL STRAIN, ACUTE, INITIAL ENCOUNTER: ICD-10-CM

## 2023-02-18 DIAGNOSIS — S29.012A STRAIN OF THORACIC BACK REGION: ICD-10-CM

## 2023-02-18 DIAGNOSIS — F10.930 ALCOHOL WITHDRAWAL SYNDROME WITHOUT COMPLICATION: Primary | ICD-10-CM

## 2023-02-18 DIAGNOSIS — S01.81XA FACIAL LACERATION, INITIAL ENCOUNTER: ICD-10-CM

## 2023-02-18 DIAGNOSIS — S09.90XA CLOSED HEAD INJURY, INITIAL ENCOUNTER: ICD-10-CM

## 2023-02-18 DIAGNOSIS — E87.6 HYPOKALEMIA: ICD-10-CM

## 2023-02-18 PROBLEM — I95.1 ORTHOSTATIC HYPOTENSION: Status: ACTIVE | Noted: 2023-02-18

## 2023-02-18 LAB
ALBUMIN SERPL-MCNC: 5 G/DL (ref 3.5–5.2)
ALBUMIN/GLOB SERPL: 1.8 G/DL
ALP SERPL-CCNC: 108 U/L (ref 39–117)
ALT SERPL W P-5'-P-CCNC: 37 U/L (ref 1–41)
ANION GAP SERPL CALCULATED.3IONS-SCNC: 27 MMOL/L (ref 5–15)
ANION GAP SERPL CALCULATED.3IONS-SCNC: 34 MMOL/L (ref 5–15)
APTT PPP: 26.5 SECONDS (ref 22.7–35.4)
AST SERPL-CCNC: 155 U/L (ref 1–40)
BASOPHILS # BLD AUTO: 0.02 10*3/MM3 (ref 0–0.2)
BASOPHILS NFR BLD AUTO: 0.3 % (ref 0–1.5)
BILIRUB SERPL-MCNC: 1.3 MG/DL (ref 0–1.2)
BUN SERPL-MCNC: 10 MG/DL (ref 8–23)
BUN SERPL-MCNC: 9 MG/DL (ref 8–23)
BUN/CREAT SERPL: 10 (ref 7–25)
BUN/CREAT SERPL: 10.8 (ref 7–25)
CALCIUM SPEC-SCNC: 8.9 MG/DL (ref 8.6–10.5)
CALCIUM SPEC-SCNC: 9 MG/DL (ref 8.6–10.5)
CHLORIDE SERPL-SCNC: 95 MMOL/L (ref 98–107)
CHLORIDE SERPL-SCNC: 96 MMOL/L (ref 98–107)
CO2 SERPL-SCNC: 13 MMOL/L (ref 22–29)
CO2 SERPL-SCNC: 7 MMOL/L (ref 22–29)
CREAT SERPL-MCNC: 0.83 MG/DL (ref 0.76–1.27)
CREAT SERPL-MCNC: 1 MG/DL (ref 0.76–1.27)
D-LACTATE SERPL-SCNC: 2.6 MMOL/L (ref 0.5–2)
DEPRECATED RDW RBC AUTO: 55.8 FL (ref 37–54)
EGFRCR SERPLBLD CKD-EPI 2021: 83 ML/MIN/1.73
EGFRCR SERPLBLD CKD-EPI 2021: 96.5 ML/MIN/1.73
EOSINOPHIL # BLD AUTO: 0.01 10*3/MM3 (ref 0–0.4)
EOSINOPHIL NFR BLD AUTO: 0.1 % (ref 0.3–6.2)
ERYTHROCYTE [DISTWIDTH] IN BLOOD BY AUTOMATED COUNT: 15.7 % (ref 12.3–15.4)
ETHANOL BLD-MCNC: 88 MG/DL (ref 0–10)
ETHANOL UR QL: 0.09 %
GLOBULIN UR ELPH-MCNC: 2.8 GM/DL
GLUCOSE SERPL-MCNC: 105 MG/DL (ref 65–99)
GLUCOSE SERPL-MCNC: 147 MG/DL (ref 65–99)
HCT VFR BLD AUTO: 43.2 % (ref 37.5–51)
HGB BLD-MCNC: 14.5 G/DL (ref 13–17.7)
IMM GRANULOCYTES # BLD AUTO: 0.03 10*3/MM3 (ref 0–0.05)
IMM GRANULOCYTES NFR BLD AUTO: 0.4 % (ref 0–0.5)
INR PPP: 1.02 (ref 0.9–1.1)
LYMPHOCYTES # BLD AUTO: 0.88 10*3/MM3 (ref 0.7–3.1)
LYMPHOCYTES NFR BLD AUTO: 13 % (ref 19.6–45.3)
MCH RBC QN AUTO: 32.4 PG (ref 26.6–33)
MCHC RBC AUTO-ENTMCNC: 33.6 G/DL (ref 31.5–35.7)
MCV RBC AUTO: 96.4 FL (ref 79–97)
MONOCYTES # BLD AUTO: 0.54 10*3/MM3 (ref 0.1–0.9)
MONOCYTES NFR BLD AUTO: 8 % (ref 5–12)
NEUTROPHILS NFR BLD AUTO: 5.28 10*3/MM3 (ref 1.7–7)
NEUTROPHILS NFR BLD AUTO: 78.2 % (ref 42.7–76)
NRBC BLD AUTO-RTO: 0 /100 WBC (ref 0–0.2)
PLATELET # BLD AUTO: 121 10*3/MM3 (ref 140–450)
PMV BLD AUTO: 9.3 FL (ref 6–12)
POTASSIUM SERPL-SCNC: 5.2 MMOL/L (ref 3.5–5.2)
POTASSIUM SERPL-SCNC: 5.3 MMOL/L (ref 3.5–5.2)
PROT SERPL-MCNC: 7.8 G/DL (ref 6–8.5)
PROTHROMBIN TIME: 13.5 SECONDS (ref 11.7–14.2)
RBC # BLD AUTO: 4.48 10*6/MM3 (ref 4.14–5.8)
SODIUM SERPL-SCNC: 136 MMOL/L (ref 136–145)
SODIUM SERPL-SCNC: 136 MMOL/L (ref 136–145)
WBC NRBC COR # BLD: 6.76 10*3/MM3 (ref 3.4–10.8)

## 2023-02-18 PROCEDURE — 25010000002 LORAZEPAM PER 2 MG: Performed by: EMERGENCY MEDICINE

## 2023-02-18 PROCEDURE — 85610 PROTHROMBIN TIME: CPT | Performed by: PHYSICIAN ASSISTANT

## 2023-02-18 PROCEDURE — 85025 COMPLETE CBC W/AUTO DIFF WBC: CPT | Performed by: PHYSICIAN ASSISTANT

## 2023-02-18 PROCEDURE — 70450 CT HEAD/BRAIN W/O DYE: CPT

## 2023-02-18 PROCEDURE — 72128 CT CHEST SPINE W/O DYE: CPT

## 2023-02-18 PROCEDURE — 70486 CT MAXILLOFACIAL W/O DYE: CPT

## 2023-02-18 PROCEDURE — 93005 ELECTROCARDIOGRAM TRACING: CPT | Performed by: NURSE PRACTITIONER

## 2023-02-18 PROCEDURE — 25010000002 ONDANSETRON PER 1 MG: Performed by: NURSE PRACTITIONER

## 2023-02-18 PROCEDURE — 82077 ASSAY SPEC XCP UR&BREATH IA: CPT | Performed by: PHYSICIAN ASSISTANT

## 2023-02-18 PROCEDURE — 25010000002 LORAZEPAM PER 2 MG: Performed by: NURSE PRACTITIONER

## 2023-02-18 PROCEDURE — 71045 X-RAY EXAM CHEST 1 VIEW: CPT

## 2023-02-18 PROCEDURE — 0 LIDOCAINE 1 % SOLUTION: Performed by: PHYSICIAN ASSISTANT

## 2023-02-18 PROCEDURE — 85730 THROMBOPLASTIN TIME PARTIAL: CPT | Performed by: PHYSICIAN ASSISTANT

## 2023-02-18 PROCEDURE — 72131 CT LUMBAR SPINE W/O DYE: CPT

## 2023-02-18 PROCEDURE — 99285 EMERGENCY DEPT VISIT HI MDM: CPT

## 2023-02-18 PROCEDURE — 80053 COMPREHEN METABOLIC PANEL: CPT | Performed by: PHYSICIAN ASSISTANT

## 2023-02-18 PROCEDURE — 93010 ELECTROCARDIOGRAM REPORT: CPT | Performed by: INTERNAL MEDICINE

## 2023-02-18 PROCEDURE — 72125 CT NECK SPINE W/O DYE: CPT

## 2023-02-18 PROCEDURE — 0HQ1XZZ REPAIR FACE SKIN, EXTERNAL APPROACH: ICD-10-PCS | Performed by: STUDENT IN AN ORGANIZED HEALTH CARE EDUCATION/TRAINING PROGRAM

## 2023-02-18 PROCEDURE — 25010000002 THIAMINE PER 100 MG: Performed by: PHYSICIAN ASSISTANT

## 2023-02-18 PROCEDURE — 83605 ASSAY OF LACTIC ACID: CPT | Performed by: STUDENT IN AN ORGANIZED HEALTH CARE EDUCATION/TRAINING PROGRAM

## 2023-02-18 RX ORDER — LISINOPRIL 5 MG/1
10 TABLET ORAL DAILY
Status: DISCONTINUED | OUTPATIENT
Start: 2023-02-18 | End: 2023-02-20 | Stop reason: HOSPADM

## 2023-02-18 RX ORDER — ENOXAPARIN SODIUM 100 MG/ML
40 INJECTION SUBCUTANEOUS EVERY 24 HOURS
Status: DISCONTINUED | OUTPATIENT
Start: 2023-02-19 | End: 2023-02-20 | Stop reason: HOSPADM

## 2023-02-18 RX ORDER — LORAZEPAM 2 MG/ML
1 INJECTION INTRAMUSCULAR
Status: DISCONTINUED | OUTPATIENT
Start: 2023-02-18 | End: 2023-02-20 | Stop reason: HOSPADM

## 2023-02-18 RX ORDER — SODIUM CHLORIDE 0.9 % (FLUSH) 0.9 %
10 SYRINGE (ML) INJECTION AS NEEDED
Status: DISCONTINUED | OUTPATIENT
Start: 2023-02-18 | End: 2023-02-20 | Stop reason: HOSPADM

## 2023-02-18 RX ORDER — FOLIC ACID 1 MG/1
1 TABLET ORAL DAILY
Status: DISCONTINUED | OUTPATIENT
Start: 2023-02-19 | End: 2023-02-20 | Stop reason: HOSPADM

## 2023-02-18 RX ORDER — HYDROCODONE BITARTRATE AND ACETAMINOPHEN 5; 325 MG/1; MG/1
1 TABLET ORAL EVERY 6 HOURS PRN
Status: DISCONTINUED | OUTPATIENT
Start: 2023-02-18 | End: 2023-02-20 | Stop reason: HOSPADM

## 2023-02-18 RX ORDER — DULOXETIN HYDROCHLORIDE 30 MG/1
30 CAPSULE, DELAYED RELEASE ORAL DAILY
Status: DISCONTINUED | OUTPATIENT
Start: 2023-02-19 | End: 2023-02-20 | Stop reason: HOSPADM

## 2023-02-18 RX ORDER — BACLOFEN 10 MG/1
10 TABLET ORAL 3 TIMES DAILY PRN
Status: DISCONTINUED | OUTPATIENT
Start: 2023-02-18 | End: 2023-02-20 | Stop reason: HOSPADM

## 2023-02-18 RX ORDER — SODIUM CHLORIDE 9 MG/ML
40 INJECTION, SOLUTION INTRAVENOUS AS NEEDED
Status: DISCONTINUED | OUTPATIENT
Start: 2023-02-18 | End: 2023-02-20 | Stop reason: HOSPADM

## 2023-02-18 RX ORDER — LIDOCAINE HYDROCHLORIDE 10 MG/ML
10 INJECTION, SOLUTION INFILTRATION; PERINEURAL ONCE
Status: COMPLETED | OUTPATIENT
Start: 2023-02-18 | End: 2023-02-18

## 2023-02-18 RX ORDER — SODIUM CHLORIDE 0.9 % (FLUSH) 0.9 %
10 SYRINGE (ML) INJECTION EVERY 12 HOURS SCHEDULED
Status: DISCONTINUED | OUTPATIENT
Start: 2023-02-18 | End: 2023-02-20 | Stop reason: HOSPADM

## 2023-02-18 RX ORDER — ATORVASTATIN CALCIUM 20 MG/1
20 TABLET, FILM COATED ORAL DAILY
Status: CANCELLED | OUTPATIENT
Start: 2023-02-18

## 2023-02-18 RX ORDER — ONDANSETRON 2 MG/ML
4 INJECTION INTRAMUSCULAR; INTRAVENOUS EVERY 6 HOURS PRN
Status: DISCONTINUED | OUTPATIENT
Start: 2023-02-18 | End: 2023-02-20 | Stop reason: HOSPADM

## 2023-02-18 RX ORDER — LORAZEPAM 0.5 MG/1
0.5 TABLET ORAL
Status: DISCONTINUED | OUTPATIENT
Start: 2023-02-18 | End: 2023-02-20 | Stop reason: HOSPADM

## 2023-02-18 RX ORDER — LORAZEPAM 1 MG/1
1 TABLET ORAL
Status: DISCONTINUED | OUTPATIENT
Start: 2023-02-18 | End: 2023-02-20 | Stop reason: HOSPADM

## 2023-02-18 RX ORDER — LORAZEPAM 2 MG/ML
1 INJECTION INTRAMUSCULAR ONCE
Status: COMPLETED | OUTPATIENT
Start: 2023-02-18 | End: 2023-02-18

## 2023-02-18 RX ORDER — ZOLPIDEM TARTRATE 5 MG/1
5 TABLET ORAL NIGHTLY PRN
Status: CANCELLED | OUTPATIENT
Start: 2023-02-18

## 2023-02-18 RX ORDER — LEVOTHYROXINE SODIUM 0.1 MG/1
100 TABLET ORAL
Status: DISCONTINUED | OUTPATIENT
Start: 2023-02-19 | End: 2023-02-20 | Stop reason: HOSPADM

## 2023-02-18 RX ORDER — SODIUM CHLORIDE 9 MG/ML
100 INJECTION, SOLUTION INTRAVENOUS CONTINUOUS
Status: DISCONTINUED | OUTPATIENT
Start: 2023-02-18 | End: 2023-02-20 | Stop reason: HOSPADM

## 2023-02-18 RX ORDER — ATORVASTATIN CALCIUM 20 MG/1
20 TABLET, FILM COATED ORAL DAILY
Status: DISCONTINUED | OUTPATIENT
Start: 2023-02-19 | End: 2023-02-20 | Stop reason: HOSPADM

## 2023-02-18 RX ORDER — MULTIPLE VITAMINS W/ MINERALS TAB 9MG-400MCG
1 TAB ORAL DAILY
Status: DISCONTINUED | OUTPATIENT
Start: 2023-02-19 | End: 2023-02-20 | Stop reason: HOSPADM

## 2023-02-18 RX ORDER — AMLODIPINE BESYLATE 5 MG/1
5 TABLET ORAL EVERY MORNING
Status: DISCONTINUED | OUTPATIENT
Start: 2023-02-19 | End: 2023-02-20 | Stop reason: HOSPADM

## 2023-02-18 RX ORDER — LORAZEPAM 2 MG/ML
0.5 INJECTION INTRAMUSCULAR
Status: DISCONTINUED | OUTPATIENT
Start: 2023-02-18 | End: 2023-02-20 | Stop reason: HOSPADM

## 2023-02-18 RX ADMIN — LISINOPRIL 10 MG: 5 TABLET ORAL at 23:34

## 2023-02-18 RX ADMIN — LORAZEPAM 0.5 MG: 2 INJECTION INTRAMUSCULAR; INTRAVENOUS at 21:38

## 2023-02-18 RX ADMIN — SODIUM CHLORIDE 500 ML: 9 INJECTION, SOLUTION INTRAVENOUS at 23:20

## 2023-02-18 RX ADMIN — LORAZEPAM 1 MG: 2 INJECTION INTRAMUSCULAR; INTRAVENOUS at 19:28

## 2023-02-18 RX ADMIN — FOLIC ACID 1000 ML/HR: 5 INJECTION, SOLUTION INTRAMUSCULAR; INTRAVENOUS; SUBCUTANEOUS at 16:13

## 2023-02-18 RX ADMIN — SODIUM CHLORIDE 100 ML/HR: 9 INJECTION, SOLUTION INTRAVENOUS at 22:15

## 2023-02-18 RX ADMIN — LIDOCAINE HYDROCHLORIDE 10 ML: 10 INJECTION, SOLUTION INFILTRATION; PERINEURAL at 14:56

## 2023-02-18 RX ADMIN — ONDANSETRON 4 MG: 2 INJECTION INTRAMUSCULAR; INTRAVENOUS at 20:55

## 2023-02-18 RX ADMIN — Medication 10 ML: at 20:59

## 2023-02-18 RX ADMIN — LORAZEPAM 1 MG: 2 INJECTION INTRAMUSCULAR; INTRAVENOUS at 17:18

## 2023-02-19 LAB
ALBUMIN SERPL-MCNC: 4.1 G/DL (ref 3.5–5.2)
ALBUMIN/GLOB SERPL: 2.1 G/DL
ALP SERPL-CCNC: 75 U/L (ref 39–117)
ALT SERPL W P-5'-P-CCNC: 26 U/L (ref 1–41)
AMPHET+METHAMPHET UR QL: NEGATIVE
ANION GAP SERPL CALCULATED.3IONS-SCNC: 12.6 MMOL/L (ref 5–15)
AST SERPL-CCNC: 95 U/L (ref 1–40)
BACTERIA UR QL AUTO: ABNORMAL /HPF
BARBITURATES UR QL SCN: NEGATIVE
BASOPHILS # BLD AUTO: 0.01 10*3/MM3 (ref 0–0.2)
BASOPHILS NFR BLD AUTO: 0.2 % (ref 0–1.5)
BENZODIAZ UR QL SCN: NEGATIVE
BILIRUB SERPL-MCNC: 1.1 MG/DL (ref 0–1.2)
BILIRUB UR QL STRIP: NEGATIVE
BUN SERPL-MCNC: 14 MG/DL (ref 8–23)
BUN/CREAT SERPL: 13.5 (ref 7–25)
CALCIUM SPEC-SCNC: 8.8 MG/DL (ref 8.6–10.5)
CANNABINOIDS SERPL QL: NEGATIVE
CHLORIDE SERPL-SCNC: 100 MMOL/L (ref 98–107)
CLARITY UR: CLEAR
CO2 SERPL-SCNC: 17.4 MMOL/L (ref 22–29)
COCAINE UR QL: NEGATIVE
COLOR UR: YELLOW
CREAT SERPL-MCNC: 1.04 MG/DL (ref 0.76–1.27)
D-LACTATE SERPL-SCNC: 1.3 MMOL/L (ref 0.5–2)
D-LACTATE SERPL-SCNC: 2.3 MMOL/L (ref 0.5–2)
DEPRECATED RDW RBC AUTO: 54.9 FL (ref 37–54)
EGFRCR SERPLBLD CKD-EPI 2021: 79.2 ML/MIN/1.73
EOSINOPHIL # BLD AUTO: 0.01 10*3/MM3 (ref 0–0.4)
EOSINOPHIL NFR BLD AUTO: 0.2 % (ref 0.3–6.2)
ERYTHROCYTE [DISTWIDTH] IN BLOOD BY AUTOMATED COUNT: 15.6 % (ref 12.3–15.4)
GLOBULIN UR ELPH-MCNC: 2 GM/DL
GLUCOSE SERPL-MCNC: 175 MG/DL (ref 65–99)
GLUCOSE UR STRIP-MCNC: NEGATIVE MG/DL
HCT VFR BLD AUTO: 33.7 % (ref 37.5–51)
HGB BLD-MCNC: 11.7 G/DL (ref 13–17.7)
HGB UR QL STRIP.AUTO: NEGATIVE
HYALINE CASTS UR QL AUTO: ABNORMAL /LPF
IMM GRANULOCYTES # BLD AUTO: 0.02 10*3/MM3 (ref 0–0.05)
IMM GRANULOCYTES NFR BLD AUTO: 0.4 % (ref 0–0.5)
KETONES UR QL STRIP: ABNORMAL
LEUKOCYTE ESTERASE UR QL STRIP.AUTO: ABNORMAL
LYMPHOCYTES # BLD AUTO: 0.5 10*3/MM3 (ref 0.7–3.1)
LYMPHOCYTES NFR BLD AUTO: 10.4 % (ref 19.6–45.3)
MCH RBC QN AUTO: 33.3 PG (ref 26.6–33)
MCHC RBC AUTO-ENTMCNC: 34.7 G/DL (ref 31.5–35.7)
MCV RBC AUTO: 96 FL (ref 79–97)
METHADONE UR QL SCN: NEGATIVE
MONOCYTES # BLD AUTO: 0.7 10*3/MM3 (ref 0.1–0.9)
MONOCYTES NFR BLD AUTO: 14.6 % (ref 5–12)
NEUTROPHILS NFR BLD AUTO: 3.55 10*3/MM3 (ref 1.7–7)
NEUTROPHILS NFR BLD AUTO: 74.2 % (ref 42.7–76)
NITRITE UR QL STRIP: NEGATIVE
NRBC BLD AUTO-RTO: 0.4 /100 WBC (ref 0–0.2)
OPIATES UR QL: POSITIVE
OXYCODONE UR QL SCN: NEGATIVE
PH UR STRIP.AUTO: 6 [PH] (ref 5–8)
PLATELET # BLD AUTO: 101 10*3/MM3 (ref 140–450)
PMV BLD AUTO: 10 FL (ref 6–12)
POTASSIUM SERPL-SCNC: 4.6 MMOL/L (ref 3.5–5.2)
PROT SERPL-MCNC: 6.1 G/DL (ref 6–8.5)
PROT UR QL STRIP: ABNORMAL
QT INTERVAL: 367 MS
RBC # BLD AUTO: 3.51 10*6/MM3 (ref 4.14–5.8)
RBC # UR STRIP: ABNORMAL /HPF
REF LAB TEST METHOD: ABNORMAL
SODIUM SERPL-SCNC: 130 MMOL/L (ref 136–145)
SP GR UR STRIP: >=1.03 (ref 1–1.03)
SQUAMOUS #/AREA URNS HPF: ABNORMAL /HPF
UROBILINOGEN UR QL STRIP: ABNORMAL
WBC # UR STRIP: ABNORMAL /HPF
WBC NRBC COR # BLD: 4.79 10*3/MM3 (ref 3.4–10.8)

## 2023-02-19 PROCEDURE — 81001 URINALYSIS AUTO W/SCOPE: CPT | Performed by: NURSE PRACTITIONER

## 2023-02-19 PROCEDURE — 80307 DRUG TEST PRSMV CHEM ANLYZR: CPT | Performed by: NURSE PRACTITIONER

## 2023-02-19 PROCEDURE — 80053 COMPREHEN METABOLIC PANEL: CPT | Performed by: STUDENT IN AN ORGANIZED HEALTH CARE EDUCATION/TRAINING PROGRAM

## 2023-02-19 PROCEDURE — 25010000002 ENOXAPARIN PER 10 MG: Performed by: STUDENT IN AN ORGANIZED HEALTH CARE EDUCATION/TRAINING PROGRAM

## 2023-02-19 PROCEDURE — 83605 ASSAY OF LACTIC ACID: CPT | Performed by: STUDENT IN AN ORGANIZED HEALTH CARE EDUCATION/TRAINING PROGRAM

## 2023-02-19 PROCEDURE — 36415 COLL VENOUS BLD VENIPUNCTURE: CPT | Performed by: NURSE PRACTITIONER

## 2023-02-19 PROCEDURE — 25010000002 LORAZEPAM PER 2 MG: Performed by: NURSE PRACTITIONER

## 2023-02-19 PROCEDURE — 87086 URINE CULTURE/COLONY COUNT: CPT | Performed by: NURSE PRACTITIONER

## 2023-02-19 PROCEDURE — 87077 CULTURE AEROBIC IDENTIFY: CPT | Performed by: NURSE PRACTITIONER

## 2023-02-19 PROCEDURE — 87186 SC STD MICRODIL/AGAR DIL: CPT | Performed by: NURSE PRACTITIONER

## 2023-02-19 PROCEDURE — 90791 PSYCH DIAGNOSTIC EVALUATION: CPT | Performed by: SOCIAL WORKER

## 2023-02-19 PROCEDURE — 85025 COMPLETE CBC W/AUTO DIFF WBC: CPT | Performed by: NURSE PRACTITIONER

## 2023-02-19 RX ORDER — LIDOCAINE HYDROCHLORIDE 20 MG/ML
10 SOLUTION OROPHARYNGEAL ONCE
Status: COMPLETED | OUTPATIENT
Start: 2023-02-19 | End: 2023-02-19

## 2023-02-19 RX ORDER — NYSTATIN 100000 U/G
1 CREAM TOPICAL EVERY 12 HOURS SCHEDULED
Status: DISCONTINUED | OUTPATIENT
Start: 2023-02-19 | End: 2023-02-20 | Stop reason: HOSPADM

## 2023-02-19 RX ORDER — CALCIUM CARBONATE 200(500)MG
2 TABLET,CHEWABLE ORAL 3 TIMES DAILY PRN
Status: DISCONTINUED | OUTPATIENT
Start: 2023-02-19 | End: 2023-02-20 | Stop reason: HOSPADM

## 2023-02-19 RX ADMIN — HYDROCODONE BITARTRATE AND ACETAMINOPHEN 1 TABLET: 5; 325 TABLET ORAL at 06:39

## 2023-02-19 RX ADMIN — NYSTATIN 1 APPLICATION: 100000 CREAM TOPICAL at 20:49

## 2023-02-19 RX ADMIN — FOLIC ACID 1 MG: 1 TABLET ORAL at 08:16

## 2023-02-19 RX ADMIN — LORAZEPAM 0.5 MG: 2 INJECTION INTRAMUSCULAR; INTRAVENOUS at 16:20

## 2023-02-19 RX ADMIN — HYDROCODONE BITARTRATE AND ACETAMINOPHEN 1 TABLET: 5; 325 TABLET ORAL at 22:18

## 2023-02-19 RX ADMIN — LORAZEPAM 0.5 MG: 2 INJECTION INTRAMUSCULAR; INTRAVENOUS at 01:15

## 2023-02-19 RX ADMIN — LIDOCAINE HYDROCHLORIDE 10 ML: 20 SOLUTION ORAL; TOPICAL at 01:07

## 2023-02-19 RX ADMIN — MULTIPLE VITAMINS W/ MINERALS TAB 1 TABLET: TAB at 08:16

## 2023-02-19 RX ADMIN — SODIUM CHLORIDE 100 ML/HR: 9 INJECTION, SOLUTION INTRAVENOUS at 20:48

## 2023-02-19 RX ADMIN — NYSTATIN 1 APPLICATION: 100000 CREAM TOPICAL at 08:16

## 2023-02-19 RX ADMIN — DULOXETINE HYDROCHLORIDE 30 MG: 30 CAPSULE, DELAYED RELEASE ORAL at 08:16

## 2023-02-19 RX ADMIN — ANTACID TABLETS 2 TABLET: 500 TABLET, CHEWABLE ORAL at 01:07

## 2023-02-19 RX ADMIN — HYDROCODONE BITARTRATE AND ACETAMINOPHEN 1 TABLET: 5; 325 TABLET ORAL at 16:07

## 2023-02-19 RX ADMIN — AMLODIPINE BESYLATE 5 MG: 5 TABLET ORAL at 08:15

## 2023-02-19 RX ADMIN — ENOXAPARIN SODIUM 40 MG: 100 INJECTION SUBCUTANEOUS at 08:16

## 2023-02-19 RX ADMIN — NYSTATIN 1 APPLICATION: 100000 CREAM TOPICAL at 02:33

## 2023-02-19 RX ADMIN — Medication 100 MG: at 08:16

## 2023-02-19 RX ADMIN — LEVOTHYROXINE SODIUM 100 MCG: 0.1 TABLET ORAL at 08:15

## 2023-02-19 RX ADMIN — Medication 10 ML: at 20:48

## 2023-02-19 RX ADMIN — HYDROCODONE BITARTRATE AND ACETAMINOPHEN 1 TABLET: 5; 325 TABLET ORAL at 00:05

## 2023-02-19 RX ADMIN — ATORVASTATIN CALCIUM 20 MG: 20 TABLET, FILM COATED ORAL at 08:15

## 2023-02-20 ENCOUNTER — READMISSION MANAGEMENT (OUTPATIENT)
Dept: CALL CENTER | Facility: HOSPITAL | Age: 68
End: 2023-02-20
Payer: MEDICARE

## 2023-02-20 VITALS
OXYGEN SATURATION: 94 % | DIASTOLIC BLOOD PRESSURE: 73 MMHG | RESPIRATION RATE: 18 BRPM | HEART RATE: 76 BPM | TEMPERATURE: 98.7 F | HEIGHT: 72 IN | WEIGHT: 194.89 LBS | SYSTOLIC BLOOD PRESSURE: 132 MMHG | BODY MASS INDEX: 26.4 KG/M2

## 2023-02-20 LAB
ALBUMIN SERPL-MCNC: 4 G/DL (ref 3.5–5.2)
ALBUMIN/GLOB SERPL: 2.1 G/DL
ALP SERPL-CCNC: 71 U/L (ref 39–117)
ALT SERPL W P-5'-P-CCNC: 21 U/L (ref 1–41)
ANION GAP SERPL CALCULATED.3IONS-SCNC: 9.1 MMOL/L (ref 5–15)
AST SERPL-CCNC: 84 U/L (ref 1–40)
BILIRUB SERPL-MCNC: 1 MG/DL (ref 0–1.2)
BUN SERPL-MCNC: 11 MG/DL (ref 8–23)
BUN/CREAT SERPL: 18.6 (ref 7–25)
CALCIUM SPEC-SCNC: 8.9 MG/DL (ref 8.6–10.5)
CHLORIDE SERPL-SCNC: 102 MMOL/L (ref 98–107)
CO2 SERPL-SCNC: 22.9 MMOL/L (ref 22–29)
CREAT SERPL-MCNC: 0.59 MG/DL (ref 0.76–1.27)
DEPRECATED RDW RBC AUTO: 56 FL (ref 37–54)
EGFRCR SERPLBLD CKD-EPI 2021: 107 ML/MIN/1.73
ERYTHROCYTE [DISTWIDTH] IN BLOOD BY AUTOMATED COUNT: 15.8 % (ref 12.3–15.4)
GLOBULIN UR ELPH-MCNC: 1.9 GM/DL
GLUCOSE SERPL-MCNC: 90 MG/DL (ref 65–99)
HCT VFR BLD AUTO: 31.5 % (ref 37.5–51)
HGB BLD-MCNC: 10.9 G/DL (ref 13–17.7)
MCH RBC QN AUTO: 33.5 PG (ref 26.6–33)
MCHC RBC AUTO-ENTMCNC: 34.6 G/DL (ref 31.5–35.7)
MCV RBC AUTO: 96.9 FL (ref 79–97)
PLATELET # BLD AUTO: 79 10*3/MM3 (ref 140–450)
PMV BLD AUTO: 10 FL (ref 6–12)
POTASSIUM SERPL-SCNC: 4.1 MMOL/L (ref 3.5–5.2)
PROT SERPL-MCNC: 5.9 G/DL (ref 6–8.5)
RBC # BLD AUTO: 3.25 10*6/MM3 (ref 4.14–5.8)
SODIUM SERPL-SCNC: 134 MMOL/L (ref 136–145)
WBC NRBC COR # BLD: 3.36 10*3/MM3 (ref 3.4–10.8)

## 2023-02-20 PROCEDURE — 25010000002 LORAZEPAM PER 2 MG: Performed by: NURSE PRACTITIONER

## 2023-02-20 PROCEDURE — 80053 COMPREHEN METABOLIC PANEL: CPT | Performed by: INTERNAL MEDICINE

## 2023-02-20 PROCEDURE — 97116 GAIT TRAINING THERAPY: CPT

## 2023-02-20 PROCEDURE — 97162 PT EVAL MOD COMPLEX 30 MIN: CPT

## 2023-02-20 PROCEDURE — 85027 COMPLETE CBC AUTOMATED: CPT | Performed by: INTERNAL MEDICINE

## 2023-02-20 PROCEDURE — 25010000002 ENOXAPARIN PER 10 MG: Performed by: STUDENT IN AN ORGANIZED HEALTH CARE EDUCATION/TRAINING PROGRAM

## 2023-02-20 RX ADMIN — MULTIPLE VITAMINS W/ MINERALS TAB 1 TABLET: TAB at 08:49

## 2023-02-20 RX ADMIN — LORAZEPAM 0.5 MG: 2 INJECTION INTRAMUSCULAR; INTRAVENOUS at 04:46

## 2023-02-20 RX ADMIN — SODIUM CHLORIDE 100 ML/HR: 9 INJECTION, SOLUTION INTRAVENOUS at 06:28

## 2023-02-20 RX ADMIN — ATORVASTATIN CALCIUM 20 MG: 20 TABLET, FILM COATED ORAL at 08:48

## 2023-02-20 RX ADMIN — LISINOPRIL 10 MG: 5 TABLET ORAL at 08:49

## 2023-02-20 RX ADMIN — AMLODIPINE BESYLATE 5 MG: 5 TABLET ORAL at 06:28

## 2023-02-20 RX ADMIN — Medication 100 MG: at 08:48

## 2023-02-20 RX ADMIN — Medication 10 ML: at 08:50

## 2023-02-20 RX ADMIN — DULOXETINE HYDROCHLORIDE 30 MG: 30 CAPSULE, DELAYED RELEASE ORAL at 08:49

## 2023-02-20 RX ADMIN — FOLIC ACID 1 MG: 1 TABLET ORAL at 08:48

## 2023-02-20 RX ADMIN — ENOXAPARIN SODIUM 40 MG: 100 INJECTION SUBCUTANEOUS at 08:49

## 2023-02-20 RX ADMIN — NYSTATIN 1 APPLICATION: 100000 CREAM TOPICAL at 08:49

## 2023-02-20 RX ADMIN — LEVOTHYROXINE SODIUM 100 MCG: 0.1 TABLET ORAL at 06:28

## 2023-02-21 ENCOUNTER — TRANSITIONAL CARE MANAGEMENT TELEPHONE ENCOUNTER (OUTPATIENT)
Dept: CALL CENTER | Facility: HOSPITAL | Age: 68
End: 2023-02-21
Payer: MEDICARE

## 2023-02-21 NOTE — OUTREACH NOTE
Prep Survey    Flowsheet Row Responses   [facility] patient discharged from? [place]   Is LACE score < 7 ? No   Eligibility [facility]   Date of Admission 02/18/23   Date of Discharge 02/20/23   Discharge Disposition Home or Self Care   Discharge diagnosis Orthostatic hypotension, fall, facial laceration   Does the patient have one of the following disease processes/diagnoses(primary or secondary)? Other   Does the patient have Home health ordered? No   Is there a DME ordered? No   Prep survey completed? Yes          Sammie PAUL - Registered Nurse

## 2023-02-21 NOTE — OUTREACH NOTE
Call Center TCM Note    Flowsheet Row Responses   [facility] patient discharged from? [place]   Does the patient have one of the following disease processes/diagnoses(primary or secondary)? Other   TCM attempt successful? No   Unsuccessful attempts Attempt 2          Heather Wells MA    2/21/2023, 16:22 EST

## 2023-02-21 NOTE — OUTREACH NOTE
Call Center TCM Note    Flowsheet Row Responses   [facility] patient discharged from? [place]   Does the patient have one of the following disease processes/diagnoses(primary or secondary)? Other   TCM attempt successful? No   Unsuccessful attempts Attempt 1          Heather Wells MA    2/21/2023, 09:45 EST

## 2023-02-22 ENCOUNTER — TRANSITIONAL CARE MANAGEMENT TELEPHONE ENCOUNTER (OUTPATIENT)
Dept: CALL CENTER | Facility: HOSPITAL | Age: 68
End: 2023-02-22
Payer: MEDICARE

## 2023-02-22 LAB — BACTERIA SPEC AEROBE CULT: ABNORMAL

## 2023-02-22 NOTE — OUTREACH NOTE
Call Center TCM Note    Flowsheet Row Responses   [facility] patient discharged from? [place]   Does the patient have one of the following disease processes/diagnoses(primary or secondary)? Other   TCM attempt successful? No   Unsuccessful attempts Attempt 3          Ina Jack RN    2/22/2023, 14:29 EST

## 2023-03-06 ENCOUNTER — TELEPHONE (OUTPATIENT)
Dept: PAIN MEDICINE | Facility: CLINIC | Age: 68
End: 2023-03-06

## 2023-03-06 NOTE — TELEPHONE ENCOUNTER
Provider: ANUEL  Caller: HERB  Relationship to Patient: SELF  Pharmacy:   Phone Number: [phone]  Reason for Call: PT CALLING TO SCHEDULE PROCEDURE, ATTEMPTED TO WT

## 2023-03-08 ENCOUNTER — OFFICE VISIT (OUTPATIENT)
Dept: PAIN MEDICINE | Facility: CLINIC | Age: 68
End: 2023-03-08
Payer: MEDICARE

## 2023-03-08 ENCOUNTER — TELEPHONE (OUTPATIENT)
Dept: FAMILY MEDICINE CLINIC | Facility: CLINIC | Age: 68
End: 2023-03-08
Payer: MEDICARE

## 2023-03-08 VITALS
DIASTOLIC BLOOD PRESSURE: 92 MMHG | TEMPERATURE: 97.3 F | RESPIRATION RATE: 18 BRPM | WEIGHT: 195.4 LBS | OXYGEN SATURATION: 98 % | HEIGHT: 72 IN | HEART RATE: 85 BPM | BODY MASS INDEX: 26.47 KG/M2 | SYSTOLIC BLOOD PRESSURE: 147 MMHG

## 2023-03-08 DIAGNOSIS — M54.41 CHRONIC MIDLINE LOW BACK PAIN WITH RIGHT-SIDED SCIATICA: ICD-10-CM

## 2023-03-08 DIAGNOSIS — M47.816 LUMBAR FACET ARTHROPATHY: Primary | ICD-10-CM

## 2023-03-08 DIAGNOSIS — M43.16 SPONDYLOLISTHESIS AT L4-L5 LEVEL: ICD-10-CM

## 2023-03-08 DIAGNOSIS — G89.29 CHRONIC MIDLINE LOW BACK PAIN WITH RIGHT-SIDED SCIATICA: ICD-10-CM

## 2023-03-08 PROCEDURE — 99213 OFFICE O/P EST LOW 20 MIN: CPT | Performed by: PHYSICIAN ASSISTANT

## 2023-03-08 NOTE — PROGRESS NOTES
CHIEF COMPLAINT  Follow-up for back pain.    Subjective   Pro Mueller is a 68 y.o. male  who presents for follow-up.  He has a history of neck, low back, left shoulder and bilateral knee pain.  Patient's primary pain complaint today is pain in a bandlike distribution across lumbosacral spine referred into the posterior buttocks/hips and into the upper portion of the bilateral thighs.  His pain is significantly aggravated with any type of lumbar facet loading maneuvers and he states that even though he is recently retired he does not have any quality of life secondary to his pain.    Patient was evaluated by Dr. Fortune on 7/6/2022 with recommendation to proceed with diagnostic bilateral lumbar MBB/RFA depending upon favorable response however the patient did not schedule the procedure as he would not be eligible for sedation.    Pain today 4/10 VAS in severity.      Back Pain  This is a chronic problem. The current episode started more than 1 year ago. The problem occurs constantly. The problem is unchanged. The pain is present in the lumbar spine. The quality of the pain is described as aching and cramping. The pain radiates to the left thigh and right thigh. The pain is at a severity of 4/10. The pain is moderate. The pain is the same all the time. The symptoms are aggravated by standing and position (Walking). Stiffness is present all day. Associated symptoms include weakness. Pertinent negatives include no chest pain or numbness.        PEG Assessment   What number best describes your pain on average in the past week?8  What number best describes how, during the past week, pain has interfered with your enjoyment of life?10  What number best describes how, during the past week, pain has interfered with your general activity?  8    Review of Pertinent Medical Data ---    TECHNIQUE: CT scan of the lumbar spine was obtained with combination of  1 mm and 3 mm axial images. Sagittal and coronal reconstructed  images  were obtained.     FINDINGS:     There is no evidence for acute fracture or bony malalignment involving  the lumbar spine. At L1-2, L2-3, and L3-4, there is no significant  degree of canal or foraminal stenosis.     At L4-5, there is degenerative anterior spondylolisthesis of L4 on L5 by  approximately 3 mm. Bulging disc material results in a mild degree of  canal and bilateral foraminal narrowing. There is mild left and  mild-to-moderate right facet hypertrophic change.     At L5-S1, there is bulging disc material which mildly narrows the right  neural foramen. No significant canal stenosis is identified. There is  mild right and moderate left facet hypertrophy.     Incidental note is made of hepatic steatosis.     IMPRESSION:     No evidence for acute fracture or bony malalignment involving the lumbar  spine.     Incidental degenerative phenomena within the lumbar spine are as  discussed in detail above.     Incidental note is also made of hepatic steatosis.     Radiation dose reduction techniques were utilized, including automated  exposure control and exposure modulation based on body size.     This report was finalized on 2/18/2023 5:53 PM by Dr. Sam Mckenzie M.D.    The following portions of the patient's history were reviewed and updated as appropriate: allergies, current medications, past family history, past medical history, past social history, past surgical history and problem list.    Review of Systems   Respiratory: Negative for shortness of breath.    Cardiovascular: Negative for chest pain.   Gastrointestinal: Negative for constipation and diarrhea.   Genitourinary: Negative for difficulty urinating.   Musculoskeletal: Positive for back pain.   Neurological: Positive for weakness. Negative for numbness.   Psychiatric/Behavioral: Positive for sleep disturbance. Negative for suicidal ideas. The patient is nervous/anxious.      I have reviewed and confirmed the accuracy of the ROS as documented  "by the MA/LPN/RN ONEIDA Lopez    Vitals:    03/08/23 1250   BP: 147/92   Pulse: 85   Resp: 18   Temp: 97.3 °F (36.3 °C)   SpO2: 98%   Weight: 88.6 kg (195 lb 6.4 oz)   Height: 182.9 cm (72\")   PainSc:   4   PainLoc: Back         Objective   Physical Exam  Vitals and nursing note reviewed.   Constitutional:       Appearance: Normal appearance.   HENT:      Head: Normocephalic.   Pulmonary:      Effort: Pulmonary effort is normal.   Musculoskeletal:      Lumbar back: Spasms and tenderness (Moderate tenderness to palpation within the overlying bilateral lumbar facet joint spaces) present. Decreased range of motion.   Skin:     General: Skin is warm and dry.   Neurological:      General: No focal deficit present.      Mental Status: He is alert and oriented to person, place, and time.      Cranial Nerves: Cranial nerves 2-12 are intact.      Sensory: Sensation is intact.      Motor: Motor function is intact.      Gait: Gait abnormal.   Psychiatric:         Mood and Affect: Mood normal.         Behavior: Behavior normal.         Thought Content: Thought content normal.         Judgment: Judgment normal.         Assessment & Plan   Diagnoses and all orders for this visit:    1. Lumbar facet arthropathy (Primary)    2. Spondylolisthesis at L4-L5 level    3. Chronic midline low back pain with right-sided sciatica        --- I have discussed with the patient that the treatment plan remains the same which would be to proceed with diagnostic lumbar MBB with plan to progress to RFA assuming favorable response.  I have explained to the patient that due to Medicare policy he is not eligible for IV light conscious sedation and that we would be able to provide Valium prior to the procedure for any anxiety.  The patient states that he is unable to tolerate the procedure without any type of \"pain killer\" being given.  I have explained to the patient that procedures are not performed with any type of pain medications however we " do utilize medications that help with preprocedural anxiety.  Mr. Mueller states that without IV sedation he would not go through with the procedure therefore I have asked him to discuss with his primary care physician any further options which may include referral to another facility.          Dictated utilizing Dragon dictation.       Patient remained masked during entire encounter. No cough present. I donned a mask and eye protection throughout entire visit. Prior to donning mask and eye protection, hand hygiene was performed, as well as when it was doffed.  I was closer than 6 feet, but not for an extended period of time. No obvious exposure to any bodily fluids.

## 2023-03-08 NOTE — TELEPHONE ENCOUNTER
Problem:  Care  Goal: [place] assessment and vital signs within acceptable range  Outcome: Outcome Met, Continue evaluating goal progress toward completion  Goal: [place] has at least two successful feeding interactions  Outcome: Outcome Met, Continue evaluating goal progress toward completion  Goal: Infant does not have pain / discomfort per NIPS Scale  Outcome: Outcome Met, Continue evaluating goal progress toward completion     Problem: Breastfeeding  Goal: Successful breastfeeding, as evidenced by proper suck/swallow, <10% weight loss, adequate void/stool.  Outcome: Outcome Met, Continue evaluating goal progress toward completion  Goal: Parent / caregiver verbalizes understanding of benefits of exclusive breastfeeding and breastfeeding techniques  Description: Document on Patient Education Activity  Outcome: Outcome Met, Continue evaluating goal progress toward completion      Pt had appt with pain management. Pt insurance would not cover anesthesia for procedures with [facility] pain management and he was advised he would not be able to pay for it out of pocket. Pt would like to know if Dr. Beal could recommend an outgoing pain management group.    Please advise

## 2023-03-09 ENCOUNTER — TELEPHONE (OUTPATIENT)
Dept: FAMILY MEDICINE CLINIC | Facility: CLINIC | Age: 68
End: 2023-03-09

## 2023-03-09 DIAGNOSIS — M54.41 CHRONIC MIDLINE LOW BACK PAIN WITH RIGHT-SIDED SCIATICA: ICD-10-CM

## 2023-03-09 DIAGNOSIS — M43.16 SPONDYLOLISTHESIS AT L4-L5 LEVEL: ICD-10-CM

## 2023-03-09 DIAGNOSIS — M17.0 PRIMARY OSTEOARTHRITIS OF BOTH KNEES: ICD-10-CM

## 2023-03-09 DIAGNOSIS — M25.512 CHRONIC LEFT SHOULDER PAIN: ICD-10-CM

## 2023-03-09 DIAGNOSIS — M54.50 CHRONIC MIDLINE LOW BACK PAIN WITHOUT SCIATICA: Primary | ICD-10-CM

## 2023-03-09 DIAGNOSIS — G89.29 CHRONIC MIDLINE LOW BACK PAIN WITHOUT SCIATICA: Primary | ICD-10-CM

## 2023-03-09 DIAGNOSIS — G89.29 CHRONIC LEFT SHOULDER PAIN: ICD-10-CM

## 2023-03-09 DIAGNOSIS — G89.29 CHRONIC MIDLINE LOW BACK PAIN WITH RIGHT-SIDED SCIATICA: ICD-10-CM

## 2023-03-09 NOTE — TELEPHONE ENCOUNTER
I put in a referral for pain management to go outside the [facility] system. Please call him to let him know.

## 2023-03-09 NOTE — TELEPHONE ENCOUNTER
Caller: Pro Mueller    Relationship: Self    Best call back number: [phone]    Who are you requesting to speak with (clinical staff, provider,  specific staff member): DR LLAMAS    What was the call regarding: PATIENT REQUESTS A CALL BACK TO DISCUSS HIS SPINAL STENOSIS.    Do you require a callback: YES

## 2023-03-13 NOTE — TELEPHONE ENCOUNTER
CALLED AND PT WOULD LIKE APPT TOMORROW FOR DOXY TELEPHONE CALL WITH DR LLAMAS FOR 8:30AM. PT IS WANTING TO DISCUSS PAIN MANAGEMENT REFERRAL. THANK YOU.

## 2023-03-14 ENCOUNTER — TELEPHONE (OUTPATIENT)
Dept: FAMILY MEDICINE CLINIC | Facility: CLINIC | Age: 68
End: 2023-03-14

## 2023-03-14 ENCOUNTER — TELEMEDICINE (OUTPATIENT)
Dept: FAMILY MEDICINE CLINIC | Facility: CLINIC | Age: 68
End: 2023-03-14
Payer: MEDICARE

## 2023-03-14 DIAGNOSIS — M51.36 LUMBAR DEGENERATIVE DISC DISEASE: Primary | ICD-10-CM

## 2023-03-14 PROBLEM — N20.0 RENAL CALCULI: Status: ACTIVE | Noted: 2023-03-14

## 2023-03-14 PROBLEM — H10.31 ACUTE BACTERIAL CONJUNCTIVITIS OF RIGHT EYE: Status: ACTIVE | Noted: 2023-03-02

## 2023-03-14 PROBLEM — Z48.02 VISIT FOR SUTURE REMOVAL: Status: ACTIVE | Noted: 2023-03-02

## 2023-03-14 PROCEDURE — 99214 OFFICE O/P EST MOD 30 MIN: CPT | Performed by: FAMILY MEDICINE

## 2023-03-14 PROCEDURE — 1159F MED LIST DOCD IN RCRD: CPT | Performed by: FAMILY MEDICINE

## 2023-03-14 PROCEDURE — 1160F RVW MEDS BY RX/DR IN RCRD: CPT | Performed by: FAMILY MEDICINE

## 2023-03-14 NOTE — TELEPHONE ENCOUNTER
CALLED PT ABOUT MEDICATION. PT WOULD LIKE CONTROLLED SUBSTANCE HE AND DR LLAMAS DISCUSSED TO BE SENT TO THE [facility] ON [address] AT [facility]. IT IS THE CURRENT LOCATION IN THE PTS CHART. THANK YOU.

## 2023-03-14 NOTE — PROGRESS NOTES
"Chief Complaint  Back Pain    Subjective        Pro Mueller presents to [facility]  History of Present Illness  Says he feels horrible.   His lower back is hurting.   Hard time getting up off the couch to the bathroom.   He has to use a walker.   Going to see [facility] pain management.   He is going to have radioablation.   Says was going to have here but could not   Now going down the outside of the left thigh in the last week. . Has just been localized in the lower back leading up to this.    Objective   Vital Signs:  There were no vitals taken for this visit.  Estimated body mass index is 26.5 kg/m² as calculated from the following:    Height as of 3/8/23: 182.9 cm (72\").    Weight as of 3/8/23: 88.6 kg (195 lb 6.4 oz).         Physical Exam  Constitutional:       General: He is not in acute distress.     Appearance: Normal appearance. He is not ill-appearing or toxic-appearing.   Eyes:      General: No scleral icterus.     Conjunctiva/sclera: Conjunctivae normal.   Pulmonary:      Effort: Pulmonary effort is normal. No respiratory distress.   Neurological:      Mental Status: He is alert and oriented to person, place, and time.      Comments: Fresh hair cut, facial hair is manicured.    Psychiatric:         Behavior: Behavior normal.        Result Review :                   Assessment and Plan   Diagnoses and all orders for this visit:    1. Lumbar degenerative disc disease (Primary)  -     HYDROcodone-acetaminophen (NORCO) 7.5-325 MG per tablet; Take 1 tablet by mouth 2 (Two) Times a Day.  Dispense: 15 tablet; Refill: 0    Other orders  -     meloxicam (MOBIC) 15 MG tablet; Take 1 tablet by mouth Daily.  Dispense: 90 tablet; Refill: 1  -     baclofen (LIORESAL) 10 MG tablet; Take 1 tablet by mouth 3 (Three) Times a Day As Needed for Muscle Spasms.  Dispense: 30 tablet; Refill: 1             Follow Up   No follow-ups on file.  Patient was given instructions and counseling regarding " his condition or for health maintenance advice. Please see specific information pulled into the AVS if appropriate.     I reviewed his CT of the lumbar spine from last month. He is having severe lower back pain that hurts when he moves. Says he has hard time even getting off the couch to walk to the bathroom. This causes terrible pain. He has not slept in a bed for weeks because all his bedrooms are upstairs and he can't go up the stairs. Most of the pain is localized and does not radiate down into his legs until most recent days is going down the left leg.   He has been working with pain management and insurance to try to get injection. When going to schedule for injection/ablation procedure he learned that he would not be able to get something for pain like versed even if he paid out of pocket for it so he has found another pain management provider to discuss the procedure and process with them.     He is asking for pain reliever medication to have for the next week until he gets to pain management. He has been taking nothing. He has no prescriptions and no over the counter medication. Says the hydrocodone 5-325 mg he has received from me don't do anything and might as well be chocolate drops.   I encouraged him a number of times to move more. I explained why this is important.  He has previously been on meloxicam for joint pain for quite some time. He has not tried muscle relaxers. Gabapentin did not do anything, says it was a non-starter.   I told him that I would write for muscle relaxer and why this was indicated and recommended, the meloxicam given that some of the pain is arthritis related and anti-inflammatories are best for this type of pain, and hydrocodone 7.5 mg dose. I did review his creatinine level and liver labs.   He was disappointed in the dose of hydrocodone. He didn't think it would help but I pointed out that he has not been on anything and we are going to do a combination of medications. He  says he has not been able to move that much at all and has pain when he moves. I again encouraged him to move more. Walk just around the couch or table, couple times in the morning and couple times in the afternoon.     CT cervical spine with prominent foraminal stenotic changes C3-4 down to C6-7. The CT of the thoracic spine without significant findings.   CT of lumbar spine without significant foraminal or canal stenosis at L1-2, 2-3, 3-4.  L4-5 with bulging disc with mild canal and bilateral foraminal narrowing. L5-S1 with bulging disc material that narrows the right neural foramen mildly. No canal stenosis.     Patient gave consent today for a telehealth video visit and pt was at home and provider in office in [place].    15 min was spent in discussion with pt and greater than 50% of that time was spent counseling.

## 2023-03-15 ENCOUNTER — TELEPHONE (OUTPATIENT)
Dept: FAMILY MEDICINE CLINIC | Facility: CLINIC | Age: 68
End: 2023-03-15

## 2023-03-15 PROBLEM — K76.0 HEPATIC STEATOSIS: Status: ACTIVE | Noted: 2023-03-15

## 2023-03-15 RX ORDER — HYDROCODONE BITARTRATE AND ACETAMINOPHEN 7.5; 325 MG/1; MG/1
1 TABLET ORAL 2 TIMES DAILY
Qty: 15 TABLET | Refills: 0 | Status: SHIPPED | OUTPATIENT
Start: 2023-03-15

## 2023-03-15 RX ORDER — BACLOFEN 10 MG/1
10 TABLET ORAL 3 TIMES DAILY PRN
Qty: 30 TABLET | Refills: 1 | Status: SHIPPED | OUTPATIENT
Start: 2023-03-15

## 2023-03-15 RX ORDER — MELOXICAM 15 MG/1
15 TABLET ORAL DAILY
Qty: 90 TABLET | Refills: 1 | Status: SHIPPED | OUTPATIENT
Start: 2023-03-15

## 2023-03-15 NOTE — TELEPHONE ENCOUNTER
Caller: Pro Mueller    Relationship: Self    Best call back number: [phone]    What medication are you requesting: HYDROCODONE    What are your current symptoms: BACK PAIN    Have you had these symptoms before:    [x] Yes  [] No    Have you been treated for these symptoms before:   [x] Yes  [] No    If a prescription is needed, what is your preferred pharmacy and phone number: [id number] [place], [place] - [address] AT [facility] OF [facility] & [facility] - [phone] [facility] [phone] FX     Additional notes: PATIENT HAD DISCUSSED WITH DR. LLAMAS AT LAST VISIT.

## 2023-03-16 NOTE — TELEPHONE ENCOUNTER
Spoke with patient in detail. Advised him that Lian has already sent over his pain meds to his [facility]. Voiced understanding.

## 2023-04-13 ENCOUNTER — APPOINTMENT (OUTPATIENT)
Dept: CT IMAGING | Facility: HOSPITAL | Age: 68
End: 2023-04-13
Payer: MEDICARE

## 2023-04-13 ENCOUNTER — HOSPITAL ENCOUNTER (OUTPATIENT)
Facility: HOSPITAL | Age: 68
Setting detail: OBSERVATION
Discharge: HOME | End: 2023-04-15
Attending: EMERGENCY MEDICINE | Admitting: STUDENT IN AN ORGANIZED HEALTH CARE EDUCATION/TRAINING PROGRAM
Payer: MEDICARE

## 2023-04-13 DIAGNOSIS — F10.939 ALCOHOL WITHDRAWAL SYNDROME WITH COMPLICATION: Primary | ICD-10-CM

## 2023-04-13 DIAGNOSIS — E87.29 ALCOHOLIC KETOACIDOSIS: ICD-10-CM

## 2023-04-13 DIAGNOSIS — R10.84 GENERALIZED ABDOMINAL PAIN: ICD-10-CM

## 2023-04-13 LAB
ALBUMIN SERPL-MCNC: 4.9 G/DL (ref 3.5–5.2)
ALBUMIN/GLOB SERPL: 1.9 G/DL
ALP SERPL-CCNC: 110 U/L (ref 39–117)
ALT SERPL W P-5'-P-CCNC: 52 U/L (ref 1–41)
ANION GAP SERPL CALCULATED.3IONS-SCNC: 32.8 MMOL/L (ref 5–15)
AST SERPL-CCNC: 164 U/L (ref 1–40)
BACTERIA UR QL AUTO: ABNORMAL /HPF
BASOPHILS # BLD AUTO: 0.03 10*3/MM3 (ref 0–0.2)
BASOPHILS NFR BLD AUTO: 0.4 % (ref 0–1.5)
BILIRUB SERPL-MCNC: 0.8 MG/DL (ref 0–1.2)
BILIRUB UR QL STRIP: NEGATIVE
BUN SERPL-MCNC: 8 MG/DL (ref 8–23)
BUN/CREAT SERPL: 10.7 (ref 7–25)
CALCIUM SPEC-SCNC: 9.1 MG/DL (ref 8.6–10.5)
CHLORIDE SERPL-SCNC: 93 MMOL/L (ref 98–107)
CLARITY UR: CLEAR
CO2 SERPL-SCNC: 12.2 MMOL/L (ref 22–29)
COLOR UR: YELLOW
CREAT SERPL-MCNC: 0.75 MG/DL (ref 0.76–1.27)
DEPRECATED RDW RBC AUTO: 46.8 FL (ref 37–54)
EGFRCR SERPLBLD CKD-EPI 2021: 99.5 ML/MIN/1.73
EOSINOPHIL # BLD AUTO: 0 10*3/MM3 (ref 0–0.4)
EOSINOPHIL NFR BLD AUTO: 0 % (ref 0.3–6.2)
ERYTHROCYTE [DISTWIDTH] IN BLOOD BY AUTOMATED COUNT: 12.9 % (ref 12.3–15.4)
GLOBULIN UR ELPH-MCNC: 2.6 GM/DL
GLUCOSE SERPL-MCNC: 109 MG/DL (ref 65–99)
GLUCOSE UR STRIP-MCNC: NEGATIVE MG/DL
HCT VFR BLD AUTO: 41.5 % (ref 37.5–51)
HGB BLD-MCNC: 14.2 G/DL (ref 13–17.7)
HGB UR QL STRIP.AUTO: ABNORMAL
HYALINE CASTS UR QL AUTO: ABNORMAL /LPF
IMM GRANULOCYTES # BLD AUTO: 0.02 10*3/MM3 (ref 0–0.05)
IMM GRANULOCYTES NFR BLD AUTO: 0.3 % (ref 0–0.5)
KETONES UR QL STRIP: ABNORMAL
LEUKOCYTE ESTERASE UR QL STRIP.AUTO: NEGATIVE
LIPASE SERPL-CCNC: 36 U/L (ref 13–60)
LYMPHOCYTES # BLD AUTO: 0.18 10*3/MM3 (ref 0.7–3.1)
LYMPHOCYTES NFR BLD AUTO: 2.3 % (ref 19.6–45.3)
MCH RBC QN AUTO: 34.2 PG (ref 26.6–33)
MCHC RBC AUTO-ENTMCNC: 34.2 G/DL (ref 31.5–35.7)
MCV RBC AUTO: 100 FL (ref 79–97)
MONOCYTES # BLD AUTO: 0.51 10*3/MM3 (ref 0.1–0.9)
MONOCYTES NFR BLD AUTO: 6.6 % (ref 5–12)
NEUTROPHILS NFR BLD AUTO: 6.98 10*3/MM3 (ref 1.7–7)
NEUTROPHILS NFR BLD AUTO: 90.4 % (ref 42.7–76)
NITRITE UR QL STRIP: NEGATIVE
NRBC BLD AUTO-RTO: 0 /100 WBC (ref 0–0.2)
PH UR STRIP.AUTO: <=5 [PH] (ref 5–8)
PLATELET # BLD AUTO: 149 10*3/MM3 (ref 140–450)
PMV BLD AUTO: 9.5 FL (ref 6–12)
POTASSIUM SERPL-SCNC: 4.9 MMOL/L (ref 3.5–5.2)
PROT SERPL-MCNC: 7.5 G/DL (ref 6–8.5)
PROT UR QL STRIP: ABNORMAL
RBC # BLD AUTO: 4.15 10*6/MM3 (ref 4.14–5.8)
RBC # UR STRIP: ABNORMAL /HPF
REF LAB TEST METHOD: ABNORMAL
SODIUM SERPL-SCNC: 138 MMOL/L (ref 136–145)
SP GR UR STRIP: 1.02 (ref 1–1.03)
SQUAMOUS #/AREA URNS HPF: ABNORMAL /HPF
UROBILINOGEN UR QL STRIP: ABNORMAL
WBC # UR STRIP: ABNORMAL /HPF
WBC NRBC COR # BLD: 7.72 10*3/MM3 (ref 3.4–10.8)

## 2023-04-13 PROCEDURE — 80307 DRUG TEST PRSMV CHEM ANLYZR: CPT | Performed by: PHYSICIAN ASSISTANT

## 2023-04-13 PROCEDURE — 82077 ASSAY SPEC XCP UR&BREATH IA: CPT | Performed by: PHYSICIAN ASSISTANT

## 2023-04-13 PROCEDURE — 80053 COMPREHEN METABOLIC PANEL: CPT | Performed by: PHYSICIAN ASSISTANT

## 2023-04-13 PROCEDURE — 96375 TX/PRO/DX INJ NEW DRUG ADDON: CPT

## 2023-04-13 PROCEDURE — 25010000002 MORPHINE PER 10 MG: Performed by: EMERGENCY MEDICINE

## 2023-04-13 PROCEDURE — 96374 THER/PROPH/DIAG INJ IV PUSH: CPT

## 2023-04-13 PROCEDURE — 99285 EMERGENCY DEPT VISIT HI MDM: CPT

## 2023-04-13 PROCEDURE — 36415 COLL VENOUS BLD VENIPUNCTURE: CPT

## 2023-04-13 PROCEDURE — 85025 COMPLETE CBC W/AUTO DIFF WBC: CPT | Performed by: PHYSICIAN ASSISTANT

## 2023-04-13 PROCEDURE — 25010000002 ONDANSETRON PER 1 MG: Performed by: PHYSICIAN ASSISTANT

## 2023-04-13 PROCEDURE — 81001 URINALYSIS AUTO W/SCOPE: CPT | Performed by: PHYSICIAN ASSISTANT

## 2023-04-13 PROCEDURE — 83690 ASSAY OF LIPASE: CPT | Performed by: PHYSICIAN ASSISTANT

## 2023-04-13 PROCEDURE — 74177 CT ABD & PELVIS W/CONTRAST: CPT

## 2023-04-13 RX ORDER — MORPHINE SULFATE 2 MG/ML
4 INJECTION, SOLUTION INTRAMUSCULAR; INTRAVENOUS ONCE
Status: COMPLETED | OUTPATIENT
Start: 2023-04-13 | End: 2023-04-13

## 2023-04-13 RX ORDER — ONDANSETRON 2 MG/ML
8 INJECTION INTRAMUSCULAR; INTRAVENOUS ONCE
Status: COMPLETED | OUTPATIENT
Start: 2023-04-13 | End: 2023-04-13

## 2023-04-13 RX ADMIN — MORPHINE SULFATE 4 MG: 2 INJECTION, SOLUTION INTRAMUSCULAR; INTRAVENOUS at 22:57

## 2023-04-13 RX ADMIN — ONDANSETRON 8 MG: 2 INJECTION INTRAMUSCULAR; INTRAVENOUS at 22:57

## 2023-04-14 ENCOUNTER — APPOINTMENT (OUTPATIENT)
Dept: ULTRASOUND IMAGING | Facility: HOSPITAL | Age: 68
End: 2023-04-14
Payer: MEDICARE

## 2023-04-14 PROBLEM — F10.939 ALCOHOL WITHDRAWAL SYNDROME WITH COMPLICATION: Status: ACTIVE | Noted: 2023-04-14

## 2023-04-14 PROBLEM — D75.89 MACROCYTOSIS WITHOUT ANEMIA: Status: ACTIVE | Noted: 2023-04-14

## 2023-04-14 LAB
AMPHET+METHAMPHET UR QL: NEGATIVE
ANION GAP SERPL CALCULATED.3IONS-SCNC: 15.2 MMOL/L (ref 5–15)
BARBITURATES UR QL SCN: NEGATIVE
BASOPHILS # BLD AUTO: 0.02 10*3/MM3 (ref 0–0.2)
BASOPHILS NFR BLD AUTO: 0.3 % (ref 0–1.5)
BENZODIAZ UR QL SCN: NEGATIVE
BUN SERPL-MCNC: 10 MG/DL (ref 8–23)
BUN/CREAT SERPL: 15.4 (ref 7–25)
CALCIUM SPEC-SCNC: 8.4 MG/DL (ref 8.6–10.5)
CANNABINOIDS SERPL QL: NEGATIVE
CHLORIDE SERPL-SCNC: 95 MMOL/L (ref 98–107)
CO2 SERPL-SCNC: 21.8 MMOL/L (ref 22–29)
COCAINE UR QL: NEGATIVE
CREAT SERPL-MCNC: 0.65 MG/DL (ref 0.76–1.27)
DEPRECATED RDW RBC AUTO: 46.9 FL (ref 37–54)
EGFRCR SERPLBLD CKD-EPI 2021: 103.9 ML/MIN/1.73
EOSINOPHIL # BLD AUTO: 0 10*3/MM3 (ref 0–0.4)
EOSINOPHIL NFR BLD AUTO: 0 % (ref 0.3–6.2)
ERYTHROCYTE [DISTWIDTH] IN BLOOD BY AUTOMATED COUNT: 13.1 % (ref 12.3–15.4)
ETHANOL BLD-MCNC: <10 MG/DL (ref 0–10)
ETHANOL UR QL: <0.01 %
GLUCOSE SERPL-MCNC: 137 MG/DL (ref 65–99)
HCT VFR BLD AUTO: 37.9 % (ref 37.5–51)
HGB BLD-MCNC: 13.3 G/DL (ref 13–17.7)
IMM GRANULOCYTES # BLD AUTO: 0.02 10*3/MM3 (ref 0–0.05)
IMM GRANULOCYTES NFR BLD AUTO: 0.3 % (ref 0–0.5)
INR PPP: 1.07 (ref 0.9–1.1)
LYMPHOCYTES # BLD AUTO: 0.63 10*3/MM3 (ref 0.7–3.1)
LYMPHOCYTES NFR BLD AUTO: 9.2 % (ref 19.6–45.3)
MCH RBC QN AUTO: 34.6 PG (ref 26.6–33)
MCHC RBC AUTO-ENTMCNC: 35.1 G/DL (ref 31.5–35.7)
MCV RBC AUTO: 98.7 FL (ref 79–97)
METHADONE UR QL SCN: NEGATIVE
MONOCYTES # BLD AUTO: 0.92 10*3/MM3 (ref 0.1–0.9)
MONOCYTES NFR BLD AUTO: 13.5 % (ref 5–12)
NEUTROPHILS NFR BLD AUTO: 5.25 10*3/MM3 (ref 1.7–7)
NEUTROPHILS NFR BLD AUTO: 76.7 % (ref 42.7–76)
NRBC BLD AUTO-RTO: 0 /100 WBC (ref 0–0.2)
OPIATES UR QL: NEGATIVE
OXYCODONE UR QL SCN: NEGATIVE
PLATELET # BLD AUTO: 136 10*3/MM3 (ref 140–450)
PMV BLD AUTO: 10.1 FL (ref 6–12)
POTASSIUM SERPL-SCNC: 5.1 MMOL/L (ref 3.5–5.2)
PROTHROMBIN TIME: 14 SECONDS (ref 11.7–14.2)
QT INTERVAL: 401 MS
RBC # BLD AUTO: 3.84 10*6/MM3 (ref 4.14–5.8)
SODIUM SERPL-SCNC: 132 MMOL/L (ref 136–145)
WBC NRBC COR # BLD: 6.84 10*3/MM3 (ref 3.4–10.8)

## 2023-04-14 PROCEDURE — 85610 PROTHROMBIN TIME: CPT | Performed by: NURSE PRACTITIONER

## 2023-04-14 PROCEDURE — 25010000002 THIAMINE PER 100 MG: Performed by: NURSE PRACTITIONER

## 2023-04-14 PROCEDURE — 76705 ECHO EXAM OF ABDOMEN: CPT

## 2023-04-14 PROCEDURE — G0378 HOSPITAL OBSERVATION PER HR: HCPCS

## 2023-04-14 PROCEDURE — 80048 BASIC METABOLIC PNL TOTAL CA: CPT | Performed by: NURSE PRACTITIONER

## 2023-04-14 PROCEDURE — 85025 COMPLETE CBC W/AUTO DIFF WBC: CPT | Performed by: NURSE PRACTITIONER

## 2023-04-14 PROCEDURE — 25510000001 IOPAMIDOL 61 % SOLUTION: Performed by: EMERGENCY MEDICINE

## 2023-04-14 PROCEDURE — 25010000002 ONDANSETRON PER 1 MG: Performed by: NURSE PRACTITIONER

## 2023-04-14 PROCEDURE — 96361 HYDRATE IV INFUSION ADD-ON: CPT

## 2023-04-14 PROCEDURE — 96376 TX/PRO/DX INJ SAME DRUG ADON: CPT

## 2023-04-14 PROCEDURE — 93005 ELECTROCARDIOGRAM TRACING: CPT | Performed by: NURSE PRACTITIONER

## 2023-04-14 PROCEDURE — 25010000002 LORAZEPAM PER 2 MG: Performed by: EMERGENCY MEDICINE

## 2023-04-14 PROCEDURE — 96375 TX/PRO/DX INJ NEW DRUG ADDON: CPT

## 2023-04-14 PROCEDURE — 25010000002 LORAZEPAM PER 2 MG: Performed by: NURSE PRACTITIONER

## 2023-04-14 RX ORDER — BACLOFEN 10 MG/1
5 TABLET ORAL 3 TIMES DAILY PRN
Status: DISCONTINUED | OUTPATIENT
Start: 2023-04-14 | End: 2023-04-15 | Stop reason: HOSPADM

## 2023-04-14 RX ORDER — SODIUM CHLORIDE 0.9 % (FLUSH) 0.9 %
10 SYRINGE (ML) INJECTION AS NEEDED
Status: DISCONTINUED | OUTPATIENT
Start: 2023-04-14 | End: 2023-04-15 | Stop reason: HOSPADM

## 2023-04-14 RX ORDER — LORAZEPAM 1 MG/1
1 TABLET ORAL
Status: DISCONTINUED | OUTPATIENT
Start: 2023-04-14 | End: 2023-04-15 | Stop reason: HOSPADM

## 2023-04-14 RX ORDER — DULOXETIN HYDROCHLORIDE 30 MG/1
30 CAPSULE, DELAYED RELEASE ORAL DAILY
Status: DISCONTINUED | OUTPATIENT
Start: 2023-04-14 | End: 2023-04-15 | Stop reason: HOSPADM

## 2023-04-14 RX ORDER — LORAZEPAM 2 MG/ML
1 INJECTION INTRAMUSCULAR
Status: DISCONTINUED | OUTPATIENT
Start: 2023-04-14 | End: 2023-04-15 | Stop reason: HOSPADM

## 2023-04-14 RX ORDER — LOPERAMIDE HYDROCHLORIDE 2 MG/1
4 CAPSULE ORAL 4 TIMES DAILY PRN
Status: DISCONTINUED | OUTPATIENT
Start: 2023-04-14 | End: 2023-04-15 | Stop reason: HOSPADM

## 2023-04-14 RX ORDER — FOLIC ACID 1 MG/1
1 TABLET ORAL DAILY
Status: DISCONTINUED | OUTPATIENT
Start: 2023-04-15 | End: 2023-04-15 | Stop reason: HOSPADM

## 2023-04-14 RX ORDER — THIAMINE HYDROCHLORIDE 100 MG/ML
100 INJECTION, SOLUTION INTRAMUSCULAR; INTRAVENOUS ONCE
Status: COMPLETED | OUTPATIENT
Start: 2023-04-14 | End: 2023-04-14

## 2023-04-14 RX ORDER — LISINOPRIL 10 MG/1
10 TABLET ORAL DAILY
Status: DISCONTINUED | OUTPATIENT
Start: 2023-04-14 | End: 2023-04-15 | Stop reason: HOSPADM

## 2023-04-14 RX ORDER — LORAZEPAM 2 MG/ML
2 INJECTION INTRAMUSCULAR ONCE
Status: COMPLETED | OUTPATIENT
Start: 2023-04-14 | End: 2023-04-14

## 2023-04-14 RX ORDER — DIPHENOXYLATE HYDROCHLORIDE AND ATROPINE SULFATE 2.5; .025 MG/1; MG/1
1 TABLET ORAL DAILY
Status: DISCONTINUED | OUTPATIENT
Start: 2023-04-15 | End: 2023-04-15 | Stop reason: HOSPADM

## 2023-04-14 RX ORDER — SODIUM CHLORIDE 9 MG/ML
100 INJECTION, SOLUTION INTRAVENOUS CONTINUOUS
Status: DISCONTINUED | OUTPATIENT
Start: 2023-04-14 | End: 2023-04-15 | Stop reason: HOSPADM

## 2023-04-14 RX ORDER — SODIUM CHLORIDE 0.9 % (FLUSH) 0.9 %
10 SYRINGE (ML) INJECTION EVERY 12 HOURS SCHEDULED
Status: DISCONTINUED | OUTPATIENT
Start: 2023-04-14 | End: 2023-04-15 | Stop reason: HOSPADM

## 2023-04-14 RX ORDER — LEVOTHYROXINE SODIUM 0.1 MG/1
100 TABLET ORAL DAILY
Status: DISCONTINUED | OUTPATIENT
Start: 2023-04-14 | End: 2023-04-15 | Stop reason: HOSPADM

## 2023-04-14 RX ORDER — HYDROCODONE BITARTRATE AND ACETAMINOPHEN 7.5; 325 MG/1; MG/1
1 TABLET ORAL EVERY 6 HOURS PRN
Status: COMPLETED | OUTPATIENT
Start: 2023-04-14 | End: 2023-04-15

## 2023-04-14 RX ORDER — MELOXICAM 15 MG/1
15 TABLET ORAL DAILY
Status: DISCONTINUED | OUTPATIENT
Start: 2023-04-14 | End: 2023-04-15 | Stop reason: HOSPADM

## 2023-04-14 RX ORDER — AMLODIPINE BESYLATE 5 MG/1
5 TABLET ORAL EVERY MORNING
Status: DISCONTINUED | OUTPATIENT
Start: 2023-04-14 | End: 2023-04-15 | Stop reason: HOSPADM

## 2023-04-14 RX ORDER — LORAZEPAM 2 MG/ML
0.5 INJECTION INTRAMUSCULAR
Status: DISCONTINUED | OUTPATIENT
Start: 2023-04-14 | End: 2023-04-15 | Stop reason: HOSPADM

## 2023-04-14 RX ORDER — ONDANSETRON 2 MG/ML
4 INJECTION INTRAMUSCULAR; INTRAVENOUS EVERY 6 HOURS PRN
Status: DISCONTINUED | OUTPATIENT
Start: 2023-04-14 | End: 2023-04-15 | Stop reason: HOSPADM

## 2023-04-14 RX ORDER — LIDOCAINE 50 MG/G
1 PATCH TOPICAL
Status: DISCONTINUED | OUTPATIENT
Start: 2023-04-14 | End: 2023-04-15 | Stop reason: HOSPADM

## 2023-04-14 RX ORDER — LORAZEPAM 0.5 MG/1
0.5 TABLET ORAL
Status: DISCONTINUED | OUTPATIENT
Start: 2023-04-14 | End: 2023-04-15 | Stop reason: HOSPADM

## 2023-04-14 RX ORDER — SODIUM CHLORIDE 9 MG/ML
40 INJECTION, SOLUTION INTRAVENOUS AS NEEDED
Status: DISCONTINUED | OUTPATIENT
Start: 2023-04-14 | End: 2023-04-15 | Stop reason: HOSPADM

## 2023-04-14 RX ADMIN — SODIUM CHLORIDE 100 ML/HR: 9 INJECTION, SOLUTION INTRAVENOUS at 04:11

## 2023-04-14 RX ADMIN — HYDROCODONE BITARTRATE AND ACETAMINOPHEN 1 TABLET: 7.5; 325 TABLET ORAL at 19:56

## 2023-04-14 RX ADMIN — Medication 10 ML: at 20:02

## 2023-04-14 RX ADMIN — AMLODIPINE BESYLATE 5 MG: 5 TABLET ORAL at 15:46

## 2023-04-14 RX ADMIN — THIAMINE HYDROCHLORIDE 100 MG: 100 INJECTION, SOLUTION INTRAMUSCULAR; INTRAVENOUS at 10:53

## 2023-04-14 RX ADMIN — LORAZEPAM 0.5 MG: 2 INJECTION INTRAMUSCULAR; INTRAVENOUS at 04:39

## 2023-04-14 RX ADMIN — ONDANSETRON 4 MG: 2 INJECTION INTRAMUSCULAR; INTRAVENOUS at 04:11

## 2023-04-14 RX ADMIN — ONDANSETRON 4 MG: 2 INJECTION INTRAMUSCULAR; INTRAVENOUS at 18:11

## 2023-04-14 RX ADMIN — LISINOPRIL 10 MG: 10 TABLET ORAL at 15:46

## 2023-04-14 RX ADMIN — LOPERAMIDE HYDROCHLORIDE 4 MG: 2 CAPSULE ORAL at 19:56

## 2023-04-14 RX ADMIN — LEVOTHYROXINE SODIUM 100 MCG: 0.1 TABLET ORAL at 15:46

## 2023-04-14 RX ADMIN — BACLOFEN 5 MG: 10 TABLET ORAL at 18:09

## 2023-04-14 RX ADMIN — LORAZEPAM 0.5 MG: 2 INJECTION INTRAMUSCULAR; INTRAVENOUS at 18:21

## 2023-04-14 RX ADMIN — DULOXETINE HYDROCHLORIDE 30 MG: 30 CAPSULE, DELAYED RELEASE ORAL at 15:46

## 2023-04-14 RX ADMIN — MELOXICAM 15 MG: 15 TABLET ORAL at 15:46

## 2023-04-14 RX ADMIN — LORAZEPAM 2 MG: 2 INJECTION INTRAMUSCULAR; INTRAVENOUS at 00:29

## 2023-04-14 RX ADMIN — IOPAMIDOL 85 ML: 612 INJECTION, SOLUTION INTRAVENOUS at 00:01

## 2023-04-14 NOTE — ED PROVIDER NOTES
EMERGENCY DEPARTMENT ENCOUNTER    Room Number:  08/08  Date seen:  4/14/2023  PCP: Provider, No Known      HPI:  Chief Complaint: Nausea, vomiting, abdominal pain  A complete HPI/ROS/PMH/PSH/SH/FH are unobtainable due to: Nothing  Context: Pro Mueller is a 68 y.o. male who presents to the ED c/o nausea, vomiting, abdominal pain that has been ongoing over the course of the evening.  Patient states abdominal pain has been as bad as a 7 out of 10 on the pain scale.  Is described as being diffuse throughout the abdomen.  At present the pain is said to be very mild.  Patient does state however he has been able to hold down solids and liquids over the course of the day.  He does admit to past history of EtOH abuse and states he has not had a drink since yesterday.  Patient denies associated chest pain, shortness of breath, fever, chills, blood in the vomitus or blood in the stool.  He is here for further evaluation.          PAST MEDICAL HISTORY  Active Ambulatory Problems     Diagnosis Date Noted   • Fall 08/08/2016   • Alcoholism in recovery 08/08/2016   • Atopic rhinitis 08/08/2016   • Mixed anxiety depressive disorder 08/08/2016   • Genital herpes simplex 08/08/2016   • Hyperlipidemia 08/08/2016   • Hypertension 08/08/2016   • Hypothyroidism 08/08/2016   • Insomnia 08/08/2016   • Panic disorder without agoraphobia 08/08/2016   • Persistent insomnia 08/08/2016   • Vitamin D deficiency 08/08/2016   • Chronic low back pain 11/11/2016   • Neuropathy involving both lower extremities 10/25/2018   • QT prolongation 01/03/2019   • Left shoulder pain 11/19/2019   • Alcoholic ketoacidosis 01/12/2020   • Hypokalemia 01/13/2020   • Pancytopenia 01/14/2020   • Left ventricular diastolic dysfunction 01/16/2021   • Tremor 10/06/2021   • C5 cervical fracture 11/09/2021   • Syncope and collapse 01/07/2022   • Neck pain 01/07/2022   • Alcoholic intoxication with complication 03/13/2022   • Withdrawal symptoms, alcohol 07/01/2022   •  Transaminitis 07/01/2022   • Lumbar facet arthropathy 07/20/2022   • Alcohol dependence with intoxication 09/11/2022   • Midline low back pain with right-sided sciatica 09/15/2022   • Spondylolisthesis at L4-L5 level 09/15/2022   • Lumbar canal stenosis 09/15/2022   • Alcohol cessation counseling 09/15/2022   • Need for assistance due to reduced mobility 09/15/2022   • Impaired mobility and ADLs 09/15/2022   • Alcohol withdrawal syndrome without complication 02/18/2023   • Facial laceration 02/18/2023   • Orthostatic hypotension 02/18/2023   • Acute bacterial conjunctivitis of right eye 03/02/2023   • Visit for suture removal 03/02/2023   • Renal calculi 03/14/2023   • Hepatic steatosis 03/15/2023     Resolved Ambulatory Problems     Diagnosis Date Noted   • Impacted cerumen 08/08/2016   • Influenza 08/08/2016   • Motion sickness 08/08/2016   • Seasonal allergic rhinitis 08/08/2016   • Upper respiratory tract infection 08/08/2016   • Alcohol withdrawal (HCC) 11/04/2016   • Headache 11/05/2016   • Gastritis 11/05/2016   • Olecranon bursitis of right elbow 04/05/2017   • Sciatica of left side 06/12/2018   • Syncope and collapse 01/02/2019   • Nausea and vomiting 01/11/2020   • Hyponatremia 01/13/2020   • Alcohol dependence with uncomplicated withdrawal 01/14/2020   • Nephrolithiasis 02/12/2020   • Hypomagnesemia 01/16/2021   • Non-traumatic rhabdomyolysis 01/18/2021   • Urine retention 01/21/2021     Past Medical History:   Diagnosis Date   • Alcohol abuse    • Allergic 1970   • Anxiety    • Arthritis    • Depression    • Disease of thyroid gland    • Elevated cholesterol    • Encounter for removal of sutures    • GERD (gastroesophageal reflux disease)    • Headache, tension-type    • Kidney stone    • Low back pain 2019   • Migraine    • Olecranon bursitis, right elbow    • Peripheral neuropathy    • Sleep apnea          PAST SURGICAL HISTORY  Past Surgical History:   Procedure Laterality Date   • COLONOSCOPY     •  "CYST REMOVAL     • CYSTOSCOPY BLADDER STONE LITHOTRIPSY  2022   • EXTRACORPOREAL SHOCK WAVE LITHOTRIPSY (ESWL) Right    • SHOULDER ARTHROSCOPY Right 2019    Procedure: SHOULDER ARTHROSCOPY, decompression, distal clavicle excision;  Surgeon: Aj Mancilla MD;  [place] [facility] [place] [facility]  Service: Orthopedics   • TONSILLECTOMY           FAMILY HISTORY  Family History   Problem Relation Age of Onset   • Alzheimer's disease Mother    • Mental illness Mother    • Pancreatic cancer Father    • Hearing loss Father    • Arthritis Maternal Grandmother    • Malig Hyperthermia Neg Hx          SOCIAL HISTORY  Social History     Socioeconomic History   • Marital status: Single   Tobacco Use   • Smoking status: Former     Packs/day: 0.50     Years: 15.00     Pack years: 7.50     Types: Cigarettes     Start date: 1990     Quit date: 2005     Years since quittin.2   • Smokeless tobacco: Never   • Tobacco comments:     caffeine - 3 cans of coke daily    Vaping Use   • Vaping Use: Never used   Substance and Sexual Activity   • Alcohol use: Yes     Alcohol/week: 7.0 standard drinks     Types: 7 Shots of liquor per week     Comment: .5 liter vodka   • Drug use: Yes     Types: Methamphetamines     Comment: \"once in a rare while\"   • Sexual activity: Yes     Partners: Female     Birth control/protection: Condom         ALLERGIES  Penicillins        REVIEW OF SYSTEMS  Review of Systems     All systems reviewed and negative except for those discussed in HPI.       PHYSICAL EXAM  ED Triage Vitals [237]   Temp Heart Rate Resp BP SpO2   98.3 °F (36.8 °C) 104 18 (!) 164/107 99 %      Temp src Heart Rate Source Patient Position BP Location FiO2 (%)   Oral Monitor -- -- --       Physical Exam      GENERAL: Anxious in appearance, smells slightly of ketone   HENT: nares patent  EYES: no scleral icterus  CV: regular rhythm, slightly tachycardic with a heart rate around 120, no unilateral leg swelling or pedal " edema noted  RESPIRATORY: normal effort, lungs CTA B   ABDOMEN: Mild tenderness diffusely, MT epigastric region of the stomach.  No guarding, no rebound, bowel sounds unremarkable   MUSCULOSKELETAL: no deformity  NEURO: alert, moves all extremities, follows commands  PSYCH:  calm, cooperative  SKIN: warm, dry    Vital signs and nursing notes reviewed.          LAB RESULTS  Recent Results (from the past 24 hour(s))   Comprehensive Metabolic Panel    Collection Time: 04/13/23 10:56 PM    Specimen: Blood   Result Value Ref Range    Glucose 109 (H) 65 - 99 mg/dL    BUN 8 8 - 23 mg/dL    Creatinine 0.75 (L) 0.76 - 1.27 mg/dL    Sodium 138 136 - 145 mmol/L    Potassium 4.9 3.5 - 5.2 mmol/L    Chloride 93 (L) 98 - 107 mmol/L    CO2 12.2 (L) 22.0 - 29.0 mmol/L    Calcium 9.1 8.6 - 10.5 mg/dL    Total Protein 7.5 6.0 - 8.5 g/dL    Albumin 4.9 3.5 - 5.2 g/dL    ALT (SGPT) 52 (H) 1 - 41 U/L    AST (SGOT) 164 (H) 1 - 40 U/L    Alkaline Phosphatase 110 39 - 117 U/L    Total Bilirubin 0.8 0.0 - 1.2 mg/dL    Globulin 2.6 gm/dL    A/G Ratio 1.9 g/dL    BUN/Creatinine Ratio 10.7 7.0 - 25.0    Anion Gap 32.8 (H) 5.0 - 15.0 mmol/L    eGFR 99.5 >60.0 mL/min/1.73   Lipase    Collection Time: 04/13/23 10:56 PM    Specimen: Blood   Result Value Ref Range    Lipase 36 13 - 60 U/L   CBC Auto Differential    Collection Time: 04/13/23 10:56 PM    Specimen: Blood   Result Value Ref Range    WBC 7.72 3.40 - 10.80 10*3/mm3    RBC 4.15 4.14 - 5.80 10*6/mm3    Hemoglobin 14.2 13.0 - 17.7 g/dL    Hematocrit 41.5 37.5 - 51.0 %    .0 (H) 79.0 - 97.0 fL    MCH 34.2 (H) 26.6 - 33.0 pg    MCHC 34.2 31.5 - 35.7 g/dL    RDW 12.9 12.3 - 15.4 %    RDW-SD 46.8 37.0 - 54.0 fl    MPV 9.5 6.0 - 12.0 fL    Platelets 149 140 - 450 10*3/mm3    Neutrophil % 90.4 (H) 42.7 - 76.0 %    Lymphocyte % 2.3 (L) 19.6 - 45.3 %    Monocyte % 6.6 5.0 - 12.0 %    Eosinophil % 0.0 (L) 0.3 - 6.2 %    Basophil % 0.4 0.0 - 1.5 %    Immature Grans % 0.3 0.0 - 0.5 %     Neutrophils, Absolute 6.98 1.70 - 7.00 10*3/mm3    Lymphocytes, Absolute 0.18 (L) 0.70 - 3.10 10*3/mm3    Monocytes, Absolute 0.51 0.10 - 0.90 10*3/mm3    Eosinophils, Absolute 0.00 0.00 - 0.40 10*3/mm3    Basophils, Absolute 0.03 0.00 - 0.20 10*3/mm3    Immature Grans, Absolute 0.02 0.00 - 0.05 10*3/mm3    nRBC 0.0 0.0 - 0.2 /100 WBC   Ethanol    Collection Time: 04/13/23 10:56 PM    Specimen: Blood   Result Value Ref Range    Ethanol <10 0 - 10 mg/dL    Ethanol % <0.010 %   Urinalysis With Microscopic If Indicated (No Culture) - Urine, Clean Catch    Collection Time: 04/13/23 11:19 PM    Specimen: Urine, Clean Catch   Result Value Ref Range    Color, UA Yellow Yellow, Straw    Appearance, UA Clear Clear    pH, UA <=5.0 5.0 - 8.0    Specific Gravity, UA 1.020 1.005 - 1.030    Glucose, UA Negative Negative    Ketones, UA 80 mg/dL (3+) (A) Negative    Bilirubin, UA Negative Negative    Blood, UA Trace (A) Negative    Protein,  mg/dL (2+) (A) Negative    Leuk Esterase, UA Negative Negative    Nitrite, UA Negative Negative    Urobilinogen, UA 0.2 E.U./dL 0.2 - 1.0 E.U./dL   Urinalysis, Microscopic Only - Urine, Clean Catch    Collection Time: 04/13/23 11:19 PM    Specimen: Urine, Clean Catch   Result Value Ref Range    RBC, UA 0-2 None Seen, 0-2 /HPF    WBC, UA 3-5 (A) None Seen, 0-2 /HPF    Bacteria, UA None Seen None Seen /HPF    Squamous Epithelial Cells, UA 0-2 None Seen, 0-2 /HPF    Hyaline Casts, UA None Seen None Seen /LPF    Methodology Automated Microscopy    Urine Drug Screen - Urine, Clean Catch    Collection Time: 04/13/23 11:19 PM    Specimen: Urine, Clean Catch   Result Value Ref Range    Amphet/Methamphet, Screen Negative Negative    Barbiturates Screen, Urine Negative Negative    Benzodiazepine Screen, Urine Negative Negative    Cocaine Screen, Urine Negative Negative    Opiate Screen Negative Negative    THC, Screen, Urine Negative Negative    Methadone Screen, Urine Negative Negative     Oxycodone Screen, Urine Negative Negative       Ordered the above labs and reviewed the results.        RADIOLOGY  CT Abdomen Pelvis With Contrast    Result Date: 4/14/2023  Patient: HERB LUNA  Time Out: 00:12 Exam(s): CT ABDOMEN + PELVIS With Contrast EXAM:   CT Abdomen and Pelvis With Intravenous Contrast CLINICAL HISTORY:    Reason for exam: Abdominal pain and elevated LFTs. TECHNIQUE:   Axial computed tomography images of the abdomen and pelvis with intravenous contrast.  CTDI is 10.18 mGy and DLP is 529.6 mGy-cm.  This CT exam was performed according to the principle of ALARA (As Low As Reasonably Achievable) by using one or more of the following dose reduction techniques: automated exposure control, adjustment of the mA and or kV according to patient size, and or use of iterative reconstruction technique. COMPARISON:   No relevant prior studies available. FINDINGS:   Lung bases:  Unremarkable.  No mass.  No consolidation.  ABDOMEN:   Liver:  Hepatic steatosis.   Gallbladder and bile ducts:  Cholelithiasis.  No ductal dilation.   Pancreas:  Unremarkable.  No mass.  No ductal dilation.   Spleen:  Unremarkable.  No splenomegaly.   Adrenals:  Indeterminate 11 mm nodule on the left adrenal gland.  Consider noncontrast CT scan for further evaluation.   Kidneys and ureters:  Right renal cyst measures 3.4 cm.  No hydronephrosis.   Stomach and bowel:  Diverticulosis, without acute diverticulitis.  No small bowel obstruction.  No free intraperitoneal air.  Wall thickening of the proximal stomach measures up to 1.5 cm.  Correlate with nonemergent upper GI endoscopy.  PELVIS:   Appendix:  Normal appendix.   Bladder:  Wall thickening of the urinary bladder measures 6 mm.  If there is concern for UTI, urinalysis recommended.   Reproductive:  Unremarkable as visualized.  ABDOMEN and PELVIS:   Intraperitoneal space:  Unremarkable.  No free air.  No significant fluid collection.   Bones joints:  Degenerative changes of the  spine.  No acute fracture.  No dislocation.   Soft tissues:  Small fat-containing left inguinal hernia.   Vasculature:  Atherosclerotic changes of the aorta.  No abdominal aortic aneurysm.   Lymph nodes:  Unremarkable.  No enlarged lymph nodes. IMPRESSION:     1.  Wall thickening of the urinary bladder measures 6 mm.  If there is concern for UTI, urinalysis recommended. 2.  Indeterminate 11 mm nodule on the left adrenal gland.  Consider noncontrast CT scan for further evaluation. 3.  Diverticulosis, without acute diverticulitis.  No small bowel obstruction.  No free intraperitoneal air.  Normal appendix. 4.  Hepatic steatosis.  Cholelithiasis. 5.  Wall thickening of the proximal stomach measures up to 1.5 cm.  Correlate with nonemergent upper GI endoscopy.     Electronically signed by Alex Kwon MD on 04-14-23 at 0012      Ordered the above noted radiological studies. Reviewed by me in PACS.          PROCEDURES  Procedures          MEDICATIONS GIVEN IN ER  Medications   ondansetron (ZOFRAN) injection 8 mg (8 mg Intravenous Given 4/13/23 2257)   morphine injection 4 mg (4 mg Intravenous Given 4/13/23 2257)   iopamidol (ISOVUE-300) 61 % injection 100 mL (85 mL Intravenous Given 4/14/23 0001)   LORazepam (ATIVAN) injection 2 mg (2 mg Intravenous Given 4/14/23 0029)         MEDICAL DECISION MAKING, PROGRESS, and CONSULTS    Discussion below represents my analysis of pertinent findings related to patient's condition, differential diagnosis, treatment plan and final disposition.      Orders placed during this visit:  Orders Placed This Encounter   Procedures   • CT Abdomen Pelvis With Contrast   • Comprehensive Metabolic Panel   • Lipase   • Urinalysis With Microscopic If Indicated (No Culture) - Urine, Clean Catch   • CBC Auto Differential   • Urinalysis, Microscopic Only - Urine, Clean Catch   • Ethanol   • Urine Drug Screen - Urine, Clean Catch   • Pulse Oximetry, Continuous   • Cardiac Monitoring   • Clinical  Adrian Withdrawal Assessment   • LHA (on-call MD unless specified) Details   • Initiate Observation Status   • CBC & Differential         Additional sources:  - Discussed/obtained information from independent historians: None available  Additional information was obtained to confirm the patient's history.    - External (non-ED) record review: Reviewing a note from a Dr. Lucho Charlton dictated 2/20/2023.  The patient had been admitted to the hospital for the period of 2/18/2023 through 2/20/2023 for alcoholic dependence and intoxication, alcoholic ketoacidosis, orthostatic hypotension.  Patient was treated conservatively on the floor with IV fluids and provided Ativan for mild alcohol withdrawal and once symptoms are improved he was discharged home and alcohol cessation was stressed.            - Chronic or social conditions impacting care: Patient's poor historian        Differential diagnosis:    Alcoholic gastritis, duodenitis, acute pancreatitis, other intra-abdominal process, alcohol withdrawal, polysubstance abuse.  Will obtain CBC, CMP, EtOH, UDS, lipase, UA, CT abdomen pelvis with IV contrast to further evaluate.          Independent interpretation of labs, radiology studies, and discussions with consultants:  ED Course as of 04/14/23 0204   u Apr 13, 2023 2346 BP(!): 164/107 [RC]   2346 Heart Rate: 104 [RC]   2346 Resp: 18 [RC]   2346 SpO2: 99 %  RA [RC]   2346 WBC: 7.72 [RC]   2346 RBC: 4.15 [RC]   2346 Hemoglobin: 14.2 [RC]   2346 Hematocrit: 41.5 [RC]   2347 Platelets: 149 [RC]   2347 Lipase: 36 [RC]   2347 Glucose(!): 109 [RC]   2347 Creatinine(!): 0.75 [RC]   2347 BUN: 8 [RC]   2347 eGFR: 99.5 [RC]   2347 CO2(!): 12.2 [RC]   2347 Anion Gap(!): 32.8 [RC]   2347 Ketones, UA(!): 80 mg/dL (3+) [RC]   2347 Bacteria, UA: None Seen [RC]   Fri Apr 14, 2023   0055 C was 15.  2 mg Ativan were ordered.  Patient has a long history of alcohol withdrawal.  He is now admitting to drinking heavily up until  yesterday.  He is clearly going to need admission for alcohol withdrawal.  Will place call to the hospitalist at this time to discuss admission. [RC]   0154 Discussed the patient's case with BERONICA Alanis with [facility].  To admit in a telemetry status under Dr. Jackson's care for further management. [RC]      ED Course User Index  [RC] Juan Olmos III, PA               PPE: The patient wore a mask throughout the entire encounter. I wore a well-fitting mask.    DIAGNOSIS  Final diagnoses:   Alcohol withdrawal syndrome with complication   Alcoholic ketoacidosis   Generalized abdominal pain         DISPOSITION  ADMISSION    Discussed treatment plan and reason for admission with pt/family and admitting physician.  Pt/family voiced understanding of the plan for admission for further testing/treatment as needed.         Latest Documented Vital Signs:  As of 02:04 EDT  BP- 166/98 HR- 90 Temp- 98.3 °F (36.8 °C) (Oral) O2 sat- 96%      --    Please note that portions of this were completed with a voice recognition program.       Note Disclaimer: At [facility], we believe that sharing information builds trust and better relationships. You are receiving this note because you are receiving care at [facility] or recently visited. It is possible you will see health information before a provider has talked with you about it. This kind of information can be easy to misunderstand. To help you fully understand what it means for your health, we urge you to discuss this note with your provider.       Juan Olmos III, PA  04/14/23 0204

## 2023-04-14 NOTE — ED NOTES
Patient arrived via EMS with complaints of nausea/vomitting x1.5hr and chronic low back pain, hypertension(out of meds x2 weeks).  Patient denies diarrhea.  Patient from home, AA&Ox4

## 2023-04-14 NOTE — ED NOTES
Patient arrived via EMS with complaints of nausea/vomitting x1.5hr and chronic low back pain, hypertension(out of meds x2 weeks).  Patient denies diarrhea.  Patient from home, AA&Ox4, EMS administered 4mg zofran IV PTA

## 2023-04-14 NOTE — ED PROVIDER NOTES
MD ATTESTATION NOTE    The LUBA and I have discussed this patient's history, physical exam, and treatment plan.  I have reviewed the documentation and personally had a face to face interaction with the patient. I affirm the documentation and agree with the treatment and plan.  The attached note describes my personal findings.      I provided a substantive portion of the care of the patient.  I personally performed the physical exam in its entirety, and below are my findings.  For this patient encounter, the patient wore surgical mask, I wore full protective PPE including N95 and eye protection.      Brief HPI: This patient is a 68-year-old male presenting to the emergency room today with abdominal discomfort as well as associated nausea and vomiting that began earlier this evening.  The patient does report a history of alcoholism and has not had a drink since yesterday.  He denies fever/chills, chest pain, or shortness of breath.      PHYSICAL EXAM  ED Triage Vitals [04/13/23 2147]   Temp Heart Rate Resp BP SpO2   98.3 °F (36.8 °C) 104 18 (!) 164/107 99 %      Temp src Heart Rate Source Patient Position BP Location FiO2 (%)   Oral Monitor -- -- --         GENERAL: In mild distress, mildly tremulous, nontoxic in appearance  HENT: nares patent  EYES: no scleral icterus  CV: regular rhythm, normal rate, no M/R/G  RESPIRATORY: normal effort, lungs clear bilaterally  ABDOMEN: soft, nontender, no rebound or guarding  MUSCULOSKELETAL: no deformity, no edema  NEURO: alert, moves all extremities, follows commands  PSYCH:  calm, cooperative  SKIN: warm, dry    Vital signs and nursing notes reviewed.        Plan: We will hydrate the patient as well as treat his nausea and vomiting as we obtain labs, urinalysis, as well as a CT scan of the abdomen and pelvis in the ED today.  We will monitor and reassess following.       Atif Long MD  04/14/23 0697

## 2023-04-14 NOTE — ED NOTES
Nursing report ED to floor  Pro Mueller  68 y.o.  male    HPI :   Chief Complaint   Patient presents with    Nausea    Vomiting     Patient arrived via EMS with complaints of nausea/vomitting x1.5hr and chronic low back pain, hypertension(out of meds x2 weeks).  Patient denies diarrhea.  Patient from home, AA&Ox4       Admitting doctor:   Esdras Jackson MD    Admitting diagnosis:   The primary encounter diagnosis was Alcohol withdrawal syndrome with complication. Diagnoses of Alcoholic ketoacidosis and Generalized abdominal pain were also pertinent to this visit.    Code status:   Current Code Status       Date Active Code Status Order ID Comments User Context       Prior            Allergies:   Penicillins    Isolation:   No active isolations    Intake and Output  No intake or output data in the 24 hours ending 04/14/23 0222    Weight:       04/13/23 2147   Weight: 86.2 kg (190 lb)       Most recent vitals:   Vitals:    04/14/23 0031 04/14/23 0131 04/14/23 0132 04/14/23 0201   BP: 175/100 166/98  161/97   Pulse: 91  90 93   Resp:       Temp:       TempSrc:       SpO2: 94%  96% 92%   Weight:       Height:           Active LDAs/IV Access:   Lines, Drains & Airways       Active LDAs       Name Placement date Placement time Site Days    Peripheral IV Right Antecubital --  --  Antecubital  --                    Labs (abnormal labs have a star):   Labs Reviewed   COMPREHENSIVE METABOLIC PANEL - Abnormal; Notable for the following components:       Result Value    Glucose 109 (*)     Creatinine 0.75 (*)     Chloride 93 (*)     CO2 12.2 (*)     ALT (SGPT) 52 (*)     AST (SGOT) 164 (*)     Anion Gap 32.8 (*)     All other components within normal limits    Narrative:     GFR Normal >60  Chronic Kidney Disease <60  Kidney Failure <15     URINALYSIS W/ MICROSCOPIC IF INDICATED (NO CULTURE) - Abnormal; Notable for the following components:    Ketones, UA 80 mg/dL (3+) (*)     Blood, UA Trace (*)     Protein,  mg/dL  (2+) (*)     All other components within normal limits   CBC WITH AUTO DIFFERENTIAL - Abnormal; Notable for the following components:    .0 (*)     MCH 34.2 (*)     Neutrophil % 90.4 (*)     Lymphocyte % 2.3 (*)     Eosinophil % 0.0 (*)     Lymphocytes, Absolute 0.18 (*)     All other components within normal limits   URINALYSIS, MICROSCOPIC ONLY - Abnormal; Notable for the following components:    WBC, UA 3-5 (*)     All other components within normal limits   LIPASE - Normal   URINE DRUG SCREEN - Normal    Narrative:     Negative Thresholds Per Drugs Screened:    Amphetamines                 500 ng/ml  Barbiturates                 200 ng/ml  Benzodiazepines              100 ng/ml  Cocaine                      300 ng/ml  Methadone                    300 ng/ml  Opiates                      300 ng/ml  Oxycodone                    100 ng/ml  THC                           50 ng/ml    The Normal Value for all drugs tested is negative. This report includes final unconfirmed screening results to be used for medical treatment purposes only. Unconfirmed results must not be used for non-medical purposes such as employment or legal testing. Clinical consideration should be applied to any drug of abuse test, particularly when unconfirmed results are used.           ETHANOL   CBC AND DIFFERENTIAL    Narrative:     The following orders were created for panel order CBC & Differential.  Procedure                               Abnormality         Status                     ---------                               -----------         ------                     CBC Auto Differential[[id number]]        Abnormal            Final result                 Please view results for these tests on the individual orders.       EKG:   No orders to display       Meds given in ED:   Medications   ondansetron (ZOFRAN) injection 8 mg (8 mg Intravenous Given 4/13/23 2257)   morphine injection 4 mg (4 mg Intravenous Given 4/13/23 2257)  "  iopamidol (ISOVUE-300) 61 % injection 100 mL (85 mL Intravenous Given 23 0001)   LORazepam (ATIVAN) injection 2 mg (2 mg Intravenous Given 23 0029)       Imaging results:  CT Abdomen Pelvis With Contrast    Result Date: 2023  Electronically signed by Alex Kwon MD on 23 at 0012     Ambulatory status:   - Stretcher    Social issues:   Social History     Socioeconomic History    Marital status: Single   Tobacco Use    Smoking status: Former     Packs/day: 0.50     Years: 15.00     Pack years: 7.50     Types: Cigarettes     Start date: 1990     Quit date: 2005     Years since quittin.2    Smokeless tobacco: Never    Tobacco comments:     caffeine - 3 cans of coke daily    Vaping Use    Vaping Use: Never used   Substance and Sexual Activity    Alcohol use: Yes     Alcohol/week: 7.0 standard drinks     Types: 7 Shots of liquor per week     Comment: .5 liter vodka    Drug use: Yes     Types: Methamphetamines     Comment: \"once in a rare while\"    Sexual activity: Yes     Partners: Female     Birth control/protection: Condom       NIH Stroke Scale:         Hossein Suggs RN  23 02:22 EDT       "

## 2023-04-14 NOTE — PLAN OF CARE
Goal Outcome Evaluation:  Plan of Care Reviewed With: patient         A&Ox4. RA. Assist x1 to bathroom. SR on monitor. ECG shows SR as well. CIWA 2-3 d/t tremors. US abd complete. Diet advanced to regaular healthy heart. Patient c/o back pain, awaiting baclofen from pharmacy. No other patient complaints at this time . VSS. All needs met.         1818- patient c/o nausea and increasing tremors. CIWA 8, ativan and zofran given

## 2023-04-14 NOTE — PLAN OF CARE
Pt is maintaining safety goals. Pt kept safe in restraints and free from injury and falls.   Goal Outcome Evaluation:  Plan of Care Reviewed With: patient  Progress: no change     Problem: Adult Inpatient Plan of Care  Goal: Absence of Hospital-Acquired Illness or Injury  Outcome: Ongoing, Progressing     Problem: Adult Inpatient Plan of Care  Goal: Optimal Comfort and Wellbeing  Outcome: Ongoing, Progressing     Problem: Fall Injury Risk  Goal: Absence of Fall and Fall-Related Injury  Outcome: Ongoing, Progressing     Problem: Skin Injury Risk Increased  Goal: Skin Health and Integrity  Outcome: Ongoing, Progressing     Problem: Restraint, Nonbehavioral (Nonviolent)  Goal: Discontinuation Criteria Achieved  Outcome: Ongoing, Progressing     Problem: Restraint, Nonbehavioral (Nonviolent)  Goal: Personal Dignity and Safety Maintained  Outcome: Ongoing, Progressing      No

## 2023-04-14 NOTE — H&P
Patient Name:  Pro Mueller  YOB: 1955  MRN:  [id number]  Admit Date:  4/13/2023  Patient Care Team:  Provider, No Known as PCP - Say Roger MD (Psychiatry)      Subjective   History Present Illness     Chief Complaint   Patient presents with   • Nausea   • Vomiting     Patient arrived via EMS with complaints of nausea/vomitting x1.5hr and chronic low back pain, hypertension(out of meds x2 weeks).  Patient denies diarrhea.  Patient from home, AA&Ox4       Mr. Mueller is a 68 y.o. former smoker with a history of hypertension, hyperlipidemia, alcohol dependence syndrome with history of hospital admission secondary to alcohol withdrawal, peripheral neuropathy who presents to [facility], [place], operated by [facility] chief complaint of nausea vomiting and admitted for alcoholic ketoacidosis.    Reportedly lives alone.  History of chronic low back pain & reportedly 'passed out' several weeks ago without hospital evaluation; however, EMS reportedly assisted patient back inside home.    Last alcohol (mixture of hard liquor and beer) intake on 4/12/2023 with unknown quantity.  Denies history of alcohol-related seizures.    Describes intractable nausea, vomiting beginning on 4/13/2023; therefore, went to [facility], [place], operated by [facility] for further evaluation.    Laboratory findings confirm alcoholic ketoacidosis.  IV fluids initiated in ER.    Recommendation pending hospital course.  Details below.    History of Present Illness  Review of Systems   Constitutional: Negative for chills and fever.   HENT: Negative for congestion and rhinorrhea.    Respiratory: Negative for cough and shortness of breath.    Cardiovascular: Negative for chest pain and leg swelling.   Gastrointestinal: Positive for nausea and vomiting. Negative for abdominal pain, constipation and diarrhea.   Endocrine: Negative for polydipsia, polyphagia and polyuria.   Genitourinary: Negative for difficulty urinating and dysuria.   Musculoskeletal: Positive for gait problem (due  to low back pain & uses a walker at home). Negative for myalgias.   Skin: Negative for rash and wound.   Neurological: Negative for syncope and light-headedness.   Psychiatric/Behavioral: Negative for confusion and hallucinations.        Personal History     Past Medical History:   Diagnosis Date   • Alcohol abuse    • Alcohol withdrawal 11/04/2016   • Alcoholic ketoacidosis 01/12/2020   • Allergic 1970   • Anxiety    • Arthritis    • Atopic rhinitis 08/08/2016   • Depression    • Disease of thyroid gland    • Elevated cholesterol    • Encounter for removal of sutures    • Genital herpes simplex 08/08/2016   • GERD (gastroesophageal reflux disease)    • Headache, tension-type    • Hyperlipidemia    • Hypertension    • Hypothyroidism    • Kidney stone    • Low back pain 2019   • Migraine    • Motion sickness    • Nephrolithiasis 02/12/2020   • Olecranon bursitis, right elbow    • Panic disorder without agoraphobia 08/08/2016   • Peripheral neuropathy    • Sleep apnea    • Syncope and collapse 01/02/2019   • Vitamin D deficiency 08/08/2016   • Withdrawal symptoms, alcohol      Past Surgical History:   Procedure Laterality Date   • COLONOSCOPY     • CYST REMOVAL     • CYSTOSCOPY BLADDER STONE LITHOTRIPSY  02/2022   • EXTRACORPOREAL SHOCK WAVE LITHOTRIPSY (ESWL) Right 2002   • SHOULDER ARTHROSCOPY Right 12/17/2019    Procedure: SHOULDER ARTHROSCOPY, decompression, distal clavicle excision;  Surgeon: Aj Mancilla MD;  Location: [facility] OR [facility];  Service: Orthopedics   • TONSILLECTOMY       Family History   Problem Relation Age of Onset   • Alzheimer's disease Mother    • Mental illness Mother    • Pancreatic cancer Father    • Hearing loss Father    • Arthritis Maternal Grandmother    • Malig Hyperthermia Neg Hx      Social History     Tobacco Use   • Smoking status: Former     Packs/day: 0.50     Years: 15.00     Pack years: 7.50     Types: Cigarettes     Start date: 1/1/1990     Quit date: 1/1/2005     Years since  "quittin.2   • Smokeless tobacco: Never   • Tobacco comments:     caffeine - 3 cans of coke daily    Vaping Use   • Vaping Use: Never used   Substance Use Topics   • Alcohol use: Yes     Alcohol/week: 7.0 standard drinks     Types: 7 Shots of liquor per week     Comment: .5 liter vodka   • Drug use: Yes     Types: Methamphetamines     Comment: \"once in a rare while\"     No current facility-administered medications on file prior to encounter.     Current Outpatient Medications on File Prior to Encounter   Medication Sig Dispense Refill   • acetaminophen (TYLENOL) 325 MG tablet Take 2 tablets by mouth Every 4 (Four) Hours As Needed for Mild Pain .     • aluminum-magnesium hydroxide-simethicone (MAALOX MAX) 400-400-40 MG/5ML suspension Take 15 mL by mouth Every 6 (Six) Hours As Needed for Heartburn. 355 mL 0   • amLODIPine (NORVASC) 5 MG tablet TAKE 1 TABLET BY MOUTH EVERY MORNING 90 tablet 1   • atorvastatin (LIPITOR) 20 MG tablet TAKE 1 TABLET BY MOUTH DAILY 90 tablet 1   • baclofen (LIORESAL) 10 MG tablet Take 1 tablet by mouth 3 (Three) Times a Day As Needed for Muscle Spasms. 30 tablet 1   • DULoxetine (CYMBALTA) 30 MG capsule Take 1 capsule by mouth Daily. 30 capsule 1   • folic acid (FOLVITE) 1 MG tablet Take 1 tablet by mouth Daily. 30 tablet 0   • HYDROcodone-acetaminophen (NORCO) 7.5-325 MG per tablet Take 1 tablet by mouth 2 (Two) Times a Day. 15 tablet 0   • levothyroxine (SYNTHROID, LEVOTHROID) 100 MCG tablet Take 1 tablet by mouth Daily. 90 tablet 1   • lisinopril (PRINIVIL,ZESTRIL) 10 MG tablet TAKE 1 TABLET BY MOUTH DAILY 90 tablet 1   • meloxicam (MOBIC) 15 MG tablet Take 1 tablet by mouth Daily. 90 tablet 1   • Multiple Vitamin (multivitamin) capsule Take 1 capsule by mouth Daily. 30 capsule 0   • thiamine (thiamine) 100 MG tablet tablet Take 1 tablet by mouth Daily. 30 tablet 0   • lidocaine (LIDODERM) 5 % Place 1 patch on the skin as directed by provider Daily. Remove & Discard patch within 12 " hours or as directed by MD 6 each 0   • sennosides-docusate (senna-docusate sodium) 8.6-50 MG per tablet Take 1 tablet by mouth 2 (Two) Times a Day As Needed for Constipation. 40 tablet 0     Allergies   Allergen Reactions   • Penicillins Anaphylaxis and Seizure     Seizure. childhood       Objective    Objective     Vital Signs  Temp:  [98.2 °F (36.8 °C)-98.8 °F (37.1 °C)] 98.8 °F (37.1 °C)  Heart Rate:  [] 85  Resp:  [18] 18  BP: (138-178)/() 138/78  SpO2:  [92 %-99 %] 96 %  on   ;   Device (Oxygen Therapy): room air  Body mass index is 25.76 kg/m².    Physical Exam  Constitutional:       General: He is not in acute distress.     Appearance: He is not toxic-appearing.      Comments: Disheveled   HENT:      Head: Normocephalic and atraumatic.   Eyes:      Extraocular Movements: Extraocular movements intact.      Conjunctiva/sclera: Conjunctivae normal.   Cardiovascular:      Rate and Rhythm: Normal rate.      Heart sounds: Normal heart sounds.   Pulmonary:      Effort: Pulmonary effort is normal.      Breath sounds: Normal breath sounds.   Abdominal:      General: Bowel sounds are normal.      Palpations: Abdomen is soft.   Musculoskeletal:         General: No tenderness.      Cervical back: Normal range of motion and neck supple.      Right lower leg: No edema.      Left lower leg: No edema.   Skin:     General: Skin is warm and dry.   Neurological:      Mental Status: He is alert and oriented to person, place, and time.      Cranial Nerves: No cranial nerve deficit.   Psychiatric:         Behavior: Behavior normal.         Thought Content: Thought content normal.         Results Review:  I reviewed the patient's new clinical results.  I reviewed the patient's new imaging results and agree with the interpretation.  I reviewed the patient's other test results and agree with the interpretation  I personally viewed and interpreted the patient's EKG/Telemetry data  Discussed with ED provider.    Lab  Results (last 24 hours)     Procedure Component Value Units Date/Time    CBC & Differential [[id number]]  (Abnormal) Collected: 04/13/23 2256    Specimen: Blood Updated: 04/13/23 2311    Narrative:      The following orders were created for panel order CBC & Differential.  Procedure                               Abnormality         Status                     ---------                               -----------         ------                     CBC Auto Differential[[id number]]        Abnormal            Final result                 Please view results for these tests on the individual orders.    Comprehensive Metabolic Panel [[id number]]  (Abnormal) Collected: 04/13/23 2256    Specimen: Blood Updated: 04/13/23 2338     Glucose 109 mg/dL      BUN 8 mg/dL      Creatinine 0.75 mg/dL      Sodium 138 mmol/L      Potassium 4.9 mmol/L      Comment: Slight hemolysis detected by analyzer. Results may be affected.        Chloride 93 mmol/L      CO2 12.2 mmol/L      Calcium 9.1 mg/dL      Total Protein 7.5 g/dL      Albumin 4.9 g/dL      ALT (SGPT) 52 U/L      AST (SGOT) 164 U/L      Comment: Slight hemolysis detected by analyzer. Results may be affected.        Alkaline Phosphatase 110 U/L      Total Bilirubin 0.8 mg/dL      Globulin 2.6 gm/dL      A/G Ratio 1.9 g/dL      BUN/Creatinine Ratio 10.7     Anion Gap 32.8 mmol/L      eGFR 99.5 mL/min/1.73     Narrative:      GFR Normal >60  Chronic Kidney Disease <60  Kidney Failure <15      Lipase [[id number]]  (Normal) Collected: 04/13/23 2256    Specimen: Blood Updated: 04/13/23 2335     Lipase 36 U/L     CBC Auto Differential [[id number]]  (Abnormal) Collected: 04/13/23 2256    Specimen: Blood Updated: 04/13/23 2311     WBC 7.72 10*3/mm3      RBC 4.15 10*6/mm3      Hemoglobin 14.2 g/dL      Hematocrit 41.5 %      .0 fL      MCH 34.2 pg      MCHC 34.2 g/dL      RDW 12.9 %      RDW-SD 46.8 fl      MPV 9.5 fL      Platelets 149 10*3/mm3      Neutrophil % 90.4 %      Lymphocyte %  2.3 %      Monocyte % 6.6 %      Eosinophil % 0.0 %      Basophil % 0.4 %      Immature Grans % 0.3 %      Neutrophils, Absolute 6.98 10*3/mm3      Lymphocytes, Absolute 0.18 10*3/mm3      Monocytes, Absolute 0.51 10*3/mm3      Eosinophils, Absolute 0.00 10*3/mm3      Basophils, Absolute 0.03 10*3/mm3      Immature Grans, Absolute 0.02 10*3/mm3      nRBC 0.0 /100 WBC     Ethanol [[id number]] Collected: 04/13/23 2256    Specimen: Blood Updated: 04/14/23 0053     Ethanol <10 mg/dL      Ethanol % <0.010 %     Urinalysis With Microscopic If Indicated (No Culture) - Urine, Clean Catch [[id number]]  (Abnormal) Collected: 04/13/23 2319    Specimen: Urine, Clean Catch Updated: 04/13/23 2342     Color, UA Yellow     Appearance, UA Clear     pH, UA <=5.0     Specific Gravity, UA 1.020     Glucose, UA Negative     Ketones, UA 80 mg/dL (3+)     Bilirubin, UA Negative     Blood, UA Trace     Protein,  mg/dL (2+)     Leuk Esterase, UA Negative     Nitrite, UA Negative     Urobilinogen, UA 0.2 E.U./dL    Urinalysis, Microscopic Only - Urine, Clean Catch [[id number]]  (Abnormal) Collected: 04/13/23 2319    Specimen: Urine, Clean Catch Updated: 04/13/23 2342     RBC, UA 0-2 /HPF      WBC, UA 3-5 /HPF      Bacteria, UA None Seen /HPF      Squamous Epithelial Cells, UA 0-2 /HPF      Hyaline Casts, UA None Seen /LPF      Methodology Automated Microscopy    Urine Drug Screen - Urine, Clean Catch [[id number]]  (Normal) Collected: 04/13/23 2319    Specimen: Urine, Clean Catch Updated: 04/14/23 0148     Amphet/Methamphet, Screen Negative     Barbiturates Screen, Urine Negative     Benzodiazepine Screen, Urine Negative     Cocaine Screen, Urine Negative     Opiate Screen Negative     THC, Screen, Urine Negative     Methadone Screen, Urine Negative     Oxycodone Screen, Urine Negative    Narrative:      Negative Thresholds Per Drugs Screened:    Amphetamines                 500 ng/ml  Barbiturates                 200 ng/ml  Benzodiazepines               100 ng/ml  Cocaine                      300 ng/ml  Methadone                    300 ng/ml  Opiates                      300 ng/ml  Oxycodone                    100 ng/ml  THC                           50 ng/ml    The Normal Value for all drugs tested is negative. This report includes final unconfirmed screening results to be used for medical treatment purposes only. Unconfirmed results must not be used for non-medical purposes such as employment or legal testing. Clinical consideration should be applied to any drug of abuse test, particularly when unconfirmed results are used.            Basic Metabolic Panel [[id number]]  (Abnormal) Collected: 04/14/23 0659    Specimen: Blood Updated: 04/14/23 0853     Glucose 137 mg/dL      BUN 10 mg/dL      Creatinine 0.65 mg/dL      Sodium 132 mmol/L      Potassium 5.1 mmol/L      Chloride 95 mmol/L      CO2 21.8 mmol/L      Calcium 8.4 mg/dL      BUN/Creatinine Ratio 15.4     Anion Gap 15.2 mmol/L      eGFR 103.9 mL/min/1.73     Narrative:      GFR Normal >60  Chronic Kidney Disease <60  Kidney Failure <15      CBC Auto Differential [[id number]]  (Abnormal) Collected: 04/14/23 0659    Specimen: Blood Updated: 04/14/23 0836     WBC 6.84 10*3/mm3      RBC 3.84 10*6/mm3      Hemoglobin 13.3 g/dL      Hematocrit 37.9 %      MCV 98.7 fL      MCH 34.6 pg      MCHC 35.1 g/dL      RDW 13.1 %      RDW-SD 46.9 fl      MPV 10.1 fL      Platelets 136 10*3/mm3      Neutrophil % 76.7 %      Lymphocyte % 9.2 %      Monocyte % 13.5 %      Eosinophil % 0.0 %      Basophil % 0.3 %      Immature Grans % 0.3 %      Neutrophils, Absolute 5.25 10*3/mm3      Lymphocytes, Absolute 0.63 10*3/mm3      Monocytes, Absolute 0.92 10*3/mm3      Eosinophils, Absolute 0.00 10*3/mm3      Basophils, Absolute 0.02 10*3/mm3      Immature Grans, Absolute 0.02 10*3/mm3      nRBC 0.0 /100 WBC     Protime-INR [[id number]]  (Normal) Collected: 04/14/23 0659    Specimen: Blood Updated: 04/14/23 0850      Protime 14.0 Seconds      INR 1.07          Imaging Results (Last 24 Hours)     Procedure Component Value Units Date/Time    CT Abdomen Pelvis With Contrast [[id number]] Collected: 04/14/23 0013     Updated: 04/14/23 0013    Narrative:        Patient: HERB LUNA  Time Out: 00:12  Exam(s): CT ABDOMEN + PELVIS With Contrast     EXAM:    CT Abdomen and Pelvis With Intravenous Contrast    CLINICAL HISTORY:     Reason for exam: Abdominal pain and elevated LFTs.    TECHNIQUE:    Axial computed tomography images of the abdomen and pelvis with   intravenous contrast.  CTDI is 10.18 mGy and DLP is 529.6 mGy-cm.  This   CT exam was performed according to the principle of ALARA (As Low As   Reasonably Achievable) by using one or more of the following dose   reduction techniques: automated exposure control, adjustment of the mA   and or kV according to patient size, and or use of iterative   reconstruction technique.    COMPARISON:    No relevant prior studies available.    FINDINGS:    Lung bases:  Unremarkable.  No mass.  No consolidation.     ABDOMEN:    Liver:  Hepatic steatosis.    Gallbladder and bile ducts:  Cholelithiasis.  No ductal dilation.    Pancreas:  Unremarkable.  No mass.  No ductal dilation.    Spleen:  Unremarkable.  No splenomegaly.    Adrenals:  Indeterminate 11 mm nodule on the left adrenal gland.    Consider noncontrast CT scan for further evaluation.    Kidneys and ureters:  Right renal cyst measures 3.4 cm.  No   hydronephrosis.    Stomach and bowel:  Diverticulosis, without acute diverticulitis.  No   small bowel obstruction.  No free intraperitoneal air.  Wall thickening   of the proximal stomach measures up to 1.5 cm.  Correlate with   nonemergent upper GI endoscopy.     PELVIS:    Appendix:  Normal appendix.    Bladder:  Wall thickening of the urinary bladder measures 6 mm.  If   there is concern for UTI, urinalysis recommended.    Reproductive:  Unremarkable as visualized.     ABDOMEN and  PELVIS:    Intraperitoneal space:  Unremarkable.  No free air.  No significant   fluid collection.    Bones joints:  Degenerative changes of the spine.  No acute fracture.    No dislocation.    Soft tissues:  Small fat-containing left inguinal hernia.    Vasculature:  Atherosclerotic changes of the aorta.  No abdominal   aortic aneurysm.    Lymph nodes:  Unremarkable.  No enlarged lymph nodes.    IMPRESSION:       1.  Wall thickening of the urinary bladder measures 6 mm.  If there is   concern for UTI, urinalysis recommended.    2.  Indeterminate 11 mm nodule on the left adrenal gland.  Consider   noncontrast CT scan for further evaluation.    3.  Diverticulosis, without acute diverticulitis.  No small bowel   obstruction.  No free intraperitoneal air.  Normal appendix.    4.  Hepatic steatosis.  Cholelithiasis.    5.  Wall thickening of the proximal stomach measures up to 1.5 cm.    Correlate with nonemergent upper GI endoscopy.      Impression:          Electronically signed by Alex Kwon MD on 04-14-23 at 0012          Results for orders placed during the hospital encounter of 01/07/22    Adult Transthoracic Echo Complete W/ Cont if Necessary Per Protocol    Interpretation Summary  · Estimated left ventricular EF = 67% Left ventricular systolic function is normal.  · Left ventricular diastolic function was normal.  · Mild dilation of the aortic root is present.      ECG 12 Lead QT Measurement   Final Result   HEART RATE= 86  bpm   RR Interval= 698  ms   DE Interval= 170  ms   P Horizontal Axis= -4  deg   P Front Axis= 27  deg   QRSD Interval= 92  ms   QT Interval= 401  ms   QRS Axis= -87  deg   T Wave Axis= 84  deg   - ABNORMAL ECG -   Sinus rhythm   Inferior infarct, old   No change from previous tracing   Electronically Signed By: July FrazierE) 14-Apr-2023 12:27:02   Date and Time of Study: 2023-04-14 09:40:29           Assessment/Plan     Active Hospital Problems    Diagnosis  POA   • **Alcohol  withdrawal syndrome with complication [F10.939]  Yes   • Macrocytosis without anemia [D75.89]  Yes   • Hepatic steatosis [K76.0]  Yes   • Alcoholic ketoacidosis [E87.29]  Yes   • QT prolongation [R94.31]  Yes   • Hyperlipidemia [E78.5]  Yes   • Hypertension [I10]  Yes   • Hypothyroidism [E03.9]  Yes   • Mixed anxiety depressive disorder [F41.8]  Yes      Resolved Hospital Problems   No resolved problems to display.       Mr. Mueller is a 68 y.o. former smoker with a history of hypertension, hyperlipidemia, alcohol dependence syndrome, peripheral neuropathy who presents to [facility] chief complaint of nausea vomiting and admitted for Alcohol withdrawal syndrome & alcoholic ketoacidosis.      Alcohol withdrawal syndrome with complication: Serum ethanol <0.010.  Most recent CIWA 10.  Continue CIWA per hospital protocol.  Seizure precautions.  Access consulted.  MVI, thiamine, folic acid.      Alcoholic ketoacidosis:  AG 15 improved from 32 & serum bicarb 12 improved to 21 following IVFs continued for now.  Repeat chemistry in AM.      Hyperlipidemia with transaminitis:  Statin held for now.  Monitor LFT trend.      Hypertension:  BP acceptable acutely.  Amlodipine 5 mg p.o. daily and lisinopril 10 mg p.o. daily continue per home dose regimen.  Monitor BP for changes.      Hypothyroidism:  Ordering TSH & free T4.  Continue home dose levothyroxine for now.      QT prolongation:  Ordering EKG.      Hepatic steatosis: Avoid hepatotoxins.  Anticipate low-fat diet advancement.  See plan above.      Macrocytosis without anemia: Most likely due to alcohol dependence syndrome.  Ordering vitamin B12.      Mixed anxiety depressive disorder:      · I discussed the patient's findings and my recommendations with patient, RN, & Dr. Adams.    VTE Prophylaxis - SCDs.  Code Status - Full code.       BERONICA Tse  [facility]  04/14/23  13:08 EDT

## 2023-04-14 NOTE — ED NOTES
"Patient presents to the ED with complaint of back pain and nausea with vomiting  for the following  two hours . Patient is alert, oriented x3. Patient denies stomach pain and fever. Nursing Assessment completed at this time.    BP (!) 164/107   Pulse 104   Temp 98.3 °F (36.8 °C) (Oral)   Resp 18   Ht 182.9 cm (72\")   Wt 86.2 kg (190 lb)   SpO2 99%   BMI 25.77 kg/m²   General appearance: alert, appears stated age, and cooperative  Back: symmetric, no curvature. ROM normal. No CVA tenderness.  Abdomen: soft, non-tender; bowel sounds normal; no masses,  no organomegaly      Patient to ED tonight with c/o chronic back pain with sudden onset of nausea and vomiting. Denies any modifying factors,  complaints, or trauma. No fever or chills. Was seen today at pain management. Patient states he has been out of his blood pressure medications as well, unable to see PMD yet.    No distress noted. Will monitor for changes. VS below    I have reviewed the patient's current vital signs as documented in the patient's EMR.    "

## 2023-04-14 NOTE — CASE MANAGEMENT/SOCIAL WORK
Discharge Planning Assessment  [facility]     Patient Name: Pro Mueller  MRN: [id number]  Today's Date: 4/14/2023    Admit Date: 4/13/2023    Plan: Home, may need commercial transportation.   Discharge Needs Assessment    No documentation.                Discharge Plan     Row Name 04/14/23 1349       Plan    Plan Home, may need commercial transportation.    Patient/Family in Agreement with Plan yes    Plan Comments Spoke with patient at bedside. Introduced self and explained CCP role. Verified face sheet and pharmacy is [facility]. Denies difficulty with medication cost/ management. Patient denies prior HH and SNF. Patient has a rolling walker and shower chair. Patient lives alone in 2 story home with 2 steps to enter. Patient reports IADL and still drives. Patient states that he is unsure if his family will be able to provide transportation at discharge.              Continued Care and Services - Admitted Since 4/13/2023    Coordination has not been started for this encounter.          Demographic Summary     Row Name 04/14/23 1348       General Information    Admission Type observation    Arrived From emergency department    Required Notices Provided Observation Status Notice    Referral Source admission list    Reason for Consult discharge planning    Preferred Language English               Functional Status     Row Name 04/14/23 1348       Functional Status    Usual Activity Tolerance moderate    Current Activity Tolerance fair       Functional Status, IADL    Medications independent    Meal Preparation independent    Housekeeping independent    Laundry independent    Shopping independent               Psychosocial    No documentation.                Abuse/Neglect    No documentation.                Legal    No documentation.                Substance Abuse    No documentation.                Patient Forms    No documentation.                   Fernanda Whitney RN

## 2023-04-15 VITALS
WEIGHT: 190 LBS | TEMPERATURE: 97.9 F | HEART RATE: 72 BPM | DIASTOLIC BLOOD PRESSURE: 78 MMHG | BODY MASS INDEX: 25.73 KG/M2 | OXYGEN SATURATION: 95 % | SYSTOLIC BLOOD PRESSURE: 114 MMHG | HEIGHT: 72 IN | RESPIRATION RATE: 18 BRPM

## 2023-04-15 LAB
ALBUMIN SERPL-MCNC: 4.3 G/DL (ref 3.5–5.2)
ALBUMIN/GLOB SERPL: 2 G/DL
ALP SERPL-CCNC: 77 U/L (ref 39–117)
ALT SERPL W P-5'-P-CCNC: 28 U/L (ref 1–41)
ANION GAP SERPL CALCULATED.3IONS-SCNC: 10.9 MMOL/L (ref 5–15)
AST SERPL-CCNC: 94 U/L (ref 1–40)
BILIRUB SERPL-MCNC: 0.9 MG/DL (ref 0–1.2)
BUN SERPL-MCNC: 9 MG/DL (ref 8–23)
BUN/CREAT SERPL: 12 (ref 7–25)
CALCIUM SPEC-SCNC: 9.2 MG/DL (ref 8.6–10.5)
CHLORIDE SERPL-SCNC: 98 MMOL/L (ref 98–107)
CO2 SERPL-SCNC: 27.1 MMOL/L (ref 22–29)
CREAT SERPL-MCNC: 0.75 MG/DL (ref 0.76–1.27)
DEPRECATED RDW RBC AUTO: 49 FL (ref 37–54)
EGFRCR SERPLBLD CKD-EPI 2021: 99.5 ML/MIN/1.73
ERYTHROCYTE [DISTWIDTH] IN BLOOD BY AUTOMATED COUNT: 13.3 % (ref 12.3–15.4)
GLOBULIN UR ELPH-MCNC: 2.2 GM/DL
GLUCOSE SERPL-MCNC: 91 MG/DL (ref 65–99)
HCT VFR BLD AUTO: 36.6 % (ref 37.5–51)
HGB BLD-MCNC: 12.5 G/DL (ref 13–17.7)
MCH RBC QN AUTO: 34.2 PG (ref 26.6–33)
MCHC RBC AUTO-ENTMCNC: 34.2 G/DL (ref 31.5–35.7)
MCV RBC AUTO: 100 FL (ref 79–97)
PLATELET # BLD AUTO: 133 10*3/MM3 (ref 140–450)
PMV BLD AUTO: 9.9 FL (ref 6–12)
POTASSIUM SERPL-SCNC: 3.8 MMOL/L (ref 3.5–5.2)
PROT SERPL-MCNC: 6.5 G/DL (ref 6–8.5)
RBC # BLD AUTO: 3.66 10*6/MM3 (ref 4.14–5.8)
SODIUM SERPL-SCNC: 136 MMOL/L (ref 136–145)
WBC NRBC COR # BLD: 3.79 10*3/MM3 (ref 3.4–10.8)

## 2023-04-15 PROCEDURE — 80053 COMPREHEN METABOLIC PANEL: CPT | Performed by: NURSE PRACTITIONER

## 2023-04-15 PROCEDURE — G0378 HOSPITAL OBSERVATION PER HR: HCPCS

## 2023-04-15 PROCEDURE — 85027 COMPLETE CBC AUTOMATED: CPT | Performed by: NURSE PRACTITIONER

## 2023-04-15 RX ADMIN — Medication 10 ML: at 08:38

## 2023-04-15 RX ADMIN — Medication 1 TABLET: at 08:37

## 2023-04-15 RX ADMIN — Medication 100 MG: at 08:37

## 2023-04-15 RX ADMIN — AMLODIPINE BESYLATE 5 MG: 5 TABLET ORAL at 06:04

## 2023-04-15 RX ADMIN — MELOXICAM 15 MG: 15 TABLET ORAL at 08:37

## 2023-04-15 RX ADMIN — BACLOFEN 5 MG: 10 TABLET ORAL at 08:37

## 2023-04-15 RX ADMIN — DULOXETINE HYDROCHLORIDE 30 MG: 30 CAPSULE, DELAYED RELEASE ORAL at 08:37

## 2023-04-15 RX ADMIN — LISINOPRIL 10 MG: 10 TABLET ORAL at 08:37

## 2023-04-15 RX ADMIN — HYDROCODONE BITARTRATE AND ACETAMINOPHEN 1 TABLET: 7.5; 325 TABLET ORAL at 02:18

## 2023-04-15 RX ADMIN — Medication 1 MG: at 08:37

## 2023-04-15 RX ADMIN — LEVOTHYROXINE SODIUM 100 MCG: 0.1 TABLET ORAL at 08:37

## 2023-04-15 RX ADMIN — HYDROCODONE BITARTRATE AND ACETAMINOPHEN 1 TABLET: 7.5; 325 TABLET ORAL at 08:37

## 2023-04-15 NOTE — PLAN OF CARE
Goal Outcome Evaluation:  Plan of Care Reviewed With: patient        Progress: improving   A&Ox4. RA. Ad geoffrey with walker. Patient walks at home with walker at baseline. CIWA at 2 d/t tremors. SR on monitor. Patient c/o back pain, medication given as ordered. No other complaints at this time. VSS. All needs met. Discharge orders received. Awaiting family to transport home.

## 2023-04-15 NOTE — PLAN OF CARE
Goal Outcome Evaluation:  Plan of Care Reviewed With: patient      Progress: improving  Outcome Evaluation: All needs met. Up ad geoffrey w/ walker. Cardiac diet. VSS. Oriented x 4. SR. RA. IVF refused. Highest CIWA score of 4 tonight. Norco and Imodium PRN.

## 2023-04-15 NOTE — DISCHARGE INSTR - DIET
Diet: Cardiac Diets; Healthy Heart (2-3 Na+); Texture: Regular Texture (IDDSI 7); Fluid Consistency: Thin (IDDSI 0)

## 2023-04-15 NOTE — DISCHARGE SUMMARY
Patient Name: Pro Mueller  : 1955  MRN: [id number]    Date of Admission: 2023  Date of Discharge:  4/15/2023  Primary Care Physician: Provider, No Known      Chief Complaint:   Nausea and Vomiting (Patient arrived via EMS with complaints of nausea/vomitting x1.5hr and chronic low back pain, hypertension(out of meds x2 weeks).  Patient denies diarrhea.  Patient from home, AA&Ox4)      Discharge Diagnoses     Active Hospital Problems    Diagnosis  POA   • **Alcohol withdrawal syndrome with complication [F10.939]  Yes   • Macrocytosis without anemia [D75.89]  Yes   • Hepatic steatosis [K76.0]  Yes   • Alcoholic ketoacidosis [E87.29]  Yes   • QT prolongation [R94.31]  Yes   • Hyperlipidemia [E78.5]  Yes   • Hypertension [I10]  Yes   • Hypothyroidism [E03.9]  Yes   • Mixed anxiety depressive disorder [F41.8]  Yes      Resolved Hospital Problems   No resolved problems to display.        Hospital Course   This is a 67-year-old male with a past medical history of alcohol use disorder, lumbar spinal stenosis who comes to the hospital after experiencing nausea and abdominal pain.  Regarding his alcohol use disorder, he reportedly only drinks 1-2 drinks a few times a week, but going through documentation from previous admissions, he has had multiple admissions for alcohol withdrawal.  It has been documented that he drinks about one half of a liter of vodka daily and has done so for years.  He underwent a CT of the abdomen pelvis for evaluation of abdominal pain that showed some thickening of the urinary bladder, an 11 mm nodule of the left adrenal gland, diverticulosis without evidence of any acute diverticulitis, and hepatic steatosis/call lithiasis. Lipase was normal.  Wall thickening of the proximal stomach was also noted.  The day after admission his abdominal pain subsided and he requested a regular diet.  An ultrasound of the abdomen was ordered for evaluation of transaminitis.  He was started on CIWA  protocol. He was discharged the following day due to improvement in his abdominal pain. A right upper quadrant ultrasound showed cholelithiasis without sonographic evidence of any acute cholecystitis.  Hepatic steatosis was also noted.      Day of Discharge       Physical Exam:  Temp:  [97.8 °F (36.6 °C)-98.8 °F (37.1 °C)] 97.8 °F (36.6 °C)  Heart Rate:  [65-89] 65  Resp:  [18-20] 18  BP: (116-135)/(77-90) 126/82  Body mass index is 25.76 kg/m².  Physical Exam    General: Alert and oriented x3, no acute distress  HEENT: Normocephalic, atraumatic  CV: Regular rate and rhythm, no murmurs rubs or gallops  Lungs: Clear to auscultation bilaterally, no crackles or wheezes  Abdomen: Soft, nontender, nondistended  Neuro: CN II-XII intact, no FND appreciated     Consultants     Consult Orders (all) (From admission, onward)     Start     Ordered    04/14/23 0057  [facility] (on-call MD unless specified) Details  Once        Specialty:  Hospitalist  Provider:  (Not yet assigned)    04/14/23 0056              Procedures     Imaging Results (All)     Procedure Component Value Units Date/Time    US Abdomen Limited [[id number]] Collected: 04/14/23 1334     Updated: 04/14/23 1518    Narrative:      Examination: Abdominal sonogram     TECHNIQUE: Sonographic images of the abdomen were obtained     HISTORY:Pain     COMPARISON: CT of 04/14/2023     FINDINGS: The pancreatic head and body are normal. There is hepatic  steatosis, without surface nodularity seen and without mass seen in  visualized portions. There is cholelithiasis, without mural edema,  pericholecystic fluid, or by report sonographic Langford's sign. The right  kidney is normal in echogenicity, without hydronephrosis, measuring  approximately 11.5 cm. A simple appearing right renal cyst measures 4  cm. The common duct measures 4 mm in its midportion. The portal vein is  patent, with antegrade flow.       Impression:      1. Cholelithiasis, without sonographic evidence of acute  cholecystitis  2. Hepatic steatosis  3. Simple appearing right renal cysts, not requiring follow-up.     This report was finalized on 4/14/2023 1:35 PM by Dr. Umang Andres M.D.       CT Abdomen Pelvis With Contrast [[id number]] Collected: 04/14/23 0013     Updated: 04/14/23 0013    Narrative:        Patient: HERB LUNA  Time Out: 00:12  Exam(s): CT ABDOMEN + PELVIS With Contrast     EXAM:    CT Abdomen and Pelvis With Intravenous Contrast    CLINICAL HISTORY:     Reason for exam: Abdominal pain and elevated LFTs.    TECHNIQUE:    Axial computed tomography images of the abdomen and pelvis with   intravenous contrast.  CTDI is 10.18 mGy and DLP is 529.6 mGy-cm.  This   CT exam was performed according to the principle of ALARA (As Low As   Reasonably Achievable) by using one or more of the following dose   reduction techniques: automated exposure control, adjustment of the mA   and or kV according to patient size, and or use of iterative   reconstruction technique.    COMPARISON:    No relevant prior studies available.    FINDINGS:    Lung bases:  Unremarkable.  No mass.  No consolidation.     ABDOMEN:    Liver:  Hepatic steatosis.    Gallbladder and bile ducts:  Cholelithiasis.  No ductal dilation.    Pancreas:  Unremarkable.  No mass.  No ductal dilation.    Spleen:  Unremarkable.  No splenomegaly.    Adrenals:  Indeterminate 11 mm nodule on the left adrenal gland.    Consider noncontrast CT scan for further evaluation.    Kidneys and ureters:  Right renal cyst measures 3.4 cm.  No   hydronephrosis.    Stomach and bowel:  Diverticulosis, without acute diverticulitis.  No   small bowel obstruction.  No free intraperitoneal air.  Wall thickening   of the proximal stomach measures up to 1.5 cm.  Correlate with   nonemergent upper GI endoscopy.     PELVIS:    Appendix:  Normal appendix.    Bladder:  Wall thickening of the urinary bladder measures 6 mm.  If   there is concern for UTI, urinalysis recommended.     Reproductive:  Unremarkable as visualized.     ABDOMEN and PELVIS:    Intraperitoneal space:  Unremarkable.  No free air.  No significant   fluid collection.    Bones joints:  Degenerative changes of the spine.  No acute fracture.    No dislocation.    Soft tissues:  Small fat-containing left inguinal hernia.    Vasculature:  Atherosclerotic changes of the aorta.  No abdominal   aortic aneurysm.    Lymph nodes:  Unremarkable.  No enlarged lymph nodes.    IMPRESSION:       1.  Wall thickening of the urinary bladder measures 6 mm.  If there is   concern for UTI, urinalysis recommended.    2.  Indeterminate 11 mm nodule on the left adrenal gland.  Consider   noncontrast CT scan for further evaluation.    3.  Diverticulosis, without acute diverticulitis.  No small bowel   obstruction.  No free intraperitoneal air.  Normal appendix.    4.  Hepatic steatosis.  Cholelithiasis.    5.  Wall thickening of the proximal stomach measures up to 1.5 cm.    Correlate with nonemergent upper GI endoscopy.      Impression:          Electronically signed by Alex Kwon MD on 04-14-23 at 0012          Pertinent Labs     Results from last 7 days   Lab Units 04/15/23  0539 04/14/23  0659 04/13/23  2256   WBC 10*3/mm3 3.79 6.84 7.72   HEMOGLOBIN g/dL 12.5* 13.3 14.2   PLATELETS 10*3/mm3 133* 136* 149     Results from last 7 days   Lab Units 04/15/23  0539 04/14/23  0659 04/13/23  2256   SODIUM mmol/L 136 132* 138   POTASSIUM mmol/L 3.8 5.1 4.9   CHLORIDE mmol/L 98 95* 93*   CO2 mmol/L 27.1 21.8* 12.2*   BUN mg/dL 9 10 8   CREATININE mg/dL 0.75* 0.65* 0.75*   GLUCOSE mg/dL 91 137* 109*   Estimated Creatinine Clearance: 118.1 mL/min (A) (by C-G formula based on SCr of 0.75 mg/dL (L)).  Results from last 7 days   Lab Units 04/15/23  0539 04/13/23  2256   ALBUMIN g/dL 4.3 4.9   BILIRUBIN mg/dL 0.9 0.8   ALK PHOS U/L 77 110   AST (SGOT) U/L 94* 164*   ALT (SGPT) U/L 28 52*     Results from last 7 days   Lab Units 04/15/23  0539 04/14/23  0659  04/13/23  2256   CALCIUM mg/dL 9.2 8.4* 9.1   ALBUMIN g/dL 4.3  --  4.9     Results from last 7 days   Lab Units 04/13/23  2256   LIPASE U/L 36             Invalid input(s): LDLCALC        Test Results Pending at Discharge       Discharge Details        Discharge Medications      Continue These Medications      Instructions Start Date   acetaminophen 325 MG tablet  Commonly known as: TYLENOL   650 mg, Oral, Every 4 Hours PRN      amLODIPine 5 MG tablet  Commonly known as: NORVASC   5 mg, Oral, Every Morning      atorvastatin 20 MG tablet  Commonly known as: LIPITOR   20 mg, Oral, Daily      baclofen 10 MG tablet  Commonly known as: LIORESAL   10 mg, Oral, 3 Times Daily PRN      DULoxetine 30 MG capsule  Commonly known as: CYMBALTA   30 mg, Oral, Daily      folic acid 1 MG tablet  Commonly known as: FOLVITE   1 mg, Oral, Daily      HYDROcodone-acetaminophen 7.5-325 MG per tablet  Commonly known as: NORCO   1 tablet, Oral, 2 Times Daily      levothyroxine 100 MCG tablet  Commonly known as: SYNTHROID, LEVOTHROID   100 mcg, Oral, Daily      lidocaine 5 %  Commonly known as: LIDODERM   1 patch, Transdermal, Every 24 Hours, Remove & Discard patch within 12 hours or as directed by MD      lisinopril 10 MG tablet  Commonly known as: PRINIVIL,ZESTRIL   10 mg, Oral, Daily      meloxicam 15 MG tablet  Commonly known as: MOBIC   15 mg, Oral, Daily      Mintox Maximum Strength 400-400-40 MG/5ML suspension  Generic drug: aluminum-magnesium hydroxide-simethicone   15 mL, Oral, Every 6 Hours PRN      multivitamin capsule   1 capsule, Oral, Daily      sennosides-docusate 8.6-50 MG per tablet  Commonly known as: PERICOLACE   1 tablet, Oral, 2 Times Daily PRN      thiamine 100 MG tablet tablet  Commonly known as: VITAMIN B-1   100 mg, Oral, Daily             Allergies   Allergen Reactions   • Penicillins Anaphylaxis and Seizure     Seizure. childhood         Discharge Disposition:  Home or Self Care    Discharge Diet:  Diet Order    Procedures   • Diet: Cardiac Diets; Healthy Heart (2-3 Na+); Texture: Regular Texture (IDDSI 7); Fluid Consistency: Thin (IDDSI 0)       Discharge Activity:       CODE STATUS:    Code Status and Medical Interventions:   Ordered at: 04/14/23 0400     Code Status (Patient has no pulse and is not breathing):    CPR (Attempt to Resuscitate)     Medical Interventions (Patient has pulse or is breathing):    Full Support       No future appointments.   Follow-up Information     Provider, No Known .    Contact information:  [facility] [address] [phone]                         Time Spent on Discharge:  Greater than 30 minutes      Manuel Adams MD  [facility]  04/15/23  13:05 EDT

## 2023-05-05 ENCOUNTER — OFFICE VISIT (OUTPATIENT)
Dept: FAMILY MEDICINE CLINIC | Facility: CLINIC | Age: 68
End: 2023-05-05
Payer: MEDICARE

## 2023-05-05 VITALS
HEIGHT: 72 IN | BODY MASS INDEX: 25.87 KG/M2 | DIASTOLIC BLOOD PRESSURE: 80 MMHG | HEART RATE: 71 BPM | TEMPERATURE: 97.7 F | OXYGEN SATURATION: 98 % | WEIGHT: 191 LBS | RESPIRATION RATE: 17 BRPM | SYSTOLIC BLOOD PRESSURE: 138 MMHG

## 2023-05-05 DIAGNOSIS — Z76.89 ENCOUNTER TO ESTABLISH CARE: ICD-10-CM

## 2023-05-05 DIAGNOSIS — Z12.11 SCREENING FOR COLON CANCER: Primary | ICD-10-CM

## 2023-05-05 DIAGNOSIS — I10 PRIMARY HYPERTENSION: ICD-10-CM

## 2023-05-05 DIAGNOSIS — Z78.9 ALCOHOL USE: ICD-10-CM

## 2023-05-05 DIAGNOSIS — E03.9 HYPOTHYROIDISM, UNSPECIFIED TYPE: ICD-10-CM

## 2023-05-05 DIAGNOSIS — E78.5 HYPERLIPIDEMIA, UNSPECIFIED HYPERLIPIDEMIA TYPE: ICD-10-CM

## 2023-05-05 PROBLEM — M47.817 LUMBOSACRAL SPONDYLOSIS WITHOUT MYELOPATHY: Status: ACTIVE | Noted: 2023-05-05

## 2023-05-05 RX ORDER — LISINOPRIL 10 MG/1
10 TABLET ORAL DAILY
Qty: 90 TABLET | Refills: 1 | Status: SHIPPED | OUTPATIENT
Start: 2023-05-05

## 2023-05-05 RX ORDER — AMLODIPINE BESYLATE 5 MG/1
5 TABLET ORAL EVERY MORNING
Qty: 90 TABLET | Refills: 1 | Status: SHIPPED | OUTPATIENT
Start: 2023-05-05

## 2023-05-05 RX ORDER — LEVOTHYROXINE SODIUM 0.1 MG/1
100 TABLET ORAL DAILY
Qty: 90 TABLET | Refills: 1 | Status: SHIPPED | OUTPATIENT
Start: 2023-05-05

## 2023-05-05 RX ORDER — ATORVASTATIN CALCIUM 20 MG/1
20 TABLET, FILM COATED ORAL DAILY
Qty: 90 TABLET | Refills: 1 | Status: SHIPPED | OUTPATIENT
Start: 2023-05-05

## 2023-05-13 ENCOUNTER — APPOINTMENT (OUTPATIENT)
Dept: CT IMAGING | Facility: HOSPITAL | Age: 68
DRG: 641 | End: 2023-05-13
Payer: MEDICARE

## 2023-05-13 ENCOUNTER — HOSPITAL ENCOUNTER (INPATIENT)
Facility: HOSPITAL | Age: 68
LOS: 2 days | Discharge: LEFT BEFORE DISCHARGE | DRG: 641 | End: 2023-05-15
Attending: EMERGENCY MEDICINE | Admitting: STUDENT IN AN ORGANIZED HEALTH CARE EDUCATION/TRAINING PROGRAM
Payer: MEDICARE

## 2023-05-13 DIAGNOSIS — E87.29 ALCOHOLIC KETOACIDOSIS: Primary | ICD-10-CM

## 2023-05-13 PROBLEM — R79.89 ELEVATED LFTS: Status: ACTIVE | Noted: 2023-05-13

## 2023-05-13 PROBLEM — F10.239 ALCOHOL DEPENDENCE WITH WITHDRAWAL: Status: ACTIVE | Noted: 2022-09-11

## 2023-05-13 LAB
ALBUMIN SERPL-MCNC: 4.6 G/DL (ref 3.5–5.2)
ALBUMIN/GLOB SERPL: 1.6 G/DL
ALP SERPL-CCNC: 114 U/L (ref 39–117)
ALT SERPL W P-5'-P-CCNC: 43 U/L (ref 1–41)
AMPHET+METHAMPHET UR QL: NEGATIVE
ANION GAP SERPL CALCULATED.3IONS-SCNC: 38.8 MMOL/L (ref 5–15)
AST SERPL-CCNC: 157 U/L (ref 1–40)
BACTERIA UR QL AUTO: NORMAL /HPF
BARBITURATES UR QL SCN: NEGATIVE
BASOPHILS # BLD AUTO: 0.05 10*3/MM3 (ref 0–0.2)
BASOPHILS NFR BLD AUTO: 0.6 % (ref 0–1.5)
BENZODIAZ UR QL SCN: NEGATIVE
BILIRUB SERPL-MCNC: 0.7 MG/DL (ref 0–1.2)
BILIRUB UR QL STRIP: NEGATIVE
BUN SERPL-MCNC: 8 MG/DL (ref 8–23)
BUN/CREAT SERPL: 11.3 (ref 7–25)
CALCIUM SPEC-SCNC: 8.9 MG/DL (ref 8.6–10.5)
CANNABINOIDS SERPL QL: NEGATIVE
CHLORIDE SERPL-SCNC: 93 MMOL/L (ref 98–107)
CLARITY UR: CLEAR
CO2 SERPL-SCNC: 5.2 MMOL/L (ref 22–29)
COCAINE UR QL: NEGATIVE
COLOR UR: YELLOW
CREAT SERPL-MCNC: 0.71 MG/DL (ref 0.76–1.27)
D-LACTATE SERPL-SCNC: 11 MMOL/L (ref 0.5–2)
DEPRECATED RDW RBC AUTO: 49.3 FL (ref 37–54)
EGFRCR SERPLBLD CKD-EPI 2021: 101.2 ML/MIN/1.73
EOSINOPHIL # BLD AUTO: 0 10*3/MM3 (ref 0–0.4)
EOSINOPHIL NFR BLD AUTO: 0 % (ref 0.3–6.2)
ERYTHROCYTE [DISTWIDTH] IN BLOOD BY AUTOMATED COUNT: 13.1 % (ref 12.3–15.4)
ETHANOL BLD-MCNC: 45 MG/DL (ref 0–10)
ETHANOL UR QL: 0.04 %
FENTANYL UR-MCNC: NEGATIVE NG/ML
GLOBULIN UR ELPH-MCNC: 2.9 GM/DL
GLUCOSE SERPL-MCNC: 63 MG/DL (ref 65–99)
GLUCOSE UR STRIP-MCNC: NEGATIVE MG/DL
HCT VFR BLD AUTO: 46.1 % (ref 37.5–51)
HGB BLD-MCNC: 14.7 G/DL (ref 13–17.7)
HGB UR QL STRIP.AUTO: NEGATIVE
HYALINE CASTS UR QL AUTO: NORMAL /LPF
IMM GRANULOCYTES # BLD AUTO: 0.03 10*3/MM3 (ref 0–0.05)
IMM GRANULOCYTES NFR BLD AUTO: 0.4 % (ref 0–0.5)
INR PPP: 1.11 (ref 0.9–1.1)
KETONES UR QL STRIP: ABNORMAL
LEUKOCYTE ESTERASE UR QL STRIP.AUTO: NEGATIVE
LIPASE SERPL-CCNC: 33 U/L (ref 13–60)
LYMPHOCYTES # BLD AUTO: 0.56 10*3/MM3 (ref 0.7–3.1)
LYMPHOCYTES NFR BLD AUTO: 7.2 % (ref 19.6–45.3)
MAGNESIUM SERPL-MCNC: 1.7 MG/DL (ref 1.6–2.4)
MCH RBC QN AUTO: 32.5 PG (ref 26.6–33)
MCHC RBC AUTO-ENTMCNC: 31.9 G/DL (ref 31.5–35.7)
MCV RBC AUTO: 102 FL (ref 79–97)
METHADONE UR QL SCN: NEGATIVE
MONOCYTES # BLD AUTO: 0.46 10*3/MM3 (ref 0.1–0.9)
MONOCYTES NFR BLD AUTO: 5.9 % (ref 5–12)
NEUTROPHILS NFR BLD AUTO: 6.67 10*3/MM3 (ref 1.7–7)
NEUTROPHILS NFR BLD AUTO: 85.9 % (ref 42.7–76)
NITRITE UR QL STRIP: NEGATIVE
NRBC BLD AUTO-RTO: 0 /100 WBC (ref 0–0.2)
OPIATES UR QL: NEGATIVE
OXYCODONE UR QL SCN: NEGATIVE
PH UR STRIP.AUTO: 5.5 [PH] (ref 5–8)
PLATELET # BLD AUTO: 271 10*3/MM3 (ref 140–450)
PMV BLD AUTO: 8.7 FL (ref 6–12)
POTASSIUM SERPL-SCNC: 4.5 MMOL/L (ref 3.5–5.2)
PROT SERPL-MCNC: 7.5 G/DL (ref 6–8.5)
PROT UR QL STRIP: ABNORMAL
PROTHROMBIN TIME: 14.4 SECONDS (ref 11.7–14.2)
RBC # BLD AUTO: 4.52 10*6/MM3 (ref 4.14–5.8)
RBC # UR STRIP: NORMAL /HPF
REF LAB TEST METHOD: NORMAL
SODIUM SERPL-SCNC: 137 MMOL/L (ref 136–145)
SP GR UR STRIP: 1.02 (ref 1–1.03)
SQUAMOUS #/AREA URNS HPF: NORMAL /HPF
UROBILINOGEN UR QL STRIP: ABNORMAL
WBC # UR STRIP: NORMAL /HPF
WBC NRBC COR # BLD: 7.77 10*3/MM3 (ref 3.4–10.8)

## 2023-05-13 PROCEDURE — 80307 DRUG TEST PRSMV CHEM ANLYZR: CPT | Performed by: EMERGENCY MEDICINE

## 2023-05-13 PROCEDURE — 80053 COMPREHEN METABOLIC PANEL: CPT | Performed by: PHYSICIAN ASSISTANT

## 2023-05-13 PROCEDURE — 85025 COMPLETE CBC W/AUTO DIFF WBC: CPT | Performed by: PHYSICIAN ASSISTANT

## 2023-05-13 PROCEDURE — 83605 ASSAY OF LACTIC ACID: CPT | Performed by: EMERGENCY MEDICINE

## 2023-05-13 PROCEDURE — 83735 ASSAY OF MAGNESIUM: CPT | Performed by: PHYSICIAN ASSISTANT

## 2023-05-13 PROCEDURE — 99285 EMERGENCY DEPT VISIT HI MDM: CPT

## 2023-05-13 PROCEDURE — 82077 ASSAY SPEC XCP UR&BREATH IA: CPT | Performed by: PHYSICIAN ASSISTANT

## 2023-05-13 PROCEDURE — 25010000002 ENOXAPARIN PER 10 MG: Performed by: STUDENT IN AN ORGANIZED HEALTH CARE EDUCATION/TRAINING PROGRAM

## 2023-05-13 PROCEDURE — 25510000001 IOPAMIDOL 61 % SOLUTION: Performed by: EMERGENCY MEDICINE

## 2023-05-13 PROCEDURE — 25010000002 THIAMINE PER 100 MG: Performed by: STUDENT IN AN ORGANIZED HEALTH CARE EDUCATION/TRAINING PROGRAM

## 2023-05-13 PROCEDURE — 81001 URINALYSIS AUTO W/SCOPE: CPT | Performed by: PHYSICIAN ASSISTANT

## 2023-05-13 PROCEDURE — 85610 PROTHROMBIN TIME: CPT | Performed by: EMERGENCY MEDICINE

## 2023-05-13 PROCEDURE — 80074 ACUTE HEPATITIS PANEL: CPT | Performed by: STUDENT IN AN ORGANIZED HEALTH CARE EDUCATION/TRAINING PROGRAM

## 2023-05-13 PROCEDURE — 83690 ASSAY OF LIPASE: CPT | Performed by: PHYSICIAN ASSISTANT

## 2023-05-13 PROCEDURE — 74177 CT ABD & PELVIS W/CONTRAST: CPT

## 2023-05-13 PROCEDURE — 25010000002 ONDANSETRON PER 1 MG: Performed by: PHYSICIAN ASSISTANT

## 2023-05-13 PROCEDURE — 25010000002 LORAZEPAM PER 2 MG: Performed by: EMERGENCY MEDICINE

## 2023-05-13 RX ORDER — ENOXAPARIN SODIUM 100 MG/ML
40 INJECTION SUBCUTANEOUS NIGHTLY
Status: DISCONTINUED | OUTPATIENT
Start: 2023-05-13 | End: 2023-05-15 | Stop reason: HOSPADM

## 2023-05-13 RX ORDER — LORAZEPAM 0.5 MG/1
0.5 TABLET ORAL
Status: DISCONTINUED | OUTPATIENT
Start: 2023-05-13 | End: 2023-05-15 | Stop reason: HOSPADM

## 2023-05-13 RX ORDER — LORAZEPAM 1 MG/1
1 TABLET ORAL
Status: DISCONTINUED | OUTPATIENT
Start: 2023-05-13 | End: 2023-05-15 | Stop reason: HOSPADM

## 2023-05-13 RX ORDER — FOLIC ACID 1 MG/1
1 TABLET ORAL DAILY
Status: DISCONTINUED | OUTPATIENT
Start: 2023-05-13 | End: 2023-05-15 | Stop reason: HOSPADM

## 2023-05-13 RX ORDER — ACETAMINOPHEN 500 MG
1000 TABLET ORAL ONCE
Status: COMPLETED | OUTPATIENT
Start: 2023-05-13 | End: 2023-05-13

## 2023-05-13 RX ORDER — SODIUM CHLORIDE, SODIUM LACTATE, POTASSIUM CHLORIDE, CALCIUM CHLORIDE 600; 310; 30; 20 MG/100ML; MG/100ML; MG/100ML; MG/100ML
150 INJECTION, SOLUTION INTRAVENOUS CONTINUOUS
Status: DISCONTINUED | OUTPATIENT
Start: 2023-05-13 | End: 2023-05-14

## 2023-05-13 RX ORDER — PANTOPRAZOLE SODIUM 40 MG/1
40 TABLET, DELAYED RELEASE ORAL
Status: DISCONTINUED | OUTPATIENT
Start: 2023-05-13 | End: 2023-05-15 | Stop reason: HOSPADM

## 2023-05-13 RX ORDER — ONDANSETRON 2 MG/ML
4 INJECTION INTRAMUSCULAR; INTRAVENOUS EVERY 6 HOURS PRN
Status: DISCONTINUED | OUTPATIENT
Start: 2023-05-13 | End: 2023-05-15 | Stop reason: HOSPADM

## 2023-05-13 RX ORDER — THIAMINE HYDROCHLORIDE 100 MG/ML
200 INJECTION, SOLUTION INTRAMUSCULAR; INTRAVENOUS EVERY 8 HOURS SCHEDULED
Status: DISCONTINUED | OUTPATIENT
Start: 2023-05-13 | End: 2023-05-15 | Stop reason: HOSPADM

## 2023-05-13 RX ORDER — LORAZEPAM 2 MG/ML
1 INJECTION INTRAMUSCULAR
Status: DISCONTINUED | OUTPATIENT
Start: 2023-05-13 | End: 2023-05-15 | Stop reason: HOSPADM

## 2023-05-13 RX ORDER — LORAZEPAM 2 MG/ML
0.5 INJECTION INTRAMUSCULAR
Status: DISCONTINUED | OUTPATIENT
Start: 2023-05-13 | End: 2023-05-15 | Stop reason: HOSPADM

## 2023-05-13 RX ORDER — MULTIPLE VITAMINS W/ MINERALS TAB 9MG-400MCG
1 TAB ORAL DAILY
Status: DISCONTINUED | OUTPATIENT
Start: 2023-05-13 | End: 2023-05-15 | Stop reason: HOSPADM

## 2023-05-13 RX ORDER — LORAZEPAM 2 MG/ML
1 INJECTION INTRAMUSCULAR ONCE
Status: COMPLETED | OUTPATIENT
Start: 2023-05-13 | End: 2023-05-13

## 2023-05-13 RX ORDER — ONDANSETRON 2 MG/ML
4 INJECTION INTRAMUSCULAR; INTRAVENOUS ONCE
Status: COMPLETED | OUTPATIENT
Start: 2023-05-13 | End: 2023-05-13

## 2023-05-13 RX ORDER — SODIUM CHLORIDE 0.9 % (FLUSH) 0.9 %
10 SYRINGE (ML) INJECTION AS NEEDED
Status: DISCONTINUED | OUTPATIENT
Start: 2023-05-13 | End: 2023-05-15 | Stop reason: HOSPADM

## 2023-05-13 RX ADMIN — ENOXAPARIN SODIUM 40 MG: 100 INJECTION SUBCUTANEOUS at 22:51

## 2023-05-13 RX ADMIN — ONDANSETRON 4 MG: 2 INJECTION INTRAMUSCULAR; INTRAVENOUS at 18:56

## 2023-05-13 RX ADMIN — FOLIC ACID 1 MG: 1 TABLET ORAL at 20:58

## 2023-05-13 RX ADMIN — ACETAMINOPHEN 1000 MG: 500 TABLET ORAL at 20:58

## 2023-05-13 RX ADMIN — LORAZEPAM 1 MG: 2 INJECTION INTRAMUSCULAR; INTRAVENOUS at 20:23

## 2023-05-13 RX ADMIN — THIAMINE HYDROCHLORIDE 200 MG: 100 INJECTION, SOLUTION INTRAMUSCULAR; INTRAVENOUS at 23:21

## 2023-05-13 RX ADMIN — SODIUM CHLORIDE, POTASSIUM CHLORIDE, SODIUM LACTATE AND CALCIUM CHLORIDE 150 ML/HR: 600; 310; 30; 20 INJECTION, SOLUTION INTRAVENOUS at 22:03

## 2023-05-13 RX ADMIN — LORAZEPAM 1 MG: 2 INJECTION INTRAMUSCULAR; INTRAVENOUS at 22:51

## 2023-05-13 RX ADMIN — SODIUM CHLORIDE 1000 ML: 9 INJECTION, SOLUTION INTRAVENOUS at 18:54

## 2023-05-13 RX ADMIN — IOPAMIDOL 85 ML: 612 INJECTION, SOLUTION INTRAVENOUS at 20:14

## 2023-05-13 NOTE — ED PROVIDER NOTES
" EMERGENCY DEPARTMENT ENCOUNTER    Room Number:  08/08  Date of encounter:  5/13/2023  PCP: Lupe Roberts APRN  Patient Care Team:  Lupe Roberts APRN as PCP - General (Family Medicine)  Say Coats MD (Psychiatry)   Independent Historians: Patient    HPI:  Chief Complaint: Nausea and vomiting  A complete HPI/ROS/PMH/PSH/SH/FH are unobtainable due to: None    Chronic or social conditions impacting patient care (social determinants of health): History of chronic alcohol use    Context: Pro Mueller is a 68 y.o. male who presents to the ED c/o nausea and vomiting that began today.  Patient states he has vomited innumerable number of times.  He denies hematemesis.  He denies any diarrhea.  He has not had any fevers or chills.  Patient admits to drinking \"a couple beers\" last night.  He denies previous abdominal surgeries.  No urinary symptoms.    Review of prior external notes (non-ED): Reviewed discharge summary from admission from 4/13/2023 to 4/15/2023.  Patient had complaints of nausea and vomiting.  He was admitted for alcohol withdrawal syndrome.  Summary reports several previous admissions for alcohol withdrawal with history of about 1/2 L of vodka use daily for years.    Review of prior external test results outside of this encounter: Ultrasound of the abdomen on 4/14/2023 shows cholelithiasis without evidence of cholecystitis, hepatic steatosis, and simple appearing right renal cysts.    PAST MEDICAL HISTORY  Active Ambulatory Problems     Diagnosis Date Noted   • Fall 08/08/2016   • Alcoholism in recovery 08/08/2016   • Atopic rhinitis 08/08/2016   • Mixed anxiety depressive disorder 08/08/2016   • Genital herpes simplex 08/08/2016   • Hyperlipidemia 08/08/2016   • Hypertension 08/08/2016   • Hypothyroidism 08/08/2016   • Insomnia 08/08/2016   • Panic disorder without agoraphobia 08/08/2016   • Persistent insomnia 08/08/2016   • Vitamin D deficiency 08/08/2016   • Chronic low " back pain 11/11/2016   • Neuropathy involving both lower extremities 10/25/2018   • QT prolongation 01/03/2019   • Left shoulder pain 11/19/2019   • Alcoholic ketoacidosis 01/12/2020   • Hypokalemia 01/13/2020   • Pancytopenia 01/14/2020   • Left ventricular diastolic dysfunction 01/16/2021   • Tremor 10/06/2021   • C5 cervical fracture 11/09/2021   • Syncope and collapse 01/07/2022   • Neck pain 01/07/2022   • Alcoholic intoxication with complication 03/13/2022   • Withdrawal symptoms, alcohol 07/01/2022   • Transaminitis 07/01/2022   • Lumbar facet arthropathy 07/20/2022   • Alcohol dependence with withdrawal 09/11/2022   • Midline low back pain with right-sided sciatica 09/15/2022   • Spondylolisthesis at L4-L5 level 09/15/2022   • Lumbar canal stenosis 09/15/2022   • Alcohol cessation counseling 09/15/2022   • Need for assistance due to reduced mobility 09/15/2022   • Impaired mobility and ADLs 09/15/2022   • Alcohol withdrawal syndrome without complication 02/18/2023   • Facial laceration 02/18/2023   • Orthostatic hypotension 02/18/2023   • Acute bacterial conjunctivitis of right eye 03/02/2023   • Visit for suture removal 03/02/2023   • Renal calculi 03/14/2023   • Hepatic steatosis 03/15/2023   • Alcohol withdrawal syndrome with complication 04/14/2023   • Macrocytosis without anemia 04/14/2023   • Lumbosacral spondylosis without myelopathy 05/05/2023     Resolved Ambulatory Problems     Diagnosis Date Noted   • Impacted cerumen 08/08/2016   • Influenza 08/08/2016   • Motion sickness 08/08/2016   • Seasonal allergic rhinitis 08/08/2016   • Upper respiratory tract infection 08/08/2016   • Alcohol withdrawal (HCC) 11/04/2016   • Headache 11/05/2016   • Gastritis 11/05/2016   • Olecranon bursitis of right elbow 04/05/2017   • Sciatica of left side 06/12/2018   • Syncope and collapse 01/02/2019   • Nausea and vomiting 01/11/2020   • Hyponatremia 01/13/2020   • Alcohol dependence with uncomplicated withdrawal  2020   • Nephrolithiasis 2020   • Hypomagnesemia 2021   • Non-traumatic rhabdomyolysis 2021   • Urine retention 2021     Past Medical History:   Diagnosis Date   • Alcohol abuse    • Allergic 1970   • Anxiety    • Arthritis    • Depression    • Disease of thyroid gland    • Elevated cholesterol    • Encounter for removal of sutures    • GERD (gastroesophageal reflux disease)    • Headache, tension-type    • Kidney stone    • Low back pain    • Migraine    • Olecranon bursitis, right elbow    • Peripheral neuropathy    • Sleep apnea        The patient has started, but not completed, their COVID-19 vaccination series.    PAST SURGICAL HISTORY  Past Surgical History:   Procedure Laterality Date   • COLONOSCOPY     • CYST REMOVAL     • CYSTOSCOPY BLADDER STONE LITHOTRIPSY  2022   • EXTRACORPOREAL SHOCK WAVE LITHOTRIPSY (ESWL) Right    • SHOULDER ARTHROSCOPY Right 2019    Procedure: SHOULDER ARTHROSCOPY, decompression, distal clavicle excision;  Surgeon: Aj Mancilla MD;  [place] [facility] [place] [facility] [place]  Service: Orthopedics   • TONSILLECTOMY           FAMILY HISTORY  Family History   Problem Relation Age of Onset   • Alzheimer's disease Mother    • Mental illness Mother    • Pancreatic cancer Father    • Hearing loss Father    • Arthritis Maternal Grandmother    • Malig Hyperthermia Neg Hx          SOCIAL HISTORY  Social History     Socioeconomic History   • Marital status: Single   Tobacco Use   • Smoking status: Former     Packs/day: 0.50     Years: 15.00     Pack years: 7.50     Types: Cigarettes     Start date: 1990     Quit date: 2005     Years since quittin.3   • Smokeless tobacco: Never   • Tobacco comments:     caffeine - 3 cans of coke daily    Vaping Use   • Vaping Use: Never used   Substance and Sexual Activity   • Alcohol use: Yes     Alcohol/week: 7.0 standard drinks     Types: 7 Shots of liquor per week     Comment: .5 liter vodka   • Drug use: Yes  "    Types: Methamphetamines     Comment: \"once in a rare while\"   • Sexual activity: Yes     Partners: Female     Birth control/protection: Condom         ALLERGIES  Penicillins        REVIEW OF SYSTEMS  Review of Systems   Constitutional: Negative for chills and fever.   Respiratory: Negative for cough and shortness of breath.    Cardiovascular: Negative for chest pain.   Gastrointestinal: Positive for nausea and vomiting. Negative for blood in stool and diarrhea.   Genitourinary: Negative for dysuria and flank pain.        All systems reviewed and negative except for those discussed in HPI.       PHYSICAL EXAM    I have reviewed the triage vital signs and nursing notes.    ED Triage Vitals [05/13/23 1816]   Temp Heart Rate Resp BP SpO2   98.4 °F (36.9 °C) 99 22 169/91 96 %      Temp src Heart Rate Source Patient Position BP Location FiO2 (%)   Oral Monitor -- -- --       Physical Exam  GENERAL: alert, anxious appearing, holding emesis bag  SKIN: Warm, dry  HENT: Normocephalic, atraumatic, slightly dry mucous membranes  EYES: no scleral icterus  CV: regular rhythm, regular rate  RESPIRATORY: normal effort, lungs clear  ABDOMEN: soft, mild tenderness worse in the left lower quadrant, nondistended  MUSCULOSKELETAL: no deformity  NEURO: alert, moves all extremities, follows commands          LAB RESULTS  Labs Reviewed   COMPREHENSIVE METABOLIC PANEL - Abnormal; Notable for the following components:       Result Value    Glucose 63 (*)     Creatinine 0.71 (*)     Chloride 93 (*)     CO2 5.2 (*)     ALT (SGPT) 43 (*)     AST (SGOT) 157 (*)     Anion Gap 38.8 (*)     All other components within normal limits    Narrative:     GFR Normal >60  Chronic Kidney Disease <60  Kidney Failure <15     URINALYSIS W/ MICROSCOPIC IF INDICATED (NO CULTURE) - Abnormal; Notable for the following components:    Ketones, UA 80 mg/dL (3+) (*)     Protein, UA 30 mg/dL (1+) (*)     All other components within normal limits   CBC WITH AUTO " DIFFERENTIAL - Abnormal; Notable for the following components:    .0 (*)     Neutrophil % 85.9 (*)     Lymphocyte % 7.2 (*)     Eosinophil % 0.0 (*)     Lymphocytes, Absolute 0.56 (*)     All other components within normal limits   ETHANOL - Abnormal; Notable for the following components:    Ethanol 45 (*)     All other components within normal limits   PROTIME-INR - Abnormal; Notable for the following components:    Protime 14.4 (*)     INR 1.11 (*)     All other components within normal limits   LACTIC ACID, PLASMA - Abnormal; Notable for the following components:    Lactate 11.0 (*)     All other components within normal limits   COMPREHENSIVE METABOLIC PANEL - Abnormal; Notable for the following components:    Glucose 142 (*)     BUN 7 (*)     Creatinine 0.75 (*)     CO2 11.7 (*)     Calcium 8.1 (*)     AST (SGOT) 123 (*)     Anion Gap 27.3 (*)     All other components within normal limits    Narrative:     GFR Normal >60  Chronic Kidney Disease <60  Kidney Failure <15     CBC (NO DIFF) - Abnormal; Notable for the following components:    .7 (*)     All other components within normal limits   LACTIC ACID, REFLEX - Abnormal; Notable for the following components:    Lactate 7.6 (*)     All other components within normal limits   LACTIC ACID, REFLEX - Abnormal; Notable for the following components:    Lactate 7.8 (*)     All other components within normal limits   BLOOD GAS, ARTERIAL - Abnormal; Notable for the following components:    pCO2, Arterial 19.3 (*)     HCO3, Arterial 11.0 (*)     Base Excess, Arterial -11.5 (*)     All other components within normal limits   LACTIC ACID, REFLEX - Abnormal; Notable for the following components:    Lactate 4.9 (*)     All other components within normal limits   LACTIC ACID, REFLEX - Abnormal; Notable for the following components:    Lactate 2.1 (*)     All other components within normal limits   BETA HYDROXYBUTYRATE QUANTITATIVE - Abnormal; Notable for the  following components:    Beta-Hydroxybutyrate Quant 0.443 (*)     All other components within normal limits    Narrative:     In the assessment of possible diabetic ketoacidosis, the test should be interpreted along with other clinical and laboratory findings.  A level greater than 1 mmol/L should require further evaluation and levels of more than 3 mmol/L require immediate medical review.   MAGNESIUM - Abnormal; Notable for the following components:    Magnesium 1.5 (*)     All other components within normal limits   PHOSPHORUS - Abnormal; Notable for the following components:    Phosphorus 1.0 (*)     All other components within normal limits   COMPREHENSIVE METABOLIC PANEL - Abnormal; Notable for the following components:    BUN 7 (*)     Creatinine 0.64 (*)     AST (SGOT) 91 (*)     All other components within normal limits    Narrative:     GFR Normal >60  Chronic Kidney Disease <60  Kidney Failure <15     CBC WITH AUTO DIFFERENTIAL - Abnormal; Notable for the following components:    RBC 3.81 (*)     Hemoglobin 12.9 (*)     Hematocrit 36.9 (*)     MCH 33.9 (*)     All other components within normal limits   LIPASE - Normal   MAGNESIUM - Normal   URINE DRUG SCREEN - Normal    Narrative:     Negative Thresholds Per Drugs Screened:    Amphetamines                 500 ng/ml  Barbiturates                 200 ng/ml  Benzodiazepines              100 ng/ml  Cocaine                      300 ng/ml  Methadone                    300 ng/ml  Opiates                      300 ng/ml  Oxycodone                    100 ng/ml  THC                           50 ng/ml  Fentanyl                       5 ng/ml      The Normal Value for all drugs tested is negative. This report includes final unconfirmed screening results to be used for medical treatment purposes only. Unconfirmed results must not be used for non-medical purposes such as employment or legal testing. Clinical consideration should be applied to any drug of abuse test,  particularly when unconfirmed results are used.           HEPATITIS PANEL, ACUTE - Normal    Narrative:     Results may be falsely decreased if patient taking Biotin.    OSMOLALITY, SERUM - Normal   LACTIC ACID, REFLEX - Normal   URIC ACID - Normal   URINALYSIS, MICROSCOPIC ONLY   BLOOD GAS, ARTERIAL   CBC AND DIFFERENTIAL    Narrative:     The following orders were created for panel order CBC & Differential.  Procedure                               Abnormality         Status                     ---------                               -----------         ------                     CBC Auto Differential[[id number]]        Abnormal            Final result                 Please view results for these tests on the individual orders.   CBC AND DIFFERENTIAL    Narrative:     The following orders were created for panel order CBC & Differential.  Procedure                               Abnormality         Status                     ---------                               -----------         ------                     CBC Auto Differential[[id number]]        Abnormal            Final result                 Please view results for these tests on the individual orders.       Ordered the above labs and independently reviewed and interpreted the results.        RADIOLOGY  CT Abdomen Pelvis With Contrast   Final Result       1. There is thick-walled appearance to the cardia of the stomach, as   well as an overall patulous appearance to the stomach, which may reflect   gastritis. Patient had similar findings on prior study from April 2023,   given persistence, correlation with nonemergent endoscopy is   recommended.   2. Cholelithiasis.   3. Left renal lesion measures higher in density than a simple cyst. It   does appear similar in size to prior imaging, which would suggest a   benign etiology, but outpatient evaluation with renal protocol CT or MRI   is suggested.       Radiation dose reduction techniques were utilized, including  automated   exposure control and exposure modulation based on body size.       This report was finalized on 5/13/2023 9:03 PM by Dr. Hilda Hampton M.D.              I ordered the above noted radiological studies. Independently reviewed and interpreted by me.  See dictation for official radiology interpretation.      PROCEDURES    Procedures      MEDICATIONS GIVEN IN ER    Medications   sodium chloride 0.9 % bolus 1,000 mL (0 mL Intravenous Stopped 5/13/23 2056)   ondansetron (ZOFRAN) injection 4 mg (4 mg Intravenous Given 5/13/23 1856)   LORazepam (ATIVAN) injection 1 mg (1 mg Intravenous Given 5/13/23 2023)   iopamidol (ISOVUE-300) 61 % injection 85 mL (85 mL Intravenous Given 5/13/23 2014)   acetaminophen (TYLENOL) tablet 1,000 mg (1,000 mg Oral Given 5/13/23 2058)   potassium phosphate 30 mmol in sodium chloride 0.9 % 500 mL infusion (30 mmol Intravenous New Bag 5/15/23 1259)         PROGRESS, DATA ANALYSIS, CONSULTS, AND MEDICAL DECISION MAKING    All labs have been independently reviewed and interpreted by me.  All radiology studies have been independently reviewed and interpreted by me and discussed with radiologist dictating the report.   EKG's independently reviewed and interpreted by me.  Discussion below represents my analysis of pertinent findings related to patient's condition, differential diagnosis, treatment plan and final disposition.    My differential diagnosis for abdominal pain includes but is not limited to:    Gastritis, gastroenteritis, peptic ulcer disease, GERD, esophageal perforation, acute appendicitis, mesenteric adenitis, Meckel’s diverticulum, epiploic appendagitis, diverticulitis, colon cancer, ulcerative colitis, Crohn’s disease, intussusception, small bowel obstruction, adhesions, ischemic bowel, perforated viscus, ileus, obstipation, biliary colic, cholecystitis, cholelithiasis, hepatitis, pancreatitis, common bile duct obstruction, cholangitis, bile leak, splenic trauma,  splenic rupture, splenic infarction, splenic abscess, abdominal abscess, ascites, spontaneous bacterial peritonitis, hernia, UTI, cystitis, prostatitis, ureterolithiasis, urinary obstruction, pelvic abscess, AAA, myocardial infarction, pneumonia, cancer, porphyria, DKA, medications, sickle cell, viral syndrome, zoster      ED Course as of 05/17/23 1002   Sat May 13, 2023   1908 WBC: 7.77 [DC]   1908 Hemoglobin: 14.7 [DC]   1932 Ethanol(!): 45 [DC]   1933 Lipase: 33 [DC]   1946 Creatinine(!): 0.71 [DC]   1947 CO2(!): 5.2 [DC]   1947 Anion Gap(!): 38.8 [DC]   1959 Discussed lab results with patient.  CT pending.  Will likely require admission as I suspect he is beginning to go through alcohol withdrawal.  We will give a dose of Ativan.  Labs suggestive of alcoholic ketosis.  Patient is agreeable to be admitted if necessary. [DC]      ED Course User Index  [DC] Sarah Barrientos PA           PPE: Patient was placed in face mask in first look. Patient was wearing facemask when I entered the room and throughout our encounter. I wore full protective equipment throughout this patient encounter including a face mask, and gloves. Hand hygiene was performed before donning protective equipment and after removal when leaving the room.        AS OF 10:02 EDT VITALS:    BP - 134/80  HR - 72  TEMP - 98.2 °F (36.8 °C) (Oral)  O2 SATS - 95%        DIAGNOSIS  Final diagnoses:   Alcoholic ketoacidosis         DISPOSITION  ED Disposition     ED Disposition   Decision to Admit    Condition   --    Comment   Level of Care: Telemetry [5]   Diagnosis: Alcoholic ketoacidosis [590002]   Admitting Physician: MEGHAN ISAAC [[id number]]   Attending Physician: MEGHAN ISAAC [[id number]]   Certification: I Certify That Inpatient Hospital Services Are Medically Necessary For Greater Than 2 Midnights                  Note Disclaimer: At [facility], we believe that sharing information builds trust and better relationships. You are receiving this note  because you recently visited [facility]. It is possible you will see health information before a provider has talked with you about it. This kind of information can be easy to misunderstand. To help you fully understand what it means for your health, we urge you to discuss this note with your provider.       Sarah Barrientos PA  05/17/23 1002

## 2023-05-13 NOTE — ED NOTES
Pt to ER from home for N/V that started around lunchtime. Pt has not taken any medications.    This RN in appropriate PPE while in pt room. Pt wearing mask

## 2023-05-14 LAB
ALBUMIN SERPL-MCNC: 4.2 G/DL (ref 3.5–5.2)
ALBUMIN/GLOB SERPL: 1.8 G/DL
ALP SERPL-CCNC: 97 U/L (ref 39–117)
ALT SERPL W P-5'-P-CCNC: 37 U/L (ref 1–41)
ANION GAP SERPL CALCULATED.3IONS-SCNC: 27.3 MMOL/L (ref 5–15)
ARTERIAL PATENCY WRIST A: POSITIVE
AST SERPL-CCNC: 123 U/L (ref 1–40)
ATMOSPHERIC PRESS: 753.5 MMHG
B-OH-BUTYR SERPL-SCNC: 0.44 MMOL/L (ref 0.02–0.27)
BASE EXCESS BLDA CALC-SCNC: -11.5 MMOL/L (ref 0–2)
BDY SITE: ABNORMAL
BILIRUB SERPL-MCNC: 1 MG/DL (ref 0–1.2)
BUN SERPL-MCNC: 7 MG/DL (ref 8–23)
BUN/CREAT SERPL: 9.3 (ref 7–25)
CALCIUM SPEC-SCNC: 8.1 MG/DL (ref 8.6–10.5)
CHLORIDE SERPL-SCNC: 98 MMOL/L (ref 98–107)
CO2 SERPL-SCNC: 11.7 MMOL/L (ref 22–29)
CREAT SERPL-MCNC: 0.75 MG/DL (ref 0.76–1.27)
D-LACTATE SERPL-SCNC: 1.7 MMOL/L (ref 0.5–2)
D-LACTATE SERPL-SCNC: 2.1 MMOL/L (ref 0.5–2)
D-LACTATE SERPL-SCNC: 4.9 MMOL/L (ref 0.5–2)
D-LACTATE SERPL-SCNC: 7.6 MMOL/L (ref 0.5–2)
D-LACTATE SERPL-SCNC: 7.8 MMOL/L (ref 0.5–2)
DEPRECATED RDW RBC AUTO: 49.9 FL (ref 37–54)
EGFRCR SERPLBLD CKD-EPI 2021: 99.5 ML/MIN/1.73
ERYTHROCYTE [DISTWIDTH] IN BLOOD BY AUTOMATED COUNT: 13.1 % (ref 12.3–15.4)
GLOBULIN UR ELPH-MCNC: 2.3 GM/DL
GLUCOSE SERPL-MCNC: 142 MG/DL (ref 65–99)
HAV IGM SERPL QL IA: NORMAL
HBV CORE IGM SERPL QL IA: NORMAL
HBV SURFACE AG SERPL QL IA: NORMAL
HCO3 BLDA-SCNC: 11 MMOL/L (ref 22–28)
HCT VFR BLD AUTO: 42.2 % (ref 37.5–51)
HCV AB SER DONR QL: NORMAL
HGB BLD-MCNC: 13.7 G/DL (ref 13–17.7)
MCH RBC QN AUTO: 33 PG (ref 26.6–33)
MCHC RBC AUTO-ENTMCNC: 32.5 G/DL (ref 31.5–35.7)
MCV RBC AUTO: 101.7 FL (ref 79–97)
MODALITY: ABNORMAL
OSMOLALITY SERPL: 297 MOSM/KG (ref 280–301)
PCO2 BLDA: 19.3 MM HG (ref 35–45)
PH BLDA: 7.36 PH UNITS (ref 7.35–7.45)
PLATELET # BLD AUTO: 245 10*3/MM3 (ref 140–450)
PMV BLD AUTO: 8.9 FL (ref 6–12)
PO2 BLDA: 89.7 MM HG (ref 80–100)
POTASSIUM SERPL-SCNC: 4.9 MMOL/L (ref 3.5–5.2)
PROT SERPL-MCNC: 6.5 G/DL (ref 6–8.5)
RBC # BLD AUTO: 4.15 10*6/MM3 (ref 4.14–5.8)
SAO2 % BLDCOA: 96.9 % (ref 92–99)
SODIUM SERPL-SCNC: 137 MMOL/L (ref 136–145)
TOTAL RATE: 22 BREATHS/MINUTE
WBC NRBC COR # BLD: 9.92 10*3/MM3 (ref 3.4–10.8)

## 2023-05-14 PROCEDURE — 90791 PSYCH DIAGNOSTIC EVALUATION: CPT

## 2023-05-14 PROCEDURE — 36600 WITHDRAWAL OF ARTERIAL BLOOD: CPT

## 2023-05-14 PROCEDURE — 25010000002 ENOXAPARIN PER 10 MG: Performed by: STUDENT IN AN ORGANIZED HEALTH CARE EDUCATION/TRAINING PROGRAM

## 2023-05-14 PROCEDURE — 85027 COMPLETE CBC AUTOMATED: CPT | Performed by: STUDENT IN AN ORGANIZED HEALTH CARE EDUCATION/TRAINING PROGRAM

## 2023-05-14 PROCEDURE — 80053 COMPREHEN METABOLIC PANEL: CPT | Performed by: STUDENT IN AN ORGANIZED HEALTH CARE EDUCATION/TRAINING PROGRAM

## 2023-05-14 PROCEDURE — 83605 ASSAY OF LACTIC ACID: CPT | Performed by: EMERGENCY MEDICINE

## 2023-05-14 PROCEDURE — 25010000002 LORAZEPAM PER 2 MG: Performed by: EMERGENCY MEDICINE

## 2023-05-14 PROCEDURE — 82010 KETONE BODYS QUAN: CPT | Performed by: INTERNAL MEDICINE

## 2023-05-14 PROCEDURE — 82803 BLOOD GASES ANY COMBINATION: CPT

## 2023-05-14 PROCEDURE — 25010000002 ONDANSETRON PER 1 MG: Performed by: STUDENT IN AN ORGANIZED HEALTH CARE EDUCATION/TRAINING PROGRAM

## 2023-05-14 PROCEDURE — 25010000002 THIAMINE PER 100 MG: Performed by: STUDENT IN AN ORGANIZED HEALTH CARE EDUCATION/TRAINING PROGRAM

## 2023-05-14 PROCEDURE — 83930 ASSAY OF BLOOD OSMOLALITY: CPT | Performed by: STUDENT IN AN ORGANIZED HEALTH CARE EDUCATION/TRAINING PROGRAM

## 2023-05-14 RX ORDER — DEXTROSE AND SODIUM CHLORIDE 5; .9 G/100ML; G/100ML
150 INJECTION, SOLUTION INTRAVENOUS CONTINUOUS
Status: DISCONTINUED | OUTPATIENT
Start: 2023-05-14 | End: 2023-05-15 | Stop reason: HOSPADM

## 2023-05-14 RX ORDER — LIDOCAINE 50 MG/G
1 PATCH TOPICAL EVERY 24 HOURS
Status: DISCONTINUED | OUTPATIENT
Start: 2023-05-14 | End: 2023-05-15 | Stop reason: HOSPADM

## 2023-05-14 RX ORDER — LEVOTHYROXINE SODIUM 0.1 MG/1
100 TABLET ORAL
Status: DISCONTINUED | OUTPATIENT
Start: 2023-05-14 | End: 2023-05-15 | Stop reason: HOSPADM

## 2023-05-14 RX ORDER — OXYCODONE HYDROCHLORIDE 5 MG/1
5 TABLET ORAL EVERY 6 HOURS PRN
Status: DISCONTINUED | OUTPATIENT
Start: 2023-05-14 | End: 2023-05-15 | Stop reason: HOSPADM

## 2023-05-14 RX ORDER — AMOXICILLIN 250 MG
1 CAPSULE ORAL 2 TIMES DAILY PRN
Status: DISCONTINUED | OUTPATIENT
Start: 2023-05-14 | End: 2023-05-15 | Stop reason: HOSPADM

## 2023-05-14 RX ADMIN — DEXTROSE AND SODIUM CHLORIDE 150 ML/HR: 5; 900 INJECTION, SOLUTION INTRAVENOUS at 20:14

## 2023-05-14 RX ADMIN — LORAZEPAM 1 MG: 2 INJECTION INTRAMUSCULAR; INTRAVENOUS at 23:43

## 2023-05-14 RX ADMIN — FOLIC ACID 1 MG: 1 TABLET ORAL at 10:46

## 2023-05-14 RX ADMIN — THIAMINE HYDROCHLORIDE 200 MG: 100 INJECTION, SOLUTION INTRAMUSCULAR; INTRAVENOUS at 22:15

## 2023-05-14 RX ADMIN — OXYCODONE HYDROCHLORIDE 5 MG: 5 TABLET ORAL at 01:52

## 2023-05-14 RX ADMIN — LORAZEPAM 1 MG: 2 INJECTION INTRAMUSCULAR; INTRAVENOUS at 00:22

## 2023-05-14 RX ADMIN — PANTOPRAZOLE SODIUM 40 MG: 40 TABLET, DELAYED RELEASE ORAL at 06:02

## 2023-05-14 RX ADMIN — DEXTROSE AND SODIUM CHLORIDE 150 ML/HR: 5; 900 INJECTION, SOLUTION INTRAVENOUS at 10:48

## 2023-05-14 RX ADMIN — LORAZEPAM 1 MG: 2 INJECTION INTRAMUSCULAR; INTRAVENOUS at 08:42

## 2023-05-14 RX ADMIN — LORAZEPAM 1 MG: 2 INJECTION INTRAMUSCULAR; INTRAVENOUS at 13:27

## 2023-05-14 RX ADMIN — THIAMINE HYDROCHLORIDE 200 MG: 100 INJECTION, SOLUTION INTRAMUSCULAR; INTRAVENOUS at 14:54

## 2023-05-14 RX ADMIN — ENOXAPARIN SODIUM 40 MG: 100 INJECTION SUBCUTANEOUS at 20:14

## 2023-05-14 RX ADMIN — LEVOTHYROXINE SODIUM 100 MCG: 0.1 TABLET ORAL at 06:02

## 2023-05-14 RX ADMIN — MULTIPLE VITAMINS W/ MINERALS TAB 1 TABLET: TAB at 10:46

## 2023-05-14 RX ADMIN — THIAMINE HYDROCHLORIDE 200 MG: 100 INJECTION, SOLUTION INTRAMUSCULAR; INTRAVENOUS at 06:02

## 2023-05-14 RX ADMIN — ONDANSETRON 4 MG: 2 INJECTION INTRAMUSCULAR; INTRAVENOUS at 00:22

## 2023-05-14 RX ADMIN — LIDOCAINE 1 PATCH: 50 PATCH CUTANEOUS at 03:56

## 2023-05-14 RX ADMIN — ONDANSETRON 4 MG: 2 INJECTION INTRAMUSCULAR; INTRAVENOUS at 14:54

## 2023-05-14 RX ADMIN — ONDANSETRON 4 MG: 2 INJECTION INTRAMUSCULAR; INTRAVENOUS at 08:42

## 2023-05-14 NOTE — PLAN OF CARE
Goal Outcome Evaluation:  Plan of Care Reviewed With: patient        Progress: no change   Pt is alert and oriented x4. On CIWA protocol pt was scored 12 x2 and prn IV ativan given .Pt with critical lactate of 11 called and talked to ALBA LOCO and will inform Dr Rutherford. Pt to have Nephro consult. Will continue to monitor.

## 2023-05-14 NOTE — PROGRESS NOTES
[facility]ed[facility] Hospital Care    [phone]   LOS: 1 day     Name: Pro Mueller  Age/Sex: 68 y.o. male  :  1955        PCP: Lupe Roberts APRN  Chief Complaint   Patient presents with   • Nausea   • Vomiting      Subjective   He feels okay today.  Still with some nausea but currently no nausea or vomiting at all.  Rested decently overnight.  Pain that he was experiencing is improved.  General: No Fever or Chills, Cardiac: No Chest Pain or Palpitations, Resp: No Cough or SOA, GI: No Nausea, Vomiting, or Diarrhea and Other: No bleeding    enoxaparin, 40 mg, Subcutaneous, Nightly  folic acid, 1 mg, Oral, Daily  levothyroxine, 100 mcg, Oral, Q AM  lidocaine, 1 patch, Transdermal, Q24H  multivitamin with minerals, 1 tablet, Oral, Daily  pantoprazole, 40 mg, Oral, Q AM  thiamine (B-1) IV, 200 mg, Intravenous, Q8H   Followed by  [START ON 2023] thiamine, 100 mg, Oral, Daily      dextrose 5 % and sodium chloride 0.9 %, 150 mL/hr, Last Rate: 150 mL/hr (23 1048)        Objective   Vital Signs  Temp:  [97.4 °F (36.3 °C)-98.4 °F (36.9 °C)] 97.6 °F (36.4 °C)  Heart Rate:  [90-99] 90  Resp:  [18-22] 18  BP: (141-171)/(84-93) 142/93  Body mass index is 25.48 kg/m².    Intake/Output Summary (Last 24 hours) at 2023 1308  Last data filed at 2023 0630  Gross per 24 hour   Intake 1000 ml   Output 800 ml   Net 200 ml       Physical Exam  Vitals and nursing note reviewed.   Constitutional:       Appearance: He is not ill-appearing.   HENT:      Head: Normocephalic and atraumatic.   Pulmonary:      Effort: Pulmonary effort is normal.      Breath sounds: Normal breath sounds.   Abdominal:      General: Bowel sounds are normal. There is no distension.      Palpations: Abdomen is soft.   Neurological:      Mental Status: He is alert.           Results Review:       I reviewed the patient's new clinical results.  Results from last 7 days   Lab Units 23  0052 23  1848   WBC 10*3/mm3 9.92  7.77   HEMOGLOBIN g/dL 13.7 14.7   PLATELETS 10*3/mm3 245 271     Results from last 7 days   Lab Units 05/14/23  0052 05/13/23  1848   SODIUM mmol/L 137 137   POTASSIUM mmol/L 4.9 4.5   CHLORIDE mmol/L 98 93*   CO2 mmol/L 11.7* 5.2*   BUN mg/dL 7* 8   CREATININE mg/dL 0.75* 0.71*   CALCIUM mg/dL 8.1* 8.9   MAGNESIUM mg/dL  --  1.7   Estimated Creatinine Clearance: 116.8 mL/min (A) (by C-G formula based on SCr of 0.75 mg/dL (L)).      Assessment & Plan   Active Hospital Problems    Diagnosis  POA   • **Alcoholic ketoacidosis [E87.29]  Yes   • Elevated LFTs [R79.89]  Yes   • Alcohol dependence with withdrawal [F10.239]  Yes   • Hyperlipidemia [E78.5]  Yes   • Hypertension [I10]  Yes   • Hypothyroidism [E03.9]  Yes      Resolved Hospital Problems   No resolved problems to display.       PLAN  Mr. Mueller is a 68 y.o. alcohol dependence, hypertension, hyperlipidemia, hypothyroidism, hepatic steatosis who presents with nausea, vomiting, alcohol intoxication and withdrawal and severe metabolic acidosis which is likely alcoholic ketoacidosis  -His anion gap and bicarb are improving with treatment.  -His nausea and vomiting are responding well to PPI therapy.  Will need an EGD at some point for right now we will need to stabilize from an alcohol withdrawal and alcohol usage standpoint  -[facility] evaluate him from substance abuse standpoint.  He is requesting to see the psychiatrist that consults been placed.  -He seems resistant to admitting that he has a problem with alcohol  -Continue symptom triggered Ativan therapy    Disposition  Expected discharge date/ time has not been documented.       Esdras Reno MD  [facility]  05/14/23  13:08 EDT

## 2023-05-14 NOTE — CONSULTS
[facility] Consult Note      Patient Name: Pro Mueller  : 1955  MRN: [id number]  Primary Care Physician:  Lupe Roberts APRN  Referring Physician: No ref. provider found  Date of admission: 2023    Subjective     Reason for Consult: Alcoholic ketoacidosis    HPI:   Pro Mueller is a 68 y.o. male who presented to the emergency department  with complaints of nausea and vomiting for the last 24 hours. Pertinent past medical history includes alcohol dependence, hypertension, dyslipidemia, hypothyroidism, hepatic steatosis, kidney stones (remote), and hypothyroidism. Multiple lab abnormalities noted on admission including significantly low bicarb of 5.2 and anion gap of 38.8. We were consulted to help manage his severe acidosis. Patient states he drinks 2-4 vodka martinis a night. He denies any methanol ingestion, antifreeze ingestion, or rubbing alcohol ingestion. He denies NSAID or ASA use. He reports feeling better today, his only complaint is he is having hiccups. Nausea and vomiting have resolved. No chest pain or palpitations. No shortness of air. He is not having any urinary issues. No diarrhea. Lactated ringers currently going at 150 cc/hr.    Review of Systems:   14 point review of systems is otherwise negative except for mentioned above on HPI    Personal History     Past Medical History:   Diagnosis Date   • Alcohol abuse    • Alcohol withdrawal 2016   • Alcoholic ketoacidosis 2020   • Allergic 1970   • Anxiety    • Arthritis    • Atopic rhinitis 2016   • Depression    • Disease of thyroid gland    • Elevated cholesterol    • Encounter for removal of sutures    • Genital herpes simplex 2016   • GERD (gastroesophageal reflux disease)    • Headache, tension-type    • Hyperlipidemia    • Hypertension    • Hypothyroidism    • Kidney stone    • Low back pain    • Migraine    • Motion sickness    • Nephrolithiasis 2020   •  Olecranon bursitis, right elbow    • Panic disorder without agoraphobia 08/08/2016   • Peripheral neuropathy    • Sleep apnea    • Syncope and collapse 01/02/2019   • Vitamin D deficiency 08/08/2016   • Withdrawal symptoms, alcohol        Past Surgical History:   Procedure Laterality Date   • COLONOSCOPY     • CYST REMOVAL     • CYSTOSCOPY BLADDER STONE LITHOTRIPSY  02/2022   • EXTRACORPOREAL SHOCK WAVE LITHOTRIPSY (ESWL) Right 2002   • SHOULDER ARTHROSCOPY Right 12/17/2019    Procedure: SHOULDER ARTHROSCOPY, decompression, distal clavicle excision;  Surgeon: Aj Mancilla MD;  Location: [facility] OR [facility] [place];  Service: Orthopedics   • TONSILLECTOMY         Family History: family history includes Alzheimer's disease in his mother; Arthritis in his maternal grandmother; Hearing loss in his father; Mental illness in his mother; Pancreatic cancer in his father.    Social History:  reports that he quit smoking about 18 years ago. His smoking use included cigarettes. He started smoking about 33 years ago. He has a 7.50 pack-year smoking history. He has never used smokeless tobacco. He reports current alcohol use of about 7.0 standard drinks per week. He reports current drug use. Drug: Methamphetamines.    Home Medications:  Prior to Admission medications    Medication Sig Start Date End Date Taking? Authorizing Provider   acetaminophen (TYLENOL) 325 MG tablet Take 2 tablets by mouth Every 4 (Four) Hours As Needed for Mild Pain . 3/15/22  Yes Esdras Jackson MD   aluminum-magnesium hydroxide-simethicone (MAALOX MAX) 400-400-40 MG/5ML suspension Take 15 mL by mouth Every 6 (Six) Hours As Needed for Heartburn. 9/15/22  Yes Roberth Chino,    amLODIPine (NORVASC) 5 MG tablet Take 1 tablet by mouth Every Morning. 5/5/23  Yes Lupe Roberts APRN   atorvastatin (LIPITOR) 20 MG tablet Take 1 tablet by mouth Daily. 5/5/23  Yes Lupe Roberts APRN   baclofen (LIORESAL) 10 MG tablet Take 1 tablet by mouth 3  (Three) Times a Day As Needed for Muscle Spasms. 3/15/23  Yes Alla Beal MD   folic acid (FOLVITE) 1 MG tablet Take 1 tablet by mouth Daily. 7/4/22  Yes Jessie Stover MD   HYDROcodone-acetaminophen (NORCO) 7.5-325 MG per tablet Take 1 tablet by mouth 2 (Two) Times a Day. 3/15/23  Yes Alla Beal MD   levothyroxine (SYNTHROID, LEVOTHROID) 100 MCG tablet Take 1 tablet by mouth Daily. 5/5/23  Yes Lupe Roberts APRN   lisinopril (PRINIVIL,ZESTRIL) 10 MG tablet Take 1 tablet by mouth Daily. 5/5/23  Yes Lupe Roberts APRN   meloxicam (MOBIC) 15 MG tablet Take 1 tablet by mouth Daily. 3/15/23  Yes Alla Beal MD   Multiple Vitamin (multivitamin) capsule Take 1 capsule by mouth Daily. 7/4/22 7/4/23 Yes Jessie Stover MD   sennosides-docusate (senna-docusate sodium) 8.6-50 MG per tablet Take 1 tablet by mouth 2 (Two) Times a Day As Needed for Constipation. 9/23/22  Yes Alla Beal MD   thiamine (thiamine) 100 MG tablet tablet Take 1 tablet by mouth Daily. 7/4/22  Yes Jessie Stover MD   lidocaine (LIDODERM) 5 % Place 1 patch on the skin as directed by provider Daily. Remove & Discard patch within 12 hours or as directed by MD 9/19/22   Sarah Barrientos PA       Allergies:  Allergies   Allergen Reactions   • Penicillins Anaphylaxis and Seizure     Seizure. childhood       Objective     Vitals:   Temp:  [97.4 °F (36.3 °C)-98.4 °F (36.9 °C)] 97.4 °F (36.3 °C)  Heart Rate:  [90-99] 90  Resp:  [18-22] 18  BP: (141-171)/(84-91) 141/84    Intake/Output Summary (Last 24 hours) at 5/14/2023 0854  Last data filed at 5/14/2023 0630  Gross per 24 hour   Intake 1000 ml   Output 800 ml   Net 200 ml       Physical Exam:   Constitutional: Awake, alert, no acute distress. Appears chronically ill  HEENT: Sclera anicteric, no conjunctival injection  Neck: Supple, no thyromegaly, no lymphadenopathy, trachea at midline, no JVD  Respiratory: Clear to auscultation bilaterally, nonlabored  respiration  Cardiovascular: RRR, no murmurs, no rubs or gallops, no carotid bruit  Gastrointestinal: Positive bowel sounds, abdomen is soft, +abdominal tenderness. nondistended  : No palpable bladder  Musculoskeletal: No edema, no clubbing or cyanosis  Psychiatric: Appropriate affect, cooperative  Neurologic: Oriented x3, moving all extremities, normal speech and mental status, slightly tremulous  Skin: Warm and dry       Scheduled Meds:     enoxaparin, 40 mg, Subcutaneous, Nightly  folic acid, 1 mg, Oral, Daily  levothyroxine, 100 mcg, Oral, Q AM  lidocaine, 1 patch, Transdermal, Q24H  multivitamin with minerals, 1 tablet, Oral, Daily  pantoprazole, 40 mg, Oral, Q AM  thiamine (B-1) IV, 200 mg, Intravenous, Q8H   Followed by  [START ON 5/19/2023] thiamine, 100 mg, Oral, Daily      IV Meds:   lactated ringers, 150 mL/hr, Last Rate: 150 mL/hr (05/13/23 2203)        Results Reviewed:   I have personally reviewed the results from the time of this admission to 5/14/2023 08:54 EDT     Lab Results   Component Value Date    GLUCOSE 142 (H) 05/14/2023    CALCIUM 8.1 (L) 05/14/2023     05/14/2023    K 4.9 05/14/2023    CO2 11.7 (L) 05/14/2023    CL 98 05/14/2023    BUN 7 (L) 05/14/2023    CREATININE 0.75 (L) 05/14/2023    EGFRIFAFRI 83 10/05/2021    EGFRIFNONA 73 02/10/2022    BCR 9.3 05/14/2023    ANIONGAP 27.3 (H) 05/14/2023      Lab Results   Component Value Date    MG 1.7 05/13/2023    PHOS 3.9 09/15/2022    ALBUMIN 4.2 05/14/2023           Assessment / Plan       Alcoholic ketoacidosis    Hyperlipidemia    Hypertension    Hypothyroidism    Alcohol dependence with withdrawal    Elevated LFTs      ASSESSMENT:  1. Alcoholic keto lactic acidosis- Patient with normal renal function. Etiology of severe acidosis related to chronic excessive alcohol abuse. Urinalysis showing 3+ ketones. Lactic acid on admission 7.6, down to 4.9 today. Serum bicarb today 11.7, anion gap 27.3.  The patient does not have a significant  osmolar gap to suspect other type of alcohol intoxication.  2.  Mixed metabolic acidosis and respiratory alkalosis  3. Alcohol dependence- withdrawal management per primary team  4. Hypertension  5. Hypothyroidism- on levothyroxine, management per primary team  6.  Dyslipidemia  7. Elevated LFT's in the setting of chronic alcohol abuse. Negative hepatitis panel  8. Left renal lesion noted on CT abdomen 5/13    PLAN:  1. Check beta hydroxybutyrate  2. Check urine studies including urine osm, urine NA, urine chloride and UPCR  3. Switch IV fluids to D5NS at 150cc/hr  4.  Will need  evaluation for his renal lesion  5. Surveillance labs    Thank you for involving us in the care of Pro Mueller.  Please feel free to call with any questions.    Eric Olvera MD  05/14/23  08:54 EDT    [facility]  [phone]      Please note that portions of this note were completed with a voice recognition program.

## 2023-05-14 NOTE — H&P
Patient Name:  Pro Mueller  YOB: 1955  MRN:  [id number]  Admit Date:  5/13/2023  Patient Care Team:  Lupe Roberts APRN as PCP - General (Family Medicine)  Say Coats MD (Psychiatry)      Subjective   History Present Illness     Chief Complaint   Patient presents with   • Nausea   • Vomiting       Mr. Mueller is a 68 y.o. alcohol dependence, hypertension, hyperlipidemia, hypothyroidism, hepatic steatosis who presents with nausea and vomiting that started around lunchtime today.  He denies any provoking factors.  He was here last month with alcohol withdrawal. He reports drinking 2-4 3 finger vodka martinis per day.  He is interested in quitting drinking.  He does have a severe metabolic acidosis with a bicarb of 5.2 and an anion gap of 38.8.  He absolutely denies any methanol ingestion, antifreeze ingestion, rubbing alcohol ingestion.  He states that he only drinks vodka which was delivered from a store in a sealed upon delivery.    History of Present Illness  Review of Systems   Constitutional: Negative for chills, diaphoresis and fever.   HENT: Negative for postnasal drip and rhinorrhea.    Eyes: Negative.    Respiratory: Negative for cough and shortness of breath.    Cardiovascular: Negative for chest pain, palpitations and leg swelling.   Gastrointestinal: Positive for nausea and vomiting. Negative for abdominal pain and diarrhea.   Endocrine: Negative.    Genitourinary: Negative for dysuria, frequency and urgency.   Musculoskeletal: Negative for arthralgias and myalgias.   Skin: Negative for rash and wound.   Allergic/Immunologic: Negative.    Neurological: Positive for headaches. Negative for light-headedness.   Hematological: Negative.    Psychiatric/Behavioral: Negative for confusion and decreased concentration.        Personal History     Past Medical History:   Diagnosis Date   • Alcohol abuse    • Alcohol withdrawal 11/04/2016   • Alcoholic ketoacidosis 01/12/2020  "  • Allergic 1970   • Anxiety    • Arthritis    • Atopic rhinitis 2016   • Depression    • Disease of thyroid gland    • Elevated cholesterol    • Encounter for removal of sutures    • Genital herpes simplex 2016   • GERD (gastroesophageal reflux disease)    • Headache, tension-type    • Hyperlipidemia    • Hypertension    • Hypothyroidism    • Kidney stone    • Low back pain    • Migraine    • Motion sickness    • Nephrolithiasis 2020   • Olecranon bursitis, right elbow    • Panic disorder without agoraphobia 2016   • Peripheral neuropathy    • Sleep apnea    • Syncope and collapse 2019   • Vitamin D deficiency 2016   • Withdrawal symptoms, alcohol      Past Surgical History:   Procedure Laterality Date   • COLONOSCOPY     • CYST REMOVAL     • CYSTOSCOPY BLADDER STONE LITHOTRIPSY  2022   • EXTRACORPOREAL SHOCK WAVE LITHOTRIPSY (ESWL) Right    • SHOULDER ARTHROSCOPY Right 2019    Procedure: SHOULDER ARTHROSCOPY, decompression, distal clavicle excision;  Surgeon: Aj Mancilla MD;  Location: [facility] [place];  Service: Orthopedics   • TONSILLECTOMY       Family History   Problem Relation Age of Onset   • Alzheimer's disease Mother    • Mental illness Mother    • Pancreatic cancer Father    • Hearing loss Father    • Arthritis Maternal Grandmother    • Malig Hyperthermia Neg Hx      Social History     Tobacco Use   • Smoking status: Former     Packs/day: 0.50     Years: 15.00     Pack years: 7.50     Types: Cigarettes     Start date: 1990     Quit date: 2005     Years since quittin.3   • Smokeless tobacco: Never   • Tobacco comments:     caffeine - 3 cans of coke daily    Vaping Use   • Vaping Use: Never used   Substance Use Topics   • Alcohol use: Yes     Alcohol/week: 7.0 standard drinks     Types: 7 Shots of liquor per week     Comment: .5 liter vodka   • Drug use: Yes     Types: Methamphetamines     Comment: \"once in a rare while\"     No current " facility-administered medications on file prior to encounter.     Current Outpatient Medications on File Prior to Encounter   Medication Sig Dispense Refill   • acetaminophen (TYLENOL) 325 MG tablet Take 2 tablets by mouth Every 4 (Four) Hours As Needed for Mild Pain .     • aluminum-magnesium hydroxide-simethicone (MAALOX MAX) 400-400-40 MG/5ML suspension Take 15 mL by mouth Every 6 (Six) Hours As Needed for Heartburn. 355 mL 0   • amLODIPine (NORVASC) 5 MG tablet Take 1 tablet by mouth Every Morning. 90 tablet 1   • atorvastatin (LIPITOR) 20 MG tablet Take 1 tablet by mouth Daily. 90 tablet 1   • baclofen (LIORESAL) 10 MG tablet Take 1 tablet by mouth 3 (Three) Times a Day As Needed for Muscle Spasms. 30 tablet 1   • folic acid (FOLVITE) 1 MG tablet Take 1 tablet by mouth Daily. 30 tablet 0   • HYDROcodone-acetaminophen (NORCO) 7.5-325 MG per tablet Take 1 tablet by mouth 2 (Two) Times a Day. 15 tablet 0   • levothyroxine (SYNTHROID, LEVOTHROID) 100 MCG tablet Take 1 tablet by mouth Daily. 90 tablet 1   • lidocaine (LIDODERM) 5 % Place 1 patch on the skin as directed by provider Daily. Remove & Discard patch within 12 hours or as directed by MD 6 each 0   • lisinopril (PRINIVIL,ZESTRIL) 10 MG tablet Take 1 tablet by mouth Daily. 90 tablet 1   • meloxicam (MOBIC) 15 MG tablet Take 1 tablet by mouth Daily. 90 tablet 1   • Multiple Vitamin (multivitamin) capsule Take 1 capsule by mouth Daily. 30 capsule 0   • sennosides-docusate (senna-docusate sodium) 8.6-50 MG per tablet Take 1 tablet by mouth 2 (Two) Times a Day As Needed for Constipation. 40 tablet 0   • thiamine (thiamine) 100 MG tablet tablet Take 1 tablet by mouth Daily. 30 tablet 0     Allergies   Allergen Reactions   • Penicillins Anaphylaxis and Seizure     Seizure. childhood       Objective    Objective     Vital Signs  Temp:  [98.4 °F (36.9 °C)] 98.4 °F (36.9 °C)  Heart Rate:  [90-99] 90  Resp:  [22] 22  BP: (168-169)/(87-91) 168/87  SpO2:  [96 %-100 %]  100 %  on   ;   Device (Oxygen Therapy): room air  Body mass index is 26.03 kg/m².    Physical Exam  Vitals and nursing note reviewed.   Constitutional:       General: He is not in acute distress.     Appearance: He is diaphoretic. He is not toxic-appearing.   HENT:      Head: Normocephalic and atraumatic.      Mouth/Throat:      Mouth: Mucous membranes are moist.      Pharynx: Oropharynx is clear. No oropharyngeal exudate.   Eyes:      Extraocular Movements: Extraocular movements intact.      Conjunctiva/sclera: Conjunctivae normal.      Pupils: Pupils are equal, round, and reactive to light.   Cardiovascular:      Rate and Rhythm: Normal rate and regular rhythm.      Heart sounds: Normal heart sounds.   Pulmonary:      Effort: Pulmonary effort is normal. No respiratory distress.      Breath sounds: Normal breath sounds. No wheezing, rhonchi or rales.   Abdominal:      General: Bowel sounds are normal. There is no distension.      Palpations: Abdomen is soft.      Tenderness: There is no abdominal tenderness. There is no guarding or rebound.   Musculoskeletal:         General: No tenderness.      Cervical back: Normal range of motion and neck supple.      Right lower leg: No edema.      Left lower leg: No edema.   Skin:     General: Skin is warm.      Findings: No erythema or rash.   Neurological:      General: No focal deficit present.      Mental Status: He is alert and oriented to person, place, and time.   Psychiatric:         Mood and Affect: Mood normal.         Behavior: Behavior normal.         Results Review:  I reviewed the patient's new clinical results.  I reviewed the patient's new imaging results and agree with the interpretation.  I reviewed the patient's other test results and agree with the interpretation  I personally viewed and interpreted the patient's EKG/Telemetry data  Discussed with ED provider.    Lab Results (last 24 hours)     Procedure Component Value Units Date/Time    CBC &  Differential [[id number]]  (Abnormal) Collected: 05/13/23 1848    Specimen: Blood Updated: 05/13/23 1901    Narrative:      The following orders were created for panel order CBC & Differential.  Procedure                               Abnormality         Status                     ---------                               -----------         ------                     CBC Auto Differential[[id number]]        Abnormal            Final result                 Please view results for these tests on the individual orders.    Comprehensive Metabolic Panel [[id number]]  (Abnormal) Collected: 05/13/23 1848    Specimen: Blood Updated: 05/13/23 1938     Glucose 63 mg/dL      BUN 8 mg/dL      Creatinine 0.71 mg/dL      Sodium 137 mmol/L      Potassium 4.5 mmol/L      Comment: Slight hemolysis detected by analyzer. Results may be affected.        Chloride 93 mmol/L      CO2 5.2 mmol/L      Calcium 8.9 mg/dL      Total Protein 7.5 g/dL      Albumin 4.6 g/dL      ALT (SGPT) 43 U/L      AST (SGOT) 157 U/L      Alkaline Phosphatase 114 U/L      Total Bilirubin 0.7 mg/dL      Globulin 2.9 gm/dL      A/G Ratio 1.6 g/dL      BUN/Creatinine Ratio 11.3     Anion Gap 38.8 mmol/L      eGFR 101.2 mL/min/1.73     Narrative:      GFR Normal >60  Chronic Kidney Disease <60  Kidney Failure <15      Lipase [[id number]]  (Normal) Collected: 05/13/23 1848    Specimen: Blood Updated: 05/13/23 1921     Lipase 33 U/L     CBC Auto Differential [[id number]]  (Abnormal) Collected: 05/13/23 1848    Specimen: Blood Updated: 05/13/23 1901     WBC 7.77 10*3/mm3      RBC 4.52 10*6/mm3      Hemoglobin 14.7 g/dL      Hematocrit 46.1 %      .0 fL      MCH 32.5 pg      MCHC 31.9 g/dL      RDW 13.1 %      RDW-SD 49.3 fl      MPV 8.7 fL      Platelets 271 10*3/mm3      Neutrophil % 85.9 %      Lymphocyte % 7.2 %      Monocyte % 5.9 %      Eosinophil % 0.0 %      Basophil % 0.6 %      Immature Grans % 0.4 %      Neutrophils, Absolute 6.67 10*3/mm3       Lymphocytes, Absolute 0.56 10*3/mm3      Monocytes, Absolute 0.46 10*3/mm3      Eosinophils, Absolute 0.00 10*3/mm3      Basophils, Absolute 0.05 10*3/mm3      Immature Grans, Absolute 0.03 10*3/mm3      nRBC 0.0 /100 WBC     Ethanol [[id number]]  (Abnormal) Collected: 05/13/23 1848    Specimen: Blood Updated: 05/13/23 1921     Ethanol 45 mg/dL      Ethanol % 0.045 %     Magnesium [[id number]]  (Normal) Collected: 05/13/23 1848    Specimen: Blood Updated: 05/13/23 1938     Magnesium 1.7 mg/dL     Urinalysis With Microscopic If Indicated (No Culture) - Urine, Clean Catch [[id number]]  (Abnormal) Collected: 05/13/23 2026    Specimen: Urine, Clean Catch Updated: 05/13/23 2040     Color, UA Yellow     Appearance, UA Clear     pH, UA 5.5     Specific Gravity, UA 1.020     Glucose, UA Negative     Ketones, UA 80 mg/dL (3+)     Bilirubin, UA Negative     Blood, UA Negative     Protein, UA 30 mg/dL (1+)     Leuk Esterase, UA Negative     Nitrite, UA Negative     Urobilinogen, UA 0.2 E.U./dL    Urinalysis, Microscopic Only - Urine, Clean Catch [[id number]] Collected: 05/13/23 2026    Specimen: Urine, Clean Catch Updated: 05/13/23 2040     RBC, UA 0-2 /HPF      WBC, UA 0-2 /HPF      Bacteria, UA None Seen /HPF      Squamous Epithelial Cells, UA 0-2 /HPF      Hyaline Casts, UA 0-2 /LPF      Methodology Automated Microscopy    Urine Drug Screen - Urine, Clean Catch [[id number]] Collected: 05/13/23 2026    Specimen: Urine, Clean Catch Updated: 05/13/23 2118    Protime-INR [[id number]]  (Abnormal) Collected: 05/13/23 2101    Specimen: Blood Updated: 05/13/23 2126     Protime 14.4 Seconds      INR 1.11    Lactic Acid, Plasma [[id number]] Collected: 05/13/23 2101    Specimen: Blood Updated: 05/13/23 2108          Imaging Results (Last 24 Hours)     Procedure Component Value Units Date/Time    CT Abdomen Pelvis With Contrast [[id number]] Collected: 05/13/23 2055     Updated: 05/13/23 2106    Narrative:      CT OF THE ABDOMEN AND PELVIS WITH  CONTRAST     HISTORY: Nausea and vomiting     COMPARISON: 04/14/2023     TECHNIQUE: Axial CT imaging was obtained through the abdomen and pelvis.  IV contrast was yesterday.     FINDINGS:  Images through the lung bases demonstrate some mild scarring. The liver  appears to be steatotic. There is cholelithiasis. The spleen, right  adrenal gland, pancreas, and duodenum appear unremarkable. Tiny left  adrenal nodule is favored to be an adenoma. The cardia of the stomach  appears thick-walled, and the stomach overall appear somewhat patulous.  Similar findings were present on the prior study. Correlation with any  evidence of gastritis is recommended. Given the persistence of wall  thickening involving the proximal stomach, follow up endoscopy is  recommended. The kidneys enhance symmetrically. There is no  hydronephrosis. There is an indeterminate low-attenuation lesion within  the left kidney, measuring 1 cm. This was present on prior exam from  February 2022, and is unchanged, but would be better characterized with  renal protocol CT or MRI on a nonemergent basis. Patient also has simple  appearing bilateral renal cysts. There is no hydronephrosis. Prostate  gland is enlarged and protrudes into the base of the bladder. Prostate  gland contains dystrophic calcifications. Urinary bladder is again noted  to be thick-walled. There is colonic diverticulosis. There is no bowel  obstruction. The appendix is normal. There are some aortoiliac  calcifications. No acute osseous abnormalities are seen.       Impression:         1. There is thick-walled appearance to the cardia of the stomach, as  well as an overall patulous appearance to the stomach, which may reflect  gastritis. Patient had similar findings on prior study from April 2023,  given persistence, correlation with nonemergent endoscopy is  recommended.  2. Cholelithiasis.  3. Left renal lesion measures higher in density than a simple cyst. It  does appear similar in  size to prior imaging, which would suggest a  benign etiology, but outpatient evaluation with renal protocol CT or MRI  is suggested.     Radiation dose reduction techniques were utilized, including automated  exposure control and exposure modulation based on body size.     This report was finalized on 5/13/2023 9:03 PM by Dr. Hilda Hampton M.D.             Results for orders placed during the hospital encounter of 01/07/22    Adult Transthoracic Echo Complete W/ Cont if Necessary Per Protocol    Interpretation Summary  · Estimated left ventricular EF = 67% Left ventricular systolic function is normal.  · Left ventricular diastolic function was normal.  · Mild dilation of the aortic root is present.      No orders to display        Assessment/Plan     Active Hospital Problems    Diagnosis  POA   • **Alcoholic ketoacidosis [E87.29]  Yes   • Elevated LFTs [R79.89]  Yes   • Alcohol dependence with withdrawal [F10.239]  Yes   • Hyperlipidemia [E78.5]  Yes   • Hypertension [I10]  Yes   • Hypothyroidism [E03.9]  Yes      Resolved Hospital Problems   No resolved problems to display.       Mr. Mueller is a 68 y.o. alcohol dependence, hypertension, hyperlipidemia, hypothyroidism, hepatic steatosis who presents with nausea, vomiting, alcohol intoxication and withdrawal and severe metabolic acidosis which is likely alcoholic ketoacidosis    · Alcohol intoxication with withdrawal-ciwa protocol, thiamine, folate, mvi, access consult  · Nausea and vomiting-antiemetics and PPI.  CT abdomen pelvis shows evidence of gastritis, which is likely due to alcohol.  He will need an EGD nonurgently.  · Severe anion gap metabolic acidosis-the patient denies any other ingestions other than alcohol, and it does look like he had a similarly severe acidosis during his last admission due to alcohol. Start some IVF and check a lactate and repeat labs in the morning. If gap isn't closing then would proceed to checking osmolar gap  · Elevated  liver enzymes-had an ultrasound his prior admission which showed cholelithiasis and hepatic steatosis. No evidence of acute cholecystitis on CT abdomen/pelvis. Will update acute hepatitis panel. monitor  · Left renal lesion-similar in size to prior imaging, but will need eventual outpatient evaluation with renal protocol CT or MRI.  · Restart home medications once reconciled  · I discussed the patient's findings and my recommendations with patient, nursing staff and ED provider.    VTE Prophylaxis - Pharmacy to dose Lovenox.  Code Status - Full code.    Addendum: lactate came back at 11. He is hemodynamically stable on exam, so I will check an ABG and check serum osmolar gap/repeat bmp. Continue IVF. Consult nephrology.     Freddie Rutherford MD  [facility]  05/13/23  21:32 EDT

## 2023-05-14 NOTE — PROGRESS NOTES
"[facility] Clinical Pharmacy Services: Enoxaparin Consult    Pro Mueller has a pharmacy consult to dose prophylactic enoxaparin per Dr. Rutherford's request.     Indication: VTE Prophylaxis  Home Anticoagulation: N/A     Relevant clinical data and objective history reviewed:  68 y.o. male 182.9 cm (72.01\") 87.1 kg (192 lb)   Body mass index is 26.03 kg/m².   Results from last 7 days   Lab Units 05/13/23  1848   PLATELETS 10*3/mm3 271     Estimated Creatinine Clearance: 126.1 mL/min (A) (by C-G formula based on SCr of 0.71 mg/dL (L)).    Assessment/Plan    Will start patient on 40mg subcutaneous every 24 hours, adjusted for renal function. Consult order will be discontinued but pharmacy will continue to follow.     Linwood Lopes III, [facility]  Clinical Pharmacist    "
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
## 2023-05-14 NOTE — ED NOTES
"Nursing report ED to floor  Pro Mueller  68 y.o.  male    HPI :   Chief Complaint   Patient presents with    Nausea    Vomiting       Admitting doctor:   Freddie Rutherford MD    Admitting diagnosis:   The encounter diagnosis was Alcoholic ketoacidosis.    Code status:   Current Code Status       Date Active Code Status Order ID Comments User Context       Prior            Allergies:   Penicillins    Isolation:   No active isolations    Intake and Output  No intake or output data in the 24 hours ending 05/13/23 2056    Weight:       05/13/23 1816   Weight: 87.1 kg (192 lb)       Most recent vitals:   Vitals:    05/13/23 1816 05/13/23 2026   BP: 169/91 168/87   Pulse: 99 90   Resp: 22    Temp: 98.4 °F (36.9 °C)    TempSrc: Oral    SpO2: 96% 100%   Weight: 87.1 kg (192 lb)    Height: 182.9 cm (72.01\")        Active LDAs/IV Access:   Lines, Drains & Airways       Active LDAs       Name Placement date Placement time Site Days    Peripheral IV 05/13/23 1848 Right Antecubital 05/13/23 1848  Antecubital  less than 1                    Labs (abnormal labs have a star):   Labs Reviewed   COMPREHENSIVE METABOLIC PANEL - Abnormal; Notable for the following components:       Result Value    Glucose 63 (*)     Creatinine 0.71 (*)     Chloride 93 (*)     CO2 5.2 (*)     ALT (SGPT) 43 (*)     AST (SGOT) 157 (*)     Anion Gap 38.8 (*)     All other components within normal limits    Narrative:     GFR Normal >60  Chronic Kidney Disease <60  Kidney Failure <15     URINALYSIS W/ MICROSCOPIC IF INDICATED (NO CULTURE) - Abnormal; Notable for the following components:    Ketones, UA 80 mg/dL (3+) (*)     Protein, UA 30 mg/dL (1+) (*)     All other components within normal limits   CBC WITH AUTO DIFFERENTIAL - Abnormal; Notable for the following components:    .0 (*)     Neutrophil % 85.9 (*)     Lymphocyte % 7.2 (*)     Eosinophil % 0.0 (*)     Lymphocytes, Absolute 0.56 (*)     All other components within normal limits   ETHANOL " - Abnormal; Notable for the following components:    Ethanol 45 (*)     All other components within normal limits   LIPASE - Normal   MAGNESIUM - Normal   URINALYSIS, MICROSCOPIC ONLY   PROTIME-INR   LACTIC ACID, PLASMA   CBC AND DIFFERENTIAL    Narrative:     The following orders were created for panel order CBC & Differential.  Procedure                               Abnormality         Status                     ---------                               -----------         ------                     CBC Auto Differential[[id number]]        Abnormal            Final result                 Please view results for these tests on the individual orders.       EKG:   No orders to display       Meds given in ED:   Medications   sodium chloride 0.9 % flush 10 mL (has no administration in time range)   LORazepam (ATIVAN) tablet 0.5 mg (has no administration in time range)     Or   LORazepam (ATIVAN) injection 0.5 mg (has no administration in time range)     Or   LORazepam (ATIVAN) tablet 1 mg (has no administration in time range)     Or   LORazepam (ATIVAN) injection 1 mg (has no administration in time range)     Or   LORazepam (ATIVAN) injection 1 mg (has no administration in time range)     Or   LORazepam (ATIVAN) injection 1 mg (has no administration in time range)   thiamine (B-1) injection 200 mg (has no administration in time range)     Followed by   thiamine (VITAMIN B-1) tablet 100 mg (has no administration in time range)   folic acid (FOLVITE) tablet 1 mg (has no administration in time range)   multivitamin with minerals 1 tablet (has no administration in time range)   ondansetron (ZOFRAN) injection 4 mg (has no administration in time range)   acetaminophen (TYLENOL) tablet 1,000 mg (has no administration in time range)   sodium chloride 0.9 % bolus 1,000 mL (1,000 mL Intravenous New Bag 5/13/23 1854)   ondansetron (ZOFRAN) injection 4 mg (4 mg Intravenous Given 5/13/23 1856)   LORazepam (ATIVAN) injection 1 mg (1  "mg Intravenous Given 23)   iopamidol (ISOVUE-300) 61 % injection 85 mL (85 mL Intravenous Given 23)       Imaging results:  No radiology results for the last day    Ambulatory status:   - adlib    Social issues:   Social History     Socioeconomic History    Marital status: Single   Tobacco Use    Smoking status: Former     Packs/day: 0.50     Years: 15.00     Pack years: 7.50     Types: Cigarettes     Start date: 1990     Quit date: 2005     Years since quittin.3    Smokeless tobacco: Never    Tobacco comments:     caffeine - 3 cans of coke daily    Vaping Use    Vaping Use: Never used   Substance and Sexual Activity    Alcohol use: Yes     Alcohol/week: 7.0 standard drinks     Types: 7 Shots of liquor per week     Comment: .5 liter vodka    Drug use: Yes     Types: Methamphetamines     Comment: \"once in a rare while\"    Sexual activity: Yes     Partners: Female     Birth control/protection: Condom       NIH Stroke Scale:         Nataly Thomas RN  23 20:56 EDT       "

## 2023-05-14 NOTE — ED PROVIDER NOTES
MD ATTESTATION NOTE    The LUBA and I have discussed this patient's history, physical exam, and treatment plan.  I have reviewed the documentation and personally had a face to face interaction with the patient. I affirm the documentation and agree with the treatment and plan.  The attached note describes my personal findings.    I provided a substantive portion of the care of this patient. I personally performed the physical exam, in its entirety.    History  68-year-old male with history of alcohol abuse presents with ongoing nausea and vomiting.  Reports mild diffuse abdominal pain.    Physical Exam  Vital Signs reviewed  GENERAL: Alert male who is somewhat tremulous.  Triage vitals reviewed and notable for initial blood pressure 168/87.  Afebrile.  Initial pulse 90.  HENT: nares patent  EYES: no scleral icterus  CV: regular rhythm, regular rate  RESPIRATORY: normal effort  ABDOMEN: soft, mild diffuse tenderness without rebound or guarding  MUSCULOSKELETAL: no deformity  NEURO: Strength sensation and coordination are grossly intact.  Speech and mentation are unremarkable  SKIN: warm, dry      Disposition  I discussed treatment evaluation this patient with ONEIDA Barrientos.  Patient in apparent alcoholic ketoacidosis with bicarb of 5.  We will give IV fluids, antiemetics and benzodiazepines to help with oncoming alcohol withdrawal.    I did speak with Dr. Freddie Rutherford who will admit on behalf of of [facility].    Final diagnoses:   Alcoholic ketoacidosis            Shilo Hauser MD  05/13/23 2052

## 2023-05-15 ENCOUNTER — READMISSION MANAGEMENT (OUTPATIENT)
Dept: CALL CENTER | Facility: HOSPITAL | Age: 68
End: 2023-05-15
Payer: MEDICARE

## 2023-05-15 VITALS
SYSTOLIC BLOOD PRESSURE: 134 MMHG | HEIGHT: 72 IN | HEART RATE: 72 BPM | RESPIRATION RATE: 18 BRPM | WEIGHT: 187.9 LBS | DIASTOLIC BLOOD PRESSURE: 80 MMHG | BODY MASS INDEX: 25.45 KG/M2 | TEMPERATURE: 98.2 F | OXYGEN SATURATION: 95 %

## 2023-05-15 LAB
ALBUMIN SERPL-MCNC: 4.2 G/DL (ref 3.5–5.2)
ALBUMIN/GLOB SERPL: 1.8 G/DL
ALP SERPL-CCNC: 84 U/L (ref 39–117)
ALT SERPL W P-5'-P-CCNC: 24 U/L (ref 1–41)
ANION GAP SERPL CALCULATED.3IONS-SCNC: 11 MMOL/L (ref 5–15)
AST SERPL-CCNC: 91 U/L (ref 1–40)
BASOPHILS # BLD AUTO: 0.02 10*3/MM3 (ref 0–0.2)
BASOPHILS NFR BLD AUTO: 0.3 % (ref 0–1.5)
BILIRUB SERPL-MCNC: 0.8 MG/DL (ref 0–1.2)
BUN SERPL-MCNC: 7 MG/DL (ref 8–23)
BUN/CREAT SERPL: 10.9 (ref 7–25)
CALCIUM SPEC-SCNC: 8.6 MG/DL (ref 8.6–10.5)
CHLORIDE SERPL-SCNC: 105 MMOL/L (ref 98–107)
CO2 SERPL-SCNC: 24 MMOL/L (ref 22–29)
CREAT SERPL-MCNC: 0.64 MG/DL (ref 0.76–1.27)
DEPRECATED RDW RBC AUTO: 43.2 FL (ref 37–54)
EGFRCR SERPLBLD CKD-EPI 2021: 104.4 ML/MIN/1.73
EOSINOPHIL # BLD AUTO: 0.08 10*3/MM3 (ref 0–0.4)
EOSINOPHIL NFR BLD AUTO: 1.4 % (ref 0.3–6.2)
ERYTHROCYTE [DISTWIDTH] IN BLOOD BY AUTOMATED COUNT: 12.3 % (ref 12.3–15.4)
GLOBULIN UR ELPH-MCNC: 2.3 GM/DL
GLUCOSE SERPL-MCNC: 94 MG/DL (ref 65–99)
HCT VFR BLD AUTO: 36.9 % (ref 37.5–51)
HGB BLD-MCNC: 12.9 G/DL (ref 13–17.7)
IMM GRANULOCYTES # BLD AUTO: 0.03 10*3/MM3 (ref 0–0.05)
IMM GRANULOCYTES NFR BLD AUTO: 0.5 % (ref 0–0.5)
LYMPHOCYTES # BLD AUTO: 1.31 10*3/MM3 (ref 0.7–3.1)
LYMPHOCYTES NFR BLD AUTO: 22.1 % (ref 19.6–45.3)
MAGNESIUM SERPL-MCNC: 1.5 MG/DL (ref 1.6–2.4)
MCH RBC QN AUTO: 33.9 PG (ref 26.6–33)
MCHC RBC AUTO-ENTMCNC: 35 G/DL (ref 31.5–35.7)
MCV RBC AUTO: 96.9 FL (ref 79–97)
MONOCYTES # BLD AUTO: 0.51 10*3/MM3 (ref 0.1–0.9)
MONOCYTES NFR BLD AUTO: 8.6 % (ref 5–12)
NEUTROPHILS NFR BLD AUTO: 3.97 10*3/MM3 (ref 1.7–7)
NEUTROPHILS NFR BLD AUTO: 67.1 % (ref 42.7–76)
NRBC BLD AUTO-RTO: 0 /100 WBC (ref 0–0.2)
PHOSPHATE SERPL-MCNC: 1 MG/DL (ref 2.5–4.5)
PLATELET # BLD AUTO: 202 10*3/MM3 (ref 140–450)
PMV BLD AUTO: 9.3 FL (ref 6–12)
POTASSIUM SERPL-SCNC: 3.9 MMOL/L (ref 3.5–5.2)
PROT SERPL-MCNC: 6.5 G/DL (ref 6–8.5)
RBC # BLD AUTO: 3.81 10*6/MM3 (ref 4.14–5.8)
SODIUM SERPL-SCNC: 140 MMOL/L (ref 136–145)
URATE SERPL-MCNC: 4.2 MG/DL (ref 3.4–7)
WBC NRBC COR # BLD: 5.92 10*3/MM3 (ref 3.4–10.8)

## 2023-05-15 PROCEDURE — 80053 COMPREHEN METABOLIC PANEL: CPT | Performed by: INTERNAL MEDICINE

## 2023-05-15 PROCEDURE — 25010000002 THIAMINE PER 100 MG: Performed by: STUDENT IN AN ORGANIZED HEALTH CARE EDUCATION/TRAINING PROGRAM

## 2023-05-15 PROCEDURE — 84100 ASSAY OF PHOSPHORUS: CPT | Performed by: INTERNAL MEDICINE

## 2023-05-15 PROCEDURE — 84550 ASSAY OF BLOOD/URIC ACID: CPT | Performed by: INTERNAL MEDICINE

## 2023-05-15 PROCEDURE — 83735 ASSAY OF MAGNESIUM: CPT | Performed by: INTERNAL MEDICINE

## 2023-05-15 PROCEDURE — 97161 PT EVAL LOW COMPLEX 20 MIN: CPT

## 2023-05-15 PROCEDURE — 85025 COMPLETE CBC W/AUTO DIFF WBC: CPT | Performed by: INTERNAL MEDICINE

## 2023-05-15 RX ORDER — SODIUM BICARBONATE 650 MG/1
1300 TABLET ORAL 3 TIMES DAILY
Status: DISCONTINUED | OUTPATIENT
Start: 2023-05-15 | End: 2023-05-15 | Stop reason: HOSPADM

## 2023-05-15 RX ADMIN — SODIUM BICARBONATE 1300 MG: 650 TABLET ORAL at 09:25

## 2023-05-15 RX ADMIN — LEVOTHYROXINE SODIUM 100 MCG: 0.1 TABLET ORAL at 05:29

## 2023-05-15 RX ADMIN — Medication 10 ML: at 09:25

## 2023-05-15 RX ADMIN — FOLIC ACID 1 MG: 1 TABLET ORAL at 09:25

## 2023-05-15 RX ADMIN — Medication 1 PACKET: at 12:54

## 2023-05-15 RX ADMIN — THIAMINE HYDROCHLORIDE 200 MG: 100 INJECTION, SOLUTION INTRAMUSCULAR; INTRAVENOUS at 05:29

## 2023-05-15 RX ADMIN — LIDOCAINE 1 PATCH: 50 PATCH CUTANEOUS at 03:22

## 2023-05-15 RX ADMIN — PANTOPRAZOLE SODIUM 40 MG: 40 TABLET, DELAYED RELEASE ORAL at 05:29

## 2023-05-15 RX ADMIN — MULTIPLE VITAMINS W/ MINERALS TAB 1 TABLET: TAB at 09:25

## 2023-05-15 RX ADMIN — MAGNESIUM OXIDE 400 MG (241.3 MG MAGNESIUM) TABLET 200 MG: TABLET at 12:54

## 2023-05-15 RX ADMIN — POTASSIUM PHOSPHATE, MONOBASIC POTASSIUM PHOSPHATE, DIBASIC 30 MMOL: 224; 236 INJECTION, SOLUTION, CONCENTRATE INTRAVENOUS at 12:59

## 2023-05-15 NOTE — NURSING NOTE
Access follow up regarding ETOH: chart reviewed. Last CIWA score of 11. PRN Ativan early in the night. Order in place for psychiatrist to see today. Will follow.

## 2023-05-15 NOTE — DISCHARGE SUMMARY
[facility]    [phone]   LOS: 2 days     Name: Pro Mueller  Age/Sex: 68 y.o. male  :  1955        PCP: Lupe Roberts APRN  Chief Complaint   Patient presents with   • Nausea   • Vomiting      Subjective   He feels like his symptoms are improved today.  He is demanding to be discharged home.  He had some diarrhea yesterday only 2 episodes of loose stools and demanded Imodium.  This was not provided and he became frustrated about this as well.  He is agreeable to remaining for electrolyte replacement and then plans to sign himself out AGAINST MEDICAL ADVICE.  Denies any perceptual disturbances or confusion.  General: No Fever or Chills, Cardiac: No Chest Pain or Palpitations, Resp: No Cough or SOA and Other: No bleeding    enoxaparin, 40 mg, Subcutaneous, Nightly  folic acid, 1 mg, Oral, Daily  levothyroxine, 100 mcg, Oral, Q AM  lidocaine, 1 patch, Transdermal, Q24H  magnesium oxide, 200 mg, Oral, Daily  multivitamin with minerals, 1 tablet, Oral, Daily  pantoprazole, 40 mg, Oral, Q AM  potassium & sodium phosphates, 1 packet, Oral, 4x Daily With Meals & Nightly  potassium phosphate, 30 mmol, Intravenous, Once  sodium bicarbonate, 1,300 mg, Oral, TID  thiamine (B-1) IV, 200 mg, Intravenous, Q8H   Followed by  [START ON 2023] thiamine, 100 mg, Oral, Daily      dextrose 5 % and sodium chloride 0.9 %, 150 mL/hr, Last Rate: Stopped (05/15/23 0322)        Objective   Vital Signs  Temp:  [97.6 °F (36.4 °C)-98.7 °F (37.1 °C)] 98.2 °F (36.8 °C)  Heart Rate:  [61-74] 74  Resp:  [16-18] 18  BP: (124-142)/(70-93) 135/78  Body mass index is 25.48 kg/m².    Intake/Output Summary (Last 24 hours) at 5/15/2023 1208  Last data filed at 5/15/2023 0529  Gross per 24 hour   Intake --   Output 1100 ml   Net -1100 ml       Physical Exam  Vitals reviewed.   Constitutional:       General: He is not in acute distress.     Appearance: He is ill-appearing.   Cardiovascular:      Rate and Rhythm:  Regular rhythm. Tachycardia present.   Pulmonary:      Effort: No respiratory distress.      Breath sounds: Normal breath sounds.   Abdominal:      General: Bowel sounds are normal. There is no distension.      Palpations: Abdomen is soft.   Neurological:      General: No focal deficit present.      Mental Status: He is alert. Mental status is at baseline.           Results Review:       I reviewed the patient's new clinical results.  Results from last 7 days   Lab Units 05/15/23  0530 05/14/23 0052 05/13/23  1848   WBC 10*3/mm3 5.92 9.92 7.77   HEMOGLOBIN g/dL 12.9* 13.7 14.7   PLATELETS 10*3/mm3 202 245 271     Results from last 7 days   Lab Units 05/15/23  0530 05/14/23 0052 05/13/23  1848   SODIUM mmol/L 140 137 137   POTASSIUM mmol/L 3.9 4.9 4.5   CHLORIDE mmol/L 105 98 93*   CO2 mmol/L 24.0 11.7* 5.2*   BUN mg/dL 7* 7* 8   CREATININE mg/dL 0.64* 0.75* 0.71*   CALCIUM mg/dL 8.6 8.1* 8.9   MAGNESIUM mg/dL 1.5*  --  1.7   PHOSPHORUS mg/dL 1.0*  --   --    Estimated Creatinine Clearance: 136.8 mL/min (A) (by C-G formula based on SCr of 0.64 mg/dL (L)).      Assessment & Plan   Active Hospital Problems    Diagnosis  POA   • **Alcoholic ketoacidosis [E87.29]  Yes   • Elevated LFTs [R79.89]  Yes   • Alcohol dependence with withdrawal [F10.239]  Yes   • Hyperlipidemia [E78.5]  Yes   • Hypertension [I10]  Yes   • Hypothyroidism [E03.9]  Yes      Resolved Hospital Problems   No resolved problems to display.       PLAN  Mr. Mueller is a 68 y.o. alcohol dependence, hypertension, hyperlipidemia, hypothyroidism, hepatic steatosis who presents with nausea, vomiting, alcohol intoxication and withdrawal and severe metabolic acidosis which is likely alcoholic ketoacidosis  -Likely his anion gap is closed and his bicarb is within normal limits today.  -Continues to have significant electrolyte abnormalities with low magnesium and phosphorus today  -He is pretty much demanding to be discharged home.  Again he does not feel he has  a problem with alcohol at all.  [facility] evaluated the patient and he declined any further assistance  -CIWA scores have been minimal over the last 24 hours.  He understands the risks and was able to reiterate those at the bedside.  -Agreeable to stay to receive electrolyte replacement but not willing to have these rechecked or followed.  -Nurse plans to print his AMA paperwork and patient plans to sign following electrolyte replacement.        Disposition  Expected Discharge Date: 5/17/2023; Expected Discharge Time:        Esdras Reno MD  [facility]  05/15/23  12:08 EDT

## 2023-05-15 NOTE — THERAPY DISCHARGE NOTE
Patient Name: Pro Mueller  : 1955    MRN: [id number]                              Today's Date: 5/15/2023       Admit Date: 2023    Visit Dx:     ICD-10-CM ICD-9-CM   1. Alcoholic ketoacidosis  E87.29 276.2     Patient Active Problem List   Diagnosis   • Fall   • Alcoholism in recovery   • Atopic rhinitis   • Mixed anxiety depressive disorder   • Genital herpes simplex   • Hyperlipidemia   • Hypertension   • Hypothyroidism   • Insomnia   • Panic disorder without agoraphobia   • Persistent insomnia   • Vitamin D deficiency   • Chronic low back pain   • Neuropathy involving both lower extremities   • QT prolongation   • Left shoulder pain   • Alcoholic ketoacidosis   • Hypokalemia   • Pancytopenia   • Left ventricular diastolic dysfunction   • Tremor   • C5 cervical fracture   • Syncope and collapse   • Neck pain   • Alcoholic intoxication with complication   • Withdrawal symptoms, alcohol   • Transaminitis   • Lumbar facet arthropathy   • Alcohol dependence with withdrawal   • Midline low back pain with right-sided sciatica   • Spondylolisthesis at L4-L5 level   • Lumbar canal stenosis   • Alcohol cessation counseling   • Need for assistance due to reduced mobility   • Impaired mobility and ADLs   • Alcohol withdrawal syndrome without complication   • Facial laceration   • Orthostatic hypotension   • Acute bacterial conjunctivitis of right eye   • Visit for suture removal   • Renal calculi   • Hepatic steatosis   • Alcohol withdrawal syndrome with complication   • Macrocytosis without anemia   • Lumbosacral spondylosis without myelopathy   • Elevated LFTs     Past Medical History:   Diagnosis Date   • Alcohol abuse    • Alcohol withdrawal 2016   • Alcoholic ketoacidosis 2020   • Allergic 1970   • Anxiety    • Arthritis    • Atopic rhinitis 2016   • Depression    • Disease of thyroid gland    • Elevated cholesterol    • Encounter for removal of sutures    • Genital herpes simplex  08/08/2016   • GERD (gastroesophageal reflux disease)    • Headache, tension-type    • Hyperlipidemia    • Hypertension    • Hypothyroidism    • Kidney stone    • Low back pain 2019   • Migraine    • Motion sickness    • Nephrolithiasis 02/12/2020   • Olecranon bursitis, right elbow    • Panic disorder without agoraphobia 08/08/2016   • Peripheral neuropathy    • Sleep apnea    • Syncope and collapse 01/02/2019   • Vitamin D deficiency 08/08/2016   • Withdrawal symptoms, alcohol      Past Surgical History:   Procedure Laterality Date   • COLONOSCOPY     • CYST REMOVAL     • CYSTOSCOPY BLADDER STONE LITHOTRIPSY  02/2022   • EXTRACORPOREAL SHOCK WAVE LITHOTRIPSY (ESWL) Right 2002   • SHOULDER ARTHROSCOPY Right 12/17/2019    Procedure: SHOULDER ARTHROSCOPY, decompression, distal clavicle excision;  Surgeon: Aj Mancilla MD;  [facility]: [facility];  Service: Orthopedics   • TONSILLECTOMY        General Information     Row Name 05/15/23 1139          Physical Therapy Time and Intention    Document Type discharge evaluation/summary  -MS     Mode of Treatment physical therapy;individual therapy  -MS     Row Name 05/15/23 1139          General Information    Patient Profile Reviewed yes  -MS     Prior Level of Function --  Baseline: Use of Rwx for ambulation prior to admission  -MS     Barriers to Rehab none identified  -MS     Row Name 05/15/23 1139          Cognition    Orientation Status (Cognition) oriented x 3  -MS     Row Name 05/15/23 1139          Safety Issues, Functional Mobility    Comment, Safety Issues/Impairments (Mobility) Gait belt used for safety.  -MS           User Key  (r) = Recorded By, (t) = Taken By, (c) = Cosigned By    Initials Name Provider Type    MS Gerber Rutherford, PT Physical Therapist               Mobility     Row Name 05/15/23 1139          Bed Mobility    Bed Mobility supine-sit;sit-supine  -MS     Supine-Sit [place] (Bed Mobility) independent  -MS     Sit-Supine [place]  (Bed Mobility) independent  -MS     Row Name 05/15/23 1139          Sit-Stand Transfer    Sit-Stand [place] (Transfers) independent  -MS     Row Name 05/15/23 1139          Gait/Stairs (Locomotion)    [place] Level (Gait) independent  -MS     Assistive Device (Gait) walker, front-wheeled  -MS     Distance in Feet (Gait) 180 feet  -MS     Comment, (Gait/Stairs) No balance deficits noted.  -MS           User Key  (r) = Recorded By, (t) = Taken By, (c) = Cosigned By    Initials Name Provider Type    Gerber Rahman, PT Physical Therapist               Obj/Interventions     Row Name 05/15/23 1140          Range of Motion Comprehensive    Comment, General Range of Motion BUE/LE (WFL's)  -MS     Row Name 05/15/23 1140          Strength Comprehensive (MMT)    Comment, General Manual Muscle Testing (MMT) Assessment BUE/LE (4-/5)  -MS           User Key  (r) = Recorded By, (t) = Taken By, (c) = Cosigned By    Initials Name Provider Type    Gerber Rahman, PT Physical Therapist               Goals/Plan    No documentation.                Clinical Impression     Row Name 05/15/23 1140          Pain    Pretreatment Pain Rating 2/10  -MS     Posttreatment Pain Rating 2/10  -MS     Pain Location - back  -MS     Pre/Posttreatment Pain Comment Pt. reports this is chronic pain  -MS     Row Name 05/15/23 1140          Plan of Care Review    Plan of Care Reviewed With patient  -MS     Row Name 05/15/23 1140          Therapy Assessment/Plan (PT)    Criteria for Skilled Interventions Met (PT) no problems identified which require skilled intervention  -MS     Row Name 05/15/23 1140          Positioning and Restraints    Pre-Treatment Position in bed  -MS     Post Treatment Position bed  -MS     In Bed notified nsg;supine;call light within reach;encouraged to call for assist;exit alarm on  All lines intact.  -MS           User Key  (r) = Recorded By, (t) = Taken By, (c) = Cosigned By    Initials Name Provider Type     MS Gerber Rutherford, PT Physical Therapist               Outcome Measures     Row Name 05/15/23 1141 05/15/23 0930       How much help from another person do you currently need...    Turning from your back to your side while in flat bed without using bedrails? 4  -MS 4  -CT    Moving from lying on back to sitting on the side of a flat bed without bedrails? 4  -MS 4  -CT    Moving to and from a bed to a chair (including a wheelchair)? 4  -MS 3  -CT    Standing up from a chair using your arms (e.g., wheelchair, bedside chair)? 4  -MS 3  -CT    Climbing 3-5 steps with a railing? 4  -MS 3  -CT    To walk in hospital room? 4  -MS 3  -CT    AM-PAC 6 Clicks Score (PT) 24  -MS 20  -CT    Highest level of mobility 8 --> Walked 250 feet or more  -MS 6 --> Walked 10 steps or more  -CT    Row Name 05/15/23 1141          Functional Assessment    Outcome Measure Options AM-PAC 6 Clicks Basic Mobility (PT)  -MS           User Key  (r) = Recorded By, (t) = Taken By, (c) = Cosigned By    Initials Name Provider Type    MS Gerber Rutherford, PT Physical Therapist    CT Mariana, Tanika Flores, RN Registered Nurse              Physical Therapy Education     Title: PT OT SLP Therapies (Resolved)     Topic: Physical Therapy (Resolved)     Point: Mobility training (Resolved)     Learning Progress Summary           Patient Acceptance, E,D, VU,DU by MS at 5/15/2023 1142                   Point: Body mechanics (Resolved)     Learning Progress Summary           Patient Acceptance, E,D, VU,DU by MS at 5/15/2023 1142                   Point: Precautions (Resolved)     Learning Progress Summary           Patient Acceptance, E,D, VU,DU by MS at 5/15/2023 1142                               User Key     Initials Effective Dates Name Provider Type Discipline    MS 06/16/21 -  Gerber Rutherford, PT Physical Therapist PT              PT Recommendation and Plan     Plan of Care Reviewed With: patient  Outcome Evaluation: Pt. is currently independent with  functional mobility and feels back to baseline functioning.  Pt. with no further questions/concerns regarding functional mobility or home safety.  Encouraged pt. to continue getting up with nursing staff while here in the hospital. Otherwise, P.T. will sign off.     Time Calculation:    PT Charges     Row Name 05/15/23 1143             Time Calculation    Start Time 1050  -MS      Stop Time 1105  -MS      Time Calculation (min) 15 min  -MS      PT Received On 05/15/23  -MS         Time Calculation- PT    Total Timed Code Minutes- PT 14 minute(s)  -MS            User Key  (r) = Recorded By, (t) = Taken By, (c) = Cosigned By    Initials Name Provider Type    Gerber Rahman, PT Physical Therapist              Therapy Charges for Today     Code Description Service Date Service Provider Modifiers Qty    [id number] HC PT EVAL LOW COMPLEXITY 1 5/15/2023 Gerber Rutherford, PT GP 1          PT G-Codes  Outcome Measure Options: AM-PAC 6 Clicks Basic Mobility (PT)  AM-PAC 6 Clicks Score (PT): 24    PT Discharge Summary  Anticipated Discharge Disposition (PT): home  Reason for Discharge: At baseline function, Independent  Discharge Destination: Home    Gerber Rutherford, PT  5/15/2023

## 2023-05-15 NOTE — PLAN OF CARE
Goal Outcome Evaluation:  Plan of Care Reviewed With: patient           Outcome Evaluation: Pt. is currently independent with functional mobility and feels back to baseline functioning.  Pt. with no further questions/concerns regarding functional mobility or home safety.  Encouraged pt. to continue getting up with nursing staff while here in the hospital. Otherwise, P.T. will sign off.    Patient was not wearing a face mask during this therapy encounter. Therapist used appropriate personal protective equipment including eye protection, mask, and gloves.  Mask used was standard procedure mask. Appropriate PPE was worn during the entire therapy session. Hand hygiene was completed before and after therapy session. Patient is not in enhanced droplet precautions.

## 2023-05-15 NOTE — PLAN OF CARE
Goal Outcome Evaluation:      Patient alert and oriented x4, VSS, on room air. Patient admitted for alcoholic ketoacidosis. CIWA protocol continued. Patient ordered D5 with normal saline at 150cc. Patient refusing fluids as he reports he's urinating too much. RN provided patient education regarding elevated lactic days prior. Fluid stopped. Notified provider. No new orders.

## 2023-05-15 NOTE — PROGRESS NOTES
[facility] Progress Note      Patient Name: Pro Mueller  : 1955  MRN: [id number]  Primary Care Physician:  Lupe Roberts APRN  Date of admission: 2023    Subjective     Interval History:     Patient in bed  Wanting to go home    Offers no complaint    Review of Systems:   As noted above    Objective     Vitals:   Temp:  [97.6 °F (36.4 °C)-98.7 °F (37.1 °C)] 98.7 °F (37.1 °C)  Heart Rate:  [61-74] 61  Resp:  [16-18] 16  BP: (124-142)/(70-93) 135/78    Intake/Output Summary (Last 24 hours) at 5/15/2023 0833  Last data filed at 5/15/2023 0529  Gross per 24 hour   Intake --   Output 1100 ml   Net -1100 ml       Physical Exam:    General Appearance: alert, oriented x 3, no acute distress   Skin: warm and dry  HEENT: oral mucosa normal, nonicteric sclera  Neck: supple, no JVD  Lungs: Clear bilateral  Heart: RRR, normal S1 and S2  Abdomen: soft, nontender, nondistended  : no palpable bladder  Extremities: no edema, cyanosis or clubbing  Neuro: normal speech and mental status     Scheduled Meds:     enoxaparin, 40 mg, Subcutaneous, Nightly  folic acid, 1 mg, Oral, Daily  levothyroxine, 100 mcg, Oral, Q AM  lidocaine, 1 patch, Transdermal, Q24H  multivitamin with minerals, 1 tablet, Oral, Daily  pantoprazole, 40 mg, Oral, Q AM  sodium bicarbonate, 1,300 mg, Oral, TID  thiamine (B-1) IV, 200 mg, Intravenous, Q8H   Followed by  [START ON 2023] thiamine, 100 mg, Oral, Daily      IV Meds:   dextrose 5 % and sodium chloride 0.9 %, 150 mL/hr, Last Rate: Stopped (05/15/23 0322)        Results Reviewed:   I have personally reviewed the results from the time of this admission to 5/15/2023 08:33 EDT     Results from last 7 days   Lab Units 05/15/23  0530 23  0052 23  1848   SODIUM mmol/L 140 137 137   POTASSIUM mmol/L 3.9 4.9 4.5   CHLORIDE mmol/L 105 98 93*   CO2 mmol/L 24.0 11.7* 5.2*   BUN mg/dL 7* 7* 8   CREATININE mg/dL 0.64* 0.75* 0.71*   CALCIUM mg/dL  "8.6 8.1* 8.9   BILIRUBIN mg/dL 0.8 1.0 0.7   ALK PHOS U/L 84 97 114   ALT (SGPT) U/L 24 37 43*   AST (SGOT) U/L 91* 123* 157*   GLUCOSE mg/dL 94 142* 63*       Estimated Creatinine Clearance: 136.8 mL/min (A) (by C-G formula based on SCr of 0.64 mg/dL (L)).    Results from last 7 days   Lab Units 05/15/23  0530 05/13/23  1848   MAGNESIUM mg/dL 1.5* 1.7   PHOSPHORUS mg/dL 1.0*  --        Results from last 7 days   Lab Units 05/15/23  0530   URIC ACID mg/dL 4.2       Results from last 7 days   Lab Units 05/15/23  0530 05/14/23 0052 05/13/23  1848   WBC 10*3/mm3 5.92 9.92 7.77   HEMOGLOBIN g/dL 12.9* 13.7 14.7   PLATELETS 10*3/mm3 202 245 271       Results from last 7 days   Lab Units 05/13/23  2101   INR  1.11*     Results from last 7 days   Lab Units 05/15/23  0530 05/14/23 0052 05/13/23  1848   ALK PHOS U/L 84 97 114   BILIRUBIN mg/dL 0.8 1.0 0.7   ALT (SGPT) U/L 24 37 43*   AST (SGOT) U/L 91* 123* 157*       Assessment / Plan     ASSESSMENT:    -  Alcoholic keto lactic acidosis- Patient with normal renal function. Etiology of severe acidosis related to chronic excessive alcohol abuse. Urinalysis showing 3+ ketones. Lactic acid of 11 >  7.6> 4.9> 2.1> 1.7. .  Urine ketones +  .   The patient does not have a significant osmolar gap to suspect other type of alcohol intoxication.  -  Mixed metabolic acidosis and respiratory alkalosis from alcoholic ketoacidosis , and malabsorption   -  Alcohol dependence- withdrawal management per primary team  -  Hypothyroidism- on levothyroxine, management per primary team  -  Elevated LFT's in the setting of chronic alcohol abuse. Negative hepatitis panel  -. Left renal lesion noted on CT abdomen 5/13 \" There is an indeterminate low-attenuation lesion within the left kidney, measuring 1 cm. This was present on prior exam from February 2022, and is unchanged, but would be better characterized with renal protocol CT or MRI on a nonemergent basis. Patient also has simple appearing " "bilateral renal cysts \"  -Severe hypophosphatemia . Order K phosphate replacement IV 30 mmol and PHOS-NAK TID , replacement will follow trend . Phos of 1.0  -Hypomagnesemia order magnesium replacement oral twice a day    PLAN:    -  Continue dextrose infusion  -  Order phosphate replacement IV and oral and magnesium replacement oral   -  Trial of sodium bicarb oral   -  Patient could benefit from urology evaluation , as an outpatient   -  Surveillance labs    Thank you for involving us in the care of Pro Mueller.  Please feel free to call with any questions.    Mike Kaiser MD  05/15/23  08:33 EDT    [facility]  [phone]    Please note that portions of this note were completed with a voice recognition program.    "

## 2023-05-16 NOTE — OUTREACH NOTE
Prep Survey    Flowsheet Row Responses   [facility] facility patient discharged from? [place]   Is LACE score < 7 ? No   Eligibility Not Eligible   What are the reasons patient is not eligible? Other  [AMA]   Does the patient have one of the following disease processes/diagnoses(primary or secondary)? Other   Prep survey completed? Yes          Sammie PAUL - Registered Nurse

## 2023-05-17 NOTE — CASE MANAGEMENT/SOCIAL WORK
Case Management Discharge Note      Final Note: Left DOC Mukherjee rn/ccp         Selected Continued Care - Discharged on 5/15/2023 Admission date: 5/13/2023 - Discharge disposition: Left Against Medical Advice    Destination    No services have been selected for the patient.              Durable Medical Equipment    No services have been selected for the patient.              Dialysis/Infusion    No services have been selected for the patient.              Home Medical Care    No services have been selected for the patient.              Therapy    No services have been selected for the patient.              Community Resources    No services have been selected for the patient.              Community & DME    No services have been selected for the patient.                       Final Discharge Disposition Code: 07 - left [facility]

## 2023-06-06 DIAGNOSIS — M54.50 CHRONIC MIDLINE LOW BACK PAIN WITHOUT SCIATICA: ICD-10-CM

## 2023-06-06 DIAGNOSIS — G89.29 CHRONIC LEFT SHOULDER PAIN: ICD-10-CM

## 2023-06-06 DIAGNOSIS — M25.512 CHRONIC LEFT SHOULDER PAIN: ICD-10-CM

## 2023-06-06 DIAGNOSIS — G89.29 CHRONIC MIDLINE LOW BACK PAIN WITHOUT SCIATICA: ICD-10-CM

## 2023-06-08 RX ORDER — TRAMADOL HYDROCHLORIDE 50 MG/1
TABLET ORAL
Qty: 30 TABLET | OUTPATIENT
Start: 2023-06-08

## 2023-06-08 NOTE — TELEPHONE ENCOUNTER
Rx Refill Note  Requested Prescriptions     Pending Prescriptions Disp Refills    traMADol (ULTRAM) 50 MG tablet [Pharmacy Med Name: TRAMADOL 50MG TABLETS] 30 tablet      Sig: TAKE 1 TABLET BY MOUTH EVERY 6 HOURS AS NEEDED FOR MODERATE PAIN      Last office visit with prescribing clinician: 9/23/2022   Last telemedicine visit with prescribing clinician: 3/14/2023   Next office visit with prescribing clinician: Visit date not found                         Would you like a call back once the refill request has been completed: [] Yes [] No    If the office needs to give you a call back, can they leave a voicemail: [] Yes [] No    Debbie Toledo LPN  06/08/23, 14:30 EDT

## 2023-06-23 PROBLEM — K85.20 ALCOHOL-INDUCED ACUTE PANCREATITIS, UNSPECIFIED COMPLICATION STATUS: Status: ACTIVE | Noted: 2023-06-23

## 2023-06-23 PROBLEM — R10.13 EPIGASTRIC PAIN: Status: ACTIVE | Noted: 2023-06-23

## 2023-06-25 PROBLEM — E87.29 ALCOHOLIC KETOACIDOSIS: Status: RESOLVED | Noted: 2020-01-12 | Resolved: 2023-06-25

## 2023-06-25 PROBLEM — R10.13 EPIGASTRIC PAIN: Status: RESOLVED | Noted: 2023-06-23 | Resolved: 2023-06-25

## 2023-06-25 PROBLEM — E87.6 HYPOKALEMIA: Status: RESOLVED | Noted: 2020-01-13 | Resolved: 2023-06-25

## 2023-07-19 ENCOUNTER — APPOINTMENT (OUTPATIENT)
Dept: CT IMAGING | Facility: HOSPITAL | Age: 68
End: 2023-07-19
Payer: MEDICAID

## 2023-07-19 ENCOUNTER — HOSPITAL ENCOUNTER (EMERGENCY)
Facility: HOSPITAL | Age: 68
Discharge: HOME | End: 2023-07-19
Attending: EMERGENCY MEDICINE | Admitting: EMERGENCY MEDICINE
Payer: MEDICAID

## 2023-07-19 ENCOUNTER — APPOINTMENT (OUTPATIENT)
Dept: GENERAL RADIOLOGY | Facility: HOSPITAL | Age: 68
End: 2023-07-19
Payer: MEDICAID

## 2023-07-19 VITALS
DIASTOLIC BLOOD PRESSURE: 85 MMHG | BODY MASS INDEX: 23.84 KG/M2 | TEMPERATURE: 99.8 F | OXYGEN SATURATION: 97 % | HEIGHT: 72 IN | SYSTOLIC BLOOD PRESSURE: 135 MMHG | RESPIRATION RATE: 16 BRPM | WEIGHT: 176 LBS | HEART RATE: 91 BPM

## 2023-07-19 DIAGNOSIS — S20.212A RIB CONTUSION, LEFT, INITIAL ENCOUNTER: Primary | ICD-10-CM

## 2023-07-19 PROCEDURE — 71101 X-RAY EXAM UNILAT RIBS/CHEST: CPT

## 2023-07-19 PROCEDURE — 71250 CT THORAX DX C-: CPT

## 2023-07-19 PROCEDURE — 63710000001 ONDANSETRON ODT 4 MG TABLET DISPERSIBLE: Performed by: EMERGENCY MEDICINE

## 2023-07-19 PROCEDURE — 99284 EMERGENCY DEPT VISIT MOD MDM: CPT

## 2023-07-19 RX ORDER — HYDROCODONE BITARTRATE AND ACETAMINOPHEN 7.5; 325 MG/1; MG/1
1 TABLET ORAL ONCE
Status: COMPLETED | OUTPATIENT
Start: 2023-07-19 | End: 2023-07-19

## 2023-07-19 RX ORDER — ONDANSETRON 4 MG/1
4 TABLET, ORALLY DISINTEGRATING ORAL ONCE
Status: COMPLETED | OUTPATIENT
Start: 2023-07-19 | End: 2023-07-19

## 2023-07-19 RX ADMIN — HYDROCODONE BITARTRATE AND ACETAMINOPHEN 1 TABLET: 7.5; 325 TABLET ORAL at 14:20

## 2023-07-19 RX ADMIN — ONDANSETRON 4 MG: 4 TABLET, ORALLY DISINTEGRATING ORAL at 14:20

## 2023-07-19 RX ADMIN — HYDROCODONE BITARTRATE AND ACETAMINOPHEN 1 TABLET: 7.5; 325 TABLET ORAL at 16:43

## 2023-07-19 NOTE — ED PROVIDER NOTES
EMERGENCY DEPARTMENT ENCOUNTER    Room Number:  S03/03  Date of encounter:  7/19/2023  PCP: Provider, No Known  Historian: Patient  Chronic or social conditions impacting care (social determinants of health): Nothing       I used full protective equipment while examining this patient.  This includes face mask, gloves and protective eyewear.  I washed my hands before entering the room and immediately upon leaving the room      HPI:  Chief Complaint: Fall  A complete HPI/ROS/PMH/PSH/SH/FH are unobtainable due to: Nothing    Context: Pro Mueller is a 68 y.o. male who presents to the ED c/o left rib pain after mechanical fall last night.  Patient states he was running up his steps when he lost his balance and fell.  Patient landed on his left lateral ribs on a wooden step.  Patient complains of sharp, achy pain to the left lateral ribs.  This pain is worse with movement and deep breaths.  He denies any head, neck, midline back pain.  Denies any abdominal pain, nausea, vomiting.  He is otherwise healthy and takes no blood thinners.    Review of prior external notes (non-ED):   I reviewed admission from 6/22/2023.  Patient admitted for pancreatitis.    Review of prior external test results outside of this encounter:  I reviewed CMP from 6/25/2023.  Creatinine 0.62, potassium 3.6.    PAST MEDICAL HISTORY  Active Ambulatory Problems     Diagnosis Date Noted    Fall 08/08/2016    Alcoholism in recovery 08/08/2016    Atopic rhinitis 08/08/2016    Mixed anxiety depressive disorder 08/08/2016    Genital herpes simplex 08/08/2016    Hyperlipidemia 08/08/2016    Hypertension 08/08/2016    Hypothyroidism 08/08/2016    Insomnia 08/08/2016    Panic disorder without agoraphobia 08/08/2016    Persistent insomnia 08/08/2016    Vitamin D deficiency 08/08/2016    Chronic low back pain 11/11/2016    Neuropathy involving both lower extremities 10/25/2018    QT prolongation 01/03/2019    Left shoulder pain 11/19/2019    Pancytopenia  01/14/2020    Left ventricular diastolic dysfunction 01/16/2021    Tremor 10/06/2021    C5 cervical fracture 11/09/2021    Syncope and collapse 01/07/2022    Neck pain 01/07/2022    Alcoholic intoxication with complication 03/13/2022    Withdrawal symptoms, alcohol 07/01/2022    Transaminitis 07/01/2022    Lumbar facet arthropathy 07/20/2022    Alcohol dependence with withdrawal 09/11/2022    Midline low back pain with right-sided sciatica 09/15/2022    Spondylolisthesis at L4-L5 level 09/15/2022    Lumbar canal stenosis 09/15/2022    Alcohol cessation counseling 09/15/2022    Need for assistance due to reduced mobility 09/15/2022    Impaired mobility and ADLs 09/15/2022    Alcohol withdrawal syndrome without complication 02/18/2023    Facial laceration 02/18/2023    Orthostatic hypotension 02/18/2023    Acute bacterial conjunctivitis of right eye 03/02/2023    Visit for suture removal 03/02/2023    Renal calculi 03/14/2023    Hepatic steatosis 03/15/2023    Alcohol withdrawal syndrome with complication 04/14/2023    Macrocytosis without anemia 04/14/2023    Lumbosacral spondylosis without myelopathy 05/05/2023    Elevated LFTs 05/13/2023    Alcohol-induced acute pancreatitis, unspecified complication status 06/23/2023     Resolved Ambulatory Problems     Diagnosis Date Noted    Impacted cerumen 08/08/2016    Influenza 08/08/2016    Motion sickness 08/08/2016    Seasonal allergic rhinitis 08/08/2016    Upper respiratory tract infection 08/08/2016    Alcohol withdrawal (HCC) 11/04/2016    Headache 11/05/2016    Gastritis 11/05/2016    Olecranon bursitis of right elbow 04/05/2017    Sciatica of left side 06/12/2018    Syncope and collapse 01/02/2019    Nausea and vomiting 01/11/2020    Alcoholic ketoacidosis 01/12/2020    Hyponatremia 01/13/2020    Hypokalemia 01/13/2020    Alcohol dependence with uncomplicated withdrawal 01/14/2020    Nephrolithiasis 02/12/2020    Hypomagnesemia 01/16/2021    Non-traumatic  "rhabdomyolysis 2021    Urine retention 2021    Epigastric pain 2023     Past Medical History:   Diagnosis Date    Alcohol abuse     Allergic 1970    Anxiety     Arthritis     Depression     Disease of thyroid gland     Elevated cholesterol     Encounter for removal of sutures     GERD (gastroesophageal reflux disease)     Headache, tension-type     Kidney stone     Low back pain     Migraine     Olecranon bursitis, right elbow     Peripheral neuropathy     Sleep apnea          PAST SURGICAL HISTORY  Past Surgical History:   Procedure Laterality Date    COLONOSCOPY      CYST REMOVAL      CYSTOSCOPY BLADDER STONE LITHOTRIPSY  2022    EXTRACORPOREAL SHOCK WAVE LITHOTRIPSY (ESWL) Right 2002    SHOULDER ARTHROSCOPY Right 2019    Procedure: SHOULDER ARTHROSCOPY, decompression, distal clavicle excision;  Surgeon: Aj Mancilla MD;  [place] [facility] [place] [facility] [place]  Service: Orthopedics    TONSILLECTOMY           FAMILY HISTORY  Family History   Problem Relation Age of Onset    Alzheimer's disease Mother     Mental illness Mother     Pancreatic cancer Father     Hearing loss Father     Arthritis Maternal Grandmother     Malig Hyperthermia Neg Hx          SOCIAL HISTORY  Social History     Socioeconomic History    Marital status: Single   Tobacco Use    Smoking status: Former     Packs/day: 0.50     Years: 15.00     Pack years: 7.50     Types: Cigarettes     Start date: 1990     Quit date: 2005     Years since quittin.5    Smokeless tobacco: Never    Tobacco comments:     caffeine - 3 cans of coke daily    Vaping Use    Vaping Use: Never used   Substance and Sexual Activity    Alcohol use: Yes     Alcohol/week: 7.0 standard drinks     Types: 7 Shots of liquor per week     Comment: .5 liter vodka    Drug use: Yes     Types: Methamphetamines     Comment: \"once in a rare while\"    Sexual activity: Yes     Partners: Female     Birth control/protection: Condom "         ALLERGIES  Penicillins        REVIEW OF SYSTEMS  All systems reviewed and negative except for those discussed in HPI.       PHYSICAL EXAM    I have reviewed the triage vital signs and nursing notes.    ED Triage Vitals [07/19/23 1257]   Temp Heart Rate Resp BP SpO2   99.8 °F (37.7 °C) 89 16 141/85 97 %      Temp src Heart Rate Source Patient Position BP Location FiO2 (%)   Tympanic Monitor -- -- --       Physical Exam  GENERAL: Alert, oriented, not distressed  HENT: head atraumatic, no cervical tenderness  EYES: no scleral icterus, EOMI  CV: regular rhythm, regular rate, no murmur  RESPIRATORY: normal effort, CTA.  Mild chest wall tenderness to the left lateral ribs.  No deformity.  No flail segment.  No ecchymosis.  ABDOMEN: soft, nontender  MUSCULOSKELETAL: no deformity, FROM, no calf swelling or tenderness.  No vertebral back tenderness.   NEURO: alert, moves all extremities, follows commands  SKIN: warm, dry      RADIOLOGY  XR Ribs Left With PA Chest    Result Date: 7/19/2023  XR RIBS LEFT W PA CHEST-  INDICATIONS: Trauma  TECHNIQUE: Frontal view of the chest, 4 views of the left RIBS  COMPARISON: . 02/18/2023  FINDINGS:  Heart size is normal. Pulmonary vasculature is unremarkable. Minimal likely atelectasis at the left base. No pleural effusion, or pneumothorax.  No acute left rib fracture is identified.       No acute left rib fracture is identified. Follow-up/further evaluation can be obtained as indications persist.  This report was finalized on 7/19/2023 1:39 PM by Dr. Jarod Devries M.D.      CT Chest Without Contrast Diagnostic    Result Date: 7/19/2023  CT OF THE CHEST WITHOUT CONTRAST 07/19/2023  HISTORY: Fell, left rib pain.  Axial images were obtained from the lung apices to the upper abdomen. No intravenous contrast was given.  No rib fractures are seen. No pneumothorax is seen. There is some minimal scar or atelectasis in the left lung base. No suspicious-appearing lung masses are seen.   No pathologically enlarged hilar or mediastinal lymph nodes are seen. No mediastinal hematoma is seen.  Multiple tiny gallstones are seen layering in the dependent portion of the gallbladder.      1. No acute posttraumatic abnormality is seen in the chest. 2. Cholelithiasis.      Radiation dose reduction techniques were utilized, including automated exposure control and exposure modulation based on body size.  This report was finalized on 7/19/2023 4:27 PM by Dr. Willie Alegre M.D.       I ordered the above noted radiological studies. Reviewed by me and discussed with radiologist.  See dictation for official radiology interpretation.      MEDICATIONS GIVEN IN ER    Medications   HYDROcodone-acetaminophen (NORCO) 7.5-325 MG per tablet 1 tablet (1 tablet Oral Given 7/19/23 1420)   ondansetron ODT (ZOFRAN-ODT) disintegrating tablet 4 mg (4 mg Oral Given 7/19/23 1420)   HYDROcodone-acetaminophen (NORCO) 7.5-325 MG per tablet 1 tablet (1 tablet Oral Given 7/19/23 1643)         ADDITIONAL ORDERS CONSIDERED BUT NOT ORDERED:  Nothing      PROGRESS, DATA ANALYSIS, CONSULTS, AND MEDICAL DECISION MAKING    All labs have been independently interpreted by myself.  All radiology studies have been independently interpreted by myself and discussed with radiologist dictating the report.   EKG's independently interpreted by myself.  Discussion below represents my analysis of pertinent findings related to patient's condition, differential diagnosis, treatment plan and final disposition.    I have discussed case with Dr. Mccoy, emergency room physician.  He has performed his own bedside examination and agrees with treatment plan.    ED Course as of 07/19/23 2014 Wed Jul 19, 2023   1400 Patient presents with left rib pain after mechanical fall last night.  Stable vitals.  No head or neck injury.  No blood thinners or abdominal pain.  Given patient's age we will obtain CT imaging of the chest to further evaluate ribs [EE]   1405  Left rib films independently interpreted myself show no evidence of pneumothorax or displaced rib fracture. [EE]   1555 CT chest images independently interpreted myself shows no evidence of pneumothorax or acute fracture. [EE]   1620 Updated patient on CT imaging.  He has stable vitals.  We will discharge. [EE]      ED Course User Index  [EE] Justin Snyder PA       AS OF 20:14 EDT VITALS:    BP - 135/85  HR - 91  TEMP - 99.8 °F (37.7 °C) (Tympanic)  O2 SATS - 97%        DIAGNOSIS  Final diagnoses:   Rib contusion, left, initial encounter         DISPOSITION  Discharged      Dictated utilizing Dragon dictation     Justin Snyder PA  07/19/23 2014

## 2023-07-19 NOTE — ED PROVIDER NOTES
Pt presents to the ED c/o left chest wall pain after he fell down some stairs.  Pain worse with movement, no significant shortness of breath.     On exam,   General: No acute distress, nontoxic  HEENT: Mucous membranes moist, atraumatic, EOMI  Neck: Full ROM  Pulm: Symmetric chest rise, nonlabored, lungs CTAB  Cardiovascular: Regular rate and rhythm, intact distal pulses  GI: Soft, nontender, nondistended, no rebound, no guarding, bowel sounds present  MSK: Full ROM, no deformity, reproducible tenderness to palpation lateral left chest wall without crepitus  Skin: Warm, dry  Neuro: Awake, alert, oriented x 4, GCS 15, moving all extremities, no focal deficits  Psych: Calm, cooperative        Plan:   ED Course as of 07/21/23 0213 Wed Jul 19, 2023   1400 Patient presents with left rib pain after mechanical fall last night.  Stable vitals.  No head or neck injury.  No blood thinners or abdominal pain.  Given patient's age we will obtain CT imaging of the chest to further evaluate ribs [EE]   1405 Left rib films independently interpreted myself show no evidence of pneumothorax or displaced rib fracture. [EE]   1555 CT chest images independently interpreted myself shows no evidence of pneumothorax or acute fracture. [EE]   1620 Updated patient on CT imaging.  He has stable vitals.  We will discharge. [EE]      ED Course User Index  [EE] Justin Snyder PA     Reassuring imaging studies, no evidence of fracture or pneumothorax.  Safe for discharge with supportive care measures discussed, ED return for worsening symptoms as needed.     Attestation:  The LUBA and I have discussed this patient's history, physical exam, and treatment plan.  I have reviewed the documentation and personally had a face to face interaction with the patient. I affirm the documentation and agree with the treatment and plan.  The attached note describes my personal findings.            Laureano Mccoy MD  07/21/23 0215

## 2023-07-19 NOTE — ED TRIAGE NOTES
Patient c/o left rib pain after falling down some steps last night. He reports he lost his balance and denies LOC.

## 2023-07-20 ENCOUNTER — TELEPHONE (OUTPATIENT)
Dept: FAMILY MEDICINE CLINIC | Facility: CLINIC | Age: 68
End: 2023-07-20

## 2023-07-20 NOTE — TELEPHONE ENCOUNTER
Hub staff attempted to follow warm transfer process and was unsuccessful on second attempt     Caller: Pro Mueller    Relationship to patient: Self    Best call back number: [phone]     Patient is needing: PRIOR DR LLAMAS PATIENT, WANTING TO STAY WITH PRACTICE, NEEDING A HOSPITAL FOLLOW UP SCHEDULED. HUB UNABLE TO SCHEDULE HOSPITAL FOLLOW UP/NEW PATIENT APPOINTMENT IN 7 DAY WINDOW AND NEEDED TO WARM TRANSFER. PT IS AWARE HE NEEDS TO RE-ESTABLISH WORKFLOW DOES NOT ALLOW HUB AGENT TO SCHEDULE OUT OF 7 DAY WINDOW.

## 2023-07-23 ENCOUNTER — HOSPITAL ENCOUNTER (EMERGENCY)
Facility: HOSPITAL | Age: 68
Discharge: LEFT BEFORE DISCHARGE | End: 2023-07-23
Attending: EMERGENCY MEDICINE | Admitting: EMERGENCY MEDICINE

## 2023-07-23 ENCOUNTER — APPOINTMENT (OUTPATIENT)
Dept: CT IMAGING | Facility: HOSPITAL | Age: 68
End: 2023-07-23

## 2023-07-23 VITALS
BODY MASS INDEX: 23.84 KG/M2 | WEIGHT: 176 LBS | DIASTOLIC BLOOD PRESSURE: 83 MMHG | OXYGEN SATURATION: 90 % | RESPIRATION RATE: 18 BRPM | TEMPERATURE: 97.9 F | HEART RATE: 74 BPM | SYSTOLIC BLOOD PRESSURE: 132 MMHG | HEIGHT: 72 IN

## 2023-07-23 DIAGNOSIS — R10.30 LOWER ABDOMINAL PAIN: Primary | ICD-10-CM

## 2023-07-23 DIAGNOSIS — D64.9 ANEMIA, UNSPECIFIED TYPE: ICD-10-CM

## 2023-07-23 DIAGNOSIS — F10.920 ACUTE ALCOHOLIC INTOXICATION WITHOUT COMPLICATION: ICD-10-CM

## 2023-07-23 DIAGNOSIS — E87.6 HYPOKALEMIA: ICD-10-CM

## 2023-07-23 DIAGNOSIS — F10.10 ALCOHOL ABUSE: ICD-10-CM

## 2023-07-23 LAB
ALBUMIN SERPL-MCNC: 3.7 G/DL (ref 3.5–5.2)
ALBUMIN/GLOB SERPL: 1.5 G/DL
ALP SERPL-CCNC: 100 U/L (ref 39–117)
ALT SERPL W P-5'-P-CCNC: 27 U/L (ref 1–41)
ANION GAP SERPL CALCULATED.3IONS-SCNC: 13.7 MMOL/L (ref 5–15)
AST SERPL-CCNC: 139 U/L (ref 1–40)
BASOPHILS # BLD AUTO: 0.02 10*3/MM3 (ref 0–0.2)
BASOPHILS NFR BLD AUTO: 0.5 % (ref 0–1.5)
BILIRUB SERPL-MCNC: 0.4 MG/DL (ref 0–1.2)
BUN SERPL-MCNC: 5 MG/DL (ref 8–23)
BUN/CREAT SERPL: 7.9 (ref 7–25)
CALCIUM SPEC-SCNC: 8.6 MG/DL (ref 8.6–10.5)
CHLORIDE SERPL-SCNC: 108 MMOL/L (ref 98–107)
CO2 SERPL-SCNC: 27.3 MMOL/L (ref 22–29)
CREAT SERPL-MCNC: 0.63 MG/DL (ref 0.76–1.27)
D-LACTATE SERPL-SCNC: 2.5 MMOL/L (ref 0.5–2)
DEPRECATED RDW RBC AUTO: 54.8 FL (ref 37–54)
EGFRCR SERPLBLD CKD-EPI 2021: 104.9 ML/MIN/1.73
EOSINOPHIL # BLD AUTO: 0.12 10*3/MM3 (ref 0–0.4)
EOSINOPHIL NFR BLD AUTO: 2.9 % (ref 0.3–6.2)
ERYTHROCYTE [DISTWIDTH] IN BLOOD BY AUTOMATED COUNT: 15.1 % (ref 12.3–15.4)
ETHANOL BLD-MCNC: 337 MG/DL (ref 0–10)
ETHANOL UR QL: 0.34 %
GLOBULIN UR ELPH-MCNC: 2.5 GM/DL
GLUCOSE SERPL-MCNC: 94 MG/DL (ref 65–99)
HCT VFR BLD AUTO: 34.7 % (ref 37.5–51)
HGB BLD-MCNC: 11.6 G/DL (ref 13–17.7)
HOLD SPECIMEN: NORMAL
HOLD SPECIMEN: NORMAL
IMM GRANULOCYTES # BLD AUTO: 0.01 10*3/MM3 (ref 0–0.05)
IMM GRANULOCYTES NFR BLD AUTO: 0.2 % (ref 0–0.5)
LIPASE SERPL-CCNC: 79 U/L (ref 13–60)
LYMPHOCYTES # BLD AUTO: 1.76 10*3/MM3 (ref 0.7–3.1)
LYMPHOCYTES NFR BLD AUTO: 42.5 % (ref 19.6–45.3)
MCH RBC QN AUTO: 33.5 PG (ref 26.6–33)
MCHC RBC AUTO-ENTMCNC: 33.4 G/DL (ref 31.5–35.7)
MCV RBC AUTO: 100.3 FL (ref 79–97)
MONOCYTES # BLD AUTO: 0.44 10*3/MM3 (ref 0.1–0.9)
MONOCYTES NFR BLD AUTO: 10.6 % (ref 5–12)
NEUTROPHILS NFR BLD AUTO: 1.79 10*3/MM3 (ref 1.7–7)
NEUTROPHILS NFR BLD AUTO: 43.3 % (ref 42.7–76)
NRBC BLD AUTO-RTO: 0 /100 WBC (ref 0–0.2)
PLATELET # BLD AUTO: 163 10*3/MM3 (ref 140–450)
PMV BLD AUTO: 8.9 FL (ref 6–12)
POTASSIUM SERPL-SCNC: 3.4 MMOL/L (ref 3.5–5.2)
PROCALCITONIN SERPL-MCNC: 0.11 NG/ML (ref 0–0.25)
PROT SERPL-MCNC: 6.2 G/DL (ref 6–8.5)
RBC # BLD AUTO: 3.46 10*6/MM3 (ref 4.14–5.8)
SODIUM SERPL-SCNC: 149 MMOL/L (ref 136–145)
WBC NRBC COR # BLD: 4.14 10*3/MM3 (ref 3.4–10.8)
WHOLE BLOOD HOLD COAG: NORMAL
WHOLE BLOOD HOLD SPECIMEN: NORMAL

## 2023-07-23 PROCEDURE — 80053 COMPREHEN METABOLIC PANEL: CPT

## 2023-07-23 PROCEDURE — 83605 ASSAY OF LACTIC ACID: CPT

## 2023-07-23 PROCEDURE — 84145 PROCALCITONIN (PCT): CPT | Performed by: EMERGENCY MEDICINE

## 2023-07-23 PROCEDURE — 82077 ASSAY SPEC XCP UR&BREATH IA: CPT | Performed by: EMERGENCY MEDICINE

## 2023-07-23 PROCEDURE — 74177 CT ABD & PELVIS W/CONTRAST: CPT

## 2023-07-23 PROCEDURE — 99283 EMERGENCY DEPT VISIT LOW MDM: CPT

## 2023-07-23 PROCEDURE — 25510000001 IOPAMIDOL 61 % SOLUTION: Performed by: EMERGENCY MEDICINE

## 2023-07-23 PROCEDURE — 83690 ASSAY OF LIPASE: CPT

## 2023-07-23 PROCEDURE — 85025 COMPLETE CBC W/AUTO DIFF WBC: CPT

## 2023-07-23 RX ORDER — SODIUM CHLORIDE 0.9 % (FLUSH) 0.9 %
10 SYRINGE (ML) INJECTION AS NEEDED
Status: DISCONTINUED | OUTPATIENT
Start: 2023-07-23 | End: 2023-07-23 | Stop reason: HOSPADM

## 2023-07-23 RX ADMIN — IOPAMIDOL 85 ML: 612 INJECTION, SOLUTION INTRAVENOUS at 06:16

## 2023-07-23 NOTE — ED PROVIDER NOTES
EMERGENCY DEPARTMENT ENCOUNTER  I wore full protective equipment throughout this patient encounter including a N95 mask, eye shield, gown and gloves. Hand hygiene was performed before donning protective equipment and after removal when leaving the room.    Room Number:  12/12  Date of encounter:  7/23/2023  PCP: Provider, No Known  Patient Care Team:  Provider, No Known as PCP - Say Roger MD (Psychiatry)     HPI:  Context: Pro Mueller is a 68 y.o. male who presents to the ED c/o chief complaint of abdominal pain.  Patient complains of lower abdominal cramping, pain does not radiate, no aggravating or ameliorating factors.  Patient denies any nausea vomiting.  Patient denies any diarrhea, last bowel movement on Thursday.  No urinary symptoms.  Patient admits to daily alcohol use, denies any illicit substances.  No fever shakes chills or night sweats.    MEDICAL HISTORY REVIEW  Reviewed in EPIC    PAST MEDICAL HISTORY  Active Ambulatory Problems     Diagnosis Date Noted    Fall 08/08/2016    Alcoholism in recovery 08/08/2016    Atopic rhinitis 08/08/2016    Mixed anxiety depressive disorder 08/08/2016    Genital herpes simplex 08/08/2016    Hyperlipidemia 08/08/2016    Hypertension 08/08/2016    Hypothyroidism 08/08/2016    Insomnia 08/08/2016    Panic disorder without agoraphobia 08/08/2016    Persistent insomnia 08/08/2016    Vitamin D deficiency 08/08/2016    Chronic low back pain 11/11/2016    Neuropathy involving both lower extremities 10/25/2018    QT prolongation 01/03/2019    Left shoulder pain 11/19/2019    Pancytopenia 01/14/2020    Left ventricular diastolic dysfunction 01/16/2021    Tremor 10/06/2021    C5 cervical fracture 11/09/2021    Syncope and collapse 01/07/2022    Neck pain 01/07/2022    Alcoholic intoxication with complication 03/13/2022    Withdrawal symptoms, alcohol 07/01/2022    Transaminitis 07/01/2022    Lumbar facet arthropathy 07/20/2022    Alcohol dependence with  withdrawal 09/11/2022    Midline low back pain with right-sided sciatica 09/15/2022    Spondylolisthesis at L4-L5 level 09/15/2022    Lumbar canal stenosis 09/15/2022    Alcohol cessation counseling 09/15/2022    Need for assistance due to reduced mobility 09/15/2022    Impaired mobility and ADLs 09/15/2022    Alcohol withdrawal syndrome without complication 02/18/2023    Facial laceration 02/18/2023    Orthostatic hypotension 02/18/2023    Acute bacterial conjunctivitis of right eye 03/02/2023    Visit for suture removal 03/02/2023    Renal calculi 03/14/2023    Hepatic steatosis 03/15/2023    Alcohol withdrawal syndrome with complication 04/14/2023    Macrocytosis without anemia 04/14/2023    Lumbosacral spondylosis without myelopathy 05/05/2023    Elevated LFTs 05/13/2023    Alcohol-induced acute pancreatitis, unspecified complication status 06/23/2023     Resolved Ambulatory Problems     Diagnosis Date Noted    Impacted cerumen 08/08/2016    Influenza 08/08/2016    Motion sickness 08/08/2016    Seasonal allergic rhinitis 08/08/2016    Upper respiratory tract infection 08/08/2016    Alcohol withdrawal (HCC) 11/04/2016    Headache 11/05/2016    Gastritis 11/05/2016    Olecranon bursitis of right elbow 04/05/2017    Sciatica of left side 06/12/2018    Syncope and collapse 01/02/2019    Nausea and vomiting 01/11/2020    Alcoholic ketoacidosis 01/12/2020    Hyponatremia 01/13/2020    Hypokalemia 01/13/2020    Alcohol dependence with uncomplicated withdrawal 01/14/2020    Nephrolithiasis 02/12/2020    Hypomagnesemia 01/16/2021    Non-traumatic rhabdomyolysis 01/18/2021    Urine retention 01/21/2021    Epigastric pain 06/23/2023     Past Medical History:   Diagnosis Date    Alcohol abuse     Allergic 1970    Anxiety     Arthritis     Depression     Disease of thyroid gland     Elevated cholesterol     Encounter for removal of sutures     GERD (gastroesophageal reflux disease)     Headache, tension-type     Kidney stone  "    Low back pain     Migraine     Olecranon bursitis, right elbow     Peripheral neuropathy     Sleep apnea        PAST SURGICAL HISTORY  Past Surgical History:   Procedure Laterality Date    COLONOSCOPY      CYST REMOVAL      CYSTOSCOPY BLADDER STONE LITHOTRIPSY  2022    EXTRACORPOREAL SHOCK WAVE LITHOTRIPSY (ESWL) Right     SHOULDER ARTHROSCOPY Right 2019    Procedure: SHOULDER ARTHROSCOPY, decompression, distal clavicle excision;  Surgeon: Aj Mancilla MD;  Location: [facility];  Service: Orthopedics    TONSILLECTOMY         FAMILY HISTORY  Family History   Problem Relation Age of Onset    Alzheimer's disease Mother     Mental illness Mother     Pancreatic cancer Father     Hearing loss Father     Arthritis Maternal Grandmother     Malig Hyperthermia Neg Hx        SOCIAL HISTORY  Social History     Socioeconomic History    Marital status: Single   Tobacco Use    Smoking status: Former     Packs/day: 0.50     Years: 15.00     Pack years: 7.50     Types: Cigarettes     Start date: 1990     Quit date: 2005     Years since quittin.5    Smokeless tobacco: Never    Tobacco comments:     caffeine - 3 cans of coke daily    Vaping Use    Vaping Use: Never used   Substance and Sexual Activity    Alcohol use: Yes     Alcohol/week: 7.0 standard drinks     Types: 7 Shots of liquor per week     Comment: .5 liter vodka    Drug use: Yes     Types: Methamphetamines     Comment: \"once in a rare while\"    Sexual activity: Yes     Partners: Female     Birth control/protection: Condom       ALLERGIES  Penicillins    The patient's allergies have been reviewed    REVIEW OF SYSTEMS  All systems reviewed and negative except for those discussed in HPI.     PHYSICAL EXAM  I have reviewed the triage vital signs and nursing notes.  ED Triage Vitals [23 [id number]]   Temp Heart Rate Resp BP SpO2   97.9 °F (36.6 °C) 74 18 132/83 90 %      Temp src Heart Rate Source Patient Position BP Location FiO2 (%) "   Tympanic -- -- -- --       General: No acute distress.  Smells of alcohol  HENT: NCAT, PERRL, Nares patent.  Eyes: no scleral icterus.  Neck: trachea midline, no ROM limitations.  CV: regular rhythm, regular rate.  Respiratory: normal effort, CTAB.  Abdomen: soft, nondistended, mild lower abdominal tenderness to palpation, no rebound tenderness, no guarding or rigidity.  Musculoskeletal: no deformity.  Neuro: alert and oriented x3, no slurred speech, responding appropriately, moves all extremities, follows commands.  Skin: warm, dry.    LAB RESULTS  Recent Results (from the past 24 hour(s))   Comprehensive Metabolic Panel    Collection Time: 07/23/23  4:25 AM    Specimen: Blood   Result Value Ref Range    Glucose 94 65 - 99 mg/dL    BUN 5 (L) 8 - 23 mg/dL    Creatinine 0.63 (L) 0.76 - 1.27 mg/dL    Sodium 149 (H) 136 - 145 mmol/L    Potassium 3.4 (L) 3.5 - 5.2 mmol/L    Chloride 108 (H) 98 - 107 mmol/L    CO2 27.3 22.0 - 29.0 mmol/L    Calcium 8.6 8.6 - 10.5 mg/dL    Total Protein 6.2 6.0 - 8.5 g/dL    Albumin 3.7 3.5 - 5.2 g/dL    ALT (SGPT) 27 1 - 41 U/L    AST (SGOT) 139 (H) 1 - 40 U/L    Alkaline Phosphatase 100 39 - 117 U/L    Total Bilirubin 0.4 0.0 - 1.2 mg/dL    Globulin 2.5 gm/dL    A/G Ratio 1.5 g/dL    BUN/Creatinine Ratio 7.9 7.0 - 25.0    Anion Gap 13.7 5.0 - 15.0 mmol/L    eGFR 104.9 >60.0 mL/min/1.73   Lipase    Collection Time: 07/23/23  4:25 AM    Specimen: Blood   Result Value Ref Range    Lipase 79 (H) 13 - 60 U/L   Lactic Acid, Plasma    Collection Time: 07/23/23  4:25 AM    Specimen: Blood   Result Value Ref Range    Lactate 2.5 (C) 0.5 - 2.0 mmol/L   Green Top (Gel)    Collection Time: 07/23/23  4:25 AM   Result Value Ref Range    Extra Tube Hold for add-ons.    Lavender Top    Collection Time: 07/23/23  4:25 AM   Result Value Ref Range    Extra Tube hold for add-on    Gold Top - SST    Collection Time: 07/23/23  4:25 AM   Result Value Ref Range    Extra Tube Hold for add-ons.    Light Blue  Top    Collection Time: 07/23/23  4:25 AM   Result Value Ref Range    Extra Tube Hold for add-ons.    CBC Auto Differential    Collection Time: 07/23/23  4:25 AM    Specimen: Blood   Result Value Ref Range    WBC 4.14 3.40 - 10.80 10*3/mm3    RBC 3.46 (L) 4.14 - 5.80 10*6/mm3    Hemoglobin 11.6 (L) 13.0 - 17.7 g/dL    Hematocrit 34.7 (L) 37.5 - 51.0 %    .3 (H) 79.0 - 97.0 fL    MCH 33.5 (H) 26.6 - 33.0 pg    MCHC 33.4 31.5 - 35.7 g/dL    RDW 15.1 12.3 - 15.4 %    RDW-SD 54.8 (H) 37.0 - 54.0 fl    MPV 8.9 6.0 - 12.0 fL    Platelets 163 140 - 450 10*3/mm3    Neutrophil % 43.3 42.7 - 76.0 %    Lymphocyte % 42.5 19.6 - 45.3 %    Monocyte % 10.6 5.0 - 12.0 %    Eosinophil % 2.9 0.3 - 6.2 %    Basophil % 0.5 0.0 - 1.5 %    Immature Grans % 0.2 0.0 - 0.5 %    Neutrophils, Absolute 1.79 1.70 - 7.00 10*3/mm3    Lymphocytes, Absolute 1.76 0.70 - 3.10 10*3/mm3    Monocytes, Absolute 0.44 0.10 - 0.90 10*3/mm3    Eosinophils, Absolute 0.12 0.00 - 0.40 10*3/mm3    Basophils, Absolute 0.02 0.00 - 0.20 10*3/mm3    Immature Grans, Absolute 0.01 0.00 - 0.05 10*3/mm3    nRBC 0.0 0.0 - 0.2 /100 WBC   Ethanol    Collection Time: 07/23/23  4:25 AM    Specimen: Blood   Result Value Ref Range    Ethanol 337 (H) 0 - 10 mg/dL    Ethanol % 0.337 %   Procalcitonin    Collection Time: 07/23/23  4:25 AM    Specimen: Blood   Result Value Ref Range    Procalcitonin 0.11 0.00 - 0.25 ng/mL       I ordered the above labs and reviewed the results.    RADIOLOGY  No Radiology Exams Resulted Within Past 24 Hours    I ordered the above noted radiological studies. I reviewed the images and results. I agree with the radiologist interpretation.    PROCEDURES  Procedures    MEDICATIONS GIVEN IN ER  Medications   sodium chloride 0.9 % flush 10 mL (has no administration in time range)   iopamidol (ISOVUE-300) 61 % injection 100 mL (85 mL Intravenous Given 7/23/23 0616)       PROGRESS, DATA ANALYSIS, CONSULTS, AND MEDICAL DECISION MAKING  A complete  history and physical exam have been performed.  All available laboratory and imaging results have been reviewed by myself prior to disposition.    MDM    After the initial H&P, I discussed pertinent information from history and physical exam with patient/family.  Discussed differential diagnosis.  Discussed plan for ED evaluation/workup/treatment.  All questions answered.  Patient/family is agreeable with plan.  ED Course as of 07/23/23 0818   Sun Jul 23, 2023   0559 First look: Patient presents with bilateral lower abdominal pain for the past 24 hours.  Reports some nausea vomiting.  No fever chills no chest pain or shortness of breath.  Patient endorses heavy alcohol consumption. [TJ]   0700 My differential diagnosis for abdominal pain for a male includes but is not limited to:  Gastritis, gastroenteritis, peptic ulcer disease, GERD, esophageal perforation, acute appendicitis, mesenteric adenitis, Meckel's diverticulum, epiploic appendagitis, diverticulitis, colon cancer, ulcerative colitis, Crohn's disease, intussusception, small bowel obstruction, adhesions, ischemic bowel, perforated viscus, ileus, obstipation, biliary colic, cholecystitis, cholelithiasis, hepatitis, pancreatitis, common bile duct obstruction, cholangitis, bile leak, splenic trauma, splenic rupture, splenic infarction, splenic abscess, abdominal abscess, ascites, spontaneous bacterial peritonitis, hernia, UTI, cystitis, prostatitis, ureterolithiasis, urinary obstruction, testicular torsion, AAA, myocardial infarction, pneumonia, cancer, porphyria, DKA, medications, sickle cell, viral syndrome, zoster   [JG]   0702 Patient afebrile, vital signs stable, no leukocytosis or left shift, AST is elevated but ALT alkaline phosphatase and bilirubin are normal, lipase just over normal, lactic acid slightly elevated 2.5, patient is not dehydrated, potassium 3.4 but no other significant electrolyte disturbances.  Blood alcohol level elevated at 337. [JG]    0730 Patient is requesting discharge from the emergency department.  Patient is blood alcohol level is elevated, patient does appear slightly intoxicated but is speaking appropriately and ambulating without difficulty.  I discussed ED work-up and results to present, discussed pending CT imaging.  I requested patient to wait for completion of work-up patient is refusing.  Patient is alert and oriented, responding appropriately, appears decisional.  Patient reports that he has already called for someone to come pick him up and that they can take him home and stay with him.  Patient is willing to wait until his ride arrives, we will continue to observe until that time [JG]   0816 Patient's ride is here.  Patient is refusing to stay for CT imaging results.  Patient is currently alert and oriented, responding appropriately, appears decisional.  Patient's ride is taking him home and comfortable with taking care of patient while he is intoxicated.  Patient being discharged AGAINST MEDICAL ADVICE secondary to leaving prior to completion of ED work-up. [JG]      ED Course User Index  [JG] Pascual De La Paz MD  [TJ] Carlos Thomas MD       AS OF 08:18 EDT VITALS:    BP - 132/83  HR - 74  TEMP - 97.9 °F (36.6 °C) (Tympanic)  O2 SATS - 90%    DIAGNOSIS  Final diagnoses:   Lower abdominal pain   Acute alcoholic intoxication without complication   Alcohol abuse   Hypokalemia   Anemia, unspecified type         DISPOSITION  DISCHARGE    Patient discharged in stable condition.    Reviewed implications of results, diagnosis, meds, responsibility to follow up, warning signs and symptoms of possible worsening, potential complications and reasons to return to ER.    Patient/Family voiced understanding of above instructions.    Discussed plan for discharge, as there is no emergent indication for admission. Patient referred to primary care provider for BP management due to today's BP. Pt/family is agreeable and understands need  for follow up and repeat testing.  Pt is aware that discharge does not mean that nothing is wrong but it indicates no emergency is present that requires admission and they must continue care with follow-up as given below or physician of their choice.     FOLLOW-UP  Your PCP    Schedule an appointment as soon as possible for a visit in 2 days  even if well    PATIENT CONNECTION - [facility] [address] [phone]    if you are unable to follow up with your PCP    [facility]  [address] [place] [id number] [phone]  Go today  for further management of your alcohol abuse         Medication List      No changes were made to your prescriptions during this visit.            Pascual De La Paz MD  07/23/23 0832

## 2023-07-23 NOTE — ED NOTES
Pt requesting to leave in the care of responsible party.  Friend is in room with patient and agrees to take patient home.  Patient is aox4 with steady gait.  Patient declines to wait for CT results.  Risks of leaving AMA without full results have been discussed with patient.  MD aware of situation.  Pt declines final vitals check.

## 2023-07-23 NOTE — ED TRIAGE NOTES
To ER via EMS from home.  C/o generalized abd pain.  Describes as cramping.  Pt has hx of alcohol abuse.  Admits to alcohol today.  Speech is slightly slurred.

## 2023-07-23 NOTE — DISCHARGE INSTRUCTIONS
You have been given emergency department evaluation.  This evaluation is intended to rule out life-threatening conditions.  Is not a complete evaluation.  You could require further testing as determined by your primary care physician or any referred specialist.  Please follow-up with all doctors that you are referred to.  Please be sure to take your prescribed medications and follow any specific instructions in the discharge instructions.  Please follow-up with your primary care physician within 48 hours.  Please have your primary care provider recheck your blood pressure.  Please return to the emergency department if you experience chest pain, shortness of breath, abdominal pain, fever greater than 102, intractable vomiting.  Please return to the emergency department if your symptoms continue or worsen, or if you begin to experience any other concerning symptom.    You have been seen for substance abuse related complaint.  Please stay with a responsible adult for 24 hours.  Please avoid driving for 24 hours.  Please quit taking alcohol/drugs, as it can result in your death or permanent disability.    Substance abuse treatment centers  The following list are some of the available alcohol and drug abuse treatment centers in the [facility] area.  This list is by no means inclusive of all available options, nor does it imply that these options are better than any other in the area.  Cost and available treatment plans to vary from site to site.  You will need to call, to see which if any of these options will work for your needs.    [facility]  [address] [address]  [phone]    [facility]  [address] [place] [address]  [phone]    [facility]  [address] [facility] [address]    [facility] [address]  [phone]    [facility]-medical stabilization  service  [address] [address]  [phone]    [facility]  [facility]  [address] [place] [address]  [phone]  [facility] [place]  [address] [address] [phone]    [facility] [address] [address] [phone]

## 2023-07-23 NOTE — ED NOTES
Patient requesting to go home states he will not pee in the urinal for a ua till he sees a doctor

## 2023-09-07 LAB — WHOLE BLOOD HOLD SPECIMEN: NORMAL

## 2023-11-08 NOTE — CASE MANAGEMENT/SOCIAL WORK
Case Management Discharge Note      Final Note: DC'd home 4/15            Transportation Services  Private: Car    Final Discharge Disposition Code: 01 - home or self-care    used

## 2023-12-01 NOTE — PROGRESS NOTES
"Chief Complaint  Establish Care (New Pt ) and Med Refill (Pt here for med refills )    Subjective        Pro Mueller presents to [facility]  History of Present Illness    The patient presents to the clinic to establish care and for medication refills. He is a patient of Dr. Beal.    He states he needs refills on amlodipine, lisinopril, and atorvastatin. The patient reports he is taking meloxicam and baclofen. He notes he would like a refill on hydrocodone. The patient reports he had an ablation on 05/03/2023 at [facility]. He denies seeing pain management. The patient reports he is no longer taking Cymbalta. He notes he last saw Dr. Beal approximately 6 months ago.    The patient reports he is still drinking alcohol. He notes he occasionally has a problem with drinking.    He states he lives alone. The patient reports he retired in 09/2022.    The patient reports he is due for a pneumonia vaccine. He denies receiving the new COVID-19 booster.    Objective   Vital Signs:  /80   Pulse 71   Temp 97.7 °F (36.5 °C)   Resp 17   Ht 182.9 cm (72.01\")   Wt 86.6 kg (191 lb)   SpO2 98%   BMI 25.90 kg/m²   Estimated body mass index is 25.9 kg/m² as calculated from the following:    Height as of this encounter: 182.9 cm (72.01\").    Weight as of this encounter: 86.6 kg (191 lb).          Physical Exam  Constitutional:       General: He is not in acute distress.     Appearance: He is well-developed. He is not diaphoretic.   HENT:      Mouth/Throat:      Mouth: Mucous membranes are moist.   Cardiovascular:      Rate and Rhythm: Normal rate and regular rhythm.      Heart sounds: Normal heart sounds. No murmur heard.    No friction rub. No gallop.   Pulmonary:      Effort: Pulmonary effort is normal. No respiratory distress.      Breath sounds: Normal breath sounds. No wheezing or rales.   Abdominal:      General: Bowel sounds are normal. There is no distension.      " Called and LVM for Lolis asking her to call back to schedule an appointment with me so I can take a look at her broken hearing aid.   Palpations: Abdomen is soft.      Tenderness: There is no abdominal tenderness.   Musculoskeletal:      Cervical back: Neck supple.   Lymphadenopathy:      Cervical: No cervical adenopathy.   Skin:     General: Skin is warm and dry.   Neurological:      Mental Status: He is alert and oriented to person, place, and time.   Psychiatric:         Mood and Affect: Mood normal.        Result Review :                  Assessment and Plan   Diagnoses and all orders for this visit:    1. Screening for colon cancer (Primary)  -     Ambulatory Referral For Screening Colonoscopy    2. Hypothyroidism, unspecified type  -     levothyroxine (SYNTHROID, LEVOTHROID) 100 MCG tablet; Take 1 tablet by mouth Daily.  Dispense: 90 tablet; Refill: 1    3. Encounter to establish care    4. Hyperlipidemia, unspecified hyperlipidemia type    5. Primary hypertension    6. Alcohol use    Other orders  -     amLODIPine (NORVASC) 5 MG tablet; Take 1 tablet by mouth Every Morning.  Dispense: 90 tablet; Refill: 1  -     atorvastatin (LIPITOR) 20 MG tablet; Take 1 tablet by mouth Daily.  Dispense: 90 tablet; Refill: 1  -     lisinopril (PRINIVIL,ZESTRIL) 10 MG tablet; Take 1 tablet by mouth Daily.  Dispense: 90 tablet; Refill: 1  -     Pneumococcal Polysaccharide Vaccine 23-Valent (PPSV23) Greater Than or Equal To 1yo Subcutaneous / IM          1. Encounter to establish care (Primary)  -The patient will complete laboratory studies as discussed during this visit.    2. Essential hypertension  -The patient will continue taking amlodipine 5 mg as prescribed.  - The patient will continue taking lisinopril 10 mg as prescribed.    3. Hyperlipidemia, unspecified hyperlipidemia type  -The patient will continue taking atorvastatin 10 mg as prescribed.    4. Hypothyroidism, unspecified type  -The patient will continue taking levothyroxine as prescribed.    5. Alcohol abuse  -The patient will consider naltrexone.    6. Screening for colon cancer  -The patient  will complete a colonoscopy as discussed during this visit.    7. Need for vaccination  -The patient will receive his pneumonia vaccine today.      Follow Up   No follow-ups on file.  Patient was given instructions and counseling regarding his condition or for health maintenance advice. Please see specific information pulled into the AVS if appropriate.       Transcribed from ambient dictation for BERONICA Lujan by Alexandro Tejada.  05/05/23   13:44 EDT    Patient or patient representative verbalized consent to the visit recording.  I have personally performed the services described in this document as transcribed by the above individual, and it is both accurate and complete.

## 2024-02-24 ENCOUNTER — HOSPITAL ENCOUNTER (OUTPATIENT)
Facility: HOSPITAL | Age: 69
Setting detail: OBSERVATION
Discharge: LEFT BEFORE DISCHARGE | End: 2024-02-25
Attending: EMERGENCY MEDICINE | Admitting: HOSPITALIST
Payer: MEDICARE

## 2024-02-24 ENCOUNTER — APPOINTMENT (OUTPATIENT)
Dept: GENERAL RADIOLOGY | Facility: HOSPITAL | Age: 69
End: 2024-02-24
Payer: MEDICARE

## 2024-02-24 ENCOUNTER — APPOINTMENT (OUTPATIENT)
Dept: CT IMAGING | Facility: HOSPITAL | Age: 69
End: 2024-02-24
Payer: MEDICARE

## 2024-02-24 DIAGNOSIS — R82.4 KETONURIA: ICD-10-CM

## 2024-02-24 DIAGNOSIS — R53.1 GENERALIZED WEAKNESS: Primary | ICD-10-CM

## 2024-02-24 DIAGNOSIS — R51.9 ACUTE NONINTRACTABLE HEADACHE, UNSPECIFIED HEADACHE TYPE: ICD-10-CM

## 2024-02-24 DIAGNOSIS — E86.0 DEHYDRATION: ICD-10-CM

## 2024-02-24 DIAGNOSIS — E87.6 HYPOKALEMIA: ICD-10-CM

## 2024-02-24 DIAGNOSIS — R63.0 POOR APPETITE: ICD-10-CM

## 2024-02-24 LAB
ALBUMIN SERPL-MCNC: 4.2 G/DL (ref 3.5–5.2)
ALBUMIN/GLOB SERPL: 1.4 G/DL
ALP SERPL-CCNC: 119 U/L (ref 39–117)
ALT SERPL W P-5'-P-CCNC: 13 U/L (ref 1–41)
AMMONIA BLD-SCNC: 25 UMOL/L (ref 16–60)
AMPHET+METHAMPHET UR QL: NEGATIVE
ANION GAP SERPL CALCULATED.3IONS-SCNC: 23.7 MMOL/L (ref 5–15)
AST SERPL-CCNC: 89 U/L (ref 1–40)
ATMOSPHERIC PRESS: 752.3 MMHG
B PARAPERT DNA SPEC QL NAA+PROBE: NOT DETECTED
B PERT DNA SPEC QL NAA+PROBE: NOT DETECTED
BACTERIA UR QL AUTO: ABNORMAL /HPF
BARBITURATES UR QL SCN: NEGATIVE
BASE EXCESS BLDV CALC-SCNC: -1.2 MMOL/L (ref -2–2)
BASOPHILS # BLD AUTO: 0.02 10*3/MM3 (ref 0–0.2)
BASOPHILS NFR BLD AUTO: 0.2 % (ref 0–1.5)
BDY SITE: ABNORMAL
BENZODIAZ UR QL SCN: NEGATIVE
BILIRUB SERPL-MCNC: 1.1 MG/DL (ref 0–1.2)
BILIRUB UR QL STRIP: ABNORMAL
BUN SERPL-MCNC: 8 MG/DL (ref 8–23)
BUN/CREAT SERPL: 11.3 (ref 7–25)
C PNEUM DNA NPH QL NAA+NON-PROBE: NOT DETECTED
CALCIUM SPEC-SCNC: 8.6 MG/DL (ref 8.6–10.5)
CANNABINOIDS SERPL QL: NEGATIVE
CHLORIDE SERPL-SCNC: 86 MMOL/L (ref 98–107)
CLARITY UR: CLEAR
CO2 BLDA-SCNC: 22.2 MMOL/L (ref 23–27)
CO2 SERPL-SCNC: 21.3 MMOL/L (ref 22–29)
COCAINE UR QL: NEGATIVE
COLOR UR: ABNORMAL
CREAT SERPL-MCNC: 0.71 MG/DL (ref 0.76–1.27)
DEPRECATED RDW RBC AUTO: 47.4 FL (ref 37–54)
DEVICE COMMENT: ABNORMAL
EGFRCR SERPLBLD CKD-EPI 2021: 100.6 ML/MIN/1.73
EOSINOPHIL # BLD AUTO: 0.02 10*3/MM3 (ref 0–0.4)
EOSINOPHIL NFR BLD AUTO: 0.2 % (ref 0.3–6.2)
ERYTHROCYTE [DISTWIDTH] IN BLOOD BY AUTOMATED COUNT: 13.6 % (ref 12.3–15.4)
ETHANOL BLD-MCNC: <10 MG/DL (ref 0–10)
ETHANOL UR QL: <0.01 %
FENTANYL UR-MCNC: NEGATIVE NG/ML
FLUAV SUBTYP SPEC NAA+PROBE: NOT DETECTED
FLUBV RNA ISLT QL NAA+PROBE: NOT DETECTED
GLOBULIN UR ELPH-MCNC: 3 GM/DL
GLUCOSE SERPL-MCNC: 103 MG/DL (ref 65–99)
GLUCOSE UR STRIP-MCNC: NEGATIVE MG/DL
HADV DNA SPEC NAA+PROBE: NOT DETECTED
HCO3 BLDV-SCNC: 21.3 MMOL/L (ref 22–28)
HCOV 229E RNA SPEC QL NAA+PROBE: NOT DETECTED
HCOV HKU1 RNA SPEC QL NAA+PROBE: NOT DETECTED
HCOV NL63 RNA SPEC QL NAA+PROBE: NOT DETECTED
HCOV OC43 RNA SPEC QL NAA+PROBE: NOT DETECTED
HCT VFR BLD AUTO: 37.1 % (ref 37.5–51)
HGB BLD-MCNC: 12.8 G/DL (ref 13–17.7)
HGB UR QL STRIP.AUTO: ABNORMAL
HMPV RNA NPH QL NAA+NON-PROBE: NOT DETECTED
HPIV1 RNA ISLT QL NAA+PROBE: NOT DETECTED
HPIV2 RNA SPEC QL NAA+PROBE: NOT DETECTED
HPIV3 RNA NPH QL NAA+PROBE: NOT DETECTED
HPIV4 P GENE NPH QL NAA+PROBE: NOT DETECTED
HYALINE CASTS UR QL AUTO: ABNORMAL /LPF
IMM GRANULOCYTES # BLD AUTO: 0.04 10*3/MM3 (ref 0–0.05)
IMM GRANULOCYTES NFR BLD AUTO: 0.4 % (ref 0–0.5)
INHALED O2 CONCENTRATION: 21 %
KETONES UR QL STRIP: ABNORMAL
LEUKOCYTE ESTERASE UR QL STRIP.AUTO: NEGATIVE
LIPASE SERPL-CCNC: 98 U/L (ref 13–60)
LYMPHOCYTES # BLD AUTO: 0.91 10*3/MM3 (ref 0.7–3.1)
LYMPHOCYTES NFR BLD AUTO: 10.2 % (ref 19.6–45.3)
M PNEUMO IGG SER IA-ACNC: NOT DETECTED
MAGNESIUM SERPL-MCNC: 1.4 MG/DL (ref 1.6–2.4)
MCH RBC QN AUTO: 33.5 PG (ref 26.6–33)
MCHC RBC AUTO-ENTMCNC: 34.5 G/DL (ref 31.5–35.7)
MCV RBC AUTO: 97.1 FL (ref 79–97)
METHADONE UR QL SCN: NEGATIVE
MODALITY: ABNORMAL
MONOCYTES # BLD AUTO: 0.98 10*3/MM3 (ref 0.1–0.9)
MONOCYTES NFR BLD AUTO: 11 % (ref 5–12)
NEUTROPHILS NFR BLD AUTO: 6.93 10*3/MM3 (ref 1.7–7)
NEUTROPHILS NFR BLD AUTO: 78 % (ref 42.7–76)
NITRITE UR QL STRIP: NEGATIVE
NRBC BLD AUTO-RTO: 0 /100 WBC (ref 0–0.2)
NT-PROBNP SERPL-MCNC: 46.4 PG/ML (ref 0–900)
OPIATES UR QL: NEGATIVE
OXYCODONE UR QL SCN: NEGATIVE
PCO2 BLDV: 28.4 MM HG (ref 41–51)
PH BLDV: 7.48 PH UNITS (ref 7.31–7.41)
PH UR STRIP.AUTO: 6 [PH] (ref 5–8)
PLATELET # BLD AUTO: 221 10*3/MM3 (ref 140–450)
PMV BLD AUTO: 9.5 FL (ref 6–12)
PO2 BLDV: 27.7 MM HG (ref 35–45)
POTASSIUM SERPL-SCNC: 3.4 MMOL/L (ref 3.5–5.2)
PROT SERPL-MCNC: 7.2 G/DL (ref 6–8.5)
PROT UR QL STRIP: ABNORMAL
QT INTERVAL: 389 MS
QTC INTERVAL: 432 MS
RBC # BLD AUTO: 3.82 10*6/MM3 (ref 4.14–5.8)
RBC # UR STRIP: ABNORMAL /HPF
REF LAB TEST METHOD: ABNORMAL
RHINOVIRUS RNA SPEC NAA+PROBE: NOT DETECTED
RSV RNA NPH QL NAA+NON-PROBE: NOT DETECTED
SAO2 % BLDCOV: 58.9 % (ref 45–75)
SARS-COV-2 RNA NPH QL NAA+NON-PROBE: NOT DETECTED
SODIUM SERPL-SCNC: 131 MMOL/L (ref 136–145)
SP GR UR STRIP: 1.02 (ref 1–1.03)
SQUAMOUS #/AREA URNS HPF: ABNORMAL /HPF
TOTAL RATE: 16 BREATHS/MINUTE
TROPONIN T SERPL HS-MCNC: 12 NG/L
UROBILINOGEN UR QL STRIP: ABNORMAL
WBC # UR STRIP: ABNORMAL /HPF
WBC NRBC COR # BLD AUTO: 8.9 10*3/MM3 (ref 3.4–10.8)

## 2024-02-24 PROCEDURE — 80307 DRUG TEST PRSMV CHEM ANLYZR: CPT | Performed by: PHYSICIAN ASSISTANT

## 2024-02-24 PROCEDURE — 25810000003 SODIUM CHLORIDE 0.9 % SOLUTION: Performed by: PHYSICIAN ASSISTANT

## 2024-02-24 PROCEDURE — 25010000002 ONDANSETRON PER 1 MG: Performed by: NURSE PRACTITIONER

## 2024-02-24 PROCEDURE — G0378 HOSPITAL OBSERVATION PER HR: HCPCS

## 2024-02-24 PROCEDURE — 82803 BLOOD GASES ANY COMBINATION: CPT | Performed by: PHYSICIAN ASSISTANT

## 2024-02-24 PROCEDURE — 25010000002 POTASSIUM CHLORIDE 10 MEQ/100ML SOLUTION: Performed by: INTERNAL MEDICINE

## 2024-02-24 PROCEDURE — 96374 THER/PROPH/DIAG INJ IV PUSH: CPT

## 2024-02-24 PROCEDURE — 0202U NFCT DS 22 TRGT SARS-COV-2: CPT | Performed by: PHYSICIAN ASSISTANT

## 2024-02-24 PROCEDURE — 93010 ELECTROCARDIOGRAM REPORT: CPT | Performed by: INTERNAL MEDICINE

## 2024-02-24 PROCEDURE — 83690 ASSAY OF LIPASE: CPT | Performed by: PHYSICIAN ASSISTANT

## 2024-02-24 PROCEDURE — 96368 THER/DIAG CONCURRENT INF: CPT

## 2024-02-24 PROCEDURE — 25010000002 MAGNESIUM SULFATE 2 GM/50ML SOLUTION: Performed by: INTERNAL MEDICINE

## 2024-02-24 PROCEDURE — 82077 ASSAY SPEC XCP UR&BREATH IA: CPT | Performed by: PHYSICIAN ASSISTANT

## 2024-02-24 PROCEDURE — 99285 EMERGENCY DEPT VISIT HI MDM: CPT

## 2024-02-24 PROCEDURE — 96365 THER/PROPH/DIAG IV INF INIT: CPT

## 2024-02-24 PROCEDURE — 85025 COMPLETE CBC W/AUTO DIFF WBC: CPT | Performed by: PHYSICIAN ASSISTANT

## 2024-02-24 PROCEDURE — 83735 ASSAY OF MAGNESIUM: CPT | Performed by: PHYSICIAN ASSISTANT

## 2024-02-24 PROCEDURE — 71045 X-RAY EXAM CHEST 1 VIEW: CPT

## 2024-02-24 PROCEDURE — 81001 URINALYSIS AUTO W/SCOPE: CPT | Performed by: PHYSICIAN ASSISTANT

## 2024-02-24 PROCEDURE — 93005 ELECTROCARDIOGRAM TRACING: CPT | Performed by: PHYSICIAN ASSISTANT

## 2024-02-24 PROCEDURE — 70450 CT HEAD/BRAIN W/O DYE: CPT

## 2024-02-24 PROCEDURE — 83880 ASSAY OF NATRIURETIC PEPTIDE: CPT | Performed by: PHYSICIAN ASSISTANT

## 2024-02-24 PROCEDURE — 96375 TX/PRO/DX INJ NEW DRUG ADDON: CPT

## 2024-02-24 PROCEDURE — 84484 ASSAY OF TROPONIN QUANT: CPT | Performed by: PHYSICIAN ASSISTANT

## 2024-02-24 PROCEDURE — 80053 COMPREHEN METABOLIC PANEL: CPT | Performed by: PHYSICIAN ASSISTANT

## 2024-02-24 PROCEDURE — 82140 ASSAY OF AMMONIA: CPT | Performed by: PHYSICIAN ASSISTANT

## 2024-02-24 PROCEDURE — 25810000003 SODIUM CHLORIDE 0.9 % SOLUTION: Performed by: NURSE PRACTITIONER

## 2024-02-24 RX ORDER — MAGNESIUM SULFATE HEPTAHYDRATE 40 MG/ML
2 INJECTION, SOLUTION INTRAVENOUS
Status: COMPLETED | OUTPATIENT
Start: 2024-02-24 | End: 2024-02-25

## 2024-02-24 RX ORDER — BISACODYL 5 MG/1
5 TABLET, DELAYED RELEASE ORAL DAILY PRN
Status: DISCONTINUED | OUTPATIENT
Start: 2024-02-24 | End: 2024-02-25 | Stop reason: HOSPADM

## 2024-02-24 RX ORDER — SODIUM CHLORIDE 0.9 % (FLUSH) 0.9 %
10 SYRINGE (ML) INJECTION EVERY 12 HOURS SCHEDULED
Status: DISCONTINUED | OUTPATIENT
Start: 2024-02-24 | End: 2024-02-25 | Stop reason: HOSPADM

## 2024-02-24 RX ORDER — SODIUM CHLORIDE 9 MG/ML
40 INJECTION, SOLUTION INTRAVENOUS AS NEEDED
Status: DISCONTINUED | OUTPATIENT
Start: 2024-02-24 | End: 2024-02-25 | Stop reason: HOSPADM

## 2024-02-24 RX ORDER — ONDANSETRON 2 MG/ML
4 INJECTION INTRAMUSCULAR; INTRAVENOUS ONCE
Status: COMPLETED | OUTPATIENT
Start: 2024-02-24 | End: 2024-02-24

## 2024-02-24 RX ORDER — ONDANSETRON 4 MG/1
4 TABLET, ORALLY DISINTEGRATING ORAL EVERY 6 HOURS PRN
Status: DISCONTINUED | OUTPATIENT
Start: 2024-02-24 | End: 2024-02-25 | Stop reason: HOSPADM

## 2024-02-24 RX ORDER — BISACODYL 10 MG
10 SUPPOSITORY, RECTAL RECTAL DAILY PRN
Status: DISCONTINUED | OUTPATIENT
Start: 2024-02-24 | End: 2024-02-25 | Stop reason: HOSPADM

## 2024-02-24 RX ORDER — ACETAMINOPHEN 650 MG/1
650 SUPPOSITORY RECTAL EVERY 4 HOURS PRN
Status: DISCONTINUED | OUTPATIENT
Start: 2024-02-24 | End: 2024-02-25 | Stop reason: HOSPADM

## 2024-02-24 RX ORDER — POLYETHYLENE GLYCOL 3350 17 G/17G
17 POWDER, FOR SOLUTION ORAL DAILY PRN
Status: DISCONTINUED | OUTPATIENT
Start: 2024-02-24 | End: 2024-02-25 | Stop reason: HOSPADM

## 2024-02-24 RX ORDER — ACETAMINOPHEN 500 MG
1000 TABLET ORAL ONCE
Status: COMPLETED | OUTPATIENT
Start: 2024-02-24 | End: 2024-02-24

## 2024-02-24 RX ORDER — SODIUM CHLORIDE 0.9 % (FLUSH) 0.9 %
10 SYRINGE (ML) INJECTION AS NEEDED
Status: DISCONTINUED | OUTPATIENT
Start: 2024-02-24 | End: 2024-02-25 | Stop reason: HOSPADM

## 2024-02-24 RX ORDER — ONDANSETRON 2 MG/ML
4 INJECTION INTRAMUSCULAR; INTRAVENOUS EVERY 6 HOURS PRN
Status: DISCONTINUED | OUTPATIENT
Start: 2024-02-24 | End: 2024-02-25 | Stop reason: HOSPADM

## 2024-02-24 RX ORDER — ACETAMINOPHEN 325 MG/1
650 TABLET ORAL EVERY 4 HOURS PRN
Status: DISCONTINUED | OUTPATIENT
Start: 2024-02-24 | End: 2024-02-25 | Stop reason: HOSPADM

## 2024-02-24 RX ORDER — AMOXICILLIN 250 MG
2 CAPSULE ORAL 2 TIMES DAILY PRN
Status: DISCONTINUED | OUTPATIENT
Start: 2024-02-24 | End: 2024-02-25 | Stop reason: HOSPADM

## 2024-02-24 RX ORDER — NITROGLYCERIN 0.4 MG/1
0.4 TABLET SUBLINGUAL
Status: DISCONTINUED | OUTPATIENT
Start: 2024-02-24 | End: 2024-02-25 | Stop reason: HOSPADM

## 2024-02-24 RX ORDER — LEVOFLOXACIN 5 MG/ML
750 INJECTION, SOLUTION INTRAVENOUS ONCE
Status: DISCONTINUED | OUTPATIENT
Start: 2024-02-24 | End: 2024-02-24

## 2024-02-24 RX ORDER — ACETAMINOPHEN 160 MG/5ML
650 SOLUTION ORAL EVERY 4 HOURS PRN
Status: DISCONTINUED | OUTPATIENT
Start: 2024-02-24 | End: 2024-02-25 | Stop reason: HOSPADM

## 2024-02-24 RX ORDER — POTASSIUM CHLORIDE 7.45 MG/ML
10 INJECTION INTRAVENOUS
Status: COMPLETED | OUTPATIENT
Start: 2024-02-24 | End: 2024-02-25

## 2024-02-24 RX ADMIN — MAGNESIUM SULFATE HEPTAHYDRATE 2 G: 40 INJECTION, SOLUTION INTRAVENOUS at 22:38

## 2024-02-24 RX ADMIN — POTASSIUM CHLORIDE 10 MEQ: 7.46 INJECTION, SOLUTION INTRAVENOUS at 22:38

## 2024-02-24 RX ADMIN — ACETAMINOPHEN 1000 MG: 500 TABLET ORAL at 21:43

## 2024-02-24 RX ADMIN — SODIUM CHLORIDE 1000 ML: 9 INJECTION, SOLUTION INTRAVENOUS at 21:47

## 2024-02-24 RX ADMIN — SODIUM CHLORIDE 1000 ML: 9 INJECTION, SOLUTION INTRAVENOUS at 17:33

## 2024-02-24 RX ADMIN — ONDANSETRON 4 MG: 2 INJECTION INTRAMUSCULAR; INTRAVENOUS at 21:47

## 2024-02-24 RX ADMIN — Medication 10 ML: at 21:47

## 2024-02-24 NOTE — ED PROVIDER NOTES
MD ATTESTATION NOTE     SHARED VISIT: This visit was performed by BOTH a physician and an APC. The substantive portion of the medical decision making was performed by this attesting physician who made or approved the management plan and takes responsibility for patient management. All studies in the APC note (if performed) were independently interpreted by me.  The LUBA and I have discussed this patient's history, physical exam, and treatment plan. I have reviewed the documentation and personally had a face to face interaction with the patient. I affirm the documentation and agree with the treatment and plan. I provided a substantive portion of the care of the patient.  I personally performed the physical exam in its entirety, and below are my findings.      Brief HPI: Patient complains of weakness, nausea, and intermittent lightheadedness since yesterday.  Denies vomiting, fever, chest pain, shortness of breath, abdominal pain, diarrhea, or dysuria.  He has been able to ambulate with his walker.    PHYSICAL EXAM  ED Triage Vitals [02/24/24 1606]   Temp Heart Rate Resp BP SpO2   98.2 °F (36.8 °C) 74 16 150/89 97 %      Temp src Heart Rate Source Patient Position BP Location FiO2 (%)   -- -- -- -- --         GENERAL: Awake, alert, oriented x 3.  Nontoxic-appearing male.  Resting comfortably in no acute distress  HENT: nares patent, moist mucous membranes  EYES: no scleral icterus  CV: regular rhythm, normal rate  RESPIRATORY: normal effort, clear to auscultation bilaterally  ABDOMEN: soft, nondistended, nontender  MUSCULOSKELETAL: Extremities are nontender  NEURO: Speech is normal, no facial droop moves all extremities, follows commands  PSYCH:  calm, cooperative  SKIN: warm, dry    Vital signs and nursing notes reviewed.        Plan: Obtain labs, EKG, and imaging studies    EKG personally interpreted by me at 1713.  My personal interpretation is:          EKG time: 1708  Rhythm/Rate: Sinus rhythm, rate 74  P waves  and [place]: Normal  QRS, axis: Anterior and inferior Q waves  ST and T waves: Nonspecific ST/T wave changes in the anterior leads    Interpreted Contemporaneously by me, independently viewed  EKG is not significantly changed compared to prior EKG done on 4/14/2023    Chest x-ray personally interpreted by me.  My personal interpretation is: Heart size is normal.  Lungs are clear.  No pleural effusion.    ED Course as of 02/25/24 1640   Sat Feb 24, 2024   1711 WBC: 8.90 [MP]   1711 Hemoglobin(!): 12.8 [MP]   1812 Preliminary report per Dr. Linares is negative acute head CT [MP]   1843 pH, Venous(!): 7.484 [MP]   1843 pCO2, Venous(!): 28.4 [MP]   1843 pO2, Venous(!): 27.7 [MP]   2030 Pt care turned over to BERONICA Rowan, pending UA and disposition. [MP]   2030 Patient was turned over to me by Salud Hancock PA-C.  Pending: UA and disposition.   [AR]   2106 Ketones, UA(!): 80 mg/dL (3+) [AR]   2106 RBC, UA(!): 6-10 [AR]   2106 WBC, UA(!): 11-20 [AR]   2106 Bacteria, UA(!): 3+ [AR]   2121 Patient reassessed.  Discussed ED findings, differential diagnosis, and the need for admission for evaluation/treatment.  They are agreeable to admission and all questions were answered.     [AR]   2135 Lipase(!): 98 [AR]   2138 Phone call with Dr. Ray ([facility]).  Discussed the patient, relevant history, exam, diagnostics, ED findings/progress, and concerns. They agree to admit the patient to tele, obs. Care assumed by the admitting physician at this time.     [AR]   2139 Dr. Ray states to discontinue the abx, as they will observe the patient over night. The patient declines any urinary complaints. [AR]   2145 Patient requested medication to treat the headache, Tylenol ordered.  Primary RN advises when patient took Tylenol he began to dry heave.  Zofran ordered. [AR]      ED Course User Index  [AR] Constanza Vaughn APRN  [MP] Salud Hancock PA-C Holland, William D, MD  02/25/24 1640

## 2024-02-24 NOTE — ED PROVIDER NOTES
EMERGENCY DEPARTMENT ENCOUNTER  Room Number:  02/02  PCP: Provider, No Known  Independent Historians: Patient and EMS      HPI:  Chief Complaint: had concerns including Nausea and Weakness - Generalized.     A complete HPI/ROS/PMH/PSH/SH/FH are unobtainable due to: None    Chronic or social conditions impacting patient care (Social Determinants of Health): None      Context: Pro Mueller is a 69 y.o. male with a medical history of alcoholism in recovery, hypertension, hyperlipidemia, anxiety, depression, and orthostatic hypotension who presents to the ED c/o acute lightheadedness, nausea, headache, generalized weakness.  Patient reports he has had poor p.o. intake due to nausea for the past 2 to 3 days.  Unsure when he last ate.  Today after waking up, he felt lightheaded with generalized headache.  No known sick contacts.  He has been able to ambulate with his walker.  Patient is not anticoagulated.  Patient denies fever, syncope, chest pain, hematemesis, diarrhea, or any other systemic complaints.      Review of prior external notes (non-ED) -and- Review of prior external test results outside of this encounter:  Patient seen at urgent care on 11/16/2023 for right-sided otalgia.  Patient prescribed prednisone and Flonase.  Reviewed labs collected on 7/23/2023.  CBC with hemoglobin 11.6, CMP with creatinine 0.63.    Prescription drug monitoring program review:     N/A    PAST MEDICAL HISTORY  Active Ambulatory Problems     Diagnosis Date Noted    Fall 08/08/2016    Alcoholism in recovery 08/08/2016    Atopic rhinitis 08/08/2016    Mixed anxiety depressive disorder 08/08/2016    Genital herpes simplex 08/08/2016    Hyperlipidemia 08/08/2016    Hypertension 08/08/2016    Hypothyroidism 08/08/2016    Insomnia 08/08/2016    Panic disorder without agoraphobia 08/08/2016    Persistent insomnia 08/08/2016    Vitamin D deficiency 08/08/2016    Chronic low back pain 11/11/2016    Neuropathy involving both lower extremities  10/25/2018    QT prolongation 01/03/2019    Left shoulder pain 11/19/2019    Pancytopenia 01/14/2020    Left ventricular diastolic dysfunction 01/16/2021    Tremor 10/06/2021    C5 cervical fracture 11/09/2021    Syncope and collapse 01/07/2022    Neck pain 01/07/2022    Alcoholic intoxication with complication 03/13/2022    Withdrawal symptoms, alcohol 07/01/2022    Transaminitis 07/01/2022    Lumbar facet arthropathy 07/20/2022    Alcohol dependence with withdrawal 09/11/2022    Midline low back pain with right-sided sciatica 09/15/2022    Spondylolisthesis at L4-L5 level 09/15/2022    Lumbar canal stenosis 09/15/2022    Alcohol cessation counseling 09/15/2022    Need for assistance due to reduced mobility 09/15/2022    Impaired mobility and ADLs 09/15/2022    Alcohol withdrawal syndrome without complication 02/18/2023    Facial laceration 02/18/2023    Orthostatic hypotension 02/18/2023    Acute bacterial conjunctivitis of right eye 03/02/2023    Visit for suture removal 03/02/2023    Renal calculi 03/14/2023    Hepatic steatosis 03/15/2023    Alcohol withdrawal syndrome with complication 04/14/2023    Macrocytosis without anemia 04/14/2023    Lumbosacral spondylosis without myelopathy 05/05/2023    Elevated LFTs 05/13/2023    Alcohol-induced acute pancreatitis, unspecified complication status 06/23/2023     Resolved Ambulatory Problems     Diagnosis Date Noted    Impacted cerumen 08/08/2016    Influenza 08/08/2016    Motion sickness 08/08/2016    Seasonal allergic rhinitis 08/08/2016    Upper respiratory tract infection 08/08/2016    Alcohol withdrawal 11/04/2016    Headache 11/05/2016    Gastritis 11/05/2016    Olecranon bursitis of right elbow 04/05/2017    Sciatica of left side 06/12/2018    Syncope and collapse 01/02/2019    Nausea and vomiting 01/11/2020    Alcoholic ketoacidosis 01/12/2020    Hyponatremia 01/13/2020    Hypokalemia 01/13/2020    Alcohol dependence with uncomplicated withdrawal 01/14/2020     "Nephrolithiasis 2020    Hypomagnesemia 2021    Non-traumatic rhabdomyolysis 2021    Urine retention 2021    Epigastric pain 2023     Past Medical History:   Diagnosis Date    Alcohol abuse     Allergic 1970    Anxiety     Arthritis     Depression     Disease of thyroid gland     Elevated cholesterol     Encounter for removal of sutures     GERD (gastroesophageal reflux disease)     Headache, tension-type     Kidney stone     Low back pain     Migraine     Olecranon bursitis, right elbow     Peripheral neuropathy     Sleep apnea          PAST SURGICAL HISTORY  Past Surgical History:   Procedure Laterality Date    COLONOSCOPY      CYST REMOVAL      CYSTOSCOPY BLADDER STONE LITHOTRIPSY  2022    EXTRACORPOREAL SHOCK WAVE LITHOTRIPSY (ESWL) Right 2002    SHOULDER ARTHROSCOPY Right 2019    Procedure: SHOULDER ARTHROSCOPY, decompression, distal clavicle excision;  Surgeon: Aj Mancilla MD;  Location: [facility] [place] [facility] [place]  Service: Orthopedics    TONSILLECTOMY           FAMILY HISTORY  Family History   Problem Relation Age of Onset    Alzheimer's disease Mother     Mental illness Mother     Pancreatic cancer Father     Hearing loss Father     Arthritis Maternal Grandmother     Malig Hyperthermia Neg Hx          SOCIAL HISTORY  Social History     Socioeconomic History    Marital status: Single   Tobacco Use    Smoking status: Former     Packs/day: 0.50     Years: 15.00     Additional pack years: 0.00     Total pack years: 7.50     Types: Cigarettes     Start date: 1990     Quit date: 2005     Years since quittin.1    Smokeless tobacco: Never    Tobacco comments:     caffeine - 3 cans of coke daily    Vaping Use    Vaping Use: Never used   Substance and Sexual Activity    Alcohol use: Yes     Alcohol/week: 7.0 standard drinks of alcohol     Types: 7 Shots of liquor per week     Comment: .5 liter vodka    Drug use: Yes     Types: Methamphetamines     Comment: \"once in a " "rare while\"    Sexual activity: Yes     Partners: Female     Birth control/protection: Condom         ALLERGIES  Penicillins      REVIEW OF SYSTEMS  Review of Systems   Constitutional:  Negative for chills and fever.   HENT:  Negative for ear pain and sore throat.    Respiratory:  Negative for cough and shortness of breath.    Cardiovascular:  Negative for chest pain and palpitations.   Gastrointestinal:  Positive for nausea. Negative for abdominal pain and vomiting.   Genitourinary:  Negative for dysuria and hematuria.   Musculoskeletal:  Negative for arthralgias and joint swelling.   Skin:  Negative for pallor and rash.   Neurological:  Positive for weakness and light-headedness. Negative for syncope and headaches.   Psychiatric/Behavioral:  Negative for confusion and hallucinations.      Included in HPI  All systems reviewed and negative except for those discussed in HPI.      PHYSICAL EXAM    I have reviewed the triage vital signs and nursing notes.    ED Triage Vitals [02/24/24 1606]   Temp Heart Rate Resp BP SpO2   98.2 °F (36.8 °C) 74 16 150/89 97 %      Temp src Heart Rate Source Patient Position BP Location FiO2 (%)   -- -- -- -- --       Physical Exam  Constitutional:       General: He is not in acute distress.     Appearance: Normal appearance.   HENT:      Head: Normocephalic and atraumatic.      Nose: Nose normal.      Mouth/Throat:      Mouth: Mucous membranes are moist.   Eyes:      Conjunctiva/sclera: Conjunctivae normal.      Pupils: Pupils are equal, round, and reactive to light.   Cardiovascular:      Rate and Rhythm: Normal rate and regular rhythm.      Pulses: Normal pulses.      Heart sounds: Normal heart sounds.      Comments: Distal pulses intact  Pulmonary:      Effort: Pulmonary effort is normal.      Breath sounds: Normal breath sounds.   Abdominal:      General: There is no distension.   Musculoskeletal:         General: Normal range of motion.      Cervical back: Normal range of motion " and neck supple.   Skin:     General: Skin is warm.      Capillary Refill: Capillary refill takes less than 2 seconds.   Neurological:      General: No focal deficit present.      Mental Status: He is alert and oriented to person, place, and time.   Psychiatric:         Mood and Affect: Mood normal.           LAB RESULTS  Recent Results (from the past 24 hour(s))   Respiratory Panel PCR w/COVID-19(SARS-CoV-2) NORAH/ALLEN/WIL/PAD/COR/REGINALDO In-House, NP Swab in UTM/VTM, 2 HR TAT - Swab, Nasopharynx    Collection Time: 02/24/24  4:45 PM    Specimen: Nasopharynx; Swab   Result Value Ref Range    ADENOVIRUS, PCR Not Detected Not Detected    Coronavirus 229E Not Detected Not Detected    Coronavirus HKU1 Not Detected Not Detected    Coronavirus NL63 Not Detected Not Detected    Coronavirus OC43 Not Detected Not Detected    COVID19 Not Detected Not Detected - Ref. Range    Human Metapneumovirus Not Detected Not Detected    Human Rhinovirus/Enterovirus Not Detected Not Detected    Influenza A PCR Not Detected Not Detected    Influenza B PCR Not Detected Not Detected    Parainfluenza Virus 1 Not Detected Not Detected    Parainfluenza Virus 2 Not Detected Not Detected    Parainfluenza Virus 3 Not Detected Not Detected    Parainfluenza Virus 4 Not Detected Not Detected    RSV, PCR Not Detected Not Detected    Bordetella pertussis pcr Not Detected Not Detected    Bordetella parapertussis PCR Not Detected Not Detected    Chlamydophila pneumoniae PCR Not Detected Not Detected    Mycoplasma pneumo by PCR Not Detected Not Detected   Comprehensive Metabolic Panel    Collection Time: 02/24/24  4:50 PM    Specimen: Blood   Result Value Ref Range    Glucose 103 (H) 65 - 99 mg/dL    BUN 8 8 - 23 mg/dL    Creatinine 0.71 (L) 0.76 - 1.27 mg/dL    Sodium 131 (L) 136 - 145 mmol/L    Potassium 3.4 (L) 3.5 - 5.2 mmol/L    Chloride 86 (L) 98 - 107 mmol/L    CO2 21.3 (L) 22.0 - 29.0 mmol/L    Calcium 8.6 8.6 - 10.5 mg/dL    Total Protein 7.2 6.0 - 8.5  g/dL    Albumin 4.2 3.5 - 5.2 g/dL    ALT (SGPT) 13 1 - 41 U/L    AST (SGOT) 89 (H) 1 - 40 U/L    Alkaline Phosphatase 119 (H) 39 - 117 U/L    Total Bilirubin 1.1 0.0 - 1.2 mg/dL    Globulin 3.0 gm/dL    A/G Ratio 1.4 g/dL    BUN/Creatinine Ratio 11.3 7.0 - 25.0    Anion Gap 23.7 (H) 5.0 - 15.0 mmol/L    eGFR 100.6 >60.0 mL/min/1.73   Lipase    Collection Time: 02/24/24  4:50 PM    Specimen: Blood   Result Value Ref Range    Lipase 98 (H) 13 - 60 U/L   BNP    Collection Time: 02/24/24  4:50 PM    Specimen: Blood   Result Value Ref Range    proBNP 46.4 0.0 - 900.0 pg/mL   Single High Sensitivity Troponin T    Collection Time: 02/24/24  4:50 PM    Specimen: Blood   Result Value Ref Range    HS Troponin T 12 <22 ng/L   Ammonia    Collection Time: 02/24/24  4:50 PM    Specimen: Blood   Result Value Ref Range    Ammonia 25 16 - 60 umol/L   Magnesium    Collection Time: 02/24/24  4:50 PM    Specimen: Blood   Result Value Ref Range    Magnesium 1.4 (L) 1.6 - 2.4 mg/dL   Ethanol    Collection Time: 02/24/24  4:50 PM    Specimen: Blood   Result Value Ref Range    Ethanol <10 0 - 10 mg/dL    Ethanol % <0.010 %   CBC Auto Differential    Collection Time: 02/24/24  4:50 PM    Specimen: Blood   Result Value Ref Range    WBC 8.90 3.40 - 10.80 10*3/mm3    RBC 3.82 (L) 4.14 - 5.80 10*6/mm3    Hemoglobin 12.8 (L) 13.0 - 17.7 g/dL    Hematocrit 37.1 (L) 37.5 - 51.0 %    MCV 97.1 (H) 79.0 - 97.0 fL    MCH 33.5 (H) 26.6 - 33.0 pg    MCHC 34.5 31.5 - 35.7 g/dL    RDW 13.6 12.3 - 15.4 %    RDW-SD 47.4 37.0 - 54.0 fl    MPV 9.5 6.0 - 12.0 fL    Platelets 221 140 - 450 10*3/mm3    Neutrophil % 78.0 (H) 42.7 - 76.0 %    Lymphocyte % 10.2 (L) 19.6 - 45.3 %    Monocyte % 11.0 5.0 - 12.0 %    Eosinophil % 0.2 (L) 0.3 - 6.2 %    Basophil % 0.2 0.0 - 1.5 %    Immature Grans % 0.4 0.0 - 0.5 %    Neutrophils, Absolute 6.93 1.70 - 7.00 10*3/mm3    Lymphocytes, Absolute 0.91 0.70 - 3.10 10*3/mm3    Monocytes, Absolute 0.98 (H) 0.10 - 0.90 10*3/mm3     Eosinophils, Absolute 0.02 0.00 - 0.40 10*3/mm3    Basophils, Absolute 0.02 0.00 - 0.20 10*3/mm3    Immature Grans, Absolute 0.04 0.00 - 0.05 10*3/mm3    nRBC 0.0 0.0 - 0.2 /100 WBC   ECG 12 Lead Other; Dizzy    Collection Time: 02/24/24  5:08 PM   Result Value Ref Range    QT Interval 389 ms    QTC Interval 432 ms   Blood Gas, Venous -    Collection Time: 02/24/24  6:24 PM    Specimen: Venous Blood   Result Value Ref Range    Site Right Brachial     pH, Venous 7.484 (H) 7.310 - 7.410 pH Units    pCO2, Venous 28.4 (L) 41.0 - 51.0 mm Hg    pO2, Venous 27.7 (L) 35.0 - 45.0 mm Hg    HCO3, Venous 21.3 (L) 22.0 - 28.0 mmol/L    Base Excess, Venous -1.2 -2.0 - 2.0 mmol/L    O2 Saturation, Venous 58.9 45.0 - 75.0 %    CO2 Content 22.2 (L) 23 - 27 mmol/L    Barometric Pressure for Blood Gas 752.3000 mmHg    Modality Room Air     FIO2 21 %    Rate 16 Breaths/minute    Device Comment [id number]          RADIOLOGY  CT Head Without Contrast    Result Date: 2/24/2024  EMERGENCY CT SCAN OF THE HEAD WITHOUT CONTRAST ON 02/24/2024  CLINICAL HISTORY: Headache.  TECHNIQUE: Spiral CT images were obtained from the base of the skull to the vertex without intravenous contrast. The images were reformatted and are submitted in 3 mm thick axial, sagittal and coronal CT sections with brain algorithm.  COMPARISON: This is correlated to a prior head CT from [facility] on 02/18/2023, 1 year ago..  FINDINGS: There is some mild patchy low-density in the periventricular white matter consistent with mild small vessel disease. There is mild cerebral atrophy. The ventricles are normal in size. I see no focal mass effect and no midline shift and no extra-axial fluid collections are identified. There is no evidence of acute intracranial hemorrhage. The calvarium and the skull base are normal in appearance. The paranasal sinuses and the mastoid air cells and middle ear cavities are clear. The orbits are unremarkable.      1. No  acute intracranial abnormality is identified with no significant change when compared to prior head CT from [facility] on 02/18/2023, 1 year ago  2. There is mild small vessel disease in the cerebral white matter and there is mild diffuse cerebral atrophy and there are calcified plaques in the intracranial segments of the distal vertebral arteries and cavernous segments of the internal carotid arteries bilaterally. The remainder of the head CT is within normal limits.  Radiation dose reduction techniques were utilized, including automated exposure control and exposure modulation based on body size.   This report was finalized on 2/24/2024 6:55 PM by Dr. Justino Linares M.D on Workstation: [id number]      XR Chest 1 View    Result Date: 2/24/2024  Emergency portable view of the chest on 2/24/2024  CLINICAL HISTORY: The patient has generalized weakness. Patient personally reports having some chest pain and not feeling well  This is correlated to a prior chest CT on 7/19/2023 and a chest x-ray on 7/19/2023..  FINDINGS: There is mild rightward convexity of the middle mediastinum likely due to an ectatic ascending aorta. The cardiomediastinal silhouette is otherwise within normal limits. The pulmonary vasculature is within normal limits. The lungs are clear. The costophrenic angles are sharp.      1. No active disease is seen in the chest with no significant change when compared to the patient's prior chest x-ray on 7/19/2023.  This report was finalized on 2/24/2024 5:37 PM by Dr. Justino Linares M.D on Workstation: [id number]         MEDICATIONS GIVEN IN ER  Medications   sodium chloride 0.9 % flush 10 mL (has no administration in time range)   sodium chloride 0.9 % bolus 1,000 mL (0 mL Intravenous Stopped 2/24/24 1916)         ORDERS PLACED DURING THIS VISIT:  Orders Placed This Encounter   Procedures    Respiratory Panel PCR w/COVID-19(SARS-CoV-2) NORAH/ALLEN/WIL/PAD/COR/REGINALDO In-House, NP Swab in UTM/VTM, 2 HR  TAT - Swab, Nasopharynx    CT Head Without Contrast    XR Chest 1 View    Comprehensive Metabolic Panel    Lipase    Urinalysis With Microscopic If Indicated (No Culture) - Urine, Clean Catch    BNP    Single High Sensitivity Troponin T    Ammonia    Magnesium    Ethanol    Urine Drug Screen - Urine, Clean Catch    CBC Auto Differential    Blood Gas, Venous -    Discontinue Patient Isolation    ECG 12 Lead Other; Dizzy    Insert Peripheral IV    CBC & Differential         OUTPATIENT MEDICATION MANAGEMENT:  Current Facility-Administered Medications Ordered in Epic   Medication Dose Route Frequency Provider Last Rate Last Admin    sodium chloride 0.9 % flush 10 mL  10 mL Intravenous PRN Salud Hancock PA-C         Current Outpatient Medications Ordered in Epic   Medication Sig Dispense Refill    amLODIPine (NORVASC) 5 MG tablet Take 1 tablet by mouth Every Morning. 90 tablet 1    atorvastatin (LIPITOR) 20 MG tablet Take 1 tablet by mouth Daily. 90 tablet 1    fluticasone (FLONASE) 50 MCG/ACT nasal spray 2 sprays into the nostril(s) as directed by provider Daily. 11.1 mL 0    levothyroxine (SYNTHROID, LEVOTHROID) 100 MCG tablet Take 1 tablet by mouth Daily. 90 tablet 1    lisinopril (PRINIVIL,ZESTRIL) 10 MG tablet Take 1 tablet by mouth Daily. 90 tablet 1    pseudoephedrine (Sudafed) 30 MG tablet Take 1 tablet by mouth 3 times a day. 20 tablet 0    traMADol (ULTRAM) 50 MG tablet Take 1 tablet by mouth Every 6 (Six) Hours As Needed for Moderate Pain.             PROGRESS, DATA ANALYSIS, CONSULTS, AND MEDICAL DECISION MAKING  All labs have been independently interpreted by me.  All radiology studies have been reviewed by me. All EKG's have been independently viewed and interpreted by me.  Discussion below represents my analysis of pertinent findings related to patient's condition, differential diagnosis, treatment plan and final disposition.    Differential diagnosis includes but is not limited to electrolyte  deficiency, gastroenteritis, COVID infection, starvation ketoacidosis.        ED Course as of 02/25/24 1123   Sat Feb 24, 2024   1711 WBC: 8.90 [MP]   1711 Hemoglobin(!): 12.8 [MP]   1812 Preliminary report per Dr. Linares is negative acute head CT [MP]   1843 pH, Venous(!): 7.484 [MP]   1843 pCO2, Venous(!): 28.4 [MP]   1843 pO2, Venous(!): 27.7 [MP]   2030 Pt care turned over to BERONICA Rowan, pending UA and disposition. [MP]   2030 Patient was turned over to me by Salud Hancock PA-C.  Pending: UA and disposition.   [AR]   2106 Ketones, UA(!): 80 mg/dL (3+) [AR]   2106 RBC, UA(!): 6-10 [AR]   2106 WBC, UA(!): 11-20 [AR]   2106 Bacteria, UA(!): 3+ [AR]   2121 Patient reassessed.  Discussed ED findings, differential diagnosis, and the need for admission for evaluation/treatment.  They are agreeable to admission and all questions were answered.     [AR]   2135 Lipase(!): 98 [AR]   2138 Phone call with Dr. Ray ([facility]).  Discussed the patient, relevant history, exam, diagnostics, ED findings/progress, and concerns. They agree to admit the patient to tele, obs. Care assumed by the admitting physician at this time.     [AR]   2139 Dr. Ray states to discontinue the abx, as they will observe the patient over night. The patient declines any urinary complaints. [AR]   2145 Patient requested medication to treat the headache, Tylenol ordered.  Primary RN advises when patient took Tylenol he began to dry heave.  Zofran ordered. [AR]      ED Course User Index  [AR] Constanza Vaughn APRN  [MP] Salud Hancock PA-C             AS OF 20:22 EST VITALS:    BP - 118/75  HR - 76  TEMP - 98.2 °F (36.8 °C)  O2 SATS - 95%    COMPLEXITY OF CARE  The patient requires admission.      DIAGNOSIS  Final diagnoses:   Generalized weakness   Poor appetite   Hypokalemia   Acute nonintractable headache, unspecified headache type         DISPOSITION  ED Disposition       ED Disposition   Decision to Admit    Condition   --    Comment    Level of Care: Telemetry [5]   Diagnosis: Dehydration [276.51.ICD-9-CM]   Admitting Physician: ASTRID WADE [[id number]]   Bed Request Comments: [facility] observation                  Please note that portions of this document were completed with a voice recognition program.    Note Disclaimer: At [facility], we believe that sharing information builds trust and better relationships. You are receiving this note because you recently visited [facility]. It is possible you will see health information before a provider has talked with you about it. This kind of information can be easy to misunderstand. To help you fully understand what it means for your health, we urge you to discuss this note with your provider.         Salud Hancock PA-C  02/25/24 1124

## 2024-02-24 NOTE — ED TRIAGE NOTES
Pt to ED via EMS with complaints of weakness, light headed and nauseous, pt states this started yesterday evening. Denies any vomiting or fevers.

## 2024-02-25 VITALS
SYSTOLIC BLOOD PRESSURE: 114 MMHG | TEMPERATURE: 97.5 F | HEIGHT: 72 IN | BODY MASS INDEX: 25.06 KG/M2 | RESPIRATION RATE: 18 BRPM | DIASTOLIC BLOOD PRESSURE: 74 MMHG | HEART RATE: 70 BPM | OXYGEN SATURATION: 100 % | WEIGHT: 185 LBS

## 2024-02-25 LAB
ALBUMIN SERPL-MCNC: 3.4 G/DL (ref 3.5–5.2)
ALBUMIN/GLOB SERPL: 1.5 G/DL
ALP SERPL-CCNC: 90 U/L (ref 39–117)
ALT SERPL W P-5'-P-CCNC: 9 U/L (ref 1–41)
ANION GAP SERPL CALCULATED.3IONS-SCNC: 13.5 MMOL/L (ref 5–15)
AST SERPL-CCNC: 58 U/L (ref 1–40)
BASOPHILS # BLD AUTO: 0.02 10*3/MM3 (ref 0–0.2)
BASOPHILS NFR BLD AUTO: 0.3 % (ref 0–1.5)
BILIRUB SERPL-MCNC: 0.9 MG/DL (ref 0–1.2)
BUN SERPL-MCNC: 6 MG/DL (ref 8–23)
BUN/CREAT SERPL: 11.3 (ref 7–25)
CALCIUM SPEC-SCNC: 7.4 MG/DL (ref 8.6–10.5)
CHLORIDE SERPL-SCNC: 97 MMOL/L (ref 98–107)
CO2 SERPL-SCNC: 24.5 MMOL/L (ref 22–29)
CREAT SERPL-MCNC: 0.53 MG/DL (ref 0.76–1.27)
DEPRECATED RDW RBC AUTO: 46.7 FL (ref 37–54)
EGFRCR SERPLBLD CKD-EPI 2021: 109.8 ML/MIN/1.73
EOSINOPHIL # BLD AUTO: 0.05 10*3/MM3 (ref 0–0.4)
EOSINOPHIL NFR BLD AUTO: 0.8 % (ref 0.3–6.2)
ERYTHROCYTE [DISTWIDTH] IN BLOOD BY AUTOMATED COUNT: 13.4 % (ref 12.3–15.4)
GLOBULIN UR ELPH-MCNC: 2.3 GM/DL
GLUCOSE SERPL-MCNC: 88 MG/DL (ref 65–99)
HCT VFR BLD AUTO: 31.2 % (ref 37.5–51)
HGB BLD-MCNC: 11.2 G/DL (ref 13–17.7)
IMM GRANULOCYTES # BLD AUTO: 0.09 10*3/MM3 (ref 0–0.05)
IMM GRANULOCYTES NFR BLD AUTO: 1.4 % (ref 0–0.5)
LYMPHOCYTES # BLD AUTO: 0.9 10*3/MM3 (ref 0.7–3.1)
LYMPHOCYTES NFR BLD AUTO: 13.9 % (ref 19.6–45.3)
MAGNESIUM SERPL-MCNC: 2.1 MG/DL (ref 1.6–2.4)
MAGNESIUM SERPL-MCNC: 2.3 MG/DL (ref 1.6–2.4)
MCH RBC QN AUTO: 34.6 PG (ref 26.6–33)
MCHC RBC AUTO-ENTMCNC: 35.9 G/DL (ref 31.5–35.7)
MCV RBC AUTO: 96.3 FL (ref 79–97)
MONOCYTES # BLD AUTO: 0.63 10*3/MM3 (ref 0.1–0.9)
MONOCYTES NFR BLD AUTO: 9.7 % (ref 5–12)
NEUTROPHILS NFR BLD AUTO: 4.8 10*3/MM3 (ref 1.7–7)
NEUTROPHILS NFR BLD AUTO: 73.9 % (ref 42.7–76)
NRBC BLD AUTO-RTO: 0 /100 WBC (ref 0–0.2)
PHOSPHATE SERPL-MCNC: 2.4 MG/DL (ref 2.5–4.5)
PLATELET # BLD AUTO: 199 10*3/MM3 (ref 140–450)
PMV BLD AUTO: 9.4 FL (ref 6–12)
POTASSIUM SERPL-SCNC: 3.6 MMOL/L (ref 3.5–5.2)
POTASSIUM SERPL-SCNC: 3.6 MMOL/L (ref 3.5–5.2)
PROT SERPL-MCNC: 5.7 G/DL (ref 6–8.5)
RBC # BLD AUTO: 3.24 10*6/MM3 (ref 4.14–5.8)
SODIUM SERPL-SCNC: 135 MMOL/L (ref 136–145)
WBC NRBC COR # BLD AUTO: 6.49 10*3/MM3 (ref 3.4–10.8)

## 2024-02-25 PROCEDURE — 83735 ASSAY OF MAGNESIUM: CPT | Performed by: INTERNAL MEDICINE

## 2024-02-25 PROCEDURE — 85025 COMPLETE CBC W/AUTO DIFF WBC: CPT | Performed by: INTERNAL MEDICINE

## 2024-02-25 PROCEDURE — 96375 TX/PRO/DX INJ NEW DRUG ADDON: CPT

## 2024-02-25 PROCEDURE — 25010000002 MAGNESIUM SULFATE 2 GM/50ML SOLUTION: Performed by: INTERNAL MEDICINE

## 2024-02-25 PROCEDURE — 80053 COMPREHEN METABOLIC PANEL: CPT | Performed by: INTERNAL MEDICINE

## 2024-02-25 PROCEDURE — 84132 ASSAY OF SERUM POTASSIUM: CPT | Performed by: INTERNAL MEDICINE

## 2024-02-25 PROCEDURE — 84100 ASSAY OF PHOSPHORUS: CPT | Performed by: INTERNAL MEDICINE

## 2024-02-25 PROCEDURE — 25010000002 SODIUM CHLORIDE 0.9 % WITH KCL 20 MEQ 20-0.9 MEQ/L-% SOLUTION: Performed by: INTERNAL MEDICINE

## 2024-02-25 PROCEDURE — 96366 THER/PROPH/DIAG IV INF ADDON: CPT

## 2024-02-25 PROCEDURE — G0378 HOSPITAL OBSERVATION PER HR: HCPCS

## 2024-02-25 PROCEDURE — 25010000002 POTASSIUM CHLORIDE 10 MEQ/100ML SOLUTION: Performed by: INTERNAL MEDICINE

## 2024-02-25 PROCEDURE — 25010000002 THIAMINE HCL 200 MG/2ML SOLUTION: Performed by: INTERNAL MEDICINE

## 2024-02-25 PROCEDURE — 96368 THER/DIAG CONCURRENT INF: CPT

## 2024-02-25 RX ORDER — LORAZEPAM 2 MG/ML
1 INJECTION INTRAMUSCULAR
Status: DISCONTINUED | OUTPATIENT
Start: 2024-02-25 | End: 2024-02-25 | Stop reason: HOSPADM

## 2024-02-25 RX ORDER — LORAZEPAM 1 MG/1
2 TABLET ORAL
Status: DISCONTINUED | OUTPATIENT
Start: 2024-02-25 | End: 2024-02-25 | Stop reason: HOSPADM

## 2024-02-25 RX ORDER — LORAZEPAM 2 MG/ML
2 INJECTION INTRAMUSCULAR
Status: DISCONTINUED | OUTPATIENT
Start: 2024-02-25 | End: 2024-02-25 | Stop reason: HOSPADM

## 2024-02-25 RX ORDER — LORAZEPAM 1 MG/1
1 TABLET ORAL
Status: DISCONTINUED | OUTPATIENT
Start: 2024-02-25 | End: 2024-02-25 | Stop reason: HOSPADM

## 2024-02-25 RX ORDER — SODIUM CHLORIDE AND POTASSIUM CHLORIDE 150; 900 MG/100ML; MG/100ML
125 INJECTION, SOLUTION INTRAVENOUS CONTINUOUS
Status: DISCONTINUED | OUTPATIENT
Start: 2024-02-25 | End: 2024-02-25 | Stop reason: HOSPADM

## 2024-02-25 RX ORDER — SODIUM CHLORIDE 9 MG/ML
100 INJECTION, SOLUTION INTRAVENOUS CONTINUOUS
Status: DISCONTINUED | OUTPATIENT
Start: 2024-02-25 | End: 2024-02-25

## 2024-02-25 RX ORDER — AMLODIPINE BESYLATE 5 MG/1
5 TABLET ORAL EVERY MORNING
Status: DISCONTINUED | OUTPATIENT
Start: 2024-02-25 | End: 2024-02-25

## 2024-02-25 RX ORDER — LEVOTHYROXINE SODIUM 0.1 MG/1
100 TABLET ORAL DAILY
Status: DISCONTINUED | OUTPATIENT
Start: 2024-02-25 | End: 2024-02-25 | Stop reason: HOSPADM

## 2024-02-25 RX ORDER — TRAMADOL HYDROCHLORIDE 50 MG/1
50 TABLET ORAL EVERY 6 HOURS PRN
Status: DISCONTINUED | OUTPATIENT
Start: 2024-02-25 | End: 2024-02-25 | Stop reason: HOSPADM

## 2024-02-25 RX ORDER — ATORVASTATIN CALCIUM 20 MG/1
20 TABLET, FILM COATED ORAL DAILY
Status: DISCONTINUED | OUTPATIENT
Start: 2024-02-25 | End: 2024-02-25 | Stop reason: HOSPADM

## 2024-02-25 RX ORDER — THIAMINE HYDROCHLORIDE 100 MG/ML
200 INJECTION, SOLUTION INTRAMUSCULAR; INTRAVENOUS EVERY 8 HOURS SCHEDULED
Status: DISCONTINUED | OUTPATIENT
Start: 2024-02-25 | End: 2024-02-25 | Stop reason: HOSPADM

## 2024-02-25 RX ORDER — FOLIC ACID 1 MG/1
1 TABLET ORAL DAILY
Status: DISCONTINUED | OUTPATIENT
Start: 2024-02-25 | End: 2024-02-25 | Stop reason: HOSPADM

## 2024-02-25 RX ORDER — LISINOPRIL 10 MG/1
10 TABLET ORAL DAILY
Status: DISCONTINUED | OUTPATIENT
Start: 2024-02-25 | End: 2024-02-25

## 2024-02-25 RX ADMIN — LEVOTHYROXINE SODIUM 100 MCG: 100 TABLET ORAL at 08:29

## 2024-02-25 RX ADMIN — MAGNESIUM SULFATE HEPTAHYDRATE 2 G: 40 INJECTION, SOLUTION INTRAVENOUS at 03:04

## 2024-02-25 RX ADMIN — POTASSIUM CHLORIDE 10 MEQ: 7.46 INJECTION, SOLUTION INTRAVENOUS at 03:04

## 2024-02-25 RX ADMIN — SODIUM CHLORIDE 100 ML/HR: 9 INJECTION, SOLUTION INTRAVENOUS at 01:11

## 2024-02-25 RX ADMIN — THIAMINE HYDROCHLORIDE 200 MG: 100 INJECTION, SOLUTION INTRAMUSCULAR; INTRAVENOUS at 05:05

## 2024-02-25 RX ADMIN — POTASSIUM CHLORIDE AND SODIUM CHLORIDE 125 ML/HR: 900; 150 INJECTION, SOLUTION INTRAVENOUS at 05:05

## 2024-02-25 RX ADMIN — POTASSIUM CHLORIDE 10 MEQ: 7.46 INJECTION, SOLUTION INTRAVENOUS at 02:17

## 2024-02-25 RX ADMIN — POTASSIUM CHLORIDE 10 MEQ: 7.46 INJECTION, SOLUTION INTRAVENOUS at 00:06

## 2024-02-25 RX ADMIN — ATORVASTATIN CALCIUM 20 MG: 20 TABLET, FILM COATED ORAL at 08:29

## 2024-02-25 RX ADMIN — FOLIC ACID 1 MG: 1 TABLET ORAL at 08:29

## 2024-02-25 RX ADMIN — MAGNESIUM SULFATE HEPTAHYDRATE 2 G: 40 INJECTION, SOLUTION INTRAVENOUS at 00:37

## 2024-02-25 RX ADMIN — Medication 10 ML: at 09:25

## 2024-02-25 NOTE — PLAN OF CARE
Goal Outcome Evaluation:  Plan of Care Reviewed With: patient        Progress: no change  Outcome Evaluation: Pt tolerating full liquids well. no nausea/vomiting reported. Potassium and magnesium replaced per IV overnight.

## 2024-02-25 NOTE — PLAN OF CARE
Goal Outcome Evaluation:  Patient discharging to home.  PIV removed.  Patient does not want to sign discharge paperwork.  Patient says he is going to call an [employer] for transportation.  DC summary reviewed with the patient.  All questions answered.                                               no

## 2024-02-25 NOTE — SIGNIFICANT NOTE
02/25/24 1037   OTHER   Discipline physical therapist   Rehab Time/Intention   Session Not Performed other (see comments)  (Patient declines PT needs at this time and reports being at [facility] with his mobility. PT will sign off at this time.)

## 2024-02-25 NOTE — DISCHARGE SUMMARY
[facility]  [phone]    DISCHARGE SUMMARY  [facility]    Patient Identification:  Name: Pro Mueller  Age: 69 y.o.  Sex: male  :  1955  MRN: [id number]  Primary Care Physician: Provider, No Known    Admit date: 2024  Discharge date and time: 2024     Discharge Diagnoses:  Dehydration    Hyperlipidemia    Hypertension    Hypothyroidism    Hepatic steatosis       History of present illness from H&P:    Ervin Estrella os a 69 y o male with a history of ETOH abuse in remission, depression, HLD, HTN, hypothyroidism, peripheral neuropathy, and sleep apnea who presents to [facility] today complaining of 18-hour history of nausea and vomiting.  He started feeling lightheaded, extremely weak, needing to use a walker to ambulate, treating prompting him to report to the emergency room.  He denies any known sick contacts, or other acute distress chest pain, palpitations, syncope, fever, chills, cough, abdominal pain, diarrhea, or  symptoms.  Upon exam he is awake alert and oriented in no acute distress, he states he does feel somewhat better since receiving nausea medication in the ER.  His lungs are diminished bilaterally, heart rate and rhythm are regular, normal, he has +2 palpable peripheral pulses, his abdomen is soft, round, nondistended nontender, with hyperactive bowel sounds throughout.     Initial evaluation in the emergency room today for 6 Tramake with sodium of 131, potassium 3.4, gap is 23.7 today, his AST is 89, urinalysis shows 3+ ketones, 1+ protein, 3+ bilirubin, 11-20 WBCs, 3+ bacteria, negative nitrates, negative leukocytes.  He does not have any  symptoms, will hold off on antibiotics for now.  Is unremarkable for any acute cardiopulmonary processes  CT scan of the head unremarkable for any acute intracranial abnormalities.  There is noted mild small vessel disease in the cerebral white matter.  Mild diffuse cerebral  atrophy and calcified plaques into intracranial segments of the distal vertebral arteries.  EKG interpreted as sinus rhythm, heart rate 74, with no obvious ischemia.  Administered 1000 mg acetaminophen, 4 mg ondansetron, 2 L normal saline 2 g magnesium sulfate, 10 mEq of potassium chloride emergency room.       Hospital Course:     The patient answers all my questions very appropriately, he is oriented to his situation.  He very clearly is able to tell me the details of what brought him to the hospital.  He states his sister forced him to come to the hospital.    When I mention about any physical therapy he declines physical therapy.  When I ask him about talking to someone about alcohol related concerns patient declines.  He was willing to stand up for me at the bedside.  He stands reasonably well.  Nurse reports she is also been able to get to the bathroom and back with minimal assistance.    I am concerned about possible withdrawal.  I recommend patient stay 1 more day in the hospital patient.  Patient is understand possibility death from alcohol withdrawal.  And he has signed out AGAINST MEDICAL ADVICE.    His magnesium this morning is improved.  His phosphorus level is low but should improve with increased oral intake.    Recommended the patient to quit drinking.      The patient was seen and examined on the day of discharge.    Consults:   Consults       No orders found for last 30 day(s).            Results from last 7 days   Lab Units 02/25/24  0710   WBC 10*3/mm3 6.49   HEMOGLOBIN g/dL 11.2*   HEMATOCRIT % 31.2*   PLATELETS 10*3/mm3 199       Results from last 7 days   Lab Units 02/25/24  0710   SODIUM mmol/L 135*   POTASSIUM mmol/L 3.6  3.6   CHLORIDE mmol/L 97*   CO2 mmol/L 24.5   BUN mg/dL 6*   CREATININE mg/dL 0.53*   GLUCOSE mg/dL 88   CALCIUM mg/dL 7.4*       Significant Diagnostic Studies:   WBC   Date Value Ref Range Status   02/25/2024 6.49 3.40 - 10.80 10*3/mm3 Final     Hemoglobin   Date Value  "Ref Range Status   02/25/2024 11.2 (L) 13.0 - 17.7 g/dL Final     Hematocrit   Date Value Ref Range Status   02/25/2024 31.2 (L) 37.5 - 51.0 % Final     Platelets   Date Value Ref Range Status   02/25/2024 199 140 - 450 10*3/mm3 Final     Sodium   Date Value Ref Range Status   02/25/2024 135 (L) 136 - 145 mmol/L Final     Potassium   Date Value Ref Range Status   02/25/2024 3.6 3.5 - 5.2 mmol/L Final   02/25/2024 3.6 3.5 - 5.2 mmol/L Final     Chloride   Date Value Ref Range Status   02/25/2024 97 (L) 98 - 107 mmol/L Final     CO2   Date Value Ref Range Status   02/25/2024 24.5 22.0 - 29.0 mmol/L Final     BUN   Date Value Ref Range Status   02/25/2024 6 (L) 8 - 23 mg/dL Final     Creatinine   Date Value Ref Range Status   02/25/2024 0.53 (L) 0.76 - 1.27 mg/dL Final     Glucose   Date Value Ref Range Status   02/25/2024 88 65 - 99 mg/dL Final     Calcium   Date Value Ref Range Status   02/25/2024 7.4 (L) 8.6 - 10.5 mg/dL Final     Magnesium   Date Value Ref Range Status   02/25/2024 2.3 1.6 - 2.4 mg/dL Final     Phosphorus   Date Value Ref Range Status   02/25/2024 2.4 (L) 2.5 - 4.5 mg/dL Final     AST (SGOT)   Date Value Ref Range Status   02/25/2024 58 (H) 1 - 40 U/L Final     ALT (SGPT)   Date Value Ref Range Status   02/25/2024 9 1 - 41 U/L Final     Alkaline Phosphatase   Date Value Ref Range Status   02/25/2024 90 39 - 117 U/L Final     No results found for: \"APTT\", \"INR\"  Color, UA   Date Value Ref Range Status   02/24/2024 Dark Yellow (A) Yellow, Straw Final     Appearance, UA   Date Value Ref Range Status   02/24/2024 Clear Clear Final     pH, UA   Date Value Ref Range Status   02/24/2024 6.0 5.0 - 8.0 Final     Glucose, UA   Date Value Ref Range Status   02/24/2024 Negative Negative Final     Ketones, UA   Date Value Ref Range Status   02/24/2024 80 mg/dL (3+) (A) Negative Final     Blood, UA   Date Value Ref Range Status   02/24/2024 Trace (A) Negative Final     Leuk Esterase, UA   Date Value Ref Range " "Status   02/24/2024 Negative Negative Final     Bilirubin, UA   Date Value Ref Range Status   02/24/2024 Large (3+) (A) Negative Final     Urobilinogen, UA   Date Value Ref Range Status   02/24/2024 4.0 E.U./dL (A) 0.2 - 1.0 E.U./dL Final     RBC, UA   Date Value Ref Range Status   02/24/2024 6-10 (A) None Seen, 0-2 /HPF Final     WBC, UA   Date Value Ref Range Status   02/24/2024 11-20 (A) None Seen, 0-2 /HPF Final     Bacteria, UA   Date Value Ref Range Status   02/24/2024 3+ (A) None Seen /HPF Final     HS Troponin T   Date Value Ref Range Status   02/24/2024 12 <22 ng/L Final     No components found for: \"HGBA1C;2\"  No components found for: \"TSH;2\"    Imaging Results (All)       Procedure Component Value Units Date/Time    CT Head Without Contrast [[id number]] Collected: 02/24/24 1848     Updated: 02/24/24 1858    Narrative:      EMERGENCY CT SCAN OF THE HEAD WITHOUT CONTRAST ON 02/24/2024     CLINICAL HISTORY: Headache.     TECHNIQUE: Spiral CT images were obtained from the base of the skull to  the vertex without intravenous contrast. The images were reformatted and  are submitted in 3 mm thick axial, sagittal and coronal CT sections with  brain algorithm.      COMPARISON: This is correlated to a prior head CT from [facility] on 02/18/2023, 1 year ago..     FINDINGS: There is some mild patchy low-density in the periventricular  white matter consistent with mild small vessel disease. There is mild  cerebral atrophy. The ventricles are normal in size. I see no focal mass  effect and no midline shift and no extra-axial fluid collections are  identified. There is no evidence of acute intracranial hemorrhage. The  calvarium and the skull base are normal in appearance. The paranasal  sinuses and the mastoid air cells and middle ear cavities are clear. The  orbits are unremarkable.       Impression:      1. No acute intracranial abnormality is identified with no significant  change when compared " to prior head CT from [facility] on 02/18/2023, 1 year ago     2. There is mild small vessel disease in the cerebral white matter and  there is mild diffuse cerebral atrophy and there are calcified plaques  in the intracranial segments of the distal vertebral arteries and  cavernous segments of the internal carotid arteries bilaterally. The  remainder of the head CT is within normal limits.     Radiation dose reduction techniques were utilized, including automated  exposure control and exposure modulation based on body size.        This report was finalized on 2/24/2024 6:55 PM by Dr. Justino Linares M.D  on Workstation: [id number]       XR Chest 1 View [[id number]] Collected: 02/24/24 1735     Updated: 02/24/24 1741    Narrative:      Emergency portable view of the chest on 2/24/2024     CLINICAL HISTORY: The patient has generalized weakness. Patient  personally reports having some chest pain and not feeling well     This is correlated to a prior chest CT on 7/19/2023 and a chest x-ray on  7/19/2023..     FINDINGS: There is mild rightward convexity of the middle mediastinum  likely due to an ectatic ascending aorta. The cardiomediastinal  silhouette is otherwise within normal limits. The pulmonary vasculature  is within normal limits. The lungs are clear. The costophrenic angles  are sharp.       Impression:      1. No active disease is seen in the chest with no significant change  when compared to the patient's prior chest x-ray on 7/19/2023.     This report was finalized on 2/24/2024 5:37 PM by Dr. Justino Linares M.D  on Workstation: [id number]             Site   Date Value Ref Range Status   02/24/2024 Right Brachial  Final     CO2 Content   Date Value Ref Range Status   02/24/2024 22.2 (L) 23 - 27 mmol/L Final     Barometric Pressure for Blood Gas   Date Value Ref Range Status   02/24/2024 752.3000 mmHg Final     Modality   Date Value Ref Range Status   02/24/2024 Room Air  Final     FIO2   Date  Value Ref Range Status   02/24/2024 21 % Final          Discharge Medications        Continue These Medications        Instructions Start Date   amLODIPine 5 MG tablet  Commonly known as: NORVASC   5 mg, Oral, Every Morning      atorvastatin 20 MG tablet  Commonly known as: LIPITOR   20 mg, Oral, Daily      fluticasone 50 MCG/ACT nasal spray  Commonly known as: FLONASE   2 sprays, Nasal, Daily      levothyroxine 100 MCG tablet  Commonly known as: SYNTHROID, LEVOTHROID   100 mcg, Oral, Daily      lisinopril 10 MG tablet  Commonly known as: PRINIVIL,ZESTRIL   10 mg, Oral, Daily      pseudoephedrine 30 MG tablet  Commonly known as: Sudafed   30 mg, Oral, 3 times daily      traMADol 50 MG tablet  Commonly known as: ULTRAM   50 mg, Oral, Every 6 Hours PRN                 Patient Instructions:       No future appointments.      Follow-up Information       Provider, No Known .    Contact information:  [place] [id number] [phone]                             Discharge Order (From admission, onward)       Start     Ordered    02/25/24 1302  Discharge patient  Once        Expected Discharge Date: 02/25/24   Discharge Disposition: Left Against Medical Advice   Physician of Record for Attribution - Please select from Treatment Team: ESDRAS JACKSON [[id number]]   Review needed by CMO to determine Physician of Record: No      Question Answer Comment   Physician of Record for Attribution - Please select from Treatment Team ESDRAS JACKSON    Review needed by CMO to determine Physician of Record No        02/25/24 1302                    No active diet order      Total time spent discharging patient including evaluation, post hospitalization follow up,  medication and post hospitalization instructions and education, total time exceeds 30 minutes.    Signed:  Esdras Jackson MD  2/25/2024  16:21 EST

## 2024-02-25 NOTE — ED NOTES
Nursing report ED to floor  Pro Mueller  69 y.o.  male    HPI :   HPI (Adult)  Stated Reason for Visit: weakness, lightheadedness  History Obtained From: patient    Chief Complaint  Chief Complaint   Patient presents with    Nausea    Weakness - Generalized       Admitting doctor:   Gerber Costello MD    Admitting diagnosis:   The primary encounter diagnosis was Generalized weakness. Diagnoses of Poor appetite, Hypokalemia, Acute nonintractable headache, unspecified headache type, Ketonuria, and Dehydration were also pertinent to this visit.    Code status:   Current Code Status       Date Active Code Status Order ID Comments User Context       Prior            Allergies:   Penicillins    Isolation:   No active isolations    Intake and Output    Intake/Output Summary (Last 24 hours) at 2/24/2024 2142  Last data filed at 2/24/2024 1916  Gross per 24 hour   Intake 1000 ml   Output --   Net 1000 ml       Weight:       02/24/24  1606   Weight: 83.9 kg (185 lb)       Most recent vitals:   Vitals:    02/24/24 2001 02/24/24 2008 02/24/24 2101 02/24/24 2119   BP: 118/75  123/79    Pulse: 75 76 76 74   Resp:       Temp:       SpO2: 94% 95% 98% 98%   Weight:       Height:           Active LDAs/IV Access:   Lines, Drains & Airways       Active LDAs       Name Placement date Placement time Site Days    Peripheral IV 02/24/24 1649 Left Antecubital 02/24/24  1649  Antecubital  less than 1                    Labs (abnormal labs have a star):   Labs Reviewed   COMPREHENSIVE METABOLIC PANEL - Abnormal; Notable for the following components:       Result Value    Glucose 103 (*)     Creatinine 0.71 (*)     Sodium 131 (*)     Potassium 3.4 (*)     Chloride 86 (*)     CO2 21.3 (*)     AST (SGOT) 89 (*)     Alkaline Phosphatase 119 (*)     Anion Gap 23.7 (*)     All other components within normal limits    Narrative:     GFR Normal >60  Chronic Kidney Disease <60  Kidney Failure <15     LIPASE - Abnormal; Notable for the following  components:    Lipase 98 (*)     All other components within normal limits   URINALYSIS W/ MICROSCOPIC IF INDICATED (NO CULTURE) - Abnormal; Notable for the following components:    Color, UA Dark Yellow (*)     Ketones, UA 80 mg/dL (3+) (*)     Bilirubin, UA Large (3+) (*)     Blood, UA Trace (*)     Protein, UA 30 mg/dL (1+) (*)     Urobilinogen, UA 4.0 E.U./dL (*)     All other components within normal limits   MAGNESIUM - Abnormal; Notable for the following components:    Magnesium 1.4 (*)     All other components within normal limits   CBC WITH AUTO DIFFERENTIAL - Abnormal; Notable for the following components:    RBC 3.82 (*)     Hemoglobin 12.8 (*)     Hematocrit 37.1 (*)     MCV 97.1 (*)     MCH 33.5 (*)     Neutrophil % 78.0 (*)     Lymphocyte % 10.2 (*)     Eosinophil % 0.2 (*)     Monocytes, Absolute 0.98 (*)     All other components within normal limits   BLOOD GAS, VENOUS - Abnormal; Notable for the following components:    pH, Venous 7.484 (*)     pCO2, Venous 28.4 (*)     pO2, Venous 27.7 (*)     HCO3, Venous 21.3 (*)     CO2 Content 22.2 (*)     All other components within normal limits   URINALYSIS, MICROSCOPIC ONLY - Abnormal; Notable for the following components:    RBC, UA 6-10 (*)     WBC, UA 11-20 (*)     Bacteria, UA 3+ (*)     All other components within normal limits   RESPIRATORY PANEL PCR W/ COVID-19 (SARS-COV-2), NP SWAB IN UTM/VTP, 2 HR TAT - Normal    Narrative:     In the setting of a positive respiratory panel with a viral infection PLUS a negative procalcitonin without other underlying concern for bacterial infection, consider observing off antibiotics or discontinuation of antibiotics and continue supportive care. If the respiratory panel is positive for atypical bacterial infection (Bordetella pertussis, Chlamydophila pneumoniae, or Mycoplasma pneumoniae), consider antibiotic de-escalation to target atypical bacterial infection.   BNP (IN-HOUSE) - Normal    Narrative:     This  assay is used as an aid in the diagnosis of individuals suspected of having heart failure. It can be used as an aid in the diagnosis of acute decompensated heart failure (ADHF) in patients presenting with signs and symptoms of ADHF to the emergency department (ED). In addition, NT-proBNP of <300 pg/mL indicates ADHF is not likely.    Age Range Result Interpretation  NT-proBNP Concentration (pg/mL:      <50             Positive            >450                   Gray                 300-450                    Negative             <300    50-75           Positive            >900                  Gray                300-900                  Negative            <300      >75             Positive            >1800                  Gray                300-1800                  Negative            <300   SINGLE HSTROPONIN T - Normal    Narrative:     High Sensitive Troponin T Reference Range:  <14.0 ng/L- Negative Female for AMI  <22.0 ng/L- Negative Male for AMI  >=14 - Abnormal Female indicating possible myocardial injury.  >=22 - Abnormal Male indicating possible myocardial injury.   Clinicians would have to utilize clinical acumen, EKG, Troponin, and serial changes to determine if it is an Acute Myocardial Infarction or myocardial injury due to an underlying chronic condition.        AMMONIA - Normal   URINE DRUG SCREEN - Normal    Narrative:     Negative Thresholds Per Drugs Screened:    Amphetamines                 500 ng/ml  Barbiturates                 200 ng/ml  Benzodiazepines              100 ng/ml  Cocaine                      300 ng/ml  Methadone                    300 ng/ml  Opiates                      300 ng/ml  Oxycodone                    100 ng/ml  THC                           50 ng/ml  Fentanyl                       5 ng/ml      The Normal Value for all drugs tested is negative. This report includes final unconfirmed screening results to be used for medical treatment purposes only. Unconfirmed results  must not be used for non-medical purposes such as employment or legal testing. Clinical consideration should be applied to any drug of abuse test, particularly when unconfirmed results are used.           ETHANOL   CBC AND DIFFERENTIAL    Narrative:     The following orders were created for panel order CBC & Differential.  Procedure                               Abnormality         Status                     ---------                               -----------         ------                     CBC Auto Differential[[id number]]        Abnormal            Final result                 Please view results for these tests on the individual orders.       EKG:   ECG 12 Lead Other; Dizzy   Final Result   HEART RATE= 74  bpm   RR Interval= 811  ms   ND Interval= 170  ms   P Horizontal Axis= -22  deg   P Front Axis= 11  deg   QRSD Interval= 91  ms   QT Interval= 389  ms   QTcB= 432  ms   QRS Axis= -89  deg   T Wave Axis= 49  deg   - ABNORMAL ECG -   Sinus rhythm   Inferior infarct, old   Anterior infarct, old   No change from previous tracing   Electronically Signed By: Aure Orona ([facility]) 24-Feb-2024 17:23:02   Date and Time of Study: 2024-02-24 17:08:46          Meds given in ED:   Medications   sodium chloride 0.9 % flush 10 mL (has no administration in time range)   sodium chloride 0.9 % bolus 1,000 mL (has no administration in time range)   acetaminophen (TYLENOL) tablet 1,000 mg (has no administration in time range)   sodium chloride 0.9 % bolus 1,000 mL (0 mL Intravenous Stopped 2/24/24 1916)       Imaging results:  CT Head Without Contrast    Result Date: 2/24/2024  1. No acute intracranial abnormality is identified with no significant change when compared to prior head CT from [facility] on 02/18/2023, 1 year ago  2. There is mild small vessel disease in the cerebral white matter and there is mild diffuse cerebral atrophy and there are calcified plaques in the intracranial segments of the distal  "vertebral arteries and cavernous segments of the internal carotid arteries bilaterally. The remainder of the head CT is within normal limits.  Radiation dose reduction techniques were utilized, including automated exposure control and exposure modulation based on body size.   This report was finalized on 2024 6:55 PM by Dr. Justino Linares M.D on Workstation: [id number]      XR Chest 1 View    Result Date: 2024  1. No active disease is seen in the chest with no significant change when compared to the patient's prior chest x-ray on 2023.  This report was finalized on 2024 5:37 PM by Dr. Justino Linares M.D on Workstation: [id number]       Ambulatory status:   - w/ assist    Social issues:   Social History     Socioeconomic History    Marital status: Single   Tobacco Use    Smoking status: Former     Packs/day: 0.50     Years: 15.00     Additional pack years: 0.00     Total pack years: 7.50     Types: Cigarettes     Start date: 1990     Quit date: 2005     Years since quittin.1    Smokeless tobacco: Never    Tobacco comments:     caffeine - 3 cans of coke daily    Vaping Use    Vaping Use: Never used   Substance and Sexual Activity    Alcohol use: Yes     Alcohol/week: 7.0 standard drinks of alcohol     Types: 7 Shots of liquor per week     Comment: .5 liter vodka    Drug use: Yes     Types: Methamphetamines     Comment: \"once in a rare while\"    Sexual activity: Yes     Partners: Female     Birth control/protection: Condom       NIH Stroke Scale:       Sondra Reeves RN  24 21:42 EST       "

## 2024-02-25 NOTE — PROGRESS NOTES
[facility]     LOS: 0 days     Subjective:    CC:Nausea and Weakness - Generalized    DIET:  Diet Order   Procedures    Diet: Gastrointestinal Diets; Low Irritant; Texture: Regular Texture (IDDSI 7); Fluid Consistency: Thin (IDDSI 0)       Objective:    Vital Signs:  Temp:  [97.3 °F (36.3 °C)-98.2 °F (36.8 °C)] 97.5 °F (36.4 °C)  Heart Rate:  [60-76] 70  Resp:  [16-18] 18  BP: (111-150)/(70-91) 114/74    SpO2:  [94 %-100 %] 100 %  on   ;   Device (Oxygen Therapy): room air  Body mass index is 25.09 kg/m².    Physical Exam    Results Review:    Glucose   Date Value Ref Range Status   02/25/2024 88 65 - 99 mg/dL Final   02/24/2024 103 (H) 65 - 99 mg/dL Final     Results from last 7 days   Lab Units 02/25/24  0710   WBC 10*3/mm3 6.49   HEMOGLOBIN g/dL 11.2*   HEMATOCRIT % 31.2*   PLATELETS 10*3/mm3 199     Results from last 7 days   Lab Units 02/25/24  0710   SODIUM mmol/L 135*   POTASSIUM mmol/L 3.6  3.6   CHLORIDE mmol/L 97*   CO2 mmol/L 24.5   BUN mg/dL 6*   CREATININE mg/dL 0.53*   CALCIUM mg/dL 7.4*   BILIRUBIN mg/dL 0.9   ALK PHOS U/L 90   ALT (SGPT) U/L 9   AST (SGOT) U/L 58*   GLUCOSE mg/dL 88         Results from last 7 days   Lab Units 02/25/24  0710   MAGNESIUM mg/dL 2.3     Results from last 7 days   Lab Units 02/24/24  1650   HSTROP T ng/L 12     Cultures:  No results found for: \"BLOODCX\", \"URINECX\", \"WOUNDCX\", \"MRSACX\", \"RESPCX\", \"STOOLCX\"    I have reviewed daily medications and changes in CPOE    Scheduled meds  atorvastatin, 20 mg, Oral, Daily  folic acid, 1 mg, Oral, Daily  levothyroxine, 100 mcg, Oral, Daily  sodium chloride, 10 mL, Intravenous, Q12H  thiamine (B-1) IV, 200 mg, Intravenous, Q8H   Followed by  [START ON 3/1/2024] thiamine, 100 mg, Oral, Daily        sodium chloride 0.9 % with KCl 20 mEq, 125 mL/hr, Last Rate: 125 mL/hr (02/25/24 2472)      PRN meds    acetaminophen **OR** acetaminophen **OR** acetaminophen    senna-docusate sodium **AND** polyethylene " glycol **AND** bisacodyl **AND** bisacodyl    Calcium Replacement - Follow Nurse / BPA Driven Protocol    LORazepam **OR** LORazepam **OR** LORazepam **OR** LORazepam **OR** LORazepam **OR** LORazepam    Magnesium Standard Dose Replacement - Follow Nurse / BPA Driven Protocol    nitroglycerin    ondansetron ODT **OR** ondansetron    Phosphorus Replacement - Follow Nurse / BPA Driven Protocol    Potassium Replacement - Follow Nurse / BPA Driven Protocol    [COMPLETED] Insert Peripheral IV **AND** sodium chloride    sodium chloride    sodium chloride    traMADol        Dehydration    Hyperlipidemia    Hypertension    Hypothyroidism    Hepatic steatosis        Assessment/Plan:      Dehydration  Electrolyte disease secondary to vomiting  Hypokalemia  Hyponatremia  Anon gap is better and magneisum is better  Lactate 2.5  Lipase 98  Place per protocol  IV fluids continued  As needed ondansetron  Admitted to telemetry unit for continuous cardiac monitoring pulse oximetry  Supplemental oxygen as needed for hypoxia/respiratory distress to maintain oxygen saturation greater than 90%  N.p.o. for now  CBC, BMP in a.m.     Generalized weakness  Secondary to above  PT consulted     Hepatic steatosis  Alcohol abuse in remission  CIWA assessment ordered     Hyperlipidemia  Chronic  Continue home statin      Hypertension  Chronic   Vital signs per policy   Continue home amlodipine, and lisinopril      Hypothyroidism  Chronic  Continue home levothyroxine      Esdras Jackson MD  02/25/24  12:54 EST

## 2024-02-25 NOTE — CASE MANAGEMENT/SOCIAL WORK
Case Management Discharge Note      Final Note: Pt left AMA.         Selected Continued Care - Discharged on 2/25/2024 Admission date: 2/24/2024 - Discharge disposition: Left Against Medical Advice      Destination    No services have been selected for the patient.                Durable Medical Equipment    No services have been selected for the patient.                Dialysis/Infusion    No services have been selected for the patient.                Home Medical Care    No services have been selected for the patient.                Therapy    No services have been selected for the patient.                Community Resources    No services have been selected for the patient.                Community & DME    No services have been selected for the patient.                         Final Discharge Disposition Code: 07 - left AMA

## 2024-02-25 NOTE — H&P
Patient Name:  Pro Mueller  YOB: 1955  MRN:  [id number]  Admit Date:  2/24/2024  Patient Care Team:  Provider, No Known as PCP - Say Roger MD (Psychiatry)      Subjective   History Present Illness     Chief Complaint   Patient presents with    Nausea    Weakness - Generalized     History of Present Illness  Ervin Estrella os a 69 y o male with a history of ETOH abuse in remission, depression, HLD, HTN, hypothyroidism, peripheral neuropathy, and sleep apnea who presents to [facility] today complaining of 18-hour history of nausea and vomiting.  He started feeling lightheaded, extremely weak, needing to use a walker to ambulate, treating prompting him to report to the emergency room.  He denies any known sick contacts, or other acute distress chest pain, palpitations, syncope, fever, chills, cough, abdominal pain, diarrhea, or  symptoms.  Upon exam he is awake alert and oriented in no acute distress, he states he does feel somewhat better since receiving nausea medication in the ER.  His lungs are diminished bilaterally, heart rate and rhythm are regular, normal, he has +2 palpable peripheral pulses, his abdomen is soft, round, nondistended nontender, with hyperactive bowel sounds throughout.    Initial evaluation in the emergency room today for 6 Tramake with sodium of 131, potassium 3.4, gap is 23.7 today, his AST is 89, urinalysis shows 3+ ketones, 1+ protein, 3+ bilirubin, 11-20 WBCs, 3+ bacteria, negative nitrates, negative leukocytes.  He does not have any  symptoms, will hold off on antibiotics for now.  Is unremarkable for any acute cardiopulmonary processes  CT scan of the head unremarkable for any acute intracranial abnormalities.  There is noted mild small vessel disease in the cerebral white matter.  Mild diffuse cerebral atrophy and calcified plaques into intracranial segments of the distal vertebral arteries.  EKG interpreted as sinus rhythm, heart  rate 74, with no obvious ischemia.  Administered 1000 mg acetaminophen, 4 mg ondansetron, 2 L normal saline 2 g magnesium sulfate, 10 mEq of potassium chloride emergency room.    Mr. Mueller will be admitted to the hospitalist group for further evaluation and treatment of acute dehydration, electrolyte abnormalities and other acute and or chronic conditions as needed.  Review of Systems   Constitutional:  Positive for fatigue. Negative for chills and fever.   HENT: Negative.  Negative for congestion and trouble swallowing.    Eyes: Negative.  Negative for visual disturbance.   Respiratory: Negative.  Negative for cough, chest tightness, shortness of breath and wheezing.    Cardiovascular: Negative.  Negative for chest pain and palpitations.   Gastrointestinal:  Positive for nausea and vomiting. Negative for abdominal distention, abdominal pain, constipation and diarrhea.   Genitourinary: Negative.  Negative for dysuria, frequency and urgency.   Musculoskeletal:  Positive for myalgias. Negative for arthralgias.   Skin: Negative.  Negative for rash.   Allergic/Immunologic: Negative.    Neurological:  Positive for light-headedness. Negative for dizziness, weakness and headaches.   Hematological: Negative.    Psychiatric/Behavioral: Negative.  Negative for agitation and confusion.    All other systems reviewed and are negative.       Personal History     Past Medical History:   Diagnosis Date    Alcohol abuse     Alcohol withdrawal 11/04/2016    Alcoholic ketoacidosis 01/12/2020    Allergic 1970    Anxiety     Arthritis     Atopic rhinitis 08/08/2016    Depression     Disease of thyroid gland     Elevated cholesterol     Encounter for removal of sutures     Genital herpes simplex 08/08/2016    GERD (gastroesophageal reflux disease)     Headache, tension-type     Hyperlipidemia     Hypertension     Hypothyroidism     Kidney stone     Low back pain 2019    Migraine     Motion sickness     Nephrolithiasis 02/12/2020     "Olecranon bursitis, right elbow     Panic disorder without agoraphobia 2016    Peripheral neuropathy     Sleep apnea     Syncope and collapse 2019    Vitamin D deficiency 2016    Withdrawal symptoms, alcohol      Past Surgical History:   Procedure Laterality Date    COLONOSCOPY      CYST REMOVAL      CYSTOSCOPY BLADDER STONE LITHOTRIPSY  2022    EXTRACORPOREAL SHOCK WAVE LITHOTRIPSY (ESWL) Right 2002    SHOULDER ARTHROSCOPY Right 2019    Procedure: SHOULDER ARTHROSCOPY, decompression, distal clavicle excision;  Surgeon: Aj Mancilla MD;  Location: [facility];  Service: Orthopedics    TONSILLECTOMY       Family History   Problem Relation Age of Onset    Alzheimer's disease Mother     Mental illness Mother     Pancreatic cancer Father     Hearing loss Father     Arthritis Maternal Grandmother     Malig Hyperthermia Neg Hx      Social History     Tobacco Use    Smoking status: Former     Packs/day: 0.50     Years: 15.00     Additional pack years: 0.00     Total pack years: 7.50     Types: Cigarettes     Start date: 1990     Quit date: 2005     Years since quittin.1    Smokeless tobacco: Never    Tobacco comments:     caffeine - 3 cans of coke daily    Vaping Use    Vaping Use: Never used   Substance Use Topics    Alcohol use: Yes     Alcohol/week: 7.0 standard drinks of alcohol     Types: 7 Shots of liquor per week     Comment: .5 liter vodka    Drug use: Yes     Types: Methamphetamines     Comment: \"once in a rare while\"     No current facility-administered medications on file prior to encounter.     Current Outpatient Medications on File Prior to Encounter   Medication Sig Dispense Refill    amLODIPine (NORVASC) 5 MG tablet Take 1 tablet by mouth Every Morning. 90 tablet 1    atorvastatin (LIPITOR) 20 MG tablet Take 1 tablet by mouth Daily. 90 tablet 1    levothyroxine (SYNTHROID, LEVOTHROID) 100 MCG tablet Take 1 tablet by mouth Daily. 90 tablet 1    lisinopril " (PRINIVIL,ZESTRIL) 10 MG tablet Take 1 tablet by mouth Daily. 90 tablet 1    traMADol (ULTRAM) 50 MG tablet Take 1 tablet by mouth Every 6 (Six) Hours As Needed for Moderate Pain.      fluticasone (FLONASE) 50 MCG/ACT nasal spray 2 sprays into the nostril(s) as directed by provider Daily. 11.1 mL 0    pseudoephedrine (Sudafed) 30 MG tablet Take 1 tablet by mouth 3 times a day. 20 tablet 0     Allergies   Allergen Reactions    Penicillins Anaphylaxis and Seizure     Seizure. childhood       Objective    Objective     Vital Signs  Temp:  [98.2 °F (36.8 °C)] 98.2 °F (36.8 °C)  Heart Rate:  [73-76] 74  Resp:  [16] 16  BP: (118-150)/(75-91) 123/79  SpO2:  [94 %-98 %] 98 %  on   ;      Body mass index is 25.09 kg/m².    Physical Exam  Vitals and nursing note reviewed.   Constitutional:       General: He is awake. He is not in acute distress.     Appearance: He is well-developed. He is obese.   HENT:      Head: Normocephalic and atraumatic.      Nose: Nose normal.      Mouth/Throat:      Mouth: Mucous membranes are dry.      Pharynx: No oropharyngeal exudate.   Eyes:      General: No scleral icterus.     Pupils: Pupils are equal, round, and reactive to light.   Neck:      Vascular: No JVD.   Cardiovascular:      Rate and Rhythm: Normal rate and regular rhythm.      Heart sounds: No murmur heard.  Pulmonary:      Effort: Pulmonary effort is normal. No respiratory distress.      Breath sounds: Normal breath sounds. No wheezing.   Abdominal:      General: Bowel sounds are increased. There is no distension.      Palpations: Abdomen is soft.      Tenderness: There is no abdominal tenderness.   Musculoskeletal:      Right lower leg: No edema.      Left lower leg: No edema.   Skin:     General: Skin is warm and dry.      Capillary Refill: Capillary refill takes less than 2 seconds.      Findings: No rash.   Neurological:      General: No focal deficit present.      Mental Status: He is alert and oriented to person, place, and  time. Mental status is at baseline.   Psychiatric:         Mood and Affect: Mood normal.         Behavior: Behavior is cooperative.         Results Review:  I reviewed the patient's new clinical results.  I reviewed the patient's new imaging results and agree with the interpretation.  I reviewed the patient's other test results and agree with the interpretation  I personally viewed and interpreted the patient's EKG/Telemetry data  Discussed with ED provider.    Lab Results (last 24 hours)       Procedure Component Value Units Date/Time    Respiratory Panel PCR w/COVID-19(SARS-CoV-2) NORAH/ALLEN/WIL/PAD/COR/REGINALDO In-House, NP Swab in UTM/VTM, 2 HR TAT - Swab, Nasopharynx [[id number]]  (Normal) Collected: 02/24/24 1645    Specimen: Swab from Nasopharynx Updated: 02/24/24 8400     ADENOVIRUS, PCR Not Detected     Coronavirus 229E Not Detected     Coronavirus HKU1 Not Detected     Coronavirus NL63 Not Detected     Coronavirus OC43 Not Detected     COVID19 Not Detected     Human Metapneumovirus Not Detected     Human Rhinovirus/Enterovirus Not Detected     Influenza A PCR Not Detected     Influenza B PCR Not Detected     Parainfluenza Virus 1 Not Detected     Parainfluenza Virus 2 Not Detected     Parainfluenza Virus 3 Not Detected     Parainfluenza Virus 4 Not Detected     RSV, PCR Not Detected     Bordetella pertussis pcr Not Detected     Bordetella parapertussis PCR Not Detected     Chlamydophila pneumoniae PCR Not Detected     Mycoplasma pneumo by PCR Not Detected    Narrative:      In the setting of a positive respiratory panel with a viral infection PLUS a negative procalcitonin without other underlying concern for bacterial infection, consider observing off antibiotics or discontinuation of antibiotics and continue supportive care. If the respiratory panel is positive for atypical bacterial infection (Bordetella pertussis, Chlamydophila pneumoniae, or Mycoplasma pneumoniae), consider antibiotic de-escalation to target  atypical bacterial infection.    CBC & Differential [[id number]]  (Abnormal) Collected: 02/24/24 1650    Specimen: Blood Updated: 02/24/24 1702    Narrative:      The following orders were created for panel order CBC & Differential.  Procedure                               Abnormality         Status                     ---------                               -----------         ------                     CBC Auto Differential[[id number]]        Abnormal            Final result                 Please view results for these tests on the individual orders.    Comprehensive Metabolic Panel [[id number]]  (Abnormal) Collected: 02/24/24 1650    Specimen: Blood Updated: 02/24/24 1722     Glucose 103 mg/dL      BUN 8 mg/dL      Creatinine 0.71 mg/dL      Sodium 131 mmol/L      Potassium 3.4 mmol/L      Chloride 86 mmol/L      CO2 21.3 mmol/L      Calcium 8.6 mg/dL      Total Protein 7.2 g/dL      Albumin 4.2 g/dL      ALT (SGPT) 13 U/L      AST (SGOT) 89 U/L      Alkaline Phosphatase 119 U/L      Total Bilirubin 1.1 mg/dL      Globulin 3.0 gm/dL      A/G Ratio 1.4 g/dL      BUN/Creatinine Ratio 11.3     Anion Gap 23.7 mmol/L      eGFR 100.6 mL/min/1.73     Narrative:      GFR Normal >60  Chronic Kidney Disease <60  Kidney Failure <15      Lipase [[id number]]  (Abnormal) Collected: 02/24/24 1650    Specimen: Blood Updated: 02/24/24 1722     Lipase 98 U/L     BNP [[id number]]  (Normal) Collected: 02/24/24 1650    Specimen: Blood Updated: 02/24/24 1720     proBNP 46.4 pg/mL     Narrative:      This assay is used as an aid in the diagnosis of individuals suspected of having heart failure. It can be used as an aid in the diagnosis of acute decompensated heart failure (ADHF) in patients presenting with signs and symptoms of ADHF to the emergency department (ED). In addition, NT-proBNP of <300 pg/mL indicates ADHF is not likely.    Age Range Result Interpretation  NT-proBNP Concentration (pg/mL:      <50             Positive             >450                   Gray                 300-450                    Negative             <300    50-75           Positive            >900                  Gray                300-900                  Negative            <300      >75             Positive            >1800                  Gray                300-1800                  Negative            <300    Single High Sensitivity Troponin T [[id number]]  (Normal) Collected: 02/24/24 1650    Specimen: Blood Updated: 02/24/24 1722     HS Troponin T 12 ng/L     Narrative:      High Sensitive Troponin T Reference Range:  <14.0 ng/L- Negative Female for AMI  <22.0 ng/L- Negative Male for AMI  >=14 - Abnormal Female indicating possible myocardial injury.  >=22 - Abnormal Male indicating possible myocardial injury.   Clinicians would have to utilize clinical acumen, EKG, Troponin, and serial changes to determine if it is an Acute Myocardial Infarction or myocardial injury due to an underlying chronic condition.         Ammonia [[id number]]  (Normal) Collected: 02/24/24 1650    Specimen: Blood Updated: 02/24/24 1716     Ammonia 25 umol/L     Magnesium [[id number]]  (Abnormal) Collected: 02/24/24 1650    Specimen: Blood Updated: 02/24/24 1722     Magnesium 1.4 mg/dL     Ethanol [[id number]] Collected: 02/24/24 1650    Specimen: Blood Updated: 02/24/24 1722     Ethanol <10 mg/dL      Ethanol % <0.010 %     CBC Auto Differential [[id number]]  (Abnormal) Collected: 02/24/24 1650    Specimen: Blood Updated: 02/24/24 1702     WBC 8.90 10*3/mm3      RBC 3.82 10*6/mm3      Hemoglobin 12.8 g/dL      Hematocrit 37.1 %      MCV 97.1 fL      MCH 33.5 pg      MCHC 34.5 g/dL      RDW 13.6 %      RDW-SD 47.4 fl      MPV 9.5 fL      Platelets 221 10*3/mm3      Neutrophil % 78.0 %      Lymphocyte % 10.2 %      Monocyte % 11.0 %      Eosinophil % 0.2 %      Basophil % 0.2 %      Immature Grans % 0.4 %      Neutrophils, Absolute 6.93 10*3/mm3      Lymphocytes, Absolute 0.91 10*3/mm3       Monocytes, Absolute 0.98 10*3/mm3      Eosinophils, Absolute 0.02 10*3/mm3      Basophils, Absolute 0.02 10*3/mm3      Immature Grans, Absolute 0.04 10*3/mm3      nRBC 0.0 /100 WBC     Blood Gas, Venous - [[id number]]  (Abnormal) Collected: 02/24/24 1824    Specimen: Venous Blood Updated: 02/24/24 1827     Site Right Brachial     pH, Venous 7.484 pH Units      pCO2, Venous 28.4 mm Hg      pO2, Venous 27.7 mm Hg      HCO3, Venous 21.3 mmol/L      Base Excess, Venous -1.2 mmol/L      Comment: Serial Number: [id number]:  [id number]        O2 Saturation, Venous 58.9 %      CO2 Content 22.2 mmol/L      Barometric Pressure for Blood Gas 752.3000 mmHg      Modality Room Air     FIO2 21 %      Rate 16 Breaths/minute      Device Comment [id number]    Urinalysis With Microscopic If Indicated (No Culture) - Urine, Clean Catch [[id number]]  (Abnormal) Collected: 02/24/24 2016    Specimen: Urine, Clean Catch Updated: 02/24/24 2040     Color, UA Dark Yellow     Appearance, UA Clear     pH, UA 6.0     Specific Gravity, UA 1.021     Glucose, UA Negative     Ketones, UA 80 mg/dL (3+)     Bilirubin, UA Large (3+)     Blood, UA Trace     Protein, UA 30 mg/dL (1+)     Leuk Esterase, UA Negative     Nitrite, UA Negative     Urobilinogen, UA 4.0 E.U./dL    Urine Drug Screen - Urine, Clean Catch [[id number]]  (Normal) Collected: 02/24/24 2016    Specimen: Urine, Clean Catch Updated: 02/24/24 2058     Amphet/Methamphet, Screen Negative     Barbiturates Screen, Urine Negative     Benzodiazepine Screen, Urine Negative     Cocaine Screen, Urine Negative     Opiate Screen Negative     THC, Screen, Urine Negative     Methadone Screen, Urine Negative     Oxycodone Screen, Urine Negative     Fentanyl, Urine Negative    Narrative:      Negative Thresholds Per Drugs Screened:    Amphetamines                 500 ng/ml  Barbiturates                 200 ng/ml  Benzodiazepines              100 ng/ml  Cocaine                      300 ng/ml  Methadone                     300 ng/ml  Opiates                      300 ng/ml  Oxycodone                    100 ng/ml  THC                           50 ng/ml  Fentanyl                       5 ng/ml      The Normal Value for all drugs tested is negative. This report includes final unconfirmed screening results to be used for medical treatment purposes only. Unconfirmed results must not be used for non-medical purposes such as employment or legal testing. Clinical consideration should be applied to any drug of abuse test, particularly when unconfirmed results are used.            Urinalysis, Microscopic Only - Urine, Clean Catch [[id number]]  (Abnormal) Collected: 02/24/24 2016    Specimen: Urine, Clean Catch Updated: 02/24/24 2040     RBC, UA 6-10 /HPF      WBC, UA 11-20 /HPF      Bacteria, UA 3+ /HPF      Squamous Epithelial Cells, UA 0-2 /HPF      Hyaline Casts, UA 7-12 /LPF      Methodology Automated Microscopy            Imaging Results (Last 24 Hours)       Procedure Component Value Units Date/Time    CT Head Without Contrast [[id number]] Collected: 02/24/24 1848     Updated: 02/24/24 1858    Narrative:      EMERGENCY CT SCAN OF THE HEAD WITHOUT CONTRAST ON 02/24/2024     CLINICAL HISTORY: Headache.     TECHNIQUE: Spiral CT images were obtained from the base of the skull to  the vertex without intravenous contrast. The images were reformatted and  are submitted in 3 mm thick axial, sagittal and coronal CT sections with  brain algorithm.      COMPARISON: This is correlated to a prior head CT from [facility] on 02/18/2023, 1 year ago..     FINDINGS: There is some mild patchy low-density in the periventricular  white matter consistent with mild small vessel disease. There is mild  cerebral atrophy. The ventricles are normal in size. I see no focal mass  effect and no midline shift and no extra-axial fluid collections are  identified. There is no evidence of acute intracranial hemorrhage. The  calvarium and the  skull base are normal in appearance. The paranasal  sinuses and the mastoid air cells and middle ear cavities are clear. The  orbits are unremarkable.       Impression:      1. No acute intracranial abnormality is identified with no significant  change when compared to prior head CT from [facility] on 02/18/2023, 1 year ago     2. There is mild small vessel disease in the cerebral white matter and  there is mild diffuse cerebral atrophy and there are calcified plaques  in the intracranial segments of the distal vertebral arteries and  cavernous segments of the internal carotid arteries bilaterally. The  remainder of the head CT is within normal limits.     Radiation dose reduction techniques were utilized, including automated  exposure control and exposure modulation based on body size.        This report was finalized on 2/24/2024 6:55 PM by Dr. Justino Linares M.D  on Workstation: [id number]       XR Chest 1 View [[id number]] Collected: 02/24/24 1735     Updated: 02/24/24 1741    Narrative:      Emergency portable view of the chest on 2/24/2024     CLINICAL HISTORY: The patient has generalized weakness. Patient  personally reports having some chest pain and not feeling well     This is correlated to a prior chest CT on 7/19/2023 and a chest x-ray on  7/19/2023..     FINDINGS: There is mild rightward convexity of the middle mediastinum  likely due to an ectatic ascending aorta. The cardiomediastinal  silhouette is otherwise within normal limits. The pulmonary vasculature  is within normal limits. The lungs are clear. The costophrenic angles  are sharp.       Impression:      1. No active disease is seen in the chest with no significant change  when compared to the patient's prior chest x-ray on 7/19/2023.     This report was finalized on 2/24/2024 5:37 PM by Dr. Justino Linares M.D  on Workstation: [id number]               Results for orders placed during the hospital encounter of 01/07/22    Adult  Transthoracic Echo Complete W/ Cont if Necessary Per Protocol    Interpretation Summary  · Estimated left ventricular EF = 67% Left ventricular systolic function is normal.  · Left ventricular diastolic function was normal.  · Mild dilation of the aortic root is present.      ECG 12 Lead Other; Dizzy   Final Result   HEART RATE= 74  bpm   RR Interval= 811  ms   KY Interval= 170  ms   P Horizontal Axis= -22  deg   P Front Axis= 11  deg   QRSD Interval= 91  ms   QT Interval= 389  ms   QTcB= 432  ms   QRS Axis= -89  deg   T Wave Axis= 49  deg   - ABNORMAL ECG -   Sinus rhythm   Inferior infarct, old   Anterior infarct, old   No change from previous tracing   Electronically Signed By: Aure Orona ([facility]) 24-Feb-2024 17:23:02   Date and Time of Study: 2024-02-24 17:08:46           Assessment/Plan     Active Hospital Problems    Diagnosis  POA    **Dehydration [E86.0]  Yes    Hepatic steatosis [K76.0]  Yes    Hypothyroidism [E03.9]  Yes    Hypertension [I10]  Yes    Hyperlipidemia [E78.5]  Yes      Resolved Hospital Problems   No resolved problems to display.     Dehydration  Electrolyte disease secondary to vomiting  Hypokalemia  Hyponatremia  Anon gap 23.7, sodium 131, potassium 3.4, magnesium 1.4  Lactate 2.5  Lipase 98  Place per protocol  IV fluids continued  As needed ondansetron  Admitted to telemetry unit for continuous cardiac monitoring pulse oximetry  Supplemental oxygen as needed for hypoxia/respiratory distress to maintain oxygen saturation greater than 90%  N.p.o. for now  CBC, BMP in a.m.    Generalized weakness  Secondary to above  PT consulted    Hepatic steatosis  Alcohol abuse in remission  CIWA assessment ordered    Hyperlipidemia  Chronic  Continue home statin      Hypertension  Chronic   Vital signs per policy   Continue home amlodipine, and lisinopril      Hypothyroidism  Chronic  Continue home levothyroxine    I discussed the patient's findings and my recommendations with patient.    VTE  Prophylaxis - SCDs.  Code Status - Full code.       BERONICA Ledbetter  [facility]  02/24/24  22:38 EST

## 2024-02-25 NOTE — ED PROVIDER NOTES
EMERGENCY DEPARTMENT ENCOUNTER    Room number:  02/02  Date Seen:  2/24/2024  Time of transfer:2030  PCP:  Provider, No Known    Laboratory Results:  Recent Results (from the past 24 hour(s))   Respiratory Panel PCR w/COVID-19(SARS-CoV-2) NORAH/ALLEN/WIL/PAD/COR/REGINALDO In-House, NP Swab in UTM/VTM, 2 HR TAT - Swab, Nasopharynx    Collection Time: 02/24/24  4:45 PM    Specimen: Nasopharynx; Swab   Result Value Ref Range    ADENOVIRUS, PCR Not Detected Not Detected    Coronavirus 229E Not Detected Not Detected    Coronavirus HKU1 Not Detected Not Detected    Coronavirus NL63 Not Detected Not Detected    Coronavirus OC43 Not Detected Not Detected    COVID19 Not Detected Not Detected - Ref. Range    Human Metapneumovirus Not Detected Not Detected    Human Rhinovirus/Enterovirus Not Detected Not Detected    Influenza A PCR Not Detected Not Detected    Influenza B PCR Not Detected Not Detected    Parainfluenza Virus 1 Not Detected Not Detected    Parainfluenza Virus 2 Not Detected Not Detected    Parainfluenza Virus 3 Not Detected Not Detected    Parainfluenza Virus 4 Not Detected Not Detected    RSV, PCR Not Detected Not Detected    Bordetella pertussis pcr Not Detected Not Detected    Bordetella parapertussis PCR Not Detected Not Detected    Chlamydophila pneumoniae PCR Not Detected Not Detected    Mycoplasma pneumo by PCR Not Detected Not Detected   Comprehensive Metabolic Panel    Collection Time: 02/24/24  4:50 PM    Specimen: Blood   Result Value Ref Range    Glucose 103 (H) 65 - 99 mg/dL    BUN 8 8 - 23 mg/dL    Creatinine 0.71 (L) 0.76 - 1.27 mg/dL    Sodium 131 (L) 136 - 145 mmol/L    Potassium 3.4 (L) 3.5 - 5.2 mmol/L    Chloride 86 (L) 98 - 107 mmol/L    CO2 21.3 (L) 22.0 - 29.0 mmol/L    Calcium 8.6 8.6 - 10.5 mg/dL    Total Protein 7.2 6.0 - 8.5 g/dL    Albumin 4.2 3.5 - 5.2 g/dL    ALT (SGPT) 13 1 - 41 U/L    AST (SGOT) 89 (H) 1 - 40 U/L    Alkaline Phosphatase 119 (H) 39 - 117 U/L    Total Bilirubin 1.1 0.0 - 1.2  mg/dL    Globulin 3.0 gm/dL    A/G Ratio 1.4 g/dL    BUN/Creatinine Ratio 11.3 7.0 - 25.0    Anion Gap 23.7 (H) 5.0 - 15.0 mmol/L    eGFR 100.6 >60.0 mL/min/1.73   Lipase    Collection Time: 02/24/24  4:50 PM    Specimen: Blood   Result Value Ref Range    Lipase 98 (H) 13 - 60 U/L   BNP    Collection Time: 02/24/24  4:50 PM    Specimen: Blood   Result Value Ref Range    proBNP 46.4 0.0 - 900.0 pg/mL   Single High Sensitivity Troponin T    Collection Time: 02/24/24  4:50 PM    Specimen: Blood   Result Value Ref Range    HS Troponin T 12 <22 ng/L   Ammonia    Collection Time: 02/24/24  4:50 PM    Specimen: Blood   Result Value Ref Range    Ammonia 25 16 - 60 umol/L   Magnesium    Collection Time: 02/24/24  4:50 PM    Specimen: Blood   Result Value Ref Range    Magnesium 1.4 (L) 1.6 - 2.4 mg/dL   Ethanol    Collection Time: 02/24/24  4:50 PM    Specimen: Blood   Result Value Ref Range    Ethanol <10 0 - 10 mg/dL    Ethanol % <0.010 %   CBC Auto Differential    Collection Time: 02/24/24  4:50 PM    Specimen: Blood   Result Value Ref Range    WBC 8.90 3.40 - 10.80 10*3/mm3    RBC 3.82 (L) 4.14 - 5.80 10*6/mm3    Hemoglobin 12.8 (L) 13.0 - 17.7 g/dL    Hematocrit 37.1 (L) 37.5 - 51.0 %    MCV 97.1 (H) 79.0 - 97.0 fL    MCH 33.5 (H) 26.6 - 33.0 pg    MCHC 34.5 31.5 - 35.7 g/dL    RDW 13.6 12.3 - 15.4 %    RDW-SD 47.4 37.0 - 54.0 fl    MPV 9.5 6.0 - 12.0 fL    Platelets 221 140 - 450 10*3/mm3    Neutrophil % 78.0 (H) 42.7 - 76.0 %    Lymphocyte % 10.2 (L) 19.6 - 45.3 %    Monocyte % 11.0 5.0 - 12.0 %    Eosinophil % 0.2 (L) 0.3 - 6.2 %    Basophil % 0.2 0.0 - 1.5 %    Immature Grans % 0.4 0.0 - 0.5 %    Neutrophils, Absolute 6.93 1.70 - 7.00 10*3/mm3    Lymphocytes, Absolute 0.91 0.70 - 3.10 10*3/mm3    Monocytes, Absolute 0.98 (H) 0.10 - 0.90 10*3/mm3    Eosinophils, Absolute 0.02 0.00 - 0.40 10*3/mm3    Basophils, Absolute 0.02 0.00 - 0.20 10*3/mm3    Immature Grans, Absolute 0.04 0.00 - 0.05 10*3/mm3    nRBC 0.0 0.0 -  0.2 /100 WBC   ECG 12 Lead Other; Dizzy    Collection Time: 02/24/24  5:08 PM   Result Value Ref Range    QT Interval 389 ms    QTC Interval 432 ms   Blood Gas, Venous -    Collection Time: 02/24/24  6:24 PM    Specimen: Venous Blood   Result Value Ref Range    Site Right Brachial     pH, Venous 7.484 (H) 7.310 - 7.410 pH Units    pCO2, Venous 28.4 (L) 41.0 - 51.0 mm Hg    pO2, Venous 27.7 (L) 35.0 - 45.0 mm Hg    HCO3, Venous 21.3 (L) 22.0 - 28.0 mmol/L    Base Excess, Venous -1.2 -2.0 - 2.0 mmol/L    O2 Saturation, Venous 58.9 45.0 - 75.0 %    CO2 Content 22.2 (L) 23 - 27 mmol/L    Barometric Pressure for Blood Gas 752.3000 mmHg    Modality Room Air     FIO2 21 %    Rate 16 Breaths/minute    Device Comment [id number]    Urinalysis With Microscopic If Indicated (No Culture) - Urine, Clean Catch    Collection Time: 02/24/24  8:16 PM    Specimen: Urine, Clean Catch   Result Value Ref Range    Color, UA Dark Yellow (A) Yellow, Straw    Appearance, UA Clear Clear    pH, UA 6.0 5.0 - 8.0    Specific Gravity, UA 1.021 1.005 - 1.030    Glucose, UA Negative Negative    Ketones, UA 80 mg/dL (3+) (A) Negative    Bilirubin, UA Large (3+) (A) Negative    Blood, UA Trace (A) Negative    Protein, UA 30 mg/dL (1+) (A) Negative    Leuk Esterase, UA Negative Negative    Nitrite, UA Negative Negative    Urobilinogen, UA 4.0 E.U./dL (A) 0.2 - 1.0 E.U./dL   Urine Drug Screen - Urine, Clean Catch    Collection Time: 02/24/24  8:16 PM    Specimen: Urine, Clean Catch   Result Value Ref Range    Amphet/Methamphet, Screen Negative Negative    Barbiturates Screen, Urine Negative Negative    Benzodiazepine Screen, Urine Negative Negative    Cocaine Screen, Urine Negative Negative    Opiate Screen Negative Negative    THC, Screen, Urine Negative Negative    Methadone Screen, Urine Negative Negative    Oxycodone Screen, Urine Negative Negative    Fentanyl, Urine Negative Negative   Urinalysis, Microscopic Only - Urine, Clean Catch     Collection Time: 02/24/24  8:16 PM    Specimen: Urine, Clean Catch   Result Value Ref Range    RBC, UA 6-10 (A) None Seen, 0-2 /HPF    WBC, UA 11-20 (A) None Seen, 0-2 /HPF    Bacteria, UA 3+ (A) None Seen /HPF    Squamous Epithelial Cells, UA 0-2 None Seen, 0-2 /HPF    Hyaline Casts, UA 7-12 None Seen /LPF    Methodology Automated Microscopy      I reviewed the above results.    Radiology:  CT Head Without Contrast   Final Result   1. No acute intracranial abnormality is identified with no significant   change when compared to prior head CT from [facility] on 02/18/2023, 1 year ago       2. There is mild small vessel disease in the cerebral white matter and   there is mild diffuse cerebral atrophy and there are calcified plaques   in the intracranial segments of the distal vertebral arteries and   cavernous segments of the internal carotid arteries bilaterally. The   remainder of the head CT is within normal limits.       Radiation dose reduction techniques were utilized, including automated   exposure control and exposure modulation based on body size.           This report was finalized on 2/24/2024 6:55 PM by Dr. Justino Linraes M.D   on Workstation: [id number]          XR Chest 1 View   Final Result   1. No active disease is seen in the chest with no significant change   when compared to the patient's prior chest x-ray on 7/19/2023.       This report was finalized on 2/24/2024 5:37 PM by Dr. Justino Linares M.D   on Workstation: [id number]            I reviewed the above results    Medications ordered in ED:  Medications   sodium chloride 0.9 % flush 10 mL (has no administration in time range)   sodium chloride 0.9 % bolus 1,000 mL (has no administration in time range)   acetaminophen (TYLENOL) tablet 1,000 mg (has no administration in time range)   sodium chloride 0.9 % bolus 1,000 mL (0 mL Intravenous Stopped 2/24/24 1916)       ED Course as of 02/24/24 2145   Sat Feb 24, 2024   1711 WBC: 8.90  [MP]   1711 Hemoglobin(!): 12.8 [MP]   1812 Preliminary report per Dr. Linares is negative acute head CT [MP]   1843 pH, Venous(!): 7.484 [MP]   1843 pCO2, Venous(!): 28.4 [MP]   1843 pO2, Venous(!): 27.7 [MP]   2030 Pt care turned over to BERONICA Rowan, pending UA and disposition. [MP]   2030 Patient was turned over to me by Salud Hancock PA-C.  Pending: UA and disposition.   [AR]   2106 Ketones, UA(!): 80 mg/dL (3+) [AR]   2106 RBC, UA(!): 6-10 [AR]   2106 WBC, UA(!): 11-20 [AR]   2106 Bacteria, UA(!): 3+ [AR]   2121 Patient reassessed.  Discussed ED findings, differential diagnosis, and the need for admission for evaluation/treatment.  They are agreeable to admission and all questions were answered.     [AR]   2135 Lipase(!): 98 [AR]   2138 Phone call with Dr. Ray ([facility]).  Discussed the patient, relevant history, exam, diagnostics, ED findings/progress, and concerns. They agree to admit the patient to tele, obs. Care assumed by the admitting physician at this time.     [AR]   2139 Dr. Ray states to discontinue the abx, as they will observe the patient over night. The patient declines any urinary complaints. [AR]   2145 Patient requested medication to treat the headache, Tylenol ordered.  Primary RN advises when patient took Tylenol he began to dry heave.  Zofran ordered. [AR]      ED Course User Index  [AR] Constanza Vaughn APRN  [MP] Salud Hancock PA-C       Diagnosis:  Final diagnoses:   Generalized weakness   Poor appetite   Hypokalemia   Acute nonintractable headache, unspecified headache type   Ketonuria   Dehydration           Provider attestation:  I personally reviewed the past medical history, past surgical history, social history, family history, current medications, and allergies as they appear in the chart.    The patient was seen and examined by myself and Dr. Cunningham, who agree with plan.       Constanza Vaughn APRN  02/24/24 2140       Constanza Vaughn, APRN  02/24/24 [id number]

## 2024-03-06 ENCOUNTER — APPOINTMENT (OUTPATIENT)
Dept: CT IMAGING | Facility: HOSPITAL | Age: 69
DRG: 897 | End: 2024-03-06
Payer: MEDICARE

## 2024-03-06 ENCOUNTER — HOSPITAL ENCOUNTER (INPATIENT)
Facility: HOSPITAL | Age: 69
LOS: 2 days | Discharge: HOME | DRG: 897 | End: 2024-03-09
Attending: STUDENT IN AN ORGANIZED HEALTH CARE EDUCATION/TRAINING PROGRAM | Admitting: STUDENT IN AN ORGANIZED HEALTH CARE EDUCATION/TRAINING PROGRAM
Payer: MEDICARE

## 2024-03-06 DIAGNOSIS — K85.20 ALCOHOL-INDUCED ACUTE PANCREATITIS, UNSPECIFIED COMPLICATION STATUS: Primary | ICD-10-CM

## 2024-03-06 PROBLEM — K85.90 PANCREATITIS: Status: ACTIVE | Noted: 2024-03-06

## 2024-03-06 LAB
ALBUMIN SERPL-MCNC: 3.7 G/DL (ref 3.5–5.2)
ALBUMIN/GLOB SERPL: 1.4 G/DL
ALP SERPL-CCNC: 108 U/L (ref 39–117)
ALT SERPL W P-5'-P-CCNC: 41 U/L (ref 1–41)
ANION GAP SERPL CALCULATED.3IONS-SCNC: 9 MMOL/L (ref 5–15)
AST SERPL-CCNC: 150 U/L (ref 1–40)
BASOPHILS # BLD AUTO: 0.04 10*3/MM3 (ref 0–0.2)
BASOPHILS NFR BLD AUTO: 0.5 % (ref 0–1.5)
BILIRUB SERPL-MCNC: 0.4 MG/DL (ref 0–1.2)
BILIRUB UR QL STRIP: NEGATIVE
BUN SERPL-MCNC: 7 MG/DL (ref 8–23)
BUN/CREAT SERPL: 12.5 (ref 7–25)
CALCIUM SPEC-SCNC: 8.4 MG/DL (ref 8.6–10.5)
CHLORIDE SERPL-SCNC: 103 MMOL/L (ref 98–107)
CLARITY UR: CLEAR
CO2 SERPL-SCNC: 26 MMOL/L (ref 22–29)
COLOR UR: YELLOW
CREAT SERPL-MCNC: 0.56 MG/DL (ref 0.76–1.27)
DEPRECATED RDW RBC AUTO: 48.1 FL (ref 37–54)
EGFRCR SERPLBLD CKD-EPI 2021: 108 ML/MIN/1.73
EOSINOPHIL # BLD AUTO: 0.12 10*3/MM3 (ref 0–0.4)
EOSINOPHIL NFR BLD AUTO: 1.5 % (ref 0.3–6.2)
ERYTHROCYTE [DISTWIDTH] IN BLOOD BY AUTOMATED COUNT: 13.6 % (ref 12.3–15.4)
GLOBULIN UR ELPH-MCNC: 2.7 GM/DL
GLUCOSE SERPL-MCNC: 137 MG/DL (ref 65–99)
GLUCOSE UR STRIP-MCNC: NEGATIVE MG/DL
HCT VFR BLD AUTO: 31.9 % (ref 37.5–51)
HGB BLD-MCNC: 10.9 G/DL (ref 13–17.7)
HGB UR QL STRIP.AUTO: NEGATIVE
HOLD SPECIMEN: NORMAL
IMM GRANULOCYTES # BLD AUTO: 0.04 10*3/MM3 (ref 0–0.05)
IMM GRANULOCYTES NFR BLD AUTO: 0.5 % (ref 0–0.5)
KETONES UR QL STRIP: NEGATIVE
LEUKOCYTE ESTERASE UR QL STRIP.AUTO: NEGATIVE
LIPASE SERPL-CCNC: 151 U/L (ref 13–60)
LYMPHOCYTES # BLD AUTO: 1.93 10*3/MM3 (ref 0.7–3.1)
LYMPHOCYTES NFR BLD AUTO: 24.8 % (ref 19.6–45.3)
MCH RBC QN AUTO: 33.6 PG (ref 26.6–33)
MCHC RBC AUTO-ENTMCNC: 34.2 G/DL (ref 31.5–35.7)
MCV RBC AUTO: 98.5 FL (ref 79–97)
MONOCYTES # BLD AUTO: 0.66 10*3/MM3 (ref 0.1–0.9)
MONOCYTES NFR BLD AUTO: 8.5 % (ref 5–12)
NEUTROPHILS NFR BLD AUTO: 5 10*3/MM3 (ref 1.7–7)
NEUTROPHILS NFR BLD AUTO: 64.2 % (ref 42.7–76)
NITRITE UR QL STRIP: NEGATIVE
NRBC BLD AUTO-RTO: 0 /100 WBC (ref 0–0.2)
PH UR STRIP.AUTO: 8 [PH] (ref 5–8)
PLATELET # BLD AUTO: 318 10*3/MM3 (ref 140–450)
PMV BLD AUTO: 9 FL (ref 6–12)
POTASSIUM SERPL-SCNC: 3.5 MMOL/L (ref 3.5–5.2)
PROT SERPL-MCNC: 6.4 G/DL (ref 6–8.5)
PROT UR QL STRIP: NEGATIVE
RBC # BLD AUTO: 3.24 10*6/MM3 (ref 4.14–5.8)
SODIUM SERPL-SCNC: 138 MMOL/L (ref 136–145)
SP GR UR STRIP: >=1.03 (ref 1–1.03)
UROBILINOGEN UR QL STRIP: NORMAL
WBC NRBC COR # BLD AUTO: 7.79 10*3/MM3 (ref 3.4–10.8)
WHOLE BLOOD HOLD COAG: NORMAL
WHOLE BLOOD HOLD SPECIMEN: NORMAL

## 2024-03-06 PROCEDURE — 80053 COMPREHEN METABOLIC PANEL: CPT | Performed by: STUDENT IN AN ORGANIZED HEALTH CARE EDUCATION/TRAINING PROGRAM

## 2024-03-06 PROCEDURE — 81003 URINALYSIS AUTO W/O SCOPE: CPT | Performed by: STUDENT IN AN ORGANIZED HEALTH CARE EDUCATION/TRAINING PROGRAM

## 2024-03-06 PROCEDURE — 85025 COMPLETE CBC W/AUTO DIFF WBC: CPT | Performed by: STUDENT IN AN ORGANIZED HEALTH CARE EDUCATION/TRAINING PROGRAM

## 2024-03-06 PROCEDURE — 25010000002 HYDROMORPHONE 1 MG/ML SOLUTION: Performed by: INTERNAL MEDICINE

## 2024-03-06 PROCEDURE — 25510000001 IOPAMIDOL 61 % SOLUTION: Performed by: STUDENT IN AN ORGANIZED HEALTH CARE EDUCATION/TRAINING PROGRAM

## 2024-03-06 PROCEDURE — 25010000002 MORPHINE PER 10 MG: Performed by: STUDENT IN AN ORGANIZED HEALTH CARE EDUCATION/TRAINING PROGRAM

## 2024-03-06 PROCEDURE — 99285 EMERGENCY DEPT VISIT HI MDM: CPT

## 2024-03-06 PROCEDURE — 83690 ASSAY OF LIPASE: CPT | Performed by: STUDENT IN AN ORGANIZED HEALTH CARE EDUCATION/TRAINING PROGRAM

## 2024-03-06 PROCEDURE — G0378 HOSPITAL OBSERVATION PER HR: HCPCS

## 2024-03-06 PROCEDURE — 74177 CT ABD & PELVIS W/CONTRAST: CPT

## 2024-03-06 PROCEDURE — 25010000002 ONDANSETRON PER 1 MG

## 2024-03-06 PROCEDURE — 25010000002 THIAMINE HCL 200 MG/2ML SOLUTION: Performed by: INTERNAL MEDICINE

## 2024-03-06 PROCEDURE — 25010000002 SODIUM CHLORIDE 0.9 % WITH KCL 20 MEQ 20-0.9 MEQ/L-% SOLUTION: Performed by: INTERNAL MEDICINE

## 2024-03-06 PROCEDURE — 25010000002 HYDROMORPHONE 1 MG/ML SOLUTION: Performed by: STUDENT IN AN ORGANIZED HEALTH CARE EDUCATION/TRAINING PROGRAM

## 2024-03-06 RX ORDER — BISACODYL 5 MG/1
5 TABLET, DELAYED RELEASE ORAL DAILY PRN
Status: DISCONTINUED | OUTPATIENT
Start: 2024-03-06 | End: 2024-03-09 | Stop reason: HOSPADM

## 2024-03-06 RX ORDER — ONDANSETRON 2 MG/ML
INJECTION INTRAMUSCULAR; INTRAVENOUS
Status: COMPLETED
Start: 2024-03-06 | End: 2024-03-06

## 2024-03-06 RX ORDER — POLYETHYLENE GLYCOL 3350 17 G/17G
17 POWDER, FOR SOLUTION ORAL DAILY PRN
Status: DISCONTINUED | OUTPATIENT
Start: 2024-03-06 | End: 2024-03-09 | Stop reason: HOSPADM

## 2024-03-06 RX ORDER — LORAZEPAM 1 MG/1
1 TABLET ORAL
Status: DISCONTINUED | OUTPATIENT
Start: 2024-03-06 | End: 2024-03-09 | Stop reason: HOSPADM

## 2024-03-06 RX ORDER — ONDANSETRON 2 MG/ML
4 INJECTION INTRAMUSCULAR; INTRAVENOUS EVERY 6 HOURS PRN
Status: DISCONTINUED | OUTPATIENT
Start: 2024-03-06 | End: 2024-03-09 | Stop reason: HOSPADM

## 2024-03-06 RX ORDER — LORAZEPAM 1 MG/1
2 TABLET ORAL EVERY 6 HOURS
Status: DISCONTINUED | OUTPATIENT
Start: 2024-03-06 | End: 2024-03-07

## 2024-03-06 RX ORDER — LORAZEPAM 2 MG/ML
2 INJECTION INTRAMUSCULAR
Status: DISCONTINUED | OUTPATIENT
Start: 2024-03-06 | End: 2024-03-09 | Stop reason: HOSPADM

## 2024-03-06 RX ORDER — LORAZEPAM 1 MG/1
1 TABLET ORAL EVERY 6 HOURS
Status: DISCONTINUED | OUTPATIENT
Start: 2024-03-07 | End: 2024-03-07

## 2024-03-06 RX ORDER — ACETAMINOPHEN 325 MG/1
650 TABLET ORAL EVERY 4 HOURS PRN
Status: DISCONTINUED | OUTPATIENT
Start: 2024-03-06 | End: 2024-03-09 | Stop reason: HOSPADM

## 2024-03-06 RX ORDER — THIAMINE HYDROCHLORIDE 100 MG/ML
200 INJECTION, SOLUTION INTRAMUSCULAR; INTRAVENOUS EVERY 8 HOURS SCHEDULED
Status: DISCONTINUED | OUTPATIENT
Start: 2024-03-06 | End: 2024-03-09 | Stop reason: HOSPADM

## 2024-03-06 RX ORDER — CLONIDINE HYDROCHLORIDE 0.1 MG/1
0.1 TABLET ORAL EVERY 4 HOURS PRN
Status: DISCONTINUED | OUTPATIENT
Start: 2024-03-06 | End: 2024-03-09 | Stop reason: HOSPADM

## 2024-03-06 RX ORDER — BISACODYL 10 MG
10 SUPPOSITORY, RECTAL RECTAL DAILY PRN
Status: DISCONTINUED | OUTPATIENT
Start: 2024-03-06 | End: 2024-03-09 | Stop reason: HOSPADM

## 2024-03-06 RX ORDER — LEVOTHYROXINE SODIUM 0.1 MG/1
100 TABLET ORAL
Status: DISCONTINUED | OUTPATIENT
Start: 2024-03-07 | End: 2024-03-09 | Stop reason: HOSPADM

## 2024-03-06 RX ORDER — ATORVASTATIN CALCIUM 20 MG/1
20 TABLET, FILM COATED ORAL DAILY
Status: DISCONTINUED | OUTPATIENT
Start: 2024-03-07 | End: 2024-03-09 | Stop reason: HOSPADM

## 2024-03-06 RX ORDER — UREA 10 %
3 LOTION (ML) TOPICAL NIGHTLY PRN
Status: DISCONTINUED | OUTPATIENT
Start: 2024-03-06 | End: 2024-03-09 | Stop reason: HOSPADM

## 2024-03-06 RX ORDER — SODIUM CHLORIDE AND POTASSIUM CHLORIDE 150; 900 MG/100ML; MG/100ML
125 INJECTION, SOLUTION INTRAVENOUS CONTINUOUS
Status: DISCONTINUED | OUTPATIENT
Start: 2024-03-06 | End: 2024-03-07

## 2024-03-06 RX ORDER — MORPHINE SULFATE 2 MG/ML
4 INJECTION, SOLUTION INTRAMUSCULAR; INTRAVENOUS ONCE
Status: COMPLETED | OUTPATIENT
Start: 2024-03-06 | End: 2024-03-06

## 2024-03-06 RX ORDER — LORAZEPAM 2 MG/ML
1 INJECTION INTRAMUSCULAR
Status: DISCONTINUED | OUTPATIENT
Start: 2024-03-06 | End: 2024-03-09 | Stop reason: HOSPADM

## 2024-03-06 RX ORDER — LORAZEPAM 1 MG/1
2 TABLET ORAL
Status: DISCONTINUED | OUTPATIENT
Start: 2024-03-06 | End: 2024-03-09 | Stop reason: HOSPADM

## 2024-03-06 RX ORDER — MULTIPLE VITAMINS W/ MINERALS TAB 9MG-400MCG
1 TAB ORAL DAILY
Status: DISCONTINUED | OUTPATIENT
Start: 2024-03-06 | End: 2024-03-09 | Stop reason: HOSPADM

## 2024-03-06 RX ORDER — LISINOPRIL 10 MG/1
10 TABLET ORAL DAILY
Status: DISCONTINUED | OUTPATIENT
Start: 2024-03-07 | End: 2024-03-07

## 2024-03-06 RX ORDER — FOLIC ACID 1 MG/1
1 TABLET ORAL DAILY
Status: DISCONTINUED | OUTPATIENT
Start: 2024-03-06 | End: 2024-03-09 | Stop reason: HOSPADM

## 2024-03-06 RX ORDER — AMOXICILLIN 250 MG
2 CAPSULE ORAL 2 TIMES DAILY PRN
Status: DISCONTINUED | OUTPATIENT
Start: 2024-03-06 | End: 2024-03-09 | Stop reason: HOSPADM

## 2024-03-06 RX ORDER — ONDANSETRON 4 MG/1
4 TABLET, ORALLY DISINTEGRATING ORAL EVERY 6 HOURS PRN
Status: DISCONTINUED | OUTPATIENT
Start: 2024-03-06 | End: 2024-03-09 | Stop reason: HOSPADM

## 2024-03-06 RX ORDER — AMLODIPINE BESYLATE 5 MG/1
5 TABLET ORAL EVERY MORNING
Status: DISCONTINUED | OUTPATIENT
Start: 2024-03-07 | End: 2024-03-07

## 2024-03-06 RX ORDER — ONDANSETRON 2 MG/ML
4 INJECTION INTRAMUSCULAR; INTRAVENOUS ONCE
Status: COMPLETED | OUTPATIENT
Start: 2024-03-06 | End: 2024-03-06

## 2024-03-06 RX ADMIN — ONDANSETRON 4 MG: 2 INJECTION INTRAMUSCULAR; INTRAVENOUS at 17:24

## 2024-03-06 RX ADMIN — FOLIC ACID 1 MG: 1 TABLET ORAL at 19:50

## 2024-03-06 RX ADMIN — POTASSIUM CHLORIDE AND SODIUM CHLORIDE 125 ML/HR: 900; 150 INJECTION, SOLUTION INTRAVENOUS at 21:48

## 2024-03-06 RX ADMIN — IOPAMIDOL 85 ML: 612 INJECTION, SOLUTION INTRAVENOUS at 18:29

## 2024-03-06 RX ADMIN — Medication 1 TABLET: at 19:50

## 2024-03-06 RX ADMIN — HYDROMORPHONE HYDROCHLORIDE 1 MG: 1 INJECTION, SOLUTION INTRAMUSCULAR; INTRAVENOUS; SUBCUTANEOUS at 20:48

## 2024-03-06 RX ADMIN — LORAZEPAM 2 MG: 1 TABLET ORAL at 19:50

## 2024-03-06 RX ADMIN — THIAMINE HYDROCHLORIDE 200 MG: 100 INJECTION, SOLUTION INTRAMUSCULAR; INTRAVENOUS at 23:04

## 2024-03-06 RX ADMIN — MORPHINE SULFATE 4 MG: 2 INJECTION, SOLUTION INTRAMUSCULAR; INTRAVENOUS at 17:18

## 2024-03-06 RX ADMIN — HYDROMORPHONE HYDROCHLORIDE 1 MG: 1 INJECTION, SOLUTION INTRAMUSCULAR; INTRAVENOUS; SUBCUTANEOUS at 17:57

## 2024-03-06 NOTE — ED PROVIDER NOTES
EMERGENCY DEPARTMENT ENCOUNTER  Room Number:  20/20  PCP: Provider, No Known  Independent Historians: Patient and EMS      HPI:  Chief Complaint: had concerns including Abdominal Pain.     Context: Pro Mueller is a 69 y.o. male with a medical history of alcohol dependency, cirrhosis, pancreatitis who presents to the ED c/o acute abdominal pain.  Patient states around 3:00 today he had sudden onset of acute abdominal pain.  Pain was initially located in his lower abdomen but he states it spread up to the upper abdomen.  Patient's last bowel movement was last night and normal.  Patient denies previous abdominal surgery.  Patient has had nausea but no vomiting.  Patient's last alcohol drink was earlier today.  Patient does not know if he has seizures when he withdrawals.      Review of prior external notes (non-ED) -and- Review of prior external test results outside of this encounter:  Discharge summary from 2/25/2024 reviewed and notable for history of HTN, HLD, alcohol abuse, depression, hypothyroid and sleep apnea.  Patient was admitted with uncontrollable nausea and vomiting, lightheadedness and weakness.  Patient managed conservatively and eventually left AGAINST MEDICAL ADVICE    PAST MEDICAL HISTORY  Active Ambulatory Problems     Diagnosis Date Noted    Fall 08/08/2016    Alcoholism in recovery 08/08/2016    Atopic rhinitis 08/08/2016    Mixed anxiety depressive disorder 08/08/2016    Genital herpes simplex 08/08/2016    Hyperlipidemia 08/08/2016    Hypertension 08/08/2016    Hypothyroidism 08/08/2016    Insomnia 08/08/2016    Panic disorder without agoraphobia 08/08/2016    Persistent insomnia 08/08/2016    Vitamin D deficiency 08/08/2016    Chronic low back pain 11/11/2016    Neuropathy involving both lower extremities 10/25/2018    QT prolongation 01/03/2019    Left shoulder pain 11/19/2019    Pancytopenia 01/14/2020    Left ventricular diastolic dysfunction 01/16/2021    Tremor 10/06/2021    C5 cervical  fracture 11/09/2021    Syncope and collapse 01/07/2022    Neck pain 01/07/2022    Alcoholic intoxication with complication 03/13/2022    Withdrawal symptoms, alcohol 07/01/2022    Transaminitis 07/01/2022    Lumbar facet arthropathy 07/20/2022    Alcohol dependence with withdrawal 09/11/2022    Midline low back pain with right-sided sciatica 09/15/2022    Spondylolisthesis at L4-L5 level 09/15/2022    Lumbar canal stenosis 09/15/2022    Alcohol cessation counseling 09/15/2022    Need for assistance due to reduced mobility 09/15/2022    Impaired mobility and ADLs 09/15/2022    Alcohol withdrawal syndrome without complication 02/18/2023    Facial laceration 02/18/2023    Orthostatic hypotension 02/18/2023    Acute bacterial conjunctivitis of right eye 03/02/2023    Visit for suture removal 03/02/2023    Renal calculi 03/14/2023    Hepatic steatosis 03/15/2023    Alcohol withdrawal syndrome with complication 04/14/2023    Macrocytosis without anemia 04/14/2023    Lumbosacral spondylosis without myelopathy 05/05/2023    Elevated LFTs 05/13/2023    Alcohol-induced acute pancreatitis, unspecified complication status 06/23/2023    Dehydration 02/24/2024     Resolved Ambulatory Problems     Diagnosis Date Noted    Impacted cerumen 08/08/2016    Influenza 08/08/2016    Motion sickness 08/08/2016    Seasonal allergic rhinitis 08/08/2016    Upper respiratory tract infection 08/08/2016    Alcohol withdrawal 11/04/2016    Headache 11/05/2016    Gastritis 11/05/2016    Olecranon bursitis of right elbow 04/05/2017    Sciatica of left side 06/12/2018    Syncope and collapse 01/02/2019    Nausea and vomiting 01/11/2020    Alcoholic ketoacidosis 01/12/2020    Hyponatremia 01/13/2020    Hypokalemia 01/13/2020    Alcohol dependence with uncomplicated withdrawal 01/14/2020    Nephrolithiasis 02/12/2020    Hypomagnesemia 01/16/2021    Non-traumatic rhabdomyolysis 01/18/2021    Urine retention 01/21/2021    Epigastric pain 06/23/2023  "    Past Medical History:   Diagnosis Date    Alcohol abuse     Allergic 1970    Anxiety     Arthritis     Depression     Disease of thyroid gland     Elevated cholesterol     Encounter for removal of sutures     GERD (gastroesophageal reflux disease)     Headache, tension-type     Kidney stone     Low back pain     Migraine     Olecranon bursitis, right elbow     Peripheral neuropathy     Sleep apnea          PAST SURGICAL HISTORY  Past Surgical History:   Procedure Laterality Date    COLONOSCOPY      CYST REMOVAL      CYSTOSCOPY BLADDER STONE LITHOTRIPSY  2022    EXTRACORPOREAL SHOCK WAVE LITHOTRIPSY (ESWL) Right 2002    SHOULDER ARTHROSCOPY Right 2019    Procedure: SHOULDER ARTHROSCOPY, decompression, distal clavicle excision;  Surgeon: Aj Mancilla MD;  [place] [facility] [place] [facility] [place]  Service: Orthopedics    TONSILLECTOMY           FAMILY HISTORY  Family History   Problem Relation Age of Onset    Alzheimer's disease Mother     Mental illness Mother     Pancreatic cancer Father     Hearing loss Father     Arthritis Maternal Grandmother     Malig Hyperthermia Neg Hx          SOCIAL HISTORY  Social History     Socioeconomic History    Marital status: Single   Tobacco Use    Smoking status: Former     Current packs/day: 0.00     Average packs/day: 0.5 packs/day for 15.0 years (7.5 ttl pk-yrs)     Types: Cigarettes     Start date: 1990     Quit date: 2005     Years since quittin.1    Smokeless tobacco: Never    Tobacco comments:     caffeine - 3 cans of coke daily    Vaping Use    Vaping status: Never Used   Substance and Sexual Activity    Alcohol use: Yes     Alcohol/week: 7.0 standard drinks of alcohol     Types: 7 Shots of liquor per week     Comment: .5 liter vodka    Drug use: Yes     Types: Methamphetamines     Comment: \"once in a rare while\"    Sexual activity: Yes     Partners: Female     Birth control/protection: Condom         ALLERGIES  Penicillins      REVIEW OF SYSTEMS  Review " of Systems  Included in HPI  All systems reviewed and negative except for those discussed in HPI.      PHYSICAL EXAM    I have reviewed the triage vital signs and nursing notes.    ED Triage Vitals [03/06/24 1633]   Temp Heart Rate Resp BP SpO2   98.4 °F (36.9 °C) 68 18 174/86 99 %      Temp src Heart Rate Source Patient Position BP Location FiO2 (%)   Oral -- -- -- --       Physical Exam  GENERAL: alert, no acute distress  SKIN: Warm, dry  HENT: Normocephalic, atraumatic  EYES: no scleral icterus  CV: regular rhythm, regular rate  RESPIRATORY: normal effort, lungs clear  ABDOMEN: soft, mild distention, diffuse tenderness to palpation  MUSCULOSKELETAL: no deformity  NEURO: alert, moves all extremities, follows commands            LAB RESULTS  Recent Results (from the past 24 hour(s))   Comprehensive Metabolic Panel    Collection Time: 03/06/24  5:12 PM    Specimen: Blood   Result Value Ref Range    Glucose 137 (H) 65 - 99 mg/dL    BUN 7 (L) 8 - 23 mg/dL    Creatinine 0.56 (L) 0.76 - 1.27 mg/dL    Sodium 138 136 - 145 mmol/L    Potassium 3.5 3.5 - 5.2 mmol/L    Chloride 103 98 - 107 mmol/L    CO2 26.0 22.0 - 29.0 mmol/L    Calcium 8.4 (L) 8.6 - 10.5 mg/dL    Total Protein 6.4 6.0 - 8.5 g/dL    Albumin 3.7 3.5 - 5.2 g/dL    ALT (SGPT) 41 1 - 41 U/L    AST (SGOT) 150 (H) 1 - 40 U/L    Alkaline Phosphatase 108 39 - 117 U/L    Total Bilirubin 0.4 0.0 - 1.2 mg/dL    Globulin 2.7 gm/dL    A/G Ratio 1.4 g/dL    BUN/Creatinine Ratio 12.5 7.0 - 25.0    Anion Gap 9.0 5.0 - 15.0 mmol/L    eGFR 108.0 >60.0 mL/min/1.73   Lipase    Collection Time: 03/06/24  5:12 PM    Specimen: Blood   Result Value Ref Range    Lipase 151 (H) 13 - 60 U/L   CBC Auto Differential    Collection Time: 03/06/24  5:12 PM    Specimen: Blood   Result Value Ref Range    WBC 7.79 3.40 - 10.80 10*3/mm3    RBC 3.24 (L) 4.14 - 5.80 10*6/mm3    Hemoglobin 10.9 (L) 13.0 - 17.7 g/dL    Hematocrit 31.9 (L) 37.5 - 51.0 %    MCV 98.5 (H) 79.0 - 97.0 fL    MCH 33.6  (H) 26.6 - 33.0 pg    MCHC 34.2 31.5 - 35.7 g/dL    RDW 13.6 12.3 - 15.4 %    RDW-SD 48.1 37.0 - 54.0 fl    MPV 9.0 6.0 - 12.0 fL    Platelets 318 140 - 450 10*3/mm3    Neutrophil % 64.2 42.7 - 76.0 %    Lymphocyte % 24.8 19.6 - 45.3 %    Monocyte % 8.5 5.0 - 12.0 %    Eosinophil % 1.5 0.3 - 6.2 %    Basophil % 0.5 0.0 - 1.5 %    Immature Grans % 0.5 0.0 - 0.5 %    Neutrophils, Absolute 5.00 1.70 - 7.00 10*3/mm3    Lymphocytes, Absolute 1.93 0.70 - 3.10 10*3/mm3    Monocytes, Absolute 0.66 0.10 - 0.90 10*3/mm3    Eosinophils, Absolute 0.12 0.00 - 0.40 10*3/mm3    Basophils, Absolute 0.04 0.00 - 0.20 10*3/mm3    Immature Grans, Absolute 0.04 0.00 - 0.05 10*3/mm3    nRBC 0.0 0.0 - 0.2 /100 WBC   Green Top (Gel)    Collection Time: 03/06/24  5:12 PM   Result Value Ref Range    Extra Tube Hold for add-ons.    Lavender Top    Collection Time: 03/06/24  5:12 PM   Result Value Ref Range    Extra Tube hold for add-on    Light Blue Top    Collection Time: 03/06/24  5:12 PM   Result Value Ref Range    Extra Tube Hold for add-ons.          RADIOLOGY  No Radiology Exams Resulted Within Past 24 Hours      MEDICATIONS GIVEN IN ER  Medications   morphine injection 4 mg (4 mg Intravenous Given 3/6/24 1718)   ondansetron (ZOFRAN) injection 4 mg (4 mg Intravenous Given 3/6/24 1724)   HYDROmorphone (DILAUDID) injection 1 mg (1 mg Intravenous Given 3/6/24 8517)   iopamidol (ISOVUE-300) 61 % injection 100 mL (85 mL Intravenous Given by Other 3/6/24 1829)         ORDERS PLACED DURING THIS VISIT:  Orders Placed This Encounter   Procedures    CT Abdomen Pelvis With Contrast    Comprehensive Metabolic Panel    Lipase    Urinalysis With Microscopic If Indicated (No Culture) - Urine, Clean Catch    CBC Auto Differential    Wells Draw    Initiate Observation Status    CBC & Differential    Green Top (Gel)    Lavender Top    Light Blue Top         OUTPATIENT MEDICATION MANAGEMENT:  No current Epic-ordered facility-administered medications on  file.     Current Outpatient Medications Ordered in Epic   Medication Sig Dispense Refill    amLODIPine (NORVASC) 5 MG tablet Take 1 tablet by mouth Every Morning. 90 tablet 1    atorvastatin (LIPITOR) 20 MG tablet Take 1 tablet by mouth Daily. 90 tablet 1    fluticasone (FLONASE) 50 MCG/ACT nasal spray 2 sprays into the nostril(s) as directed by provider Daily. 11.1 mL 0    levothyroxine (SYNTHROID, LEVOTHROID) 100 MCG tablet Take 1 tablet by mouth Daily. 90 tablet 1    lisinopril (PRINIVIL,ZESTRIL) 10 MG tablet Take 1 tablet by mouth Daily. 90 tablet 1    pseudoephedrine (Sudafed) 30 MG tablet Take 1 tablet by mouth 3 times a day. 20 tablet 0    traMADol (ULTRAM) 50 MG tablet Take 1 tablet by mouth Every 6 (Six) Hours As Needed for Moderate Pain.         PROGRESS, DATA ANALYSIS, CONSULTS, AND MEDICAL DECISION MAKING  All labs have been independently interpreted by me.  All radiology studies have been reviewed by me. All EKG's have been independently viewed and interpreted by me.  Discussion below represents my analysis of pertinent findings related to patient's condition, differential diagnosis, treatment plan and final disposition.    Differential diagnosis includes but is not limited to alcohol withdrawal, biliary pathology, pancreatitis, small bowel obstruction.    Clinical Scores:                   ED Course as of 03/06/24 1856   Wed Mar 06, 2024   1745 Lipase(!): 151 [MW]   1851 Laboratory evaluation is notable for mild anemia, elevated lipase.  Patient does have considerable pain throughout his abdomen and distention is noted.  Patient treated with IV Zofran and IV morphine.  On reassessment patient has severe ongoing pain.  Patient given 1 mg IV Dilaudid. [MW]   1856 Given ongoing pain, elevated lipase we will admit for further symptomatic management. [MW]   1856 Discussed with Dr. Guevara who agrees to admit. [MW]      ED Course User Index  [MW] Gerber Mcdowell MD             AS OF 18:56 EST  VITALS:    BP - 149/93  HR - 68  TEMP - 98.4 °F (36.9 °C) (Oral)  O2 SATS - 96%    COMPLEXITY OF CARE  The patient requires admission.      DIAGNOSIS  Final diagnoses:   Alcohol-induced acute pancreatitis, unspecified complication status         DISPOSITION  ED Disposition       ED Disposition   Decision to Admit    Condition   --    Comment   Level of Care: Telemetry [5]   Diagnosis: Pancreatitis [202663]   Admitting Physician: KISHA GREGORY [[id number]]   Attending Physician: KISHA GREGORY [[id number]]                  Please note that portions of this document were completed with a voice recognition program.    Note Disclaimer: At [facility], we believe that sharing information builds trust and better relationships. You are receiving this note because you recently visited [facility]. It is possible you will see health information before a provider has talked with you about it. This kind of information can be easy to misunderstand. To help you fully understand what it means for your health, we urge you to discuss this note with your provider.         Gerber Mcdowell MD  03/06/24 [id number]

## 2024-03-06 NOTE — ED NOTES
Pt to ED from home via [facility]. Pt reports abd cramping lower abd x1-2 hours. Pt denies n/v/d.

## 2024-03-06 NOTE — ED NOTES
Pt to ED with acute lower abd pain since 1500 today, constant pain, intermittent very sharp pains. + nausea. Denies constipation , v/d or urinary c/o.

## 2024-03-07 PROBLEM — F10.939 ALCOHOL WITHDRAWAL: Status: ACTIVE | Noted: 2024-03-07

## 2024-03-07 PROBLEM — R10.84 GENERALIZED ABDOMINAL PAIN: Status: ACTIVE | Noted: 2024-03-07

## 2024-03-07 LAB
ANION GAP SERPL CALCULATED.3IONS-SCNC: 10.6 MMOL/L (ref 5–15)
BUN SERPL-MCNC: 6 MG/DL (ref 8–23)
BUN/CREAT SERPL: 15 (ref 7–25)
CALCIUM SPEC-SCNC: 7.7 MG/DL (ref 8.6–10.5)
CHLORIDE SERPL-SCNC: 104 MMOL/L (ref 98–107)
CO2 SERPL-SCNC: 21.4 MMOL/L (ref 22–29)
CREAT SERPL-MCNC: 0.4 MG/DL (ref 0.76–1.27)
DEPRECATED RDW RBC AUTO: 48.2 FL (ref 37–54)
EGFRCR SERPLBLD CKD-EPI 2021: 119.6 ML/MIN/1.73
ERYTHROCYTE [DISTWIDTH] IN BLOOD BY AUTOMATED COUNT: 13.5 % (ref 12.3–15.4)
GLUCOSE SERPL-MCNC: 105 MG/DL (ref 65–99)
HCT VFR BLD AUTO: 28.4 % (ref 37.5–51)
HGB BLD-MCNC: 9.5 G/DL (ref 13–17.7)
MCH RBC QN AUTO: 32.8 PG (ref 26.6–33)
MCHC RBC AUTO-ENTMCNC: 33.5 G/DL (ref 31.5–35.7)
MCV RBC AUTO: 97.9 FL (ref 79–97)
PLATELET # BLD AUTO: 283 10*3/MM3 (ref 140–450)
PMV BLD AUTO: 8.9 FL (ref 6–12)
POTASSIUM SERPL-SCNC: 4.2 MMOL/L (ref 3.5–5.2)
RBC # BLD AUTO: 2.9 10*6/MM3 (ref 4.14–5.8)
SODIUM SERPL-SCNC: 136 MMOL/L (ref 136–145)
WBC NRBC COR # BLD AUTO: 9.01 10*3/MM3 (ref 3.4–10.8)

## 2024-03-07 PROCEDURE — 80048 BASIC METABOLIC PNL TOTAL CA: CPT | Performed by: INTERNAL MEDICINE

## 2024-03-07 PROCEDURE — 25010000002 HYDROMORPHONE 1 MG/ML SOLUTION: Performed by: INTERNAL MEDICINE

## 2024-03-07 PROCEDURE — 25010000002 PHENOBARBITAL PER 120 MG: Performed by: STUDENT IN AN ORGANIZED HEALTH CARE EDUCATION/TRAINING PROGRAM

## 2024-03-07 PROCEDURE — 36415 COLL VENOUS BLD VENIPUNCTURE: CPT | Performed by: INTERNAL MEDICINE

## 2024-03-07 PROCEDURE — 25010000002 SODIUM CHLORIDE 0.9 % WITH KCL 20 MEQ 20-0.9 MEQ/L-% SOLUTION: Performed by: INTERNAL MEDICINE

## 2024-03-07 PROCEDURE — 90791 PSYCH DIAGNOSTIC EVALUATION: CPT

## 2024-03-07 PROCEDURE — 25010000002 THIAMINE HCL 200 MG/2ML SOLUTION: Performed by: INTERNAL MEDICINE

## 2024-03-07 PROCEDURE — 85027 COMPLETE CBC AUTOMATED: CPT | Performed by: INTERNAL MEDICINE

## 2024-03-07 PROCEDURE — 25810000003 LACTATED RINGERS PER 1000 ML: Performed by: STUDENT IN AN ORGANIZED HEALTH CARE EDUCATION/TRAINING PROGRAM

## 2024-03-07 RX ORDER — SODIUM CHLORIDE, SODIUM LACTATE, POTASSIUM CHLORIDE, CALCIUM CHLORIDE 600; 310; 30; 20 MG/100ML; MG/100ML; MG/100ML; MG/100ML
75 INJECTION, SOLUTION INTRAVENOUS CONTINUOUS
Status: DISCONTINUED | OUTPATIENT
Start: 2024-03-07 | End: 2024-03-09 | Stop reason: HOSPADM

## 2024-03-07 RX ORDER — PHENOBARBITAL 32.4 MG/1
32.4 TABLET ORAL ONCE
Status: DISCONTINUED | OUTPATIENT
Start: 2024-03-09 | End: 2024-03-08

## 2024-03-07 RX ORDER — PHENOBARBITAL SODIUM 65 MG/ML
65 INJECTION, SOLUTION INTRAMUSCULAR; INTRAVENOUS ONCE
Status: DISCONTINUED | OUTPATIENT
Start: 2024-03-08 | End: 2024-03-08

## 2024-03-07 RX ORDER — PHENOBARBITAL 32.4 MG/1
32.4 TABLET ORAL ONCE
Status: DISCONTINUED | OUTPATIENT
Start: 2024-03-10 | End: 2024-03-08

## 2024-03-07 RX ORDER — PANTOPRAZOLE SODIUM 40 MG/10ML
40 INJECTION, POWDER, LYOPHILIZED, FOR SOLUTION INTRAVENOUS
Status: DISCONTINUED | OUTPATIENT
Start: 2024-03-07 | End: 2024-03-09 | Stop reason: HOSPADM

## 2024-03-07 RX ORDER — HYDROCODONE BITARTRATE AND ACETAMINOPHEN 5; 325 MG/1; MG/1
1 TABLET ORAL EVERY 6 HOURS PRN
Status: DISCONTINUED | OUTPATIENT
Start: 2024-03-07 | End: 2024-03-09 | Stop reason: HOSPADM

## 2024-03-07 RX ORDER — PHENOBARBITAL SODIUM 65 MG/ML
65 INJECTION, SOLUTION INTRAMUSCULAR; INTRAVENOUS ONCE
Status: COMPLETED | OUTPATIENT
Start: 2024-03-08 | End: 2024-03-08

## 2024-03-07 RX ORDER — HYDROMORPHONE HYDROCHLORIDE 1 MG/ML
0.5 INJECTION, SOLUTION INTRAMUSCULAR; INTRAVENOUS; SUBCUTANEOUS EVERY 4 HOURS PRN
Status: DISCONTINUED | OUTPATIENT
Start: 2024-03-07 | End: 2024-03-08

## 2024-03-07 RX ADMIN — THIAMINE HYDROCHLORIDE 200 MG: 100 INJECTION, SOLUTION INTRAMUSCULAR; INTRAVENOUS at 22:45

## 2024-03-07 RX ADMIN — PHENOBARBITAL SODIUM 155.4 MG: 65 INJECTION INTRAMUSCULAR at 15:23

## 2024-03-07 RX ADMIN — ATORVASTATIN CALCIUM 20 MG: 20 TABLET, FILM COATED ORAL at 11:01

## 2024-03-07 RX ADMIN — AMLODIPINE BESYLATE 5 MG: 5 TABLET ORAL at 06:44

## 2024-03-07 RX ADMIN — Medication 1 TABLET: at 11:01

## 2024-03-07 RX ADMIN — HYDROMORPHONE HYDROCHLORIDE 1 MG: 1 INJECTION, SOLUTION INTRAMUSCULAR; INTRAVENOUS; SUBCUTANEOUS at 06:44

## 2024-03-07 RX ADMIN — LISINOPRIL 10 MG: 10 TABLET ORAL at 11:01

## 2024-03-07 RX ADMIN — POTASSIUM CHLORIDE AND SODIUM CHLORIDE 125 ML/HR: 900; 150 INJECTION, SOLUTION INTRAVENOUS at 06:42

## 2024-03-07 RX ADMIN — FOLIC ACID 1 MG: 1 TABLET ORAL at 11:00

## 2024-03-07 RX ADMIN — THIAMINE HYDROCHLORIDE 200 MG: 100 INJECTION, SOLUTION INTRAMUSCULAR; INTRAVENOUS at 06:44

## 2024-03-07 RX ADMIN — THIAMINE HYDROCHLORIDE 200 MG: 100 INJECTION, SOLUTION INTRAMUSCULAR; INTRAVENOUS at 17:13

## 2024-03-07 RX ADMIN — LEVOTHYROXINE SODIUM 100 MCG: 100 TABLET ORAL at 06:49

## 2024-03-07 RX ADMIN — SODIUM CHLORIDE, POTASSIUM CHLORIDE, SODIUM LACTATE AND CALCIUM CHLORIDE 75 ML/HR: 600; 310; 30; 20 INJECTION, SOLUTION INTRAVENOUS at 18:18

## 2024-03-07 RX ADMIN — LORAZEPAM 2 MG: 1 TABLET ORAL at 01:26

## 2024-03-07 RX ADMIN — PHENOBARBITAL SODIUM 155.4 MG: 65 INJECTION INTRAMUSCULAR at 11:00

## 2024-03-07 RX ADMIN — PANTOPRAZOLE SODIUM 40 MG: 40 INJECTION, POWDER, LYOPHILIZED, FOR SOLUTION INTRAVENOUS at 11:00

## 2024-03-07 RX ADMIN — PHENOBARBITAL SODIUM 155.4 MG: 65 INJECTION INTRAMUSCULAR at 18:18

## 2024-03-07 RX ADMIN — LORAZEPAM 2 MG: 1 TABLET ORAL at 06:44

## 2024-03-07 RX ADMIN — HYDROMORPHONE HYDROCHLORIDE 1 MG: 1 INJECTION, SOLUTION INTRAMUSCULAR; INTRAVENOUS; SUBCUTANEOUS at 00:45

## 2024-03-07 NOTE — H&P
HISTORY AND PHYSICAL   [facility]        Date of Admission: 3/6/2024  Patient Identification:  Name: Pro Mueller  Age: 69 y.o.  Sex: male  :  1955  MRN: [id number]                     Primary Care Physician: Provider, No Known    Chief Complaint:  69 year old gentleman presented to the emergency room with abdominal pain which started earlier today; the pain is sharp, constant and severe; he has a history of alcohol dependence and last drank earlier today; he has a history of alcohol withdrawal; presently he continues to complain of pain; he was admitted last month with nausea, vomiting and weakness    History of Present Illness:   As above    Past Medical History:  Past Medical History:   Diagnosis Date    Alcohol abuse     Alcohol withdrawal 2016    Alcoholic ketoacidosis 2020    Allergic 1970    Anxiety     Arthritis     Atopic rhinitis 2016    Depression     Disease of thyroid gland     Elevated cholesterol     Encounter for removal of sutures     Genital herpes simplex 2016    GERD (gastroesophageal reflux disease)     Headache, tension-type     Hyperlipidemia     Hypertension     Hypothyroidism     Kidney stone     Low back pain     Migraine     Motion sickness     Nephrolithiasis 2020    Olecranon bursitis, right elbow     Panic disorder without agoraphobia 2016    Peripheral neuropathy     Sleep apnea     Syncope and collapse 2019    Vitamin D deficiency 2016    Withdrawal symptoms, alcohol      Past Surgical History:  Past Surgical History:   Procedure Laterality Date    COLONOSCOPY      CYST REMOVAL      CYSTOSCOPY BLADDER STONE LITHOTRIPSY  2022    EXTRACORPOREAL SHOCK WAVE LITHOTRIPSY (ESWL) Right     SHOULDER ARTHROSCOPY Right 2019    Procedure: SHOULDER ARTHROSCOPY, decompression, distal clavicle excision;  Surgeon: Aj Mancilla MD;  [place] [facility] [place]: [place]opedics    TONSILLECTOMY        Home  Meds:  Medications Prior to Admission   Medication Sig Dispense Refill Last Dose    amLODIPine (NORVASC) 5 MG tablet Take 1 tablet by mouth Every Morning. 90 tablet 1 Past Month    atorvastatin (LIPITOR) 20 MG tablet Take 1 tablet by mouth Daily. 90 tablet 1 Past Month    levothyroxine (SYNTHROID, LEVOTHROID) 100 MCG tablet Take 1 tablet by mouth Daily. 90 tablet 1 Past Month    lisinopril (PRINIVIL,ZESTRIL) 10 MG tablet Take 1 tablet by mouth Daily. 90 tablet 1 Past Week    traMADol (ULTRAM) 50 MG tablet Take 1 tablet by mouth Every 6 (Six) Hours As Needed for Moderate Pain.   Past Month    fluticasone (FLONASE) 50 MCG/ACT nasal spray 2 sprays into the nostril(s) as directed by provider Daily. 11.1 mL 0 More than a month    pseudoephedrine (Sudafed) 30 MG tablet Take 1 tablet by mouth 3 times a day. 20 tablet 0 More than a month       Allergies:  Allergies   Allergen Reactions    Penicillins Anaphylaxis and Seizure     Seizure. childhood     Immunizations:  Immunization History   Administered Date(s) Administered    COVID-19 (PFIZER) Purple Cap Monovalent 03/19/2021, 04/09/2021, 10/28/2021    Flu Vaccine Intradermal Quad 18-64YR 10/23/2020    Flu Vaccine Quad PF >36MO 11/06/2016    Fluzone (or Fluarix & Flulaval for VFC) >6mos 12/06/2019, 10/23/2020, 10/05/2021    Hepatitis A 08/20/2019    Influenza, Unspecified 11/26/2018    PEDS-Pneumococcal Conjugate (PCV7) 07/21/2017    Pneumococcal Conjugate 13-Valent (PCV13) 07/21/2017    Pneumococcal Polysaccharide (PPSV23) 05/05/2023    Pneumococcal, Unspecified 07/21/2017    Td, Not Adsorbed 11/26/2018    Tdap 07/28/2021    Zostavax 07/21/2017    flucelvax quad pfs =>4 YRS 11/26/2018     Social History:   Social History     Social History Narrative    Not on file     Social History     Socioeconomic History    Marital status: Single   Tobacco Use    Smoking status: Former     Current packs/day: 0.00     Average packs/day: 0.5 packs/day for 15.0 years (7.5 ttl pk-yrs)      "Types: Cigarettes     Start date: 1990     Quit date: 2005     Years since quittin.1    Smokeless tobacco: Never    Tobacco comments:     caffeine - 3 cans of coke daily    Vaping Use    Vaping status: Never Used   Substance and Sexual Activity    Alcohol use: Yes     Alcohol/week: 7.0 standard drinks of alcohol     Types: 7 Shots of liquor per week     Comment: .5 liter vodka    Drug use: Yes     Types: Methamphetamines     Comment: \"once in a rare while\"    Sexual activity: Yes     Partners: Female     Birth control/protection: Condom       Family History:  Family History   Problem Relation Age of Onset    Alzheimer's disease Mother     Mental illness Mother     Pancreatic cancer Father     Hearing loss Father     Arthritis Maternal Grandmother     Malig Hyperthermia Neg Hx         Review of Systems  See history of present illness and past medical history.  Patient denies headache, dizziness, syncope, falls, trauma, change in vision, change in hearing, change in taste, changes in weight, changes in appetite, focal weakness, numbness, or paresthesia.  Patient denies chest pain, palpitations, dyspnea, orthopnea, PND, cough, sinus pressure, rhinorrhea, epistaxis, hemoptysis, nausea, vomiting,hematemesis, diarrhea, constipation or hematochezia.  Denies cold or heat intolerance, polydipsia, polyuria, polyphagia. Denies hematuria, pyuria, dysuria, hesitancy, frequency or urgency. Denies consumption of raw and under cooked meats foods or change in water source.       Objective:  T Max 24 hrs: Temp (24hrs), Av.2 °F (36.8 °C), Min:97.9 °F (36.6 °C), Max:98.4 °F (36.9 °C)    Vitals Ranges:   Temp:  [97.9 °F (36.6 °C)-98.4 °F (36.9 °C)] 97.9 °F (36.6 °C)  Heart Rate:  [65-69] 67  Resp:  [18] 18  BP: (132-175)/() 155/86      Exam:  /86 (BP Location: Right arm, Patient Position: Lying)   Pulse 67   Temp 97.9 °F (36.6 °C) (Oral)   Resp 18   Ht 182.9 cm (72\")   Wt 83.9 kg (185 lb)   SpO2 93%   " BMI 25.09 kg/m²     General Appearance:    Alert, cooperative, no distress, appears stated age; tremulous   Head:    Normocephalic, without obvious abnormality, atraumatic   Eyes:    PERRL, conjunctivae/corneas clear, EOM's intact, both eyes   Ears:    Normal external ear canals, both ears   Nose:   Nares normal, septum midline, mucosa normal, no drainage    or sinus tenderness   Throat:   Lips, mucosa, and tongue normal   Neck:   Supple, symmetrical, trachea midline, no adenopathy;     thyroid:  no enlargement/tenderness/nodules; no carotid    bruit or JVD   Back:     Symmetric, no curvature, ROM normal, no CVA tenderness   Lungs:     Clear to auscultation bilaterally, respirations unlabored   Chest Wall:    No tenderness or deformity    Heart:    Regular rate and rhythm, S1 and S2 normal, no murmur, rub   or gallop   Abdomen:     Soft, nontender, bowel sounds active all four quadrants,     no masses, no hepatomegaly, no splenomegaly   Extremities:   Extremities normal, atraumatic, no cyanosis or edema                       .    Data Review:  Labs in chart were reviewed.  WBC   Date Value Ref Range Status   03/06/2024 7.79 3.40 - 10.80 10*3/mm3 Final     Hemoglobin   Date Value Ref Range Status   03/06/2024 10.9 (L) 13.0 - 17.7 g/dL Final     Hematocrit   Date Value Ref Range Status   03/06/2024 31.9 (L) 37.5 - 51.0 % Final     Platelets   Date Value Ref Range Status   03/06/2024 318 140 - 450 10*3/mm3 Final     Sodium   Date Value Ref Range Status   03/06/2024 138 136 - 145 mmol/L Final     Potassium   Date Value Ref Range Status   03/06/2024 3.5 3.5 - 5.2 mmol/L Final     Chloride   Date Value Ref Range Status   03/06/2024 103 98 - 107 mmol/L Final     CO2   Date Value Ref Range Status   03/06/2024 26.0 22.0 - 29.0 mmol/L Final     BUN   Date Value Ref Range Status   03/06/2024 7 (L) 8 - 23 mg/dL Final     Creatinine   Date Value Ref Range Status   03/06/2024 0.56 (L) 0.76 - 1.27 mg/dL Final     Glucose   Date  Value Ref Range Status   03/06/2024 137 (H) 65 - 99 mg/dL Final     Calcium   Date Value Ref Range Status   03/06/2024 8.4 (L) 8.6 - 10.5 mg/dL Final     AST (SGOT)   Date Value Ref Range Status   03/06/2024 150 (H) 1 - 40 U/L Final     ALT (SGPT)   Date Value Ref Range Status   03/06/2024 41 1 - 41 U/L Final     Alkaline Phosphatase   Date Value Ref Range Status   03/06/2024 108 39 - 117 U/L Final                Imaging Results (All)       Procedure Component Value Units Date/Time    CT Abdomen Pelvis With Contrast [[id number]] Collected: 03/06/24 1904     Updated: 03/06/24 1916    Narrative:      CT ABDOMEN AND PELVIS WITH IV CONTRAST     HISTORY: Acute lower abdominal pain     TECHNIQUE: Radiation dose reduction techniques were utilized, including  automated exposure control and exposure modulation based on body size.   3 mm images were obtained through the abdomen and pelvis after the  administration of IV contrast.     COMPARISON: CT abdomen pelvis 7/23/2023        FINDINGS: Bibasilar linear atelectasis/scarring. Right and left renal  cysts. No hydronephrosis. Cholelithiasis. Gallbladder is mildly  distended. No appreciable wall thickening or pericholecystic  inflammatory changes. Nondilated biliary tree. Proximal and distal colon  diverticulosis without findings to suggest diverticulitis. Normal  nondilated appendix. Remaining solid organs and bowel are normal. No  abdominopelvic adenopathy. No focal osseous abnormality.          Impression:      1. Cholelithiasis. Nonspecific mildly distended gallbladder. No  appreciable wall thickening or pericholecystic inflammatory changes.  2. Colonic diverticulosis without findings to suggest diverticulitis.     This report was finalized on 3/6/2024 7:13 PM by Dr. Gerber Archer M.D on Workstation: [id number]                 Assessment:  Active Hospital Problems    Diagnosis  POA    **Pancreatitis [K85.90]  Yes      Resolved Hospital Problems   No resolved problems to  display.   Abdominal pain  Alcohol dependence  Alcohol withdrawal  Cirrhosis  Hypothyroidism  Anemia  Sleep apnea  hyperglycemia    Plan:  Will continue pain meds, fluids  Monitor on telemetry  [facility] protocol  Access center consult  Trend labs  Pain meds, antiemetics  Dw patient and ed provider as well as nurse    Juanita Guevara MD  3/6/2024  22:57 EST

## 2024-03-07 NOTE — ED NOTES
Nursing report ED to floor  Pro Mueller  69 y.o.  male    HPI :  HPI (Adult)  Stated Reason for Visit: abd cramping  History Obtained From: EMS, patient    Chief Complaint  Chief Complaint   Patient presents with    Abdominal Pain       Admitting doctor:   Juanita Guevara MD    Admitting diagnosis:   The encounter diagnosis was Alcohol-induced acute pancreatitis, unspecified complication status.    Code status:   Current Code Status       Date Active Code Status Order ID Comments User Context       3/6/2024 1918 CPR (Attempt to Resuscitate) [id number]  Juanita Guevara MD ED        Question Answer    Code Status (Patient has no pulse and is not breathing) CPR (Attempt to Resuscitate)    Medical Interventions (Patient has pulse or is breathing) Full                    Allergies:   Penicillins    Isolation:   No active isolations    Intake and Output  No intake or output data in the 24 hours ending 03/06/24 1941    Weight:       03/06/24  1633   Weight: 83.9 kg (185 lb)       Most recent vitals:   Vitals:    03/06/24 1712 03/06/24 1800 03/06/24 1801 03/06/24 1901   BP: 164/98 149/93 153/84 158/89   Pulse:  68 68 66   Resp:  18 18    Temp:       TempSrc:       SpO2:  96% 97% 90%   Weight:       Height:           Active LDAs/IV Access:   Lines, Drains & Airways       Active LDAs       Name Placement date Placement time Site Days    Peripheral IV 03/06/24 1718 Anterior;Distal;Left;Upper Antecubital 03/06/24  1718  Antecubital  less than 1                    Labs (abnormal labs have a star):   Labs Reviewed   COMPREHENSIVE METABOLIC PANEL - Abnormal; Notable for the following components:       Result Value    Glucose 137 (*)     BUN 7 (*)     Creatinine 0.56 (*)     Calcium 8.4 (*)     AST (SGOT) 150 (*)     All other components within normal limits    Narrative:     GFR Normal >60  Chronic Kidney Disease <60  Kidney Failure <15     LIPASE - Abnormal; Notable for the following components:    Lipase 151 (*)      All other components within normal limits   CBC WITH AUTO DIFFERENTIAL - Abnormal; Notable for the following components:    RBC 3.24 (*)     Hemoglobin 10.9 (*)     Hematocrit 31.9 (*)     MCV 98.5 (*)     MCH 33.6 (*)     All other components within normal limits   RAINBOW DRAW    Narrative:     The following orders were created for panel order [place] Draw.  Procedure                               Abnormality         Status                     ---------                               -----------         ------                     Green Top (Gel)[[id number]]                                  Final result               Lavender Top[[id number]]                                     Final result               Light Blue Top[[id number]]                                   Final result                 Please view results for these tests on the individual orders.   URINALYSIS W/ MICROSCOPIC IF INDICATED (NO CULTURE)   CBC AND DIFFERENTIAL    Narrative:     The following orders were created for panel order CBC & Differential.  Procedure                               Abnormality         Status                     ---------                               -----------         ------                     CBC Auto Differential[[id number]]        Abnormal            Final result                 Please view results for these tests on the individual orders.   GREEN TOP   LAVENDER TOP   LIGHT BLUE TOP       EKG:   No orders to display       Meds given in ED:   Medications   acetaminophen (TYLENOL) tablet 650 mg (has no administration in time range)   ondansetron ODT (ZOFRAN-ODT) disintegrating tablet 4 mg (has no administration in time range)     Or   ondansetron (ZOFRAN) injection 4 mg (has no administration in time range)   melatonin tablet 3 mg (has no administration in time range)   sennosides-docusate (PERICOLACE) 8.6-50 MG per tablet 2 tablet (has no administration in time range)     And   polyethylene glycol (MIRALAX) packet 17 g  (has no administration in time range)     And   bisacodyl (DULCOLAX) EC tablet 5 mg (has no administration in time range)     And   bisacodyl (DULCOLAX) suppository 10 mg (has no administration in time range)   cloNIDine (CATAPRES) tablet 0.1 mg (has no administration in time range)   Magnesium Standard Dose Replacement - Follow Nurse / BPA Driven Protocol (has no administration in time range)   thiamine (B-1) injection 200 mg (has no administration in time range)     Followed by   thiamine (VITAMIN B-1) tablet 100 mg (has no administration in time range)   folic acid (FOLVITE) tablet 1 mg (has no administration in time range)   multivitamin with minerals 1 tablet (has no administration in time range)   LORazepam (ATIVAN) tablet 2 mg (has no administration in time range)     Followed by   LORazepam (ATIVAN) tablet 1 mg (has no administration in time range)   LORazepam (ATIVAN) tablet 1 mg (has no administration in time range)     Or   LORazepam (ATIVAN) injection 1 mg (has no administration in time range)     Or   LORazepam (ATIVAN) tablet 2 mg (has no administration in time range)     Or   LORazepam (ATIVAN) injection 2 mg (has no administration in time range)     Or   LORazepam (ATIVAN) injection 2 mg (has no administration in time range)     Or   LORazepam (ATIVAN) injection 2 mg (has no administration in time range)   morphine injection 4 mg (4 mg Intravenous Given 3/6/24 1718)   ondansetron (ZOFRAN) injection 4 mg (4 mg Intravenous Given 3/6/24 1724)   HYDROmorphone (DILAUDID) injection 1 mg (1 mg Intravenous Given 3/6/24 1757)   iopamidol (ISOVUE-300) 61 % injection 100 mL (85 mL Intravenous Given by Other 3/6/24 1829)       Imaging results:  CT Abdomen Pelvis With Contrast    Result Date: 3/6/2024  1. Cholelithiasis. Nonspecific mildly distended gallbladder. No appreciable wall thickening or pericholecystic inflammatory changes. 2. Colonic diverticulosis without findings to suggest diverticulitis.  This  "report was finalized on 3/6/2024 7:13 PM by Dr. Gerber Archer M.D on Workstation: [id number]       Ambulatory status:   - with assist    Social issues:   Social History     Socioeconomic History    Marital status: Single   Tobacco Use    Smoking status: Former     Current packs/day: 0.00     Average packs/day: 0.5 packs/day for 15.0 years (7.5 ttl pk-yrs)     Types: Cigarettes     Start date: 1990     Quit date: 2005     Years since quittin.1    Smokeless tobacco: Never    Tobacco comments:     caffeine - 3 cans of coke daily    Vaping Use    Vaping status: Never Used   Substance and Sexual Activity    Alcohol use: Yes     Alcohol/week: 7.0 standard drinks of alcohol     Types: 7 Shots of liquor per week     Comment: .5 liter vodka    Drug use: Yes     Types: Methamphetamines     Comment: \"once in a rare while\"    Sexual activity: Yes     Partners: Female     Birth control/protection: Condom       Peripheral Neurovascular  Peripheral Neurovascular (Adult)  Peripheral Neurovascular WDL: WDL    Neuro Cognitive  Neuro Cognitive (Adult)  Cognitive/Neuro/Behavioral WDL: WDL    Learning  Learning Assessment (Adult)  Learning Readiness and Ability: no barriers identified    Respiratory  Respiratory (Adult)  Airway WDL: WDL  Respiratory WDL  Respiratory WDL: WDL    Abdominal Pain       Pain Assessments  Pain (Adult)  (0-10) Pain Rating: Rest: 8  Pain Location: abdomen  Pain Side/Orientation: lower    NIH Stroke Scale       Purnima Mayorga RN  24 19:41 EST   "

## 2024-03-07 NOTE — PROGRESS NOTES
"    Name: Pro Mueller ADMIT: 3/6/2024   : 1955  PCP: Provider, No Known    MRN: [id number] LOS: 0 days   AGE/SEX: 69 y.o. male  ROOM: [facility]     Subjective   Subjective   The patient appears to be actively withdrawing.  Alert and orient x 3.  Reports that he has had alcohol withdrawal seizures, does remember if he is ever been in the ICU before.  Does not remember when his last drink was.  Generalized abdominal pain.  No nausea.  \"I feel like I have been hit by a truck\"      Review of Systems   Constitutional:  Positive for fatigue. Negative for chills and fever.   Respiratory:  Negative for cough and shortness of breath.    Cardiovascular:  Negative for chest pain and palpitations.   Gastrointestinal:  Positive for abdominal pain. Negative for diarrhea, nausea and vomiting.     As above     Objective   Objective   Vital Signs  Temp:  [97.9 °F (36.6 °C)-98.6 °F (37 °C)] 98.6 °F (37 °C)  Heart Rate:  [65-81] 81  Resp:  [18-20] 18  BP: (111-175)/() 111/70  SpO2:  [89 %-100 %] 97 %  on   ;   Device (Oxygen Therapy): room air  Body mass index is 24.25 kg/m².  Physical Exam  Vitals and nursing note reviewed.   Constitutional:       General: He is not in acute distress.     Appearance: He is ill-appearing.   Cardiovascular:      Rate and Rhythm: Normal rate and regular rhythm.   Pulmonary:      Effort: Pulmonary effort is normal. No respiratory distress.   Abdominal:      General: Abdomen is flat. There is no distension.      Tenderness: There is abdominal tenderness (Generalized, mild).   Musculoskeletal:         General: No swelling or deformity.   Skin:     General: Skin is warm and dry.   Neurological:      General: No focal deficit present.      Mental Status: He is alert and oriented to person, place, and time. Mental status is at baseline.         Results Review     I reviewed the patient's new clinical results.  Results from last 7 days   Lab Units 24  0510 24  1712   WBC 10*3/mm3 9.01 " "7.79   HEMOGLOBIN g/dL 9.5* 10.9*   PLATELETS 10*3/mm3 283 318     Results from last 7 days   Lab Units 03/07/24  0510 03/06/24  1712   SODIUM mmol/L 136 138   POTASSIUM mmol/L 4.2 3.5   CHLORIDE mmol/L 104 103   CO2 mmol/L 21.4* 26.0   BUN mg/dL 6* 7*   CREATININE mg/dL 0.40* 0.56*   GLUCOSE mg/dL 105* 137*   Estimated Creatinine Clearance: 205.6 mL/min (A) (by C-G formula based on SCr of 0.4 mg/dL (L)).  Results from last 7 days   Lab Units 03/06/24  1712   ALBUMIN g/dL 3.7   BILIRUBIN mg/dL 0.4   ALK PHOS U/L 108   AST (SGOT) U/L 150*   ALT (SGPT) U/L 41     Results from last 7 days   Lab Units 03/07/24  0510 03/06/24  1712   CALCIUM mg/dL 7.7* 8.4*   ALBUMIN g/dL  --  3.7       COVID19   Date Value Ref Range Status   02/24/2024 Not Detected Not Detected - Ref. Range Final   06/22/2023 Not Detected Not Detected - Ref. Range Final     No results found for: \"HGBA1C\", \"POCGLU\"    CT Abdomen Pelvis With Contrast  Narrative: CT ABDOMEN AND PELVIS WITH IV CONTRAST     HISTORY: Acute lower abdominal pain     TECHNIQUE: Radiation dose reduction techniques were utilized, including  automated exposure control and exposure modulation based on body size.   3 mm images were obtained through the abdomen and pelvis after the  administration of IV contrast.     COMPARISON: CT abdomen pelvis 7/23/2023        FINDINGS: Bibasilar linear atelectasis/scarring. Right and left renal  cysts. No hydronephrosis. Cholelithiasis. Gallbladder is mildly  distended. No appreciable wall thickening or pericholecystic  inflammatory changes. Nondilated biliary tree. Proximal and distal colon  diverticulosis without findings to suggest diverticulitis. Normal  nondilated appendix. Remaining solid organs and bowel are normal. No  abdominopelvic adenopathy. No focal osseous abnormality.        Impression: 1. Cholelithiasis. Nonspecific mildly distended gallbladder. No  appreciable wall thickening or pericholecystic inflammatory changes.  2. Colonic " diverticulosis without findings to suggest diverticulitis.     This report was finalized on 3/6/2024 7:13 PM by Dr. Gerber Archer M.D on Workstation: [email or web address]       I reviewed the patient's daily medications.  Scheduled Medications  amLODIPine, 5 mg, Oral, QAM  atorvastatin, 20 mg, Oral, Daily  folic acid, 1 mg, Oral, Daily  levothyroxine, 100 mcg, Oral, Q AM  lisinopril, 10 mg, Oral, Daily  multivitamin with minerals, 1 tablet, Oral, Daily  pantoprazole, 40 mg, Intravenous, BID AC  PHENobarbital, 2 mg/kg (Ideal), Intravenous, Once   Followed by  PHENobarbital, 2 mg/kg (Ideal), Intravenous, Once   Followed by  PHENobarbital, 2 mg/kg (Ideal), Intravenous, Once  [START ON 3/8/2024] PHENobarbital, 65 mg, Intravenous, Once   Followed by  [START ON 3/8/2024] PHENobarbital, 65 mg, Intravenous, Once   Followed by  [START ON 3/9/2024] PHENobarbital, 32.4 mg, Oral, Once   Followed by  [START ON 3/9/2024] PHENobarbital, 32.4 mg, Oral, Once   Followed by  [START ON 3/10/2024] PHENobarbital, 32.4 mg, Oral, Once  thiamine (B-1) IV, 200 mg, Intravenous, Q8H   Followed by  [START ON 3/12/2024] thiamine, 100 mg, Oral, Daily    Infusions  sodium chloride 0.9 % with KCl 20 mEq, 125 mL/hr, Last Rate: 125 mL/hr (03/07/24 0642)    Diet  Diet: Liquid; Clear Liquid; Fluid Consistency: Thin (IDDSI 0)         I have personally reviewed:  [x]  Laboratory   [x]  Microbiology   [x]  Radiology   []  EKG/Telemetry   []  Cardiology/Vascular   []  Pathology   [x]  Records     Assessment/Plan     Active Hospital Problems    Diagnosis  POA    Generalized abdominal pain [R10.84]  Yes    Hepatic steatosis [K76.0]  Yes    Alcohol dependence with withdrawal [F10.239]  Yes    Withdrawal symptoms, alcohol [F10.939]  Yes    Transaminitis [R74.01]  Yes    Alcoholic intoxication with complication [F10.929]  Yes    Hypothyroidism [E03.9]  Yes    Hyperlipidemia [E78.5]  Yes      Resolved Hospital Problems   No resolved problems to display.       69 y.o.  male admitted with <principal problem not specified>.    Alcohol use disorder with withdrawal  Reported withdrawal seizures per patient  -Stop scheduled Ativan, start phenobarbital  -Continue CIWA, thiamine, multivitamin, folate    Abdominal pain  -Not pancreatitis, lipase less than 3 times upper limit of normal, CT with no findings of pancreatitis.  Abdominal pain not characteristic for pericarditis  -CT abdomen with cholelithiasis with no acute cholecystitis, colonic diverticulosis without diverticulitis  -Suspect gastritis-start IV PPI twice daily    Transaminitis  -, ALT 41, consistent with alcohol use  -trend    Anemia  -no signs of bleeding  -Check iron studies    Hypertension-continue home Norvasc, lisinopril    Hyperlipidemia-continue home Lipitor    Hypothyroidism-continue home Synthroid      SCDs for DVT prophylaxis.  Full code.  Discussed with patient and nursing staff.  Anticipate discharge home in 2-3 days.    Expected discharge date/ time has not been documented.      Callum Butler MD  [facility]  03/07/24  09:08 EST
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
## 2024-03-07 NOTE — PLAN OF CARE
Goal Outcome Evaluation:            Patient arrive to the floor in stable condition.  Patient is complaining of abdominal pain 7/10. VSS. Call light in reach.  Will continue to monitor.

## 2024-03-07 NOTE — PLAN OF CARE
Goal Outcome Evaluation:      Pt drowsy for most of shift, arouses to voice. CIWA stable for much of shift. IV replaced due to report of pain when flushing current IV. Pt no complaints at this time.

## 2024-03-08 LAB
ALBUMIN SERPL-MCNC: 3.2 G/DL (ref 3.5–5.2)
ALBUMIN/GLOB SERPL: 1.4 G/DL
ALP SERPL-CCNC: 93 U/L (ref 39–117)
ALT SERPL W P-5'-P-CCNC: 24 U/L (ref 1–41)
ANION GAP SERPL CALCULATED.3IONS-SCNC: 10 MMOL/L (ref 5–15)
AST SERPL-CCNC: 79 U/L (ref 1–40)
BILIRUB SERPL-MCNC: 0.6 MG/DL (ref 0–1.2)
BUN SERPL-MCNC: 11 MG/DL (ref 8–23)
BUN/CREAT SERPL: 14.9 (ref 7–25)
CALCIUM SPEC-SCNC: 8.1 MG/DL (ref 8.6–10.5)
CHLORIDE SERPL-SCNC: 106 MMOL/L (ref 98–107)
CO2 SERPL-SCNC: 23 MMOL/L (ref 22–29)
CREAT SERPL-MCNC: 0.74 MG/DL (ref 0.76–1.27)
DEPRECATED RDW RBC AUTO: 48.7 FL (ref 37–54)
EGFRCR SERPLBLD CKD-EPI 2021: 99.3 ML/MIN/1.73
ERYTHROCYTE [DISTWIDTH] IN BLOOD BY AUTOMATED COUNT: 13.3 % (ref 12.3–15.4)
FERRITIN SERPL-MCNC: 509 NG/ML (ref 30–400)
GLOBULIN UR ELPH-MCNC: 2.3 GM/DL
GLUCOSE SERPL-MCNC: 94 MG/DL (ref 65–99)
HCT VFR BLD AUTO: 29.2 % (ref 37.5–51)
HGB BLD-MCNC: 9.8 G/DL (ref 13–17.7)
IRON 24H UR-MRATE: 35 MCG/DL (ref 59–158)
IRON SATN MFR SERPL: 16 % (ref 20–50)
MCH RBC QN AUTO: 33.6 PG (ref 26.6–33)
MCHC RBC AUTO-ENTMCNC: 33.6 G/DL (ref 31.5–35.7)
MCV RBC AUTO: 100 FL (ref 79–97)
PLATELET # BLD AUTO: 272 10*3/MM3 (ref 140–450)
PMV BLD AUTO: 8.8 FL (ref 6–12)
POTASSIUM SERPL-SCNC: 4.1 MMOL/L (ref 3.5–5.2)
PROT SERPL-MCNC: 5.5 G/DL (ref 6–8.5)
RBC # BLD AUTO: 2.92 10*6/MM3 (ref 4.14–5.8)
SODIUM SERPL-SCNC: 139 MMOL/L (ref 136–145)
TIBC SERPL-MCNC: 225 MCG/DL (ref 298–536)
TRANSFERRIN SERPL-MCNC: 151 MG/DL (ref 200–360)
WBC NRBC COR # BLD AUTO: 7.27 10*3/MM3 (ref 3.4–10.8)

## 2024-03-08 PROCEDURE — 85027 COMPLETE CBC AUTOMATED: CPT | Performed by: STUDENT IN AN ORGANIZED HEALTH CARE EDUCATION/TRAINING PROGRAM

## 2024-03-08 PROCEDURE — 82728 ASSAY OF FERRITIN: CPT | Performed by: STUDENT IN AN ORGANIZED HEALTH CARE EDUCATION/TRAINING PROGRAM

## 2024-03-08 PROCEDURE — 80053 COMPREHEN METABOLIC PANEL: CPT | Performed by: STUDENT IN AN ORGANIZED HEALTH CARE EDUCATION/TRAINING PROGRAM

## 2024-03-08 PROCEDURE — 97166 OT EVAL MOD COMPLEX 45 MIN: CPT

## 2024-03-08 PROCEDURE — 25810000003 LACTATED RINGERS PER 1000 ML: Performed by: STUDENT IN AN ORGANIZED HEALTH CARE EDUCATION/TRAINING PROGRAM

## 2024-03-08 PROCEDURE — 25010000002 THIAMINE HCL 200 MG/2ML SOLUTION: Performed by: INTERNAL MEDICINE

## 2024-03-08 PROCEDURE — 97535 SELF CARE MNGMENT TRAINING: CPT

## 2024-03-08 PROCEDURE — 83540 ASSAY OF IRON: CPT | Performed by: STUDENT IN AN ORGANIZED HEALTH CARE EDUCATION/TRAINING PROGRAM

## 2024-03-08 PROCEDURE — 97530 THERAPEUTIC ACTIVITIES: CPT

## 2024-03-08 PROCEDURE — 97161 PT EVAL LOW COMPLEX 20 MIN: CPT

## 2024-03-08 PROCEDURE — 25010000002 PHENOBARBITAL PER 120 MG: Performed by: STUDENT IN AN ORGANIZED HEALTH CARE EDUCATION/TRAINING PROGRAM

## 2024-03-08 PROCEDURE — 84466 ASSAY OF TRANSFERRIN: CPT | Performed by: STUDENT IN AN ORGANIZED HEALTH CARE EDUCATION/TRAINING PROGRAM

## 2024-03-08 RX ADMIN — FOLIC ACID 1 MG: 1 TABLET ORAL at 08:35

## 2024-03-08 RX ADMIN — SODIUM CHLORIDE, POTASSIUM CHLORIDE, SODIUM LACTATE AND CALCIUM CHLORIDE 75 ML/HR: 600; 310; 30; 20 INJECTION, SOLUTION INTRAVENOUS at 06:53

## 2024-03-08 RX ADMIN — THIAMINE HYDROCHLORIDE 200 MG: 100 INJECTION, SOLUTION INTRAMUSCULAR; INTRAVENOUS at 22:29

## 2024-03-08 RX ADMIN — LEVOTHYROXINE SODIUM 100 MCG: 100 TABLET ORAL at 06:51

## 2024-03-08 RX ADMIN — HYDROCODONE BITARTRATE AND ACETAMINOPHEN 1 TABLET: 5; 325 TABLET ORAL at 08:34

## 2024-03-08 RX ADMIN — PANTOPRAZOLE SODIUM 40 MG: 40 INJECTION, POWDER, LYOPHILIZED, FOR SOLUTION INTRAVENOUS at 19:17

## 2024-03-08 RX ADMIN — THIAMINE HYDROCHLORIDE 200 MG: 100 INJECTION, SOLUTION INTRAMUSCULAR; INTRAVENOUS at 06:52

## 2024-03-08 RX ADMIN — PANTOPRAZOLE SODIUM 40 MG: 40 INJECTION, POWDER, LYOPHILIZED, FOR SOLUTION INTRAVENOUS at 08:41

## 2024-03-08 RX ADMIN — PHENOBARBITAL SODIUM 65 MG: 65 INJECTION INTRAMUSCULAR; INTRAVENOUS at 02:36

## 2024-03-08 RX ADMIN — THIAMINE HYDROCHLORIDE 200 MG: 100 INJECTION, SOLUTION INTRAMUSCULAR; INTRAVENOUS at 14:53

## 2024-03-08 RX ADMIN — ATORVASTATIN CALCIUM 20 MG: 20 TABLET, FILM COATED ORAL at 08:34

## 2024-03-08 RX ADMIN — HYDROCODONE BITARTRATE AND ACETAMINOPHEN 1 TABLET: 5; 325 TABLET ORAL at 19:17

## 2024-03-08 RX ADMIN — Medication 1 TABLET: at 08:34

## 2024-03-08 NOTE — PROGRESS NOTES
Name: Pro Mueller ADMIT: 3/6/2024   : 1955  PCP: Provider, No Known    MRN: [id number] LOS: 1 days   AGE/SEX: 69 y.o. male  ROOM: [facility] [place]/     Subjective   Subjective   Patient says he is sleepy this morning.  No further abdominal pain, nausea, vomiting.  Tolerated regular diet yesterday. Does not feel like he's been hit by a truck today.      Review of Systems   Constitutional:  Positive for fatigue. Negative for chills and fever.   Respiratory:  Negative for cough and shortness of breath.    Cardiovascular:  Negative for chest pain and palpitations.   Gastrointestinal:  Negative for abdominal pain, diarrhea, nausea and vomiting.     As above     Objective   Objective   Vital Signs  Temp:  [97.5 °F (36.4 °C)-99.3 °F (37.4 °C)] 98.4 °F (36.9 °C)  Heart Rate:  [63-83] 75  Resp:  [18] 18  BP: ()/(40-84) 118/84  SpO2:  [97 %-98 %] 98 %  on   ;   Device (Oxygen Therapy): room air  Body mass index is 24.25 kg/m².  Physical Exam  Vitals and nursing note reviewed.   Constitutional:       General: He is not in acute distress.     Appearance: He is ill-appearing.   Cardiovascular:      Rate and Rhythm: Normal rate and regular rhythm.   Pulmonary:      Effort: Pulmonary effort is normal. No respiratory distress.   Abdominal:      General: Abdomen is flat. There is no distension.      Tenderness: There is abdominal tenderness (Generalized, mild).   Musculoskeletal:         General: No swelling or deformity.   Skin:     General: Skin is warm and dry.   Neurological:      General: No focal deficit present.      Mental Status: He is alert and oriented to person, place, and time. Mental status is at baseline.         Results Review     I reviewed the patient's new clinical results.  Results from last 7 days   Lab Units 24  0525 24  0510 24  1712   WBC 10*3/mm3 7.27 9.01 7.79   HEMOGLOBIN g/dL 9.8* 9.5* 10.9*   PLATELETS 10*3/mm3 272 283 318     Results from last 7 days   Lab Units 24  0525  "03/07/24  0510 03/06/24  1712   SODIUM mmol/L 139 136 138   POTASSIUM mmol/L 4.1 4.2 3.5   CHLORIDE mmol/L 106 104 103   CO2 mmol/L 23.0 21.4* 26.0   BUN mg/dL 11 6* 7*   CREATININE mg/dL 0.74* 0.40* 0.56*   GLUCOSE mg/dL 94 105* 137*   Estimated Creatinine Clearance: 111.1 mL/min (A) (by C-G formula based on SCr of 0.74 mg/dL (L)).  Results from last 7 days   Lab Units 03/08/24  0525 03/06/24  1712   ALBUMIN g/dL 3.2* 3.7   BILIRUBIN mg/dL 0.6 0.4   ALK PHOS U/L 93 108   AST (SGOT) U/L 79* 150*   ALT (SGPT) U/L 24 41     Results from last 7 days   Lab Units 03/08/24  0525 03/07/24  0510 03/06/24  1712   CALCIUM mg/dL 8.1* 7.7* 8.4*   ALBUMIN g/dL 3.2*  --  3.7       COVID19   Date Value Ref Range Status   02/24/2024 Not Detected Not Detected - Ref. Range Final   06/22/2023 Not Detected Not Detected - Ref. Range Final     No results found for: \"HGBA1C\", \"POCGLU\"    CT Abdomen Pelvis With Contrast  Narrative: CT ABDOMEN AND PELVIS WITH IV CONTRAST     HISTORY: Acute lower abdominal pain     TECHNIQUE: Radiation dose reduction techniques were utilized, including  automated exposure control and exposure modulation based on body size.   3 mm images were obtained through the abdomen and pelvis after the  administration of IV contrast.     COMPARISON: CT abdomen pelvis 7/23/2023        FINDINGS: Bibasilar linear atelectasis/scarring. Right and left renal  cysts. No hydronephrosis. Cholelithiasis. Gallbladder is mildly  distended. No appreciable wall thickening or pericholecystic  inflammatory changes. Nondilated biliary tree. Proximal and distal colon  diverticulosis without findings to suggest diverticulitis. Normal  nondilated appendix. Remaining solid organs and bowel are normal. No  abdominopelvic adenopathy. No focal osseous abnormality.        Impression: 1. Cholelithiasis. Nonspecific mildly distended gallbladder. No  appreciable wall thickening or pericholecystic inflammatory changes.  2. Colonic diverticulosis " without findings to suggest diverticulitis.     This report was finalized on 3/6/2024 7:13 PM by Dr. Gerber Archer M.D on Workstation: [employer]       I reviewed the patient's daily medications.  Scheduled Medications  atorvastatin, 20 mg, Oral, Daily  folic acid, 1 mg, Oral, Daily  levothyroxine, 100 mcg, Oral, Q AM  multivitamin with minerals, 1 tablet, Oral, Daily  pantoprazole, 40 mg, Intravenous, BID AC  thiamine (B-1) IV, 200 mg, Intravenous, Q8H   Followed by  [START ON 3/12/2024] thiamine, 100 mg, Oral, Daily    Infusions  lactated ringers, 75 mL/hr, Last Rate: 75 mL/hr (03/08/24 0653)    Diet  Diet: Regular/House; Texture: Regular (IDDSI 7); Fluid Consistency: Thin (IDDSI 0)         I have personally reviewed:  [x]  Laboratory   [x]  Microbiology   [x]  Radiology   []  EKG/Telemetry   []  Cardiology/Vascular   []  Pathology   []  Records     Assessment/Plan     Active Hospital Problems    Diagnosis  POA    Generalized abdominal pain [R10.84]  Yes    Alcohol withdrawal [F10.939]  Yes    Hepatic steatosis [K76.0]  Yes    Alcohol dependence with withdrawal [F10.239]  Yes    Withdrawal symptoms, alcohol [F10.939]  Yes    Transaminitis [R74.01]  Yes    Alcoholic intoxication with complication [F10.929]  Yes    Hypothyroidism [E03.9]  Yes    Hyperlipidemia [E78.5]  Yes      Resolved Hospital Problems   No resolved problems to display.       69 y.o. male admitted with <principal problem not specified>.    Alcohol use disorder with withdrawal  Reported withdrawal seizures per patient  -Stop scheduled Ativan, stop phenobarbital, resolved  -Continue CIWA, thiamine, multivitamin, folate    Abdominal pain  -Not pancreatitis, lipase less than 3 times upper limit of normal, CT with no findings of pancreatitis.  Abdominal pain not characteristic for pericarditis  -CT abdomen with cholelithiasis with no acute cholecystitis, colonic diverticulosis without diverticulitis  -Suspect gastritis-start IV PPI twice  daily    Transaminitis, improving  -consistent with alcohol use    Anemia, stable    Hypertension-hypotension yesterday, holding home lisinopril and amlodipine    Hyperlipidemia-continue home Lipitor    Hypothyroidism-continue home Synthroid      SCDs for DVT prophylaxis.  Full code.  Discussed with patient and nursing staff.  Anticipate discharge home in 2-3 days. Pending withdrawal symptoms    Expected Discharge Date: 3/10/2024; Expected Discharge Time:       Callum Butler MD  [facility]  03/08/24  09:21 EST

## 2024-03-08 NOTE — PROGRESS NOTES
[facility] follow up d/t EtOH; this writer reviewed chart and spoke with RN Carly. Per RN, patient is doing okay, slept overnight.  Last CIWA 7 at 23:55; psychiatrist, Dr. Cheek, consult ordered for today.  Per EMR, patient not interested in PETER treatment at this time; no further needs/concerns noted at this time per RN and/or medical team. [facility] to continue following.

## 2024-03-08 NOTE — THERAPY EVALUATION
Patient Name: Pro Mueller  : 1955    MRN: [id number]                              Today's Date: 3/8/2024       Admit Date: 3/6/2024    Visit Dx:     ICD-10-CM ICD-9-CM   1. Alcohol-induced acute pancreatitis, unspecified complication status  K85.20 577.0     Patient Active Problem List   Diagnosis    Fall    Alcoholism in recovery    Atopic rhinitis    Mixed anxiety depressive disorder    Genital herpes simplex    Hyperlipidemia    Hypertension    Hypothyroidism    Insomnia    Panic disorder without agoraphobia    Persistent insomnia    Vitamin D deficiency    Chronic low back pain    Neuropathy involving both lower extremities    QT prolongation    Left shoulder pain    Pancytopenia    Left ventricular diastolic dysfunction    Tremor    C5 cervical fracture    Syncope and collapse    Neck pain    Alcoholic intoxication with complication    Withdrawal symptoms, alcohol    Transaminitis    Lumbar facet arthropathy    Alcohol dependence with withdrawal    Midline low back pain with right-sided sciatica    Spondylolisthesis at L4-L5 level    Lumbar canal stenosis    Alcohol cessation counseling    Need for assistance due to reduced mobility    Impaired mobility and ADLs    Alcohol withdrawal syndrome without complication    Facial laceration    Orthostatic hypotension    Acute bacterial conjunctivitis of right eye    Visit for suture removal    Renal calculi    Hepatic steatosis    Alcohol withdrawal syndrome with complication    Macrocytosis without anemia    Lumbosacral spondylosis without myelopathy    Elevated LFTs    Alcohol-induced acute pancreatitis, unspecified complication status    Dehydration    Pancreatitis    Generalized abdominal pain    Alcohol withdrawal     Past Medical History:   Diagnosis Date    Alcohol abuse     Alcohol withdrawal 2016    Alcoholic ketoacidosis 2020    Allergic 1970    Anxiety     Arthritis     Atopic rhinitis 2016    Depression     Disease of thyroid  "gland     Elevated cholesterol     Encounter for removal of sutures     Genital herpes simplex 08/08/2016    GERD (gastroesophageal reflux disease)     Headache, tension-type     Hyperlipidemia     Hypertension     Hypothyroidism     Kidney stone     Low back pain 2019    Migraine     Motion sickness     Nephrolithiasis 02/12/2020    Olecranon bursitis, right elbow     Panic disorder without agoraphobia 08/08/2016    Peripheral neuropathy     Sleep apnea     Syncope and collapse 01/02/2019    Vitamin D deficiency 08/08/2016    Withdrawal symptoms, alcohol      Past Surgical History:   Procedure Laterality Date    COLONOSCOPY      CYST REMOVAL      CYSTOSCOPY BLADDER STONE LITHOTRIPSY  02/2022    EXTRACORPOREAL SHOCK WAVE LITHOTRIPSY (ESWL) Right 2002    SHOULDER ARTHROSCOPY Right 12/17/2019    Procedure: SHOULDER ARTHROSCOPY, decompression, distal clavicle excision;  Surgeon: Aj Mancilla MD;  Location: [facility] OR [facility] [place];  Service: Orthopedics    TONSILLECTOMY        General Information       Row Name 03/08/24 0941          Physical Therapy Time and Intention    Document Type evaluation  -     Mode of Treatment physical therapy  -       Row Name 03/08/24 0941          General Information    Patient Profile Reviewed yes  -     Prior Level of Function independent:  -     Existing Precautions/Restrictions fall  -       Row Name 03/08/24 0941          Living Environment    People in Home alone  -       Row Name 03/08/24 0941          Home Main Entrance    Number of Stairs, Main Entrance other (see comments)  \"a few\"  -       Row Name 03/08/24 0941          Cognition    Orientation Status (Cognition) oriented x 3  -       Row Name 03/08/24 0941          Safety Issues, Functional Mobility    Impairments Affecting Function (Mobility) balance;strength;endurance/activity tolerance  -               User Key  (r) = Recorded By, (t) = Taken By, (c) = Cosigned By      Initials Name Provider Type    SM " Micaela Combs, LUCILA Physical Therapist                   Mobility       Row Name 03/08/24 0942          Bed Mobility    Bed Mobility supine-sit  -     Supine-Sit Clark (Bed Mobility) standby assist  -     Assistive Device (Bed Mobility) head of bed elevated  -       Row Name 03/08/24 0942          Sit-Stand Transfer    Sit-Stand Clark (Transfers) standby assist  -       Row Name 03/08/24 0942          Gait/Stairs (Locomotion)    Clark Level (Gait) standby assist  -     Distance in Feet (Gait) 150ft  -     Deviations/Abnormal Patterns (Gait) godfrey decreased;festinating/shuffling;gait speed decreased  -     Comment, (Gait/Stairs) Gait slow shuffled and gaurded. Patient reaching for furniture at times for balance.  -               User Key  (r) = Recorded By, (t) = Taken By, (c) = Cosigned By      Initials Name Provider Type     Micaela Combs PT Physical Therapist                   Obj/Interventions       Row Name 03/08/24 0943          Range of Motion Comprehensive    General Range of Motion bilateral lower extremity ROM WFL  -[facility] Name 03/08/24 0943          Strength Comprehensive (MMT)    General Manual Muscle Testing (MMT) Assessment lower extremity strength deficits identified  -     Comment, General Manual Muscle Testing (MMT) Assessment Generalized weakness  -       Row Name 03/08/24 0943          Balance    Balance Assessment sitting static balance;sitting dynamic balance;sit to stand dynamic balance;standing static balance;standing dynamic balance  -     Static Sitting Balance supervision  -     Dynamic Sitting Balance supervision  -     Position, Sitting Balance sitting edge of bed  -     Sit to Stand Dynamic Balance standby assist  -     Static Standing Balance standby assist  -     Dynamic Standing Balance standby assist  -     Position/Device Used, Standing Balance unsupported  -     Balance Interventions standing;sitting;sit to  stand;static;dynamic  -SM               User Key  (r) = Recorded By, (t) = Taken By, (c) = Cosigned By      Initials Name Provider Type     Micaela Combs PT Physical Therapist                   Goals/Plan       Row Name 03/08/24 0958          Bed Mobility Goal 1 (PT)    Activity/Assistive Device (Bed Mobility Goal 1, PT) bed mobility activities, all  -SM     Davis Level/Cues Needed (Bed Mobility Goal 1, PT) independent  -SM     Time Frame (Bed Mobility Goal 1, PT) 1 week  -SM       Row Name 03/08/24 0947          Transfer Goal 1 (PT)    Activity/Assistive Device (Transfer Goal 1, PT) sit-to-stand/stand-to-sit;bed-to-chair/chair-to-bed  -SM     Davis Level/Cues Needed (Transfer Goal 1, PT) independent  -SM       Row Name 03/08/24 0947          Gait Training Goal 1 (PT)    Activity/Assistive Device (Gait Training Goal 1, PT) gait (walking locomotion)  -SM     Davis Level (Gait Training Goal 1, PT) independent  -SM     Distance (Gait Training Goal 1, PT) 450ft  -SM     Time Frame (Gait Training Goal 1, PT) 1 week  -SM               User Key  (r) = Recorded By, (t) = Taken By, (c) = Cosigned By      Initials Name Provider Type     Micaela Combs PT Physical Therapist                   Clinical Impression       Row Name 03/08/24 0932          Pain    Pretreatment Pain Rating 0/10 - no pain  -SM     Posttreatment Pain Rating 0/10 - no pain  -SM       Row Name 03/08/24 0985          Plan of Care Review    Plan of Care Reviewed With patient  -SM     Outcome Evaluation Patient is a 69 y.o male who presented to [facility] abdominal pain and alcohol withdraw symptoms. Patient with hx of alcohol dependency. Patient very abrupt when speaking with therapist but agreeable to PT evaluation. Patient lives at home alone with a few steps to enter. Patient reports he is independent at baseline and has a rwx that he uses occasionally. Patient completed all bed mobility with SBA. Patient ambulated in  hallway with no AD and SBA. Gait slow shuffled and gaurded. Patient reaching for furniture at times for balance. Patient reclined in chair at end of session. Anticpate return home at d/c pending continued progress with mobility. Acute PT will continue to monitor.  -       Row Name 03/08/24 0944          Therapy Assessment/Plan (PT)    Rehab Potential (PT) good, to achieve stated therapy goals  -     Criteria for Skilled Interventions Met (PT) yes  -     Therapy Frequency (PT) 3 times/wk  -       Row Name 03/08/24 0944          Vital Signs    Pre Patient Position Supine  -     Intra Patient Position Standing  -SM     Post Patient Position Sitting  -SM       Row Name 03/08/24 0944          Positioning and Restraints    Pre-Treatment Position in bed  -SM     Post Treatment Position chair  -SM     In Chair notified nsg;reclined;call light within reach;encouraged to call for assist;exit alarm on  -               User Key  (r) = Recorded By, (t) = Taken By, (c) = Cosigned By      Initials Name Provider Type    Micaela Nuno, PT Physical Therapist                   Outcome Measures       Row Name 03/08/24 0947 03/08/24 0843       How much help from another person do you currently need...    Turning from your back to your side while in flat bed without using bedrails? 4  -SM 4  -DK    Moving from lying on back to sitting on the side of a flat bed without bedrails? 4  -SM 4  -DK    Moving to and from a bed to a chair (including a wheelchair)? 3  -SM 4  -DK    Standing up from a chair using your arms (e.g., wheelchair, bedside chair)? 3  -SM 4  -DK    Climbing 3-5 steps with a railing? 3  -SM 4  -DK    To walk in hospital room? 3  -SM 3  -DK    AM-PAC 6 Clicks Score (PT) 20  -SM 23  -DK    Highest Level of Mobility Goal 6 --> Walk 10 steps or more  - 7 --> Walk 25 feet or more  -DK              User Key  (r) = Recorded By, (t) = Taken By, (c) = Cosigned By      Initials Name Provider Type    CHAI Combs  LUCILA Hinojosa Physical Therapist    Mary Sullivan RN Registered Nurse                                 Physical Therapy Education       Title: PT OT SLP Therapies (Done)       Topic: Physical Therapy (Done)       Point: Mobility training (Done)       Learning Progress Summary             Patient Acceptance, E, VU,NR by  at 3/8/2024 0948                         Point: Home exercise program (Done)       Learning Progress Summary             Patient Acceptance, E, VU,NR by  at 3/8/2024 0948                         Point: Body mechanics (Done)       Learning Progress Summary             Patient Acceptance, E, VU,NR by  at 3/8/2024 0948                         Point: Precautions (Done)       Learning Progress Summary             Patient Acceptance, E, VU,NR by  at 3/8/2024 0948                                         User Key       Initials Effective Dates Name Provider Type Discipline     05/02/22 -  Micaela Combs PT Physical Therapist PT                  PT Recommendation and Plan     Plan of Care Reviewed With: patient  Outcome Evaluation: Patient is a 69 y.o male who presented to [facility] abdominal pain and alcohol withdraw symptoms. Patient with hx of alcohol dependency. Patient very abrupt when speaking with therapist but agreeable to PT evaluation. Patient lives at home alone with a few steps to enter. Patient reports he is independent at baseline and has a rwx that he uses occasionally. Patient completed all bed mobility with SBA. Patient ambulated in hallway with no AD and SBA. Gait slow shuffled and gaurded. Patient reaching for furniture at times for balance. Patient reclined in chair at end of session. Anticpate return home at d/c pending continued progress with mobility. Acute PT will continue to monitor.     Time Calculation:         PT Charges       Row Name 03/08/24 0948             Time Calculation    Start Time 0914 -      Stop Time 0938 -      Time Calculation (min) 24 min   -SM      PT Received On 03/08/24  -      PT - Next Appointment 03/11/24  -      PT Goal Re-Cert Due Date 03/15/24  -         Time Calculation- PT    Total Timed Code Minutes- PT 10 minute(s)  -SM         Timed Charges    91760 - PT Therapeutic Activity Minutes 10  -SM         Total Minutes    Timed Charges Total Minutes 10  -SM       Total Minutes 10  -SM                User Key  (r) = Recorded By, (t) = Taken By, (c) = Cosigned By      Initials Name Provider Type     Micaela Combs, LUCILA Physical Therapist                  Therapy Charges for Today       Code Description Service Date Service Provider Modifiers Qty    24963210416 HC PT THERAPEUTIC ACT EA 15 MIN 3/8/2024 Micaela Combs, PT GP 1    22892296461 HC PT EVAL LOW COMPLEXITY 3 3/8/2024 Micaela Combs, PT GP 1            PT G-Codes  AM-PAC 6 Clicks Score (PT): 20  PT Discharge Summary  Anticipated Discharge Disposition (PT): home    Micaela Combs PT  3/8/2024

## 2024-03-08 NOTE — NURSING NOTE
"Pt very rude upon initial assessment. I was told \"shut up\" and pt was angry during assessment. I told pt I would follow up and he can call when he is ready for me to come back.  "

## 2024-03-08 NOTE — PLAN OF CARE
Goal Outcome Evaluation:  Plan of Care Reviewed With: patient           Outcome Evaluation: Patient is a 69 y.o male who presented to [facility] abdominal pain and alcohol withdraw symptoms. Patient with hx of alcohol dependency. Patient very abrupt when speaking with therapist but agreeable to PT evaluation. Patient lives at home alone with a few steps to enter. Patient reports he is independent at baseline and has a rwx that he uses occasionally. Patient completed all bed mobility with SBA. Patient ambulated in hallway with no AD and SBA. Gait slow shuffled and gaurded. Patient reaching for furniture at times for balance. Patient reclined in chair at end of session. Anticpate return home at d/c pending continued progress with mobility. Acute PT will continue to monitor.      Anticipated Discharge Disposition (PT): home

## 2024-03-08 NOTE — THERAPY EVALUATION
Patient Name: Pro Mueller  : 1955    MRN: [id number]                              Today's Date: 3/8/2024       Admit Date: 3/6/2024    Visit Dx:     ICD-10-CM ICD-9-CM   1. Alcohol-induced acute pancreatitis, unspecified complication status  K85.20 577.0     Patient Active Problem List   Diagnosis    Fall    Alcoholism in recovery    Atopic rhinitis    Mixed anxiety depressive disorder    Genital herpes simplex    Hyperlipidemia    Hypertension    Hypothyroidism    Insomnia    Panic disorder without agoraphobia    Persistent insomnia    Vitamin D deficiency    Chronic low back pain    Neuropathy involving both lower extremities    QT prolongation    Left shoulder pain    Pancytopenia    Left ventricular diastolic dysfunction    Tremor    C5 cervical fracture    Syncope and collapse    Neck pain    Alcoholic intoxication with complication    Withdrawal symptoms, alcohol    Transaminitis    Lumbar facet arthropathy    Alcohol dependence with withdrawal    Midline low back pain with right-sided sciatica    Spondylolisthesis at L4-L5 level    Lumbar canal stenosis    Alcohol cessation counseling    Need for assistance due to reduced mobility    Impaired mobility and ADLs    Alcohol withdrawal syndrome without complication    Facial laceration    Orthostatic hypotension    Acute bacterial conjunctivitis of right eye    Visit for suture removal    Renal calculi    Hepatic steatosis    Alcohol withdrawal syndrome with complication    Macrocytosis without anemia    Lumbosacral spondylosis without myelopathy    Elevated LFTs    Alcohol-induced acute pancreatitis, unspecified complication status    Dehydration    Pancreatitis    Generalized abdominal pain    Alcohol withdrawal     Past Medical History:   Diagnosis Date    Alcohol abuse     Alcohol withdrawal 2016    Alcoholic ketoacidosis 2020    Allergic 1970    Anxiety     Arthritis     Atopic rhinitis 2016    Depression     Disease of thyroid  gland     Elevated cholesterol     Encounter for removal of sutures     Genital herpes simplex 08/08/2016    GERD (gastroesophageal reflux disease)     Headache, tension-type     Hyperlipidemia     Hypertension     Hypothyroidism     Kidney stone     Low back pain 2019    Migraine     Motion sickness     Nephrolithiasis 02/12/2020    Olecranon bursitis, right elbow     Panic disorder without agoraphobia 08/08/2016    Peripheral neuropathy     Sleep apnea     Syncope and collapse 01/02/2019    Vitamin D deficiency 08/08/2016    Withdrawal symptoms, alcohol      Past Surgical History:   Procedure Laterality Date    COLONOSCOPY      CYST REMOVAL      CYSTOSCOPY BLADDER STONE LITHOTRIPSY  02/2022    EXTRACORPOREAL SHOCK WAVE LITHOTRIPSY (ESWL) Right 2002    SHOULDER ARTHROSCOPY Right 12/17/2019    Procedure: SHOULDER ARTHROSCOPY, decompression, distal clavicle excision;  Surgeon: Aj Mancilla MD;  [place] [facility] [place] [facility] [place]  Service: Orthopedics    TONSILLECTOMY        General Information       Row Name 03/08/24 1308          OT Time and Intention    Document Type evaluation  -PP     Mode of Treatment occupational therapy;co-treatment;physical therapy  co-tx d/t anticipation of decreased act ward to maximize therapeutic benefit and safety for pt and staff.  -PP       Row Name 03/08/24 1308          General Information    Patient Profile Reviewed yes  -PP     Prior Level of Function independent:  -PP     Existing Precautions/Restrictions fall  -PP       Row Name 03/08/24 1308          Living Environment    People in Home alone  -PP       Row Name 03/08/24 1308          Cognition    Orientation Status (Cognition) oriented x 3  -PP       Row Name 03/08/24 1308          Safety Issues, Functional Mobility    Impairments Affecting Function (Mobility) balance;strength;endurance/activity tolerance  -PP     Comment, Safety Issues/Impairments (Mobility) gait belt and non skid socks worn for safety  -PP               User Key   (r) = Recorded By, (t) = Taken By, (c) = Cosigned By      Initials Name Provider Type    PP Renetta Chester OT Occupational Therapist                     Mobility/ADL's       Row Name 03/08/24 1311          Bed Mobility    Bed Mobility supine-sit  -PP     Supine-Sit [place] (Bed Mobility) standby assist  -PP     Assistive Device (Bed Mobility) head of bed elevated  -PP     Comment, (Bed Mobility) Pt left [facility] at session end  -PP       Row Name 03/08/24 1311          Transfers    Transfers sit-stand transfer;bed-chair transfer;stand-sit transfer;toilet transfer  -PP       Row Name 03/08/24 1311          Bed-Chair Transfer    Bed-Chair [place] (Transfers) standby assist  -PP     Comment, (Bed-Chair Transfer) fxnl xfer to bed  -PP       Row Name 03/08/24 1311          Sit-Stand Transfer    Sit-Stand [place] (Transfers) standby assist  -PP     Comment, (Sit-Stand Transfer) 2x from EOB and commode  -PP       Row Name 03/08/24 1311          Toilet Transfer    Type (Toilet Transfer) sit-stand;stand-sit  -PP     [place] Level (Toilet Transfer) standby assist  -PP       Row Name 03/08/24 1311          Functional Mobility    Functional Mobility- Ind. Level standby assist  -PP     Functional Mobility- Comment Pt able to ambulate around room for short household distance  -PP       Row Name 03/08/24 1311          Activities of Daily Living    BADL Assessment/Intervention grooming;toileting;feeding  -PP       Row Name 03/08/24 1311          Grooming Assessment/Training    [place] Level (Grooming) grooming skills;oral care regimen;wash face, hands;set up  -PP     Position (Grooming) sink side;unsupported standing  -PP       Row Name 03/08/24 1311          Toileting Assessment/Training    [place] Level (Toileting) toileting skills;adjust/manage clothing;perform perineal hygiene;supervision;standby assist  -PP     Assistive Devices (Toileting) commode;grab bar/safety frame  -PP     Position  (Toileting) unsupported sitting;supported standing  -PP       Row Name 03/08/24 1311          Self-Feeding Assessment/Training    [place] Level (Feeding) feeding skills;scoop food and bring to mouth;liquids to mouth;set up  -PP     Position (Self-Feeding) sitting up in bed  -PP               User Key  (r) = Recorded By, (t) = Taken By, (c) = Cosigned By      Initials Name Provider Type    PP Renetta Chester, OT Occupational Therapist                   Obj/Interventions       Row Name 03/08/24 1318          Sensory Assessment (Somatosensory)    Sensory Assessment (Somatosensory) UE sensation intact  -[facility] Name 03/08/24 1318          Vision Assessment/Intervention    Visual Impairment/Limitations WFL  -PP       Row Name 03/08/24 1318          Range of Motion Comprehensive    General Range of Motion bilateral upper extremity ROM WFL  -[facility] Name 03/08/24 1318          Strength Comprehensive (MMT)    General Manual Muscle Testing (MMT) Assessment upper extremity strength deficits identified  -PP     Comment, General Manual Muscle Testing (MMT) Assessment BUE generalized weakness noted  -PP       Row Name 03/08/24 1318          Motor Skills    Motor Skills functional endurance  -PP     Functional Endurance fair  -       Row Name 03/08/24 1318          Balance    Balance Assessment sitting static balance;sitting dynamic balance;standing static balance;standing dynamic balance  -PP     Static Sitting Balance supervision  -PP     Dynamic Sitting Balance supervision  -PP     Position, Sitting Balance sitting edge of bed  -PP     Static Standing Balance standby assist  -PP     Dynamic Standing Balance standby assist  -PP     Position/Device Used, Standing Balance unsupported  -PP     Balance Interventions sitting;standing;occupation based/functional task;static;dynamic  -PP     Comment, Balance Pt SBA for standing bal during ADLs  -PP               User Key  (r) = Recorded By, (t) = Taken By, (c) =  Cosigned By      Initials Name Provider Type    PP Renetta Chester, OT Occupational Therapist                   Goals/Plan       Row Name 03/08/24 1326          Bed Mobility Goal 1 (OT)    Activity/Assistive Device (Bed Mobility Goal 1, OT) bed mobility activities, all  -PP     [place] Level/Cues Needed (Bed Mobility Goal 1, OT) modified independence  -PP     Time Frame (Bed Mobility Goal 1, OT) short term goal (STG);2 weeks  -PP     Progress/Outcomes (Bed Mobility Goal 1, OT) goal ongoing  -PP       Row Name 03/08/24 1326          Transfer Goal 1 (OT)    Activity/Assistive Device (Transfer Goal 1, OT) transfers, all  -PP     [place] Level/Cues Needed (Transfer Goal 1, OT) modified independence  -PP     Time Frame (Transfer Goal 1, OT) short term goal (STG);2 weeks  -PP     Strategies/Barriers (Transfers Goal 1, OT) using least restrictive AD as needed and trained  -PP     Progress/Outcome (Transfer Goal 1, OT) goal ongoing  -PP       Row Name 03/08/24 1326          Dressing Goal 1 (OT)    Activity/Device (Dressing Goal 1, OT) dressing skills, all  -PP     [place]/Cues Needed (Dressing Goal 1, OT) modified independence  -PP     Time Frame (Dressing Goal 1, OT) short term goal (STG);2 weeks  -PP     Progress/Outcome (Dressing Goal 1, OT) goal ongoing  -PP       Row Name 03/08/24 1326          Toileting Goal 1 (OT)    Activity/Device (Toileting Goal 1, OT) toileting skills, all;adjust/manage clothing;perform perineal hygiene;commode  -PP     [place] Level/Cues Needed (Toileting Goal 1, OT) modified independence  -PP     Time Frame (Toileting Goal 1, OT) short term goal (STG);2 weeks  -PP     Progress/Outcome (Toileting Goal 1, OT) goal ongoing  -PP       Row Name 03/08/24 1326          Grooming Goal 1 (OT)    Activity/Device (Grooming Goal 1, OT) grooming skills, all  -PP     [place] (Grooming Goal 1, OT) modified independence  -PP     Time Frame (Grooming Goal 1, OT) short term goal  (STG);2 weeks  -PP     Progress/Outcome (Grooming Goal 1, OT) goal ongoing  -PP       Row Name 03/08/24 1326          Problem Specific Goal 1 (OT)    Problem Specific Goal 1 (OT) Pt and family will demo safe technqiues with ADls, transfers, HEP and AE prior to d/c home.  -PP     Time Frame (Problem Specific Goal 1, OT) short term goal (STG);2 weeks  -PP       Row Name 03/08/24 1326          Therapy Assessment/Plan (OT)    Planned Therapy Interventions (OT) activity tolerance training;BADL retraining;functional balance retraining;patient/caregiver education/training;ROM/therapeutic exercise;strengthening exercise;transfer/mobility retraining  -PP               User Key  (r) = Recorded By, (t) = Taken By, (c) = Cosigned By      Initials Name Provider Type    PP Renetta Chester, ASHLIE Occupational Therapist                   Clinical Impression       Row Name 03/08/24 1324          Pain Assessment    Pretreatment Pain Rating 0/10 - no pain  -PP     Posttreatment Pain Rating 0/10 - no pain  -PP       Row Name 03/08/24 1324          Plan of Care Review    Plan of Care Reviewed With patient  -PP     Progress no change  -PP     Outcome Evaluation Pt is a 70 y/o male who was admitted to [facility] on 3/6/24 for abdominal pain, Alcohol-induced acute pancreatitis, and withdrawal. Pt hx of drinking 3 vodka cocktails/ daily x7 years. Pt PMhx of HTN, hyperlipidemia, hypothyroidism, anxiety, depression, and panic disorder. Pt reports living alone and being (I) for ADLs and mob. Pt SBA for supine to sit EOB, STS from EOB and fxnl mob for extended distance. Pt S/U for oral care and washing face while standing unsupported at sink w/ SBA. Pt SBA for toileting using grab bar. Pt presents w/ deficits in strength, bal and endurance, skilled OT req to address. Pt anticipated to DC home pending prog. OT will continue to monitor.  -PP       Row Name 03/08/24 1324          Therapy Assessment/Plan (OT)    Rehab Potential (OT) good, to achieve  stated therapy goals  -PP     Criteria for Skilled Therapeutic Interventions Met (OT) yes;skilled treatment is necessary  -PP     Therapy Frequency (OT) 3 times/wk  -PP       Row Name 03/08/24 1324          Therapy Plan Review/Discharge Plan (OT)    Anticipated Discharge Disposition (OT) home  -PP       Row Name 03/08/24 1324          Vital Signs    O2 Delivery Pre Treatment room air  -PP     Pre Patient Position Supine  -PP     Intra Patient Position Standing  -PP     Post Patient Position Sitting  -PP       Row Name 03/08/24 1324          Positioning and Restraints    Pre-Treatment Position in bed  -PP     Post Treatment Position chair  -PP     In Chair notified nsg;reclined;call light within reach;encouraged to call for assist;exit alarm on  -PP               User Key  (r) = Recorded By, (t) = Taken By, (c) = Cosigned By      Initials Name Provider Type    PP Renetta Chester, OT Occupational Therapist                   Outcome Measures       Row Name 03/08/24 1326          How much help from another is currently needed...    Putting on and taking off regular lower body clothing? 3  -PP     Bathing (including washing, rinsing, and drying) 3  -PP     Toileting (which includes using toilet bed pan or urinal) 3  -PP     Putting on and taking off regular upper body clothing 3  -PP     Taking care of personal grooming (such as brushing teeth) 3  -PP     Eating meals 4  -PP     AM-PAC 6 Clicks Score (OT) 19  -PP       Row Name 03/08/24 0947 03/08/24 0843       How much help from another person do you currently need...    Turning from your back to your side while in flat bed without using bedrails? 4  -SM 4  -DK    Moving from lying on back to sitting on the side of a flat bed without bedrails? 4  -SM 4  -DK    Moving to and from a bed to a chair (including a wheelchair)? 3  -SM 4  -DK    Standing up from a chair using your arms (e.g., wheelchair, bedside chair)? 3  -SM 4  -DK    Climbing 3-5 steps with a railing? 3   -SM 4  -DK    To walk in hospital room? 3  -SM 3  -DK    AM-PAC 6 Clicks Score (PT) 20  -SM 23  -DK    Highest Level of Mobility Goal 6 --> Walk 10 steps or more  -SM 7 --> Walk 25 feet or more  -DK      Row Name 03/08/24 1326          Functional Assessment    Outcome Measure Options AM-PAC 6 Clicks Daily Activity (OT)  -PP               User Key  (r) = Recorded By, (t) = Taken By, (c) = Cosigned By      Initials Name Provider Type     Micaela Combs, PT Physical Therapist    PP Renetta Chester, OT Occupational Therapist    Mary Sullivan, RN Registered Nurse                    Occupational Therapy Education       Title: PT OT SLP Therapies (In Progress)       Topic: Occupational Therapy (In Progress)       Point: ADL training (Done)       Description:   Instruct learner(s) on proper safety adaptation and remediation techniques during self care or transfers.   Instruct in proper use of assistive devices.                  Learning Progress Summary             Patient Acceptance, E, VU by PP at 3/8/2024 1327    Comment: Pt Ed on OT role, DC planning and call light function to increase safety.                         Point: Home exercise program (Not Started)       Description:   Instruct learner(s) on appropriate technique for monitoring, assisting and/or progressing therapeutic exercises/activities.                  Learner Progress:  Not documented in this visit.              Point: Precautions (Not Started)       Description:   Instruct learner(s) on prescribed precautions during self-care and functional transfers.                  Learner Progress:  Not documented in this visit.              Point: Body mechanics (Not Started)       Description:   Instruct learner(s) on proper positioning and spine alignment during self-care, functional mobility activities and/or exercises.                  Learner Progress:  Not documented in this visit.                              User Key       Initials  Effective Dates Name Provider Type Discipline    PP 06/09/23 -  Renetta Chester OT Occupational Therapist OT                  OT Recommendation and Plan  Planned Therapy Interventions (OT): activity tolerance training, BADL retraining, functional balance retraining, patient/caregiver education/training, ROM/therapeutic exercise, strengthening exercise, transfer/mobility retraining  Therapy Frequency (OT): 3 times/wk  Plan of Care Review  Plan of Care Reviewed With: patient  Progress: no change  Outcome Evaluation: Pt is a 68 y/o male who was admitted to [facility] on 3/6/24 for abdominal pain, Alcohol-induced acute pancreatitis, and withdrawal. Pt hx of drinking 3 vodka cocktails/ daily x7 years. Pt PMhx of HTN, hyperlipidemia, hypothyroidism, anxiety, depression, and panic disorder. Pt reports living alone and being (I) for ADLs and mob. Pt SBA for supine to sit EOB, STS from EOB and fxnl mob for extended distance. Pt S/U for oral care and washing face while standing unsupported at sink w/ SBA. Pt SBA for toileting using grab bar. Pt presents w/ deficits in strength, bal and endurance, skilled OT req to address. Pt anticipated to DC home pending prog. OT will continue to monitor.     Time Calculation:   Evaluation Complexity (OT)  Review Occupational Profile/Medical/Therapy History Complexity: expanded/moderate complexity  Assessment, Occupational Performance/Identification of Deficit Complexity: 3-5 performance deficits  Clinical Decision Making Complexity (OT): detailed assessment/moderate complexity  Overall Complexity of Evaluation (OT): moderate complexity     Time Calculation- OT       Row Name 03/08/24 1327             Time Calculation- OT    OT Start Time 0915  -PP      OT Stop Time 0937  -PP      OT Time Calculation (min) 22 min  -PP      Total Timed Code Minutes- OT 17 minute(s)  -PP      OT Received On 03/08/24  -PP      OT - Next Appointment 03/11/24  -PP      OT Goal Re-Cert Due Date 03/22/24  -PP          Timed Charges    12528 - OT Self Care/Mgmt Minutes 17  -PP         Untimed Charges    OT Eval/Re-eval Minutes 5  -PP         Total Minutes    Timed Charges Total Minutes 17  -PP      Untimed Charges Total Minutes 5  -PP       Total Minutes 22  -PP                User Key  (r) = Recorded By, (t) = Taken By, (c) = Cosigned By      Initials Name Provider Type    PP [place], Renetta, OT Occupational Therapist                  Therapy Charges for Today       Code Description Service Date Service Provider Modifiers Qty    83789150548 HC OT SELF CARE/MGMT/TRAIN EA 15 MIN 3/8/2024 Renetta Chester, OT GO 1    69655421806 HC OT EVAL MOD COMPLEXITY 2 3/8/2024 Renetta Chester, OT GO 1                 Renetta Chester, OT  3/8/2024

## 2024-03-08 NOTE — PLAN OF CARE
Goal Outcome Evaluation:  Plan of Care Reviewed With: patient        Progress: no change       CIWA 7

## 2024-03-08 NOTE — CONSULTS
"IDENTIFYING INFORMATION: The patient is a 69-year-old white male seen on 2 previous occasions in consultation by this physician.  He was admitted related to a history of sharp abdominal pain related to ongoing alcohol abuse.    CHIEF COMPLAINT: None given    INFORMANT: Patient and chart    RELIABILITY: Fair    HISTORY OF PRESENT ILLNESS: The patient is a 69-year-old white male with a history of alcohol dependence and a possible diagnosis of bipolar disorder.  He had previously been followed by Dr. Say Coats at [facility] and when last seen by this physician had been prescribed Vraylar, but at this time he is on no prescribed psychotropic medications.  The patient admits that he drinks \"3 or 4 martinis a day\".  He reports that his sister had insisted that he be admitted to the hospital but the chart indicates that he came to the emergency room complaining of severe abdominal pain.  As has been the case previously the patient voices no interest whatsoever in pursuing chemical dependence treatment and states that he has no plans of ceasing alcohol use following his discharge.  He denies current suicidal or homicidal ideation or psychotic symptoms.  He is pleasant and cooperative throughout interview.  For more complete history of present illness please refer to previous dictated notes    PAST PSYCHIATRIC HISTORY: The patient no longer sees Dr. Coats nor is she on any prescribed psychotropic medications at this time.    PAST MEDICAL HISTORY: Reviewed no changes    MEDICATIONS:   Current Facility-Administered Medications   Medication Dose Route Frequency Provider Last Rate Last Admin    acetaminophen (TYLENOL) tablet 650 mg  650 mg Oral Q4H PRN Juanita Guevara MD        atorvastatin (LIPITOR) tablet 20 mg  20 mg Oral Daily Juanita Guevara MD   20 mg at 03/08/24 0834    sennosides-docusate (PERICOLACE) 8.6-50 MG per tablet 2 tablet  2 tablet Oral BID PRN Juanita Guevara, " Goal Outcome Evaluation:  Plan of Care Reviewed With: patient        Progress: no change  Outcome Summary: VSS .Tolerated a regular diet.Stool specimen sent negative cdiff. GI panel pending.   MD        And    polyethylene glycol (MIRALAX) packet 17 g  17 g Oral Daily PRN Juanita Guevara MD        And    bisacodyl (DULCOLAX) EC tablet 5 mg  5 mg Oral Daily PRN Juanita Guevara MD        And    bisacodyl (DULCOLAX) suppository 10 mg  10 mg Rectal Daily PRN Juanita Guevara MD        cloNIDine (CATAPRES) tablet 0.1 mg  0.1 mg Oral Q4H PRN Juanita Guevara MD        folic acid (FOLVITE) tablet 1 mg  1 mg Oral Daily StingJuanita ryan MD   1 mg at 03/08/24 0835    HYDROcodone-acetaminophen (NORCO) 5-325 MG per tablet 1 tablet  1 tablet Oral Q6H PRN Callum Butler MD   1 tablet at 03/08/24 0834    lactated ringers infusion  75 mL/hr Intravenous Continuous Callum Butler MD 75 mL/hr at 03/08/24 0653 75 mL/hr at 03/08/24 0653    levothyroxine (SYNTHROID, LEVOTHROID) tablet 100 mcg  100 mcg Oral Q AM Juanita Guevara MD   100 mcg at 03/08/24 0651    LORazepam (ATIVAN) tablet 1 mg  1 mg Oral Q1H PRN Juanita Guevara MD        Or    LORazepam (ATIVAN) injection 1 mg  1 mg Intravenous Q1H PRN Juanita Guevara MD        Or    LORazepam (ATIVAN) tablet 2 mg  2 mg Oral Q1H PRN Juanita Guevara MD        Or    LORazepam (ATIVAN) injection 2 mg  2 mg Intravenous Q1H PRN Juanita Guevara MD        Or    LORazepam (ATIVAN) injection 2 mg  2 mg Intravenous Q15 Min PRN Juanita Guevara MD        Or    LORazepam (ATIVAN) injection 2 mg  2 mg Intramuscular Q15 Min ROBERTAN Juanita Guevara MD        Magnesium Standard Dose Replacement - Follow Nurse / BPA Driven Protocol   Does not apply PRN Stingl, Juanita Tran MD        melatonin tablet 3 mg  3 mg Oral Nightly PRN Juanita Guevara MD        multivitamin with minerals 1 tablet  1 tablet Oral Daily Juanita Guevara MD   1 tablet at 03/08/24 0834    ondansetron ODT (ZOFRAN-ODT) disintegrating tablet 4 mg  4 mg Oral Q6H PRN Stingl, Juanita Tran MD        Or    ondansetron (ZOFRAN) injection 4 mg  4  mg Intravenous Q6H PRN Juanita Guevara MD        pantoprazole (PROTONIX) injection 40 mg  40 mg Intravenous BID AC Callum Butler MD   40 mg at 03/08/24 0841    thiamine (B-1) injection 200 mg  200 mg Intravenous Q8H Juanita Guevara MD   200 mg at 03/08/24 0652    Followed by    [START ON 3/12/2024] thiamine (VITAMIN B-1) tablet 100 mg  100 mg Oral Daily Juanita Guevara MD             ALLERGIES: Penicillin    FAMILY HISTORY: Not obtained    SOCIAL HISTORY: Reviewed no changes    MENTAL STATUS EXAM: Patient is a well-developed well-nourished white male appearing his stated age.  He is in no apparent physical distress at the time of examination.  He is awake alert and oriented all spheres.  His mood is euthymic his affect full range.  Speech is relevant coherent.  There are no gross deficits in memory or cognition noted.  Intelligence is judged to be in the average range based on fund of knowledge, the patient is quite throughout interview.  He denies current suicidal or homicidal ideation or cutting features.  Judgement and insight are intact    ASSETS/LIABILITIES: To be assessed    DIAGNOSTIC IMPRESSION: Alcohol use disorder, medical problems as previously documented    PLAN: I would recommend continuation of the patient's currently prescribed detoxication protocol.  At this point, the patient expresses no interest in reinitiation of psychiatric care or any chemical dependence treatment.  Once his detox is complete, he is stable for discharge from a psychiatric standpoint.  I will sign off.

## 2024-03-08 NOTE — CASE MANAGEMENT/SOCIAL WORK
Discharge Planning Assessment  [facility]     Patient Name: Pro Muelelr  MRN: [id number]  Today's Date: 3/8/2024    Admit Date: 3/6/2024    Plan: Discharge home; sister to transport   Discharge Needs Assessment       Row Name 03/08/24 1424       Living Environment    People in Home alone    Current Living Arrangements home    Primary Care Provided by self    Provides Primary Care For no one    Family Caregiver if Needed sibling(s)    Family Caregiver Names Jeny Rodriguez    Able to Return to Prior Arrangements yes       Resource/Environmental Concerns    Transportation Concerns none       Transportation Needs    In the past 12 months, has lack of transportation kept you from medical appointments or from getting medications? no    In the past 12 months, has lack of transportation kept you from meetings, work, or from getting things needed for daily living? No  Patient reported he drives       Transition Planning    Patient/Family Anticipates Transition to home    Transportation Anticipated family or friend will provide       Discharge Needs Assessment    Equipment Currently Used at Home walker, rolling    Concerns to be Addressed discharge planning    Anticipated Changes Related to Illness none    Equipment Needed After Discharge none                   Discharge Plan       Row Name 03/08/24 1426       Plan    Plan Discharge home; sister to transport    Patient/Family in Agreement with Plan yes    Plan Comments CCP met with patient at bedside; verified facesheet; patient verfied sister, Jeny Andersen, as primary support. DME - rolling walker; two steps to enter; has upstairs; PT/OT signed off. Plan - discharge home; sister to provide transportation. REINA Quintero CSW                  Continued Care and Services - Admitted Since 3/6/2024    No active coordination exists for this encounter.       Expected Discharge Date and Time       Expected Discharge Date Expected Discharge Time    Mar 10, 2024             Demographic Summary       Row Name 03/08/24 1422       General Information    Admission Type inpatient    Arrived From home    Referral Source admission list    Reason for Consult discharge planning    Preferred Language English                   Functional Status       Row Name 03/08/24 1423       Functional Status    Usual Activity Tolerance moderate    Current Activity Tolerance moderate       Functional Status, IADL    Medications independent    Meal Preparation independent    Housekeeping independent    Laundry independent    Shopping independent                   Psychosocial    No documentation.                  Abuse/Neglect    No documentation.                  Legal    No documentation.                  Substance Abuse    No documentation.                  Patient Forms    No documentation.                     Isela Garcia

## 2024-03-08 NOTE — PLAN OF CARE
Problem: Adult Inpatient Plan of Care  Goal: Plan of Care Review  Outcome: Ongoing, Progressing  Goal: Patient-Specific Goal (Individualized)  Outcome: Ongoing, Progressing  Goal: Absence of Hospital-Acquired Illness or Injury  Outcome: Ongoing, Progressing  Intervention: Identify and Manage Fall Risk  Recent Flowsheet Documentation  Taken 3/8/2024 1831 by Mary Carroll RN  Safety Promotion/Fall Prevention: safety round/check completed  Taken 3/8/2024 1600 by Mary Carroll RN  Safety Promotion/Fall Prevention: safety round/check completed  Taken 3/8/2024 1400 by Mary Carroll RN  Safety Promotion/Fall Prevention: safety round/check completed  Taken 3/8/2024 1200 by Mary Carroll RN  Safety Promotion/Fall Prevention: safety round/check completed  Taken 3/8/2024 1000 by Mary Carroll RN  Safety Promotion/Fall Prevention: safety round/check completed  Taken 3/8/2024 0843 by Mary Carroll RN  Safety Promotion/Fall Prevention: safety round/check completed  Taken 3/8/2024 0800 by Mary Carroll RN  Safety Promotion/Fall Prevention: safety round/check completed  Intervention: Prevent Skin Injury  Recent Flowsheet Documentation  Taken 3/8/2024 0843 by Mary Carroll RN  Body Position: supine  Intervention: Prevent and Manage VTE (Venous Thromboembolism) Risk  Recent Flowsheet Documentation  Taken 3/8/2024 1600 by Mary Carroll RN  Activity Management: up in chair  Taken 3/8/2024 0843 by Mary Carroll RN  Range of Motion: active ROM (range of motion) encouraged  Intervention: Prevent Infection  Recent Flowsheet Documentation  Taken 3/8/2024 0843 by Mary Carroll RN  Infection Prevention: hand hygiene promoted  Goal: Optimal Comfort and Wellbeing  Outcome: Ongoing, Progressing  Intervention: Monitor Pain and Promote Comfort  Recent Flowsheet Documentation  Taken 3/8/2024 0843 by Mary Carroll RN  Pain Management Interventions: see MAR  Intervention:  Provide Person-Centered Care  Recent Flowsheet Documentation  Taken 3/8/2024 0843 by Mary Carroll RN  Trust Relationship/Rapport:   care explained   questions answered  Goal: Readiness for Transition of Care  Outcome: Ongoing, Progressing     Problem: Pain Acute  Goal: Acceptable Pain Control and Functional Ability  Outcome: Ongoing, Progressing  Intervention: Develop Pain Management Plan  Recent Flowsheet Documentation  Taken 3/8/2024 0843 by Mary Carroll RN  Pain Management Interventions: see MAR     Problem: Alcohol Withdrawal  Goal: Alcohol Withdrawal Symptom Control  Outcome: Ongoing, Progressing  Intervention: Minimize or Manage Alcohol Withdrawal Symptoms  Recent Flowsheet Documentation  Taken 3/8/2024 0843 by Mary Carroll RN  Aspiration Precautions:   upright posture maintained   awake/alert before oral intake  Seizure Precautions:   side rails padded   soft boundaries provided     Problem: Acute Neurologic Deterioration (Alcohol Withdrawal)  Goal: Optimal Neurologic Function  Outcome: Ongoing, Progressing  Intervention: Prevent Seizure-Related Injury  Recent Flowsheet Documentation  Taken 3/8/2024 0843 by Mary Carroll RN  Seizure Precautions:   side rails padded   soft boundaries provided     Problem: Substance Misuse (Alcohol Withdrawal)  Goal: Readiness for Change Identified  Outcome: Ongoing, Progressing     Problem: Fall Injury Risk  Goal: Absence of Fall and Fall-Related Injury  Outcome: Ongoing, Progressing  Intervention: Promote Injury-Free Environment  Recent Flowsheet Documentation  Taken 3/8/2024 1831 by Mary Carroll RN  Safety Promotion/Fall Prevention: safety round/check completed  Taken 3/8/2024 1600 by Mary Carroll RN  Safety Promotion/Fall Prevention: safety round/check completed  Taken 3/8/2024 1400 by Mary Carroll RN  Safety Promotion/Fall Prevention: safety round/check completed  Taken 3/8/2024 1200 by Mary Carroll RN  Safety  Promotion/Fall Prevention: safety round/check completed  Taken 3/8/2024 1000 by Mary Carroll RN  Safety Promotion/Fall Prevention: safety round/check completed  Taken 3/8/2024 0843 by Mary Carroll RN  Safety Promotion/Fall Prevention: safety round/check completed  Taken 3/8/2024 0800 by Mary Carroll RN  Safety Promotion/Fall Prevention: safety round/check completed   Goal Outcome Evaluation:      Pt is A/OX4, agitated throughout the shift. Pt on room air. Vital signs are WNL. No pain until closer to the end of the shift. Complaints of abdominal pain that is cramping along lower abdominal region. Pt up to chair for most of shift. When educating, ETON was a sensitive topic. Encouraged pt to remain dehydrated with water. No other concerns, questions, or complaints.

## 2024-03-08 NOTE — PLAN OF CARE
Goal Outcome Evaluation:  Plan of Care Reviewed With: patient        Progress: no change  Outcome Evaluation: Pt is a 68 y/o male who was admitted to [facility] on 3/6/24 for abdominal pain, Alcohol-induced acute pancreatitis, and withdrawal. Pt hx of drinking 3 vodka cocktails/ daily x7 years. Pt PMhx of HTN, hyperlipidemia, hypothyroidism, anxiety, depression, and panic disorder. Pt reports living alone and being (I) for ADLs and mob. Pt SBA for supine to sit EOB, STS from EOB and fxnl mob for extended distance. Pt S/U for oral care and washing face while standing unsupported at sink w/ SBA. Pt SBA for toileting using grab bar. Pt presents w/ deficits in strength, bal and endurance, skilled OT req to address. Pt anticipated to DC home pending prog. OT will continue to monitor.      Anticipated Discharge Disposition (OT): home

## 2024-03-09 ENCOUNTER — READMISSION MANAGEMENT (OUTPATIENT)
Dept: CALL CENTER | Facility: HOSPITAL | Age: 69
End: 2024-03-09

## 2024-03-09 VITALS
RESPIRATION RATE: 20 BRPM | TEMPERATURE: 98.6 F | SYSTOLIC BLOOD PRESSURE: 102 MMHG | HEIGHT: 72 IN | HEART RATE: 73 BPM | BODY MASS INDEX: 24.58 KG/M2 | OXYGEN SATURATION: 95 % | WEIGHT: 181.44 LBS | DIASTOLIC BLOOD PRESSURE: 61 MMHG

## 2024-03-09 LAB
ALBUMIN SERPL-MCNC: 3.1 G/DL (ref 3.5–5.2)
ALBUMIN/GLOB SERPL: 1.3 G/DL
ALP SERPL-CCNC: 81 U/L (ref 39–117)
ALT SERPL W P-5'-P-CCNC: 17 U/L (ref 1–41)
ANION GAP SERPL CALCULATED.3IONS-SCNC: 11 MMOL/L (ref 5–15)
AST SERPL-CCNC: 60 U/L (ref 1–40)
BILIRUB SERPL-MCNC: 0.5 MG/DL (ref 0–1.2)
BUN SERPL-MCNC: 6 MG/DL (ref 8–23)
BUN/CREAT SERPL: 13.3 (ref 7–25)
CALCIUM SPEC-SCNC: 7.9 MG/DL (ref 8.6–10.5)
CHLORIDE SERPL-SCNC: 104 MMOL/L (ref 98–107)
CO2 SERPL-SCNC: 21 MMOL/L (ref 22–29)
CREAT SERPL-MCNC: 0.45 MG/DL (ref 0.76–1.27)
DEPRECATED RDW RBC AUTO: 49.7 FL (ref 37–54)
EGFRCR SERPLBLD CKD-EPI 2021: 115.4 ML/MIN/1.73
ERYTHROCYTE [DISTWIDTH] IN BLOOD BY AUTOMATED COUNT: 13.8 % (ref 12.3–15.4)
GLOBULIN UR ELPH-MCNC: 2.3 GM/DL
GLUCOSE SERPL-MCNC: 89 MG/DL (ref 65–99)
HCT VFR BLD AUTO: 29.8 % (ref 37.5–51)
HGB BLD-MCNC: 9.8 G/DL (ref 13–17.7)
MCH RBC QN AUTO: 32.6 PG (ref 26.6–33)
MCHC RBC AUTO-ENTMCNC: 32.9 G/DL (ref 31.5–35.7)
MCV RBC AUTO: 99 FL (ref 79–97)
PLATELET # BLD AUTO: 312 10*3/MM3 (ref 140–450)
PMV BLD AUTO: 8.9 FL (ref 6–12)
POTASSIUM SERPL-SCNC: 4 MMOL/L (ref 3.5–5.2)
PROT SERPL-MCNC: 5.4 G/DL (ref 6–8.5)
RBC # BLD AUTO: 3.01 10*6/MM3 (ref 4.14–5.8)
SODIUM SERPL-SCNC: 136 MMOL/L (ref 136–145)
WBC NRBC COR # BLD AUTO: 6.59 10*3/MM3 (ref 3.4–10.8)

## 2024-03-09 PROCEDURE — 25810000003 LACTATED RINGERS PER 1000 ML: Performed by: STUDENT IN AN ORGANIZED HEALTH CARE EDUCATION/TRAINING PROGRAM

## 2024-03-09 PROCEDURE — 25010000002 THIAMINE HCL 200 MG/2ML SOLUTION: Performed by: INTERNAL MEDICINE

## 2024-03-09 PROCEDURE — 85027 COMPLETE CBC AUTOMATED: CPT | Performed by: STUDENT IN AN ORGANIZED HEALTH CARE EDUCATION/TRAINING PROGRAM

## 2024-03-09 PROCEDURE — 80053 COMPREHEN METABOLIC PANEL: CPT | Performed by: STUDENT IN AN ORGANIZED HEALTH CARE EDUCATION/TRAINING PROGRAM

## 2024-03-09 RX ORDER — LANOLIN ALCOHOL/MO/W.PET/CERES
100 CREAM (GRAM) TOPICAL DAILY
Qty: 30 TABLET | Refills: 0 | Status: SHIPPED | OUTPATIENT
Start: 2024-03-12 | End: 2024-04-11

## 2024-03-09 RX ORDER — PANTOPRAZOLE SODIUM 40 MG/1
40 TABLET, DELAYED RELEASE ORAL DAILY
Qty: 30 TABLET | Refills: 0 | Status: SHIPPED | OUTPATIENT
Start: 2024-03-09 | End: 2024-04-08

## 2024-03-09 RX ORDER — LISINOPRIL 10 MG/1
10 TABLET ORAL DAILY
Status: DISCONTINUED | OUTPATIENT
Start: 2024-03-09 | End: 2024-03-09 | Stop reason: HOSPADM

## 2024-03-09 RX ORDER — ATORVASTATIN CALCIUM 20 MG/1
20 TABLET, FILM COATED ORAL DAILY
Qty: 30 TABLET | Refills: 0 | Status: SHIPPED | OUTPATIENT
Start: 2024-03-09 | End: 2024-04-08

## 2024-03-09 RX ORDER — AMLODIPINE BESYLATE 5 MG/1
5 TABLET ORAL EVERY MORNING
Status: DISCONTINUED | OUTPATIENT
Start: 2024-03-09 | End: 2024-03-09 | Stop reason: HOSPADM

## 2024-03-09 RX ADMIN — FOLIC ACID 1 MG: 1 TABLET ORAL at 10:03

## 2024-03-09 RX ADMIN — SODIUM CHLORIDE, POTASSIUM CHLORIDE, SODIUM LACTATE AND CALCIUM CHLORIDE 75 ML/HR: 600; 310; 30; 20 INJECTION, SOLUTION INTRAVENOUS at 00:37

## 2024-03-09 RX ADMIN — LEVOTHYROXINE SODIUM 100 MCG: 100 TABLET ORAL at 06:51

## 2024-03-09 RX ADMIN — ATORVASTATIN CALCIUM 20 MG: 20 TABLET, FILM COATED ORAL at 10:03

## 2024-03-09 RX ADMIN — PANTOPRAZOLE SODIUM 40 MG: 40 INJECTION, POWDER, LYOPHILIZED, FOR SOLUTION INTRAVENOUS at 06:51

## 2024-03-09 RX ADMIN — THIAMINE HYDROCHLORIDE 200 MG: 100 INJECTION, SOLUTION INTRAMUSCULAR; INTRAVENOUS at 06:51

## 2024-03-09 RX ADMIN — Medication 1 TABLET: at 10:03

## 2024-03-09 RX ADMIN — AMLODIPINE BESYLATE 5 MG: 5 TABLET ORAL at 10:03

## 2024-03-09 NOTE — NURSING NOTE
Access follow up regarding ETOH: chart reviewed. Last CIWA score of 4. Psychiatry consulted yesterday with no new orders started. Pt continues to report not being interested in [facility] resources at this time but is open to follow up. Will continue to follow for support.

## 2024-03-09 NOTE — NURSING NOTE
Patient aware of discharge but unable to find a ride until this time. Pt discharged home and picked up by sister. All belongings gathered. IV removed. AVS reviewed with patient. All questions answered. Meds delivered by pharmacy.

## 2024-03-09 NOTE — DISCHARGE SUMMARY
Patient Name: Pro Mueller  : 1955  MRN: [id number]    Date of Admission: 3/6/2024  Date of Discharge:  3/9/2024  Primary Care Physician: Provider, No Known      Chief Complaint:   Abdominal Pain      Discharge Diagnoses     Active Hospital Problems    Diagnosis  POA    Generalized abdominal pain [R10.84]  Yes    Alcohol withdrawal [F10.939]  Yes    Hepatic steatosis [K76.0]  Yes    Alcohol dependence with withdrawal [F10.239]  Yes    Withdrawal symptoms, alcohol [F10.939]  Yes    Transaminitis [R74.01]  Yes    Alcoholic intoxication with complication [F10.929]  Yes    Hypothyroidism [E03.9]  Yes    Hyperlipidemia [E78.5]  Yes      Resolved Hospital Problems   No resolved problems to display.        Hospital Course     Mr. Mueller is a 69 y.o. male with a history of hypothyroidism, hypertension, hyperlipidemia presenting with alcohol use disorder with concern for withdrawal.  Patient has denied withdrawal seizures to some providers but reports withdrawal seizures to me.  Patient was started on phenobarb and this was discontinued because of drowsiness.  Patient has not required any Ativan over 24 hours.  Patient had abdominal pain this is suspected to do for due to gastritis.  CT abdomen with no acute findings.  Patient had a mild transaminitis from alcohol use that is slowly improving.    Discussed alcohol cessation with patient.  His plan currently is to drink in moderation.  Psych and access team is seen and patient does not want any resources at this time.    At the time of discharge patient was told to take all medications as prescribed, keep all follow-up appointments, and call their doctor or return to the hospital with any worsening or concerning symptoms.    Day of Discharge     Subjective:  Patient resting comfortably in bed.  No abdominal pain, nausea, vomiting.  Discussed discharge plan later today if he tolerates a diet.    Review of Systems   Constitutional:  Negative for chills and fever.    Respiratory:  Negative for cough and shortness of breath.    Cardiovascular:  Negative for chest pain and palpitations.   Gastrointestinal:  Negative for abdominal pain, diarrhea, nausea and vomiting.       Physical Exam:  Temp:  [97.9 °F (36.6 °C)-98.6 °F (37 °C)] 98.6 °F (37 °C)  Heart Rate:  [66-73] 73  Resp:  [18-20] 20  BP: (102-148)/(61-83) 102/61  Body mass index is 24.61 kg/m².  Physical Exam  Vitals and nursing note reviewed.   Constitutional:       General: He is not in acute distress.  Cardiovascular:      Rate and Rhythm: Normal rate and regular rhythm.   Pulmonary:      Effort: Pulmonary effort is normal. No respiratory distress.   Abdominal:      General: Abdomen is flat. There is no distension.      Tenderness: There is no abdominal tenderness.   Musculoskeletal:         General: No swelling or deformity.   Skin:     General: Skin is warm and dry.   Neurological:      General: No focal deficit present.      Mental Status: He is alert. Mental status is at baseline.         Consultants     Consult Orders (all) (From admission, onward)       Start     Ordered    03/07/24 1344  Inpatient Psychiatrist Consult  Once        Specialty:  Psychiatry  Provider:  Mariano Cheek III, MD    03/07/24 1344    03/06/24 1919  Inpatient consult to [facility] Center  Once        Provider:  (Not yet assigned)    03/06/24 1918                  Procedures     Imaging Results (All)       Procedure Component Value Units Date/Time    CT Abdomen Pelvis With Contrast [[id number]] Collected: 03/06/24 1904     Updated: 03/06/24 1916    Narrative:      CT ABDOMEN AND PELVIS WITH IV CONTRAST     HISTORY: Acute lower abdominal pain     TECHNIQUE: Radiation dose reduction techniques were utilized, including  automated exposure control and exposure modulation based on body size.   3 mm images were obtained through the abdomen and pelvis after the  administration of IV contrast.     COMPARISON: CT abdomen pelvis 7/23/2023         FINDINGS: Bibasilar linear atelectasis/scarring. Right and left renal  cysts. No hydronephrosis. Cholelithiasis. Gallbladder is mildly  distended. No appreciable wall thickening or pericholecystic  inflammatory changes. Nondilated biliary tree. Proximal and distal colon  diverticulosis without findings to suggest diverticulitis. Normal  nondilated appendix. Remaining solid organs and bowel are normal. No  abdominopelvic adenopathy. No focal osseous abnormality.          Impression:      1. Cholelithiasis. Nonspecific mildly distended gallbladder. No  appreciable wall thickening or pericholecystic inflammatory changes.  2. Colonic diverticulosis without findings to suggest diverticulitis.     This report was finalized on 3/6/2024 7:13 PM by Dr. Gerber Archer M.D on Workstation: [id number]               Pertinent Labs     Results from last 7 days   Lab Units 03/09/24  0556 03/08/24  0525 03/07/24  0510 03/06/24  1712   WBC 10*3/mm3 6.59 7.27 9.01 7.79   HEMOGLOBIN g/dL 9.8* 9.8* 9.5* 10.9*   PLATELETS 10*3/mm3 312 272 283 318     Results from last 7 days   Lab Units 03/09/24  0556 03/08/24  0525 03/07/24  0510 03/06/24  1712   SODIUM mmol/L 136 139 136 138   POTASSIUM mmol/L 4.0 4.1 4.2 3.5   CHLORIDE mmol/L 104 106 104 103   CO2 mmol/L 21.0* 23.0 21.4* 26.0   BUN mg/dL 6* 11 6* 7*   CREATININE mg/dL 0.45* 0.74* 0.40* 0.56*   GLUCOSE mg/dL 89 94 105* 137*   Estimated Creatinine Clearance: 185.4 mL/min (A) (by C-G formula based on SCr of 0.45 mg/dL (L)).  Results from last 7 days   Lab Units 03/09/24  0556 03/08/24  0525 03/06/24  1712   ALBUMIN g/dL 3.1* 3.2* 3.7   BILIRUBIN mg/dL 0.5 0.6 0.4   ALK PHOS U/L 81 93 108   AST (SGOT) U/L 60* 79* 150*   ALT (SGPT) U/L 17 24 41     Results from last 7 days   Lab Units 03/09/24  0556 03/08/24  0525 03/07/24  0510 03/06/24  1712   CALCIUM mg/dL 7.9* 8.1* 7.7* 8.4*   ALBUMIN g/dL 3.1* 3.2*  --  3.7     Results from last 7 days   Lab Units 03/06/24  1712   LIPASE U/L 151*  "            Invalid input(s): \"LDLCALC\"        Test Results Pending at Discharge       Discharge Details        Discharge Medications        New Medications        Instructions Start Date   pantoprazole 40 MG EC tablet  Commonly known as: PROTONIX   40 mg, Oral, Daily      thiamine 100 MG tablet  Commonly known as: VITAMIN B1   100 mg, Oral, Daily   Start Date: March 12, 2024            Continue These Medications        Instructions Start Date   amLODIPine 5 MG tablet  Commonly known as: NORVASC   5 mg, Oral, Every Morning      atorvastatin 20 MG tablet  Commonly known as: LIPITOR   20 mg, Oral, Daily      fluticasone 50 MCG/ACT nasal spray  Commonly known as: FLONASE   2 sprays, Nasal, Daily      levothyroxine 100 MCG tablet  Commonly known as: SYNTHROID, LEVOTHROID   100 mcg, Oral, Daily      lisinopril 10 MG tablet  Commonly known as: PRINIVIL,ZESTRIL   10 mg, Oral, Daily      pseudoephedrine 30 MG tablet  Commonly known as: Sudafed   30 mg, Oral, 3 times daily      traMADol 50 MG tablet  Commonly known as: ULTRAM   50 mg, Oral, Every 6 Hours PRN               Allergies   Allergen Reactions    Penicillins Anaphylaxis and Seizure     Seizure. childhood         Discharge Disposition:  Home or Self Care    Discharge Diet:  Diet Order   Procedures    Diet: Regular/House; Texture: Regular (IDDSI 7); Fluid Consistency: Thin (IDDSI 0)       Discharge Activity:   As tolerated    CODE STATUS:    Code Status and Medical Interventions:   Ordered at: 03/06/24 1918     Code Status (Patient has no pulse and is not breathing):    CPR (Attempt to Resuscitate)     Medical Interventions (Patient has pulse or is breathing):    Full       No future appointments.   Follow-up Information       Provider, No Known .    Contact information:  [facility] [address] [phone]                             Time Spent on Discharge:  Greater than 30 minutes      Callum Butler MD  [facility]  03/09/24  11:38 EST

## 2024-03-09 NOTE — CASE MANAGEMENT/SOCIAL WORK
Case Management Discharge Note      Final Note: DC HOME         Selected Continued Care - Discharged on 3/9/2024 Admission date: 3/6/2024 - Discharge disposition: Home or Self Care      Destination    No services have been selected for the patient.                Durable Medical Equipment    No services have been selected for the patient.                Dialysis/Infusion    No services have been selected for the patient.                Home Medical Care    No services have been selected for the patient.                Therapy    No services have been selected for the patient.                Community Resources    No services have been selected for the patient.                Community & DME    No services have been selected for the patient.                         Final Discharge Disposition Code: 01 - home or self-care

## 2024-03-09 NOTE — OUTREACH NOTE
Prep Survey      Flowsheet Row Responses   [facility] facility patient discharged from? [place]   Is LACE score < 7 ? No   Eligibility Readm Mgmt   Discharge diagnosis Alcohol-induced acute pancreatitis   Does the patient have one of the following disease processes/diagnoses(primary or secondary)? Other   Prep survey completed? Yes            Lary MELCHOR - Registered Nurse

## 2024-03-13 ENCOUNTER — READMISSION MANAGEMENT (OUTPATIENT)
Dept: CALL CENTER | Facility: HOSPITAL | Age: 69
End: 2024-03-13

## 2024-03-13 NOTE — OUTREACH NOTE
Medical Week 1 Survey      Flowsheet Row Responses   [facility] patient discharged from? [place]   Does the patient have one of the following disease processes/diagnoses(primary or secondary)? Other   Week 1 attempt successful? No   Unsuccessful attempts Attempt 1            Lary MELCHOR - Registered Nurse

## 2024-03-20 ENCOUNTER — READMISSION MANAGEMENT (OUTPATIENT)
Dept: CALL CENTER | Facility: HOSPITAL | Age: 69
End: 2024-03-20

## 2024-03-20 NOTE — OUTREACH NOTE
Medical Week 2 Survey      Flowsheet Row Responses   [facility] patient discharged from? [place]   Does the patient have one of the following disease processes/diagnoses(primary or secondary)? Other   Week 2 attempt successful? Yes   Call start time 1350   Discharge diagnosis Alcohol-induced acute pancreatitis   Call end time 1355   Person spoke with today (if not patient) and relationship sister   Meds reviewed with patient/caregiver? Yes   Is the patient having any side effects they believe may be caused by any medication additions or changes? No   Is the patient taking all medications as directed (includes completed medication regime)? Yes   Does the patient have a primary care provider?  No   Comments regarding PCP Sister is driving and can't take number, nurse advised sister that she can call main hosp number and ask for assistance obtaining PCP   Comments The last time the pt's sister checked on the pt, he was doing well.   What is the patient's perception of their health status since discharge? Improving   Is the patient/caregiver able to teach back signs and symptoms related to disease process for when to call PCP? Yes   Is the patient/caregiver able to teach back signs and symptoms related to disease process for when to call 911? Yes   Week 2 Call Completed? Yes   Revoked No further contact(revokes)-requires comment   Call end time 1351            Brittany BELTRÁN - Registered Nurse

## 2024-05-11 ENCOUNTER — HOSPITAL ENCOUNTER (EMERGENCY)
Facility: HOSPITAL | Age: 69
Discharge: HOME | End: 2024-05-12
Attending: EMERGENCY MEDICINE | Admitting: EMERGENCY MEDICINE
Payer: COMMERCIAL

## 2024-05-11 ENCOUNTER — APPOINTMENT (OUTPATIENT)
Dept: GENERAL RADIOLOGY | Facility: HOSPITAL | Age: 69
End: 2024-05-11
Payer: COMMERCIAL

## 2024-05-11 ENCOUNTER — APPOINTMENT (OUTPATIENT)
Dept: CT IMAGING | Facility: HOSPITAL | Age: 69
End: 2024-05-11
Payer: COMMERCIAL

## 2024-05-11 DIAGNOSIS — F10.920 ALCOHOLIC INTOXICATION WITHOUT COMPLICATION: Primary | ICD-10-CM

## 2024-05-11 LAB
APTT PPP: 27.1 SECONDS (ref 22.7–35.4)
BASOPHILS # BLD AUTO: 0.02 10*3/MM3 (ref 0–0.2)
BASOPHILS NFR BLD AUTO: 0.5 % (ref 0–1.5)
DEPRECATED RDW RBC AUTO: 55.5 FL (ref 37–54)
EOSINOPHIL # BLD AUTO: 0.06 10*3/MM3 (ref 0–0.4)
EOSINOPHIL NFR BLD AUTO: 1.5 % (ref 0.3–6.2)
ERYTHROCYTE [DISTWIDTH] IN BLOOD BY AUTOMATED COUNT: 15.1 % (ref 12.3–15.4)
HCT VFR BLD AUTO: 39.1 % (ref 37.5–51)
HGB BLD-MCNC: 13.2 G/DL (ref 13–17.7)
IMM GRANULOCYTES # BLD AUTO: 0.01 10*3/MM3 (ref 0–0.05)
IMM GRANULOCYTES NFR BLD AUTO: 0.3 % (ref 0–0.5)
INR PPP: 1.03 (ref 0.9–1.1)
LYMPHOCYTES # BLD AUTO: 1.77 10*3/MM3 (ref 0.7–3.1)
LYMPHOCYTES NFR BLD AUTO: 44.4 % (ref 19.6–45.3)
MCH RBC QN AUTO: 33.8 PG (ref 26.6–33)
MCHC RBC AUTO-ENTMCNC: 33.8 G/DL (ref 31.5–35.7)
MCV RBC AUTO: 100.3 FL (ref 79–97)
MONOCYTES # BLD AUTO: 0.48 10*3/MM3 (ref 0.1–0.9)
MONOCYTES NFR BLD AUTO: 12 % (ref 5–12)
NEUTROPHILS NFR BLD AUTO: 1.65 10*3/MM3 (ref 1.7–7)
NEUTROPHILS NFR BLD AUTO: 41.3 % (ref 42.7–76)
NRBC BLD AUTO-RTO: 0 /100 WBC (ref 0–0.2)
PLATELET # BLD AUTO: 104 10*3/MM3 (ref 140–450)
PMV BLD AUTO: 9.5 FL (ref 6–12)
PROTHROMBIN TIME: 13.7 SECONDS (ref 11.7–14.2)
RBC # BLD AUTO: 3.9 10*6/MM3 (ref 4.14–5.8)
WBC NRBC COR # BLD AUTO: 3.99 10*3/MM3 (ref 3.4–10.8)

## 2024-05-11 PROCEDURE — 96365 THER/PROPH/DIAG IV INF INIT: CPT

## 2024-05-11 PROCEDURE — 96366 THER/PROPH/DIAG IV INF ADDON: CPT

## 2024-05-11 PROCEDURE — 96375 TX/PRO/DX INJ NEW DRUG ADDON: CPT

## 2024-05-11 PROCEDURE — 85610 PROTHROMBIN TIME: CPT | Performed by: NURSE PRACTITIONER

## 2024-05-11 PROCEDURE — 85730 THROMBOPLASTIN TIME PARTIAL: CPT | Performed by: NURSE PRACTITIONER

## 2024-05-11 PROCEDURE — 80053 COMPREHEN METABOLIC PANEL: CPT | Performed by: NURSE PRACTITIONER

## 2024-05-11 PROCEDURE — 72125 CT NECK SPINE W/O DYE: CPT

## 2024-05-11 PROCEDURE — 85025 COMPLETE CBC W/AUTO DIFF WBC: CPT | Performed by: NURSE PRACTITIONER

## 2024-05-11 PROCEDURE — 70450 CT HEAD/BRAIN W/O DYE: CPT

## 2024-05-11 PROCEDURE — 84484 ASSAY OF TROPONIN QUANT: CPT | Performed by: NURSE PRACTITIONER

## 2024-05-11 PROCEDURE — 83735 ASSAY OF MAGNESIUM: CPT | Performed by: NURSE PRACTITIONER

## 2024-05-11 PROCEDURE — 82077 ASSAY SPEC XCP UR&BREATH IA: CPT | Performed by: NURSE PRACTITIONER

## 2024-05-11 PROCEDURE — 99284 EMERGENCY DEPT VISIT MOD MDM: CPT

## 2024-05-11 PROCEDURE — 93010 ELECTROCARDIOGRAM REPORT: CPT | Performed by: INTERNAL MEDICINE

## 2024-05-11 PROCEDURE — 93005 ELECTROCARDIOGRAM TRACING: CPT | Performed by: NURSE PRACTITIONER

## 2024-05-11 PROCEDURE — 71045 X-RAY EXAM CHEST 1 VIEW: CPT

## 2024-05-11 RX ORDER — SODIUM CHLORIDE 0.9 % (FLUSH) 0.9 %
10 SYRINGE (ML) INJECTION AS NEEDED
Status: DISCONTINUED | OUTPATIENT
Start: 2024-05-11 | End: 2024-05-12 | Stop reason: HOSPADM

## 2024-05-12 VITALS
DIASTOLIC BLOOD PRESSURE: 89 MMHG | OXYGEN SATURATION: 93 % | BODY MASS INDEX: 25.06 KG/M2 | TEMPERATURE: 98.5 F | HEART RATE: 75 BPM | SYSTOLIC BLOOD PRESSURE: 118 MMHG | HEIGHT: 72 IN | RESPIRATION RATE: 16 BRPM | WEIGHT: 185 LBS

## 2024-05-12 LAB
ALBUMIN SERPL-MCNC: 4.5 G/DL (ref 3.5–5.2)
ALBUMIN/GLOB SERPL: 1.8 G/DL
ALP SERPL-CCNC: 94 U/L (ref 39–117)
ALT SERPL W P-5'-P-CCNC: 25 U/L (ref 1–41)
ANION GAP SERPL CALCULATED.3IONS-SCNC: 16 MMOL/L (ref 5–15)
AST SERPL-CCNC: 129 U/L (ref 1–40)
BILIRUB SERPL-MCNC: 0.3 MG/DL (ref 0–1.2)
BUN SERPL-MCNC: 6 MG/DL (ref 8–23)
BUN/CREAT SERPL: 6.4 (ref 7–25)
CALCIUM SPEC-SCNC: 8.7 MG/DL (ref 8.6–10.5)
CHLORIDE SERPL-SCNC: 106 MMOL/L (ref 98–107)
CO2 SERPL-SCNC: 23 MMOL/L (ref 22–29)
CREAT SERPL-MCNC: 0.94 MG/DL (ref 0.76–1.27)
EGFRCR SERPLBLD CKD-EPI 2021: 88.9 ML/MIN/1.73
ETHANOL BLD-MCNC: 322 MG/DL (ref 0–10)
ETHANOL UR QL: 0.32 %
GLOBULIN UR ELPH-MCNC: 2.5 GM/DL
GLUCOSE SERPL-MCNC: 92 MG/DL (ref 65–99)
MAGNESIUM SERPL-MCNC: 1.5 MG/DL (ref 1.6–2.4)
POTASSIUM SERPL-SCNC: 3.7 MMOL/L (ref 3.5–5.2)
PROT SERPL-MCNC: 7 G/DL (ref 6–8.5)
QT INTERVAL: 408 MS
QTC INTERVAL: 459 MS
SODIUM SERPL-SCNC: 145 MMOL/L (ref 136–145)
TROPONIN T SERPL HS-MCNC: 17 NG/L

## 2024-05-12 PROCEDURE — 25810000003 LACTATED RINGERS SOLUTION: Performed by: NURSE PRACTITIONER

## 2024-05-12 PROCEDURE — 25010000002 MAGNESIUM SULFATE 2 GM/50ML SOLUTION: Performed by: NURSE PRACTITIONER

## 2024-05-12 PROCEDURE — 96375 TX/PRO/DX INJ NEW DRUG ADDON: CPT

## 2024-05-12 PROCEDURE — 96365 THER/PROPH/DIAG IV INF INIT: CPT

## 2024-05-12 PROCEDURE — 96366 THER/PROPH/DIAG IV INF ADDON: CPT

## 2024-05-12 PROCEDURE — 25010000002 THIAMINE PER 100 MG: Performed by: NURSE PRACTITIONER

## 2024-05-12 RX ORDER — THIAMINE HYDROCHLORIDE 100 MG/ML
100 INJECTION, SOLUTION INTRAMUSCULAR; INTRAVENOUS ONCE
Status: COMPLETED | OUTPATIENT
Start: 2024-05-12 | End: 2024-05-12

## 2024-05-12 RX ORDER — ACETAMINOPHEN 500 MG
1000 TABLET ORAL ONCE
Status: COMPLETED | OUTPATIENT
Start: 2024-05-12 | End: 2024-05-12

## 2024-05-12 RX ORDER — MAGNESIUM SULFATE HEPTAHYDRATE 40 MG/ML
2 INJECTION, SOLUTION INTRAVENOUS ONCE
Status: COMPLETED | OUTPATIENT
Start: 2024-05-12 | End: 2024-05-12

## 2024-05-12 RX ADMIN — MAGNESIUM SULFATE HEPTAHYDRATE 2 G: 40 INJECTION, SOLUTION INTRAVENOUS at 00:50

## 2024-05-12 RX ADMIN — THIAMINE HYDROCHLORIDE 100 MG: 100 INJECTION, SOLUTION INTRAMUSCULAR; INTRAVENOUS at 00:53

## 2024-05-12 RX ADMIN — ACETAMINOPHEN 1000 MG: 500 TABLET ORAL at 02:05

## 2024-05-12 RX ADMIN — SODIUM CHLORIDE, POTASSIUM CHLORIDE, SODIUM LACTATE AND CALCIUM CHLORIDE 1000 ML: 600; 310; 30; 20 INJECTION, SOLUTION INTRAVENOUS at 00:48

## 2024-05-12 NOTE — ED NOTES
Pt called sister to pick him up. Pt reports sister is able to come get him but her ETA is not for another hour. IV left in at this time as fluid bolus is still running. Constanza GONGORA NP informed.

## 2024-05-12 NOTE — ED PROVIDER NOTES
" EMERGENCY DEPARTMENT ENCOUNTER  Room Number:  S02/02  PCP: Provider, No Known  Independent Historians: Patient      HPI:  Chief Complaint: had concerns including Panic Attack.     A complete HPI/ROS/PMH/PSH/SH/FH are unobtainable due to: None    Chronic or social conditions impacting patient care (Social Determinants of Health): None      Context: Pro Mueller is a 69 y.o. male with a medical history of alcoholism, anxiety depressive disorder, genital herpes, hyperlipidemia, hypertension, hypothyroidism, insomnia, panic disorder, persistent insomnia, alcohol withdrawal, pancreatitis, who presents to the emergency department with complaints of feeling panicked.  Patient reports he had a fall down some steps but does not know why he fell.  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  Patient complained of a \"whole body meltdown\" to triage.  Patient denies injuries, headache, neck pain, lightheadedness, dizziness, syncope, unilateral extremity weakness, numbness, tingling, shortness of breath, chest pain, abdominal pain, nausea, vomiting, or other complaints.      Review of prior external notes (non-ED) -and- Review of prior external test results outside of this encounter:   March 6, 2024 through March 9, 2024: Hospital admission.  Patient was admitted for alcohol induced pancreatitis.    PAST MEDICAL HISTORY  Active Ambulatory Problems     Diagnosis Date Noted    Fall 08/08/2016    Alcoholism in recovery 08/08/2016    Atopic rhinitis 08/08/2016    Mixed anxiety depressive disorder 08/08/2016    Genital herpes simplex 08/08/2016    Hyperlipidemia 08/08/2016    Hypertension 08/08/2016    Hypothyroidism 08/08/2016    Insomnia 08/08/2016    Panic disorder without agoraphobia 08/08/2016    Persistent insomnia 08/08/2016    Vitamin D deficiency 08/08/2016    Chronic low back pain 11/11/2016    Neuropathy involving both lower extremities 10/25/2018    QT prolongation 01/03/2019    Left shoulder pain " 11/19/2019    Pancytopenia 01/14/2020    Left ventricular diastolic dysfunction 01/16/2021    Tremor 10/06/2021    C5 cervical fracture 11/09/2021    Syncope and collapse 01/07/2022    Neck pain 01/07/2022    Alcoholic intoxication with complication 03/13/2022    Withdrawal symptoms, alcohol 07/01/2022    Transaminitis 07/01/2022    Lumbar facet arthropathy 07/20/2022    Alcohol dependence with withdrawal 09/11/2022    Midline low back pain with right-sided sciatica 09/15/2022    Spondylolisthesis at L4-L5 level 09/15/2022    Lumbar canal stenosis 09/15/2022    Alcohol cessation counseling 09/15/2022    Need for assistance due to reduced mobility 09/15/2022    Impaired mobility and ADLs 09/15/2022    Alcohol withdrawal syndrome without complication 02/18/2023    Facial laceration 02/18/2023    Orthostatic hypotension 02/18/2023    Acute bacterial conjunctivitis of right eye 03/02/2023    Visit for suture removal 03/02/2023    Renal calculi 03/14/2023    Hepatic steatosis 03/15/2023    Alcohol withdrawal syndrome with complication 04/14/2023    Macrocytosis without anemia 04/14/2023    Lumbosacral spondylosis without myelopathy 05/05/2023    Elevated LFTs 05/13/2023    Alcohol-induced acute pancreatitis, unspecified complication status 06/23/2023    Dehydration 02/24/2024    Pancreatitis 03/06/2024    Generalized abdominal pain 03/07/2024    Alcohol withdrawal 03/07/2024     Resolved Ambulatory Problems     Diagnosis Date Noted    Impacted cerumen 08/08/2016    Influenza 08/08/2016    Motion sickness 08/08/2016    Seasonal allergic rhinitis 08/08/2016    Upper respiratory tract infection 08/08/2016    Alcohol withdrawal 11/04/2016    Headache 11/05/2016    Gastritis 11/05/2016    Olecranon bursitis of right elbow 04/05/2017    Sciatica of left side 06/12/2018    Syncope and collapse 01/02/2019    Nausea and vomiting 01/11/2020    Alcoholic ketoacidosis 01/12/2020    Hyponatremia 01/13/2020    Hypokalemia 01/13/2020     Alcohol dependence with uncomplicated withdrawal 2020    Nephrolithiasis 2020    Hypomagnesemia 2021    Non-traumatic rhabdomyolysis 2021    Urine retention 2021    Epigastric pain 2023     Past Medical History:   Diagnosis Date    Alcohol abuse     Allergic 1970    Anxiety     Arthritis     Depression     Disease of thyroid gland     Elevated cholesterol     Encounter for removal of sutures     GERD (gastroesophageal reflux disease)     Headache, tension-type     Kidney stone     Low back pain     Migraine     Olecranon bursitis, right elbow     Peripheral neuropathy     Sleep apnea          PAST SURGICAL HISTORY  Past Surgical History:   Procedure Laterality Date    COLONOSCOPY      CYST REMOVAL      CYSTOSCOPY BLADDER STONE LITHOTRIPSY  2022    EXTRACORPOREAL SHOCK WAVE LITHOTRIPSY (ESWL) Right     SHOULDER ARTHROSCOPY Right 2019    Procedure: SHOULDER ARTHROSCOPY, decompression, distal clavicle excision;  Surgeon: Aj Mancilla MD;  Location: [facility] OR [facility];  Service: Orthopedics    TONSILLECTOMY           FAMILY HISTORY  Family History   Problem Relation Age of Onset    Alzheimer's disease Mother     Mental illness Mother     Pancreatic cancer Father     Hearing loss Father     Arthritis Maternal Grandmother     Malig Hyperthermia Neg Hx          SOCIAL HISTORY  Social History     Socioeconomic History    Marital status: Single   Tobacco Use    Smoking status: Former     Current packs/day: 0.00     Average packs/day: 0.5 packs/day for 15.0 years (7.5 ttl pk-yrs)     Types: Cigarettes     Start date: 1990     Quit date: 2005     Years since quittin.3    Smokeless tobacco: Never    Tobacco comments:     caffeine - 3 cans of coke daily    Vaping Use    Vaping status: Never Used   Substance and Sexual Activity    Alcohol use: Yes     Alcohol/week: 7.0 standard drinks of alcohol     Types: 7 Shots of liquor per week     Comment: .5 liter vodka  "   Drug use: Yes     Types: Methamphetamines     Comment: \"once in a rare while\"    Sexual activity: Yes     Partners: Female     Birth control/protection: Condom         ALLERGIES  Penicillins      REVIEW OF SYSTEMS  Included in HPI  All systems reviewed and negative except for those discussed in HPI.      PHYSICAL EXAM    I have reviewed the triage vital signs and nursing notes.    ED Triage Vitals [05/11/24 2206]   Temp Heart Rate Resp BP SpO2   98.5 °F (36.9 °C) 84 16 132/82 91 %      Temp src Heart Rate Source Patient Position BP Location FiO2 (%)   -- -- -- -- --       GENERAL: not distressed, chronically ill-appearing  HENT: nares patent  Head/neck/ face are symmetric without gross deformity or swelling  EYES: no scleral icterus  CV: regular rhythm, regular rate with intact distal pulses  RESPIRATORY: normal effort and no respiratory distress  ABDOMEN: soft and non-tender  MUSCULOSKELETAL: no deformity  Cervical spine: No step-offs or deformities, no midline tenderness, full range of motion.  Thoracic spine: No step-offs or deformities, no midline tenderness.  Lumbar spine: No step-offs or deformities, no midline tenderness to palpation.  Bilateral lower extremities: Negative straight leg raise.  5 out of 5 strength.  Sensation intact light touch.    NEURO: alert and appropriate, moves all extremities, follows commands  SKIN: warm, dry    Vital signs and nursing notes reviewed.      LAB RESULTS  Recent Results (from the past 24 hour(s))   ECG 12 Lead Other; Unwitnessed fall, rule out arrhythmia    Collection Time: 05/11/24 11:23 PM   Result Value Ref Range    QT Interval 408 ms    QTC Interval 459 ms   Comprehensive Metabolic Panel    Collection Time: 05/11/24 11:24 PM    Specimen: Blood   Result Value Ref Range    Glucose 92 65 - 99 mg/dL    BUN 6 (L) 8 - 23 mg/dL    Creatinine 0.94 0.76 - 1.27 mg/dL    Sodium 145 136 - 145 mmol/L    Potassium 3.7 3.5 - 5.2 mmol/L    Chloride 106 98 - 107 mmol/L    CO2 23.0 " 22.0 - 29.0 mmol/L    Calcium 8.7 8.6 - 10.5 mg/dL    Total Protein 7.0 6.0 - 8.5 g/dL    Albumin 4.5 3.5 - 5.2 g/dL    ALT (SGPT) 25 1 - 41 U/L    AST (SGOT) 129 (H) 1 - 40 U/L    Alkaline Phosphatase 94 39 - 117 U/L    Total Bilirubin 0.3 0.0 - 1.2 mg/dL    Globulin 2.5 gm/dL    A/G Ratio 1.8 g/dL    BUN/Creatinine Ratio 6.4 (L) 7.0 - 25.0    Anion Gap 16.0 (H) 5.0 - 15.0 mmol/L    eGFR 88.9 >60.0 mL/min/1.73   Protime-INR    Collection Time: 05/11/24 11:24 PM    Specimen: Blood   Result Value Ref Range    Protime 13.7 11.7 - 14.2 Seconds    INR 1.03 0.90 - 1.10   aPTT    Collection Time: 05/11/24 11:24 PM    Specimen: Blood   Result Value Ref Range    PTT 27.1 22.7 - 35.4 seconds   Single High Sensitivity Troponin T    Collection Time: 05/11/24 11:24 PM    Specimen: Blood   Result Value Ref Range    HS Troponin T 17 <22 ng/L   Magnesium    Collection Time: 05/11/24 11:24 PM    Specimen: Blood   Result Value Ref Range    Magnesium 1.5 (L) 1.6 - 2.4 mg/dL   Ethanol    Collection Time: 05/11/24 11:24 PM    Specimen: Blood   Result Value Ref Range    Ethanol 322 (H) 0 - 10 mg/dL    Ethanol % 0.322 %   CBC Auto Differential    Collection Time: 05/11/24 11:24 PM    Specimen: Blood   Result Value Ref Range    WBC 3.99 3.40 - 10.80 10*3/mm3    RBC 3.90 (L) 4.14 - 5.80 10*6/mm3    Hemoglobin 13.2 13.0 - 17.7 g/dL    Hematocrit 39.1 37.5 - 51.0 %    .3 (H) 79.0 - 97.0 fL    MCH 33.8 (H) 26.6 - 33.0 pg    MCHC 33.8 31.5 - 35.7 g/dL    RDW 15.1 12.3 - 15.4 %    RDW-SD 55.5 (H) 37.0 - 54.0 fl    MPV 9.5 6.0 - 12.0 fL    Platelets 104 (L) 140 - 450 10*3/mm3    Neutrophil % 41.3 (L) 42.7 - 76.0 %    Lymphocyte % 44.4 19.6 - 45.3 %    Monocyte % 12.0 5.0 - 12.0 %    Eosinophil % 1.5 0.3 - 6.2 %    Basophil % 0.5 0.0 - 1.5 %    Immature Grans % 0.3 0.0 - 0.5 %    Neutrophils, Absolute 1.65 (L) 1.70 - 7.00 10*3/mm3    Lymphocytes, Absolute 1.77 0.70 - 3.10 10*3/mm3    Monocytes, Absolute 0.48 0.10 - 0.90 10*3/mm3     Eosinophils, Absolute 0.06 0.00 - 0.40 10*3/mm3    Basophils, Absolute 0.02 0.00 - 0.20 10*3/mm3    Immature Grans, Absolute 0.01 0.00 - 0.05 10*3/mm3    nRBC 0.0 0.0 - 0.2 /100 WBC         RADIOLOGY  CT Cervical Spine Without Contrast    Result Date: 5/12/2024  CT OF THE CERVICAL SPINE  HISTORY: Fall  COMPARISON: None available.  TECHNIQUE: Axial CT imaging was obtained through the cervical spine. Coronal and sagittal reformatted images were obtained.  FINDINGS: No acute fracture or subluxation of the cervical spine is seen. There is some straightening/reversal of normal cervical curvature. This may be related to patient positioning. However, a component of muscle spasm is not excluded. Images through the lung apices do not demonstrate any acute abnormalities. There is no prevertebral soft tissue swelling. Mild canal stenosis is noted at C6-C7. Neural foraminal narrowing is most noted at multiple levels.      No acute fracture or subluxation noted.  Radiation dose reduction techniques were utilized, including automated exposure control and exposure modulation based on body size.   This report was finalized on 5/12/2024 12:30 AM by Dr. Hilda Hampton M.D on Workstation: [id number]      CT Head Without Contrast    Result Date: 5/12/2024  CT OF THE HEAD WITHOUT CONTRAST  HISTORY: Panic attack  COMPARISON: February 24, 2024  TECHNIQUE: Axial CT imaging was obtained through the brain. No IV contrast was administered.  FINDINGS: No acute intracranial hemorrhage is seen. There is relatively advanced atrophy for the age of 69. There is periventricular and deep white matter microangiopathic change. There is no midline shift or mass effect. No calvarial fracture is seen. Paranasal sinuses and mastoid air cells appear clear.      No acute intracranial findings.  Radiation dose reduction techniques were utilized, including automated exposure control and exposure modulation based on body size.   This report was finalized  on 5/12/2024 12:25 AM by Dr. Hilda Hampton M.D on Workstation: [id number]      XR Chest 1 View    Result Date: 5/11/2024  SINGLE VIEW OF THE CHEST  HISTORY: Unwitnessed fall  COMPARISON: 3/24/2024  FINDINGS: Heart size is within normal limits. No pneumothorax or pleural effusion is seen. Lung volumes appear diminished, with some bibasilar consolidation noted. This may represent atelectasis, although correlation with any evidence of infection is recommended.      Mildly diminished lung volumes, with nonspecific bibasilar consolidation.  This report was finalized on 5/11/2024 11:59 PM by Dr. Hilda Hampton M.D on Workstation: [id number]         MEDICATIONS GIVEN IN ER  Medications   magnesium sulfate 2g/50 mL (PREMIX) infusion (0 g Intravenous Stopped 5/12/24 0303)   lactated ringers bolus 1,000 mL (0 mL Intravenous Stopped 5/12/24 0430)   thiamine (B-1) injection 100 mg (100 mg Intravenous Given 5/12/24 0053)   acetaminophen (TYLENOL) tablet 1,000 mg (1,000 mg Oral Given 5/12/24 0205)           OUTPATIENT MEDICATION MANAGEMENT:  No current Epic-ordered facility-administered medications on file.     Current Outpatient Medications Ordered in Epic   Medication Sig Dispense Refill    amLODIPine (NORVASC) 5 MG tablet Take 1 tablet by mouth Every Morning. 90 tablet 1    fluticasone (FLONASE) 50 MCG/ACT nasal spray 2 sprays into the nostril(s) as directed by provider Daily. 11.1 mL 0    levothyroxine (SYNTHROID, LEVOTHROID) 100 MCG tablet Take 1 tablet by mouth Daily. 90 tablet 1    lisinopril (PRINIVIL,ZESTRIL) 10 MG tablet Take 1 tablet by mouth Daily. 90 tablet 1    pseudoephedrine (Sudafed) 30 MG tablet Take 1 tablet by mouth 3 times a day. 20 tablet 0    traMADol (ULTRAM) 50 MG tablet Take 1 tablet by mouth Every 6 (Six) Hours As Needed for Moderate Pain.           PROGRESS, DATA ANALYSIS, CONSULTS, AND MEDICAL DECISION MAKING  ORDERS PLACED DURING THIS VISIT:  Orders Placed This Encounter   Procedures    XR  Chest 1 View    CT Head Without Contrast    CT Cervical Spine Without Contrast    Comprehensive Metabolic Panel    Protime-INR    aPTT    Single High Sensitivity Troponin T    Magnesium    Ethanol    CBC Auto Differential    Continuous Pulse Oximetry    Monitor Blood Pressure    ECG 12 Lead Other; Unwitnessed fall, rule out arrhythmia    CBC & Differential       All labs have been independently interpreted by me.  All radiology studies have been reviewed by me. All EKG's have been independently viewed by me.  Discussion below represents my analysis of pertinent findings related to patient's condition, differential diagnosis, treatment plan and final disposition.    Differential diagnosis includes but is not limited to:   ICH, electrolyte abnormality, anemia, fracture, contusion, etc.    ED Course/Progress Notes/MDM:  ED Course as of 05/12/24 0826   Sat May 11, 2024   2228 I discussed the case with Dr. Bar and they agree to evaluate the patient at the bedside.    [AR]   2248 XR Chest 1 View  My independent interpretation of the imaging study is no pneumothorax [TR]   Sun May 12, 2024   0016 Ethanol(!): 322 [AR]   0323 Primary RN advises that she was able to speak with the patient's sister who agrees to come  the patient. [AR]      ED Course User Index  [AR] Constanza Vaughn APRN  [TR] Low Bar MD       Mdm:  Patient is a 69-year-old male who presents to the emergency department with complaints of feeling panicked.  Patient informed me he had a fall down some steps tonight however had no injuries.CT head and cervical spine were, unremarkable for emergent findings.  Patient appeared intoxicated, alcohol level elevated.  Magnesium level slightly low, replaced with IV magnesium 2 g.  Patient given IV fluids as well as IV thiamine.  Patient observed, patient's sister will come and pick him up.  Patient will be discharged.strict return precautions given.      COMPLEXITY OF CARE  Admission was considered  but after careful review of the patient's presentation, physical examination, diagnostic results, and response to treatment the patient may be safely discharged with outpatient follow-up.        DIAGNOSIS  Final diagnoses:   Alcoholic intoxication without complication         DISPOSITION  ED Disposition       ED Disposition   Discharge    Condition   Stable    Comment   --                    Please note that portions of this document were completed with a voice recognition program.    Note Disclaimer: At [facility], we believe that sharing information builds trust and better relationships. You are receiving this note because you recently visited [facility]. It is possible you will see health information before a provider has talked with you about it. This kind of information can be easy to misunderstand. To help you fully understand what it means for your health, we urge you to discuss this note with your provider.     Constanza Vaughn, BERONICA  05/12/24 0828

## 2024-05-12 NOTE — ED PROVIDER NOTES
EMERGENCY DEPARTMENT MD ATTESTATION NOTE    Room Number:  11/11  PCP: Provider, No Known  Independent Historians: Patient and EMS    HPI:  A complete HPI/ROS/PMH/PSH/SH/FH are unobtainable due to: Poor historian    Chronic or social conditions impacting patient care (Social Determinants of Health): None      Context: Pro Mueller is a 69 y.o. male with a medical history of alcohol use, who presents to the ED c/o acute panic attack.  The patient reports that he felt like he was having a panic attack today.  He states that he was having a full body meltdown.  He states he has had similar episodes in the past.  He denies any current pain.        Review of prior external notes (non-ED) -and- Review of prior external test results outside of this encounter:  Laboratory evaluation 3/9/2024 shows CBC with a hemoglobin of 9.8.  BMP on the same date was normal    Prescription drug monitoring program review:           PHYSICAL EXAM    I have reviewed the triage vital signs and nursing notes.    ED Triage Vitals [05/11/24 2206]   Temp Heart Rate Resp BP SpO2   98.5 °F (36.9 °C) 84 16 132/82 91 %      Temp src Heart Rate Source Patient Position BP Location FiO2 (%)   -- -- -- -- --       Physical Exam  GENERAL: Awake, alert, no acute distress, chronically ill-appearing  SKIN: Warm, dry  HENT: Normocephalic, atraumatic  EYES: no scleral icterus  CV: regular rhythm, regular rate  RESPIRATORY: normal effort, lungs clear  ABDOMEN: soft, nontender, nondistended  MUSCULOSKELETAL: no deformity, no tenderness to the cervical, thoracic, or lumbar spines.  No tenderness to hips, shoulders, knees  NEURO: alert, moves all extremities, follows commands            MEDICATIONS GIVEN IN ER  Medications   magnesium sulfate 2g/50 mL (PREMIX) infusion (0 g Intravenous Stopped 5/12/24 0303)   lactated ringers bolus 1,000 mL (0 mL Intravenous Stopped 5/12/24 0430)   thiamine (B-1) injection 100 mg (100 mg Intravenous Given 5/12/24 0053)    acetaminophen (TYLENOL) tablet 1,000 mg (1,000 mg Oral Given 5/12/24 0205)         ORDERS PLACED DURING THIS VISIT:  Orders Placed This Encounter   Procedures    XR Chest 1 View    CT Head Without Contrast    CT Cervical Spine Without Contrast    Comprehensive Metabolic Panel    Protime-INR    aPTT    Single High Sensitivity Troponin T    Magnesium    Ethanol    CBC Auto Differential    Continuous Pulse Oximetry    Monitor Blood Pressure    ECG 12 Lead Other; Unwitnessed fall, rule out arrhythmia    CBC & Differential         PROCEDURES  Procedures            PROGRESS, DATA ANALYSIS, CONSULTS, AND MEDICAL DECISION MAKING  All labs have been independently interpreted by me.  All radiology studies have been reviewed by me. All EKG's have been independently viewed and interpreted by me.  Discussion below represents my analysis of pertinent findings related to patient's condition, differential diagnosis, treatment plan and final disposition.    Differential diagnosis includes but is not limited to intracranial hemorrhage, hypoglycemia, hyponatremia, syncope.    Clinical Scores:                   ED Course as of 05/12/24 1721   Sat May 11, 2024   2228 I discussed the case with Dr. Bar and they agree to evaluate the patient at the bedside.    [AR]   2248 XR Chest 1 View  My independent interpretation of the imaging study is no pneumothorax [TR]   Sun May 12, 2024   0016 Ethanol(!): 322 [AR]   0323 Primary RN advises that she was able to speak with the patient's sister who agrees to come  the patient. [AR]      ED Course User Index  [AR] Constanza Vaughn APRN  [TR] Low Bar MD       MDM: Plan to obtain laboratory evaluation as well as imaging.  He did note he fell to the nurse practitioner although he denies fall to me.  His history of alcohol abuse increases risk of complications.      COMPLEXITY OF CARE      Please note that portions of this document were completed with a voice recognition  program.    Note Disclaimer: At [facility], we believe that sharing information builds trust and better relationships. You are receiving this note because you recently visited [facility]. It is possible you will see health information before a provider has talked with you about it. This kind of information can be easy to misunderstand. To help you fully understand what it means for your health, we urge you to discuss this note with your provider.         Low Bar MD  05/11/24 2215       Low Bar MD  05/12/24 1875

## 2024-05-12 NOTE — DISCHARGE INSTRUCTIONS
You have been given emergency department evaluation.  This evaluation is intended to rule out life-threatening conditions.  Is not a complete evaluation.  You could require further testing as determined by your primary care physician or any referred specialist.  Please follow-up with all doctors that you are referred to.  Please be sure to take your prescribed medications and follow any specific instructions in the discharge instructions.  Please follow-up with your primary care physician within 48 hours.  Please have your primary care provider recheck your blood pressure.  Rest.  Drink plenty of fluids, stay hydrated.  STOP DRINKING ALCOHOL, if you need assistance please seek help from one of the locations listed below  [facility] ([facility]  [address], [place]  [phone]     [facility]  [address] [place] [address] [phone]    [facility]  [address] [place] [address] [phone]    [facility]  [address], [place]  [phone]    [facility]  [facility]  [address] [place] [address] [phone]    [facility]  [address] [address] [phone]    [facility]  [address] [place]  [address] [phone]      Please return to the emergency department if you experience chest pain, shortness of breath, abdominal pain, fever greater than 102, intractable vomiting.  Please return to the emergency department if your symptoms continue or worsen, or if you begin to experience any other concerning symptom.

## 2024-07-29 NOTE — PROGRESS NOTES
Chief Complaint   Patient presents with    Shortness of Breath    Atrial Fibrillation    Coronary Artery Disease     Vitals:    07/29/24 1251   BP: 130/70   Site: Left Upper Arm   Position: Sitting   Cuff Size: Medium Adult   Pulse: 75   SpO2: 95%   Weight: 74.4 kg (164 lb)   Height: 1.854 m (6' 1\")      /70 (Site: Left Upper Arm, Position: Sitting, Cuff Size: Medium Adult)   Pulse 75   Ht 1.854 m (6' 1\")   Wt 74.4 kg (164 lb)   SpO2 95%   BMI 21.64 kg/m²           Patient: Pro Mueller    YOB: 1955    Medical Record Number: [id number]    Chief Complaints: Bilateral shoulder pain    History of Present Illness:     64 y.o. male patient who presents for follow-up of both shoulders.  The last injection did help tremendously but his symptoms are now back to baseline.  Both shoulders are bothering him but the right is worse.  He localizes pain primarily to the top of the shoulder.  The symptoms are worse with reaching and lifting.  Pain is moderate to severe, intermittent and both aching and stabbing.  He does get some associated clicking and popping.    Allergies:   Allergies   Allergen Reactions   • Penicillins        Home Medications:    Current Outpatient Medications:   •  acyclovir (ZOVIRAX) 800 MG tablet, TAKE 1 TABLET BY MOUTH THREE TIMES DAILY, Disp: 90 tablet, Rfl: 0  •  amLODIPine (NORVASC) 5 MG tablet, TAKE 1 TABLET BY MOUTH DAILY, Disp: 90 tablet, Rfl: 0  •  atorvastatin (LIPITOR) 20 MG tablet, TAKE 1 TABLET BY MOUTH DAILY, Disp: 90 tablet, Rfl: 0  •  buPROPion SR (WELLBUTRIN SR) 150 MG 12 hr tablet, Take 300 mg by mouth Daily., Disp: , Rfl: 2  •  clonazePAM (KlonoPIN) 2 MG tablet, Take 2 mg by mouth Every Night., Disp: , Rfl:   •  gabapentin (NEURONTIN) 300 MG capsule, Take 1 capsule by mouth 3 (Three) Times a Day., Disp: 90 capsule, Rfl: 5  •  levothyroxine (SYNTHROID, LEVOTHROID) 75 MCG tablet, Take 75 mcg by mouth Daily., Disp: , Rfl:   •  lisinopril (PRINIVIL,ZESTRIL) 10 MG tablet, TAKE 1 TABLET BY MOUTH DAILY, Disp: 90 tablet, Rfl: 0  •  magnesium oxide (MAGOX) 400 (241.3 Mg) MG tablet tablet, Take 400 mg by mouth Daily., Disp: , Rfl:   •  meloxicam (MOBIC) 15 MG tablet, TAKE 1 TABLET BY MOUTH DAILY WITH FOOD AS NEEDED AS DIRECTED FOR MODERATE PAIN, Disp: 30 tablet, Rfl: 0  •  ondansetron ODT (ZOFRAN-ODT) 8 MG disintegrating tablet, Take 1 tablet by mouth Every 8 (Eight) Hours As Needed for Nausea or Vomiting., Disp: 20 tablet, Rfl: 2  •   rizatriptan (MAXALT) 10 MG tablet, Take 1 tablet by mouth 1 (One) Time As Needed for Migraine for up to 1 dose. May repeat in 2 hours if needed, Disp: 12 tablet, Rfl: 1  •  sildenafil (VIAGRA) 25 MG tablet, Take 1 tablet by mouth Daily As Needed for erectile dysfunction., Disp: 10 tablet, Rfl: 0  •  traMADol (ULTRAM) 50 MG tablet, TAKE 1 TABLET BY MOUTH EVERY 6 HOURS AS NEEDED FOR MODERATE PAIN, Disp: 110 tablet, Rfl: 0  •  amLODIPine (NORVASC) 5 MG tablet, Take 5 mg by mouth Daily., Disp: , Rfl:   •  atorvastatin (LIPITOR) 20 MG tablet, Take 20 mg by mouth Daily., Disp: , Rfl:   •  Vortioxetine HBr (TRINTELLIX) 20 MG tablet, Take 20 mg by mouth Daily., Disp: , Rfl:     Past Medical History:   Diagnosis Date   • Alcohol abuse    • Anxiety    • Arthritis    • Atopic rhinitis 8/8/2016   • Depression    • Encounter for removal of sutures    • Genital herpes simplex 8/8/2016   • Headache, tension-type    • Hyperlipidemia    • Hypertension    • Kidney stone    • Migraine    • Motion sickness    • Olecranon bursitis, right elbow    • Panic disorder without agoraphobia 8/8/2016   • Peripheral neuropathy    • Vitamin D deficiency 8/8/2016   • Withdrawal symptoms, alcohol (CMS/HCC)        Past Surgical History:   Procedure Laterality Date   • CYST REMOVAL     • EXTRACORPOREAL SHOCK WAVE LITHOTRIPSY (ESWL) Right 2002   • TONSILLECTOMY         Social History     Occupational History   • Not on file   Tobacco Use   • Smoking status: Former Smoker     Packs/day: 0.50     Years: 25.00     Pack years: 12.50   • Smokeless tobacco: Never Used   Substance and Sexual Activity   • Alcohol use: Yes     Alcohol/week: 3.6 oz     Types: 6 Shots of liquor per week     Comment: several night weekly, few drinks per occasion   • Drug use: No   • Sexual activity: Yes     Partners: Female      Social History     Social History Narrative   • Not on file       Family History   Problem Relation Age of Onset   • Alzheimer's disease Mother    •  "Pancreatic cancer Father        Review of Systems:      Constitutional: Denies fever, shaking or chills   Eyes: Denies change in visual acuity   HEENT: Denies nasal congestion or sore throat   Respiratory: Denies cough or shortness of breath   Cardiovascular: Denies chest pain or edema  Endocrine: Denies tremors, palpitations, intolerance of heat or cold, polyuria, polydipsia.  GI: Denies abdominal pain, nausea, vomiting, bloody stools or diarrhea  : Denies frequency, urgency, incontinence, retention, or nocturia.  Musculoskeletal: Denies numbness, tingling or loss of motor function except as above  Integument: Denies rash, lesion or ulceration   Neurologic: Denies headache or focal weakness, deficits  Heme: Denies spontaneous or excessive bleeding, epistaxis, hematuria, melena, fatigue, enlarged or tender lymph nodes.      All other pertinent positives and negatives as noted above in HPI.    Physical Exam: 64 y.o. male  Vitals:    11/04/19 1505   Temp: 97.2 °F (36.2 °C)   TempSrc: Temporal   Weight: 88.5 kg (195 lb)   Height: 182.9 cm (72\")       General:  Patient is awake and alert.  Appears in no acute distress or discomfort.    Psych:  Affect and demeanor are appropriate.    Eyes:  Conjunctiva and sclera appear grossly normal.  Eyes track well and EOM seem to be intact.    Ears:  No gross abnormalities.  Hearing adequate for the exam.    Cardiovascular:  Regular rate and rhythm.    Lungs:  Good chest expansion.  Breathing unlabored.    Extremities:     Right shoulder is examined.  Skin is benign.  No gross abnormalities on inspection including any atrophy, swellings, or masses.  No palpable masses or adenopathy.  Focal tenderness noted over the acromioclavicular joint.  Full shoulder motion.  No evident instability or apprehension.  Positive active compression maneuver.  Mild discomfort with a Villareal maneuver as well.  Excellent strength with elevation in the scapular plane and abduction.  Good strength with " Symptoms:  Constitutional: Negative for fever, chills  HEENT: Negative for nosebleeds, tinnitus, and vision changes.   Respiratory: Negative for cough, wheezing  Cardiovascular: Negative for orthopnea, claudication, syncope  Gastrointestinal: Negative for abdominal pain, diarrhea, melena.   Genitourinary: Negative for dysuria  Musculoskeletal: + back pain   Skin: Negative for rash  Heme: No problems bleeding.  Neurological: Negative for speech change and focal weakness.       PHYSICAL EXAM:     Physical Exam:  /70 (Site: Left Upper Arm, Position: Sitting, Cuff Size: Medium Adult)   Pulse 75   Ht 1.854 m (6' 1\")   Wt 74.4 kg (164 lb)   SpO2 95%   BMI 21.64 kg/m²     Patient appears generally well, mood and affect are appropriate and pleasant.  HEENT:  Hearing intact, non-icteric, normocephalic, atraumatic.   Neck Exam: Supple, No JVD   Lung Exam: Clear to auscultation, even breath sounds.   Cardiac Exam: Regular rate and rhythm with no murmur or rub  Abdomen: Soft, non-tender  Extremities: Moves all ext well. No lower extremity edema.  MSKTL: Overall good ROM ext  Skin: No significant rashes  Psych: Appropriate affect  Neuro - Grossly intact        LABS / OTHER STUDIES:     Lab Results   Component Value Date     06/17/2024    K 4.1 06/17/2024     06/17/2024    CO2 30 06/17/2024    BUN 35 (H) 06/17/2024    CREATININE 1.12 06/17/2024    GLUCOSE 108 (H) 06/17/2024    CALCIUM 9.6 06/17/2024    BILITOT 0.6 06/17/2024    ALKPHOS 56 06/17/2024    AST 14 (L) 06/17/2024    ALT 22 06/17/2024    LABGLOM 72 06/17/2024    GFRAA >60 05/23/2022    AGRATIO 1.8 01/11/2023    GLOB 2.5 06/17/2024       Lab Results   Component Value Date    WBC 6.2 12/03/2023    HGB 15.1 12/03/2023    HCT 44.3 12/03/2023    MCV 91.0 12/03/2023     12/03/2023     Lab Results   Component Value Date    TSH 4.40 (H) 10/10/2022       Lab Results   Component Value Date    CHOL 140 06/17/2024    TRIG 81 06/17/2024    HDL 64  internal and external rotation as well. Good strength in the deltoid, biceps, triceps, and .  Intact sensation throughout the arm.  Brisk cap refill.  Palpable radial pulse.  Good skin turgor.    His left shoulder is only briefly examined.  Skin is benign.  Again he is tender over the acromioclavicular joint.  Good motion.    Imaging: Bilateral AP, scapular Y and axillary views of the shoulders are ordered and reviewed.  These compared to previous x-rays.  He has advanced acromioclavicular arthritis on the right.  The acromioclavicular joint is difficult to visualize well on the left side but it appears that he has at least moderate arthritis.  His MRI of the right shoulder from 2017 is reviewed along with the associated report.  He has some rotator cuff tendinopathy but no obvious tear.  He does have advanced acromioclavicular arthritis with edema in the joint.    Assessment/Plan: Bilateral shoulder symptomatic acromioclavicular arthritis and mild associated impingement syndrome, right currently far more symptomatic than the left    We discussed options for him.  There is certainly no urgency to proceed with any surgical intervention for either shoulder.  This issue can be managed conservatively with injections and/or therapy.  We discussed that and he is of the mindset that he wants to get this fixed.  He has met his deductible for the year and he feels like now is optimal in terms of the timing for him.  We had a thorough discussion regarding the risks, benefits and alternatives to an arthroscopic decompression with distal clavicle excision and non-surgical management versus surgery.  I explained that surgical risks include infection, hematoma, persistent pain and/or loss of motion, iatrogenic nerve and/or blood vessel injury resulting in permanent weakness, numbness or dysfunction, RSD, DVT, PE, positioning related neuropraxia, and anesthesia related complications resulting in death.  We further discussed  the possible need to address any rotator cuff, labral and/or biceps pathology, if found, at the time of the surgery.  I explained to patient that I would certainly plan to address this in the same surgical setting if we were to identify any unexpected pathology.  We discussed the risk associated with this including possible anchor related complications including failure of fixation, loosening, cutout of the anchors, chondrolysis, re-tear necessitating revision surgery.  We also discussed a possible tenotomy versus tenodesis of the biceps, just depending on where the pathology is located.  We discussed the fact that if the biceps demonstrates significant pathology in the groove that I would plan to perform a tenotomy which could result in rui deformity or persistent pain, weakness or cramping.  We also discussed possible risks with a tenodesis as well including screw related complications including cutout, chondrolysis, failure of fixation with re-tear of the biceps, fracture and possible iatrogenic nerve injury.  The patient voiced understanding of the risks, benefits, and alternative forms of treatment that were discussed and the patient consents to proceed with an arthroscopic decompression and distal clavicle excision and addressing any unexpected pathology as indicated.  No guarantees were given regarding the results of this procedure.  I did answer all the questions posed by the patient in detail.    Aj Mancilla MD    11/04/2019

## 2024-09-03 ENCOUNTER — APPOINTMENT (OUTPATIENT)
Dept: ULTRASOUND IMAGING | Facility: HOSPITAL | Age: 69
End: 2024-09-03
Payer: COMMERCIAL

## 2024-09-03 ENCOUNTER — HOSPITAL ENCOUNTER (OUTPATIENT)
Facility: HOSPITAL | Age: 69
Setting detail: OBSERVATION
Discharge: HOME | End: 2024-09-04
Attending: EMERGENCY MEDICINE | Admitting: HOSPITALIST
Payer: COMMERCIAL

## 2024-09-03 ENCOUNTER — APPOINTMENT (OUTPATIENT)
Dept: CT IMAGING | Facility: HOSPITAL | Age: 69
End: 2024-09-03
Payer: COMMERCIAL

## 2024-09-03 DIAGNOSIS — R51.9 NONINTRACTABLE HEADACHE, UNSPECIFIED CHRONICITY PATTERN, UNSPECIFIED HEADACHE TYPE: ICD-10-CM

## 2024-09-03 DIAGNOSIS — E83.42 HYPOMAGNESEMIA: Primary | ICD-10-CM

## 2024-09-03 DIAGNOSIS — R11.0 NAUSEA: ICD-10-CM

## 2024-09-03 DIAGNOSIS — F10.10 ALCOHOL ABUSE: ICD-10-CM

## 2024-09-03 LAB
ALBUMIN SERPL-MCNC: 3.7 G/DL (ref 3.5–5.2)
ALBUMIN/GLOB SERPL: 1.6 G/DL
ALP SERPL-CCNC: 84 U/L (ref 39–117)
ALT SERPL W P-5'-P-CCNC: 25 U/L (ref 1–41)
AMPHET+METHAMPHET UR QL: NEGATIVE
ANION GAP SERPL CALCULATED.3IONS-SCNC: 13.4 MMOL/L (ref 5–15)
APTT PPP: 25.8 SECONDS (ref 22.7–35.4)
AST SERPL-CCNC: 90 U/L (ref 1–40)
BACTERIA UR QL AUTO: ABNORMAL /HPF
BARBITURATES UR QL SCN: NEGATIVE
BASOPHILS # BLD AUTO: 0.05 10*3/MM3 (ref 0–0.2)
BASOPHILS NFR BLD AUTO: 0.8 % (ref 0–1.5)
BENZODIAZ UR QL SCN: NEGATIVE
BILIRUB SERPL-MCNC: 0.5 MG/DL (ref 0–1.2)
BILIRUB UR QL STRIP: NEGATIVE
BUN SERPL-MCNC: 7 MG/DL (ref 8–23)
BUN/CREAT SERPL: 10.3 (ref 7–25)
CALCIUM SPEC-SCNC: 8 MG/DL (ref 8.6–10.5)
CANNABINOIDS SERPL QL: NEGATIVE
CHLORIDE SERPL-SCNC: 101 MMOL/L (ref 98–107)
CLARITY UR: CLEAR
CO2 SERPL-SCNC: 24.6 MMOL/L (ref 22–29)
COCAINE UR QL: NEGATIVE
COLOR UR: YELLOW
CREAT SERPL-MCNC: 0.68 MG/DL (ref 0.76–1.27)
D-LACTATE SERPL-SCNC: 2.4 MMOL/L (ref 0.5–2)
D-LACTATE SERPL-SCNC: 2.7 MMOL/L (ref 0.5–2)
D-LACTATE SERPL-SCNC: 2.8 MMOL/L (ref 0.5–2)
D-LACTATE SERPL-SCNC: 4.1 MMOL/L (ref 0.5–2)
DEPRECATED RDW RBC AUTO: 60 FL (ref 37–54)
EGFRCR SERPLBLD CKD-EPI 2021: 101.9 ML/MIN/1.73
EOSINOPHIL # BLD AUTO: 0.03 10*3/MM3 (ref 0–0.4)
EOSINOPHIL NFR BLD AUTO: 0.5 % (ref 0.3–6.2)
ERYTHROCYTE [DISTWIDTH] IN BLOOD BY AUTOMATED COUNT: 16.7 % (ref 12.3–15.4)
ETHANOL BLD-MCNC: <10 MG/DL (ref 0–10)
ETHANOL UR QL: <0.01 %
FENTANYL UR-MCNC: NEGATIVE NG/ML
FLUAV RNA RESP QL NAA+PROBE: NOT DETECTED
FLUBV RNA RESP QL NAA+PROBE: NOT DETECTED
GEN 5 2HR TROPONIN T REFLEX: 13 NG/L
GLOBULIN UR ELPH-MCNC: 2.3 GM/DL
GLUCOSE SERPL-MCNC: 97 MG/DL (ref 65–99)
GLUCOSE UR STRIP-MCNC: NEGATIVE MG/DL
HCT VFR BLD AUTO: 36.5 % (ref 37.5–51)
HGB BLD-MCNC: 12.4 G/DL (ref 13–17.7)
HGB UR QL STRIP.AUTO: NEGATIVE
HYALINE CASTS UR QL AUTO: ABNORMAL /LPF
IMM GRANULOCYTES # BLD AUTO: 0.02 10*3/MM3 (ref 0–0.05)
IMM GRANULOCYTES NFR BLD AUTO: 0.3 % (ref 0–0.5)
INR PPP: 1.12 (ref 0.9–1.1)
KETONES UR QL STRIP: NEGATIVE
LEUKOCYTE ESTERASE UR QL STRIP.AUTO: ABNORMAL
LIPASE SERPL-CCNC: 81 U/L (ref 13–60)
LYMPHOCYTES # BLD AUTO: 1.23 10*3/MM3 (ref 0.7–3.1)
LYMPHOCYTES NFR BLD AUTO: 18.5 % (ref 19.6–45.3)
MAGNESIUM SERPL-MCNC: 1.2 MG/DL (ref 1.6–2.4)
MCH RBC QN AUTO: 33.5 PG (ref 26.6–33)
MCHC RBC AUTO-ENTMCNC: 34 G/DL (ref 31.5–35.7)
MCV RBC AUTO: 98.6 FL (ref 79–97)
METHADONE UR QL SCN: NEGATIVE
MONOCYTES # BLD AUTO: 0.65 10*3/MM3 (ref 0.1–0.9)
MONOCYTES NFR BLD AUTO: 9.8 % (ref 5–12)
NEUTROPHILS NFR BLD AUTO: 4.66 10*3/MM3 (ref 1.7–7)
NEUTROPHILS NFR BLD AUTO: 70.1 % (ref 42.7–76)
NITRITE UR QL STRIP: NEGATIVE
NRBC BLD AUTO-RTO: 0 /100 WBC (ref 0–0.2)
NT-PROBNP SERPL-MCNC: 170 PG/ML (ref 0–900)
OPIATES UR QL: NEGATIVE
OXYCODONE UR QL SCN: NEGATIVE
PH UR STRIP.AUTO: 8 [PH] (ref 5–8)
PLATELET # BLD AUTO: 193 10*3/MM3 (ref 140–450)
PMV BLD AUTO: 9.1 FL (ref 6–12)
POTASSIUM SERPL-SCNC: 3.7 MMOL/L (ref 3.5–5.2)
PROT SERPL-MCNC: 6 G/DL (ref 6–8.5)
PROT UR QL STRIP: NEGATIVE
PROTHROMBIN TIME: 14.6 SECONDS (ref 11.7–14.2)
QT INTERVAL: 435 MS
QTC INTERVAL: 460 MS
RBC # BLD AUTO: 3.7 10*6/MM3 (ref 4.14–5.8)
RBC # UR STRIP: ABNORMAL /HPF
REF LAB TEST METHOD: ABNORMAL
SARS-COV-2 RNA RESP QL NAA+PROBE: NOT DETECTED
SODIUM SERPL-SCNC: 139 MMOL/L (ref 136–145)
SP GR UR STRIP: 1.01 (ref 1–1.03)
SQUAMOUS #/AREA URNS HPF: ABNORMAL /HPF
TROPONIN T DELTA: -2 NG/L
TROPONIN T SERPL HS-MCNC: 15 NG/L
UROBILINOGEN UR QL STRIP: ABNORMAL
WBC # UR STRIP: ABNORMAL /HPF
WBC NRBC COR # BLD AUTO: 6.64 10*3/MM3 (ref 3.4–10.8)

## 2024-09-03 PROCEDURE — 25010000002 MAGNESIUM SULFATE 2 GM/50ML SOLUTION: Performed by: EMERGENCY MEDICINE

## 2024-09-03 PROCEDURE — 99285 EMERGENCY DEPT VISIT HI MDM: CPT

## 2024-09-03 PROCEDURE — 90791 PSYCH DIAGNOSTIC EVALUATION: CPT

## 2024-09-03 PROCEDURE — 82077 ASSAY SPEC XCP UR&BREATH IA: CPT | Performed by: EMERGENCY MEDICINE

## 2024-09-03 PROCEDURE — 76705 ECHO EXAM OF ABDOMEN: CPT

## 2024-09-03 PROCEDURE — 74177 CT ABD & PELVIS W/CONTRAST: CPT

## 2024-09-03 PROCEDURE — 25010000002 DIPHENHYDRAMINE PER 50 MG: Performed by: EMERGENCY MEDICINE

## 2024-09-03 PROCEDURE — 93010 ELECTROCARDIOGRAM REPORT: CPT | Performed by: INTERNAL MEDICINE

## 2024-09-03 PROCEDURE — 93005 ELECTROCARDIOGRAM TRACING: CPT | Performed by: EMERGENCY MEDICINE

## 2024-09-03 PROCEDURE — 84484 ASSAY OF TROPONIN QUANT: CPT | Performed by: EMERGENCY MEDICINE

## 2024-09-03 PROCEDURE — G0378 HOSPITAL OBSERVATION PER HR: HCPCS

## 2024-09-03 PROCEDURE — 96367 TX/PROPH/DG ADDL SEQ IV INF: CPT

## 2024-09-03 PROCEDURE — 96375 TX/PRO/DX INJ NEW DRUG ADDON: CPT

## 2024-09-03 PROCEDURE — 85025 COMPLETE CBC W/AUTO DIFF WBC: CPT | Performed by: EMERGENCY MEDICINE

## 2024-09-03 PROCEDURE — 80307 DRUG TEST PRSMV CHEM ANLYZR: CPT | Performed by: EMERGENCY MEDICINE

## 2024-09-03 PROCEDURE — 81001 URINALYSIS AUTO W/SCOPE: CPT | Performed by: EMERGENCY MEDICINE

## 2024-09-03 PROCEDURE — 83690 ASSAY OF LIPASE: CPT | Performed by: EMERGENCY MEDICINE

## 2024-09-03 PROCEDURE — 83880 ASSAY OF NATRIURETIC PEPTIDE: CPT | Performed by: EMERGENCY MEDICINE

## 2024-09-03 PROCEDURE — 85610 PROTHROMBIN TIME: CPT | Performed by: EMERGENCY MEDICINE

## 2024-09-03 PROCEDURE — 36415 COLL VENOUS BLD VENIPUNCTURE: CPT | Performed by: EMERGENCY MEDICINE

## 2024-09-03 PROCEDURE — 25510000001 IOPAMIDOL 61 % SOLUTION: Performed by: EMERGENCY MEDICINE

## 2024-09-03 PROCEDURE — 25010000002 PROCHLORPERAZINE 10 MG/2ML SOLUTION: Performed by: EMERGENCY MEDICINE

## 2024-09-03 PROCEDURE — 80053 COMPREHEN METABOLIC PANEL: CPT | Performed by: EMERGENCY MEDICINE

## 2024-09-03 PROCEDURE — 85730 THROMBOPLASTIN TIME PARTIAL: CPT | Performed by: EMERGENCY MEDICINE

## 2024-09-03 PROCEDURE — 25010000002 THIAMINE PER 100 MG: Performed by: EMERGENCY MEDICINE

## 2024-09-03 PROCEDURE — 25010000002 MIDAZOLAM PER 1 MG: Performed by: EMERGENCY MEDICINE

## 2024-09-03 PROCEDURE — 96361 HYDRATE IV INFUSION ADD-ON: CPT

## 2024-09-03 PROCEDURE — 25810000003 SODIUM CHLORIDE 0.9 % SOLUTION: Performed by: EMERGENCY MEDICINE

## 2024-09-03 PROCEDURE — 87040 BLOOD CULTURE FOR BACTERIA: CPT | Performed by: INTERNAL MEDICINE

## 2024-09-03 PROCEDURE — 25810000003 SODIUM CHLORIDE 0.9 % SOLUTION: Performed by: INTERNAL MEDICINE

## 2024-09-03 PROCEDURE — 25010000002 CEFTRIAXONE PER 250 MG: Performed by: EMERGENCY MEDICINE

## 2024-09-03 PROCEDURE — 87636 SARSCOV2 & INF A&B AMP PRB: CPT | Performed by: EMERGENCY MEDICINE

## 2024-09-03 PROCEDURE — 25010000002 THIAMINE PER 100 MG: Performed by: INTERNAL MEDICINE

## 2024-09-03 PROCEDURE — 83735 ASSAY OF MAGNESIUM: CPT | Performed by: EMERGENCY MEDICINE

## 2024-09-03 PROCEDURE — 83605 ASSAY OF LACTIC ACID: CPT | Performed by: EMERGENCY MEDICINE

## 2024-09-03 PROCEDURE — 96376 TX/PRO/DX INJ SAME DRUG ADON: CPT

## 2024-09-03 PROCEDURE — 96365 THER/PROPH/DIAG IV INF INIT: CPT

## 2024-09-03 RX ORDER — LORAZEPAM 2 MG/ML
1 INJECTION INTRAMUSCULAR
Status: DISCONTINUED | OUTPATIENT
Start: 2024-09-03 | End: 2024-09-04 | Stop reason: HOSPADM

## 2024-09-03 RX ORDER — AMLODIPINE BESYLATE 5 MG/1
5 TABLET ORAL EVERY MORNING
Status: DISCONTINUED | OUTPATIENT
Start: 2024-09-04 | End: 2024-09-04 | Stop reason: HOSPADM

## 2024-09-03 RX ORDER — LORAZEPAM 2 MG/ML
2 INJECTION INTRAMUSCULAR
Status: DISCONTINUED | OUTPATIENT
Start: 2024-09-03 | End: 2024-09-04 | Stop reason: HOSPADM

## 2024-09-03 RX ORDER — BISACODYL 5 MG/1
5 TABLET, DELAYED RELEASE ORAL DAILY PRN
Status: DISCONTINUED | OUTPATIENT
Start: 2024-09-03 | End: 2024-09-04 | Stop reason: HOSPADM

## 2024-09-03 RX ORDER — LORAZEPAM 1 MG/1
1 TABLET ORAL EVERY 12 HOURS SCHEDULED
Status: DISCONTINUED | OUTPATIENT
Start: 2024-09-05 | End: 2024-09-04 | Stop reason: HOSPADM

## 2024-09-03 RX ORDER — LORAZEPAM 1 MG/1
2 TABLET ORAL EVERY 6 HOURS
Status: DISCONTINUED | OUTPATIENT
Start: 2024-09-03 | End: 2024-09-04 | Stop reason: HOSPADM

## 2024-09-03 RX ORDER — CLONIDINE HYDROCHLORIDE 0.1 MG/1
0.1 TABLET ORAL EVERY 4 HOURS PRN
Status: DISCONTINUED | OUTPATIENT
Start: 2024-09-03 | End: 2024-09-04 | Stop reason: HOSPADM

## 2024-09-03 RX ORDER — LORAZEPAM 1 MG/1
1 TABLET ORAL
Status: DISCONTINUED | OUTPATIENT
Start: 2024-09-03 | End: 2024-09-04 | Stop reason: HOSPADM

## 2024-09-03 RX ORDER — MAGNESIUM SULFATE HEPTAHYDRATE 40 MG/ML
2 INJECTION, SOLUTION INTRAVENOUS ONCE
Status: COMPLETED | OUTPATIENT
Start: 2024-09-03 | End: 2024-09-03

## 2024-09-03 RX ORDER — POLYETHYLENE GLYCOL 3350 17 G/17G
17 POWDER, FOR SOLUTION ORAL DAILY PRN
Status: DISCONTINUED | OUTPATIENT
Start: 2024-09-03 | End: 2024-09-04 | Stop reason: HOSPADM

## 2024-09-03 RX ORDER — LISINOPRIL 10 MG/1
10 TABLET ORAL DAILY
Status: DISCONTINUED | OUTPATIENT
Start: 2024-09-04 | End: 2024-09-04 | Stop reason: HOSPADM

## 2024-09-03 RX ORDER — PROCHLORPERAZINE EDISYLATE 5 MG/ML
5 INJECTION INTRAMUSCULAR; INTRAVENOUS ONCE
Status: COMPLETED | OUTPATIENT
Start: 2024-09-03 | End: 2024-09-03

## 2024-09-03 RX ORDER — DIPHENHYDRAMINE HYDROCHLORIDE 50 MG/ML
12.5 INJECTION INTRAMUSCULAR; INTRAVENOUS ONCE
Status: COMPLETED | OUTPATIENT
Start: 2024-09-03 | End: 2024-09-03

## 2024-09-03 RX ORDER — MIDAZOLAM HYDROCHLORIDE 1 MG/ML
1 INJECTION INTRAMUSCULAR; INTRAVENOUS ONCE
Status: COMPLETED | OUTPATIENT
Start: 2024-09-03 | End: 2024-09-03

## 2024-09-03 RX ORDER — ONDANSETRON 2 MG/ML
4 INJECTION INTRAMUSCULAR; INTRAVENOUS EVERY 6 HOURS PRN
Status: DISCONTINUED | OUTPATIENT
Start: 2024-09-03 | End: 2024-09-04 | Stop reason: HOSPADM

## 2024-09-03 RX ORDER — METRONIDAZOLE 500 MG/100ML
500 INJECTION, SOLUTION INTRAVENOUS ONCE
Status: DISCONTINUED | OUTPATIENT
Start: 2024-09-03 | End: 2024-09-03

## 2024-09-03 RX ORDER — BISACODYL 10 MG
10 SUPPOSITORY, RECTAL RECTAL DAILY PRN
Status: DISCONTINUED | OUTPATIENT
Start: 2024-09-03 | End: 2024-09-04 | Stop reason: HOSPADM

## 2024-09-03 RX ORDER — SODIUM CHLORIDE 0.9 % (FLUSH) 0.9 %
10 SYRINGE (ML) INJECTION AS NEEDED
Status: DISCONTINUED | OUTPATIENT
Start: 2024-09-03 | End: 2024-09-04 | Stop reason: HOSPADM

## 2024-09-03 RX ORDER — LORAZEPAM 1 MG/1
2 TABLET ORAL
Status: DISCONTINUED | OUTPATIENT
Start: 2024-09-03 | End: 2024-09-04 | Stop reason: HOSPADM

## 2024-09-03 RX ORDER — IOPAMIDOL 612 MG/ML
100 INJECTION, SOLUTION INTRAVASCULAR
Status: COMPLETED | OUTPATIENT
Start: 2024-09-03 | End: 2024-09-03

## 2024-09-03 RX ORDER — THIAMINE HYDROCHLORIDE 100 MG/ML
200 INJECTION, SOLUTION INTRAMUSCULAR; INTRAVENOUS ONCE
Status: COMPLETED | OUTPATIENT
Start: 2024-09-03 | End: 2024-09-03

## 2024-09-03 RX ORDER — SODIUM CHLORIDE 9 MG/ML
1000 INJECTION, SOLUTION INTRAVENOUS ONCE
Status: COMPLETED | OUTPATIENT
Start: 2024-09-03 | End: 2024-09-03

## 2024-09-03 RX ORDER — AMOXICILLIN 250 MG
2 CAPSULE ORAL 2 TIMES DAILY PRN
Status: DISCONTINUED | OUTPATIENT
Start: 2024-09-03 | End: 2024-09-04 | Stop reason: HOSPADM

## 2024-09-03 RX ORDER — SODIUM CHLORIDE 9 MG/ML
75 INJECTION, SOLUTION INTRAVENOUS CONTINUOUS
Status: DISCONTINUED | OUTPATIENT
Start: 2024-09-03 | End: 2024-09-04 | Stop reason: HOSPADM

## 2024-09-03 RX ORDER — THIAMINE HYDROCHLORIDE 100 MG/ML
200 INJECTION, SOLUTION INTRAMUSCULAR; INTRAVENOUS EVERY 8 HOURS SCHEDULED
Status: DISCONTINUED | OUTPATIENT
Start: 2024-09-03 | End: 2024-09-04 | Stop reason: HOSPADM

## 2024-09-03 RX ORDER — ACETAMINOPHEN 325 MG/1
650 TABLET ORAL EVERY 4 HOURS PRN
Status: DISCONTINUED | OUTPATIENT
Start: 2024-09-03 | End: 2024-09-04 | Stop reason: HOSPADM

## 2024-09-03 RX ORDER — ACETAMINOPHEN 160 MG/5ML
650 SOLUTION ORAL EVERY 4 HOURS PRN
Status: DISCONTINUED | OUTPATIENT
Start: 2024-09-03 | End: 2024-09-04 | Stop reason: HOSPADM

## 2024-09-03 RX ORDER — ACETAMINOPHEN 650 MG/1
650 SUPPOSITORY RECTAL EVERY 4 HOURS PRN
Status: DISCONTINUED | OUTPATIENT
Start: 2024-09-03 | End: 2024-09-04 | Stop reason: HOSPADM

## 2024-09-03 RX ORDER — ONDANSETRON 4 MG/1
4 TABLET, ORALLY DISINTEGRATING ORAL EVERY 6 HOURS PRN
Status: DISCONTINUED | OUTPATIENT
Start: 2024-09-03 | End: 2024-09-04 | Stop reason: HOSPADM

## 2024-09-03 RX ORDER — FLUTICASONE PROPIONATE 50 MCG
2 SPRAY, SUSPENSION (ML) NASAL DAILY
Status: DISCONTINUED | OUTPATIENT
Start: 2024-09-04 | End: 2024-09-04 | Stop reason: HOSPADM

## 2024-09-03 RX ORDER — LEVOTHYROXINE SODIUM 100 UG/1
100 TABLET ORAL DAILY
Status: DISCONTINUED | OUTPATIENT
Start: 2024-09-04 | End: 2024-09-04 | Stop reason: HOSPADM

## 2024-09-03 RX ORDER — LORAZEPAM 1 MG/1
1 TABLET ORAL DAILY
Status: DISCONTINUED | OUTPATIENT
Start: 2024-09-07 | End: 2024-09-04 | Stop reason: HOSPADM

## 2024-09-03 RX ORDER — MULTIPLE VITAMINS W/ MINERALS TAB 9MG-400MCG
1 TAB ORAL DAILY
Status: DISCONTINUED | OUTPATIENT
Start: 2024-09-04 | End: 2024-09-04 | Stop reason: HOSPADM

## 2024-09-03 RX ORDER — LORAZEPAM 1 MG/1
1 TABLET ORAL EVERY 6 HOURS
Status: DISCONTINUED | OUTPATIENT
Start: 2024-09-04 | End: 2024-09-04 | Stop reason: HOSPADM

## 2024-09-03 RX ADMIN — MIDAZOLAM 1 MG: 1 INJECTION INTRAMUSCULAR; INTRAVENOUS at 14:02

## 2024-09-03 RX ADMIN — MAGNESIUM SULFATE HEPTAHYDRATE 2 G: 40 INJECTION, SOLUTION INTRAVENOUS at 14:06

## 2024-09-03 RX ADMIN — DIPHENHYDRAMINE HYDROCHLORIDE 12.5 MG: 50 INJECTION, SOLUTION INTRAMUSCULAR; INTRAVENOUS at 12:49

## 2024-09-03 RX ADMIN — PROCHLORPERAZINE EDISYLATE 5 MG: 5 INJECTION INTRAMUSCULAR; INTRAVENOUS at 12:48

## 2024-09-03 RX ADMIN — SODIUM CHLORIDE 1000 ML: 9 INJECTION, SOLUTION INTRAVENOUS at 12:48

## 2024-09-03 RX ADMIN — Medication 2.5 MG: at 21:17

## 2024-09-03 RX ADMIN — THIAMINE HYDROCHLORIDE 200 MG: 100 INJECTION, SOLUTION INTRAMUSCULAR; INTRAVENOUS at 15:48

## 2024-09-03 RX ADMIN — LORAZEPAM 2 MG: 1 TABLET ORAL at 21:16

## 2024-09-03 RX ADMIN — FOLIC ACID 1 MG: 5 INJECTION, SOLUTION INTRAMUSCULAR; INTRAVENOUS; SUBCUTANEOUS at 15:48

## 2024-09-03 RX ADMIN — CEFTRIAXONE 2000 MG: 2 INJECTION, POWDER, FOR SOLUTION INTRAMUSCULAR; INTRAVENOUS at 16:27

## 2024-09-03 RX ADMIN — IOPAMIDOL 85 ML: 612 INJECTION, SOLUTION INTRAVENOUS at 15:00

## 2024-09-03 RX ADMIN — SODIUM CHLORIDE 1000 ML: 9 INJECTION, SOLUTION INTRAVENOUS at 14:03

## 2024-09-03 RX ADMIN — THIAMINE HYDROCHLORIDE 200 MG: 100 INJECTION, SOLUTION INTRAMUSCULAR; INTRAVENOUS at 23:36

## 2024-09-03 RX ADMIN — SODIUM CHLORIDE 75 ML/HR: 9 INJECTION, SOLUTION INTRAVENOUS at 18:08

## 2024-09-03 NOTE — ED PROVIDER NOTES
" EMERGENCY DEPARTMENT ENCOUNTER    Room Number:  N539/1  PCP: Provider, No Known  Independent Historians: Patient and EMS    HPI:  Chief Complaint: had concerns including Headache, Vomiting, Nausea, and Diarrhea.      A complete HPI/ROS/PMH/PSH/SH/FH are unobtainable due to: None    Chronic or social conditions impacting patient care (Social Determinants of Health): None      Context: Pro Mueller is a 69 y.o. male with a medical history of hypertension, hypothyroidism, and history of alcohol abuse who presents to the ED c/o acute headache, nausea, diarrhea that started overnight.  Patient says he felt well yesterday and day before.  Patient denies any vomiting but has \"gotten close to it\".  Patient denies any focal abdominal pain.  Patient's headache is global with no vision changes, no weakness, no numbness.  Patient denies any falls.        Review of prior external notes (non-ED) -and- Review of prior external test results outside of this encounter: Pt went to [facility] er 6/1/24 with ble edmea. Pt had us with no dvt.  Pt restarted on his bp meds.    Prescription drug monitoring program review:     N/A    PAST MEDICAL HISTORY  Active Ambulatory Problems     Diagnosis Date Noted    Fall 08/08/2016    Alcoholism in recovery 08/08/2016    Atopic rhinitis 08/08/2016    Mixed anxiety depressive disorder 08/08/2016    Genital herpes simplex 08/08/2016    Hyperlipidemia 08/08/2016    Hypertension 08/08/2016    Hypothyroidism 08/08/2016    Insomnia 08/08/2016    Panic disorder without agoraphobia 08/08/2016    Persistent insomnia 08/08/2016    Vitamin D deficiency 08/08/2016    Chronic low back pain 11/11/2016    Neuropathy involving both lower extremities 10/25/2018    QT prolongation 01/03/2019    Left shoulder pain 11/19/2019    Nausea and vomiting 01/11/2020    Pancytopenia 01/14/2020    Left ventricular diastolic dysfunction 01/16/2021    Tremor 10/06/2021    C5 cervical fracture 11/09/2021    Syncope and collapse " 01/07/2022    Neck pain 01/07/2022    Alcoholic intoxication with complication 03/13/2022    Withdrawal symptoms, alcohol 07/01/2022    Transaminitis 07/01/2022    Lumbar facet arthropathy 07/20/2022    Alcohol dependence with withdrawal 09/11/2022    Midline low back pain with right-sided sciatica 09/15/2022    Spondylolisthesis at L4-L5 level 09/15/2022    Lumbar canal stenosis 09/15/2022    Alcohol cessation counseling 09/15/2022    Need for assistance due to reduced mobility 09/15/2022    Impaired mobility and ADLs 09/15/2022    Alcohol withdrawal syndrome without complication 02/18/2023    Facial laceration 02/18/2023    Orthostatic hypotension 02/18/2023    Acute bacterial conjunctivitis of right eye 03/02/2023    Visit for suture removal 03/02/2023    Renal calculi 03/14/2023    Hepatic steatosis 03/15/2023    Alcohol withdrawal syndrome with complication 04/14/2023    Macrocytosis without anemia 04/14/2023    Lumbosacral spondylosis without myelopathy 05/05/2023    Elevated LFTs 05/13/2023    Alcohol-induced acute pancreatitis, unspecified complication status 06/23/2023    Pancreatitis 03/06/2024    Generalized abdominal pain 03/07/2024    Alcohol withdrawal 03/07/2024     Resolved Ambulatory Problems     Diagnosis Date Noted    Impacted cerumen 08/08/2016    Influenza 08/08/2016    Motion sickness 08/08/2016    Seasonal allergic rhinitis 08/08/2016    Upper respiratory tract infection 08/08/2016    Alcohol withdrawal 11/04/2016    Headache 11/05/2016    Gastritis 11/05/2016    Olecranon bursitis of right elbow 04/05/2017    Sciatica of left side 06/12/2018    Syncope and collapse 01/02/2019    Alcoholic ketoacidosis 01/12/2020    Hyponatremia 01/13/2020    Hypokalemia 01/13/2020    Alcohol dependence with uncomplicated withdrawal 01/14/2020    Nephrolithiasis 02/12/2020    Hypomagnesemia 01/16/2021    Non-traumatic rhabdomyolysis 01/18/2021    Urine retention 01/21/2021    Epigastric pain 06/23/2023     "Dehydration 2024     Past Medical History:   Diagnosis Date    Alcohol abuse     Allergic 1970    Anxiety     Arthritis     Depression     Disease of thyroid gland     Elevated cholesterol     Encounter for removal of sutures     GERD (gastroesophageal reflux disease)     Headache, tension-type     Kidney stone     Low back pain     Migraine     Olecranon bursitis, right elbow     Peripheral neuropathy     Sleep apnea          PAST SURGICAL HISTORY  Past Surgical History:   Procedure Laterality Date    COLONOSCOPY      CYST REMOVAL      CYSTOSCOPY BLADDER STONE LITHOTRIPSY  2022    EXTRACORPOREAL SHOCK WAVE LITHOTRIPSY (ESWL) Right 2002    SHOULDER ARTHROSCOPY Right 2019    Procedure: SHOULDER ARTHROSCOPY, decompression, distal clavicle excision;  Surgeon: Aj Mancilla MD;  Location: [facility] OR [facility] [place];  Service: Orthopedics    TONSILLECTOMY           FAMILY HISTORY  Family History   Problem Relation Age of Onset    Alzheimer's disease Mother     Mental illness Mother     Pancreatic cancer Father     Hearing loss Father     Arthritis Maternal Grandmother     Malig Hyperthermia Neg Hx          SOCIAL HISTORY  Social History     Socioeconomic History    Marital status: Single   Tobacco Use    Smoking status: Former     Current packs/day: 0.00     Average packs/day: 0.5 packs/day for 15.0 years (7.5 ttl pk-yrs)     Types: Cigarettes     Start date: 1990     Quit date: 2005     Years since quittin.6    Smokeless tobacco: Never    Tobacco comments:     caffeine - 3 cans of coke daily    Vaping Use    Vaping status: Never Used   Substance and Sexual Activity    Alcohol use: Yes     Alcohol/week: 7.0 standard drinks of alcohol     Types: 7 Shots of liquor per week     Comment: .5 liter vodka    Drug use: Yes     Types: Methamphetamines     Comment: \"once in a rare while\"    Sexual activity: Yes     Partners: Female     Birth control/protection: Condom "         ALLERGIES  Penicillins        REVIEW OF SYSTEMS  Review of Systems  Included in HPI  All systems reviewed and negative except for those discussed in HPI.      PHYSICAL EXAM    I have reviewed the triage vital signs and nursing notes.    ED Triage Vitals [09/03/24 1140]   Temp Heart Rate Resp BP SpO2   98.3 °F (36.8 °C) 71 18 176/96 95 %      Temp src Heart Rate Source Patient Position BP Location FiO2 (%)   -- -- -- -- --       Physical Exam  GENERAL: cooperative and conversant but ill appearing tremulous M, alert  SKIN: Warm, diaphoretic  HENT: Normocephalic, atraumatic  EYES: no scleral icterus  CV: regular rhythm, accelerated rate  RESPIRATORY: normal effort, lungs clear  ABDOMEN: soft, diffuse upper ab ttp, no rebound, no guarding, nondistended  MUSCULOSKELETAL: no deformity, ble nonpitting edema  NEURO: alert, moves all extremities, follows commands                                                                LAB RESULTS  Recent Results (from the past 24 hour(s))   Basic Metabolic Panel    Collection Time: 09/04/24  5:39 AM    Specimen: Blood   Result Value Ref Range    Glucose 89 65 - 99 mg/dL    BUN 4 (L) 8 - 23 mg/dL    Creatinine 0.52 (L) 0.76 - 1.27 mg/dL    Sodium 140 136 - 145 mmol/L    Potassium 3.8 3.5 - 5.2 mmol/L    Chloride 107 98 - 107 mmol/L    CO2 22.7 22.0 - 29.0 mmol/L    Calcium 7.0 (L) 8.6 - 10.5 mg/dL    BUN/Creatinine Ratio 7.7 7.0 - 25.0    Anion Gap 10.3 5.0 - 15.0 mmol/L    eGFR 110.5 >60.0 mL/min/1.73   CBC (No Diff)    Collection Time: 09/04/24  5:39 AM    Specimen: Blood   Result Value Ref Range    WBC 4.64 3.40 - 10.80 10*3/mm3    RBC 3.39 (L) 4.14 - 5.80 10*6/mm3    Hemoglobin 11.4 (L) 13.0 - 17.7 g/dL    Hematocrit 33.5 (L) 37.5 - 51.0 %    MCV 98.8 (H) 79.0 - 97.0 fL    MCH 33.6 (H) 26.6 - 33.0 pg    MCHC 34.0 31.5 - 35.7 g/dL    RDW 16.6 (H) 12.3 - 15.4 %    RDW-SD 59.3 (H) 37.0 - 54.0 fl    MPV 9.2 6.0 - 12.0 fL    Platelets 170 140 - 450 10*3/mm3   STAT Lactic Acid,  Reflex    Collection Time: 09/04/24  5:39 AM    Specimen: Blood   Result Value Ref Range    Lactate 1.4 0.5 - 2.0 mmol/L   Hepatic Function Panel    Collection Time: 09/04/24  5:39 AM    Specimen: Blood   Result Value Ref Range    Total Protein 5.1 (L) 6.0 - 8.5 g/dL    Albumin 3.2 (L) 3.5 - 5.2 g/dL    ALT (SGPT) 18 1 - 41 U/L    AST (SGOT) 67 (H) 1 - 40 U/L    Alkaline Phosphatase 83 39 - 117 U/L    Total Bilirubin 0.6 0.0 - 1.2 mg/dL    Bilirubin, Direct 0.2 0.0 - 0.3 mg/dL    Bilirubin, Indirect 0.4 mg/dL   Magnesium    Collection Time: 09/04/24  5:39 AM    Specimen: Blood   Result Value Ref Range    Magnesium 1.6 1.6 - 2.4 mg/dL   Albumin    Collection Time: 09/04/24  5:39 AM    Specimen: Blood   Result Value Ref Range    Albumin 3.3 (L) 3.5 - 5.2 g/dL         RADIOLOGY  No Radiology Exams Resulted Within Past 24 Hours      MEDICATIONS GIVEN IN ER  Medications   sodium chloride 0.9 % bolus 1,000 mL (0 mL Intravenous Stopped 9/3/24 1318)   prochlorperazine (COMPAZINE) injection 5 mg (5 mg Intravenous Given 9/3/24 1248)   diphenhydrAMINE (BENADRYL) injection 12.5 mg (12.5 mg Intravenous Given 9/3/24 1249)   sodium chloride 0.9 % infusion 1,000 mL (0 mL Intravenous Stopped 9/3/24 1545)   folic acid 1 mg in sodium chloride 0.9 % 50 mL IVPB (0 mg Intravenous Stopped 9/3/24 1618)   thiamine (B-1) injection 200 mg (200 mg Intravenous Given 9/3/24 1548)   magnesium sulfate 2g/50 mL (PREMIX) infusion (0 g Intravenous Stopped 9/3/24 1506)   midazolam (VERSED) injection 1 mg (1 mg Intravenous Given 9/3/24 1402)   iopamidol (ISOVUE-300) 61 % injection 100 mL (85 mL Intravenous Given 9/3/24 1500)   cefTRIAXone (ROCEPHIN) 2,000 mg in sodium chloride 0.9 % 100 mL MBP (0 mg Intravenous Stopped 9/3/24 3231)         ORDERS PLACED DURING THIS VISIT:  Orders Placed This Encounter   Procedures    COVID-19 and FLU A/B PCR, 1 HR TAT - Swab, Nasopharynx    Blood Culture - Blood,    Blood Culture - Blood,    CT Abdomen Pelvis With  Contrast    US Gallbladder    Comprehensive Metabolic Panel    Protime-INR    aPTT    Lipase    Urinalysis With Microscopic If Indicated (No Culture) - Urine, Clean Catch    BNP    Lactic Acid, Plasma    Ethanol    Urine Drug Screen - Urine, Clean Catch    Magnesium    High Sensitivity Troponin T    CBC Auto Differential    High Sensitivity Troponin T 2Hr    STAT Lactic Acid, Reflex    Urinalysis, Microscopic Only - Urine, Clean Catch    STAT Lactic Acid, Reflex    Basic Metabolic Panel    CBC (No Diff)    STAT Lactic Acid, Reflex    STAT Lactic Acid, Reflex    Hepatic Function Panel    Magnesium    Albumin    Obtain pre and post sedation scores with every Lorazepam dose    Discharge Follow-up with PCP    Discharge Follow-up with Specified Provider: Psychiatry; 2 Weeks    Activity as Tolerated    Diet: Regular/House Diet, Gastrointestinal Diets; Fiber-Restricted, Low Irritant; Soft to Chew (NDD 3); Whole Meat; Thin (IDDSI 0)    Inpatient Case Management  Consult    Inpatient consult to [facility]    Inpatient Psychiatrist Consult    ECG 12 Lead Other; epigastric pain    Telemetry Scan    Initiate Observation Status    Discharge patient    CBC & Differential         OUTPATIENT MEDICATION MANAGEMENT:  No current Epic-ordered facility-administered medications on file.     Current Outpatient Medications Ordered in Epic   Medication Sig Dispense Refill    amLODIPine (NORVASC) 5 MG tablet Take 1 tablet by mouth Every Morning. 90 tablet 1    fluticasone (FLONASE) 50 MCG/ACT nasal spray 2 sprays into the nostril(s) as directed by provider Daily. 11.1 mL 0    levothyroxine (SYNTHROID, LEVOTHROID) 100 MCG tablet Take 1 tablet by mouth Daily. 90 tablet 1    lisinopril (PRINIVIL,ZESTRIL) 10 MG tablet Take 1 tablet by mouth Daily. 90 tablet 1         PROCEDURES  Procedures            PROGRESS, DATA ANALYSIS, CONSULTS, AND MEDICAL DECISION MAKING  All labs have been independently interpreted by me.  All  radiology studies have been reviewed by me. All EKG's have been independently viewed and interpreted by me.  Discussion below represents my analysis of pertinent findings related to patient's condition, differential diagnosis, treatment plan and final disposition.    Differential diagnosis includes but is not limited to   Pt presenting with ha, nausea and diarrhea in setting of alcohol abuse and apparent withdrawal.  Plan ivf resuscitation and iv antiemetics.  Will sepsis screen, check tox and etoh, assess for lft abnormalities or any signs of pancreatitis, among other possibilities.          ED Course as of 09/04/24 2247   Tue Sep 03, 2024   1355 EKG ER MD interpretation   Time: 13: 36  Rhythm and rate: Normal sinus rhythm at a rate of 67  Axis: Normal  P waves: Normal  QRS complexes: Normal  ST segments: no elevation nor depressions  T waves: Isolated inversion in V1, flattening in V2 and lead III  Q waves in precordial leads and lead III and aVF  [facility] EKG is from May 11, 2024 and had similar findings [AR]   7291 I discussed patient's case with Dr. Guevara is amenable to admitting the patient for further care. [AR]      ED Course User Index  [AR] Janet Reeder MD       CT A/p with:  IMPRESSION:     1. No specific CT evidence of pancreatitis or its complications.  2. Cholelithiasis with mild gallbladder wall thickening and mild common  duct dilatation with a filling defect in the distal common duct that  could represent choledocholithiasis., Correlating with laboratory  values. Could consider further evaluation with ultrasound if clinically  indicated.  3. Heterogeneous hepatic attenuation, which could reflect underlying  hepatocellular disease.  4. Urinary bladder wall thickening could be accentuated by  underdistention but could also reflect sequela of chronic bladder outlet  obstruction or cystitis. Recommend correlating with laboratory values.  5. Colonic diverticula, without CT evidence of  diverticulitis.  I communicated with Dr. Guevara that GB ultrasound had been ordered.      AS OF 22:47 EDT VITALS:    BP - 148/89  HR - 70  TEMP - 98.2 °F (36.8 °C) (Oral)  O2 SATS - 98%      COMPLEXITY OF CARE  The patient requires admission.    DIAGNOSIS  Final diagnoses:   Hypomagnesemia   Alcohol abuse   Nausea   Nonintractable headache, unspecified chronicity pattern, unspecified headache type   Cholelithiasis (rule out choledocholithiasis)      DISPOSITION  ED Disposition       ED Disposition   Decision to Admit    Condition   --    Comment   Level of Care: Telemetry [5]   Diagnosis: Dehydration [276.51.ICD-9-CM]   Admitting Physician: KISHA GUEVARA [[id number]]   Attending Physician: KISHA GUEVARA [[id number]]   Bed Request Comments: not 5 [address]                  Please note that portions of this document were completed with a voice recognition program.    Note Disclaimer: At [facility], we believe that sharing information builds trust and better relationships. You are receiving this note because you recently visited [facility]. It is possible you will see health information before a provider has talked with you about it. This kind of information can be easy to misunderstand. To help you fully understand what it means for your health, we urge you to discuss this note with your provider.         Janet Reeder MD  09/04/24 7876

## 2024-09-03 NOTE — ED NOTES
"Nursing report ED to floor  Pro Mueller  69 y.o.  male    HPI :  HPI (Adult)  Stated Reason for Visit: ha  History Obtained From: EMS    Chief Complaint  Chief Complaint   Patient presents with    Headache    Vomiting    Nausea    Diarrhea     Pro Mueller is a 69 y.o. male with a medical history of hypertension, hypothyroidism, and history of alcohol abuse who presents to the ED c/o acute headache, nausea, diarrhea that started overnight.  Patient says he felt well yesterday and day before.  Patient denies any vomiting but has \"gotten close to it\".  Patient denies any focal abdominal pain.  Patient's headache is global with no vision changes, no weakness, no numbness.  Patient denies any falls.     Admitting doctor:   Juanita Guevara MD    Admitting diagnosis:   The primary encounter diagnosis was Hypomagnesemia. Diagnoses of Alcohol abuse, Nausea, and Nonintractable headache, unspecified chronicity pattern, unspecified headache type were also pertinent to this visit.    Code status:   Current Code Status       Date Active Code Status Order ID Comments User Context       Prior            Allergies:   Penicillins    Isolation:   No active isolations    Intake and Output    Intake/Output Summary (Last 24 hours) at 9/3/2024 1552  Last data filed at 9/3/2024 1545  Gross per 24 hour   Intake 2050 ml   Output --   Net 2050 ml       Weight:       09/03/24  1140   Weight: 81.6 kg (180 lb)       Most recent vitals:   Vitals:    09/03/24 1313 09/03/24 1430 09/03/24 1431 09/03/24 1531   BP: 159/91  146/88 144/80   Pulse: 66 66 65 68   Resp:   19    Temp:       SpO2: 92% 95% 95% 93%   Weight:       Height:           Active LDAs/IV Access:   Lines, Drains & Airways       Active LDAs       Name Placement date Placement time Site Days    Peripheral IV 09/03/24 1142 Left Antecubital 09/03/24  1142  Antecubital  less than 1                    Labs (abnormal labs have a star):   Labs Reviewed   COMPREHENSIVE METABOLIC PANEL " - Abnormal; Notable for the following components:       Result Value    BUN 7 (*)     Creatinine 0.68 (*)     Calcium 8.0 (*)     AST (SGOT) 90 (*)     All other components within normal limits    Narrative:     GFR Normal >60  Chronic Kidney Disease <60  Kidney Failure <15     PROTIME-INR - Abnormal; Notable for the following components:    Protime 14.6 (*)     INR 1.12 (*)     All other components within normal limits   LIPASE - Abnormal; Notable for the following components:    Lipase 81 (*)     All other components within normal limits   URINALYSIS W/ MICROSCOPIC IF INDICATED (NO CULTURE) - Abnormal; Notable for the following components:    Leuk Esterase, UA Trace (*)     All other components within normal limits   LACTIC ACID, PLASMA - Abnormal; Notable for the following components:    Lactate 2.8 (*)     All other components within normal limits   MAGNESIUM - Abnormal; Notable for the following components:    Magnesium 1.2 (*)     All other components within normal limits   CBC WITH AUTO DIFFERENTIAL - Abnormal; Notable for the following components:    RBC 3.70 (*)     Hemoglobin 12.4 (*)     Hematocrit 36.5 (*)     MCV 98.6 (*)     MCH 33.5 (*)     RDW 16.7 (*)     RDW-SD 60.0 (*)     Lymphocyte % 18.5 (*)     All other components within normal limits   LACTIC ACID, REFLEX - Abnormal; Notable for the following components:    Lactate 4.1 (*)     All other components within normal limits   URINALYSIS, MICROSCOPIC ONLY - Abnormal; Notable for the following components:    WBC, UA 3-5 (*)     All other components within normal limits   COVID-19 AND FLU A/B, NP SWAB IN TRANSPORT MEDIA 1 HR TAT - Normal    Narrative:     Fact sheet for providers: https://www.fda.gov/media/974775/download    Fact sheet for patients: https://www.fda.gov/media/580294/download    Test performed by PCR.   APTT - Normal   BNP (IN-HOUSE) - Normal    Narrative:     This assay is used as an aid in the diagnosis of individuals suspected of  having heart failure. It can be used as an aid in the diagnosis of acute decompensated heart failure (ADHF) in patients presenting with signs and symptoms of ADHF to the emergency department (ED). In addition, NT-proBNP of <300 pg/mL indicates ADHF is not likely.    Age Range Result Interpretation  NT-proBNP Concentration (pg/mL:      <50             Positive            >450                   Gray                 300-450                    Negative             <300    50-75           Positive            >900                  Gray                300-900                  Negative            <300      >75             Positive            >1800                  Gray                300-1800                  Negative            <300   URINE DRUG SCREEN - Normal    Narrative:     Negative Thresholds Per Drugs Screened:    Amphetamines                 500 ng/ml  Barbiturates                 200 ng/ml  Benzodiazepines              100 ng/ml  Cocaine                      300 ng/ml  Methadone                    300 ng/ml  Opiates                      300 ng/ml  Oxycodone                    100 ng/ml  THC                           50 ng/ml  Fentanyl                       5 ng/ml      The Normal Value for all drugs tested is negative. This report includes final unconfirmed screening results to be used for medical treatment purposes only. Unconfirmed results must not be used for non-medical purposes such as employment or legal testing. Clinical consideration should be applied to any drug of abuse test, particularly when unconfirmed results are used.           TROPONIN - Normal    Narrative:     High Sensitive Troponin T Reference Range:  <14.0 ng/L- Negative Female for AMI  <22.0 ng/L- Negative Male for AMI  >=14 - Abnormal Female indicating possible myocardial injury.  >=22 - Abnormal Male indicating possible myocardial injury.   Clinicians would have to utilize clinical acumen, EKG, Troponin, and serial changes to determine if  it is an Acute Myocardial Infarction or myocardial injury due to an underlying chronic condition.        HIGH SENSITIVITIY TROPONIN T 2HR - Normal    Narrative:     High Sensitive Troponin T Reference Range:  <14.0 ng/L- Negative Female for AMI  <22.0 ng/L- Negative Male for AMI  >=14 - Abnormal Female indicating possible myocardial injury.  >=22 - Abnormal Male indicating possible myocardial injury.   Clinicians would have to utilize clinical acumen, EKG, Troponin, and serial changes to determine if it is an Acute Myocardial Infarction or myocardial injury due to an underlying chronic condition.        ETHANOL   LACTIC ACID, REFLEX   CBC AND DIFFERENTIAL    Narrative:     The following orders were created for panel order CBC & Differential.  Procedure                               Abnormality         Status                     ---------                               -----------         ------                     CBC Auto Differential[[id number]]        Abnormal            Final result                 Please view results for these tests on the individual orders.       EKG:   ECG 12 Lead Other; epigastric pain   Preliminary Result   HEART RATE=67  bpm   RR Aovhdozr=803  ms   MS Ryhxcugb=007  ms   P Horizontal Axis=1  deg   P Front Axis=29  deg   QRSD Interval=94  ms   QT Qedrenpi=741  ms   DIcU=329  ms   QRS Axis=-57  deg   T Wave Axis=39  deg   - ABNORMAL ECG -   Sinus rhythm   Inferior infarct, old   Anterior infarct, old   Date and Time of Study:2024-09-03 13:36:53          Meds given in ED:   Medications   sodium chloride 0.9 % flush 10 mL (has no administration in time range)   folic acid 1 mg in sodium chloride 0.9 % 50 mL IVPB (1 mg Intravenous New Bag 9/3/24 1548)   sodium chloride 0.9 % bolus 1,000 mL (0 mL Intravenous Stopped 9/3/24 1318)   prochlorperazine (COMPAZINE) injection 5 mg (5 mg Intravenous Given 9/3/24 1248)   diphenhydrAMINE (BENADRYL) injection 12.5 mg (12.5 mg Intravenous Given 9/3/24 1249)    sodium chloride 0.9 % infusion 1,000 mL (0 mL Intravenous Stopped 9/3/24 1545)   thiamine (B-1) injection 200 mg (200 mg Intravenous Given 9/3/24 1548)   magnesium sulfate 2g/50 mL (PREMIX) infusion (0 g Intravenous Stopped 9/3/24 1506)   midazolam (VERSED) injection 1 mg (1 mg Intravenous Given 9/3/24 1402)   iopamidol (ISOVUE-300) 61 % injection 100 mL (85 mL Intravenous Given 9/3/24 1500)       Imaging results:  CT Abdomen Pelvis With Contrast    Result Date: 9/3/2024   1. No specific CT evidence of pancreatitis or its complications. 2. Cholelithiasis with mild gallbladder wall thickening and mild common duct dilatation with a filling defect in the distal common duct that could represent choledocholithiasis., Correlating with laboratory values. Could consider further evaluation with ultrasound if clinically indicated. 3. Heterogeneous hepatic attenuation, which could reflect underlying hepatocellular disease. 4. Urinary bladder wall thickening could be accentuated by underdistention but could also reflect sequela of chronic bladder outlet obstruction or cystitis. Recommend correlating with laboratory values. 5. Colonic diverticula, without CT evidence of diverticulitis.  This report was finalized on 9/3/2024 3:44 PM by Evangelist Ruffin MD on Workstation: [id number]       Ambulatory status:   - ax2    Social issues:   Social History     Socioeconomic History    Marital status: Single   Tobacco Use    Smoking status: Former     Current packs/day: 0.00     Average packs/day: 0.5 packs/day for 15.0 years (7.5 ttl pk-yrs)     Types: Cigarettes     Start date: 1990     Quit date: 2005     Years since quittin.6    Smokeless tobacco: Never    Tobacco comments:     caffeine - 3 cans of coke daily    Vaping Use    Vaping status: Never Used   Substance and Sexual Activity    Alcohol use: Yes     Alcohol/week: 7.0 standard drinks of alcohol     Types: 7 Shots of liquor per week     Comment: .5 liter vodka    Drug  "use: Yes     Types: Methamphetamines     Comment: \"once in a rare while\"    Sexual activity: Yes     Partners: Female     Birth control/protection: Condom       Peripheral Neurovascular  Peripheral Neurovascular (Adult)  Peripheral Neurovascular WDL: WDL    Neuro Cognitive  Neuro Cognitive (Adult)  Cognitive/Neuro/Behavioral WDL: WDL, level of consciousness, orientation, speech  Level of Consciousness: Alert  Orientation: oriented x 4  Speech: clear, spontaneous, logical    Learning  Learning Assessment (Adult)  Learning Readiness and Ability: no barriers identified    Respiratory  Respiratory WDL  Respiratory WDL: WDL, rhythm/pattern  Rhythm/Pattern, Respiratory: no shortness of breath reported, depth regular, pattern regular, unlabored    Abdominal Pain       Pain Assessments  Pain (Adult)  (0-10) Pain Rating: Rest: 7  Pain Location: head    NIH Stroke Scale       Karen Abdul RN  09/03/24 15:52 EDT     Call Karen #[phone] with any questions  "

## 2024-09-03 NOTE — ED NOTES
Nursing report ED to floor  Pro Mueller  69 y.o.  male    HPI :  HPI (Adult)  Stated Reason for Visit: ha  History Obtained From: EMS    Chief Complaint  Chief Complaint   Patient presents with    Headache    Vomiting    Nausea    Diarrhea       Admitting doctor:   Juanita Guevara MD    Admitting diagnosis:   The primary encounter diagnosis was Hypomagnesemia. Diagnoses of Alcohol abuse, Nausea, and Nonintractable headache, unspecified chronicity pattern, unspecified headache type were also pertinent to this visit.    Code status:   Current Code Status       Date Active Code Status Order ID Comments User Context       Prior            Allergies:   Penicillins    Isolation:   No active isolations    Intake and Output    Intake/Output Summary (Last 24 hours) at 9/3/2024 1552  Last data filed at 9/3/2024 1545  Gross per 24 hour   Intake 2050 ml   Output --   Net 2050 ml       Weight:       09/03/24  1140   Weight: 81.6 kg (180 lb)       Most recent vitals:   Vitals:    09/03/24 1313 09/03/24 1430 09/03/24 1431 09/03/24 1531   BP: 159/91  146/88 144/80   Pulse: 66 66 65 68   Resp:   19    Temp:       SpO2: 92% 95% 95% 93%   Weight:       Height:           Active LDAs/IV Access:   Lines, Drains & Airways       Active LDAs       Name Placement date Placement time Site Days    Peripheral IV 09/03/24 1142 Left Antecubital 09/03/24  1142  Antecubital  less than 1                    Labs (abnormal labs have a star):   Labs Reviewed   COMPREHENSIVE METABOLIC PANEL - Abnormal; Notable for the following components:       Result Value    BUN 7 (*)     Creatinine 0.68 (*)     Calcium 8.0 (*)     AST (SGOT) 90 (*)     All other components within normal limits    Narrative:     GFR Normal >60  Chronic Kidney Disease <60  Kidney Failure <15     PROTIME-INR - Abnormal; Notable for the following components:    Protime 14.6 (*)     INR 1.12 (*)     All other components within normal limits   LIPASE - Abnormal; Notable for the  following components:    Lipase 81 (*)     All other components within normal limits   URINALYSIS W/ MICROSCOPIC IF INDICATED (NO CULTURE) - Abnormal; Notable for the following components:    Leuk Esterase, UA Trace (*)     All other components within normal limits   LACTIC ACID, PLASMA - Abnormal; Notable for the following components:    Lactate 2.8 (*)     All other components within normal limits   MAGNESIUM - Abnormal; Notable for the following components:    Magnesium 1.2 (*)     All other components within normal limits   CBC WITH AUTO DIFFERENTIAL - Abnormal; Notable for the following components:    RBC 3.70 (*)     Hemoglobin 12.4 (*)     Hematocrit 36.5 (*)     MCV 98.6 (*)     MCH 33.5 (*)     RDW 16.7 (*)     RDW-SD 60.0 (*)     Lymphocyte % 18.5 (*)     All other components within normal limits   LACTIC ACID, REFLEX - Abnormal; Notable for the following components:    Lactate 4.1 (*)     All other components within normal limits   URINALYSIS, MICROSCOPIC ONLY - Abnormal; Notable for the following components:    WBC, UA 3-5 (*)     All other components within normal limits   COVID-19 AND FLU A/B, NP SWAB IN TRANSPORT MEDIA 1 HR TAT - Normal    Narrative:     Fact sheet for providers: https://www.fda.gov/media/752693/download    Fact sheet for patients: https://www.fda.gov/media/088736/download    Test performed by PCR.   APTT - Normal   BNP (IN-HOUSE) - Normal    Narrative:     This assay is used as an aid in the diagnosis of individuals suspected of having heart failure. It can be used as an aid in the diagnosis of acute decompensated heart failure (ADHF) in patients presenting with signs and symptoms of ADHF to the emergency department (ED). In addition, NT-proBNP of <300 pg/mL indicates ADHF is not likely.    Age Range Result Interpretation  NT-proBNP Concentration (pg/mL:      <50             Positive            >450                   Gray                 300-450                    Negative              <300    50-75           Positive            >900                  Gray                300-900                  Negative            <300      >75             Positive            >1800                  Gray                300-1800                  Negative            <300   URINE DRUG SCREEN - Normal    Narrative:     Negative Thresholds Per Drugs Screened:    Amphetamines                 500 ng/ml  Barbiturates                 200 ng/ml  Benzodiazepines              100 ng/ml  Cocaine                      300 ng/ml  Methadone                    300 ng/ml  Opiates                      300 ng/ml  Oxycodone                    100 ng/ml  THC                           50 ng/ml  Fentanyl                       5 ng/ml      The Normal Value for all drugs tested is negative. This report includes final unconfirmed screening results to be used for medical treatment purposes only. Unconfirmed results must not be used for non-medical purposes such as employment or legal testing. Clinical consideration should be applied to any drug of abuse test, particularly when unconfirmed results are used.           TROPONIN - Normal    Narrative:     High Sensitive Troponin T Reference Range:  <14.0 ng/L- Negative Female for AMI  <22.0 ng/L- Negative Male for AMI  >=14 - Abnormal Female indicating possible myocardial injury.  >=22 - Abnormal Male indicating possible myocardial injury.   Clinicians would have to utilize clinical acumen, EKG, Troponin, and serial changes to determine if it is an Acute Myocardial Infarction or myocardial injury due to an underlying chronic condition.        HIGH SENSITIVITIY TROPONIN T 2HR - Normal    Narrative:     High Sensitive Troponin T Reference Range:  <14.0 ng/L- Negative Female for AMI  <22.0 ng/L- Negative Male for AMI  >=14 - Abnormal Female indicating possible myocardial injury.  >=22 - Abnormal Male indicating possible myocardial injury.   Clinicians would have to utilize clinical acumen, EKG,  Troponin, and serial changes to determine if it is an Acute Myocardial Infarction or myocardial injury due to an underlying chronic condition.        ETHANOL   LACTIC ACID, REFLEX   CBC AND DIFFERENTIAL    Narrative:     The following orders were created for panel order CBC & Differential.  Procedure                               Abnormality         Status                     ---------                               -----------         ------                     CBC Auto Differential[[id number]]        Abnormal            Final result                 Please view results for these tests on the individual orders.       EKG:   ECG 12 Lead Other; epigastric pain   Preliminary Result   HEART RATE=67  bpm   RR Splgmiwc=405  ms   NH Lxvbrxel=067  ms   P Horizontal Axis=1  deg   P Front Axis=29  deg   QRSD Interval=94  ms   QT Qynwwjth=697  ms   DKoD=247  ms   QRS Axis=-57  deg   T Wave Axis=39  deg   - ABNORMAL ECG -   Sinus rhythm   Inferior infarct, old   Anterior infarct, old   Date and Time of Study:2024-09-03 13:36:53          Meds given in ED:   Medications   sodium chloride 0.9 % flush 10 mL (has no administration in time range)   folic acid 1 mg in sodium chloride 0.9 % 50 mL IVPB (1 mg Intravenous New Bag 9/3/24 1548)   sodium chloride 0.9 % bolus 1,000 mL (0 mL Intravenous Stopped 9/3/24 1318)   prochlorperazine (COMPAZINE) injection 5 mg (5 mg Intravenous Given 9/3/24 1248)   diphenhydrAMINE (BENADRYL) injection 12.5 mg (12.5 mg Intravenous Given 9/3/24 1249)   sodium chloride 0.9 % infusion 1,000 mL (0 mL Intravenous Stopped 9/3/24 1545)   thiamine (B-1) injection 200 mg (200 mg Intravenous Given 9/3/24 1548)   magnesium sulfate 2g/50 mL (PREMIX) infusion (0 g Intravenous Stopped 9/3/24 1506)   midazolam (VERSED) injection 1 mg (1 mg Intravenous Given 9/3/24 1402)   iopamidol (ISOVUE-300) 61 % injection 100 mL (85 mL Intravenous Given 9/3/24 1500)       Imaging results:  CT Abdomen Pelvis With Contrast    Result  "Date: 9/3/2024   1. No specific CT evidence of pancreatitis or its complications. 2. Cholelithiasis with mild gallbladder wall thickening and mild common duct dilatation with a filling defect in the distal common duct that could represent choledocholithiasis., Correlating with laboratory values. Could consider further evaluation with ultrasound if clinically indicated. 3. Heterogeneous hepatic attenuation, which could reflect underlying hepatocellular disease. 4. Urinary bladder wall thickening could be accentuated by underdistention but could also reflect sequela of chronic bladder outlet obstruction or cystitis. Recommend correlating with laboratory values. 5. Colonic diverticula, without CT evidence of diverticulitis.  This report was finalized on 9/3/2024 3:44 PM by Evangelist Ruffin MD on Workstation: [id number]       Ambulatory status:   - assist x 1    Social issues:   Social History     Socioeconomic History    Marital status: Single   Tobacco Use    Smoking status: Former     Current packs/day: 0.00     Average packs/day: 0.5 packs/day for 15.0 years (7.5 ttl pk-yrs)     Types: Cigarettes     Start date: 1990     Quit date: 2005     Years since quittin.6    Smokeless tobacco: Never    Tobacco comments:     caffeine - 3 cans of coke daily    Vaping Use    Vaping status: Never Used   Substance and Sexual Activity    Alcohol use: Yes     Alcohol/week: 7.0 standard drinks of alcohol     Types: 7 Shots of liquor per week     Comment: .5 liter vodka    Drug use: Yes     Types: Methamphetamines     Comment: \"once in a rare while\"    Sexual activity: Yes     Partners: Female     Birth control/protection: Condom       Peripheral Neurovascular  Peripheral Neurovascular (Adult)  Peripheral Neurovascular WDL: WDL    Neuro Cognitive  Neuro Cognitive (Adult)  Cognitive/Neuro/Behavioral WDL: WDL, level of consciousness, orientation, speech  Level of Consciousness: Alert  Orientation: oriented x 4  Speech: clear, " spontaneous, logical    Learning  Learning Assessment (Adult)  Learning Readiness and Ability: no barriers identified    Respiratory  Respiratory WDL  Respiratory WDL: WDL, rhythm/pattern  Rhythm/Pattern, Respiratory: no shortness of breath reported, depth regular, pattern regular, unlabored    Abdominal Pain       Pain Assessments  Pain (Adult)  (0-10) Pain Rating: Rest: 7  Pain Location: head    NIH Stroke Scale       Karen Abdul RN  09/03/24 15:52 EDT

## 2024-09-03 NOTE — ED TRIAGE NOTES
Pt from home via ems, called for ha, n/v/d, that started 4 hours ago; hx of etoh use, last drink about 12 hours ago

## 2024-09-03 NOTE — PLAN OF CARE
Goal Outcome Evaluation:  Plan of Care Reviewed With: patient           Outcome Evaluation: Pt arrived to unit from ER at 5pm--pt in stable condition--VSS. Pt on RA--slight tremor with CIWA of 8--will treat per MD order. Pt resting comfortably in bed--no complaints of pain at this time. Pt drinking some broth--NS started via IV per MD order. Advised pt how to use call light and how to call nurse if has any needs. Pt v/u and had no further issues at this time.

## 2024-09-04 VITALS
BODY MASS INDEX: 27.92 KG/M2 | DIASTOLIC BLOOD PRESSURE: 89 MMHG | OXYGEN SATURATION: 98 % | TEMPERATURE: 98.2 F | HEART RATE: 70 BPM | RESPIRATION RATE: 18 BRPM | WEIGHT: 206.13 LBS | HEIGHT: 72 IN | SYSTOLIC BLOOD PRESSURE: 148 MMHG

## 2024-09-04 PROBLEM — E86.0 DEHYDRATION: Status: RESOLVED | Noted: 2024-02-24 | Resolved: 2024-09-04

## 2024-09-04 LAB
ALBUMIN SERPL-MCNC: 3.2 G/DL (ref 3.5–5.2)
ALBUMIN SERPL-MCNC: 3.3 G/DL (ref 3.5–5.2)
ALP SERPL-CCNC: 83 U/L (ref 39–117)
ALT SERPL W P-5'-P-CCNC: 18 U/L (ref 1–41)
ANION GAP SERPL CALCULATED.3IONS-SCNC: 10.3 MMOL/L (ref 5–15)
AST SERPL-CCNC: 67 U/L (ref 1–40)
BILIRUB CONJ SERPL-MCNC: 0.2 MG/DL (ref 0–0.3)
BILIRUB INDIRECT SERPL-MCNC: 0.4 MG/DL
BILIRUB SERPL-MCNC: 0.6 MG/DL (ref 0–1.2)
BUN SERPL-MCNC: 4 MG/DL (ref 8–23)
BUN/CREAT SERPL: 7.7 (ref 7–25)
CALCIUM SPEC-SCNC: 7 MG/DL (ref 8.6–10.5)
CHLORIDE SERPL-SCNC: 107 MMOL/L (ref 98–107)
CO2 SERPL-SCNC: 22.7 MMOL/L (ref 22–29)
CREAT SERPL-MCNC: 0.52 MG/DL (ref 0.76–1.27)
D-LACTATE SERPL-SCNC: 1.4 MMOL/L (ref 0.5–2)
DEPRECATED RDW RBC AUTO: 59.3 FL (ref 37–54)
EGFRCR SERPLBLD CKD-EPI 2021: 110.5 ML/MIN/1.73
ERYTHROCYTE [DISTWIDTH] IN BLOOD BY AUTOMATED COUNT: 16.6 % (ref 12.3–15.4)
GLUCOSE SERPL-MCNC: 89 MG/DL (ref 65–99)
HCT VFR BLD AUTO: 33.5 % (ref 37.5–51)
HGB BLD-MCNC: 11.4 G/DL (ref 13–17.7)
MAGNESIUM SERPL-MCNC: 1.6 MG/DL (ref 1.6–2.4)
MCH RBC QN AUTO: 33.6 PG (ref 26.6–33)
MCHC RBC AUTO-ENTMCNC: 34 G/DL (ref 31.5–35.7)
MCV RBC AUTO: 98.8 FL (ref 79–97)
PLATELET # BLD AUTO: 170 10*3/MM3 (ref 140–450)
PMV BLD AUTO: 9.2 FL (ref 6–12)
POTASSIUM SERPL-SCNC: 3.8 MMOL/L (ref 3.5–5.2)
PROT SERPL-MCNC: 5.1 G/DL (ref 6–8.5)
RBC # BLD AUTO: 3.39 10*6/MM3 (ref 4.14–5.8)
SODIUM SERPL-SCNC: 140 MMOL/L (ref 136–145)
WBC NRBC COR # BLD AUTO: 4.64 10*3/MM3 (ref 3.4–10.8)

## 2024-09-04 PROCEDURE — 83735 ASSAY OF MAGNESIUM: CPT | Performed by: HOSPITALIST

## 2024-09-04 PROCEDURE — 80048 BASIC METABOLIC PNL TOTAL CA: CPT | Performed by: INTERNAL MEDICINE

## 2024-09-04 PROCEDURE — 96361 HYDRATE IV INFUSION ADD-ON: CPT

## 2024-09-04 PROCEDURE — 97161 PT EVAL LOW COMPLEX 20 MIN: CPT

## 2024-09-04 PROCEDURE — 96366 THER/PROPH/DIAG IV INF ADDON: CPT

## 2024-09-04 PROCEDURE — 83605 ASSAY OF LACTIC ACID: CPT | Performed by: EMERGENCY MEDICINE

## 2024-09-04 PROCEDURE — 85027 COMPLETE CBC AUTOMATED: CPT | Performed by: INTERNAL MEDICINE

## 2024-09-04 PROCEDURE — 96376 TX/PRO/DX INJ SAME DRUG ADON: CPT

## 2024-09-04 PROCEDURE — 25810000003 SODIUM CHLORIDE 0.9 % SOLUTION: Performed by: INTERNAL MEDICINE

## 2024-09-04 PROCEDURE — 82040 ASSAY OF SERUM ALBUMIN: CPT | Performed by: NURSE PRACTITIONER

## 2024-09-04 PROCEDURE — 25010000002 THIAMINE PER 100 MG: Performed by: INTERNAL MEDICINE

## 2024-09-04 PROCEDURE — 80076 HEPATIC FUNCTION PANEL: CPT | Performed by: NURSE PRACTITIONER

## 2024-09-04 PROCEDURE — G0378 HOSPITAL OBSERVATION PER HR: HCPCS

## 2024-09-04 RX ADMIN — AMLODIPINE BESYLATE 5 MG: 5 TABLET ORAL at 06:04

## 2024-09-04 RX ADMIN — SODIUM CHLORIDE 75 ML/HR: 9 INJECTION, SOLUTION INTRAVENOUS at 06:09

## 2024-09-04 RX ADMIN — Medication 1 TABLET: at 09:21

## 2024-09-04 RX ADMIN — THIAMINE HYDROCHLORIDE 200 MG: 100 INJECTION, SOLUTION INTRAMUSCULAR; INTRAVENOUS at 06:04

## 2024-09-04 RX ADMIN — LEVOTHYROXINE SODIUM 100 MCG: 100 TABLET ORAL at 09:21

## 2024-09-04 RX ADMIN — FLUTICASONE PROPIONATE 2 SPRAY: 50 SPRAY, METERED NASAL at 09:21

## 2024-09-04 RX ADMIN — THIAMINE HYDROCHLORIDE 200 MG: 100 INJECTION, SOLUTION INTRAMUSCULAR; INTRAVENOUS at 14:46

## 2024-09-04 RX ADMIN — LORAZEPAM 2 MG: 1 TABLET ORAL at 14:46

## 2024-09-04 RX ADMIN — LORAZEPAM 2 MG: 1 TABLET ORAL at 03:14

## 2024-09-04 RX ADMIN — LISINOPRIL 10 MG: 10 TABLET ORAL at 09:32

## 2024-09-04 RX ADMIN — FOLIC ACID 1 MG: 5 INJECTION, SOLUTION INTRAMUSCULAR; INTRAVENOUS; SUBCUTANEOUS at 09:21

## 2024-09-04 NOTE — NURSING NOTE
"Access follow-up regards ETOH use:    The pt was found RIB. He stated he is \"fit as a fiddle\". He denied current s/s withdrawal. Most current CIWA=4 over the 0900 hr. Score of 8 last evening. The pt denied current cravings. He rated current depression level 2/10 (10 being the worst), anxiety 0/10. He denied current Si/HI/AVH. He stated he is \"starving\" and if it weren't for usual interruptions for care he would have slept \"quite well\" last night.   Pt does have an outpatient psychiatrist, Dr. Coats; however, he has not had a follow-up in \"4-6 mo\" and relays he will need to for his \"sleeping pills\".   Pt was given resource for [facility] meetings as he is involved with [employer].    Psychiatrist has been consulted. Access following.      "

## 2024-09-04 NOTE — PLAN OF CARE
Goal Outcome Evaluation:           Progress: improving       Lactate trending downwards, CIWA = 5, no acute events for shift. Called inpatient psych for consult numerous times, no answer, will try again later, plan of care ongoing. Call light within reach.

## 2024-09-04 NOTE — THERAPY EVALUATION
Patient Name: Pro Mueller  : 1955    MRN: [id number]                              Today's Date: 2024       Admit Date: 9/3/2024    Visit Dx:     ICD-10-CM ICD-9-CM   1. Hypomagnesemia  E83.42 275.2   2. Alcohol abuse  F10.10 305.00   3. Nausea  R11.0 787.02   4. Nonintractable headache, unspecified chronicity pattern, unspecified headache type  R51.9 784.0     Patient Active Problem List   Diagnosis    Fall    Alcoholism in recovery    Atopic rhinitis    Mixed anxiety depressive disorder    Genital herpes simplex    Hyperlipidemia    Hypertension    Hypothyroidism    Insomnia    Panic disorder without agoraphobia    Persistent insomnia    Vitamin D deficiency    Chronic low back pain    Neuropathy involving both lower extremities    QT prolongation    Left shoulder pain    Nausea and vomiting    Pancytopenia    Left ventricular diastolic dysfunction    Tremor    C5 cervical fracture    Syncope and collapse    Neck pain    Alcoholic intoxication with complication    Withdrawal symptoms, alcohol    Transaminitis    Lumbar facet arthropathy    Alcohol dependence with withdrawal    Midline low back pain with right-sided sciatica    Spondylolisthesis at L4-L5 level    Lumbar canal stenosis    Alcohol cessation counseling    Need for assistance due to reduced mobility    Impaired mobility and ADLs    Alcohol withdrawal syndrome without complication    Facial laceration    Orthostatic hypotension    Acute bacterial conjunctivitis of right eye    Visit for suture removal    Renal calculi    Hepatic steatosis    Alcohol withdrawal syndrome with complication    Macrocytosis without anemia    Lumbosacral spondylosis without myelopathy    Elevated LFTs    Alcohol-induced acute pancreatitis, unspecified complication status    Dehydration    Pancreatitis    Generalized abdominal pain    Alcohol withdrawal     Past Medical History:   Diagnosis Date    Alcohol abuse     Alcohol withdrawal 2016    Alcoholic  ketoacidosis 01/12/2020    Allergic 1970    Anxiety     Arthritis     Atopic rhinitis 08/08/2016    Depression     Disease of thyroid gland     Elevated cholesterol     Encounter for removal of sutures     Genital herpes simplex 08/08/2016    GERD (gastroesophageal reflux disease)     Headache, tension-type     Hyperlipidemia     Hypertension     Hypothyroidism     Kidney stone     Low back pain 2019    Migraine     Motion sickness     Nephrolithiasis 02/12/2020    Olecranon bursitis, right elbow     Panic disorder without agoraphobia 08/08/2016    Peripheral neuropathy     Sleep apnea     Syncope and collapse 01/02/2019    Vitamin D deficiency 08/08/2016    Withdrawal symptoms, alcohol      Past Surgical History:   Procedure Laterality Date    COLONOSCOPY      CYST REMOVAL      CYSTOSCOPY BLADDER STONE LITHOTRIPSY  02/2022    EXTRACORPOREAL SHOCK WAVE LITHOTRIPSY (ESWL) Right 2002    SHOULDER ARTHROSCOPY Right 12/17/2019    Procedure: SHOULDER ARTHROSCOPY, decompression, distal clavicle excision;  Surgeon: Aj Mancilla MD;  Location: [facility] OR [facility] [place];  Service: Orthopedics    TONSILLECTOMY        General Information       Row Name 09/04/24 0923          Physical Therapy Time and Intention    Document Type evaluation;therapy note (daily note)  -EF (r) CK (t) EF (c)     Mode of Treatment physical therapy;individual therapy  -EF (r) CK (t) EF (c)       Row Name 09/04/24 0923          General Information    Patient Profile Reviewed yes  -EF (r) CK (t) EF (c)     Prior Level of Function independent:;all household mobility  uses front wheeled walker  -EF (r) CK (t) EF (c)     Existing Precautions/Restrictions fall  -EF (r) CK (t) EF (c)     Barriers to Rehab none identified  -EF (r) CK (t) EF (c)       Row Name 09/04/24 0923          Living Environment    People in Home alone  -EF (r) CK (t) EF (c)       Row Name 09/04/24 0923          Home Main Entrance    Number of Stairs, Main Entrance eight  -EF (r) CK (t) EF (c)      Stair Railings, Main Entrance railings safe and in good condition  -EF (r) CK (t) EF (c)       Row Name 09/04/24 0923          Stairs Within Home, Primary    Stairs, Within Home, Primary two story home but did not know number of stairs to reach second level  -EF (r) CK (t) EF (c)       Row Name 09/04/24 0923          Cognition    Orientation Status (Cognition) oriented x 4  -EF (r) CK (t) EF (c)       Row Name 09/04/24 0923          Safety Issues, Functional Mobility    Impairments Affecting Function (Mobility) endurance/activity tolerance;strength  -EF (r) CK (t) EF (c)               User Key  (r) = Recorded By, (t) = Taken By, (c) = Cosigned By      Initials Name Provider Type    EF France Yusuf, PT Physical Therapist    Court Lowery, PT Student PT Student                   Mobility       Row Name 09/04/24 0956          Bed Mobility    Bed Mobility supine-sit;sit-supine  -EF (r) CK (t) EF (c)     Supine-Sit [place] (Bed Mobility) standby assist  -EF (r) CK (t) EF (c)     Sit-Supine [place] (Bed Mobility) standby assist  -EF (r) CK (t) EF (c)     Assistive Device (Bed Mobility) bed rails;head of bed elevated  -EF (r) CK (t) EF (c)       Row Name 09/04/24 0956          Sit-Stand Transfer    Sit-Stand [place] (Transfers) contact guard  -EF (r) CK (t) EF (c)     Assistive Device (Sit-Stand Transfers) walker, front-wheeled  -EF (r) CK (t) EF (c)       Row Name 09/04/24 0956          Gait/Stairs (Locomotion)    [place] Level (Gait) contact guard  -EF (r) CK (t) EF (c)     Assistive Device (Gait) walker, front-wheeled  -EF (r) CK (t) EF (c)     Patient was able to Ambulate yes  -EF (r) CK (t) EF (c)     Distance in Feet (Gait) 270  -EF (r) CK (t) EF (c)     Deviations/Abnormal Patterns (Gait) godfrey decreased  -EF (r) CK (t) EF (c)               User Key  (r) = Recorded By, (t) = Taken By, (c) = Cosigned By      Initials Name Provider Type    France Escobedo, PT Physical  Therapist    Court Lowery, PT Student PT Student                   Obj/Interventions       Row Name 09/04/24 0958          Range of Motion Comprehensive    General Range of Motion bilateral lower extremity ROM WFL  -EF (r) CK (t) EF (c)       Row Name 09/04/24 0958          Strength Comprehensive (MMT)    General Manual Muscle Testing (MMT) Assessment no strength deficits identified  -EF (r) CK (t) EF (c)       Row Name 09/04/24 0958          Balance    Balance Assessment sitting static balance;standing static balance;sitting dynamic balance;standing dynamic balance  -EF (r) CK (t) EF (c)     Static Sitting Balance standby assist  -EF (r) CK (t) EF (c)     Dynamic Sitting Balance standby assist  -EF (r) CK (t) EF (c)     Position, Sitting Balance sitting edge of bed  -EF (r) CK (t) EF (c)     Static Standing Balance standby assist  -EF (r) CK (t) EF (c)     Dynamic Standing Balance standby assist  -EF (r) CK (t) EF (c)     Position/Device Used, Standing Balance walker, front-wheeled  -EF (r) CK (t) EF (c)       Row Name 09/04/24 0958          Sensory Assessment (Somatosensory)    Sensory Assessment (Somatosensory) not tested  -EF (r) CK (t) EF (c)               User Key  (r) = Recorded By, (t) = Taken By, (c) = Cosigned By      Initials Name Provider Type    EF France Yusuf, PT Physical Therapist    Court Lowery, PT Student PT Student                   Goals/Plan       Row Name 09/04/24 1008          Bed Mobility Goal 1 (PT)    Activity/Assistive Device (Bed Mobility Goal 1, PT) bed mobility activities, all  -EF (r) CK (t) EF (c)     [place] Level/Cues Needed (Bed Mobility Goal 1, PT) independent  -EF (r) CK (t) EF (c)     Time Frame (Bed Mobility Goal 1, PT) long term goal (LTG);1 week  -EF (r) CK (t) EF (c)     Progress/Outcomes (Bed Mobility Goal 1, PT) goal ongoing  -EF (r) CK (t) EF (c)       Row Name 09/04/24 1008          Transfer Goal 1 (PT)    Activity/Assistive Device (Transfer Goal  1, PT) transfers, all  -EF (r) CK (t) EF (c)     [place] Level/Cues Needed (Transfer Goal 1, PT) independent  -EF (r) CK (t) EF (c)     Time Frame (Transfer Goal 1, PT) long term goal (LTG);1 week  -EF (r) CK (t) EF (c)     Progress/Outcome (Transfer Goal 1, PT) goal ongoing  -EF (r) CK (t) EF (c)       Row Name 09/04/24 1008          Gait Training Goal 1 (PT)    Activity/Assistive Device (Gait Training Goal 1, PT) gait (walking locomotion)  -EF (r) CK (t) EF (c)     [place] Level (Gait Training Goal 1, PT) supervision required  -EF (r) CK (t) EF (c)     Time Frame (Gait Training Goal 1, PT) long term goal (LTG);1 week  -EF (r) CK (t) EF (c)     Progress/Outcome (Gait Training Goal 1, PT) goal ongoing  -EF (r) CK (t) EF (c)       Row Name 09/04/24 1002          Therapy Assessment/Plan (PT)    Planned Therapy Interventions (PT) balance training;bed mobility training;gait training;transfer training;stair training;strengthening  -EF (r) CK (t) EF (c)               User Key  (r) = Recorded By, (t) = Taken By, (c) = Cosigned By      Initials Name Provider Type    EF France Yusuf, PT Physical Therapist    Court Lowery, PT Student PT Student                   Clinical Impression       Row Name 09/04/24 6080          Pain    Pretreatment Pain Rating 0/10 - no pain  -EF (r) CK (t) EF (c)     Posttreatment Pain Rating 0/10 - no pain  -EF (r) CK (t) EF (c)     Pain Intervention(s) Ambulation/increased activity;Rest  -EF (r) CK (t) EF (c)       Row Name 09/04/24 0935          Plan of Care Review    Plan of Care Reviewed With patient  -EF (r) CK (t) EF (c)     Outcome Evaluation Pt is a 68 y/o male presenting to hospital with nausea, diarrhea, and headache potentially due dehydration, hypomagnesemia, and alcohol abuse. PMH includes hypertension and hypothyroidism. Imaging shows cholelithiasis and colonic diverticula. Prior to arrival, pt uses a front wheeled walker to ambulate in his house. Pt presents with  B hand tremors potentially due to alcohol withdraw. Pt able to complete all bed mobility SBA and STS transfer CGA. Ambulated 270' CGA with front wheeled walker without any LOB or safety concerns. Pt would continue to benefit from skilled therapy in order to continue improving strength and endurance required for independence with all mobility and functional tasks. Rec D/C to home.  -EF (r) CK (t) EF (c)       Row Name 09/04/24 0959          Therapy Assessment/Plan (PT)    Rehab Potential (PT) good, to achieve stated therapy goals  -EF (r) CK (t) EF (c)     Criteria for Skilled Interventions Met (PT) yes;meets criteria;skilled treatment is necessary  -EF (r) CK (t) EF (c)     Therapy Frequency (PT) 3 times/wk (P)   -CK       Row Name 09/04/24 0959          Positioning and Restraints    Pre-Treatment Position in bed  -EF (r) CK (t) EF (c)     Post Treatment Position bed  -EF (r) CK (t) EF (c)     In Bed fowlers;exit alarm on;encouraged to call for assist;call light within reach  -EF (r) CK (t) EF (c)               User Key  (r) = Recorded By, (t) = Taken By, (c) = Cosigned By      Initials Name Provider Type    EF France Yusuf, PT Physical Therapist    Court Lowery, LUCILA Student PT Student                   Outcome Measures       Row Name 09/04/24 1011 09/04/24 0830       How much help from another person do you currently need...    Turning from your back to your side while in flat bed without using bedrails? 4  -EF (r) CK (t) EF (c) 4  -KG    Moving from lying on back to sitting on the side of a flat bed without bedrails? 4  -EF (r) CK (t) EF (c) 4  -KG    Moving to and from a bed to a chair (including a wheelchair)? 4  -EF (r) CK (t) EF (c) 3  -KG    Standing up from a chair using your arms (e.g., wheelchair, bedside chair)? 3  -EF (r) CK (t) EF (c) 3  -KG    Climbing 3-5 steps with a railing? 3  -EF (r) CK (t) EF (c) 3  -KG    To walk in hospital room? 3  -EF (r) CK (t) EF (c) 3  -KG    AM-PAC 6 Clicks  Score (PT) 21  -EF (r) CK (t) 20  -KG    Highest Level of Mobility Goal 6 --> Walk 10 steps or more  -EF (r) CK (t) 6 --> Walk 10 steps or more  -KG      Row Name 09/04/24 0000          How much help from another person do you currently need...    Turning from your back to your side while in flat bed without using bedrails? 4  -HARIS     Moving from lying on back to sitting on the side of a flat bed without bedrails? 4  -HARIS     Moving to and from a bed to a chair (including a wheelchair)? 3  -HARIS     Standing up from a chair using your arms (e.g., wheelchair, bedside chair)? 3  -HARIS     Climbing 3-5 steps with a railing? 3  -HARIS     To walk in hospital room? 3  -HARIS     AM-PAC 6 Clicks Score (PT) 20  -HARIS     Highest Level of Mobility Goal 6 --> Walk 10 steps or more  -HARIS               User Key  (r) = Recorded By, (t) = Taken By, (c) = Cosigned By      Initials Name Provider Type    EF France Yusuf, PT Physical Therapist    Pastor Nava, RN Registered Nurse    Purnima Galarza RN Registered Nurse    Court Lowery, PT Student PT Student                                 Physical Therapy Education       Title: PT OT SLP Therapies (In Progress)       Topic: Physical Therapy (In Progress)       Point: Mobility training (Done)       Learning Progress Summary             Patient Acceptance, BRINDAJAVIER DU by  at 9/4/2024 1012                         Point: Home exercise program (Not Started)       Learner Progress:  Not documented in this visit.              Point: Body mechanics (Not Started)       Learner Progress:  Not documented in this visit.              Point: Precautions (Not Started)       Learner Progress:  Not documented in this visit.                              User Key       Initials Effective Dates Name Provider Type Discipline     08/22/24 -  Court Hancock, PT Student PT Student PT                  PT Recommendation and Plan  Planned Therapy Interventions (PT): balance training, bed mobility  training, gait training, transfer training, stair training, strengthening  Plan of Care Reviewed With: patient  Outcome Evaluation: Pt is a 70 y/o male presenting to hospital with nausea, diarrhea, and headache potentially due dehydration, hypomagnesemia, and alcohol abuse. PMH includes hypertension and hypothyroidism. Imaging shows cholelithiasis and colonic diverticula. Prior to arrival, pt uses a front wheeled walker to ambulate in his house. Pt presents with B hand tremors potentially due to alcohol withdraw. Pt able to complete all bed mobility SBA and STS transfer CGA. Ambulated 270' CGA with front wheeled walker without any LOB or safety concerns. Pt would continue to benefit from skilled therapy in order to continue improving strength and endurance required for independence with all mobility and functional tasks. Rec D/C to home.     Time Calculation:         PT Charges       Row Name 09/04/24 1012             Time Calculation    Start Time 0833  -EF (r) CK (t) EF (c)      Stop Time 0846  -EF (r) CK (t) EF (c)      Time Calculation (min) 13 min  -EF (r) CK (t)      PT Received On 09/04/24  -EF (r) CK (t) EF (c)      PT - Next Appointment 09/06/24 (P)   -CK      PT Goal Re-Cert Due Date 09/11/24  -EF (r) CK (t) EF (c)         Time Calculation- PT    Total Timed Code Minutes- PT 8 minute(s)  -EF (r) CK (t) EF (c)         Timed Charges    63933 - Gait Training Minutes  8  -EF (r) CK (t) EF (c)         Total Minutes    Timed Charges Total Minutes 8  -EF (r) CK (t)       Total Minutes 8  -EF (r) CK (t)                User Key  (r) = Recorded By, (t) = Taken By, (c) = Cosigned By      Initials Name Provider Type    EF France Yusuf, PT Physical Therapist    Court Lowery, PT Student PT Student                  Therapy Charges for Today       Code Description Service Date Service Provider Modifiers Qty    [id number] HC PT EVAL LOW COMPLEXITY 1 9/4/2024 Court Hancock, PT Student GP 1            PT  G-Codes  AM-PAC 6 Clicks Score (PT): 21  PT Discharge Summary  Anticipated Discharge Disposition (PT): home    Court Hancock, PT Student  9/4/2024

## 2024-09-04 NOTE — CONSULTS
"Access center consult for 69 year old male seen in [facility] for ETOH. Pt familiar to the Access center and has been seen numerous times prior for ETOH. Last seen March 2024 for ETOH and pt had declined resources. Pt lives at home alone. No children. States he does have close friends and some family. Pt admitted medically today for dehydration. Last CIWA score of 5. UDS and BAL negative. Pt alert and oriented x4. Resting in bed and appears to be in no distress. He is a retired .     Pt states he is not wanting to abstain from alcohol completely but wants to cut back to social drinking. States he was a social drinker 7-8 years ago but since his FDC his use has increased to daily. He states he drinks vodka martinis daily, he states the amount that he drinks varies but he goes through a handle of vodka \"pretty quickly\". States he began drinking in his early 20's. His use has increased to daily in the last 7-8 years. States he has had a few IP [facility] residential stays. The most recent he can remember was being admitted to the [facility] for two weeks but he states \"that was years ago\". He could not remember any previous admissions but the chart review indicates at least 2 other inpatient admissions. He denies blackouts and memory lapses. States he has gone to AA meetings but it has been a long time. He denies a hx of seizures or DT's. States the most that has happened in the past when he stopped drinking was \"some shaking and a headache\". Denies family hx of alcohol use. Denies others reporting he has a problem. He states a previous girlfriend \"complained sometimes\", but otherwise denies. He states he does believe alcohol is a problem but he is only wanting to cut back to drinking socially with others.     Pt reports he has a long hx of depression, anxiety, and panic attacks. He states it has been a long time since he had any panic attacks. He further reports that his depression \"comes in waves\" and typically " "does not last long, however he noticed recently that the episodes have been lasting longer. He reports a hx of trauma in childhood. He denies SI, HI, AVH. Denies mental health inpatient stays. He sees Dr. Coats through [facility]. States he was previously taking an SSRI but could not remember the name. States he stopped taking it when he found it was not effective. He states he wants to continue seeing Dr. Coats. He reports he may be interested in therapy but at this time he \"doesn't want to make any commitments\".     Pt states that this time he would be interested in receiving resources for support groups other than AA. He states he is not sure if he would be interested in any IP tx again. He is agreeable to follow up and discussed that he can further talk about resources in follow up. Will place call to [facility] therapist for assistance with support group referrals. Pt requesting to see psychiatrist regarding depression medication discussion. Will place order and follow.     "

## 2024-09-04 NOTE — H&P
HISTORY AND PHYSICAL   [facility]        Date of Admission: 9/3/2024  Patient Identification:  Name: Pro Mueller  Age: 69 y.o.  Sex: male  :  1955  MRN: [id number]                     Primary Care Physician: Provider, No Known    Chief Complaint:  69 year old gentleman presented to the emergency room with a headache, nausea and and diarrhea which started last night; no vomiting; no fever or chills; he has a history of alcohol dependence; no change in vision or speech;     History of Present Illness:   As above    Past Medical History:  Past Medical History:   Diagnosis Date    Alcohol abuse     Alcohol withdrawal 2016    Alcoholic ketoacidosis 2020    Allergic 1970    Anxiety     Arthritis     Atopic rhinitis 2016    Depression     Disease of thyroid gland     Elevated cholesterol     Encounter for removal of sutures     Genital herpes simplex 2016    GERD (gastroesophageal reflux disease)     Headache, tension-type     Hyperlipidemia     Hypertension     Hypothyroidism     Kidney stone     Low back pain     Migraine     Motion sickness     Nephrolithiasis 2020    Olecranon bursitis, right elbow     Panic disorder without agoraphobia 2016    Peripheral neuropathy     Sleep apnea     Syncope and collapse 2019    Vitamin D deficiency 2016    Withdrawal symptoms, alcohol      Past Surgical History:  Past Surgical History:   Procedure Laterality Date    COLONOSCOPY      CYST REMOVAL      CYSTOSCOPY BLADDER STONE LITHOTRIPSY  2022    EXTRACORPOREAL SHOCK WAVE LITHOTRIPSY (ESWL) Right     SHOULDER ARTHROSCOPY Right 2019    Procedure: SHOULDER ARTHROSCOPY, decompression, distal clavicle excision;  Surgeon: Aj Mancilla MD;  Location: [facility] OR [facility];  Service: Orthopedics    TONSILLECTOMY        Home Meds:  Medications Prior to Admission   Medication Sig Dispense Refill Last Dose    traMADol (ULTRAM) 50 MG tablet Take 1 tablet by  mouth Every 6 (Six) Hours As Needed for Moderate Pain.   Past Month    amLODIPine (NORVASC) 5 MG tablet Take 1 tablet by mouth Every Morning. 90 tablet 1 More than a month    fluticasone (FLONASE) 50 MCG/ACT nasal spray 2 sprays into the nostril(s) as directed by provider Daily. 11.1 mL 0 More than a month    levothyroxine (SYNTHROID, LEVOTHROID) 100 MCG tablet Take 1 tablet by mouth Daily. 90 tablet 1 More than a month    lisinopril (PRINIVIL,ZESTRIL) 10 MG tablet Take 1 tablet by mouth Daily. 90 tablet 1 More than a month    pseudoephedrine (Sudafed) 30 MG tablet Take 1 tablet by mouth 3 times a day. 20 tablet 0 More than a month       Allergies:  Allergies   Allergen Reactions    Penicillins Anaphylaxis and Seizure     Seizure. childhood     Immunizations:  Immunization History   Administered Date(s) Administered    COVID-19 (PFIZER) Purple Cap Monovalent 2021, 2021, 10/28/2021    Flu Vaccine Intradermal Quad 18-64YR 10/23/2020    Flu Vaccine Quad PF >36MO 2016    Fluzone (or Fluarix & Flulaval for VFC) >6mos 2019, 10/23/2020, 10/05/2021    Hepatitis A 2019    Influenza, Unspecified 2018    PEDS-Pneumococcal Conjugate (PCV7) 2017    Pneumococcal Conjugate 13-Valent (PCV13) 2017    Pneumococcal Polysaccharide (PPSV23) 2023    Pneumococcal, Unspecified 2017    Td, Not Adsorbed 2018    Tdap 2021    Zostavax 2017    flucelvax quad pfs =>4 YRS 2018     Social History:   Social History     Social History Narrative    Not on file     Social History     Socioeconomic History    Marital status: Single   Tobacco Use    Smoking status: Former     Current packs/day: 0.00     Average packs/day: 0.5 packs/day for 15.0 years (7.5 ttl pk-yrs)     Types: Cigarettes     Start date: 1990     Quit date: 2005     Years since quittin.6    Smokeless tobacco: Never    Tobacco comments:     caffeine - 3 cans of coke daily    Vaping Use     "Vaping status: Never Used   Substance and Sexual Activity    Alcohol use: Yes     Alcohol/week: 7.0 standard drinks of alcohol     Types: 7 Shots of liquor per week     Comment: .5 liter vodka    Drug use: Yes     Types: Methamphetamines     Comment: \"once in a rare while\"    Sexual activity: Yes     Partners: Female     Birth control/protection: Condom       Family History:  Family History   Problem Relation Age of Onset    Alzheimer's disease Mother     Mental illness Mother     Pancreatic cancer Father     Hearing loss Father     Arthritis Maternal Grandmother     Malig Hyperthermia Neg Hx         Review of Systems  See history of present illness and past medical history.  Patient denies headache, dizziness, syncope, falls, trauma, change in vision, change in hearing, change in taste, changes in weight, changes in appetite, focal weakness, numbness, or paresthesia.  Patient denies chest pain, palpitations, dyspnea, orthopnea, PND, cough, sinus pressure, rhinorrhea, epistaxis, hemoptysis, nausea, vomiting,hematemesis, diarrhea, constipation or hematochezia.  Denies cold or heat intolerance, polydipsia, polyuria, polyphagia. Denies hematuria, pyuria, dysuria, hesitancy, frequency or urgency. Denies consumption of raw and under cooked meats foods or change in water source.       Objective:  T Max 24 hrs: Temp (24hrs), Av.4 °F (36.9 °C), Min:98.3 °F (36.8 °C), Max:98.6 °F (37 °C)    Vitals Ranges:   Temp:  [98.3 °F (36.8 °C)-98.6 °F (37 °C)] 98.6 °F (37 °C)  Heart Rate:  [65-74] 66  Resp:  [18-19] 18  BP: (132-176)/(73-96) 146/87      Exam:  /87 (BP Location: Right arm, Patient Position: Lying)   Pulse 66   Temp 98.6 °F (37 °C) (Oral)   Resp 18   Ht 182.9 cm (72\")   Wt 81.6 kg (180 lb)   SpO2 95%   BMI 24.41 kg/m²     General Appearance:    Alert, cooperative, no distress, appears stated age   Head:    Normocephalic, without obvious abnormality, atraumatic   Eyes:    PERRL, conjunctivae/corneas " clear, EOM's intact, both eyes   Ears:    Normal external ear canals, both ears   Nose:   Nares normal, septum midline, mucosa normal, no drainage    or sinus tenderness   Throat:   Lips, mucosa, and tongue normal   Neck:   Supple, symmetrical, trachea midline, no adenopathy;     thyroid:  no enlargement/tenderness/nodules; no carotid    bruit or JVD   Back:     Symmetric, no curvature, ROM normal, no CVA tenderness   Lungs:     Decreased breath sounds bilaterally, respirations unlabored   Chest Wall:    No tenderness or deformity    Heart:    Regular rate and rhythm, S1 and S2 normal, no murmur, rub   or gallop   Abdomen:     Soft, nontender, bowel sounds active all four quadrants,     no masses, no hepatomegaly, no splenomegaly   Extremities:   Extremities normal, atraumatic, no cyanosis or edema                       .    Data Review:  Labs in chart were reviewed.  WBC   Date Value Ref Range Status   09/03/2024 6.64 3.40 - 10.80 10*3/mm3 Final     Hemoglobin   Date Value Ref Range Status   09/03/2024 12.4 (L) 13.0 - 17.7 g/dL Final     Hematocrit   Date Value Ref Range Status   09/03/2024 36.5 (L) 37.5 - 51.0 % Final     Platelets   Date Value Ref Range Status   09/03/2024 193 140 - 450 10*3/mm3 Final     Sodium   Date Value Ref Range Status   09/03/2024 139 136 - 145 mmol/L Final     Potassium   Date Value Ref Range Status   09/03/2024 3.7 3.5 - 5.2 mmol/L Final     Chloride   Date Value Ref Range Status   09/03/2024 101 98 - 107 mmol/L Final     CO2   Date Value Ref Range Status   09/03/2024 24.6 22.0 - 29.0 mmol/L Final     BUN   Date Value Ref Range Status   09/03/2024 7 (L) 8 - 23 mg/dL Final     Creatinine   Date Value Ref Range Status   09/03/2024 0.68 (L) 0.76 - 1.27 mg/dL Final     Glucose   Date Value Ref Range Status   09/03/2024 97 65 - 99 mg/dL Final     Calcium   Date Value Ref Range Status   09/03/2024 8.0 (L) 8.6 - 10.5 mg/dL Final     Magnesium   Date Value Ref Range Status   09/03/2024 1.2 (L)  1.6 - 2.4 mg/dL Final     AST (SGOT)   Date Value Ref Range Status   09/03/2024 90 (H) 1 - 40 U/L Final     ALT (SGPT)   Date Value Ref Range Status   09/03/2024 25 1 - 41 U/L Final     Alkaline Phosphatase   Date Value Ref Range Status   09/03/2024 84 39 - 117 U/L Final                Imaging Results (All)       Procedure Component Value Units Date/Time    US Gallbladder [[id number]] Collected: 09/03/24 1632     Updated: 09/03/24 1639    Narrative:      US GALLBLADDER-     INDICATIONS: Abdominal pain, cholelithiasis     TECHNIQUE: Ultrasound of the right upper quadrant     COMPARISON: CT from same date     FINDINGS:     The gallbladder is nontender, but contains multiple stones and sludge.  No gallbladder wall thickening, as remeasured, or pericholecystic fluid.  No choledocholithiasis is identified, but the distal common biliary duct  is obscured, limiting the assessment (if further imaging evaluation the  biliary tree is indicated, MRCP could be considered).     No intrahepatic or extrahepatic biliary ductal dilatation is  demonstrated. The common biliary duct caliber is measured at 3.7 mm.     No liver lesion is identified. Diffusely, the echogenicity of the liver  is otherwise in the range of normal relative to that of the right  kidney.     The pancreas is partly obscured. A right renal cyst measures 3.7 cm. The  right kidney, 12.2 cm, and the pancreas otherwise appear unremarkable.                Impression:         Cholelithiasis. No evidence for acute cholecystitis. With persistent  clinical indication, hepatobiliary scintigraphy could be considered for  further evaluation.           This report was finalized on 9/3/2024 4:36 PM by Dr. Jarod Devries M.D on Workstation: [id number]       CT Abdomen Pelvis With Contrast [[id number]] Collected: 09/03/24 1535     Updated: 09/03/24 1547    Narrative:      CT ABDOMEN PELVIS W CONTRAST-     DATE OF EXAM: 9/3/2024 2:51 PM     INDICATION: Upper abdominal pain with  elevated lipase. History of EtOH.  Nausea and vomiting.     COMPARISON: CT 3/6/2024 and 7/23/2023. Chest radiograph 5/11/2024.     TECHNIQUE: Multiple contiguous axial images were acquired through the  abdomen and pelvis following the intravenous administration of 85 mL of  Isovue-300. Reformatted coronal and sagittal sequences were also  reviewed. Radiation dose reduction techniques were utilized, including  automated exposure control and exposure modulation based on body size.     FINDINGS:  Elevation of the right hemidiaphragm with multifocal bibasilar  subsegmental atelectasis and/or scarring. Moderate to severe calcified  coronary artery disease. Aortic valve calcifications.     Multiple small gallstones with mild gallbladder wall thickening. The  common duct is mildly dilated, measuring up to approximately 8 mm in  diameter, with possible filling defect distally that could represent  choledocholithiasis. Mildly heterogeneous hepatic attenuation could  reflect underlying hepatocellular disease. The spleen and pancreas are  unremarkable. No evidence of pancreatic necrosis. No peripancreatic  fluid collection. No splenic vein thrombosis. The adrenal glands are  unremarkable. Simple appearing bilateral renal cysts with scarring at  the lower pole of the left kidney. Tiny 2 mm nonobstructing stone in the  lower pole of the left kidney. The urinary bladder is nondistended.  Diffuse urinary bladder wall thickening, which can be accentuated by  under distention. Dystrophic appearing calcifications in the prostate  gland.     Tiny hiatal hernia. Mild diffuse gastric wall thickening is likely  accentuated by under distention. Mild colonic stool. Colonic  diverticula, without CT evidence of diverticulitis. No bowel obstruction  or significant bowel wall thickening. The appendix is normal.     No free fluid in the abdomen or pelvis. No free intraperitoneal air. No  pathologically enlarged lymph nodes in the abdomen or  pelvis. Mild to  moderate multiple phleboliths in the pelvis.     Small fat-containing umbilical and left inguinal hernias.  Similar-appearing multilevel lumbar spondylosis with  chronic/degenerative grade 1 anterolisthesis of L4 on L5. Mild to  moderate bilateral hip joint DJD. No acute osseous abnormality or  concerning osseous lesion. Calcified atherosclerotic disease in the  abdominal aorta and its distal branches without aneurysm.       Impression:         1. No specific CT evidence of pancreatitis or its complications.  2. Cholelithiasis with mild gallbladder wall thickening and mild common  duct dilatation with a filling defect in the distal common duct that  could represent choledocholithiasis., Correlating with laboratory  values. Could consider further evaluation with ultrasound if clinically  indicated.  3. Heterogeneous hepatic attenuation, which could reflect underlying  hepatocellular disease.  4. Urinary bladder wall thickening could be accentuated by  underdistention but could also reflect sequela of chronic bladder outlet  obstruction or cystitis. Recommend correlating with laboratory values.  5. Colonic diverticula, without CT evidence of diverticulitis.     This report was finalized on 9/3/2024 3:44 PM by Evangelist Ruffin MD on  Workstation: [id number]                 Assessment:  Active Hospital Problems    Diagnosis  POA    **Dehydration [E86.0]  Yes      Resolved Hospital Problems   No resolved problems to display.   Nausea  Alcohol dependence  Hypertension  Hypothyroidism  Sleep apnea  Anemia  Headache  hypomagnesemia    Plan:  Fluids  Antiemetics  Ciwa protocol  Monitor on telemetry  Trend labs  Continue antibiotics for now  Lactate is now trending down  Dw patient and ed provider    Juanita Guevara MD  9/3/2024  22:01 EDT

## 2024-09-04 NOTE — CONSULTS
"IDENTIFYING INFORMATION: The patient is a 69-year-old single white male admitted with complaints of headache and nausea and diarrhea.  He has a history of alcohol dependence and a possible history of bipolar disorder    CHIEF COMPLAINT: None given    INFORMANT: Patient and chart    RELIABILITY: Fair    HISTORY OF PRESENT ILLNESS: The patient is a 69-year-old single white male admitted with complaints of headache nausea and diarrhea.  The patient has a history of alcohol dependence and continues to drink heavily per his report.  He expresses no interest in cessation of alcohol use but states that he would like to \"cut back\".  He has been treated in the past for bipolar disorder but is currently on no prescribed medications for that disorder and is currently not under the care of a psychiatrist.  He denies current suicidal or homicidal ideation or psychotic features.  He is a retired .  For more complete history of present illness please refer to previous dictated notes    PAST PSYCHIATRIC HISTORY: Reviewed no changes next field reviewed note next field    PAST MEDICAL HISTORY: Reviewed no changes    MEDICATIONS:   Current Facility-Administered Medications   Medication Dose Route Frequency Provider Last Rate Last Admin    acetaminophen (TYLENOL) tablet 650 mg  650 mg Oral Q4H PRN Juanita Guevara MD        Or    acetaminophen (TYLENOL) 160 MG/5ML oral solution 650 mg  650 mg Oral Q4H PRN Juanita Guevara MD        Or    acetaminophen (TYLENOL) suppository 650 mg  650 mg Rectal Q4H PRN Juanita Guevara MD        amLODIPine (NORVASC) tablet 5 mg  5 mg Oral QAM Juanita Guevara MD   5 mg at 09/04/24 0604    sennosides-docusate (PERICOLACE) 8.6-50 MG per tablet 2 tablet  2 tablet Oral BID PRJuanita Manrique MD        And    polyethylene glycol (MIRALAX) packet 17 g  17 g Oral Daily PRN Juanita Guevara MD        And    bisacodyl (DULCOLAX) EC tablet 5 mg  5 mg Oral " Daily PRN Juanita Guevara MD        And    bisacodyl (DULCOLAX) suppository 10 mg  10 mg Rectal Daily PRN Juanita Guevara MD        Calcium Replacement - Follow Nurse / BPA Driven Protocol   Does not apply PRN StingJuanita ryan MD        cefTRIAXone (ROCEPHIN) 2,000 mg in sodium chloride 0.9 % 100 mL MBP  2,000 mg Intravenous Q24H Juanita Guevara MD        cloNIDine (CATAPRES) tablet 0.1 mg  0.1 mg Oral Q4H PRN Juanita Guevara MD        fluticasone (FLONASE) 50 MCG/ACT nasal spray 2 spray  2 spray Nasal Daily Juanita Guevara MD   2 spray at 09/04/24 0921    folic acid 1 mg in sodium chloride 0.9 % 50 mL IVPB  1 mg Intravenous Daily Juanita Guevara MD   Stopped at 09/04/24 1000    levothyroxine (SYNTHROID, LEVOTHROID) tablet 100 mcg  100 mcg Oral Daily Juanita Guevara MD   100 mcg at 09/04/24 0921    lisinopril (PRINIVIL,ZESTRIL) tablet 10 mg  10 mg Oral Daily Juanita Guevara MD   10 mg at 09/04/24 0932    LORazepam (ATIVAN) tablet 1 mg  1 mg Oral Q1H PRN Juanita Guevara MD        Or    LORazepam (ATIVAN) injection 1 mg  1 mg Intravenous Q1H PRN Juanita Guevara MD        Or    LORazepam (ATIVAN) tablet 2 mg  2 mg Oral Q1H PRN Juanita Guevara MD        Or    LORazepam (ATIVAN) injection 2 mg  2 mg Intravenous Q1H PRN Juanita Guevara MD        Or    LORazepam (ATIVAN) injection 2 mg  2 mg Intravenous Q15 Min PRN Juanita Guevara MD        Or    LORazepam (ATIVAN) injection 2 mg  2 mg Intramuscular Q15 Min PRN Juanita Guevara MD        LORazepam (ATIVAN) tablet 2 mg  2 mg Oral Q6H Juanita Guevara MD   2 mg at 09/04/24 0314    Followed by    LORazepam (ATIVAN) tablet 1 mg  1 mg Oral Q6H Juanita Guevara MD        Followed by    [START ON 9/5/2024] LORazepam (ATIVAN) tablet 1 mg  1 mg Oral Q12H Stingl, Juanita Tran MD        Followed by    [START ON 9/7/2024] LORazepam (ATIVAN) tablet 1 mg  1 mg Oral Daily  Juanita Guevara MD        Magnesium Standard Dose Replacement - Follow Nurse / BPA Driven Protocol   Does not apply PRN Juanita Guevara MD        melatonin tablet 2.5 mg  2.5 mg Oral Nightly PRN Juantia Guevara MD   2.5 mg at 09/03/24 2117    multivitamin with minerals 1 tablet  1 tablet Oral Daily Juanita Guevara MD   1 tablet at 09/04/24 0921    ondansetron ODT (ZOFRAN-ODT) disintegrating tablet 4 mg  4 mg Oral Q6H PRN Juanita Guevara MD        Or    ondansetron (ZOFRAN) injection 4 mg  4 mg Intravenous Q6H PRN Juanita Guevara MD        Phosphorus Replacement - Follow Nurse / BPA Driven Protocol   Does not apply PRN Juanita Guevara MD        Potassium Replacement - Follow Nurse / BPA Driven Protocol   Does not apply PRN Juanita Guevara MD        sodium chloride 0.9 % flush 10 mL  10 mL Intravenous PRN Janet Reeder MD        sodium chloride 0.9 % infusion  75 mL/hr Intravenous Continuous Juanita Guevara MD 75 mL/hr at 09/04/24 1144 75 mL/hr at 09/04/24 1144    thiamine (B-1) injection 200 mg  200 mg Intravenous Q8H Juanita Guevara MD   200 mg at 09/04/24 0604    Followed by    [START ON 9/9/2024] thiamine (VITAMIN B-1) tablet 100 mg  100 mg Oral Daily Juanita Guevara MD             ALLERGIES: Penicillin    FAMILY HISTORY: Reviewed no changes    SOCIAL HISTORY: Reviewed no changes    MENTAL STATUS EXAM: Patient is a well-developed well-nourished white male who is in a state of some dishevelment.  He is awake alert and oriented all spheres.  His mood is euthymic his affect full range.  Speech is generally relevant and coherent.  There are no deficits memory or cognition noted.  Intelligence is judged to be in the average range based on fund of knowledge, the patient is cooperative throughout interview.  He is currently reporting no suicidal or homicidal ideation or psychotic features.  Judgement and insight are intact.  Vital signs:  "Heart rate: 70, blood pressure, 148/89.    ASSETS/LIABILITIES: To be assessed    DIAGNOSTIC IMPRESSION: Alcohol use disorder, bipolar disorder unspecified by history, medical problems as previously documented    PLAN: Again, the patient does not express interest in maintenance of sobriety but states that he wishes to \"cut back\".  I have explained to him that this is generally not a successful strategy in alcohol addiction.  He will be given information regarding post discharge chemical dependence treatment options by [facility].  He is currently exhibiting no signs of a bipolar episode and hence I will not reinitiate any psychotropic medications at this time.  Little else to add, I will sign off.  "

## 2024-09-04 NOTE — PLAN OF CARE
Goal Outcome Evaluation:  Plan of Care Reviewed With: patient           Outcome Evaluation: Pt is a 68 y/o male presenting to hospital with nausea, diarrhea, and headache potentially due dehydration, hypomagnesemia, and alcohol abuse. PMH includes hypertension and hypothyroidism. Imaging shows cholelithiasis and colonic diverticula. Prior to arrival, pt uses a front wheeled walker to ambulate in his house. Pt presents with B hand tremors potentially due to alcohol withdraw. Pt able to complete all bed mobility SBA and STS transfer CGA. Ambulated 270' CGA with front wheeled walker without any LOB or safety concerns. Pt would continue to benefit from skilled therapy in order to continue improving strength and endurance required for independence with all mobility and functional tasks. Rec D/C to home.      Anticipated Discharge Disposition (PT): home                         97

## 2024-09-04 NOTE — DISCHARGE SUMMARY
[facility]    Date of Admission: 9/3/2024  Date of Discharge:  9/4/2024    PCP: Provider, No Known    Discharge Diagnosis:   Active Hospital Problems    Diagnosis  POA    **Nausea and vomiting [R11.2]  Yes    Alcohol dependence with withdrawal [F10.239]  Yes    Chronic low back pain [M54.50, G89.29]  Yes    Hypertension [I10]  Yes    Hyperlipidemia [E78.5]  Yes    Hypothyroidism [E03.9]  Yes      Resolved Hospital Problems    Diagnosis Date Resolved POA    Dehydration [E86.0] 09/04/2024 Yes     Procedures Performed  none     Consults       Date and Time Order Name Status Description    9/3/2024 10:42 PM Inpatient Psychiatrist Consult Completed     9/3/2024  3:19 PM [facility] (on-call MD unless specified) Details            Hospital Course  Please see history and physical for details. Patient is a 69 y.o. male who presented to the emergency room with a headache, nausea and diarrhea.  He states his symptoms have been present about 18 hours prior to coming to the hospital.  He was given Zofran and route and reported vast improvement.  He was admitted for further evaluation and monitoring.  He does have a history of alcohol dependence as well.   CT A/P on admission revealed no signs of pancreatitis.  He did have cholelithiasis with mild gallbladder wall thickening and mild common bile duct dilatation that could represent choledocholithiasis.  He also had some urinary bladder wall thickening that could be related to chronic bladder outlet obstruction or cystitis.  There was no other acute findings in abdomen.  Ultrasound of his gallbladder was further obtained showing cholelithiasis without any evidence for acute cholecystitis.  He did have mild elevation of his AST which is downtrending on labs this morning.  His bilirubin has been normal.  He did have some lactic acidosis secondary to his GI upset and likely dehydration..  He was given IV fluids with improvement.  He has  not had any leukocytosis or fever to suggest underlying infection.  He was given antibiotics during the stay briefly.  His blood cultures are currently pending at this time, but there is low suspicion for any underlying infection.  His symptoms quickly resolved without much intervention.  His diet was advanced and he has tolerated a regular diet this afternoon.  He denies any further nausea, vomiting and has not had any stools today.  He denies any abdominal pain as well.   He did report increased alcohol intake since his nursing home this past October.  He was seen by [facility] and reported a desire to cut back, but not entirely quit.  Psychiatry was consulted.  He was found to have an alcohol use disorder as well as bipolar disorder, but felt to be clinically stable at this time and no psychotropic medications were added.  He was recommended to follow-up outpatient for chemical dependence treatment options as well as to see his usual psychiatrist if indicated.   On the day of discharge, he denies any chest pain, dyspnea, cough, fever or chills.  He has been hemodynamically during the hospital stay without any signs of confusion or disorientation.  His CIWA scores have been low and he has been ambulating with PT.  He was cleared for discharge home.  He was advised to come back to the hospital for any changes in his condition.  See his PCP in the next 1 to 2 weeks.    I discussed the patient's findings and my recommendations with patient, nursing staff, and Dr. Castaneda .    Condition on Discharge: Improved.     Temp:  [98.2 °F (36.8 °C)-99.5 °F (37.5 °C)] 98.2 °F (36.8 °C)  Heart Rate:  [65-74] 70  Resp:  [18-19] 18  BP: (132-149)/(73-93) 148/89  Body mass index is 27.96 kg/m².    Physical Exam  Vitals and nursing note reviewed.   Constitutional:       Appearance: He is not toxic-appearing.   Cardiovascular:      Rate and Rhythm: Normal rate and regular rhythm.      Pulses: Normal pulses.   Pulmonary:       Effort: Pulmonary effort is normal. No respiratory distress.      Breath sounds: Normal breath sounds.   Abdominal:      General: Bowel sounds are normal. There is no distension.      Palpations: Abdomen is soft.      Tenderness: There is no abdominal tenderness.   Musculoskeletal:         General: No swelling. Normal range of motion.   Skin:     General: Skin is warm and dry.      Findings: No bruising.   Neurological:      Mental Status: He is alert and oriented to person, place, and time.      Comments: Mild hand tremor to touch   Psychiatric:         Mood and Affect: Mood normal.         Behavior: Behavior normal.          Discharge Medications        Continue These Medications        Instructions Start Date   amLODIPine 5 MG tablet  Commonly known as: NORVASC   5 mg, Oral, Every Morning      fluticasone 50 MCG/ACT nasal spray  Commonly known as: FLONASE   2 sprays, Nasal, Daily      levothyroxine 100 MCG tablet  Commonly known as: SYNTHROID, LEVOTHROID   100 mcg, Oral, Daily      lisinopril 10 MG tablet  Commonly known as: PRINIVIL,ZESTRIL   10 mg, Oral, Daily             Stop These Medications      pseudoephedrine 30 MG tablet  Commonly known as: Sudafed     traMADol 50 MG tablet  Commonly known as: ULTRAM             Diet Instructions       Diet: Regular/House Diet, Gastrointestinal Diets; Fiber-Restricted, Low Irritant; Soft to Chew (NDD 3); Whole Meat; Thin (IDDSI 0)      Discharge Diet:  Regular/House Diet  Gastrointestinal Diets       Gastrointestinal Diet:  Fiber-Restricted  Low Irritant       Texture: Soft to Chew (NDD 3)    Soft to Chew: Whole Meat    Fluid Consistency: Thin (IDDSI 0)           Activity Instructions       Activity as Tolerated             Additional Instructions for the Follow-ups that You Need to Schedule       Discharge Follow-up with PCP   As directed       Currently Documented PCP:    Ramona Burton Known    PCP Phone Number:    [phone]     Follow Up Details: see in 1 week         Discharge Follow-up with Specified Provider: Psychiatry; 2 Weeks   As directed      To: Psychiatry   Follow Up: 2 Weeks               Follow-up Information       Provider, No Known .    Why: see in 1 week  Contact information:  [place] [id number] [phone]                           Test Results Pending at Discharge  Pending Labs       Order Current Status    Albumin In process    Blood Culture - Blood, Hand, Left In process    Blood Culture - Blood, Wrist, Right In process           BERONICA Ambriz  09/04/24  14:26 EDT    Discharge time spent greater than 30 minutes.

## 2024-09-05 NOTE — CASE MANAGEMENT/SOCIAL WORK
Case Management Discharge Note      Final Note: Pt discharged home on 9/4.  BEBE De La Torre RN         Selected Continued Care - Discharged on 9/4/2024 Admission date: 9/3/2024 - Discharge disposition: Home or Self Care      Destination    No services have been selected for the patient.                Durable Medical Equipment    No services have been selected for the patient.                Dialysis/Infusion    No services have been selected for the patient.                Home Medical Care    No services have been selected for the patient.                Therapy    No services have been selected for the patient.                Community Resources    No services have been selected for the patient.                Community & DME    No services have been selected for the patient.                    Transportation Services  Private: Car    Final Discharge Disposition Code: 01 - home or self-care

## 2024-09-08 LAB
BACTERIA SPEC AEROBE CULT: NORMAL
BACTERIA SPEC AEROBE CULT: NORMAL

## 2024-09-20 ENCOUNTER — HOSPITAL ENCOUNTER (INPATIENT)
Facility: HOSPITAL | Age: 69
LOS: 5 days | Discharge: HOME | End: 2024-09-25
Attending: EMERGENCY MEDICINE | Admitting: HOSPITALIST
Payer: MEDICARE

## 2024-09-20 ENCOUNTER — APPOINTMENT (OUTPATIENT)
Dept: CT IMAGING | Facility: HOSPITAL | Age: 69
End: 2024-09-20
Payer: COMMERCIAL

## 2024-09-20 DIAGNOSIS — K80.10 CALCULUS OF GALLBLADDER WITH CHOLECYSTITIS WITHOUT BILIARY OBSTRUCTION, UNSPECIFIED CHOLECYSTITIS ACUITY: ICD-10-CM

## 2024-09-20 DIAGNOSIS — E87.20 LACTIC ACIDOSIS: ICD-10-CM

## 2024-09-20 DIAGNOSIS — R74.8 ELEVATED LIPASE: ICD-10-CM

## 2024-09-20 DIAGNOSIS — K80.00 CALCULUS OF GALLBLADDER WITH ACUTE CHOLECYSTITIS WITHOUT OBSTRUCTION: ICD-10-CM

## 2024-09-20 DIAGNOSIS — F10.920 ALCOHOLIC INTOXICATION WITHOUT COMPLICATION: Primary | ICD-10-CM

## 2024-09-20 DIAGNOSIS — R79.89 ELEVATED LFTS: ICD-10-CM

## 2024-09-20 PROBLEM — F10.229: Status: ACTIVE | Noted: 2024-09-20

## 2024-09-20 LAB
ALBUMIN SERPL-MCNC: 4.2 G/DL (ref 3.5–5.2)
ALBUMIN/GLOB SERPL: 1.6 G/DL
ALP SERPL-CCNC: 127 U/L (ref 39–117)
ALT SERPL W P-5'-P-CCNC: 24 U/L (ref 1–41)
ANION GAP SERPL CALCULATED.3IONS-SCNC: 14.5 MMOL/L (ref 5–15)
AST SERPL-CCNC: 112 U/L (ref 1–40)
BACTERIA UR QL AUTO: ABNORMAL /HPF
BASOPHILS # BLD AUTO: 0.03 10*3/MM3 (ref 0–0.2)
BASOPHILS NFR BLD AUTO: 0.7 % (ref 0–1.5)
BILIRUB SERPL-MCNC: 0.5 MG/DL (ref 0–1.2)
BILIRUB UR QL STRIP: NEGATIVE
BUN SERPL-MCNC: 6 MG/DL (ref 8–23)
BUN/CREAT SERPL: 9.2 (ref 7–25)
CALCIUM SPEC-SCNC: 8.6 MG/DL (ref 8.6–10.5)
CHLORIDE SERPL-SCNC: 103 MMOL/L (ref 98–107)
CLARITY UR: CLEAR
CO2 SERPL-SCNC: 24.5 MMOL/L (ref 22–29)
COLOR UR: YELLOW
CREAT SERPL-MCNC: 0.65 MG/DL (ref 0.76–1.27)
D-LACTATE SERPL-SCNC: 2.2 MMOL/L (ref 0.5–2)
D-LACTATE SERPL-SCNC: 3 MMOL/L (ref 0.5–2)
DEPRECATED RDW RBC AUTO: 58.2 FL (ref 37–54)
EGFRCR SERPLBLD CKD-EPI 2021: 103.3 ML/MIN/1.73
EOSINOPHIL # BLD AUTO: 0.08 10*3/MM3 (ref 0–0.4)
EOSINOPHIL NFR BLD AUTO: 1.9 % (ref 0.3–6.2)
ERYTHROCYTE [DISTWIDTH] IN BLOOD BY AUTOMATED COUNT: 15.7 % (ref 12.3–15.4)
ETHANOL BLD-MCNC: 184 MG/DL (ref 0–10)
ETHANOL UR QL: 0.18 %
GLOBULIN UR ELPH-MCNC: 2.7 GM/DL
GLUCOSE SERPL-MCNC: 100 MG/DL (ref 65–99)
GLUCOSE UR STRIP-MCNC: NEGATIVE MG/DL
HCT VFR BLD AUTO: 41.1 % (ref 37.5–51)
HGB BLD-MCNC: 13.8 G/DL (ref 13–17.7)
HGB UR QL STRIP.AUTO: NEGATIVE
HOLD SPECIMEN: NORMAL
HOLD SPECIMEN: NORMAL
HYALINE CASTS UR QL AUTO: ABNORMAL /LPF
IMM GRANULOCYTES # BLD AUTO: 0.02 10*3/MM3 (ref 0–0.05)
IMM GRANULOCYTES NFR BLD AUTO: 0.5 % (ref 0–0.5)
KETONES UR QL STRIP: NEGATIVE
LEUKOCYTE ESTERASE UR QL STRIP.AUTO: ABNORMAL
LIPASE SERPL-CCNC: 139 U/L (ref 13–60)
LYMPHOCYTES # BLD AUTO: 2.05 10*3/MM3 (ref 0.7–3.1)
LYMPHOCYTES NFR BLD AUTO: 47.7 % (ref 19.6–45.3)
MAGNESIUM SERPL-MCNC: 1.4 MG/DL (ref 1.6–2.4)
MCH RBC QN AUTO: 33.9 PG (ref 26.6–33)
MCHC RBC AUTO-ENTMCNC: 33.6 G/DL (ref 31.5–35.7)
MCV RBC AUTO: 101 FL (ref 79–97)
MONOCYTES # BLD AUTO: 0.53 10*3/MM3 (ref 0.1–0.9)
MONOCYTES NFR BLD AUTO: 12.3 % (ref 5–12)
NEUTROPHILS NFR BLD AUTO: 1.59 10*3/MM3 (ref 1.7–7)
NEUTROPHILS NFR BLD AUTO: 36.9 % (ref 42.7–76)
NITRITE UR QL STRIP: NEGATIVE
NRBC BLD AUTO-RTO: 0 /100 WBC (ref 0–0.2)
PH UR STRIP.AUTO: 5.5 [PH] (ref 5–8)
PLATELET # BLD AUTO: 156 10*3/MM3 (ref 140–450)
PMV BLD AUTO: 8.9 FL (ref 6–12)
POTASSIUM SERPL-SCNC: 3.5 MMOL/L (ref 3.5–5.2)
PROT SERPL-MCNC: 6.9 G/DL (ref 6–8.5)
PROT UR QL STRIP: NEGATIVE
RBC # BLD AUTO: 4.07 10*6/MM3 (ref 4.14–5.8)
RBC # UR STRIP: ABNORMAL /HPF
REF LAB TEST METHOD: ABNORMAL
SODIUM SERPL-SCNC: 142 MMOL/L (ref 136–145)
SP GR UR STRIP: >1.03 (ref 1–1.03)
SQUAMOUS #/AREA URNS HPF: ABNORMAL /HPF
UROBILINOGEN UR QL STRIP: ABNORMAL
WBC # UR STRIP: ABNORMAL /HPF
WBC NRBC COR # BLD AUTO: 4.3 10*3/MM3 (ref 3.4–10.8)
WHOLE BLOOD HOLD COAG: NORMAL
WHOLE BLOOD HOLD SPECIMEN: NORMAL

## 2024-09-20 PROCEDURE — 25810000003 SODIUM CHLORIDE 0.9 % SOLUTION: Performed by: NURSE PRACTITIONER

## 2024-09-20 PROCEDURE — 81001 URINALYSIS AUTO W/SCOPE: CPT | Performed by: EMERGENCY MEDICINE

## 2024-09-20 PROCEDURE — 25510000001 IOPAMIDOL 61 % SOLUTION: Performed by: EMERGENCY MEDICINE

## 2024-09-20 PROCEDURE — 25010000002 HYDROMORPHONE PER 4 MG: Performed by: NURSE PRACTITIONER

## 2024-09-20 PROCEDURE — 83690 ASSAY OF LIPASE: CPT | Performed by: EMERGENCY MEDICINE

## 2024-09-20 PROCEDURE — 83605 ASSAY OF LACTIC ACID: CPT | Performed by: EMERGENCY MEDICINE

## 2024-09-20 PROCEDURE — 25010000002 MORPHINE PER 10 MG: Performed by: NURSE PRACTITIONER

## 2024-09-20 PROCEDURE — 36415 COLL VENOUS BLD VENIPUNCTURE: CPT

## 2024-09-20 PROCEDURE — 83735 ASSAY OF MAGNESIUM: CPT | Performed by: NURSE PRACTITIONER

## 2024-09-20 PROCEDURE — 82077 ASSAY SPEC XCP UR&BREATH IA: CPT | Performed by: NURSE PRACTITIONER

## 2024-09-20 PROCEDURE — 99222 1ST HOSP IP/OBS MODERATE 55: CPT

## 2024-09-20 PROCEDURE — 25010000002 LORAZEPAM PER 2 MG: Performed by: HOSPITALIST

## 2024-09-20 PROCEDURE — 25010000002 HYDROMORPHONE PER 4 MG: Performed by: HOSPITALIST

## 2024-09-20 PROCEDURE — 80053 COMPREHEN METABOLIC PANEL: CPT | Performed by: EMERGENCY MEDICINE

## 2024-09-20 PROCEDURE — 25010000002 SODIUM CHLORIDE 0.9 % WITH KCL 20 MEQ 20-0.9 MEQ/L-% SOLUTION: Performed by: HOSPITALIST

## 2024-09-20 PROCEDURE — 25010000002 ONDANSETRON PER 1 MG: Performed by: NURSE PRACTITIONER

## 2024-09-20 PROCEDURE — 85025 COMPLETE CBC W/AUTO DIFF WBC: CPT | Performed by: EMERGENCY MEDICINE

## 2024-09-20 PROCEDURE — 74177 CT ABD & PELVIS W/CONTRAST: CPT

## 2024-09-20 PROCEDURE — 25010000002 THIAMINE HCL 200 MG/2ML SOLUTION: Performed by: HOSPITALIST

## 2024-09-20 PROCEDURE — 99285 EMERGENCY DEPT VISIT HI MDM: CPT

## 2024-09-20 PROCEDURE — 25010000002 MAGNESIUM SULFATE 4 GM/100ML SOLUTION: Performed by: HOSPITALIST

## 2024-09-20 RX ORDER — LISINOPRIL 20 MG/1
20 TABLET ORAL DAILY
Status: DISCONTINUED | OUTPATIENT
Start: 2024-09-20 | End: 2024-09-25 | Stop reason: HOSPADM

## 2024-09-20 RX ORDER — THIAMINE HYDROCHLORIDE 100 MG/ML
200 INJECTION, SOLUTION INTRAMUSCULAR; INTRAVENOUS EVERY 8 HOURS SCHEDULED
Status: DISPENSED | OUTPATIENT
Start: 2024-09-20 | End: 2024-09-25

## 2024-09-20 RX ORDER — MORPHINE SULFATE 2 MG/ML
4 INJECTION, SOLUTION INTRAMUSCULAR; INTRAVENOUS ONCE
Status: COMPLETED | OUTPATIENT
Start: 2024-09-20 | End: 2024-09-20

## 2024-09-20 RX ORDER — POTASSIUM CHLORIDE 750 MG/1
40 TABLET, FILM COATED, EXTENDED RELEASE ORAL ONCE
Status: COMPLETED | OUTPATIENT
Start: 2024-09-20 | End: 2024-09-20

## 2024-09-20 RX ORDER — SODIUM CHLORIDE 0.9 % (FLUSH) 0.9 %
10 SYRINGE (ML) INJECTION EVERY 12 HOURS SCHEDULED
Status: DISCONTINUED | OUTPATIENT
Start: 2024-09-20 | End: 2024-09-25 | Stop reason: HOSPADM

## 2024-09-20 RX ORDER — LORAZEPAM 1 MG/1
1 TABLET ORAL EVERY 6 HOURS
Status: DISCONTINUED | OUTPATIENT
Start: 2024-09-21 | End: 2024-09-21

## 2024-09-20 RX ORDER — FOLIC ACID 1 MG/1
1 TABLET ORAL DAILY
Status: DISCONTINUED | OUTPATIENT
Start: 2024-09-20 | End: 2024-09-25 | Stop reason: HOSPADM

## 2024-09-20 RX ORDER — LORAZEPAM 2 MG/ML
1 INJECTION INTRAMUSCULAR
Status: DISCONTINUED | OUTPATIENT
Start: 2024-09-20 | End: 2024-09-23

## 2024-09-20 RX ORDER — SODIUM CHLORIDE 0.9 % (FLUSH) 0.9 %
10 SYRINGE (ML) INJECTION AS NEEDED
Status: DISCONTINUED | OUTPATIENT
Start: 2024-09-20 | End: 2024-09-25 | Stop reason: HOSPADM

## 2024-09-20 RX ORDER — LORAZEPAM 1 MG/1
1 TABLET ORAL
Status: DISCONTINUED | OUTPATIENT
Start: 2024-09-20 | End: 2024-09-23

## 2024-09-20 RX ORDER — HYDROMORPHONE HYDROCHLORIDE 1 MG/ML
0.5 INJECTION, SOLUTION INTRAMUSCULAR; INTRAVENOUS; SUBCUTANEOUS ONCE
Status: COMPLETED | OUTPATIENT
Start: 2024-09-20 | End: 2024-09-20

## 2024-09-20 RX ORDER — MAGNESIUM SULFATE HEPTAHYDRATE 40 MG/ML
4 INJECTION, SOLUTION INTRAVENOUS ONCE
Status: COMPLETED | OUTPATIENT
Start: 2024-09-20 | End: 2024-09-20

## 2024-09-20 RX ORDER — LEVOTHYROXINE SODIUM 100 UG/1
100 TABLET ORAL DAILY
Status: DISCONTINUED | OUTPATIENT
Start: 2024-09-20 | End: 2024-09-25 | Stop reason: HOSPADM

## 2024-09-20 RX ORDER — ACETAMINOPHEN 325 MG/1
650 TABLET ORAL EVERY 6 HOURS PRN
Status: DISCONTINUED | OUTPATIENT
Start: 2024-09-20 | End: 2024-09-25 | Stop reason: HOSPADM

## 2024-09-20 RX ORDER — LORAZEPAM 1 MG/1
1 TABLET ORAL EVERY 12 HOURS SCHEDULED
Status: DISCONTINUED | OUTPATIENT
Start: 2024-09-22 | End: 2024-09-21

## 2024-09-20 RX ORDER — HYDROMORPHONE HYDROCHLORIDE 1 MG/ML
0.5 INJECTION, SOLUTION INTRAMUSCULAR; INTRAVENOUS; SUBCUTANEOUS
Status: DISCONTINUED | OUTPATIENT
Start: 2024-09-20 | End: 2024-09-24

## 2024-09-20 RX ORDER — LORAZEPAM 2 MG/ML
2 INJECTION INTRAMUSCULAR
Status: DISCONTINUED | OUTPATIENT
Start: 2024-09-20 | End: 2024-09-23

## 2024-09-20 RX ORDER — ONDANSETRON 2 MG/ML
4 INJECTION INTRAMUSCULAR; INTRAVENOUS ONCE
Status: COMPLETED | OUTPATIENT
Start: 2024-09-20 | End: 2024-09-20

## 2024-09-20 RX ORDER — ONDANSETRON 2 MG/ML
4 INJECTION INTRAMUSCULAR; INTRAVENOUS EVERY 6 HOURS PRN
Status: DISCONTINUED | OUTPATIENT
Start: 2024-09-20 | End: 2024-09-25 | Stop reason: HOSPADM

## 2024-09-20 RX ORDER — BISACODYL 10 MG
10 SUPPOSITORY, RECTAL RECTAL DAILY PRN
Status: DISCONTINUED | OUTPATIENT
Start: 2024-09-20 | End: 2024-09-25 | Stop reason: HOSPADM

## 2024-09-20 RX ORDER — FLUTICASONE PROPIONATE 50 UG/1
2 SPRAY, METERED NASAL DAILY
Status: DISCONTINUED | OUTPATIENT
Start: 2024-09-20 | End: 2024-09-25 | Stop reason: HOSPADM

## 2024-09-20 RX ORDER — POLYETHYLENE GLYCOL 3350 17 G/17G
17 POWDER, FOR SOLUTION ORAL DAILY PRN
Status: DISCONTINUED | OUTPATIENT
Start: 2024-09-20 | End: 2024-09-25 | Stop reason: HOSPADM

## 2024-09-20 RX ORDER — PANTOPRAZOLE SODIUM 40 MG/10ML
40 INJECTION, POWDER, LYOPHILIZED, FOR SOLUTION INTRAVENOUS
Status: DISCONTINUED | OUTPATIENT
Start: 2024-09-21 | End: 2024-09-24

## 2024-09-20 RX ORDER — SODIUM CHLORIDE 9 MG/ML
40 INJECTION, SOLUTION INTRAVENOUS AS NEEDED
Status: DISCONTINUED | OUTPATIENT
Start: 2024-09-20 | End: 2024-09-25 | Stop reason: HOSPADM

## 2024-09-20 RX ORDER — IOPAMIDOL 612 MG/ML
100 INJECTION, SOLUTION INTRAVASCULAR
Status: COMPLETED | OUTPATIENT
Start: 2024-09-20 | End: 2024-09-20

## 2024-09-20 RX ORDER — LORAZEPAM 1 MG/1
1 TABLET ORAL DAILY
Status: DISCONTINUED | OUTPATIENT
Start: 2024-09-23 | End: 2024-09-21

## 2024-09-20 RX ORDER — BISACODYL 5 MG/1
5 TABLET, DELAYED RELEASE ORAL DAILY PRN
Status: DISCONTINUED | OUTPATIENT
Start: 2024-09-20 | End: 2024-09-25 | Stop reason: HOSPADM

## 2024-09-20 RX ORDER — ONDANSETRON 4 MG/1
4 TABLET, ORALLY DISINTEGRATING ORAL EVERY 6 HOURS PRN
Status: DISCONTINUED | OUTPATIENT
Start: 2024-09-20 | End: 2024-09-25 | Stop reason: HOSPADM

## 2024-09-20 RX ORDER — FAMOTIDINE 10 MG/ML
20 INJECTION, SOLUTION INTRAVENOUS EVERY 12 HOURS SCHEDULED
Status: DISCONTINUED | OUTPATIENT
Start: 2024-09-20 | End: 2024-09-20

## 2024-09-20 RX ORDER — AMLODIPINE BESYLATE 5 MG/1
5 TABLET ORAL EVERY MORNING
Status: DISCONTINUED | OUTPATIENT
Start: 2024-09-20 | End: 2024-09-25 | Stop reason: HOSPADM

## 2024-09-20 RX ORDER — AMOXICILLIN 250 MG
2 CAPSULE ORAL 2 TIMES DAILY PRN
Status: DISCONTINUED | OUTPATIENT
Start: 2024-09-20 | End: 2024-09-25 | Stop reason: HOSPADM

## 2024-09-20 RX ORDER — LORAZEPAM 1 MG/1
2 TABLET ORAL EVERY 6 HOURS
Status: COMPLETED | OUTPATIENT
Start: 2024-09-20 | End: 2024-09-21

## 2024-09-20 RX ORDER — SODIUM CHLORIDE AND POTASSIUM CHLORIDE 150; 900 MG/100ML; MG/100ML
100 INJECTION, SOLUTION INTRAVENOUS CONTINUOUS
Status: DISPENSED | OUTPATIENT
Start: 2024-09-20 | End: 2024-09-21

## 2024-09-20 RX ORDER — SUCRALFATE 1 G/1
1 TABLET ORAL
Status: DISCONTINUED | OUTPATIENT
Start: 2024-09-20 | End: 2024-09-25 | Stop reason: HOSPADM

## 2024-09-20 RX ORDER — LORAZEPAM 1 MG/1
2 TABLET ORAL
Status: DISCONTINUED | OUTPATIENT
Start: 2024-09-20 | End: 2024-09-23

## 2024-09-20 RX ADMIN — HYDROMORPHONE HYDROCHLORIDE 0.5 MG: 1 INJECTION, SOLUTION INTRAMUSCULAR; INTRAVENOUS; SUBCUTANEOUS at 07:55

## 2024-09-20 RX ADMIN — LISINOPRIL 20 MG: 20 TABLET ORAL at 12:07

## 2024-09-20 RX ADMIN — SUCRALFATE 1 G: 1 TABLET ORAL at 13:34

## 2024-09-20 RX ADMIN — IOPAMIDOL 85 ML: 612 INJECTION, SOLUTION INTRAVENOUS at 03:04

## 2024-09-20 RX ADMIN — THIAMINE HYDROCHLORIDE 200 MG: 100 INJECTION, SOLUTION INTRAMUSCULAR; INTRAVENOUS at 14:38

## 2024-09-20 RX ADMIN — LORAZEPAM 2 MG: 1 TABLET ORAL at 20:22

## 2024-09-20 RX ADMIN — HYDROMORPHONE HYDROCHLORIDE 0.5 MG: 1 INJECTION, SOLUTION INTRAMUSCULAR; INTRAVENOUS; SUBCUTANEOUS at 20:26

## 2024-09-20 RX ADMIN — SUCRALFATE 1 G: 1 TABLET ORAL at 17:38

## 2024-09-20 RX ADMIN — HYDROMORPHONE HYDROCHLORIDE 0.5 MG: 1 INJECTION, SOLUTION INTRAMUSCULAR; INTRAVENOUS; SUBCUTANEOUS at 03:36

## 2024-09-20 RX ADMIN — Medication 10 ML: at 08:29

## 2024-09-20 RX ADMIN — LORAZEPAM 2 MG: 1 TABLET ORAL at 16:00

## 2024-09-20 RX ADMIN — FAMOTIDINE 20 MG: 10 INJECTION INTRAVENOUS at 08:29

## 2024-09-20 RX ADMIN — LORAZEPAM 2 MG: 2 INJECTION INTRAMUSCULAR; INTRAVENOUS at 08:57

## 2024-09-20 RX ADMIN — LORAZEPAM 2 MG: 1 TABLET ORAL at 10:47

## 2024-09-20 RX ADMIN — HYDROMORPHONE HYDROCHLORIDE 0.5 MG: 1 INJECTION, SOLUTION INTRAMUSCULAR; INTRAVENOUS; SUBCUTANEOUS at 18:11

## 2024-09-20 RX ADMIN — POTASSIUM CHLORIDE 40 MEQ: 750 TABLET, EXTENDED RELEASE ORAL at 08:24

## 2024-09-20 RX ADMIN — AMLODIPINE BESYLATE 5 MG: 5 TABLET ORAL at 10:47

## 2024-09-20 RX ADMIN — FLUTICASONE PROPIONATE 2 SPRAY: 50 SPRAY, METERED NASAL at 12:07

## 2024-09-20 RX ADMIN — SODIUM CHLORIDE 1000 ML: 9 INJECTION, SOLUTION INTRAVENOUS at 02:41

## 2024-09-20 RX ADMIN — SUCRALFATE 1 G: 1 TABLET ORAL at 20:22

## 2024-09-20 RX ADMIN — LEVOTHYROXINE SODIUM 100 MCG: 100 TABLET ORAL at 10:47

## 2024-09-20 RX ADMIN — HYDROMORPHONE HYDROCHLORIDE 0.5 MG: 1 INJECTION, SOLUTION INTRAMUSCULAR; INTRAVENOUS; SUBCUTANEOUS at 05:36

## 2024-09-20 RX ADMIN — ONDANSETRON 4 MG: 2 INJECTION, SOLUTION INTRAMUSCULAR; INTRAVENOUS at 02:44

## 2024-09-20 RX ADMIN — MORPHINE SULFATE 4 MG: 2 INJECTION, SOLUTION INTRAMUSCULAR; INTRAVENOUS at 02:42

## 2024-09-20 RX ADMIN — FOLIC ACID 1 MG: 1 TABLET ORAL at 12:07

## 2024-09-20 RX ADMIN — THIAMINE HYDROCHLORIDE 200 MG: 100 INJECTION, SOLUTION INTRAMUSCULAR; INTRAVENOUS at 21:36

## 2024-09-20 RX ADMIN — MAGNESIUM SULFATE HEPTAHYDRATE 4 G: 40 INJECTION, SOLUTION INTRAVENOUS at 08:29

## 2024-09-20 RX ADMIN — POTASSIUM CHLORIDE AND SODIUM CHLORIDE 100 ML/HR: 900; 150 INJECTION, SOLUTION INTRAVENOUS at 14:38

## 2024-09-20 NOTE — CONSULTS
Chp visited with pt. Pt expressed boredom and physical pain. Through conversation Chap discovered pt is interested in sci fi and westerns. Chp suggested flipping through the stations for a western channel that was once there and might possibly still be there.  Pt asked for a coke.  Chp reported that ask to the nurse . Chp remains available.

## 2024-09-20 NOTE — PROGRESS NOTES
Clinical Pharmacy Services: Medication History    Pro Mueller is a 69 y.o. male presenting to [facility] for   Chief Complaint   Patient presents with    Abdominal Pain       He  has a past medical history of Alcohol abuse, Alcohol withdrawal (11/04/2016), Alcoholic ketoacidosis (01/12/2020), Allergic (1970), Anxiety, Arthritis, Atopic rhinitis (08/08/2016), Depression, Disease of thyroid gland, Elevated cholesterol, Encounter for removal of sutures, Genital herpes simplex (08/08/2016), GERD (gastroesophageal reflux disease), Headache, tension-type, Hyperlipidemia, Hypertension, Hypothyroidism, Kidney stone, Low back pain (2019), Migraine, Motion sickness, Nephrolithiasis (02/12/2020), Olecranon bursitis, right elbow, Panic disorder without agoraphobia (08/08/2016), Peripheral neuropathy, Sleep apnea, Syncope and collapse (01/02/2019), Vitamin D deficiency (08/08/2016), and Withdrawal symptoms, alcohol.    Allergies as of 09/20/2024 - Reviewed 09/20/2024   Allergen Reaction Noted    Penicillins Anaphylaxis and Seizure 01/04/2016       Medication information was obtained from: Patient   Pharmacy and Phone Number:     Prior to Admission Medications       Prescriptions Last Dose Informant Patient Reported? Taking?    amLODIPine (NORVASC) 5 MG tablet Past Month Self No Yes    Take 1 tablet by mouth Every Morning.    fluticasone (FLONASE) 50 MCG/ACT nasal spray  Self No Yes    2 sprays into the nostril(s) as directed by provider Daily.    Patient taking differently:  Administer 2 sprays into the nostril(s) as directed by provider As Needed.    levothyroxine (SYNTHROID, LEVOTHROID) 100 MCG tablet Past Month Self No Yes    Take 1 tablet by mouth Daily.    lisinopril (PRINIVIL,ZESTRIL) 10 MG tablet Past Month Self No Yes    Take 1 tablet by mouth Daily.              Medication notes:     This medication list is complete to the best of my knowledge as of 9/20/2024    Please call if questions.    Clement  Ananya  Medication History Technician  [phone]    9/20/2024 09:04 EDT

## 2024-09-20 NOTE — ED PROVIDER NOTES
MD ATTESTATION NOTE    SHARED VISIT: This visit was performed by BOTH a physician and an APC. The substantive portion of the medical decision making was performed by this attesting physician who made or approved the management plan and takes responsibility for patient management. All studies documented in the APC note (if performed) were independently interpreted by me.    The LUBA and I have discussed this patient's history, physical exam, MDM, and treatment plan.  I have reviewed the documentation and personally had a face to face interaction with the patient. The attached note describes my personal findings.      Pro Mueller is a 69 y.o. male who presents to the ED c/o acute diffuse abdominal pain and nausea without vomiting.  States that started this evening.  Has not had similar pain in the past.  No fevers or chills.    On exam:  GENERAL: Mild distressed  HENT: nares patent  EYES: no scleral icterus  CV: regular rhythm, regular rate  RESPIRATORY: normal effort  ABDOMEN: soft, generalized tenderness no rebound or guarding  MUSCULOSKELETAL: no deformity  NEURO: alert, moves all extremities, follows commands  SKIN: warm, dry    Labs  Recent Results (from the past 24 hour(s))   Comprehensive Metabolic Panel    Collection Time: 09/20/24  1:21 AM    Specimen: Blood   Result Value Ref Range    Glucose 100 (H) 65 - 99 mg/dL    BUN 6 (L) 8 - 23 mg/dL    Creatinine 0.65 (L) 0.76 - 1.27 mg/dL    Sodium 142 136 - 145 mmol/L    Potassium 3.5 3.5 - 5.2 mmol/L    Chloride 103 98 - 107 mmol/L    CO2 24.5 22.0 - 29.0 mmol/L    Calcium 8.6 8.6 - 10.5 mg/dL    Total Protein 6.9 6.0 - 8.5 g/dL    Albumin 4.2 3.5 - 5.2 g/dL    ALT (SGPT) 24 1 - 41 U/L    AST (SGOT) 112 (H) 1 - 40 U/L    Alkaline Phosphatase 127 (H) 39 - 117 U/L    Total Bilirubin 0.5 0.0 - 1.2 mg/dL    Globulin 2.7 gm/dL    A/G Ratio 1.6 g/dL    BUN/Creatinine Ratio 9.2 7.0 - 25.0    Anion Gap 14.5 5.0 - 15.0 mmol/L    eGFR 103.3 >60.0 mL/min/1.73   Lipase     Collection Time: 09/20/24  1:21 AM    Specimen: Blood   Result Value Ref Range    Lipase 139 (H) 13 - 60 U/L   Green Top (Gel)    Collection Time: 09/20/24  1:21 AM   Result Value Ref Range    Extra Tube Hold for add-ons.    Lavender Top    Collection Time: 09/20/24  1:21 AM   Result Value Ref Range    Extra Tube hold for add-on    Gold Top - SST    Collection Time: 09/20/24  1:21 AM   Result Value Ref Range    Extra Tube Hold for add-ons.    Light Blue Top    Collection Time: 09/20/24  1:21 AM   Result Value Ref Range    Extra Tube Hold for add-ons.    CBC Auto Differential    Collection Time: 09/20/24  1:21 AM    Specimen: Blood   Result Value Ref Range    WBC 4.30 3.40 - 10.80 10*3/mm3    RBC 4.07 (L) 4.14 - 5.80 10*6/mm3    Hemoglobin 13.8 13.0 - 17.7 g/dL    Hematocrit 41.1 37.5 - 51.0 %    .0 (H) 79.0 - 97.0 fL    MCH 33.9 (H) 26.6 - 33.0 pg    MCHC 33.6 31.5 - 35.7 g/dL    RDW 15.7 (H) 12.3 - 15.4 %    RDW-SD 58.2 (H) 37.0 - 54.0 fl    MPV 8.9 6.0 - 12.0 fL    Platelets 156 140 - 450 10*3/mm3    Neutrophil % 36.9 (L) 42.7 - 76.0 %    Lymphocyte % 47.7 (H) 19.6 - 45.3 %    Monocyte % 12.3 (H) 5.0 - 12.0 %    Eosinophil % 1.9 0.3 - 6.2 %    Basophil % 0.7 0.0 - 1.5 %    Immature Grans % 0.5 0.0 - 0.5 %    Neutrophils, Absolute 1.59 (L) 1.70 - 7.00 10*3/mm3    Lymphocytes, Absolute 2.05 0.70 - 3.10 10*3/mm3    Monocytes, Absolute 0.53 0.10 - 0.90 10*3/mm3    Eosinophils, Absolute 0.08 0.00 - 0.40 10*3/mm3    Basophils, Absolute 0.03 0.00 - 0.20 10*3/mm3    Immature Grans, Absolute 0.02 0.00 - 0.05 10*3/mm3    nRBC 0.0 0.0 - 0.2 /100 WBC   Lactic Acid, Plasma    Collection Time: 09/20/24  2:33 AM    Specimen: Blood   Result Value Ref Range    Lactate 2.2 (C) 0.5 - 2.0 mmol/L   Urinalysis With Microscopic If Indicated (No Culture) - Urine, Clean Catch    Collection Time: 09/20/24  4:57 AM    Specimen: Urine, Clean Catch   Result Value Ref Range    Color, UA Yellow Yellow, Straw    Appearance, UA Clear Clear     pH, UA 5.5 5.0 - 8.0    Specific Gravity, UA >1.030 (H) 1.005 - 1.030    Glucose, UA Negative Negative    Ketones, UA Negative Negative    Bilirubin, UA Negative Negative    Blood, UA Negative Negative    Protein, UA Negative Negative    Leuk Esterase, UA Trace (A) Negative    Nitrite, UA Negative Negative    Urobilinogen, UA 1.0 E.U./dL 0.2 - 1.0 E.U./dL   Urinalysis, Microscopic Only - Urine, Clean Catch    Collection Time: 09/20/24  4:57 AM    Specimen: Urine, Clean Catch   Result Value Ref Range    RBC, UA 0-2 None Seen, 0-2 /HPF    WBC, UA 6-10 (A) None Seen, 0-2 /HPF    Bacteria, UA None Seen None Seen /HPF    Squamous Epithelial Cells, UA 0-2 None Seen, 0-2 /HPF    Hyaline Casts, UA None Seen None Seen /LPF    Methodology Automated Microscopy    Ethanol    Collection Time: 09/20/24  5:00 AM    Specimen: Blood   Result Value Ref Range    Ethanol 184 (H) 0 - 10 mg/dL    Ethanol % 0.184 %   Magnesium    Collection Time: 09/20/24  5:00 AM    Specimen: Blood   Result Value Ref Range    Magnesium 1.4 (L) 1.6 - 2.4 mg/dL   STAT Lactic Acid, Reflex    Collection Time: 09/20/24  5:33 AM    Specimen: Blood   Result Value Ref Range    Lactate 3.0 (C) 0.5 - 2.0 mmol/L       Radiology  CT Abdomen Pelvis With Contrast    Result Date: 9/20/2024  Patient: HERB LUNA  Time Out: 04:05 Exam(s): CT ABDOMEN + PELVIS With Contrast EXAM:   CT Abdomen and Pelvis With Intravenous Contrast CLINICAL HISTORY:    Reason for exam: left sided abdominal pain. TECHNIQUE:   Axial computed tomography images of the abdomen and pelvis with intravenous contrast.  CTDI is 11.92 mGy and DLP is 672.6 mGy-cm.  This CT exam was performed according to the principle of ALARA (As Low As Reasonably Achievable) by using one or more of the following dose reduction techniques: automated exposure control, adjustment of the mA and or kV according to patient size, and or use of iterative reconstruction technique. COMPARISON:   CT abdomen pelvis on 9 3 2024  FINDINGS:   Lung bases:  Lower lung atelectasis.  ABDOMEN:   Liver:  Unremarkable.  No mass.   Gallbladder and bile ducts:  Cholelithiasis.  No ductal dilation.   Pancreas:  Unremarkable.  No mass.  No ductal dilation.   Spleen:  Small splenules.   Adrenals:  Nonspecific small left adrenal nodule.   Kidneys and ureters:  Right renal cyst.  No further follow-up necessary.   Mild scarring in the left kidney.  No hydronephrosis or obstructing ureteral stone.   Stomach and bowel:  Evaluation of the stomach is limited by underdistention.  Diverticulosis without evidence of diverticulitis.  No small bowel obstruction.  PELVIS:   Appendix:  Normal appendix.   Bladder:  Bladder wall thickening is nonspecific.  Please correlate with urinalysis if concerned for cystitis.   Reproductive:  Calcifications in a mildly enlarged prostate.  ABDOMEN and PELVIS:   Intraperitoneal space:  Unremarkable.  No free air.  No significant fluid collection.   Bones joints:  Mild degenerative changes of the spine.  No acute fracture.  No dislocation.   Soft tissues:  Mild bilateral gynecomastia.  Small fat-containing umbilical hernia.   Vasculature:  Phleboliths in the pelvis.  Atherosclerotic changes of the vasculature.  No abdominal aortic aneurysm or dissection.   Lymph nodes:  Unremarkable.  No enlarged lymph nodes.   Other findings:  Elevation of the right hemidiaphragm. IMPRESSION:     1.  Bladder wall thickening is nonspecific.  Please correlate with urinalysis if concerned for cystitis. 2.  Cholelithiasis.     Electronically signed by Jesus Siegel M.D. on 09-20-24 at 0405     Medications given in the ED:  Medications   sodium chloride 0.9 % flush 10 mL (has no administration in time range)   sodium chloride 0.9 % bolus 1,000 mL (0 mL Intravenous Stopped 9/20/24 0456)   morphine injection 4 mg (4 mg Intravenous Given 9/20/24 0242)   ondansetron (ZOFRAN) injection 4 mg (4 mg Intravenous Given 9/20/24 0244)   iopamidol (ISOVUE-300) 61 %  injection 100 mL (85 mL Intravenous Given by Other 9/20/24 0304)   HYDROmorphone (DILAUDID) injection 0.5 mg (0.5 mg Intravenous Given 9/20/24 0336)   HYDROmorphone (DILAUDID) injection 0.5 mg (0.5 mg Intravenous Given 9/20/24 0536)       Orders placed during this visit:  Orders Placed This Encounter   Procedures    CT Abdomen Pelvis With Contrast    [place] Draw    Comprehensive Metabolic Panel    Lipase    Urinalysis With Microscopic If Indicated (No Culture) - Urine, Clean Catch    Lactic Acid, Plasma    CBC Auto Differential    Ethanol    STAT Lactic Acid, Reflex    Urinalysis, Microscopic Only - Urine, Clean Catch    Magnesium    STAT Lactic Acid, Reflex    NPO Diet NPO Type: Strict NPO    Undress & Gown    [facility] (on-call MD unless specified) Details    Insert Peripheral IV    Inpatient Admission    CBC & Differential    Green Top (Gel)    Lavender Top    Gold Top - SST    Light Blue Top       Medical Decision Making:  ED Course as of 09/20/24 0715   Fri Sep 20, 2024   0159 Lipase(!): 139 [AR]   0159 WBC: 4.30 [AR]   0211 I discussed this case with Dr. Thomas. [AR]   0329 Lactate(!!): 2.2 [AR]   0403 BP: 166/94 [AR]   0403 Heart Rate: 69 [AR]   0530 Ethanol(!): 184 [AR]   0530 Patient continues to have pain, additional dose of Dilaudid ordered. [AR]   0622 Lactate(!!): 3.0 [DC]   0640 I discussed the case with MD Tonya with [facility] at this time regarding the patient.  I discussed work-up, results, concerns.  I discussed the consulting provider's desire for tele admit to MD Sharri.    [DC]   0641 Patient presentation and evaluation consistent with intractable abdominal pain in the setting of alcohol intoxication and lactic acidosis.  Due to his rising lactic acid level despite adequate fluid resuscitation and continued abdominal pain, I feel he would benefit from admission for IV fluids and continued pain control.  He also has a history of pancreatitis and I feel it would benefit him to continue observing for  signs of acute pancreatitis. [DC]      ED Course User Index  [AR] Constanza Vaughn APRN  [DC] Shilo Guillermo, PA       Differential diagnosis:  My differential diagnosis for abdominal pain for a male includes but is not limited to:  Gastritis, gastroenteritis, peptic ulcer disease, GERD, esophageal perforation, acute appendicitis, mesenteric adenitis, Meckel’s diverticulum, epiploic appendagitis, diverticulitis, colon cancer, ulcerative colitis, Crohn’s disease, intussusception, small bowel obstruction, adhesions, ischemic bowel, perforated viscus, ileus, obstipation, biliary colic, cholecystitis, cholelithiasis, hepatitis, pancreatitis, common bile duct obstruction, cholangitis, bile leak, splenic trauma, splenic rupture, splenic infarction, splenic abscess, abdominal abscess, ascites, spontaneous bacterial peritonitis, hernia, UTI, cystitis, prostatitis, ureterolithiasis, urinary obstruction, testicular torsion, AAA, myocardial infarction, pneumonia, cancer, porphyria, DKA, medications, sickle cell, viral syndrome, zoster      Diagnosis  Final diagnoses:   Alcoholic intoxication without complication   Lactic acidosis   Elevated lipase          Carlos Thomas MD  09/20/24 0731

## 2024-09-20 NOTE — ED NOTES
Patient to ED from home for complaints of lower abdominal pain that started 3 hours ago. Patient is nauseous but has not vomited. Patient states he has been having some occasional diarrhea.

## 2024-09-20 NOTE — ED NOTES
Nursing report ED to floor  Pro Mueller  69 y.o.  male    HPI :  HPI (Adult)  Stated Reason for Visit: abdominal pain x3 hours, no vomitting but is nauseous  History Obtained From: patient    Chief Complaint  Chief Complaint   Patient presents with    Abdominal Pain       Admitting doctor:   Keanu Harrell MD    Admitting diagnosis:   The primary encounter diagnosis was Alcoholic intoxication without complication. Diagnoses of Lactic acidosis and Elevated lipase were also pertinent to this visit.    Code status:   Current Code Status       Date Active Code Status Order ID Comments User Context       9/20/2024 0750 CPR (Attempt to Resuscitate) [id number]  Keanu Harrell MD ED        Question Answer    Code Status (Patient has no pulse and is not breathing) CPR (Attempt to Resuscitate)    Medical Interventions (Patient has pulse or is breathing) Full Support    Level Of Support Discussed With Patient                    Allergies:   Penicillins    Isolation:   No active isolations    Intake and Output    Intake/Output Summary (Last 24 hours) at 9/20/2024 0845  Last data filed at 9/20/2024 0456  Gross per 24 hour   Intake 1000 ml   Output --   Net 1000 ml       Weight:       09/20/24  0110   Weight: 83.9 kg (185 lb)       Most recent vitals:   Vitals:    09/20/24 0600 09/20/24 0606 09/20/24 0608 09/20/24 0701   BP: 158/91   160/100   Pulse: 69 62 65 71   Resp:       Temp:       TempSrc:       SpO2:  (!) 83% 99% 93%   Weight:       Height:           Active LDAs/IV Access:   Lines, Drains & Airways       Active LDAs       Name Placement date Placement time Site Days    Peripheral IV 09/20/24 0233 Left Antecubital 09/20/24  0233  Antecubital  less than 1                    Labs (abnormal labs have a star):   Labs Reviewed   COMPREHENSIVE METABOLIC PANEL - Abnormal; Notable for the following components:       Result Value    Glucose 100 (*)     BUN 6 (*)     Creatinine 0.65 (*)     AST (SGOT) 112 (*)      Alkaline Phosphatase 127 (*)     All other components within normal limits    Narrative:     GFR Normal >60  Chronic Kidney Disease <60  Kidney Failure <15     LIPASE - Abnormal; Notable for the following components:    Lipase 139 (*)     All other components within normal limits   URINALYSIS W/ MICROSCOPIC IF INDICATED (NO CULTURE) - Abnormal; Notable for the following components:    Specific Gravity, UA >1.030 (*)     Leuk Esterase, UA Trace (*)     All other components within normal limits   LACTIC ACID, PLASMA - Abnormal; Notable for the following components:    Lactate 2.2 (*)     All other components within normal limits   CBC WITH AUTO DIFFERENTIAL - Abnormal; Notable for the following components:    RBC 4.07 (*)     .0 (*)     MCH 33.9 (*)     RDW 15.7 (*)     RDW-SD 58.2 (*)     Neutrophil % 36.9 (*)     Lymphocyte % 47.7 (*)     Monocyte % 12.3 (*)     Neutrophils, Absolute 1.59 (*)     All other components within normal limits   ETHANOL - Abnormal; Notable for the following components:    Ethanol 184 (*)     All other components within normal limits   LACTIC ACID, REFLEX - Abnormal; Notable for the following components:    Lactate 3.0 (*)     All other components within normal limits   URINALYSIS, MICROSCOPIC ONLY - Abnormal; Notable for the following components:    WBC, UA 6-10 (*)     All other components within normal limits   MAGNESIUM - Abnormal; Notable for the following components:    Magnesium 1.4 (*)     All other components within normal limits   RAINBOW DRAW    Narrative:     The following orders were created for panel order [place] Draw.  Procedure                               Abnormality         Status                     ---------                               -----------         ------                     Green Top (Gel)[[id number]]                                  Final result               Lavender Top[[id number]]                                     Final result               Gold  Top - SST[[id number]]                                   Final result               Light Blue Top[[id number]]                                   Final result                 Please view results for these tests on the individual orders.   CBC AND DIFFERENTIAL    Narrative:     The following orders were created for panel order CBC & Differential.  Procedure                               Abnormality         Status                     ---------                               -----------         ------                     CBC Auto Differential[[id number]]        Abnormal            Final result                 Please view results for these tests on the individual orders.   GREEN TOP   LAVENDER TOP   GOLD TOP - SST   LIGHT BLUE TOP       EKG:   No orders to display       Meds given in ED:   Medications   sodium chloride 0.9 % flush 10 mL (has no administration in time range)   HYDROmorphone (DILAUDID) injection 0.5 mg (0.5 mg Intravenous Given 9/20/24 0755)   famotidine (PEPCID) injection 20 mg (20 mg Intravenous Given 9/20/24 0829)   ondansetron ODT (ZOFRAN-ODT) disintegrating tablet 4 mg (has no administration in time range)     Or   ondansetron (ZOFRAN) injection 4 mg (has no administration in time range)   acetaminophen (TYLENOL) tablet 650 mg (has no administration in time range)   sodium chloride 0.9 % flush 10 mL (10 mL Intravenous Given 9/20/24 0829)   sodium chloride 0.9 % flush 10 mL (has no administration in time range)   sodium chloride 0.9 % infusion 40 mL (has no administration in time range)   sennosides-docusate (PERICOLACE) 8.6-50 MG per tablet 2 tablet (has no administration in time range)     And   polyethylene glycol (MIRALAX) packet 17 g (has no administration in time range)     And   bisacodyl (DULCOLAX) EC tablet 5 mg (has no administration in time range)     And   bisacodyl (DULCOLAX) suppository 10 mg (has no administration in time range)   sodium chloride 0.9 % bolus 1,000 mL (0 mL Intravenous Stopped  "24 0456)   morphine injection 4 mg (4 mg Intravenous Given 24 0242)   ondansetron (ZOFRAN) injection 4 mg (4 mg Intravenous Given 24 0244)   iopamidol (ISOVUE-300) 61 % injection 100 mL (85 mL Intravenous Given by Other 24 0304)   HYDROmorphone (DILAUDID) injection 0.5 mg (0.5 mg Intravenous Given 24 0336)   HYDROmorphone (DILAUDID) injection 0.5 mg (0.5 mg Intravenous Given 24 0536)   magnesium sulfate 4g/100mL (PREMIX) infusion (4 g Intravenous New Bag 24 0829)   potassium chloride (K-DUR,KLOR-CON) ER tablet 40 mEq (40 mEq Oral Given 24 0824)       Imaging results:  CT Abdomen Pelvis With Contrast    Result Date: 2024  Electronically signed by Jesus Siegel M.D. on 24 at 0405     Ambulatory status:   - with assist    Social issues:   Social History     Socioeconomic History    Marital status: Single   Tobacco Use    Smoking status: Former     Current packs/day: 0.00     Average packs/day: 0.5 packs/day for 15.0 years (7.5 ttl pk-yrs)     Types: Cigarettes     Start date: 1990     Quit date: 2005     Years since quittin.7    Smokeless tobacco: Never    Tobacco comments:     caffeine - 3 cans of coke daily    Vaping Use    Vaping status: Never Used   Substance and Sexual Activity    Alcohol use: Yes     Alcohol/week: 7.0 standard drinks of alcohol     Types: 7 Shots of liquor per week     Comment: .5 liter vodka    Drug use: Yes     Types: Methamphetamines     Comment: \"once in a rare while\"    Sexual activity: Yes     Partners: Female     Birth control/protection: Condom       Peripheral Neurovascular  Peripheral Neurovascular (Adult)  Peripheral Neurovascular WDL: WDL    Neuro Cognitive  Neuro Cognitive (Adult)  Cognitive/Neuro/Behavioral WDL: WDL  Orientation: oriented x 4    Learning  Learning Assessment (Adult)  Learning Readiness and Ability: no barriers identified    Respiratory  Respiratory WDL  Respiratory WDL: WDL    Abdominal Pain       Pain " Assessments  Pain (Adult)  (0-10) Pain Rating: Rest: 8  Pain Location: abdomen  Pain Management Interventions: see MAR  Response to Pain Interventions: interventions effective per patient    NIH Stroke Scale       Shannan Gupta RN  09/20/24 08:45 EDT

## 2024-09-20 NOTE — CONSULTS
[facility]  Initial Inpatient Consult Note    Referring Provider: Keanu Harrell MD     Reason for Consultation:     abd pain - etoh gastitis/pancreatitis       Subjective     History of present illness:    69 y.o. male with history of alcohol abuse presented to the ED with abdominal pain and nausea as well as vomiting.    Patient reports sudden onset of epigastric and right sided abdominal discomfort which started shortly after he ate lunch.  He does report intermittent abdominal pain and has been seen by the GI team before about a year ago for similar symptoms.  He denies heartburn or reflux and does not take any antacid medication.  He denies vomiting upon our discussion today but is quite nauseous.  He denies black or bloody stool.  He does drink alcohol daily but states that he is not sure how much he drinks daily.  Upon chart review it appears that he had previously reported consuming nearly a pint of vodka daily.  He has never had an EGD or colonoscopy-it was recommended that he undergo these procedures after his last time being seen by the GI team in 2023, but he was lost to follow-up.    CT abdomen pelvis showed cholelithiasis and bladder wall thickening which was nonspecific.    Recent gallbladder ultrasound showed cholelithiasis and no evidence of acute cholecystitis    Labs show , ALT 24, alk phos 127, T. bili 0.5.  Lipase elevated at 139, hemoglobin 13.8.    Past Medical History:  Past Medical History:   Diagnosis Date    Alcohol abuse     Alcohol withdrawal 11/04/2016    Alcoholic ketoacidosis 01/12/2020    Allergic 1970    Anxiety     Arthritis     Atopic rhinitis 08/08/2016    Depression     Disease of thyroid gland     Elevated cholesterol     Encounter for removal of sutures     Genital herpes simplex 08/08/2016    GERD (gastroesophageal reflux disease)     Headache, tension-type     Hyperlipidemia     Hypertension     Hypothyroidism     Kidney stone     Low  back pain 2019    Migraine     Motion sickness     Nephrolithiasis 2020    Olecranon bursitis, right elbow     Panic disorder without agoraphobia 2016    Peripheral neuropathy     Sleep apnea     Syncope and collapse 2019    Vitamin D deficiency 2016    Withdrawal symptoms, alcohol      Past Surgical History:  Past Surgical History:   Procedure Laterality Date    COLONOSCOPY      CYST REMOVAL      CYSTOSCOPY BLADDER STONE LITHOTRIPSY  2022    EXTRACORPOREAL SHOCK WAVE LITHOTRIPSY (ESWL) Right 2002    SHOULDER ARTHROSCOPY Right 2019    Procedure: SHOULDER ARTHROSCOPY, decompression, distal clavicle excision;  Surgeon: Aj Mancilla MD;  Location: [facility] OR [facility];  Service: Orthopedics    TONSILLECTOMY        Social History:   Social History     Tobacco Use    Smoking status: Former     Current packs/day: 0.00     Average packs/day: 0.5 packs/day for 15.0 years (7.5 ttl pk-yrs)     Types: Cigarettes     Start date: 1990     Quit date: 2005     Years since quittin.7    Smokeless tobacco: Never    Tobacco comments:     caffeine - 3 cans of coke daily    Substance Use Topics    Alcohol use: Yes     Alcohol/week: 7.0 standard drinks of alcohol     Types: 7 Shots of liquor per week     Comment: .5 liter vodka      Family History:  Family History   Problem Relation Age of Onset    Alzheimer's disease Mother     Mental illness Mother     Pancreatic cancer Father     Hearing loss Father     Arthritis Maternal Grandmother     Malig Hyperthermia Neg Hx        Home Meds:  Medications Prior to Admission   Medication Sig Dispense Refill Last Dose    amLODIPine (NORVASC) 5 MG tablet Take 1 tablet by mouth Every Morning. 90 tablet 1 Past Month    fluticasone (FLONASE) 50 MCG/ACT nasal spray 2 sprays into the nostril(s) as directed by provider Daily. (Patient taking differently: Administer 2 sprays into the nostril(s) as directed by provider As Needed.) 11.1 mL 0     levothyroxine  (SYNTHROID, LEVOTHROID) 100 MCG tablet Take 1 tablet by mouth Daily. 90 tablet 1 Past Month    lisinopril (PRINIVIL,ZESTRIL) 10 MG tablet Take 1 tablet by mouth Daily. 90 tablet 1 Past Month     Current Meds:   amLODIPine, 5 mg, Oral, QAM  famotidine, 20 mg, Intravenous, Q12H  fluticasone, 2 spray, Nasal, Daily  folic acid, 1 mg, Oral, Daily  levothyroxine, 100 mcg, Oral, Daily  lisinopril, 20 mg, Oral, Daily  LORazepam, 2 mg, Oral, Q6H   Followed by  [START ON 9/21/2024] LORazepam, 1 mg, Oral, Q6H   Followed by  [START ON 9/22/2024] LORazepam, 1 mg, Oral, Q12H   Followed by  [START ON 9/23/2024] LORazepam, 1 mg, Oral, Daily  sodium chloride, 10 mL, Intravenous, Q12H  sucralfate, 1 g, Oral, 4x Daily AC & at Bedtime  thiamine (B-1) IV, 200 mg, Intravenous, Q8H   Followed by  [START ON 9/25/2024] thiamine, 100 mg, Oral, Daily      Allergies:  Allergies   Allergen Reactions    Penicillins Anaphylaxis and Seizure     Seizure. childhood     Objective     Vital Signs  Temp:  [97.9 °F (36.6 °C)-98.4 °F (36.9 °C)] 98.2 °F (36.8 °C)  Heart Rate:  [62-83] 83  Resp:  [17-18] 18  BP: (158-177)/() 166/94    Physical Exam:   General: patient awake, alert and cooperative   Skin: warm and dry, not jaundiced   Cardiovascular: regular rhythm and rate   Pulm: regular and unlabored   Abdomen: soft, epigastric and right upper quadrant abdominal tenderness to palpation, nondistended; normal bowel sounds    Results Review:   I reviewed the patient's new clinical results.    Results from last 7 days   Lab Units 09/20/24  0121   WBC 10*3/mm3 4.30   HEMOGLOBIN g/dL 13.8   HEMATOCRIT % 41.1   PLATELETS 10*3/mm3 156     Results from last 7 days   Lab Units 09/20/24  0121   SODIUM mmol/L 142   POTASSIUM mmol/L 3.5   CHLORIDE mmol/L 103   CO2 mmol/L 24.5   BUN mg/dL 6*   CREATININE mg/dL 0.65*   CALCIUM mg/dL 8.6   BILIRUBIN mg/dL 0.5   ALK PHOS U/L 127*   ALT (SGPT) U/L 24   AST (SGOT) U/L 112*   GLUCOSE mg/dL 100*         Lab Results    Lab Value Date/Time    LIPASE 139 (H) 09/20/2024 0121    LIPASE 81 (H) 09/03/2024 1256    LIPASE 151 (H) 03/06/2024 1712    LIPASE 98 (H) 02/24/2024 1650    LIPASE 79 (H) 07/23/2023 0425    LIPASE 160 (H) 06/22/2023 2056    LIPASE 33 05/13/2023 1848    LIPASE 36 04/13/2023 2256    LIPASE 164 (H) 08/18/2022 2307    LIPASE 43 02/10/2022 1203    LIPASE 114 (H) 01/16/2021 1850    LIPASE 113 (H) 01/11/2020 1715    LIPASE 39 11/04/2016 1551     Radiology:  CT Abdomen Pelvis With Contrast   Final Result         Electronically signed by Jesus Siegel M.D. on 09-20-24 at 0405          Assessment & Plan   Active Hospital Problems    Diagnosis     **Alcohol-induced acute pancreatitis, unspecified complication status     Lactic acidosis     Acute alcohol intoxication in patient with alcoholism with blood alcohol level 0.08 to 0.29, with unspecified complication     Elevated LFTs     Hypertension     Hyperlipidemia     Hypothyroidism        Assessment:  Epigastric/right upper quadrant abdominal pain  Nausea  Vomiting  Alcohol abuse  Cholelithiasis without findings of cholecystitis    Plan:  Strongly encouraged total alcohol cessation as this could cause alcohol-related gastritis/duodenitis and be contributing to his epigastric discomfort.  Recommend starting on PPI twice daily IV for now.  Ultimately he would likely benefit from EGD at some point as well as a screening colonoscopy.  This could be done either when he is no longer having withdrawal symptoms or potentially as an outpatient depending on his clinical course  He does have cholelithiasis on multiple imaging modalities-given right sided discomfort recommend HIDA scan as I do not see where he has had this done  Continue CIWA protocol per primary team      Patient and plan of care discussed with attending, Dr. Cari Patel PA-C

## 2024-09-20 NOTE — PLAN OF CARE
Goal Outcome Evaluation:     Problem: Adult Inpatient Plan of Care  Goal: Plan of Care Review  Outcome: Ongoing, Progressing  Goal: Patient-Specific Goal (Individualized)  Outcome: Ongoing, Progressing  Goal: Absence of Hospital-Acquired Illness or Injury  Outcome: Ongoing, Progressing  Intervention: Identify and Manage Fall Risk  Recent Flowsheet Documentation  Taken 9/20/2024 1600 by Loretta Zapata RN  Safety Promotion/Fall Prevention: activity supervised  Taken 9/20/2024 1400 by Loretta Zapata RN  Safety Promotion/Fall Prevention: activity supervised  Taken 9/20/2024 1200 by Loretta Zapata RN  Safety Promotion/Fall Prevention: activity supervised  Taken 9/20/2024 1045 by Loretta Zapata RN  Safety Promotion/Fall Prevention:   activity supervised   safety round/check completed  Intervention: Prevent Skin Injury  Recent Flowsheet Documentation  Taken 9/20/2024 1600 by Loretta Zapata RN  Body Position: position changed independently  Taken 9/20/2024 1400 by Loretta Zapata RN  Body Position: position changed independently  Taken 9/20/2024 1200 by Loretta Zapata RN  Body Position: position changed independently  Taken 9/20/2024 1045 by Loretta Zapata RN  Body Position: position changed independently  Intervention: Prevent and Manage VTE (Venous Thromboembolism) Risk  Recent Flowsheet Documentation  Taken 9/20/2024 1600 by Loretta Zapata RN  Activity Management: ambulated to bathroom  Taken 9/20/2024 1400 by Loretta Zapata RN  Activity Management: ambulated to bathroom  Taken 9/20/2024 1200 by Loretta Zapata RN  Activity Management: activity encouraged  Taken 9/20/2024 1045 by Loretta Zapata RN  Activity Management: activity encouraged  Range of Motion: active ROM (range of motion) encouraged  Intervention: Prevent Infection  Recent Flowsheet Documentation  Taken 9/20/2024 1600 by Loretta Zapata RN  Infection Prevention:   hand hygiene promoted   single patient room provided   environmental surveillance performed  Taken 9/20/2024  1400 by Jodi, Loretta, RN  Infection Prevention:   hand hygiene promoted   single patient room provided  Taken 9/20/2024 1200 by Loretta Zapata RN  Infection Prevention:   hand hygiene promoted   environmental surveillance performed   single patient room provided  Taken 9/20/2024 1045 by Loretta Zapata RN  Infection Prevention:   environmental surveillance performed   hand hygiene promoted   single patient room provided  Goal: Optimal Comfort and Wellbeing  Outcome: Ongoing, Progressing  Intervention: Provide Person-Centered Care  Recent Flowsheet Documentation  Taken 9/20/2024 1045 by Loretta Zapata RN  Trust Relationship/Rapport: care explained  Goal: Readiness for Transition of Care  Outcome: Ongoing, Progressing  Intervention: Mutually Develop Transition Plan  Recent Flowsheet Documentation  Taken 9/20/2024 1040 by Loretta Zapata RN  Transportation Anticipated: (may need transportation on DC) other (see comments)  Patient/Family Anticipated Services at Transition:   Patient/Family Anticipates Transition to: home  Taken 9/20/2024 1039 by Loretta Zapata RN  Equipment Currently Used at Home: walker, standard     Problem: Hypertension Comorbidity  Goal: Blood Pressure in Desired Range  Outcome: Ongoing, Progressing  Intervention: Maintain Blood Pressure Management  Recent Flowsheet Documentation  Taken 9/20/2024 1045 by Loretta Zapata RN  Syncope Management: position changed slowly  Goal: Blood Pressure in Desired Range  Outcome: Ongoing, Progressing  Intervention: Maintain Blood Pressure Management  Recent Flowsheet Documentation  Taken 9/20/2024 1045 by Loretta Zapata RN  Syncope Management: position changed slowly     Problem: Obstructive Sleep Apnea Risk or Actual Comorbidity Management  Goal: Unobstructed Breathing During Sleep  Outcome: Ongoing, Progressing     Problem: Fall Injury Risk  Goal: Absence of Fall and Fall-Related Injury  Outcome: Ongoing, Progressing  Intervention: Promote Injury-Free  Environment  Recent Flowsheet Documentation  Taken 9/20/2024 1600 by Loretta Zapata RN  Safety Promotion/Fall Prevention: activity supervised  Taken 9/20/2024 1400 by Loretta Zapata RN  Safety Promotion/Fall Prevention: activity supervised  Taken 9/20/2024 1200 by Loretta Zapata RN  Safety Promotion/Fall Prevention: activity supervised  Taken 9/20/2024 1045 by Loretta Zapata RN  Safety Promotion/Fall Prevention:   activity supervised   safety round/check completed     Problem: Pain Acute  Goal: Acceptable Pain Control and Functional Ability  Outcome: Ongoing, Progressing  Intervention: Optimize Psychosocial Wellbeing  Recent Flowsheet Documentation  Taken 9/20/2024 1045 by Loretta Zapata RN  Diversional Activities:   television   smartphone     Problem: Alcohol Withdrawal  Goal: Alcohol Withdrawal Symptom Control  Outcome: Ongoing, Progressing     Problem: Acute Neurologic Deterioration (Alcohol Withdrawal)  Goal: Optimal Neurologic Function  Outcome: Ongoing, Progressing     Problem: Substance Misuse (Alcohol Withdrawal)  Goal: Readiness for Change Identified  Outcome: Ongoing, Progressing  Intervention: Promote Psychosocial Wellbeing  Recent Flowsheet Documentation  Taken 9/20/2024 1045 by Loretta Zapata RN  Family/Support System Care:   self-care encouraged   support provided     Problem: Skin Injury Risk Increased  Goal: Skin Health and Integrity  Outcome: Ongoing, Progressing  Intervention: Optimize Skin Protection  Recent Flowsheet Documentation  Taken 9/20/2024 1600 by Loretta Zapata RN  Head of Bed ([facility]) Positioning: HOB elevated  Taken 9/20/2024 1400 by Loretta Zapata RN  Head of Bed ([facility]) Positioning: HOB elevated  Taken 9/20/2024 1200 by Loretta Zapata RN  Head of Bed ([facility]) Positioning: HOB at 15 degrees  Taken 9/20/2024 1045 by Loretta Zapata RN  Head of Bed ([facility]) Positioning: HOB elevated         Pt's is AXO4, VSS, On RA, SR on tele, SBA with walker, On CIWA protocol, IVF running, NPO after MN, no narcotics after  MN for HIDA  scan tomorrow, no C/O nausea, needs attended, Plan of care ongoing

## 2024-09-20 NOTE — H&P
"HISTORY AND PHYSICAL   [facility]        Date of Admission: 2024  Patient Identification:  Name: Pro Mueller  Age: 69 y.o.  Sex: male  :  1955  MRN: [id number]                     Primary Care Physician: Provider, No Known    Chief Complaint: Abdominal pain    History of Present Illness:   Mr. Mueller is a 69-year-old gentleman who unfortunately deals with alcohol abuse and is consuming at least nearly a pint of vodka on a daily basis.  He has been admitted at this location numerous times for alcohol abuse complications and detox.  He is here at this time not because he came here requesting sobriety but because he had similar flares of abdominal pain with nausea.  No reported emesis and certainly no bloody emesis.  His abdominal pain is more in his epigastrium.  He had some incidental finding of cholelithiasis on CT but no swollen gallbladder noted.  He denies any fever or chills.  His last drink of alcohol was yesterday in the afternoon about 3 PM.  He has not gone very long in the past when trying for alcohol cessation.  He currently states that he would \"like to stop\" but there does not seem like any true genuine drive towards sobriety.  Denies any GI bleeding/melena.  No issues with chest pain troubles breathing.  Currently no confusion.    Past Medical History:  Past Medical History:   Diagnosis Date    Alcohol abuse     Alcohol withdrawal 2016    Alcoholic ketoacidosis 2020    Allergic 1970    Anxiety     Arthritis     Atopic rhinitis 2016    Depression     Disease of thyroid gland     Elevated cholesterol     Encounter for removal of sutures     Genital herpes simplex 2016    GERD (gastroesophageal reflux disease)     Headache, tension-type     Hyperlipidemia     Hypertension     Hypothyroidism     Kidney stone     Low back pain     Migraine     Motion sickness     Nephrolithiasis 2020    Olecranon bursitis, right elbow     Panic disorder without " agoraphobia 2016    Peripheral neuropathy     Sleep apnea     Syncope and collapse 2019    Vitamin D deficiency 2016    Withdrawal symptoms, alcohol      Past Surgical History:  Past Surgical History:   Procedure Laterality Date    COLONOSCOPY      CYST REMOVAL      CYSTOSCOPY BLADDER STONE LITHOTRIPSY  2022    EXTRACORPOREAL SHOCK WAVE LITHOTRIPSY (ESWL) Right     SHOULDER ARTHROSCOPY Right 2019    Procedure: SHOULDER ARTHROSCOPY, decompression, distal clavicle excision;  Surgeon: Aj Mancilla MD;  Location: [facility] OR [facility] [place];  Service: Orthopedics    TONSILLECTOMY        Home Meds:  (Not in a hospital admission)      Allergies:  Allergies   Allergen Reactions    Penicillins Anaphylaxis and Seizure     Seizure. childhood     Immunizations:  Immunization History   Administered Date(s) Administered    COVID-19 (PFIZER) Purple Cap Monovalent 2021, 2021, 10/28/2021    Flu Vaccine Intradermal Quad 18-64YR 10/23/2020    Flu Vaccine Quad PF >36MO 2016    Fluzone (or Fluarix & Flulaval for VFC) >6mos 2019, 10/23/2020, 10/05/2021    Hepatitis A 2019    Influenza, Unspecified 2018    PEDS-Pneumococcal Conjugate (PCV7) 2017    Pneumococcal Conjugate 13-Valent (PCV13) 2017    Pneumococcal Polysaccharide (PPSV23) 2023    Pneumococcal, Unspecified 2017    Td, Not Adsorbed 2018    Tdap 2021    Zostavax 2017    flucelvax quad pfs =>4 YRS 2018     Social History:   Social History     Social History Narrative    Not on file     Social History     Socioeconomic History    Marital status: Single   Tobacco Use    Smoking status: Former     Current packs/day: 0.00     Average packs/day: 0.5 packs/day for 15.0 years (7.5 ttl pk-yrs)     Types: Cigarettes     Start date: 1990     Quit date: 2005     Years since quittin.7    Smokeless tobacco: Never    Tobacco comments:     caffeine - 3 cans of coke daily   "  Vaping Use    Vaping status: Never Used   Substance and Sexual Activity    Alcohol use: Yes     Alcohol/week: 7.0 standard drinks of alcohol     Types: 7 Shots of liquor per week     Comment: .5 liter vodka    Drug use: Yes     Types: Methamphetamines     Comment: \"once in a rare while\"    Sexual activity: Yes     Partners: Female     Birth control/protection: Condom       Family History:  Family History   Problem Relation Age of Onset    Alzheimer's disease Mother     Mental illness Mother     Pancreatic cancer Father     Hearing loss Father     Arthritis Maternal Grandmother     Malig Hyperthermia Neg Hx         Review of Systems  See history of present illness and past medical history.  Patient denies fever chills night sweats.  Admits to abdominal pain and nausea denies emesis.  Denies any focal changes to vision smell taste or sound.  Denies any dysuria denies any bruising bleeding or loss of consciousness or focal loss of function.  Remainder of ROS is negative.    Objective:  T Max 24 hrs: Temp (24hrs), Av.9 °F (36.6 °C), Min:97.9 °F (36.6 °C), Max:97.9 °F (36.6 °C)    Vitals Ranges:   Temp:  [97.9 °F (36.6 °C)] 97.9 °F (36.6 °C)  Heart Rate:  [62-74] 71  Resp:  [17] 17  BP: (158-177)/() 160/100      Exam:  /100   Pulse 71   Temp 97.9 °F (36.6 °C) (Tympanic)   Resp 17   Ht 182.9 cm (72\")   Wt 83.9 kg (185 lb)   SpO2 93%   BMI 25.09 kg/m²     General Appearance:    Alert, cooperative, conversational pleasant and definitely oriented at this time, mild tremors noted, nontoxic, disheveled, no family present   Head:    Normocephalic, without obvious abnormality, atraumatic   Eyes:    PERRLA/EOM's intact, both eyes, nonicteric   Ears:    Normal external ear canals, both ears   Nose:   Nares normal, septum midline, mucosa normal, no drainage    or sinus tenderness   Throat: Dry mucous membranes   Neck:   Supple, no rigidity or JVD       Lungs:     Clear to auscultation bilaterally, " respirations unlabored   Chest Wall:    No tenderness or deformity    Heart:    Regular/tachycardic rate and rhythm, S1 and S2 normal   Abdomen:     Soft, mild epigastric TTP, bowel sounds active all four quadrants, no masses rebound or guarding   Extremities:   Moving all currently with baseline strength   Pulses:   2+ and symmetric all extremities   Skin: No jaundice       Neurologic:   CNII-XII intact, moving all with no focal deficit, positive tremor/mild      .    Data Review:  Labs in chart were reviewed.             Imaging Results (All)       Procedure Component Value Units Date/Time    CT Abdomen Pelvis With Contrast [[id number]] Collected: 09/20/24 0406     Updated: 09/20/24 0406    Narrative:        Patient: HERB LUNA  Time Out: 04:05  Exam(s): CT ABDOMEN + PELVIS With Contrast     EXAM:    CT Abdomen and Pelvis With Intravenous Contrast    CLINICAL HISTORY:     Reason for exam: left sided abdominal pain.    TECHNIQUE:    Axial computed tomography images of the abdomen and pelvis with   intravenous contrast.  CTDI is 11.92 mGy and DLP is 672.6 mGy-cm.  This   CT exam was performed according to the principle of ALARA (As Low As   Reasonably Achievable) by using one or more of the following dose   reduction techniques: automated exposure control, adjustment of the mA   and or kV according to patient size, and or use of iterative   reconstruction technique.    COMPARISON:    CT abdomen pelvis on 9 3 2024    FINDINGS:    Lung bases:  Lower lung atelectasis.     ABDOMEN:    Liver:  Unremarkable.  No mass.    Gallbladder and bile ducts:  Cholelithiasis.  No ductal dilation.    Pancreas:  Unremarkable.  No mass.  No ductal dilation.    Spleen:  Small splenules.    Adrenals:  Nonspecific small left adrenal nodule.    Kidneys and ureters:  Right renal cyst.  No further follow-up necessary.    Mild scarring in the left kidney.  No hydronephrosis or obstructing   ureteral stone.    Stomach and bowel:  Evaluation  of the stomach is limited by   underdistention.  Diverticulosis without evidence of diverticulitis.  No   small bowel obstruction.     PELVIS:    Appendix:  Normal appendix.    Bladder:  Bladder wall thickening is nonspecific.  Please correlate   with urinalysis if concerned for cystitis.    Reproductive:  Calcifications in a mildly enlarged prostate.     ABDOMEN and PELVIS:    Intraperitoneal space:  Unremarkable.  No free air.  No significant   fluid collection.    Bones joints:  Mild degenerative changes of the spine.  No acute   fracture.  No dislocation.    Soft tissues:  Mild bilateral gynecomastia.  Small fat-containing   umbilical hernia.    Vasculature:  Phleboliths in the pelvis.  Atherosclerotic changes of   the vasculature.  No abdominal aortic aneurysm or dissection.    Lymph nodes:  Unremarkable.  No enlarged lymph nodes.    Other findings:  Elevation of the right hemidiaphragm.    IMPRESSION:       1.  Bladder wall thickening is nonspecific.  Please correlate with   urinalysis if concerned for cystitis.  2.  Cholelithiasis.      Impression:          Electronically signed by Jesus Siegel M.D. on 09-20-24 at 0405              Assessment:  Active Hospital Problems    Diagnosis  POA    **Alcohol-induced acute pancreatitis, unspecified complication status [K85.20]  Yes    Lactic acidosis [E87.20]  Yes    Acute alcohol intoxication in patient with alcoholism with blood alcohol level 0.08 to 0.29, with unspecified complication [F10.229]  Unknown    Elevated LFTs [R79.89]  Yes    Hypertension [I10]  Yes    Hyperlipidemia [E78.5]  Yes    Hypothyroidism [E03.9]  Yes      Resolved Hospital Problems   No resolved problems to display.       Plan:    Recurrent alcohol abuse with intoxication and subsequent acidosis   -CT not very impressive and no complicating features on the pancreas such as pseudocyst.  I think this patient is probably more so dealing with alcohol induced gastritis and duodenitis and have added  Pepcid with Carafate will await further GI recommendations if endoscopy is warranted   -N.p.o. except for sips with meds   -IV fluids x 2 L and IV pain and antiemetic control   -No need to trend lactate is skewed by alcohol abuse with no signs of infection found.  Afebrile with normal WBC   -Classic AST to ALT ratio consistent with alcohol abuse -trend CMP   -Counseled alcohol abuse and recurrent DTs and informed patient how he is carrying an increased mortality rate each time he puts himself in potential alcohol DTs      HTN-anticipate reactive changes and will continue with amlodipine and increase lisinopril to 20 mg   -Avoid over control    Hypothyroidism-continue levothyroxine    Bladder changes on CT -UA overall unremarkable for infection with some microscopic hematuria and this can be further evaluated and worked up in an outpatient setting with urology    SCDs for prophylaxis        Further recommendations to follow as clinical course unfolds    Keanu Harrell MD  9/20/2024  07:48 EDT

## 2024-09-20 NOTE — ED PROVIDER NOTES
EMERGENCY DEPARTMENT ENCOUNTER  Room Number:  06/06  PCP: Provider, No Known  Independent Historians: Patient      HPI:  Chief Complaint: had concerns including Abdominal Pain.     A complete HPI/ROS/PMH/PSH/SH/FH are unobtainable due to: None    Chronic or social conditions impacting patient care (Social Determinants of Health): None      Context: Pro Mueller is a 69 y.o. male who presents to the emergency department with complaints of diffuse abdominal pain.  Patient also verbalizes complaints of associated nausea.  Symptoms started earlier in the night and have been constant.  Pain is described as sharp/stabbing.  He is unaware of any alleviating or aggravating factors.  Patient denies fever, chills, headache, lightheadedness, dizziness, numbness, tingling, unilateral extremity weakness, syncope, shortness of breath, chest pain, vomiting, diarrhea, dysuria, hematuria, or other complaints.      PAST MEDICAL HISTORY  Active Ambulatory Problems     Diagnosis Date Noted    Fall 08/08/2016    Alcoholism in recovery 08/08/2016    Atopic rhinitis 08/08/2016    Mixed anxiety depressive disorder 08/08/2016    Genital herpes simplex 08/08/2016    Hyperlipidemia 08/08/2016    Hypertension 08/08/2016    Hypothyroidism 08/08/2016    Insomnia 08/08/2016    Panic disorder without agoraphobia 08/08/2016    Persistent insomnia 08/08/2016    Vitamin D deficiency 08/08/2016    Chronic low back pain 11/11/2016    Neuropathy involving both lower extremities 10/25/2018    QT prolongation 01/03/2019    Left shoulder pain 11/19/2019    Nausea and vomiting 01/11/2020    Pancytopenia 01/14/2020    Left ventricular diastolic dysfunction 01/16/2021    Tremor 10/06/2021    C5 cervical fracture 11/09/2021    Syncope and collapse 01/07/2022    Neck pain 01/07/2022    Alcoholic intoxication with complication 03/13/2022    Withdrawal symptoms, alcohol 07/01/2022    Transaminitis 07/01/2022    Lumbar facet arthropathy 07/20/2022    Alcohol  dependence with withdrawal 09/11/2022    Midline low back pain with right-sided sciatica 09/15/2022    Spondylolisthesis at L4-L5 level 09/15/2022    Lumbar canal stenosis 09/15/2022    Alcohol cessation counseling 09/15/2022    Need for assistance due to reduced mobility 09/15/2022    Impaired mobility and ADLs 09/15/2022    Alcohol withdrawal syndrome without complication 02/18/2023    Facial laceration 02/18/2023    Orthostatic hypotension 02/18/2023    Acute bacterial conjunctivitis of right eye 03/02/2023    Visit for suture removal 03/02/2023    Renal calculi 03/14/2023    Hepatic steatosis 03/15/2023    Alcohol withdrawal syndrome with complication 04/14/2023    Macrocytosis without anemia 04/14/2023    Lumbosacral spondylosis without myelopathy 05/05/2023    Elevated LFTs 05/13/2023    Alcohol-induced acute pancreatitis, unspecified complication status 06/23/2023    Pancreatitis 03/06/2024    Generalized abdominal pain 03/07/2024    Alcohol withdrawal 03/07/2024     Resolved Ambulatory Problems     Diagnosis Date Noted    Impacted cerumen 08/08/2016    Influenza 08/08/2016    Motion sickness 08/08/2016    Seasonal allergic rhinitis 08/08/2016    Upper respiratory tract infection 08/08/2016    Alcohol withdrawal 11/04/2016    Headache 11/05/2016    Gastritis 11/05/2016    Olecranon bursitis of right elbow 04/05/2017    Sciatica of left side 06/12/2018    Syncope and collapse 01/02/2019    Alcoholic ketoacidosis 01/12/2020    Hyponatremia 01/13/2020    Hypokalemia 01/13/2020    Alcohol dependence with uncomplicated withdrawal 01/14/2020    Nephrolithiasis 02/12/2020    Hypomagnesemia 01/16/2021    Non-traumatic rhabdomyolysis 01/18/2021    Urine retention 01/21/2021    Epigastric pain 06/23/2023    Dehydration 02/24/2024     Past Medical History:   Diagnosis Date    Alcohol abuse     Allergic 1970    Anxiety     Arthritis     Depression     Disease of thyroid gland     Elevated cholesterol     Encounter for  "removal of sutures     GERD (gastroesophageal reflux disease)     Headache, tension-type     Kidney stone     Low back pain     Migraine     Olecranon bursitis, right elbow     Peripheral neuropathy     Sleep apnea          PAST SURGICAL HISTORY  Past Surgical History:   Procedure Laterality Date    COLONOSCOPY      CYST REMOVAL      CYSTOSCOPY BLADDER STONE LITHOTRIPSY  2022    EXTRACORPOREAL SHOCK WAVE LITHOTRIPSY (ESWL) Right     SHOULDER ARTHROSCOPY Right 2019    Procedure: SHOULDER ARTHROSCOPY, decompression, distal clavicle excision;  Surgeon: Aj Mancilla MD;  Location: [facility] [place];  Service: Orthopedics    TONSILLECTOMY           FAMILY HISTORY  Family History   Problem Relation Age of Onset    Alzheimer's disease Mother     Mental illness Mother     Pancreatic cancer Father     Hearing loss Father     Arthritis Maternal Grandmother     Malig Hyperthermia Neg Hx          SOCIAL HISTORY  Social History     Socioeconomic History    Marital status: Single   Tobacco Use    Smoking status: Former     Current packs/day: 0.00     Average packs/day: 0.5 packs/day for 15.0 years (7.5 ttl pk-yrs)     Types: Cigarettes     Start date: 1990     Quit date: 2005     Years since quittin.7    Smokeless tobacco: Never    Tobacco comments:     caffeine - 3 cans of coke daily    Vaping Use    Vaping status: Never Used   Substance and Sexual Activity    Alcohol use: Yes     Alcohol/week: 7.0 standard drinks of alcohol     Types: 7 Shots of liquor per week     Comment: .5 liter vodka    Drug use: Yes     Types: Methamphetamines     Comment: \"once in a rare while\"    Sexual activity: Yes     Partners: Female     Birth control/protection: Condom         ALLERGIES  Penicillins      REVIEW OF SYSTEMS  Included in HPI  All systems reviewed and negative except for those discussed in HPI.      PHYSICAL EXAM    I have reviewed the triage vital signs and nursing notes.    ED Triage Vitals [24 " 0110]   Temp Heart Rate Resp BP SpO2   97.9 °F (36.6 °C) 72 17 177/92 96 %      Temp src Heart Rate Source Patient Position BP Location FiO2 (%)   Tympanic -- -- -- --       GENERAL: not distressed  HENT: nares patent  Head/neck/ face are symmetric without gross deformity or swelling  EYES: no scleral icterus  CV: regular rhythm, regular rate with intact distal pulses  RESPIRATORY: normal effort and no respiratory distress  ABDOMEN: soft, diffuse TTP without rebound or guarding MUSCULOSKELETAL: no deformity  NEURO: alert and appropriate, moves all extremities, follows commands  SKIN: warm, dry    Vital signs and nursing notes reviewed.      LAB RESULTS  Recent Results (from the past 24 hour(s))   Comprehensive Metabolic Panel    Collection Time: 09/20/24  1:21 AM    Specimen: Blood   Result Value Ref Range    Glucose 100 (H) 65 - 99 mg/dL    BUN 6 (L) 8 - 23 mg/dL    Creatinine 0.65 (L) 0.76 - 1.27 mg/dL    Sodium 142 136 - 145 mmol/L    Potassium 3.5 3.5 - 5.2 mmol/L    Chloride 103 98 - 107 mmol/L    CO2 24.5 22.0 - 29.0 mmol/L    Calcium 8.6 8.6 - 10.5 mg/dL    Total Protein 6.9 6.0 - 8.5 g/dL    Albumin 4.2 3.5 - 5.2 g/dL    ALT (SGPT) 24 1 - 41 U/L    AST (SGOT) 112 (H) 1 - 40 U/L    Alkaline Phosphatase 127 (H) 39 - 117 U/L    Total Bilirubin 0.5 0.0 - 1.2 mg/dL    Globulin 2.7 gm/dL    A/G Ratio 1.6 g/dL    BUN/Creatinine Ratio 9.2 7.0 - 25.0    Anion Gap 14.5 5.0 - 15.0 mmol/L    eGFR 103.3 >60.0 mL/min/1.73   Lipase    Collection Time: 09/20/24  1:21 AM    Specimen: Blood   Result Value Ref Range    Lipase 139 (H) 13 - 60 U/L   Green Top (Gel)    Collection Time: 09/20/24  1:21 AM   Result Value Ref Range    Extra Tube Hold for add-ons.    Lavender Top    Collection Time: 09/20/24  1:21 AM   Result Value Ref Range    Extra Tube hold for add-on    Gold Top - SST    Collection Time: 09/20/24  1:21 AM   Result Value Ref Range    Extra Tube Hold for add-ons.    Light Blue Top    Collection Time: 09/20/24  1:21  AM   Result Value Ref Range    Extra Tube Hold for add-ons.    CBC Auto Differential    Collection Time: 09/20/24  1:21 AM    Specimen: Blood   Result Value Ref Range    WBC 4.30 3.40 - 10.80 10*3/mm3    RBC 4.07 (L) 4.14 - 5.80 10*6/mm3    Hemoglobin 13.8 13.0 - 17.7 g/dL    Hematocrit 41.1 37.5 - 51.0 %    .0 (H) 79.0 - 97.0 fL    MCH 33.9 (H) 26.6 - 33.0 pg    MCHC 33.6 31.5 - 35.7 g/dL    RDW 15.7 (H) 12.3 - 15.4 %    RDW-SD 58.2 (H) 37.0 - 54.0 fl    MPV 8.9 6.0 - 12.0 fL    Platelets 156 140 - 450 10*3/mm3    Neutrophil % 36.9 (L) 42.7 - 76.0 %    Lymphocyte % 47.7 (H) 19.6 - 45.3 %    Monocyte % 12.3 (H) 5.0 - 12.0 %    Eosinophil % 1.9 0.3 - 6.2 %    Basophil % 0.7 0.0 - 1.5 %    Immature Grans % 0.5 0.0 - 0.5 %    Neutrophils, Absolute 1.59 (L) 1.70 - 7.00 10*3/mm3    Lymphocytes, Absolute 2.05 0.70 - 3.10 10*3/mm3    Monocytes, Absolute 0.53 0.10 - 0.90 10*3/mm3    Eosinophils, Absolute 0.08 0.00 - 0.40 10*3/mm3    Basophils, Absolute 0.03 0.00 - 0.20 10*3/mm3    Immature Grans, Absolute 0.02 0.00 - 0.05 10*3/mm3    nRBC 0.0 0.0 - 0.2 /100 WBC   Lactic Acid, Plasma    Collection Time: 09/20/24  2:33 AM    Specimen: Blood   Result Value Ref Range    Lactate 2.2 (C) 0.5 - 2.0 mmol/L   Urinalysis With Microscopic If Indicated (No Culture) - Urine, Clean Catch    Collection Time: 09/20/24  4:57 AM    Specimen: Urine, Clean Catch   Result Value Ref Range    Color, UA Yellow Yellow, Straw    Appearance, UA Clear Clear    pH, UA 5.5 5.0 - 8.0    Specific Gravity, UA >1.030 (H) 1.005 - 1.030    Glucose, UA Negative Negative    Ketones, UA Negative Negative    Bilirubin, UA Negative Negative    Blood, UA Negative Negative    Protein, UA Negative Negative    Leuk Esterase, UA Trace (A) Negative    Nitrite, UA Negative Negative    Urobilinogen, UA 1.0 E.U./dL 0.2 - 1.0 E.U./dL   Urinalysis, Microscopic Only - Urine, Clean Catch    Collection Time: 09/20/24  4:57 AM    Specimen: Urine, Clean Catch   Result  Value Ref Range    RBC, UA 0-2 None Seen, 0-2 /HPF    WBC, UA 6-10 (A) None Seen, 0-2 /HPF    Bacteria, UA None Seen None Seen /HPF    Squamous Epithelial Cells, UA 0-2 None Seen, 0-2 /HPF    Hyaline Casts, UA None Seen None Seen /LPF    Methodology Automated Microscopy    Ethanol    Collection Time: 09/20/24  5:00 AM    Specimen: Blood   Result Value Ref Range    Ethanol 184 (H) 0 - 10 mg/dL    Ethanol % 0.184 %         RADIOLOGY  CT Abdomen Pelvis With Contrast    Result Date: 9/20/2024  Patient: HERB LUNA  Time Out: 04:05 Exam(s): CT ABDOMEN + PELVIS With Contrast EXAM:   CT Abdomen and Pelvis With Intravenous Contrast CLINICAL HISTORY:    Reason for exam: left sided abdominal pain. TECHNIQUE:   Axial computed tomography images of the abdomen and pelvis with intravenous contrast.  CTDI is 11.92 mGy and DLP is 672.6 mGy-cm.  This CT exam was performed according to the principle of ALARA (As Low As Reasonably Achievable) by using one or more of the following dose reduction techniques: automated exposure control, adjustment of the mA and or kV according to patient size, and or use of iterative reconstruction technique. COMPARISON:   CT abdomen pelvis on 9 3 2024 FINDINGS:   Lung bases:  Lower lung atelectasis.  ABDOMEN:   Liver:  Unremarkable.  No mass.   Gallbladder and bile ducts:  Cholelithiasis.  No ductal dilation.   Pancreas:  Unremarkable.  No mass.  No ductal dilation.   Spleen:  Small splenules.   Adrenals:  Nonspecific small left adrenal nodule.   Kidneys and ureters:  Right renal cyst.  No further follow-up necessary.   Mild scarring in the left kidney.  No hydronephrosis or obstructing ureteral stone.   Stomach and bowel:  Evaluation of the stomach is limited by underdistention.  Diverticulosis without evidence of diverticulitis.  No small bowel obstruction.  PELVIS:   Appendix:  Normal appendix.   Bladder:  Bladder wall thickening is nonspecific.  Please correlate with urinalysis if concerned for  cystitis.   Reproductive:  Calcifications in a mildly enlarged prostate.  ABDOMEN and PELVIS:   Intraperitoneal space:  Unremarkable.  No free air.  No significant fluid collection.   Bones joints:  Mild degenerative changes of the spine.  No acute fracture.  No dislocation.   Soft tissues:  Mild bilateral gynecomastia.  Small fat-containing umbilical hernia.   Vasculature:  Phleboliths in the pelvis.  Atherosclerotic changes of the vasculature.  No abdominal aortic aneurysm or dissection.   Lymph nodes:  Unremarkable.  No enlarged lymph nodes.   Other findings:  Elevation of the right hemidiaphragm. IMPRESSION:     1.  Bladder wall thickening is nonspecific.  Please correlate with urinalysis if concerned for cystitis. 2.  Cholelithiasis.     Electronically signed by Jesus Siegel M.D. on 09-20-24 at 0405       MEDICATIONS GIVEN IN ER  Medications   sodium chloride 0.9 % flush 10 mL (has no administration in time range)   sodium chloride 0.9 % bolus 1,000 mL (0 mL Intravenous Stopped 9/20/24 0456)   morphine injection 4 mg (4 mg Intravenous Given 9/20/24 0242)   ondansetron (ZOFRAN) injection 4 mg (4 mg Intravenous Given 9/20/24 0244)   iopamidol (ISOVUE-300) 61 % injection 100 mL (85 mL Intravenous Given by Other 9/20/24 0304)   HYDROmorphone (DILAUDID) injection 0.5 mg (0.5 mg Intravenous Given 9/20/24 0336)   HYDROmorphone (DILAUDID) injection 0.5 mg (0.5 mg Intravenous Given 9/20/24 0536)           OUTPATIENT MEDICATION MANAGEMENT:  Current Facility-Administered Medications Ordered in Epic   Medication Dose Route Frequency Provider Last Rate Last Admin    sodium chloride 0.9 % flush 10 mL  10 mL Intravenous PRN Carlos Thomas MD         Current Outpatient Medications Ordered in Epic   Medication Sig Dispense Refill    amLODIPine (NORVASC) 5 MG tablet Take 1 tablet by mouth Every Morning. 90 tablet 1    fluticasone (FLONASE) 50 MCG/ACT nasal spray 2 sprays into the nostril(s) as directed by provider  Daily. 11.1 mL 0    levothyroxine (SYNTHROID, LEVOTHROID) 100 MCG tablet Take 1 tablet by mouth Daily. 90 tablet 1    lisinopril (PRINIVIL,ZESTRIL) 10 MG tablet Take 1 tablet by mouth Daily. 90 tablet 1           PROGRESS, DATA ANALYSIS, CONSULTS, AND MEDICAL DECISION MAKING  ORDERS PLACED DURING THIS VISIT:  Orders Placed This Encounter   Procedures    CT Abdomen Pelvis With Contrast    [place] Draw    Comprehensive Metabolic Panel    Lipase    Urinalysis With Microscopic If Indicated (No Culture) - Urine, Clean Catch    Lactic Acid, Plasma    CBC Auto Differential    Ethanol    STAT Lactic Acid, Reflex    Urinalysis, Microscopic Only - Urine, Clean Catch    NPO Diet NPO Type: Strict NPO    Undress & Gown    Insert Peripheral IV    CBC & Differential    Green Top (Gel)    Lavender Top    Gold Top - SST    Light Blue Top       All labs have been independently interpreted by me.  All radiology studies have been reviewed by me. All EKG's have been independently viewed by me.  Discussion below represents my analysis of pertinent findings related to patient's condition, differential diagnosis, treatment plan and final disposition.    Differential diagnosis includes but is not limited to:   Pancreatitis, cholecystitis, diverticulitis, etc.    ED Course/Progress Notes/MDM:  ED Course as of 09/20/24 0552   Fri Sep 20, 2024   0159 Lipase(!): 139 [AR]   0159 WBC: 4.30 [AR]   0211 I discussed this case with Dr. Thomas. [AR]   0329 Lactate(!!): 2.2 [AR]   0403 BP: 166/94 [AR]   0403 Heart Rate: 69 [AR]   0530 Ethanol(!): 184 [AR]   0530 Patient continues to have pain, additional dose of Dilaudid ordered. [AR]      ED Course User Index  [AR] Constanza Vaughn APRN           AS OF 05:51 EDT VITALS:    BP - (!) 172/103  HR - 74  TEMP - 97.9 °F (36.6 °C) (Tympanic)  O2 SATS - 94%        COMPLEXITY OF CARE  {Admission Statement:37448}    {EM_CC (Optional):07533}    DIAGNOSIS  Final diagnoses:   None         DISPOSITION  ED  Disposition       None               {EM_PPE (Optional):55642}    Please note that portions of this document were completed with a voice recognition program.    Note Disclaimer: At [facility], we believe that sharing information builds trust and better relationships. You are receiving this note because you recently visited [facility]. It is possible you will see health information before a provider has talked with you about it. This kind of information can be easy to misunderstand. To help you fully understand what it means for your health, we urge you to discuss this note with your provider.

## 2024-09-21 ENCOUNTER — APPOINTMENT (OUTPATIENT)
Dept: NUCLEAR MEDICINE | Facility: HOSPITAL | Age: 69
End: 2024-09-21
Payer: MEDICARE

## 2024-09-21 PROBLEM — K80.10 CALCULUS OF GALLBLADDER WITH CHOLECYSTITIS WITHOUT BILIARY OBSTRUCTION: Status: ACTIVE | Noted: 2024-09-20

## 2024-09-21 LAB
ALBUMIN SERPL-MCNC: 3.8 G/DL (ref 3.5–5.2)
ALBUMIN/GLOB SERPL: 1.5 G/DL
ALP SERPL-CCNC: 105 U/L (ref 39–117)
ALT SERPL W P-5'-P-CCNC: 15 U/L (ref 1–41)
ANION GAP SERPL CALCULATED.3IONS-SCNC: 9 MMOL/L (ref 5–15)
AST SERPL-CCNC: 72 U/L (ref 1–40)
BILIRUB SERPL-MCNC: 1.3 MG/DL (ref 0–1.2)
BUN SERPL-MCNC: 6 MG/DL (ref 8–23)
BUN/CREAT SERPL: 12.8 (ref 7–25)
CALCIUM SPEC-SCNC: 7.9 MG/DL (ref 8.6–10.5)
CHLORIDE SERPL-SCNC: 102 MMOL/L (ref 98–107)
CO2 SERPL-SCNC: 23 MMOL/L (ref 22–29)
CREAT SERPL-MCNC: 0.47 MG/DL (ref 0.76–1.27)
EGFRCR SERPLBLD CKD-EPI 2021: 113.9 ML/MIN/1.73
GLOBULIN UR ELPH-MCNC: 2.5 GM/DL
GLUCOSE SERPL-MCNC: 110 MG/DL (ref 65–99)
POTASSIUM SERPL-SCNC: 4.4 MMOL/L (ref 3.5–5.2)
PROT SERPL-MCNC: 6.3 G/DL (ref 6–8.5)
SODIUM SERPL-SCNC: 134 MMOL/L (ref 136–145)

## 2024-09-21 PROCEDURE — 25010000002 THIAMINE HCL 200 MG/2ML SOLUTION: Performed by: HOSPITALIST

## 2024-09-21 PROCEDURE — 25010000002 THIAMINE PER 100 MG: Performed by: HOSPITALIST

## 2024-09-21 PROCEDURE — S2900 ROBOTIC SURGICAL SYSTEM: HCPCS | Performed by: SURGERY

## 2024-09-21 PROCEDURE — 36415 COLL VENOUS BLD VENIPUNCTURE: CPT | Performed by: HOSPITALIST

## 2024-09-21 PROCEDURE — A9537 TC99M MEBROFENIN: HCPCS | Performed by: HOSPITALIST

## 2024-09-21 PROCEDURE — 99232 SBSQ HOSP IP/OBS MODERATE 35: CPT | Performed by: INTERNAL MEDICINE

## 2024-09-21 PROCEDURE — 25010000002 SODIUM CHLORIDE 0.9 % WITH KCL 20 MEQ 20-0.9 MEQ/L-% SOLUTION: Performed by: HOSPITALIST

## 2024-09-21 PROCEDURE — 25010000002 LORAZEPAM PER 2 MG: Performed by: HOSPITALIST

## 2024-09-21 PROCEDURE — 78227 HEPATOBIL SYST IMAGE W/DRUG: CPT

## 2024-09-21 PROCEDURE — 80053 COMPREHEN METABOLIC PANEL: CPT | Performed by: HOSPITALIST

## 2024-09-21 PROCEDURE — 99221 1ST HOSP IP/OBS SF/LOW 40: CPT | Performed by: SURGERY

## 2024-09-21 PROCEDURE — 0 TECHNETIUM TC 99M MEBROFENIN KIT: Performed by: HOSPITALIST

## 2024-09-21 RX ORDER — KIT FOR THE PREPARATION OF TECHNETIUM TC 99M MEBROFENIN 45 MG/10ML
1 INJECTION, POWDER, LYOPHILIZED, FOR SOLUTION INTRAVENOUS
Status: COMPLETED | OUTPATIENT
Start: 2024-09-21 | End: 2024-09-21

## 2024-09-21 RX ADMIN — SUCRALFATE 1 G: 1 TABLET ORAL at 10:39

## 2024-09-21 RX ADMIN — MEBROFENIN 1 DOSE: 45 INJECTION, POWDER, LYOPHILIZED, FOR SOLUTION INTRAVENOUS at 08:33

## 2024-09-21 RX ADMIN — Medication 10 ML: at 20:05

## 2024-09-21 RX ADMIN — THIAMINE HYDROCHLORIDE 200 MG: 100 INJECTION, SOLUTION INTRAMUSCULAR; INTRAVENOUS at 22:51

## 2024-09-21 RX ADMIN — THIAMINE HYDROCHLORIDE 200 MG: 100 INJECTION, SOLUTION INTRAMUSCULAR; INTRAVENOUS at 05:12

## 2024-09-21 RX ADMIN — LORAZEPAM 1 MG: 1 TABLET ORAL at 22:51

## 2024-09-21 RX ADMIN — LEVOTHYROXINE SODIUM 100 MCG: 100 TABLET ORAL at 10:39

## 2024-09-21 RX ADMIN — LORAZEPAM 2 MG: 2 INJECTION INTRAMUSCULAR; INTRAVENOUS at 00:02

## 2024-09-21 RX ADMIN — Medication 10 ML: at 10:39

## 2024-09-21 RX ADMIN — FOLIC ACID 1 MG: 1 TABLET ORAL at 10:39

## 2024-09-21 RX ADMIN — FLUTICASONE PROPIONATE 2 SPRAY: 50 SPRAY, METERED NASAL at 10:39

## 2024-09-21 RX ADMIN — ACETAMINOPHEN 325MG 650 MG: 325 TABLET ORAL at 10:39

## 2024-09-21 RX ADMIN — POTASSIUM CHLORIDE AND SODIUM CHLORIDE 100 ML/HR: 900; 150 INJECTION, SOLUTION INTRAVENOUS at 00:42

## 2024-09-21 RX ADMIN — LORAZEPAM 2 MG: 1 TABLET ORAL at 02:15

## 2024-09-21 RX ADMIN — LORAZEPAM 1 MG: 1 TABLET ORAL at 10:39

## 2024-09-21 RX ADMIN — SUCRALFATE 1 G: 1 TABLET ORAL at 17:36

## 2024-09-21 RX ADMIN — LISINOPRIL 20 MG: 20 TABLET ORAL at 10:39

## 2024-09-21 RX ADMIN — THIAMINE HYDROCHLORIDE 200 MG: 100 INJECTION, SOLUTION INTRAMUSCULAR; INTRAVENOUS at 17:36

## 2024-09-21 RX ADMIN — AMLODIPINE BESYLATE 5 MG: 5 TABLET ORAL at 10:39

## 2024-09-21 RX ADMIN — SUCRALFATE 1 G: 1 TABLET ORAL at 20:05

## 2024-09-21 RX ADMIN — PANTOPRAZOLE SODIUM 40 MG: 40 INJECTION, POWDER, FOR SOLUTION INTRAVENOUS at 05:12

## 2024-09-21 NOTE — PROGRESS NOTES
[facility]  Inpatient Progress Note    Reason for Followup:  Abdominal pain, EtOHism    Subjective     Interval History:   No new issues    Current Facility-Administered Medications:     acetaminophen (TYLENOL) tablet 650 mg, 650 mg, Oral, Q6H PRN, Keanu Harrell MD    amLODIPine (NORVASC) tablet 5 mg, 5 mg, Oral, QAM, Keanu Harrell MD, 5 mg at 09/20/24 1047    sennosides-docusate (PERICOLACE) 8.6-50 MG per tablet 2 tablet, 2 tablet, Oral, BID PRN **AND** polyethylene glycol (MIRALAX) packet 17 g, 17 g, Oral, Daily PRN **AND** bisacodyl (DULCOLAX) EC tablet 5 mg, 5 mg, Oral, Daily PRN **AND** bisacodyl (DULCOLAX) suppository 10 mg, 10 mg, Rectal, Daily PRN, Keanu Harrell MD    fluticasone (FLONASE) 50 MCG/ACT nasal spray 2 spray, 2 spray, Nasal, Daily, Keanu Harrell MD, 2 spray at 09/20/24 1207    folic acid (FOLVITE) tablet 1 mg, 1 mg, Oral, Daily, Keanu Harrell MD, 1 mg at 09/20/24 1207    HYDROmorphone (DILAUDID) injection 0.5 mg, 0.5 mg, Intravenous, Q2H PRN, Keanu Harrell MD, 0.5 mg at 09/20/24 2026    levothyroxine (SYNTHROID, LEVOTHROID) tablet 100 mcg, 100 mcg, Oral, Daily, Keanu Harrell MD, 100 mcg at 09/20/24 1047    lisinopril (PRINIVIL,ZESTRIL) tablet 20 mg, 20 mg, Oral, Daily, Keanu Harrell MD, 20 mg at 09/20/24 1207    LORazepam (ATIVAN) tablet 1 mg, 1 mg, Oral, Q1H PRN **OR** LORazepam (ATIVAN) injection 1 mg, 1 mg, Intravenous, Q1H PRN **OR** LORazepam (ATIVAN) tablet 2 mg, 2 mg, Oral, Q1H PRN **OR** LORazepam (ATIVAN) injection 2 mg, 2 mg, Intravenous, Q1H PRN, 2 mg at 09/21/24 0002 **OR** LORazepam (ATIVAN) injection 2 mg, 2 mg, Intravenous, Q15 Min PRN **OR** LORazepam (ATIVAN) injection 2 mg, 2 mg, Intramuscular, Q15 Min PRN, Keanu Harrell MD    [COMPLETED] LORazepam (ATIVAN) tablet 2 mg, 2 mg, Oral, Q6H, 2 mg at 09/21/24 0215 **FOLLOWED BY** LORazepam (ATIVAN) tablet 1 mg, 1 mg, Oral, Q6H **FOLLOWED BY**  [START ON 9/22/2024] LORazepam (ATIVAN) tablet 1 mg, 1 mg, Oral, Q12H **FOLLOWED BY** [START ON 9/23/2024] LORazepam (ATIVAN) tablet 1 mg, 1 mg, Oral, Daily, Keanu Harrell MD    ondansetron ODT (ZOFRAN-ODT) disintegrating tablet 4 mg, 4 mg, Oral, Q6H PRN **OR** ondansetron (ZOFRAN) injection 4 mg, 4 mg, Intravenous, Q6H PRN, Keanu Harrell MD    pantoprazole (PROTONIX) injection 40 mg, 40 mg, Intravenous, Q AM, Sol Patel PA-C, 40 mg at 09/21/24 0512    sodium chloride 0.9 % flush 10 mL, 10 mL, Intravenous, PRN, Carlos Thomas MD    sodium chloride 0.9 % flush 10 mL, 10 mL, Intravenous, Q12H, Keanu Harrell MD, 10 mL at 09/20/24 0829    sodium chloride 0.9 % flush 10 mL, 10 mL, Intravenous, PRN, Keanu Harrell MD    sodium chloride 0.9 % infusion 40 mL, 40 mL, Intravenous, PRN, Keanu Harrell MD    sodium chloride 0.9 % with KCl 20 mEq/L infusion, 100 mL/hr, Intravenous, Continuous, Keanu Harrell MD, Last Rate: 100 mL/hr at 09/21/24 0042, 100 mL/hr at 09/21/24 0042    sucralfate (CARAFATE) tablet 1 g, 1 g, Oral, 4x Daily AC & at Bedtime, Keanu Harrell MD, 1 g at 09/20/24 2022    thiamine (B-1) injection 200 mg, 200 mg, Intravenous, Q8H, 200 mg at 09/21/24 0512 **FOLLOWED BY** [START ON 9/25/2024] thiamine (VITAMIN B-1) tablet 100 mg, 100 mg, Oral, Daily, Keanu Harrell MD    Objective     Vital Signs  Temp:  [98.2 °F (36.8 °C)-99.7 °F (37.6 °C)] 99.7 °F (37.6 °C)  Heart Rate:  [62-95] 95  Resp:  [18] 18  BP: (137-170)/() 153/73  Body mass index is 25.09 kg/m².    Intake/Output Summary (Last 24 hours) at 9/21/2024 0534  Last data filed at 9/21/2024 0334  Gross per 24 hour   Intake --   Output 1500 ml   Net -1500 ml     I/O this shift:  In: -   Out: 700 [Urine:700]     Physical Exam:   General: patient awake, alert and cooperative   Abdomen: soft, nontender, nondistended; normal bowel sounds     Results Review:     I reviewed the patient's new  clinical results.  I reviewed the patient's new imaging results and agree with the interpretation.    Results from last 7 days   Lab Units 09/20/24  0121   WBC 10*3/mm3 4.30   HEMOGLOBIN g/dL 13.8   HEMATOCRIT % 41.1   PLATELETS 10*3/mm3 156     Results from last 7 days   Lab Units 09/21/24  0231 09/20/24  0121   SODIUM mmol/L 134* 142   POTASSIUM mmol/L 4.4 3.5   CHLORIDE mmol/L 102 103   CO2 mmol/L 23.0 24.5   BUN mg/dL 6* 6*   CREATININE mg/dL 0.47* 0.65*   CALCIUM mg/dL 7.9* 8.6   BILIRUBIN mg/dL 1.3* 0.5   ALK PHOS U/L 105 127*   ALT (SGPT) U/L 15 24   AST (SGOT) U/L 72* 112*   GLUCOSE mg/dL 110* 100*         Lab Results   Lab Value Date/Time    LIPASE 139 (H) 09/20/2024 0121    LIPASE 81 (H) 09/03/2024 1256    LIPASE 151 (H) 03/06/2024 1712    LIPASE 98 (H) 02/24/2024 1650    LIPASE 79 (H) 07/23/2023 0425    LIPASE 160 (H) 06/22/2023 2056    LIPASE 33 05/13/2023 1848    LIPASE 36 04/13/2023 2256    LIPASE 164 (H) 08/18/2022 2307    LIPASE 43 02/10/2022 1203    LIPASE 114 (H) 01/16/2021 1850    LIPASE 113 (H) 01/11/2020 1715    LIPASE 39 11/04/2016 1551       Radiology:  [unfilled]      Assessment & Plan   Assessment:   Alcoholism  Abdominal pain  Modestly elevated lipase  Cholelithiasis    All problems new to me today    Plan:   Await HIDA scan  Continue PPI  CIWA protocol    I discussed the patient's findings and my recommendations with patient.          Jamari Shearer M.D.  [facility]  [address] [place] [address]  Office: [phone]

## 2024-09-21 NOTE — PLAN OF CARE
Goal Outcome Evaluation:         VSS. RA. Sinus rhythm. HIDA was completed. Has been NPO. Ambulating to the bathroom with assit x 1. CIWA 2-8. Pleasant. Will advance diet. Care plan ongoing.  Problem: Adult Inpatient Plan of Care  Goal: Plan of Care Review  Outcome: Ongoing, Progressing  Goal: Patient-Specific Goal (Individualized)  Outcome: Ongoing, Progressing  Goal: Absence of Hospital-Acquired Illness or Injury  Outcome: Ongoing, Progressing  Intervention: Identify and Manage Fall Risk  Recent Flowsheet Documentation  Taken 9/21/2024 1635 by Tiesha Loera RN  Safety Promotion/Fall Prevention:   toileting scheduled   safety round/check completed   room organization consistent   nonskid shoes/slippers when out of bed   lighting adjusted   clutter free environment maintained  Intervention: Prevent Skin Injury  Recent Flowsheet Documentation  Taken 9/21/2024 1635 by Tiesha Loera RN  Body Position: position changed independently  Intervention: Prevent and Manage VTE (Venous Thromboembolism) Risk  Recent Flowsheet Documentation  Taken 9/21/2024 1635 by Tiesha Loera RN  Activity Management:   ambulated in room   back to bed   bedrest   activity minimized  Intervention: Prevent Infection  Recent Flowsheet Documentation  Taken 9/21/2024 1635 by Tiesha Loera RN  Infection Prevention:   single patient room provided   rest/sleep promoted   hand hygiene promoted   equipment surfaces disinfected  Goal: Optimal Comfort and Wellbeing  Outcome: Ongoing, Progressing  Goal: Readiness for Transition of Care  Outcome: Ongoing, Progressing     Problem: Hypertension Comorbidity  Goal: Blood Pressure in Desired Range  Outcome: Ongoing, Progressing  Intervention: Maintain Blood Pressure Management  Recent Flowsheet Documentation  Taken 9/21/2024 1635 by Tiesha Loera RN  Medication Review/Management: medications reviewed  Goal: Blood Pressure in Desired Range  Outcome: Ongoing, Progressing  Intervention: Maintain Blood  Pressure Management  Recent Flowsheet Documentation  Taken 9/21/2024 1635 by Tiesha Loera RN  Medication Review/Management: medications reviewed     Problem: Obstructive Sleep Apnea Risk or Actual Comorbidity Management  Goal: Unobstructed Breathing During Sleep  Outcome: Ongoing, Progressing     Problem: Fall Injury Risk  Goal: Absence of Fall and Fall-Related Injury  Outcome: Ongoing, Progressing  Intervention: Identify and Manage Contributors  Recent Flowsheet Documentation  Taken 9/21/2024 1635 by Tiesha Loera RN  Medication Review/Management: medications reviewed  Intervention: Promote Injury-Free Environment  Recent Flowsheet Documentation  Taken 9/21/2024 1635 by Tiesha Loera RN  Safety Promotion/Fall Prevention:   toileting scheduled   safety round/check completed   room organization consistent   nonskid shoes/slippers when out of bed   lighting adjusted   clutter free environment maintained     Problem: Pain Acute  Goal: Acceptable Pain Control and Functional Ability  Outcome: Ongoing, Progressing  Intervention: Prevent or Manage Pain  Recent Flowsheet Documentation  Taken 9/21/2024 1635 by Tiesha Loera RN  Medication Review/Management: medications reviewed     Problem: Alcohol Withdrawal  Goal: Alcohol Withdrawal Symptom Control  Outcome: Ongoing, Progressing  Intervention: Minimize or Manage Alcohol Withdrawal Symptoms  Recent Flowsheet Documentation  Taken 9/21/2024 1635 by Tiesha Loera RN  Seizure Precautions:   soft boundaries provided   side rails padded     Problem: Acute Neurologic Deterioration (Alcohol Withdrawal)  Goal: Optimal Neurologic Function  Outcome: Ongoing, Progressing  Intervention: Prevent Seizure-Related Injury  Recent Flowsheet Documentation  Taken 9/21/2024 1635 by Tiesha Loera RN  Seizure Precautions:   soft boundaries provided   side rails padded     Problem: Substance Misuse (Alcohol Withdrawal)  Goal: Readiness for Change Identified  Outcome: Ongoing,  Progressing     Problem: Skin Injury Risk Increased  Goal: Skin Health and Integrity  Outcome: Ongoing, Progressing  Intervention: Optimize Skin Protection  Recent Flowsheet Documentation  Taken 9/21/2024 1635 by Tiesha Loera, RN  Head of Bed (HOB) Positioning: HOB at 30-45 degrees

## 2024-09-21 NOTE — PLAN OF CARE
Goal Outcome Evaluation:  Plan of Care Reviewed With: patient        Progress: no change  Outcome Evaluation: VSS: Pt is alert and oriented at present; CIWA is anywhere 6-11; PRN ativan given x1 so far; IVF continue, but will be d/c this am; prn pain med given x1; NPO for HIDA scan; pt very tremorous; SR; RA; WCTM

## 2024-09-21 NOTE — PROGRESS NOTES
"    DAILY PROGRESS NOTE  [facility]    Patient Identification:  Name: Pro Mueller  Age: 69 y.o.  Sex: male  :  1955  MRN: [id number]         Primary Care Physician: Provider, No Known    Subjective:  Interval History: A bit sedated for some of the Ativan.  He can awake easily but he does fall asleep pretty properly.  He seems much more relaxed today and much less tremulous as opposed to yesterday.    Objective: Disheveled at baseline.    Scheduled Meds:amLODIPine, 5 mg, Oral, QAM  fluticasone, 2 spray, Nasal, Daily  folic acid, 1 mg, Oral, Daily  levothyroxine, 100 mcg, Oral, Daily  lisinopril, 20 mg, Oral, Daily  LORazepam, 1 mg, Oral, Q6H   Followed by  [START ON 2024] LORazepam, 1 mg, Oral, Q12H   Followed by  [START ON 2024] LORazepam, 1 mg, Oral, Daily  pantoprazole, 40 mg, Intravenous, Q AM  sincalide (KINEVAC) 1.7 mcg in sodium chloride 0.9 % 101.7 mL IVPB, 0.02 mcg/kg, Intravenous, Once  sodium chloride, 10 mL, Intravenous, Q12H  sucralfate, 1 g, Oral, 4x Daily AC & at Bedtime  thiamine (B-1) IV, 200 mg, Intravenous, Q8H   Followed by  [START ON 2024] thiamine, 100 mg, Oral, Daily      Continuous Infusions:     Vital signs in last 24 hours:  Temp:  [98.6 °F (37 °C)-100.2 °F (37.9 °C)] 100.2 °F (37.9 °C)  Heart Rate:  [82-95] 84  Resp:  [18] 18  BP: (137-155)/(73-90) 148/90    Intake/Output:    Intake/Output Summary (Last 24 hours) at 2024 1214  Last data filed at 2024 0605  Gross per 24 hour   Intake --   Output 1200 ml   Net -1200 ml       Exam:  /90 (BP Location: Right arm, Patient Position: Lying)   Pulse 84   Temp 100.2 °F (37.9 °C) (Oral)   Resp 18   Ht 182.9 cm (72\")   Wt 83.9 kg (185 lb)   SpO2 100%   BMI 25.09 kg/m²     General Appearance:    Alert, cooperative, sedated though easily awakened.  No family present                          Head:    Normocephalic, without obvious abnormality, atraumatic                         Throat: Mucous " membranes much less dry                           neck:   Supple, no meningismus or JVD                         Lungs:    Diminished bases otherwise to auscultation bilaterally, respirations unlabored                 Chest Wall:    No tenderness or deformity                          Heart:    Regular rate and rhythm, S1 and S2 normal                  Abdomen:     Soft, nontender, bowel sounds active                 Extremities: Moving all, no cyanosis or edema                        Pulses:   Pulses palpable in all extremities                            Skin:   Skin is warm and dry, nonjaundiced                  neurologic:   CNII-XII intact, much less tremulous though more sedated         Data Review:  Labs in chart were reviewed.    Assessment:  Active Hospital Problems    Diagnosis  POA    **Alcohol-induced acute pancreatitis, unspecified complication status [K85.20]  Yes    Lactic acidosis [E87.20]  Yes    Acute alcohol intoxication in patient with alcoholism with blood alcohol level 0.08 to 0.29, with unspecified complication [F10.229]  Unknown    Elevated LFTs [R79.89]  Yes    Hypertension [I10]  Yes    Hyperlipidemia [E78.5]  Yes    Hypothyroidism [E03.9]  Yes      Resolved Hospital Problems   No resolved problems to display.       Plan:    Recurrent alcohol abuse with intoxication    -[facility] protocol-remove scheduled   -Hida per GI -incidental finding of stones on CT   -Awaiting GI recommendations regarding endoscopy if indicated as patient's symptoms sound consistent with gastritis/esophagitis -on PPI/Carafate   -CT of the pancreas not all that impressive -no complicating features   -Classic AST to ALT ratio secondary to alcohol abuse   -Low-grade fever concerning for atelectasis -I-S ordered and will back off of scheduled Ativan.  If for any reason fever persist or gets above 101 then I will check blood cultures and consider empiric antibiotics to cover gallbladder as well as increased risk for aspiration  pneumonia.  On room air      HTN with reactive changes   -Avoid over control and continue current regimen    Hypothyroidism on levothyroxine    Patient can follow-up outpatient urology for microscopic hematuria    SCDs for prophylaxis        Disposition -patient likely needs a couple 2-3 more days    Keanu Harrell MD  9/21/2024  12:14 EDT

## 2024-09-22 ENCOUNTER — APPOINTMENT (OUTPATIENT)
Dept: GENERAL RADIOLOGY | Facility: HOSPITAL | Age: 69
End: 2024-09-22
Payer: MEDICARE

## 2024-09-22 ENCOUNTER — ANESTHESIA (OUTPATIENT)
Dept: PERIOP | Facility: HOSPITAL | Age: 69
End: 2024-09-22
Payer: MEDICARE

## 2024-09-22 ENCOUNTER — ANESTHESIA EVENT (OUTPATIENT)
Dept: PERIOP | Facility: HOSPITAL | Age: 69
End: 2024-09-22
Payer: MEDICARE

## 2024-09-22 LAB
ALBUMIN SERPL-MCNC: 3.6 G/DL (ref 3.5–5.2)
ALBUMIN/GLOB SERPL: 1.4 G/DL
ALP SERPL-CCNC: 97 U/L (ref 39–117)
ALT SERPL W P-5'-P-CCNC: 13 U/L (ref 1–41)
ANION GAP SERPL CALCULATED.3IONS-SCNC: 11 MMOL/L (ref 5–15)
AST SERPL-CCNC: 50 U/L (ref 1–40)
BILIRUB SERPL-MCNC: 1.7 MG/DL (ref 0–1.2)
BUN SERPL-MCNC: 6 MG/DL (ref 8–23)
BUN/CREAT SERPL: 10.7 (ref 7–25)
CALCIUM SPEC-SCNC: 8.2 MG/DL (ref 8.6–10.5)
CHLORIDE SERPL-SCNC: 102 MMOL/L (ref 98–107)
CO2 SERPL-SCNC: 20 MMOL/L (ref 22–29)
CREAT SERPL-MCNC: 0.56 MG/DL (ref 0.76–1.27)
EGFRCR SERPLBLD CKD-EPI 2021: 108 ML/MIN/1.73
GLOBULIN UR ELPH-MCNC: 2.6 GM/DL
GLUCOSE BLDC GLUCOMTR-MCNC: 93 MG/DL (ref 70–130)
GLUCOSE SERPL-MCNC: 96 MG/DL (ref 65–99)
LIPASE SERPL-CCNC: 22 U/L (ref 13–60)
POTASSIUM SERPL-SCNC: 3.7 MMOL/L (ref 3.5–5.2)
PROT SERPL-MCNC: 6.2 G/DL (ref 6–8.5)
SODIUM SERPL-SCNC: 133 MMOL/L (ref 136–145)

## 2024-09-22 PROCEDURE — 25010000002 ONDANSETRON PER 1 MG: Performed by: ANESTHESIOLOGY

## 2024-09-22 PROCEDURE — 25010000002 MAGNESIUM SULFATE PER 500 MG OF MAGNESIUM: Performed by: ANESTHESIOLOGY

## 2024-09-22 PROCEDURE — 25010000002 SUGAMMADEX 200 MG/2ML SOLUTION: Performed by: ANESTHESIOLOGY

## 2024-09-22 PROCEDURE — 25010000002 THIAMINE HCL 200 MG/2ML SOLUTION: Performed by: HOSPITALIST

## 2024-09-22 PROCEDURE — 25810000003 LACTATED RINGERS PER 1000 ML: Performed by: ANESTHESIOLOGY

## 2024-09-22 PROCEDURE — 47563 LAPARO CHOLECYSTECTOMY/GRAPH: CPT | Performed by: SURGERY

## 2024-09-22 PROCEDURE — 25010000002 INDOCYANINE GREEN 25 MG RECONSTITUTED SOLUTION: Performed by: SURGERY

## 2024-09-22 PROCEDURE — 25810000003 SODIUM CHLORIDE PER 500 ML: Performed by: SURGERY

## 2024-09-22 PROCEDURE — 25010000002 SODIUM CHLORIDE 0.9 % WITH KCL 20 MEQ 20-0.9 MEQ/L-% SOLUTION: Performed by: SURGERY

## 2024-09-22 PROCEDURE — 80053 COMPREHEN METABOLIC PANEL: CPT | Performed by: SURGERY

## 2024-09-22 PROCEDURE — 25010000002 HYDROMORPHONE PER 4 MG: Performed by: HOSPITALIST

## 2024-09-22 PROCEDURE — 25010000002 THIAMINE PER 100 MG: Performed by: SURGERY

## 2024-09-22 PROCEDURE — 25010000002 METRONIDAZOLE 500 MG/100ML SOLUTION: Performed by: SURGERY

## 2024-09-22 PROCEDURE — 74300 X-RAY BILE DUCTS/PANCREAS: CPT

## 2024-09-22 PROCEDURE — 25010000002 ONDANSETRON PER 1 MG: Performed by: HOSPITALIST

## 2024-09-22 PROCEDURE — 25010000002 PROPOFOL 200 MG/20ML EMULSION: Performed by: ANESTHESIOLOGY

## 2024-09-22 PROCEDURE — 25510000001 IOPAMIDOL 61 % SOLUTION: Performed by: SURGERY

## 2024-09-22 PROCEDURE — 25010000002 LEVOFLOXACIN PER 250 MG: Performed by: SURGERY

## 2024-09-22 PROCEDURE — BF101ZZ FLUOROSCOPY OF BILE DUCTS USING LOW OSMOLAR CONTRAST: ICD-10-PCS | Performed by: SURGERY

## 2024-09-22 PROCEDURE — 25010000002 DEXAMETHASONE SODIUM PHOSPHATE 20 MG/5ML SOLUTION: Performed by: ANESTHESIOLOGY

## 2024-09-22 PROCEDURE — 83690 ASSAY OF LIPASE: CPT | Performed by: SURGERY

## 2024-09-22 PROCEDURE — C1758 CATHETER, URETERAL: HCPCS | Performed by: SURGERY

## 2024-09-22 PROCEDURE — 82948 REAGENT STRIP/BLOOD GLUCOSE: CPT

## 2024-09-22 PROCEDURE — 25010000002 CEFAZOLIN PER 500 MG: Performed by: SURGERY

## 2024-09-22 PROCEDURE — 25010000002 THIAMINE HCL 200 MG/2ML SOLUTION: Performed by: SURGERY

## 2024-09-22 PROCEDURE — 88304 TISSUE EXAM BY PATHOLOGIST: CPT | Performed by: SURGERY

## 2024-09-22 PROCEDURE — 0FT44ZZ RESECTION OF GALLBLADDER, PERCUTANEOUS ENDOSCOPIC APPROACH: ICD-10-PCS | Performed by: SURGERY

## 2024-09-22 PROCEDURE — 25010000002 FENTANYL CITRATE (PF) 50 MCG/ML SOLUTION: Performed by: ANESTHESIOLOGY

## 2024-09-22 PROCEDURE — 8E0W4CZ ROBOTIC ASSISTED PROCEDURE OF TRUNK REGION, PERCUTANEOUS ENDOSCOPIC APPROACH: ICD-10-PCS | Performed by: SURGERY

## 2024-09-22 DEVICE — MEDIUM-LARGE LIGATION CLIPS 6 CLIPS/CART
Type: IMPLANTABLE DEVICE | Site: ABDOMEN | Status: FUNCTIONAL
Brand: VAS-Q-CLIP

## 2024-09-22 RX ORDER — SODIUM CHLORIDE 0.9 % (FLUSH) 0.9 %
3-10 SYRINGE (ML) INJECTION AS NEEDED
Status: DISCONTINUED | OUTPATIENT
Start: 2024-09-22 | End: 2024-09-22 | Stop reason: HOSPADM

## 2024-09-22 RX ORDER — SODIUM CHLORIDE AND POTASSIUM CHLORIDE 150; 900 MG/100ML; MG/100ML
100 INJECTION, SOLUTION INTRAVENOUS CONTINUOUS
Status: DISCONTINUED | OUTPATIENT
Start: 2024-09-22 | End: 2024-09-24

## 2024-09-22 RX ORDER — HYDRALAZINE HYDROCHLORIDE 20 MG/ML
5 INJECTION INTRAMUSCULAR; INTRAVENOUS
Status: DISCONTINUED | OUTPATIENT
Start: 2024-09-22 | End: 2024-09-22 | Stop reason: HOSPADM

## 2024-09-22 RX ORDER — SODIUM CHLORIDE 9 MG/ML
40 INJECTION, SOLUTION INTRAVENOUS AS NEEDED
Status: DISCONTINUED | OUTPATIENT
Start: 2024-09-22 | End: 2024-09-22 | Stop reason: HOSPADM

## 2024-09-22 RX ORDER — FENTANYL CITRATE 50 UG/ML
50 INJECTION, SOLUTION INTRAMUSCULAR; INTRAVENOUS
Status: DISCONTINUED | OUTPATIENT
Start: 2024-09-22 | End: 2024-09-22 | Stop reason: HOSPADM

## 2024-09-22 RX ORDER — EPHEDRINE SULFATE 50 MG/ML
5 INJECTION, SOLUTION INTRAVENOUS ONCE AS NEEDED
Status: DISCONTINUED | OUTPATIENT
Start: 2024-09-22 | End: 2024-09-22 | Stop reason: HOSPADM

## 2024-09-22 RX ORDER — DROPERIDOL 2.5 MG/ML
0.62 INJECTION, SOLUTION INTRAMUSCULAR; INTRAVENOUS
Status: DISCONTINUED | OUTPATIENT
Start: 2024-09-22 | End: 2024-09-22 | Stop reason: HOSPADM

## 2024-09-22 RX ORDER — DEXMEDETOMIDINE HYDROCHLORIDE 100 UG/ML
INJECTION, SOLUTION INTRAVENOUS AS NEEDED
Status: DISCONTINUED | OUTPATIENT
Start: 2024-09-22 | End: 2024-09-22 | Stop reason: SURG

## 2024-09-22 RX ORDER — OXYCODONE AND ACETAMINOPHEN 7.5; 325 MG/1; MG/1
1 TABLET ORAL EVERY 4 HOURS PRN
Status: DISCONTINUED | OUTPATIENT
Start: 2024-09-22 | End: 2024-09-22 | Stop reason: HOSPADM

## 2024-09-22 RX ORDER — FENTANYL CITRATE 50 UG/ML
INJECTION, SOLUTION INTRAMUSCULAR; INTRAVENOUS AS NEEDED
Status: DISCONTINUED | OUTPATIENT
Start: 2024-09-22 | End: 2024-09-22 | Stop reason: SURG

## 2024-09-22 RX ORDER — SODIUM CHLORIDE 0.9 % (FLUSH) 0.9 %
10 SYRINGE (ML) INJECTION AS NEEDED
Status: DISCONTINUED | OUTPATIENT
Start: 2024-09-22 | End: 2024-09-22 | Stop reason: HOSPADM

## 2024-09-22 RX ORDER — DIPHENHYDRAMINE HYDROCHLORIDE 50 MG/ML
12.5 INJECTION INTRAMUSCULAR; INTRAVENOUS
Status: DISCONTINUED | OUTPATIENT
Start: 2024-09-22 | End: 2024-09-22 | Stop reason: HOSPADM

## 2024-09-22 RX ORDER — PHENYLEPHRINE HCL IN 0.9% NACL 1 MG/10 ML
SYRINGE (ML) INTRAVENOUS AS NEEDED
Status: DISCONTINUED | OUTPATIENT
Start: 2024-09-22 | End: 2024-09-22 | Stop reason: SURG

## 2024-09-22 RX ORDER — FLUMAZENIL 0.1 MG/ML
0.2 INJECTION INTRAVENOUS AS NEEDED
Status: DISCONTINUED | OUTPATIENT
Start: 2024-09-22 | End: 2024-09-22 | Stop reason: HOSPADM

## 2024-09-22 RX ORDER — HYDROCODONE BITARTRATE AND ACETAMINOPHEN 5; 325 MG/1; MG/1
1 TABLET ORAL ONCE AS NEEDED
Status: COMPLETED | OUTPATIENT
Start: 2024-09-22 | End: 2024-09-22

## 2024-09-22 RX ORDER — NALOXONE HCL 0.4 MG/ML
0.2 VIAL (ML) INJECTION AS NEEDED
Status: DISCONTINUED | OUTPATIENT
Start: 2024-09-22 | End: 2024-09-22 | Stop reason: HOSPADM

## 2024-09-22 RX ORDER — MAGNESIUM SULFATE HEPTAHYDRATE 500 MG/ML
INJECTION, SOLUTION INTRAMUSCULAR; INTRAVENOUS AS NEEDED
Status: DISCONTINUED | OUTPATIENT
Start: 2024-09-22 | End: 2024-09-22 | Stop reason: SURG

## 2024-09-22 RX ORDER — ONDANSETRON 2 MG/ML
4 INJECTION INTRAMUSCULAR; INTRAVENOUS ONCE AS NEEDED
Status: DISCONTINUED | OUTPATIENT
Start: 2024-09-22 | End: 2024-09-22 | Stop reason: HOSPADM

## 2024-09-22 RX ORDER — LEVOFLOXACIN 5 MG/ML
750 INJECTION, SOLUTION INTRAVENOUS EVERY 24 HOURS
Status: COMPLETED | OUTPATIENT
Start: 2024-09-22 | End: 2024-09-22

## 2024-09-22 RX ORDER — FAMOTIDINE 10 MG/ML
20 INJECTION, SOLUTION INTRAVENOUS ONCE
Status: COMPLETED | OUTPATIENT
Start: 2024-09-22 | End: 2024-09-22

## 2024-09-22 RX ORDER — METRONIDAZOLE 500 MG/100ML
500 INJECTION, SOLUTION INTRAVENOUS EVERY 8 HOURS
Status: DISCONTINUED | OUTPATIENT
Start: 2024-09-22 | End: 2024-09-23

## 2024-09-22 RX ORDER — SODIUM CHLORIDE 0.9 % (FLUSH) 0.9 %
3 SYRINGE (ML) INJECTION EVERY 12 HOURS SCHEDULED
Status: DISCONTINUED | OUTPATIENT
Start: 2024-09-22 | End: 2024-09-22 | Stop reason: HOSPADM

## 2024-09-22 RX ORDER — ROCURONIUM BROMIDE 10 MG/ML
INJECTION, SOLUTION INTRAVENOUS AS NEEDED
Status: DISCONTINUED | OUTPATIENT
Start: 2024-09-22 | End: 2024-09-22 | Stop reason: SURG

## 2024-09-22 RX ORDER — LABETALOL HYDROCHLORIDE 5 MG/ML
5 INJECTION, SOLUTION INTRAVENOUS
Status: DISCONTINUED | OUTPATIENT
Start: 2024-09-22 | End: 2024-09-22 | Stop reason: HOSPADM

## 2024-09-22 RX ORDER — SODIUM CHLORIDE 0.9 % (FLUSH) 0.9 %
10 SYRINGE (ML) INJECTION EVERY 12 HOURS SCHEDULED
Status: DISCONTINUED | OUTPATIENT
Start: 2024-09-22 | End: 2024-09-22 | Stop reason: HOSPADM

## 2024-09-22 RX ORDER — PROMETHAZINE HYDROCHLORIDE 25 MG/1
25 SUPPOSITORY RECTAL ONCE AS NEEDED
Status: DISCONTINUED | OUTPATIENT
Start: 2024-09-22 | End: 2024-09-22 | Stop reason: HOSPADM

## 2024-09-22 RX ORDER — IPRATROPIUM BROMIDE AND ALBUTEROL SULFATE 2.5; .5 MG/3ML; MG/3ML
3 SOLUTION RESPIRATORY (INHALATION) ONCE AS NEEDED
Status: DISCONTINUED | OUTPATIENT
Start: 2024-09-22 | End: 2024-09-22 | Stop reason: HOSPADM

## 2024-09-22 RX ORDER — LIDOCAINE HYDROCHLORIDE 10 MG/ML
0.5 INJECTION, SOLUTION INFILTRATION; PERINEURAL ONCE AS NEEDED
Status: DISCONTINUED | OUTPATIENT
Start: 2024-09-22 | End: 2024-09-22 | Stop reason: HOSPADM

## 2024-09-22 RX ORDER — PROMETHAZINE HYDROCHLORIDE 25 MG/1
25 TABLET ORAL ONCE AS NEEDED
Status: DISCONTINUED | OUTPATIENT
Start: 2024-09-22 | End: 2024-09-22 | Stop reason: HOSPADM

## 2024-09-22 RX ORDER — HYDROCODONE BITARTRATE AND ACETAMINOPHEN 5; 325 MG/1; MG/1
1 TABLET ORAL EVERY 6 HOURS PRN
Status: DISCONTINUED | OUTPATIENT
Start: 2024-09-22 | End: 2024-09-24

## 2024-09-22 RX ORDER — MIDAZOLAM HYDROCHLORIDE 1 MG/ML
1 INJECTION INTRAMUSCULAR; INTRAVENOUS
Status: DISCONTINUED | OUTPATIENT
Start: 2024-09-22 | End: 2024-09-22 | Stop reason: HOSPADM

## 2024-09-22 RX ORDER — SODIUM CHLORIDE, SODIUM LACTATE, POTASSIUM CHLORIDE, CALCIUM CHLORIDE 600; 310; 30; 20 MG/100ML; MG/100ML; MG/100ML; MG/100ML
9 INJECTION, SOLUTION INTRAVENOUS CONTINUOUS
Status: DISCONTINUED | OUTPATIENT
Start: 2024-09-22 | End: 2024-09-22

## 2024-09-22 RX ORDER — HYDROMORPHONE HYDROCHLORIDE 1 MG/ML
0.5 INJECTION, SOLUTION INTRAMUSCULAR; INTRAVENOUS; SUBCUTANEOUS
Status: DISCONTINUED | OUTPATIENT
Start: 2024-09-22 | End: 2024-09-22 | Stop reason: HOSPADM

## 2024-09-22 RX ORDER — BUPIVACAINE HYDROCHLORIDE AND EPINEPHRINE 2.5; 5 MG/ML; UG/ML
INJECTION, SOLUTION EPIDURAL; INFILTRATION; INTRACAUDAL; PERINEURAL AS NEEDED
Status: DISCONTINUED | OUTPATIENT
Start: 2024-09-22 | End: 2024-09-22 | Stop reason: HOSPADM

## 2024-09-22 RX ORDER — LIDOCAINE HYDROCHLORIDE 20 MG/ML
INJECTION, SOLUTION INFILTRATION; PERINEURAL AS NEEDED
Status: DISCONTINUED | OUTPATIENT
Start: 2024-09-22 | End: 2024-09-22 | Stop reason: SURG

## 2024-09-22 RX ORDER — PROPOFOL 10 MG/ML
INJECTION, EMULSION INTRAVENOUS AS NEEDED
Status: DISCONTINUED | OUTPATIENT
Start: 2024-09-22 | End: 2024-09-22 | Stop reason: SURG

## 2024-09-22 RX ORDER — DEXAMETHASONE SODIUM PHOSPHATE 4 MG/ML
INJECTION, SOLUTION INTRA-ARTICULAR; INTRALESIONAL; INTRAMUSCULAR; INTRAVENOUS; SOFT TISSUE AS NEEDED
Status: DISCONTINUED | OUTPATIENT
Start: 2024-09-22 | End: 2024-09-22 | Stop reason: SURG

## 2024-09-22 RX ORDER — IOPAMIDOL 612 MG/ML
INJECTION, SOLUTION INTRAVASCULAR AS NEEDED
Status: DISCONTINUED | OUTPATIENT
Start: 2024-09-22 | End: 2024-09-22 | Stop reason: HOSPADM

## 2024-09-22 RX ORDER — SODIUM CHLORIDE 9 MG/ML
INJECTION, SOLUTION INTRAVENOUS AS NEEDED
Status: DISCONTINUED | OUTPATIENT
Start: 2024-09-22 | End: 2024-09-22 | Stop reason: HOSPADM

## 2024-09-22 RX ORDER — ONDANSETRON 2 MG/ML
INJECTION INTRAMUSCULAR; INTRAVENOUS AS NEEDED
Status: DISCONTINUED | OUTPATIENT
Start: 2024-09-22 | End: 2024-09-22 | Stop reason: SURG

## 2024-09-22 RX ORDER — INDOCYANINE GREEN AND WATER 25 MG
2.5 KIT INJECTION ONCE
Status: COMPLETED | OUTPATIENT
Start: 2024-09-22 | End: 2024-09-22

## 2024-09-22 RX ADMIN — PROPOFOL 150 MG: 10 INJECTION, EMULSION INTRAVENOUS at 12:25

## 2024-09-22 RX ADMIN — SODIUM CHLORIDE, POTASSIUM CHLORIDE, SODIUM LACTATE AND CALCIUM CHLORIDE: 600; 310; 30; 20 INJECTION, SOLUTION INTRAVENOUS at 13:59

## 2024-09-22 RX ADMIN — LIDOCAINE HYDROCHLORIDE 100 MG: 20 INJECTION, SOLUTION INFILTRATION; PERINEURAL at 12:25

## 2024-09-22 RX ADMIN — ONDANSETRON 4 MG: 2 INJECTION, SOLUTION INTRAMUSCULAR; INTRAVENOUS at 04:27

## 2024-09-22 RX ADMIN — HYDROCODONE BITARTRATE AND ACETAMINOPHEN 1 TABLET: 5; 325 TABLET ORAL at 14:44

## 2024-09-22 RX ADMIN — DEXMEDETOMIDINE HCL 8 MCG: 100 INJECTION INTRAVENOUS at 12:36

## 2024-09-22 RX ADMIN — DEXMEDETOMIDINE HCL 10 MCG: 100 INJECTION INTRAVENOUS at 12:25

## 2024-09-22 RX ADMIN — ROCURONIUM BROMIDE 20 MG: 10 INJECTION, SOLUTION INTRAVENOUS at 13:22

## 2024-09-22 RX ADMIN — DEXMEDETOMIDINE HCL 10 MCG: 100 INJECTION INTRAVENOUS at 12:30

## 2024-09-22 RX ADMIN — THIAMINE HYDROCHLORIDE 200 MG: 100 INJECTION, SOLUTION INTRAMUSCULAR; INTRAVENOUS at 05:42

## 2024-09-22 RX ADMIN — Medication 200 MCG: at 13:23

## 2024-09-22 RX ADMIN — SODIUM CHLORIDE 2000 MG: 900 INJECTION INTRAVENOUS at 12:14

## 2024-09-22 RX ADMIN — Medication 10 ML: at 20:10

## 2024-09-22 RX ADMIN — DEXMEDETOMIDINE HCL 6 MCG: 100 INJECTION INTRAVENOUS at 13:58

## 2024-09-22 RX ADMIN — METRONIDAZOLE 500 MG: 500 INJECTION, SOLUTION INTRAVENOUS at 20:09

## 2024-09-22 RX ADMIN — SUGAMMADEX 200 MG: 100 INJECTION, SOLUTION INTRAVENOUS at 13:59

## 2024-09-22 RX ADMIN — ONDANSETRON 4 MG: 2 INJECTION INTRAMUSCULAR; INTRAVENOUS at 12:32

## 2024-09-22 RX ADMIN — HYDROCODONE BITARTRATE AND ACETAMINOPHEN 1 TABLET: 5; 325 TABLET ORAL at 22:11

## 2024-09-22 RX ADMIN — ROCURONIUM BROMIDE 50 MG: 10 INJECTION, SOLUTION INTRAVENOUS at 12:25

## 2024-09-22 RX ADMIN — LEVOFLOXACIN 750 MG: 5 INJECTION, SOLUTION INTRAVENOUS at 20:19

## 2024-09-22 RX ADMIN — INDOCYANINE GREEN AND WATER 2.5 MG: KIT at 10:10

## 2024-09-22 RX ADMIN — MAGNESIUM SULFATE HEPTAHYDRATE 1 G: 500 INJECTION, SOLUTION INTRAMUSCULAR; INTRAVENOUS at 12:50

## 2024-09-22 RX ADMIN — FAMOTIDINE 20 MG: 10 INJECTION INTRAVENOUS at 10:26

## 2024-09-22 RX ADMIN — PANTOPRAZOLE SODIUM 40 MG: 40 INJECTION, POWDER, FOR SOLUTION INTRAVENOUS at 05:42

## 2024-09-22 RX ADMIN — FENTANYL CITRATE 50 MCG: 50 INJECTION, SOLUTION INTRAMUSCULAR; INTRAVENOUS at 12:50

## 2024-09-22 RX ADMIN — LEVOTHYROXINE SODIUM 100 MCG: 100 TABLET ORAL at 08:05

## 2024-09-22 RX ADMIN — POTASSIUM CHLORIDE AND SODIUM CHLORIDE 100 ML/HR: 900; 150 INJECTION, SOLUTION INTRAVENOUS at 19:33

## 2024-09-22 RX ADMIN — DEXAMETHASONE SODIUM PHOSPHATE 8 MG: 4 INJECTION, SOLUTION INTRAMUSCULAR; INTRAVENOUS at 12:32

## 2024-09-22 RX ADMIN — SUCRALFATE 1 G: 1 TABLET ORAL at 20:09

## 2024-09-22 RX ADMIN — HYDROMORPHONE HYDROCHLORIDE 0.5 MG: 1 INJECTION, SOLUTION INTRAMUSCULAR; INTRAVENOUS; SUBCUTANEOUS at 01:44

## 2024-09-22 RX ADMIN — SUCRALFATE 1 G: 1 TABLET ORAL at 16:39

## 2024-09-22 RX ADMIN — THIAMINE HYDROCHLORIDE 200 MG: 100 INJECTION, SOLUTION INTRAMUSCULAR; INTRAVENOUS at 18:36

## 2024-09-22 RX ADMIN — ACETAMINOPHEN 325MG 650 MG: 325 TABLET ORAL at 16:39

## 2024-09-22 RX ADMIN — SODIUM CHLORIDE, POTASSIUM CHLORIDE, SODIUM LACTATE AND CALCIUM CHLORIDE 9 ML/HR: 600; 310; 30; 20 INJECTION, SOLUTION INTRAVENOUS at 10:26

## 2024-09-22 RX ADMIN — DEXMEDETOMIDINE HCL 12 MCG: 100 INJECTION INTRAVENOUS at 12:50

## 2024-09-22 RX ADMIN — THIAMINE HYDROCHLORIDE 200 MG: 100 INJECTION, SOLUTION INTRAMUSCULAR; INTRAVENOUS at 22:08

## 2024-09-22 RX ADMIN — Medication 5 MG: at 23:18

## 2024-09-22 NOTE — CONSULTS
Cc: Abdominal pain, cholelithiasis    History of presenting illness:   This is a 69-year-old gentleman with a history of alcohol abuse admitted with acute abdominal pain.  He says that the pain started a day or 2 ago.  He describes mid abdominal pain, perhaps slightly worse on the right side.  He describes some associated nausea but no vomiting.  He was evaluated in the emergency department and had a CT which demonstrated cholelithiasis but no other acute inflammatory changes.  He had undergone an ultrasound of the gallbladder earlier this month with finding of uncomplicated cholelithiasis.  Ongoing abdominal pain prompted a GI consultation.  They recommended HIDA scan which demonstrated nonvisualization of the gallbladder.  He continues to have some pain although it is somewhat improved compared to admission.  He was also found to have a mildly elevated lipase level.    Past Medical History: Alcohol abuse, depression, hypothyroidism, hyperlipidemia, hypertension    Past Surgical History: Shoulder arthroscopy, denies history of abdominal surgery, colonoscopy, unclear when this was last done    Medications: Amlodipine, fluticasone, levothyroxine, lisinopril    Allergies: Penicillin reported as causing a seizure as a child    Social History: History of heavy alcohol use, including recently.  Former smoker who quit in 2005.    Family History: Negative for colorectal cancer.  Negative for known family history of gallbladder disease.    Review of Systems:  Constitutional: Denies fever, chills, change in weight  Neck: no swollen glands or dysphagia or odynophagia  Respiratory: negative for SOB, cough, hemoptysis or wheezing  Cardiovascular: negative for chest pain, palpitations or peripheral edema  Gastrointestinal: Positive for abdominal pain, nausea      Physical Exam:  BMI: 25.1  Temperature 99.9 heart rate 91 blood pressure 143/72  General: alert and oriented, appropriate, no acute distress  Eyes: No scleral icterus,  extraocular movements are intact  Neck: Supple without lymphadenopathy or thyromegaly, trachea is in the midline  Respiratory: There is good bilateral chest expansion, no use of accessory muscles is noted  Cardiovascular: No jugular venous distention or peripheral edema is seen  Gastrointestinal: Soft, there is tenderness in the right upper quadrant with mild guarding, there is no rebound tenderness    Laboratory data: Alcohol level was elevated at admission at 184.  Chemistries largely unremarkable, total bilirubin 0.5.  AST mildly elevated at 112, ALT 24.  Albumin 4.2.  CBC largely unremarkable, platelets in the normal range at 156.  Lipase slightly elevated at 139.    Imaging data: The patient had an ultrasound a couple of weeks ago demonstrating uncomplicated cholelithiasis.  CT done in the emergency department also demonstrated cholelithiasis, no pericholecystic fluid or wall thickening is appreciated.  HIDA scan completed today has nonvisualization of the gallbladder consistent with cholecystitis.      Assessment and plan:   -Cholelithiasis and probable cholecystitis  -This is in the setting of a patient with chronic alcohol abuse including on admission, although at the moment no obvious signs of withdrawals.  -Options discussed with patient.  I think he would benefit from cholecystectomy.  This is probably better accomplished before he begins to withdrawal, if that is going to be the case.  I am tentatively trying to place him on the schedule for tomorrow for cholecystectomy.  If he begins to show signs of alcohol withdrawal before that, this may have to be delayed.  Risks discussed in detail with the patient.  -Pancreatitis, unclear that this is biliary in nature, his biliary system does not appear to be dilated and bilirubin is normal.  That may be more of an alcoholic type of pancreatitis.  Still, during cholecystectomy, he would benefit from a cholangiogram.    Risks associated with the procedure are  noted to include, but not be limited to, bleeding, infection, injury to intra-abdominal structures including the liver, small and large intestine, stomach, duodenum, common bile duct and major vascular structures.  Possible need for conversion to open surgery, further revisional surgery, postoperative ERCP discussed.      Toro Noguera MD, FACS  General, Minimally Invasive and Endoscopic Surgery  [facility]    [address], [address]  [place], [place], [address]  P: [phone]  F: [phone]

## 2024-09-22 NOTE — PLAN OF CARE
Goal Outcome Evaluation:  Plan of Care Reviewed With: patient        Progress: no change  Outcome Evaluation: Pt is alert and oriented; less anxious then last night; prn ativan given x1; prn pain med given x1; npo for choley today; consent signed and bath done; Pt got up to 1750 on IS; wctm

## 2024-09-22 NOTE — PROGRESS NOTES
Came by but patient off the floor to the OR per surgery for cholecystectomy for chronic cholecystitis      Fevers noted overnight with concerns for chronic cholecystitis noted on HIDA.  Lactate is unreliable as it is skewed by his underlying alcohol abuse/acid-base balance.  No previous leukocytosis.  Surgery evaluated and took to the OR for cholecystectomy and appreciate their input and assistance      I am unable to physically see the patient today, I appreciate surgical input and assistance as cholecystitis could be driving the fever as well      Review of MAR shows he has had cefazolin for intraoperative antibiotics, I will avoid penicillin derivatives and initiate Levaquin 750 x 1 as well as placed on Flagyl at 500 Q8 and see how the patient does overnight and reassess in a.m.    AM Labs ordered -CBC, CMP, mag      Alcohol abuse and aspects of detoxification complicate all the above

## 2024-09-22 NOTE — OP NOTE
Operative Note :   Toro Noguera MD    Pro Mueller  1955    Procedure Date: 09/22/24    Pre-op Diagnosis:  Calculus of gallbladder with cholecystitis without biliary obstruction, unspecified cholecystitis acuity [K80.10]    Post-Op Diagnosis:  Acute cholecystitis  Possible nonobstructing choledocholithiasis    Procedure:   Laparoscopic cholecystectomy with cholangiogram and da Jordyn robot assistance    Surgeon: Toro Noguera MD    Assistant: Eric Hardwick CSA was responsible for performing the following activities: Irrigation, suction, retraction, manipulation of the camera, closure of skin and placement of dressings as well as exchange of instruments at bedside and their skilled assistance was necessary for the success of this case.    Anesthesia:  General    EBL:   25 cc    Specimens:   Gallbladder and contents    Indications:  69-year-old gentleman admitted with abdominal pain.  History of alcohol abuse.  Workup demonstrated gallstones and HIDA with nonfilling of the gallbladder consistent with acute cholecystitis.    Findings:   Acutely inflamed gallbladder  Cholangiogram with suspicion of nonobstructing distal common bile duct stone    Recommendations:   Routine postcholecystectomy care  GI to evaluate for consideration of MRCP versus ERCP    Description of procedure:    After obtaining informed consent, patient was brought to the operating room and placed under a general anesthetic.  The abdomen was sterilely prepped and draped.  Mccoy's point was identified and anesthetized with a Marcaine solution.  8 mm skin incision made and then 0 degree scope and Optiview trocar was used with direct access technique, placed through the abdominal wall layers into the abdominal cavity.  The abdomen was then insufflated and inspection of the abdomen demonstrated no evidence of intra-abdominal injury.  Additional 8 mm robotic trocars were then introduced into the abdomen with an 8 mm right lateral trocar, 8  mm right mid abdomen trocar and 8 mm left midabdomen trocar.  The patient was positioned with the head up and right side up.  Gallbladder was evaluated and obviously acutely inflamed.  I was able to peel some of the omentum down exposing the fundus well.  The needle aspirator was used to withdraw about 60 cc of thick purulent appearing bile.  At this point I was able to grasp the fundus. The da Jordyn Xi robot was then brought up and docked.  From right to left the fenestrated bipolar grasper, 30 degree scope, monopolar hook and ProGrasp forceps were introduced under direct visualization.  I then moved to the console.  The fundus of the gallbladder was grasped and retracted cephalad.  There were extensive adhesions of the omentum and the duodenum along the lower portion of the gallbladder extending towards the infundibulum.  Eventually I was able to clear this off entirely and clearly visualize the infundibulum.  The infundibulum was then grasped and retracted laterally.  The peritoneum overlying the cystic triangle was incised.  Next using a combination of sharp and blunt dissection and using fluorescent cholangiography, the cystic duct was dissected and identified completely.  It should be noted that ICG entered only the very proximal portion of the cystic duct, it did not get up to the portion where I was dissecting near the gallbladder. The cystic artery was identified and dissected completely.  The remainder of the cystic triangle was cleared out, creating the retrocystic window and then the lower one third of the gallbladder was mobilized off the bed of the liver, giving the critical view of safety.   2 clips were placed proximally on the cystic artery.  A clip was placed on the gallbladder side of the gallbladder cystic duct junction.  Incision was made in the cystic duct.  Upon trying to pass the cholangiocatheter into the duct, the duct tore completely.  I was able to lift it up enough to place the catheter  in it and get a clip around this and we are able to irrigate this.  We shot our cholangiogram.  There was good filling of the proximal ducts.  There was a relatively long cystic duct stump remaining.  The distal bile duct was only mildly dilated.  It did appear there might be a meniscus sign distally.  Some contrast did appear to pass into the duodenum, but my impression was that there was a nonobstructing distal bile duct stone.  At this point I elected to remove the cholangiocatheter.  An additional clip was placed on the cystic duct dump.  The cystic duct was then clipped and then divided between the clips.  The cystic artery was divided between clips.  The gallbladder was removed from the bed of the liver with electrocautery and then placed in an Endo Catch bag.  There were several soft stones spilled from the gallbladder which were picked up.  A few fragments were probably still left behind after extensive irrigation.  The bed of the liver was inspected and any small bleeders were cauterized.  The clips on the cystic duct and cystic artery were inspected and appeared to be in good position.  The abdomen was irrigated.  Trocars were withdrawn under direct visualization after removal of the gallbladder.  Gallbladder was removed to the left mid abdominal wall port site.  The site was closed with a figure-of-eight 0 Vicryl suture.  Skin incisions were closed with 4-0 Monocryl.  Patient tolerated this well.    Toro Noguera MD  General and Endoscopic Surgery  [facility]    [address], [address]  [place], [place], [address]  P: [phone]

## 2024-09-23 ENCOUNTER — INPATIENT HOSPITAL (OUTPATIENT)
Age: 69
End: 2024-09-23
Payer: COMMERCIAL

## 2024-09-23 ENCOUNTER — ANESTHESIA (OUTPATIENT)
Dept: GASTROENTEROLOGY | Facility: HOSPITAL | Age: 69
End: 2024-09-23
Payer: MEDICARE

## 2024-09-23 ENCOUNTER — APPOINTMENT (OUTPATIENT)
Dept: GENERAL RADIOLOGY | Facility: HOSPITAL | Age: 69
End: 2024-09-23
Payer: MEDICARE

## 2024-09-23 ENCOUNTER — ANESTHESIA EVENT (OUTPATIENT)
Dept: GASTROENTEROLOGY | Facility: HOSPITAL | Age: 69
End: 2024-09-23
Payer: MEDICARE

## 2024-09-23 ENCOUNTER — INPATIENT HOSPITAL (OUTPATIENT)
Dept: URBAN - METROPOLITAN AREA HOSPITAL 113 | Facility: HOSPITAL | Age: 69
End: 2024-09-23
Payer: COMMERCIAL

## 2024-09-23 DIAGNOSIS — K44.9 DIAPHRAGMATIC HERNIA WITHOUT OBSTRUCTION OR GANGRENE: ICD-10-CM

## 2024-09-23 DIAGNOSIS — K80.50 CALCULUS OF BILE DUCT WITHOUT CHOLANGITIS OR CHOLECYSTITIS W: ICD-10-CM

## 2024-09-23 DIAGNOSIS — R94.5 ABNORMAL RESULTS OF LIVER FUNCTION STUDIES: ICD-10-CM

## 2024-09-23 LAB
ALBUMIN SERPL-MCNC: 3.3 G/DL (ref 3.5–5.2)
ALBUMIN/GLOB SERPL: 1.2 G/DL
ALP SERPL-CCNC: 93 U/L (ref 39–117)
ALT SERPL W P-5'-P-CCNC: 19 U/L (ref 1–41)
ANION GAP SERPL CALCULATED.3IONS-SCNC: 9.7 MMOL/L (ref 5–15)
AST SERPL-CCNC: 91 U/L (ref 1–40)
BILIRUB SERPL-MCNC: 1.2 MG/DL (ref 0–1.2)
BUN SERPL-MCNC: 9 MG/DL (ref 8–23)
BUN/CREAT SERPL: 16.1 (ref 7–25)
CALCIUM SPEC-SCNC: 8.1 MG/DL (ref 8.6–10.5)
CHLORIDE SERPL-SCNC: 107 MMOL/L (ref 98–107)
CO2 SERPL-SCNC: 19.3 MMOL/L (ref 22–29)
CREAT SERPL-MCNC: 0.56 MG/DL (ref 0.76–1.27)
DEPRECATED RDW RBC AUTO: 56.1 FL (ref 37–54)
EGFRCR SERPLBLD CKD-EPI 2021: 108 ML/MIN/1.73
ERYTHROCYTE [DISTWIDTH] IN BLOOD BY AUTOMATED COUNT: 14.8 % (ref 12.3–15.4)
GLOBULIN UR ELPH-MCNC: 2.8 GM/DL
GLUCOSE SERPL-MCNC: 131 MG/DL (ref 65–99)
HCT VFR BLD AUTO: 36.6 % (ref 37.5–51)
HGB BLD-MCNC: 12.2 G/DL (ref 13–17.7)
MAGNESIUM SERPL-MCNC: 2 MG/DL (ref 1.6–2.4)
MCH RBC QN AUTO: 34.1 PG (ref 26.6–33)
MCHC RBC AUTO-ENTMCNC: 33.3 G/DL (ref 31.5–35.7)
MCV RBC AUTO: 102.2 FL (ref 79–97)
PLATELET # BLD AUTO: 179 10*3/MM3 (ref 140–450)
PMV BLD AUTO: 9.4 FL (ref 6–12)
POTASSIUM SERPL-SCNC: 4.5 MMOL/L (ref 3.5–5.2)
PROT SERPL-MCNC: 6.1 G/DL (ref 6–8.5)
RBC # BLD AUTO: 3.58 10*6/MM3 (ref 4.14–5.8)
SODIUM SERPL-SCNC: 136 MMOL/L (ref 136–145)
WBC NRBC COR # BLD AUTO: 8.97 10*3/MM3 (ref 3.4–10.8)

## 2024-09-23 PROCEDURE — 25010000002 PROPOFOL 10 MG/ML EMULSION: Performed by: ANESTHESIOLOGY

## 2024-09-23 PROCEDURE — C1769 GUIDE WIRE: HCPCS | Performed by: INTERNAL MEDICINE

## 2024-09-23 PROCEDURE — 43264 ERCP REMOVE DUCT CALCULI: CPT | Performed by: INTERNAL MEDICINE

## 2024-09-23 PROCEDURE — 0FC98ZZ EXTIRPATION OF MATTER FROM COMMON BILE DUCT, VIA NATURAL OR ARTIFICIAL OPENING ENDOSCOPIC: ICD-10-PCS | Performed by: INTERNAL MEDICINE

## 2024-09-23 PROCEDURE — 25010000002 SUGAMMADEX 200 MG/2ML SOLUTION: Performed by: ANESTHESIOLOGY

## 2024-09-23 PROCEDURE — 97162 PT EVAL MOD COMPLEX 30 MIN: CPT

## 2024-09-23 PROCEDURE — 80053 COMPREHEN METABOLIC PANEL: CPT | Performed by: SURGERY

## 2024-09-23 PROCEDURE — 43262 ENDO CHOLANGIOPANCREATOGRAPH: CPT | Mod: 59 | Performed by: INTERNAL MEDICINE

## 2024-09-23 PROCEDURE — 25010000002 THIAMINE HCL 200 MG/2ML SOLUTION: Performed by: SURGERY

## 2024-09-23 PROCEDURE — 25810000003 SODIUM CHLORIDE 0.9 % SOLUTION: Performed by: INTERNAL MEDICINE

## 2024-09-23 PROCEDURE — 25010000002 SODIUM CHLORIDE 0.9 % WITH KCL 20 MEQ 20-0.9 MEQ/L-% SOLUTION: Performed by: SURGERY

## 2024-09-23 PROCEDURE — 25510000001 IOPAMIDOL 61 % SOLUTION: Performed by: INTERNAL MEDICINE

## 2024-09-23 PROCEDURE — 99024 POSTOP FOLLOW-UP VISIT: CPT | Performed by: SURGERY

## 2024-09-23 PROCEDURE — 25010000002 ONDANSETRON PER 1 MG: Performed by: ANESTHESIOLOGY

## 2024-09-23 PROCEDURE — BF101ZZ FLUOROSCOPY OF BILE DUCTS USING LOW OSMOLAR CONTRAST: ICD-10-PCS | Performed by: INTERNAL MEDICINE

## 2024-09-23 PROCEDURE — 25010000002 DEXAMETHASONE SODIUM PHOSPHATE 20 MG/5ML SOLUTION: Performed by: ANESTHESIOLOGY

## 2024-09-23 PROCEDURE — 74328 X-RAY BILE DUCT ENDOSCOPY: CPT

## 2024-09-23 PROCEDURE — 85027 COMPLETE CBC AUTOMATED: CPT | Performed by: SURGERY

## 2024-09-23 PROCEDURE — 97530 THERAPEUTIC ACTIVITIES: CPT

## 2024-09-23 PROCEDURE — 25010000002 HYDROMORPHONE PER 4 MG: Performed by: SURGERY

## 2024-09-23 PROCEDURE — 25010000002 METRONIDAZOLE 500 MG/100ML SOLUTION: Performed by: SURGERY

## 2024-09-23 PROCEDURE — 25010000002 HYDROMORPHONE PER 4 MG: Performed by: HOSPITALIST

## 2024-09-23 PROCEDURE — 83735 ASSAY OF MAGNESIUM: CPT | Performed by: SURGERY

## 2024-09-23 RX ORDER — ONDANSETRON 2 MG/ML
4 INJECTION INTRAMUSCULAR; INTRAVENOUS ONCE AS NEEDED
Status: DISCONTINUED | OUTPATIENT
Start: 2024-09-23 | End: 2024-09-23 | Stop reason: HOSPADM

## 2024-09-23 RX ORDER — LABETALOL HYDROCHLORIDE 5 MG/ML
5 INJECTION, SOLUTION INTRAVENOUS
Status: DISCONTINUED | OUTPATIENT
Start: 2024-09-23 | End: 2024-09-23 | Stop reason: HOSPADM

## 2024-09-23 RX ORDER — FENTANYL CITRATE 50 UG/ML
50 INJECTION, SOLUTION INTRAMUSCULAR; INTRAVENOUS
Status: DISCONTINUED | OUTPATIENT
Start: 2024-09-23 | End: 2024-09-23 | Stop reason: HOSPADM

## 2024-09-23 RX ORDER — DEXAMETHASONE SODIUM PHOSPHATE 4 MG/ML
INJECTION, SOLUTION INTRA-ARTICULAR; INTRALESIONAL; INTRAMUSCULAR; INTRAVENOUS; SOFT TISSUE AS NEEDED
Status: DISCONTINUED | OUTPATIENT
Start: 2024-09-23 | End: 2024-09-23 | Stop reason: SURG

## 2024-09-23 RX ORDER — FLUMAZENIL 0.1 MG/ML
0.2 INJECTION INTRAVENOUS AS NEEDED
Status: DISCONTINUED | OUTPATIENT
Start: 2024-09-23 | End: 2024-09-23 | Stop reason: HOSPADM

## 2024-09-23 RX ORDER — HYDRALAZINE HYDROCHLORIDE 20 MG/ML
5 INJECTION INTRAMUSCULAR; INTRAVENOUS
Status: DISCONTINUED | OUTPATIENT
Start: 2024-09-23 | End: 2024-09-23 | Stop reason: HOSPADM

## 2024-09-23 RX ORDER — IOPAMIDOL 612 MG/ML
INJECTION, SOLUTION INTRAVASCULAR AS NEEDED
Status: DISCONTINUED | OUTPATIENT
Start: 2024-09-23 | End: 2024-09-23 | Stop reason: HOSPADM

## 2024-09-23 RX ORDER — HYDROMORPHONE HYDROCHLORIDE 1 MG/ML
0.5 INJECTION, SOLUTION INTRAMUSCULAR; INTRAVENOUS; SUBCUTANEOUS
Status: DISCONTINUED | OUTPATIENT
Start: 2024-09-23 | End: 2024-09-23 | Stop reason: HOSPADM

## 2024-09-23 RX ORDER — HYDROCODONE BITARTRATE AND ACETAMINOPHEN 7.5; 325 MG/1; MG/1
1 TABLET ORAL EVERY 4 HOURS PRN
Status: DISCONTINUED | OUTPATIENT
Start: 2024-09-23 | End: 2024-09-25 | Stop reason: HOSPADM

## 2024-09-23 RX ORDER — PROPOFOL 10 MG/ML
VIAL (ML) INTRAVENOUS AS NEEDED
Status: DISCONTINUED | OUTPATIENT
Start: 2024-09-23 | End: 2024-09-23 | Stop reason: SURG

## 2024-09-23 RX ORDER — DROPERIDOL 2.5 MG/ML
0.62 INJECTION, SOLUTION INTRAMUSCULAR; INTRAVENOUS
Status: DISCONTINUED | OUTPATIENT
Start: 2024-09-23 | End: 2024-09-23 | Stop reason: HOSPADM

## 2024-09-23 RX ORDER — PROMETHAZINE HYDROCHLORIDE 25 MG/1
25 SUPPOSITORY RECTAL ONCE AS NEEDED
Status: DISCONTINUED | OUTPATIENT
Start: 2024-09-23 | End: 2024-09-23 | Stop reason: HOSPADM

## 2024-09-23 RX ORDER — DIPHENHYDRAMINE HYDROCHLORIDE 50 MG/ML
12.5 INJECTION INTRAMUSCULAR; INTRAVENOUS
Status: DISCONTINUED | OUTPATIENT
Start: 2024-09-23 | End: 2024-09-23 | Stop reason: HOSPADM

## 2024-09-23 RX ORDER — HYDROCODONE BITARTRATE AND ACETAMINOPHEN 5; 325 MG/1; MG/1
1 TABLET ORAL ONCE AS NEEDED
Status: DISCONTINUED | OUTPATIENT
Start: 2024-09-23 | End: 2024-09-23 | Stop reason: HOSPADM

## 2024-09-23 RX ORDER — NALOXONE HCL 0.4 MG/ML
0.2 VIAL (ML) INJECTION AS NEEDED
Status: DISCONTINUED | OUTPATIENT
Start: 2024-09-23 | End: 2024-09-23 | Stop reason: HOSPADM

## 2024-09-23 RX ORDER — IPRATROPIUM BROMIDE AND ALBUTEROL SULFATE 2.5; .5 MG/3ML; MG/3ML
3 SOLUTION RESPIRATORY (INHALATION) ONCE AS NEEDED
Status: DISCONTINUED | OUTPATIENT
Start: 2024-09-23 | End: 2024-09-23 | Stop reason: HOSPADM

## 2024-09-23 RX ORDER — EPHEDRINE SULFATE 50 MG/ML
5 INJECTION, SOLUTION INTRAVENOUS ONCE AS NEEDED
Status: DISCONTINUED | OUTPATIENT
Start: 2024-09-23 | End: 2024-09-23 | Stop reason: HOSPADM

## 2024-09-23 RX ORDER — PROMETHAZINE HYDROCHLORIDE 25 MG/1
25 TABLET ORAL ONCE AS NEEDED
Status: DISCONTINUED | OUTPATIENT
Start: 2024-09-23 | End: 2024-09-23 | Stop reason: HOSPADM

## 2024-09-23 RX ORDER — ROCURONIUM BROMIDE 10 MG/ML
INJECTION, SOLUTION INTRAVENOUS AS NEEDED
Status: DISCONTINUED | OUTPATIENT
Start: 2024-09-23 | End: 2024-09-23 | Stop reason: SURG

## 2024-09-23 RX ORDER — SODIUM CHLORIDE 9 MG/ML
1000 INJECTION, SOLUTION INTRAVENOUS CONTINUOUS
Status: DISCONTINUED | OUTPATIENT
Start: 2024-09-23 | End: 2024-09-24

## 2024-09-23 RX ORDER — OXYCODONE AND ACETAMINOPHEN 7.5; 325 MG/1; MG/1
1 TABLET ORAL EVERY 4 HOURS PRN
Status: DISCONTINUED | OUTPATIENT
Start: 2024-09-23 | End: 2024-09-23 | Stop reason: HOSPADM

## 2024-09-23 RX ORDER — INDOMETHACIN 100 MG
SUPPOSITORY, RECTAL RECTAL AS NEEDED
Status: DISCONTINUED | OUTPATIENT
Start: 2024-09-23 | End: 2024-09-23 | Stop reason: HOSPADM

## 2024-09-23 RX ORDER — ONDANSETRON 2 MG/ML
INJECTION INTRAMUSCULAR; INTRAVENOUS AS NEEDED
Status: DISCONTINUED | OUTPATIENT
Start: 2024-09-23 | End: 2024-09-23 | Stop reason: SURG

## 2024-09-23 RX ADMIN — HYDROCODONE BITARTRATE AND ACETAMINOPHEN 1 TABLET: 7.5; 325 TABLET ORAL at 20:36

## 2024-09-23 RX ADMIN — FOLIC ACID 1 MG: 1 TABLET ORAL at 08:47

## 2024-09-23 RX ADMIN — ROCURONIUM BROMIDE 50 MG: 10 INJECTION, SOLUTION INTRAVENOUS at 17:24

## 2024-09-23 RX ADMIN — FLUTICASONE PROPIONATE 2 SPRAY: 50 SPRAY, METERED NASAL at 08:47

## 2024-09-23 RX ADMIN — SUCRALFATE 1 G: 1 TABLET ORAL at 14:16

## 2024-09-23 RX ADMIN — METRONIDAZOLE 500 MG: 500 INJECTION, SOLUTION INTRAVENOUS at 03:01

## 2024-09-23 RX ADMIN — LEVOTHYROXINE SODIUM 100 MCG: 100 TABLET ORAL at 08:47

## 2024-09-23 RX ADMIN — HYDROMORPHONE HYDROCHLORIDE 0.5 MG: 1 INJECTION, SOLUTION INTRAMUSCULAR; INTRAVENOUS; SUBCUTANEOUS at 16:11

## 2024-09-23 RX ADMIN — SUCRALFATE 1 G: 1 TABLET ORAL at 20:36

## 2024-09-23 RX ADMIN — AMLODIPINE BESYLATE 5 MG: 5 TABLET ORAL at 06:40

## 2024-09-23 RX ADMIN — HYDROCODONE BITARTRATE AND ACETAMINOPHEN 1 TABLET: 5; 325 TABLET ORAL at 06:42

## 2024-09-23 RX ADMIN — Medication 10 ML: at 08:47

## 2024-09-23 RX ADMIN — HYDROMORPHONE HYDROCHLORIDE 0.5 MG: 1 INJECTION, SOLUTION INTRAMUSCULAR; INTRAVENOUS; SUBCUTANEOUS at 03:01

## 2024-09-23 RX ADMIN — PANTOPRAZOLE SODIUM 40 MG: 40 INJECTION, POWDER, FOR SOLUTION INTRAVENOUS at 06:40

## 2024-09-23 RX ADMIN — PROPOFOL 150 MG: 10 INJECTION, EMULSION INTRAVENOUS at 17:24

## 2024-09-23 RX ADMIN — POTASSIUM CHLORIDE AND SODIUM CHLORIDE 100 ML/HR: 900; 150 INJECTION, SOLUTION INTRAVENOUS at 08:53

## 2024-09-23 RX ADMIN — HYDROMORPHONE HYDROCHLORIDE 0.5 MG: 1 INJECTION, SOLUTION INTRAMUSCULAR; INTRAVENOUS; SUBCUTANEOUS at 22:35

## 2024-09-23 RX ADMIN — SODIUM CHLORIDE 1000 ML: 9 INJECTION, SOLUTION INTRAVENOUS at 16:53

## 2024-09-23 RX ADMIN — THIAMINE HYDROCHLORIDE 200 MG: 100 INJECTION, SOLUTION INTRAMUSCULAR; INTRAVENOUS at 22:29

## 2024-09-23 RX ADMIN — THIAMINE HYDROCHLORIDE 200 MG: 100 INJECTION, SOLUTION INTRAMUSCULAR; INTRAVENOUS at 06:40

## 2024-09-23 RX ADMIN — ONDANSETRON 4 MG: 2 INJECTION INTRAMUSCULAR; INTRAVENOUS at 17:59

## 2024-09-23 RX ADMIN — LISINOPRIL 20 MG: 20 TABLET ORAL at 08:47

## 2024-09-23 RX ADMIN — Medication 10 ML: at 20:36

## 2024-09-23 RX ADMIN — SUCRALFATE 1 G: 1 TABLET ORAL at 06:40

## 2024-09-23 RX ADMIN — THIAMINE HYDROCHLORIDE 200 MG: 100 INJECTION, SOLUTION INTRAMUSCULAR; INTRAVENOUS at 14:37

## 2024-09-23 RX ADMIN — SUGAMMADEX 200 MG: 100 INJECTION, SOLUTION INTRAVENOUS at 18:10

## 2024-09-23 RX ADMIN — DEXAMETHASONE SODIUM PHOSPHATE 4 MG: 4 INJECTION, SOLUTION INTRAMUSCULAR; INTRAVENOUS at 17:59

## 2024-09-23 RX ADMIN — Medication 5 MG: at 22:29

## 2024-09-23 NOTE — H&P
Patient Care Team:  Provider, No Known as PCP - Say Roger MD (Psychiatry)      Subjective .     History of present illness:    Pro Mueller is a 69 y.o. male who presents today for Procedure(s):  ENDOSCOPIC RETROGRADE CHOLANGIOPANCREATOGRAPHY sphincterotomy, balloon sweep (9-12) for the indications listed below.     The updated Patient Profile was reviewed prior to the procedure, in conjunction with the Physical Exam, including medical conditions, surgical procedures, medications, allergies, family history and social history.     Pre-operatively, I reviewed the indication(s) for the procedure, the risks of the procedure [including but not limited to: unexpected bleeding possibly requiring hospitalization and/or unplanned repeat procedures, perforation possibly requiring surgical treatment, missed lesions and complications of sedation/MAC (also explained by anesthesia staff)].     I have evaluated the patient for risks associated with the planned anesthesia and the procedure to be performed and find the patient an acceptable candidate for IV sedation.    Multiple opportunities were provided for any questions or concerns, and all questions were answered satisfactorily before any anesthesia was administered. We will proceed with the planned procedure.    69 years old male who came in with a right upper quadrant abdominal, also has a history of alcohol abuse.  Noticed to have cholelithiasis on CT scan underwent cholecystectomy and intraoperative cholangiogram which did show CBD stone prompted GI consult.  Patient's having a pain in the right upper quadrant area he is status post of ONRY drain placement.  He is denying any history of hematemesis melena rectal bleeding.  He denies history of fever chills.  Other review of symptoms they are unremarkable.    On examination abdominous soft nondistended tender at the incision site as well as at the NORY drain site.    ASSESSMENT/PLAN:  69 years old male with a  history of positive intraoperative cholangiogram with a CBD stone here for ERCP.  Procedure was discussed in detail with the patient, picture was shown and and possible complication related to ERCP including infection perforation bleeding post ERCP pancreatitis parasailing in detail.  Patient agreed to proceed.      Past Medical History:  Past Medical History:   Diagnosis Date    Alcohol abuse     Alcohol withdrawal 11/04/2016    Alcoholic ketoacidosis 01/12/2020    Allergic 1970    Anxiety     Arthritis     Atopic rhinitis 08/08/2016    Depression     Disease of thyroid gland     Elevated cholesterol     Encounter for removal of sutures     Genital herpes simplex 08/08/2016    GERD (gastroesophageal reflux disease)     Headache, tension-type     Hyperlipidemia     Hypertension     Hypothyroidism     Kidney stone     Low back pain 2019    Migraine     Motion sickness     Nephrolithiasis 02/12/2020    Olecranon bursitis, right elbow     Panic disorder without agoraphobia 08/08/2016    Peripheral neuropathy     Sleep apnea     Syncope and collapse 01/02/2019    Vitamin D deficiency 08/08/2016    Withdrawal symptoms, alcohol        Past Surgical History:  Past Surgical History:   Procedure Laterality Date    CHOLECYSTECTOMY N/A 9/22/2024    Procedure: CHOLECYSTECTOMY LAPAROSCOPIC WITH DAVINCI ROBOT with cholangiogram, possible open;  Surgeon: Toro Noguera MD;  Location: [facility];  Service: General;  Laterality: N/A;    COLONOSCOPY      CYST REMOVAL      CYSTOSCOPY BLADDER STONE LITHOTRIPSY  02/2022    EXTRACORPOREAL SHOCK WAVE LITHOTRIPSY (ESWL) Right 2002    SHOULDER ARTHROSCOPY Right 12/17/2019    Procedure: SHOULDER ARTHROSCOPY, decompression, distal clavicle excision;  Surgeon: Aj Mancilla MD;  Location: [facility];  Service: Orthopedics    TONSILLECTOMY         Social History:  Social History     Tobacco Use    Smoking status: Former     Current packs/day: 0.00     Average packs/day: 0.5  "packs/day for 15.0 years (7.5 ttl pk-yrs)     Types: Cigarettes     Start date: 1990     Quit date: 2005     Years since quittin.7    Smokeless tobacco: Never    Tobacco comments:     caffeine - 3 cans of coke daily    Vaping Use    Vaping status: Never Used   Substance Use Topics    Alcohol use: Yes     Alcohol/week: 7.0 standard drinks of alcohol     Types: 7 Shots of liquor per week     Comment: .5 liter vodka    Drug use: Yes     Types: Methamphetamines     Comment: \"once in a rare while\"       Family History:  Family History   Problem Relation Age of Onset    Alzheimer's disease Mother     Mental illness Mother     Pancreatic cancer Father     Hearing loss Father     Arthritis Maternal Grandmother     Malig Hyperthermia Neg Hx        Medications:  Medications Prior to Admission   Medication Sig Dispense Refill Last Dose    amLODIPine (NORVASC) 5 MG tablet Take 1 tablet by mouth Every Morning. 90 tablet 1 Past Month    fluticasone (FLONASE) 50 MCG/ACT nasal spray 2 sprays into the nostril(s) as directed by provider Daily. (Patient taking differently: Administer 2 sprays into the nostril(s) as directed by provider As Needed.) 11.1 mL 0     levothyroxine (SYNTHROID, LEVOTHROID) 100 MCG tablet Take 1 tablet by mouth Daily. 90 tablet 1 Past Month    lisinopril (PRINIVIL,ZESTRIL) 10 MG tablet Take 1 tablet by mouth Daily. 90 tablet 1 Past Month       Scheduled Meds:amLODIPine, 5 mg, Oral, QAM  fluticasone, 2 spray, Nasal, Daily  folic acid, 1 mg, Oral, Daily  levothyroxine, 100 mcg, Oral, Daily  lisinopril, 20 mg, Oral, Daily  pantoprazole, 40 mg, Intravenous, Q AM  sincalide (KINEVAC) 1.7 mcg in sodium chloride 0.9 % 101.7 mL IVPB, 0.02 mcg/kg, Intravenous, Once  sodium chloride, 10 mL, Intravenous, Q12H  sucralfate, 1 g, Oral, 4x Daily AC & at Bedtime  thiamine (B-1) IV, 200 mg, Intravenous, Q8H   Followed by  [START ON 2024] thiamine, 100 mg, Oral, Daily      Continuous Infusions:sodium chloride, " 1,000 mL, Last Rate: 25 mL/hr at 09/23/24 1723  sodium chloride 0.9 % with KCl 20 mEq, 100 mL/hr, Last Rate: 100 mL/hr (09/23/24 0853)      PRN Meds:.  acetaminophen    senna-docusate sodium **AND** polyethylene glycol **AND** bisacodyl **AND** bisacodyl    diphenhydrAMINE    droperidol **OR** droperidol    ePHEDrine    fentanyl    flumazenil    hydrALAZINE    HYDROcodone-acetaminophen    HYDROcodone-acetaminophen    HYDROcodone-acetaminophen    HYDROmorphone    HYDROmorphone    Indomethacin    ipratropium-albuterol    labetalol    melatonin    naloxone    ondansetron ODT **OR** ondansetron    ondansetron    oxyCODONE-acetaminophen    promethazine **OR** promethazine    sodium chloride    sodium chloride    sodium chloride    ALLERGIES:  Penicillins        Objective     Vital Signs:   Temp:  [97.7 °F (36.5 °C)-98.8 °F (37.1 °C)] 98.8 °F (37.1 °C)  Heart Rate:  [65-70] 68  Resp:  [18] 18  BP: (130-140)/(80-90) 140/90    Physical Exam:      General Appearance:    Awake and alert, in no acute distress   Lungs:     Clear to auscultation bilaterally, respirations regular, even and unlabored    Heart:    Regular rhythm and normal rate, normal S1 and S2, no            murmur, no gallop, no rub   Abdomen:     Normal bowel sounds, soft, non-tender, no rebound or guarding, non-distended, no hepatosplenomegaly        Results Review:   I reviewed the patient's labs and imaging.  Lab Results (last 24 hours)       Procedure Component Value Units Date/Time    Tissue Pathology Exam [[id number]] Collected: 09/22/24 1332    Specimen: Tissue from Gallbladder Updated: 09/23/24 0643    Magnesium [[id number]]  (Normal) Collected: 09/23/24 0336    Specimen: Blood Updated: 09/23/24 0434     Magnesium 2.0 mg/dL     Comprehensive Metabolic Panel [[id number]]  (Abnormal) Collected: 09/23/24 0336    Specimen: Blood Updated: 09/23/24 0433     Glucose 131 mg/dL      BUN 9 mg/dL      Creatinine 0.56 mg/dL      Sodium 136 mmol/L      Potassium 4.5  mmol/L      Chloride 107 mmol/L      CO2 19.3 mmol/L      Calcium 8.1 mg/dL      Total Protein 6.1 g/dL      Albumin 3.3 g/dL      ALT (SGPT) 19 U/L      AST (SGOT) 91 U/L      Alkaline Phosphatase 93 U/L      Total Bilirubin 1.2 mg/dL      Globulin 2.8 gm/dL      A/G Ratio 1.2 g/dL      BUN/Creatinine Ratio 16.1     Anion Gap 9.7 mmol/L      eGFR 108.0 mL/min/1.73     Narrative:      GFR Normal >60  Chronic Kidney Disease <60  Kidney Failure <15      CBC (No Diff) [[id number]]  (Abnormal) Collected: 09/23/24 0336    Specimen: Blood Updated: 09/23/24 0407     WBC 8.97 10*3/mm3      RBC 3.58 10*6/mm3      Hemoglobin 12.2 g/dL      Hematocrit 36.6 %      .2 fL      MCH 34.1 pg      MCHC 33.3 g/dL      RDW 14.8 %      RDW-SD 56.1 fl      MPV 9.4 fL      Platelets 179 10*3/mm3             Imaging Results (Last 24 Hours)       Procedure Component Value Units Date/Time    FL Cholangiogram Operative [[id number]] Collected: 09/22/24 2225     Updated: 09/22/24 2230    Narrative:      INTRAOPERATIVE PORTABLE VIEW CHOLANGIOGRAM     CLINICAL INFORMATION: Post cholecystectomy     FINDINGS: Upon injection of the cystic duct with contrast, the proximal  intrahepatic, common hepatic and common bile ducts are opacified.  Contrast passes into the duodenum. Filling defect persists within the  distal common bile duct on imaging and raises concern for  choledocholithiasis. Further evaluation with MRCP is recommended..     Dose Ka,r: 12.55 mGy            This report was finalized on 9/22/2024 10:26 PM by Dr. Freddie Mckeon M.D on Workstation: [id number]                  I discussed the patients findings and my recommendations with the patient.  Fara Madrigal MD  09/23/24  18:07 EDT

## 2024-09-23 NOTE — CONSULTS
Assessment Date:  09/23/24    Summary: Pt admitted to [facility] with abdominal pain. Admitting dx of alcohol-induced acute pancreatitis. Pt also with incidental finding of cholelithiasis s/p cholecystectomy. RD visited pt at bedside. Pt reported he eats well normally, 2-3 meals daily with little to no snacking. Pt does not drink ONS. Pt generally eats out at fast food or sit down restaurants. NFPE completed and not consistent with nutrition diagnosis of malnutrition at this time using AND/ASPEN criteria     Initiate diet per GI. Goal of low fat diet.     CLINICAL NUTRITION ASSESSMENT      Reason for Assessment MST score 2+     Diagnosis/Problem   Alcohol-induced pancreatitis  Severe acute cholecystitis   -complaining of abdominal pain and nausea  -NPO and IVF  -s/p cholecystectomy 9/22   -plan for ERCP with GI  -diet advancement per GI according to surgery   Medical/Surgical History Past Medical History:   Diagnosis Date    Alcohol abuse     Alcohol withdrawal 11/04/2016    Alcoholic ketoacidosis 01/12/2020    Allergic 1970    Anxiety     Arthritis     Atopic rhinitis 08/08/2016    Depression     Disease of thyroid gland     Elevated cholesterol     Encounter for removal of sutures     Genital herpes simplex 08/08/2016    GERD (gastroesophageal reflux disease)     Headache, tension-type     Hyperlipidemia     Hypertension     Hypothyroidism     Kidney stone     Low back pain 2019    Migraine     Motion sickness     Nephrolithiasis 02/12/2020    Olecranon bursitis, right elbow     Panic disorder without agoraphobia 08/08/2016    Peripheral neuropathy     Sleep apnea     Syncope and collapse 01/02/2019    Vitamin D deficiency 08/08/2016    Withdrawal symptoms, alcohol        Past Surgical History:   Procedure Laterality Date    CHOLECYSTECTOMY N/A 9/22/2024    Procedure: CHOLECYSTECTOMY LAPAROSCOPIC WITH DAVINCI ROBOT with cholangiogram, possible open;  Surgeon: Toro Noguera MD;  Location: [facility] OR;  Service:  "General;  Laterality: N/A;    COLONOSCOPY      CYST REMOVAL      CYSTOSCOPY BLADDER STONE LITHOTRIPSY  02/2022    EXTRACORPOREAL SHOCK WAVE LITHOTRIPSY (ESWL) Right 2002    SHOULDER ARTHROSCOPY Right 12/17/2019    Procedure: SHOULDER ARTHROSCOPY, decompression, distal clavicle excision;  Surgeon: Aj Mancilla MD;  Location: [facility] OR [facility];  Service: Orthopedics    TONSILLECTOMY          Anthropometrics        Current Height  Current Weight  BMI kg/m2 Height: 182.9 cm (72\")  Weight: 83.9 kg (185 lb) (09/20/24 0110)  Body mass index is 25.09 kg/m².   Adjusted BMI (if applicable)    BMI Category Overweight (25 - 29.9)   Ideal Body Weight (IBW) 178#   Usual Body Weight (UBW) 180#   Weight Trend 10.2% wt loss since 9/4 per wt review (question accuracy);  2.2% wt gain x 6 months   Weight History Wt Readings from Last 30 Encounters:   09/20/24 0110 83.9 kg (185 lb)   09/04/24 0515 93.5 kg (206 lb 2.1 oz)   09/03/24 1140 81.6 kg (180 lb)   05/11/24 2206 83.9 kg (185 lb)   03/09/24 0513 82.3 kg (181 lb 7 oz)   03/07/24 0600 81.1 kg (178 lb 12.7 oz)   03/06/24 1919 81.1 kg (178 lb 12.7 oz)   03/06/24 1633 83.9 kg (185 lb)   02/24/24 1606 83.9 kg (185 lb)   07/23/23 0357 79.8 kg (176 lb)   07/19/23 1436 79.8 kg (176 lb)   06/23/23 0255 80 kg (176 lb 6.4 oz)   06/22/23 2004 86.2 kg (190 lb)   05/13/23 2100 85.2 kg (187 lb 14.4 oz)   05/13/23 1816 87.1 kg (192 lb)   05/05/23 1145 86.6 kg (191 lb)   04/13/23 2147 86.2 kg (190 lb)   03/08/23 1250 88.6 kg (195 lb 6.4 oz)   02/20/23 0600 88.4 kg (194 lb 14.2 oz)   02/20/23 0303 86.5 kg (190 lb 12.8 oz)   02/18/23 1407 90.7 kg (200 lb)   09/23/22 2026 86.2 kg (190 lb)   09/11/22 1552 89.6 kg (197 lb 8.5 oz)   09/11/22 0037 86.2 kg (190 lb)   07/08/22 0943 87.5 kg (193 lb)   07/06/22 1504 86.8 kg (191 lb 6.4 oz)   07/02/22 0541 86 kg (189 lb 9.5 oz)   07/01/22 0635 85.3 kg (188 lb 1.6 oz)   07/01/22 0010 81.6 kg (180 lb)   03/13/22 1222 82.1 kg (181 lb)   02/22/22 1534 86.2 kg " (190 lb 1.6 oz)   02/17/22 0901 86.4 kg (190 lb 8 oz)   02/10/22 1212 82.6 kg (182 lb)   02/10/22 1045 83 kg (183 lb)   01/18/22 1038 86.6 kg (190 lb 14.4 oz)   01/09/22 0545 86.1 kg (189 lb 13.1 oz)   01/07/22 1359 84 kg (185 lb 1.6 oz)   01/07/22 1057 89.8 kg (198 lb)   01/07/22 0636 89.9 kg (198 lb 4.8 oz)   12/08/21 2046 87.1 kg (192 lb 0.3 oz)   11/09/21 1020 87.1 kg (192 lb)   11/02/21 1447 87.1 kg (192 lb)   10/05/21 1528 87 kg (191 lb 12.8 oz)   08/26/21 1114 92.7 kg (204 lb 4.8 oz)      --  Labs       Pertinent Labs Reviewed, management per attending    Results from last 7 days   Lab Units 09/23/24  0336 09/22/24  0848 09/21/24  0231   SODIUM mmol/L 136 133* 134*   POTASSIUM mmol/L 4.5 3.7 4.4   CHLORIDE mmol/L 107 102 102   CO2 mmol/L 19.3* 20.0* 23.0   BUN mg/dL 9 6* 6*   CREATININE mg/dL 0.56* 0.56* 0.47*   CALCIUM mg/dL 8.1* 8.2* 7.9*   BILIRUBIN mg/dL 1.2 1.7* 1.3*   ALK PHOS U/L 93 97 105   ALT (SGPT) U/L 19 13 15   AST (SGOT) U/L 91* 50* 72*   GLUCOSE mg/dL 131* 96 110*     Results from last 7 days   Lab Units 09/23/24  0336 09/21/24  0231 09/20/24  0500   MAGNESIUM mg/dL 2.0  --  1.4*   HEMOGLOBIN g/dL 12.2*  --   --    HEMATOCRIT % 36.6*  --   --    WBC 10*3/mm3 8.97  --   --    ALBUMIN g/dL 3.3*   < >  --     < > = values in this interval not displayed.     Results from last 7 days   Lab Units 09/23/24  0336 09/20/24  0121   PLATELETS 10*3/mm3 179 156     COVID19   Date Value Ref Range Status   09/03/2024 Not Detected Not Detected - Ref. Range Final     Lab Results   Component Value Date    HGBA1C 5.33 11/26/2018          Medications           Scheduled Medications amLODIPine, 5 mg, Oral, QAM  fluticasone, 2 spray, Nasal, Daily  folic acid, 1 mg, Oral, Daily  levothyroxine, 100 mcg, Oral, Daily  lisinopril, 20 mg, Oral, Daily  pantoprazole, 40 mg, Intravenous, Q AM  sincalide (KINEVAC) 1.7 mcg in sodium chloride 0.9 % 101.7 mL IVPB, 0.02 mcg/kg, Intravenous, Once  sodium chloride, 10 mL,  Intravenous, Q12H  sucralfate, 1 g, Oral, 4x Daily AC & at Bedtime  thiamine (B-1) IV, 200 mg, Intravenous, Q8H   Followed by  [START ON 9/25/2024] thiamine, 100 mg, Oral, Daily       Infusions sodium chloride 0.9 % with KCl 20 mEq, 100 mL/hr, Last Rate: 100 mL/hr (09/23/24 0853)       PRN Medications   acetaminophen    senna-docusate sodium **AND** polyethylene glycol **AND** bisacodyl **AND** bisacodyl    HYDROcodone-acetaminophen    HYDROcodone-acetaminophen    HYDROmorphone    melatonin    ondansetron ODT **OR** ondansetron    sodium chloride    sodium chloride    sodium chloride     Physical Findings          General Findings alert, disheveled    Oral/Mouth Cavity other:CHAPITO   Edema  1+ (trace) feet   Gastrointestinal last bowel movement: 9/20   Skin  surgical incision: umbilical incision   Tubes/Drains/Lines none   NFPE Date Completed: 9/23  NFPE completed and not consistent with nutrition diagnosis of malnutrition at this time using AND/ASPEN criteria      --  Current Nutrition Orders & Evaluation of Intake       Oral Nutrition     Food Allergies NKFA   Current PO Diet NPO Diet NPO Type: Sips with Meds   Supplement n/a   PO Evaluation     % PO Intake 9/23: no intake recorded since admission, frequent NPO status    Factors Affecting Intake: Other: NPO   --  PES STATEMENT / NUTRITION DIAGNOSIS      Nutrition Dx Problem  Inadequate energy intake related to clinical course as evidenced by frequent NPO status this admission.      NUTRITION INTERVENTION / PLAN OF CARE      Intervention Goal(s) Maintain nutrition status, Establish PO intake, and Tolerate PO          RD Intervention/Action Await initiation/advancement of PO diet   --      Prescription/Orders:       PO Diet NPO      Supplements -      Enteral Nutrition -      Parenteral Nutrition -   New Prescription Ordered? No changes at this time   --      Monitor/Evaluation Per protocol, PO intake, Pertinent labs, GI status   Discharge Plan/Needs No discharge needs  identified at this time   --    RD to follow per protocol.      Electronically signed by:  Peggy Ramírez RD  09/23/24 12:19 EDT

## 2024-09-23 NOTE — OP NOTE
ENDOSCOPIC RETROGRADE CHOLANGIOPANCREATOGRAPHY Procedure Report    Patient Name:  Pro Mueller  YOB: 1955    Date of Surgery:  9/23/2024     Pre-Op Diagnosis:  Elevated LFTs [R79.89]        Procedure/CPT® Codes:      Procedure(s):  ENDOSCOPIC RETROGRADE CHOLANGIOPANCREATOGRAPHY sphincterotomy, balloon sweep (9-12)    Staff:  Surgeon(s):  Fara Madrigal MD      Anesthesia: Monitored Anesthesia Care    Description of Procedure:  A description of the procedure as well as risks, benefits and alternative methods were explained to the patient who voiced understanding and signed the corresponding consent form.Specifically risks of post-ERCP pancreatitis, bleeding, perforation, failure to canulate and adverse reaction to sedation were discussed. A physical exam was performed and vital signs were monitored throughout the procedure.    A  film was performed which was normal. With the patient in the semi-prone position, an Olympus side viewing endoscope was placed into the mouth and proceeded through the esophagus, stomach and second portion of the duodenum without difficulty. Limited views of the esophagus and stomach were normal. The ampulla was visualized and appeared normal. A RegalBox hydrotome was used to cannulate the ampulla using wire guided technique. Bile was aspirated to ensure this was the duct of interest. Contrast was injected into the bile duct.      The scope was then retroflexed and the fundus was visualized. The procedure was not difficult and there were no immediate complications.    Findings:   Limited evaluation esophagus gastric duodenal mucosa grossly looks normal.  Major ampulla was aligned with the scope.  Major ampulla looks normal without any periampullary lesion mass.  Selective dilation of the common bile duct was obtained using Hydratome preloaded with a wire.  After positioning of the ampulla and using a sphincterotome, wire was advanced intrahepatically followed by  the catheter with subsequent cholangiogram was performed using Omni pack.  There was a filling defect was noticed in the distal part with a dilated common bile duct at least close to 8 to 9 mm and common hepatic duct was around 6 mm.  At this stage sphincterotomy was performed around 11 to 12 o'clock position using standard ERBE setting, after sphincterotomy, 9 to 12 mm balloon was introduced over the wire and with multiple sweeps 1 stone was removed.  Subsequent occlusion cholangiogram did not show any further filling defect.  Contrast and bile was draining freely.    Pancreas duct was not cannulated or accessed during this procedure.  Patient was given indomethacin and IV fluid to prevent post ERCP pancreatitis.    Impression:  1.  Choledocholithiasis with dilated common bile duct status post of sphincterotomy and removal of CBD stone.  2.  Normal esophagus gastric duodenal mucosa with limited evaluation using duodenoscope.  Small hiatal hernia was noticed.    Recommendations:  Follow the liver function test.  Clear liquids today and advance to low-fat diet in the morning.  Resume other preop medication.  Call if needed.      Fara Madrigal MD     Date: 9/23/2024    Time: 18:09 EDT

## 2024-09-23 NOTE — PLAN OF CARE
Goal Outcome Evaluation:  Plan of Care Reviewed With: patient        Progress: improving         Outcome Evaluation: VSS. A&Ox4. Weaned to RA from 3L. Lap sites c/d/i and soft. NORY drain remains intact. PRN medication given for pain providing relief. CIWA below 5 throughout the shift. IV ABX infusing per order. IVF infusing. Makes needs known. All questions answered with verbalized understanding. Care on going. Bed alarm in place. Personal belongings within reach.

## 2024-09-23 NOTE — THERAPY EVALUATION
Patient Name: Pro Mueller  : 1955    MRN: [id number]                              Today's Date: 2024       Admit Date: 2024    Visit Dx:     ICD-10-CM ICD-9-CM   1. Alcoholic intoxication without complication  F10.920 305.00   2. Lactic acidosis  E87.20 276.2   3. Elevated lipase  R74.8 790.5   4. Calculus of gallbladder with cholecystitis without biliary obstruction, unspecified cholecystitis acuity  K80.10 574.10   5. Elevated LFTs  R79.89 790.6     Patient Active Problem List   Diagnosis    Fall    Alcoholism in recovery    Atopic rhinitis    Mixed anxiety depressive disorder    Genital herpes simplex    Hyperlipidemia    Hypertension    Hypothyroidism    Insomnia    Panic disorder without agoraphobia    Persistent insomnia    Vitamin D deficiency    Chronic low back pain    Neuropathy involving both lower extremities    QT prolongation    Left shoulder pain    Nausea and vomiting    Pancytopenia    Left ventricular diastolic dysfunction    Tremor    C5 cervical fracture    Syncope and collapse    Neck pain    Alcoholic intoxication with complication    Withdrawal symptoms, alcohol    Transaminitis    Lumbar facet arthropathy    Alcohol dependence with withdrawal    Midline low back pain with right-sided sciatica    Spondylolisthesis at L4-L5 level    Lumbar canal stenosis    Alcohol cessation counseling    Need for assistance due to reduced mobility    Impaired mobility and ADLs    Alcohol withdrawal syndrome without complication    Facial laceration    Orthostatic hypotension    Acute bacterial conjunctivitis of right eye    Visit for suture removal    Renal calculi    Hepatic steatosis    Alcohol withdrawal syndrome with complication    Macrocytosis without anemia    Lumbosacral spondylosis without myelopathy    Elevated LFTs    Alcohol-induced acute pancreatitis, unspecified complication status    Pancreatitis    Generalized abdominal pain    Alcohol withdrawal    Lactic acidosis    Acute  alcohol intoxication in patient with alcoholism with blood alcohol level 0.08 to 0.29, with unspecified complication    Calculus of gallbladder with cholecystitis without biliary obstruction     Past Medical History:   Diagnosis Date    Alcohol abuse     Alcohol withdrawal 11/04/2016    Alcoholic ketoacidosis 01/12/2020    Allergic 1970    Anxiety     Arthritis     Atopic rhinitis 08/08/2016    Depression     Disease of thyroid gland     Elevated cholesterol     Encounter for removal of sutures     Genital herpes simplex 08/08/2016    GERD (gastroesophageal reflux disease)     Headache, tension-type     Hyperlipidemia     Hypertension     Hypothyroidism     Kidney stone     Low back pain 2019    Migraine     Motion sickness     Nephrolithiasis 02/12/2020    Olecranon bursitis, right elbow     Panic disorder without agoraphobia 08/08/2016    Peripheral neuropathy     Sleep apnea     Syncope and collapse 01/02/2019    Vitamin D deficiency 08/08/2016    Withdrawal symptoms, alcohol      Past Surgical History:   Procedure Laterality Date    CHOLECYSTECTOMY N/A 9/22/2024    Procedure: CHOLECYSTECTOMY LAPAROSCOPIC WITH DAVINCI ROBOT with cholangiogram, possible open;  Surgeon: Toro Noguera MD;  Location: [facility];  Service: General;  Laterality: N/A;    COLONOSCOPY      CYST REMOVAL      CYSTOSCOPY BLADDER STONE LITHOTRIPSY  02/2022    EXTRACORPOREAL SHOCK WAVE LITHOTRIPSY (ESWL) Right 2002    SHOULDER ARTHROSCOPY Right 12/17/2019    Procedure: SHOULDER ARTHROSCOPY, decompression, distal clavicle excision;  Surgeon: Aj Mancilla MD;  Location: [facility];  Service: Orthopedics    TONSILLECTOMY        General Information       Row Name 09/23/24 1043          Physical Therapy Time and Intention    Document Type evaluation  -CB     Mode of Treatment individual therapy;physical therapy  -CB       Row Name 09/23/24 1043          General Information    Patient Profile Reviewed yes  -CB     Prior Level of  Function independent:;gait;transfer;bed mobility  -CB     Existing Precautions/Restrictions fall  NORY drain  -CB     Barriers to Rehab none identified  -CB       Row Name 09/23/24 1043          Living Environment    People in Home alone  -CB       Row Name 09/23/24 1043          Home Main Entrance    Number of Stairs, Main Entrance --  a few  -CB     Stair Railings, Main Entrance none  -CB       Row Name 09/23/24 1043          Stairs Within Home, Primary    Stairs, Within Home, Primary flight to bedroom with L sided handrail  -CB       Row Name 09/23/24 1043          Cognition    Orientation Status (Cognition) oriented x 3  -CB       Row Name 09/23/24 1043          Safety Issues, Functional Mobility    Impairments Affecting Function (Mobility) balance;endurance/activity tolerance;strength;pain  -CB               User Key  (r) = Recorded By, (t) = Taken By, (c) = Cosigned By      Initials Name Provider Type    CB Nancy Deleon, PT Physical Therapist                   Mobility       Row Name 09/23/24 1044          Bed Mobility    Bed Mobility supine-sit  -CB     Supine-Sit [place] (Bed Mobility) standby assist  -CB       Row Name 09/23/24 1044          Sit-Stand Transfer    Sit-Stand [place] (Transfers) standby assist;verbal cues  -CB     Assistive Device (Sit-Stand Transfers) walker, front-wheeled  -CB     Comment, (Sit-Stand Transfer) x2  -CB       Row Name 09/23/24 1044          Gait/Stairs (Locomotion)    [place] Level (Gait) contact guard;verbal cues  -CB     Assistive Device (Gait) walker, front-wheeled  -CB     Distance in Feet (Gait) 40  -CB     Deviations/Abnormal Patterns (Gait) gait speed decreased;stride length decreased  -CB     Bilateral Gait Deviations forward flexed posture  -CB     Comment, (Gait/Stairs) no overt LOB noted; limited by pain and fatigue  -CB               User Key  (r) = Recorded By, (t) = Taken By, (c) = Cosigned By      Initials Name Provider Type    Nancy Del eRal,  PT Physical Therapist                   Obj/Interventions       Row Name 09/23/24 1045          Range of Motion Comprehensive    General Range of Motion bilateral lower extremity ROM WFL  -CB       Row Name 09/23/24 1045          Strength Comprehensive (MMT)    Comment, General Manual Muscle Testing (MMT) Assessment generalized weakness  -CB       Row Name 09/23/24 1045          Balance    Balance Assessment standing static balance;standing dynamic balance;sitting dynamic balance;sitting static balance  -CB     Static Sitting Balance standby assist  -CB     Dynamic Sitting Balance standby assist  -CB     Position, Sitting Balance sitting edge of bed  -CB     Static Standing Balance standby assist  -CB     Dynamic Standing Balance contact guard  -CB     Position/Device Used, Standing Balance supported;walker, front-wheeled  -CB     Balance Interventions sitting;standing;sit to stand;supported;static;dynamic;minimal challenge  -CB       Row Name 09/23/24 1045          Sensory Assessment (Somatosensory)    Sensory Assessment (Somatosensory) sensation intact  -CB               User Key  (r) = Recorded By, (t) = Taken By, (c) = Cosigned By      Initials Name Provider Type    CB Nancy Deleon, PT Physical Therapist                   Goals/Plan       Row Name 09/23/24 1046          Bed Mobility Goal 1 (PT)    Activity/Assistive Device (Bed Mobility Goal 1, PT) bed mobility activities, all  -CB     [place] Level/Cues Needed (Bed Mobility Goal 1, PT) modified independence  -CB     Time Frame (Bed Mobility Goal 1, PT) 1 week;long term goal (LTG)  -CB       Row Name 09/23/24 1046          Transfer Goal 1 (PT)    Activity/Assistive Device (Transfer Goal 1, PT) sit-to-stand/stand-to-sit;bed-to-chair/chair-to-bed;walker, rolling  -CB     [place] Level/Cues Needed (Transfer Goal 1, PT) modified independence  -CB     Time Frame (Transfer Goal 1, PT) long term goal (LTG);1 week  -CB       Row Name 09/23/24 1046           Gait Training Goal 1 (PT)    Activity/Assistive Device (Gait Training Goal 1, PT) gait (walking locomotion);assistive device use;improve balance and speed;increase endurance/gait distance;decrease fall risk;diminish gait deviation;walker, rolling  -CB     [place] Level (Gait Training Goal 1, PT) modified independence  -CB     Distance (Gait Training Goal 1, PT) 150  -CB     Time Frame (Gait Training Goal 1, PT) long term goal (LTG);1 week  -CB       Row Name 09/23/24 1046          Therapy Assessment/Plan (PT)    Planned Therapy Interventions (PT) balance training;bed mobility training;gait training;home exercise program;patient/family education;transfer training;strengthening  -CB               User Key  (r) = Recorded By, (t) = Taken By, (c) = Cosigned By      Initials Name Provider Type    Nancy Del Real, PT Physical Therapist                   Clinical Impression       Row Name 09/23/24 1046          Pain    Pretreatment Pain Rating 6/10  -CB     Posttreatment Pain Rating 6/10  -CB     Pain Location - abdomen  -CB     Pain Intervention(s) Repositioned;Rest;Ambulation/increased activity  -CB       Row Name 09/23/24 1046          Plan of Care Review    Plan of Care Reviewed With patient  -CB     Outcome Evaluation Patient is a 69 y.o. male admitted to [facility] for lactic acidosis and elevated lipase on 9/20/2024. Pt is s/p Laparoscopic cholecystectomy with cholangiogram and da Jordyn robot assistance on 9/22/24. PMHx includes alcohol abuse, HTN, depression, hypothyroidism. Patient is ind at baseline with use of rwx and lives alone.He has a few [place] home and a flight to his bedroom. Today, patient performed bed mobility with SBA, required SBA for transfers, and ambulated 40ft using rwx requiring CGA. Limited by pain and fatigue. Pt reports pain 6/10. Strength, activity tolerance deficits noted. Patient may benefit from skilled PT services to address functional deficits and improve level of independence prior to  discharge. Anticipate home with assist and HHPT upon DC.  -CB       Row Name 09/23/24 1046          Therapy Assessment/Plan (PT)    Rehab Potential (PT) good, to achieve stated therapy goals  -CB     Criteria for Skilled Interventions Met (PT) yes  -CB     Therapy Frequency (PT) 5 times/wk  -CB       Row Name 09/23/24 1046          Positioning and Restraints    Pre-Treatment Position in bed  -CB     Post Treatment Position chair  -CB     In Chair notified nsg;reclined;call light within reach;encouraged to call for assist;exit alarm on  -CB               User Key  (r) = Recorded By, (t) = Taken By, (c) = Cosigned By      Initials Name Provider Type    Nancy Del Real PT Physical Therapist                   Outcome Measures       Row Name 09/23/24 1047          How much help from another person do you currently need...    Turning from your back to your side while in flat bed without using bedrails? 3  -CB     Moving from lying on back to sitting on the side of a flat bed without bedrails? 3  -CB     Moving to and from a bed to a chair (including a wheelchair)? 3  -CB     Standing up from a chair using your arms (e.g., wheelchair, bedside chair)? 3  -CB     Climbing 3-5 steps with a railing? 2  -CB     To walk in hospital room? 3  -CB     AM-PAC 6 Clicks Score (PT) 17  -CB     Highest Level of Mobility Goal 5 --> Static standing  -CB       Row Name 09/23/24 1047          Functional Assessment    Outcome Measure Options AM-PAC 6 Clicks Basic Mobility (PT)  -CB               User Key  (r) = Recorded By, (t) = Taken By, (c) = Cosigned By      Initials Name Provider Type    Nancy Del Real, LUCILA Physical Therapist                                 Physical Therapy Education       Title: PT OT SLP Therapies (In Progress)       Topic: Physical Therapy (In Progress)       Point: Mobility training (Done)       Learning Progress Summary             Patient Acceptance, E,TB, VU,NR by BRIAN at 9/23/2024 1049                          Point: Home exercise program (Not Started)       Learner Progress:  Not documented in this visit.              Point: Body mechanics (Done)       Learning Progress Summary             Patient Acceptance, E,TB, VU,NR by  at 9/23/2024 1047                         Point: Precautions (Done)       Learning Progress Summary             Patient Acceptance, E,TB, VU,NR by  at 9/23/2024 1047                                         User Key       Initials Effective Dates Name Provider Type Discipline     10/22/21 -  Nancy Deleon, PT Physical Therapist PT                  PT Recommendation and Plan  Planned Therapy Interventions (PT): balance training, bed mobility training, gait training, home exercise program, patient/family education, transfer training, strengthening  Plan of Care Reviewed With: patient  Outcome Evaluation: Patient is a 69 y.o. male admitted to [facility] for lactic acidosis and elevated lipase on 9/20/2024. Pt is s/p Laparoscopic cholecystectomy with cholangiogram and da Jordyn robot assistance on 9/22/24. PMHx includes alcohol abuse, HTN, depression, hypothyroidism. Patient is ind at baseline with use of rwx and lives alone.He has a few [place] home and a flight to his bedroom. Today, patient performed bed mobility with SBA, required SBA for transfers, and ambulated 40ft using rwx requiring CGA. Limited by pain and fatigue. Pt reports pain 6/10. Strength, activity tolerance deficits noted. Patient may benefit from skilled PT services to address functional deficits and improve level of independence prior to discharge. Anticipate home with assist and HHPT upon DC.     Time Calculation:         PT Charges       Row Name 09/23/24 1050             Time Calculation    Start Time 0930  -CB      Stop Time 0945  -CB      Time Calculation (min) 15 min  -CB      PT Received On 09/23/24  -CB      PT - Next Appointment 09/24/24  -CB      PT Goal Re-Cert Due Date 09/30/24  -CB         Time Calculation- PT    Total Timed  Code Minutes- PT 8 minute(s)  -CB         Timed Charges    14554 - PT Therapeutic Activity Minutes 8  -CB         Total Minutes    Timed Charges Total Minutes 8  -CB       Total Minutes 8  -CB                User Key  (r) = Recorded By, (t) = Taken By, (c) = Cosigned By      Initials Name Provider Type    CB Nancy Deleon, PT Physical Therapist                  Therapy Charges for Today       Code Description Service Date Service Provider Modifiers Qty    18593472399 HC PT THERAPEUTIC ACT EA 15 MIN 9/23/2024 Nancy Deleon, PT GP 1    47343000337 HC PT EVAL MOD COMPLEXITY 3 9/23/2024 Nancy Deleon, PT GP 1            PT G-Codes  Outcome Measure Options: AM-PAC 6 Clicks Basic Mobility (PT)  AM-PAC 6 Clicks Score (PT): 17  PT Discharge Summary  Anticipated Discharge Disposition (PT): home with home health, home with assist    Nancy Deleon, LUCILA  9/23/2024

## 2024-09-23 NOTE — PROGRESS NOTES
Postop day 1 after robotic cholecystectomy for severe cholecystitis    Subjective:  Doing reasonly well.  Feels hungry.    Objective:  Afebrile, vital stable  General: Awake and alert, no distress  Abdomen: Soft, appropriate tenderness, dressings are clean.    Drain is nonbilious    Labs are reviewed, AST mildly elevated, ALT remains normal, total bilirubin 1.2.    Assessment and plan:  -Severe acute cholecystitis with a large stone impacted in the cystic duct, extracted, now postop day 1 after cholecystectomy and cholangiogram  -Cholangiogram worrisome for nonobstructing distal bile duct stone  -Noted plans for ERCP by GI service  -Okay for diet from general surgery standpoint once okay with GI service  -Tentatively plan for drain removal tomorrow as long as all looks good  -Reasonable progress to this point    Toro Noguera MD  General and Endoscopic Surgery  [facility]    [address], [address]  [place], [place], [address]  P: [phone]  F: [phone]

## 2024-09-23 NOTE — PROGRESS NOTES
"    DAILY PROGRESS NOTE  [facility]    Patient Identification:  Name: Pro Mueller  Age: 69 y.o.  Sex: male  :  1955  MRN: [id number]         Primary Care Physician: Provider, No Known    Subjective:  Interval History: Feeling much better today.  He looks like a new person this morning.  He is not agitated or sedated or combative.  He seems relaxed and he really wants to eat some food.  He is currently n.p.o. and surgery has not seen him yet this morning.  He denies any new chest pain troubles breathing confusion and/or hallucination    Objective: Looks clinically much much better    Scheduled Meds:amLODIPine, 5 mg, Oral, QAM  fluticasone, 2 spray, Nasal, Daily  folic acid, 1 mg, Oral, Daily  levothyroxine, 100 mcg, Oral, Daily  lisinopril, 20 mg, Oral, Daily  metroNIDAZOLE, 500 mg, Intravenous, Q8H  pantoprazole, 40 mg, Intravenous, Q AM  sincalide (KINEVAC) 1.7 mcg in sodium chloride 0.9 % 101.7 mL IVPB, 0.02 mcg/kg, Intravenous, Once  sodium chloride, 10 mL, Intravenous, Q12H  sucralfate, 1 g, Oral, 4x Daily AC & at Bedtime  thiamine (B-1) IV, 200 mg, Intravenous, Q8H   Followed by  [START ON 2024] thiamine, 100 mg, Oral, Daily      Continuous Infusions:sodium chloride 0.9 % with KCl 20 mEq, 100 mL/hr, Last Rate: 100 mL/hr (24 0853)        Vital signs in last 24 hours:  Temp:  [97.7 °F (36.5 °C)-99.3 °F (37.4 °C)] 98.2 °F (36.8 °C)  Heart Rate:  [66-74] 66  Resp:  [17-20] 18  BP: (105-140)/(63-81) 140/80    Intake/Output:    Intake/Output Summary (Last 24 hours) at 2024 1052  Last data filed at 2024 0640  Gross per 24 hour   Intake 1120 ml   Output 980 ml   Net 140 ml       Exam:  /80 (BP Location: Right arm, Patient Position: Lying)   Pulse 66   Temp 98.2 °F (36.8 °C) (Oral)   Resp 18   Ht 182.9 cm (72\")   Wt 83.9 kg (185 lb)   SpO2 96%   BMI 25.09 kg/m²     General Appearance:    Alert, cooperative, nontoxic, AAOx3, nontremulous                           " Eyes:    PERRLA both eyes                         Throat:   Lips, tongue, gums normal; oral mucosa pink and moist                           Neck:   No JVD                         Lungs:    Clear to auscultation bilaterally, respirations unlabored                 Chest Wall:    No tenderness or deformity                          Heart:    Regular rate and rhythm, S1 and S2 normal                  Abdomen:   Laparoscopic scars noted CDI, postoperatively soft, bowel sounds noted                 extremities: Moving all without volume overload                        pulses:   Pulses palpable in all extremities                            Skin:   Skin is warm and dry, nonjaundiced                  neurologic:   CNII-XII intact, no tremor     Data Review:  Labs in chart were reviewed.    Assessment:  Active Hospital Problems    Diagnosis  POA    **Alcohol-induced acute pancreatitis, unspecified complication status [K85.20]  Yes    Lactic acidosis [E87.20]  Yes    Acute alcohol intoxication in patient with alcoholism with blood alcohol level 0.08 to 0.29, with unspecified complication [F10.229]  Unknown    Calculus of gallbladder with cholecystitis without biliary obstruction [K80.10]  Unknown    Elevated LFTs [R79.89]  Yes    Hypertension [I10]  Yes    Hyperlipidemia [E78.5]  Yes    Hypothyroidism [E03.9]  Yes      Resolved Hospital Problems   No resolved problems to display.       Plan:      Fever curve seems to be resolving with cholecystectomy and thoroughly appreciate surgical's assistance   -I also have concern for possible developing aspiration pneumonia and pulsed with Levaquin and Flagyl but will hold further and monitor response.  I defer the need for further antibiotics postoperatively to surgery.  Current white count normal   -Dietary advancement per surgery but okay to trial liquids from my perspective.  Try to transition to p.o. pain control postoperatively and minimize Dilaudid   -Needs to utilize I-S  postoperatively due to concerns for atelectasis    -IV PPI/p.o. Carafate   -Lactate unreliable due to alcohol abuse   -Hgb postop stable at 12.2 (megaloblastic changes noted)      Alcohol abuse with intoxication at admission and no DTs   -Remove CIWA   -Concerns for gastritis/esophagitis due to alcohol admission but perhaps it was all due to the gallbladder   -Thiamine/folic acid   -Classic 2-1 AST to ALT alcoholic ratio with normalized bilirubin        HTN overall stable on Norvasc/lisinopril    Hypothyroid on levothyroxine    Outpatient follow-up for microscopic hematuria suggested    SCDs for prophylaxis        Disposition-perhaps ready in the next day or 2.  Input and assistance appreciated from all involved        Keanu Harrell MD  9/23/2024  10:52 EDT

## 2024-09-23 NOTE — PLAN OF CARE
Goal Outcome Evaluation:  Plan of Care Reviewed With: patient              Patient is a 69 y.o. male admitted to [facility] for lactic acidosis and elevated lipase on 9/20/2024. Pt is s/p Laparoscopic cholecystectomy with cholangiogram and da Jordyn robot assistance on 9/22/24. PMHx includes alcohol abuse, HTN, depression, hypothyroidism. Patient is ind at baseline with use of rwx and lives alone.He has a few [place] home and a flight to his bedroom. Today, patient performed bed mobility with SBA, required SBA for transfers, and ambulated 40ft using rwx requiring CGA. Limited by pain and fatigue. Pt reports pain 6/10. Strength, activity tolerance deficits noted. Patient may benefit from skilled PT services to address functional deficits and improve level of independence prior to discharge. Anticipate home with assist and HHPT upon DC.      Anticipated Discharge Disposition (PT): home with home health, home with assist

## 2024-09-23 NOTE — CASE MANAGEMENT/SOCIAL WORK
Discharge Planning Assessment  [facility]     Patient Name: Pro Mueller  MRN: [id number]  Today's Date: 9/23/2024    Admit Date: 9/20/2024    Plan: Home via family/Uber   Discharge Needs Assessment       Row Name 09/23/24 1342       Living Environment    People in Home alone    Current Living Arrangements home    Family Caregiver if Needed parent(s);sibling(s)    Quality of Family Relationships involved;helpful;supportive    Able to Return to Prior Arrangements yes       Transition Planning    Patient/Family Anticipates Transition to home;home with family    Patient/Family Anticipated Services at Transition     Transportation Anticipated family or friend will provide;car, drives self       Discharge Needs Assessment    Readmission Within the Last 30 Days no previous admission in last 30 days    Equipment Currently Used at Home walker, rolling    Concerns to be Addressed discharge planning    Equipment Needed After Discharge none                   Discharge Plan       Row Name 09/23/24 1342       Plan    Plan Home via family/Uber    Patient/Family in Agreement with Plan yes    Plan Comments CCP met with pt at bedside, introduced self and role of CCP. Face sheet information and pharmacy verified. Pt confirms he does not have a current PCP; PCP list and Provider directory number provided to pt. Pt lives in a 2-story home with 1STE. Pt still drives, IADL's and has a walker at home. Pt denies having a living will. Pt is enrolled in meds to beds and denies trouble affording or managing his medications. Pt has used HH in the past and has been to rehab but cannot remember which ones. DC plan is to return home, family/Uber to transport. Danica KIDD/CCP                  Continued Care and Services - Admitted Since 9/20/2024    No active coordination exists for this encounter.       Expected Discharge Date and Time       Expected Discharge Date Expected Discharge Time    Sep 25, 2024            Demographic  Summary    No documentation.                  Functional Status       Row Name 09/23/24 1342       Functional Status    Usual Activity Tolerance good    Current Activity Tolerance moderate       Assessment of Health Literacy    How often do you have someone help you read hospital materials? Never    How often do you have problems learning about your medical condition because of difficulty understanding written information? Never    How often do you have a problem understanding what is told to you about your medical condition? Never    How confident are you filling out medical forms by yourself? Extremely    Health Literacy Excellent       Functional Status, IADL    Medications independent    Meal Preparation independent    Housekeeping independent    Laundry independent    Shopping independent                               Solange Llanes, MARTI

## 2024-09-24 LAB
ALBUMIN SERPL-MCNC: 3.5 G/DL (ref 3.5–5.2)
ALBUMIN/GLOB SERPL: 1.3 G/DL
ALP SERPL-CCNC: 109 U/L (ref 39–117)
ALT SERPL W P-5'-P-CCNC: 19 U/L (ref 1–41)
ANION GAP SERPL CALCULATED.3IONS-SCNC: 10 MMOL/L (ref 5–15)
AST SERPL-CCNC: 77 U/L (ref 1–40)
BILIRUB SERPL-MCNC: 0.8 MG/DL (ref 0–1.2)
BUN SERPL-MCNC: 9 MG/DL (ref 8–23)
BUN/CREAT SERPL: 22 (ref 7–25)
CALCIUM SPEC-SCNC: 8.4 MG/DL (ref 8.6–10.5)
CHLORIDE SERPL-SCNC: 103 MMOL/L (ref 98–107)
CO2 SERPL-SCNC: 21 MMOL/L (ref 22–29)
CREAT SERPL-MCNC: 0.41 MG/DL (ref 0.76–1.27)
CYTO UR: NORMAL
DEPRECATED RDW RBC AUTO: 53.4 FL (ref 37–54)
EGFRCR SERPLBLD CKD-EPI 2021: 118.7 ML/MIN/1.73
ERYTHROCYTE [DISTWIDTH] IN BLOOD BY AUTOMATED COUNT: 14.2 % (ref 12.3–15.4)
GLOBULIN UR ELPH-MCNC: 2.8 GM/DL
GLUCOSE SERPL-MCNC: 131 MG/DL (ref 65–99)
HCT VFR BLD AUTO: 37.8 % (ref 37.5–51)
HGB BLD-MCNC: 12.9 G/DL (ref 13–17.7)
LAB AP CASE REPORT: NORMAL
MAGNESIUM SERPL-MCNC: 1.9 MG/DL (ref 1.6–2.4)
MCH RBC QN AUTO: 34.8 PG (ref 26.6–33)
MCHC RBC AUTO-ENTMCNC: 34.1 G/DL (ref 31.5–35.7)
MCV RBC AUTO: 101.9 FL (ref 79–97)
PATH REPORT.FINAL DX SPEC: NORMAL
PATH REPORT.GROSS SPEC: NORMAL
PLATELET # BLD AUTO: 235 10*3/MM3 (ref 140–450)
PMV BLD AUTO: 9.4 FL (ref 6–12)
POTASSIUM SERPL-SCNC: 4.3 MMOL/L (ref 3.5–5.2)
PROT SERPL-MCNC: 6.3 G/DL (ref 6–8.5)
RBC # BLD AUTO: 3.71 10*6/MM3 (ref 4.14–5.8)
SODIUM SERPL-SCNC: 134 MMOL/L (ref 136–145)
WBC NRBC COR # BLD AUTO: 7.96 10*3/MM3 (ref 3.4–10.8)

## 2024-09-24 PROCEDURE — 25010000002 THIAMINE HCL 200 MG/2ML SOLUTION: Performed by: SURGERY

## 2024-09-24 PROCEDURE — 83735 ASSAY OF MAGNESIUM: CPT | Performed by: HOSPITALIST

## 2024-09-24 PROCEDURE — 97530 THERAPEUTIC ACTIVITIES: CPT

## 2024-09-24 PROCEDURE — 85027 COMPLETE CBC AUTOMATED: CPT | Performed by: HOSPITALIST

## 2024-09-24 PROCEDURE — 25010000002 HYDROMORPHONE PER 4 MG: Performed by: HOSPITALIST

## 2024-09-24 PROCEDURE — 80053 COMPREHEN METABOLIC PANEL: CPT | Performed by: HOSPITALIST

## 2024-09-24 PROCEDURE — 99024 POSTOP FOLLOW-UP VISIT: CPT | Performed by: SURGERY

## 2024-09-24 RX ORDER — PANTOPRAZOLE SODIUM 40 MG/1
40 TABLET, DELAYED RELEASE ORAL
Status: DISCONTINUED | OUTPATIENT
Start: 2024-09-25 | End: 2024-09-25 | Stop reason: HOSPADM

## 2024-09-24 RX ADMIN — LISINOPRIL 20 MG: 20 TABLET ORAL at 08:28

## 2024-09-24 RX ADMIN — SUCRALFATE 1 G: 1 TABLET ORAL at 20:05

## 2024-09-24 RX ADMIN — THIAMINE HYDROCHLORIDE 200 MG: 100 INJECTION, SOLUTION INTRAMUSCULAR; INTRAVENOUS at 14:13

## 2024-09-24 RX ADMIN — HYDROCODONE BITARTRATE AND ACETAMINOPHEN 1 TABLET: 7.5; 325 TABLET ORAL at 23:58

## 2024-09-24 RX ADMIN — HYDROCODONE BITARTRATE AND ACETAMINOPHEN 1 TABLET: 7.5; 325 TABLET ORAL at 18:09

## 2024-09-24 RX ADMIN — HYDROMORPHONE HYDROCHLORIDE 0.5 MG: 1 INJECTION, SOLUTION INTRAMUSCULAR; INTRAVENOUS; SUBCUTANEOUS at 04:53

## 2024-09-24 RX ADMIN — SUCRALFATE 1 G: 1 TABLET ORAL at 17:38

## 2024-09-24 RX ADMIN — SENNOSIDES AND DOCUSATE SODIUM 2 TABLET: 50; 8.6 TABLET ORAL at 20:05

## 2024-09-24 RX ADMIN — Medication 10 ML: at 20:05

## 2024-09-24 RX ADMIN — FOLIC ACID 1 MG: 1 TABLET ORAL at 08:28

## 2024-09-24 RX ADMIN — THIAMINE HYDROCHLORIDE 200 MG: 100 INJECTION, SOLUTION INTRAMUSCULAR; INTRAVENOUS at 04:53

## 2024-09-24 RX ADMIN — SUCRALFATE 1 G: 1 TABLET ORAL at 11:37

## 2024-09-24 RX ADMIN — SUCRALFATE 1 G: 1 TABLET ORAL at 08:28

## 2024-09-24 RX ADMIN — HYDROCODONE BITARTRATE AND ACETAMINOPHEN 1 TABLET: 7.5; 325 TABLET ORAL at 08:28

## 2024-09-24 RX ADMIN — AMLODIPINE BESYLATE 5 MG: 5 TABLET ORAL at 08:28

## 2024-09-24 RX ADMIN — HYDROCODONE BITARTRATE AND ACETAMINOPHEN 1 TABLET: 7.5; 325 TABLET ORAL at 14:13

## 2024-09-24 RX ADMIN — PANTOPRAZOLE SODIUM 40 MG: 40 INJECTION, POWDER, FOR SOLUTION INTRAVENOUS at 04:53

## 2024-09-24 RX ADMIN — LEVOTHYROXINE SODIUM 100 MCG: 100 TABLET ORAL at 08:28

## 2024-09-24 RX ADMIN — Medication 10 ML: at 08:28

## 2024-09-24 RX ADMIN — FLUTICASONE PROPIONATE 2 SPRAY: 50 SPRAY, METERED NASAL at 08:28

## 2024-09-24 NOTE — PLAN OF CARE
Goal Outcome Evaluation:  Plan of Care Reviewed With: patient        Progress: improving           Outcome Evaluation: VSS. A&Ox4. Anxious. Incisions c/d/i. NORY drain remains with minimal output. PRN given for pain. Patient encouraged to move but patient becomes anxious about the pain and refuses to ambulate, Patient educated about taking prn  when available to help alleviate pain during activity. Patient voiced understanding. Tolerated clears. Advanced diet for breakfast. All questions answered with verbalized understanding. Bed alarm in place. Personal belongings and call light within reach.

## 2024-09-24 NOTE — THERAPY TREATMENT NOTE
Patient Name: Pro Mueller  : 1955    MRN: [id number]                              Today's Date: 2024       Admit Date: 2024    Visit Dx:     ICD-10-CM ICD-9-CM   1. Alcoholic intoxication without complication  F10.920 305.00   2. Lactic acidosis  E87.20 276.2   3. Elevated lipase  R74.8 790.5   4. Calculus of gallbladder with cholecystitis without biliary obstruction, unspecified cholecystitis acuity  K80.10 574.10   5. Elevated LFTs  R79.89 790.6     Patient Active Problem List   Diagnosis    Fall    Alcoholism in recovery    Atopic rhinitis    Mixed anxiety depressive disorder    Genital herpes simplex    Hyperlipidemia    Hypertension    Hypothyroidism    Insomnia    Panic disorder without agoraphobia    Persistent insomnia    Vitamin D deficiency    Chronic low back pain    Neuropathy involving both lower extremities    QT prolongation    Left shoulder pain    Nausea and vomiting    Pancytopenia    Left ventricular diastolic dysfunction    Tremor    C5 cervical fracture    Syncope and collapse    Neck pain    Alcoholic intoxication with complication    Withdrawal symptoms, alcohol    Transaminitis    Lumbar facet arthropathy    Alcohol dependence with withdrawal    Midline low back pain with right-sided sciatica    Spondylolisthesis at L4-L5 level    Lumbar canal stenosis    Alcohol cessation counseling    Need for assistance due to reduced mobility    Impaired mobility and ADLs    Alcohol withdrawal syndrome without complication    Facial laceration    Orthostatic hypotension    Acute bacterial conjunctivitis of right eye    Visit for suture removal    Renal calculi    Hepatic steatosis    Alcohol withdrawal syndrome with complication    Macrocytosis without anemia    Lumbosacral spondylosis without myelopathy    Elevated LFTs    Alcohol-induced acute pancreatitis, unspecified complication status    Pancreatitis    Generalized abdominal pain    Alcohol withdrawal    Lactic acidosis    Acute  alcohol intoxication in patient with alcoholism with blood alcohol level 0.08 to 0.29, with unspecified complication    Calculus of gallbladder with cholecystitis without biliary obstruction     Past Medical History:   Diagnosis Date    Alcohol abuse     Alcohol withdrawal 11/04/2016    Alcoholic ketoacidosis 01/12/2020    Allergic 1970    Anxiety     Arthritis     Atopic rhinitis 08/08/2016    Depression     Disease of thyroid gland     Elevated cholesterol     Encounter for removal of sutures     Genital herpes simplex 08/08/2016    GERD (gastroesophageal reflux disease)     Headache, tension-type     Hyperlipidemia     Hypertension     Hypothyroidism     Kidney stone     Low back pain 2019    Migraine     Motion sickness     Nephrolithiasis 02/12/2020    Olecranon bursitis, right elbow     Panic disorder without agoraphobia 08/08/2016    Peripheral neuropathy     Sleep apnea     Syncope and collapse 01/02/2019    Vitamin D deficiency 08/08/2016    Withdrawal symptoms, alcohol      Past Surgical History:   Procedure Laterality Date    CHOLECYSTECTOMY N/A 9/22/2024    Procedure: CHOLECYSTECTOMY LAPAROSCOPIC WITH DAVINCI ROBOT with cholangiogram, possible open;  Surgeon: Toro Noguera MD;  Location: [facility] MAIN OR;  Service: General;  Laterality: N/A;    COLONOSCOPY      CYST REMOVAL      CYSTOSCOPY BLADDER STONE LITHOTRIPSY  02/2022    ERCP N/A 9/23/2024    Procedure: ENDOSCOPIC RETROGRADE CHOLANGIOPANCREATOGRAPHY sphincterotomy, balloon sweep (9-12);  Surgeon: Fara Madrigal MD;  Location: [facility] ENDOSCOPY;  Service: Gastroenterology;  Laterality: N/A;  sphincterotomy hiatial hernia, gallstone removal    EXTRACORPOREAL SHOCK WAVE LITHOTRIPSY (ESWL) Right 2002    SHOULDER ARTHROSCOPY Right 12/17/2019    Procedure: SHOULDER ARTHROSCOPY, decompression, distal clavicle excision;  Surgeon: Aj Mancilla MD;  Location: [facility] OR [facility]SC;  Service: Orthopedics    TONSILLECTOMY        General Information       Row  Name 09/24/24 1425          Physical Therapy Time and Intention    Document Type therapy note (daily note)  -PH     Mode of Treatment physical therapy  -PH       Row Name 09/24/24 1425          General Information    Existing Precautions/Restrictions fall  -PH       Row Name 09/24/24 1425          Cognition    Orientation Status (Cognition) oriented x 3  -PH       Row Name 09/24/24 1425          Safety Issues, Functional Mobility    Impairments Affecting Function (Mobility) balance;endurance/activity tolerance;strength;pain  -PH     Comment, Safety Issues/Impairments (Mobility) gt belt and non skid socks donned  -PH               User Key  (r) = Recorded By, (t) = Taken By, (c) = Cosigned By      Initials Name Provider Type    PH Rupinder Graff PTA Physical Therapist Assistant                   Mobility       Row Name 09/24/24 1428          Bed Mobility    Bed Mobility supine-sit;sit-supine  -PH     Supine-Sit [place] (Bed Mobility) standby assist  -PH       Row Name 09/24/24 1428          Sit-Stand Transfer    Sit-Stand [place] (Transfers) standby assist  -PH     Assistive Device (Sit-Stand Transfers) other (see comments)  no AD  -PH       Row Name 09/24/24 1428          Gait/Stairs (Locomotion)    [place] Level (Gait) contact guard;supervision  -PH     Assistive Device (Gait) other (see comments)  no AD  -PH     Distance in Feet (Gait) --  amb in room approx 3 min  -PH     Deviations/Abnormal Patterns (Gait) gait speed decreased;stride length decreased  -PH     Bilateral Gait Deviations forward flexed posture  -PH     [place] Level (Stairs) not tested  -PH     Comment, (Gait/Stairs) no overt LOB; limited by pain and fatigue  -PH               User Key  (r) = Recorded By, (t) = Taken By, (c) = Cosigned By      Initials Name Provider Type    PH Rupinder Graff PTA Physical Therapist Assistant                   Obj/Interventions       Row Name 09/24/24 2547          Balance     "Balance Assessment sitting static balance;standing static balance  -PH     Static Sitting Balance standby assist  -PH     Static Standing Balance standby assist  -PH     Dynamic Standing Balance other (see comments)  no AD  -PH     Comment, Balance inital mild unsteadiness w/ amb although improved w/ distance  -PH               User Key  (r) = Recorded By, (t) = Taken By, (c) = Cosigned By      Initials Name Provider Type     Rupinder Graff PTA Physical Therapist Assistant                   Goals/Plan    No documentation.                  Clinical Impression       Row Name 09/24/24 1458          Pain    Pretreatment Pain Rating 10/10  pt stated pain was \"12/10\" when asked  -PH     Posttreatment Pain Rating 7/10  -PH     Pain Location - abdomen  -PH     Pain Intervention(s) Repositioned;Ambulation/increased activity  -PH     Additional Documentation Pain Scale: Numbers Pre/Post-Treatment (Group)  -PH       Row Name 09/24/24 1458          Plan of Care Review    Plan of Care Reviewed With patient  -PH     Progress improving  -PH     Outcome Evaluation Pt was seen for PT tx this PM. Pt performed supine to sit from R side of bed w/ SBA. Pt stood w/ SBA. Pt amb approx 3 min in room w/ CGA/SV and no AD. Pt was mildly unsteady although improved w/ distance. Pain and fatigue limiting. Pt returned to bed after sitting w/ SBA. PT will prog as pt ward.  -PH       Row Name 09/24/24 1458          Vital Signs    O2 Delivery Pre Treatment room air  -PH     O2 Delivery Intra Treatment room air  -PH     O2 Delivery Post Treatment room air  -PH       Row Name 09/24/24 1458          Positioning and Restraints    Pre-Treatment Position in bed  -PH     Post Treatment Position bed  -PH     In Bed fowlers;call light within reach;encouraged to call for assist;notified nsg  -PH               User Key  (r) = Recorded By, (t) = Taken By, (c) = Cosigned By      Initials Name Provider Type     Rupinder Graff PTA Physical " Therapist Assistant                   Outcome Measures       Row Name 09/24/24 1500 09/24/24 0824       How much help from another person do you currently need...    Turning from your back to your side while in flat bed without using bedrails? 3  -PH 3  -JL    Moving from lying on back to sitting on the side of a flat bed without bedrails? 3  -PH 3  -JL    Moving to and from a bed to a chair (including a wheelchair)? 3  -PH 3  -JL    Standing up from a chair using your arms (e.g., wheelchair, bedside chair)? 3  -PH 3  -JL    Climbing 3-5 steps with a railing? 2  -PH 2  -JL    To walk in hospital room? 3  -PH 3  -JL    AM-PAC 6 Clicks Score (PT) 17  -PH 17  -JL    Highest Level of Mobility Goal 5 --> Static standing  -PH 5 --> Static standing  -JL      Row Name 09/24/24 1500          Functional Assessment    Outcome Measure Options AM-PAC 6 Clicks Basic Mobility (PT)  -PH               User Key  (r) = Recorded By, (t) = Taken By, (c) = Cosigned By      Initials Name Provider Type    Ras Jean, RN Registered Nurse    PH Rupinder Graff PTA Physical Therapist Assistant                                 Physical Therapy Education       Title: PT OT SLP Therapies (In Progress)       Topic: Physical Therapy (In Progress)       Point: Mobility training (Done)       Learning Progress Summary             Patient Acceptance, E,TB, VU by  at 9/24/2024 1501    Acceptance, E,TB, VU,NR by CB at 9/23/2024 1047                         Point: Home exercise program (Not Started)       Learner Progress:  Not documented in this visit.              Point: Body mechanics (Done)       Learning Progress Summary             Patient Acceptance, E,TB, VU by  at 9/24/2024 1501    Acceptance, E,TB, VU,NR by CB at 9/23/2024 1047                         Point: Precautions (Done)       Learning Progress Summary             Patient Acceptance, E,TB, VU by  at 9/24/2024 1501    Acceptance, E,TB, VU,NR by CB at 9/23/2024 1047                                          User Key       Initials Effective Dates Name Provider Type Discipline    PH 06/16/21 -  Rupinder Graff PTA Physical Therapist Assistant PT    CB 10/22/21 -  Nancy Deleon PT Physical Therapist PT                  PT Recommendation and Plan     Plan of Care Reviewed With: patient  Progress: improving  Outcome Evaluation: Pt was seen for PT tx this PM. Pt performed supine to sit from R side of bed w/ SBA. Pt stood w/ SBA. Pt amb approx 3 min in room w/ CGA/SV and no AD. Pt was mildly unsteady although improved w/ distance. Pain and fatigue limiting. Pt returned to bed after sitting w/ SBA. PT will prog as pt ward.     Time Calculation:         PT Charges       Row Name 09/24/24 1501             Time Calculation    Start Time 1408  -PH      Stop Time 1419  -PH      Time Calculation (min) 11 min  -PH      PT Received On 09/24/24  -PH      PT - Next Appointment 09/25/24  -PH         Timed Charges    85784 - PT Therapeutic Activity Minutes 11  -PH         Total Minutes    Timed Charges Total Minutes 11  -PH       Total Minutes 11  -PH                User Key  (r) = Recorded By, (t) = Taken By, (c) = Cosigned By      Initials Name Provider Type    PH Rupinder Graff PTA Physical Therapist Assistant                  Therapy Charges for Today       Code Description Service Date Service Provider Modifiers Qty    48906661897 HC PT THERAPEUTIC ACT EA 15 MIN 9/24/2024 Rupinder Graff PTA GP 1            PT G-Codes  Outcome Measure Options: AM-PAC 6 Clicks Basic Mobility (PT)  AM-PAC 6 Clicks Score (PT): 17  PT Discharge Summary  Anticipated Discharge Disposition (PT): home with home health, home with assist    Rupinder Graff PTA  9/24/2024

## 2024-09-24 NOTE — PROGRESS NOTES
" LOS: 4 days     Name: Pro Mueller  Age: 69 y.o.  Sex: male  :  1955  MRN: [id number]         Primary Care Physician: Provider, No Known    Subjective   Subjective  Tolerated diet.  Still with some incisional abdominal pain.  No nausea or vomiting.    Objective   Vital Signs  Temp:  [97.7 °F (36.5 °C)-98.8 °F (37.1 °C)] 98.4 °F (36.9 °C)  Heart Rate:  [57-70] 64  Resp:  [16-18] 18  BP: (122-157)/(75-95) 136/80  Body mass index is 25.09 kg/m².    Objective:  General Appearance:  Comfortable and in no acute distress.    Vital signs: (most recent): Blood pressure 136/80, pulse 64, temperature 98.4 °F (36.9 °C), temperature source Oral, resp. rate 18, height 182.9 cm (72\"), weight 83.9 kg (185 lb), SpO2 100%.    Lungs:  Normal effort and normal respiratory rate.  He is not in respiratory distress.  There are decreased breath sounds.    Heart: Normal rate.  Regular rhythm.    Abdomen: Abdomen is soft.  Bowel sounds are normal.   There is no abdominal tenderness.     Extremities: There is no dependent edema or local swelling.    Neurological: Patient is alert and oriented to person, place and time.    Skin:  Warm and dry.                Results Review:       I reviewed the patient's new clinical results.    Results from last 7 days   Lab Units 24  02224  0336 24  0121   WBC 10*3/mm3 7.96 8.97 4.30   HEMOGLOBIN g/dL 12.9* 12.2* 13.8   PLATELETS 10*3/mm3 235 179 156     Results from last 7 days   Lab Units 24  0229 24  0336 24  0848 24  0231 24  0121   SODIUM mmol/L 134* 136 133* 134* 142   POTASSIUM mmol/L 4.3 4.5 3.7 4.4 3.5   CHLORIDE mmol/L 103 107 102 102 103   CO2 mmol/L 21.0* 19.3* 20.0* 23.0 24.5   BUN mg/dL 9 9 6* 6* 6*   CREATININE mg/dL 0.41* 0.56* 0.56* 0.47* 0.65*   CALCIUM mg/dL 8.4* 8.1* 8.2* 7.9* 8.6   GLUCOSE mg/dL 131* 131* 96 110* 100*                 Scheduled Meds:   amLODIPine, 5 mg, Oral, QAM  fluticasone, 2 spray, Nasal, Daily  folic acid, 1 " mg, Oral, Daily  levothyroxine, 100 mcg, Oral, Daily  lisinopril, 20 mg, Oral, Daily  pantoprazole, 40 mg, Intravenous, Q AM  sincalide (KINEVAC) 1.7 mcg in sodium chloride 0.9 % 101.7 mL IVPB, 0.02 mcg/kg, Intravenous, Once  sodium chloride, 10 mL, Intravenous, Q12H  sucralfate, 1 g, Oral, 4x Daily AC & at Bedtime  thiamine (B-1) IV, 200 mg, Intravenous, Q8H   Followed by  [START ON 9/25/2024] thiamine, 100 mg, Oral, Daily      PRN Meds:     acetaminophen    senna-docusate sodium **AND** polyethylene glycol **AND** bisacodyl **AND** bisacodyl    HYDROcodone-acetaminophen    HYDROcodone-acetaminophen    HYDROmorphone    melatonin    ondansetron ODT **OR** ondansetron    sodium chloride    sodium chloride    sodium chloride  Continuous Infusions:  sodium chloride, 1,000 mL, Last Rate: 25 mL/hr at 09/23/24 1723        Assessment & Plan   Active Hospital Problems    Diagnosis  POA    **Alcohol-induced acute pancreatitis, unspecified complication status [K85.20]  Yes    Lactic acidosis [E87.20]  Yes    Acute alcohol intoxication in patient with alcoholism with blood alcohol level 0.08 to 0.29, with unspecified complication [F10.229]  Unknown    Calculus of gallbladder with cholecystitis without biliary obstruction [K80.10]  Unknown    Elevated LFTs [R79.89]  Yes    Hypertension [I10]  Yes    Hyperlipidemia [E78.5]  Yes    Hypothyroidism [E03.9]  Yes      Resolved Hospital Problems   No resolved problems to display.       Assessment & Plan    -POD 2 from laparoscopic cholecystectomy  -POD 1 from ERCP with sphincterotomy and balloon sweep  -Stop IV pain medicine.  Norco 7.5 as needed  -Diet has been advanced.  Stop IV fluids  -Blood pressure reasonably controlled on present regimen  -LFTs downtrending    Dispo  Anticipate discharge home soon    Expected Discharge Date: 9/25/2024; Expected Discharge Time:      Justino Charlton MD  [facility]  09/24/24  10:49 EDT

## 2024-09-24 NOTE — PLAN OF CARE
Goal Outcome Evaluation:  Plan of Care Reviewed With: patient        Progress: improving  Outcome Evaluation: Pt was seen for PT tx this PM. Pt performed supine to sit from R side of bed w/ SBA. Pt stood w/ SBA. Pt amb approx 3 min in room w/ CGA/SV and no AD. Pt was mildly unsteady although improved w/ distance. Pain and fatigue limiting. Pt returned to bed after sitting w/ SBA. PT will prog as pt ward.      Anticipated Discharge Disposition (PT): home with home health, home with assist

## 2024-09-24 NOTE — PROGRESS NOTES
Postop day 2 after robotic cholecystectomy for severe cholecystitis  Postop day 1 ERCP/stone retrieval    Subjective:  Feels well, tolerating a diet.    Objective:  Afebrile, vital stable  General: Awake and alert without distress  Eyes: No icterus  Abdomen: Soft and benign, trocar sites are clean    Drain with serosanguineous character    Labs improving, bilirubin normal    Assessment and plan:  -Severe cholecystitis and choledocholithiasis, status post cholecystectomy and ERCP  -Doing well, remove drain today  -Anticipate he would be safe for discharge tomorrow from surgical standpoint    Toro Noguera MD  General and Endoscopic Surgery  [facility]    [address], [address]  [place], [place], [address]  P: [phone]  F: [phone]

## 2024-09-25 ENCOUNTER — READMISSION MANAGEMENT (OUTPATIENT)
Dept: CALL CENTER | Facility: HOSPITAL | Age: 69
End: 2024-09-25
Payer: COMMERCIAL

## 2024-09-25 ENCOUNTER — HOME HEALTH ADMISSION (OUTPATIENT)
Dept: HOME HEALTH SERVICES | Facility: HOME HEALTHCARE | Age: 69
End: 2024-09-25
Payer: MEDICARE

## 2024-09-25 VITALS
SYSTOLIC BLOOD PRESSURE: 165 MMHG | HEART RATE: 70 BPM | WEIGHT: 185 LBS | TEMPERATURE: 98 F | DIASTOLIC BLOOD PRESSURE: 92 MMHG | RESPIRATION RATE: 18 BRPM | BODY MASS INDEX: 25.06 KG/M2 | HEIGHT: 72 IN | OXYGEN SATURATION: 99 %

## 2024-09-25 LAB
ALBUMIN SERPL-MCNC: 3.3 G/DL (ref 3.5–5.2)
ALBUMIN/GLOB SERPL: 1.2 G/DL
ALP SERPL-CCNC: 99 U/L (ref 39–117)
ALT SERPL W P-5'-P-CCNC: 21 U/L (ref 1–41)
ANION GAP SERPL CALCULATED.3IONS-SCNC: 7.8 MMOL/L (ref 5–15)
AST SERPL-CCNC: 72 U/L (ref 1–40)
BILIRUB SERPL-MCNC: 0.6 MG/DL (ref 0–1.2)
BUN SERPL-MCNC: 13 MG/DL (ref 8–23)
BUN/CREAT SERPL: 26 (ref 7–25)
CALCIUM SPEC-SCNC: 8.5 MG/DL (ref 8.6–10.5)
CHLORIDE SERPL-SCNC: 102 MMOL/L (ref 98–107)
CO2 SERPL-SCNC: 23.2 MMOL/L (ref 22–29)
CREAT SERPL-MCNC: 0.5 MG/DL (ref 0.76–1.27)
DEPRECATED RDW RBC AUTO: 54.4 FL (ref 37–54)
EGFRCR SERPLBLD CKD-EPI 2021: 111.8 ML/MIN/1.73
ERYTHROCYTE [DISTWIDTH] IN BLOOD BY AUTOMATED COUNT: 14.4 % (ref 12.3–15.4)
GLOBULIN UR ELPH-MCNC: 2.8 GM/DL
GLUCOSE SERPL-MCNC: 96 MG/DL (ref 65–99)
HCT VFR BLD AUTO: 36.4 % (ref 37.5–51)
HGB BLD-MCNC: 12.2 G/DL (ref 13–17.7)
MCH RBC QN AUTO: 34.4 PG (ref 26.6–33)
MCHC RBC AUTO-ENTMCNC: 33.5 G/DL (ref 31.5–35.7)
MCV RBC AUTO: 102.5 FL (ref 79–97)
PLATELET # BLD AUTO: 248 10*3/MM3 (ref 140–450)
PMV BLD AUTO: 9.7 FL (ref 6–12)
POTASSIUM SERPL-SCNC: 3.9 MMOL/L (ref 3.5–5.2)
PROT SERPL-MCNC: 6.1 G/DL (ref 6–8.5)
RBC # BLD AUTO: 3.55 10*6/MM3 (ref 4.14–5.8)
SODIUM SERPL-SCNC: 133 MMOL/L (ref 136–145)
WBC NRBC COR # BLD AUTO: 7.16 10*3/MM3 (ref 3.4–10.8)

## 2024-09-25 PROCEDURE — 80053 COMPREHEN METABOLIC PANEL: CPT | Performed by: INTERNAL MEDICINE

## 2024-09-25 PROCEDURE — 97110 THERAPEUTIC EXERCISES: CPT

## 2024-09-25 PROCEDURE — 25010000002 THIAMINE PER 100 MG: Performed by: SURGERY

## 2024-09-25 PROCEDURE — 85027 COMPLETE CBC AUTOMATED: CPT | Performed by: INTERNAL MEDICINE

## 2024-09-25 RX ORDER — LANOLIN ALCOHOL/MO/W.PET/CERES
100 CREAM (GRAM) TOPICAL DAILY
Qty: 30 TABLET | Refills: 0 | Status: SHIPPED | OUTPATIENT
Start: 2024-09-25 | End: 2024-10-25

## 2024-09-25 RX ORDER — HYDROCODONE BITARTRATE AND ACETAMINOPHEN 7.5; 325 MG/1; MG/1
1 TABLET ORAL EVERY 4 HOURS PRN
Qty: 20 TABLET | Refills: 0 | Status: SHIPPED | OUTPATIENT
Start: 2024-09-25

## 2024-09-25 RX ORDER — FOLIC ACID 1 MG/1
1 TABLET ORAL DAILY
Qty: 30 TABLET | Refills: 0 | Status: SHIPPED | OUTPATIENT
Start: 2024-09-26 | End: 2024-10-26

## 2024-09-25 RX ORDER — SUCRALFATE 1 G/1
1 TABLET ORAL
Qty: 120 TABLET | Refills: 0 | Status: SHIPPED | OUTPATIENT
Start: 2024-09-25 | End: 2024-10-25

## 2024-09-25 RX ORDER — PANTOPRAZOLE SODIUM 40 MG/1
40 TABLET, DELAYED RELEASE ORAL
Qty: 30 TABLET | Refills: 0 | Status: SHIPPED | OUTPATIENT
Start: 2024-09-26 | End: 2024-10-26

## 2024-09-25 RX ADMIN — THIAMINE HYDROCHLORIDE 200 MG: 100 INJECTION, SOLUTION INTRAMUSCULAR; INTRAVENOUS at 05:18

## 2024-09-25 RX ADMIN — LEVOTHYROXINE SODIUM 100 MCG: 100 TABLET ORAL at 08:08

## 2024-09-25 RX ADMIN — PANTOPRAZOLE SODIUM 40 MG: 40 TABLET, DELAYED RELEASE ORAL at 05:17

## 2024-09-25 RX ADMIN — LISINOPRIL 20 MG: 20 TABLET ORAL at 08:08

## 2024-09-25 RX ADMIN — SUCRALFATE 1 G: 1 TABLET ORAL at 08:08

## 2024-09-25 RX ADMIN — SUCRALFATE 1 G: 1 TABLET ORAL at 11:46

## 2024-09-25 RX ADMIN — HYDROCODONE BITARTRATE AND ACETAMINOPHEN 1 TABLET: 7.5; 325 TABLET ORAL at 09:45

## 2024-09-25 RX ADMIN — AMLODIPINE BESYLATE 5 MG: 5 TABLET ORAL at 05:17

## 2024-09-25 RX ADMIN — HYDROCODONE BITARTRATE AND ACETAMINOPHEN 1 TABLET: 7.5; 325 TABLET ORAL at 05:17

## 2024-09-25 RX ADMIN — FLUTICASONE PROPIONATE 2 SPRAY: 50 SPRAY, METERED NASAL at 08:08

## 2024-09-25 RX ADMIN — Medication 10 ML: at 08:08

## 2024-09-25 RX ADMIN — FOLIC ACID 1 MG: 1 TABLET ORAL at 08:08

## 2024-09-25 NOTE — PLAN OF CARE
Goal Outcome Evaluation:  Plan of Care Reviewed With: patient           Outcome Evaluation: Patient resting in bed and agreeable to PT this morning. SV for bed mobility, stood SV-SBA to a RW and ambulated 120' SV-SBA with a RW. Lower abdominal pain reported. Reviewed DC plan and patient plans to return home with  PT upon DC and confirmed he has access to a RW. PT will begin following peripherally. Encourage [facility] for all meals and ambulation 3x/day to promote functional mobility during hospitalization.        Anticipated Discharge Disposition (PT): home with home health

## 2024-09-25 NOTE — CASE MANAGEMENT/SOCIAL WORK
Case Management Discharge Note      Final Note: Home with [facility]. No additional CCP needs.         Selected Continued Care - Admitted Since 9/20/2024       Destination    No services have been selected for the patient.                Durable Medical Equipment    No services have been selected for the patient.                Dialysis/Infusion    No services have been selected for the patient.                Home Medical Care       Service Provider Selected Services Address Phone Fax Patient Preferred    [facility] - [facility] ,  Home Medical  ,  Home Nursing ,  Home Rehabilitation ,  [facility] Living [facility] [address] [facility] [address] [place] [address] [phone] --              Therapy    No services have been selected for the patient.                Community Resources    No services have been selected for the patient.                Community & DME    No services have been selected for the patient.                         Final Discharge Disposition Code: 06 - home with home health care

## 2024-09-25 NOTE — NURSING NOTE
Spoke with Dr. Noguera per Dr Charlton request.  Per Dr. Noguera it is Ok to discharge home and follow up in 2 weeks outpatient

## 2024-09-25 NOTE — THERAPY TREATMENT NOTE
Patient Name: Pro Mueller  : 1955    MRN: [id number]                              Today's Date: 2024       Admit Date: 2024    Visit Dx:     ICD-10-CM ICD-9-CM   1. Alcoholic intoxication without complication  F10.920 305.00   2. Lactic acidosis  E87.20 276.2   3. Elevated lipase  R74.8 790.5   4. Calculus of gallbladder with cholecystitis without biliary obstruction, unspecified cholecystitis acuity  K80.10 574.10   5. Elevated LFTs  R79.89 790.6     Patient Active Problem List   Diagnosis    Fall    Alcoholism in recovery    Atopic rhinitis    Mixed anxiety depressive disorder    Genital herpes simplex    Hyperlipidemia    Hypertension    Hypothyroidism    Insomnia    Panic disorder without agoraphobia    Persistent insomnia    Vitamin D deficiency    Chronic low back pain    Neuropathy involving both lower extremities    QT prolongation    Left shoulder pain    Nausea and vomiting    Pancytopenia    Left ventricular diastolic dysfunction    Tremor    C5 cervical fracture    Syncope and collapse    Neck pain    Alcoholic intoxication with complication    Withdrawal symptoms, alcohol    Transaminitis    Lumbar facet arthropathy    Alcohol dependence with withdrawal    Midline low back pain with right-sided sciatica    Spondylolisthesis at L4-L5 level    Lumbar canal stenosis    Alcohol cessation counseling    Need for assistance due to reduced mobility    Impaired mobility and ADLs    Alcohol withdrawal syndrome without complication    Facial laceration    Orthostatic hypotension    Acute bacterial conjunctivitis of right eye    Visit for suture removal    Renal calculi    Hepatic steatosis    Alcohol withdrawal syndrome with complication    Macrocytosis without anemia    Lumbosacral spondylosis without myelopathy    Elevated LFTs    Alcohol-induced acute pancreatitis, unspecified complication status    Pancreatitis    Generalized abdominal pain    Alcohol withdrawal    Lactic acidosis    Acute  alcohol intoxication in patient with alcoholism with blood alcohol level 0.08 to 0.29, with unspecified complication    Calculus of gallbladder with cholecystitis without biliary obstruction     Past Medical History:   Diagnosis Date    Alcohol abuse     Alcohol withdrawal 11/04/2016    Alcoholic ketoacidosis 01/12/2020    Allergic 1970    Anxiety     Arthritis     Atopic rhinitis 08/08/2016    Depression     Disease of thyroid gland     Elevated cholesterol     Encounter for removal of sutures     Genital herpes simplex 08/08/2016    GERD (gastroesophageal reflux disease)     Headache, tension-type     Hyperlipidemia     Hypertension     Hypothyroidism     Kidney stone     Low back pain 2019    Migraine     Motion sickness     Nephrolithiasis 02/12/2020    Olecranon bursitis, right elbow     Panic disorder without agoraphobia 08/08/2016    Peripheral neuropathy     Sleep apnea     Syncope and collapse 01/02/2019    Vitamin D deficiency 08/08/2016    Withdrawal symptoms, alcohol      Past Surgical History:   Procedure Laterality Date    CHOLECYSTECTOMY N/A 9/22/2024    Procedure: CHOLECYSTECTOMY LAPAROSCOPIC WITH DAVINCI ROBOT with cholangiogram, possible open;  Surgeon: Toro Noguera MD;  Location: [facility] MAIN OR;  Service: General;  Laterality: N/A;    COLONOSCOPY      CYST REMOVAL      CYSTOSCOPY BLADDER STONE LITHOTRIPSY  02/2022    ERCP N/A 9/23/2024    Procedure: ENDOSCOPIC RETROGRADE CHOLANGIOPANCREATOGRAPHY sphincterotomy, balloon sweep (9-12);  Surgeon: Fara Madrigal MD;  Location: [facility] ENDOSCOPY;  Service: Gastroenterology;  Laterality: N/A;  sphincterotomy hiatial hernia, gallstone removal    EXTRACORPOREAL SHOCK WAVE LITHOTRIPSY (ESWL) Right 2002    SHOULDER ARTHROSCOPY Right 12/17/2019    Procedure: SHOULDER ARTHROSCOPY, decompression, distal clavicle excision;  Surgeon: Aj Mancilla MD;  Location: [facility] OR OSC;  Service: Orthopedics    TONSILLECTOMY        General Information       Row  Name 09/25/24 1053          Physical Therapy Time and Intention    Document Type therapy note (daily note)  -MS     Mode of Treatment physical therapy  -MS       Row Name 09/25/24 1053          General Information    Existing Precautions/Restrictions fall  -MS       Row Name 09/25/24 1053          Cognition    Orientation Status (Cognition) oriented x 3  -MS       Row Name 09/25/24 1053          Safety Issues, Functional Mobility    Impairments Affecting Function (Mobility) balance;endurance/activity tolerance;strength;pain  -MS     Comment, Safety Issues/Impairments (Mobility) Gait belt and non skid socks donned.  -MS               User Key  (r) = Recorded By, (t) = Taken By, (c) = Cosigned By      Initials Name Provider Type    MS Salud Chester PT Physical Therapist                   Mobility       Row Name 09/25/24 1053          Bed Mobility    Bed Mobility supine-sit;sit-supine  -MS     Supine-Sit [place] (Bed Mobility) supervision;verbal cues  -MS     Sit-Supine [place] (Bed Mobility) supervision;verbal cues  -MS     Assistive Device (Bed Mobility) bed rails;head of bed elevated  -MS     Comment, (Bed Mobility) SBA for sitting balance.  -MS       Row Name 09/25/24 1053          Transfers    Comment, (Transfers) Sequencing and hand placement cues.  -MS       Row Name 09/25/24 1053          Sit-Stand Transfer    Sit-Stand [place] (Transfers) supervision;standby assist;verbal cues  -MS     Assistive Device (Sit-Stand Transfers) walker, front-wheeled  -MS       Row Name 09/25/24 1053          Gait/Stairs (Locomotion)    [place] Level (Gait) supervision;standby assist;verbal cues  -MS     Assistive Device (Gait) walker, front-wheeled  -MS     Patient was able to Ambulate yes  -MS     Distance in Feet (Gait) 120  -MS     Deviations/Abnormal Patterns (Gait) godfrey decreased;gait speed decreased  -MS     Bilateral Gait Deviations forward flexed posture  -MS     Comment, (Gait/Stairs)  Minimally unsteady, no overt LOB, pain limiting.  -MS               User Key  (r) = Recorded By, (t) = Taken By, (c) = Cosigned By      Initials Name Provider Type    Salud Farfan PT Physical Therapist                   Obj/Interventions       Row Name 09/25/24 1055          Balance    Static Sitting Balance supervision  -MS     Dynamic Sitting Balance supervision  -MS     Position, Sitting Balance sitting edge of bed  -MS     Static Standing Balance standby assist  -MS     Dynamic Standing Balance standby assist  -MS     Position/Device Used, Standing Balance supported;walker, front-wheeled  -MS               User Key  (r) = Recorded By, (t) = Taken By, (c) = Cosigned By      Initials Name Provider Type    Salud Farfan, PT Physical Therapist                   Goals/Plan    No documentation.                  Clinical Impression       Row Name 09/25/24 1055          Pain    Pretreatment Pain Rating 7/10  -MS     Posttreatment Pain Rating 7/10  -MS     Pain Location lower  -MS     Pain Location - abdomen  -MS     Pre/Posttreatment Pain Comment Medicated prior to tx per RN.  -MS     Pain Intervention(s) Repositioned;Ambulation/increased activity;Rest  -MS       Row Name 09/25/24 1055          Plan of Care Review    Plan of Care Reviewed With patient  -MS     Outcome Evaluation Patient resting in bed and agreeable to PT this morning. SV for bed mobility, stood SV-SBA to a RW and ambulated 120' SV-SBA with a RW. Lower abdominal pain reported. Reviewed DC plan and patient plans to return home with  PT upon DC and confirmed he has access to a RW. PT will begin following peripherally.  -MS       Row Name 09/25/24 1055          Vital Signs    O2 Delivery Pre Treatment room air  -MS       Row Name 09/25/24 1055          Positioning and Restraints    Pre-Treatment Position in bed  -MS     Post Treatment Position bed  -MS     In Bed fowlers;call light within reach;encouraged to call for assist;exit alarm on   -MS               User Key  (r) = Recorded By, (t) = Taken By, (c) = Cosigned By      Initials Name Provider Type    Salud Farfan, PT Physical Therapist                   Outcome Measures       Row Name 09/25/24 1058 09/25/24 0808       How much help from another person do you currently need...    Turning from your back to your side while in flat bed without using bedrails? 4  -MS 3  -JL    Moving from lying on back to sitting on the side of a flat bed without bedrails? 3  -MS 3  -JL    Moving to and from a bed to a chair (including a wheelchair)? 3  -MS 3  -JL    Standing up from a chair using your arms (e.g., wheelchair, bedside chair)? 3  -MS 3  -JL    Climbing 3-5 steps with a railing? 3  -MS 2  -JL    To walk in hospital room? 3  -MS 3  -JL    AM-PAC 6 Clicks Score (PT) 19  -MS 17  -JL    Highest Level of Mobility Goal 6 --> Walk 10 steps or more  -MS 5 --> Static standing  -JL              User Key  (r) = Recorded By, (t) = Taken By, (c) = Cosigned By      Initials Name Provider Type    Ras Jean, RN Registered Nurse    Salud Farfan, PT Physical Therapist                                 Physical Therapy Education       Title: PT OT SLP Therapies (In Progress)       Topic: Physical Therapy (Done)       Point: Mobility training (Done)       Learning Progress Summary             Patient Acceptance, E,TB, VU,NR by MS at 9/25/2024 1058    Acceptance, E,TB, VU by PH at 9/24/2024 1501    Acceptance, E,TB, VU,NR by CB at 9/23/2024 1047                         Point: Home exercise program (Done)       Learning Progress Summary             Patient Acceptance, E,TB, VU,NR by MS at 9/25/2024 1058                         Point: Body mechanics (Done)       Learning Progress Summary             Patient Acceptance, E,TB, VU,NR by MS at 9/25/2024 1058    Acceptance, E,TB, VU by PH at 9/24/2024 1501    Acceptance, E,TB, VU,NR by CB at 9/23/2024 1047                         Point: Precautions (Done)        Learning Progress Summary             Patient Acceptance, E,TB, VU,NR by MS at 9/25/2024 1058    Acceptance, E,TB, VU by  at 9/24/2024 1501    Acceptance, E,TB, VU,NR by  at 9/23/2024 1047                                         User Key       Initials Effective Dates Name Provider Type Discipline    MS 06/16/21 -  Salud Chester, PT Physical Therapist PT    PH 06/16/21 -  Rupinder Graff, PTA Physical Therapist Assistant PT    CB 10/22/21 -  Nancy Deleon, PT Physical Therapist PT                  PT Recommendation and Plan     Plan of Care Reviewed With: patient  Outcome Evaluation: Patient resting in bed and agreeable to PT this morning. SV for bed mobility, stood SV-SBA to a RW and ambulated 120' SV-SBA with a RW. Lower abdominal pain reported. Reviewed DC plan and patient plans to return home with HH PT upon DC and confirmed he has access to a RW. PT will begin following peripherally.     Time Calculation:         PT Charges       Row Name 09/25/24 1052             Time Calculation    Start Time 1026  -MS      Stop Time 1036  -MS      Time Calculation (min) 10 min  -MS      PT Received On 09/25/24  -MS      PT - Next Appointment 09/27/24  -MS                User Key  (r) = Recorded By, (t) = Taken By, (c) = Cosigned By      Initials Name Provider Type    MS Chester, Salud LARSEN, PT Physical Therapist                  Therapy Charges for Today       Code Description Service Date Service Provider Modifiers Qty    57798415002 HC PT THER PROC EA 15 MIN 9/25/2024 Salud Chester, PT GP 1            PT G-Codes  Outcome Measure Options: AM-PAC 6 Clicks Basic Mobility (PT)  AM-PAC 6 Clicks Score (PT): 19  PT Discharge Summary  Anticipated Discharge Disposition (PT): home with home health    Salud Chester PT  9/25/2024

## 2024-09-25 NOTE — CASE MANAGEMENT/SOCIAL WORK
Continued Stay Note  [facility]     Patient Name: Pro Mueller  MRN: [id number]  Today's Date: 9/25/2024    Admit Date: 9/20/2024    Plan: Home with [facility]    Discharge Plan       Row Name 09/25/24 1325       Plan    Plan Home with [facility]     Patient/Family in Agreement with Plan yes    Plan Comments Radha/Perri  notified CCP unable to accept pt d/t no PCP. Michelle/[facility] notified of referral and able to accept pt and will work with pt on finding an accepting PCP. Per Michelle/[facility] HH; HH may be delayed d/t finding an accepting PCP but can accept pt.  [facility] Danica KIDD/CCP    Final Discharge Disposition Code 06 - home with home health care    Final Note Home with [facility] . No additional CCP needs.                   Discharge Codes    No documentation.                 Expected Discharge Date and Time       Expected Discharge Date Expected Discharge Time    Sep 25, 2024               Solange Llanes RN

## 2024-09-25 NOTE — DISCHARGE SUMMARY
Date of Admission: 9/20/2024  Date of Discharge:  9/25/2024  Primary Care Physician: Provider, No Known     Discharge Diagnosis:  Active Hospital Problems    Diagnosis  POA    **Alcohol-induced acute pancreatitis, unspecified complication status [K85.20]  Yes    Lactic acidosis [E87.20]  Yes    Acute alcohol intoxication in patient with alcoholism with blood alcohol level 0.08 to 0.29, with unspecified complication [F10.229]  Unknown    Calculus of gallbladder with cholecystitis without biliary obstruction [K80.10]  Unknown    Elevated LFTs [R79.89]  Yes    Hypertension [I10]  Yes    Hyperlipidemia [E78.5]  Yes    Hypothyroidism [E03.9]  Yes      Resolved Hospital Problems   No resolved problems to display.       DETAILS OF HOSPITAL STAY     Pertinent Test Results and Procedures Performed    CT scan of the abdomen and pelvis:  1.  Bladder wall thickening is nonspecific.  Please correlate with   urinalysis if concerned for cystitis.   2.  Cholelithiasis.     HIDA scan:  Anterior projections of the abdomen were obtained following radiotracer   administration. There is prompt visualization of the common biliary   duct, and small bowel.      The gallbladder is not visualized until the 4-hour image, that may   reflect chronic cholecystitis.     HPI per Dr. Harrell  Mr. Mueller is a 69-year-old gentleman who unfortunately deals with alcohol abuse and is consuming at least nearly a pint of vodka on a daily basis. He has been admitted at this location numerous times for alcohol abuse complications and detox. He is here at this time not because he came here requesting sobriety but because he had similar flares of abdominal pain with nausea. No reported emesis and certainly no bloody emesis. His abdominal pain is more in his epigastrium. He had some incidental finding of cholelithiasis on CT but no swollen gallbladder noted. He denies any fever or chills. His last drink of alcohol was yesterday in the afternoon about 3 PM. He  "has not gone very long in the past when trying for alcohol cessation. He currently states that he would \"like to stop\" but there does not seem like any true genuine drive towards sobriety. Denies any GI bleeding/melena. No issues with chest pain troubles breathing. Currently no confusion.     Hospital Course  The patient was initially admitted for alcohol abuse and intoxication with withdrawal symptoms.  He had coinciding epigastric and right upper quadrant pain with nausea and vomiting.  He was felt to have an element of acute pancreatitis.  Cholelithiasis was demonstrated on imaging and he underwent HIDA scan as recommended by gastroenterology.  He also had a low-grade fever.  General surgery was consulted.  His withdrawal symptoms were mild and resolved quickly.  General surgery recommended cholecystectomy.  He was found to have severe acute cholecystitis with a large stone impacted in the cystic duct which was extracted during surgery.  His cholangiogram was worrisome for nonobstructing distal bile duct stone.  GI service was consulted and patient underwent ERCP with sphincterotomy and balloon sweep of retained stones.  Thereafter, he has shown significant improvement with advancement of diet.  He has soreness around his incisions but overall his abdominal pain is greatly improved.  LFTs have trended down.  Consulting services have all cleared him for discharge and he will be released home today with outpatient follow-up with general surgery.    Physical Exam at Discharge:  General: No acute distress, AAOx3  HEENT: EOMI, PERRL  Cardiovascular: +s1 and s2, RRR  Lungs: No rhonchi or wheezing  Abdomen: soft, nontender    Consults:   Consults       Date and Time Order Name Status Description    9/21/2024  4:42 PM Inpatient General Surgery Consult Completed     9/20/2024 12:39 PM Inpatient Gastroenterology Consult Completed     9/20/2024  6:21 AM [facility] (on-call MD unless specified) Details      9/3/2024 10:42 PM " Inpatient Psychiatrist Consult Completed               Condition on Discharge: Stable, improved    Discharge Disposition  Home or Self Care    Discharge Medications     Discharge Medications        New Medications        Instructions Start Date   folic acid 1 MG tablet  Commonly known as: FOLVITE   1 mg, Oral, Daily   Start Date: September 26, 2024     HYDROcodone-acetaminophen 7.5-325 MG per tablet  Commonly known as: NORCO   1 tablet, Oral, Every 4 Hours PRN      pantoprazole 40 MG EC tablet  Commonly known as: PROTONIX   40 mg, Oral, Every Early Morning   Start Date: September 26, 2024     sucralfate 1 g tablet  Commonly known as: CARAFATE   1 g, Oral, 4 Times Daily Before Meals & Nightly      thiamine 100 MG tablet  Commonly known as: VITAMIN B1   100 mg, Oral, Daily             Continue These Medications        Instructions Start Date   amLODIPine 5 MG tablet  Commonly known as: NORVASC   5 mg, Oral, Every Morning      fluticasone 50 MCG/ACT nasal spray  Commonly known as: FLONASE   2 sprays, Nasal, Daily      levothyroxine 100 MCG tablet  Commonly known as: SYNTHROID, LEVOTHROID   100 mcg, Oral, Daily      lisinopril 10 MG tablet  Commonly known as: PRINIVIL,ZESTRIL   10 mg, Oral, Daily               Discharge Diet:   Diet Instructions       Diet: Regular/House Diet, Gastrointestinal Diets; Fat-Restricted; Regular (IDDSI 7); Thin (IDDSI 0)      Discharge Diet:  Regular/House Diet  Gastrointestinal Diets       Gastrointestinal Diet: Fat-Restricted    Texture: Regular (IDDSI 7)    Fluid Consistency: Thin (IDDSI 0)            Activity at Discharge:   Activity Instructions       Activity as Tolerated              Follow-up Appointments  No future appointments.  Additional Instructions for the Follow-ups that You Need to Schedule       Discharge Follow-up with PCP   As directed       Currently Documented PCP:    Provider, No Known    PCP Phone Number:    [phone]     Follow Up Details: 1 week        Discharge  Follow-up with Specialty: General surgery per their recommendations   As directed      Specialty: General surgery per their recommendations                I have examined and discussed discharge planning with the patient today.    Justino Charlton MD  09/25/24  11:27 EDT    Time: Discharge greater than 30 min

## 2024-09-25 NOTE — DISCHARGE PLACEMENT REQUEST
"Herb Luna (69 y.o. Male)       Date of Birth   1955    Social Security Number       Address   [address] [address]  [place] [address]    Home Phone   [phone]    MRN   [id number]       [place]   Religion    Marital Status   Single                            Admission Date   9/20/24    Admission Type   Emergency    Admitting Provider   Keanu Harrell MD    Attending Provider   Justino Charlton MD    Department, Room/Bed   [address], [address]       Discharge Date       Discharge Disposition       Discharge Destination                                 Attending Provider: Justino Charlton MD    Allergies: Penicillins    Isolation: None   Infection: None   Code Status: CPR    Ht: 182.9 cm (72\")   Wt: 83.9 kg (185 lb)    Admission Cmt: None   Principal Problem: Alcohol-induced acute pancreatitis, unspecified complication status [K85.20]                   Active Insurance as of 9/20/2024       Primary Coverage       Payor Plan Insurance Group Employer/Plan Group    MEDICARE MEDICARE A ONLY        Payor Plan Address Payor Plan Phone Number Payor Plan Fax Number Effective Dates    [address] [phone]  5/1/2022 - None Entered    [facility] [address]         Subscriber Name Subscriber Birth Date Member ID       HERB LUNA 1955 [id number]               Secondary Coverage       Payor Plan Insurance Group Employer/Plan Group    [employer]        Payor Plan Address Payor Plan Phone Number Payor Plan Fax Number Effective Dates    [address] [address] [phone]  2/18/2023 - None Entered    [place] [address]         Subscriber Name Subscriber Birth Date Member ID       HERB LUNA 1955 [id number]                     Emergency Contacts        (Rel.) Home Phone Work Phone Mobile Phone    Jeny Crump (Sister) [phone] -- [phone]    Saul Oscar (Father) " [phone] -- [phone]    Bran Villar (Other) -- -- [phone]

## 2024-09-25 NOTE — PLAN OF CARE
Goal Outcome Evaluation:  Plan of Care Reviewed With: patient        Progress: improving  Outcome Evaluation: plan to discharge home this afternoon after surgeon gives clearance

## 2024-09-25 NOTE — CASE MANAGEMENT/SOCIAL WORK
Continued Stay Note  [facility]     Patient Name: Pro Mueller  MRN: [id number]  Today's Date: 9/25/2024    Admit Date: 9/20/2024    Plan: Home, [facility].   Discharge Plan       Row Name 09/25/24 0832       Plan    Plan Home, [facility].    Patient/Family in Agreement with Plan yes    Plan Comments CCP met with pt at bedside to confirm DC. Pt confirms DC plan is to return home. CCP explained PT eval recs for HH. Pt is agreeable to HH referral and denies preference of agency. LVM for [facility] notifying of referral. Danica KIDD/CCP                   Discharge Codes    No documentation.                 Expected Discharge Date and Time       Expected Discharge Date Expected Discharge Time    Sep 25, 2024               Solange Llanes RN

## 2024-09-30 ENCOUNTER — READMISSION MANAGEMENT (OUTPATIENT)
Dept: CALL CENTER | Facility: HOSPITAL | Age: 69
End: 2024-09-30
Payer: COMMERCIAL

## 2024-09-30 NOTE — OUTREACH NOTE
General Surgery Week 1 Survey      Flowsheet Row Responses   [facility] patient discharged from? [place]   Does the patient have one of the following disease processes/diagnoses(primary or secondary)? General Surgery   Week 1 attempt successful? No   Unsuccessful attempts Attempt 1            Vianney ZARATE - Licensed Nurse

## 2024-10-01 ENCOUNTER — HOSPITAL ENCOUNTER (EMERGENCY)
Facility: HOSPITAL | Age: 69
Discharge: HOME | End: 2024-10-01
Attending: EMERGENCY MEDICINE | Admitting: EMERGENCY MEDICINE
Payer: MEDICARE

## 2024-10-01 ENCOUNTER — APPOINTMENT (OUTPATIENT)
Dept: CT IMAGING | Facility: HOSPITAL | Age: 69
End: 2024-10-01
Payer: MEDICARE

## 2024-10-01 VITALS
DIASTOLIC BLOOD PRESSURE: 84 MMHG | TEMPERATURE: 99.2 F | OXYGEN SATURATION: 93 % | RESPIRATION RATE: 16 BRPM | HEART RATE: 72 BPM | SYSTOLIC BLOOD PRESSURE: 149 MMHG

## 2024-10-01 DIAGNOSIS — G89.18 ACUTE POST-OPERATIVE PAIN: Primary | ICD-10-CM

## 2024-10-01 DIAGNOSIS — R10.31 RLQ ABDOMINAL PAIN: ICD-10-CM

## 2024-10-01 LAB
ALBUMIN SERPL-MCNC: 3.8 G/DL (ref 3.5–5.2)
ALBUMIN/GLOB SERPL: 1.3 G/DL
ALP SERPL-CCNC: 140 U/L (ref 39–117)
ALT SERPL W P-5'-P-CCNC: 21 U/L (ref 1–41)
ANION GAP SERPL CALCULATED.3IONS-SCNC: 16 MMOL/L (ref 5–15)
AST SERPL-CCNC: 59 U/L (ref 1–40)
BACTERIA UR QL AUTO: ABNORMAL /HPF
BASOPHILS # BLD AUTO: 0.04 10*3/MM3 (ref 0–0.2)
BASOPHILS NFR BLD AUTO: 0.5 % (ref 0–1.5)
BILIRUB SERPL-MCNC: 0.6 MG/DL (ref 0–1.2)
BILIRUB UR QL STRIP: NEGATIVE
BUN SERPL-MCNC: 8 MG/DL (ref 8–23)
BUN/CREAT SERPL: 13.6 (ref 7–25)
CALCIUM SPEC-SCNC: 8.7 MG/DL (ref 8.6–10.5)
CHLORIDE SERPL-SCNC: 101 MMOL/L (ref 98–107)
CLARITY UR: CLEAR
CO2 SERPL-SCNC: 24 MMOL/L (ref 22–29)
COLOR UR: ABNORMAL
CREAT SERPL-MCNC: 0.59 MG/DL (ref 0.76–1.27)
D-LACTATE SERPL-SCNC: 1.4 MMOL/L (ref 0.5–2)
DEPRECATED RDW RBC AUTO: 54.3 FL (ref 37–54)
EGFRCR SERPLBLD CKD-EPI 2021: 106.3 ML/MIN/1.73
EOSINOPHIL # BLD AUTO: 0.01 10*3/MM3 (ref 0–0.4)
EOSINOPHIL NFR BLD AUTO: 0.1 % (ref 0.3–6.2)
ERYTHROCYTE [DISTWIDTH] IN BLOOD BY AUTOMATED COUNT: 14.6 % (ref 12.3–15.4)
GLOBULIN UR ELPH-MCNC: 2.9 GM/DL
GLUCOSE SERPL-MCNC: 103 MG/DL (ref 65–99)
GLUCOSE UR STRIP-MCNC: NEGATIVE MG/DL
HCT VFR BLD AUTO: 39 % (ref 37.5–51)
HGB BLD-MCNC: 12.8 G/DL (ref 13–17.7)
HGB UR QL STRIP.AUTO: NEGATIVE
HOLD SPECIMEN: NORMAL
HYALINE CASTS UR QL AUTO: ABNORMAL /LPF
IMM GRANULOCYTES # BLD AUTO: 0.06 10*3/MM3 (ref 0–0.05)
IMM GRANULOCYTES NFR BLD AUTO: 0.7 % (ref 0–0.5)
KETONES UR QL STRIP: ABNORMAL
LEUKOCYTE ESTERASE UR QL STRIP.AUTO: ABNORMAL
LIPASE SERPL-CCNC: 54 U/L (ref 13–60)
LYMPHOCYTES # BLD AUTO: 1.17 10*3/MM3 (ref 0.7–3.1)
LYMPHOCYTES NFR BLD AUTO: 13.8 % (ref 19.6–45.3)
MCH RBC QN AUTO: 33.4 PG (ref 26.6–33)
MCHC RBC AUTO-ENTMCNC: 32.8 G/DL (ref 31.5–35.7)
MCV RBC AUTO: 101.8 FL (ref 79–97)
MONOCYTES # BLD AUTO: 1.01 10*3/MM3 (ref 0.1–0.9)
MONOCYTES NFR BLD AUTO: 11.9 % (ref 5–12)
NEUTROPHILS NFR BLD AUTO: 6.19 10*3/MM3 (ref 1.7–7)
NEUTROPHILS NFR BLD AUTO: 73 % (ref 42.7–76)
NITRITE UR QL STRIP: NEGATIVE
NRBC BLD AUTO-RTO: 0 /100 WBC (ref 0–0.2)
PH UR STRIP.AUTO: 8 [PH] (ref 5–8)
PLATELET # BLD AUTO: 543 10*3/MM3 (ref 140–450)
PMV BLD AUTO: 8.6 FL (ref 6–12)
POTASSIUM SERPL-SCNC: 4.1 MMOL/L (ref 3.5–5.2)
PROT SERPL-MCNC: 6.7 G/DL (ref 6–8.5)
PROT UR QL STRIP: ABNORMAL
RBC # BLD AUTO: 3.83 10*6/MM3 (ref 4.14–5.8)
RBC # UR STRIP: ABNORMAL /HPF
REF LAB TEST METHOD: ABNORMAL
SODIUM SERPL-SCNC: 141 MMOL/L (ref 136–145)
SP GR UR STRIP: >1.03 (ref 1–1.03)
SQUAMOUS #/AREA URNS HPF: ABNORMAL /HPF
UROBILINOGEN UR QL STRIP: ABNORMAL
WBC # UR STRIP: ABNORMAL /HPF
WBC NRBC COR # BLD AUTO: 8.48 10*3/MM3 (ref 3.4–10.8)
WHOLE BLOOD HOLD COAG: NORMAL
WHOLE BLOOD HOLD SPECIMEN: NORMAL

## 2024-10-01 PROCEDURE — 85025 COMPLETE CBC W/AUTO DIFF WBC: CPT | Performed by: EMERGENCY MEDICINE

## 2024-10-01 PROCEDURE — 25010000002 MORPHINE PER 10 MG: Performed by: EMERGENCY MEDICINE

## 2024-10-01 PROCEDURE — 25010000002 ONDANSETRON PER 1 MG: Performed by: EMERGENCY MEDICINE

## 2024-10-01 PROCEDURE — 99285 EMERGENCY DEPT VISIT HI MDM: CPT

## 2024-10-01 PROCEDURE — 25810000003 SODIUM CHLORIDE 0.9 % SOLUTION: Performed by: EMERGENCY MEDICINE

## 2024-10-01 PROCEDURE — 83605 ASSAY OF LACTIC ACID: CPT | Performed by: EMERGENCY MEDICINE

## 2024-10-01 PROCEDURE — 96361 HYDRATE IV INFUSION ADD-ON: CPT

## 2024-10-01 PROCEDURE — 96376 TX/PRO/DX INJ SAME DRUG ADON: CPT

## 2024-10-01 PROCEDURE — 81001 URINALYSIS AUTO W/SCOPE: CPT | Performed by: EMERGENCY MEDICINE

## 2024-10-01 PROCEDURE — 96374 THER/PROPH/DIAG INJ IV PUSH: CPT

## 2024-10-01 PROCEDURE — 80053 COMPREHEN METABOLIC PANEL: CPT | Performed by: EMERGENCY MEDICINE

## 2024-10-01 PROCEDURE — 83690 ASSAY OF LIPASE: CPT | Performed by: EMERGENCY MEDICINE

## 2024-10-01 PROCEDURE — 25510000001 IOPAMIDOL 61 % SOLUTION: Performed by: EMERGENCY MEDICINE

## 2024-10-01 PROCEDURE — 74177 CT ABD & PELVIS W/CONTRAST: CPT

## 2024-10-01 PROCEDURE — 96375 TX/PRO/DX INJ NEW DRUG ADDON: CPT

## 2024-10-01 RX ORDER — SODIUM CHLORIDE 0.9 % (FLUSH) 0.9 %
10 SYRINGE (ML) INJECTION AS NEEDED
Status: DISCONTINUED | OUTPATIENT
Start: 2024-10-01 | End: 2024-10-01 | Stop reason: HOSPADM

## 2024-10-01 RX ORDER — IOPAMIDOL 612 MG/ML
100 INJECTION, SOLUTION INTRAVASCULAR
Status: COMPLETED | OUTPATIENT
Start: 2024-10-01 | End: 2024-10-01

## 2024-10-01 RX ORDER — MORPHINE SULFATE 2 MG/ML
4 INJECTION, SOLUTION INTRAMUSCULAR; INTRAVENOUS ONCE
Status: COMPLETED | OUTPATIENT
Start: 2024-10-01 | End: 2024-10-01

## 2024-10-01 RX ORDER — ONDANSETRON 2 MG/ML
4 INJECTION INTRAMUSCULAR; INTRAVENOUS ONCE
Status: COMPLETED | OUTPATIENT
Start: 2024-10-01 | End: 2024-10-01

## 2024-10-01 RX ORDER — MORPHINE SULFATE 2 MG/ML
2 INJECTION, SOLUTION INTRAMUSCULAR; INTRAVENOUS ONCE
Status: COMPLETED | OUTPATIENT
Start: 2024-10-01 | End: 2024-10-01

## 2024-10-01 RX ADMIN — SODIUM CHLORIDE 500 ML: 9 INJECTION, SOLUTION INTRAVENOUS at 08:05

## 2024-10-01 RX ADMIN — MORPHINE SULFATE 2 MG: 2 INJECTION, SOLUTION INTRAMUSCULAR; INTRAVENOUS at 09:59

## 2024-10-01 RX ADMIN — IOPAMIDOL 85 ML: 612 INJECTION, SOLUTION INTRAVENOUS at 09:01

## 2024-10-01 RX ADMIN — MORPHINE SULFATE 4 MG: 2 INJECTION, SOLUTION INTRAMUSCULAR; INTRAVENOUS at 07:59

## 2024-10-01 RX ADMIN — ONDANSETRON 4 MG: 2 INJECTION, SOLUTION INTRAMUSCULAR; INTRAVENOUS at 07:59

## 2024-10-01 NOTE — ED PROVIDER NOTES
EMERGENCY DEPARTMENT ENCOUNTER      Room Number:  02/02  PCP: Provider, No Known  Independent Historians: Patient  Patient Care Team:  Provider, No Known as PCP - Say Roger MD (Psychiatry)       HPI:  Chief Complaint: Abdominal pain    A complete HPI/ROS/PMH/PSH/SH/FH are unobtainable due to: None    Chronic or social conditions impacting patient care (Social Determinants of Health): None      Context: Pro Mueller is a 69 y.o. male with a PMH significant for hypertension, hyperlipidemia, kidney stone, anxiety, alcohol abuse, migraines, panic disorder who presents to the ED c/o acute right lower quadrant abdominal pain since a surgical procedure approximately 1 week ago.  The patient states that he had gallbladder surgery at that time but is unsure about what procedure was done.  The procedure was laparoscopic and he has had persistent right lower quadrant pain since that time surrounding one of his incisional sites.  No fever, chills, nausea, vomiting.  He came in today because he ran out of his pain medications at home.  No change to bowel habits or urination.  No concerns for the wounds themselves.      Upon review of prior external notes (non-ED) -and- Review of prior external test results outside of this encounter it appears the patient was evaluated by gastroenterology with ERCP sphincterotomy on 9/23/2024.  The patient had a normal lipase and glucose on 9/22/2024.      PAST MEDICAL HISTORY  Active Ambulatory Problems     Diagnosis Date Noted    Fall 08/08/2016    Alcoholism in recovery 08/08/2016    Atopic rhinitis 08/08/2016    Mixed anxiety depressive disorder 08/08/2016    Genital herpes simplex 08/08/2016    Hyperlipidemia 08/08/2016    Hypertension 08/08/2016    Hypothyroidism 08/08/2016    Insomnia 08/08/2016    Panic disorder without agoraphobia 08/08/2016    Persistent insomnia 08/08/2016    Vitamin D deficiency 08/08/2016    Chronic low back pain 11/11/2016    Neuropathy involving  both lower extremities 10/25/2018    QT prolongation 01/03/2019    Left shoulder pain 11/19/2019    Nausea and vomiting 01/11/2020    Pancytopenia 01/14/2020    Left ventricular diastolic dysfunction 01/16/2021    Tremor 10/06/2021    C5 cervical fracture 11/09/2021    Syncope and collapse 01/07/2022    Neck pain 01/07/2022    Alcoholic intoxication with complication 03/13/2022    Withdrawal symptoms, alcohol 07/01/2022    Transaminitis 07/01/2022    Lumbar facet arthropathy 07/20/2022    Alcohol dependence with withdrawal 09/11/2022    Midline low back pain with right-sided sciatica 09/15/2022    Spondylolisthesis at L4-L5 level 09/15/2022    Lumbar canal stenosis 09/15/2022    Alcohol cessation counseling 09/15/2022    Need for assistance due to reduced mobility 09/15/2022    Impaired mobility and ADLs 09/15/2022    Alcohol withdrawal syndrome without complication 02/18/2023    Facial laceration 02/18/2023    Orthostatic hypotension 02/18/2023    Acute bacterial conjunctivitis of right eye 03/02/2023    Visit for suture removal 03/02/2023    Renal calculi 03/14/2023    Hepatic steatosis 03/15/2023    Alcohol withdrawal syndrome with complication 04/14/2023    Macrocytosis without anemia 04/14/2023    Lumbosacral spondylosis without myelopathy 05/05/2023    Elevated LFTs 05/13/2023    Alcohol-induced acute pancreatitis, unspecified complication status 06/23/2023    Pancreatitis 03/06/2024    Generalized abdominal pain 03/07/2024    Alcohol withdrawal 03/07/2024    Lactic acidosis 09/20/2024    Acute alcohol intoxication in patient with alcoholism with blood alcohol level 0.08 to 0.29, with unspecified complication 09/20/2024    Calculus of gallbladder with cholecystitis without biliary obstruction 09/20/2024     Resolved Ambulatory Problems     Diagnosis Date Noted    Impacted cerumen 08/08/2016    Influenza 08/08/2016    Motion sickness 08/08/2016    Seasonal allergic rhinitis 08/08/2016    Upper respiratory tract  infection 08/08/2016    Alcohol withdrawal 11/04/2016    Headache 11/05/2016    Gastritis 11/05/2016    Olecranon bursitis of right elbow 04/05/2017    Sciatica of left side 06/12/2018    Syncope and collapse 01/02/2019    Alcoholic ketoacidosis 01/12/2020    Hyponatremia 01/13/2020    Hypokalemia 01/13/2020    Alcohol dependence with uncomplicated withdrawal 01/14/2020    Nephrolithiasis 02/12/2020    Hypomagnesemia 01/16/2021    Non-traumatic rhabdomyolysis 01/18/2021    Urine retention 01/21/2021    Epigastric pain 06/23/2023    Dehydration 02/24/2024     Past Medical History:   Diagnosis Date    Alcohol abuse     Allergic 1970    Anxiety     Arthritis     Depression     Disease of thyroid gland     Elevated cholesterol     Encounter for removal of sutures     GERD (gastroesophageal reflux disease)     Headache, tension-type     Kidney stone     Low back pain 2019    Migraine     Olecranon bursitis, right elbow     Peripheral neuropathy     Sleep apnea          PAST SURGICAL HISTORY  Past Surgical History:   Procedure Laterality Date    CHOLECYSTECTOMY N/A 9/22/2024    Procedure: CHOLECYSTECTOMY LAPAROSCOPIC WITH DAVINCI ROBOT with cholangiogram, possible open;  Surgeon: Toro Noguera MD;  Location: [facility] MAIN OR;  Service: General;  Laterality: N/A;    COLONOSCOPY      CYST REMOVAL      CYSTOSCOPY BLADDER STONE LITHOTRIPSY  02/2022    ERCP N/A 9/23/2024    Procedure: ENDOSCOPIC RETROGRADE CHOLANGIOPANCREATOGRAPHY sphincterotomy, balloon sweep (9-12);  Surgeon: Fara Madrigal MD;  Location: [facility] ENDOSCOPY;  Service: Gastroenterology;  Laterality: N/A;  sphincterotomy hiatial hernia, gallstone removal    EXTRACORPOREAL SHOCK WAVE LITHOTRIPSY (ESWL) Right 2002    SHOULDER ARTHROSCOPY Right 12/17/2019    Procedure: SHOULDER ARTHROSCOPY, decompression, distal clavicle excision;  Surgeon: Aj Mancilla MD;  Location: [facility] OR OSC;  Service: Orthopedics    TONSILLECTOMY           FAMILY HISTORY  Family  "History   Problem Relation Age of Onset    Alzheimer's disease Mother     Mental illness Mother     Pancreatic cancer Father     Hearing loss Father     Arthritis Maternal Grandmother     Malig Hyperthermia Neg Hx          SOCIAL HISTORY  Social History     Socioeconomic History    Marital status: Single   Tobacco Use    Smoking status: Former     Current packs/day: 0.00     Average packs/day: 0.5 packs/day for 15.0 years (7.5 ttl pk-yrs)     Types: Cigarettes     Start date: 1990     Quit date: 2005     Years since quittin.7    Smokeless tobacco: Never    Tobacco comments:     caffeine - 3 cans of coke daily    Vaping Use    Vaping status: Never Used   Substance and Sexual Activity    Alcohol use: Yes     Alcohol/week: 7.0 standard drinks of alcohol     Types: 7 Shots of liquor per week     Comment: .5 liter vodka    Drug use: Yes     Types: Methamphetamines     Comment: \"once in a rare while\"    Sexual activity: Yes     Partners: Female     Birth control/protection: Condom         ALLERGIES  Penicillins      REVIEW OF SYSTEMS  Included in HPI  All systems reviewed and negative except for those discussed in HPI.      PHYSICAL EXAM    I have reviewed the triage vital signs and nursing notes.    ED Triage Vitals   Temp Heart Rate Resp BP SpO2   10/01/24 0729 10/01/24 0725 10/01/24 0725 10/01/24 0725 10/01/24 0725   99.2 °F (37.3 °C) 78 16 158/88 95 %      Temp src Heart Rate Source Patient Position BP Location FiO2 (%)   10/01/24 0729 10/01/24 0725 -- -- --   Tympanic Monitor          Physical Exam  Constitutional:       General: He is not in acute distress.     Appearance: He is well-developed.   HENT:      Head: Normocephalic and atraumatic.   Eyes:      General: No scleral icterus.     Conjunctiva/sclera: Conjunctivae normal.   Neck:      Trachea: No tracheal deviation.   Cardiovascular:      Rate and Rhythm: Normal rate and regular rhythm.   Pulmonary:      Effort: Pulmonary effort is normal.      " Breath sounds: Normal breath sounds.   Abdominal:      Palpations: Abdomen is soft.      Tenderness: There is abdominal tenderness in the right lower quadrant. There is guarding.      Comments: Scattered abdominal incisional wound from recent operation appear well with no signs of dehiscence.   Musculoskeletal:         General: No deformity.      Cervical back: Normal range of motion.   Lymphadenopathy:      Cervical: No cervical adenopathy.   Skin:     General: Skin is warm and dry.   Neurological:      Mental Status: He is alert and oriented to person, place, and time.      Comments: Somewhat tremulous on exam   Psychiatric:         Behavior: Behavior normal.         Vital signs and nursing notes reviewed.      PPE: I wore a surgical mask throughout my encounters with the pt. I performed hand hygiene on entry into the pt room and upon exit.     LAB RESULTS  Recent Results (from the past 24 hour(s))   Comprehensive Metabolic Panel    Collection Time: 10/01/24  8:06 AM    Specimen: Blood   Result Value Ref Range    Glucose 103 (H) 65 - 99 mg/dL    BUN 8 8 - 23 mg/dL    Creatinine 0.59 (L) 0.76 - 1.27 mg/dL    Sodium 141 136 - 145 mmol/L    Potassium 4.1 3.5 - 5.2 mmol/L    Chloride 101 98 - 107 mmol/L    CO2 24.0 22.0 - 29.0 mmol/L    Calcium 8.7 8.6 - 10.5 mg/dL    Total Protein 6.7 6.0 - 8.5 g/dL    Albumin 3.8 3.5 - 5.2 g/dL    ALT (SGPT) 21 1 - 41 U/L    AST (SGOT) 59 (H) 1 - 40 U/L    Alkaline Phosphatase 140 (H) 39 - 117 U/L    Total Bilirubin 0.6 0.0 - 1.2 mg/dL    Globulin 2.9 gm/dL    A/G Ratio 1.3 g/dL    BUN/Creatinine Ratio 13.6 7.0 - 25.0    Anion Gap 16.0 (H) 5.0 - 15.0 mmol/L    eGFR 106.3 >60.0 mL/min/1.73   Lipase    Collection Time: 10/01/24  8:06 AM    Specimen: Blood   Result Value Ref Range    Lipase 54 13 - 60 U/L   Lactic Acid, Plasma    Collection Time: 10/01/24  8:06 AM    Specimen: Blood   Result Value Ref Range    Lactate 1.4 0.5 - 2.0 mmol/L   Green Top (Gel)    Collection Time: 10/01/24   8:06 AM   Result Value Ref Range    Extra Tube Hold for add-ons.    Lavender Top    Collection Time: 10/01/24  8:06 AM   Result Value Ref Range    Extra Tube hold for add-on    Light Blue Top    Collection Time: 10/01/24  8:06 AM   Result Value Ref Range    Extra Tube Hold for add-ons.    CBC Auto Differential    Collection Time: 10/01/24  8:06 AM    Specimen: Blood   Result Value Ref Range    WBC 8.48 3.40 - 10.80 10*3/mm3    RBC 3.83 (L) 4.14 - 5.80 10*6/mm3    Hemoglobin 12.8 (L) 13.0 - 17.7 g/dL    Hematocrit 39.0 37.5 - 51.0 %    .8 (H) 79.0 - 97.0 fL    MCH 33.4 (H) 26.6 - 33.0 pg    MCHC 32.8 31.5 - 35.7 g/dL    RDW 14.6 12.3 - 15.4 %    RDW-SD 54.3 (H) 37.0 - 54.0 fl    MPV 8.6 6.0 - 12.0 fL    Platelets 543 (H) 140 - 450 10*3/mm3    Neutrophil % 73.0 42.7 - 76.0 %    Lymphocyte % 13.8 (L) 19.6 - 45.3 %    Monocyte % 11.9 5.0 - 12.0 %    Eosinophil % 0.1 (L) 0.3 - 6.2 %    Basophil % 0.5 0.0 - 1.5 %    Immature Grans % 0.7 (H) 0.0 - 0.5 %    Neutrophils, Absolute 6.19 1.70 - 7.00 10*3/mm3    Lymphocytes, Absolute 1.17 0.70 - 3.10 10*3/mm3    Monocytes, Absolute 1.01 (H) 0.10 - 0.90 10*3/mm3    Eosinophils, Absolute 0.01 0.00 - 0.40 10*3/mm3    Basophils, Absolute 0.04 0.00 - 0.20 10*3/mm3    Immature Grans, Absolute 0.06 (H) 0.00 - 0.05 10*3/mm3    nRBC 0.0 0.0 - 0.2 /100 WBC         RADIOLOGY  CT Abdomen Pelvis With Contrast    Result Date: 10/1/2024  CT OF THE ABDOMEN AND PELVIS WITH CONTRAST 10/01/2024  HISTORY: Lower abdominal pain. Recent cholecystectomy.  Spiral images were obtained from the lung bases to the symphysis pubis after intravenous contrast. No oral contrast was given.  There is mild bibasilar atelectasis, right greater than left. There is some coronary calcification.  Gallbladder has been removed. There is some minimal increased attenuation within the gallbladder fossa, likely postoperative change with no discrete abscess.  The liver, spleen, pancreas, and adrenals appear within  normal limits. There is an approximately 3.6 cm right renal cyst.  There is colonic diverticulosis. Prostate gland is mildly enlarged. Urinary bladder is unremarkable. No bowel wall thickening or bowel dilatation is seen.      1. Mild postoperative changes of the gallbladder fossa. 2. No abscess or abnormal fluid collections are seen. 3. Colonic diverticulosis. 4. Right renal cyst.  Radiation dose reduction techniques were utilized, including automated exposure control and exposure modulation based on body size.          MEDICATIONS GIVEN IN ER  Medications   sodium chloride 0.9 % flush 10 mL (has no administration in time range)   morphine injection 4 mg (4 mg Intravenous Given 10/1/24 0759)   ondansetron (ZOFRAN) injection 4 mg (4 mg Intravenous Given 10/1/24 0759)   sodium chloride 0.9 % bolus 500 mL (0 mL Intravenous Stopped 10/1/24 0953)   iopamidol (ISOVUE-300) 61 % injection 100 mL (85 mL Intravenous Given by Other 10/1/24 0901)   morphine injection 2 mg (2 mg Intravenous Given 10/1/24 0959)         ORDERS PLACED DURING THIS VISIT:  Orders Placed This Encounter   Procedures    CT Abdomen Pelvis With Contrast    [place] Draw    Comprehensive Metabolic Panel    Lipase    Urinalysis With Microscopic If Indicated (No Culture) - Urine, Clean Catch    Lactic Acid, Plasma    CBC Auto Differential    Surgery (on-call MD unless specified)    Insert Peripheral IV    CBC & Differential    Green Top (Gel)    Lavender Top    Gold Top - SST    Light Blue Top         OUTPATIENT MEDICATION MANAGEMENT:  Current Facility-Administered Medications Ordered in Epic   Medication Dose Route Frequency Provider Last Rate Last Admin    sodium chloride 0.9 % flush 10 mL  10 mL Intravenous PRN Jimy Grijalva MD         Current Outpatient Medications Ordered in Epic   Medication Sig Dispense Refill    amLODIPine (NORVASC) 5 MG tablet Take 1 tablet by mouth Every Morning. 90 tablet 1    fluticasone (FLONASE) 50 MCG/ACT nasal spray 2  sprays into the nostril(s) as directed by provider Daily. (Patient taking differently: Administer 2 sprays into the nostril(s) as directed by provider As Needed.) 11.1 mL 0    folic acid (FOLVITE) 1 MG tablet Take 1 tablet by mouth Daily for 30 days. 30 tablet 0    HYDROcodone-acetaminophen (NORCO) 7.5-325 MG per tablet Take 1 tablet by mouth Every 4 (Four) Hours As Needed for Moderate Pain or Severe Pain. 20 tablet 0    levothyroxine (SYNTHROID, LEVOTHROID) 100 MCG tablet Take 1 tablet by mouth Daily. 90 tablet 1    lisinopril (PRINIVIL,ZESTRIL) 10 MG tablet Take 1 tablet by mouth Daily. 90 tablet 1    pantoprazole (PROTONIX) 40 MG EC tablet Take 1 tablet by mouth Every Morning for 30 days. 30 tablet 0    sucralfate (CARAFATE) 1 g tablet Take 1 tablet by mouth 4 (Four) Times a Day Before Meals & at Bedtime for 30 days. 120 tablet 0    thiamine (VITAMIN B1) 100 MG tablet Take 1 tablet by mouth Daily for 30 days. 30 tablet 0            PROGRESS, DATA ANALYSIS, CONSULTS, AND MEDICAL DECISION MAKING  All labs have been independently interpreted by me.  All radiology studies have been reviewed by me. All EKG's have been independently viewed and interpreted by me.  Discussion below represents my analysis of pertinent findings related to patient's condition, differential diagnosis, treatment plan and final disposition.      DIFFERENTIAL DIAGNOSIS INCLUDE BUT NOT LIMITED TO:     Differential diagnosis includes but is not limited to:  - Hepatobiliary pathology such as cholecystitis, cholangitis, and symptomatic cholelithiasis  - Pancreatitis  - Dyspepsia  - Small bowel obstruction  - Appendicitis  - Diverticulitis  - UTI including pyelonephritis  - Ureteral stone  - Zoster  - Colitis, including infectious and ischemic  - Atypical ACS    Clinical Scores: N/A      ED Course as of 10/01/24 1005   Tue Oct 01, 2024   0816 WBC: 8.48 [DC]   0954 I discussed the case with MD berenice with gen Whitaker at this time regarding the  patient.  I discussed work-up, results, concerns.  I discussed the consulting provider's desire for discharging the patient to follow-up in the office with Dr. Lerma this week.  He has viewed the patient CT scans himself and reports that he has no concerns.   [DC]   1003 Patient presentation and evaluation consistent with normal postoperative recovery pain with reassuring ED evaluation and no indication for admission at this time.  Plan for symptom control with Tylenol and ibuprofen at home while he recovers and follows up with Dr. Lerma this week for further management. [DC]      ED Course User Index  [DC] Shilo Guillermo, PA         1008 I rechecked the patient.  I discussed the patient's labs, radiology findings (including all incidental findings), diagnosis, and plan for discharge.  A repeat exam reveals no new worrisome changes from my initial exam findings.  The patient understands that the fact that they are being discharged does not denote that nothing is abnormal, it indicates that no clinical emergency is present and that they must follow-up as directed in order to properly maintain their health.  Follow-up instructions (specifically listed below) and return to ER precautions were given at this time.  I specifically instructed the patient to follow-up with their PCP.  The patient understands and agrees with the plan, and is ready for discharge.  All questions answered.         AS OF 10:05 EDT VITALS:    BP - 149/84  HR - 72  TEMP - 99.2 °F (37.3 °C) (Tympanic)  O2 SATS - 93%    COMPLEXITY OF CARE  Admission was considered but after careful review of the patient's presentation, physical examination, diagnostic results, and response to treatment the patient may be safely discharged with outpatient follow-up.      DIAGNOSIS  Final diagnoses:   Acute post-operative pain   RLQ abdominal pain         DISPOSITION  ED Disposition       ED Disposition   Discharge    Condition   Stable    Comment   --                 Please note that portions of this document were completed with a voice recognition program.    Note Disclaimer: At [facility], we believe that sharing information builds trust and better relationships. You are receiving this note because you recently visited [facility]. It is possible you will see health information before a provider has talked with you about it. This kind of information can be easy to misunderstand. To help you fully understand what it means for your health, we urge you to discuss this note with your provider.         Shilo Guillermo PA  10/01/24 100

## 2024-10-01 NOTE — ED NOTES
Lower abd pain.  He had gall bladder sx last Friday.  He started having pain yest when he ran out of pain meds

## 2024-10-04 ENCOUNTER — READMISSION MANAGEMENT (OUTPATIENT)
Dept: CALL CENTER | Facility: HOSPITAL | Age: 69
End: 2024-10-04
Payer: COMMERCIAL

## 2024-10-04 NOTE — OUTREACH NOTE
"General Surgery Week 1 Survey      Flowsheet Row Responses   [facility] patient discharged from? [facility]   Does the patient have one of the following disease processes/diagnoses(primary or secondary)? General Surgery   Week 1 attempt successful? Yes   Call start time 1239   Call end time 1302   Discharge diagnosis Alcohol-induced acute pancreatitis, ERCP, Lap dallin   Meds reviewed with patient/caregiver? Yes   Is the patient having any side effects they believe may be caused by any medication additions or changes? No   Does the patient have all medications related to this admission filled (includes all antibiotics, pain medications, etc.) Yes   Is the patient taking all medications as directed (includes completed medication regime)? Yes   Medication comments Pt reports he returned to ED since discharge r/t RLQ pain but no meds ordered at ED discharge, he has used Norco prescribed at hospital discharge.  He reports told to f/u with PCP for pain mgmt, he has no PCP.  Using tylenol for pain and a \"stiff drink\"--discouraged etoh for pain control.   Does the patient have a follow up appointment scheduled with their surgeon? No  [pt has not scheduled f/u and did not have AVS, was laying down at time of call.  This RN called pt back and left voice mail with surgeon phone number as well as number to [facility] for PCP.]   Has the patient kept scheduled appointments due by today? N/A   What is the Home health agency?  [facility]    Has home health visited the patient within 72 hours of discharge? No   Home health interventions Called Home Health agency   Home health comments Called HH, they do not have referral in system for pt . He does not have a PCP so would need to have PCP in place for HH orders, etc. Pt feels as if he would benefit from  services.   Psychosocial issues? Yes   Psychosocial comments Pt reports he still drinks etoh but has reduced since he retired from his job of 40 years, reports he talks with a PSY " regarding his issues.   Did the patient receive a copy of their discharge instructions? No  [uncertain of AVS whereabouts at time of call]   Nursing interventions Reviewed instructions with patient   What is the patient's perception of their health status since discharge? Same  [Patient wtih pain issues as noted, encouraged to f/u with surgoen, PCP.]   Nursing interventions Nurse provided patient education   Is the patient /caregiver able to teach back basic post-op care? No tub bath, swimming, or hot tub until instructed by MD, Keep incision areas clean,dry and protected, Lifting as instructed by MD in discharge instructions   Is the patient/caregiver able to teach back signs and symptoms of incisional infection? Increased redness, swelling or pain at the incisonal site, Increased drainage or bleeding, Incisional warmth, Pus or odor from incision, Fever  [Pt still has dressing on site, reviewed removal and site care, s/s infection.]   Is the patient/caregiver able to teach back steps to recovery at home? Rest and rebuild strength, gradually increase activity   Is the patient/caregiver able to teach back the hierarchy of who to call/visit for symptoms/problems? PCP, Specialist, Home health nurse, Urgent Care, ED, 911 Yes   Week 1 call completed? Yes   Graduated Yes   Is the patient interested in additional calls from an ambulatory ? Yes  [agreeable to assistance from ALBERTO RAMON]   What is the reason the patient needs support from an ACM? Care Coordination, High Risk For ED/Readmission, Barriers to Care, Disease Education  [Pt needs assistance with HH referral, new PCP]   Call end time 1302            FENG CANTU - Registered Nurse

## 2024-10-07 VITALS
OXYGEN SATURATION: 98 % | TEMPERATURE: 99.2 F | WEIGHT: 185 LBS | SYSTOLIC BLOOD PRESSURE: 176 MMHG | RESPIRATION RATE: 16 BRPM | DIASTOLIC BLOOD PRESSURE: 117 MMHG | BODY MASS INDEX: 25.06 KG/M2 | HEIGHT: 72 IN | HEART RATE: 71 BPM

## 2024-10-07 PROCEDURE — 99283 EMERGENCY DEPT VISIT LOW MDM: CPT

## 2024-10-08 ENCOUNTER — HOSPITAL ENCOUNTER (EMERGENCY)
Facility: HOSPITAL | Age: 69
Discharge: HOME | End: 2024-10-08
Attending: EMERGENCY MEDICINE
Payer: COMMERCIAL

## 2024-10-08 DIAGNOSIS — R51.9 ACUTE NONINTRACTABLE HEADACHE, UNSPECIFIED HEADACHE TYPE: Primary | ICD-10-CM

## 2024-10-08 DIAGNOSIS — R19.7 DIARRHEA, UNSPECIFIED TYPE: ICD-10-CM

## 2024-10-08 DIAGNOSIS — I10 HYPERTENSION, UNSPECIFIED TYPE: ICD-10-CM

## 2024-10-08 LAB
ALBUMIN SERPL-MCNC: 3.8 G/DL (ref 3.5–5.2)
ALBUMIN/GLOB SERPL: 1.3 G/DL
ALP SERPL-CCNC: 118 U/L (ref 39–117)
ALT SERPL W P-5'-P-CCNC: 17 U/L (ref 1–41)
ANION GAP SERPL CALCULATED.3IONS-SCNC: 12.3 MMOL/L (ref 5–15)
APTT PPP: 29.3 SECONDS (ref 22.7–35.4)
AST SERPL-CCNC: 97 U/L (ref 1–40)
BASOPHILS # BLD AUTO: 0.05 10*3/MM3 (ref 0–0.2)
BASOPHILS NFR BLD AUTO: 1 % (ref 0–1.5)
BILIRUB SERPL-MCNC: 0.7 MG/DL (ref 0–1.2)
BUN SERPL-MCNC: 4 MG/DL (ref 8–23)
BUN/CREAT SERPL: 5.6 (ref 7–25)
CALCIUM SPEC-SCNC: 8.4 MG/DL (ref 8.6–10.5)
CHLORIDE SERPL-SCNC: 99 MMOL/L (ref 98–107)
CO2 SERPL-SCNC: 25.7 MMOL/L (ref 22–29)
CREAT SERPL-MCNC: 0.71 MG/DL (ref 0.76–1.27)
DEPRECATED RDW RBC AUTO: 51.4 FL (ref 37–54)
EGFRCR SERPLBLD CKD-EPI 2021: 100.6 ML/MIN/1.73
EOSINOPHIL # BLD AUTO: 0.1 10*3/MM3 (ref 0–0.4)
EOSINOPHIL NFR BLD AUTO: 1.9 % (ref 0.3–6.2)
ERYTHROCYTE [DISTWIDTH] IN BLOOD BY AUTOMATED COUNT: 14.1 % (ref 12.3–15.4)
ETHANOL BLD-MCNC: <10 MG/DL (ref 0–10)
ETHANOL UR QL: <0.01 %
GLOBULIN UR ELPH-MCNC: 2.9 GM/DL
GLUCOSE SERPL-MCNC: 91 MG/DL (ref 65–99)
HCT VFR BLD AUTO: 41.5 % (ref 37.5–51)
HGB BLD-MCNC: 13.8 G/DL (ref 13–17.7)
IMM GRANULOCYTES # BLD AUTO: 0.02 10*3/MM3 (ref 0–0.05)
IMM GRANULOCYTES NFR BLD AUTO: 0.4 % (ref 0–0.5)
INR PPP: 1.16 (ref 0.9–1.1)
LIPASE SERPL-CCNC: 94 U/L (ref 13–60)
LYMPHOCYTES # BLD AUTO: 1.43 10*3/MM3 (ref 0.7–3.1)
LYMPHOCYTES NFR BLD AUTO: 27.2 % (ref 19.6–45.3)
MCH RBC QN AUTO: 33.3 PG (ref 26.6–33)
MCHC RBC AUTO-ENTMCNC: 33.3 G/DL (ref 31.5–35.7)
MCV RBC AUTO: 100 FL (ref 79–97)
MONOCYTES # BLD AUTO: 0.58 10*3/MM3 (ref 0.1–0.9)
MONOCYTES NFR BLD AUTO: 11 % (ref 5–12)
NEUTROPHILS NFR BLD AUTO: 3.08 10*3/MM3 (ref 1.7–7)
NEUTROPHILS NFR BLD AUTO: 58.5 % (ref 42.7–76)
NRBC BLD AUTO-RTO: 0 /100 WBC (ref 0–0.2)
PLATELET # BLD AUTO: 249 10*3/MM3 (ref 140–450)
PMV BLD AUTO: 9 FL (ref 6–12)
POTASSIUM SERPL-SCNC: 3.5 MMOL/L (ref 3.5–5.2)
PROT SERPL-MCNC: 6.7 G/DL (ref 6–8.5)
PROTHROMBIN TIME: 15.1 SECONDS (ref 11.7–14.2)
RBC # BLD AUTO: 4.15 10*6/MM3 (ref 4.14–5.8)
SODIUM SERPL-SCNC: 137 MMOL/L (ref 136–145)
WBC NRBC COR # BLD AUTO: 5.26 10*3/MM3 (ref 3.4–10.8)

## 2024-10-08 PROCEDURE — 96374 THER/PROPH/DIAG INJ IV PUSH: CPT

## 2024-10-08 PROCEDURE — 85610 PROTHROMBIN TIME: CPT | Performed by: EMERGENCY MEDICINE

## 2024-10-08 PROCEDURE — 85025 COMPLETE CBC W/AUTO DIFF WBC: CPT | Performed by: EMERGENCY MEDICINE

## 2024-10-08 PROCEDURE — 83690 ASSAY OF LIPASE: CPT | Performed by: EMERGENCY MEDICINE

## 2024-10-08 PROCEDURE — 82077 ASSAY SPEC XCP UR&BREATH IA: CPT | Performed by: EMERGENCY MEDICINE

## 2024-10-08 PROCEDURE — 25010000002 PROCHLORPERAZINE 10 MG/2ML SOLUTION: Performed by: EMERGENCY MEDICINE

## 2024-10-08 PROCEDURE — 25010000002 DIPHENHYDRAMINE PER 50 MG: Performed by: EMERGENCY MEDICINE

## 2024-10-08 PROCEDURE — 80053 COMPREHEN METABOLIC PANEL: CPT | Performed by: EMERGENCY MEDICINE

## 2024-10-08 PROCEDURE — 85730 THROMBOPLASTIN TIME PARTIAL: CPT | Performed by: EMERGENCY MEDICINE

## 2024-10-08 PROCEDURE — 96375 TX/PRO/DX INJ NEW DRUG ADDON: CPT

## 2024-10-08 PROCEDURE — 25810000003 SODIUM CHLORIDE 0.9 % SOLUTION: Performed by: EMERGENCY MEDICINE

## 2024-10-08 PROCEDURE — 25010000002 KETOROLAC TROMETHAMINE PER 15 MG: Performed by: EMERGENCY MEDICINE

## 2024-10-08 RX ORDER — KETOROLAC TROMETHAMINE 15 MG/ML
15 INJECTION, SOLUTION INTRAMUSCULAR; INTRAVENOUS ONCE
Status: COMPLETED | OUTPATIENT
Start: 2024-10-08 | End: 2024-10-08

## 2024-10-08 RX ORDER — PROCHLORPERAZINE EDISYLATE 5 MG/ML
10 INJECTION INTRAMUSCULAR; INTRAVENOUS ONCE
Status: COMPLETED | OUTPATIENT
Start: 2024-10-08 | End: 2024-10-08

## 2024-10-08 RX ORDER — DIPHENHYDRAMINE HYDROCHLORIDE 50 MG/ML
25 INJECTION INTRAMUSCULAR; INTRAVENOUS ONCE
Status: COMPLETED | OUTPATIENT
Start: 2024-10-08 | End: 2024-10-08

## 2024-10-08 RX ORDER — SODIUM CHLORIDE 0.9 % (FLUSH) 0.9 %
10 SYRINGE (ML) INJECTION AS NEEDED
Status: DISCONTINUED | OUTPATIENT
Start: 2024-10-08 | End: 2024-10-08 | Stop reason: HOSPADM

## 2024-10-08 RX ADMIN — SODIUM CHLORIDE 1000 ML: 9 INJECTION, SOLUTION INTRAVENOUS at 01:23

## 2024-10-08 RX ADMIN — DIPHENHYDRAMINE HYDROCHLORIDE 25 MG: 50 INJECTION, SOLUTION INTRAMUSCULAR; INTRAVENOUS at 01:20

## 2024-10-08 RX ADMIN — KETOROLAC TROMETHAMINE 15 MG: 15 INJECTION, SOLUTION INTRAMUSCULAR; INTRAVENOUS at 01:18

## 2024-10-08 RX ADMIN — PROCHLORPERAZINE EDISYLATE 10 MG: 5 INJECTION INTRAMUSCULAR; INTRAVENOUS at 01:21

## 2024-10-08 NOTE — ED PROVIDER NOTES
EMERGENCY DEPARTMENT ENCOUNTER    Room Number:  24/24  PCP: Provider, No Known  Historian: Patient      HPI:  Chief Complaint: Headache  A complete HPI/ROS/PMH/PSH/SH/FH are unobtainable due to: None  Context: Pro Mueller is a 69 y.o. male who presents to the ED c/o fairly sudden onset of a generalized global headache that was present upon awakening this morning and has worsened throughout the day and evening tonight.  He states that it was mild initially and is moderate in intensity now.  He is also had several episodes of watery diarrhea throughout the day.  However, he reports that the watery diarrhea has eased up over the last several hours.  He denies abdominal pain, chest pain, back pain, nausea/vomiting, or fever/chills.            PAST MEDICAL HISTORY  Active Ambulatory Problems     Diagnosis Date Noted    Fall 08/08/2016    Alcoholism in recovery 08/08/2016    Atopic rhinitis 08/08/2016    Mixed anxiety depressive disorder 08/08/2016    Genital herpes simplex 08/08/2016    Hyperlipidemia 08/08/2016    Hypertension 08/08/2016    Hypothyroidism 08/08/2016    Insomnia 08/08/2016    Panic disorder without agoraphobia 08/08/2016    Persistent insomnia 08/08/2016    Vitamin D deficiency 08/08/2016    Chronic low back pain 11/11/2016    Neuropathy involving both lower extremities 10/25/2018    QT prolongation 01/03/2019    Left shoulder pain 11/19/2019    Nausea and vomiting 01/11/2020    Pancytopenia 01/14/2020    Left ventricular diastolic dysfunction 01/16/2021    Tremor 10/06/2021    C5 cervical fracture 11/09/2021    Syncope and collapse 01/07/2022    Neck pain 01/07/2022    Alcoholic intoxication with complication 03/13/2022    Withdrawal symptoms, alcohol 07/01/2022    Transaminitis 07/01/2022    Lumbar facet arthropathy 07/20/2022    Alcohol dependence with withdrawal 09/11/2022    Midline low back pain with right-sided sciatica 09/15/2022    Spondylolisthesis at L4-L5 level 09/15/2022    Lumbar canal  stenosis 09/15/2022    Alcohol cessation counseling 09/15/2022    Need for assistance due to reduced mobility 09/15/2022    Impaired mobility and ADLs 09/15/2022    Alcohol withdrawal syndrome without complication 02/18/2023    Facial laceration 02/18/2023    Orthostatic hypotension 02/18/2023    Acute bacterial conjunctivitis of right eye 03/02/2023    Visit for suture removal 03/02/2023    Renal calculi 03/14/2023    Hepatic steatosis 03/15/2023    Alcohol withdrawal syndrome with complication 04/14/2023    Macrocytosis without anemia 04/14/2023    Lumbosacral spondylosis without myelopathy 05/05/2023    Elevated LFTs 05/13/2023    Alcohol-induced acute pancreatitis, unspecified complication status 06/23/2023    Pancreatitis 03/06/2024    Generalized abdominal pain 03/07/2024    Alcohol withdrawal 03/07/2024    Lactic acidosis 09/20/2024    Acute alcohol intoxication in patient with alcoholism with blood alcohol level 0.08 to 0.29, with unspecified complication 09/20/2024    Calculus of gallbladder with cholecystitis without biliary obstruction 09/20/2024     Resolved Ambulatory Problems     Diagnosis Date Noted    Impacted cerumen 08/08/2016    Influenza 08/08/2016    Motion sickness 08/08/2016    Seasonal allergic rhinitis 08/08/2016    Upper respiratory tract infection 08/08/2016    Alcohol withdrawal 11/04/2016    Headache 11/05/2016    Gastritis 11/05/2016    Olecranon bursitis of right elbow 04/05/2017    Sciatica of left side 06/12/2018    Syncope and collapse 01/02/2019    Alcoholic ketoacidosis 01/12/2020    Hyponatremia 01/13/2020    Hypokalemia 01/13/2020    Alcohol dependence with uncomplicated withdrawal 01/14/2020    Nephrolithiasis 02/12/2020    Hypomagnesemia 01/16/2021    Non-traumatic rhabdomyolysis 01/18/2021    Urine retention 01/21/2021    Epigastric pain 06/23/2023    Dehydration 02/24/2024     Past Medical History:   Diagnosis Date    Alcohol abuse     Allergic 1970    Anxiety     Arthritis      Depression     Disease of thyroid gland     Elevated cholesterol     Encounter for removal of sutures     GERD (gastroesophageal reflux disease)     Headache, tension-type     Kidney stone     Low back pain     Migraine     Olecranon bursitis, right elbow     Peripheral neuropathy     Sleep apnea          PAST SURGICAL HISTORY  Past Surgical History:   Procedure Laterality Date    CHOLECYSTECTOMY N/A 2024    Procedure: CHOLECYSTECTOMY LAPAROSCOPIC WITH DAVINCI ROBOT with cholangiogram, possible open;  Surgeon: Toro Noguera MD;  Location: [facility] MAIN OR;  Service: General;  Laterality: N/A;    COLONOSCOPY      CYST REMOVAL      CYSTOSCOPY BLADDER STONE LITHOTRIPSY  2022    ERCP N/A 2024    Procedure: ENDOSCOPIC RETROGRADE CHOLANGIOPANCREATOGRAPHY sphincterotomy, balloon sweep ();  Surgeon: Fara Madrigal MD;  Location: [facility] ENDOSCOPY;  Service: Gastroenterology;  Laterality: N/A;  sphincterotomy hiatial hernia, gallstone removal    EXTRACORPOREAL SHOCK WAVE LITHOTRIPSY (ESWL) Right     SHOULDER ARTHROSCOPY Right 2019    Procedure: SHOULDER ARTHROSCOPY, decompression, distal clavicle excision;  Surgeon: Aj Mancilla MD;  Location: [facility] OR [facility];  Service: Orthopedics    TONSILLECTOMY           FAMILY HISTORY  Family History   Problem Relation Age of Onset    Alzheimer's disease Mother     Mental illness Mother     Pancreatic cancer Father     Hearing loss Father     Arthritis Maternal Grandmother     Malig Hyperthermia Neg Hx          SOCIAL HISTORY  Social History     Socioeconomic History    Marital status: Single   Tobacco Use    Smoking status: Former     Current packs/day: 0.00     Average packs/day: 0.5 packs/day for 15.0 years (7.5 ttl pk-yrs)     Types: Cigarettes     Start date: 1990     Quit date: 2005     Years since quittin.7    Smokeless tobacco: Never    Tobacco comments:     caffeine - 3 cans of coke daily    Vaping Use    Vaping status: Never  "Used   Substance and Sexual Activity    Alcohol use: Yes     Alcohol/week: 7.0 standard drinks of alcohol     Types: 7 Shots of liquor per week     Comment: .5 liter vodka    Drug use: Yes     Types: Methamphetamines     Comment: \"once in a rare while\"    Sexual activity: Yes     Partners: Female     Birth control/protection: Condom         ALLERGIES  Penicillins        REVIEW OF SYSTEMS  Review of Systems   Constitutional:  Negative for activity change, appetite change and fever.   HENT:  Negative for congestion and sore throat.    Eyes: Negative.    Respiratory:  Negative for cough and shortness of breath.    Cardiovascular:  Negative for chest pain and leg swelling.   Gastrointestinal:  Positive for diarrhea. Negative for abdominal pain and vomiting.   Endocrine: Negative.    Genitourinary:  Negative for decreased urine volume and dysuria.   Musculoskeletal:  Negative for neck pain.   Skin:  Negative for rash and wound.   Allergic/Immunologic: Negative.    Neurological:  Positive for headaches. Negative for weakness and numbness.   Hematological: Negative.    Psychiatric/Behavioral: Negative.     All other systems reviewed and are negative.         PHYSICAL EXAM  ED Triage Vitals [10/07/24 2152]   Temp Heart Rate Resp BP SpO2   99.2 °F (37.3 °C) 71 16 (!) 176/117 98 %      Temp src Heart Rate Source Patient Position BP Location FiO2 (%)   Oral Monitor Lying Right arm --       Physical Exam  Constitutional:       General: He is not in acute distress.     Appearance: Normal appearance. He is not ill-appearing or toxic-appearing.   HENT:      Head: Normocephalic and atraumatic.   Eyes:      Extraocular Movements: Extraocular movements intact.      Pupils: Pupils are equal, round, and reactive to light.   Cardiovascular:      Rate and Rhythm: Normal rate and regular rhythm.      Heart sounds: No murmur heard.     No friction rub. No gallop.   Pulmonary:      Effort: Pulmonary effort is normal.      Breath sounds: " Normal breath sounds.   Abdominal:      General: Abdomen is flat. There is no distension.      Palpations: Abdomen is soft.      Tenderness: There is no abdominal tenderness.   Musculoskeletal:         General: No swelling or tenderness. Normal range of motion.      Cervical back: Normal range of motion and neck supple.   Skin:     General: Skin is warm and dry.   Neurological:      General: No focal deficit present.      Mental Status: He is alert and oriented to person, place, and time.      Sensory: No sensory deficit.      Motor: No weakness.   Psychiatric:         Mood and Affect: Mood normal.         Behavior: Behavior normal.         Vital signs and nursing notes reviewed.          LAB RESULTS  Recent Results (from the past 24 hour(s))   Comprehensive Metabolic Panel    Collection Time: 10/08/24  1:12 AM    Specimen: Blood   Result Value Ref Range    Glucose 91 65 - 99 mg/dL    BUN 4 (L) 8 - 23 mg/dL    Creatinine 0.71 (L) 0.76 - 1.27 mg/dL    Sodium 137 136 - 145 mmol/L    Potassium 3.5 3.5 - 5.2 mmol/L    Chloride 99 98 - 107 mmol/L    CO2 25.7 22.0 - 29.0 mmol/L    Calcium 8.4 (L) 8.6 - 10.5 mg/dL    Total Protein 6.7 6.0 - 8.5 g/dL    Albumin 3.8 3.5 - 5.2 g/dL    ALT (SGPT) 17 1 - 41 U/L    AST (SGOT) 97 (H) 1 - 40 U/L    Alkaline Phosphatase 118 (H) 39 - 117 U/L    Total Bilirubin 0.7 0.0 - 1.2 mg/dL    Globulin 2.9 gm/dL    A/G Ratio 1.3 g/dL    BUN/Creatinine Ratio 5.6 (L) 7.0 - 25.0    Anion Gap 12.3 5.0 - 15.0 mmol/L    eGFR 100.6 >60.0 mL/min/1.73   Protime-INR    Collection Time: 10/08/24  1:12 AM    Specimen: Blood   Result Value Ref Range    Protime 15.1 (H) 11.7 - 14.2 Seconds    INR 1.16 (H) 0.90 - 1.10   aPTT    Collection Time: 10/08/24  1:12 AM    Specimen: Blood   Result Value Ref Range    PTT 29.3 22.7 - 35.4 seconds   Lipase    Collection Time: 10/08/24  1:12 AM    Specimen: Blood   Result Value Ref Range    Lipase 94 (H) 13 - 60 U/L   CBC Auto Differential    Collection Time: 10/08/24   1:12 AM    Specimen: Blood   Result Value Ref Range    WBC 5.26 3.40 - 10.80 10*3/mm3    RBC 4.15 4.14 - 5.80 10*6/mm3    Hemoglobin 13.8 13.0 - 17.7 g/dL    Hematocrit 41.5 37.5 - 51.0 %    .0 (H) 79.0 - 97.0 fL    MCH 33.3 (H) 26.6 - 33.0 pg    MCHC 33.3 31.5 - 35.7 g/dL    RDW 14.1 12.3 - 15.4 %    RDW-SD 51.4 37.0 - 54.0 fl    MPV 9.0 6.0 - 12.0 fL    Platelets 249 140 - 450 10*3/mm3    Neutrophil % 58.5 42.7 - 76.0 %    Lymphocyte % 27.2 19.6 - 45.3 %    Monocyte % 11.0 5.0 - 12.0 %    Eosinophil % 1.9 0.3 - 6.2 %    Basophil % 1.0 0.0 - 1.5 %    Immature Grans % 0.4 0.0 - 0.5 %    Neutrophils, Absolute 3.08 1.70 - 7.00 10*3/mm3    Lymphocytes, Absolute 1.43 0.70 - 3.10 10*3/mm3    Monocytes, Absolute 0.58 0.10 - 0.90 10*3/mm3    Eosinophils, Absolute 0.10 0.00 - 0.40 10*3/mm3    Basophils, Absolute 0.05 0.00 - 0.20 10*3/mm3    Immature Grans, Absolute 0.02 0.00 - 0.05 10*3/mm3    nRBC 0.0 0.0 - 0.2 /100 WBC   Ethanol    Collection Time: 10/08/24  1:12 AM    Specimen: Blood   Result Value Ref Range    Ethanol <10 0 - 10 mg/dL    Ethanol % <0.010 %       Ordered the above labs and reviewed the results.        RADIOLOGY  No Radiology Exams Resulted Within Past 24 Hours    Ordered the above noted radiological studies. Reviewed by me in PACS.            PROCEDURES  Procedures          MEDICATIONS GIVEN IN ER  Medications   sodium chloride 0.9 % flush 10 mL (has no administration in time range)   sodium chloride 0.9 % bolus 1,000 mL (0 mL Intravenous Stopped 10/8/24 0306)   ketorolac (TORADOL) injection 15 mg (15 mg Intravenous Given 10/8/24 0118)   prochlorperazine (COMPAZINE) injection 10 mg (10 mg Intravenous Given 10/8/24 0121)   diphenhydrAMINE (BENADRYL) injection 25 mg (25 mg Intravenous Given 10/8/24 0120)                   MEDICAL DECISION MAKING, PROGRESS, and CONSULTS    All labs have been independently reviewed by me.  All radiology studies have been reviewed by me and I have also reviewed the  radiology report.   EKG's independently viewed and interpreted by me.  Discussion below represents my analysis of pertinent findings related to patient's condition, differential diagnosis, treatment plan and final disposition.      Additional sources:  - Discussed/ obtained information from independent historians: History obtained from the patient himself at bedside.    - External (non-ED) record review: Upon medical records review, the patient was admitted to the hospital last from 9/20/2024 through 9/25/2024 where he was treated for alcohol induced pancreatitis.    - Chronic or social conditions impacting care: Chronic alcoholism    - Shared decision making: Discharge decision based on shared conversations have between myself as well as the patient at bedside.      Orders placed during this visit:  Orders Placed This Encounter   Procedures    Comprehensive Metabolic Panel    Protime-INR    aPTT    Lipase    CBC Auto Differential    Ethanol    Insert Peripheral IV    CBC & Differential           Differential diagnosis includes but is not limited to:    Migraine headache, dehydration, sepsis, severe electrolyte disturbance, acute renal insufficiency, or acute CVA      Independent interpretation of labs, radiology studies, and discussions with consultants:    Laboratory results independently interpreted by myself with my interpretation showing a normal CBC as well as normal electrolytes seen on CMP.        ED Course as of 10/08/24 0308   Tue Oct 08, 2024   0228 On reevaluation, the patient reports that his headache at this point is almost essentially fully resolved.  He has had no further episodes of diarrhea while here in the emergency room.  I informed him that his electrolytes are fairly unremarkable and his workup is benign.  He is showing no red flag symptoms so I do not think a head CT is warranted at this point  Especially given the resolution of the headache.  At this point, he is stable for discharge and all  questions answered. [BM]      ED Course User Index  [BM] Atif Long MD           DIAGNOSIS  Final diagnoses:   Acute nonintractable headache, unspecified headache type   Diarrhea, unspecified type   Hypertension, unspecified type         DISPOSITION  DISCHARGE    Patient discharged in stable condition.    Reviewed implications of results, diagnosis, meds, responsibility to follow up, warning signs and symptoms of possible worsening, potential complications and reasons to return to ER.    Patient/Family voiced understanding of above instructions.    Discussed plan for discharge, as there is no emergent indication for admission. Patient referred to primary care provider for BP management due to today's BP. Pt/family is agreeable and understands need for follow up and repeat testing.  Pt is aware that discharge does not mean that nothing is wrong but it indicates no emergency is present that requires admission and they must continue care with follow-up as given below or physician of their choice.     FOLLOW-UP  [facility]  [address] [place] [id number] [phone]  Schedule an appointment as soon as possible for a visit            Medication List        Changed      fluticasone 50 MCG/ACT nasal spray  Commonly known as: FLONASE  2 sprays into the nostril(s) as directed by provider Daily.  What changed:   when to take this  reasons to take this                        Latest Documented Vital Signs:  As of 03:08 EDT  BP- (!) 176/117 HR- 71 Temp- 99.2 °F (37.3 °C) (Oral) O2 sat- 98%              --    Please note that portions of this were completed with a voice recognition program.       Note Disclaimer: At [facility], we believe that sharing information builds trust and better relationships. You are receiving this note because you are receiving care at [facility] or recently visited. It is possible you will see health information before a  provider has talked with you about it. This kind of information can be easy to misunderstand. To help you fully understand what it means for your health, we urge you to discuss this note with your provider.             Atif Long MD  10/08/24 0309

## 2024-10-14 ENCOUNTER — PATIENT OUTREACH (OUTPATIENT)
Dept: CASE MANAGEMENT | Facility: OTHER | Age: 69
End: 2024-10-14
Payer: COMMERCIAL

## 2024-10-14 NOTE — OUTREACH NOTE
AMBULATORY CASE MANAGEMENT NOTE    Names and Relationships of Patient/Support Persons: Contact: Pro Mueller; Relationship: Self -     Patient Outreach    Outgoing call to the patient for ED follow up.  Introduced self and explained role and purpose of outreach.  He denies questions or needs related to his ED visit.  Discussed following up with his PCP.  He was provided the number for assistance with obtaining a new PCP [place] [facility] [place] and a [employer] message was sent with this phone number as well.  He declines HRCM at this time but verbalized understanding of how to discuss with his future PCP and obtain a referral if he is interested in the future or he can contact [facility] and contact information was provided.       Kaylynn MELCHOR  Ambulatory Case Management    10/14/2024, 14:28 EDT

## 2024-12-02 ENCOUNTER — APPOINTMENT (OUTPATIENT)
Dept: CT IMAGING | Facility: HOSPITAL | Age: 69
End: 2024-12-02
Payer: MEDICARE

## 2024-12-02 ENCOUNTER — HOSPITAL ENCOUNTER (INPATIENT)
Facility: HOSPITAL | Age: 69
LOS: 8 days | Discharge: NURSING, REHAB OR LONG-TERM CARE | End: 2024-12-10
Attending: EMERGENCY MEDICINE | Admitting: INTERNAL MEDICINE
Payer: MEDICARE

## 2024-12-02 ENCOUNTER — APPOINTMENT (OUTPATIENT)
Dept: CARDIOLOGY | Facility: HOSPITAL | Age: 69
End: 2024-12-02
Payer: MEDICARE
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
## 2024-12-02 DIAGNOSIS — K85.20 ALCOHOL-INDUCED ACUTE PANCREATITIS, UNSPECIFIED COMPLICATION STATUS: Primary | ICD-10-CM

## 2024-12-02 DIAGNOSIS — K80.00 CALCULUS OF GALLBLADDER WITH ACUTE CHOLECYSTITIS WITHOUT OBSTRUCTION: ICD-10-CM

## 2024-12-02 DIAGNOSIS — I49.3 PVC'S (PREMATURE VENTRICULAR CONTRACTIONS): ICD-10-CM

## 2024-12-02 DIAGNOSIS — B37.49 CANDIDIASIS OF SCROTUM: ICD-10-CM

## 2024-12-02 DIAGNOSIS — E87.1 HYPONATREMIA: ICD-10-CM

## 2024-12-02 DIAGNOSIS — T68.XXXA HYPOTHERMIA, INITIAL ENCOUNTER: ICD-10-CM

## 2024-12-02 PROBLEM — F10.20 ALCOHOL DEPENDENCE: Status: ACTIVE | Noted: 2022-09-11

## 2024-12-02 LAB
ALBUMIN SERPL-MCNC: 3.6 G/DL (ref 3.5–5.2)
ALBUMIN/GLOB SERPL: 1.1 G/DL
ALP SERPL-CCNC: 119 U/L (ref 39–117)
ALT SERPL W P-5'-P-CCNC: 42 U/L (ref 1–41)
ANION GAP SERPL CALCULATED.3IONS-SCNC: 21.3 MMOL/L (ref 5–15)
ANION GAP SERPL CALCULATED.3IONS-SCNC: 24 MMOL/L (ref 5–15)
AST SERPL-CCNC: 113 U/L (ref 1–40)
BASOPHILS # BLD AUTO: 0.1 10*3/MM3 (ref 0–0.2)
BASOPHILS NFR BLD AUTO: 0.7 % (ref 0–1.5)
BILIRUB SERPL-MCNC: 0.7 MG/DL (ref 0–1.2)
BUN SERPL-MCNC: 32 MG/DL (ref 8–23)
BUN SERPL-MCNC: 35 MG/DL (ref 8–23)
BUN/CREAT SERPL: 34.7 (ref 7–25)
BUN/CREAT SERPL: 34.8 (ref 7–25)
CALCIUM SPEC-SCNC: 8.8 MG/DL (ref 8.6–10.5)
CALCIUM SPEC-SCNC: 9.4 MG/DL (ref 8.6–10.5)
CHLORIDE SERPL-SCNC: 85 MMOL/L (ref 98–107)
CHLORIDE SERPL-SCNC: 89 MMOL/L (ref 98–107)
CHLORIDE UR-SCNC: <20 MMOL/L
CK SERPL-CCNC: 120 U/L (ref 20–200)
CK SERPL-CCNC: 98 U/L (ref 20–200)
CO2 SERPL-SCNC: 18.7 MMOL/L (ref 22–29)
CO2 SERPL-SCNC: 19 MMOL/L (ref 22–29)
CREAT SERPL-MCNC: 0.92 MG/DL (ref 0.76–1.27)
CREAT SERPL-MCNC: 1.01 MG/DL (ref 0.76–1.27)
DEPRECATED RDW RBC AUTO: 57.3 FL (ref 37–54)
DEPRECATED RDW RBC AUTO: 58.2 FL (ref 37–54)
EGFRCR SERPLBLD CKD-EPI 2021: 81.5 ML/MIN/1.73
EGFRCR SERPLBLD CKD-EPI 2021: 91.2 ML/MIN/1.73
EOSINOPHIL # BLD AUTO: 0.02 10*3/MM3 (ref 0–0.4)
EOSINOPHIL NFR BLD AUTO: 0.1 % (ref 0.3–6.2)
ERYTHROCYTE [DISTWIDTH] IN BLOOD BY AUTOMATED COUNT: 16.8 % (ref 12.3–15.4)
ERYTHROCYTE [DISTWIDTH] IN BLOOD BY AUTOMATED COUNT: 17 % (ref 12.3–15.4)
ETHANOL BLD-MCNC: <10 MG/DL (ref 0–10)
ETHANOL UR QL: <0.01 %
GEN 5 1HR TROPONIN T REFLEX: 21 NG/L
GLOBULIN UR ELPH-MCNC: 3.4 GM/DL
GLUCOSE SERPL-MCNC: 90 MG/DL (ref 65–99)
GLUCOSE SERPL-MCNC: 93 MG/DL (ref 65–99)
HCT VFR BLD AUTO: 36.2 % (ref 37.5–51)
HCT VFR BLD AUTO: 41.1 % (ref 37.5–51)
HGB BLD-MCNC: 12.9 G/DL (ref 13–17.7)
HGB BLD-MCNC: 14.5 G/DL (ref 13–17.7)
IMM GRANULOCYTES # BLD AUTO: 0.32 10*3/MM3 (ref 0–0.05)
IMM GRANULOCYTES NFR BLD AUTO: 2.2 % (ref 0–0.5)
LIPASE SERPL-CCNC: 425 U/L (ref 13–60)
LYMPHOCYTES # BLD AUTO: 0.76 10*3/MM3 (ref 0.7–3.1)
LYMPHOCYTES NFR BLD AUTO: 5.3 % (ref 19.6–45.3)
MAGNESIUM SERPL-MCNC: 1.6 MG/DL (ref 1.6–2.4)
MCH RBC QN AUTO: 33.3 PG (ref 26.6–33)
MCH RBC QN AUTO: 34.1 PG (ref 26.6–33)
MCHC RBC AUTO-ENTMCNC: 35.3 G/DL (ref 31.5–35.7)
MCHC RBC AUTO-ENTMCNC: 35.6 G/DL (ref 31.5–35.7)
MCV RBC AUTO: 94.3 FL (ref 79–97)
MCV RBC AUTO: 95.8 FL (ref 79–97)
MONOCYTES # BLD AUTO: 1.24 10*3/MM3 (ref 0.1–0.9)
MONOCYTES NFR BLD AUTO: 8.7 % (ref 5–12)
NEUTROPHILS NFR BLD AUTO: 11.86 10*3/MM3 (ref 1.7–7)
NEUTROPHILS NFR BLD AUTO: 83 % (ref 42.7–76)
NRBC BLD AUTO-RTO: 0.3 /100 WBC (ref 0–0.2)
OSMOLALITY UR: 637 MOSM/KG
PLATELET # BLD AUTO: 200 10*3/MM3 (ref 140–450)
PLATELET # BLD AUTO: 225 10*3/MM3 (ref 140–450)
PMV BLD AUTO: 9.2 FL (ref 6–12)
PMV BLD AUTO: 9.4 FL (ref 6–12)
POTASSIUM SERPL-SCNC: 3.6 MMOL/L (ref 3.5–5.2)
POTASSIUM SERPL-SCNC: 4.2 MMOL/L (ref 3.5–5.2)
PROT SERPL-MCNC: 7 G/DL (ref 6–8.5)
QT INTERVAL: 397 MS
QT INTERVAL: 431 MS
QTC INTERVAL: 461 MS
QTC INTERVAL: 562 MS
RBC # BLD AUTO: 3.78 10*6/MM3 (ref 4.14–5.8)
RBC # BLD AUTO: 4.36 10*6/MM3 (ref 4.14–5.8)
SODIUM SERPL-SCNC: 128 MMOL/L (ref 136–145)
SODIUM SERPL-SCNC: 129 MMOL/L (ref 136–145)
SODIUM UR-SCNC: <20 MMOL/L
T4 FREE SERPL-MCNC: 1.02 NG/DL (ref 0.92–1.68)
TROPONIN T NUMERIC DELTA: -1 NG/L
TROPONIN T SERPL HS-MCNC: 22 NG/L
TSH SERPL DL<=0.05 MIU/L-ACNC: 14.2 UIU/ML (ref 0.27–4.2)
WBC NRBC COR # BLD AUTO: 10.78 10*3/MM3 (ref 3.4–10.8)
WBC NRBC COR # BLD AUTO: 14.3 10*3/MM3 (ref 3.4–10.8)

## 2024-12-02 PROCEDURE — 80053 COMPREHEN METABOLIC PANEL: CPT | Performed by: NURSE PRACTITIONER

## 2024-12-02 PROCEDURE — 93005 ELECTROCARDIOGRAM TRACING: CPT | Performed by: EMERGENCY MEDICINE

## 2024-12-02 PROCEDURE — 70450 CT HEAD/BRAIN W/O DYE: CPT

## 2024-12-02 PROCEDURE — 25010000002 ONDANSETRON PER 1 MG: Performed by: EMERGENCY MEDICINE

## 2024-12-02 PROCEDURE — 83735 ASSAY OF MAGNESIUM: CPT | Performed by: NURSE PRACTITIONER

## 2024-12-02 PROCEDURE — 93010 ELECTROCARDIOGRAM REPORT: CPT | Performed by: INTERNAL MEDICINE

## 2024-12-02 PROCEDURE — 82550 ASSAY OF CK (CPK): CPT | Performed by: EMERGENCY MEDICINE

## 2024-12-02 PROCEDURE — 72131 CT LUMBAR SPINE W/O DYE: CPT

## 2024-12-02 PROCEDURE — 84443 ASSAY THYROID STIM HORMONE: CPT | Performed by: INTERNAL MEDICINE

## 2024-12-02 PROCEDURE — 84439 ASSAY OF FREE THYROXINE: CPT | Performed by: INTERNAL MEDICINE

## 2024-12-02 PROCEDURE — 25810000003 SODIUM CHLORIDE 0.9 % SOLUTION: Performed by: NURSE PRACTITIONER

## 2024-12-02 PROCEDURE — 93005 ELECTROCARDIOGRAM TRACING: CPT | Performed by: INTERNAL MEDICINE

## 2024-12-02 PROCEDURE — 85025 COMPLETE CBC W/AUTO DIFF WBC: CPT | Performed by: EMERGENCY MEDICINE

## 2024-12-02 PROCEDURE — 83690 ASSAY OF LIPASE: CPT | Performed by: EMERGENCY MEDICINE

## 2024-12-02 PROCEDURE — 25010000002 THIAMINE PER 100 MG: Performed by: NURSE PRACTITIONER

## 2024-12-02 PROCEDURE — 36415 COLL VENOUS BLD VENIPUNCTURE: CPT

## 2024-12-02 PROCEDURE — 82436 ASSAY OF URINE CHLORIDE: CPT | Performed by: NURSE PRACTITIONER

## 2024-12-02 PROCEDURE — 99221 1ST HOSP IP/OBS SF/LOW 40: CPT | Performed by: NURSE PRACTITIONER

## 2024-12-02 PROCEDURE — 25510000001 PERFLUTREN 6.52 MG/ML SUSPENSION 2 ML VIAL: Performed by: INTERNAL MEDICINE

## 2024-12-02 PROCEDURE — 84300 ASSAY OF URINE SODIUM: CPT | Performed by: NURSE PRACTITIONER

## 2024-12-02 PROCEDURE — 72125 CT NECK SPINE W/O DYE: CPT

## 2024-12-02 PROCEDURE — 93306 TTE W/DOPPLER COMPLETE: CPT

## 2024-12-02 PROCEDURE — 25810000003 SODIUM CHLORIDE 0.9 % SOLUTION: Performed by: EMERGENCY MEDICINE

## 2024-12-02 PROCEDURE — 85027 COMPLETE CBC AUTOMATED: CPT | Performed by: NURSE PRACTITIONER

## 2024-12-02 PROCEDURE — 25010000002 THIAMINE HCL 200 MG/2ML SOLUTION: Performed by: NURSE PRACTITIONER

## 2024-12-02 PROCEDURE — 82077 ASSAY SPEC XCP UR&BREATH IA: CPT | Performed by: EMERGENCY MEDICINE

## 2024-12-02 PROCEDURE — 84484 ASSAY OF TROPONIN QUANT: CPT | Performed by: INTERNAL MEDICINE

## 2024-12-02 PROCEDURE — 82550 ASSAY OF CK (CPK): CPT | Performed by: NURSE PRACTITIONER

## 2024-12-02 PROCEDURE — 83935 ASSAY OF URINE OSMOLALITY: CPT | Performed by: NURSE PRACTITIONER

## 2024-12-02 PROCEDURE — 99285 EMERGENCY DEPT VISIT HI MDM: CPT

## 2024-12-02 PROCEDURE — 93306 TTE W/DOPPLER COMPLETE: CPT | Performed by: INTERNAL MEDICINE

## 2024-12-02 RX ORDER — LORAZEPAM 1 MG/1
1 TABLET ORAL
Status: DISPENSED | OUTPATIENT
Start: 2024-12-02 | End: 2024-12-07

## 2024-12-02 RX ORDER — SODIUM CHLORIDE 0.9 % (FLUSH) 0.9 %
10 SYRINGE (ML) INJECTION AS NEEDED
Status: DISCONTINUED | OUTPATIENT
Start: 2024-12-02 | End: 2024-12-10 | Stop reason: HOSPADM

## 2024-12-02 RX ORDER — LORAZEPAM 2 MG/ML
2 INJECTION INTRAMUSCULAR
Status: ACTIVE | OUTPATIENT
Start: 2024-12-02 | End: 2024-12-07

## 2024-12-02 RX ORDER — ONDANSETRON 2 MG/ML
4 INJECTION INTRAMUSCULAR; INTRAVENOUS ONCE
Status: COMPLETED | OUTPATIENT
Start: 2024-12-02 | End: 2024-12-02

## 2024-12-02 RX ORDER — LORAZEPAM 2 MG/ML
1 INJECTION INTRAMUSCULAR
Status: DISPENSED | OUTPATIENT
Start: 2024-12-02 | End: 2024-12-07

## 2024-12-02 RX ORDER — SODIUM CHLORIDE 9 MG/ML
40 INJECTION, SOLUTION INTRAVENOUS AS NEEDED
Status: DISCONTINUED | OUTPATIENT
Start: 2024-12-02 | End: 2024-12-10 | Stop reason: HOSPADM

## 2024-12-02 RX ORDER — FOLIC ACID 1 MG/1
1 TABLET ORAL DAILY
Status: DISCONTINUED | OUTPATIENT
Start: 2024-12-02 | End: 2024-12-10 | Stop reason: HOSPADM

## 2024-12-02 RX ORDER — NITROGLYCERIN 0.4 MG/1
0.4 TABLET SUBLINGUAL
Status: DISCONTINUED | OUTPATIENT
Start: 2024-12-02 | End: 2024-12-10 | Stop reason: HOSPADM

## 2024-12-02 RX ORDER — SODIUM CHLORIDE 0.9 % (FLUSH) 0.9 %
10 SYRINGE (ML) INJECTION EVERY 12 HOURS SCHEDULED
Status: DISCONTINUED | OUTPATIENT
Start: 2024-12-02 | End: 2024-12-10 | Stop reason: HOSPADM

## 2024-12-02 RX ORDER — BISACODYL 10 MG
10 SUPPOSITORY, RECTAL RECTAL DAILY PRN
Status: DISCONTINUED | OUTPATIENT
Start: 2024-12-02 | End: 2024-12-10 | Stop reason: HOSPADM

## 2024-12-02 RX ORDER — SODIUM BICARBONATE 650 MG/1
650 TABLET ORAL 3 TIMES DAILY
Status: DISCONTINUED | OUTPATIENT
Start: 2024-12-02 | End: 2024-12-03

## 2024-12-02 RX ORDER — AMOXICILLIN 250 MG
2 CAPSULE ORAL 2 TIMES DAILY PRN
Status: DISCONTINUED | OUTPATIENT
Start: 2024-12-02 | End: 2024-12-10 | Stop reason: HOSPADM

## 2024-12-02 RX ORDER — LEVOTHYROXINE SODIUM 100 UG/1
100 TABLET ORAL DAILY
Status: DISCONTINUED | OUTPATIENT
Start: 2024-12-02 | End: 2024-12-10 | Stop reason: HOSPADM

## 2024-12-02 RX ORDER — POLYETHYLENE GLYCOL 3350 17 G/17G
17 POWDER, FOR SOLUTION ORAL DAILY PRN
Status: DISCONTINUED | OUTPATIENT
Start: 2024-12-02 | End: 2024-12-10 | Stop reason: HOSPADM

## 2024-12-02 RX ORDER — NYSTATIN 100000 U/G
1 OINTMENT TOPICAL EVERY 12 HOURS SCHEDULED
Status: DISCONTINUED | OUTPATIENT
Start: 2024-12-02 | End: 2024-12-10 | Stop reason: HOSPADM

## 2024-12-02 RX ORDER — LORAZEPAM 1 MG/1
2 TABLET ORAL
Status: DISPENSED | OUTPATIENT
Start: 2024-12-02 | End: 2024-12-07

## 2024-12-02 RX ORDER — SODIUM CHLORIDE 9 MG/ML
100 INJECTION, SOLUTION INTRAVENOUS CONTINUOUS
Status: DISCONTINUED | OUTPATIENT
Start: 2024-12-02 | End: 2024-12-03

## 2024-12-02 RX ORDER — CALCIUM CARBONATE 500 MG/1
2 TABLET, CHEWABLE ORAL 2 TIMES DAILY PRN
Status: DISCONTINUED | OUTPATIENT
Start: 2024-12-02 | End: 2024-12-10 | Stop reason: HOSPADM

## 2024-12-02 RX ORDER — THIAMINE HYDROCHLORIDE 100 MG/ML
200 INJECTION, SOLUTION INTRAMUSCULAR; INTRAVENOUS EVERY 8 HOURS SCHEDULED
Status: COMPLETED | OUTPATIENT
Start: 2024-12-02 | End: 2024-12-06

## 2024-12-02 RX ORDER — BISACODYL 5 MG/1
5 TABLET, DELAYED RELEASE ORAL DAILY PRN
Status: DISCONTINUED | OUTPATIENT
Start: 2024-12-02 | End: 2024-12-10 | Stop reason: HOSPADM

## 2024-12-02 RX ADMIN — LORAZEPAM 2 MG: 1 TABLET ORAL at 07:39

## 2024-12-02 RX ADMIN — SODIUM BICARBONATE 650 MG TABLET 650 MG: at 22:37

## 2024-12-02 RX ADMIN — SODIUM CHLORIDE 1000 ML: 9 INJECTION, SOLUTION INTRAVENOUS at 01:32

## 2024-12-02 RX ADMIN — FOLIC ACID 1 MG: 1 TABLET ORAL at 11:21

## 2024-12-02 RX ADMIN — LEVOTHYROXINE SODIUM 100 MCG: 100 TABLET ORAL at 11:20

## 2024-12-02 RX ADMIN — NYSTATIN 1 APPLICATION: 100000 OINTMENT TOPICAL at 17:02

## 2024-12-02 RX ADMIN — THIAMINE HYDROCHLORIDE 200 MG: 100 INJECTION, SOLUTION INTRAMUSCULAR; INTRAVENOUS at 15:19

## 2024-12-02 RX ADMIN — NYSTATIN 1 APPLICATION: 100000 OINTMENT TOPICAL at 04:02

## 2024-12-02 RX ADMIN — SODIUM CHLORIDE 100 ML/HR: 9 INJECTION, SOLUTION INTRAVENOUS at 18:57

## 2024-12-02 RX ADMIN — THIAMINE HYDROCHLORIDE 200 MG: 100 INJECTION, SOLUTION INTRAMUSCULAR; INTRAVENOUS at 22:37

## 2024-12-02 RX ADMIN — Medication 10 ML: at 22:37

## 2024-12-02 RX ADMIN — SODIUM BICARBONATE 650 MG TABLET 650 MG: at 15:19

## 2024-12-02 RX ADMIN — SODIUM CHLORIDE 100 ML/HR: 9 INJECTION, SOLUTION INTRAVENOUS at 06:39

## 2024-12-02 RX ADMIN — NYSTATIN 1 APPLICATION: 100000 OINTMENT TOPICAL at 22:37

## 2024-12-02 RX ADMIN — ONDANSETRON 4 MG: 2 INJECTION, SOLUTION INTRAMUSCULAR; INTRAVENOUS at 03:12

## 2024-12-02 RX ADMIN — THIAMINE HYDROCHLORIDE 200 MG: 100 INJECTION, SOLUTION INTRAMUSCULAR; INTRAVENOUS at 06:33

## 2024-12-02 RX ADMIN — Medication 10 ML: at 09:37

## 2024-12-02 RX ADMIN — SODIUM BICARBONATE 650 MG TABLET 650 MG: at 11:21

## 2024-12-02 RX ADMIN — PERFLUTREN 2 ML: 6.52 INJECTION, SUSPENSION INTRAVENOUS at 17:50

## 2024-12-02 NOTE — H&P
A Admission H&P    Patient Care Team:  Provider, No Known as PCP - Say Roger MD (Psychiatry)    Chief complaint: Found down at home after welfare check conducted by police department    History of Present Illness    This is a 69-year-old male with history of alcohol abuse, alcoholic pancreatitis, hypothyroidism, hypertension, chronic low back pain, and multiple other chronic medical issues as outlined below who presented to the emergency room after he was found down at home in his an apartment when a welfare check was conducted by the police department.  They reported that the heat was turned off in his apartment and his floor was covered with feces.  Patient believes that he fell but at the moment is a very poor historian.  Just prior to my evaluation he was given a dose of Ativan in the emergency room.  He wakes up and answer some basic questions.  He cannot recall if he hit his head or not.  Head CT in the ER did reveal a scalp hematoma but no other acute intracranial abnormality.  He thinks he lost consciousness.  CT of the cervical and lumbar spine were negative for any fracture or other acute abnormality.  Initial temperature was 94.7.  He is placed on a Debra hugger with improvement of temperature and this has since been removed.  Patient unable to state when he last took any of his listed scheduled outpatient medications.  He reports drinking alcohol over the weekend but cannot state when his last drink was.    Past Medical History:   Diagnosis Date    Alcohol abuse     Alcohol withdrawal 11/04/2016    Alcoholic ketoacidosis 01/12/2020    Allergic 1970    Anxiety     Arthritis     Atopic rhinitis 08/08/2016    Depression     Disease of thyroid gland     Elevated cholesterol     Encounter for removal of sutures     Genital herpes simplex 08/08/2016    GERD (gastroesophageal reflux disease)     Headache, tension-type     Hyperlipidemia     Hypertension     Hypothyroidism     Kidney stone      Low back pain     Migraine     Motion sickness     Nephrolithiasis 2020    Olecranon bursitis, right elbow     Panic disorder without agoraphobia 2016    Peripheral neuropathy     Sleep apnea     Syncope and collapse 2019    Vitamin D deficiency 2016    Withdrawal symptoms, alcohol      Past Surgical History:   Procedure Laterality Date    CHOLECYSTECTOMY N/A 2024    Procedure: CHOLECYSTECTOMY LAPAROSCOPIC WITH DAVINCI ROBOT with cholangiogram, possible open;  Surgeon: Toro Noguera MD;  Location: [facility] MAIN OR;  Service: General;  Laterality: N/A;    COLONOSCOPY      CYST REMOVAL      CYSTOSCOPY BLADDER STONE LITHOTRIPSY  2022    ERCP N/A 2024    Procedure: ENDOSCOPIC RETROGRADE CHOLANGIOPANCREATOGRAPHY sphincterotomy, balloon sweep ();  Surgeon: Fara Madrigal MD;  Location: [facility] ENDOSCOPY;  Service: Gastroenterology;  Laterality: N/A;  sphincterotomy hiatial hernia, gallstone removal    EXTRACORPOREAL SHOCK WAVE LITHOTRIPSY (ESWL) Right     SHOULDER ARTHROSCOPY Right 2019    Procedure: SHOULDER ARTHROSCOPY, decompression, distal clavicle excision;  Surgeon: Aj Mancilla MD;  Location: [facility] OR OSC;  Service: Orthopedics    TONSILLECTOMY       Family History   Problem Relation Age of Onset    Alzheimer's disease Mother     Mental illness Mother     Pancreatic cancer Father     Hearing loss Father     Arthritis Maternal Grandmother     Malig Hyperthermia Neg Hx      Social History     Tobacco Use    Smoking status: Former     Current packs/day: 0.00     Average packs/day: 0.5 packs/day for 15.0 years (7.5 ttl pk-yrs)     Types: Cigarettes     Start date: 1990     Quit date: 2005     Years since quittin.9    Smokeless tobacco: Never    Tobacco comments:     caffeine - 3 cans of coke daily    Vaping Use    Vaping status: Never Used   Substance Use Topics    Alcohol use: Yes     Alcohol/week: 7.0 standard drinks of alcohol     Types: 7  "Shots of liquor per week     Comment: .5 liter vodka    Drug use: Yes     Types: Methamphetamines     Comment: \"once in a rare while\"     Medications Prior to Admission   Medication Sig Dispense Refill Last Dose/Taking    amLODIPine (NORVASC) 5 MG tablet Take 1 tablet by mouth Every Morning. 90 tablet 1 Unknown    fluticasone (FLONASE) 50 MCG/ACT nasal spray 2 sprays into the nostril(s) as directed by provider Daily. (Patient taking differently: Administer 2 sprays into the nostril(s) as directed by provider As Needed.) 11.1 mL 0 Unknown    HYDROcodone-acetaminophen (NORCO) 7.5-325 MG per tablet Take 1 tablet by mouth Every 4 (Four) Hours As Needed for Moderate Pain or Severe Pain. 20 tablet 0 Unknown    levothyroxine (SYNTHROID, LEVOTHROID) 100 MCG tablet Take 1 tablet by mouth Daily. 90 tablet 1 Unknown    lisinopril (PRINIVIL,ZESTRIL) 10 MG tablet Take 1 tablet by mouth Daily. 90 tablet 1 Unknown     Allergies:  Penicillins    Review of Systems   Unable to perform ROS: Mental status change      Patient is drowsy and an unreliable historian due to administration of Ativan just prior to my evaluation.    PHYSICAL EXAM    Vital Signs  tMax 24 hrs:  Temp (24hrs), Av.8 °F (36 °C), Min:94.7 °F (34.8 °C), Max:97.9 °F (36.6 °C)    Vitals Ranges:  Temp:  [94.7 °F (34.8 °C)-97.9 °F (36.6 °C)] 97.9 °F (36.6 °C)  Heart Rate:  [72-88] 82  Resp:  [18] 18  BP: (111-147)/(74-94) 115/75    Physical Exam  Vitals and nursing note reviewed.   Constitutional:       General: He is not in acute distress.     Appearance: He is well-developed. He is not ill-appearing.      Comments: Deconditioned appearing   HENT:      Head: Normocephalic and atraumatic.      Nose: Nose normal.      Mouth/Throat:      Mouth: Mucous membranes are not dry.   Eyes:      Conjunctiva/sclera: Conjunctivae normal.      Pupils: Pupils are equal, round, and reactive to light.   Neck:      Vascular: No JVD.   Cardiovascular:      Rate and Rhythm: Normal rate " and regular rhythm.      Heart sounds: No murmur heard.     No gallop.   Pulmonary:      Effort: Pulmonary effort is normal. No accessory muscle usage or respiratory distress.      Breath sounds: No decreased breath sounds or wheezing.   Abdominal:      General: Bowel sounds are normal. There is no distension.      Palpations: Abdomen is soft.      Tenderness: There is no abdominal tenderness. There is no guarding or rebound.   Musculoskeletal:         General: No deformity. Normal range of motion.      Cervical back: Normal range of motion and neck supple.   Lymphadenopathy:      Cervical: No cervical adenopathy.   Skin:     General: Skin is warm and dry.      Findings: No erythema or rash.   Neurological:      Mental Status: He is alert.      Cranial Nerves: No cranial nerve deficit.      Comments: He is drowsy.  I do get him to wake up and he answer some basic questions but drifts back to sleep easily         Results Review:  Results from last 7 days   Lab Units 12/02/24  0633   WBC 10*3/mm3 10.78   HEMOGLOBIN g/dL 12.9*   HEMATOCRIT % 36.2*   PLATELETS 10*3/mm3 200     Results from last 7 days   Lab Units 12/02/24  0633 12/02/24  0124   SODIUM mmol/L 129* 128*   POTASSIUM mmol/L 3.6 4.2   CHLORIDE mmol/L 89* 85*   CO2 mmol/L 18.7* 19.0*   BUN mg/dL 32* 35*   CREATININE mg/dL 0.92 1.01   CALCIUM mg/dL 8.8 9.4   BILIRUBIN mg/dL  --  0.7   ALK PHOS U/L  --  119*   ALT (SGPT) U/L  --  42*   AST (SGOT) U/L  --  113*   GLUCOSE mg/dL 90 93        I reviewed the patient's new clinical results.  I reviewed the patient's new imaging results and agree with the interpretation.  I personally viewed and interpreted the patient's EKG/Telemetry data        Active Hospital Problems    Diagnosis  POA    **Hypothermia [T68.XXXA]  Unknown    Metabolic acidosis [E87.20]  Yes    Elevated LFTs [R79.89]  Yes    Alcohol dependence [F10.20]  Yes    Hyponatremia [E87.1]  Yes    Chronic low back pain [M54.50, G89.29]  Yes    Hypothyroidism  [E03.9]  Yes      Resolved Hospital Problems   No resolved problems to display.       Assessment & Plan    The patient was admitted overnight last night after being found down in his apartment for possibly 48 hours or longer.  He was hypothermic, mildly hyponatremic and complaining of low back pain.  His hypothermia has corrected and he is no longer requiring a Debra hugger.  He was placed on IV fluids and his sodium count is starting to gradually improve.  Will keep fluids running for now.  His low back pain was evaluated with a CT which was unremarkable.  He has chronic back pain at baseline.  I suspect his condition when found by the police was due to alcohol use but given the unwitnessed nature of everything cannot rule out other etiologies.  He is EKG last night was nondiagnostic due to lots of motion artifact and I will recheck this.  I will also check an echocardiogram as it is not clear if he had a mechanical fall resulting in unconsciousness or syncope.  Appears he did hit his head as he had evidence of scalp hematoma on CT.  There is also unclear if he is compliant with his home medications.  TSH is elevated at 14 I will check a T4 and get him back on some Synthroid.  I will round out metabolic workup with ammonia level, B12, folic acid.  LFTs are likely elevated due to his history of alcoholism and hepatic steatosis.  Will trend these.  [facility] protocol is in place.  Lipase was mildly elevated but I do not think he has any significant pancreatitis at this time given his lack of complaint of epigastric pain, nausea, or vomiting.  Will monitor for now.  He does have a metabolic acidosis for which I will place him on oral sodium bicarbonate.  Therapy services to see.  Additional plans based on his clinical course.    I discussed the patients findings and my recommendations with patient and nursing staff      Justino Charlton MD  12/02/24  11:04 EST

## 2024-12-02 NOTE — ED PROVIDER NOTES
EMERGENCY DEPARTMENT ENCOUNTER  Room Number:  10/10  PCP: Provider, No Known  Independent Historians: Patient, EMS      HPI:  Chief Complaint: had concerns including Fall.     A complete HPI/ROS/PMH/PSH/SH/FH are unobtainable due to: Nothing      Context: Pro Mueller is a 69 y.o. male with a medical history of alcohol abuse, pancreatitis who presents to the ED c/o acute fall.  Initial history obtained from EMS who reports that police had gone for a welfare check and found patient on the floor.  He had reportedly been down in the floor for at least 48 hours.  They report that there was feces all over the floor in the building and that the heat was not on in his home.  He was cool to touch however they do not have a thermometer.  Patient reportedly has a history of alcohol abuse.      Patient thinks that he may have been going to turn that he down when he fell.  He cannot recall if he hit his head.  He thinks that he may have had some alcohol yesterday or the day before.  He had been crawling around the floor and was unable to get to the phone.  He is complaining of acute low back pain.      Review of prior external notes (non-ED) -and- Review of prior external test results outside of this encounter: I reviewed recent ED visit from 10/8/2024 patient was seen for headache, diarrhea.  I also reviewed discharge summary from 9/25/2024 where patient was admitted for acute alcohol induced pancreatitis.        PAST MEDICAL HISTORY  Active Ambulatory Problems     Diagnosis Date Noted    Fall 08/08/2016    Alcoholism in recovery 08/08/2016    Atopic rhinitis 08/08/2016    Mixed anxiety depressive disorder 08/08/2016    Genital herpes simplex 08/08/2016    Hyperlipidemia 08/08/2016    Hypertension 08/08/2016    Hypothyroidism 08/08/2016    Insomnia 08/08/2016    Panic disorder without agoraphobia 08/08/2016    Persistent insomnia 08/08/2016    Vitamin D deficiency 08/08/2016    Chronic low back pain 11/11/2016    Neuropathy  involving both lower extremities 10/25/2018    QT prolongation 01/03/2019    Left shoulder pain 11/19/2019    Nausea and vomiting 01/11/2020    Pancytopenia 01/14/2020    Left ventricular diastolic dysfunction 01/16/2021    Tremor 10/06/2021    C5 cervical fracture 11/09/2021    Syncope and collapse 01/07/2022    Neck pain 01/07/2022    Alcoholic intoxication with complication 03/13/2022    Withdrawal symptoms, alcohol 07/01/2022    Transaminitis 07/01/2022    Lumbar facet arthropathy 07/20/2022    Alcohol dependence with withdrawal 09/11/2022    Midline low back pain with right-sided sciatica 09/15/2022    Spondylolisthesis at L4-L5 level 09/15/2022    Lumbar canal stenosis 09/15/2022    Alcohol cessation counseling 09/15/2022    Need for assistance due to reduced mobility 09/15/2022    Impaired mobility and ADLs 09/15/2022    Alcohol withdrawal syndrome without complication 02/18/2023    Facial laceration 02/18/2023    Orthostatic hypotension 02/18/2023    Acute bacterial conjunctivitis of right eye 03/02/2023    Visit for suture removal 03/02/2023    Renal calculi 03/14/2023    Hepatic steatosis 03/15/2023    Alcohol withdrawal syndrome with complication 04/14/2023    Macrocytosis without anemia 04/14/2023    Lumbosacral spondylosis without myelopathy 05/05/2023    Elevated LFTs 05/13/2023    Alcohol-induced acute pancreatitis, unspecified complication status 06/23/2023    Pancreatitis 03/06/2024    Generalized abdominal pain 03/07/2024    Alcohol withdrawal 03/07/2024    Lactic acidosis 09/20/2024    Acute alcohol intoxication in patient with alcoholism with blood alcohol level 0.08 to 0.29, with unspecified complication 09/20/2024    Calculus of gallbladder with cholecystitis without biliary obstruction 09/20/2024     Resolved Ambulatory Problems     Diagnosis Date Noted    Impacted cerumen 08/08/2016    Influenza 08/08/2016    Motion sickness 08/08/2016    Seasonal allergic rhinitis 08/08/2016    Upper  respiratory tract infection 08/08/2016    Alcohol withdrawal 11/04/2016    Headache 11/05/2016    Gastritis 11/05/2016    Olecranon bursitis of right elbow 04/05/2017    Sciatica of left side 06/12/2018    Syncope and collapse 01/02/2019    Alcoholic ketoacidosis 01/12/2020    Hyponatremia 01/13/2020    Hypokalemia 01/13/2020    Alcohol dependence with uncomplicated withdrawal 01/14/2020    Nephrolithiasis 02/12/2020    Hypomagnesemia 01/16/2021    Non-traumatic rhabdomyolysis 01/18/2021    Urine retention 01/21/2021    Epigastric pain 06/23/2023    Dehydration 02/24/2024     Past Medical History:   Diagnosis Date    Alcohol abuse     Allergic 1970    Anxiety     Arthritis     Depression     Disease of thyroid gland     Elevated cholesterol     Encounter for removal of sutures     GERD (gastroesophageal reflux disease)     Headache, tension-type     Kidney stone     Low back pain 2019    Migraine     Olecranon bursitis, right elbow     Peripheral neuropathy     Sleep apnea          PAST SURGICAL HISTORY  Past Surgical History:   Procedure Laterality Date    CHOLECYSTECTOMY N/A 9/22/2024    Procedure: CHOLECYSTECTOMY LAPAROSCOPIC WITH DAVINCI ROBOT with cholangiogram, possible open;  Surgeon: Toro Noguera MD;  Location: [facility] MAIN OR;  Service: General;  Laterality: N/A;    COLONOSCOPY      CYST REMOVAL      CYSTOSCOPY BLADDER STONE LITHOTRIPSY  02/2022    ERCP N/A 9/23/2024    Procedure: ENDOSCOPIC RETROGRADE CHOLANGIOPANCREATOGRAPHY sphincterotomy, balloon sweep (9-12);  Surgeon: Fara Madrigal MD;  Location: [facility] ENDOSCOPY;  Service: Gastroenterology;  Laterality: N/A;  sphincterotomy hiatial hernia, gallstone removal    EXTRACORPOREAL SHOCK WAVE LITHOTRIPSY (ESWL) Right 2002    SHOULDER ARTHROSCOPY Right 12/17/2019    Procedure: SHOULDER ARTHROSCOPY, decompression, distal clavicle excision;  Surgeon: Aj Mancilla MD;  Location: [facility] OR [facility];  Service: Orthopedics    TONSILLECTOMY           FAMILY  "HISTORY  Family History   Problem Relation Age of Onset    Alzheimer's disease Mother     Mental illness Mother     Pancreatic cancer Father     Hearing loss Father     Arthritis Maternal Grandmother     Malig Hyperthermia Neg Hx          SOCIAL HISTORY  Social History     Socioeconomic History    Marital status: Single   Tobacco Use    Smoking status: Former     Current packs/day: 0.00     Average packs/day: 0.5 packs/day for 15.0 years (7.5 ttl pk-yrs)     Types: Cigarettes     Start date: 1990     Quit date: 2005     Years since quittin.9    Smokeless tobacco: Never    Tobacco comments:     caffeine - 3 cans of coke daily    Vaping Use    Vaping status: Never Used   Substance and Sexual Activity    Alcohol use: Yes     Alcohol/week: 7.0 standard drinks of alcohol     Types: 7 Shots of liquor per week     Comment: .5 liter vodka    Drug use: Yes     Types: Methamphetamines     Comment: \"once in a rare while\"    Sexual activity: Yes     Partners: Female     Birth control/protection: Condom         ALLERGIES  Penicillins      REVIEW OF SYSTEMS  Review of all 14 systems is negative other than stated in the HPI above.      PHYSICAL EXAM    I have reviewed the triage vital signs and nursing notes.    ED Triage Vitals [24 0100]   Temp Heart Rate Resp BP SpO2   -- 76 18 144/76 99 %      Temp src Heart Rate Source Patient Position BP Location FiO2 (%)   -- -- -- -- --         GENERAL: awake and alert, ungroomed appearance, no acute distress, cool to touch  HENT: Normocephalic, atraumatic  EYES: no scleral icterus, pupils 3 mm reactive bilaterally  CV: regular rhythm, regular rate  RESPIRATORY: normal effort, lungs clear to auscultation bilaterally  ABDOMEN: soft, nondistended, nontender throughout  : The phallus, scrotum, and bilateral inguinal region is erythematous, macerated, with satellite lesions present.  There is no perineal tenderness, no scrotal tenderness.  MUSCULOSKELETAL: no " deformity  NEURO: alert, moves all extremities with normal strength, follows commands, normal sensation light touch throughout bilateral lower extremities  PSYCH: calm, cooperative  SKIN: Warm, dry.  The feet are cool to touch bilaterally, slightly erythematous with delayed cap refill.          LAB RESULTS  Recent Results (from the past 24 hours)   Ethanol    Collection Time: 12/02/24  1:24 AM    Specimen: Arm, Right; Blood   Result Value Ref Range    Ethanol <10 0 - 10 mg/dL    Ethanol % <0.010 %   Comprehensive Metabolic Panel    Collection Time: 12/02/24  1:24 AM    Specimen: Arm, Right; Blood   Result Value Ref Range    Glucose 93 65 - 99 mg/dL    BUN 35 (H) 8 - 23 mg/dL    Creatinine 1.01 0.76 - 1.27 mg/dL    Sodium 128 (L) 136 - 145 mmol/L    Potassium 4.2 3.5 - 5.2 mmol/L    Chloride 85 (L) 98 - 107 mmol/L    CO2 19.0 (L) 22.0 - 29.0 mmol/L    Calcium 9.4 8.6 - 10.5 mg/dL    Total Protein 7.0 6.0 - 8.5 g/dL    Albumin 3.6 3.5 - 5.2 g/dL    ALT (SGPT) 42 (H) 1 - 41 U/L    AST (SGOT) 113 (H) 1 - 40 U/L    Alkaline Phosphatase 119 (H) 39 - 117 U/L    Total Bilirubin 0.7 0.0 - 1.2 mg/dL    Globulin 3.4 gm/dL    A/G Ratio 1.1 g/dL    BUN/Creatinine Ratio 34.7 (H) 7.0 - 25.0    Anion Gap 24.0 (H) 5.0 - 15.0 mmol/L    eGFR 81.5 >60.0 mL/min/1.73   CK    Collection Time: 12/02/24  1:24 AM    Specimen: Arm, Right; Blood   Result Value Ref Range    Creatine Kinase 120 20 - 200 U/L   CBC Auto Differential    Collection Time: 12/02/24  1:24 AM    Specimen: Arm, Right; Blood   Result Value Ref Range    WBC 14.30 (H) 3.40 - 10.80 10*3/mm3    RBC 4.36 4.14 - 5.80 10*6/mm3    Hemoglobin 14.5 13.0 - 17.7 g/dL    Hematocrit 41.1 37.5 - 51.0 %    MCV 94.3 79.0 - 97.0 fL    MCH 33.3 (H) 26.6 - 33.0 pg    MCHC 35.3 31.5 - 35.7 g/dL    RDW 16.8 (H) 12.3 - 15.4 %    RDW-SD 57.3 (H) 37.0 - 54.0 fl    MPV 9.4 6.0 - 12.0 fL    Platelets 225 140 - 450 10*3/mm3    Neutrophil % 83.0 (H) 42.7 - 76.0 %    Lymphocyte % 5.3 (L) 19.6 - 45.3 %     Monocyte % 8.7 5.0 - 12.0 %    Eosinophil % 0.1 (L) 0.3 - 6.2 %    Basophil % 0.7 0.0 - 1.5 %    Immature Grans % 2.2 (H) 0.0 - 0.5 %    Neutrophils, Absolute 11.86 (H) 1.70 - 7.00 10*3/mm3    Lymphocytes, Absolute 0.76 0.70 - 3.10 10*3/mm3    Monocytes, Absolute 1.24 (H) 0.10 - 0.90 10*3/mm3    Eosinophils, Absolute 0.02 0.00 - 0.40 10*3/mm3    Basophils, Absolute 0.10 0.00 - 0.20 10*3/mm3    Immature Grans, Absolute 0.32 (H) 0.00 - 0.05 10*3/mm3    nRBC 0.3 (H) 0.0 - 0.2 /100 WBC   Lipase    Collection Time: 12/02/24  1:24 AM    Specimen: Arm, Right; Blood   Result Value Ref Range    Lipase 425 (H) 13 - 60 U/L   ECG 12 Lead Other; hypothermia    Collection Time: 12/02/24  2:37 AM   Result Value Ref Range    QT Interval 431 ms    QTC Interval 562 ms       The above labs were ordered by me and independently reviewed by me.     RADIOLOGY  CT Lumbar Spine Without Contrast    Result Date: 12/2/2024  Patient: HERB LUNA  Time Out: 04:03 Exam(s): CT L SPINE EXAM:   CT Lumbar Spine Without Intravenous Contrast CLINICAL HISTORY:    Reason for exam: fall, low back pain. TECHNIQUE:   Axial computed tomography images of the lumbar spine without intravenous contrast with coronal and sagittal reformats.  CTDI is 31.84 mGy and DLP is 945.6 mGy-cm.  This CT exam was performed according to the principle of ALARA (As Low As Reasonably Achievable) by using one or more of the following dose reduction techniques: automated exposure control, adjustment of the mA and or kV according to patient size, and or use of iterative reconstruction technique. COMPARISON:   No relevant prior studies available. FINDINGS:   Vertebrae:  Lumbar vertebrae intact.  Degenerative facet findings.   Discs spinal canal neural foramina:  Noncritical canal and foraminal stenosis.  Degenerative disc findings.   Vasculature:  Moderate arterial nonflow limiting calcifications.   Stomach and bowel:  Colonic diverticula. IMPRESSION:     1.  No acute lumbar  spine abnormality. 2.  See additional findings above.     Electronically signed by Bob Frias MD on 12-02-24 at 0403    CT Cervical Spine Without Contrast    Result Date: 12/2/2024  Patient: HERB LUNA  Time Out: 04:02 Exam(s): CT C SPINE EXAM:   CT Cervical Spine Without Intravenous Contrast CLINICAL HISTORY:    Reason for exam: fall, etoh. TECHNIQUE:   Axial computed tomography images of the cervical spine without intravenous contrastwith coronal and sagittal reformats.  CTDI is 19.21 mGy and DLP is 421 mGy-cm.  This CT exam was performed according to the principle of ALARA (As Low As Reasonably Achievable) by using one or more of the following dose reduction techniques: automated exposure control, adjustment of the mA and or kV according to patient size, and or use of iterative reconstruction technique. COMPARISON:   No relevant prior studies available. FINDINGS:   Vertebrae:  Cervical vertebrae intact.  Degenerative facet findings.   Discs spinal canal neural foramina:  Noncritical canal and foraminal stenosis.  Degenerative disc findings.   Vasculature:  Carotid calcifications. IMPRESSION:     1.  No acute cervical spine abnormality. 2.  See additional findings above.     Electronically signed by Bob Frias MD on 12-02-24 at 0402    CT Head Without Contrast    Result Date: 12/2/2024  Patient: HERB LUNA  Time Out: 03:58 Exam(s): CT HEAD Without Contrast EXAM:   CT Head Without Intravenous Contrast CLINICAL HISTORY:    Reason for exam: fall, etoh. TECHNIQUE:   Axial computed tomography images of the head brain without intravenous contrast.  CTDI is 54.92 mGy and DLP is 961.2 mGy-cm.  This CT exam was performed according to the principle of ALARA (As Low As Reasonably Achievable) by using one or more of the following dose reduction techniques: automated exposure control, adjustment of the mA and or kV according to patient size, and or use of iterative reconstruction technique. COMPARISON:   No  relevant prior studies available. FINDINGS:   Brain:  White matter nonspecific changes statistically likely due to chronic microvascular ischemia.  Parenchymal structures otherwise unremarkable.  No acute intracranial hemorrhage or mass effect.   Ventricles:  No hydrocephalus.   Bones joints:  No acute abnormality.   Soft tissues:  Scalp hematoma.   Sinuses:  Layering sphenoid sinus fluid.  Scattered ethmoidal air cell opacification.   Mastoid air cells:  Unremarkable. IMPRESSION:     1.  No acute intracranial abnormality. 2.  Scalp hematoma. 3.  See additional findings above. 4.  Paranasal sinus findings above.     Electronically signed by Bob Frias MD on 12-02-24 at 0358     The above radiology studies were ordered by me.  See ED course for independent interpretations.     MEDICATIONS GIVEN IN ER  Medications   sodium chloride 0.9 % flush 10 mL (has no administration in time range)   nystatin (MYCOSTATIN) ointment 1 Application (1 Application Topical Given 12/2/24 0402)   sodium chloride 0.9 % bolus 1,000 mL (0 mL Intravenous Stopped 12/2/24 0340)   ondansetron (ZOFRAN) injection 4 mg (4 mg Intravenous Given 12/2/24 0312)         ORDERS PLACED DURING THIS VISIT:  Orders Placed This Encounter   Procedures    CT Head Without Contrast    CT Cervical Spine Without Contrast    CT Lumbar Spine Without Contrast    Ethanol    Comprehensive Metabolic Panel    CK    CBC Auto Differential    Lipase    Continuous Pulse Oximetry    Monitor Blood Pressure    LHA (on-call MD unless specified) Details    ECG 12 Lead Other; hypothermia    Insert Peripheral IV    Inpatient Admission    CBC & Differential         OUTPATIENT MEDICATION MANAGEMENT:  Current Facility-Administered Medications Ordered in Epic   Medication Dose Route Frequency Provider Last Rate Last Admin    nystatin (MYCOSTATIN) ointment 1 Application  1 Application Topical Q12H Yohan Iverson MD   1 Application at 12/02/24 0402    sodium chloride 0.9 %  flush 10 mL  10 mL Intravenous PRN Yohan Iverson MD         Current Outpatient Medications Ordered in Epic   Medication Sig Dispense Refill    amLODIPine (NORVASC) 5 MG tablet Take 1 tablet by mouth Every Morning. 90 tablet 1    fluticasone (FLONASE) 50 MCG/ACT nasal spray 2 sprays into the nostril(s) as directed by provider Daily. (Patient taking differently: Administer 2 sprays into the nostril(s) as directed by provider As Needed.) 11.1 mL 0    HYDROcodone-acetaminophen (NORCO) 7.5-325 MG per tablet Take 1 tablet by mouth Every 4 (Four) Hours As Needed for Moderate Pain or Severe Pain. 20 tablet 0    levothyroxine (SYNTHROID, LEVOTHROID) 100 MCG tablet Take 1 tablet by mouth Daily. 90 tablet 1    lisinopril (PRINIVIL,ZESTRIL) 10 MG tablet Take 1 tablet by mouth Daily. 90 tablet 1         PROCEDURES  Procedures            PROGRESS, DATA ANALYSIS, CONSULTS, AND MEDICAL DECISION MAKING  All labs have been independently interpreted by me.  All radiology studies have been reviewed by me. All EKG's have been independently viewed and interpreted by me.  Discussion below represents my analysis of pertinent findings related to patient's condition, differential diagnosis, treatment plan and final disposition.    Differential diagnosis includes but is not limited to:  Traumatic intracranial hemorrhage  Traumatic cervical or lumbar spine fracture  Rhabdomyolysis  Acute renal failure  Alcohol intoxication  Alcohol withdrawal    Clinical Scores:                  ED Course as of 12/02/24 0428   Mon Dec 02, 2024   0145 WBC(!): 14.30 [JR]   0209 Lipase(!): 425 [JR]   0209 Sodium(!): 128 [JR]   0209 Creatinine: 1.01 [JR]   0209 Creatine Kinase: 120 [JR]   0209 Ethanol: <10 [JR]   0209 ALT (SGPT)(!): 42 [JR]   0209 AST (SGOT)(!): 113 [JR]   0209 Alkaline Phosphatase(!): 119 [JR]   0307 EKG          EKG time: 237  Rhythm/Rate: Sinus rhythm, 102, significant motion artifact present  P waves and GA: Normal  QRS, axis: Low  voltage  ST and T waves: No apparent ischemic changes    Interpreted Contemporaneously by me, independently viewed         [JR]   0351 CT brain without contrast independently interpreted in PACS demonstrates no acute cranial hemorrhage. [JR]   0352 CT cervical spine independently interpreted PACS.  There is loss of cervical lordosis.  No acute fracture noted. [JR]   0353 Patient was hypothermic on arrival.  Debra hugger applied and warmed IV fluid bolus also given.  I was concerned that he may be in rhabdomyolysis however his CK is just 120, creatinine is normal.  He does have some mild elevation of his LFTs and slightly elevated lipase.  He does have a history of alcohol induced pancreatitis and he is experiencing some nausea and vomiting here.  His blood alcohol level is undetectable.  I do not think that he is safe to be discharged home given the condition that EMS found him in.  He will be admitted for continued IV hydration, further evaluation of his acute back pain and possible pancreatitis.  He will need close monitoring for symptoms of alcohol withdrawal. [JR]   0426 Discussed with BERONICA Hopper for A, who agrees to admit on behalf of Dr. Billings. [JR]      ED Course User Index  [JR] Yohan Iverson MD             AS OF 04:28 EST VITALS:    BP - 132/90  HR - 79  TEMP - 97.9 °F (36.6 °C) (Oral)  O2 SATS - 98%    COMPLEXITY OF CARE  The patient requires admission.      Chronic or social conditions impacting patient care (Social Determinants of Health):     DIAGNOSIS  Final diagnoses:   Alcohol-induced acute pancreatitis, unspecified complication status   Fall, initial encounter   Hyponatremia   Hypothermia, initial encounter   Candidiasis of scrotum           DISPOSITION  Admit      Prescription drug monitoring program review:           Please note that portions of this document were completed with a voice recognition program.    Note Disclaimer: At [facility], we believe that sharing  information builds trust and better relationships. You are receiving this note because you recently visited [facility]. It is possible you will see health information before a provider has talked with you about it. This kind of information can be easy to misunderstand. To help you fully understand what it means for your health, we urge you to discuss this note with your provider.         Yohan Iverson MD  12/02/24 0428

## 2024-12-02 NOTE — CASE MANAGEMENT/SOCIAL WORK
Continued Stay Note  [facility]     Patient Name: Pro Mueller  MRN: [id number]  Today's Date: 12/2/2024    Admit Date: 12/2/2024    Plan: TBD   Discharge Plan       Row Name 12/02/24 1638       Plan    Plan TBD    Plan Comments Patient confused, unable to participate in screening, attempted to call [facility] several times this shift, no option for voicemail. CCP will f/u tomorrow. -Regi ESTRADA                   Discharge Codes    No documentation.                 Expected Discharge Date and Time       Expected Discharge Date Expected Discharge Time    Dec 4, 2024               Regi Pagan RN

## 2024-12-02 NOTE — CONSULTS
[facility]  Cardiology Consult Note    Patient Identification:  Name: Pro Mueller  Age: 69 y.o.  Sex: male  :  1955  MRN: [id number]             Requesting Physician: Dr. Charlton    Reason for Consultation / Chief Complaint: Syncope with possible acute MI on EKG    History of Present Illness:       Pro Mueller is a 69-year-old male who is new to this provider.  He has a history to include alcohol abuse, pancreatitis, hypertension, hyperlipidemia, GERD, and history of syncope.  He presented to the emergency room after an acute fall and down for at least 48 hours, and hypothermic.  Medications given in the emergency room was nystatin, and Zofran.  He was admitted for alcohol induced acute pancreatitis, fall, hyponatremia, hypothermia, and Candidiasis of scrotum.    On admission vital signs to include blood pressure 144/76, SpO2 99%, respirations 18 and heart rate of 76.  CT of brain showed no acute intracranial merge.  CT of spine shows no acute lumbar spine abnormality.  See full report as below.  Labs revealed lipase 425, sodium 128, WBC, 14.3, creatinine 1.01, ALT 42, and .   ECG showed sinus rhythm, PACs, nonspecific t waves    2022 echo: EF 67%.  LV systolic function is normal.  Mild dilation of the aortic root is present.    2022 Holter monitor was relatively benign monitor study.    Past Medical History:  Past Medical History:   Diagnosis Date    Alcohol abuse     Alcohol withdrawal 2016    Alcoholic ketoacidosis 2020    Allergic 1970    Anxiety     Arthritis     Atopic rhinitis 2016    Depression     Disease of thyroid gland     Elevated cholesterol     Encounter for removal of sutures     Genital herpes simplex 2016    GERD (gastroesophageal reflux disease)     Headache, tension-type     Hyperlipidemia     Hypertension     Hypothyroidism     Kidney stone     Low back pain 2019    Migraine     Motion sickness     Nephrolithiasis 2020     Olecranon bursitis, right elbow     Panic disorder without agoraphobia 08/08/2016    Peripheral neuropathy     Sleep apnea     Syncope and collapse 01/02/2019    Vitamin D deficiency 08/08/2016    Withdrawal symptoms, alcohol      Past Surgical History:  Past Surgical History:   Procedure Laterality Date    CHOLECYSTECTOMY N/A 9/22/2024    Procedure: CHOLECYSTECTOMY LAPAROSCOPIC WITH DAVINCI ROBOT with cholangiogram, possible open;  Surgeon: Toro Noguera MD;  Location: [facility] MAIN OR;  Service: General;  Laterality: N/A;    COLONOSCOPY      CYST REMOVAL      CYSTOSCOPY BLADDER STONE LITHOTRIPSY  02/2022    ERCP N/A 9/23/2024    Procedure: ENDOSCOPIC RETROGRADE CHOLANGIOPANCREATOGRAPHY sphincterotomy, balloon sweep (9-12);  Surgeon: Fara Madrigal MD;  Location: [facility] ENDOSCOPY;  Service: Gastroenterology;  Laterality: N/A;  sphincterotomy hiatial hernia, gallstone removal    EXTRACORPOREAL SHOCK WAVE LITHOTRIPSY (ESWL) Right 2002    SHOULDER ARTHROSCOPY Right 12/17/2019    Procedure: SHOULDER ARTHROSCOPY, decompression, distal clavicle excision;  Surgeon: Aj Mancilla MD;  Location: [facility] OR OSC;  Service: Orthopedics    TONSILLECTOMY        Allergies:  Allergies   Allergen Reactions    Penicillins Anaphylaxis and Seizure     Seizure. childhood     Home Meds:  Medications Prior to Admission   Medication Sig Dispense Refill Last Dose/Taking    amLODIPine (NORVASC) 5 MG tablet Take 1 tablet by mouth Every Morning. 90 tablet 1 Unknown    fluticasone (FLONASE) 50 MCG/ACT nasal spray 2 sprays into the nostril(s) as directed by provider Daily. (Patient taking differently: Administer 2 sprays into the nostril(s) as directed by provider As Needed.) 11.1 mL 0 Unknown    HYDROcodone-acetaminophen (NORCO) 7.5-325 MG per tablet Take 1 tablet by mouth Every 4 (Four) Hours As Needed for Moderate Pain or Severe Pain. 20 tablet 0 Unknown    levothyroxine (SYNTHROID, LEVOTHROID) 100 MCG tablet Take 1 tablet by mouth  Daily. 90 tablet 1 Unknown    lisinopril (PRINIVIL,ZESTRIL) 10 MG tablet Take 1 tablet by mouth Daily. 90 tablet 1 Unknown     Current Meds:   [unfilled]  Social History:   Social History     Tobacco Use    Smoking status: Former     Current packs/day: 0.00     Average packs/day: 0.5 packs/day for 15.0 years (7.5 ttl pk-yrs)     Types: Cigarettes     Start date: 1990     Quit date: 2005     Years since quittin.9    Smokeless tobacco: Never    Tobacco comments:     caffeine - 3 cans of coke daily    Substance Use Topics    Alcohol use: Yes     Alcohol/week: 7.0 standard drinks of alcohol     Types: 7 Shots of liquor per week     Comment: .5 liter vodka      Family History:  Family History   Problem Relation Age of Onset    Alzheimer's disease Mother     Mental illness Mother     Pancreatic cancer Father     Hearing loss Father     Arthritis Maternal Grandmother     Malig Hyperthermia Neg Hx         Review of Systems    Constitutional: No weakness,fatigue, fever, rigors, chills   Eyes: No vision changes, eye pain   ENT/oropharynx: No difficulty swallowing, sore throat, epistaxis, changes in hearing   Cardiovascular: No chest pain, chest tightness, palpitations, paroxysmal nocturnal dyspnea, orthopnea, diaphoresis, dizziness / syncopal episode   Respiratory: No shortness of breath, dyspnea on exertion, cough, wheezing hemoptysis   Gastrointestinal: No abdominal pain, nausea, vomiting, diarrhea, bloody stools   Genitourinary: No hematuria, dysuria   Neurological: No headache, tremors, numbness,  one-sided weakness    Musculoskeletal: No cramps, myalgias,  joint pain, joint swelling   Integument: No rash, edema           Constitutional:  Temp:  [94.7 °F (34.8 °C)-97.9 °F (36.6 °C)] 97.9 °F (36.6 °C)  Heart Rate:  [72-88] 82  Resp:  [18] 18  BP: (111-147)/(74-94) 115/75    Physical Exam   General:  Appears in no acute distress, resting in bed  Eyes: EOM normal no conjunctival drainage  HEENT:  No JVD. Thyroid  not visibly enlarged. No mucosal pallor or cyanosis  Respiratory: Respirations regular and unlabored at rest. BBS with good air entry in all fields. No crackles, rubs or wheezes auscultated  Cardiovascular: S1S2 Regular rate and rhythm. No murmur, rub or gallop auscultated. No carotid bruits. DP/PT pulses    . No pretibial pitting edema  Gastrointestinal: Abdomen soft, flat, non tender. Bowel sounds present. No hepatosplenomegaly. No ascites  Musculoskeletal: SHERWOOD x4. No abnormal movements  Extremities: No digital clubbing or cyanosis  Skin:  Skin warm and dry to touch. No rashes  No xanthoma  Neuro: AAO x3 CN II-XII grossly intact              Cardiographics  ECG: Sinus rhythm, PACs, probable inferior infarct, old.                  Echocardiogram:   2022 Interpretation Summary    Estimated left ventricular EF = 67% Left ventricular systolic function is normal.  Left ventricular diastolic function was normal.  Mild dilation of the aortic root is present.  Imaging  Chest X-ray:   FINDINGS:    Brain:  White matter nonspecific changes statistically likely due to   chronic microvascular ischemia.  Parenchymal structures otherwise   unremarkable.  No acute intracranial hemorrhage or mass effect.    Ventricles:  No hydrocephalus.    Bones joints:  No acute abnormality.    Soft tissues:  Scalp hematoma.    Sinuses:  Layering sphenoid sinus fluid.  Scattered ethmoidal air cell   opacification.    Mastoid air cells:  Unremarkable.     IMPRESSION:       1.  No acute intracranial abnormality.  2.  Scalp hematoma.  3.  See additional findings above.  4.  Paranasal sinus findings above.     IMPRESSION:        Electronically signed by Bob Frias MD on 12-02-24 at 0358    CT  IMPRESSION:   Hazy linear opacities within the central right lung and the left lower   lung probably represent scarring and/or atelectasis. Component of   infiltrate cannot be entirely excluded however.       Dictated by: Michelle Langston M.D.     Images  and Report reviewed and interpreted by: Michelle Langston M.D.     <PS><Electronically signed by: Michelle Langston M.D.>  06/01/2024 0513     D: 06/01/2024 0512   T: 06/01/2024 0512 2022      Interpretation Summary    A relatively benign monitor study.  Syncope was reported during the monitoring period. The syncopal event correlated with an ectopic atrial rhythm, 82bpm  Lab Review   Results from last 7 days   Lab Units 12/02/24  0633 12/02/24  0124   CK TOTAL U/L 98 120         Results from last 7 days   Lab Units 12/02/24 0633   SODIUM mmol/L 129*   POTASSIUM mmol/L 3.6   BUN mg/dL 32*   CREATININE mg/dL 0.92   CALCIUM mg/dL 8.8     @[id number]@  Results from last 7 days   Lab Units 12/02/24  0633 12/02/24  0124   WBC 10*3/mm3 10.78 14.30*   HEMOGLOBIN g/dL 12.9* 14.5   HEMATOCRIT % 36.2* 41.1   PLATELETS 10*3/mm3 200 225               Assessment:  Hypothermia  Syncope    Metabolic acidosis  Elevated LFTs  Alcohol dependence: CIWA protocol in place  Hyponatremia  Chronic low back pain  hypothyroidism    Recommendations / Plan:   Mr. Mueller is a 69-year-old male who has a history of alcohol abuse, pancreatitis, hypertension, who was found down during a welfare check and had been down for approximately 48 hours.  He was admitted for hyponatremia, hypothermia, alcohol induced acute pancreatitis and Candidiasis of scrotum.  On admission EKG shows  Sinus rhythm, PACs, and nonspecific t waves.  No chest pain or shortness of breath.  Prior echocardiogram in November 2022 revealed EF 67%, LV systolic function, and mild dilation of the aortic root.  At this time we will trend troponin, daily mag, EKG and obtain echo for LV function and EF.  He will also need a Holter at discharge to look for abnormal arrhythmias.  Further recommendations once echo has been read.      Nathalia Villaseñor, BERONICA  12/2/2024, 12:52 EST      EMR Dragon/Transcription:   Dictated utilizing Dragon dictation

## 2024-12-02 NOTE — NURSING NOTE
Ultrasound Inserted IV Site:L Upper Arm 20ga    Catheter Length: 2.5in    Diameter: 0.52    Depth: 0.9      Vascular Access Score= 5  1) Palpable / Visible / Dis  2) Palpable / Viasible / Not Distended  3) Easily Palpable / Not Visibile  4) Poorly Palpable / Visible  5) Poorly / Nonpalpable / NV

## 2024-12-02 NOTE — ED NOTES
Pt to ed from home via ems 2/2 police call for welfare check. Forced entry then found in upstairs bathroom. Pt states that he was down for about 48 hours. No head contact, no loc, no thinners, patient was able to crawl around some during this time. C/O lower back pain, which is chronic but slightly worse than normal.

## 2024-12-02 NOTE — CONSULTS
visited pt secondary to a referral in the chart for advanced directives consultation.  In the room,  explained the document with the pt and answered all questions for the pt.  The pt stated that he wished to take some time and to think it over before signing.  This  informed pt that he could let his nurse know when he feels ready to sign the document.       Pastoral care remains available.

## 2024-12-02 NOTE — PLAN OF CARE
Goal Outcome Evaluation:  Patient alert and oriented x3. Disoriented to time. RA. VSS. Medicated per MAR. NS  infusing at 100mL/hr. Echo completed at bedside. Plan of care ongoing.

## 2024-12-02 NOTE — ED NOTES
Nursing report ED to floor  Pro Mueller  69 y.o.  male    HPI :  HPI  Stated Reason for Visit: fall  History Obtained From: EMS, patient    Chief Complaint  Chief Complaint   Patient presents with    Fall       Admitting doctor:   Justino Charlton MD    Admitting diagnosis:   The primary encounter diagnosis was Alcohol-induced acute pancreatitis, unspecified complication status. Diagnoses of Fall, initial encounter, Hyponatremia, Hypothermia, initial encounter, and Candidiasis of scrotum were also pertinent to this visit.    Code status:   Current Code Status       Date Active Code Status Order ID Comments User Context       12/2/2024 0518 CPR (Attempt to Resuscitate) [id number]  Nilda Dixon APRN ED        Question Answer    Code Status (Patient has no pulse and is not breathing) CPR (Attempt to Resuscitate)    Medical Interventions (Patient has pulse or is breathing) Full Support                    Allergies:   Penicillins    Isolation:   No active isolations    Intake and Output    Intake/Output Summary (Last 24 hours) at 12/2/2024 0628  Last data filed at 12/2/2024 0340  Gross per 24 hour   Intake 1000 ml   Output --   Net 1000 ml       Weight:       12/02/24  0100   Weight: 83.9 kg (185 lb)       Most recent vitals:   Vitals:    12/02/24 0431 12/02/24 0501 12/02/24 0531 12/02/24 0601   BP: 139/82 135/79 131/77 111/94   Pulse: 77 78 76 78   Resp:       Temp:       TempSrc:       SpO2: 98% 98% 98% 98%   Weight:       Height:           Active LDAs/IV Access:   Lines, Drains & Airways       Active LDAs       Name Placement date Placement time Site Days    Peripheral IV 12/02/24 0125 Right Antecubital 12/02/24  0125  Antecubital  less than 1    Peripheral IV 12/02/24 0200 Anterior;Left;Upper Arm 12/02/24  0200  Arm  less than 1                    Labs (abnormal labs have a star):   Labs Reviewed   COMPREHENSIVE METABOLIC PANEL - Abnormal; Notable for the following components:       Result Value     BUN 35 (*)     Sodium 128 (*)     Chloride 85 (*)     CO2 19.0 (*)     ALT (SGPT) 42 (*)     AST (SGOT) 113 (*)     Alkaline Phosphatase 119 (*)     BUN/Creatinine Ratio 34.7 (*)     Anion Gap 24.0 (*)     All other components within normal limits    Narrative:     GFR Normal >60  Chronic Kidney Disease <60  Kidney Failure <15     CBC WITH AUTO DIFFERENTIAL - Abnormal; Notable for the following components:    WBC 14.30 (*)     MCH 33.3 (*)     RDW 16.8 (*)     RDW-SD 57.3 (*)     Neutrophil % 83.0 (*)     Lymphocyte % 5.3 (*)     Eosinophil % 0.1 (*)     Immature Grans % 2.2 (*)     Neutrophils, Absolute 11.86 (*)     Monocytes, Absolute 1.24 (*)     Immature Grans, Absolute 0.32 (*)     nRBC 0.3 (*)     All other components within normal limits   LIPASE - Abnormal; Notable for the following components:    Lipase 425 (*)     All other components within normal limits   CK - Normal   ETHANOL   BASIC METABOLIC PANEL   CBC (NO DIFF)   CHLORIDE, URINE, RANDOM   OSMOLALITY, URINE   SODIUM, URINE, RANDOM   CK   CBC AND DIFFERENTIAL    Narrative:     The following orders were created for panel order CBC & Differential.  Procedure                               Abnormality         Status                     ---------                               -----------         ------                     CBC Auto Differential[[id number]]        Abnormal            Final result                 Please view results for these tests on the individual orders.       EKG:   ECG 12 Lead Other; hypothermia   Final Result   HEART NSUY=393  bpm   RR Gwyukljy=459  ms   TX Interval=  ms   P Horizontal Axis=  deg   P Front Axis=  deg   QRSD Qezifubc=666  ms   QT Swxvefdt=358  ms   VZlG=823  ms   QRS Axis=-62  deg   T Wave Axis=72  deg   - ABNORMAL ECG -   Poor quality tracing, repeat.   Electronically Signed By: Obed Grossman) ([facility]) 2024-12-02 05:24:04   Date and Time of Study:2024-12-02 02:37:16          Meds given in ED:   Medications   sodium  chloride 0.9 % flush 10 mL (has no administration in time range)   nystatin (MYCOSTATIN) ointment 1 Application (1 Application Topical Given 12/2/24 0402)   sodium chloride 0.9 % flush 10 mL (has no administration in time range)   sodium chloride 0.9 % flush 10 mL (has no administration in time range)   sodium chloride 0.9 % infusion 40 mL (has no administration in time range)   nitroglycerin (NITROSTAT) SL tablet 0.4 mg (has no administration in time range)   sennosides-docusate (PERICOLACE) 8.6-50 MG per tablet 2 tablet (has no administration in time range)     And   polyethylene glycol (MIRALAX) packet 17 g (has no administration in time range)     And   bisacodyl (DULCOLAX) EC tablet 5 mg (has no administration in time range)     And   bisacodyl (DULCOLAX) suppository 10 mg (has no administration in time range)   calcium carbonate (TUMS) chewable tablet 500 mg (200 mg elemental) (has no administration in time range)   sodium chloride 0.9 % infusion (has no administration in time range)   Magnesium Standard Dose Replacement - Follow Nurse / BPA Driven Protocol (has no administration in time range)   thiamine (B-1) injection 200 mg (has no administration in time range)     Followed by   thiamine (VITAMIN B-1) tablet 100 mg (has no administration in time range)   folic acid (FOLVITE) tablet 1 mg (has no administration in time range)   LORazepam (ATIVAN) tablet 1 mg (has no administration in time range)     Or   LORazepam (ATIVAN) injection 1 mg (has no administration in time range)     Or   LORazepam (ATIVAN) tablet 2 mg (has no administration in time range)     Or   LORazepam (ATIVAN) injection 2 mg (has no administration in time range)     Or   LORazepam (ATIVAN) injection 2 mg (has no administration in time range)     Or   LORazepam (ATIVAN) injection 2 mg (has no administration in time range)   sodium chloride 0.9 % bolus 1,000 mL (0 mL Intravenous Stopped 12/2/24 0340)   ondansetron (ZOFRAN) injection 4 mg (4  "mg Intravenous Given 24 0312)       Imaging results:  CT Lumbar Spine Without Contrast    Result Date: 2024  Electronically signed by Bob Frias MD on 24 at 0403    CT Cervical Spine Without Contrast    Result Date: 2024  Electronically signed by Bob Frias MD on 24 at 0402    CT Head Without Contrast    Result Date: 2024  Electronically signed by Bob Frias MD on 24 at 0358     Ambulatory status:   - UP WITH ASSIST      Social issues:   Social History     Socioeconomic History    Marital status: Single   Tobacco Use    Smoking status: Former     Current packs/day: 0.00     Average packs/day: 0.5 packs/day for 15.0 years (7.5 ttl pk-yrs)     Types: Cigarettes     Start date: 1990     Quit date: 2005     Years since quittin.9    Smokeless tobacco: Never    Tobacco comments:     caffeine - 3 cans of coke daily    Vaping Use    Vaping status: Never Used   Substance and Sexual Activity    Alcohol use: Yes     Alcohol/week: 7.0 standard drinks of alcohol     Types: 7 Shots of liquor per week     Comment: .5 liter vodka    Drug use: Yes     Types: Methamphetamines     Comment: \"once in a rare while\"    Sexual activity: Yes     Partners: Female     Birth control/protection: Condom       Peripheral Neurovascular  Peripheral Neurovascular (Adult)  Peripheral Neurovascular WDL: .WDL except, pulse assessment  Pulse Assessment: dorsalis pedis  Additional Documentation: Edema (Group)  Edema  Edema: foot, right, foot, left  Foot, Left Edema: 4+ (Severe)  Foot, Right Edema: 4+ (Severe)    Neuro Cognitive  Neuro Cognitive (Adult)  Cognitive/Neuro/Behavioral WDL: WDL    Learning  Learning Assessment  Learning Readiness and Ability: psychosocial barrier noted    Respiratory  Respiratory  Airway WDL: WDL  Respiratory WDL  Respiratory WDL: WDL    Abdominal Pain       Pain Assessments  Pain (Adult)  (0-10) Pain Rating: Rest: 4  (0-10) Pain Rating: Activity: 4    NIH " Stroke Scale       Jennifer Brenner RN  12/02/24 06:28 EST

## 2024-12-03 LAB
ALBUMIN SERPL-MCNC: 2.8 G/DL (ref 3.5–5.2)
ALBUMIN/GLOB SERPL: 1.1 G/DL
ALP SERPL-CCNC: 84 U/L (ref 39–117)
ALT SERPL W P-5'-P-CCNC: 24 U/L (ref 1–41)
ANION GAP SERPL CALCULATED.3IONS-SCNC: 12 MMOL/L (ref 5–15)
AORTIC DIMENSIONLESS INDEX: 0.7 (DI)
AST SERPL-CCNC: 69 U/L (ref 1–40)
BH CV ECHO MEAS - AO MAX PG: 12.4 MMHG
BH CV ECHO MEAS - AO MEAN PG: 7 MMHG
BH CV ECHO MEAS - AO ROOT DIAM: 3.4 CM
BH CV ECHO MEAS - AO V2 MAX: 176 CM/SEC
BH CV ECHO MEAS - AO V2 VTI: 27.5 CM
BH CV ECHO MEAS - AVA(I,D): 3.3 CM2
BH CV ECHO MEAS - EDV(CUBED): 44.1 ML
BH CV ECHO MEAS - EDV(MOD-SP2): 86 ML
BH CV ECHO MEAS - EDV(MOD-SP4): 79 ML
BH CV ECHO MEAS - EF(MOD-BP): 72.8 %
BH CV ECHO MEAS - EF(MOD-SP2): 70.9 %
BH CV ECHO MEAS - EF(MOD-SP4): 73.4 %
BH CV ECHO MEAS - ESV(CUBED): 10.5 ML
BH CV ECHO MEAS - ESV(MOD-SP2): 25 ML
BH CV ECHO MEAS - ESV(MOD-SP4): 21 ML
BH CV ECHO MEAS - FS: 37.9 %
BH CV ECHO MEAS - IVS/LVPW: 0.92 CM
BH CV ECHO MEAS - IVSD: 1.07 CM
BH CV ECHO MEAS - LAT PEAK E' VEL: 10.7 CM/SEC
BH CV ECHO MEAS - LV DIASTOLIC VOL/BSA (35-75): 38.6 CM2
BH CV ECHO MEAS - LV MASS(C)D: 123.4 GRAMS
BH CV ECHO MEAS - LV MAX PG: 4.7 MMHG
BH CV ECHO MEAS - LV MEAN PG: 2.38 MMHG
BH CV ECHO MEAS - LV SYSTOLIC VOL/BSA (12-30): 10.3 CM2
BH CV ECHO MEAS - LV V1 MAX: 108.9 CM/SEC
BH CV ECHO MEAS - LV V1 VTI: 19.7 CM
BH CV ECHO MEAS - LVIDD: 3.5 CM
BH CV ECHO MEAS - LVIDS: 2.19 CM
BH CV ECHO MEAS - LVOT AREA: 4.6 CM2
BH CV ECHO MEAS - LVOT DIAM: 2.42 CM
BH CV ECHO MEAS - LVPWD: 1.16 CM
BH CV ECHO MEAS - MED PEAK E' VEL: 7.2 CM/SEC
BH CV ECHO MEAS - MV A DUR: 0.1 SEC
BH CV ECHO MEAS - MV A MAX VEL: 91.2 CM/SEC
BH CV ECHO MEAS - MV DEC SLOPE: 296.4 CM/SEC2
BH CV ECHO MEAS - MV DEC TIME: 320 SEC
BH CV ECHO MEAS - MV E MAX VEL: 49.6 CM/SEC
BH CV ECHO MEAS - MV E/A: 0.54
BH CV ECHO MEAS - MV MAX PG: 6.9 MMHG
BH CV ECHO MEAS - MV MEAN PG: 1.83 MMHG
BH CV ECHO MEAS - MV P1/2T: 70.4 MSEC
BH CV ECHO MEAS - MV V2 VTI: 24.3 CM
BH CV ECHO MEAS - MVA(P1/2T): 3.1 CM2
BH CV ECHO MEAS - MVA(VTI): 3.7 CM2
BH CV ECHO MEAS - PA ACC TIME: 0.09 SEC
BH CV ECHO MEAS - PA V2 MAX: 115.5 CM/SEC
BH CV ECHO MEAS - PULM A REVS DUR: 0.13 SEC
BH CV ECHO MEAS - PULM A REVS VEL: 54.3 CM/SEC
BH CV ECHO MEAS - PULM DIAS VEL: 41.2 CM/SEC
BH CV ECHO MEAS - PULM S/D: 1.29
BH CV ECHO MEAS - PULM SYS VEL: 53.4 CM/SEC
BH CV ECHO MEAS - RAP SYSTOLE: 3 MMHG
BH CV ECHO MEAS - RV MAX PG: 2.8 MMHG
BH CV ECHO MEAS - RV V1 MAX: 83.9 CM/SEC
BH CV ECHO MEAS - RV V1 VTI: 11 CM
BH CV ECHO MEAS - RVSP: 11.6 MMHG
BH CV ECHO MEAS - SV(LVOT): 90.4 ML
BH CV ECHO MEAS - SV(MOD-SP2): 61 ML
BH CV ECHO MEAS - SV(MOD-SP4): 58 ML
BH CV ECHO MEAS - SVI(LVOT): 44.2 ML/M2
BH CV ECHO MEAS - SVI(MOD-SP2): 29.8 ML/M2
BH CV ECHO MEAS - SVI(MOD-SP4): 28.3 ML/M2
BH CV ECHO MEAS - TAPSE (>1.6): 1.7 CM
BH CV ECHO MEAS - TR MAX PG: 8.6 MMHG
BH CV ECHO MEAS - TR MAX VEL: 146.6 CM/SEC
BH CV ECHO MEASUREMENTS AVERAGE E/E' RATIO: 5.54
BH CV XLRA - RV BASE: 2.9 CM
BH CV XLRA - RV MID: 3.5 CM
BH CV XLRA - TDI S': 17.6 CM/SEC
BILIRUB SERPL-MCNC: 0.4 MG/DL (ref 0–1.2)
BUN SERPL-MCNC: 18 MG/DL (ref 8–23)
BUN/CREAT SERPL: 22.8 (ref 7–25)
CALCIUM SPEC-SCNC: 7.9 MG/DL (ref 8.6–10.5)
CHLORIDE SERPL-SCNC: 97 MMOL/L (ref 98–107)
CO2 SERPL-SCNC: 22 MMOL/L (ref 22–29)
CREAT SERPL-MCNC: 0.79 MG/DL (ref 0.76–1.27)
DEPRECATED RDW RBC AUTO: 56.7 FL (ref 37–54)
EGFRCR SERPLBLD CKD-EPI 2021: 97.4 ML/MIN/1.73
ERYTHROCYTE [DISTWIDTH] IN BLOOD BY AUTOMATED COUNT: 16.5 % (ref 12.3–15.4)
GLOBULIN UR ELPH-MCNC: 2.5 GM/DL
GLUCOSE SERPL-MCNC: 107 MG/DL (ref 65–99)
HCT VFR BLD AUTO: 30.8 % (ref 37.5–51)
HGB BLD-MCNC: 10.5 G/DL (ref 13–17.7)
LEFT ATRIUM VOLUME INDEX: 11.8 ML/M2
MAGNESIUM SERPL-MCNC: 1.2 MG/DL (ref 1.6–2.4)
MCH RBC QN AUTO: 32.6 PG (ref 26.6–33)
MCHC RBC AUTO-ENTMCNC: 34.1 G/DL (ref 31.5–35.7)
MCV RBC AUTO: 95.7 FL (ref 79–97)
PLATELET # BLD AUTO: 131 10*3/MM3 (ref 140–450)
PMV BLD AUTO: 9.5 FL (ref 6–12)
POTASSIUM SERPL-SCNC: 3.2 MMOL/L (ref 3.5–5.2)
POTASSIUM SERPL-SCNC: 3.4 MMOL/L (ref 3.5–5.2)
PROT SERPL-MCNC: 5.3 G/DL (ref 6–8.5)
RBC # BLD AUTO: 3.22 10*6/MM3 (ref 4.14–5.8)
SODIUM SERPL-SCNC: 131 MMOL/L (ref 136–145)
TROPONIN T SERPL HS-MCNC: 21 NG/L
WBC NRBC COR # BLD AUTO: 7.54 10*3/MM3 (ref 3.4–10.8)

## 2024-12-03 PROCEDURE — 97530 THERAPEUTIC ACTIVITIES: CPT

## 2024-12-03 PROCEDURE — 25010000002 THIAMINE HCL 200 MG/2ML SOLUTION: Performed by: NURSE PRACTITIONER

## 2024-12-03 PROCEDURE — 93010 ELECTROCARDIOGRAM REPORT: CPT | Performed by: INTERNAL MEDICINE

## 2024-12-03 PROCEDURE — 85027 COMPLETE CBC AUTOMATED: CPT | Performed by: INTERNAL MEDICINE

## 2024-12-03 PROCEDURE — 84132 ASSAY OF SERUM POTASSIUM: CPT | Performed by: INTERNAL MEDICINE

## 2024-12-03 PROCEDURE — 25010000002 LORAZEPAM PER 2 MG: Performed by: NURSE PRACTITIONER

## 2024-12-03 PROCEDURE — 83735 ASSAY OF MAGNESIUM: CPT | Performed by: NURSE PRACTITIONER

## 2024-12-03 PROCEDURE — 25010000002 THIAMINE PER 100 MG: Performed by: NURSE PRACTITIONER

## 2024-12-03 PROCEDURE — 25010000002 MAGNESIUM SULFATE 2 GM/50ML SOLUTION: Performed by: INTERNAL MEDICINE

## 2024-12-03 PROCEDURE — 99232 SBSQ HOSP IP/OBS MODERATE 35: CPT | Performed by: INTERNAL MEDICINE

## 2024-12-03 PROCEDURE — 93005 ELECTROCARDIOGRAM TRACING: CPT | Performed by: NURSE PRACTITIONER

## 2024-12-03 PROCEDURE — 97162 PT EVAL MOD COMPLEX 30 MIN: CPT

## 2024-12-03 PROCEDURE — 84484 ASSAY OF TROPONIN QUANT: CPT | Performed by: NURSE PRACTITIONER

## 2024-12-03 PROCEDURE — 25010000002 ONDANSETRON PER 1 MG: Performed by: INTERNAL MEDICINE

## 2024-12-03 PROCEDURE — 80053 COMPREHEN METABOLIC PANEL: CPT | Performed by: INTERNAL MEDICINE

## 2024-12-03 RX ORDER — POTASSIUM CHLORIDE 750 MG/1
40 TABLET, FILM COATED, EXTENDED RELEASE ORAL EVERY 4 HOURS
Status: COMPLETED | OUTPATIENT
Start: 2024-12-03 | End: 2024-12-03

## 2024-12-03 RX ORDER — ACETAMINOPHEN 325 MG/1
650 TABLET ORAL EVERY 6 HOURS PRN
Status: DISCONTINUED | OUTPATIENT
Start: 2024-12-03 | End: 2024-12-10 | Stop reason: HOSPADM

## 2024-12-03 RX ORDER — MAGNESIUM SULFATE HEPTAHYDRATE 40 MG/ML
2 INJECTION, SOLUTION INTRAVENOUS
Status: COMPLETED | OUTPATIENT
Start: 2024-12-03 | End: 2024-12-03

## 2024-12-03 RX ORDER — ONDANSETRON 2 MG/ML
4 INJECTION INTRAMUSCULAR; INTRAVENOUS EVERY 6 HOURS PRN
Status: DISCONTINUED | OUTPATIENT
Start: 2024-12-03 | End: 2024-12-10 | Stop reason: HOSPADM

## 2024-12-03 RX ORDER — HYDROCODONE BITARTRATE AND ACETAMINOPHEN 5; 325 MG/1; MG/1
1 TABLET ORAL ONCE
Status: COMPLETED | OUTPATIENT
Start: 2024-12-03 | End: 2024-12-03

## 2024-12-03 RX ADMIN — Medication 10 ML: at 20:40

## 2024-12-03 RX ADMIN — FOLIC ACID 1 MG: 1 TABLET ORAL at 09:29

## 2024-12-03 RX ADMIN — THIAMINE HYDROCHLORIDE 200 MG: 100 INJECTION, SOLUTION INTRAMUSCULAR; INTRAVENOUS at 08:23

## 2024-12-03 RX ADMIN — POTASSIUM CHLORIDE 40 MEQ: 750 TABLET, EXTENDED RELEASE ORAL at 10:33

## 2024-12-03 RX ADMIN — NYSTATIN 1 APPLICATION: 100000 OINTMENT TOPICAL at 09:29

## 2024-12-03 RX ADMIN — Medication 10 ML: at 09:29

## 2024-12-03 RX ADMIN — MAGNESIUM SULFATE HEPTAHYDRATE 2 G: 40 INJECTION, SOLUTION INTRAVENOUS at 13:52

## 2024-12-03 RX ADMIN — NYSTATIN 1 APPLICATION: 100000 OINTMENT TOPICAL at 20:22

## 2024-12-03 RX ADMIN — MAGNESIUM SULFATE HEPTAHYDRATE 2 G: 40 INJECTION, SOLUTION INTRAVENOUS at 09:29

## 2024-12-03 RX ADMIN — LEVOTHYROXINE SODIUM 100 MCG: 100 TABLET ORAL at 09:29

## 2024-12-03 RX ADMIN — MAGNESIUM SULFATE HEPTAHYDRATE 2 G: 40 INJECTION, SOLUTION INTRAVENOUS at 10:33

## 2024-12-03 RX ADMIN — THIAMINE HYDROCHLORIDE 200 MG: 100 INJECTION, SOLUTION INTRAMUSCULAR; INTRAVENOUS at 21:57

## 2024-12-03 RX ADMIN — POTASSIUM CHLORIDE 40 MEQ: 750 TABLET, EXTENDED RELEASE ORAL at 13:53

## 2024-12-03 RX ADMIN — ACETAMINOPHEN 325MG 650 MG: 325 TABLET ORAL at 20:21

## 2024-12-03 RX ADMIN — HYDROCODONE BITARTRATE AND ACETAMINOPHEN 1 TABLET: 5; 325 TABLET ORAL at 22:57

## 2024-12-03 RX ADMIN — LORAZEPAM 1 MG: 2 INJECTION INTRAMUSCULAR; INTRAVENOUS at 18:47

## 2024-12-03 RX ADMIN — ONDANSETRON 4 MG: 2 INJECTION INTRAMUSCULAR; INTRAVENOUS at 09:45

## 2024-12-03 RX ADMIN — THIAMINE HYDROCHLORIDE 200 MG: 100 INJECTION, SOLUTION INTRAMUSCULAR; INTRAVENOUS at 13:53

## 2024-12-03 NOTE — PROGRESS NOTES
" LOS: 1 day     Name: Pro Mueller  Age: 69 y.o.  Sex: male  :  1955  MRN: [id number]         Primary Care Physician: Provider, No Known    Subjective   Subjective  Patient is more awake and conversant today.  Slow in conversation.  Does not remember me from yesterday.  States he does remember being down in his apartment with the heat off and feeling extremely cold.  States that he was drinking heavily at home prior to the episode.    Objective   Vital Signs  Temp:  [98.4 °F (36.9 °C)-100.2 °F (37.9 °C)] 100.2 °F (37.9 °C)  Heart Rate:  [82-84] 84  Resp:  [18] 18  BP: (123-132)/(72-75) 132/72  Body mass index is 24.68 kg/m².    Objective:  General Appearance:  Comfortable and in no acute distress.    Vital signs: (most recent): Blood pressure 132/72, pulse 84, temperature 100.2 °F (37.9 °C), temperature source Oral, resp. rate 18, height 182.9 cm (72\"), weight 82.6 kg (182 lb), SpO2 97%.    Lungs:  Normal effort and normal respiratory rate.  He is not in respiratory distress.  There are decreased breath sounds.    Heart: Normal rate.  Regular rhythm.    Abdomen: Abdomen is soft.  Bowel sounds are normal.   There is no abdominal tenderness.     Extremities: There is no dependent edema or local swelling.    Neurological: Patient is alert and oriented to person, place and time.    Skin:  Warm and dry.                Results Review:       I reviewed the patient's new clinical results.    Results from last 7 days   Lab Units 24  0553 24  0633 24  0124   WBC 10*3/mm3 7.54 10.78 14.30*   HEMOGLOBIN g/dL 10.5* 12.9* 14.5   PLATELETS 10*3/mm3 131* 200 225     Results from last 7 days   Lab Units 24  0553 24  0633 24  0124   SODIUM mmol/L 131* 129* 128*   POTASSIUM mmol/L 3.2* 3.6 4.2   CHLORIDE mmol/L 97* 89* 85*   CO2 mmol/L 22.0 18.7* 19.0*   BUN mg/dL 18 32* 35*   CREATININE mg/dL 0.79 0.92 1.01   CALCIUM mg/dL 7.9* 8.8 9.4   GLUCOSE mg/dL 107* 90 93                 Scheduled " Meds:   folic acid, 1 mg, Oral, Daily  levothyroxine, 100 mcg, Oral, Daily  magnesium sulfate, 2 g, Intravenous, Q2H  nystatin, 1 Application, Topical, Q12H  sodium chloride, 10 mL, Intravenous, Q12H  thiamine (B-1) IV, 200 mg, Intravenous, Q8H   Followed by  [START ON 12/7/2024] thiamine, 100 mg, Oral, Daily      PRN Meds:     senna-docusate sodium **AND** polyethylene glycol **AND** bisacodyl **AND** bisacodyl    calcium carbonate    influenza vaccine    LORazepam **OR** LORazepam **OR** LORazepam **OR** LORazepam **OR** LORazepam **OR** LORazepam    Magnesium Standard Dose Replacement - Follow Nurse / BPA Driven Protocol    nitroglycerin    ondansetron    [COMPLETED] Insert Peripheral IV **AND** sodium chloride    sodium chloride    sodium chloride  Continuous Infusions:       Assessment & Plan   Active Hospital Problems    Diagnosis  POA    **Hypothermia [T68.XXXA]  Unknown    Metabolic acidosis [E87.20]  Yes    Elevated LFTs [R79.89]  Yes    Alcohol dependence [F10.20]  Yes    Hyponatremia [E87.1]  Yes    Chronic low back pain [M54.50, G89.29]  Yes    Hypothyroidism [E03.9]  Yes      Resolved Hospital Problems   No resolved problems to display.       Assessment & Plan    -Hypothermia has resolved.  Secondary to being down in his apartment with no heat on.  -No evidence of alcohol withdrawal at this time and he is not requiring Ativan.  I think he is pretty much out of the window at this point.  -Hyponatremia has improved with normal saline.  Now that he is more awake and alert I will stop the fluids and we will optimize nutrition.  Dietitian has been consulted  -He admits to not taking his Synthroid at home.  TSH was 14.  Have reinstituted Synthroid 100 mcg daily  -Appreciate evaluation from cardiology.  Echocardiogram noted  -Replace potassium and magnesium  -Metabolic acidosis improved.  Stop oral sodium bicarb  -Check B12 and folic acid  -PT and OT to see    Dispo  To be determined    Expected Discharge  Date: 12/4/2024; Expected Discharge Time:      Justino Charlton MD  [facility]  12/03/24  09:37 EST

## 2024-12-03 NOTE — CONSULTS
Nutrition Services    Patient Name:  Pro Mueller  YOB: 1955  MRN: [id number]  Admit Date:  12/2/2024  Assessment Date:  12/03/24    Summary: Nutrition consult due to MST score of 2 per nurse admission screen    69 y.o. male admitted with hypothermia.  Found down at home by police during welfare check.  Scalp hematoma.  More awake and conversant today, hypothermia resolved.  ETOH abuse, history of ETOH pancreatitis.    Weight appears fairly stable per chart weight history.  50% x 1 meal per chart PO data.      Attempted visit.  Waiting for staff to take him to the restroom.    RD to continue to follow.    CLINICAL NUTRITION ASSESSMENT      Reason for Assessment MST score 2+, Nurse or Nurse Practitioner Consult     Diagnosis/Problem   Hypothermia    Medical/Surgical History Past Medical History:   Diagnosis Date    Alcohol abuse     Alcohol withdrawal 11/04/2016    Alcoholic ketoacidosis 01/12/2020    Allergic 1970    Anxiety     Arthritis     Atopic rhinitis 08/08/2016    Depression     Disease of thyroid gland     Elevated cholesterol     Encounter for removal of sutures     Genital herpes simplex 08/08/2016    GERD (gastroesophageal reflux disease)     Headache, tension-type     Hyperlipidemia     Hypertension     Hypothyroidism     Kidney stone     Low back pain 2019    Migraine     Motion sickness     Nephrolithiasis 02/12/2020    Olecranon bursitis, right elbow     Panic disorder without agoraphobia 08/08/2016    Peripheral neuropathy     Sleep apnea     Syncope and collapse 01/02/2019    Vitamin D deficiency 08/08/2016    Withdrawal symptoms, alcohol        Past Surgical History:   Procedure Laterality Date    CHOLECYSTECTOMY N/A 9/22/2024    Procedure: CHOLECYSTECTOMY LAPAROSCOPIC WITH DAVINCI ROBOT with cholangiogram, possible open;  Surgeon: Toro Noguera MD;  Location: [facility];  Service: General;  Laterality: N/A;    COLONOSCOPY      CYST REMOVAL      CYSTOSCOPY BLADDER STONE  "LITHOTRIPSY  02/2022    ERCP N/A 9/23/2024    Procedure: ENDOSCOPIC RETROGRADE CHOLANGIOPANCREATOGRAPHY sphincterotomy, balloon sweep (9-12);  Surgeon: Fara Madrigal MD;  Location: [facility] ENDOSCOPY;  Service: Gastroenterology;  Laterality: N/A;  sphincterotomy hiatial hernia, gallstone removal    EXTRACORPOREAL SHOCK WAVE LITHOTRIPSY (ESWL) Right 2002    SHOULDER ARTHROSCOPY Right 12/17/2019    Procedure: SHOULDER ARTHROSCOPY, decompression, distal clavicle excision;  Surgeon: Aj Mancilla MD;  [place] [facility] [place] [facility] [place]  Service: Orthopedics    TONSILLECTOMY          Anthropometrics        Current Height  Current Weight  BMI kg/m2 Height: 182.9 cm (72\")  Weight: 82.6 kg (182 lb) (12/02/24 1750)  Body mass index is 24.68 kg/m².   Adjusted BMI (if applicable)    BMI Category Normal/Healthy (18.4 - 24.9)   Ideal Body Weight (IBW) 178 lb (80.9 kg)   Usual Body Weight (UBW) 178-206 lb   Weight Trend Stable   Weight History Wt Readings from Last 30 Encounters:   12/02/24 1750 82.6 kg (182 lb)   12/02/24 0834 82.7 kg (182 lb 5.1 oz)   12/02/24 0100 83.9 kg (185 lb)   10/07/24 2152 83.9 kg (185 lb)   09/20/24 0110 83.9 kg (185 lb)   09/04/24 0515 93.5 kg (206 lb 2.1 oz)   09/03/24 1140 81.6 kg (180 lb)   05/11/24 2206 83.9 kg (185 lb)   03/09/24 0513 82.3 kg (181 lb 7 oz)   03/07/24 0600 81.1 kg (178 lb 12.7 oz)   03/06/24 1919 81.1 kg (178 lb 12.7 oz)   03/06/24 1633 83.9 kg (185 lb)   02/24/24 1606 83.9 kg (185 lb)   07/23/23 0357 79.8 kg (176 lb)   07/19/23 1436 79.8 kg (176 lb)   06/23/23 0255 80 kg (176 lb 6.4 oz)   06/22/23 2004 86.2 kg (190 lb)   05/13/23 2100 85.2 kg (187 lb 14.4 oz)   05/13/23 1816 87.1 kg (192 lb)   05/05/23 1145 86.6 kg (191 lb)   04/13/23 2147 86.2 kg (190 lb)   03/08/23 1250 88.6 kg (195 lb 6.4 oz)   02/20/23 0600 88.4 kg (194 lb 14.2 oz)   02/20/23 0303 86.5 kg (190 lb 12.8 oz)   02/18/23 1407 90.7 kg (200 lb)   09/23/22 2026 86.2 kg (190 lb)   09/11/22 1552 89.6 kg (197 lb 8.5 oz) "   09/11/22 0037 86.2 kg (190 lb)   07/08/22 0943 87.5 kg (193 lb)   07/06/22 1504 86.8 kg (191 lb 6.4 oz)   07/02/22 0541 86 kg (189 lb 9.5 oz)   07/01/22 0635 85.3 kg (188 lb 1.6 oz)   07/01/22 0010 81.6 kg (180 lb)   03/13/22 1222 82.1 kg (181 lb)   02/22/22 1534 86.2 kg (190 lb 1.6 oz)   02/17/22 0901 86.4 kg (190 lb 8 oz)   02/10/22 1212 82.6 kg (182 lb)   02/10/22 1045 83 kg (183 lb)   01/18/22 1038 86.6 kg (190 lb 14.4 oz)   01/09/22 0545 86.1 kg (189 lb 13.1 oz)   01/07/22 1359 84 kg (185 lb 1.6 oz)   01/07/22 1057 89.8 kg (198 lb)   01/07/22 0636 89.9 kg (198 lb 4.8 oz)   12/08/21 2046 87.1 kg (192 lb 0.3 oz)   11/09/21 1020 87.1 kg (192 lb)   11/02/21 1447 87.1 kg (192 lb)      --  Labs       Pertinent Labs    Results from last 7 days   Lab Units 12/03/24  0553 12/02/24  0633 12/02/24  0124   SODIUM mmol/L 131* 129* 128*   POTASSIUM mmol/L 3.2* 3.6 4.2   CHLORIDE mmol/L 97* 89* 85*   CO2 mmol/L 22.0 18.7* 19.0*   BUN mg/dL 18 32* 35*   CREATININE mg/dL 0.79 0.92 1.01   CALCIUM mg/dL 7.9* 8.8 9.4   BILIRUBIN mg/dL 0.4  --  0.7   ALK PHOS U/L 84  --  119*   ALT (SGPT) U/L 24  --  42*   AST (SGOT) U/L 69*  --  113*   GLUCOSE mg/dL 107* 90 93     Results from last 7 days   Lab Units 12/03/24  0553 12/02/24  1453   MAGNESIUM mg/dL 1.2* 1.6   HEMOGLOBIN g/dL 10.5*  --    HEMATOCRIT % 30.8*  --    WBC 10*3/mm3 7.54  --    ALBUMIN g/dL 2.8*  --      Results from last 7 days   Lab Units 12/03/24  0553 12/02/24  0633 12/02/24  0124   PLATELETS 10*3/mm3 131* 200 225     COVID19   Date Value Ref Range Status   09/03/2024 Not Detected Not Detected - Ref. Range Final     Lab Results   Component Value Date    HGBA1C 5.33 11/26/2018          Medications           Scheduled Medications folic acid, 1 mg, Oral, Daily  levothyroxine, 100 mcg, Oral, Daily  nystatin, 1 Application, Topical, Q12H  sodium chloride, 10 mL, Intravenous, Q12H  thiamine (B-1) IV, 200 mg, Intravenous, Q8H   Followed by  [START ON 12/7/2024] thiamine,  100 mg, Oral, Daily       Infusions     PRN Medications   senna-docusate sodium **AND** polyethylene glycol **AND** bisacodyl **AND** bisacodyl    calcium carbonate    influenza vaccine    LORazepam **OR** LORazepam **OR** LORazepam **OR** LORazepam **OR** LORazepam **OR** LORazepam    Magnesium Standard Dose Replacement - Follow Nurse / BPA Driven Protocol    nitroglycerin    ondansetron    Potassium Replacement - Follow Nurse / BPA Driven Protocol    [COMPLETED] Insert Peripheral IV **AND** sodium chloride    sodium chloride    sodium chloride     Physical Findings          General Findings disoriented, room air   Oral/Mouth Cavity WDL   Edema  lower extremity , 3+ (moderate)   Gastrointestinal normoactive   Skin  location of wound: L upper coccyx, bilateral groin   Tubes/Drains/Lines none   NFPE Other: follow up to perform   --  Current Nutrition Orders & Evaluation of Intake       Oral Nutrition     Food Allergies NKFA   Current PO Diet Diet: Cardiac; Healthy Heart (2-3 Na+); Fluid Consistency: Thin (IDDSI 0)   Supplement n/a   PO Evaluation     % PO Intake 50% x 1 meal    Factors Affecting Intake: decreased appetite, Other: ETOH abuse   --  PES STATEMENT / NUTRITION DIAGNOSIS      Nutrition Dx Problem  Problem: Predicted Suboptimal Intake  Etiology: Factors Affecting Nutrition - ETOH abuse    Signs/Symptoms: Report/Observation     NUTRITION INTERVENTION / PLAN OF CARE      Intervention Goal(s) Maintain nutrition status, Reduce/improve symptoms, Disease management/therapy, Tolerate PO , Increase intake, and Maintain weight         RD Intervention/Action Continue to monitor and Care plan reviewed   --      Prescription/Orders:       PO Diet       Supplements       Enteral Nutrition       Parenteral Nutrition    New Prescription Ordered? No changes at this time   --      Monitor/Evaluation Per protocol, PO intake, Pertinent labs, Weight, Skin status, Symptoms   Discharge Plan/Needs Pending clinical course    --    RD to follow per protocol.      Electronically signed by:  Kitty Roche RD  12/03/24 16:31 EST

## 2024-12-03 NOTE — PLAN OF CARE
Goal Outcome Evaluation:  Plan of Care Reviewed With: patient           Outcome Evaluation: Pt is a 68 yo M admitted after being found down in his home by police who had been called for a welfare check. Per chart, pt had been down for at least 48 hours and was drinking heavily prior  - hx of alcohol abuse and pancreatitis. Pt lives alone and reports independence at BL with a rwx, states he is independent with ADLs, but orders [employer] for every meal. Pt has 2 RENA with 12-14 up to his bedroom. Denies any other recent falls. Pt presents to PT with impaired strength, endurance, and balance limiting overall mobility. Pt transferred to EOB with SBA - initially stating he needed help without attempting to move on his own. PT offered education/encouragement to work on independence and pt transferred without assist. Pt stood with min A x2 and rwx, c/o significant weakness and impulsively returned to sitting. PT encouraged trying to transfer to the chair and pt agreeable - chair pulled closer and pt stood with mod A x2 and pivoted to the chair min A x2. Pt able to take a couple shuffled steps but poor foot clearance from floor and again impulsively sitting before completing turn. Pt assisted with repositioning and left with needs met. PT will continue to follow, pt is unsafe to return home at current level of function, will need SNF at [place].    Anticipated Discharge Disposition (PT): skilled nursing facility

## 2024-12-03 NOTE — PROGRESS NOTES
LOS: 1 day   Patient Care Team:  Provider, No Known as PCP - Say Roger MD (Psychiatry)    Chief Complaint: Follow-up abnormal EKG.    Interval History: He is slowly improving, but just feels generalized malaise.  No chest pain or shortness of breath.    Vital Signs:  Temp:  [98.4 °F (36.9 °C)-100.2 °F (37.9 °C)] 98.5 °F (36.9 °C)  Heart Rate:  [74-84] 74  Resp:  [18] 18  BP: (115-132)/(64-75) 115/64    Intake/Output Summary (Last 24 hours) at 12/3/2024 1723  Last data filed at 12/3/2024 1400  Gross per 24 hour   Intake 720 ml   Output --   Net 720 ml       Physical Exam:   General Appearance:    No acute distress, alert and oriented x4.  Flat affect.   Lungs:     Decreased breath sounds at the bases bilaterally.    Heart:    Regular rhythm and normal rate.  No murmurs, gallops, or rubs.   Abdomen:     Soft, nontender, nondistended.    Extremities:   No clubbing, cyanosis, or edema.     Results Review:    Results from last 7 days   Lab Units 12/03/24  0553   SODIUM mmol/L 131*   POTASSIUM mmol/L 3.2*   CHLORIDE mmol/L 97*   CO2 mmol/L 22.0   BUN mg/dL 18   CREATININE mg/dL 0.79   GLUCOSE mg/dL 107*   CALCIUM mg/dL 7.9*     Results from last 7 days   Lab Units 12/03/24  0553 12/02/24  1800 12/02/24  1453 12/02/24  0633 12/02/24  0124   CK TOTAL U/L  --   --   --  98 120   HSTROP T ng/L 21 21 22*  --   --      Results from last 7 days   Lab Units 12/03/24  0553   WBC 10*3/mm3 7.54   HEMOGLOBIN g/dL 10.5*   HEMATOCRIT % 30.8*   PLATELETS 10*3/mm3 131*             Results from last 7 days   Lab Units 12/03/24  0553   MAGNESIUM mg/dL 1.2*           I reviewed the patient's new clinical results.        Assessment:  1.  Hypothermia, resolved (secondary to environmental exposure)  2.  Acute pancreatitis  3.  Candidiasis of the scrotum  4.  Alcohol dependence  5.  Hyponatremia  6.  Anemia, likely multifactorial  7.  Mild thrombocytopenia  8.  Hypothyroidism with TSH 14 (noncompliance with Synthroid at  home)  9.  Questionable syncope  10.  Abnormal EKG with poor R wave progression  11.  Mild transaminitis on admission  12.  Hypoalbuminemia    Plan:  -I reviewed the EKG from yesterday.  There was poor R wave progression.  The initial reading stated inferior infarct, although this was clearly not consistent with a STEMI.  He also has not had any chest discomfort.    -I also reviewed the echocardiogram images, and agree with the reading.  The ejection fraction was hyperdynamic at over 70% with normal wall motion.  He did not have any significant valvular disease.    -Questionable syncope, although unclear.  I really have a low suspicion that this was from a cardiac cause at this point.    -No further cardiac workup is needed currently.  Cardiology will sign off for now.  Please call back if needed.    Saul Abebe MD  12/03/24  17:23 EST

## 2024-12-03 NOTE — THERAPY EVALUATION
Patient Name: Pro Mueller  : 1955    MRN: [id number]                              Today's Date: 12/3/2024       Admit Date: 2024    Visit Dx:     ICD-10-CM ICD-9-CM   1. Alcohol-induced acute pancreatitis, unspecified complication status  K85.20 577.0   2. Fall, initial encounter  W19.XXXA E888.9   3. Hyponatremia  E87.1 276.1   4. Hypothermia, initial encounter  T68.XXXA 991.6   5. Candidiasis of scrotum  B37.49 112.2     Patient Active Problem List   Diagnosis    Fall    Alcoholism in recovery    Atopic rhinitis    Mixed anxiety depressive disorder    Genital herpes simplex    Hyperlipidemia    Hypertension    Hypothyroidism    Insomnia    Panic disorder without agoraphobia    Persistent insomnia    Vitamin D deficiency    Chronic low back pain    Neuropathy involving both lower extremities    QT prolongation    Left shoulder pain    Nausea and vomiting    Hyponatremia    Pancytopenia    Left ventricular diastolic dysfunction    Tremor    C5 cervical fracture    Syncope and collapse    Neck pain    Alcoholic intoxication with complication    Withdrawal symptoms, alcohol    Transaminitis    Lumbar facet arthropathy    Alcohol dependence    Midline low back pain with right-sided sciatica    Spondylolisthesis at L4-L5 level    Lumbar canal stenosis    Alcohol cessation counseling    Need for assistance due to reduced mobility    Impaired mobility and ADLs    Alcohol withdrawal syndrome without complication    Facial laceration    Orthostatic hypotension    Acute bacterial conjunctivitis of right eye    Visit for suture removal    Renal calculi    Hepatic steatosis    Alcohol withdrawal syndrome with complication    Macrocytosis without anemia    Lumbosacral spondylosis without myelopathy    Elevated LFTs    Alcohol-induced acute pancreatitis, unspecified complication status    Pancreatitis    Generalized abdominal pain    Alcohol withdrawal    Metabolic acidosis    Acute alcohol intoxication in patient  with alcoholism with blood alcohol level 0.08 to 0.29, with unspecified complication    Calculus of gallbladder with cholecystitis without biliary obstruction    Hypothermia     Past Medical History:   Diagnosis Date    Alcohol abuse     Alcohol withdrawal 11/04/2016    Alcoholic ketoacidosis 01/12/2020    Allergic 1970    Anxiety     Arthritis     Atopic rhinitis 08/08/2016    Depression     Disease of thyroid gland     Elevated cholesterol     Encounter for removal of sutures     Genital herpes simplex 08/08/2016    GERD (gastroesophageal reflux disease)     Headache, tension-type     Hyperlipidemia     Hypertension     Hypothyroidism     Kidney stone     Low back pain 2019    Migraine     Motion sickness     Nephrolithiasis 02/12/2020    Olecranon bursitis, right elbow     Panic disorder without agoraphobia 08/08/2016    Peripheral neuropathy     Sleep apnea     Syncope and collapse 01/02/2019    Vitamin D deficiency 08/08/2016    Withdrawal symptoms, alcohol      Past Surgical History:   Procedure Laterality Date    CHOLECYSTECTOMY N/A 9/22/2024    Procedure: CHOLECYSTECTOMY LAPAROSCOPIC WITH DAVINCI ROBOT with cholangiogram, possible open;  Surgeon: Toro Noguera MD;  Location: [facility] MAIN OR;  Service: General;  Laterality: N/A;    COLONOSCOPY      CYST REMOVAL      CYSTOSCOPY BLADDER STONE LITHOTRIPSY  02/2022    ERCP N/A 9/23/2024    Procedure: ENDOSCOPIC RETROGRADE CHOLANGIOPANCREATOGRAPHY sphincterotomy, balloon sweep (9-12);  Surgeon: Fara Madrigal MD;  Location: [facility] ENDOSCOPY;  Service: Gastroenterology;  Laterality: N/A;  sphincterotomy hiatial hernia, gallstone removal    EXTRACORPOREAL SHOCK WAVE LITHOTRIPSY (ESWL) Right 2002    SHOULDER ARTHROSCOPY Right 12/17/2019    Procedure: SHOULDER ARTHROSCOPY, decompression, distal clavicle excision;  Surgeon: Aj Mancilla MD;  Location: [facility] OR [facility];  Service: Orthopedics    TONSILLECTOMY        General Information       Row Name 12/03/24 1040  "         Physical Therapy Time and Intention    Document Type evaluation  -     Mode of Treatment individual therapy;physical therapy  -[facility] Name 12/03/24 1044          General Information    Patient Profile Reviewed yes  -     Prior Level of Function independent:;gait;transfer;bed mobility  -     Existing Precautions/Restrictions fall  -     Barriers to Rehab medically complex  -[facility] Name 12/03/24 1044          Living Environment    People in Home alone  -[facility] Name 12/03/24 1044          Home Main Entrance    Number of Stairs, Main Entrance two  -[facility] Name 12/03/24 1044          Stairs Within Home, Primary    Number of Stairs, Within Home, Primary twelve  -[facility] Name 12/03/24 1044          Cognition    Orientation Status (Cognition) oriented x 3;other (see comments)  Slow to respond  -[facility] Name 12/03/24 1044          Safety Issues/Impairments Affecting Functional Mobility    Impairments Affecting Function (Mobility) balance;cognition;endurance/activity tolerance;strength;pain;range of motion (ROM)  -               User Key  (r) = Recorded By, (t) = Taken By, (c) = Cosigned By      Initials Name Provider Type     Amarilis Pierre, PT Physical Therapist                   Mobility       [facility] Name 12/03/24 1044          Bed Mobility    Bed Mobility supine-sit  -     Supine-Sit [place] (Bed Mobility) standby assist;verbal cues  -     Assistive Device (Bed Mobility) head of bed elevated;bed rails  -     Comment, (Bed Mobility) Inititally stating \"I need help\" before attempting to move - cued for independence and pt transferred to EOB without assist  -[facility] Name 12/03/24 1044          Bed-Chair Transfer    Bed-Chair [place] (Transfers) minimum assist (75% patient effort);2 person assist;verbal cues  -     Assistive Device (Bed-Chair Transfers) walker, front-wheeled  -     Comment, (Bed-Chair Transfer) Pivot to chair, minimal foot " clearance from floor with a couple small steps  -[facility] Name 12/03/24 1044          Sit-Stand Transfer    Sit-Stand [place] (Transfers) verbal cues;minimum assist (75% patient effort);moderate assist (50% patient effort);2 person assist  -     Assistive Device (Sit-Stand Transfers) walker, front-wheeled  -     Comment, (Sit-Stand Transfer) min A x2 on first STS, mod A x2 on 2nd stand  -               User Key  (r) = Recorded By, (t) = Taken By, (c) = Cosigned By      Initials Name Provider Type     Amarilis Pierre PT Physical Therapist                   Obj/Interventions       [facility] Name 12/03/24 1046          Range of Motion Comprehensive    General Range of Motion lower extremity range of motion deficits identified  -     Comment, General Range of Motion Impaired B knee extension  -[facility] Name 12/03/24 1046          Strength Comprehensive (MMT)    General Manual Muscle Testing (MMT) Assessment lower extremity strength deficits identified  -     Comment, General Manual Muscle Testing (MMT) Assessment Generalized weakness, BLE grossly 3+/5  -[facility] Name 12/03/24 1046          Balance    Balance Assessment sitting static balance;sitting dynamic balance;standing static balance;standing dynamic balance  -     Static Sitting Balance standby assist  -     Dynamic Sitting Balance standby assist  -     Position, Sitting Balance unsupported;sitting edge of bed  -     Static Standing Balance minimal assist;verbal cues  -     Dynamic Standing Balance minimal assist;verbal cues  -     Position/Device Used, Standing Balance walker, front-wheeled;supported  -     Balance Interventions sitting;standing;sit to stand;supported;static;dynamic  -[facility] Name 12/03/24 1046          Sensory Assessment (Somatosensory)    Sensory Assessment (Somatosensory) LE sensation intact  -               User Key  (r) = Recorded By, (t) = Taken By, (c) = Cosigned By      Initials Name Provider  Type     Amarilis Pierre PT Physical Therapist                   Goals/Plan       Row Name 12/03/24 1052          Bed Mobility Goal 1 (PT)    Activity/Assistive Device (Bed Mobility Goal 1, PT) bed mobility activities, all  -     [place] Level/Cues Needed (Bed Mobility Goal 1, PT) independent  -     Time Frame (Bed Mobility Goal 1, PT) 1 week  -       Row Name 12/03/24 1052          Transfer Goal 1 (PT)    Activity/Assistive Device (Transfer Goal 1, PT) transfers, all  -     [place] Level/Cues Needed (Transfer Goal 1, PT) standby assist  -     Time Frame (Transfer Goal 1, PT) 1 week  -       Row Name 12/03/24 1052          Gait Training Goal 1 (PT)    Activity/Assistive Device (Gait Training Goal 1, PT) gait (walking locomotion)  -     [place] Level (Gait Training Goal 1, PT) standby assist  -     Distance (Gait Training Goal 1, PT) 50ft  -     Time Frame (Gait Training Goal 1, PT) 1 week  -       Row Name 12/03/24 1052          Therapy Assessment/Plan (PT)    Planned Therapy Interventions (PT) balance training;bed mobility training;gait training;home exercise program;patient/family education;ROM (range of motion);stair training;strengthening;stretching;transfer training  -               User Key  (r) = Recorded By, (t) = Taken By, (c) = Cosigned By      Initials Name Provider Type     Amarilis Pierre PT Physical Therapist                   Clinical Impression       Row Name 12/03/24 1046          Pain    Pretreatment Pain Rating 0/10 - no pain  -     Posttreatment Pain Rating 0/10 - no pain  -       Row Name 12/03/24 1046          Plan of Care Review    Plan of Care Reviewed With patient  -     Outcome Evaluation Pt is a 68 yo M admitted after being found down in his home by police who had been called for a welfare check. Per chart, pt had been down for at least 48 hours and was drinking heavily prior  - hx of alcohol abuse and pancreatitis. Pt lives alone and  reports independence at BL with a rwx, states he is independent with ADLs, but orders [employer] for every meal. Pt has 2 RENA with 12-14 up to his bedroom. Denies any other recent falls. Pt presents to PT with impaired strength, endurance, and balance limiting overall mobility. Pt transferred to EOB with SBA - initially stating he needed help without attempting to move on his own. PT offered education/encouragement to work on independence and pt transferred without assist. Pt stood with min A x2 and rwx, c/o significant weakness and impulsively returned to sitting. PT encouraged trying to transfer to the chair and pt agreeable - chair pulled closer and pt stood with mod A x2 and pivoted to the chair min A x2. Pt able to take a couple shuffled steps but poor foot clearance from floor and again impulsively sitting before completing turn. Pt assisted with repositioning and left with needs met. PT will continue to follow, pt is unsafe to return home at current level of function, will need SNF at [place].  -       Row Name 12/03/24 1046          Therapy Assessment/Plan (PT)    Patient/Family Therapy Goals Statement (PT) Return to [facility]  -     Rehab Potential (PT) good  -     Criteria for Skilled Interventions Met (PT) yes  -     Therapy Frequency (PT) 5 times/wk  -       Row Name 12/03/24 1046          Vital Signs    O2 Delivery Pre Treatment room air  -     O2 Delivery Intra Treatment room air  -     O2 Delivery Post Treatment room air  -       Row Name 12/03/24 1046          Positioning and Restraints    Pre-Treatment Position in bed  -     Post Treatment Position chair  -     In Chair notified nsg;reclined;call light within reach;encouraged to call for assist;exit alarm on  -               User Key  (r) = Recorded By, (t) = Taken By, (c) = Cosigned By      Initials Name Provider Type     Amarilis Pierre, PT Physical Therapist                   Outcome Measures       Row Name 12/03/24 1052 12/03/24  0800       How much help from another person do you currently need...    Turning from your back to your side while in flat bed without using bedrails? 4  - 4  -SY    Moving from lying on back to sitting on the side of a flat bed without bedrails? 3  - 4  -SY    Moving to and from a bed to a chair (including a wheelchair)? 3  - 4  -SY    Standing up from a chair using your arms (e.g., wheelchair, bedside chair)? 2  - 4  -SY    Climbing 3-5 steps with a railing? 1  - 4  -SY    To walk in hospital room? 2  - 4  -SY    AM-[facility] 6 Clicks Score (PT) 15  - 24  -SY    Highest Level of Mobility Goal 4 --> Transfer to chair/commode  - 8 --> Walked 250 feet or more  -SY      Row Name 12/03/24 0000          How much help from another person do you currently need...    Turning from your back to your side while in flat bed without using bedrails? 3  -MS     Moving from lying on back to sitting on the side of a flat bed without bedrails? 3  -MS     Moving to and from a bed to a chair (including a wheelchair)? 2  -MS     Standing up from a chair using your arms (e.g., wheelchair, bedside chair)? 3  -MS     Climbing 3-5 steps with a railing? 2  -MS     To walk in hospital room? 3  -MS     AM-PAC 6 Clicks Score (PT) 16  -MS     Highest Level of Mobility Goal 5 --> Static standing  -MS       Row Name 12/03/24 1052          Functional Assessment    Outcome Measure Options AM-[facility] 6 Clicks Basic Mobility (PT)  -               User Key  (r) = Recorded By, (t) = Taken By, (c) = Cosigned By      Initials Name Provider Type    SY Regi Turk, RN Registered Nurse    Madelaine Mitchell RN Registered Nurse     Amarilis Pierre PT Physical Therapist                                 Physical Therapy Education       Title: PT OT SLP Therapies (Done)       Topic: Physical Therapy (Done)       Point: Mobility training (Done)       Learning Progress Summary            Patient Acceptance, E,TB,D, VU,NR by  at 12/3/2024 1053     Acceptance, E, VU by  at 12/2/2024 0926                      Point: Home exercise program (Done)       Learning Progress Summary            Patient Acceptance, E, VU by  at 12/2/2024 0926                      Point: Body mechanics (Done)       Learning Progress Summary            Patient Acceptance, E,TB,D, VU,NR by  at 12/3/2024 1053    Acceptance, E, VU by  at 12/2/2024 0926                      Point: Precautions (Done)       Learning Progress Summary            Patient Acceptance, E,TB,D, VU,NR by  at 12/3/2024 1053    Acceptance, E, VU by  at 12/2/2024 0926                                      User Key       Initials Effective Dates Name Provider Type Discipline     02/26/24 -  Murphy Sherman RN Registered Nurse Nurse     04/08/22 -  Amarilis Pierre PT Physical Therapist PT                  PT Recommendation and Plan  Planned Therapy Interventions (PT): balance training, bed mobility training, gait training, home exercise program, patient/family education, ROM (range of motion), stair training, strengthening, stretching, transfer training  Outcome Evaluation: Pt is a 70 yo M admitted after being found down in his home by police who had been called for a welfare check. Per chart, pt had been down for at least 48 hours and was drinking heavily prior  - hx of alcohol abuse and pancreatitis. Pt lives alone and reports independence at BL with a rwx, states he is independent with ADLs, but orders [employer] for every meal. Pt has 2 RENA with 12-14 up to his bedroom. Denies any other recent falls. Pt presents to PT with impaired strength, endurance, and balance limiting overall mobility. Pt transferred to EOB with SBA - initially stating he needed help without attempting to move on his own. PT offered education/encouragement to work on independence and pt transferred without assist. Pt stood with min A x2 and rwx, c/o significant weakness and impulsively returned to sitting. PT encouraged trying to  transfer to the chair and pt agreeable - chair pulled closer and pt stood with mod A x2 and pivoted to the chair min A x2. Pt able to take a couple shuffled steps but poor foot clearance from floor and again impulsively sitting before completing turn. Pt assisted with repositioning and left with needs met. PT will continue to follow, pt is unsafe to return home at current level of function, will need SNF at [place].     Time Calculation:   PT Evaluation Complexity  History, PT Evaluation Complexity: 1-2 personal factors and/or comorbidities  Examination of Body Systems (PT Eval Complexity): total of 3 or more elements  Clinical Presentation (PT Evaluation Complexity): evolving  Clinical Decision Making (PT Evaluation Complexity): moderate complexity  Overall Complexity (PT Evaluation Complexity): moderate complexity     PT Charges       Row Name 12/03/24 1053             Time Calculation    Start Time 0842  -      Stop Time 0900  -      Time Calculation (min) 18 min  -      PT Received On 12/03/24  -      PT - Next Appointment 12/04/24  -      PT Goal Re-Cert Due Date 12/10/24  -         Time Calculation- PT    Total Timed Code Minutes- PT 15 minute(s)  -         Timed Charges    85046 - PT Therapeutic Activity Minutes 15  -BH         Total Minutes    Timed Charges Total Minutes 15  -BH       Total Minutes 15  -BH                User Key  (r) = Recorded By, (t) = Taken By, (c) = Cosigned By      Initials Name Provider Type     Amarilis Pierre, PT Physical Therapist                  Therapy Charges for Today       Code Description Service Date Service Provider Modifiers Qty    63559173213 HC PT THERAPEUTIC ACT EA 15 MIN 12/3/2024 Amarilis Pierre, PT GP 1    93128265837 HC PT EVAL MOD COMPLEXITY 3 12/3/2024 Amarilis Pierre, PT GP 1    [id number]  PT THER SUPP EA 15 MIN 12/3/2024 Amarilis Pierre, PT GP 1            PT G-Codes  Outcome Measure Options: AM-PAC 6 Clicks Basic Mobility (PT)  AM-PAC 6  Clicks Score (PT): 15  PT Discharge Summary  Anticipated Discharge Disposition (PT): skilled nursing facility    Amarilis Pierre, PT  12/3/2024

## 2024-12-03 NOTE — CASE MANAGEMENT/SOCIAL WORK
Discharge Planning Assessment  [facility]     Patient Name: Pro Mueller  MRN: [id number]  Today's Date: 12/3/2024    Admit Date: 12/2/2024    Plan: TBD   Discharge Needs Assessment       Row Name 12/03/24 1405       Living Environment    People in Home alone    Current Living Arrangements home    Potentially Unsafe Housing Conditions no heat    Primary Care Provided by self    Family Caregiver if Needed sibling(s)    Family Caregiver Names Sister Jeny    Quality of Family Relationships involved;helpful    Able to Return to Prior Arrangements yes    Living Arrangement Comments likely unsafe to return home       Resource/Environmental Concerns    Resource/Environmental Concerns home accessibility;reliable transportation;environmental    Environment Concerns other (see comments)  pt not taking care of home or turning on heat    Home Accessibility Concerns stairs to access bedroom or bathroom    Transportation Concerns none       Transition Planning    Patient/Family Anticipates Transition to inpatient rehabilitation facility;long-term care facility;other (see comments)    Transportation Anticipated family or friend will provide       Discharge Needs Assessment    Readmission Within the Last 30 Days no previous admission in last 30 days    Equipment Currently Used at Home walker, rolling    Concerns to be Addressed adjustment to diagnosis/illness;home safety;decision-making;discharge planning    Anticipated Changes Related to Illness inability to care for self    Equipment Needed After Discharge none                   Discharge Plan       Row Name 12/03/24 1408       Plan    Plan TBD    Patient/Family in Agreement with Plan yes    Plan Comments Spoke with patient sister Jeny by phone, she is best reached prior to 1230 pm due to work schedule, introduced self, explained CCP role, pt lives alone in 2 story home with 2 RENA and second floor bedroom. Pt has been intermittently confused and unable to care for himself,  "Jeny is HCS per AD paperwork on file, she states she has been attempting to get guardianship over pt because he is incapable of caring for himself properly. Pt has rwx but \"doesn't help on steps\" pt has no HH or SNF history, he has been to multiple [facility] treatment centers in the past for alcohol abuse. Per sister pt home is unfit for living, dirty, and not taken care of, he has failed to fulfill financial obligations. Pt continues to have self-care deficit with hygiene and nutrition over the last few months, family members have attempted to clean his home, provide meals etc and he is resistant to assistance. Pt reportedly uses delivery services for food and groceries but often forgets they are on the porch, and they go to waste. Pt admitted to hospital after fall at home in bathroom and was down for 48 hours. Reportedly pt had not turned on his heat, unsure if heat working or just failed to utilize in freezing temperatures. Pt has no PCP at this time, but Jeny is trying to arrange appointment for him, left [facility] liaison number at bedside for her to review. CCP will initiate APS referral as pt is not safe to dc home at this time. Pt could qualify for DIANA, would not need precert, recent substance use could be an obstacle with placement.  CCP will follow for needs - Regi ESTRADA                  Continued Care and Services - Admitted Since 12/2/2024    No active coordination exists for this encounter.       Selected Continued Care - Prior Encounters Includes continued care and service providers with selected services from prior encounters from 9/3/2024 to 12/3/2024      Discharged on 9/25/2024 Admission date: 9/20/2024 - Discharge disposition: Home-Health Care [facility]      Home Medical Care       Service Provider Services Address Phone Fax Patient Preferred    [facility], Home Medical , Home Nursing, Home Rehabilitation, Home Living Aide Services [phone] " [address] [address] [phone] --                          Expected Discharge Date and Time       Expected Discharge Date Expected Discharge Time    Dec 4, 2024            Demographic Summary       Row Name 12/03/24 1404       General Information    Admission Type inpatient                   Functional Status       Row Name 12/03/24 1404       Functional Status    Usual Activity Tolerance moderate    Current Activity Tolerance poor       Assessment of Health Literacy    Health Literacy Fair       Functional Status, IADL    Medications independent    Meal Preparation independent    Housekeeping assistive person    Laundry assistive person    Shopping assistive person    IADL Comments pt not caring for home, orders groceries and door dash but often forgets deliveries are on porch and they go bad       Mental Status    General Appearance WDL WDL       Mental Status Summary    Recent Changes in Mental Status/Cognitive Functioning unable to assess                   Psychosocial    No documentation.                  Abuse/Neglect    No documentation.                  Legal       Row Name 12/03/24 1405       Financial/Legal    Who Manages Finances if Patient Unable Sister                   Substance Abuse    No documentation.                  Patient Forms    No documentation.                     Regi Pagan RN

## 2024-12-03 NOTE — PLAN OF CARE
Goal Outcome Evaluation:      Pt is A&Ox4 with some confusion; K+  and Mag replaced today; urinal in reach; CIWA score- 1; VSS; medicated per orders; plan of care on going.

## 2024-12-04 LAB
ALBUMIN SERPL-MCNC: 2.9 G/DL (ref 3.5–5.2)
ALBUMIN/GLOB SERPL: 1.2 G/DL
ALP SERPL-CCNC: 85 U/L (ref 39–117)
ALT SERPL W P-5'-P-CCNC: 26 U/L (ref 1–41)
ANION GAP SERPL CALCULATED.3IONS-SCNC: 10 MMOL/L (ref 5–15)
AST SERPL-CCNC: 76 U/L (ref 1–40)
BILIRUB SERPL-MCNC: 0.4 MG/DL (ref 0–1.2)
BUN SERPL-MCNC: 10 MG/DL (ref 8–23)
BUN/CREAT SERPL: 15.4 (ref 7–25)
CALCIUM SPEC-SCNC: 7.9 MG/DL (ref 8.6–10.5)
CHLORIDE SERPL-SCNC: 98 MMOL/L (ref 98–107)
CO2 SERPL-SCNC: 26 MMOL/L (ref 22–29)
CREAT SERPL-MCNC: 0.65 MG/DL (ref 0.76–1.27)
DEPRECATED RDW RBC AUTO: 55.2 FL (ref 37–54)
EGFRCR SERPLBLD CKD-EPI 2021: 103.3 ML/MIN/1.73
ERYTHROCYTE [DISTWIDTH] IN BLOOD BY AUTOMATED COUNT: 16.4 % (ref 12.3–15.4)
FOLATE SERPL-MCNC: 4.51 NG/ML (ref 4.78–24.2)
GLOBULIN UR ELPH-MCNC: 2.4 GM/DL
GLUCOSE SERPL-MCNC: 91 MG/DL (ref 65–99)
HCT VFR BLD AUTO: 28.8 % (ref 37.5–51)
HGB BLD-MCNC: 10.3 G/DL (ref 13–17.7)
MAGNESIUM SERPL-MCNC: 1.9 MG/DL (ref 1.6–2.4)
MCH RBC QN AUTO: 33.6 PG (ref 26.6–33)
MCHC RBC AUTO-ENTMCNC: 35.8 G/DL (ref 31.5–35.7)
MCV RBC AUTO: 93.8 FL (ref 79–97)
PLATELET # BLD AUTO: 115 10*3/MM3 (ref 140–450)
PMV BLD AUTO: 9.1 FL (ref 6–12)
POTASSIUM SERPL-SCNC: 3.5 MMOL/L (ref 3.5–5.2)
POTASSIUM SERPL-SCNC: 4.7 MMOL/L (ref 3.5–5.2)
PROT SERPL-MCNC: 5.3 G/DL (ref 6–8.5)
RBC # BLD AUTO: 3.07 10*6/MM3 (ref 4.14–5.8)
SODIUM SERPL-SCNC: 134 MMOL/L (ref 136–145)
VIT B12 BLD-MCNC: 504 PG/ML (ref 211–946)
WBC NRBC COR # BLD AUTO: 9.8 10*3/MM3 (ref 3.4–10.8)

## 2024-12-04 PROCEDURE — 25010000002 THIAMINE HCL 200 MG/2ML SOLUTION: Performed by: NURSE PRACTITIONER

## 2024-12-04 PROCEDURE — 83735 ASSAY OF MAGNESIUM: CPT | Performed by: NURSE PRACTITIONER

## 2024-12-04 PROCEDURE — 85027 COMPLETE CBC AUTOMATED: CPT | Performed by: INTERNAL MEDICINE

## 2024-12-04 PROCEDURE — 97110 THERAPEUTIC EXERCISES: CPT

## 2024-12-04 PROCEDURE — 82607 VITAMIN B-12: CPT | Performed by: INTERNAL MEDICINE

## 2024-12-04 PROCEDURE — 80053 COMPREHEN METABOLIC PANEL: CPT | Performed by: INTERNAL MEDICINE

## 2024-12-04 PROCEDURE — 82746 ASSAY OF FOLIC ACID SERUM: CPT | Performed by: INTERNAL MEDICINE

## 2024-12-04 PROCEDURE — 97166 OT EVAL MOD COMPLEX 45 MIN: CPT

## 2024-12-04 PROCEDURE — 84132 ASSAY OF SERUM POTASSIUM: CPT | Performed by: INTERNAL MEDICINE

## 2024-12-04 PROCEDURE — 97530 THERAPEUTIC ACTIVITIES: CPT

## 2024-12-04 PROCEDURE — 25010000002 THIAMINE PER 100 MG: Performed by: NURSE PRACTITIONER

## 2024-12-04 RX ORDER — POTASSIUM CHLORIDE 750 MG/1
40 TABLET, FILM COATED, EXTENDED RELEASE ORAL EVERY 4 HOURS
Status: COMPLETED | OUTPATIENT
Start: 2024-12-04 | End: 2024-12-04

## 2024-12-04 RX ADMIN — POTASSIUM CHLORIDE 40 MEQ: 750 TABLET, EXTENDED RELEASE ORAL at 08:56

## 2024-12-04 RX ADMIN — NYSTATIN 1 APPLICATION: 100000 OINTMENT TOPICAL at 07:27

## 2024-12-04 RX ADMIN — THIAMINE HYDROCHLORIDE 200 MG: 100 INJECTION, SOLUTION INTRAMUSCULAR; INTRAVENOUS at 14:07

## 2024-12-04 RX ADMIN — FOLIC ACID 1 MG: 1 TABLET ORAL at 07:27

## 2024-12-04 RX ADMIN — ACETAMINOPHEN 325MG 650 MG: 325 TABLET ORAL at 07:31

## 2024-12-04 RX ADMIN — THIAMINE HYDROCHLORIDE 200 MG: 100 INJECTION, SOLUTION INTRAMUSCULAR; INTRAVENOUS at 06:49

## 2024-12-04 RX ADMIN — THIAMINE HYDROCHLORIDE 200 MG: 100 INJECTION, SOLUTION INTRAMUSCULAR; INTRAVENOUS at 22:09

## 2024-12-04 RX ADMIN — Medication 10 ML: at 07:27

## 2024-12-04 RX ADMIN — Medication 10 ML: at 20:26

## 2024-12-04 RX ADMIN — LEVOTHYROXINE SODIUM 100 MCG: 100 TABLET ORAL at 07:27

## 2024-12-04 RX ADMIN — LORAZEPAM 1 MG: 1 TABLET ORAL at 10:14

## 2024-12-04 RX ADMIN — POTASSIUM CHLORIDE 40 MEQ: 750 TABLET, EXTENDED RELEASE ORAL at 12:14

## 2024-12-04 RX ADMIN — NYSTATIN 1 APPLICATION: 100000 OINTMENT TOPICAL at 20:25

## 2024-12-04 NOTE — PLAN OF CARE
Goal Outcome Evaluation:  Pt resting on the bed , sr on monitor, RA, administered meds per mar, c/o pain medicated with narco and tylenol x 1, urinal within reach, vss, will continue to monitor.      Problem: Adult Inpatient Plan of Care  Goal: Absence of Hospital-Acquired Illness or Injury  Intervention: Identify and Manage Fall Risk  Recent Flowsheet Documentation  Taken 12/4/2024 0404 by Memo Stubbs RN  Safety Promotion/Fall Prevention:   activity supervised   room organization consistent   safety round/check completed  Taken 12/4/2024 0204 by Memo Stubbs RN  Safety Promotion/Fall Prevention:   activity supervised   room organization consistent   safety round/check completed  Taken 12/4/2024 0005 by Memo Stubbs RN  Safety Promotion/Fall Prevention:   activity supervised   room organization consistent   safety round/check completed  Taken 12/3/2024 2204 by Memo Stubbs RN  Safety Promotion/Fall Prevention:   activity supervised   room organization consistent   safety round/check completed  Taken 12/3/2024 2010 by Memo Stubbs RN  Safety Promotion/Fall Prevention:   activity supervised   room organization consistent   safety round/check completed  Intervention: Prevent Skin Injury  Recent Flowsheet Documentation  Taken 12/4/2024 0404 by Memo Stubbs RN  Body Position: position changed independently  Taken 12/4/2024 0204 by Memo Stubbs RN  Body Position: position changed independently  Taken 12/4/2024 0005 by Memo Stubbs RN  Body Position: position changed independently  Skin Protection: incontinence pads utilized  Taken 12/3/2024 2204 by Memo Stubbs RN  Body Position: position changed independently  Taken 12/3/2024 2010 by Memo Stubbs RN  Body Position: position changed independently  Skin Protection: incontinence pads utilized  Intervention: Prevent Infection  Recent Flowsheet Documentation  Taken 12/4/2024 0404 by Memo Stubbs RN  Infection  Prevention:   hand hygiene promoted   rest/sleep promoted  Taken 12/4/2024 0204 by Memo Stubbs RN  Infection Prevention:   hand hygiene promoted   rest/sleep promoted  Taken 12/4/2024 0005 by Memo Stubbs RN  Infection Prevention:   hand hygiene promoted   rest/sleep promoted  Taken 12/3/2024 2204 by Memo Stubbs RN  Infection Prevention:   hand hygiene promoted   rest/sleep promoted  Taken 12/3/2024 2010 by Memo Stubbs RN  Infection Prevention:   hand hygiene promoted   rest/sleep promoted  Goal: Optimal Comfort and Wellbeing  Intervention: Monitor Pain and Promote Comfort  Recent Flowsheet Documentation  Taken 12/4/2024 0005 by Memo Stubbs RN  Pain Management Interventions: pain medication given  Taken 12/3/2024 2010 by Memo Stubbs RN  Pain Management Interventions: pain medication given  Intervention: Provide Person-Centered Care  Recent Flowsheet Documentation  Taken 12/4/2024 0005 by Memo Stubbs RN  Trust Relationship/Rapport: care explained  Taken 12/3/2024 2010 by Memo Stubbs RN  Trust Relationship/Rapport:   care explained   choices provided     Problem: Fall Injury Risk  Goal: Absence of Fall and Fall-Related Injury  Intervention: Identify and Manage Contributors  Recent Flowsheet Documentation  Taken 12/4/2024 0404 by Memo Stubbs RN  Medication Review/Management: medications reviewed  Taken 12/4/2024 0204 by Memo Stubbs RN  Medication Review/Management: medications reviewed  Taken 12/4/2024 0005 by Memo Stubbs RN  Medication Review/Management: medications reviewed  Taken 12/3/2024 2204 by Memo Stubbs RN  Medication Review/Management: medications reviewed  Taken 12/3/2024 2010 by Memo Stubbs RN  Medication Review/Management: medications reviewed  Self-Care Promotion: independence encouraged  Intervention: Promote Injury-Free Environment  Recent Flowsheet Documentation  Taken 12/4/2024 0404 by Memo Stubbs  RN  Safety Promotion/Fall Prevention:   activity supervised   room organization consistent   safety round/check completed  Taken 12/4/2024 0204 by Memo Stubbs RN  Safety Promotion/Fall Prevention:   activity supervised   room organization consistent   safety round/check completed  Taken 12/4/2024 0005 by Memo Stubbs RN  Safety Promotion/Fall Prevention:   activity supervised   room organization consistent   safety round/check completed  Taken 12/3/2024 2204 by Memo Stubbs RN  Safety Promotion/Fall Prevention:   activity supervised   room organization consistent   safety round/check completed  Taken 12/3/2024 2010 by Memo Stubbs RN  Safety Promotion/Fall Prevention:   activity supervised   room organization consistent   safety round/check completed     Problem: Sepsis/Septic Shock  Goal: Absence of Infection Signs and Symptoms  Intervention: Initiate Sepsis Management  Recent Flowsheet Documentation  Taken 12/4/2024 0404 by Memo Stubbs RN  Infection Prevention:   hand hygiene promoted   rest/sleep promoted  Isolation Precautions: precautions maintained  Taken 12/4/2024 0204 by Memo Stubbs RN  Infection Management: aseptic technique maintained  Infection Prevention:   hand hygiene promoted   rest/sleep promoted  Isolation Precautions: precautions maintained  Taken 12/4/2024 0005 by Memo Stubbs RN  Infection Prevention:   hand hygiene promoted   rest/sleep promoted  Isolation Precautions: precautions maintained  Taken 12/3/2024 2204 by Memo Stubbs RN  Infection Prevention:   hand hygiene promoted   rest/sleep promoted  Isolation Precautions: precautions maintained  Taken 12/3/2024 2010 by Memo Stubbs RN  Infection Management: aseptic technique maintained  Infection Prevention:   hand hygiene promoted   rest/sleep promoted  Isolation Precautions: precautions maintained  Intervention: Promote Recovery  Recent Flowsheet Documentation  Taken 12/4/2024 0204 by  Memo Stubbs RN  Activity Management: bedrest  Taken 12/4/2024 0005 by Memo Stubbs RN  Activity Management: activity encouraged  Sleep/Rest Enhancement: awakenings minimized  Taken 12/3/2024 2010 by Memo Stubbs RN  Activity Management: activity encouraged  Sleep/Rest Enhancement: awakenings minimized     Problem: Skin Injury Risk Increased  Goal: Skin Health and Integrity  Intervention: Optimize Skin Protection  Recent Flowsheet Documentation  Taken 12/4/2024 0404 by Memo Stubbs RN  Head of Bed (HOB) Positioning: HOB elevated  Taken 12/4/2024 0204 by Memo Stubbs RN  Activity Management: bedrest  Head of Bed (HOB) Positioning: HOB elevated  Taken 12/4/2024 0005 by Memo Stubbs RN  Activity Management: activity encouraged  Pressure Reduction Techniques:   frequent weight shift encouraged   weight shift assistance provided  Head of Bed (HOB) Positioning: HOB elevated  Pressure Reduction Devices:   alternating pressure pump (LUBA)   pressure-redistributing mattress utilized  Skin Protection: incontinence pads utilized  Taken 12/3/2024 2204 by Memo Stubbs RN  Head of Bed (HOB) Positioning: HOB elevated  Taken 12/3/2024 2010 by Memo Stubbs RN  Activity Management: activity encouraged  Pressure Reduction Techniques:   frequent weight shift encouraged   weight shift assistance provided  Head of Bed (HOB) Positioning: HOB elevated  Pressure Reduction Devices:   alternating pressure pump (LUBA)   pressure-redistributing mattress utilized  Skin Protection: incontinence pads utilized  Intervention: Promote and Optimize Oral Intake  Recent Flowsheet Documentation  Taken 12/4/2024 0005 by Memo Stubbs RN  Oral Nutrition Promotion: rest periods promoted  Taken 12/3/2024 2010 by Memo Stubbs RN  Oral Nutrition Promotion: rest periods promoted

## 2024-12-04 NOTE — THERAPY TREATMENT NOTE
Patient Name: Pro Mueller  : 1955    MRN: [id number]                              Today's Date: 2024       Admit Date: 2024    Visit Dx:     ICD-10-CM ICD-9-CM   1. Alcohol-induced acute pancreatitis, unspecified complication status  K85.20 577.0   2. Fall, initial encounter  W19.XXXA E888.9   3. Hyponatremia  E87.1 276.1   4. Hypothermia, initial encounter  T68.XXXA 991.6   5. Candidiasis of scrotum  B37.49 112.2     Patient Active Problem List   Diagnosis    Fall    Alcoholism in recovery    Atopic rhinitis    Mixed anxiety depressive disorder    Genital herpes simplex    Hyperlipidemia    Hypertension    Hypothyroidism    Insomnia    Panic disorder without agoraphobia    Persistent insomnia    Vitamin D deficiency    Chronic low back pain    Neuropathy involving both lower extremities    QT prolongation    Left shoulder pain    Nausea and vomiting    Hyponatremia    Pancytopenia    Left ventricular diastolic dysfunction    Tremor    C5 cervical fracture    Syncope and collapse    Neck pain    Alcoholic intoxication with complication    Withdrawal symptoms, alcohol    Transaminitis    Lumbar facet arthropathy    Alcohol dependence    Midline low back pain with right-sided sciatica    Spondylolisthesis at L4-L5 level    Lumbar canal stenosis    Alcohol cessation counseling    Need for assistance due to reduced mobility    Impaired mobility and ADLs    Alcohol withdrawal syndrome without complication    Facial laceration    Orthostatic hypotension    Acute bacterial conjunctivitis of right eye    Visit for suture removal    Renal calculi    Hepatic steatosis    Alcohol withdrawal syndrome with complication    Macrocytosis without anemia    Lumbosacral spondylosis without myelopathy    Elevated LFTs    Alcohol-induced acute pancreatitis, unspecified complication status    Pancreatitis    Generalized abdominal pain    Alcohol withdrawal    Metabolic acidosis    Acute alcohol intoxication in patient  with alcoholism with blood alcohol level 0.08 to 0.29, with unspecified complication    Calculus of gallbladder with cholecystitis without biliary obstruction    Hypothermia     Past Medical History:   Diagnosis Date    Alcohol abuse     Alcohol withdrawal 11/04/2016    Alcoholic ketoacidosis 01/12/2020    Allergic 1970    Anxiety     Arthritis     Atopic rhinitis 08/08/2016    Depression     Disease of thyroid gland     Elevated cholesterol     Encounter for removal of sutures     Genital herpes simplex 08/08/2016    GERD (gastroesophageal reflux disease)     Headache, tension-type     Hyperlipidemia     Hypertension     Hypothyroidism     Kidney stone     Low back pain 2019    Migraine     Motion sickness     Nephrolithiasis 02/12/2020    Olecranon bursitis, right elbow     Panic disorder without agoraphobia 08/08/2016    Peripheral neuropathy     Sleep apnea     Syncope and collapse 01/02/2019    Vitamin D deficiency 08/08/2016    Withdrawal symptoms, alcohol      Past Surgical History:   Procedure Laterality Date    CHOLECYSTECTOMY N/A 9/22/2024    Procedure: CHOLECYSTECTOMY LAPAROSCOPIC WITH DAVINCI ROBOT with cholangiogram, possible open;  Surgeon: Toro Noguera MD;  Location: [facility] MAIN OR;  Service: General;  Laterality: N/A;    COLONOSCOPY      CYST REMOVAL      CYSTOSCOPY BLADDER STONE LITHOTRIPSY  02/2022    ERCP N/A 9/23/2024    Procedure: ENDOSCOPIC RETROGRADE CHOLANGIOPANCREATOGRAPHY sphincterotomy, balloon sweep (9-12);  Surgeon: Fara Madrigal MD;  Location: [facility] ENDOSCOPY;  Service: Gastroenterology;  Laterality: N/A;  sphincterotomy hiatial hernia, gallstone removal    EXTRACORPOREAL SHOCK WAVE LITHOTRIPSY (ESWL) Right 2002    SHOULDER ARTHROSCOPY Right 12/17/2019    Procedure: SHOULDER ARTHROSCOPY, decompression, distal clavicle excision;  Surgeon: Aj Mancilla MD;  Location: [facility] OR [facility];  Service: Orthopedics    TONSILLECTOMY        General Information       Row Name 12/04/24 3743           Physical Therapy Time and Intention    Document Type therapy note (daily note)  Simultaneous filing. User may be unaware of other data.  -MG     Mode of Treatment co-treatment;occupational therapy;physical therapy;other (see comments)  Simultaneous filing. User may be unaware of other data.  -MG       Row Name 12/04/24 1619          General Information    Patient Profile Reviewed yes  -MG     Existing Precautions/Restrictions fall  Simultaneous filing. User may be unaware of other data.  -MG     Barriers to Rehab medically complex  Simultaneous filing. User may be unaware of other data.  -MG       Row Name 12/04/24 1619          Cognition    Orientation Status (Cognition) oriented x 3  Simultaneous filing. User may be unaware of other data.  -MG       Row Name 12/04/24 1619          Safety Issues/Impairments Affecting Functional Mobility    Safety Issues Affecting Function (Mobility) insight into deficits/self-awareness;safety precaution awareness;sequencing abilities  Simultaneous filing. User may be unaware of other data.  -MG     Impairments Affecting Function (Mobility) balance;cognition;endurance/activity tolerance;strength;pain;range of motion (ROM)  Simultaneous filing. User may be unaware of other data.  -MG     Comment, Safety Issues/Impairments (Mobility) Co treatment medically appropriate and necessary due to patient acuity level, activity tolerance and safety of patient and staff. Treatment is focusing on progression of care and goals established in the POC. Gait belt and non-skid socks donned.  -MG               User Key  (r) = Recorded By, (t) = Taken By, (c) = Cosigned By      Initials Name Provider Type    MG Monique Mock PT Physical Therapist                   Mobility       Row Name 12/04/24 1619          Bed Mobility    Bed Mobility supine-sit;sit-supine  -MG     Supine-Sit [place] (Bed Mobility) moderate assist (50% patient effort);verbal cues;1 person assist  -MG     Sit-Supine  [place] (Bed Mobility) minimum assist (75% patient effort);moderate assist (50% patient effort);2 person assist;verbal cues  -MG     Assistive Device (Bed Mobility) bed rails  -MG     Comment, (Bed Mobility) Cues for sequencing and hand placement.  -MG       Row Name 12/04/24 1619          Sit-Stand Transfer    Sit-Stand [place] (Transfers) maximum assist (25% patient effort);2 person assist;verbal cues  -MG     Assistive Device (Sit-Stand Transfers) walker, front-wheeled  -MG     Comment, (Sit-Stand Transfer) x1 from EOB with one L side step then abrupt return to sitting. Second stand attempt pt unable to clear his bottom from the bed.  -MG               User Key  (r) = Recorded By, (t) = Taken By, (c) = Cosigned By      Initials Name Provider Type    Monique Betancourt, LUCILA Physical Therapist                   Obj/Interventions       Row Name 12/04/24 1621          Motor Skills    Therapeutic Exercise other (see comments)  AP, LAQ x5. Cues for full ROM  -MG       Row Name 12/04/24 1621          Balance    Comment, Balance Sitting EOB w/ CG/SBA. Standing w/ RW and MaxA x2.  -MG               User Key  (r) = Recorded By, (t) = Taken By, (c) = Cosigned By      Initials Name Provider Type    Monique Betancourt, LUCILA Physical Therapist                   Goals/Plan    No documentation.                  Clinical Impression       Row Name 12/04/24 1622          Pain    Pretreatment Pain Rating 0/10 - no pain  -MG     Posttreatment Pain Rating 0/10 - no pain  -MG     Pre/Posttreatment Pain Comment Reports generalized soreness, but no actual pain.  -MG       Row Name 12/04/24 1622          Plan of Care Review    Plan of Care Reviewed With patient  -MG     Progress improving  -MG     Outcome Evaluation Pt seen for PT/OT cotx this PM and tolerated the session fairly. Today, pt required encouragement to participate and was ModA x1 up to Min/ModA x2 for bed mobility. While EOB pt stood w/ MaxA x2 to the RW and was able to  take one L side step, but abruptly returned to sitting EOB. Attempted a second stand w/ pt unable to clear his bottom from the bed. Pt performed UE/LE ther-ex prior to STS attempts. Pt was quick to fatigue and returned to supine to end the session. Encouraged pt to trying getting UIC for 1-3 meals w/ nsg assist to increase his activity tolerance; he v/u. PT will cont to follow and rec rehab at [facility].  -MG       Row Name 12/04/24 1622          Therapy Assessment/Plan (PT)    Rehab Potential (PT) good  -MG     Criteria for Skilled Interventions Met (PT) yes  -MG     Therapy Frequency (PT) 5 times/wk  -MG       Row Name 12/04/24 1622          Vital Signs    Pre Patient Position Supine  -MG     Intra Patient Position Standing  -MG     Post Patient Position Supine  -MG       Row Name 12/04/24 1622          Positioning and Restraints    Pre-Treatment Position in bed  -MG     Post Treatment Position bed  -MG     In Bed notified nsg;supine;fowlers;call light within reach;encouraged to call for assist;exit alarm on;side rails up x3;legs elevated  -MG               User Key  (r) = Recorded By, (t) = Taken By, (c) = Cosigned By      Initials Name Provider Type    MG Monique Mock, PT Physical Therapist                   Outcome Measures       Row Name 12/04/24 1625 12/04/24 0729       How much help from another person do you currently need...    Turning from your back to your side while in flat bed without using bedrails? 3  -MG 3  -MS    Moving from lying on back to sitting on the side of a flat bed without bedrails? 3  -MG 3  -MS    Moving to and from a bed to a chair (including a wheelchair)? 2  -MG 3  -MS    Standing up from a chair using your arms (e.g., wheelchair, bedside chair)? 2  -MG 3  -MS    Climbing 3-5 steps with a railing? 2  -MG 3  -MS    To walk in hospital room? 2  -MG 3  -MS    AM-PAC 6 Clicks Score (PT) 14  -MG 18  -MS    Highest Level of Mobility Goal 4 --> Transfer to chair/commode  -MG 6 --> Walk 10 steps  or more  -MS      Row Name 12/04/24 1616          Modified [place] Scale    Modified [place] Scale 4 - Moderately severe disability.  Unable to walk without assistance, and unable to attend to own bodily needs without assistance.  -RB       Row Name 12/04/24 1616          Functional Assessment    Outcome Measure Options AM-PAC 6 Clicks Daily Activity (OT);Modified [place]  -RB               User Key  (r) = Recorded By, (t) = Taken By, (c) = Cosigned By      Initials Name Provider Type    Say Fung, RN Registered Nurse     Monique Mock, PT Physical Therapist    RB Maria Elena Meza, OT Occupational Therapist                                 Physical Therapy Education       Title: PT OT SLP Therapies (Done)       Topic: Physical Therapy (Done)       Point: Mobility training (Done)       Learning Progress Summary            Patient Acceptance, E,D, VU,NR by  at 12/4/2024 1625    Acceptance, E,TB,D, VU,NR by  at 12/3/2024 1053    Acceptance, E, VU by  at 12/2/2024 0926                      Point: Home exercise program (Done)       Learning Progress Summary            Patient Acceptance, E,D, VU,NR by  at 12/4/2024 1625    Acceptance, E, VU by  at 12/2/2024 0926                      Point: Body mechanics (Done)       Learning Progress Summary            Patient Acceptance, E,D, VU,NR by  at 12/4/2024 1625    Acceptance, E,TB,D, VU,NR by  at 12/3/2024 1053    Acceptance, E, VU by  at 12/2/2024 0926                      Point: Precautions (Done)       Learning Progress Summary            Patient Acceptance, E,D, VU,NR by  at 12/4/2024 1625    Acceptance, E,TB,D, VU,NR by  at 12/3/2024 1053    Acceptance, E, VU by  at 12/2/2024 0926                                      User Key       Initials Effective Dates Name Provider Type Discipline     05/24/22 -  Monique Mock, PT Physical Therapist PT     02/26/24 -  Murphy Sherman RN Registered Nurse Nurse     04/08/22 -  Amarilis Pierre  PT Physical Therapist PT                  PT Recommendation and Plan     Progress: improving  Outcome Evaluation: Pt seen for PT/OT cotx this PM and tolerated the session fairly. Today, pt required encouragement to participate and was ModA x1 up to Min/ModA x2 for bed mobility. While EOB pt stood w/ MaxA x2 to the RW and was able to take one L side step, but abruptly returned to sitting EOB. Attempted a second stand w/ pt unable to clear his bottom from the bed. Pt performed UE/LE ther-ex prior to STS attempts. Pt was quick to fatigue and returned to supine to end the session. Encouraged pt to trying getting UIC for 1-3 meals w/ nsg assist to increase his activity tolerance; he v/u. PT will cont to follow and rec rehab at [facility].     Time Calculation:         PT Charges       Row Name 12/04/24 1625             Time Calculation    Start Time 1456  -MG      Stop Time 1507  -MG      Time Calculation (min) 11 min  -MG      PT Received On 12/04/24  -MG      PT - Next Appointment 12/05/24  -MG                User Key  (r) = Recorded By, (t) = Taken By, (c) = Cosigned By      Initials Name Provider Type    MG Monique Mock, PT Physical Therapist                  Therapy Charges for Today       Code Description Service Date Service Provider Modifiers Qty    [id number] HC PT THERAPEUTIC ACT EA 15 MIN 12/4/2024 Monique Mock, PT GP 1            PT G-Codes  Outcome Measure Options: AM-PAC 6 Clicks Daily Activity (OT), Modified [place]  AM-PAC 6 Clicks Score (PT): 14  AM-PAC 6 Clicks Score (OT): 11  Modified Mohit Scale: 4 - Moderately severe disability.  Unable to walk without assistance, and unable to attend to own bodily needs without assistance.  PT Discharge Summary  Anticipated Discharge Disposition (PT): skilled nursing facility    Monique Mock PT  12/4/2024

## 2024-12-04 NOTE — PLAN OF CARE
Problem: Adult Inpatient Plan of Care  Goal: Plan of Care Review  Outcome: Progressing  Flowsheets (Taken 12/4/2024 1441)  Progress: improving  Outcome Evaluation: Patient has been cooperative during shift. No complaints of pain, nausea or SOA. AOx3, assist x1/turn, room air, SR. Replace K. Zio patch ordered at discharge. Patient treated once per CIWA score. Patient received a bath today. Will continue to monitor and assist patient as needed.  Plan of Care Reviewed With: patient  Goal: Patient-Specific Goal (Individualized)  Outcome: Progressing  Goal: Absence of Hospital-Acquired Illness or Injury  Outcome: Progressing  Intervention: Identify and Manage Fall Risk  Recent Flowsheet Documentation  Taken 12/4/2024 1315 by Say Diallo RN  Safety Promotion/Fall Prevention:   assistive device/personal items within reach   fall prevention program maintained   nonskid shoes/slippers when out of bed   safety round/check completed  Taken 12/4/2024 1200 by Say Diallo RN  Safety Promotion/Fall Prevention:   assistive device/personal items within reach   fall prevention program maintained   nonskid shoes/slippers when out of bed   safety round/check completed  Taken 12/4/2024 1000 by Say Diallo RN  Safety Promotion/Fall Prevention:   assistive device/personal items within reach   fall prevention program maintained   nonskid shoes/slippers when out of bed   safety round/check completed  Taken 12/4/2024 0729 by Say Diallo RN  Safety Promotion/Fall Prevention:   assistive device/personal items within reach   fall prevention program maintained   nonskid shoes/slippers when out of bed   safety round/check completed  Intervention: Prevent Skin Injury  Recent Flowsheet Documentation  Taken 12/4/2024 1315 by Say Diallo RN  Body Position: supine  Taken 12/4/2024 1200 by Say Diallo RN  Body Position: supine  Taken 12/4/2024 1000 by Say Diallo RN  Body Position: supine  Taken 12/4/2024  0729 by Say Diallo RN  Body Position: supine  Goal: Optimal Comfort and Wellbeing  Outcome: Progressing  Goal: Readiness for Transition of Care  Outcome: Progressing     Problem: Fall Injury Risk  Goal: Absence of Fall and Fall-Related Injury  Outcome: Progressing  Intervention: Promote Injury-Free Environment  Recent Flowsheet Documentation  Taken 12/4/2024 1315 by Say Diallo RN  Safety Promotion/Fall Prevention:   assistive device/personal items within reach   fall prevention program maintained   nonskid shoes/slippers when out of bed   safety round/check completed  Taken 12/4/2024 1200 by Say Diallo RN  Safety Promotion/Fall Prevention:   assistive device/personal items within reach   fall prevention program maintained   nonskid shoes/slippers when out of bed   safety round/check completed  Taken 12/4/2024 1000 by Say Diallo RN  Safety Promotion/Fall Prevention:   assistive device/personal items within reach   fall prevention program maintained   nonskid shoes/slippers when out of bed   safety round/check completed  Taken 12/4/2024 0729 by Say Diallo RN  Safety Promotion/Fall Prevention:   assistive device/personal items within reach   fall prevention program maintained   nonskid shoes/slippers when out of bed   safety round/check completed     Problem: Sepsis/Septic Shock  Goal: Optimal Coping  Outcome: Progressing  Goal: Absence of Bleeding  Outcome: Progressing  Goal: Blood Glucose Level Within Target Range  Outcome: Progressing  Goal: Absence of Infection Signs and Symptoms  Outcome: Progressing  Goal: Optimal Nutrition Delivery  Outcome: Progressing     Problem: Skin Injury Risk Increased  Goal: Skin Health and Integrity  Outcome: Progressing  Intervention: Optimize Skin Protection  Recent Flowsheet Documentation  Taken 12/4/2024 1315 by Say Diallo RN  Pressure Reduction Techniques: frequent weight shift encouraged  Head of Bed (HOB) Positioning: HOB at 30  degrees  Pressure Reduction Devices: pressure-redistributing mattress utilized  Taken 12/4/2024 1200 by Say Diallo RN  Head of Bed (HOB) Positioning: HOB at 20-30 degrees  Taken 12/4/2024 1000 by Say Diallo RN  Head of Bed (HOB) Positioning: HOB at 30 degrees  Taken 12/4/2024 0729 by Say Diallo RN  Pressure Reduction Techniques: frequent weight shift encouraged  Head of Bed (HOB) Positioning: HOB at 45 degrees  Pressure Reduction Devices: pressure-redistributing mattress utilized   Goal Outcome Evaluation:  Plan of Care Reviewed With: patient        Progress: improving  Outcome Evaluation: Patient has been cooperative during shift. No complaints of pain, nausea or SOA. AOx3, assist x1/turn, room air, SR. Replace K. Zio patch ordered at discharge. Patient treated once per CIWA score. Patient received a bath today. Will continue to monitor and assist patient as needed.

## 2024-12-04 NOTE — PLAN OF CARE
The pt was admitted to [facility] 2/2 to being found down in his home by police after a welfare check. Dx includes alcohol induced acute pancreatitis, fall, hyponatremia, hypothermia, and Candidiasis of scrotum. Pmhx significant for alcohol abuse, pancreatitis, hypertension, hyperlipidemia, GERD, and history of syncope. Per chart review, the pt's family has reported that the pt is unable to take care of himself in his home prior to admission. Today, the pt participated in OT/PT with motivation. Mod A x1 for bed mobility. He participated in several UE/LE exercises with guidance. He stood once with Max A x2 briefly and a second stand without his bottom clearing the bed. Mod A x2 to return to supine. Due to his limited activity tolerance SNF rehab would be beneficial as the next step in his recovery.

## 2024-12-04 NOTE — PROGRESS NOTES
" LOS: 2 days     Name: Pro Mueller  Age: 69 y.o.  Sex: male  :  1955  MRN: [id number]         Primary Care Physician: Provider, No Known    Subjective   Subjective  Patient is lying on the bed and does not appear to be any major distress.  Denies nausea, vomiting abdominal pain, chest pain.    Objective   Vital Signs  Temp:  [98 °F (36.7 °C)-98.7 °F (37.1 °C)] 98.1 °F (36.7 °C)  Heart Rate:  [65-73] 65  Resp:  [16-18] 18  BP: (111-119)/(61-71) 114/71  Body mass index is 24.68 kg/m².    Objective:  General Appearance:  Comfortable and in no acute distress.    Vital signs: (most recent): Blood pressure 114/71, pulse 65, temperature 98.1 °F (36.7 °C), temperature source Oral, resp. rate 18, height 182.9 cm (72\"), weight 82.6 kg (182 lb), SpO2 98%.    Lungs:  Normal effort and normal respiratory rate.  He is not in respiratory distress.  There are decreased breath sounds.    Heart: Normal rate.  Regular rhythm.    Abdomen: Abdomen is soft.  Bowel sounds are normal.   There is no abdominal tenderness.     Extremities: There is no dependent edema or local swelling.    Neurological: Patient is alert and oriented to person, place and time.    Skin:  Warm and dry.                Results Review:       I reviewed the patient's new clinical results.    Results from last 7 days   Lab Units 24  0557 24  0553 24  0633 24  0124   WBC 10*3/mm3 9.80 7.54 10.78 14.30*   HEMOGLOBIN g/dL 10.3* 10.5* 12.9* 14.5   PLATELETS 10*3/mm3 115* 131* 200 225     Results from last 7 days   Lab Units 24  0557 24  1859 24  0553 24  0633 24  0124   SODIUM mmol/L 134*  --  131* 129* 128*   POTASSIUM mmol/L 3.5 3.4* 3.2* 3.6 4.2   CHLORIDE mmol/L 98  --  97* 89* 85*   CO2 mmol/L 26.0  --  22.0 18.7* 19.0*   BUN mg/dL 10  --  18 32* 35*   CREATININE mg/dL 0.65*  --  0.79 0.92 1.01   CALCIUM mg/dL 7.9*  --  7.9* 8.8 9.4   GLUCOSE mg/dL 91  --  107* 90 93                 Scheduled Meds: "   folic acid, 1 mg, Oral, Daily  levothyroxine, 100 mcg, Oral, Daily  nystatin, 1 Application, Topical, Q12H  sodium chloride, 10 mL, Intravenous, Q12H  thiamine (B-1) IV, 200 mg, Intravenous, Q8H   Followed by  [START ON 12/7/2024] thiamine, 100 mg, Oral, Daily      PRN Meds:     acetaminophen    senna-docusate sodium **AND** polyethylene glycol **AND** bisacodyl **AND** bisacodyl    calcium carbonate    influenza vaccine    LORazepam **OR** LORazepam **OR** LORazepam **OR** LORazepam **OR** LORazepam **OR** LORazepam    Magnesium Standard Dose Replacement - Follow Nurse / BPA Driven Protocol    nitroglycerin    ondansetron    Potassium Replacement - Follow Nurse / BPA Driven Protocol    [COMPLETED] Insert Peripheral IV **AND** sodium chloride    sodium chloride    sodium chloride  Continuous Infusions:       Assessment & Plan   Active Hospital Problems    Diagnosis  POA    **Hypothermia [T68.XXXA]  Unknown    Metabolic acidosis [E87.20]  Yes    Elevated LFTs [R79.89]  Yes    Alcohol dependence [F10.20]  Yes    Hyponatremia [E87.1]  Yes    Chronic low back pain [M54.50, G89.29]  Yes    Hypothyroidism [E03.9]  Yes      Resolved Hospital Problems   No resolved problems to display.       Assessment & Plan    1 Hypothermia, secondary to loss of heat due to nonfunctioning furnace at home.  This has now resolved.  2.  Ethanol abuse, 3-4 drinks per day and not going through any withdrawal during this hospital stay.  On a CIWA protocol which will be continued.  And is on folate and thiamine supplements as well.  3.  Hypothyroidism, noncompliant with Synthroid which has been resumed.  TSH level is 14.  4.  Hypokalemia/hypomagnesemia, replace per protocol.  5.  Metabolic acidosis, this has now resolved.  6.  Anemia, check iron panel and follow-up with GI as an outpatient basis.  7.  Abnormal EKG with poor R wave progression, cardiology did evaluate and underwent echocardiogram which revealed normal ejection fraction with no  wall motion abnormality.  Recommended no further workup at this point.  No arrhythmia noted during this hospital stay as well.  8.  Generalized weakness, PT/OT to evaluate and recommended rehab placement.  9.  CODE STATUS is full code.        Danish Brown MD  [facility]  12/04/24  09:37 EST

## 2024-12-04 NOTE — CASE MANAGEMENT/SOCIAL WORK
Continued Stay Note  [facility]     Patient Name: Pro Mueller  MRN: [id number]  Today's Date: 12/4/2024    Admit Date: 12/2/2024    Plan: SNF referrals pending   Discharge Plan       Row Name 12/04/24 1120       Plan    Plan SNF referrals pending    Patient/Family in Agreement with Plan yes    Provided Post Acute Provider List? Yes    Post Acute Provider List Nursing Home    Delivered To Patient    Plan Comments CCP met with patient at bedside to discuss PT recommendations for SNF. Delivered [facility] Choice SNF list to patient and he is agreeable to referrals to [facility], [facility], [facility], and [facility]. Referrals pending. Patient anticipates he will need assistance with transportation. Nuria GONGORA RN/CCP                   Discharge Codes    No documentation.                 Expected Discharge Date and Time       Expected Discharge Date Expected Discharge Time    Dec 4, 2024               Regi Harp

## 2024-12-04 NOTE — THERAPY EVALUATION
Patient Name: Pro Mueller  : 1955    MRN: [id number]                              Today's Date: 2024       Admit Date: 2024    Visit Dx:     ICD-10-CM ICD-9-CM   1. Alcohol-induced acute pancreatitis, unspecified complication status  K85.20 577.0   2. Fall, initial encounter  W19.XXXA E888.9   3. Hyponatremia  E87.1 276.1   4. Hypothermia, initial encounter  T68.XXXA 991.6   5. Candidiasis of scrotum  B37.49 112.2     Patient Active Problem List   Diagnosis    Fall    Alcoholism in recovery    Atopic rhinitis    Mixed anxiety depressive disorder    Genital herpes simplex    Hyperlipidemia    Hypertension    Hypothyroidism    Insomnia    Panic disorder without agoraphobia    Persistent insomnia    Vitamin D deficiency    Chronic low back pain    Neuropathy involving both lower extremities    QT prolongation    Left shoulder pain    Nausea and vomiting    Hyponatremia    Pancytopenia    Left ventricular diastolic dysfunction    Tremor    C5 cervical fracture    Syncope and collapse    Neck pain    Alcoholic intoxication with complication    Withdrawal symptoms, alcohol    Transaminitis    Lumbar facet arthropathy    Alcohol dependence    Midline low back pain with right-sided sciatica    Spondylolisthesis at L4-L5 level    Lumbar canal stenosis    Alcohol cessation counseling    Need for assistance due to reduced mobility    Impaired mobility and ADLs    Alcohol withdrawal syndrome without complication    Facial laceration    Orthostatic hypotension    Acute bacterial conjunctivitis of right eye    Visit for suture removal    Renal calculi    Hepatic steatosis    Alcohol withdrawal syndrome with complication    Macrocytosis without anemia    Lumbosacral spondylosis without myelopathy    Elevated LFTs    Alcohol-induced acute pancreatitis, unspecified complication status    Pancreatitis    Generalized abdominal pain    Alcohol withdrawal    Metabolic acidosis    Acute alcohol intoxication in patient  with alcoholism with blood alcohol level 0.08 to 0.29, with unspecified complication    Calculus of gallbladder with cholecystitis without biliary obstruction    Hypothermia     Past Medical History:   Diagnosis Date    Alcohol abuse     Alcohol withdrawal 11/04/2016    Alcoholic ketoacidosis 01/12/2020    Allergic 1970    Anxiety     Arthritis     Atopic rhinitis 08/08/2016    Depression     Disease of thyroid gland     Elevated cholesterol     Encounter for removal of sutures     Genital herpes simplex 08/08/2016    GERD (gastroesophageal reflux disease)     Headache, tension-type     Hyperlipidemia     Hypertension     Hypothyroidism     Kidney stone     Low back pain 2019    Migraine     Motion sickness     Nephrolithiasis 02/12/2020    Olecranon bursitis, right elbow     Panic disorder without agoraphobia 08/08/2016    Peripheral neuropathy     Sleep apnea     Syncope and collapse 01/02/2019    Vitamin D deficiency 08/08/2016    Withdrawal symptoms, alcohol      Past Surgical History:   Procedure Laterality Date    CHOLECYSTECTOMY N/A 9/22/2024    Procedure: CHOLECYSTECTOMY LAPAROSCOPIC WITH DAVINCI ROBOT with cholangiogram, possible open;  Surgeon: Toro Noguera MD;  Location: [facility] MAIN OR;  Service: General;  Laterality: N/A;    COLONOSCOPY      CYST REMOVAL      CYSTOSCOPY BLADDER STONE LITHOTRIPSY  02/2022    ERCP N/A 9/23/2024    Procedure: ENDOSCOPIC RETROGRADE CHOLANGIOPANCREATOGRAPHY sphincterotomy, balloon sweep (9-12);  Surgeon: Fara Madrigal MD;  Location: [facility] ENDOSCOPY;  Service: Gastroenterology;  Laterality: N/A;  sphincterotomy hiatial hernia, gallstone removal    EXTRACORPOREAL SHOCK WAVE LITHOTRIPSY (ESWL) Right 2002    SHOULDER ARTHROSCOPY Right 12/17/2019    Procedure: SHOULDER ARTHROSCOPY, decompression, distal clavicle excision;  Surgeon: Aj Mancilla MD;  Location: [facility] OR [facility];  Service: Orthopedics    TONSILLECTOMY        General Information       Row Name 12/04/24 5284           OT Time and Intention    Subjective Information complains of;fatigue;weakness  -RB     Patient Effort adequate  -RB       Row Name 12/04/24 1619          General Information    Prior Level of Function independent:;ADL's;transfer  per chart review the pt's family reports he is unable to care for himself alone any longer  -RB       Row Name 12/04/24 1619          Living Environment    People in Home alone  -RB       Row Name 12/04/24 1619          Home Main Entrance    Number of Stairs, Main Entrance one  -RB       Row Name 12/04/24 1619          Stairs Within Home, Primary    Stairs, Within Home, Primary flight of steps to upstairs  -RB               User Key  (r) = Recorded By, (t) = Taken By, (c) = Cosigned By      Initials Name Provider Type    RB Maria Elena Meza OT Occupational Therapist                     Mobility/ADL's       Row Name 12/04/24 1618          Bed Mobility    Bed Mobility supine-sit;sit-supine  -RB     Supine-Sit Isabella (Bed Mobility) moderate assist (50% patient effort);verbal cues;1 person assist  -RB     Sit-Supine Isabella (Bed Mobility) minimum assist (75% patient effort);moderate assist (50% patient effort);2 person assist;verbal cues  -RB     Assistive Device (Bed Mobility) bed rails  -RB       Row Name 12/04/24 1618          Transfers    Transfers sit-stand transfer;stand-sit transfer  -RB     Comment, (Transfers) stood once fully for ~10 seconds and abruptly returned to sitting. his second attempt at standing his bottom didn't leave the bed  -RB       Row Name 12/04/24 1618          Sit-Stand Transfer    Sit-Stand Isabella (Transfers) maximum assist (25% patient effort);2 person assist;verbal cues  -RB     Assistive Device (Sit-Stand Transfers) walker, front-wheeled  -RB       Row Name 12/04/24 1618          Stand-Sit Transfer    Stand-Sit Isabella (Transfers) maximum assist (25% patient effort);2 person assist;verbal cues  -RB     Assistive Device (Stand-Sit  Transfers) walker, front-wheeled  -RB       Row Name 12/04/24 1618          Functional Mobility    Functional Mobility- Ind. Level not tested;unable to perform  -RB       Row Name 12/04/24 1618          Activities of Daily Living    BADL Assessment/Intervention lower body dressing  -RB       Row Name 12/04/24 1618          Lower Body Dressing Assessment/Training    [place] Level (Lower Body Dressing) lower body dressing skills;dependent (less than 25% patient effort)  -RB     Position (Lower Body Dressing) edge of bed sitting  -RB               User Key  (r) = Recorded By, (t) = Taken By, (c) = Cosigned By      Initials Name Provider Type    RB Maria Elena Meza OT Occupational Therapist                   Obj/Interventions       Row Name 12/04/24 1618          Sensory Assessment (Somatosensory)    Sensory Assessment (Somatosensory) sensation intact  -RB       Row Name 12/04/24 1618          Vision Assessment/Intervention    Visual Impairment/Limitations WFL  -RB       Row Name 12/04/24 1618          Range of Motion Comprehensive    Comment, General Range of Motion BUE WFL  -       Row Name 12/04/24 1618          Strength Comprehensive (MMT)    Comment, General Manual Muscle Testing (MMT) Assessment BLE generalized weakness  -       Row Name 12/04/24 1618          Shoulder (Therapeutic Exercise)    Shoulder (Therapeutic Exercise) AROM (active range of motion)  -RB     Shoulder AROM (Therapeutic Exercise) bilateral;flexion;extension;aBduction;aDduction;sitting;10 repetitions  -RB       Row Name 12/04/24 1618          Motor Skills    Therapeutic Exercise shoulder  -RB       Row Name 12/04/24 1618          Balance    Comment, Balance SBA/CGA sitting balance, Max A x2 for standing balance  -RB               User Key  (r) = Recorded By, (t) = Taken By, (c) = Cosigned By      Initials Name Provider Type    Maria Elena Reyes OT Occupational Therapist                   Goals/Plan       Row Name 12/04/24 1617           Bed Mobility Goal 1 (OT)    Activity/Assistive Device (Bed Mobility Goal 1, OT) bed mobility activities, all  -RB     [place] Level/Cues Needed (Bed Mobility Goal 1, OT) standby assist  -RB     Time Frame (Bed Mobility Goal 1, OT) short term goal (STG);2 weeks  -RB     Progress/Outcomes (Bed Mobility Goal 1, OT) new goal  -RB       Row Name 12/04/24 1617          Transfer Goal 1 (OT)    Activity/Assistive Device (Transfer Goal 1, OT) transfers, all  -RB     [place] Level/Cues Needed (Transfer Goal 1, OT) standby assist  -RB     Time Frame (Transfer Goal 1, OT) short term goal (STG);2 weeks  -RB     Progress/Outcome (Transfer Goal 1, OT) new goal  -RB       Row Name 12/04/24 1617          Bathing Goal 1 (OT)    Activity/Device (Bathing Goal 1, OT) bathing skills, all  -RB     [place] Level/Cues Needed (Bathing Goal 1, OT) minimum assist (75% or more patient effort)  -RB     Time Frame (Bathing Goal 1, OT) 2 weeks;short term goal (STG)  -RB     Progress/Outcomes (Bathing Goal 1, OT) new goal  -RB       Row Name 12/04/24 1617          Dressing Goal 1 (OT)    Activity/Device (Dressing Goal 1, OT) dressing skills, all  -RB     [place]/Cues Needed (Dressing Goal 1, OT) minimum assist (75% or more patient effort)  -RB     Time Frame (Dressing Goal 1, OT) short term goal (STG);2 weeks  -RB     Progress/Outcome (Dressing Goal 1, OT) new goal  -RB       Row Name 12/04/24 1617          Toileting Goal 1 (OT)    Activity/Device (Toileting Goal 1, OT) toileting skills, all  -RB     [place] Level/Cues Needed (Toileting Goal 1, OT) minimum assist (75% or more patient effort)  -RB     Time Frame (Toileting Goal 1, OT) short term goal (STG);2 weeks  -RB     Progress/Outcome (Toileting Goal 1, OT) new goal  -RB       Row Name 12/04/24 1617          Grooming Goal 1 (OT)    Activity/Device (Grooming Goal 1, OT) grooming skills, all  -RB     [place] (Grooming Goal 1, OT) standby assist  -RB     Time  Frame (Grooming Goal 1, OT) short term goal (STG)  -RB     Progress/Outcome (Grooming Goal 1, OT) new goal  -RB       Row Name 12/04/24 1617          Therapy Assessment/Plan (OT)    Planned Therapy Interventions (OT) transfer/mobility retraining;strengthening exercise;ROM/therapeutic exercise;activity tolerance training;adaptive equipment training;BADL retraining;functional balance retraining;occupation/activity based interventions;patient/caregiver education/training  -RB               User Key  (r) = Recorded By, (t) = Taken By, (c) = Cosigned By      Initials Name Provider Type    RB Maria Elena Meza OT Occupational Therapist                   Clinical Impression       Row Name 12/04/24 1617          Pain Assessment    Pretreatment Pain Rating 0/10 - no pain  -RB     Posttreatment Pain Rating 0/10 - no pain  -RB       Row Name 12/04/24 1617          Plan of Care Review    Plan of Care Reviewed With patient  -RB     Progress improving  -RB     Outcome Evaluation The pt was admitted to [facility] 2/2 to being found down in his home by police after a welfare check. Dx includes alcohol induced acute pancreatitis, fall, hyponatremia, hypothermia, and Candidiasis of scrotum. Pmhx significant for alcohol abuse, pancreatitis, hypertension, hyperlipidemia, GERD, and history of syncope. Per chart review, the pt's family has reported that the pt is unable to take care of himself in his home prior to admission. Today, the pt participated in OT/PT with motivation. Mod A x1 for bed mobility. He participated in several UE/LE exercises with guidance. He stood once with Max A x2 briefly and a second stand without his bottom clearing the bed. Mod A x2 to return to supine. Due to his limited activity tolerance SNF rehab would be beneficial as the next step in his recovery.  -RB       Row Name 12/04/24 1617          Therapy Assessment/Plan (OT)    Rehab Potential (OT) fair  -RB     Criteria for Skilled Therapeutic Interventions Met (OT)  yes;skilled treatment is necessary  -RB     Therapy Frequency (OT) 5 times/wk  -RB       Row Name 12/04/24 1617          Therapy Plan Review/Discharge Plan (OT)    Anticipated Discharge Disposition (OT) skilled nursing facility  -RB       Row Name 12/04/24 1617          Vital Signs    Pre Patient Position Supine  -RB     Intra Patient Position Standing  -RB     Post Patient Position Supine  -RB       Row Name 12/04/24 1617          Positioning and Restraints    Pre-Treatment Position in bed  -RB     Post Treatment Position bed  -RB     In Bed notified nsg;supine;encouraged to call for assist;exit alarm on;fowlers;legs elevated  -RB               User Key  (r) = Recorded By, (t) = Taken By, (c) = Cosigned By      Initials Name Provider Type    Maria Elena Reyes, ASHLIE Occupational Therapist                   Outcome Measures       Row Name 12/04/24 1616          How much help from another is currently needed...    Putting on and taking off regular lower body clothing? 1  -RB     Bathing (including washing, rinsing, and drying) 2  -RB     Toileting (which includes using toilet bed pan or urinal) 1  -RB     Putting on and taking off regular upper body clothing 2  -RB     Taking care of personal grooming (such as brushing teeth) 2  -RB     Eating meals 3  -RB     AM-PAC 6 Clicks Score (OT) 11  -RB       Row Name 12/04/24 0729          How much help from another person do you currently need...    Turning from your back to your side while in flat bed without using bedrails? 3  -MS     Moving from lying on back to sitting on the side of a flat bed without bedrails? 3  -MS     Moving to and from a bed to a chair (including a wheelchair)? 3  -MS     Standing up from a chair using your arms (e.g., wheelchair, bedside chair)? 3  -MS     Climbing 3-5 steps with a railing? 3  -MS     To walk in hospital room? 3  -MS     AM-PAC 6 Clicks Score (PT) 18  -MS     Highest Level of Mobility Goal 6 --> Walk 10 steps or more  -MS        Row Name 12/04/24 1616          Modified [place] Scale    Modified [place] Scale 4 - Moderately severe disability.  Unable to walk without assistance, and unable to attend to own bodily needs without assistance.  -RB       Row Name 12/04/24 1616          Functional Assessment    Outcome Measure Options AM-PAC 6 Clicks Daily Activity (OT);Modified [place]  -RB               User Key  (r) = Recorded By, (t) = Taken By, (c) = Cosigned By      Initials Name Provider Type    Say Fung, RN Registered Nurse    Maria Elena Reyes OT Occupational Therapist                    Occupational Therapy Education       Title: PT OT SLP Therapies (Done)       Topic: Occupational Therapy (Done)       Point: ADL training (Done)       Description:   Instruct learner(s) on proper safety adaptation and remediation techniques during self care or transfers.   Instruct in proper use of assistive devices.                  Learning Progress Summary            Patient Acceptance, E, VU by SUSANA at 12/2/2024 0926                      Point: Home exercise program (Done)       Description:   Instruct learner(s) on appropriate technique for monitoring, assisting and/or progressing therapeutic exercises/activities.                  Learning Progress Summary            Patient Acceptance, E, VU by JS at 12/2/2024 0926                      Point: Precautions (Done)       Description:   Instruct learner(s) on prescribed precautions during self-care and functional transfers.                  Learning Progress Summary            Patient Acceptance, E, VU by JS at 12/2/2024 0926                      Point: Body mechanics (Done)       Description:   Instruct learner(s) on proper positioning and spine alignment during self-care, functional mobility activities and/or exercises.                  Learning Progress Summary            Patient Acceptance, E, VU by SUSANA at 12/2/2024 0926                                      User Key       Initials Effective  Dates Name Provider Type Discipline     02/26/24 -  Murphy Sherman, RN Registered Nurse Nurse                  OT Recommendation and Plan  Planned Therapy Interventions (OT): transfer/mobility retraining, strengthening exercise, ROM/therapeutic exercise, activity tolerance training, adaptive equipment training, BADL retraining, functional balance retraining, occupation/activity based interventions, patient/caregiver education/training  Therapy Frequency (OT): 5 times/wk  Plan of Care Review  Plan of Care Reviewed With: patient  Progress: improving  Outcome Evaluation: The pt was admitted to [facility] 2/2 to being found down in his home by police after a welfare check. Dx includes alcohol induced acute pancreatitis, fall, hyponatremia, hypothermia, and Candidiasis of scrotum. Pmhx significant for alcohol abuse, pancreatitis, hypertension, hyperlipidemia, GERD, and history of syncope. Per chart review, the pt's family has reported that the pt is unable to take care of himself in his home prior to admission. Today, the pt participated in OT/PT with motivation. Mod A x1 for bed mobility. He participated in several UE/LE exercises with guidance. He stood once with Max A x2 briefly and a second stand without his bottom clearing the bed. Mod A x2 to return to supine. Due to his limited activity tolerance SNF rehab would be beneficial as the next step in his recovery.     Time Calculation:   Evaluation Complexity (OT)  Review Occupational Profile/Medical/Therapy History Complexity: expanded/moderate complexity  Assessment, Occupational Performance/Identification of Deficit Complexity: 5 or more performance deficits  Clinical Decision Making Complexity (OT): detailed assessment/moderate complexity  Overall Complexity of Evaluation (OT): moderate complexity     Time Calculation- OT       Row Name 12/04/24 1616             Time Calculation- OT    OT Start Time 1447  -RB      OT Stop Time 1502  -RB      OT Time Calculation (min)  15 min  -RB      Total Timed Code Minutes- OT 8 minute(s)  -RB      OT Received On 12/04/24  -RB      OT - Next Appointment 12/05/24  -RB      OT Goal Re-Cert Due Date 12/18/24  -RB         Timed Charges    84285 - OT Therapeutic Exercise Minutes 8  -RB         Untimed Charges    OT Eval/Re-eval Minutes 7  -RB         Total Minutes    Timed Charges Total Minutes 8  -RB      Untimed Charges Total Minutes 7  -RB       Total Minutes 15  -RB                User Key  (r) = Recorded By, (t) = Taken By, (c) = Cosigned By      Initials Name Provider Type    RB Maria Elena Meza OT Occupational Therapist                  Therapy Charges for Today       Code Description Service Date Service Provider Modifiers Qty    21173434635 HC OT THER PROC EA 15 MIN 12/4/2024 Maria Elena Meza OT GO 1    48366383869 HC OT EVAL MOD COMPLEXITY 3 12/4/2024 aMria Elena Meza OT GO 1                 Maria Elena Meza OT  12/4/2024

## 2024-12-04 NOTE — CASE MANAGEMENT/SOCIAL WORK
Continued Stay Note  [facility]     Patient Name: Pro Mueller  MRN: [id number]  Today's Date: 12/4/2024    Admit Date: 12/2/2024    Plan: Area SNF referrals pending   Discharge Plan       Row Name 12/04/24 1700       Plan    Plan Area SNF referrals pending    Patient/Family in Agreement with Plan yes    Plan Comments CCP followed up on SNF referrals- no accepting SNF at this time. Patient agreeable to area referrals to assess for bed availability. Referrals pending. Nuria GONGORA RN/CCP                   Discharge Codes    No documentation.                 Expected Discharge Date and Time       Expected Discharge Date Expected Discharge Time    Dec 4, 2024               Regi Harp

## 2024-12-04 NOTE — DISCHARGE PLACEMENT REQUEST
"Herb Luna (69 y.o. Male)       Date of Birth   1955    Social Security Number       Address   [id number] [place] [address] [address] [address]    Home Phone   [phone]    MRN   [id number]       [employer]   [employer]    Marital Status   Single                            Admission Date   12/2/24    Admission Type   Emergency    Admitting Provider   Justino Charlton MD    Attending Provider   Danish Brown MD    Department, Room/Bed   [facility], 3107/1       Discharge Date       Discharge Disposition       Discharge Destination                                 Attending Provider: Danish Brown MD    Allergies: Penicillins    Isolation: None   Infection: None   Code Status: CPR    Ht: 182.9 cm (72\")   Wt: 82.6 kg (182 lb)    Admission Cmt: None   Principal Problem: Hypothermia [T68.XXXA]                   Active Insurance as of 12/2/2024       Primary Coverage       Payor Plan Insurance Group Employer/Plan Group    MEDICARE MEDICARE A ONLY        Payor Plan Address Payor Plan Phone Number Payor Plan Fax Number Effective Dates    [address] [phone]  5/1/2022 - None Entered    [facility] [id number]         Subscriber Name Subscriber Birth Date Member ID       HERB LUNA 1955 [id number]               Secondary Coverage       Payor Plan Insurance Group Employer/Plan Group    [employer]        Payor Plan Address Payor Plan Phone Number Payor Plan Fax Number Effective Dates    [address] [address] [phone]  2/18/2023 - None Entered    [facility] [address]         Subscriber Name Subscriber Birth Date Member ID       HERB LUNA 1955 [id number]                     Emergency Contacts        (Rel.) Home Phone Work Phone Mobile Phone    Jeny Crump(HCS) (Sister) [phone] -- [phone]    DayneSaul (Father) [phone] -- [phone]    Bran Villar niece (Other) -- -- " [phone]

## 2024-12-05 LAB
ALBUMIN SERPL-MCNC: 2.7 G/DL (ref 3.5–5.2)
ALBUMIN/GLOB SERPL: 0.8 G/DL
ALP SERPL-CCNC: 86 U/L (ref 39–117)
ALT SERPL W P-5'-P-CCNC: 19 U/L (ref 1–41)
ANION GAP SERPL CALCULATED.3IONS-SCNC: 9.8 MMOL/L (ref 5–15)
AST SERPL-CCNC: 59 U/L (ref 1–40)
BASOPHILS # BLD AUTO: 0.03 10*3/MM3 (ref 0–0.2)
BASOPHILS NFR BLD AUTO: 0.4 % (ref 0–1.5)
BILIRUB SERPL-MCNC: 0.3 MG/DL (ref 0–1.2)
BUN SERPL-MCNC: 7 MG/DL (ref 8–23)
BUN/CREAT SERPL: 10.9 (ref 7–25)
CALCIUM SPEC-SCNC: 8 MG/DL (ref 8.6–10.5)
CHLORIDE SERPL-SCNC: 101 MMOL/L (ref 98–107)
CO2 SERPL-SCNC: 24.2 MMOL/L (ref 22–29)
CREAT SERPL-MCNC: 0.64 MG/DL (ref 0.76–1.27)
DEPRECATED RDW RBC AUTO: 57.7 FL (ref 37–54)
EGFRCR SERPLBLD CKD-EPI 2021: 103.8 ML/MIN/1.73
EOSINOPHIL # BLD AUTO: 0.03 10*3/MM3 (ref 0–0.4)
EOSINOPHIL NFR BLD AUTO: 0.4 % (ref 0.3–6.2)
ERYTHROCYTE [DISTWIDTH] IN BLOOD BY AUTOMATED COUNT: 16.6 % (ref 12.3–15.4)
GLOBULIN UR ELPH-MCNC: 3.2 GM/DL
GLUCOSE SERPL-MCNC: 96 MG/DL (ref 65–99)
HCT VFR BLD AUTO: 32.5 % (ref 37.5–51)
HGB BLD-MCNC: 10.8 G/DL (ref 13–17.7)
IMM GRANULOCYTES # BLD AUTO: 0.12 10*3/MM3 (ref 0–0.05)
IMM GRANULOCYTES NFR BLD AUTO: 1.6 % (ref 0–0.5)
LYMPHOCYTES # BLD AUTO: 1.54 10*3/MM3 (ref 0.7–3.1)
LYMPHOCYTES NFR BLD AUTO: 20.8 % (ref 19.6–45.3)
MAGNESIUM SERPL-MCNC: 1.5 MG/DL (ref 1.6–2.4)
MCH RBC QN AUTO: 32 PG (ref 26.6–33)
MCHC RBC AUTO-ENTMCNC: 33.2 G/DL (ref 31.5–35.7)
MCV RBC AUTO: 96.4 FL (ref 79–97)
MONOCYTES # BLD AUTO: 1.15 10*3/MM3 (ref 0.1–0.9)
MONOCYTES NFR BLD AUTO: 15.5 % (ref 5–12)
NEUTROPHILS NFR BLD AUTO: 4.53 10*3/MM3 (ref 1.7–7)
NEUTROPHILS NFR BLD AUTO: 61.3 % (ref 42.7–76)
PLATELET # BLD AUTO: 149 10*3/MM3 (ref 140–450)
PMV BLD AUTO: 9.3 FL (ref 6–12)
POTASSIUM SERPL-SCNC: 4.4 MMOL/L (ref 3.5–5.2)
PROT SERPL-MCNC: 5.9 G/DL (ref 6–8.5)
RBC # BLD AUTO: 3.37 10*6/MM3 (ref 4.14–5.8)
SODIUM SERPL-SCNC: 135 MMOL/L (ref 136–145)
WBC NRBC COR # BLD AUTO: 7.4 10*3/MM3 (ref 3.4–10.8)

## 2024-12-05 PROCEDURE — 25010000002 THIAMINE PER 100 MG: Performed by: NURSE PRACTITIONER

## 2024-12-05 PROCEDURE — 83735 ASSAY OF MAGNESIUM: CPT | Performed by: NURSE PRACTITIONER

## 2024-12-05 PROCEDURE — 85025 COMPLETE CBC W/AUTO DIFF WBC: CPT | Performed by: INTERNAL MEDICINE

## 2024-12-05 PROCEDURE — 80053 COMPREHEN METABOLIC PANEL: CPT | Performed by: INTERNAL MEDICINE

## 2024-12-05 PROCEDURE — 25010000002 THIAMINE HCL 200 MG/2ML SOLUTION: Performed by: NURSE PRACTITIONER

## 2024-12-05 PROCEDURE — 25010000002 MAGNESIUM SULFATE 2 GM/50ML SOLUTION: Performed by: INTERNAL MEDICINE

## 2024-12-05 RX ORDER — MAGNESIUM SULFATE HEPTAHYDRATE 40 MG/ML
2 INJECTION, SOLUTION INTRAVENOUS
Status: COMPLETED | OUTPATIENT
Start: 2024-12-05 | End: 2024-12-05

## 2024-12-05 RX ADMIN — NYSTATIN 1 APPLICATION: 100000 OINTMENT TOPICAL at 21:20

## 2024-12-05 RX ADMIN — MAGNESIUM SULFATE HEPTAHYDRATE 2 G: 40 INJECTION, SOLUTION INTRAVENOUS at 11:11

## 2024-12-05 RX ADMIN — Medication 10 ML: at 21:20

## 2024-12-05 RX ADMIN — MAGNESIUM SULFATE HEPTAHYDRATE 2 G: 40 INJECTION, SOLUTION INTRAVENOUS at 12:53

## 2024-12-05 RX ADMIN — THIAMINE HYDROCHLORIDE 200 MG: 100 INJECTION, SOLUTION INTRAMUSCULAR; INTRAVENOUS at 06:56

## 2024-12-05 RX ADMIN — NYSTATIN 1 APPLICATION: 100000 OINTMENT TOPICAL at 10:15

## 2024-12-05 RX ADMIN — LORAZEPAM 1 MG: 1 TABLET ORAL at 10:12

## 2024-12-05 RX ADMIN — THIAMINE HYDROCHLORIDE 200 MG: 100 INJECTION, SOLUTION INTRAMUSCULAR; INTRAVENOUS at 21:20

## 2024-12-05 RX ADMIN — Medication 10 ML: at 10:16

## 2024-12-05 RX ADMIN — THIAMINE HYDROCHLORIDE 200 MG: 100 INJECTION, SOLUTION INTRAMUSCULAR; INTRAVENOUS at 15:50

## 2024-12-05 RX ADMIN — ACETAMINOPHEN 325MG 650 MG: 325 TABLET ORAL at 01:56

## 2024-12-05 RX ADMIN — FOLIC ACID 1 MG: 1 TABLET ORAL at 09:18

## 2024-12-05 RX ADMIN — Medication 5 MG: at 01:57

## 2024-12-05 RX ADMIN — LEVOTHYROXINE SODIUM 100 MCG: 100 TABLET ORAL at 09:18

## 2024-12-05 RX ADMIN — MAGNESIUM SULFATE HEPTAHYDRATE 2 G: 40 INJECTION, SOLUTION INTRAVENOUS at 09:18

## 2024-12-05 NOTE — PLAN OF CARE
Goal Outcome Evaluation:   Pt resting on the bed, alert and oriented, sr on monitor, RA, restless, administered meds per mar, tylenol x 1 with melatonin given, vss, urinal with in reach, will continue to monitor.        Problem: Adult Inpatient Plan of Care  Goal: Absence of Hospital-Acquired Illness or Injury  Intervention: Identify and Manage Fall Risk  Recent Flowsheet Documentation  Taken 12/5/2024 0404 by Memo Stubbs RN  Safety Promotion/Fall Prevention:   activity supervised   room organization consistent   safety round/check completed  Taken 12/5/2024 0204 by Memo Stubbs RN  Safety Promotion/Fall Prevention:   activity supervised   room organization consistent   safety round/check completed  Taken 12/5/2024 0005 by Memo Stubbs RN  Safety Promotion/Fall Prevention:   activity supervised   room organization consistent   safety round/check completed  Taken 12/4/2024 2204 by Memo Stubbs RN  Safety Promotion/Fall Prevention:   activity supervised   room organization consistent   safety round/check completed  Taken 12/4/2024 2015 by Memo Stubbs RN  Safety Promotion/Fall Prevention:   activity supervised   room organization consistent   safety round/check completed  Intervention: Prevent Skin Injury  Recent Flowsheet Documentation  Taken 12/5/2024 0404 by Memo Stubbs RN  Body Position: position changed independently  Taken 12/5/2024 0204 by Memo Stubbs RN  Body Position: position changed independently  Taken 12/5/2024 0005 by Memo Stubbs RN  Body Position: position changed independently  Skin Protection: incontinence pads utilized  Taken 12/4/2024 2204 by Memo Stubbs RN  Body Position: position changed independently  Taken 12/4/2024 2015 by Memo Stubbs RN  Body Position: position changed independently  Skin Protection: incontinence pads utilized  Intervention: Prevent Infection  Recent Flowsheet Documentation  Taken 12/5/2024 0404 by Enoc  MARTI Hampton  Infection Prevention:   hand hygiene promoted   rest/sleep promoted  Taken 12/5/2024 0204 by Memo Stubbs RN  Infection Prevention:   hand hygiene promoted   rest/sleep promoted  Taken 12/5/2024 0005 by Memo Stubbs RN  Infection Prevention:   hand hygiene promoted   rest/sleep promoted  Taken 12/4/2024 2204 by Memo Stubbs RN  Infection Prevention:   hand hygiene promoted   rest/sleep promoted  Taken 12/4/2024 2015 by Memo Stubbs RN  Infection Prevention:   hand hygiene promoted   rest/sleep promoted  Goal: Optimal Comfort and Wellbeing  Intervention: Provide Person-Centered Care  Recent Flowsheet Documentation  Taken 12/5/2024 0005 by Memo Stubbs RN  Trust Relationship/Rapport:   care explained   choices provided  Taken 12/4/2024 2015 by Memo Stubbs RN  Trust Relationship/Rapport:   care explained   choices provided     Problem: Fall Injury Risk  Goal: Absence of Fall and Fall-Related Injury  Intervention: Identify and Manage Contributors  Recent Flowsheet Documentation  Taken 12/5/2024 0404 by Memo Stubbs RN  Medication Review/Management: medications reviewed  Taken 12/5/2024 0204 by Memo Stubbs RN  Medication Review/Management: medications reviewed  Taken 12/5/2024 0005 by Memo Stubbs RN  Medication Review/Management: medications reviewed  Self-Care Promotion: independence encouraged  Taken 12/4/2024 2204 by Memo Stubbs RN  Medication Review/Management: medications reviewed  Taken 12/4/2024 2015 by Memo Stubbs RN  Medication Review/Management: medications reviewed  Self-Care Promotion: independence encouraged  Intervention: Promote Injury-Free Environment  Recent Flowsheet Documentation  Taken 12/5/2024 0404 by Memo Stubbs RN  Safety Promotion/Fall Prevention:   activity supervised   room organization consistent   safety round/check completed  Taken 12/5/2024 0204 by Memo Stubbs RN  Safety Promotion/Fall  Prevention:   activity supervised   room organization consistent   safety round/check completed  Taken 12/5/2024 0005 by Memo Stubbs RN  Safety Promotion/Fall Prevention:   activity supervised   room organization consistent   safety round/check completed  Taken 12/4/2024 2204 by Memo Stubbs RN  Safety Promotion/Fall Prevention:   activity supervised   room organization consistent   safety round/check completed  Taken 12/4/2024 2015 by Memo Stubbs RN  Safety Promotion/Fall Prevention:   activity supervised   room organization consistent   safety round/check completed     Problem: Sepsis/Septic Shock  Goal: Absence of Infection Signs and Symptoms  Intervention: Initiate Sepsis Management  Recent Flowsheet Documentation  Taken 12/5/2024 0404 by Memo Stubbs RN  Infection Prevention:   hand hygiene promoted   rest/sleep promoted  Isolation Precautions: precautions maintained  Taken 12/5/2024 0204 by Memo Stubbs RN  Infection Prevention:   hand hygiene promoted   rest/sleep promoted  Isolation Precautions: precautions maintained  Taken 12/5/2024 0005 by Memo Stubbs RN  Infection Management: aseptic technique maintained  Infection Prevention:   hand hygiene promoted   rest/sleep promoted  Isolation Precautions: precautions maintained  Taken 12/4/2024 2204 by Memo Stubbs RN  Infection Prevention:   hand hygiene promoted   rest/sleep promoted  Isolation Precautions: precautions maintained  Taken 12/4/2024 2015 by Memo Stubbs RN  Infection Management: aseptic technique maintained  Infection Prevention:   hand hygiene promoted   rest/sleep promoted  Isolation Precautions: precautions maintained  Intervention: Promote Recovery  Recent Flowsheet Documentation  Taken 12/5/2024 0005 by Memo Stubbs RN  Activity Management: bedrest  Sleep/Rest Enhancement: awakenings minimized  Taken 12/4/2024 2015 by Memo Stubbs RN  Activity Management: bedrest  Sleep/Rest  Enhancement: awakenings minimized     Problem: Skin Injury Risk Increased  Goal: Skin Health and Integrity  Intervention: Optimize Skin Protection  Recent Flowsheet Documentation  Taken 12/5/2024 0404 by Memo Stubbs RN  Head of Bed (hospitals) Positioning: HOB elevated  Taken 12/5/2024 0204 by Memo Stubbs RN  Head of Bed (hospitals) Positioning: HOB elevated  Taken 12/5/2024 0005 by Memo Stubbs RN  Activity Management: bedrest  Pressure Reduction Techniques:   frequent weight shift encouraged   weight shift assistance provided  Head of Bed (HOB) Positioning: HOB elevated  Pressure Reduction Devices:   alternating pressure pump (LUBA)   pressure-redistributing mattress utilized  Skin Protection: incontinence pads utilized  Taken 12/4/2024 2204 by Memo Stubbs RN  Head of Bed (hospitals) Positioning: HOB elevated  Taken 12/4/2024 2015 by Memo Stubbs RN  Activity Management: bedrest  Pressure Reduction Techniques:   frequent weight shift encouraged   weight shift assistance provided  Head of Bed (hospitals) Positioning: HOB elevated  Pressure Reduction Devices:   alternating pressure pump (LUBA)   pressure-redistributing mattress utilized  Skin Protection: incontinence pads utilized  Intervention: Promote and Optimize Oral Intake  Recent Flowsheet Documentation  Taken 12/5/2024 0005 by Memo Stubbs RN  Oral Nutrition Promotion: rest periods promoted  Taken 12/4/2024 2015 by Memo Stubbs RN  Oral Nutrition Promotion: rest periods promoted

## 2024-12-05 NOTE — CASE MANAGEMENT/SOCIAL WORK
Continued Stay Note  [facility]     Patient Name: Pro Mueller  MRN: [id number]  Today's Date: 12/5/2024    Admit Date: 12/2/2024    Plan: SNF vs LTC   Discharge Plan       Row Name 12/05/24 1229       Plan    Plan SNF vs LTC    Patient/Family in Agreement with Plan yes    Plan Comments Spoke with pt who is undecided on SNF vs LTC. Neftali with [facility] following if pt wants LTC however he will need to be willing to spend down assets for Medicaid process. [facility] can accept. Pt would like to think about it today to see if he wants LTC vs SNF with possibility of going home after SNF. APS report that was made is in progress. CCP to follow up with pt tomorrow 12/6.                   Discharge Codes    No documentation.                 Expected Discharge Date and Time       Expected Discharge Date Expected Discharge Time    Dec 5, 2024               LAMINE Webber

## 2024-12-05 NOTE — PLAN OF CARE
Goal Outcome Evaluation:      Pt continues to slowly improve on CIWA... gave ativan x1 with good results, generalized weakness continues, no complications, will continue to monitor

## 2024-12-05 NOTE — SIGNIFICANT NOTE
12/05/24 1122   OTHER   Discipline physical therapist   Rehab Time/Intention   Session Not Performed patient/family declined, not feeling well  (Pt politely declined PT tx this AM stating feeling weak and dizzy. Education provided and encouraged to as least sit EOB or perform there-ex; declined. Pt requesting for PT to return after lunch. PT may follow up later this PM, time permitting, or next service date for tx as appropriate.)   Therapy Assessment/Plan (PT)   Criteria for Skilled Interventions Met (PT) yes   Recommendation   PT - Next Appointment 12/06/24

## 2024-12-05 NOTE — PROGRESS NOTES
" LOS: 3 days     Name: Pro Mueller  Age: 69 y.o.  Sex: male  :  1955  MRN: [id number]         Primary Care Physician: Provider, No Known    Subjective   Subjective  Patient is lying on the bed and does not appear to be any major distress.  Denies nausea, vomiting abdominal pain, chest pain, shortness of breath.    Objective   Vital Signs  Temp:  [97.3 °F (36.3 °C)-99.3 °F (37.4 °C)] 97.3 °F (36.3 °C)  Heart Rate:  [66-83] 66  Resp:  [18] 18  BP: (116-142)/(69-82) 116/69  Body mass index is 24.68 kg/m².    Objective:  General Appearance:  Comfortable and in no acute distress.    Vital signs: (most recent): Blood pressure 116/69, pulse 66, temperature 97.3 °F (36.3 °C), temperature source Oral, resp. rate 18, height 182.9 cm (72\"), weight 82.6 kg (182 lb), SpO2 98%.    Lungs:  Normal effort and normal respiratory rate.  He is not in respiratory distress.  There are decreased breath sounds.    Heart: Normal rate.  Regular rhythm.    Abdomen: Abdomen is soft.  Bowel sounds are normal.   There is no abdominal tenderness.     Extremities: There is no dependent edema or local swelling.    Neurological: Patient is alert and oriented to person, place and time.    Skin:  Warm and dry.                Results Review:       I reviewed the patient's new clinical results.    Results from last 7 days   Lab Units 24  0500 24  0557 24  0553 24  0633 24  0124   WBC 10*3/mm3 7.40 9.80 7.54 10.78 14.30*   HEMOGLOBIN g/dL 10.8* 10.3* 10.5* 12.9* 14.5   PLATELETS 10*3/mm3 149 115* 131* 200 225     Results from last 7 days   Lab Units 24  0500 24  1646 24  0557 24  1859 24  0553 24  0633 24  0124   SODIUM mmol/L 135*  --  134*  --  131* 129* 128*   POTASSIUM mmol/L 4.4 4.7 3.5 3.4* 3.2* 3.6 4.2   CHLORIDE mmol/L 101  --  98  --  97* 89* 85*   CO2 mmol/L 24.2  --  26.0  --  22.0 18.7* 19.0*   BUN mg/dL 7*  --  10  --  18 32* 35*   CREATININE mg/dL 0.64*  --  " 0.65*  --  0.79 0.92 1.01   CALCIUM mg/dL 8.0*  --  7.9*  --  7.9* 8.8 9.4   GLUCOSE mg/dL 96  --  91  --  107* 90 93                 Scheduled Meds:   folic acid, 1 mg, Oral, Daily  levothyroxine, 100 mcg, Oral, Daily  nystatin, 1 Application, Topical, Q12H  sodium chloride, 10 mL, Intravenous, Q12H  thiamine (B-1) IV, 200 mg, Intravenous, Q8H   Followed by  [START ON 12/7/2024] thiamine, 100 mg, Oral, Daily      PRN Meds:     acetaminophen    senna-docusate sodium **AND** polyethylene glycol **AND** bisacodyl **AND** bisacodyl    calcium carbonate    influenza vaccine    LORazepam **OR** LORazepam **OR** LORazepam **OR** LORazepam **OR** LORazepam **OR** LORazepam    Magnesium Standard Dose Replacement - Follow Nurse / BPA Driven Protocol    melatonin    nitroglycerin    ondansetron    Potassium Replacement - Follow Nurse / BPA Driven Protocol    [COMPLETED] Insert Peripheral IV **AND** sodium chloride    sodium chloride    sodium chloride  Continuous Infusions:       Assessment & Plan   Active Hospital Problems    Diagnosis  POA    **Hypothermia [T68.XXXA]  Unknown    Metabolic acidosis [E87.20]  Yes    Elevated LFTs [R79.89]  Yes    Alcohol dependence [F10.20]  Yes    Hyponatremia [E87.1]  Yes    Chronic low back pain [M54.50, G89.29]  Yes    Hypothyroidism [E03.9]  Yes      Resolved Hospital Problems   No resolved problems to display.       Assessment & Plan    1 Hypothermia, secondary to loss of heat due to nonfunctioning furnace at home.  This has now resolved.    2.  Ethanol abuse, 3-4 drinks per day and not going through any withdrawal during this hospital stay.  On a CIWA protocol which will be continued.  And is on folate and thiamine supplements as well.    3.  Hypothyroidism, noncompliant with Synthroid which has been resumed.  TSH level is 14.    4.  Hypokalemia/hypomagnesemia, replace per protocol.    5.  Metabolic acidosis, this has now resolved.    6.  Anemia, check iron panel and follow-up with GI  as an outpatient basis.    7.  Abnormal EKG with poor R wave progression, cardiology did evaluate and underwent echocardiogram which revealed normal ejection fraction with no wall motion abnormality.  Recommended no further workup at this point.  No arrhythmia noted during this hospital stay as well.    8.  Generalized weakness, PT/OT to evaluate and recommended rehab placement.  Discharge once a bed is available.    9.  CODE STATUS is full code.    Copy text on this note has been reviewed by me on 12/5/2024    Danish Brown MD  [facility]  12/05/24  09:37 EST

## 2024-12-06 LAB
ALBUMIN SERPL-MCNC: 2.6 G/DL (ref 3.5–5.2)
ALBUMIN/GLOB SERPL: 0.8 G/DL
ALP SERPL-CCNC: 88 U/L (ref 39–117)
ALT SERPL W P-5'-P-CCNC: 20 U/L (ref 1–41)
ANION GAP SERPL CALCULATED.3IONS-SCNC: 9.4 MMOL/L (ref 5–15)
AST SERPL-CCNC: 56 U/L (ref 1–40)
BASOPHILS # BLD AUTO: 0.04 10*3/MM3 (ref 0–0.2)
BASOPHILS NFR BLD AUTO: 0.6 % (ref 0–1.5)
BILIRUB SERPL-MCNC: 0.4 MG/DL (ref 0–1.2)
BUN SERPL-MCNC: 5 MG/DL (ref 8–23)
BUN/CREAT SERPL: 8.2 (ref 7–25)
CALCIUM SPEC-SCNC: 7.9 MG/DL (ref 8.6–10.5)
CHLORIDE SERPL-SCNC: 100 MMOL/L (ref 98–107)
CO2 SERPL-SCNC: 23.6 MMOL/L (ref 22–29)
CREAT SERPL-MCNC: 0.61 MG/DL (ref 0.76–1.27)
DEPRECATED RDW RBC AUTO: 56.4 FL (ref 37–54)
EGFRCR SERPLBLD CKD-EPI 2021: 105.3 ML/MIN/1.73
EOSINOPHIL # BLD AUTO: 0.04 10*3/MM3 (ref 0–0.4)
EOSINOPHIL NFR BLD AUTO: 0.6 % (ref 0.3–6.2)
ERYTHROCYTE [DISTWIDTH] IN BLOOD BY AUTOMATED COUNT: 16.6 % (ref 12.3–15.4)
GLOBULIN UR ELPH-MCNC: 3.3 GM/DL
GLUCOSE SERPL-MCNC: 98 MG/DL (ref 65–99)
HCT VFR BLD AUTO: 32.4 % (ref 37.5–51)
HGB BLD-MCNC: 11.2 G/DL (ref 13–17.7)
IMM GRANULOCYTES # BLD AUTO: 0.09 10*3/MM3 (ref 0–0.05)
IMM GRANULOCYTES NFR BLD AUTO: 1.4 % (ref 0–0.5)
IRON 24H UR-MRATE: 29 MCG/DL (ref 59–158)
IRON SATN MFR SERPL: 12 % (ref 20–50)
LYMPHOCYTES # BLD AUTO: 1.35 10*3/MM3 (ref 0.7–3.1)
LYMPHOCYTES NFR BLD AUTO: 21.2 % (ref 19.6–45.3)
MAGNESIUM SERPL-MCNC: 2 MG/DL (ref 1.6–2.4)
MCH RBC QN AUTO: 32.7 PG (ref 26.6–33)
MCHC RBC AUTO-ENTMCNC: 34.6 G/DL (ref 31.5–35.7)
MCV RBC AUTO: 94.7 FL (ref 79–97)
MONOCYTES # BLD AUTO: 0.99 10*3/MM3 (ref 0.1–0.9)
MONOCYTES NFR BLD AUTO: 15.5 % (ref 5–12)
NEUTROPHILS NFR BLD AUTO: 3.86 10*3/MM3 (ref 1.7–7)
NEUTROPHILS NFR BLD AUTO: 60.7 % (ref 42.7–76)
NRBC BLD AUTO-RTO: 0 /100 WBC (ref 0–0.2)
PLATELET # BLD AUTO: 162 10*3/MM3 (ref 140–450)
PMV BLD AUTO: 9.8 FL (ref 6–12)
POTASSIUM SERPL-SCNC: 4 MMOL/L (ref 3.5–5.2)
PROT SERPL-MCNC: 5.9 G/DL (ref 6–8.5)
QT INTERVAL: 398 MS
QTC INTERVAL: 436 MS
RBC # BLD AUTO: 3.42 10*6/MM3 (ref 4.14–5.8)
SODIUM SERPL-SCNC: 133 MMOL/L (ref 136–145)
TIBC SERPL-MCNC: 247 MCG/DL (ref 298–536)
TRANSFERRIN SERPL-MCNC: 166 MG/DL (ref 200–360)
WBC NRBC COR # BLD AUTO: 6.37 10*3/MM3 (ref 3.4–10.8)

## 2024-12-06 PROCEDURE — 83735 ASSAY OF MAGNESIUM: CPT | Performed by: NURSE PRACTITIONER

## 2024-12-06 PROCEDURE — 85025 COMPLETE CBC W/AUTO DIFF WBC: CPT | Performed by: INTERNAL MEDICINE

## 2024-12-06 PROCEDURE — 84466 ASSAY OF TRANSFERRIN: CPT | Performed by: INTERNAL MEDICINE

## 2024-12-06 PROCEDURE — 83540 ASSAY OF IRON: CPT | Performed by: INTERNAL MEDICINE

## 2024-12-06 PROCEDURE — 25010000002 ONDANSETRON PER 1 MG: Performed by: INTERNAL MEDICINE

## 2024-12-06 PROCEDURE — 80053 COMPREHEN METABOLIC PANEL: CPT | Performed by: INTERNAL MEDICINE

## 2024-12-06 PROCEDURE — 25010000002 THIAMINE HCL 200 MG/2ML SOLUTION: Performed by: NURSE PRACTITIONER

## 2024-12-06 PROCEDURE — 25010000002 THIAMINE PER 100 MG: Performed by: NURSE PRACTITIONER

## 2024-12-06 RX ORDER — IBUPROFEN 800 MG/1
800 TABLET, FILM COATED ORAL ONCE
Status: COMPLETED | OUTPATIENT
Start: 2024-12-06 | End: 2024-12-06

## 2024-12-06 RX ADMIN — Medication 10 ML: at 21:00

## 2024-12-06 RX ADMIN — ONDANSETRON 4 MG: 2 INJECTION INTRAMUSCULAR; INTRAVENOUS at 10:04

## 2024-12-06 RX ADMIN — THIAMINE HYDROCHLORIDE 200 MG: 100 INJECTION, SOLUTION INTRAMUSCULAR; INTRAVENOUS at 22:06

## 2024-12-06 RX ADMIN — FOLIC ACID 1 MG: 1 TABLET ORAL at 07:33

## 2024-12-06 RX ADMIN — NYSTATIN 1 APPLICATION: 100000 OINTMENT TOPICAL at 07:34

## 2024-12-06 RX ADMIN — THIAMINE HYDROCHLORIDE 200 MG: 100 INJECTION, SOLUTION INTRAMUSCULAR; INTRAVENOUS at 12:14

## 2024-12-06 RX ADMIN — Medication 10 ML: at 07:34

## 2024-12-06 RX ADMIN — ONDANSETRON 4 MG: 2 INJECTION INTRAMUSCULAR; INTRAVENOUS at 18:01

## 2024-12-06 RX ADMIN — ACETAMINOPHEN 325MG 650 MG: 325 TABLET ORAL at 18:54

## 2024-12-06 RX ADMIN — IBUPROFEN 800 MG: 800 TABLET, FILM COATED ORAL at 16:15

## 2024-12-06 RX ADMIN — THIAMINE HYDROCHLORIDE 200 MG: 100 INJECTION, SOLUTION INTRAMUSCULAR; INTRAVENOUS at 06:52

## 2024-12-06 RX ADMIN — ACETAMINOPHEN 325MG 650 MG: 325 TABLET ORAL at 13:14

## 2024-12-06 RX ADMIN — NYSTATIN 1 APPLICATION: 100000 OINTMENT TOPICAL at 21:57

## 2024-12-06 RX ADMIN — LEVOTHYROXINE SODIUM 100 MCG: 100 TABLET ORAL at 07:33

## 2024-12-06 NOTE — PLAN OF CARE
Goal Outcome Evaluation:      Pt resting on the bed, sr on monitor, RA, administered meds per mar, vss, CIWA score of 4/5, urinal within reach, will continue to monitor.      Problem: Adult Inpatient Plan of Care  Goal: Absence of Hospital-Acquired Illness or Injury  Intervention: Identify and Manage Fall Risk  Recent Flowsheet Documentation  Taken 12/6/2024 0401 by Memo Stubbs RN  Safety Promotion/Fall Prevention:   activity supervised   room organization consistent   safety round/check completed  Taken 12/6/2024 0204 by Memo Stubbs RN  Safety Promotion/Fall Prevention:   activity supervised   room organization consistent   safety round/check completed  Taken 12/6/2024 0001 by Memo Stubbs RN  Safety Promotion/Fall Prevention:   activity supervised   room organization consistent   safety round/check completed  Taken 12/5/2024 2201 by Memo Stubbs RN  Safety Promotion/Fall Prevention:   activity supervised   room organization consistent   safety round/check completed  Taken 12/5/2024 2001 by Memo Stubbs RN  Safety Promotion/Fall Prevention:   activity supervised   room organization consistent   safety round/check completed  Intervention: Prevent Skin Injury  Recent Flowsheet Documentation  Taken 12/6/2024 0401 by Memo Stubbs RN  Body Position: position changed independently  Taken 12/6/2024 0204 by Memo Stubbs RN  Body Position: position changed independently  Taken 12/6/2024 0001 by Memo Stubbs RN  Body Position: position changed independently  Taken 12/5/2024 2201 by Memo Stubbs RN  Body Position: position changed independently  Taken 12/5/2024 2001 by Memo Stubbs RN  Body Position: position changed independently  Intervention: Prevent Infection  Recent Flowsheet Documentation  Taken 12/6/2024 0401 by Memo Stubbs RN  Infection Prevention:   hand hygiene promoted   rest/sleep promoted  Taken 12/6/2024 0204 by Memo Stubbs RN  Infection  Prevention:   hand hygiene promoted   rest/sleep promoted  Taken 12/6/2024 0001 by Memo Stubbs RN  Infection Prevention:   hand hygiene promoted   rest/sleep promoted  Taken 12/5/2024 2201 by Memo Stubbs RN  Infection Prevention:   hand hygiene promoted   rest/sleep promoted  Taken 12/5/2024 2001 by Memo Stubbs RN  Infection Prevention:   hand hygiene promoted   rest/sleep promoted  Goal: Optimal Comfort and Wellbeing  Intervention: Monitor Pain and Promote Comfort  Recent Flowsheet Documentation  Taken 12/5/2024 2001 by Memo Stubbs RN  Pain Management Interventions:   pillow support provided   position adjusted  Intervention: Provide Person-Centered Care  Recent Flowsheet Documentation  Taken 12/6/2024 0001 by Memo Stubbs RN  Trust Relationship/Rapport:   care explained   choices provided  Taken 12/5/2024 2001 by Memo Stubbs RN  Trust Relationship/Rapport:   care explained   choices provided     Problem: Fall Injury Risk  Goal: Absence of Fall and Fall-Related Injury  Intervention: Identify and Manage Contributors  Recent Flowsheet Documentation  Taken 12/6/2024 0401 by Memo Stubbs RN  Medication Review/Management: medications reviewed  Taken 12/6/2024 0204 by Memo Stubbs RN  Medication Review/Management: medications reviewed  Taken 12/6/2024 0001 by Memo Stubbs RN  Medication Review/Management: medications reviewed  Taken 12/5/2024 2201 by Memo Stubbs RN  Medication Review/Management: medications reviewed  Taken 12/5/2024 2001 by Memo Stubbs RN  Medication Review/Management: medications reviewed  Self-Care Promotion: independence encouraged  Intervention: Promote Injury-Free Environment  Recent Flowsheet Documentation  Taken 12/6/2024 0401 by Memo Stubbs RN  Safety Promotion/Fall Prevention:   activity supervised   room organization consistent   safety round/check completed  Taken 12/6/2024 0204 by Memo Stubbs RN  Safety  Promotion/Fall Prevention:   activity supervised   room organization consistent   safety round/check completed  Taken 12/6/2024 0001 by Memo Stubbs RN  Safety Promotion/Fall Prevention:   activity supervised   room organization consistent   safety round/check completed  Taken 12/5/2024 2201 by Memo Stubbs RN  Safety Promotion/Fall Prevention:   activity supervised   room organization consistent   safety round/check completed  Taken 12/5/2024 2001 by Memo Stubbs RN  Safety Promotion/Fall Prevention:   activity supervised   room organization consistent   safety round/check completed     Problem: Sepsis/Septic Shock  Goal: Absence of Infection Signs and Symptoms  Intervention: Initiate Sepsis Management  Recent Flowsheet Documentation  Taken 12/6/2024 0401 by Memo Stubbs RN  Infection Prevention:   hand hygiene promoted   rest/sleep promoted  Isolation Precautions: precautions maintained  Taken 12/6/2024 0204 by Memo Stubbs RN  Infection Prevention:   hand hygiene promoted   rest/sleep promoted  Isolation Precautions: precautions maintained  Taken 12/6/2024 0001 by Memo Stubbs RN  Infection Management: aseptic technique maintained  Infection Prevention:   hand hygiene promoted   rest/sleep promoted  Isolation Precautions: precautions maintained  Taken 12/5/2024 2201 by Memo Stubbs RN  Infection Prevention:   hand hygiene promoted   rest/sleep promoted  Isolation Precautions: precautions maintained  Taken 12/5/2024 2001 by Memo Stubbs RN  Infection Management: aseptic technique maintained  Infection Prevention:   hand hygiene promoted   rest/sleep promoted  Isolation Precautions: precautions maintained  Intervention: Promote Recovery  Recent Flowsheet Documentation  Taken 12/6/2024 0001 by Memo Stubbs RN  Activity Management: bedrest  Taken 12/5/2024 2001 by Memo Stubbs RN  Activity Management: bedrest     Problem: Skin Injury Risk Increased  Goal:  Skin Health and Integrity  Intervention: Optimize Skin Protection  Recent Flowsheet Documentation  Taken 12/6/2024 0401 by Memo Stubbs RN  Head of Bed (HOB) Positioning: HOB elevated  Taken 12/6/2024 0204 by Memo Stubbs RN  Head of Bed (HOB) Positioning: HOB elevated  Taken 12/6/2024 0001 by Memo Stubbs RN  Activity Management: bedrest  Pressure Reduction Techniques:   frequent weight shift encouraged   weight shift assistance provided  Head of Bed (HOB) Positioning: HOB elevated  Pressure Reduction Devices:   alternating pressure pump (LUBA)   pressure-redistributing mattress utilized  Taken 12/5/2024 2201 by Memo Stubbs RN  Head of Bed (HOB) Positioning: HOB elevated  Taken 12/5/2024 2001 by Memo Stubbs RN  Activity Management: bedrest  Pressure Reduction Techniques:   frequent weight shift encouraged   weight shift assistance provided  Head of Bed (HOB) Positioning: HOB elevated  Pressure Reduction Devices:   alternating pressure pump (LUBA)   pressure-redistributing mattress utilized  Intervention: Promote and Optimize Oral Intake  Recent Flowsheet Documentation  Taken 12/6/2024 0001 by Memo Stubbs RN  Oral Nutrition Promotion: rest periods promoted  Taken 12/5/2024 2001 by Memo Stubbs RN  Oral Nutrition Promotion: rest periods promoted

## 2024-12-06 NOTE — CASE MANAGEMENT/SOCIAL WORK
Continued Stay Note  [facility]     Patient Name: Pro Mueller  MRN: [id number]  Today's Date: 12/6/2024    Admit Date: 12/2/2024    [facility] then [facility]   Discharge Plan       Row Name 12/06/24 1131       Plan    Plan [facility] for [facility] then [facility]    Patient/Family in Agreement with Plan yes    Plan Comments CCP spoke with the patient regarding discharge planning. CCP asked if he had decided if he would leo to go to a SNF for short-term rehab with plans to return home after therapy goals have been met or if he would like to go to a SNF with plans to transition to LTC after therapy goals have been met. He said rehab then stay. CCP spoke to Lacie/Brooke who states she will come talk to the patient to discuss finances and then will start the Medicaid process. CCP to follow. RAHUL BELLEW.                   Discharge Codes    No documentation.                 Expected Discharge Date and Time       Expected Discharge Date Expected Discharge Time    Dec 7, 2024

## 2024-12-06 NOTE — SIGNIFICANT NOTE
12/06/24 1449   OTHER   Discipline physical therapist   Rehab Time/Intention   Session Not Performed patient/family declined, not feeling well  (Pt declined PT tx d/t fatigue and not feeling well. Education provided on benefit of mobility or at least sitting EOB w/ pt cont to decline. PT will cont to follow and perform tx as appropriate.)   Therapy Assessment/Plan (PT)   Criteria for Skilled Interventions Met (PT) yes   Recommendation   PT - Next Appointment 12/09/24

## 2024-12-06 NOTE — PLAN OF CARE
Problem: Adult Inpatient Plan of Care  Goal: Plan of Care Review  Outcome: Progressing  Flowsheets (Taken 12/6/2024 1435)  Progress: improving  Outcome Evaluation: Patient has been pleasant and cooperative during shift. No complaints of SOA. Treated for pain and nausea. AOx3, assist x1, room air, SR. CIWA . Plan to transition from SNF to LTC. Zio at discharge. will continue to monitor and assist patient as needed.  Plan of Care Reviewed With: patient  Goal: Patient-Specific Goal (Individualized)  Outcome: Progressing  Goal: Absence of Hospital-Acquired Illness or Injury  Outcome: Progressing  Intervention: Identify and Manage Fall Risk  Recent Flowsheet Documentation  Taken 12/6/2024 1330 by Say Diallo RN  Safety Promotion/Fall Prevention:   assistive device/personal items within reach   fall prevention program maintained   nonskid shoes/slippers when out of bed   safety round/check completed  Taken 12/6/2024 1200 by Say Diallo RN  Safety Promotion/Fall Prevention:   assistive device/personal items within reach   fall prevention program maintained   nonskid shoes/slippers when out of bed   safety round/check completed  Taken 12/6/2024 1000 by Say Diallo RN  Safety Promotion/Fall Prevention:   assistive device/personal items within reach   fall prevention program maintained   nonskid shoes/slippers when out of bed   safety round/check completed  Taken 12/6/2024 0736 by Say Diallo RN  Safety Promotion/Fall Prevention:   assistive device/personal items within reach   fall prevention program maintained   nonskid shoes/slippers when out of bed   safety round/check completed  Intervention: Prevent Skin Injury  Recent Flowsheet Documentation  Taken 12/6/2024 1330 by Say Diallo RN  Body Position: supine  Skin Protection: incontinence pads utilized  Taken 12/6/2024 1200 by Say Diallo RN  Body Position:   supine   position changed independently  Taken 12/6/2024 1000 by Yoel  MARTI Deal  Body Position: supine  Taken 12/6/2024 0736 by Say Diallo RN  Body Position: supine  Skin Protection: incontinence pads utilized  Goal: Optimal Comfort and Wellbeing  Outcome: Progressing  Goal: Readiness for Transition of Care  Outcome: Progressing     Problem: Fall Injury Risk  Goal: Absence of Fall and Fall-Related Injury  Outcome: Progressing  Intervention: Promote Injury-Free Environment  Recent Flowsheet Documentation  Taken 12/6/2024 1330 by Say Diallo RN  Safety Promotion/Fall Prevention:   assistive device/personal items within reach   fall prevention program maintained   nonskid shoes/slippers when out of bed   safety round/check completed  Taken 12/6/2024 1200 by Say Diallo RN  Safety Promotion/Fall Prevention:   assistive device/personal items within reach   fall prevention program maintained   nonskid shoes/slippers when out of bed   safety round/check completed  Taken 12/6/2024 1000 by Say Diallo RN  Safety Promotion/Fall Prevention:   assistive device/personal items within reach   fall prevention program maintained   nonskid shoes/slippers when out of bed   safety round/check completed  Taken 12/6/2024 0736 by Say Diallo RN  Safety Promotion/Fall Prevention:   assistive device/personal items within reach   fall prevention program maintained   nonskid shoes/slippers when out of bed   safety round/check completed     Problem: Sepsis/Septic Shock  Goal: Optimal Coping  Outcome: Progressing  Goal: Absence of Bleeding  Outcome: Progressing  Goal: Blood Glucose Level Within Target Range  Outcome: Progressing  Goal: Absence of Infection Signs and Symptoms  Outcome: Progressing  Goal: Optimal Nutrition Delivery  Outcome: Progressing     Problem: Skin Injury Risk Increased  Goal: Skin Health and Integrity  Outcome: Progressing  Intervention: Optimize Skin Protection  Recent Flowsheet Documentation  Taken 12/6/2024 1330 by Say Diallo RN  Pressure Reduction  Techniques:   frequent weight shift encouraged   weight shift assistance provided  Head of Bed (HOB) Positioning: HOB at 30 degrees  Pressure Reduction Devices: pressure-redistributing mattress utilized  Skin Protection: incontinence pads utilized  Taken 12/6/2024 1200 by Say Diallo RN  Head of Bed (HOB) Positioning: HOB at 30 degrees  Taken 12/6/2024 1000 by Say Diallo RN  Head of Bed (HOB) Positioning: HOB at 30 degrees  Taken 12/6/2024 0736 by Say Diallo RN  Pressure Reduction Techniques:   frequent weight shift encouraged   weight shift assistance provided  Head of Bed (HOB) Positioning: HOB at 45 degrees  Pressure Reduction Devices: pressure-redistributing mattress utilized  Skin Protection: incontinence pads utilized   Goal Outcome Evaluation:  Plan of Care Reviewed With: patient        Progress: improving  Outcome Evaluation: Patient has been pleasant and cooperative during shift. No complaints of SOA. Treated for pain and nausea. AOx3, assist x1, room air, SR. CIWA . Plan to transition from SNF to LTC. Zio at discharge. will continue to monitor and assist patient as needed.

## 2024-12-06 NOTE — SIGNIFICANT NOTE
12/06/24 1530   OTHER   Discipline occupational therapist   Rehab Time/Intention   Session Not Performed patient/family declined treatment   Recommendation   OT - Next Appointment 12/09/24

## 2024-12-06 NOTE — PROGRESS NOTES
Name: Pro Mueller ADMIT: 2024   : 1955  PCP: Provider, No Known    MRN: [id number] LOS: 4 days   AGE/SEX: 69 y.o. male  ROOM: [facility]     Subjective   Subjective   Patient is seen at bedside, no acute issues from overnight have been reported.  Patient appears clinically stable.       Objective   Objective   Vital Signs  Temp:  [98.2 °F (36.8 °C)-98.6 °F (37 °C)] 98.3 °F (36.8 °C)  Heart Rate:  [63-72] 71  Resp:  [18] 18  BP: (101-122)/(58-72) 101/66  SpO2:  [97 %-100 %] 98 %  on   ;   Device (Oxygen Therapy): room air  Body mass index is 24.68 kg/m².  Physical Exam  General, awake and alert.  Head and ENT, normocephalic and atraumatic.  Lungs, symmetric expansion, equal air entry bilaterally.  Heart, regular rate and rhythm.  Abdomen, soft and nontender.  Extremities, no clubbing or cyanosis.  Neuro, no focal deficits.  Skin: Warm and no rash.  Psych, normal mood and affect.  Musculoskeletal, joint examination is grossly normal.    Copied text material from yesterday's note has been reviewed for appropriate changes and remains accurate as of 24.    Results Review     I reviewed the patient's new clinical results.  Results from last 7 days   Lab Units 24  0454 24  0500 24  0557 24  0553   WBC 10*3/mm3 6.37 7.40 9.80 7.54   HEMOGLOBIN g/dL 11.2* 10.8* 10.3* 10.5*   PLATELETS 10*3/mm3 162 149 115* 131*     Results from last 7 days   Lab Units 24  0454 24  0500 24  1646 24  0557 24  1859 24  0553   SODIUM mmol/L 133* 135*  --  134*  --  131*   POTASSIUM mmol/L 4.0 4.4 4.7 3.5   < > 3.2*   CHLORIDE mmol/L 100 101  --  98  --  97*   CO2 mmol/L 23.6 24.2  --  26.0  --  22.0   BUN mg/dL 5* 7*  --  10  --  18   CREATININE mg/dL 0.61* 0.64*  --  0.65*  --  0.79   GLUCOSE mg/dL 98 96  --  91  --  107*   EGFR mL/min/1.73 105.3 103.8  --  103.3  --  97.4    < > = values in this interval not displayed.     Results from last 7 days   Lab Units  "12/06/24  0454 12/05/24  0500 12/04/24  0557 12/03/24  0553   ALBUMIN g/dL 2.6* 2.7* 2.9* 2.8*   BILIRUBIN mg/dL 0.4 0.3 0.4 0.4   ALK PHOS U/L 88 86 85 84   AST (SGOT) U/L 56* 59* 76* 69*   ALT (SGPT) U/L 20 19 26 24     Results from last 7 days   Lab Units 12/06/24  0454 12/05/24  0500 12/04/24  0557 12/03/24  0553   CALCIUM mg/dL 7.9* 8.0* 7.9* 7.9*   ALBUMIN g/dL 2.6* 2.7* 2.9* 2.8*   MAGNESIUM mg/dL 2.0 1.5* 1.9 1.2*       No results found for: \"HGBA1C\", \"POCGLU\"    No radiology results for the last day    I have personally reviewed all medications:  Scheduled Medications  folic acid, 1 mg, Oral, Daily  levothyroxine, 100 mcg, Oral, Daily  nystatin, 1 Application, Topical, Q12H  sodium chloride, 10 mL, Intravenous, Q12H  thiamine (B-1) IV, 200 mg, Intravenous, Q8H   Followed by  [START ON 12/7/2024] thiamine, 100 mg, Oral, Daily    Infusions   Diet  Diet: Cardiac; Healthy Heart (2-3 Na+); Fluid Consistency: Thin (IDDSI 0)    I have personally reviewed:  [x]  Laboratory   [x]  Microbiology   [x]  Radiology   [x]  EKG/Telemetry  [x]  Cardiology/Vascular   []  Pathology    []  Records       Assessment/Plan     Active Hospital Problems    Diagnosis  POA    **Hypothermia [T68.XXXA]  Unknown    Metabolic acidosis [E87.20]  Yes    Elevated LFTs [R79.89]  Yes    Alcohol dependence [F10.20]  Yes    Hyponatremia [E87.1]  Yes    Chronic low back pain [M54.50, G89.29]  Yes    Hypothyroidism [E03.9]  Yes      Resolved Hospital Problems   No resolved problems to display.       69 y.o. male admitted with Hypothermia.    Assessment and plan   Hypothermia, secondary to loss of heat due to nonfunctioning furnace at home.  This has now resolved.     2.  Ethanol abuse, 3-4 drinks per day and not going through any withdrawal during this hospital stay.  On a CIWA protocol which will be continued.  And is on folate and thiamine supplements as well.     3.  Hypothyroidism, noncompliant with Synthroid which has been resumed.  TSH level " is 14.     4.  Hypokalemia/hypomagnesemia, replace per protocol.     5.  Metabolic acidosis, this has now resolved.     6.  Anemia, check iron panel and follow-up with GI as an outpatient basis.     7.  Abnormal EKG with poor R wave progression, cardiology did evaluate and underwent echocardiogram which revealed normal ejection fraction with no wall motion abnormality.  Recommended no further workup at this point.  No arrhythmia noted during this hospital stay as well.     8.  Generalized weakness, PT/OT to evaluate and recommended rehab placement.  Discharge once a bed is available.     9.  CODE STATUS is full code.         Kolton Mcdaniel MD  [facility]  12/06/24  11:49 EST

## 2024-12-06 NOTE — CONSULTS
Nutrition Services    Patient Name:  Pro Mueller  YOB: 1955  MRN: [id number]  Admit Date:  12/2/2024  Assessment Date:  12/06/24    Summary: follow up     69 y.o. male admitted with hypothermia.  Found down at home by police during welfare check.  Scalp hematoma. Hypothermia resolved.  ETOH abuse, history of ETOH pancreatitis.    Weight appears fairly stable per chart weight history and pt statement. 75% of breakfast this morning.  Pt states he is not always eating great. He would like boost shakes.     PLAN  - boost BID   - Liberalize diet   - encourage oral intakes >75%    RD to continue to follow.    CLINICAL NUTRITION ASSESSMENT      Reason for Assessment MST score 2+, Nurse or Nurse Practitioner Consult     Diagnosis/Problem   Hypothermia    Medical/Surgical History Past Medical History:   Diagnosis Date    Alcohol abuse     Alcohol withdrawal 11/04/2016    Alcoholic ketoacidosis 01/12/2020    Allergic 1970    Anxiety     Arthritis     Atopic rhinitis 08/08/2016    Depression     Disease of thyroid gland     Elevated cholesterol     Encounter for removal of sutures     Genital herpes simplex 08/08/2016    GERD (gastroesophageal reflux disease)     Headache, tension-type     Hyperlipidemia     Hypertension     Hypothyroidism     Kidney stone     Low back pain 2019    Migraine     Motion sickness     Nephrolithiasis 02/12/2020    Olecranon bursitis, right elbow     Panic disorder without agoraphobia 08/08/2016    Peripheral neuropathy     Sleep apnea     Syncope and collapse 01/02/2019    Vitamin D deficiency 08/08/2016    Withdrawal symptoms, alcohol        Past Surgical History:   Procedure Laterality Date    CHOLECYSTECTOMY N/A 9/22/2024    Procedure: CHOLECYSTECTOMY LAPAROSCOPIC WITH DAVINCI ROBOT with cholangiogram, possible open;  Surgeon: Toro Noguera MD;  Location: [facility];  Service: General;  Laterality: N/A;    COLONOSCOPY      CYST REMOVAL      CYSTOSCOPY BLADDER STONE  "LITHOTRIPSY  02/2022    ERCP N/A 9/23/2024    Procedure: ENDOSCOPIC RETROGRADE CHOLANGIOPANCREATOGRAPHY sphincterotomy, balloon sweep (9-12);  Surgeon: Fara Madrigal MD;  Location: [facility] ENDOSCOPY;  Service: Gastroenterology;  Laterality: N/A;  sphincterotomy hiatial hernia, gallstone removal    EXTRACORPOREAL SHOCK WAVE LITHOTRIPSY (ESWL) Right 2002    SHOULDER ARTHROSCOPY Right 12/17/2019    Procedure: SHOULDER ARTHROSCOPY, decompression, distal clavicle excision;  Surgeon: Aj Mancilla MD;  Location: [facility] OR [facility];  Service: Orthopedics    TONSILLECTOMY          Anthropometrics        Current Height  Current Weight  BMI kg/m2 Height: 182.9 cm (72\")  Weight: 82.6 kg (182 lb) (12/02/24 1750)  Body mass index is 24.68 kg/m².   Adjusted BMI (if applicable)    BMI Category Normal/Healthy (18.4 - 24.9)   Ideal Body Weight (IBW) 178 lb (80.9 kg)   Usual Body Weight (UBW) 178-206 lb   Weight Trend Stable   Weight History Wt Readings from Last 30 Encounters:   12/02/24 1750 82.6 kg (182 lb)   12/02/24 0834 82.7 kg (182 lb 5.1 oz)   12/02/24 0100 83.9 kg (185 lb)   10/07/24 2152 83.9 kg (185 lb)   09/20/24 0110 83.9 kg (185 lb)   09/04/24 0515 93.5 kg (206 lb 2.1 oz)   09/03/24 1140 81.6 kg (180 lb)   05/11/24 2206 83.9 kg (185 lb)   03/09/24 0513 82.3 kg (181 lb 7 oz)   03/07/24 0600 81.1 kg (178 lb 12.7 oz)   03/06/24 1919 81.1 kg (178 lb 12.7 oz)   03/06/24 1633 83.9 kg (185 lb)   02/24/24 1606 83.9 kg (185 lb)   07/23/23 0357 79.8 kg (176 lb)   07/19/23 1436 79.8 kg (176 lb)   06/23/23 0255 80 kg (176 lb 6.4 oz)   06/22/23 2004 86.2 kg (190 lb)   05/13/23 2100 85.2 kg (187 lb 14.4 oz)   05/13/23 1816 87.1 kg (192 lb)   05/05/23 1145 86.6 kg (191 lb)   04/13/23 2147 86.2 kg (190 lb)   03/08/23 1250 88.6 kg (195 lb 6.4 oz)   02/20/23 0600 88.4 kg (194 lb 14.2 oz)   02/20/23 0303 86.5 kg (190 lb 12.8 oz)   02/18/23 1407 90.7 kg (200 lb)   09/23/22 2026 86.2 kg (190 lb)   09/11/22 1552 89.6 kg (197 lb 8.5 oz) "   09/11/22 0037 86.2 kg (190 lb)   07/08/22 0943 87.5 kg (193 lb)   07/06/22 1504 86.8 kg (191 lb 6.4 oz)   07/02/22 0541 86 kg (189 lb 9.5 oz)   07/01/22 0635 85.3 kg (188 lb 1.6 oz)   07/01/22 0010 81.6 kg (180 lb)   03/13/22 1222 82.1 kg (181 lb)   02/22/22 1534 86.2 kg (190 lb 1.6 oz)   02/17/22 0901 86.4 kg (190 lb 8 oz)   02/10/22 1212 82.6 kg (182 lb)   02/10/22 1045 83 kg (183 lb)   01/18/22 1038 86.6 kg (190 lb 14.4 oz)   01/09/22 0545 86.1 kg (189 lb 13.1 oz)   01/07/22 1359 84 kg (185 lb 1.6 oz)   01/07/22 1057 89.8 kg (198 lb)   01/07/22 0636 89.9 kg (198 lb 4.8 oz)   12/08/21 2046 87.1 kg (192 lb 0.3 oz)   11/09/21 1020 87.1 kg (192 lb)   11/02/21 1447 87.1 kg (192 lb)      --  Labs       Pertinent Labs    Results from last 7 days   Lab Units 12/06/24  0454 12/05/24  0500 12/04/24  1646 12/04/24  0557   SODIUM mmol/L 133* 135*  --  134*   POTASSIUM mmol/L 4.0 4.4 4.7 3.5   CHLORIDE mmol/L 100 101  --  98   CO2 mmol/L 23.6 24.2  --  26.0   BUN mg/dL 5* 7*  --  10   CREATININE mg/dL 0.61* 0.64*  --  0.65*   CALCIUM mg/dL 7.9* 8.0*  --  7.9*   BILIRUBIN mg/dL 0.4 0.3  --  0.4   ALK PHOS U/L 88 86  --  85   ALT (SGPT) U/L 20 19  --  26   AST (SGOT) U/L 56* 59*  --  76*   GLUCOSE mg/dL 98 96  --  91     Results from last 7 days   Lab Units 12/06/24  0454 12/05/24  0500 12/04/24  0557   MAGNESIUM mg/dL 2.0 1.5* 1.9   HEMOGLOBIN g/dL 11.2* 10.8* 10.3*   HEMATOCRIT % 32.4* 32.5* 28.8*   WBC 10*3/mm3 6.37 7.40 9.80   ALBUMIN g/dL 2.6* 2.7* 2.9*     Results from last 7 days   Lab Units 12/06/24  0454 12/05/24  0500 12/04/24  0557 12/03/24  0553 12/02/24  0633   PLATELETS 10*3/mm3 162 149 115* 131* 200     COVID19   Date Value Ref Range Status   09/03/2024 Not Detected Not Detected - Ref. Range Final     Lab Results   Component Value Date    HGBA1C 5.33 11/26/2018          Medications           Scheduled Medications folic acid, 1 mg, Oral, Daily  levothyroxine, 100 mcg, Oral, Daily  nystatin, 1 Application,  Topical, Q12H  sodium chloride, 10 mL, Intravenous, Q12H  thiamine (B-1) IV, 200 mg, Intravenous, Q8H   Followed by  [START ON 12/7/2024] thiamine, 100 mg, Oral, Daily       Infusions     PRN Medications   acetaminophen    senna-docusate sodium **AND** polyethylene glycol **AND** bisacodyl **AND** bisacodyl    calcium carbonate    influenza vaccine    LORazepam **OR** LORazepam **OR** LORazepam **OR** LORazepam **OR** LORazepam **OR** LORazepam    Magnesium Standard Dose Replacement - Follow Nurse / BPA Driven Protocol    melatonin    nitroglycerin    ondansetron    Potassium Replacement - Follow Nurse / BPA Driven Protocol    [COMPLETED] Insert Peripheral IV **AND** sodium chloride    sodium chloride    sodium chloride     Physical Findings          General Findings alert, oriented, room air   Oral/Mouth Cavity WDL   Edema  lower extremity , 2+ (mild)   Gastrointestinal normoactive   Skin  location of wound: L upper coccyx, bilateral groin   Tubes/Drains/Lines none   NFPE Not indicated at this time   --  Current Nutrition Orders & Evaluation of Intake       Oral Nutrition     Food Allergies NKFA   Current PO Diet Diet: Cardiac; Healthy Heart (2-3 Na+); Fluid Consistency: Thin (IDDSI 0)   Supplement n/a   PO Evaluation     % PO Intake 75% x 1 meal    Factors Affecting Intake: decreased appetite, Other: ETOH abuse   --  PES STATEMENT / NUTRITION DIAGNOSIS      Nutrition Dx Problem  Problem: Predicted Suboptimal Intake  Etiology: Factors Affecting Nutrition - ETOH abuse    Signs/Symptoms: Report/Observation     NUTRITION INTERVENTION / PLAN OF CARE      Intervention Goal(s) Maintain nutrition status, Reduce/improve symptoms, Disease management/therapy, Tolerate PO , Increase intake, and Maintain weight         RD Intervention/Action Liberalize or adjust diet to: regular, Continue to monitor, and Care plan reviewed   --      Prescription/Orders:       PO Diet regular      Supplements Boost BID       Enteral Nutrition        Parenteral Nutrition    New Prescription Ordered? yes   --      Monitor/Evaluation Per protocol, PO intake, Pertinent labs, Weight, Skin status, Symptoms   Discharge Plan/Needs Pending clinical course   --    RD to follow per protocol.      Electronically signed by:  Leida Scott RD  12/06/24 10:46 EST

## 2024-12-07 LAB
ALBUMIN SERPL-MCNC: 3 G/DL (ref 3.5–5.2)
ALBUMIN/GLOB SERPL: 1.2 G/DL
ALP SERPL-CCNC: 87 U/L (ref 39–117)
ALT SERPL W P-5'-P-CCNC: 17 U/L (ref 1–41)
ANION GAP SERPL CALCULATED.3IONS-SCNC: 9.7 MMOL/L (ref 5–15)
AST SERPL-CCNC: 48 U/L (ref 1–40)
BASOPHILS # BLD AUTO: 0.04 10*3/MM3 (ref 0–0.2)
BASOPHILS NFR BLD AUTO: 0.6 % (ref 0–1.5)
BILIRUB SERPL-MCNC: 0.4 MG/DL (ref 0–1.2)
BUN SERPL-MCNC: 8 MG/DL (ref 8–23)
BUN/CREAT SERPL: 10.5 (ref 7–25)
CALCIUM SPEC-SCNC: 8 MG/DL (ref 8.6–10.5)
CHLORIDE SERPL-SCNC: 100 MMOL/L (ref 98–107)
CO2 SERPL-SCNC: 23.3 MMOL/L (ref 22–29)
CREAT SERPL-MCNC: 0.76 MG/DL (ref 0.76–1.27)
DEPRECATED RDW RBC AUTO: 60 FL (ref 37–54)
EGFRCR SERPLBLD CKD-EPI 2021: 98.5 ML/MIN/1.73
EOSINOPHIL # BLD AUTO: 0.04 10*3/MM3 (ref 0–0.4)
EOSINOPHIL NFR BLD AUTO: 0.6 % (ref 0.3–6.2)
ERYTHROCYTE [DISTWIDTH] IN BLOOD BY AUTOMATED COUNT: 16.8 % (ref 12.3–15.4)
GLOBULIN UR ELPH-MCNC: 2.6 GM/DL
GLUCOSE SERPL-MCNC: 100 MG/DL (ref 65–99)
HCT VFR BLD AUTO: 32.2 % (ref 37.5–51)
HGB BLD-MCNC: 10.9 G/DL (ref 13–17.7)
IMM GRANULOCYTES # BLD AUTO: 0.03 10*3/MM3 (ref 0–0.05)
IMM GRANULOCYTES NFR BLD AUTO: 0.4 % (ref 0–0.5)
LYMPHOCYTES # BLD AUTO: 1.49 10*3/MM3 (ref 0.7–3.1)
LYMPHOCYTES NFR BLD AUTO: 21.3 % (ref 19.6–45.3)
MAGNESIUM SERPL-MCNC: 1.5 MG/DL (ref 1.6–2.4)
MCH RBC QN AUTO: 32.9 PG (ref 26.6–33)
MCHC RBC AUTO-ENTMCNC: 33.9 G/DL (ref 31.5–35.7)
MCV RBC AUTO: 97.3 FL (ref 79–97)
MONOCYTES # BLD AUTO: 1 10*3/MM3 (ref 0.1–0.9)
MONOCYTES NFR BLD AUTO: 14.3 % (ref 5–12)
NEUTROPHILS NFR BLD AUTO: 4.41 10*3/MM3 (ref 1.7–7)
NEUTROPHILS NFR BLD AUTO: 62.8 % (ref 42.7–76)
NRBC BLD AUTO-RTO: 0 /100 WBC (ref 0–0.2)
PLATELET # BLD AUTO: 158 10*3/MM3 (ref 140–450)
PMV BLD AUTO: 10 FL (ref 6–12)
POTASSIUM SERPL-SCNC: 4.2 MMOL/L (ref 3.5–5.2)
PROT SERPL-MCNC: 5.6 G/DL (ref 6–8.5)
RBC # BLD AUTO: 3.31 10*6/MM3 (ref 4.14–5.8)
SODIUM SERPL-SCNC: 133 MMOL/L (ref 136–145)
WBC NRBC COR # BLD AUTO: 7.01 10*3/MM3 (ref 3.4–10.8)

## 2024-12-07 PROCEDURE — 25010000002 MAGNESIUM SULFATE 2 GM/50ML SOLUTION: Performed by: INTERNAL MEDICINE

## 2024-12-07 PROCEDURE — 90791 PSYCH DIAGNOSTIC EVALUATION: CPT

## 2024-12-07 PROCEDURE — 80053 COMPREHEN METABOLIC PANEL: CPT | Performed by: INTERNAL MEDICINE

## 2024-12-07 PROCEDURE — 83735 ASSAY OF MAGNESIUM: CPT | Performed by: NURSE PRACTITIONER

## 2024-12-07 PROCEDURE — 85025 COMPLETE CBC W/AUTO DIFF WBC: CPT | Performed by: INTERNAL MEDICINE

## 2024-12-07 RX ORDER — MAGNESIUM SULFATE HEPTAHYDRATE 40 MG/ML
2 INJECTION, SOLUTION INTRAVENOUS
Status: COMPLETED | OUTPATIENT
Start: 2024-12-07 | End: 2024-12-07

## 2024-12-07 RX ADMIN — MAGNESIUM SULFATE HEPTAHYDRATE 2 G: 40 INJECTION, SOLUTION INTRAVENOUS at 08:58

## 2024-12-07 RX ADMIN — NYSTATIN 1 APPLICATION: 100000 OINTMENT TOPICAL at 08:58

## 2024-12-07 RX ADMIN — LEVOTHYROXINE SODIUM 100 MCG: 100 TABLET ORAL at 08:58

## 2024-12-07 RX ADMIN — NYSTATIN 1 APPLICATION: 100000 OINTMENT TOPICAL at 20:51

## 2024-12-07 RX ADMIN — Medication 5 MG: at 02:23

## 2024-12-07 RX ADMIN — FOLIC ACID 1 MG: 1 TABLET ORAL at 08:58

## 2024-12-07 RX ADMIN — MAGNESIUM SULFATE HEPTAHYDRATE 2 G: 40 INJECTION, SOLUTION INTRAVENOUS at 10:56

## 2024-12-07 RX ADMIN — Medication 10 ML: at 20:51

## 2024-12-07 RX ADMIN — MAGNESIUM SULFATE HEPTAHYDRATE 2 G: 40 INJECTION, SOLUTION INTRAVENOUS at 06:40

## 2024-12-07 RX ADMIN — THIAMINE HCL TAB 100 MG 100 MG: 100 TAB at 08:58

## 2024-12-07 NOTE — SIGNIFICANT NOTE
Ultrasound Inserted IV Site: LFA    Catheter Length: 2.5 inch    Diameter: 0.4 cm    Depth: 0.75 cm      Vascular Access Score=5  1) Palpable / Visible / Dis  2) Palpable / Viasible / Not Distended  3) Easily Palpable / Not Visibile  4) Poorly Palpable / Visible  5) Poorly / Nonpalpable / NV

## 2024-12-07 NOTE — PROGRESS NOTES
Name: Pro Mueller ADMIT: 2024   : 1955  PCP: Provider, No Known    MRN: [id number] LOS: 5 days   AGE/SEX: 69 y.o. male  ROOM: [facility]     Subjective   Subjective   Patient is seen at bedside, he feels better today.  No new complaints.       Objective   Objective   Vital Signs  Temp:  [97.5 °F (36.4 °C)-98.5 °F (36.9 °C)] 98.5 °F (36.9 °C)  Heart Rate:  [65-72] 65  Resp:  [18-20] 18  BP: (106-144)/(59-87) 120/79  SpO2:  [98 %-99 %] 99 %  on   ;   Device (Oxygen Therapy): room air  Body mass index is 24.68 kg/m².  Physical Exam  General, awake and alert.  Head and ENT, normocephalic and atraumatic.  Lungs, symmetric expansion, equal air entry bilaterally.  Heart, regular rate and rhythm.  Abdomen, soft and nontender.  Extremities, no clubbing or cyanosis.  Neuro, no focal deficits.  Skin: Warm and no rash.  Psych, normal mood and affect.  Musculoskeletal, joint examination is grossly normal.     Copied text material from yesterday's note has been reviewed for appropriate changes and remains accurate as of 24.        Results Review     I reviewed the patient's new clinical results.  Results from last 7 days   Lab Units 24  0426 24  0454 24  0500 24  0557   WBC 10*3/mm3 7.01 6.37 7.40 9.80   HEMOGLOBIN g/dL 10.9* 11.2* 10.8* 10.3*   PLATELETS 10*3/mm3 158 162 149 115*     Results from last 7 days   Lab Units 24  0426 24  0454 24  0500 24  1646 24  0557   SODIUM mmol/L 133* 133* 135*  --  134*   POTASSIUM mmol/L 4.2 4.0 4.4 4.7 3.5   CHLORIDE mmol/L 100 100 101  --  98   CO2 mmol/L 23.3 23.6 24.2  --  26.0   BUN mg/dL 8 5* 7*  --  10   CREATININE mg/dL 0.76 0.61* 0.64*  --  0.65*   GLUCOSE mg/dL 100* 98 96  --  91   EGFR mL/min/1.73 98.5 105.3 103.8  --  103.3     Results from last 7 days   Lab Units 24  0426 24  0454 24  0500 24  0557   ALBUMIN g/dL 3.0* 2.6* 2.7* 2.9*   BILIRUBIN mg/dL 0.4 0.4 0.3 0.4   ALK PHOS U/L 87  "88 86 85   AST (SGOT) U/L 48* 56* 59* 76*   ALT (SGPT) U/L 17 20 19 26     Results from last 7 days   Lab Units 12/07/24  0426 12/06/24  0454 12/05/24  0500 12/04/24  0557   CALCIUM mg/dL 8.0* 7.9* 8.0* 7.9*   ALBUMIN g/dL 3.0* 2.6* 2.7* 2.9*   MAGNESIUM mg/dL 1.5* 2.0 1.5* 1.9       No results found for: \"HGBA1C\", \"POCGLU\"    No radiology results for the last day    I have personally reviewed all medications:  Scheduled Medications  folic acid, 1 mg, Oral, Daily  levothyroxine, 100 mcg, Oral, Daily  nystatin, 1 Application, Topical, Q12H  sodium chloride, 10 mL, Intravenous, Q12H  thiamine, 100 mg, Oral, Daily    Infusions   Diet  Diet: Regular/House; Fluid Consistency: Thin (IDDSI 0)    I have personally reviewed:  [x]  Laboratory   [x]  Microbiology   [x]  Radiology   [x]  EKG/Telemetry  [x]  Cardiology/Vascular   []  Pathology    []  Records       Assessment/Plan     Active Hospital Problems    Diagnosis  POA    **Hypothermia [T68.XXXA]  Unknown    Metabolic acidosis [E87.20]  Yes    Elevated LFTs [R79.89]  Yes    Alcohol dependence [F10.20]  Yes    Hyponatremia [E87.1]  Yes    Chronic low back pain [M54.50, G89.29]  Yes    Hypothyroidism [E03.9]  Yes      Resolved Hospital Problems   No resolved problems to display.       69 y.o. male admitted with Hypothermia.    Assessment and plan   Hypothermia, secondary to loss of heat due to nonfunctioning furnace at home.  This has now resolved.     2.  Ethanol abuse, 3-4 drinks per day and not going through any withdrawal during this hospital stay.  On a CIWA protocol which will be continued.  And is on folate and thiamine supplements as well.     3.  Hypothyroidism, noncompliant with Synthroid which has been resumed.  TSH level is 14.     4.  Hypokalemia/hypomagnesemia, replace per protocol.     5.  Metabolic acidosis, this has now resolved.     6.  Anemia, check iron panel and follow-up with GI as an outpatient basis.     7.  Abnormal EKG with poor R wave progression, " cardiology did evaluate and underwent echocardiogram which revealed normal ejection fraction with no wall motion abnormality.  Recommended no further workup at this point.  No arrhythmia noted during this hospital stay as well.     8.  Generalized weakness, PT/OT to evaluate and recommended rehab placement.  Discharge once a bed is available.     9.  CODE STATUS is full code.    Kolton Mcdaniel MD  [facility]  12/07/24  14:57 EST

## 2024-12-07 NOTE — PLAN OF CARE
Problem: Adult Inpatient Plan of Care  Goal: Plan of Care Review  Outcome: Progressing  Flowsheets (Taken 12/7/2024 0036)  Progress: improving  Outcome Evaluation: No s/s ETOH w/d noted. Freq PVCs on monitor at times. Mg replaced per protocol. VSS.  Goal: Patient-Specific Goal (Individualized)  Outcome: Progressing  Goal: Absence of Hospital-Acquired Illness or Injury  Outcome: Progressing  Intervention: Identify and Manage Fall Risk  Recent Flowsheet Documentation  Taken 12/7/2024 1415 by Nilda Neal RN  Safety Promotion/Fall Prevention:   fall prevention program maintained   safety round/check completed  Taken 12/7/2024 1200 by Nilda Neal RN  Safety Promotion/Fall Prevention:   fall prevention program maintained   safety round/check completed  Taken 12/7/2024 1030 by Nilda Neal RN  Safety Promotion/Fall Prevention:   fall prevention program maintained   safety round/check completed  Taken 12/7/2024 0907 by Nilda Neal RN  Safety Promotion/Fall Prevention:   fall prevention program maintained   safety round/check completed  Taken 12/7/2024 0800 by Nilda Neal RN  Safety Promotion/Fall Prevention:   fall prevention program maintained   safety round/check completed  Goal: Optimal Comfort and Wellbeing  Outcome: Progressing  Goal: Readiness for Transition of Care  Outcome: Progressing     Problem: Fall Injury Risk  Goal: Absence of Fall and Fall-Related Injury  Outcome: Progressing  Intervention: Promote Injury-Free Environment  Recent Flowsheet Documentation  Taken 12/7/2024 1415 by Nilda Neal RN  Safety Promotion/Fall Prevention:   fall prevention program maintained   safety round/check completed  Taken 12/7/2024 1200 by Nilda Neal RN  Safety Promotion/Fall Prevention:   fall prevention program maintained   safety round/check completed  Taken 12/7/2024 1030 by Nilda Neal RN  Safety Promotion/Fall Prevention:   fall prevention program maintained   safety  round/check completed  Taken 12/7/2024 0907 by Nilda Neal RN  Safety Promotion/Fall Prevention:   fall prevention program maintained   safety round/check completed  Taken 12/7/2024 0800 by Nilda Neal RN  Safety Promotion/Fall Prevention:   fall prevention program maintained   safety round/check completed     Problem: Sepsis/Septic Shock  Goal: Optimal Coping  Outcome: Progressing  Goal: Absence of Bleeding  Outcome: Progressing  Goal: Blood Glucose Level Within Target Range  Outcome: Progressing  Goal: Absence of Infection Signs and Symptoms  Outcome: Progressing  Goal: Optimal Nutrition Delivery  Outcome: Progressing     Problem: Skin Injury Risk Increased  Goal: Skin Health and Integrity  Outcome: Progressing     Problem: Excessive Substance Use  Goal: Optimized Energy Level (Excessive Substance Use)  Outcome: Progressing  Goal: Improved Behavioral Control (Excessive Substance Use)  Outcome: Progressing  Goal: Increased Participation and Engagement (Excessive Substance Use)  Outcome: Progressing  Goal: Improved Physiologic Symptoms (Excessive Substance Use)  Outcome: Progressing  Goal: Enhanced Social, Occupational or Functional Skills (Excessive Substance Use)  Outcome: Progressing   Goal Outcome Evaluation:           Progress: improving  Outcome Evaluation: No s/s ETOH w/d noted. Freq PVCs on monitor at times. Mg replaced per protocol. VSS.

## 2024-12-07 NOTE — PLAN OF CARE
Goal Outcome Evaluation:      Pt resting in bed, Aox4 room air , SR on tele, VSS. CIWA remains 0. BM x 1 noted. No sign of acute distress noted. Plan of care is ongoing.

## 2024-12-08 LAB
ALBUMIN SERPL-MCNC: 2.8 G/DL (ref 3.5–5.2)
ALBUMIN/GLOB SERPL: 1 G/DL
ALP SERPL-CCNC: 90 U/L (ref 39–117)
ALT SERPL W P-5'-P-CCNC: 15 U/L (ref 1–41)
ANION GAP SERPL CALCULATED.3IONS-SCNC: 9 MMOL/L (ref 5–15)
AST SERPL-CCNC: 45 U/L (ref 1–40)
BASOPHILS # BLD AUTO: 0.04 10*3/MM3 (ref 0–0.2)
BASOPHILS NFR BLD AUTO: 0.5 % (ref 0–1.5)
BILIRUB SERPL-MCNC: 0.3 MG/DL (ref 0–1.2)
BUN SERPL-MCNC: 6 MG/DL (ref 8–23)
BUN/CREAT SERPL: 9.5 (ref 7–25)
CALCIUM SPEC-SCNC: 8 MG/DL (ref 8.6–10.5)
CHLORIDE SERPL-SCNC: 101 MMOL/L (ref 98–107)
CO2 SERPL-SCNC: 23 MMOL/L (ref 22–29)
CREAT SERPL-MCNC: 0.63 MG/DL (ref 0.76–1.27)
DEPRECATED RDW RBC AUTO: 56.4 FL (ref 37–54)
EGFRCR SERPLBLD CKD-EPI 2021: 104.3 ML/MIN/1.73
EOSINOPHIL # BLD AUTO: 0.04 10*3/MM3 (ref 0–0.4)
EOSINOPHIL NFR BLD AUTO: 0.5 % (ref 0.3–6.2)
ERYTHROCYTE [DISTWIDTH] IN BLOOD BY AUTOMATED COUNT: 16.4 % (ref 12.3–15.4)
FERRITIN SERPL-MCNC: 539 NG/ML (ref 30–400)
GLOBULIN UR ELPH-MCNC: 2.7 GM/DL
GLUCOSE SERPL-MCNC: 96 MG/DL (ref 65–99)
HAPTOGLOB SERPL-MCNC: 246 MG/DL (ref 30–200)
HCT VFR BLD AUTO: 31.3 % (ref 37.5–51)
HGB BLD-MCNC: 11.1 G/DL (ref 13–17.7)
IMM GRANULOCYTES # BLD AUTO: 0.04 10*3/MM3 (ref 0–0.05)
IMM GRANULOCYTES NFR BLD AUTO: 0.5 % (ref 0–0.5)
IRON 24H UR-MRATE: 20 MCG/DL (ref 59–158)
IRON SATN MFR SERPL: 9 % (ref 20–50)
LDH SERPL-CCNC: 178 U/L (ref 135–225)
LYMPHOCYTES # BLD AUTO: 1.46 10*3/MM3 (ref 0.7–3.1)
LYMPHOCYTES NFR BLD AUTO: 17.5 % (ref 19.6–45.3)
MAGNESIUM SERPL-MCNC: 1.8 MG/DL (ref 1.6–2.4)
MCH RBC QN AUTO: 33.5 PG (ref 26.6–33)
MCHC RBC AUTO-ENTMCNC: 35.5 G/DL (ref 31.5–35.7)
MCV RBC AUTO: 94.6 FL (ref 79–97)
MONOCYTES # BLD AUTO: 0.96 10*3/MM3 (ref 0.1–0.9)
MONOCYTES NFR BLD AUTO: 11.5 % (ref 5–12)
NEUTROPHILS NFR BLD AUTO: 5.79 10*3/MM3 (ref 1.7–7)
NEUTROPHILS NFR BLD AUTO: 69.5 % (ref 42.7–76)
NRBC BLD AUTO-RTO: 0 /100 WBC (ref 0–0.2)
PLATELET # BLD AUTO: 196 10*3/MM3 (ref 140–450)
PMV BLD AUTO: 9.3 FL (ref 6–12)
POTASSIUM SERPL-SCNC: 4.2 MMOL/L (ref 3.5–5.2)
PROT SERPL-MCNC: 5.5 G/DL (ref 6–8.5)
RBC # BLD AUTO: 3.31 10*6/MM3 (ref 4.14–5.8)
RETICS # AUTO: 0.06 10*6/MM3 (ref 0.02–0.13)
RETICS/RBC NFR AUTO: 1.73 % (ref 0.7–1.9)
SODIUM SERPL-SCNC: 133 MMOL/L (ref 136–145)
TIBC SERPL-MCNC: 226 MCG/DL (ref 298–536)
TRANSFERRIN SERPL-MCNC: 152 MG/DL (ref 200–360)
TSH SERPL DL<=0.05 MIU/L-ACNC: 5.2 UIU/ML (ref 0.27–4.2)
WBC NRBC COR # BLD AUTO: 8.33 10*3/MM3 (ref 3.4–10.8)

## 2024-12-08 PROCEDURE — 85025 COMPLETE CBC W/AUTO DIFF WBC: CPT | Performed by: INTERNAL MEDICINE

## 2024-12-08 PROCEDURE — 82728 ASSAY OF FERRITIN: CPT | Performed by: INTERNAL MEDICINE

## 2024-12-08 PROCEDURE — 25010000002 ONDANSETRON PER 1 MG: Performed by: INTERNAL MEDICINE

## 2024-12-08 PROCEDURE — 84443 ASSAY THYROID STIM HORMONE: CPT | Performed by: INTERNAL MEDICINE

## 2024-12-08 PROCEDURE — 83010 ASSAY OF HAPTOGLOBIN QUANT: CPT | Performed by: INTERNAL MEDICINE

## 2024-12-08 PROCEDURE — 84466 ASSAY OF TRANSFERRIN: CPT | Performed by: INTERNAL MEDICINE

## 2024-12-08 PROCEDURE — 83540 ASSAY OF IRON: CPT | Performed by: INTERNAL MEDICINE

## 2024-12-08 PROCEDURE — 85045 AUTOMATED RETICULOCYTE COUNT: CPT | Performed by: INTERNAL MEDICINE

## 2024-12-08 PROCEDURE — 83735 ASSAY OF MAGNESIUM: CPT | Performed by: NURSE PRACTITIONER

## 2024-12-08 PROCEDURE — 83615 LACTATE (LD) (LDH) ENZYME: CPT | Performed by: INTERNAL MEDICINE

## 2024-12-08 PROCEDURE — 80053 COMPREHEN METABOLIC PANEL: CPT | Performed by: INTERNAL MEDICINE

## 2024-12-08 RX ORDER — FERROUS SULFATE 325(65) MG
325 TABLET ORAL
Status: DISCONTINUED | OUTPATIENT
Start: 2024-12-09 | End: 2024-12-10 | Stop reason: HOSPADM

## 2024-12-08 RX ADMIN — THIAMINE HCL TAB 100 MG 100 MG: 100 TAB at 08:39

## 2024-12-08 RX ADMIN — Medication 10 ML: at 21:00

## 2024-12-08 RX ADMIN — FOLIC ACID 1 MG: 1 TABLET ORAL at 08:39

## 2024-12-08 RX ADMIN — NYSTATIN 1 APPLICATION: 100000 OINTMENT TOPICAL at 08:40

## 2024-12-08 RX ADMIN — ONDANSETRON 4 MG: 2 INJECTION INTRAMUSCULAR; INTRAVENOUS at 13:55

## 2024-12-08 RX ADMIN — LEVOTHYROXINE SODIUM 100 MCG: 100 TABLET ORAL at 08:39

## 2024-12-08 RX ADMIN — NYSTATIN 1 APPLICATION: 100000 OINTMENT TOPICAL at 21:00

## 2024-12-08 NOTE — NURSING NOTE
Access center follow up regarding ETOH: no acute changes overnight. Will discuss tx goals/options when pt ready. Will continue to follow.

## 2024-12-08 NOTE — CONSULTS
"Access center consult for 69 year old male seen in [facility] for ETOH. Pt familiar to the Access center and was most recently seen 9/3/24 by both Access and psychiatry. Pt admitted medically 12/2 for SOB. When seen this evening he is resting in bed and states he feels \"foggy\". Pt states he does feel that ETOH is a problem but he is still not sure if he wants to commit to full sobriety.     Pt states alcohol has always been his coping mechanism and that he used to be to \"take it or leave it\" but he states now it more difficult to abstain despite him being aware of the detriment to his health. Continues to report that his drink of choice is a vodka martini. He is unsure of when his last drink was and he states he does not remember what happened when he was found down in his apartment. He states he is sure he was drinking but he does not remember if he was significantly intoxicated. He does report that he has been falling more at home over the last few months but he does not directly attribute that to alcohol use. Pt states again that he is not sure what he wants to do in terms of alcohol cessation. He discussed that his sister talked to him about not being able to live by himself anymore and he was very upset to hear that. Pt is requesting Access return in follow up to discuss tx goals/options.     Pt denies SI, HI, AVH. He denies feeling unsafe at home. He is not receiving any mental health care. No psychotropics found in PTA med review. He does feel that he is depressed but he states medications have never been effective. Discussed alcohol's effect on the efficacy of depression medications. Will further discuss goals and tx options per pt request. Encouraged to let staff know if he would like to speak later this evening. Will follow and provide resources as needed.   "

## 2024-12-08 NOTE — PROGRESS NOTES
Name: Pro Mueller ADMIT: 2024   : 1955  PCP: Provider, No Known    MRN: [id number] LOS: 6 days   AGE/SEX: 69 y.o. male  ROOM: [facility]/     Subjective   Subjective   Patient continues with weakness and fatigue.  No fever or chills.  No chest pain.  No palpitation.  No ankle edema.  No cough.  No dyspnea.  No wheezing.  No PND orthopnea.    Review of Systems  GI.  No abdominal pain.  Positive occasional nausea but no vomiting.  Normal bowel movement yesterday without fresh bright blood per rectum or melena  .  No dysuria or hematuria  CNS.  No dizziness or loss of consciousness.  No focal neurological symptoms.     Objective   Objective   Vital Signs  Temp:  [98.2 °F (36.8 °C)-99.6 °F (37.6 °C)] 98.2 °F (36.8 °C)  Heart Rate:  [74-94] 79  Resp:  [16-18] 16  BP: (109-133)/(54-84) 115/72  SpO2:  [93 %-97 %] 96 %  on   ;   Device (Oxygen Therapy): room air    Intake/Output Summary (Last 24 hours) at 2024 1755  Last data filed at 2024 1500  Gross per 24 hour   Intake 340 ml   Output 1050 ml   Net -710 ml     Body mass index is 24.68 kg/m².      24  0100 24  0834 24  1750   Weight: 83.9 kg (185 lb) 82.7 kg (182 lb 5.1 oz) 82.6 kg (182 lb)     Physical Exam  General.  Middle-aged gentleman.  Alert and oriented x 4.  No apparent pain/distress/diaphoresis.  Normal mood and affect.  Eyes.  Pupils equal round and reactive.  Intact extraocular musculature.  No pallor or jaundice  Oral cavity.  Moist mucous membrane.  Neck.  Supple.  No JVD.  No lymphadenopathy or thyromegaly.  Cardiovascular.  Regular rate and rhythm with occasional ectopic beats and no murmurs  Chest.  Clear to auscultation bilaterally with no added sounds  Abdomen.  Soft lax.  No tenderness.  No organomegaly.  No guarding or rebound  Extremities.  No clubbing/cyanosis/edema.  CNS.  No acute focal neurological deficits.    Results Review:      Results from last 7 days   Lab Units 24  0446 24  0427  "12/06/24  0454 12/05/24  0500 12/04/24  1646 12/04/24  0557 12/03/24  1859 12/03/24  0553 12/02/24  0633 12/02/24  0124   SODIUM mmol/L 133* 133* 133* 135*  --  134*  --  131* 129* 128*   POTASSIUM mmol/L 4.2 4.2 4.0 4.4 4.7 3.5 3.4* 3.2* 3.6 4.2   CHLORIDE mmol/L 101 100 100 101  --  98  --  97* 89* 85*   CO2 mmol/L 23.0 23.3 23.6 24.2  --  26.0  --  22.0 18.7* 19.0*   BUN mg/dL 6* 8 5* 7*  --  10  --  18 32* 35*   CREATININE mg/dL 0.63* 0.76 0.61* 0.64*  --  0.65*  --  0.79 0.92 1.01   GLUCOSE mg/dL 96 100* 98 96  --  91  --  107* 90 93   CALCIUM mg/dL 8.0* 8.0* 7.9* 8.0*  --  7.9*  --  7.9* 8.8 9.4   AST (SGOT) U/L 45* 48* 56* 59*  --  76*  --  69*  --  113*   ALT (SGPT) U/L 15 17 20 19  --  26  --  24  --  42*     Estimated Creatinine Clearance: 132.9 mL/min (A) (by C-G formula based on SCr of 0.63 mg/dL (L)).          Results from last 7 days   Lab Units 12/03/24  0553 12/02/24  1800 12/02/24  1453 12/02/24  0633 12/02/24  0124   CK TOTAL U/L  --   --   --  98 120   HSTROP T ng/L 21 21 22*  --   --          Results from last 7 days   Lab Units 12/08/24  1406 12/02/24  0633   TSH uIU/mL 5.200* 14.200*     Results from last 7 days   Lab Units 12/08/24  0446 12/07/24  0426 12/06/24  0454 12/05/24  0500 12/04/24  0557 12/03/24  0553 12/02/24  1453   MAGNESIUM mg/dL 1.8 1.5* 2.0 1.5* 1.9 1.2* 1.6           Invalid input(s): \"LDLCALC\"  Results from last 7 days   Lab Units 12/08/24  0446 12/07/24  0426 12/06/24  0454 12/05/24  0500 12/04/24  0557 12/03/24  0553 12/02/24  0633   WBC 10*3/mm3 8.33 7.01 6.37 7.40 9.80 7.54 10.78   HEMOGLOBIN g/dL 11.1* 10.9* 11.2* 10.8* 10.3* 10.5* 12.9*   HEMATOCRIT % 31.3* 32.2* 32.4* 32.5* 28.8* 30.8* 36.2*   PLATELETS 10*3/mm3 196 158 162 149 115* 131* 200   MCV fL 94.6 97.3* 94.7 96.4 93.8 95.7 95.8   MCH pg 33.5* 32.9 32.7 32.0 33.6* 32.6 34.1*   MCHC g/dL 35.5 33.9 34.6 33.2 35.8* 34.1 35.6   RDW % 16.4* 16.8* 16.6* 16.6* 16.4* 16.5* 17.0*   RDW-SD fl 56.4* 60.0* 56.4* 57.7* 55.2* " 56.7* 58.2*   MPV fL 9.3 10.0 9.8 9.3 9.1 9.5 9.2   NEUTROPHIL % % 69.5 62.8 60.7 61.3  --   --   --    LYMPHOCYTE % % 17.5* 21.3 21.2 20.8  --   --   --    MONOCYTES % % 11.5 14.3* 15.5* 15.5*  --   --   --    EOSINOPHIL % % 0.5 0.6 0.6 0.4  --   --   --    BASOPHIL % % 0.5 0.6 0.6 0.4  --   --   --    IMM GRAN % % 0.5 0.4 1.4* 1.6*  --   --   --    NEUTROS ABS 10*3/mm3 5.79 4.41 3.86 4.53  --   --   --    LYMPHS ABS 10*3/mm3 1.46 1.49 1.35 1.54  --   --   --    MONOS ABS 10*3/mm3 0.96* 1.00* 0.99* 1.15*  --   --   --    EOS ABS 10*3/mm3 0.04 0.04 0.04 0.03  --   --   --    BASOS ABS 10*3/mm3 0.04 0.04 0.04 0.03  --   --   --    IMMATURE GRANS (ABS) 10*3/mm3 0.04 0.03 0.09* 0.12*  --   --   --    NRBC /100 WBC 0.0 0.0 0.0  --   --   --   --                      Results from last 7 days   Lab Units 12/02/24  0124   LIPASE U/L 425*                 Results from last 7 days   Lab Units 12/02/24  0842   SODIUM UR mmol/L <20   CHLORIDE UR mmol/L <20   OSMOLALITY UR mOsm/kg 637       Imaging:  Imaging Results (Last 24 Hours)       ** No results found for the last 24 hours. **               I reviewed the patient's new clinical results / labs / tests / procedures      Assessment/Plan     Active Hospital Problems    Diagnosis  POA    **Hypothermia [T68.XXXA]  Unknown    Metabolic acidosis [E87.20]  Yes    Elevated LFTs [R79.89]  Yes    Alcohol dependence [F10.20]  Yes    Hyponatremia [E87.1]  Yes    Chronic low back pain [M54.50, G89.29]  Yes    Hypothyroidism [E03.9]  Yes      Resolved Hospital Problems   No resolved problems to display.           Hypothermia.  This is secondary to malfunction of the patient's furnace at home.  Resolved.  No clinical evidence of infection  History of alcohol abuse/mild alcoholic liver disease.  No evidence of withdrawal.  Will continue CIWA protocol and detoxification.  Benign GI examination.  Monitor liver function test.  Anemia.  Worse than baseline.  Will workup.  Hypothyroidism.   Elevated TSH secondary to noncompliance.  Currently on Synthroid.  Improving  Hypokalemia and hypomagnesemia/metabolic acidosis.  All resolved with substitution.  Abnormal EKG/PVCs.  No evidence of angina or congestive heart failure.  2D echo is normal.  Cardiology saw the patient recommends no further workup.  Electrolytes have corrected.  Holter at discharge.  Weakness and fatigue.  Multifactorial secondary to hyperthermia/electrolyte imbalance/alcohol/hypothyroidism/deconditioning.  Electrolytes replaced.  Anemia is stable.  Hypothermia resolved.  Echo is normal.  Recheck CK.  PT recommends SNF.  VTE prophylaxis.  Sequential compression device.        Discussed my findings and plan of treatment with the patient  Disposition.  To SNF once arrangement is made.        Estrellita Bah MD  [facility]  12/08/24  17:55 EST

## 2024-12-08 NOTE — PLAN OF CARE
Problem: Adult Inpatient Plan of Care  Goal: Plan of Care Review  Outcome: Progressing  Flowsheets (Taken 12/8/2024 4021)  Outcome Evaluation: No s/s ETOH noted. Freq PVCs/bigeminy at times. Asymptomatic. Dr. Bah aware. VSS. Denies pain. Medicated x1 for c/o nausea w/ relief.  Goal: Patient-Specific Goal (Individualized)  Outcome: Progressing  Goal: Absence of Hospital-Acquired Illness or Injury  Outcome: Progressing  Intervention: Identify and Manage Fall Risk  Recent Flowsheet Documentation  Taken 12/8/2024 1426 by Nilda Neal RN  Safety Promotion/Fall Prevention:   fall prevention program maintained   safety round/check completed  Taken 12/8/2024 1200 by Nilda Neal RN  Safety Promotion/Fall Prevention:   fall prevention program maintained   safety round/check completed  Taken 12/8/2024 1000 by Nilda Neal RN  Safety Promotion/Fall Prevention:   fall prevention program maintained   safety round/check completed  Taken 12/8/2024 0840 by Nilda Neal RN  Safety Promotion/Fall Prevention:   fall prevention program maintained   safety round/check completed  Goal: Optimal Comfort and Wellbeing  Outcome: Progressing  Goal: Readiness for Transition of Care  Outcome: Progressing     Problem: Fall Injury Risk  Goal: Absence of Fall and Fall-Related Injury  Outcome: Progressing  Intervention: Promote Injury-Free Environment  Recent Flowsheet Documentation  Taken 12/8/2024 1426 by Nilda Neal RN  Safety Promotion/Fall Prevention:   fall prevention program maintained   safety round/check completed  Taken 12/8/2024 1200 by Nilda Neal RN  Safety Promotion/Fall Prevention:   fall prevention program maintained   safety round/check completed  Taken 12/8/2024 1000 by Nilda Neal RN  Safety Promotion/Fall Prevention:   fall prevention program maintained   safety round/check completed  Taken 12/8/2024 0840 by Nilda Neal RN  Safety Promotion/Fall Prevention:   fall  prevention program maintained   safety round/check completed     Problem: Sepsis/Septic Shock  Goal: Optimal Coping  Outcome: Progressing  Goal: Absence of Bleeding  Outcome: Progressing  Goal: Blood Glucose Level Within Target Range  Outcome: Progressing  Goal: Absence of Infection Signs and Symptoms  Outcome: Progressing  Goal: Optimal Nutrition Delivery  Outcome: Progressing     Problem: Skin Injury Risk Increased  Goal: Skin Health and Integrity  Outcome: Progressing     Problem: Excessive Substance Use  Goal: Optimized Energy Level (Excessive Substance Use)  Outcome: Progressing  Goal: Improved Behavioral Control (Excessive Substance Use)  Outcome: Progressing  Goal: Increased Participation and Engagement (Excessive Substance Use)  Outcome: Progressing  Goal: Improved Physiologic Symptoms (Excessive Substance Use)  Outcome: Progressing  Goal: Enhanced Social, Occupational or Functional Skills (Excessive Substance Use)  Outcome: Progressing   Goal Outcome Evaluation:           Progress: improving  Outcome Evaluation: No s/s ETOH noted. Freq PVCs/bigeminy at times. Asymptomatic. Dr. Bah aware. VSS. Denies pain. Medicated x1 for c/o nausea w/ relief.

## 2024-12-08 NOTE — PLAN OF CARE
Goal Outcome Evaluation:         Pt resting in bed , Aox4 Room air . SR with PVC, Trigeminy on tele . VSS. Plan of care is ongoing.

## 2024-12-09 LAB
ALBUMIN SERPL-MCNC: 3.1 G/DL (ref 3.5–5.2)
ALBUMIN/GLOB SERPL: 0.9 G/DL
ALP SERPL-CCNC: 107 U/L (ref 39–117)
ALT SERPL W P-5'-P-CCNC: 16 U/L (ref 1–41)
ANION GAP SERPL CALCULATED.3IONS-SCNC: 12.4 MMOL/L (ref 5–15)
AST SERPL-CCNC: 47 U/L (ref 1–40)
BASOPHILS # BLD AUTO: 0.07 10*3/MM3 (ref 0–0.2)
BASOPHILS NFR BLD AUTO: 1.1 % (ref 0–1.5)
BILIRUB SERPL-MCNC: 0.4 MG/DL (ref 0–1.2)
BUN SERPL-MCNC: 8 MG/DL (ref 8–23)
BUN/CREAT SERPL: 10.7 (ref 7–25)
CALCIUM SPEC-SCNC: 8.7 MG/DL (ref 8.6–10.5)
CHLORIDE SERPL-SCNC: 99 MMOL/L (ref 98–107)
CK SERPL-CCNC: 40 U/L (ref 20–200)
CO2 SERPL-SCNC: 21.6 MMOL/L (ref 22–29)
CREAT SERPL-MCNC: 0.75 MG/DL (ref 0.76–1.27)
DEPRECATED RDW RBC AUTO: 56.8 FL (ref 37–54)
EGFRCR SERPLBLD CKD-EPI 2021: 98.9 ML/MIN/1.73
EOSINOPHIL # BLD AUTO: 0.09 10*3/MM3 (ref 0–0.4)
EOSINOPHIL NFR BLD AUTO: 1.4 % (ref 0.3–6.2)
ERYTHROCYTE [DISTWIDTH] IN BLOOD BY AUTOMATED COUNT: 16.5 % (ref 12.3–15.4)
GLOBULIN UR ELPH-MCNC: 3.4 GM/DL
GLUCOSE SERPL-MCNC: 99 MG/DL (ref 65–99)
HCT VFR BLD AUTO: 35.6 % (ref 37.5–51)
HGB BLD-MCNC: 12.2 G/DL (ref 13–17.7)
IMM GRANULOCYTES # BLD AUTO: 0.02 10*3/MM3 (ref 0–0.05)
IMM GRANULOCYTES NFR BLD AUTO: 0.3 % (ref 0–0.5)
LYMPHOCYTES # BLD AUTO: 1.83 10*3/MM3 (ref 0.7–3.1)
LYMPHOCYTES NFR BLD AUTO: 27.6 % (ref 19.6–45.3)
MAGNESIUM SERPL-MCNC: 1.8 MG/DL (ref 1.6–2.4)
MCH RBC QN AUTO: 32.7 PG (ref 26.6–33)
MCHC RBC AUTO-ENTMCNC: 34.3 G/DL (ref 31.5–35.7)
MCV RBC AUTO: 95.4 FL (ref 79–97)
MONOCYTES # BLD AUTO: 0.82 10*3/MM3 (ref 0.1–0.9)
MONOCYTES NFR BLD AUTO: 12.4 % (ref 5–12)
NEUTROPHILS NFR BLD AUTO: 3.8 10*3/MM3 (ref 1.7–7)
NEUTROPHILS NFR BLD AUTO: 57.2 % (ref 42.7–76)
NRBC BLD AUTO-RTO: 0 /100 WBC (ref 0–0.2)
PLATELET # BLD AUTO: 267 10*3/MM3 (ref 140–450)
PMV BLD AUTO: 9.8 FL (ref 6–12)
POTASSIUM SERPL-SCNC: 4.5 MMOL/L (ref 3.5–5.2)
PROT SERPL-MCNC: 6.5 G/DL (ref 6–8.5)
RBC # BLD AUTO: 3.73 10*6/MM3 (ref 4.14–5.8)
SODIUM SERPL-SCNC: 133 MMOL/L (ref 136–145)
WBC NRBC COR # BLD AUTO: 6.63 10*3/MM3 (ref 3.4–10.8)

## 2024-12-09 PROCEDURE — 97530 THERAPEUTIC ACTIVITIES: CPT

## 2024-12-09 PROCEDURE — 97535 SELF CARE MNGMENT TRAINING: CPT

## 2024-12-09 PROCEDURE — 80053 COMPREHEN METABOLIC PANEL: CPT | Performed by: INTERNAL MEDICINE

## 2024-12-09 PROCEDURE — 82550 ASSAY OF CK (CPK): CPT | Performed by: INTERNAL MEDICINE

## 2024-12-09 PROCEDURE — 83735 ASSAY OF MAGNESIUM: CPT | Performed by: NURSE PRACTITIONER

## 2024-12-09 PROCEDURE — 85025 COMPLETE CBC W/AUTO DIFF WBC: CPT | Performed by: INTERNAL MEDICINE

## 2024-12-09 RX ORDER — TAMSULOSIN HYDROCHLORIDE 0.4 MG/1
0.4 CAPSULE ORAL NIGHTLY
Status: DISCONTINUED | OUTPATIENT
Start: 2024-12-09 | End: 2024-12-10 | Stop reason: HOSPADM

## 2024-12-09 RX ADMIN — Medication 10 ML: at 08:35

## 2024-12-09 RX ADMIN — Medication 5 MG: at 00:45

## 2024-12-09 RX ADMIN — FOLIC ACID 1 MG: 1 TABLET ORAL at 08:35

## 2024-12-09 RX ADMIN — NYSTATIN 1 APPLICATION: 100000 OINTMENT TOPICAL at 08:36

## 2024-12-09 RX ADMIN — ACETAMINOPHEN 325MG 650 MG: 325 TABLET ORAL at 00:45

## 2024-12-09 RX ADMIN — THIAMINE HCL TAB 100 MG 100 MG: 100 TAB at 08:35

## 2024-12-09 RX ADMIN — TAMSULOSIN HYDROCHLORIDE 0.4 MG: 0.4 CAPSULE ORAL at 20:00

## 2024-12-09 RX ADMIN — LEVOTHYROXINE SODIUM 100 MCG: 100 TABLET ORAL at 08:35

## 2024-12-09 RX ADMIN — Medication 10 ML: at 20:01

## 2024-12-09 RX ADMIN — NYSTATIN 1 APPLICATION: 100000 OINTMENT TOPICAL at 20:01

## 2024-12-09 RX ADMIN — ACETAMINOPHEN 325MG 650 MG: 325 TABLET ORAL at 19:59

## 2024-12-09 RX ADMIN — FERROUS SULFATE TAB 325 MG (65 MG ELEMENTAL FE) 325 MG: 325 (65 FE) TAB at 08:35

## 2024-12-09 NOTE — CASE MANAGEMENT/SOCIAL WORK
Continued Stay Note  [facility]     Patient Name: Pro Mueller  MRN: [id number]  Today's Date: 12/9/2024    Admit Date: 12/2/2024    Plan: Pending   Discharge Plan       Row Name 12/09/24 1452       Plan    Plan Pending    Plan Comments CCP spoke with patient at bedside. He states he plans to do SNF and then return home if possible. CCP updated [facility]; he is evaluating. CCP spoke with [facility]; able to accept. CCP spoke with [facility]; able to accept. Margarita WINTERS                   Discharge Codes    No documentation.                 Expected Discharge Date and Time       Expected Discharge Date Expected Discharge Time    Dec 11, 2024               VIANEY Recinos

## 2024-12-09 NOTE — PLAN OF CARE
Goal Outcome Evaluation:     Patient A&Ox4. Frequent trips to the restroom with little urine output each time, PVR bladder scan 0. Plan for discharge tomorrow. Plan of care ongoing.

## 2024-12-09 NOTE — PLAN OF CARE
Goal Outcome Evaluation:  Plan of Care Reviewed With: patient           Outcome Evaluation: Pt seen for OT/PT cotreat to maxmize safety and therapeutic benefit. Peroformed bed mobility w/ SBA. Needing CGA<>Min A x2 + RW for OOBTC, and then for additional STS and functional mobility w/ chair follow. Required 1 seated rest break prior to mobility. Able to complete UB grooming/hygiene w/ setup from chair level. Pt showing improvements from previous session, however remains limited by diminished strength/activity tolerance. OT will continue to follow to address stated deficits.    Anticipated Discharge Disposition (OT): skilled nursing facility

## 2024-12-09 NOTE — PLAN OF CARE
Goal Outcome Evaluation:  Plan of Care Reviewed With: patient        Progress: improving  Outcome Evaluation: Pt seen for PT/OT cotx this AM and toleratd the session well, demoing good progres. Today, pt was SBA to sit EOB where he had c/o dizziness, and performed LE ther-ex for 10 reps. Pts BP taken in sitting reading 100/63. Pt was CG/MinAx2 for two STS to RW, for a L step pivot tsf w/ RW to the chair, and for ambulation of 10' w/ RW and chair follow. Pt required a few min seated rest break between the tsf to the chair and gait, d/t fatigue. Pt also stating not able to walk much farther d/t fatigue. PT will cont to follow and rec rehab at NH.    Anticipated Discharge Disposition (PT): skilled nursing facility

## 2024-12-09 NOTE — PROGRESS NOTES
"Access follow up:  Reviewed chart and interviewed pt:  This pt well known to this therapist as I used to treat him at the OP counseling center at [facility] when it was open.  Educated pt as to the progression of pt body starting to shut down. \"That's what happens in life\" Pt replied.  Noted to pt that I was referring to [pt ETOH use and latent stage of ETOH use disorder as ETOH is a neurotoxin that changes the way the brain functions  Educated pt on effects of ETOH on body  Pt not sure he wants to stop drinking   Educated pt on co-occurring disorders and invited him to [facility] treatment at [facility]  Left pt with information of [facility] IOP at [facility] and other area resources. Pt has \"no\" SI    "

## 2024-12-09 NOTE — CASE MANAGEMENT/SOCIAL WORK
Continued Stay Note  [facility]     Patient Name: Pro Mueller  MRN: [id number]  Today's Date: 12/9/2024    Admit Date: 12/2/2024    Plan: [facility]; [facility] WC van for 11:00 A.M 12/10/24   Discharge Plan       Row Name 12/09/24 1540       Plan    Plan [facility]; [facility] WC van for 11:00 A.M 12/10/24    Plan Comments CCP spoke with [facility]; able to accept. CCP met with patient at bedside. Discussed accepting facilities and Medicare ratings. Patient selected [facility]. CCP notified [facility]; bed available tomorrow. CCP arranged WC van for 11:00 A.M. CCP attempted to update patient's sister, but no voicemail option. Packet in office. Margarita WINTERS      Row Name 12/09/24 1452       Plan    Plan Pending    Plan Comments CCP spoke with patient at bedside. He states he plans to do SNF and then return home if possible. CCP updated [facility]; he is evaluating. CCP spoke with [facility]; able to accept. CCP spoke with [facility]; able to accept. Margarita WINTERS                   Discharge Codes    No documentation.                 Expected Discharge Date and Time       Expected Discharge Date Expected Discharge Time    Dec 11, 2024               VIANEY Recinos

## 2024-12-09 NOTE — PLAN OF CARE
Goal Outcome Evaluation:      Pt resting on the bed, sr on monitor, RA, administered meds per mar, no c/o pain or soa, melatonin given, used urinal, no ss of acute distress noted, will continue to monitor.      Problem: Adult Inpatient Plan of Care  Goal: Absence of Hospital-Acquired Illness or Injury  Intervention: Identify and Manage Fall Risk  Recent Flowsheet Documentation  Taken 12/9/2024 0401 by Memo Stubbs RN  Safety Promotion/Fall Prevention:   activity supervised   room organization consistent   safety round/check completed  Taken 12/9/2024 0201 by Memo Stubbs RN  Safety Promotion/Fall Prevention:   activity supervised   room organization consistent   safety round/check completed  Taken 12/9/2024 0001 by Memo Stubbs RN  Safety Promotion/Fall Prevention:   activity supervised   room organization consistent   safety round/check completed  Taken 12/8/2024 2201 by Memo Stubbs RN  Safety Promotion/Fall Prevention:   activity supervised   room organization consistent   safety round/check completed  Taken 12/8/2024 2028 by Memo Stubbs RN  Safety Promotion/Fall Prevention:   activity supervised   room organization consistent   safety round/check completed  Intervention: Prevent Skin Injury  Recent Flowsheet Documentation  Taken 12/9/2024 0401 by Memo Stubbs RN  Body Position: position changed independently  Taken 12/9/2024 0201 by Memo Stubbs RN  Body Position: position changed independently  Taken 12/9/2024 0001 by Memo Stubbs RN  Body Position: position changed independently  Skin Protection: incontinence pads utilized  Taken 12/8/2024 2201 by Memo Stubbs RN  Body Position: position changed independently  Taken 12/8/2024 2028 by Memo Stubbs RN  Body Position: position changed independently  Intervention: Prevent Infection  Recent Flowsheet Documentation  Taken 12/9/2024 0401 by Memo Stubbs RN  Infection Prevention:   hand hygiene  promoted   rest/sleep promoted  Taken 12/9/2024 0201 by Memo Stubbs RN  Infection Prevention:   equipment surfaces disinfected   personal protective equipment utilized   hand hygiene promoted   rest/sleep promoted  Taken 12/9/2024 0001 by Memo Stubbs RN  Infection Prevention:   hand hygiene promoted   single patient room provided   rest/sleep promoted  Taken 12/8/2024 2201 by Memo Stubbs RN  Infection Prevention:   hand hygiene promoted   rest/sleep promoted  Taken 12/8/2024 2028 by Memo Stubbs RN  Infection Prevention:   hand hygiene promoted   rest/sleep promoted  Goal: Optimal Comfort and Wellbeing  Intervention: Provide Person-Centered Care  Recent Flowsheet Documentation  Taken 12/9/2024 0001 by Memo Stubbs RN  Trust Relationship/Rapport:   care explained   choices provided  Taken 12/8/2024 2028 by Memo Stubbs RN  Trust Relationship/Rapport:   care explained   choices provided     Problem: Fall Injury Risk  Goal: Absence of Fall and Fall-Related Injury  Intervention: Identify and Manage Contributors  Recent Flowsheet Documentation  Taken 12/9/2024 0401 by Memo Stubbs RN  Medication Review/Management: medications reviewed  Taken 12/9/2024 0201 by Memo Stubbs RN  Medication Review/Management: medications reviewed  Taken 12/9/2024 0001 by Memo Stubbs RN  Medication Review/Management: medications reviewed  Self-Care Promotion: independence encouraged  Taken 12/8/2024 2201 by Memo Stubbs RN  Medication Review/Management: medications reviewed  Taken 12/8/2024 2028 by Memo Stubbs RN  Medication Review/Management: medications reviewed  Intervention: Promote Injury-Free Environment  Recent Flowsheet Documentation  Taken 12/9/2024 0401 by Memo Stubbs RN  Safety Promotion/Fall Prevention:   activity supervised   room organization consistent   safety round/check completed  Taken 12/9/2024 0201 by Memo Stubbs RN  Safety Promotion/Fall  Prevention:   activity supervised   room organization consistent   safety round/check completed  Taken 12/9/2024 0001 by Memo Stubbs RN  Safety Promotion/Fall Prevention:   activity supervised   room organization consistent   safety round/check completed  Taken 12/8/2024 2201 by Memo Stubbs RN  Safety Promotion/Fall Prevention:   activity supervised   room organization consistent   safety round/check completed  Taken 12/8/2024 2028 by Memo Stubbs RN  Safety Promotion/Fall Prevention:   activity supervised   room organization consistent   safety round/check completed     Problem: Sepsis/Septic Shock  Goal: Absence of Infection Signs and Symptoms  Intervention: Initiate Sepsis Management  Recent Flowsheet Documentation  Taken 12/9/2024 0401 by Memo Stubbs RN  Infection Prevention:   hand hygiene promoted   rest/sleep promoted  Isolation Precautions: precautions maintained  Taken 12/9/2024 0201 by Memo Stubbs RN  Infection Prevention:   equipment surfaces disinfected   personal protective equipment utilized   hand hygiene promoted   rest/sleep promoted  Isolation Precautions: precautions maintained  Taken 12/9/2024 0001 by Memo Stubbs RN  Infection Management: aseptic technique maintained  Infection Prevention:   hand hygiene promoted   single patient room provided   rest/sleep promoted  Isolation Precautions: precautions maintained  Taken 12/8/2024 2201 by Memo Stubbs RN  Infection Prevention:   hand hygiene promoted   rest/sleep promoted  Isolation Precautions: precautions maintained  Taken 12/8/2024 2028 by Memo Stubbs RN  Infection Management: aseptic technique maintained  Infection Prevention:   hand hygiene promoted   rest/sleep promoted  Isolation Precautions: precautions maintained  Intervention: Promote Recovery  Recent Flowsheet Documentation  Taken 12/9/2024 0001 by Memo Stubbs RN  Activity Management: bedrest  Taken 12/8/2024 2028 by Enoc  MARTI Hampton  Activity Management: bedrest     Problem: Skin Injury Risk Increased  Goal: Skin Health and Integrity  Intervention: Optimize Skin Protection  Recent Flowsheet Documentation  Taken 12/9/2024 0401 by Memo Stubbs RN  Head of Bed ([facility]) Positioning: HOB elevated  Taken 12/9/2024 0201 by Memo Stubbs RN  Head of Bed (HOB) Positioning: HOB elevated  Taken 12/9/2024 0001 by Memo Stubbs RN  Activity Management: bedrest  Pressure Reduction Techniques:   frequent weight shift encouraged   weight shift assistance provided  Head of Bed (HOB) Positioning: HOB elevated  Pressure Reduction Devices:   alternating pressure pump (LUBA)   pressure-redistributing mattress utilized  Skin Protection: incontinence pads utilized  Taken 12/8/2024 2201 by Memo Stubbs RN  Head of Bed (HOB) Positioning: HOB elevated  Taken 12/8/2024 2028 by Memo Stubbs RN  Activity Management: bedrest  Pressure Reduction Techniques:   frequent weight shift encouraged   weight shift assistance provided  Head of Bed (HOB) Positioning: HOB elevated  Pressure Reduction Devices:   alternating pressure pump (LUBA)   pressure-redistributing mattress utilized  Intervention: Promote and Optimize Oral Intake  Recent Flowsheet Documentation  Taken 12/9/2024 0001 by Memo Stubbs RN  Oral Nutrition Promotion: rest periods promoted  Taken 12/8/2024 2028 by Memo Stubbs RN  Oral Nutrition Promotion: rest periods promoted     Problem: Excessive Substance Use  Goal: Improved Physiologic Symptoms (Excessive Substance Use)  Intervention: Optimize Physiologic Function  Recent Flowsheet Documentation  Taken 12/9/2024 0001 by Memo Stubbs RN  Oral Nutrition Promotion: rest periods promoted  Taken 12/8/2024 2028 by Memo Stubbs RN  Oral Nutrition Promotion: rest periods promoted  Goal: Enhanced Social, Occupational or Functional Skills (Excessive Substance Use)  Intervention: Promote Social, Occupational and  Functional Ability  Recent Flowsheet Documentation  Taken 12/9/2024 0001 by Memo Stubbs, RN  Trust Relationship/Rapport:   care explained   choices provided  Taken 12/8/2024 2028 by Memo Stubbs, RN  Trust Relationship/Rapport:   care explained   choices provided

## 2024-12-09 NOTE — PROGRESS NOTES
Name: Pro Mueller ADMIT: 2024   : 1955  PCP: Provider, No Known    MRN: [id number] LOS: 7 days   AGE/SEX: 69 y.o. male  ROOM: [facility]     Subjective   Subjective   Patient continues with weakness and fatigue.  No other complaints.  No fever or chills.  No chest pain.  No palpitation.  No ankle edema.  No cough.  No dyspnea.  No wheezing.  No PND orthopnea.    Review of Systems  GI.  No abdominal pain.  No nausea or vomiting..  Normal bowel movement yesterday without fresh bright blood per rectum or melena  .  No dysuria or hematuria  CNS.  No dizziness or loss of consciousness.  No focal neurological symptoms.     Objective   Objective   Vital Signs  Temp:  [98 °F (36.7 °C)-99 °F (37.2 °C)] 98.1 °F (36.7 °C)  Heart Rate:  [72-80] 80  Resp:  [16] 16  BP: (102-145)/(58-79) 102/58  SpO2:  [94 %-100 %] 97 %  on   ;   Device (Oxygen Therapy): room air    Intake/Output Summary (Last 24 hours) at 2024 1600  Last data filed at 2024 1102  Gross per 24 hour   Intake 240 ml   Output 700 ml   Net -460 ml     Body mass index is 24.68 kg/m².      24  0100 24  0834 24  1750   Weight: 83.9 kg (185 lb) 82.7 kg (182 lb 5.1 oz) 82.6 kg (182 lb)     Physical Exam  General.  Middle-aged gentleman.  Alert and oriented x 4.  No apparent pain/distress/diaphoresis.  Normal mood and affect.  Eyes.  Pupils equal round and reactive.  Intact extraocular musculature.  No pallor or jaundice  Oral cavity.  Moist mucous membrane.  Neck.  Supple.  No JVD.  No lymphadenopathy or thyromegaly.  Cardiovascular.  Regular rate and rhythm with occasional ectopic beats and no murmurs  Chest.  Clear to auscultation bilaterally with no added sounds  Abdomen.  Soft lax.  No tenderness.  No organomegaly.  No guarding or rebound  Extremities.  No clubbing/cyanosis/edema.  CNS.  No acute focal neurological deficits.    Results Review:      Results from last 7 days   Lab Units 24  0539 24  0446  "12/07/24  0426 12/06/24  0454 12/05/24  0500 12/04/24  1646 12/04/24  0557 12/03/24  1859 12/03/24  0553   SODIUM mmol/L 133* 133* 133* 133* 135*  --  134*  --  131*   POTASSIUM mmol/L 4.5 4.2 4.2 4.0 4.4 4.7 3.5   < > 3.2*   CHLORIDE mmol/L 99 101 100 100 101  --  98  --  97*   CO2 mmol/L 21.6* 23.0 23.3 23.6 24.2  --  26.0  --  22.0   BUN mg/dL 8 6* 8 5* 7*  --  10  --  18   CREATININE mg/dL 0.75* 0.63* 0.76 0.61* 0.64*  --  0.65*  --  0.79   GLUCOSE mg/dL 99 96 100* 98 96  --  91  --  107*   CALCIUM mg/dL 8.7 8.0* 8.0* 7.9* 8.0*  --  7.9*  --  7.9*   AST (SGOT) U/L 47* 45* 48* 56* 59*  --  76*  --  69*   ALT (SGPT) U/L 16 15 17 20 19  --  26  --  24    < > = values in this interval not displayed.     Estimated Creatinine Clearance: 111.7 mL/min (A) (by C-G formula based on SCr of 0.75 mg/dL (L)).          Results from last 7 days   Lab Units 12/09/24  0539 12/03/24  0553 12/02/24  1800   CK TOTAL U/L 40  --   --    HSTROP T ng/L  --  21 21         Results from last 7 days   Lab Units 12/08/24  1406   TSH uIU/mL 5.200*     Results from last 7 days   Lab Units 12/09/24  0539 12/08/24  0446 12/07/24  0426 12/06/24  0454 12/05/24  0500 12/04/24  0557 12/03/24  0553   MAGNESIUM mg/dL 1.8 1.8 1.5* 2.0 1.5* 1.9 1.2*           Invalid input(s): \"LDLCALC\"  Results from last 7 days   Lab Units 12/09/24  0539 12/08/24  0446 12/07/24  0426 12/06/24  0454 12/05/24  0500 12/05/24  0500 12/04/24  0557 12/04/24  0557 12/03/24  0553   WBC 10*3/mm3 6.63 8.33 7.01 6.37  --  7.40  --  9.80 7.54   HEMOGLOBIN g/dL 12.2* 11.1* 10.9* 11.2*  --  10.8*  --  10.3* 10.5*   HEMATOCRIT % 35.6* 31.3* 32.2* 32.4*  --  32.5*  --  28.8* 30.8*   PLATELETS 10*3/mm3 267 196 158 162  --  149  --  115* 131*   MCV fL 95.4 94.6 97.3* 94.7  --  96.4  --  93.8 95.7   MCH pg 32.7 33.5* 32.9 32.7  --  32.0  --  33.6* 32.6   MCHC g/dL 34.3 35.5 33.9 34.6  --  33.2  --  35.8* 34.1   RDW % 16.5* 16.4* 16.8* 16.6*  --  16.6*  --  16.4* 16.5*   RDW-SD fl 56.8* " 56.4* 60.0* 56.4*  --  57.7*  --  55.2* 56.7*   MPV fL 9.8 9.3 10.0 9.8  --  9.3  --  9.1 9.5   NEUTROPHIL % % 57.2 69.5 62.8 60.7  --  61.3   < >  --   --    LYMPHOCYTE % % 27.6 17.5* 21.3 21.2  --  20.8   < >  --   --    MONOCYTES % % 12.4* 11.5 14.3* 15.5*  --  15.5*   < >  --   --    EOSINOPHIL % % 1.4 0.5 0.6 0.6  --  0.4   < >  --   --    BASOPHIL % % 1.1 0.5 0.6 0.6  --  0.4   < >  --   --    IMM GRAN % % 0.3 0.5 0.4 1.4*  --  1.6*   < >  --   --    NEUTROS ABS 10*3/mm3 3.80 5.79 4.41 3.86  --  4.53   < >  --   --    LYMPHS ABS 10*3/mm3 1.83 1.46 1.49 1.35  --  1.54   < >  --   --    MONOS ABS 10*3/mm3 0.82 0.96* 1.00* 0.99*  --  1.15*   < >  --   --    EOS ABS 10*3/mm3 0.09 0.04 0.04 0.04  --  0.03   < >  --   --    BASOS ABS 10*3/mm3 0.07 0.04 0.04 0.04  --  0.03   < >  --   --    IMMATURE GRANS (ABS) 10*3/mm3 0.02 0.04 0.03 0.09*  --  0.12*   < >  --   --    NRBC /100 WBC 0.0 0.0 0.0 0.0   < >  --   --   --   --     < > = values in this interval not displayed.                                               Imaging:  Imaging Results (Last 24 Hours)       ** No results found for the last 24 hours. **               I reviewed the patient's new clinical results / labs / tests / procedures      Assessment/Plan     Active Hospital Problems    Diagnosis  POA    **Hypothermia [T68.XXXA]  Unknown    Metabolic acidosis [E87.20]  Yes    Elevated LFTs [R79.89]  Yes    Alcohol dependence [F10.20]  Yes    Hyponatremia [E87.1]  Yes    Chronic low back pain [M54.50, G89.29]  Yes    Hypothyroidism [E03.9]  Yes      Resolved Hospital Problems   No resolved problems to display.           Hypothermia.  This is secondary to malfunction of the patient's furnace at home.  Resolved.  No clinical evidence of infection  History of alcohol abuse/mild alcoholic liver disease.  No evidence of withdrawal.  Will continue CIWA protocol and detoxification.  Benign GI examination.  Improved liver function test.    Folate deficiency and iron  deficiency anemia.  Hemoglobin is stable.  Continue folic acid and iron.    Hypothyroidism.  Elevated TSH secondary to noncompliance.  Currently on Synthroid.  Improving  Hypokalemia and hypomagnesemia/metabolic acidosis.  All resolved with substitution.  Abnormal EKG/PVCs.  No evidence of angina or congestive heart failure.  2D echo is normal.  Cardiology saw the patient recommends no further workup.  Electrolytes have been corrected.  Holter at discharge.  Weakness and fatigue.  Multifactorial secondary to hyperthermia/electrolyte imbalance/alcohol/hypothyroidism/deconditioning.  Electrolytes replaced.  Anemia is stable.  Hypothermia resolved.  Echo is normal.  Normal CK..  PT recommends SNF.  VTE prophylaxis.  Sequential compression device.        Discussed my findings and plan of treatment with the patient  Disposition.  To SNF tomorrow.        Estrellita Bah MD  [facility]  12/09/24  16:00 EST

## 2024-12-09 NOTE — THERAPY TREATMENT NOTE
Patient Name: Pro Mueller  : 1955    MRN: [id number]                              Today's Date: 2024       Admit Date: 2024    Visit Dx:     ICD-10-CM ICD-9-CM   1. Alcohol-induced acute pancreatitis, unspecified complication status  K85.20 577.0   2. Fall, initial encounter  W19.XXXA E888.9   3. Hyponatremia  E87.1 276.1   4. Hypothermia, initial encounter  T68.XXXA 991.6   5. Candidiasis of scrotum  B37.49 112.2     Patient Active Problem List   Diagnosis    Fall    Alcoholism in recovery    Atopic rhinitis    Mixed anxiety depressive disorder    Genital herpes simplex    Hyperlipidemia    Hypertension    Hypothyroidism    Insomnia    Panic disorder without agoraphobia    Persistent insomnia    Vitamin D deficiency    Chronic low back pain    Neuropathy involving both lower extremities    QT prolongation    Left shoulder pain    Nausea and vomiting    Hyponatremia    Pancytopenia    Left ventricular diastolic dysfunction    Tremor    C5 cervical fracture    Syncope and collapse    Neck pain    Alcoholic intoxication with complication    Withdrawal symptoms, alcohol    Transaminitis    Lumbar facet arthropathy    Alcohol dependence    Midline low back pain with right-sided sciatica    Spondylolisthesis at L4-L5 level    Lumbar canal stenosis    Alcohol cessation counseling    Need for assistance due to reduced mobility    Impaired mobility and ADLs    Alcohol withdrawal syndrome without complication    Facial laceration    Orthostatic hypotension    Acute bacterial conjunctivitis of right eye    Visit for suture removal    Renal calculi    Hepatic steatosis    Alcohol withdrawal syndrome with complication    Macrocytosis without anemia    Lumbosacral spondylosis without myelopathy    Elevated LFTs    Alcohol-induced acute pancreatitis, unspecified complication status    Pancreatitis    Generalized abdominal pain    Alcohol withdrawal    Metabolic acidosis    Acute alcohol intoxication in patient  with alcoholism with blood alcohol level 0.08 to 0.29, with unspecified complication    Calculus of gallbladder with cholecystitis without biliary obstruction    Hypothermia     Past Medical History:   Diagnosis Date    Alcohol abuse     Alcohol withdrawal 11/04/2016    Alcoholic ketoacidosis 01/12/2020    Allergic 1970    Anxiety     Arthritis     Atopic rhinitis 08/08/2016    Depression     Disease of thyroid gland     Elevated cholesterol     Encounter for removal of sutures     Genital herpes simplex 08/08/2016    GERD (gastroesophageal reflux disease)     Headache, tension-type     Hyperlipidemia     Hypertension     Hypothyroidism     Kidney stone     Low back pain 2019    Migraine     Motion sickness     Nephrolithiasis 02/12/2020    Olecranon bursitis, right elbow     Panic disorder without agoraphobia 08/08/2016    Peripheral neuropathy     Sleep apnea     Syncope and collapse 01/02/2019    Vitamin D deficiency 08/08/2016    Withdrawal symptoms, alcohol      Past Surgical History:   Procedure Laterality Date    CHOLECYSTECTOMY N/A 9/22/2024    Procedure: CHOLECYSTECTOMY LAPAROSCOPIC WITH DAVINCI ROBOT with cholangiogram, possible open;  Surgeon: Toro Noguera MD;  Location: [facility] MAIN OR;  Service: General;  Laterality: N/A;    COLONOSCOPY      CYST REMOVAL      CYSTOSCOPY BLADDER STONE LITHOTRIPSY  02/2022    ERCP N/A 9/23/2024    Procedure: ENDOSCOPIC RETROGRADE CHOLANGIOPANCREATOGRAPHY sphincterotomy, balloon sweep (9-12);  Surgeon: Fara Madrigal MD;  Location: [facility] ENDOSCOPY;  Service: Gastroenterology;  Laterality: N/A;  sphincterotomy hiatial hernia, gallstone removal    EXTRACORPOREAL SHOCK WAVE LITHOTRIPSY (ESWL) Right 2002    SHOULDER ARTHROSCOPY Right 12/17/2019    Procedure: SHOULDER ARTHROSCOPY, decompression, distal clavicle excision;  Surgeon: Aj Mancilla MD;  Location: [facility] OR [facility];  Service: Orthopedics    TONSILLECTOMY        General Information       Row Name 12/09/24 1200           Physical Therapy Time and Intention    Document Type therapy note (daily note)  -MG     Mode of Treatment co-treatment;occupational therapy;physical therapy;other (see comments)  -MG       Row Name 12/09/24 1200          General Information    Patient Profile Reviewed yes  -MG     Existing Precautions/Restrictions fall  -MG     Barriers to Rehab none identified  -MG       Row Name 12/09/24 1200          Cognition    Orientation Status (Cognition) oriented x 3  -MG       Row Name 12/09/24 1200          Safety Issues/Impairments Affecting Functional Mobility    Safety Issues Affecting Function (Mobility) insight into deficits/self-awareness;safety precaution awareness;awareness of need for assistance;judgment;sequencing abilities;positioning of assistive device  -MG     Impairments Affecting Function (Mobility) balance;endurance/activity tolerance;pain;strength  -MG     Comment, Safety Issues/Impairments (Mobility) Co treatment medically appropriate and necessary due to patient acuity level, activity tolerance and safety of patient and staff. Treatment is focusing on progression of care and goals established in the POC. Gait belt and non-skid socks donned.  -MG               User Key  (r) = Recorded By, (t) = Taken By, (c) = Cosigned By      Initials Name Provider Type    MG Monique Mock, PT Physical Therapist                   Mobility       Row Name 12/09/24 1200          Bed Mobility    Supine-Sit [place] (Bed Mobility) standby assist  -MG     Assistive Device (Bed Mobility) head of bed elevated;bed rails  -MG     Comment, (Bed Mobility) Increased time/effort to complete. Dizzy upon sitting. BP taken reading 100/63  -MG       Row Name 12/09/24 1200          Bed-Chair Transfer    Bed-Chair [place] (Transfers) contact guard;minimum assist (75% patient effort);2 person assist;verbal cues  -MG     Assistive Device (Bed-Chair Transfers) walker, front-wheeled  -MG     Comment, (Bed-Chair Transfer)  Step pivot to the L. Cues for posture.  -MG       Row Name 12/09/24 1200          Sit-Stand Transfer    Sit-Stand [place] (Transfers) contact guard;minimum assist (75% patient effort);2 person assist;verbal cues  -MG     Assistive Device (Sit-Stand Transfers) walker, front-wheeled  -MG     Comment, (Sit-Stand Transfer) x1 from elevated EOB, x1 chair. Cues for hand placement and sequencing, however pt pulling up on RW and did not reach back to sit.  -MG       Row Name 12/09/24 1200          Gait/Stairs (Locomotion)    [place] Level (Gait) contact guard;minimum assist (75% patient effort);2 person assist;verbal cues  -MG     Assistive Device (Gait) walker, front-wheeled  chair follow  -MG     Distance in Feet (Gait) 10  -MG     Deviations/Abnormal Patterns (Gait) gait speed decreased;stride length decreased  -MG     Bilateral Gait Deviations forward flexed posture;heel strike decreased  -MG     Comment, (Gait/Stairs) Cues for posture, heel-toe mechanics and sequencing. Pt reports fatigue afterwards and did not feel like he could ambulate much further. No dizziness, SOB or LOB.  -MG               User Key  (r) = Recorded By, (t) = Taken By, (c) = Cosigned By      Initials Name Provider Type    Monique Betancourt, LUCILA Physical Therapist                   Obj/Interventions       Row Name 12/09/24 1202          Motor Skills    Therapeutic Exercise other (see comments)  AP, LAQ x10. Cues for full ROM.  -MG               User Key  (r) = Recorded By, (t) = Taken By, (c) = Cosigned By      Initials Name Provider Type    Monique Betancourt, PT Physical Therapist                   Goals/Plan    No documentation.                  Clinical Impression       Row Name 12/09/24 1203          Pain    Pretreatment Pain Rating 0/10 - no pain  -MG     Posttreatment Pain Rating 0/10 - no pain  -MG     Pre/Posttreatment Pain Comment c/o generalized stiffness.  -MG       Row Name 12/09/24 1203          Plan of Care Review    Plan of  Care Reviewed With patient  -MG     Progress improving  -MG     Outcome Evaluation Pt seen for PT/OT cotx this AM and toleratd the session well, demoing good progres. Today, pt was SBA to sit EOB where he had c/o dizziness, and performed LE ther-ex for 10 reps. Pts BP taken in sitting reading 100/63. Pt was CG/MinAx2 for two STS to RW, for a L step pivot tsf w/ RW to the chair, and for ambulation of 10' w/ RW and chair follow. Pt required a few min seated rest break between the tsf to the chair and gait, d/t fatigue. Pt also stating not able to walk much farther d/t fatigue. PT will cont to follow and rec rehab at [place].  -MG       Row Name 12/09/24 1203          Therapy Assessment/Plan (PT)    Criteria for Skilled Interventions Met (PT) yes  -MG     Therapy Frequency (PT) 5 times/wk  -MG       Row Name 12/09/24 1203          Vital Signs    Intra Systolic BP Rehab 100  sitting  -MG     Intra Treatment Diastolic BP 63  -MG     Intratreatment Heart Rate (beats/min) 86  -MG     O2 Delivery Pre Treatment room air  -MG     Intra SpO2 (%) 97  -MG     O2 Delivery Intra Treatment room air  -MG     O2 Delivery Post Treatment room air  -MG     Pre Patient Position Supine  -MG     Intra Patient Position Standing  -MG     Post Patient Position Sitting  -MG       Row Name 12/09/24 1203          Positioning and Restraints    Pre-Treatment Position in bed  -MG     Post Treatment Position chair  -MG     In Chair notified nsg;sitting;call light within reach;encouraged to call for assist;exit alarm on  -MG               User Key  (r) = Recorded By, (t) = Taken By, (c) = Cosigned By      Initials Name Provider Type    MG Monique Mock, PT Physical Therapist                   Outcome Measures       Row Name 12/09/24 1206          How much help from another person do you currently need...    Turning from your back to your side while in flat bed without using bedrails? 4  -MG     Moving from lying on back to sitting on the side of a flat  bed without bedrails? 3  -MG     Moving to and from a bed to a chair (including a wheelchair)? 3  -MG     Standing up from a chair using your arms (e.g., wheelchair, bedside chair)? 3  -MG     Climbing 3-5 steps with a railing? 2  -MG     To walk in hospital room? 3  -MG     AM-PAC 6 Clicks Score (PT) 18  -MG     Highest Level of Mobility Goal 6 --> Walk 10 steps or more  -MG               User Key  (r) = Recorded By, (t) = Taken By, (c) = Cosigned By      Initials Name Provider Type    MG Monique Mock, PT Physical Therapist                                 Physical Therapy Education       Title: PT OT SLP Therapies (Done)       Topic: Physical Therapy (Done)       Point: Mobility training (Done)       Learning Progress Summary            Patient Acceptance, E,TB,D, VU,NR by  at 12/9/2024 1207    Acceptance, E,D, VU,NR by  at 12/4/2024 1625    Acceptance, E,TB,D, VU,NR by  at 12/3/2024 1053    Acceptance, E, VU by  at 12/2/2024 0926                      Point: Home exercise program (Done)       Learning Progress Summary            Patient Acceptance, E,TB,D, VU,NR by  at 12/9/2024 1207    Acceptance, E,D, VU,NR by  at 12/4/2024 1625    Acceptance, E, VU by  at 12/2/2024 0926                      Point: Body mechanics (Done)       Learning Progress Summary            Patient Acceptance, E,TB,D, VU,NR by  at 12/9/2024 1207    Acceptance, E,D, VU,NR by  at 12/4/2024 1625    Acceptance, E,TB,D, VU,NR by  at 12/3/2024 1053    Acceptance, E, VU by  at 12/2/2024 0926                      Point: Precautions (Done)       Learning Progress Summary            Patient Acceptance, E,TB,D, VU,NR by  at 12/9/2024 1207    Acceptance, E,D, VU,NR by  at 12/4/2024 1625    Acceptance, E,TB,D, VU,NR by  at 12/3/2024 1053    Acceptance, E, VU by  at 12/2/2024 0926                                      User Key       Initials Effective Dates Name Provider Type Discipline    MG 05/24/22 -  Monique Mock, PT  Physical Therapist PT     02/26/24 -  Murphy Sherman, RN Registered Nurse Nurse     04/08/22 -  Amarilis Pierre PT Physical Therapist PT                  PT Recommendation and Plan     Progress: improving  Outcome Evaluation: Pt seen for PT/OT cotx this AM and toleratd the session well, demoing good progres. Today, pt was SBA to sit EOB where he had c/o dizziness, and performed LE ther-ex for 10 reps. Pts BP taken in sitting reading 100/63. Pt was CG/MinAx2 for two STS to RW, for a L step pivot tsf w/ RW to the chair, and for ambulation of 10' w/ RW and chair follow. Pt required a few min seated rest break between the tsf to the chair and gait, d/t fatigue. Pt also stating not able to walk much farther d/t fatigue. PT will cont to follow and rec rehab at [place].     Time Calculation:         PT Charges       Row Name 12/09/24 1207             Time Calculation    Start Time 0905  -MG      Stop Time 0923  -MG      Time Calculation (min) 18 min  -MG      PT Received On 12/09/24  -MG      PT - Next Appointment 12/10/24  -MG                User Key  (r) = Recorded By, (t) = Taken By, (c) = Cosigned By      Initials Name Provider Type    MG Monique Mock, PT Physical Therapist                  Therapy Charges for Today       Code Description Service Date Service Provider Modifiers Qty    [id number]  PT THERAPEUTIC ACT EA 15 MIN 12/9/2024 Monique Mock, PT GP 1            PT G-Codes  Outcome Measure Options: AM-PAC 6 Clicks Daily Activity (OT), Modified [place]  AM-PAC 6 Clicks Score (PT): 18  AM-PAC 6 Clicks Score (OT): 11  Modified [place] Scale: 4 - Moderately severe disability.  Unable to walk without assistance, and unable to attend to own bodily needs without assistance.  PT Discharge Summary  Anticipated Discharge Disposition (PT): skilled nursing facility    Monique Mock PT  12/9/2024

## 2024-12-09 NOTE — THERAPY TREATMENT NOTE
Patient Name: Pro Mueller  : 1955    MRN: [id number]                              Today's Date: 2024       Admit Date: 2024    Visit Dx:     ICD-10-CM ICD-9-CM   1. Alcohol-induced acute pancreatitis, unspecified complication status  K85.20 577.0   2. Fall, initial encounter  W19.XXXA E888.9   3. Hyponatremia  E87.1 276.1   4. Hypothermia, initial encounter  T68.XXXA 991.6   5. Candidiasis of scrotum  B37.49 112.2     Patient Active Problem List   Diagnosis    Fall    Alcoholism in recovery    Atopic rhinitis    Mixed anxiety depressive disorder    Genital herpes simplex    Hyperlipidemia    Hypertension    Hypothyroidism    Insomnia    Panic disorder without agoraphobia    Persistent insomnia    Vitamin D deficiency    Chronic low back pain    Neuropathy involving both lower extremities    QT prolongation    Left shoulder pain    Nausea and vomiting    Hyponatremia    Pancytopenia    Left ventricular diastolic dysfunction    Tremor    C5 cervical fracture    Syncope and collapse    Neck pain    Alcoholic intoxication with complication    Withdrawal symptoms, alcohol    Transaminitis    Lumbar facet arthropathy    Alcohol dependence    Midline low back pain with right-sided sciatica    Spondylolisthesis at L4-L5 level    Lumbar canal stenosis    Alcohol cessation counseling    Need for assistance due to reduced mobility    Impaired mobility and ADLs    Alcohol withdrawal syndrome without complication    Facial laceration    Orthostatic hypotension    Acute bacterial conjunctivitis of right eye    Visit for suture removal    Renal calculi    Hepatic steatosis    Alcohol withdrawal syndrome with complication    Macrocytosis without anemia    Lumbosacral spondylosis without myelopathy    Elevated LFTs    Alcohol-induced acute pancreatitis, unspecified complication status    Pancreatitis    Generalized abdominal pain    Alcohol withdrawal    Metabolic acidosis    Acute alcohol intoxication in patient  with alcoholism with blood alcohol level 0.08 to 0.29, with unspecified complication    Calculus of gallbladder with cholecystitis without biliary obstruction    Hypothermia     Past Medical History:   Diagnosis Date    Alcohol abuse     Alcohol withdrawal 11/04/2016    Alcoholic ketoacidosis 01/12/2020    Allergic 1970    Anxiety     Arthritis     Atopic rhinitis 08/08/2016    Depression     Disease of thyroid gland     Elevated cholesterol     Encounter for removal of sutures     Genital herpes simplex 08/08/2016    GERD (gastroesophageal reflux disease)     Headache, tension-type     Hyperlipidemia     Hypertension     Hypothyroidism     Kidney stone     Low back pain 2019    Migraine     Motion sickness     Nephrolithiasis 02/12/2020    Olecranon bursitis, right elbow     Panic disorder without agoraphobia 08/08/2016    Peripheral neuropathy     Sleep apnea     Syncope and collapse 01/02/2019    Vitamin D deficiency 08/08/2016    Withdrawal symptoms, alcohol      Past Surgical History:   Procedure Laterality Date    CHOLECYSTECTOMY N/A 9/22/2024    Procedure: CHOLECYSTECTOMY LAPAROSCOPIC WITH DAVINCI ROBOT with cholangiogram, possible open;  Surgeon: Toro Noguera MD;  Location: [facility] MAIN OR;  Service: General;  Laterality: N/A;    COLONOSCOPY      CYST REMOVAL      CYSTOSCOPY BLADDER STONE LITHOTRIPSY  02/2022    ERCP N/A 9/23/2024    Procedure: ENDOSCOPIC RETROGRADE CHOLANGIOPANCREATOGRAPHY sphincterotomy, balloon sweep (9-12);  Surgeon: Fara Madrigal MD;  Location: [facility] ENDOSCOPY;  Service: Gastroenterology;  Laterality: N/A;  sphincterotomy hiatial hernia, gallstone removal    EXTRACORPOREAL SHOCK WAVE LITHOTRIPSY (ESWL) Right 2002    SHOULDER ARTHROSCOPY Right 12/17/2019    Procedure: SHOULDER ARTHROSCOPY, decompression, distal clavicle excision;  Surgeon: Aj Mancilla MD;  Location: [facility] OR [facility];  Service: Orthopedics    TONSILLECTOMY        General Information       Row Name 12/09/24 5744           OT Time and Intention    Subjective Information no complaints  -KG     Document Type therapy note (daily note)  -KG     Mode of Treatment co-treatment;physical therapy;occupational therapy  cotreat appropriate due to pt acuity level, limited activity tolerance, and requiring 2 person assist for mobility  -KG     Patient Effort good  -KG       Row Name 12/09/24 1153          General Information    Patient Profile Reviewed yes  -KG     Existing Precautions/Restrictions fall  -KG     Barriers to Rehab none identified  -KG       Row Name 12/09/24 1153          Safety Issues/Impairments Affecting Functional Mobility    Impairments Affecting Function (Mobility) balance;endurance/activity tolerance;pain;strength  -KG               User Key  (r) = Recorded By, (t) = Taken By, (c) = Cosigned By      Initials Name Provider Type    KG Biju Claudio OT Occupational Therapist                     Mobility/ADL's       Row Name 12/09/24 1155          Bed Mobility    Bed Mobility supine-sit  -KG     Supine-Sit [place] (Bed Mobility) standby assist  -KG       Row Name 12/09/24 1155          Transfers    Transfers bed-chair transfer;sit-stand transfer;stand-sit transfer  -KG       Row Name 12/09/24 1155          Bed-Chair Transfer    Bed-Chair [place] (Transfers) contact guard;minimum assist (75% patient effort);2 person assist  -KG     Assistive Device (Bed-Chair Transfers) walker, front-wheeled  -KG       Row Name 12/09/24 1155          Sit-Stand Transfer    Sit-Stand [place] (Transfers) contact guard;minimum assist (75% patient effort);2 person assist  -KG     Assistive Device (Sit-Stand Transfers) walker, front-wheeled  -KG       Row Name 12/09/24 1155          Stand-Sit Transfer    Stand-Sit [place] (Transfers) contact guard;minimum assist (75% patient effort);2 person assist  -KG     Assistive Device (Stand-Sit Transfers) walker, front-wheeled  -KG       Row Name 12/09/24 1155          Functional  Mobility    Functional Mobility- Ind. Level contact guard assist;minimum assist (75% patient effort);2 person assist required  took ~ 8 steps away from recliner w/ chair follow  -KG     Functional Mobility- Device walker, front-wheeled  -KG       Row Name 12/09/24 1155          Activities of Daily Living    BADL Assessment/Intervention grooming  -KG       Row Name 12/09/24 1155          Grooming Assessment/Training    [place] Level (Grooming) oral care regimen;wash face, hands  -KG     Position (Grooming) supported sitting  -KG               User Key  (r) = Recorded By, (t) = Taken By, (c) = Cosigned By      Initials Name Provider Type    Biju Talbot OT Occupational Therapist                   Obj/Interventions       Row Name 12/09/24 1156          Motor Skills    Motor Skills functional endurance  -KG     Functional Endurance mild<>moderate endurance deficits w/ minimal activity  -KG       Row Name 12/09/24 1156          Balance    Balance Assessment sitting static balance;sitting dynamic balance;sit to stand dynamic balance;standing static balance;standing dynamic balance  -KG     Static Sitting Balance standby assist  -KG     Dynamic Sitting Balance standby assist  -KG     Position, Sitting Balance sitting edge of bed  -KG     Sit to Stand Dynamic Balance contact guard;minimal assist;2-person assist  -KG     Static Standing Balance contact guard;minimal assist  -KG     Dynamic Standing Balance contact guard;minimal assist  -KG     Position/Device Used, Standing Balance walker, front-wheeled  -KG     Balance Interventions sitting;standing;sit to stand;supported;static;dynamic;occupation based/functional task  -KG               User Key  (r) = Recorded By, (t) = Taken By, (c) = Cosigned By      Initials Name Provider Type    Biju Talbot OT Occupational Therapist                   Goals/Plan    No documentation.                  Clinical Impression       Row Name 12/09/24 1157          Pain  Assessment    Pretreatment Pain Rating 0/10 - no pain  -KG     Posttreatment Pain Rating 0/10 - no pain  -KG     Pre/Posttreatment Pain Comment c/o stiffness from laying in bed  -KG       Row Name 12/09/24 1157          Plan of Care Review    Plan of Care Reviewed With patient  -KG     Outcome Evaluation Pt seen for OT/PT cotreat to maxmize safety and therapeutic benefit. Peroformed bed mobility w/ SBA. Needing CGA<>Min A x2 + RW for OOBTC, and then for additional STS and functional mobility w/ chair follow. Required 1 seated rest break prior to mobility. Able to complete UB grooming/hygiene w/ setup from chair level. Pt showing improvements from previous session, however remains limited by diminished strength/activity tolerance. OT will continue to follow to address stated deficits.  -KG       Row Name 12/09/24 1157          Therapy Plan Review/Discharge Plan (OT)    Anticipated Discharge Disposition (OT) skilled nursing facility  -KG       Row Name 12/09/24 1200 12/09/24 1157       Vital Signs    Intra Systolic BP Rehab 100  -KG --    Intra Treatment Diastolic BP 63  -KG --    Intratreatment Resp Rate (breaths/min) 86  -KG --    Intra SpO2 (%) 97  -KG --    O2 Delivery Intra Treatment room air  -KG --    Pre Patient Position -- Supine  -KG    Intra Patient Position -- Standing  -KG    Post Patient Position -- Sitting  -KG      Row Name 12/09/24 1157          Positioning and Restraints    Pre-Treatment Position in bed  -KG     Post Treatment Position chair  -KG     In Chair notified nsg;reclined;call light within reach;encouraged to call for assist;exit alarm on  -KG               User Key  (r) = Recorded By, (t) = Taken By, (c) = Cosigned By      Initials Name Provider Type    KG Biju Claudio OT Occupational Therapist                   Outcome Measures    No documentation.                   Occupational Therapy Education       Title: PT OT SLP Therapies (Done)       Topic: Occupational Therapy (Done)        Point: ADL training (Done)       Description:   Instruct learner(s) on proper safety adaptation and remediation techniques during self care or transfers.   Instruct in proper use of assistive devices.                  Learning Progress Summary            Patient Acceptance, E, VU by  at 12/2/2024 0926                      Point: Home exercise program (Done)       Description:   Instruct learner(s) on appropriate technique for monitoring, assisting and/or progressing therapeutic exercises/activities.                  Learning Progress Summary            Patient Acceptance, E, VU by SUSANA at 12/2/2024 0926                      Point: Precautions (Done)       Description:   Instruct learner(s) on prescribed precautions during self-care and functional transfers.                  Learning Progress Summary            Patient Acceptance, E, VU by SUSANA at 12/2/2024 0926                      Point: Body mechanics (Done)       Description:   Instruct learner(s) on proper positioning and spine alignment during self-care, functional mobility activities and/or exercises.                  Learning Progress Summary            Patient Acceptance, E, VU by  at 12/2/2024 0926                                      User Key       Initials Effective Dates Name Provider Type Discipline     02/26/24 -  Murphy Sherman RN Registered Nurse Nurse                  OT Recommendation and Plan     Plan of Care Review  Plan of Care Reviewed With: patient  Outcome Evaluation: Pt seen for OT/PT cotreat to maxmize safety and therapeutic benefit. Peroformed bed mobility w/ SBA. Needing CGA<>Min A x2 + RW for OOBTC, and then for additional STS and functional mobility w/ chair follow. Required 1 seated rest break prior to mobility. Able to complete UB grooming/hygiene w/ setup from chair level. Pt showing improvements from previous session, however remains limited by diminished strength/activity tolerance. OT will continue to follow to address  stated deficits.     Time Calculation:         Time Calculation- OT       Row Name 12/09/24 1201             Time Calculation- OT    OT Start Time 0905  -KG      OT Stop Time 0921  -KG      OT Time Calculation (min) 16 min  -KG      Total Timed Code Minutes- OT 16 minute(s)  -KG      OT Received On 12/09/24  -KG      OT - Next Appointment 12/10/24  -KG         Timed Charges    36560 - OT Self Care/Mgmt Minutes 16  -KG         Total Minutes    Timed Charges Total Minutes 16  -KG       Total Minutes 16  -KG                User Key  (r) = Recorded By, (t) = Taken By, (c) = Cosigned By      Initials Name Provider Type    KG Biju Claudio OT Occupational Therapist                  Therapy Charges for Today       Code Description Service Date Service Provider Modifiers Qty    [id number] HC OT SELF CARE/MGMT/TRAIN EA 15 MIN 12/9/2024 Biju Claudio OT GO 1                 Biju Claudio OT  12/9/2024

## 2024-12-10 ENCOUNTER — APPOINTMENT (OUTPATIENT)
Dept: CARDIOLOGY | Facility: HOSPITAL | Age: 69
End: 2024-12-10
Payer: MEDICARE

## 2024-12-10 VITALS
RESPIRATION RATE: 18 BRPM | TEMPERATURE: 98 F | DIASTOLIC BLOOD PRESSURE: 72 MMHG | WEIGHT: 182 LBS | HEART RATE: 82 BPM | HEIGHT: 72 IN | BODY MASS INDEX: 24.65 KG/M2 | SYSTOLIC BLOOD PRESSURE: 122 MMHG | OXYGEN SATURATION: 99 %

## 2024-12-10 PROBLEM — E83.42 HYPOMAGNESEMIA: Status: RESOLVED | Noted: 2021-01-16 | Resolved: 2024-12-10

## 2024-12-10 PROBLEM — E87.20 METABOLIC ACIDOSIS: Status: RESOLVED | Noted: 2024-09-20 | Resolved: 2024-12-10

## 2024-12-10 PROBLEM — R53.1 WEAKNESS: Status: ACTIVE | Noted: 2024-12-10

## 2024-12-10 PROBLEM — I49.3 PVC'S (PREMATURE VENTRICULAR CONTRACTIONS): Status: ACTIVE | Noted: 2024-12-10

## 2024-12-10 PROBLEM — E87.6 HYPOKALEMIA: Status: RESOLVED | Noted: 2020-01-13 | Resolved: 2024-12-10

## 2024-12-10 PROBLEM — K70.9 ALCOHOLIC LIVER DISEASE: Status: ACTIVE | Noted: 2024-12-10

## 2024-12-10 PROBLEM — T68.XXXA HYPOTHERMIA: Status: RESOLVED | Noted: 2024-12-02 | Resolved: 2024-12-10

## 2024-12-10 PROBLEM — E87.1 HYPONATREMIA: Status: RESOLVED | Noted: 2020-01-13 | Resolved: 2024-12-10

## 2024-12-10 PROBLEM — D52.9 FOLATE DEFICIENCY ANEMIA: Status: ACTIVE | Noted: 2024-12-10

## 2024-12-10 PROBLEM — D50.9 IRON DEFICIENCY ANEMIA: Status: ACTIVE | Noted: 2024-12-10

## 2024-12-10 LAB
ALBUMIN SERPL-MCNC: 2.7 G/DL (ref 3.5–5.2)
ALBUMIN/GLOB SERPL: 0.9 G/DL
ALP SERPL-CCNC: 86 U/L (ref 39–117)
ALT SERPL W P-5'-P-CCNC: 13 U/L (ref 1–41)
ANION GAP SERPL CALCULATED.3IONS-SCNC: 10 MMOL/L (ref 5–15)
AST SERPL-CCNC: 45 U/L (ref 1–40)
BASOPHILS # BLD AUTO: 0.05 10*3/MM3 (ref 0–0.2)
BASOPHILS NFR BLD AUTO: 1.1 % (ref 0–1.5)
BILIRUB SERPL-MCNC: 0.3 MG/DL (ref 0–1.2)
BUN SERPL-MCNC: 5 MG/DL (ref 8–23)
BUN/CREAT SERPL: 8.8 (ref 7–25)
CALCIUM SPEC-SCNC: 8.3 MG/DL (ref 8.6–10.5)
CHLORIDE SERPL-SCNC: 105 MMOL/L (ref 98–107)
CO2 SERPL-SCNC: 24 MMOL/L (ref 22–29)
CREAT SERPL-MCNC: 0.57 MG/DL (ref 0.76–1.27)
DEPRECATED RDW RBC AUTO: 58 FL (ref 37–54)
EGFRCR SERPLBLD CKD-EPI 2021: 107.5 ML/MIN/1.73
EOSINOPHIL # BLD AUTO: 0.1 10*3/MM3 (ref 0–0.4)
EOSINOPHIL NFR BLD AUTO: 2.1 % (ref 0.3–6.2)
ERYTHROCYTE [DISTWIDTH] IN BLOOD BY AUTOMATED COUNT: 16.6 % (ref 12.3–15.4)
GLOBULIN UR ELPH-MCNC: 3 GM/DL
GLUCOSE SERPL-MCNC: 111 MG/DL (ref 65–99)
HCT VFR BLD AUTO: 31.6 % (ref 37.5–51)
HGB BLD-MCNC: 10.7 G/DL (ref 13–17.7)
IMM GRANULOCYTES # BLD AUTO: 0.01 10*3/MM3 (ref 0–0.05)
IMM GRANULOCYTES NFR BLD AUTO: 0.2 % (ref 0–0.5)
LYMPHOCYTES # BLD AUTO: 1.38 10*3/MM3 (ref 0.7–3.1)
LYMPHOCYTES NFR BLD AUTO: 29.1 % (ref 19.6–45.3)
MAGNESIUM SERPL-MCNC: 1.6 MG/DL (ref 1.6–2.4)
MCH RBC QN AUTO: 33 PG (ref 26.6–33)
MCHC RBC AUTO-ENTMCNC: 33.9 G/DL (ref 31.5–35.7)
MCV RBC AUTO: 97.5 FL (ref 79–97)
MONOCYTES # BLD AUTO: 0.59 10*3/MM3 (ref 0.1–0.9)
MONOCYTES NFR BLD AUTO: 12.4 % (ref 5–12)
NEUTROPHILS NFR BLD AUTO: 2.61 10*3/MM3 (ref 1.7–7)
NEUTROPHILS NFR BLD AUTO: 55.1 % (ref 42.7–76)
NRBC BLD AUTO-RTO: 0 /100 WBC (ref 0–0.2)
PLATELET # BLD AUTO: 308 10*3/MM3 (ref 140–450)
PMV BLD AUTO: 9.8 FL (ref 6–12)
POTASSIUM SERPL-SCNC: 3.8 MMOL/L (ref 3.5–5.2)
PROT SERPL-MCNC: 5.7 G/DL (ref 6–8.5)
RBC # BLD AUTO: 3.24 10*6/MM3 (ref 4.14–5.8)
SODIUM SERPL-SCNC: 139 MMOL/L (ref 136–145)
WBC NRBC COR # BLD AUTO: 4.74 10*3/MM3 (ref 3.4–10.8)

## 2024-12-10 PROCEDURE — 80053 COMPREHEN METABOLIC PANEL: CPT | Performed by: INTERNAL MEDICINE

## 2024-12-10 PROCEDURE — 83735 ASSAY OF MAGNESIUM: CPT | Performed by: NURSE PRACTITIONER

## 2024-12-10 PROCEDURE — 85025 COMPLETE CBC W/AUTO DIFF WBC: CPT | Performed by: INTERNAL MEDICINE

## 2024-12-10 PROCEDURE — 93246 EXT ECG>7D<15D RECORDING: CPT

## 2024-12-10 RX ORDER — ACETAMINOPHEN 325 MG/1
650 TABLET ORAL EVERY 6 HOURS PRN
Qty: 120 TABLET | Refills: 3 | Status: SHIPPED | OUTPATIENT
Start: 2024-12-10

## 2024-12-10 RX ORDER — LANOLIN ALCOHOL/MO/W.PET/CERES
100 CREAM (GRAM) TOPICAL DAILY
Qty: 30 TABLET | Refills: 3 | Status: SHIPPED | OUTPATIENT
Start: 2024-12-10

## 2024-12-10 RX ORDER — HYDROCODONE BITARTRATE AND ACETAMINOPHEN 7.5; 325 MG/1; MG/1
1 TABLET ORAL EVERY 4 HOURS PRN
Qty: 12 TABLET | Refills: 0 | Status: SHIPPED | OUTPATIENT
Start: 2024-12-10

## 2024-12-10 RX ORDER — TAMSULOSIN HYDROCHLORIDE 0.4 MG/1
0.4 CAPSULE ORAL NIGHTLY
Qty: 30 CAPSULE | Refills: 3 | Status: SHIPPED | OUTPATIENT
Start: 2024-12-10

## 2024-12-10 RX ORDER — AMOXICILLIN 250 MG
2 CAPSULE ORAL 2 TIMES DAILY PRN
Qty: 120 TABLET | Refills: 3 | Status: SHIPPED | OUTPATIENT
Start: 2024-12-10

## 2024-12-10 RX ORDER — FOLIC ACID 1 MG/1
1 TABLET ORAL DAILY
Qty: 30 TABLET | Refills: 3 | Status: SHIPPED | OUTPATIENT
Start: 2024-12-10

## 2024-12-10 RX ORDER — FERROUS SULFATE 325(65) MG
325 TABLET ORAL
Qty: 30 TABLET | Refills: 3 | Status: SHIPPED | OUTPATIENT
Start: 2024-12-10

## 2024-12-10 RX ADMIN — Medication 10 ML: at 08:15

## 2024-12-10 RX ADMIN — THIAMINE HCL TAB 100 MG 100 MG: 100 TAB at 08:13

## 2024-12-10 RX ADMIN — FERROUS SULFATE TAB 325 MG (65 MG ELEMENTAL FE) 325 MG: 325 (65 FE) TAB at 08:13

## 2024-12-10 RX ADMIN — FOLIC ACID 1 MG: 1 TABLET ORAL at 08:13

## 2024-12-10 RX ADMIN — LEVOTHYROXINE SODIUM 100 MCG: 100 TABLET ORAL at 08:13

## 2024-12-10 RX ADMIN — NYSTATIN 1 APPLICATION: 100000 OINTMENT TOPICAL at 08:15

## 2024-12-10 NOTE — PROGRESS NOTES
[facility] follow up d/t EtOH; this writer reviewed chart and spoke with MARTI Hampton. Per RN, patient did well overnight, likely discharged today; he slept on and off overnight..  CIWA scoring discontinued; patient has [facility] and other behavior health resources- per EMR, he is not sure if he wants to stop drinking.  Discharge to [facility] today at 11 AM; CCP involved and providing assistance. No further needs/concerns noted at this time per RN and/or medical team; [facility] to continue following.

## 2024-12-10 NOTE — DISCHARGE SUMMARY
Patient Name: Pro Mueller  : 1955  MRN: [id number]    Date of Admission: 2024  Date of Discharge:  12/10/2024  Primary Care Physician: Provider, No Known      Discharge Diagnoses     Active Hospital Problems    Diagnosis  POA    Alcoholic liver disease [K70.9]  Yes    Iron deficiency anemia [D50.9]  Yes    Folate deficiency anemia [D52.9]  Yes    PVC's (premature ventricular contractions) [I49.3]  Yes    Weakness [R53.1]  Yes    Elevated LFTs [R79.89]  Yes    Alcohol dependence [F10.20]  Yes    Abnormal EKG [R94.31]  Yes    Chronic low back pain [M54.50, G89.29]  Yes    Hypothyroidism [E03.9]  Yes      Resolved Hospital Problems    Diagnosis Date Resolved POA    **Hypothermia [T68.XXXA] 12/10/2024 Yes    Metabolic acidosis [E87.20] 12/10/2024 Yes    Hypomagnesemia [E83.42] 12/10/2024 Yes    Hyponatremia [E87.1] 12/10/2024 Yes    Hypokalemia [E87.6] 12/10/2024 Yes        Hospital Course     Brief admission history and physical.  Please refer to the H&P for full details.  A pleasant 69 years old gentleman with a past history of alcohol abuse/alcoholic pancreatitis/hypothyroidism/hypertension/chronic low back pain who presented to the emergency department after he was found down in his apartment covered with feces no other significant history could be obtained except an malfunctioning heat furnace in the apartment.  In the emergency department he was found lethargic and hypothermic.  His physical examination on admission included an afebrile patient with stable vital signs/chronically ill-appearing with decreased level of consciousness.  Otherwise negative  Hospital course.  Initial ER evaluation included a lipase that was elevated at 425.  CBC was normal except a white count of 14.3.  CK was normal.  CMP normal except a BUN of 35, sodium 128, chloride 85, CO2 of 19, ALT 42, , alkaline phosphatase 119, anion gap 24.  Ethanol level was negative.  TSH was elevated at 14 with normal T4.  CT scan  of the lumbar spine without contrast revealed no acute abnormalities with positive degenerative changes.  CT scan of the C-spine revealed no acute traumatic abnormalities.  CT scan of the brain without contrast revealed no acute intracranial abnormalities with scalp hematoma.  Hospital course will be summarized in a problem oriented manner as below.  1.  Hypothermia.  This was secondary to malfunctioning of the patient furnace at home.  He was treated with warming blanket and this has resolved.  There was no evidence of sepsis.  2.  History of alcohol abuse and alcoholic liver disease.  He was placed on CIWA protocol and detoxification.  There was no evidence of withdrawal throughout the hospital stay and CIWA was DC'd.  Will continue detoxification with thiamine/folate/multivitamin.  Access so the patient during this admission.  His liver function test has improved but remains mildly elevated.  3.  Folate and iron deficiency anemia.  He was noted to be anemic throughout the hospital stay.  Anemia workup revealed folate and iron deficiency.  Hemoglobin remained stable.  Iron and folate were instituted.  He will be referred to GI as an outpatient for GI workup.  4.  Hypokalemia/hypomagnesemia/hyponatremia/metabolic acidosis.  This was thought to be secondary to dehydration and hypovolemia and all has resolved with IV fluid and substitution.  5.  Abnormal EKG and PVCs/history of hypertension..  Patient was noticed to have an abnormal EKG and frequent PVCs throughout the hospital stay.  Cardiology consult was obtained.  2D echo was done and was normal.  Cardiology recommended Holter at discharge no further workup.  Holter was placed at discharge and he will follow-up with cardiology as an outpatient.  Blood pressure was on the low side during this admission and his oral antihypertensive were DC'd.  He remained without angina or congestive heart failure with good blood pressure control by the time of discharge.  6.   Weakness/fatigue/found down.  This was thought to be multifactorial secondary to his alcohol use/hypothymia/electrolyte imbalance/hypovolemia and dehydration/hypothyroidism/deconditioning.  His weakness and fatigue has improved after physical therapy and correction of the above factors but persisted.  Physical therapy recommended SNF  7.  Hypothyroidism.  TSH was elevated.  There was a question allegra about compliance.  Synthroid was resumed and TSH has improved during this admission this needs to be followed up as an outpatient.    At the time of discharge patient was hemodynamically stable and alert and oriented x 4.  He is being discharged to SNF for the purpose of physical therapy.      Consultants     Consult Orders (all) (From admission, onward)       Start     Ordered    12/07/24 1738  Inpatient Access Center Consult  Once        Provider:  (Not yet assigned)    12/07/24 1737    12/02/24 1128  Inpatient Cardiology Consult  Once        Specialty:  Cardiology  Provider:  Saul Abebe MD    12/02/24 1129    12/02/24 0927  Inpatient Nutrition Consult  Once        Provider:  (Not yet assigned)    12/02/24 0926    12/02/24 0919  Inpatient Case Management  Consult  Once        Provider:  (Not yet assigned)    12/02/24 0918    12/02/24 0912  Inpatient Consult to Advance Care Planning  Once        Provider:  (Not yet assigned)    12/02/24 0911    12/02/24 0352  [facility] (on-call MD unless specified) Details  Once        Specialty:  Hospitalist  Provider:  (Not yet assigned)    12/02/24 0351                  Procedures     Imaging Results (All)       Procedure Component Value Units Date/Time    CT Lumbar Spine Without Contrast [[id number]] Collected: 12/02/24 0404     Updated: 12/02/24 0404    Narrative:        Patient: HERB LUNA  Time Out: 04:03  Exam(s): CT L SPINE     EXAM:    CT Lumbar Spine Without Intravenous Contrast    CLINICAL HISTORY:     Reason for exam: fall, low back pain.    TECHNIQUE:     Axial computed tomography images of the lumbar spine without   intravenous contrast with coronal and sagittal reformats.  CTDI is 31.84   mGy and DLP is 945.6 mGy-cm.  This CT exam was performed according to the   principle of ALARA (As Low As Reasonably Achievable) by using one or more   of the following dose reduction techniques: automated exposure control,   adjustment of the mA and or kV according to patient size, and or use of   iterative reconstruction technique.    COMPARISON:    No relevant prior studies available.    FINDINGS:    Vertebrae:  Lumbar vertebrae intact.  Degenerative facet findings.    Discs spinal canal neural foramina:  Noncritical canal and foraminal   stenosis.  Degenerative disc findings.    Vasculature:  Moderate arterial nonflow limiting calcifications.    Stomach and bowel:  Colonic diverticula.    IMPRESSION:       1.  No acute lumbar spine abnormality.  2.  See additional findings above.      Impression:          Electronically signed by Bob Frias MD on 12-02-24 at 0403    CT Cervical Spine Without Contrast [[id number]] Collected: 12/02/24 0403     Updated: 12/02/24 0403    Narrative:        Patient: HERB LUNA  Time Out: 04:02  Exam(s): CT C SPINE     EXAM:    CT Cervical Spine Without Intravenous Contrast    CLINICAL HISTORY:     Reason for exam: fall, etoh.    TECHNIQUE:    Axial computed tomography images of the cervical spine without   intravenous contrastwith coronal and sagittal reformats.  CTDI is 19.21   mGy and DLP is 421 mGy-cm.  This CT exam was performed according to the   principle of ALARA (As Low As Reasonably Achievable) by using one or more   of the following dose reduction techniques: automated exposure control,   adjustment of the mA and or kV according to patient size, and or use of   iterative reconstruction technique.    COMPARISON:    No relevant prior studies available.    FINDINGS:    Vertebrae:  Cervical vertebrae intact.  Degenerative facet  findings.    Discs spinal canal neural foramina:  Noncritical canal and foraminal   stenosis.  Degenerative disc findings.    Vasculature:  Carotid calcifications.    IMPRESSION:       1.  No acute cervical spine abnormality.  2.  See additional findings above.      Impression:          Electronically signed by Bob Frias MD on 12-02-24 at 0402    CT Head Without Contrast [[id number]] Collected: 12/02/24 0359     Updated: 12/02/24 0359    Narrative:        Patient: HERB LUNA  Time Out: 03:58  Exam(s): CT HEAD Without Contrast     EXAM:    CT Head Without Intravenous Contrast    CLINICAL HISTORY:     Reason for exam: fall, etoh.    TECHNIQUE:    Axial computed tomography images of the head brain without intravenous   contrast.  CTDI is 54.92 mGy and DLP is 961.2 mGy-cm.  This CT exam was   performed according to the principle of ALARA (As Low As Reasonably   Achievable) by using one or more of the following dose reduction   techniques: automated exposure control, adjustment of the mA and or kV   according to patient size, and or use of iterative reconstruction   technique.    COMPARISON:    No relevant prior studies available.    FINDINGS:    Brain:  White matter nonspecific changes statistically likely due to   chronic microvascular ischemia.  Parenchymal structures otherwise   unremarkable.  No acute intracranial hemorrhage or mass effect.    Ventricles:  No hydrocephalus.    Bones joints:  No acute abnormality.    Soft tissues:  Scalp hematoma.    Sinuses:  Layering sphenoid sinus fluid.  Scattered ethmoidal air cell   opacification.    Mastoid air cells:  Unremarkable.    IMPRESSION:       1.  No acute intracranial abnormality.  2.  Scalp hematoma.  3.  See additional findings above.  4.  Paranasal sinus findings above.      Impression:          Electronically signed by Bob Frias MD on 12-02-24 at 0358            Pertinent Labs     Results from last 7 days   Lab Units 12/10/24  0506 12/09/24  0539  "12/08/24 0446 12/07/24 0426   WBC 10*3/mm3 4.74 6.63 8.33 7.01   HEMOGLOBIN g/dL 10.7* 12.2* 11.1* 10.9*   PLATELETS 10*3/mm3 308 267 196 158     Results from last 7 days   Lab Units 12/10/24  0506 12/09/24  0539 12/08/24 0446 12/07/24 0426   SODIUM mmol/L 139 133* 133* 133*   POTASSIUM mmol/L 3.8 4.5 4.2 4.2   CHLORIDE mmol/L 105 99 101 100   CO2 mmol/L 24.0 21.6* 23.0 23.3   BUN mg/dL 5* 8 6* 8   CREATININE mg/dL 0.57* 0.75* 0.63* 0.76   GLUCOSE mg/dL 111* 99 96 100*   Estimated Creatinine Clearance: 146.9 mL/min (A) (by C-G formula based on SCr of 0.57 mg/dL (L)).  Results from last 7 days   Lab Units 12/10/24  0506 12/09/24 0539 12/08/24 0446 12/07/24 0426   ALBUMIN g/dL 2.7* 3.1* 2.8* 3.0*   BILIRUBIN mg/dL 0.3 0.4 0.3 0.4   ALK PHOS U/L 86 107 90 87   AST (SGOT) U/L 45* 47* 45* 48*   ALT (SGPT) U/L 13 16 15 17     Results from last 7 days   Lab Units 12/10/24  0506 12/09/24  0539 12/08/24 0446 12/07/24  0426   CALCIUM mg/dL 8.3* 8.7 8.0* 8.0*   ALBUMIN g/dL 2.7* 3.1* 2.8* 3.0*   MAGNESIUM mg/dL 1.6 1.8 1.8 1.5*       Results from last 7 days   Lab Units 12/09/24 0539   CK TOTAL U/L 40           Invalid input(s): \"LDLCALC\"      Imaging Results (Last 24 Hours)       ** No results found for the last 24 hours. **            Test Results Pending at Discharge         Discharge Exam   Physical Exam  Vitals.  Temperature 98 a pulse of 82 respiratory rate of 18 blood pressure 122/72 and O2 sats of 99% on room air  General.  Middle-aged gentleman.  Alert and oriented x 4.  No apparent pain/distress/diaphoresis.  Normal mood and affect.  Eyes.  Pupils equal round and reactive.  Intact extraocular musculature.  No pallor or jaundice  Oral cavity.  Moist mucous membrane.  Neck.  Supple.  No JVD.  No lymphadenopathy or thyromegaly.  Cardiovascular.  Regular rate and rhythm with grade 2 systolic murmur   chest.  Clear to auscultation bilaterally with no added sounds  Abdomen.  Soft lax.  No tenderness.  No " organomegaly.  No guarding or rebound  Extremities.  No clubbing/cyanosis/edema.  CNS.  No acute focal neurological deficits.  Discharge Details        Discharge Medications        New Medications        Instructions Start Date   acetaminophen 325 MG tablet  Commonly known as: TYLENOL   650 mg, Oral, Every 6 Hours PRN      ferrous sulfate 325 (65 FE) MG tablet   325 mg, Oral, Daily With Breakfast      folic acid 1 MG tablet  Commonly known as: FOLVITE   1 mg, Oral, Daily      naloxone 4 MG/0.1ML nasal spray  Commonly known as: NARCAN   Call 911. Don't prime. [place] in 1 nostril for overdose. Repeat in 2-3 minutes in other nostril if no or minimal breathing/responsiveness.      sennosides-docusate 8.6-50 MG per tablet  Commonly known as: PERICOLACE   2 tablets, Oral, 2 Times Daily PRN      tamsulosin 0.4 MG capsule 24 hr capsule  Commonly known as: FLOMAX   0.4 mg, Oral, Nightly      thiamine 100 MG tablet  Commonly known as: VITAMIN B1   100 mg, Oral, Daily             Continue These Medications        Instructions Start Date   HYDROcodone-acetaminophen 7.5-325 MG per tablet  Commonly known as: NORCO   1 tablet, Oral, Every 4 Hours PRN      levothyroxine 100 MCG tablet  Commonly known as: SYNTHROID, LEVOTHROID   100 mcg, Oral, Daily             Stop These Medications      amLODIPine 5 MG tablet  Commonly known as: NORVASC     fluticasone 50 MCG/ACT nasal spray  Commonly known as: FLONASE     lisinopril 10 MG tablet  Commonly known as: PRINIVIL,ZESTRIL              Allergies   Allergen Reactions    Penicillins Anaphylaxis and Seizure     Seizure. childhood         Discharge Disposition:  Condition: Stable    Diet:   Diet Order   Procedures    Diet: Regular/House; Fluid Consistency: Thin (IDDSI 0)       Activity:   Activity Instructions       Activity as Tolerated      Other Activity Instructions      Activity Instructions: PT and OT to evaluate and treat    Up WIth Assist              Counseling : Abstain from  alcohol    CODE STATUS:    Code Status and Medical Interventions: CPR (Attempt to Resuscitate); Full Support   Ordered at: 12/02/24 0518     Code Status (Patient has no pulse and is not breathing):    CPR (Attempt to Resuscitate)     Medical Interventions (Patient has pulse or is breathing):    Full Support       No future appointments.  Additional Instructions for the Follow-ups that You Need to Schedule       Call MD With Problems / Concerns   As directed      Instructions: Call MD or return to ER if increasing weakness/chest pain/shortness of breath/palpitation/abdominal pain/nausea or vomiting/dysuria or hematuria/fever or chills    Order Comments: Instructions: Call MD or return to ER if increasing weakness/chest pain/shortness of breath/palpitation/abdominal pain/nausea or vomiting/dysuria or hematuria/fever or chills         Discharge Follow-up with PCP   As directed       Currently Documented PCP:    Provider, No Known    PCP Phone Number:    [phone]     Follow Up Details: 1 week.  Alcohol use/alcoholic liver disease/folate and iron deficiency anemia/hypothyroidism/weakness/electrolyte imbalance/abnormal EKG and PVCs        Discharge Follow-up with Specified Provider: Cardiology; 2 Weeks   As directed      To: Cardiology   Follow Up: 2 Weeks   Follow Up Details: Abnormal EKG and frequent PVCs.  Holter in place        Discharge Follow-up with Specified Provider: GI; 2 Weeks   As directed      To: GI   Follow Up: 2 Weeks   Follow Up Details: Iron deficiency anemia               Contact information for follow-up providers       Provider, No Known .    Why: 1 week.  Alcohol use/alcoholic liver disease/folate and iron deficiency anemia/hypothyroidism/weakness/electrolyte imbalance/abnormal EKG and PVCs  Contact information:  [facility] [address]  [phone]                       Contact information for after-discharge care       Destination       [facility] .     Service: Skilled Nursing  Contact information:  [address] [facility] [phone]                                     Time Spent on Discharge:  Greater than 30 minutes      Estrellita Bah MD  [facility]  12/10/24  08:00 EST

## 2024-12-10 NOTE — CASE MANAGEMENT/SOCIAL WORK
Case Management Discharge Note      Final Note: Pt discharged to [facility] for skilled care.  BEBE De La Torre RN    Provided Post Acute Provider List?: Yes  Post Acute Provider List: Nursing Home  Delivered To: Patient    Selected Continued Care - Admitted Since 12/2/2024       Destination Coordination complete.      Service Provider Services Address Phone Fax Patient Preferred    [facility] [address] [facility] [phone] --              Durable Medical Equipment    No services have been selected for the patient.                Dialysis/Infusion    No services have been selected for the patient.                Home Medical Care    No services have been selected for the patient.                Therapy    No services have been selected for the patient.                Community Resources    No services have been selected for the patient.                Community & DME    No services have been selected for the patient.                    Selected Continued Care - Prior Encounters Includes continued care and service providers with selected services from prior encounters from 9/3/2024 to 12/10/2024      Discharged on 9/25/2024 Admission date: 9/20/2024 - [place] [facility] [place]       Service Provider Services Address Phone Fax Patient Preferred    [facility] - [facility], Home Medical , Home Nursing, Home Rehabilitation, Home Living Aide Services [address] [id number] [phone] --                          Transportation Services  Private: Car    Final Discharge Disposition Code: 03 - skilled nursing facility (SNF)

## 2024-12-10 NOTE — PLAN OF CARE
Goal Outcome Evaluation:   Pt resting on the bed, sr on monitor, Ra, alert and oriented, administered meds per mar, tylenol x 1, frequent urination, urinal within reach, no ss of acute distress noted, will continue to monitor.      Problem: Adult Inpatient Plan of Care  Goal: Absence of Hospital-Acquired Illness or Injury  Intervention: Identify and Manage Fall Risk  Recent Flowsheet Documentation  Taken 12/10/2024 0159 by Memo Stubbs RN  Safety Promotion/Fall Prevention:   activity supervised   room organization consistent   safety round/check completed  Taken 12/10/2024 0001 by Memo Stubbs RN  Safety Promotion/Fall Prevention:   activity supervised   room organization consistent   safety round/check completed  Taken 12/9/2024 2201 by Memo Stubbs RN  Safety Promotion/Fall Prevention:   activity supervised   room organization consistent   safety round/check completed  Taken 12/9/2024 2001 by Memo Stubbs RN  Safety Promotion/Fall Prevention:   activity supervised   room organization consistent   safety round/check completed  Intervention: Prevent Skin Injury  Recent Flowsheet Documentation  Taken 12/10/2024 0159 by Memo Stubbs RN  Body Position: position changed independently  Taken 12/10/2024 0001 by Memo Stubbs RN  Body Position: position changed independently  Skin Protection: incontinence pads utilized  Taken 12/9/2024 2201 by Memo Stubbs RN  Body Position: position changed independently  Taken 12/9/2024 2001 by Memo Stubbs RN  Body Position: position changed independently  Skin Protection: incontinence pads utilized  Intervention: Prevent Infection  Recent Flowsheet Documentation  Taken 12/10/2024 0159 by Memo Stubbs RN  Infection Prevention:   hand hygiene promoted   personal protective equipment utilized   rest/sleep promoted  Taken 12/10/2024 0001 by Memo Stubbs RN  Infection Prevention:   hand hygiene promoted   rest/sleep promoted  Taken  12/9/2024 2201 by Memo Stubbs RN  Infection Prevention:   hand hygiene promoted   personal protective equipment utilized   rest/sleep promoted  Taken 12/9/2024 2001 by Memo Stubbs RN  Infection Prevention:   hand hygiene promoted   rest/sleep promoted  Goal: Optimal Comfort and Wellbeing  Intervention: Monitor Pain and Promote Comfort  Recent Flowsheet Documentation  Taken 12/9/2024 2001 by Memo Stubbs RN  Pain Management Interventions: pain medication given  Intervention: Provide Person-Centered Care  Recent Flowsheet Documentation  Taken 12/10/2024 0001 by Memo Stubbs RN  Trust Relationship/Rapport:   care explained   choices provided  Taken 12/9/2024 2001 by Memo Stubbs RN  Trust Relationship/Rapport:   care explained   choices provided     Problem: Fall Injury Risk  Goal: Absence of Fall and Fall-Related Injury  Intervention: Identify and Manage Contributors  Recent Flowsheet Documentation  Taken 12/10/2024 0159 by Memo Stubbs RN  Medication Review/Management: medications reviewed  Taken 12/10/2024 0001 by Memo Stubbs RN  Medication Review/Management: medications reviewed  Taken 12/9/2024 2201 by Memo Stubbs RN  Medication Review/Management: medications reviewed  Taken 12/9/2024 2001 by Memo Stubbs RN  Medication Review/Management: medications reviewed  Self-Care Promotion: independence encouraged  Intervention: Promote Injury-Free Environment  Recent Flowsheet Documentation  Taken 12/10/2024 0159 by Memo Stubbs RN  Safety Promotion/Fall Prevention:   activity supervised   room organization consistent   safety round/check completed  Taken 12/10/2024 0001 by Memo Stubbs RN  Safety Promotion/Fall Prevention:   activity supervised   room organization consistent   safety round/check completed  Taken 12/9/2024 2201 by Memo Stubbs RN  Safety Promotion/Fall Prevention:   activity supervised   room organization consistent   safety  round/check completed  Taken 12/9/2024 2001 by Memo Stubbs RN  Safety Promotion/Fall Prevention:   activity supervised   room organization consistent   safety round/check completed     Problem: Sepsis/Septic Shock  Goal: Optimal Coping  Intervention: Support Patient and Family Response  Recent Flowsheet Documentation  Taken 12/10/2024 0001 by Memo Stubbs RN  Supportive Measures: active listening utilized  Goal: Absence of Infection Signs and Symptoms  Intervention: Initiate Sepsis Management  Recent Flowsheet Documentation  Taken 12/10/2024 0159 by Memo Stubbs RN  Infection Prevention:   hand hygiene promoted   personal protective equipment utilized   rest/sleep promoted  Isolation Precautions: precautions maintained  Taken 12/10/2024 0001 by Memo Stubbs RN  Infection Prevention:   hand hygiene promoted   rest/sleep promoted  Isolation Precautions: precautions maintained  Taken 12/9/2024 2201 by Memo Stubbs RN  Infection Prevention:   hand hygiene promoted   personal protective equipment utilized   rest/sleep promoted  Isolation Precautions: precautions maintained  Taken 12/9/2024 2001 by Memo Stubbs RN  Infection Prevention:   hand hygiene promoted   rest/sleep promoted  Isolation Precautions: precautions maintained  Intervention: Promote Recovery  Recent Flowsheet Documentation  Taken 12/10/2024 0001 by Memo Stubbs RN  Activity Management: bedrest  Taken 12/9/2024 2001 by Memo Stubbs RN  Activity Management: activity encouraged  Airway/Ventilation Management: airway patency maintained     Problem: Skin Injury Risk Increased  Goal: Skin Health and Integrity  Intervention: Optimize Skin Protection  Recent Flowsheet Documentation  Taken 12/10/2024 0159 by Memo Stubbs RN  Head of Bed (HOB) Positioning: HOB elevated  Taken 12/10/2024 0001 by Memo Stubbs RN  Activity Management: bedrest  Pressure Reduction Techniques:   frequent weight shift  encouraged   weight shift assistance provided  Head of Bed (HOB) Positioning: hospitals elevated  Pressure Reduction Devices:   alternating pressure pump (LUBA)   pressure-redistributing mattress utilized  Skin Protection: incontinence pads utilized  Taken 12/9/2024 2201 by Memo Stubbs RN  Head of Bed (HOB) Positioning: HOB elevated  Taken 12/9/2024 2001 by Memo Stubbs RN  Activity Management: activity encouraged  Pressure Reduction Techniques:   frequent weight shift encouraged   weight shift assistance provided  Head of Bed (HOB) Positioning: hospitals elevated  Pressure Reduction Devices:   alternating pressure pump (LUBA)   pressure-redistributing mattress utilized  Skin Protection: incontinence pads utilized  Intervention: Promote and Optimize Oral Intake  Recent Flowsheet Documentation  Taken 12/9/2024 2001 by Memo Stubbs RN  Oral Nutrition Promotion: referred to outpatient services     Problem: Excessive Substance Use  Goal: Increased Participation and Engagement (Excessive Substance Use)  Intervention: Facilitate Participation and Engagement  Recent Flowsheet Documentation  Taken 12/10/2024 0001 by Memo Stubbs RN  Supportive Measures: active listening utilized  Goal: Improved Physiologic Symptoms (Excessive Substance Use)  Intervention: Optimize Physiologic Function  Recent Flowsheet Documentation  Taken 12/9/2024 2001 by Memo Stubbs RN  Oral Nutrition Promotion: referred to outpatient services  Goal: Enhanced Social, Occupational or Functional Skills (Excessive Substance Use)  Intervention: Promote Social, Occupational and Functional Ability  Recent Flowsheet Documentation  Taken 12/10/2024 0001 by Memo Stubbs RN  Trust Relationship/Rapport:   care explained   choices provided  Taken 12/9/2024 2001 by Memo Stubbs RN  Trust Relationship/Rapport:   care explained   choices provided

## 2024-12-10 NOTE — CASE MANAGEMENT/SOCIAL WORK
Continued Stay Note  [facility]     Patient Name: Pro Mueller  MRN: [id number]  Today's Date: 12/10/2024    Admit Date: 12/2/2024    Plan: Plan [facility] for skilled care- [facility] to transport pt at 11:00 am.   BEBE De La Torre RN   Discharge Plan       Row Name 12/10/24 0846       Plan    Plan Plan [facility] for skilled care- [facility] to transport pt at 11:00 am.   BEBE De La Torre RN    Plan Comments Per Neftali ( [phone]) pt has a bed at [facility] and can be accepted today.  Discharge Summary in packet.  Neftali will fax Discharge Summary to facility.  Prescriptions sent to facility pharmacy.  Packet to RN.  [facility] arranged to transport pt at 11 am.  Called pt's sister  ( Jeny Crump [phone]) and notified her of pt's discharge.  Plan [facility] for skilled care- [facility] to transport pt at 11:00 am. BEBE De La Torre RN                   Discharge Codes    No documentation.                 Expected Discharge Date and Time       Expected Discharge Date Expected Discharge Time    Dec 10, 2024               Ju De La Torre, RN

## 2025-01-28 ENCOUNTER — HOSPITAL ENCOUNTER (OUTPATIENT)
Facility: HOSPITAL | Age: 70
Setting detail: OBSERVATION
Discharge: HOME | End: 2025-01-31
Attending: EMERGENCY MEDICINE | Admitting: HOSPITALIST
Payer: MEDICARE

## 2025-01-28 ENCOUNTER — APPOINTMENT (OUTPATIENT)
Dept: CT IMAGING | Facility: HOSPITAL | Age: 70
End: 2025-01-28
Payer: MEDICARE

## 2025-01-28 DIAGNOSIS — F10.939 ALCOHOL WITHDRAWAL SYNDROME WITH COMPLICATION: Primary | ICD-10-CM

## 2025-01-28 DIAGNOSIS — M54.50 ACUTE MIDLINE LOW BACK PAIN WITHOUT SCIATICA: ICD-10-CM

## 2025-01-28 DIAGNOSIS — Z91.199 NONCOMPLIANCE: ICD-10-CM

## 2025-01-28 DIAGNOSIS — E87.29 ALCOHOLIC KETOACIDOSIS: ICD-10-CM

## 2025-01-28 DIAGNOSIS — I10 POORLY-CONTROLLED HYPERTENSION: ICD-10-CM

## 2025-01-28 LAB
ALBUMIN SERPL-MCNC: 4.2 G/DL (ref 3.5–5.2)
ALBUMIN/GLOB SERPL: 1.4 G/DL
ALP SERPL-CCNC: 76 U/L (ref 39–117)
ALT SERPL W P-5'-P-CCNC: 14 U/L (ref 1–41)
ANION GAP SERPL CALCULATED.3IONS-SCNC: 17 MMOL/L (ref 5–15)
AST SERPL-CCNC: 41 U/L (ref 1–40)
BASOPHILS # BLD AUTO: 0.04 10*3/MM3 (ref 0–0.2)
BASOPHILS NFR BLD AUTO: 0.7 % (ref 0–1.5)
BILIRUB SERPL-MCNC: 0.7 MG/DL (ref 0–1.2)
BUN SERPL-MCNC: 5 MG/DL (ref 8–23)
BUN/CREAT SERPL: 6.3 (ref 7–25)
CALCIUM SPEC-SCNC: 9.2 MG/DL (ref 8.6–10.5)
CHLORIDE SERPL-SCNC: 102 MMOL/L (ref 98–107)
CO2 SERPL-SCNC: 21 MMOL/L (ref 22–29)
CREAT SERPL-MCNC: 0.79 MG/DL (ref 0.76–1.27)
DEPRECATED RDW RBC AUTO: 47.4 FL (ref 37–54)
EGFRCR SERPLBLD CKD-EPI 2021: 96.8 ML/MIN/1.73
EOSINOPHIL # BLD AUTO: 0.09 10*3/MM3 (ref 0–0.4)
EOSINOPHIL NFR BLD AUTO: 1.5 % (ref 0.3–6.2)
ERYTHROCYTE [DISTWIDTH] IN BLOOD BY AUTOMATED COUNT: 14 % (ref 12.3–15.4)
ETHANOL BLD-MCNC: 12 MG/DL (ref 0–10)
ETHANOL UR QL: 0.01 %
GLOBULIN UR ELPH-MCNC: 3 GM/DL
GLUCOSE SERPL-MCNC: 103 MG/DL (ref 65–99)
HCT VFR BLD AUTO: 37.9 % (ref 37.5–51)
HGB BLD-MCNC: 12.9 G/DL (ref 13–17.7)
IMM GRANULOCYTES # BLD AUTO: 0.01 10*3/MM3 (ref 0–0.05)
IMM GRANULOCYTES NFR BLD AUTO: 0.2 % (ref 0–0.5)
LYMPHOCYTES # BLD AUTO: 2.12 10*3/MM3 (ref 0.7–3.1)
LYMPHOCYTES NFR BLD AUTO: 34.7 % (ref 19.6–45.3)
MAGNESIUM SERPL-MCNC: 1.6 MG/DL (ref 1.6–2.4)
MCH RBC QN AUTO: 31.3 PG (ref 26.6–33)
MCHC RBC AUTO-ENTMCNC: 34 G/DL (ref 31.5–35.7)
MCV RBC AUTO: 92 FL (ref 79–97)
MONOCYTES # BLD AUTO: 0.53 10*3/MM3 (ref 0.1–0.9)
MONOCYTES NFR BLD AUTO: 8.7 % (ref 5–12)
NEUTROPHILS NFR BLD AUTO: 3.32 10*3/MM3 (ref 1.7–7)
NEUTROPHILS NFR BLD AUTO: 54.2 % (ref 42.7–76)
NRBC BLD AUTO-RTO: 0 /100 WBC (ref 0–0.2)
PLATELET # BLD AUTO: 368 10*3/MM3 (ref 140–450)
PMV BLD AUTO: 8.9 FL (ref 6–12)
POTASSIUM SERPL-SCNC: 3.5 MMOL/L (ref 3.5–5.2)
PROT SERPL-MCNC: 7.2 G/DL (ref 6–8.5)
RBC # BLD AUTO: 4.12 10*6/MM3 (ref 4.14–5.8)
SODIUM SERPL-SCNC: 140 MMOL/L (ref 136–145)
WBC NRBC COR # BLD AUTO: 6.11 10*3/MM3 (ref 3.4–10.8)

## 2025-01-28 PROCEDURE — 99285 EMERGENCY DEPT VISIT HI MDM: CPT

## 2025-01-28 PROCEDURE — 96376 TX/PRO/DX INJ SAME DRUG ADON: CPT

## 2025-01-28 PROCEDURE — 85025 COMPLETE CBC W/AUTO DIFF WBC: CPT | Performed by: EMERGENCY MEDICINE

## 2025-01-28 PROCEDURE — 25010000002 ONDANSETRON PER 1 MG: Performed by: PHYSICIAN ASSISTANT

## 2025-01-28 PROCEDURE — 80053 COMPREHEN METABOLIC PANEL: CPT | Performed by: EMERGENCY MEDICINE

## 2025-01-28 PROCEDURE — 25810000003 SODIUM CHLORIDE 0.9 % SOLUTION: Performed by: PHYSICIAN ASSISTANT

## 2025-01-28 PROCEDURE — 72131 CT LUMBAR SPINE W/O DYE: CPT

## 2025-01-28 PROCEDURE — 25010000002 MIDAZOLAM PER 1 MG: Performed by: EMERGENCY MEDICINE

## 2025-01-28 PROCEDURE — G0378 HOSPITAL OBSERVATION PER HR: HCPCS

## 2025-01-28 PROCEDURE — 25010000002 ONDANSETRON PER 1 MG: Performed by: EMERGENCY MEDICINE

## 2025-01-28 PROCEDURE — 96365 THER/PROPH/DIAG IV INF INIT: CPT

## 2025-01-28 PROCEDURE — 83735 ASSAY OF MAGNESIUM: CPT | Performed by: EMERGENCY MEDICINE

## 2025-01-28 PROCEDURE — 25010000002 MORPHINE PER 10 MG: Performed by: EMERGENCY MEDICINE

## 2025-01-28 PROCEDURE — 82077 ASSAY SPEC XCP UR&BREATH IA: CPT | Performed by: EMERGENCY MEDICINE

## 2025-01-28 PROCEDURE — 25010000002 THIAMINE PER 100 MG: Performed by: PHYSICIAN ASSISTANT

## 2025-01-28 PROCEDURE — 96375 TX/PRO/DX INJ NEW DRUG ADDON: CPT

## 2025-01-28 RX ORDER — ONDANSETRON 2 MG/ML
4 INJECTION INTRAMUSCULAR; INTRAVENOUS ONCE
Status: COMPLETED | OUTPATIENT
Start: 2025-01-28 | End: 2025-01-28

## 2025-01-28 RX ORDER — THIAMINE HYDROCHLORIDE 100 MG/ML
200 INJECTION, SOLUTION INTRAMUSCULAR; INTRAVENOUS ONCE
Status: COMPLETED | OUTPATIENT
Start: 2025-01-28 | End: 2025-01-28

## 2025-01-28 RX ORDER — BACLOFEN 10 MG/1
10 TABLET ORAL ONCE
Status: COMPLETED | OUTPATIENT
Start: 2025-01-28 | End: 2025-01-28

## 2025-01-28 RX ORDER — SODIUM CHLORIDE 0.9 % (FLUSH) 0.9 %
10 SYRINGE (ML) INJECTION AS NEEDED
Status: DISCONTINUED | OUTPATIENT
Start: 2025-01-28 | End: 2025-01-31 | Stop reason: HOSPADM

## 2025-01-28 RX ORDER — MIDAZOLAM HYDROCHLORIDE 1 MG/ML
2 INJECTION, SOLUTION INTRAMUSCULAR; INTRAVENOUS ONCE
Status: COMPLETED | OUTPATIENT
Start: 2025-01-28 | End: 2025-01-28

## 2025-01-28 RX ORDER — MORPHINE SULFATE 2 MG/ML
4 INJECTION, SOLUTION INTRAMUSCULAR; INTRAVENOUS ONCE
Status: COMPLETED | OUTPATIENT
Start: 2025-01-28 | End: 2025-01-28

## 2025-01-28 RX ORDER — LABETALOL HYDROCHLORIDE 5 MG/ML
20 INJECTION, SOLUTION INTRAVENOUS ONCE
Status: COMPLETED | OUTPATIENT
Start: 2025-01-29 | End: 2025-01-29

## 2025-01-28 RX ADMIN — MIDAZOLAM 2 MG: 1 INJECTION INTRAMUSCULAR; INTRAVENOUS at 23:11

## 2025-01-28 RX ADMIN — SODIUM CHLORIDE 1000 ML: 9 INJECTION, SOLUTION INTRAVENOUS at 23:10

## 2025-01-28 RX ADMIN — THIAMINE HYDROCHLORIDE 200 MG: 100 INJECTION, SOLUTION INTRAMUSCULAR; INTRAVENOUS at 23:11

## 2025-01-28 RX ADMIN — ONDANSETRON 4 MG: 2 INJECTION INTRAMUSCULAR; INTRAVENOUS at 21:08

## 2025-01-28 RX ADMIN — BACLOFEN 10 MG: 10 TABLET ORAL at 21:08

## 2025-01-28 RX ADMIN — ONDANSETRON 4 MG: 2 INJECTION INTRAMUSCULAR; INTRAVENOUS at 23:10

## 2025-01-28 RX ADMIN — FOLIC ACID 1 MG: 5 INJECTION, SOLUTION INTRAMUSCULAR; INTRAVENOUS; SUBCUTANEOUS at 23:47

## 2025-01-28 RX ADMIN — MORPHINE SULFATE 4 MG: 2 INJECTION, SOLUTION INTRAMUSCULAR; INTRAVENOUS at 21:08

## 2025-01-28 NOTE — Clinical Note
Level of Care: Telemetry [5]   Diagnosis: Alcohol withdrawal [291.81.ICD-9-CM]   Admitting Physician: WILL ZUNIGA [[id number]]   Attending Physician: WILL ZUNIGA [[id number]]

## 2025-01-29 PROBLEM — M51.369 LUMBAR DEGENERATIVE DISC DISEASE: Status: ACTIVE | Noted: 2025-01-29

## 2025-01-29 PROBLEM — F10.239 ALCOHOL DEPENDENCE WITH WITHDRAWAL: Status: ACTIVE | Noted: 2024-03-07

## 2025-01-29 LAB
ANION GAP SERPL CALCULATED.3IONS-SCNC: 12.9 MMOL/L (ref 5–15)
BACTERIA UR QL AUTO: ABNORMAL /HPF
BILIRUB UR QL STRIP: NEGATIVE
BUN SERPL-MCNC: 6 MG/DL (ref 8–23)
BUN/CREAT SERPL: 8.2 (ref 7–25)
CALCIUM SPEC-SCNC: 8.4 MG/DL (ref 8.6–10.5)
CHLORIDE SERPL-SCNC: 101 MMOL/L (ref 98–107)
CLARITY UR: ABNORMAL
CO2 SERPL-SCNC: 23.1 MMOL/L (ref 22–29)
COLOR UR: ABNORMAL
CREAT SERPL-MCNC: 0.73 MG/DL (ref 0.76–1.27)
DEPRECATED RDW RBC AUTO: 46.8 FL (ref 37–54)
EGFRCR SERPLBLD CKD-EPI 2021: 99.1 ML/MIN/1.73
ERYTHROCYTE [DISTWIDTH] IN BLOOD BY AUTOMATED COUNT: 13.9 % (ref 12.3–15.4)
GLUCOSE SERPL-MCNC: 99 MG/DL (ref 65–99)
GLUCOSE UR STRIP-MCNC: NEGATIVE MG/DL
HCT VFR BLD AUTO: 32.4 % (ref 37.5–51)
HGB BLD-MCNC: 11.1 G/DL (ref 13–17.7)
HGB UR QL STRIP.AUTO: NEGATIVE
HYALINE CASTS UR QL AUTO: ABNORMAL /LPF
KETONES UR QL STRIP: ABNORMAL
LEUKOCYTE ESTERASE UR QL STRIP.AUTO: ABNORMAL
MCH RBC QN AUTO: 31.5 PG (ref 26.6–33)
MCHC RBC AUTO-ENTMCNC: 34.3 G/DL (ref 31.5–35.7)
MCV RBC AUTO: 92 FL (ref 79–97)
NITRITE UR QL STRIP: NEGATIVE
PH UR STRIP.AUTO: 5.5 [PH] (ref 5–8)
PLATELET # BLD AUTO: 267 10*3/MM3 (ref 140–450)
PMV BLD AUTO: 9 FL (ref 6–12)
POTASSIUM SERPL-SCNC: 3.7 MMOL/L (ref 3.5–5.2)
PROT UR QL STRIP: ABNORMAL
RBC # BLD AUTO: 3.52 10*6/MM3 (ref 4.14–5.8)
RBC # UR STRIP: ABNORMAL /HPF
REF LAB TEST METHOD: ABNORMAL
SODIUM SERPL-SCNC: 137 MMOL/L (ref 136–145)
SP GR UR STRIP: 1.02 (ref 1–1.03)
SQUAMOUS #/AREA URNS HPF: ABNORMAL /HPF
UROBILINOGEN UR QL STRIP: ABNORMAL
WBC # UR STRIP: ABNORMAL /HPF
WBC NRBC COR # BLD AUTO: 6.55 10*3/MM3 (ref 3.4–10.8)

## 2025-01-29 PROCEDURE — 85027 COMPLETE CBC AUTOMATED: CPT

## 2025-01-29 PROCEDURE — 25010000002 LABETALOL 5 MG/ML SOLUTION: Performed by: PHYSICIAN ASSISTANT

## 2025-01-29 PROCEDURE — 96376 TX/PRO/DX INJ SAME DRUG ADON: CPT

## 2025-01-29 PROCEDURE — 25010000002 THIAMINE PER 100 MG: Performed by: INTERNAL MEDICINE

## 2025-01-29 PROCEDURE — G0378 HOSPITAL OBSERVATION PER HR: HCPCS

## 2025-01-29 PROCEDURE — 96366 THER/PROPH/DIAG IV INF ADDON: CPT

## 2025-01-29 PROCEDURE — 96375 TX/PRO/DX INJ NEW DRUG ADDON: CPT

## 2025-01-29 PROCEDURE — 81001 URINALYSIS AUTO W/SCOPE: CPT | Performed by: PHYSICIAN ASSISTANT

## 2025-01-29 PROCEDURE — 90791 PSYCH DIAGNOSTIC EVALUATION: CPT

## 2025-01-29 PROCEDURE — 80048 BASIC METABOLIC PNL TOTAL CA: CPT

## 2025-01-29 PROCEDURE — 36415 COLL VENOUS BLD VENIPUNCTURE: CPT

## 2025-01-29 RX ORDER — BISACODYL 10 MG
10 SUPPOSITORY, RECTAL RECTAL DAILY PRN
Status: DISCONTINUED | OUTPATIENT
Start: 2025-01-29 | End: 2025-01-31 | Stop reason: HOSPADM

## 2025-01-29 RX ORDER — CLONIDINE HYDROCHLORIDE 0.1 MG/1
0.1 TABLET ORAL EVERY 4 HOURS PRN
Status: DISCONTINUED | OUTPATIENT
Start: 2025-01-29 | End: 2025-01-31 | Stop reason: HOSPADM

## 2025-01-29 RX ORDER — MULTIPLE VITAMINS W/ MINERALS TAB 9MG-400MCG
1 TAB ORAL DAILY
Status: DISCONTINUED | OUTPATIENT
Start: 2025-01-29 | End: 2025-01-31 | Stop reason: HOSPADM

## 2025-01-29 RX ORDER — THIAMINE HYDROCHLORIDE 100 MG/ML
200 INJECTION, SOLUTION INTRAMUSCULAR; INTRAVENOUS EVERY 8 HOURS SCHEDULED
Status: DISCONTINUED | OUTPATIENT
Start: 2025-01-29 | End: 2025-01-31 | Stop reason: HOSPADM

## 2025-01-29 RX ORDER — MIDAZOLAM HYDROCHLORIDE 1 MG/ML
4 INJECTION, SOLUTION INTRAMUSCULAR; INTRAVENOUS
Status: DISCONTINUED | OUTPATIENT
Start: 2025-01-29 | End: 2025-01-31 | Stop reason: HOSPADM

## 2025-01-29 RX ORDER — LORAZEPAM 1 MG/1
1 TABLET ORAL
Status: DISCONTINUED | OUTPATIENT
Start: 2025-01-29 | End: 2025-01-31 | Stop reason: HOSPADM

## 2025-01-29 RX ORDER — ONDANSETRON 2 MG/ML
4 INJECTION INTRAMUSCULAR; INTRAVENOUS EVERY 6 HOURS PRN
Status: DISCONTINUED | OUTPATIENT
Start: 2025-01-29 | End: 2025-01-31 | Stop reason: HOSPADM

## 2025-01-29 RX ORDER — POLYETHYLENE GLYCOL 3350 17 G/17G
17 POWDER, FOR SOLUTION ORAL DAILY PRN
Status: DISCONTINUED | OUTPATIENT
Start: 2025-01-29 | End: 2025-01-31 | Stop reason: HOSPADM

## 2025-01-29 RX ORDER — BISACODYL 5 MG/1
5 TABLET, DELAYED RELEASE ORAL DAILY PRN
Status: DISCONTINUED | OUTPATIENT
Start: 2025-01-29 | End: 2025-01-31 | Stop reason: HOSPADM

## 2025-01-29 RX ORDER — MIDAZOLAM HYDROCHLORIDE 1 MG/ML
2 INJECTION, SOLUTION INTRAMUSCULAR; INTRAVENOUS
Status: DISCONTINUED | OUTPATIENT
Start: 2025-01-29 | End: 2025-01-31 | Stop reason: HOSPADM

## 2025-01-29 RX ORDER — AMOXICILLIN 250 MG
2 CAPSULE ORAL 2 TIMES DAILY PRN
Status: DISCONTINUED | OUTPATIENT
Start: 2025-01-29 | End: 2025-01-31 | Stop reason: HOSPADM

## 2025-01-29 RX ORDER — ONDANSETRON 4 MG/1
4 TABLET, ORALLY DISINTEGRATING ORAL EVERY 6 HOURS PRN
Status: DISCONTINUED | OUTPATIENT
Start: 2025-01-29 | End: 2025-01-31 | Stop reason: HOSPADM

## 2025-01-29 RX ORDER — LORAZEPAM 1 MG/1
2 TABLET ORAL
Status: DISCONTINUED | OUTPATIENT
Start: 2025-01-29 | End: 2025-01-31 | Stop reason: HOSPADM

## 2025-01-29 RX ORDER — ACETAMINOPHEN 325 MG/1
650 TABLET ORAL EVERY 4 HOURS PRN
Status: DISCONTINUED | OUTPATIENT
Start: 2025-01-29 | End: 2025-01-31 | Stop reason: HOSPADM

## 2025-01-29 RX ORDER — MIDAZOLAM HYDROCHLORIDE 5 MG/ML
4 INJECTION, SOLUTION INTRAMUSCULAR; INTRAVENOUS
Status: DISCONTINUED | OUTPATIENT
Start: 2025-01-29 | End: 2025-01-31 | Stop reason: HOSPADM

## 2025-01-29 RX ORDER — NITROGLYCERIN 0.4 MG/1
0.4 TABLET SUBLINGUAL
Status: DISCONTINUED | OUTPATIENT
Start: 2025-01-29 | End: 2025-01-31 | Stop reason: HOSPADM

## 2025-01-29 RX ADMIN — ACETAMINOPHEN 650 MG: 325 TABLET, FILM COATED ORAL at 16:22

## 2025-01-29 RX ADMIN — THIAMINE HYDROCHLORIDE 200 MG: 100 INJECTION, SOLUTION INTRAMUSCULAR; INTRAVENOUS at 06:36

## 2025-01-29 RX ADMIN — THIAMINE HYDROCHLORIDE 200 MG: 100 INJECTION, SOLUTION INTRAMUSCULAR; INTRAVENOUS at 14:25

## 2025-01-29 RX ADMIN — FOLIC ACID 1 MG: 5 INJECTION, SOLUTION INTRAMUSCULAR; INTRAVENOUS; SUBCUTANEOUS at 08:12

## 2025-01-29 RX ADMIN — Medication 1 TABLET: at 08:12

## 2025-01-29 RX ADMIN — ACETAMINOPHEN 650 MG: 325 TABLET, FILM COATED ORAL at 04:54

## 2025-01-29 RX ADMIN — LABETALOL HYDROCHLORIDE 20 MG: 5 INJECTION, SOLUTION INTRAVENOUS at 00:01

## 2025-01-29 RX ADMIN — THIAMINE HYDROCHLORIDE 200 MG: 100 INJECTION, SOLUTION INTRAMUSCULAR; INTRAVENOUS at 21:36

## 2025-01-29 NOTE — ED NOTES
Nursing report ED to floor  Pro Mueller  70 y.o.  male    HPI :  HPI  Stated Reason for Visit: Lower back pain  History Obtained From: patient    Chief Complaint  Chief Complaint   Patient presents with    Back Pain       Admitting doctor:   Neftali Billings MD    Admitting diagnosis:   The primary encounter diagnosis was Alcohol withdrawal syndrome with complication. Diagnoses of Acute midline low back pain without sciatica, Alcoholic ketoacidosis, Poorly-controlled hypertension, and Noncompliance were also pertinent to this visit.    Code status:   Current Code Status       Date Active Code Status Order ID Comments User Context       1/29/2025 0007 CPR (Attempt to Resuscitate) [id number]  Carmela Funes, APRN ED        Question Answer    Code Status (Patient has no pulse and is not breathing) CPR (Attempt to Resuscitate)    Medical Interventions (Patient has pulse or is breathing) Full                    Allergies:   Penicillins    Isolation:   No active isolations    Intake and Output    Intake/Output Summary (Last 24 hours) at 1/29/2025 1323  Last data filed at 1/29/2025 0846  Gross per 24 hour   Intake 1050 ml   Output 400 ml   Net 650 ml       Weight:       01/28/25 2025   Weight: 81.6 kg (180 lb)       Most recent vitals:   Vitals:    01/29/25 0716 01/29/25 1045 01/29/25 1054 01/29/25 1145   BP: 125/76 128/71 135/92 148/78   Pulse: 69 75 78 77   Resp:       Temp:    98.4 °F (36.9 °C)   TempSrc:    Tympanic   SpO2: 90% 91% 95% 95%   Weight:       Height:           Active LDAs/IV Access:   Lines, Drains & Airways       Active LDAs       Name Placement date Placement time Site Days    Peripheral IV 01/28/25 2102 Left Antecubital 01/28/25 2102  Antecubital  less than 1                    Labs (abnormal labs have a star):   Labs Reviewed   COMPREHENSIVE METABOLIC PANEL - Abnormal; Notable for the following components:       Result Value    Glucose 103 (*)     BUN 5 (*)     CO2 21.0 (*)     AST (SGOT) 41 (*)      BUN/Creatinine Ratio 6.3 (*)     Anion Gap 17.0 (*)     All other components within normal limits    Narrative:     GFR Categories in Chronic Kidney Disease (CKD)      GFR Category          GFR (mL/min/1.73)    Interpretation  G1                     90 or greater         Normal or high (1)  G2                      60-89                Mild decrease (1)  G3a                   45-59                Mild to moderate decrease  G3b                   30-44                Moderate to severe decrease  G4                    15-29                Severe decrease  G5                    14 or less           Kidney failure          (1)In the absence of evidence of kidney disease, neither GFR category G1 or G2 fulfill the criteria for CKD.    eGFR calculation 2021 CKD-EPI creatinine equation, which does not include race as a factor   ETHANOL - Abnormal; Notable for the following components:    Ethanol 12 (*)     All other components within normal limits   CBC WITH AUTO DIFFERENTIAL - Abnormal; Notable for the following components:    RBC 4.12 (*)     Hemoglobin 12.9 (*)     All other components within normal limits   URINALYSIS W/ MICROSCOPIC IF INDICATED (NO CULTURE) - Abnormal; Notable for the following components:    Color, UA Dark Yellow (*)     Appearance, UA Cloudy (*)     Ketones, UA Trace (*)     Protein, UA Trace (*)     Leuk Esterase, UA Trace (*)     All other components within normal limits   BASIC METABOLIC PANEL - Abnormal; Notable for the following components:    BUN 6 (*)     Creatinine 0.73 (*)     Calcium 8.4 (*)     All other components within normal limits    Narrative:     GFR Categories in Chronic Kidney Disease (CKD)      GFR Category          GFR (mL/min/1.73)    Interpretation  G1                     90 or greater         Normal or high (1)  G2                      60-89                Mild decrease (1)  G3a                   45-59                Mild to moderate decrease  G3b                   30-44                 Moderate to severe decrease  G4                    15-29                Severe decrease  G5                    14 or less           Kidney failure          (1)In the absence of evidence of kidney disease, neither GFR category G1 or G2 fulfill the criteria for CKD.    eGFR calculation 2021 CKD-EPI creatinine equation, which does not include race as a factor   CBC (NO DIFF) - Abnormal; Notable for the following components:    RBC 3.52 (*)     Hemoglobin 11.1 (*)     Hematocrit 32.4 (*)     All other components within normal limits   URINALYSIS, MICROSCOPIC ONLY - Abnormal; Notable for the following components:    WBC, UA 11-20 (*)     All other components within normal limits   MAGNESIUM - Normal   CBC AND DIFFERENTIAL    Narrative:     The following orders were created for panel order CBC & Differential.  Procedure                               Abnormality         Status                     ---------                               -----------         ------                     CBC Auto Differential[[id number]]        Abnormal            Final result                 Please view results for these tests on the individual orders.       EKG:   No orders to display       Meds given in ED:   Medications   sodium chloride 0.9 % flush 10 mL (has no administration in time range)   nitroglycerin (NITROSTAT) SL tablet 0.4 mg (has no administration in time range)   acetaminophen (TYLENOL) tablet 650 mg (650 mg Oral Given 1/29/25 0454)   sennosides-docusate (PERICOLACE) 8.6-50 MG per tablet 2 tablet (has no administration in time range)     And   polyethylene glycol (MIRALAX) packet 17 g (has no administration in time range)     And   bisacodyl (DULCOLAX) EC tablet 5 mg (has no administration in time range)     And   bisacodyl (DULCOLAX) suppository 10 mg (has no administration in time range)   ondansetron ODT (ZOFRAN-ODT) disintegrating tablet 4 mg (has no administration in time range)     Or   ondansetron (ZOFRAN)  injection 4 mg (has no administration in time range)   cloNIDine (CATAPRES) tablet 0.1 mg (has no administration in time range)   Magnesium Standard Dose Replacement - Follow Nurse / BPA Driven Protocol (has no administration in time range)   thiamine (B-1) injection 200 mg (200 mg Intravenous Given 1/29/25 0636)     Followed by   thiamine (VITAMIN B-1) tablet 100 mg (has no administration in time range)   folic acid 1 mg in sodium chloride 0.9 % 50 mL IVPB (0 mg Intravenous Stopped 1/29/25 0846)   multivitamin with minerals 1 tablet (1 tablet Oral Given 1/29/25 0812)   LORazepam (ATIVAN) tablet 1 mg (has no administration in time range)     Or   midazolam (VERSED) injection 2 mg (has no administration in time range)     Or   LORazepam (ATIVAN) tablet 2 mg (has no administration in time range)     Or   midazolam (VERSED) injection 4 mg (has no administration in time range)     Or   midazolam (VERSED) injection 4 mg (has no administration in time range)     Or   midazolam (VERSED) injection 4 mg (has no administration in time range)   morphine injection 4 mg (4 mg Intravenous Given 1/28/25 2108)   ondansetron (ZOFRAN) injection 4 mg (4 mg Intravenous Given 1/28/25 2108)   baclofen (LIORESAL) tablet 10 mg (10 mg Oral Given 1/28/25 2108)   ondansetron (ZOFRAN) injection 4 mg (4 mg Intravenous Given 1/28/25 2310)   folic acid 1 mg in sodium chloride 0.9 % 50 mL IVPB (0 mg Intravenous Stopped 1/29/25 0029)   thiamine (B-1) injection 200 mg (200 mg Intravenous Given 1/28/25 2311)   sodium chloride 0.9 % bolus 1,000 mL (0 mL Intravenous Stopped 1/29/25 0040)   midazolam (VERSED) injection 2 mg (2 mg Intravenous Given 1/28/25 2311)   labetalol (NORMODYNE,TRANDATE) injection 20 mg (20 mg Intravenous Given 1/29/25 0001)       Imaging results:  CT Lumbar Spine Without Contrast    Result Date: 1/28/2025   Lower lumbar degenerative changes, no acute appearing abnormality, no substantial interval change since 12/2/2024.     This  "report was finalized on 2025 9:54 PM by Dr. Carlos Constantino M.D on Workstation: [id number]       Ambulatory status:   - stand by    Social issues:   Social History     Socioeconomic History    Marital status: Single   Tobacco Use    Smoking status: Former     Current packs/day: 0.00     Average packs/day: 0.5 packs/day for 15.0 years (7.5 ttl pk-yrs)     Types: Cigarettes     Start date: 1990     Quit date: 2005     Years since quittin.0    Smokeless tobacco: Never    Tobacco comments:     caffeine - 3 cans of coke daily    Vaping Use    Vaping status: Never Used   Substance and Sexual Activity    Alcohol use: Yes     Alcohol/week: 7.0 standard drinks of alcohol     Types: 7 Shots of liquor per week     Comment: .5 liter vodka    Drug use: Yes     Types: Methamphetamines     Comment: \"once in a rare while\"    Sexual activity: Yes     Partners: Female     Birth control/protection: Condom       Peripheral Neurovascular  Peripheral Neurovascular (Adult)  Peripheral Neurovascular WDL: WDL    Neuro Cognitive  Neuro Cognitive (Adult)  Cognitive/Neuro/Behavioral WDL: WDL, all  Level of Consciousness: Alert  Arousal Level: opens eyes spontaneously  Orientation: oriented x 4  Speech: clear, spontaneous, logical  Mood/Behavior: cooperative, calm    Learning  Learning Assessment  Learning Readiness and Ability: no barriers identified  Education Provided  Person Taught: patient    Respiratory  Respiratory WDL  Respiratory WDL: WDL    Abdominal Pain       Pain Assessments  Pain (Adult)  (0-10) Pain Rating: Rest: 5  Pain Location: back  Pain Side/Orientation: lower  Response to Pain Interventions: nonverbal indicators absent/decreased    NIH Stroke Scale       Shannan Gupta RN  25 13:23 EST    "

## 2025-01-29 NOTE — ED PROVIDER NOTES
EMERGENCY DEPARTMENT ENCOUNTER    Room Number:  07/07  Date of encounter:  1/28/2025  PCP: Provider, No Known  Historian: Patient  Chronic or social conditions impacting care (social determinants of health): None    HPI:  Chief Complaint: Low back pain  A complete HPI/ROS/PMH/PSH/SH/FH are unobtainable due to: Nothing    Context: Pro Mueller is a 70 y.o. male with a history of alcoholism, hypertension, anxiety, noncompliance, who presents to the ED c/o acute low back pain prior to arrival.  Patient states that he just stood up off the couch when he felt a sharp pain in his low lumbar spine.  The pain is sharp, stabbing.  He denies any radiation of pain down his legs.  He denies any numbness or tingling down the legs.  He denies any saddle paresthesias or incontinence.  He does have a history of hypertension however states he has been off of medications for several months.    Review of prior external notes (non-ED):   Reviewed last admission from 12/2/2024.  Patient admitted for alcohol abuse, hypothermia.    Review of prior external test results outside of this encounter:  I reviewed a CMP dated 12/10/2024.  Creatinine 0.57, potassium 3.8    PAST MEDICAL HISTORY  Active Ambulatory Problems     Diagnosis Date Noted    Fall 08/08/2016    Alcoholism in recovery 08/08/2016    Atopic rhinitis 08/08/2016    Mixed anxiety depressive disorder 08/08/2016    Genital herpes simplex 08/08/2016    Hyperlipidemia 08/08/2016    Hypertension 08/08/2016    Hypothyroidism 08/08/2016    Insomnia 08/08/2016    Panic disorder without agoraphobia 08/08/2016    Persistent insomnia 08/08/2016    Vitamin D deficiency 08/08/2016    Chronic low back pain 11/11/2016    Neuropathy involving both lower extremities 10/25/2018    Abnormal EKG 01/03/2019    Left shoulder pain 11/19/2019    Nausea and vomiting 01/11/2020    Pancytopenia 01/14/2020    Left ventricular diastolic dysfunction 01/16/2021    Tremor 10/06/2021    C5 cervical fracture  11/09/2021    Syncope and collapse 01/07/2022    Neck pain 01/07/2022    Alcoholic intoxication with complication 03/13/2022    Withdrawal symptoms, alcohol 07/01/2022    Transaminitis 07/01/2022    Lumbar facet arthropathy 07/20/2022    Alcohol dependence 09/11/2022    Midline low back pain with right-sided sciatica 09/15/2022    Spondylolisthesis at L4-L5 level 09/15/2022    Lumbar canal stenosis 09/15/2022    Alcohol cessation counseling 09/15/2022    Need for assistance due to reduced mobility 09/15/2022    Impaired mobility and ADLs 09/15/2022    Alcohol withdrawal syndrome without complication 02/18/2023    Facial laceration 02/18/2023    Orthostatic hypotension 02/18/2023    Acute bacterial conjunctivitis of right eye 03/02/2023    Visit for suture removal 03/02/2023    Renal calculi 03/14/2023    Hepatic steatosis 03/15/2023    Alcohol withdrawal syndrome with complication 04/14/2023    Macrocytosis without anemia 04/14/2023    Lumbosacral spondylosis without myelopathy 05/05/2023    Elevated LFTs 05/13/2023    Alcohol-induced acute pancreatitis, unspecified complication status 06/23/2023    Pancreatitis 03/06/2024    Generalized abdominal pain 03/07/2024    Alcohol withdrawal 03/07/2024    Acute alcohol intoxication in patient with alcoholism with blood alcohol level 0.08 to 0.29, with unspecified complication 09/20/2024    Calculus of gallbladder with cholecystitis without biliary obstruction 09/20/2024    Alcoholic liver disease 12/10/2024    Iron deficiency anemia 12/10/2024    Folate deficiency anemia 12/10/2024    PVC's (premature ventricular contractions) 12/10/2024    Weakness 12/10/2024     Resolved Ambulatory Problems     Diagnosis Date Noted    Impacted cerumen 08/08/2016    Influenza 08/08/2016    Motion sickness 08/08/2016    Seasonal allergic rhinitis 08/08/2016    Upper respiratory tract infection 08/08/2016    Alcohol withdrawal 11/04/2016    Headache 11/05/2016    Gastritis 11/05/2016     Olecranon bursitis of right elbow 04/05/2017    Sciatica of left side 06/12/2018    Syncope and collapse 01/02/2019    Alcoholic ketoacidosis 01/12/2020    Hyponatremia 01/13/2020    Hypokalemia 01/13/2020    Alcohol dependence with uncomplicated withdrawal 01/14/2020    Nephrolithiasis 02/12/2020    Hypomagnesemia 01/16/2021    Non-traumatic rhabdomyolysis 01/18/2021    Urine retention 01/21/2021    Epigastric pain 06/23/2023    Dehydration 02/24/2024    Metabolic acidosis 09/20/2024    Hypothermia 12/02/2024     Past Medical History:   Diagnosis Date    Alcohol abuse     Allergic 1970    Anxiety     Arthritis     Depression     Disease of thyroid gland     Elevated cholesterol     Encounter for removal of sutures     GERD (gastroesophageal reflux disease)     Headache, tension-type     Kidney stone     Low back pain 2019    Migraine     Olecranon bursitis, right elbow     Peripheral neuropathy     Sleep apnea          PAST SURGICAL HISTORY  Past Surgical History:   Procedure Laterality Date    CHOLECYSTECTOMY N/A 9/22/2024    Procedure: CHOLECYSTECTOMY LAPAROSCOPIC WITH DAVINCI ROBOT with cholangiogram, possible open;  Surgeon: Toro Noguera MD;  Location: [facility] MAIN OR;  Service: General;  Laterality: N/A;    COLONOSCOPY      CYST REMOVAL      CYSTOSCOPY BLADDER STONE LITHOTRIPSY  02/2022    ERCP N/A 9/23/2024    Procedure: ENDOSCOPIC RETROGRADE CHOLANGIOPANCREATOGRAPHY sphincterotomy, balloon sweep (9-12);  Surgeon: Fara Madrigal MD;  Location: [facility] ENDOSCOPY;  Service: Gastroenterology;  Laterality: N/A;  sphincterotomy hiatial hernia, gallstone removal    EXTRACORPOREAL SHOCK WAVE LITHOTRIPSY (ESWL) Right 2002    SHOULDER ARTHROSCOPY Right 12/17/2019    Procedure: SHOULDER ARTHROSCOPY, decompression, distal clavicle excision;  Surgeon: Aj Mancilla MD;  Location: [facility] OR OSC;  Service: Orthopedics    TONSILLECTOMY           FAMILY HISTORY  Family History   Problem Relation Age of Onset     "Alzheimer's disease Mother     Mental illness Mother     Pancreatic cancer Father     Hearing loss Father     Arthritis Maternal Grandmother     Malig Hyperthermia Neg Hx          SOCIAL HISTORY  Social History     Socioeconomic History    Marital status: Single   Tobacco Use    Smoking status: Former     Current packs/day: 0.00     Average packs/day: 0.5 packs/day for 15.0 years (7.5 ttl pk-yrs)     Types: Cigarettes     Start date: 1990     Quit date: 2005     Years since quittin.0    Smokeless tobacco: Never    Tobacco comments:     caffeine - 3 cans of coke daily    Vaping Use    Vaping status: Never Used   Substance and Sexual Activity    Alcohol use: Yes     Alcohol/week: 7.0 standard drinks of alcohol     Types: 7 Shots of liquor per week     Comment: .5 liter vodka    Drug use: Yes     Types: Methamphetamines     Comment: \"once in a rare while\"    Sexual activity: Yes     Partners: Female     Birth control/protection: Condom         ALLERGIES  Penicillins        REVIEW OF SYSTEMS  All systems reviewed and negative except for those discussed in HPI.       PHYSICAL EXAM    I have reviewed the triage vital signs and nursing notes.    ED Triage Vitals [25]   Temp Heart Rate Resp BP SpO2   98.9 °F (37.2 °C) 98 18 179/97 99 %      Temp src Heart Rate Source Patient Position BP Location FiO2 (%)   Tympanic Monitor Lying Right arm --       Physical Exam  GENERAL: Alert, oriented, disheveled, dry heaving  HENT: head atraumatic, no nuchal rigidity  EYES: no scleral icterus, EOMI  CV: regular rhythm, regular rate, no murmur  RESPIRATORY: normal effort, CTA  ABDOMEN: soft, nontender  MUSCULOSKELETAL: Decreased range of motion of lumbar spine  NEURO: alert, 5/5 strength in bilateral lower extremities  SKIN: warm, dry        LAB RESULTS  Recent Results (from the past 24 hours)   Comprehensive Metabolic Panel    Collection Time: 25  9:02 PM    Specimen: Blood   Result Value Ref Range    " Glucose 103 (H) 65 - 99 mg/dL    BUN 5 (L) 8 - 23 mg/dL    Creatinine 0.79 0.76 - 1.27 mg/dL    Sodium 140 136 - 145 mmol/L    Potassium 3.5 3.5 - 5.2 mmol/L    Chloride 102 98 - 107 mmol/L    CO2 21.0 (L) 22.0 - 29.0 mmol/L    Calcium 9.2 8.6 - 10.5 mg/dL    Total Protein 7.2 6.0 - 8.5 g/dL    Albumin 4.2 3.5 - 5.2 g/dL    ALT (SGPT) 14 1 - 41 U/L    AST (SGOT) 41 (H) 1 - 40 U/L    Alkaline Phosphatase 76 39 - 117 U/L    Total Bilirubin 0.7 0.0 - 1.2 mg/dL    Globulin 3.0 gm/dL    A/G Ratio 1.4 g/dL    BUN/Creatinine Ratio 6.3 (L) 7.0 - 25.0    Anion Gap 17.0 (H) 5.0 - 15.0 mmol/L    eGFR 96.8 >60.0 mL/min/1.73   Ethanol    Collection Time: 01/28/25  9:02 PM    Specimen: Blood   Result Value Ref Range    Ethanol 12 (H) 0 - 10 mg/dL    Ethanol % 0.012 %   Magnesium    Collection Time: 01/28/25  9:02 PM    Specimen: Blood   Result Value Ref Range    Magnesium 1.6 1.6 - 2.4 mg/dL   CBC Auto Differential    Collection Time: 01/28/25  9:02 PM    Specimen: Blood   Result Value Ref Range    WBC 6.11 3.40 - 10.80 10*3/mm3    RBC 4.12 (L) 4.14 - 5.80 10*6/mm3    Hemoglobin 12.9 (L) 13.0 - 17.7 g/dL    Hematocrit 37.9 37.5 - 51.0 %    MCV 92.0 79.0 - 97.0 fL    MCH 31.3 26.6 - 33.0 pg    MCHC 34.0 31.5 - 35.7 g/dL    RDW 14.0 12.3 - 15.4 %    RDW-SD 47.4 37.0 - 54.0 fl    MPV 8.9 6.0 - 12.0 fL    Platelets 368 140 - 450 10*3/mm3    Neutrophil % 54.2 42.7 - 76.0 %    Lymphocyte % 34.7 19.6 - 45.3 %    Monocyte % 8.7 5.0 - 12.0 %    Eosinophil % 1.5 0.3 - 6.2 %    Basophil % 0.7 0.0 - 1.5 %    Immature Grans % 0.2 0.0 - 0.5 %    Neutrophils, Absolute 3.32 1.70 - 7.00 10*3/mm3    Lymphocytes, Absolute 2.12 0.70 - 3.10 10*3/mm3    Monocytes, Absolute 0.53 0.10 - 0.90 10*3/mm3    Eosinophils, Absolute 0.09 0.00 - 0.40 10*3/mm3    Basophils, Absolute 0.04 0.00 - 0.20 10*3/mm3    Immature Grans, Absolute 0.01 0.00 - 0.05 10*3/mm3    nRBC 0.0 0.0 - 0.2 /100 WBC       Ordered the above labs and independently reviewed the  results.        RADIOLOGY  CT Lumbar Spine Without Contrast    Result Date: 1/28/2025  CT LUMBAR SPINE without contrast..  HISTORY: Sudden onset low back pain.  TECHNIQUE: Routine lumbar spine CT without contrast..   Radiation dose reduction techniques were utilized, including automated exposure control, and exposure modulation based on body size.  COMPARISON: Lumbar spine [place] [address].  FINDINGS:  Redemonstrated grade 1 anterolisthesis at L4-5, unchanged..  There is no bone erosion or destruction, or evidence of acute or chronic fracture.  The paraspinous soft tissues are unremarkable.  T12-L1: There is no substantial canal or foraminal compromise.  L1-2: There is no substantial canal or foraminal compromise.  L2-3: There is no canal stenosis. The right foramen is normal. There is borderline degenerative left foraminal narrowing.  L3-4: There is a disc bulge and facet arthropathy, with minimal if any canal narrowing. There is no left and borderline right foraminal narrowing.  L4-5: There is anterolisthesis and pseudodisc bulge and facet arthropathy. There is moderate central canal stenosis, and moderate bilateral foraminal stenosis.  L5-S1: There is no disc bulge or protrusion. There is left greater than right facet arthropathy. There is no canal stenosis. There is no left and mild right foraminal stenosis.       Lower lumbar degenerative changes, no acute appearing abnormality, no substantial interval change since 12/2/2024.     This report was finalized on 1/28/2025 9:54 PM by Dr. Carlos Constantino M.D on Workstation: [id number]       I ordered the above noted radiological studies. Reviewed by me and discussed with radiologist.  See dictation for official radiology interpretation.      MEDICATIONS GIVEN IN ER    Medications   sodium chloride 0.9 % flush 10 mL (has no administration in time range)   folic acid 1 mg in sodium chloride 0.9 % 50 mL IVPB (1 mg Intravenous New Bag 1/28/25 3538)   labetalol  (NORMODYNE,TRANDATE) injection 20 mg (has no administration in time range)   morphine injection 4 mg (4 mg Intravenous Given 1/28/25 2108)   ondansetron (ZOFRAN) injection 4 mg (4 mg Intravenous Given 1/28/25 2108)   baclofen (LIORESAL) tablet 10 mg (10 mg Oral Given 1/28/25 2108)   ondansetron (ZOFRAN) injection 4 mg (4 mg Intravenous Given 1/28/25 2310)   thiamine (B-1) injection 200 mg (200 mg Intravenous Given 1/28/25 2311)   sodium chloride 0.9 % bolus 1,000 mL (1,000 mL Intravenous New Bag 1/28/25 2310)   midazolam (VERSED) injection 2 mg (2 mg Intravenous Given 1/28/25 2311)         ADDITIONAL ORDERS CONSIDERED BUT NOT ORDERED:  Nothing    PROGRESS, DATA ANALYSIS, CONSULTS, AND MEDICAL DECISION MAKING    All labs have been independently interpreted by myself.  All radiology studies have been independently interpreted by myself and discussed with radiologist dictating the report.   EKGs independently interpreted by myself.  Discussion below represents my analysis of pertinent findings related to patient's condition, differential diagnosis, treatment plan and final disposition.    I have discussed case with Dr. Case, emergency room physician.  He has performed his own bedside examination and agrees with treatment plan.    ED Course as of 01/28/25 2353 Tue Jan 28, 2025 2055 Patient presents with sudden onset of low back pain, no radiation of pain or neurodeficits.  Suspect likely muscular strain.  Patient is a chronic alcoholic and noncompliant with blood pressure medications.  Plan for basic labs, pain control, CT imaging of the lumbar spine. [EE]   2121 WBC: 6.11 [EE]   2121 Hemoglobin(!): 12.9 [EE]   2133 Ethanol(!): 12 [EE]   2133 Creatinine: 0.79 [EE]   2133 Magnesium: 1.6 [EE]   2133 Potassium: 3.5 [EE]   2221 Ethanol(!): 12 [MM]   2221 CO2(!): 21.0 [MM]   2221 Anion Gap(!): 17.0 [MM]   2222 CT of the lumbar shows degenerative changes no acute fracture or dislocation.  Multilevel degenerative  changes. [MM]   4525 Recheck of patient.  He is now tremulous and vomiting.  He states he has not had anything to drink today.  He does have a history of alcohol withdrawal.  He is mildly confused as well does not remember being here last month.  I do not believe the patient safe to go home.  He is mildly acidotic.  Plan to admit for further care. [EE]   5405 I discussed case with Carmela, nurse practitioner with [facility].  She agrees to admit to Dr. Bah [EE]      ED Course User Index  [EE] Justin Snyder PA  [MM] Aries Case MD       AS OF 23:53 EST VITALS:    BP - (!) 183/100  HR - 87  TEMP - 98.9 °F (37.2 °C) (Tympanic)  O2 SATS - 97%        DIAGNOSIS  Final diagnoses:   Alcohol withdrawal syndrome with complication   Acute midline low back pain without sciatica   Alcoholic ketoacidosis   Poorly-controlled hypertension   Noncompliance         DISPOSITION  Admitted    Dictated utilizing Dragon dictation     Justin Snyder PA  01/28/25 2541

## 2025-01-29 NOTE — ED PROVIDER NOTES
I supervised care provided by the midlevel provider.   We have discussed this patient's history, physical exam, and treatment plan.  I have reviewed the note and personally saw and examined the patient and agree with the plan of care.   This is a gentleman who has a history of alcoholism, hypertension, anxiety and noncompliance with his medicines.  He states his last drink of alcohol was last night.  Complains of some acute back pain that started in his lower lumbar spine that started just a couple hours prior to arrival here.  It is a sharp pain it is worse with any sort of movement.  He has had back pain before.  He has no saddle anesthesia he has no radiation of the pain down his legs he has no urinary or fecal incontinence or retention and has no paralysis.  Denies any abdominal pain.  He just started having some nausea and vomiting just as I came in and began to evaluate this gentleman.    GENERAL: This is a male that looks older than his stated age.  He is disheveled and poorly kept.  He has some dry heaves and vomiting.  There is no hematemesis.  He is hypertensive.  He is afebrile with normal heart rate and oxygen saturation.  HENT: nares patent  Head/neck/ face are symmetric without gross deformity or swelling  EYES: no scleral icterus  CV: regular rhythm, regular rate with intact distal pulses  RESPIRATORY: normal effort and no respiratory distress  ABDOMEN: soft and nontender  Back exam normal inspection to his back complains of pain in his lower lumbar to sacral spine.  It is midline.  There is no CVA tenderness.  There is no saddle anesthesia.  MUSCULOSKELETAL: no deformity.  5/5 strength to hip flexion, knee extension/flexion, dorsiflexion, plantarflexion, and EHL.  No pain with bilateral external and internal rotation of hips.  Intact distal pulses with no cyanosis, pallor, or temperature change on palpation. Normal inspection and palpation with soft compartments.  SILT at bilateral superficial  peroneal, deep peroneal, sural, and saphenous nerves    NEURO: alert and appropriate, moves all extremities, follows commands  SKIN: warm, dry    Vital signs and nursing notes reviewed.    Plan   ED Course as of 01/29/25 0101 Tue Jan 28, 2025 2055 Patient presents with sudden onset of low back pain, no radiation of pain or neurodeficits.  Suspect likely muscular strain.  Patient is a chronic alcoholic and noncompliant with blood pressure medications.  Plan for basic labs, pain control, CT imaging of the lumbar spine. [EE]   2121 WBC: 6.11 [EE]   2121 Hemoglobin(!): 12.9 [EE]   2133 Ethanol(!): 12 [EE]   2133 Creatinine: 0.79 [EE]   2133 Magnesium: 1.6 [EE]   2133 Potassium: 3.5 [EE]   2221 Ethanol(!): 12 [MM]   2221 CO2(!): 21.0 [MM]   2221 Anion Gap(!): 17.0 [MM]   2222 CT of the lumbar shows degenerative changes no acute fracture or dislocation.  Multilevel degenerative changes. [MM]   2255 Recheck of patient.  He is now tremulous and vomiting.  He states he has not had anything to drink today.  He does have a history of alcohol withdrawal.  He is mildly confused as well does not remember being here last month.  I do not believe the patient safe to go home.  He is mildly acidotic.  Plan to admit for further care. [EE]   2316 I discussed case with Carmela, nurse practitioner with [facility].  She agrees to admit to Dr. Bah [EE]      ED Course User Index  [EE] Justin Snyder PA  [MM] Aries Case MD Molinari, Mark, MD  01/29/25 0101

## 2025-01-29 NOTE — H&P
Patient Name:  Pro Mueller  YOB: 1955  MRN:  [id number]  Admit Date:  1/28/2025  Patient Care Team:  Provider, No Known as PCP - Say Roger MD (Psychiatry)      Subjective   History Present Illness     Chief Complaint   Patient presents with    Back Pain       Mr. Mueller is a 70 y.o. male that presents to [facility] complaining of back pain.  He has had history of back pain but presented here because of acute back pain that started on Monday.  It was a sharp pain made worse by certain movements.  No real radiation of pain or paresthesias.  No incontinence.  He also has a history of alcohol abuse last drink was the evening of 1/27.  Patient began having nausea and vomiting with tremors in the ED.      Review of Systems   Musculoskeletal:  Positive for back pain.        Personal History     Past Medical History:   Diagnosis Date    Alcohol abuse     Alcohol withdrawal 11/04/2016    Alcoholic ketoacidosis 01/12/2020    Allergic 1970    Anxiety     Arthritis     Atopic rhinitis 08/08/2016    Depression     Disease of thyroid gland     Elevated cholesterol     Encounter for removal of sutures     Genital herpes simplex 08/08/2016    GERD (gastroesophageal reflux disease)     Headache, tension-type     Hyperlipidemia     Hypertension     Hypothyroidism     Kidney stone     Low back pain 2019    Migraine     Motion sickness     Nephrolithiasis 02/12/2020    Olecranon bursitis, right elbow     Panic disorder without agoraphobia 08/08/2016    Peripheral neuropathy     Sleep apnea     Syncope and collapse 01/02/2019    Vitamin D deficiency 08/08/2016    Withdrawal symptoms, alcohol      Past Surgical History:   Procedure Laterality Date    CHOLECYSTECTOMY N/A 9/22/2024    Procedure: CHOLECYSTECTOMY LAPAROSCOPIC WITH DAVINCI ROBOT with cholangiogram, possible open;  Surgeon: Toro Noguera MD;  Location: [facility] OR;  Service: General;  Laterality: N/A;    COLONOSCOPY   "    CYST REMOVAL      CYSTOSCOPY BLADDER STONE LITHOTRIPSY  2022    ERCP N/A 2024    Procedure: ENDOSCOPIC RETROGRADE CHOLANGIOPANCREATOGRAPHY sphincterotomy, balloon sweep (9-12);  Surgeon: Fara Madrigal MD;  Location: [facility] ENDOSCOPY;  Service: Gastroenterology;  Laterality: N/A;  sphincterotomy hiatial hernia, gallstone removal    EXTRACORPOREAL SHOCK WAVE LITHOTRIPSY (ESWL) Right 2002    SHOULDER ARTHROSCOPY Right 2019    Procedure: SHOULDER ARTHROSCOPY, decompression, distal clavicle excision;  Surgeon: Aj Mancilla MD;  Location: [facility] OR [facility];  Service: Orthopedics    TONSILLECTOMY       Family History   Problem Relation Age of Onset    Alzheimer's disease Mother     Mental illness Mother     Pancreatic cancer Father     Hearing loss Father     Arthritis Maternal Grandmother     Malig Hyperthermia Neg Hx      Social History     Tobacco Use    Smoking status: Former     Current packs/day: 0.00     Average packs/day: 0.5 packs/day for 15.0 years (7.5 ttl pk-yrs)     Types: Cigarettes     Start date: 1990     Quit date: 2005     Years since quittin.0    Smokeless tobacco: Never    Tobacco comments:     caffeine - 3 cans of coke daily    Vaping Use    Vaping status: Never Used   Substance Use Topics    Alcohol use: Yes     Alcohol/week: 7.0 standard drinks of alcohol     Types: 7 Shots of liquor per week     Comment: .5 liter vodka    Drug use: Yes     Types: Methamphetamines     Comment: \"once in a rare while\"     No current facility-administered medications on file prior to encounter.     Current Outpatient Medications on File Prior to Encounter   Medication Sig Dispense Refill    acetaminophen (TYLENOL) 325 MG tablet Take 2 tablets by mouth Every 6 (Six) Hours As Needed for Mild Pain. 120 tablet 3    ferrous sulfate 325 (65 FE) MG tablet Take 1 tablet by mouth Daily With Breakfast. 30 tablet 3    folic acid (FOLVITE) 1 MG tablet Take 1 tablet by mouth Daily. 30 tablet 3    " HYDROcodone-acetaminophen (NORCO) 7.5-325 MG per tablet Take 1 tablet by mouth Every 4 (Four) Hours As Needed for Moderate Pain or Severe Pain. 12 tablet 0    levothyroxine (SYNTHROID, LEVOTHROID) 100 MCG tablet Take 1 tablet by mouth Daily. 90 tablet 1    naloxone (NARCAN) 4 MG/0.1ML nasal spray Call 911. Don't prime. [place] in 1 nostril for overdose. Repeat in 2-3 minutes in other nostril if no or minimal breathing/responsiveness. 2 each 0    sennosides-docusate (PERICOLACE) 8.6-50 MG per tablet Take 2 tablets by mouth 2 (Two) Times a Day As Needed for Constipation. 120 tablet 3    tamsulosin (FLOMAX) 0.4 MG capsule 24 hr capsule Take 1 capsule by mouth Every Night. 30 capsule 3    thiamine (VITAMIN B1) 100 MG tablet Take 1 tablet by mouth Daily. 30 tablet 3     Allergies   Allergen Reactions    Penicillins Anaphylaxis and Seizure     Seizure. childhood       Objective    Objective     Vital Signs  Temp:  [98.9 °F (37.2 °C)] 98.9 °F (37.2 °C)  Heart Rate:  [68-98] 69  Resp:  [18] 18  BP: (125-183)/() 125/76  SpO2:  [90 %-99 %] 90 %  on   ;   Device (Oxygen Therapy): room air  Body mass index is 24.41 kg/m².    Physical Exam  Vitals and nursing note reviewed.   Constitutional:       Appearance: He is well-developed. He is ill-appearing.      Comments: Unkept   HENT:      Head: Normocephalic and atraumatic.   Eyes:      General: No scleral icterus.  Cardiovascular:      Rate and Rhythm: Normal rate and regular rhythm.   Pulmonary:      Effort: Pulmonary effort is normal.   Skin:     General: Skin is warm and dry.   Neurological:      Mental Status: He is alert.      Motor: Tremor present.         Results Review:  I reviewed the patient's new clinical results.  I reviewed the patient's new imaging results and agree with the interpretation.  I reviewed the patient's other test results and agree with the interpretation  I personally viewed and interpreted the patient's EKG/Telemetry data  Discussed with ED  provider.    Lab Results (last 24 hours)       Procedure Component Value Units Date/Time    CBC & Differential [[id number]]  (Abnormal) Collected: 01/28/25 2102    Specimen: Blood Updated: 01/28/25 2112    Narrative:      The following orders were created for panel order CBC & Differential.  Procedure                               Abnormality         Status                     ---------                               -----------         ------                     CBC Auto Differential[[id number]]        Abnormal            Final result                 Please view results for these tests on the individual orders.    Comprehensive Metabolic Panel [[id number]]  (Abnormal) Collected: 01/28/25 2102    Specimen: Blood Updated: 01/28/25 2132     Glucose 103 mg/dL      BUN 5 mg/dL      Creatinine 0.79 mg/dL      Sodium 140 mmol/L      Potassium 3.5 mmol/L      Chloride 102 mmol/L      CO2 21.0 mmol/L      Calcium 9.2 mg/dL      Total Protein 7.2 g/dL      Albumin 4.2 g/dL      ALT (SGPT) 14 U/L      AST (SGOT) 41 U/L      Alkaline Phosphatase 76 U/L      Total Bilirubin 0.7 mg/dL      Globulin 3.0 gm/dL      A/G Ratio 1.4 g/dL      BUN/Creatinine Ratio 6.3     Anion Gap 17.0 mmol/L      eGFR 96.8 mL/min/1.73     Narrative:      GFR Categories in Chronic Kidney Disease (CKD)      GFR Category          GFR (mL/min/1.73)    Interpretation  G1                     90 or greater         Normal or high (1)  G2                      60-89                Mild decrease (1)  G3a                   45-59                Mild to moderate decrease  G3b                   30-44                Moderate to severe decrease  G4                    15-29                Severe decrease  G5                    14 or less           Kidney failure          (1)In the absence of evidence of kidney disease, neither GFR category G1 or G2 fulfill the criteria for CKD.    eGFR calculation 2021 CKD-EPI creatinine equation, which does not include race as a factor     Ethanol [[id number]]  (Abnormal) Collected: 01/28/25 2102    Specimen: Blood Updated: 01/28/25 2132     Ethanol 12 mg/dL      Ethanol % 0.012 %     Magnesium [[id number]]  (Normal) Collected: 01/28/25 2102    Specimen: Blood Updated: 01/28/25 2132     Magnesium 1.6 mg/dL     CBC Auto Differential [[id number]]  (Abnormal) Collected: 01/28/25 2102    Specimen: Blood Updated: 01/28/25 2112     WBC 6.11 10*3/mm3      RBC 4.12 10*6/mm3      Hemoglobin 12.9 g/dL      Hematocrit 37.9 %      MCV 92.0 fL      MCH 31.3 pg      MCHC 34.0 g/dL      RDW 14.0 %      RDW-SD 47.4 fl      MPV 8.9 fL      Platelets 368 10*3/mm3      Neutrophil % 54.2 %      Lymphocyte % 34.7 %      Monocyte % 8.7 %      Eosinophil % 1.5 %      Basophil % 0.7 %      Immature Grans % 0.2 %      Neutrophils, Absolute 3.32 10*3/mm3      Lymphocytes, Absolute 2.12 10*3/mm3      Monocytes, Absolute 0.53 10*3/mm3      Eosinophils, Absolute 0.09 10*3/mm3      Basophils, Absolute 0.04 10*3/mm3      Immature Grans, Absolute 0.01 10*3/mm3      nRBC 0.0 /100 WBC     Urinalysis With Microscopic If Indicated (No Culture) - Urine, Clean Catch [[id number]]  (Abnormal) Collected: 01/29/25 0217    Specimen: Urine, Clean Catch Updated: 01/29/25 0244     Color, UA Dark Yellow     Appearance, UA Cloudy     pH, UA 5.5     Specific Gravity, UA 1.022     Glucose, UA Negative     Ketones, UA Trace     Bilirubin, UA Negative     Blood, UA Negative     Protein, UA Trace     Leuk Esterase, UA Trace     Nitrite, UA Negative     Urobilinogen, UA 1.0 E.U./dL    Urinalysis, Microscopic Only - Urine, Clean Catch [[id number]]  (Abnormal) Collected: 01/29/25 0217    Specimen: Urine, Clean Catch Updated: 01/29/25 0240     RBC, UA 0-2 /HPF      WBC, UA 11-20 /HPF      Bacteria, UA None Seen /HPF      Squamous Epithelial Cells, UA 0-2 /HPF      Hyaline Casts, UA 0-2 /LPF      Methodology Automated Microscopy    Basic Metabolic Panel [[id number]]  (Abnormal) Collected: 01/29/25 0511     Specimen: Blood Updated: 01/29/25 0633     Glucose 99 mg/dL      BUN 6 mg/dL      Creatinine 0.73 mg/dL      Sodium 137 mmol/L      Potassium 3.7 mmol/L      Chloride 101 mmol/L      CO2 23.1 mmol/L      Calcium 8.4 mg/dL      BUN/Creatinine Ratio 8.2     Anion Gap 12.9 mmol/L      eGFR 99.1 mL/min/1.73     Narrative:      GFR Categories in Chronic Kidney Disease (CKD)      GFR Category          GFR (mL/min/1.73)    Interpretation  G1                     90 or greater         Normal or high (1)  G2                      60-89                Mild decrease (1)  G3a                   45-59                Mild to moderate decrease  G3b                   30-44                Moderate to severe decrease  G4                    15-29                Severe decrease  G5                    14 or less           Kidney failure          (1)In the absence of evidence of kidney disease, neither GFR category G1 or G2 fulfill the criteria for CKD.    eGFR calculation 2021 CKD-EPI creatinine equation, which does not include race as a factor    CBC (No Diff) [[id number]]  (Abnormal) Collected: 01/29/25 0556    Specimen: Blood Updated: 01/29/25 0609     WBC 6.55 10*3/mm3      RBC 3.52 10*6/mm3      Hemoglobin 11.1 g/dL      Hematocrit 32.4 %      MCV 92.0 fL      MCH 31.5 pg      MCHC 34.3 g/dL      RDW 13.9 %      RDW-SD 46.8 fl      MPV 9.0 fL      Platelets 267 10*3/mm3             Imaging Results (Last 24 Hours)       Procedure Component Value Units Date/Time    CT Lumbar Spine Without Contrast [[id number]] Collected: 01/28/25 2149     Updated: 01/28/25 2157    Narrative:      CT LUMBAR SPINE without contrast..     HISTORY: Sudden onset low back pain.     TECHNIQUE: Routine lumbar spine CT without contrast..   Radiation dose  reduction techniques were utilized, including automated exposure  control, and exposure modulation based on body size.     COMPARISON: Lumbar [place] [address].     FINDINGS:     Redemonstrated grade 1  anterolisthesis at L4-5, unchanged..  There is no  bone erosion or destruction, or evidence of acute or chronic fracture.    The paraspinous soft tissues are unremarkable.     T12-L1: There is no substantial canal or foraminal compromise.     L1-2: There is no substantial canal or foraminal compromise.     L2-3: There is no canal stenosis. The right foramen is normal. There is  borderline degenerative left foraminal narrowing.     L3-4: There is a disc bulge and facet arthropathy, with minimal if any  canal narrowing. There is no left and borderline right foraminal  narrowing.     L4-5: There is anterolisthesis and pseudodisc bulge and facet  arthropathy. There is moderate central canal stenosis, and moderate  bilateral foraminal stenosis.     L5-S1: There is no disc bulge or protrusion. There is left greater than  right facet arthropathy. There is no canal stenosis. There is no left  and mild right foraminal stenosis.       Impression:         Lower lumbar degenerative changes, no acute appearing abnormality, no  substantial interval change since 12/2/2024.              This report was finalized on 1/28/2025 9:54 PM by Dr. Carlos Constantino M.D on Workstation: [id number]               Results for orders placed during the hospital encounter of 12/02/24    Adult Transthoracic Echo Complete W/ Cont if Necessary Per Protocol    Interpretation Summary    Left ventricular systolic function is hyperdynamic (EF > 70%). Calculated left ventricular EF = 72.8%    Left ventricular diastolic function is consistent with (grade I) impaired relaxation.    Estimated right ventricular systolic pressure from tricuspid regurgitation is normal (<35 mmHg).    No orders to display     Assessment/Plan   Assessment & Plan   Active Hospital Problems    Diagnosis  POA    Lumbar degenerative disc disease [M51.369]  Yes    Alcoholic liver disease [K70.9]  Yes    Alcohol dependence with withdrawal [F10.239]  Yes    Chronic low back pain  [M54.50, G89.29]  Yes    Hypothyroidism [E03.9]  Yes    Hypertension [I10]  Yes      Resolved Hospital Problems   No resolved problems to display.       70 y.o. male admitted with <principal problem not specified>.    CT scan of his lumbar spine showed chronic degenerative changes but nothing acute.  His acute back pain has since resolved.  Patient admitted due to concerns of alcohol withdrawals.  He will be admitted to the hospital and placed on CIWA protocol.  Will have Access Center see him.  Monitor labs.  Will get PT/OT.      SCDs for DVT prophylaxis.  Full code.  Discussed with patient and ED provider.      Neftali Billings MD  [facility]  01/29/25  07:46 EST     02:00

## 2025-01-29 NOTE — PROGRESS NOTES
Clinical Pharmacy Services: Medication History    Pro Mueller is a 70 y.o. male presenting to [facility] for   Chief Complaint   Patient presents with    Back Pain       He  has a past medical history of Alcohol abuse, Alcohol withdrawal (11/04/2016), Alcoholic ketoacidosis (01/12/2020), Allergic (1970), Anxiety, Arthritis, Atopic rhinitis (08/08/2016), Depression, Disease of thyroid gland, Elevated cholesterol, Encounter for removal of sutures, Genital herpes simplex (08/08/2016), GERD (gastroesophageal reflux disease), Headache, tension-type, Hyperlipidemia, Hypertension, Hypothyroidism, Kidney stone, Low back pain (2019), Migraine, Motion sickness, Nephrolithiasis (02/12/2020), Olecranon bursitis, right elbow, Panic disorder without agoraphobia (08/08/2016), Peripheral neuropathy, Sleep apnea, Syncope and collapse (01/02/2019), Vitamin D deficiency (08/08/2016), and Withdrawal symptoms, alcohol.    Allergies as of 01/28/2025 - Reviewed 01/28/2025   Allergen Reaction Noted    Penicillins Anaphylaxis and Seizure 01/04/2016       Medication information was obtained from: Patient   Pharmacy and Phone Number:     Prior to Admission Medications       Prescriptions Last Dose Informant Patient Reported? Taking?    ferrous sulfate 325 (65 FE) MG tablet Not Taking  No No    Take 1 tablet by mouth Daily With Breakfast.    Patient not taking:  Reported on 1/29/2025    folic acid (FOLVITE) 1 MG tablet Not Taking  No No    Take 1 tablet by mouth Daily.    Patient not taking:  Reported on 1/29/2025    levothyroxine (SYNTHROID, LEVOTHROID) 100 MCG tablet Not Taking Self No No    Take 1 tablet by mouth Daily.    Patient not taking:  Reported on 1/29/2025    tamsulosin (FLOMAX) 0.4 MG capsule 24 hr capsule Not Taking  No No    Take 1 capsule by mouth Every Night.    Patient not taking:  Reported on 1/29/2025    thiamine (VITAMIN B1) 100 MG tablet Not Taking  No No    Take 1 tablet by mouth Daily.    Patient not  taking:  Reported on 1/29/2025              Medication notes: Patient states he is not taking any medications at this time. Patient states he does not have a PCP to get refills from.     This medication list is complete to the best of my knowledge as of 1/29/2025    Please call if questions.    Clement aHre  Medication History Technician  [phone]    1/29/2025 08:14 EST

## 2025-01-29 NOTE — ED TRIAGE NOTES
"Pt arrives from home via EMS.    EMS states \"Pt reports lower back pain that started today, denies any known injury. He reports that it feels like someone is stabbing him with an ice pick.\"  "

## 2025-01-29 NOTE — CONSULTS
"Access center consult for 70 year old male seen in ED bed 7 for ETOH. Pt very familiar to the Access center and was recently seen 12/7/24 for same. See prior notes for full H&P. Pt states he is \"not doing well\" and gestures to himself and states \"well I'm here again\". He states he is \"tired and I don't really want to get into it all right now\". He voices that he feels safe at home and he denies SI, HI, AVH. States he feels safe in the hospital. States he is disappointed in himself to be in the hospital for withdrawal again. When asked if he has thought about goals he states \"that's the furthest thing from my mind right now\". Will place pt on follow up list and discuss when he is settled in his room. Will follow.   "

## 2025-01-30 LAB
25(OH)D3 SERPL-MCNC: 10.5 NG/ML (ref 30–100)
ALBUMIN SERPL-MCNC: 3.5 G/DL (ref 3.5–5.2)
ANION GAP SERPL CALCULATED.3IONS-SCNC: 8 MMOL/L (ref 5–15)
BUN SERPL-MCNC: 7 MG/DL (ref 8–23)
BUN/CREAT SERPL: 9.7 (ref 7–25)
CALCIUM SPEC-SCNC: 8.5 MG/DL (ref 8.6–10.5)
CHLORIDE SERPL-SCNC: 106 MMOL/L (ref 98–107)
CO2 SERPL-SCNC: 23 MMOL/L (ref 22–29)
CREAT SERPL-MCNC: 0.72 MG/DL (ref 0.76–1.27)
DEPRECATED RDW RBC AUTO: 44.7 FL (ref 37–54)
EGFRCR SERPLBLD CKD-EPI 2021: 99.5 ML/MIN/1.73
ERYTHROCYTE [DISTWIDTH] IN BLOOD BY AUTOMATED COUNT: 13.4 % (ref 12.3–15.4)
GLUCOSE SERPL-MCNC: 95 MG/DL (ref 65–99)
HCT VFR BLD AUTO: 32.2 % (ref 37.5–51)
HGB BLD-MCNC: 10.9 G/DL (ref 13–17.7)
MCH RBC QN AUTO: 30.8 PG (ref 26.6–33)
MCHC RBC AUTO-ENTMCNC: 33.9 G/DL (ref 31.5–35.7)
MCV RBC AUTO: 91 FL (ref 79–97)
PHOSPHATE SERPL-MCNC: 3.9 MG/DL (ref 2.5–4.5)
PLATELET # BLD AUTO: 266 10*3/MM3 (ref 140–450)
PMV BLD AUTO: 9.4 FL (ref 6–12)
POTASSIUM SERPL-SCNC: 3.6 MMOL/L (ref 3.5–5.2)
RBC # BLD AUTO: 3.54 10*6/MM3 (ref 4.14–5.8)
SODIUM SERPL-SCNC: 137 MMOL/L (ref 136–145)
WBC NRBC COR # BLD AUTO: 5.01 10*3/MM3 (ref 3.4–10.8)

## 2025-01-30 PROCEDURE — 82306 VITAMIN D 25 HYDROXY: CPT | Performed by: HOSPITALIST

## 2025-01-30 PROCEDURE — 97161 PT EVAL LOW COMPLEX 20 MIN: CPT

## 2025-01-30 PROCEDURE — 94799 UNLISTED PULMONARY SVC/PX: CPT

## 2025-01-30 PROCEDURE — 96376 TX/PRO/DX INJ SAME DRUG ADON: CPT

## 2025-01-30 PROCEDURE — 80069 RENAL FUNCTION PANEL: CPT | Performed by: HOSPITALIST

## 2025-01-30 PROCEDURE — 25010000002 THIAMINE PER 100 MG: Performed by: INTERNAL MEDICINE

## 2025-01-30 PROCEDURE — 85027 COMPLETE CBC AUTOMATED: CPT | Performed by: HOSPITALIST

## 2025-01-30 PROCEDURE — 94761 N-INVAS EAR/PLS OXIMETRY MLT: CPT

## 2025-01-30 PROCEDURE — G0378 HOSPITAL OBSERVATION PER HR: HCPCS

## 2025-01-30 RX ADMIN — THIAMINE HYDROCHLORIDE 200 MG: 100 INJECTION, SOLUTION INTRAMUSCULAR; INTRAVENOUS at 13:58

## 2025-01-30 RX ADMIN — ACETAMINOPHEN 650 MG: 325 TABLET, FILM COATED ORAL at 20:37

## 2025-01-30 RX ADMIN — FOLIC ACID 1 MG: 5 INJECTION, SOLUTION INTRAMUSCULAR; INTRAVENOUS; SUBCUTANEOUS at 09:35

## 2025-01-30 RX ADMIN — MUPIROCIN 1 APPLICATION: 20 OINTMENT TOPICAL at 20:36

## 2025-01-30 RX ADMIN — Medication 1 TABLET: at 09:35

## 2025-01-30 RX ADMIN — THIAMINE HYDROCHLORIDE 200 MG: 100 INJECTION, SOLUTION INTRAMUSCULAR; INTRAVENOUS at 06:26

## 2025-01-30 RX ADMIN — MUPIROCIN 1 APPLICATION: 20 OINTMENT TOPICAL at 00:58

## 2025-01-30 RX ADMIN — THIAMINE HYDROCHLORIDE 200 MG: 100 INJECTION, SOLUTION INTRAMUSCULAR; INTRAVENOUS at 21:07

## 2025-01-30 RX ADMIN — MUPIROCIN 1 APPLICATION: 20 OINTMENT TOPICAL at 09:35

## 2025-01-30 NOTE — PLAN OF CARE
Goal Outcome Evaluation:  Plan of Care Reviewed With: patient           Outcome Evaluation: Pt is a 70 YOM who presents to [facility] complaining of back pain.  He has had history of back pain but presented here because of acute back pain that started on Monday.  It was a sharp pain made worse by certain movements.  He also has a history of alcohol abuse last drink was the evening of 1/27.  Patient began having nausea and vomiting with tremors in the ED. Before admission pt was independent and lives in a 2 story house and has 3 steps to enter. Pt states he uses RW intermittently out in public. Pt states he lives alone. Today, pt performed bed mobility w/ SBA. Pt performed 300 ft of gait w/ RW w/ SBA/CGA. Pt demo good mobility and B LE strength during gait. Pt has no concerns for home environment. Pt is moving great and is cleared from a PT perspective. D/c Recommendation home. PT is signing off.    Anticipated Discharge Disposition (PT): home

## 2025-01-30 NOTE — PLAN OF CARE
Problem: Adult Inpatient Plan of Care  Goal: Absence of Hospital-Acquired Illness or Injury  Intervention: Identify and Manage Fall Risk  Recent Flowsheet Documentation  Taken 1/29/2025 1800 by Jayden Harrell RN  Safety Promotion/Fall Prevention:   activity supervised   fall prevention program maintained   nonskid shoes/slippers when out of bed   room organization consistent   safety round/check completed   clutter free environment maintained  Taken 1/29/2025 1600 by Jayden Harrell RN  Safety Promotion/Fall Prevention:   activity supervised   fall prevention program maintained   nonskid shoes/slippers when out of bed   room organization consistent   safety round/check completed   clutter free environment maintained  Intervention: Prevent Skin Injury  Recent Flowsheet Documentation  Taken 1/29/2025 1800 by Jayden Harrell RN  Body Position: position changed independently  Taken 1/29/2025 1600 by Jayden Harrell RN  Body Position: position changed independently  Skin Protection: incontinence pads utilized  Intervention: Prevent and Manage VTE (Venous Thromboembolism) Risk  Recent Flowsheet Documentation  Taken 1/29/2025 1600 by Jayden Harrell RN  VTE Prevention/Management:   other (see comments)   bilateral  Intervention: Prevent Infection  Recent Flowsheet Documentation  Taken 1/29/2025 1800 by Jayden Harrell RN  Infection Prevention: single patient room provided  Taken 1/29/2025 1600 by Jayden Harrell RN  Infection Prevention: single patient room provided  Goal: Optimal Comfort and Wellbeing  Intervention: Monitor Pain and Promote Comfort  Recent Flowsheet Documentation  Taken 1/29/2025 1600 by Jayden Harrell RN  Pain Management Interventions: pain medication given  Intervention: Provide Person-Centered Care  Recent Flowsheet Documentation  Taken 1/29/2025 1600 by Jayden Harrell RN  Trust Relationship/Rapport:   care explained   choices provided   emotional support  provided   empathic listening provided   questions encouraged   questions answered   reassurance provided   thoughts/feelings acknowledged  Goal: Readiness for Transition of Care  Intervention: Mutually Develop Transition Plan  Recent Flowsheet Documentation  Taken 1/29/2025 1603 by Jayden Harrell RN  Equipment Currently Used at Home: (sometimes uses walker)   walker, rolling   other (see comments)   Goal Outcome Evaluation:   Patient came into the Unit around 1600hrs from the ED. His last CIWA score was 2. He got up to the rest room with assist. His general condition was stable.

## 2025-01-30 NOTE — PROGRESS NOTES
"DAILY PROGRESS NOTE  [facility]    Patient Identification:  Name: Pro Mueller  Age: 70 y.o.  Sex: male  :  1955  MRN: [id number]         Primary Care Physician: Provider, No Known    Subjective:  Interval History: He says he is feeling better and wants to go home.    Objective:    Scheduled Meds:folic acid 1 mg in sodium chloride 0.9 % 50 mL IVPB, 1 mg, Intravenous, Daily  multivitamin with minerals, 1 tablet, Oral, Daily  mupirocin, 1 Application, Each Nare, BID  thiamine (B-1) IV, 200 mg, Intravenous, Q8H   Followed by  [START ON 2/3/2025] thiamine, 100 mg, Oral, Daily      Continuous Infusions:     Vital signs in last 24 hours:  Temp:  [98.2 °F (36.8 °C)-98.7 °F (37.1 °C)] 98.7 °F (37.1 °C)  Heart Rate:  [79-87] 87  Resp:  [15-16] 15  BP: (120-147)/(72-93) 123/93    Intake/Output:    Intake/Output Summary (Last 24 hours) at 2025 1713  Last data filed at 2025 1400  Gross per 24 hour   Intake 1200 ml   Output --   Net 1200 ml       Exam:  /93 (BP Location: Left arm, Patient Position: Lying)   Pulse 87   Temp 98.7 °F (37.1 °C) (Oral)   Resp 15   Ht 182.9 cm (72\")   Wt 80.8 kg (178 lb 2.1 oz)   SpO2 92%   BMI 24.16 kg/m²     General Appearance:    Alert, cooperative, no distress   Head:    Normocephalic, without obvious abnormality, atraumatic   Eyes:       Throat:   Lips, tongue, gums normal   Neck:   Supple, symmetrical, trachea midline, no JVD   Lungs:     Clear to auscultation bilaterally, respirations unlabored   Chest Wall:    No tenderness or deformity    Heart:    Regular rate and rhythm, S1 and S2 normal, no murmur,no  rub or gallop   Abdomen:     Soft, nontender, bowel sounds active, no masses, no organomegaly    Extremities:   Extremities normal, atraumatic, no cyanosis or edema   Pulses:      Skin:   Skin is warm and dry,  no rashes or palpable lesions   Neurologic:   no focal deficits noted      Lab Results (last 72 hours)       Procedure Component Value " Units Date/Time    Vitamin D,25-Hydroxy [[id number]]  (Abnormal) Collected: 01/30/25 0356    Specimen: Blood Updated: 01/30/25 0515     25 Hydroxy, Vitamin D 10.5 ng/ml     Narrative:      Reference Range for Total Vitamin D 25(OH)     Deficiency <20.0 ng/mL   Insufficiency 21-29 ng/mL   Sufficiency  ng/mL  Toxicity >100 ng/ml      Renal Function Panel [[id number]]  (Abnormal) Collected: 01/30/25 0356    Specimen: Blood Updated: 01/30/25 0500     Glucose 95 mg/dL      BUN 7 mg/dL      Creatinine 0.72 mg/dL      Sodium 137 mmol/L      Potassium 3.6 mmol/L      Chloride 106 mmol/L      CO2 23.0 mmol/L      Calcium 8.5 mg/dL      Albumin 3.5 g/dL      Phosphorus 3.9 mg/dL      Anion Gap 8.0 mmol/L      BUN/Creatinine Ratio 9.7     eGFR 99.5 mL/min/1.73     Narrative:      GFR Categories in Chronic Kidney Disease (CKD)      GFR Category          GFR (mL/min/1.73)    Interpretation  G1                     90 or greater         Normal or high (1)  G2                      60-89                Mild decrease (1)  G3a                   45-59                Mild to moderate decrease  G3b                   30-44                Moderate to severe decrease  G4                    15-29                Severe decrease  G5                    14 or less           Kidney failure          (1)In the absence of evidence of kidney disease, neither GFR category G1 or G2 fulfill the criteria for CKD.    eGFR calculation 2021 CKD-EPI creatinine equation, which does not include race as a factor    CBC (No Diff) [[id number]]  (Abnormal) Collected: 01/30/25 0356    Specimen: Blood Updated: 01/30/25 0438     WBC 5.01 10*3/mm3      RBC 3.54 10*6/mm3      Hemoglobin 10.9 g/dL      Hematocrit 32.2 %      MCV 91.0 fL      MCH 30.8 pg      MCHC 33.9 g/dL      RDW 13.4 %      RDW-SD 44.7 fl      MPV 9.4 fL      Platelets 266 10*3/mm3     Basic Metabolic Panel [[id number]]  (Abnormal) Collected: 01/29/25 0556    Specimen: Blood Updated: 01/29/25 0633      Glucose 99 mg/dL      BUN 6 mg/dL      Creatinine 0.73 mg/dL      Sodium 137 mmol/L      Potassium 3.7 mmol/L      Chloride 101 mmol/L      CO2 23.1 mmol/L      Calcium 8.4 mg/dL      BUN/Creatinine Ratio 8.2     Anion Gap 12.9 mmol/L      eGFR 99.1 mL/min/1.73     Narrative:      GFR Categories in Chronic Kidney Disease (CKD)      GFR Category          GFR (mL/min/1.73)    Interpretation  G1                     90 or greater         Normal or high (1)  G2                      60-89                Mild decrease (1)  G3a                   45-59                Mild to moderate decrease  G3b                   30-44                Moderate to severe decrease  G4                    15-29                Severe decrease  G5                    14 or less           Kidney failure          (1)In the absence of evidence of kidney disease, neither GFR category G1 or G2 fulfill the criteria for CKD.    eGFR calculation 2021 CKD-EPI creatinine equation, which does not include race as a factor    CBC (No Diff) [[id number]]  (Abnormal) Collected: 01/29/25 0556    Specimen: Blood Updated: 01/29/25 0609     WBC 6.55 10*3/mm3      RBC 3.52 10*6/mm3      Hemoglobin 11.1 g/dL      Hematocrit 32.4 %      MCV 92.0 fL      MCH 31.5 pg      MCHC 34.3 g/dL      RDW 13.9 %      RDW-SD 46.8 fl      MPV 9.0 fL      Platelets 267 10*3/mm3     Urinalysis With Microscopic If Indicated (No Culture) - Urine, Clean Catch [[id number]]  (Abnormal) Collected: 01/29/25 0217    Specimen: Urine, Clean Catch Updated: 01/29/25 0244     Color, UA Dark Yellow     Appearance, UA Cloudy     pH, UA 5.5     Specific Gravity, UA 1.022     Glucose, UA Negative     Ketones, UA Trace     Bilirubin, UA Negative     Blood, UA Negative     Protein, UA Trace     Leuk Esterase, UA Trace     Nitrite, UA Negative     Urobilinogen, UA 1.0 E.U./dL    Urinalysis, Microscopic Only - Urine, Clean Catch [[id number]]  (Abnormal) Collected: 01/29/25 0217    Specimen: Urine, Clean  Catch Updated: 01/29/25 0240     RBC, UA 0-2 /HPF      WBC, UA 11-20 /HPF      Bacteria, UA None Seen /HPF      Squamous Epithelial Cells, UA 0-2 /HPF      Hyaline Casts, UA 0-2 /LPF      Methodology Automated Microscopy    Comprehensive Metabolic Panel [[id number]]  (Abnormal) Collected: 01/28/25 2102    Specimen: Blood Updated: 01/28/25 2132     Glucose 103 mg/dL      BUN 5 mg/dL      Creatinine 0.79 mg/dL      Sodium 140 mmol/L      Potassium 3.5 mmol/L      Chloride 102 mmol/L      CO2 21.0 mmol/L      Calcium 9.2 mg/dL      Total Protein 7.2 g/dL      Albumin 4.2 g/dL      ALT (SGPT) 14 U/L      AST (SGOT) 41 U/L      Alkaline Phosphatase 76 U/L      Total Bilirubin 0.7 mg/dL      Globulin 3.0 gm/dL      A/G Ratio 1.4 g/dL      BUN/Creatinine Ratio 6.3     Anion Gap 17.0 mmol/L      eGFR 96.8 mL/min/1.73     Narrative:      GFR Categories in Chronic Kidney Disease (CKD)      GFR Category          GFR (mL/min/1.73)    Interpretation  G1                     90 or greater         Normal or high (1)  G2                      60-89                Mild decrease (1)  G3a                   45-59                Mild to moderate decrease  G3b                   30-44                Moderate to severe decrease  G4                    15-29                Severe decrease  G5                    14 or less           Kidney failure          (1)In the absence of evidence of kidney disease, neither GFR category G1 or G2 fulfill the criteria for CKD.    eGFR calculation 2021 CKD-EPI creatinine equation, which does not include race as a factor    Ethanol [[id number]]  (Abnormal) Collected: 01/28/25 2102    Specimen: Blood Updated: 01/28/25 2132     Ethanol 12 mg/dL      Ethanol % 0.012 %     Magnesium [[id number]]  (Normal) Collected: 01/28/25 2102    Specimen: Blood Updated: 01/28/25 2132     Magnesium 1.6 mg/dL     CBC & Differential [[id number]]  (Abnormal) Collected: 01/28/25 2102    Specimen: Blood Updated: 01/28/25 2112     Narrative:      The following orders were created for panel order CBC & Differential.  Procedure                               Abnormality         Status                     ---------                               -----------         ------                     CBC Auto Differential[[id number]]        Abnormal            Final result                 Please view results for these tests on the individual orders.    CBC Auto Differential [[id number]]  (Abnormal) Collected: 01/28/25 2102    Specimen: Blood Updated: 01/28/25 2112     WBC 6.11 10*3/mm3      RBC 4.12 10*6/mm3      Hemoglobin 12.9 g/dL      Hematocrit 37.9 %      MCV 92.0 fL      MCH 31.3 pg      MCHC 34.0 g/dL      RDW 14.0 %      RDW-SD 47.4 fl      MPV 8.9 fL      Platelets 368 10*3/mm3      Neutrophil % 54.2 %      Lymphocyte % 34.7 %      Monocyte % 8.7 %      Eosinophil % 1.5 %      Basophil % 0.7 %      Immature Grans % 0.2 %      Neutrophils, Absolute 3.32 10*3/mm3      Lymphocytes, Absolute 2.12 10*3/mm3      Monocytes, Absolute 0.53 10*3/mm3      Eosinophils, Absolute 0.09 10*3/mm3      Basophils, Absolute 0.04 10*3/mm3      Immature Grans, Absolute 0.01 10*3/mm3      nRBC 0.0 /100 WBC           Data Review:  Results from last 7 days   Lab Units 01/30/25 0356 01/29/25  0556 01/28/25 2102   SODIUM mmol/L 137 137 140   POTASSIUM mmol/L 3.6 3.7 3.5   CHLORIDE mmol/L 106 101 102   CO2 mmol/L 23.0 23.1 21.0*   BUN mg/dL 7* 6* 5*   CREATININE mg/dL 0.72* 0.73* 0.79   GLUCOSE mg/dL 95 99 103*   CALCIUM mg/dL 8.5* 8.4* 9.2     Results from last 7 days   Lab Units 01/30/25 0356 01/29/25  0556 01/28/25 2102   WBC 10*3/mm3 5.01 6.55 6.11   HEMOGLOBIN g/dL 10.9* 11.1* 12.9*   HEMATOCRIT % 32.2* 32.4* 37.9   PLATELETS 10*3/mm3 266 267 368             Lab Results   Lab Value Date/Time    TROPONINT 21 12/03/2024 0553    TROPONINT 21 12/02/2024 1800    TROPONINT 22 (H) 12/02/2024 1453    TROPONINT 13 09/03/2024 1520    TROPONINT 15 09/03/2024 1256    TROPONINT  "17 05/11/2024 2324    TROPONINT 12 02/24/2024 1650    TROPONINT <0.010 07/01/2022 1139    TROPONINT <0.010 06/30/2022 2316    TROPONINT <0.010 01/07/2022 0434    TROPONINT <0.010 01/25/2021 0930    TROPONINT <0.010 01/11/2020 1715    TROPONINT <0.010 01/02/2019 1250         Results from last 7 days   Lab Units 01/28/25  2102   ALK PHOS U/L 76   BILIRUBIN mg/dL 0.7   ALT (SGPT) U/L 14   AST (SGOT) U/L 41*             No results found for: \"POCGLU\"        Past Medical History:   Diagnosis Date    Alcohol abuse     Alcohol withdrawal 11/04/2016    Alcoholic ketoacidosis 01/12/2020    Allergic 1970    Anxiety     Arthritis     Atopic rhinitis 08/08/2016    Depression     Disease of thyroid gland     Elevated cholesterol     Encounter for removal of sutures     Genital herpes simplex 08/08/2016    GERD (gastroesophageal reflux disease)     Headache, tension-type     Hyperlipidemia     Hypertension     Hypothyroidism     Kidney stone     Low back pain 2019    Migraine     Motion sickness     Nephrolithiasis 02/12/2020    Olecranon bursitis, right elbow     Panic disorder without agoraphobia 08/08/2016    Peripheral neuropathy     Sleep apnea     Syncope and collapse 01/02/2019    Vitamin D deficiency 08/08/2016    Withdrawal symptoms, alcohol        Assessment:  Active Hospital Problems    Diagnosis  POA    **Alcohol dependence with withdrawal [F10.239]  Yes    Lumbar degenerative disc disease [M51.369]  Yes    Alcoholic liver disease [K70.9]  Yes    Chronic low back pain [M54.50, G89.29]  Yes    Hypothyroidism [E03.9]  Yes    Hypertension [I10]  Yes      Resolved Hospital Problems   No resolved problems to display.       Plan:  He is feeling better and did well with physical therapy but still needs to use a walker.  Not much alcohol withdrawal symptoms.  Will watch overnight to remain stable probably can go home tomorrow.  He needs to stop drinking but I am not sure he is ready for this yet.    Rafita Jules, " MD  1/30/2025  17:13 EST

## 2025-01-30 NOTE — NURSING NOTE
Access center follow up regarding ETOH: last CIWA score of 2. Pt appeared to be resting. Will discuss pt's treatment goals when awake. No acute changes reported overnight. Following.

## 2025-01-30 NOTE — CASE MANAGEMENT/SOCIAL WORK
Discharge Planning Assessment  [facility]     Patient Name: Pro Muleler  MRN: [id number]  Today's Date: 1/30/2025    Admit Date: 1/28/2025    Plan: Pending clinical progress. PT/OT pending. Access team following.   Discharge Needs Assessment       Row Name 01/30/25 1139       Living Environment    People in Home alone    Current Living Arrangements home    Potentially Unsafe Housing Conditions none    In the past 12 months has the electric, gas, oil, or water company threatened to shut off services in your home? No    Primary Care Provided by self    Provides Primary Care For no one    Family Caregiver if Needed none    Quality of Family Relationships helpful    Able to Return to Prior Arrangements yes       Resource/Environmental Concerns    Transportation Concerns none       Transportation Needs    In the past 12 months, has lack of transportation kept you from medical appointments or from getting medications? no    In the past 12 months, has lack of transportation kept you from meetings, work, or from getting things needed for daily living? No       Food Insecurity    Within the past 12 months, you worried that your food would run out before you got the money to buy more. Never true    Within the past 12 months, the food you bought just didn't last and you didn't have money to get more. Never true       Transition Planning    Patient/Family Anticipates Transition to home       Discharge Needs Assessment    Equipment Currently Used at Home walker, rolling    Concerns to be Addressed discharge planning    Anticipated Changes Related to Illness inability to care for self    Equipment Needed After Discharge other (see comments)  TBD    Discharge Facility/Level of Care Needs other (see comments)  TBD    Provided Post Acute Provider List? Yes    Post Acute Provider List DME Supplier;Home Health;Inpatient Rehab;Nursing Home    Provided Post Acute Provider Quality & Resource List? Yes    Post Acute Provider Quality  and Resource List Home Health;Inpatient Rehab;Nursing Home    Delivered To Patient    Method of Delivery In person    Offered/Gave Vendor List yes    Current Discharge Risk substance use/abuse                   Discharge Plan       Row Name 01/30/25 1148       Plan    Plan Pending clinical progress. PT/OT pending. Access team following.    Patient/Family in Agreement with Plan yes    Plan Comments Spoke with patient at bedside. Facesheet, PCP and pharmacy verified. Patient lives alone, uses a rolling walker. [facility] He denies HH at this time. PT/OT pending recommendations and access team is following.                  Continued Care and Services - Admitted Since 1/28/2025    No active coordination exists for this encounter.       Selected Continued Care - Prior Encounters Includes continued care and service providers with selected services from prior encounters from 10/30/2024 to 1/30/2025      Discharged on 12/10/2024 Admission date: 12/2/2024 - Discharge disposition: Skilled Nursing Facility (DC - External)      Destination       Service Provider Services Address Phone Fax Patient Preferred    [address] [address] [phone] --                             Demographic Summary       Row Name 01/30/25 1132       General Information    Referral Source admission list    Reason for Consult discharge planning    Preferred Language English      Row Name 01/30/25 1129       General Information    Admission Type observation    Arrived From emergency department    Required Notices Provided Observation Status Notice                   Functional Status       Row Name 01/30/25 1137       Functional Status    Usual Activity Tolerance moderate    Current Activity Tolerance fair       Functional Status, IADL    Medications independent    Meal Preparation independent    Housekeeping independent     Laundry independent    Shopping independent       Mental Status    General Appearance WDL WDL       Mental Status Summary    Recent Changes in Mental Status/Cognitive Functioning no changes                               Kathy Land RN

## 2025-01-30 NOTE — THERAPY EVALUATION
Patient Name: Pro Mueller  : 1955    MRN: [id number]                              Today's Date: 2025       Admit Date: 2025    Visit Dx:     ICD-10-CM ICD-9-CM   1. Alcohol withdrawal syndrome with complication  F10.939 291.81   2. Acute midline low back pain without sciatica  M54.50 724.2   3. Alcoholic ketoacidosis  E87.29 276.2   4. Poorly-controlled hypertension  I10 401.9   5. Noncompliance  Z91.199 V15.81     Patient Active Problem List   Diagnosis    Fall    Alcoholism in recovery    Atopic rhinitis    Mixed anxiety depressive disorder    Genital herpes simplex    Hyperlipidemia    Hypertension    Hypothyroidism    Insomnia    Panic disorder without agoraphobia    Persistent insomnia    Vitamin D deficiency    Chronic low back pain    Neuropathy involving both lower extremities    Abnormal EKG    Left shoulder pain    Nausea and vomiting    Pancytopenia    Left ventricular diastolic dysfunction    Tremor    C5 cervical fracture    Syncope and collapse    Neck pain    Alcoholic intoxication with complication    Withdrawal symptoms, alcohol    Transaminitis    Lumbar facet arthropathy    Alcohol dependence    Midline low back pain with right-sided sciatica    Spondylolisthesis at L4-L5 level    Lumbar canal stenosis    Alcohol cessation counseling    Need for assistance due to reduced mobility    Impaired mobility and ADLs    Alcohol withdrawal syndrome without complication    Facial laceration    Orthostatic hypotension    Acute bacterial conjunctivitis of right eye    Visit for suture removal    Renal calculi    Hepatic steatosis    Alcohol withdrawal syndrome with complication    Macrocytosis without anemia    Lumbosacral spondylosis without myelopathy    Elevated LFTs    Alcohol-induced acute pancreatitis, unspecified complication status    Pancreatitis    Generalized abdominal pain    Alcohol dependence with withdrawal    Acute alcohol intoxication in patient with alcoholism with blood  alcohol level 0.08 to 0.29, with unspecified complication    Calculus of gallbladder with cholecystitis without biliary obstruction    Alcoholic liver disease    Iron deficiency anemia    Folate deficiency anemia    PVC's (premature ventricular contractions)    Weakness    Lumbar degenerative disc disease     Past Medical History:   Diagnosis Date    Alcohol abuse     Alcohol withdrawal 11/04/2016    Alcoholic ketoacidosis 01/12/2020    Allergic 1970    Anxiety     Arthritis     Atopic rhinitis 08/08/2016    Depression     Disease of thyroid gland     Elevated cholesterol     Encounter for removal of sutures     Genital herpes simplex 08/08/2016    GERD (gastroesophageal reflux disease)     Headache, tension-type     Hyperlipidemia     Hypertension     Hypothyroidism     Kidney stone     Low back pain 2019    Migraine     Motion sickness     Nephrolithiasis 02/12/2020    Olecranon bursitis, right elbow     Panic disorder without agoraphobia 08/08/2016    Peripheral neuropathy     Sleep apnea     Syncope and collapse 01/02/2019    Vitamin D deficiency 08/08/2016    Withdrawal symptoms, alcohol      Past Surgical History:   Procedure Laterality Date    CHOLECYSTECTOMY N/A 9/22/2024    Procedure: CHOLECYSTECTOMY LAPAROSCOPIC WITH DAVINCI ROBOT with cholangiogram, possible open;  Surgeon: Toro Noguera MD;  Location: [facility] MAIN OR;  Service: General;  Laterality: N/A;    COLONOSCOPY      CYST REMOVAL      CYSTOSCOPY BLADDER STONE LITHOTRIPSY  02/2022    ERCP N/A 9/23/2024    Procedure: ENDOSCOPIC RETROGRADE CHOLANGIOPANCREATOGRAPHY sphincterotomy, balloon sweep (9-12);  Surgeon: Fara Madrigal MD;  Location: [facility] ENDOSCOPY;  Service: Gastroenterology;  Laterality: N/A;  sphincterotomy hiatial hernia, gallstone removal    EXTRACORPOREAL SHOCK WAVE LITHOTRIPSY (ESWL) Right 2002    SHOULDER ARTHROSCOPY Right 12/17/2019    Procedure: SHOULDER ARTHROSCOPY, decompression, distal clavicle excision;  Surgeon: Laurent  Aj BELTRÁN MD;  [place] [facility] [place] [facility]  Service: Orthopedics    TONSILLECTOMY        General Information       Row Name 01/30/25 1102          Physical Therapy Time and Intention    Document Type evaluation  -EF (r) CH (t) EF (c)     Mode of Treatment individual therapy;physical therapy  -EF (r) CH (t) EF (c)       Row Name 01/30/25 1102          General Information    Patient Profile Reviewed yes  -EF (r) CH (t) EF (c)     Prior Level of Function independent:  Uses RW intermittently outside home  -EF (r) CH (t) EF (c)     Existing Precautions/Restrictions fall  -EF (r) CH (t) EF (c)     Barriers to Rehab none identified  -EF (r) CH (t) EF (c)       Row Name 01/30/25 1102          Living Environment    People in Home alone  -EF (r) CH (t) EF (c)       Row Name 01/30/25 1102          Home Main Entrance    Number of Stairs, Main Entrance three  -EF (r) CH (t) EF (c)       Row Name 01/30/25 1102          Cognition    Orientation Status (Cognition) oriented x 4  -EF (r) CH (t) EF (c)               User Key  (r) = Recorded By, (t) = Taken By, (c) = Cosigned By      Initials Name Provider Type    EF France Yusuf, PT Physical Therapist    Callum Garcia, PT Student PT Student                   Mobility       Row Name 01/30/25 1103          Bed Mobility    Bed Mobility supine-sit;sit-supine  -EF (r) CH (t) EF (c)     Supine-Sit [place] (Bed Mobility) standby assist;contact guard  -EF (r) CH (t) EF (c)     Sit-Supine [place] (Bed Mobility) standby assist;contact guard  -EF (r) CH (t) EF (c)       Row Name 01/30/25 1103          Transfers    Comment, (Transfers) Pt performed gait training w/ RW  -EF (r) CH (t) EF (c)       Row Name 01/30/25 1103          Sit-Stand Transfer    Sit-Stand [place] (Transfers) standby assist  -EF (r) CH (t) EF (c)     Assistive Device (Sit-Stand Transfers) walker, front-wheeled  -EF (r) CH (t) EF (c)       Row Name 01/30/25 1103          Gait/Stairs (Locomotion)     [place] Level (Gait) standby assist;contact guard  -EF (r) CH (t) EF (c)     Assistive Device (Gait) walker, front-wheeled  -EF (r) CH (t) EF (c)     Distance in Feet (Gait) 300  -EF (r) CH (t) EF (c)               User Key  (r) = Recorded By, (t) = Taken By, (c) = Cosigned By      Initials Name Provider Type    EF France Yusuf, PT Physical Therapist    Callum Garcia, PT Student PT Student                   Obj/Interventions       Row Name 01/30/25 [facility]          Range of Motion Comprehensive    General Range of Motion no range of motion deficits identified  -EF (r) CH (t) EF (c)       Row Name 01/30/25 [facility]          Strength Comprehensive (MMT)    General Manual Muscle Testing (MMT) Assessment no strength deficits identified  -EF (r) CH (t) EF (c)       Row Name 01/30/25 [facility]          Balance    Balance Assessment sitting static balance;sitting dynamic balance  -EF (r) CH (t) EF (c)     Static Sitting Balance standby assist  -EF (r) CH (t) EF (c)     Dynamic Sitting Balance standby assist  -EF (r) CH (t) EF (c)               User Key  (r) = Recorded By, (t) = Taken By, (c) = Cosigned By      Initials Name Provider Type    EF France Yusuf, PT Physical Therapist    Callum Garcia, PT Student PT Student                   Goals/Plan    No documentation.                  Clinical Impression       Row Name 01/30/25 [facility]          Pain    Pretreatment Pain Rating 0/10 - no pain  -EF (r) CH (t) EF (c)     Posttreatment Pain Rating 0/10 - no pain  -EF (r) CH (t) EF (c)     Pre/Posttreatment Pain Comment Pt reports no pain  -EF (r) CH (t) EF (c)       Row Name 01/30/25 [facility]          Plan of Care Review    Plan of Care Reviewed With patient  -EF (r) CH (t) EF (c)     Outcome Evaluation Pt is a 70 YOM who presents to [facility] complaining of back pain.  He has had history of back pain but presented here because of acute back pain that started on Monday.  It was a sharp pain made worse by  certain movements.  He also has a history of alcohol abuse last drink was the evening of 1/27.  Patient began having nausea and vomiting with tremors in the ED. Before admission pt was independent and lives in a 2 story house and has 3 steps to enter. Pt states he uses RW intermittently out in public. Pt states he lives alone. Today, pt performed bed mobility w/ SBA. Pt performed 300 ft of gait w/ RW w/ SBA/CGA. Pt demo good mobility and B LE strength during gait. Pt has no concerns for home environment. Pt is moving great and is cleared from a PT perspective. D/c Recommendation home. PT is signing off.  -EF (r) CH (t) EF (c)       Row Name 01/30/25 1105          Therapy Assessment/Plan (PT)    Criteria for Skilled Interventions Met (PT) no  -EF (r) CH (t) EF (c)     Therapy Frequency (PT) evaluation only  -EF (r) CH (t) EF (c)       Row Name 01/30/25 1105          Positioning and Restraints    Pre-Treatment Position in bed  -EF (r) CH (t) EF (c)     Post Treatment Position bed  -EF (r) CH (t) EF (c)     In Bed notified nsg;call light within reach;encouraged to call for assist;exit alarm on  -EF (r) CH (t) EF (c)               User Key  (r) = Recorded By, (t) = Taken By, (c) = Cosigned By      Initials Name Provider Type    EF France Yusuf, PT Physical Therapist    Callum Garcia, PT Student PT Student                   Outcome Measures       Row Name 01/30/25 1115 01/30/25 0800       How much help from another person do you currently need...    Turning from your back to your side while in flat bed without using bedrails? 4  -EF (r) CH (t) EF (c) 4  -EE    Moving from lying on back to sitting on the side of a flat bed without bedrails? 4  -EF (r) CH (t) EF (c) 4  -EE    Moving to and from a bed to a chair (including a wheelchair)? 4  -EF (r) CH (t) EF (c) 4  -EE    Standing up from a chair using your arms (e.g., wheelchair, bedside chair)? 4  -EF (r) CH (t) EF (c) 4  -EE    Climbing 3-5 steps with a railing?  3  -EF (r) CH (t) EF (c) 3  -EE    To walk in hospital room? 4  -EF (r) CH (t) EF (c) 3  -EE    AM-PAC 6 Clicks Score (PT) 23  -EF (r) CH (t) 22  -EE              User Key  (r) = Recorded By, (t) = Taken By, (c) = Cosigned By      Initials Name Provider Type    EF France Yusuf, PT Physical Therapist    Jayden Feliciano RN Registered Nurse    Callum Garcia, PT Student PT Student                                 Physical Therapy Education        No education to display                  PT Recommendation and Plan     Outcome Evaluation: Pt is a 70 YOM who presents to [facility] complaining of back pain.  He has had history of back pain but presented here because of acute back pain that started on Monday.  It was a sharp pain made worse by certain movements.  He also has a history of alcohol abuse last drink was the evening of 1/27.  Patient began having nausea and vomiting with tremors in the ED. Before admission pt was independent and lives in a 2 story house and has 3 steps to enter. Pt states he uses RW intermittently out in public. Pt states he lives alone. Today, pt performed bed mobility w/ SBA. Pt performed 300 ft of gait w/ RW w/ SBA/CGA. Pt demo good mobility and B LE strength during gait. Pt has no concerns for home environment. Pt is moving great and is cleared from a PT perspective. D/c Recommendation home. PT is signing off.     Time Calculation:         PT Charges       Row Name 01/30/25 1112             Time Calculation    Start Time 1043  -EF (r) CH (t) EF (c)      Stop Time 1057  -EF (r) CH (t) EF (c)      Time Calculation (min) 14 min  -EF (r) CH (t)      PT Received On 01/30/25  -EF (r) CH (t) EF (c)                User Key  (r) = Recorded By, (t) = Taken By, (c) = Cosigned By      Initials Name Provider Type    EF France Yusuf, PT Physical Therapist    Callum Garcia, PT Student PT Student                      PT G-Codes  AM-PAC 6 Clicks Score (PT): 23  PT  Discharge Summary  Anticipated Discharge Disposition (PT): home    Callum Moran, PT Student  1/30/2025

## 2025-01-30 NOTE — PLAN OF CARE
Goal Outcome Evaluation:  Plan of Care Reviewed With: patient        Progress: improving     Pt resting in bed with VSS and no s/o pain or distress.  CIWA score 2 for mild tremors, no other withdrawal s/s at this time.  Pt requiring minimal assist for ADLs and cares.  Neuro exam with no focal findings.  Will continue fall and seizure precautions in addition to CIWA monitoring.  Continue POC as written.

## 2025-01-31 ENCOUNTER — READMISSION MANAGEMENT (OUTPATIENT)
Dept: CALL CENTER | Facility: HOSPITAL | Age: 70
End: 2025-01-31

## 2025-01-31 VITALS
HEART RATE: 72 BPM | TEMPERATURE: 98.7 F | BODY MASS INDEX: 26.24 KG/M2 | OXYGEN SATURATION: 92 % | HEIGHT: 72 IN | WEIGHT: 193.7 LBS | SYSTOLIC BLOOD PRESSURE: 139 MMHG | RESPIRATION RATE: 14 BRPM | DIASTOLIC BLOOD PRESSURE: 81 MMHG

## 2025-01-31 LAB
ANION GAP SERPL CALCULATED.3IONS-SCNC: 8.1 MMOL/L (ref 5–15)
BASOPHILS # BLD AUTO: 0.03 10*3/MM3 (ref 0–0.2)
BASOPHILS NFR BLD AUTO: 0.5 % (ref 0–1.5)
BUN SERPL-MCNC: 6 MG/DL (ref 8–23)
BUN/CREAT SERPL: 8.3 (ref 7–25)
CALCIUM SPEC-SCNC: 9 MG/DL (ref 8.6–10.5)
CHLORIDE SERPL-SCNC: 105 MMOL/L (ref 98–107)
CO2 SERPL-SCNC: 21.9 MMOL/L (ref 22–29)
CREAT SERPL-MCNC: 0.72 MG/DL (ref 0.76–1.27)
DEPRECATED RDW RBC AUTO: 45.6 FL (ref 37–54)
EGFRCR SERPLBLD CKD-EPI 2021: 99.5 ML/MIN/1.73
EOSINOPHIL # BLD AUTO: 0.18 10*3/MM3 (ref 0–0.4)
EOSINOPHIL NFR BLD AUTO: 3.1 % (ref 0.3–6.2)
ERYTHROCYTE [DISTWIDTH] IN BLOOD BY AUTOMATED COUNT: 13.5 % (ref 12.3–15.4)
GLUCOSE SERPL-MCNC: 91 MG/DL (ref 65–99)
HCT VFR BLD AUTO: 35.8 % (ref 37.5–51)
HGB BLD-MCNC: 12 G/DL (ref 13–17.7)
IMM GRANULOCYTES # BLD AUTO: 0.01 10*3/MM3 (ref 0–0.05)
IMM GRANULOCYTES NFR BLD AUTO: 0.2 % (ref 0–0.5)
LYMPHOCYTES # BLD AUTO: 2.05 10*3/MM3 (ref 0.7–3.1)
LYMPHOCYTES NFR BLD AUTO: 34.8 % (ref 19.6–45.3)
MCH RBC QN AUTO: 31.3 PG (ref 26.6–33)
MCHC RBC AUTO-ENTMCNC: 33.5 G/DL (ref 31.5–35.7)
MCV RBC AUTO: 93.2 FL (ref 79–97)
MONOCYTES # BLD AUTO: 0.67 10*3/MM3 (ref 0.1–0.9)
MONOCYTES NFR BLD AUTO: 11.4 % (ref 5–12)
NEUTROPHILS NFR BLD AUTO: 2.95 10*3/MM3 (ref 1.7–7)
NEUTROPHILS NFR BLD AUTO: 50 % (ref 42.7–76)
NRBC BLD AUTO-RTO: 0 /100 WBC (ref 0–0.2)
PLATELET # BLD AUTO: 262 10*3/MM3 (ref 140–450)
PMV BLD AUTO: 9.7 FL (ref 6–12)
POTASSIUM SERPL-SCNC: 4.1 MMOL/L (ref 3.5–5.2)
RBC # BLD AUTO: 3.84 10*6/MM3 (ref 4.14–5.8)
SODIUM SERPL-SCNC: 135 MMOL/L (ref 136–145)
WBC NRBC COR # BLD AUTO: 5.89 10*3/MM3 (ref 3.4–10.8)

## 2025-01-31 PROCEDURE — 25010000002 THIAMINE PER 100 MG: Performed by: INTERNAL MEDICINE

## 2025-01-31 PROCEDURE — G0378 HOSPITAL OBSERVATION PER HR: HCPCS

## 2025-01-31 PROCEDURE — 96366 THER/PROPH/DIAG IV INF ADDON: CPT

## 2025-01-31 PROCEDURE — 80048 BASIC METABOLIC PNL TOTAL CA: CPT | Performed by: HOSPITALIST

## 2025-01-31 PROCEDURE — 85025 COMPLETE CBC W/AUTO DIFF WBC: CPT | Performed by: HOSPITALIST

## 2025-01-31 PROCEDURE — 96376 TX/PRO/DX INJ SAME DRUG ADON: CPT

## 2025-01-31 RX ADMIN — Medication 1 TABLET: at 08:06

## 2025-01-31 RX ADMIN — THIAMINE HYDROCHLORIDE 200 MG: 100 INJECTION, SOLUTION INTRAMUSCULAR; INTRAVENOUS at 05:40

## 2025-01-31 RX ADMIN — MUPIROCIN 1 APPLICATION: 20 OINTMENT TOPICAL at 08:06

## 2025-01-31 RX ADMIN — FOLIC ACID 1 MG: 5 INJECTION, SOLUTION INTRAMUSCULAR; INTRAVENOUS; SUBCUTANEOUS at 08:06

## 2025-01-31 NOTE — CASE MANAGEMENT/SOCIAL WORK
Case Management Discharge Note      Final Note: Home    Provided Post Acute Provider List?: Yes  Post Acute Provider List: DME Supplier, Home Health, Inpatient Rehab, Nursing Home  Provided Post Acute Provider Quality & Resource List?: Yes  Post Acute Provider Quality and Resource List: Home Health, Inpatient Rehab, Nursing Home  Delivered To: Patient  Method of Delivery: In person    Selected Continued Care - Discharged on 1/31/2025 Admission date: 1/28/2025 - Discharge disposition: Home or Self Care      Destination    No services have been selected for the patient.                Durable Medical Equipment    No services have been selected for the patient.                Dialysis/Infusion    No services have been selected for the patient.                Home Medical Care    No services have been selected for the patient.                Therapy    No services have been selected for the patient.                Community Resources    No services have been selected for the patient.                Community & DME    No services have been selected for the patient.                    Selected Continued Care - Prior Encounters Includes continued care and service providers with selected services from prior encounters from 10/30/2024 to 1/31/2025      Discharged on 12/10/2024 Admission date: 12/2/2024 - Discharge disposition: Skilled Nursing Facility (DC - External)      Destination       Service Provider Services Address Phone Fax Patient Preferred    [facility] [address] [place] [id number] [phone] --                               Final Discharge Disposition Code: 01 - home or self-care

## 2025-01-31 NOTE — PLAN OF CARE
Goal Outcome Evaluation:  Pt A&Ox4, VSS on room air, no s/o pain or distress.  Up ad geoffrey to toilet. CHG bath given. CIWA score 1 for mild tremors, no other withdrawal s/s at this time. Plan of care ongoing.

## 2025-01-31 NOTE — CASE MANAGEMENT/SOCIAL WORK
Continued Stay Note  [facility]     Patient Name: Pro Mueller  MRN: [id number]  Today's Date: 1/31/2025    Admit Date: 1/28/2025    Plan: Return home alone via [employer] cab   Discharge Plan       Row Name 01/31/25 1316       Plan    Plan Return home alone via [employer] cab    Patient/Family in Agreement with Plan yes    Plan Comments CCP notified by the patient's nurse that he has been able to find a ride home and needs a cab. [facility] called and booked a cab with [employer]. Orignal cab voucher completed and given to nurse, copy faxed down to CCP office. CD, CSW.                   Discharge Codes    No documentation.                 Expected Discharge Date and Time       Expected Discharge Date Expected Discharge Time    Jan 31, 2025

## 2025-01-31 NOTE — DISCHARGE SUMMARY
PHYSICIAN DISCHARGE SUMMARY                                                                        [facility]    Patient Identification:  Name: Pro Mueller  Age: 70 y.o.  Sex: male  :  1955  MRN: [id number]  Primary Care Physician: Provider, No Known    Admit date: 2025  Discharge date and time:2025  Discharged Condition: good    Discharge Diagnoses:  Active Hospital Problems    Diagnosis  POA    **Alcohol dependence with withdrawal [F10.239]  Yes    Lumbar degenerative disc disease [M51.369]  Yes    Alcoholic liver disease [K70.9]  Yes    Chronic low back pain [M54.50, G89.29]  Yes    Hypothyroidism [E03.9]  Yes    Hypertension [I10]  Yes      Resolved Hospital Problems   No resolved problems to display.          PMHX:   Past Medical History:   Diagnosis Date    Alcohol abuse     Alcohol withdrawal 2016    Alcoholic ketoacidosis 2020    Allergic 1970    Anxiety     Arthritis     Atopic rhinitis 2016    Depression     Disease of thyroid gland     Elevated cholesterol     Encounter for removal of sutures     Genital herpes simplex 2016    GERD (gastroesophageal reflux disease)     Headache, tension-type     Hyperlipidemia     Hypertension     Hypothyroidism     Kidney stone     Low back pain     Migraine     Motion sickness     Nephrolithiasis 2020    Olecranon bursitis, right elbow     Panic disorder without agoraphobia 2016    Peripheral neuropathy     Sleep apnea     Syncope and collapse 2019    Vitamin D deficiency 2016    Withdrawal symptoms, alcohol      PSHX:   Past Surgical History:   Procedure Laterality Date    CHOLECYSTECTOMY N/A 2024    Procedure: CHOLECYSTECTOMY LAPAROSCOPIC WITH DAVINCI ROBOT with cholangiogram, possible open;  Surgeon: Toro Noguera MD;  Location: [facility];  Service: General;  Laterality: N/A;    COLONOSCOPY      CYST  REMOVAL      CYSTOSCOPY BLADDER STONE LITHOTRIPSY  02/2022    ERCP N/A 9/23/2024    Procedure: ENDOSCOPIC RETROGRADE CHOLANGIOPANCREATOGRAPHY sphincterotomy, balloon sweep (9-12);  Surgeon: Fara Madrigal MD;  Location: [facility] ENDOSCOPY;  Service: Gastroenterology;  Laterality: N/A;  sphincterotomy hiatial hernia, gallstone removal    EXTRACORPOREAL SHOCK WAVE LITHOTRIPSY (ESWL) Right 2002    SHOULDER ARTHROSCOPY Right 12/17/2019    Procedure: SHOULDER ARTHROSCOPY, decompression, distal clavicle excision;  Surgeon: Aj Mancilla MD;  Location: [facility];  Service: Orthopedics    TONSILLECTOMY         Hospital Course: Pro Mueller   is a 70 y.o. male that presents to [facility] complaining of back pain.  He has had history of back pain but presented here because of acute back pain that started on Monday.  It was a sharp pain made worse by certain movements.  No real radiation of pain or paresthesias.  No incontinence.  He also has a history of alcohol abuse last drink was the evening of 1/27.  Patient began having nausea and vomiting with tremors in the ED. the patient was admitted to hospital with some alcohol withdrawal symptoms.  Symptoms were very mild and he was feeling better after being in the hospital for couple days.  He looked well enough to go home and recommended that he stop drinking alcoholic beverages.  He will follow-up with his primary care in 1 week for ongoing care.    Consults:     Consults       No orders found from 12/30/2024 to 1/29/2025.          Results from last 7 days   Lab Units 01/31/25  0552   WBC 10*3/mm3 5.89   HEMOGLOBIN g/dL 12.0*   HEMATOCRIT % 35.8*   PLATELETS 10*3/mm3 262     Results from last 7 days   Lab Units 01/31/25  0552   SODIUM mmol/L 135*   POTASSIUM mmol/L 4.1   CHLORIDE mmol/L 105   CO2 mmol/L 21.9*   BUN mg/dL 6*   CREATININE mg/dL 0.72*   GLUCOSE mg/dL 91   CALCIUM mg/dL 9.0     Significant Diagnostic Studies:   WBC   Date Value Ref Range  "Status   01/31/2025 5.89 3.40 - 10.80 10*3/mm3 Final     Hemoglobin   Date Value Ref Range Status   01/31/2025 12.0 (L) 13.0 - 17.7 g/dL Final     Hematocrit   Date Value Ref Range Status   01/31/2025 35.8 (L) 37.5 - 51.0 % Final     Platelets   Date Value Ref Range Status   01/31/2025 262 140 - 450 10*3/mm3 Final     Sodium   Date Value Ref Range Status   01/31/2025 135 (L) 136 - 145 mmol/L Final     Potassium   Date Value Ref Range Status   01/31/2025 4.1 3.5 - 5.2 mmol/L Final     Chloride   Date Value Ref Range Status   01/31/2025 105 98 - 107 mmol/L Final     CO2   Date Value Ref Range Status   01/31/2025 21.9 (L) 22.0 - 29.0 mmol/L Final     BUN   Date Value Ref Range Status   01/31/2025 6 (L) 8 - 23 mg/dL Final     Creatinine   Date Value Ref Range Status   01/31/2025 0.72 (L) 0.76 - 1.27 mg/dL Final     Glucose   Date Value Ref Range Status   01/31/2025 91 65 - 99 mg/dL Final     Calcium   Date Value Ref Range Status   01/31/2025 9.0 8.6 - 10.5 mg/dL Final     Magnesium   Date Value Ref Range Status   01/28/2025 1.6 1.6 - 2.4 mg/dL Final     Phosphorus   Date Value Ref Range Status   01/30/2025 3.9 2.5 - 4.5 mg/dL Final     AST (SGOT)   Date Value Ref Range Status   01/28/2025 41 (H) 1 - 40 U/L Final     ALT (SGPT)   Date Value Ref Range Status   01/28/2025 14 1 - 41 U/L Final     Alkaline Phosphatase   Date Value Ref Range Status   01/28/2025 76 39 - 117 U/L Final     No results found for: \"APTT\", \"INR\"  Color, UA   Date Value Ref Range Status   01/29/2025 Dark Yellow (A) Yellow, Straw Final     Appearance, UA   Date Value Ref Range Status   01/29/2025 Cloudy (A) Clear Final     pH, UA   Date Value Ref Range Status   01/29/2025 5.5 5.0 - 8.0 Final     Glucose, UA   Date Value Ref Range Status   01/29/2025 Negative Negative Final     Ketones, UA   Date Value Ref Range Status   01/29/2025 Trace (A) Negative Final     Blood, UA   Date Value Ref Range Status   01/29/2025 Negative Negative Final     Leuk " "Esterase, UA   Date Value Ref Range Status   01/29/2025 Trace (A) Negative Final     Bilirubin, UA   Date Value Ref Range Status   01/29/2025 Negative Negative Final     Urobilinogen, UA   Date Value Ref Range Status   01/29/2025 1.0 E.U./dL 0.2 - 1.0 E.U./dL Final     RBC, UA   Date Value Ref Range Status   01/29/2025 0-2 None Seen, 0-2 /HPF Final     WBC, UA   Date Value Ref Range Status   01/29/2025 11-20 (A) None Seen, 0-2 /HPF Final     Bacteria, UA   Date Value Ref Range Status   01/29/2025 None Seen None Seen /HPF Final     No results found for: \"TROPONINT\", \"TROPONINI\", \"BNP\"  No components found for: \"HGBA1C;2\"  No components found for: \"TSH;2\"  Imaging Results (All)       Procedure Component Value Units Date/Time    CT Lumbar Spine Without Contrast [[id number]] Collected: 01/28/25 2149     Updated: 01/28/25 2157    Narrative:      CT LUMBAR SPINE without contrast..     HISTORY: Sudden onset low back pain.     TECHNIQUE: Routine lumbar spine CT without contrast..   Radiation dose  reduction techniques were utilized, including automated exposure  control, and exposure modulation based on body size.     COMPARISON: Lumbar spine [place] [address].     FINDINGS:     Redemonstrated grade 1 anterolisthesis at L4-5, unchanged..  There is no  bone erosion or destruction, or evidence of acute or chronic fracture.    The paraspinous soft tissues are unremarkable.     T12-L1: There is no substantial canal or foraminal compromise.     L1-2: There is no substantial canal or foraminal compromise.     L2-3: There is no canal stenosis. The right foramen is normal. There is  borderline degenerative left foraminal narrowing.     L3-4: There is a disc bulge and facet arthropathy, with minimal if any  canal narrowing. There is no left and borderline right foraminal  narrowing.     L4-5: There is anterolisthesis and pseudodisc bulge and facet  arthropathy. There is moderate central canal stenosis, and moderate  bilateral foraminal " stenosis.     L5-S1: There is no disc bulge or protrusion. There is left greater than  right facet arthropathy. There is no canal stenosis. There is no left  and mild right foraminal stenosis.       Impression:         Lower lumbar degenerative changes, no acute appearing abnormality, no  substantial interval change since 12/2/2024.              This report was finalized on 1/28/2025 9:54 PM by Dr. Carlos Constantino M.D on Workstation: [id number]             Lab Results (last 7 days)       Procedure Component Value Units Date/Time    Basic Metabolic Panel [[id number]]  (Abnormal) Collected: 01/31/25 0552    Specimen: Blood Updated: 01/31/25 0654     Glucose 91 mg/dL      BUN 6 mg/dL      Creatinine 0.72 mg/dL      Sodium 135 mmol/L      Potassium 4.1 mmol/L      Chloride 105 mmol/L      CO2 21.9 mmol/L      Calcium 9.0 mg/dL      BUN/Creatinine Ratio 8.3     Anion Gap 8.1 mmol/L      eGFR 99.5 mL/min/1.73     Narrative:      GFR Categories in Chronic Kidney Disease (CKD)      GFR Category          GFR (mL/min/1.73)    Interpretation  G1                     90 or greater         Normal or high (1)  G2                      60-89                Mild decrease (1)  G3a                   45-59                Mild to moderate decrease  G3b                   30-44                Moderate to severe decrease  G4                    15-29                Severe decrease  G5                    14 or less           Kidney failure          (1)In the absence of evidence of kidney disease, neither GFR category G1 or G2 fulfill the criteria for CKD.    eGFR calculation 2021 CKD-EPI creatinine equation, which does not include race as a factor    CBC & Differential [[id number]]  (Abnormal) Collected: 01/31/25 0552    Specimen: Blood Updated: 01/31/25 0636    Narrative:      The following orders were created for panel order CBC & Differential.  Procedure                               Abnormality         Status                      ---------                               -----------         ------                     CBC Auto Differential[[id number]]        Abnormal            Final result                 Please view results for these tests on the individual orders.    CBC Auto Differential [[id number]]  (Abnormal) Collected: 01/31/25 0552    Specimen: Blood Updated: 01/31/25 0636     WBC 5.89 10*3/mm3      RBC 3.84 10*6/mm3      Hemoglobin 12.0 g/dL      Hematocrit 35.8 %      MCV 93.2 fL      MCH 31.3 pg      MCHC 33.5 g/dL      RDW 13.5 %      RDW-SD 45.6 fl      MPV 9.7 fL      Platelets 262 10*3/mm3      Neutrophil % 50.0 %      Lymphocyte % 34.8 %      Monocyte % 11.4 %      Eosinophil % 3.1 %      Basophil % 0.5 %      Immature Grans % 0.2 %      Neutrophils, Absolute 2.95 10*3/mm3      Lymphocytes, Absolute 2.05 10*3/mm3      Monocytes, Absolute 0.67 10*3/mm3      Eosinophils, Absolute 0.18 10*3/mm3      Basophils, Absolute 0.03 10*3/mm3      Immature Grans, Absolute 0.01 10*3/mm3      nRBC 0.0 /100 WBC     Vitamin D,25-Hydroxy [[id number]]  (Abnormal) Collected: 01/30/25 0356    Specimen: Blood Updated: 01/30/25 0515     25 Hydroxy, Vitamin D 10.5 ng/ml     Narrative:      Reference Range for Total Vitamin D 25(OH)     Deficiency <20.0 ng/mL   Insufficiency 21-29 ng/mL   Sufficiency  ng/mL  Toxicity >100 ng/ml      Renal Function Panel [[id number]]  (Abnormal) Collected: 01/30/25 0356    Specimen: Blood Updated: 01/30/25 0500     Glucose 95 mg/dL      BUN 7 mg/dL      Creatinine 0.72 mg/dL      Sodium 137 mmol/L      Potassium 3.6 mmol/L      Chloride 106 mmol/L      CO2 23.0 mmol/L      Calcium 8.5 mg/dL      Albumin 3.5 g/dL      Phosphorus 3.9 mg/dL      Anion Gap 8.0 mmol/L      BUN/Creatinine Ratio 9.7     eGFR 99.5 mL/min/1.73     Narrative:      GFR Categories in Chronic Kidney Disease (CKD)      GFR Category          GFR (mL/min/1.73)    Interpretation  G1                     90 or greater         Normal or high (1)  G2                       60-89                Mild decrease (1)  G3a                   45-59                Mild to moderate decrease  G3b                   30-44                Moderate to severe decrease  G4                    15-29                Severe decrease  G5                    14 or less           Kidney failure          (1)In the absence of evidence of kidney disease, neither GFR category G1 or G2 fulfill the criteria for CKD.    eGFR calculation 2021 CKD-EPI creatinine equation, which does not include race as a factor    CBC (No Diff) [[id number]]  (Abnormal) Collected: 01/30/25 0356    Specimen: Blood Updated: 01/30/25 0438     WBC 5.01 10*3/mm3      RBC 3.54 10*6/mm3      Hemoglobin 10.9 g/dL      Hematocrit 32.2 %      MCV 91.0 fL      MCH 30.8 pg      MCHC 33.9 g/dL      RDW 13.4 %      RDW-SD 44.7 fl      MPV 9.4 fL      Platelets 266 10*3/mm3     Basic Metabolic Panel [[id number]]  (Abnormal) Collected: 01/29/25 0556    Specimen: Blood Updated: 01/29/25 0633     Glucose 99 mg/dL      BUN 6 mg/dL      Creatinine 0.73 mg/dL      Sodium 137 mmol/L      Potassium 3.7 mmol/L      Chloride 101 mmol/L      CO2 23.1 mmol/L      Calcium 8.4 mg/dL      BUN/Creatinine Ratio 8.2     Anion Gap 12.9 mmol/L      eGFR 99.1 mL/min/1.73     Narrative:      GFR Categories in Chronic Kidney Disease (CKD)      GFR Category          GFR (mL/min/1.73)    Interpretation  G1                     90 or greater         Normal or high (1)  G2                      60-89                Mild decrease (1)  G3a                   45-59                Mild to moderate decrease  G3b                   30-44                Moderate to severe decrease  G4                    15-29                Severe decrease  G5                    14 or less           Kidney failure          (1)In the absence of evidence of kidney disease, neither GFR category G1 or G2 fulfill the criteria for CKD.    eGFR calculation 2021 CKD-EPI creatinine equation, which  does not include race as a factor    CBC (No Diff) [[id number]]  (Abnormal) Collected: 01/29/25 0556    Specimen: Blood Updated: 01/29/25 0609     WBC 6.55 10*3/mm3      RBC 3.52 10*6/mm3      Hemoglobin 11.1 g/dL      Hematocrit 32.4 %      MCV 92.0 fL      MCH 31.5 pg      MCHC 34.3 g/dL      RDW 13.9 %      RDW-SD 46.8 fl      MPV 9.0 fL      Platelets 267 10*3/mm3     Urinalysis With Microscopic If Indicated (No Culture) - Urine, Clean Catch [[id number]]  (Abnormal) Collected: 01/29/25 0217    Specimen: Urine, Clean Catch Updated: 01/29/25 0244     Color, UA Dark Yellow     Appearance, UA Cloudy     pH, UA 5.5     Specific Gravity, UA 1.022     Glucose, UA Negative     Ketones, UA Trace     Bilirubin, UA Negative     Blood, UA Negative     Protein, UA Trace     Leuk Esterase, UA Trace     Nitrite, UA Negative     Urobilinogen, UA 1.0 E.U./dL    Urinalysis, Microscopic Only - Urine, Clean Catch [[id number]]  (Abnormal) Collected: 01/29/25 0217    Specimen: Urine, Clean Catch Updated: 01/29/25 0240     RBC, UA 0-2 /HPF      WBC, UA 11-20 /HPF      Bacteria, UA None Seen /HPF      Squamous Epithelial Cells, UA 0-2 /HPF      Hyaline Casts, UA 0-2 /LPF      Methodology Automated Microscopy    Comprehensive Metabolic Panel [[id number]]  (Abnormal) Collected: 01/28/25 2102    Specimen: Blood Updated: 01/28/25 2132     Glucose 103 mg/dL      BUN 5 mg/dL      Creatinine 0.79 mg/dL      Sodium 140 mmol/L      Potassium 3.5 mmol/L      Chloride 102 mmol/L      CO2 21.0 mmol/L      Calcium 9.2 mg/dL      Total Protein 7.2 g/dL      Albumin 4.2 g/dL      ALT (SGPT) 14 U/L      AST (SGOT) 41 U/L      Alkaline Phosphatase 76 U/L      Total Bilirubin 0.7 mg/dL      Globulin 3.0 gm/dL      A/G Ratio 1.4 g/dL      BUN/Creatinine Ratio 6.3     Anion Gap 17.0 mmol/L      eGFR 96.8 mL/min/1.73     Narrative:      GFR Categories in Chronic Kidney Disease (CKD)      GFR Category          GFR (mL/min/1.73)    Interpretation  G1                      90 or greater         Normal or high (1)  G2                      60-89                Mild decrease (1)  G3a                   45-59                Mild to moderate decrease  G3b                   30-44                Moderate to severe decrease  G4                    15-29                Severe decrease  G5                    14 or less           Kidney failure          (1)In the absence of evidence of kidney disease, neither GFR category G1 or G2 fulfill the criteria for CKD.    eGFR calculation 2021 CKD-EPI creatinine equation, which does not include race as a factor    Ethanol [[id number]]  (Abnormal) Collected: 01/28/25 2102    Specimen: Blood Updated: 01/28/25 2132     Ethanol 12 mg/dL      Ethanol % 0.012 %     Magnesium [[id number]]  (Normal) Collected: 01/28/25 2102    Specimen: Blood Updated: 01/28/25 2132     Magnesium 1.6 mg/dL     CBC & Differential [[id number]]  (Abnormal) Collected: 01/28/25 2102    Specimen: Blood Updated: 01/28/25 2112    Narrative:      The following orders were created for panel order CBC & Differential.  Procedure                               Abnormality         Status                     ---------                               -----------         ------                     CBC Auto Differential[[id number]]        Abnormal            Final result                 Please view results for these tests on the individual orders.    CBC Auto Differential [[id number]]  (Abnormal) Collected: 01/28/25 2102    Specimen: Blood Updated: 01/28/25 2112     WBC 6.11 10*3/mm3      RBC 4.12 10*6/mm3      Hemoglobin 12.9 g/dL      Hematocrit 37.9 %      MCV 92.0 fL      MCH 31.3 pg      MCHC 34.0 g/dL      RDW 14.0 %      RDW-SD 47.4 fl      MPV 8.9 fL      Platelets 368 10*3/mm3      Neutrophil % 54.2 %      Lymphocyte % 34.7 %      Monocyte % 8.7 %      Eosinophil % 1.5 %      Basophil % 0.7 %      Immature Grans % 0.2 %      Neutrophils, Absolute 3.32 10*3/mm3      Lymphocytes, Absolute  "2.12 10*3/mm3      Monocytes, Absolute 0.53 10*3/mm3      Eosinophils, Absolute 0.09 10*3/mm3      Basophils, Absolute 0.04 10*3/mm3      Immature Grans, Absolute 0.01 10*3/mm3      nRBC 0.0 /100 WBC           /81 (BP Location: Right arm, Patient Position: Lying)   Pulse 72   Temp 98.7 °F (37.1 °C) (Oral)   Resp 14   Ht 182.9 cm (72\")   Wt 87.9 kg (193 lb 11.2 oz)   SpO2 92%   BMI 26.27 kg/m²     Discharge Exam:  General Appearance:    Alert, cooperative, no distress                          Head:    Normocephalic, without obvious abnormality, atraumatic                          Eyes:                            Throat:   Lips, tongue, gums normal                          Neck:   Supple, symmetrical, trachea midline, no JVD                        Lungs:     Clear to auscultation bilaterally, respirations unlabored                Chest Wall:    No tenderness or deformity                        Heart:    Regular rate and rhythm, S1 and S2 normal, no murmur,no  Rub  or gallop                  Abdomen:     Soft, non-tender, bowel sounds active, no masses, no                                                        organomegaly                  Extremities:   Extremities normal, atraumatic, no cyanosis or edema                             Skin:   Skin is warm and dry,  no rashes or palpable lesions                  Neurologic:   no focal deficits noted     Disposition:  Home    Activity as tolerated    Diet as tolerated  Diet Order   Procedures    Diet: Cardiac; Healthy Heart (2-3 Na+); Fluid Consistency: Thin (IDDSI 0)       Patient Instructions:      Discharge Medications        Stop These Medications      ferrous sulfate 325 (65 FE) MG tablet     folic acid 1 MG tablet  Commonly known as: FOLVITE     levothyroxine 100 MCG tablet  Commonly known as: SYNTHROID, LEVOTHROID     tamsulosin 0.4 MG capsule 24 hr capsule  Commonly known as: FLOMAX     thiamine 100 MG tablet  Commonly known as: VITAMIN B1        "     No future appointments.   Follow-up Information       Provider, No Known Follow up in 1 week(s).    Why: Follow-up with PCP in 1 week.  Recommend  stop drinking alcoholic beverages.  Contact information:  [place] [id number] [phone]                           Discharge Order (From admission, onward)       Start     Ordered    01/31/25 1238  Discharge patient  Once        Expected Discharge Date: 01/31/25   Discharge Disposition: Home or Self Care   Physician of Record for Attribution - Please select from Treatment Team: RAFITA JULES [[id number]]   Review needed by CMO to determine Physician of Record: No      Question Answer Comment   Physician of Record for Attribution - Please select from Treatment Team RAFITA JULES    Review needed by CMO to determine Physician of Record No        01/31/25 1238                    Total time spent discharging patient including evaluation,post hospitalization follow up,  medication and post hospitalization instructions and education total time exceeds 30 minutes.    Signed:  Rafita Jules MD  1/31/2025  12:38 EST

## 2025-01-31 NOTE — PROGRESS NOTES
"[facility] f/u d/t ETOH. Chart reviewed and spoke w/ primary RN who denies updates. Pt in room alone upon entry. Introduced self and role. Pt alert & oriented. Rates current depression 2/10 and denies anxiety (with 10 being worst). Denies wish to be dead/SI/HI/hallucinations. Reports not sleeping at all at night d/t being a \"night owl\" but sleeping some during the day when not being interrupted by staff. Describes appetite as \"fine.\" CIWA score 0 at 9:00 this AM. Denies current withdrawal sxs or ETOH cravings. When asked about sobriety, pt stated that he isn't interested in no longer drinking and denied need for resources at this time. States that he still has [facility] tx resources given to him in a previous visit from [facility]. Access following.   "

## 2025-01-31 NOTE — PLAN OF CARE
Problem: Adult Inpatient Plan of Care  Goal: Absence of Hospital-Acquired Illness or Injury  Intervention: Identify and Manage Fall Risk  Recent Flowsheet Documentation  Taken 1/30/2025 1800 by Jayden Harrell RN  Safety Promotion/Fall Prevention:   activity supervised   fall prevention program maintained   nonskid shoes/slippers when out of bed   room organization consistent   safety round/check completed   clutter free environment maintained  Taken 1/30/2025 1600 by Jayden Harrell RN  Safety Promotion/Fall Prevention:   activity supervised   fall prevention program maintained   nonskid shoes/slippers when out of bed   room organization consistent   safety round/check completed   clutter free environment maintained  Taken 1/30/2025 1400 by Jayden Harrell RN  Safety Promotion/Fall Prevention:   activity supervised   fall prevention program maintained   nonskid shoes/slippers when out of bed   room organization consistent   safety round/check completed   clutter free environment maintained  Taken 1/30/2025 1200 by Jayden Harrell RN  Safety Promotion/Fall Prevention:   activity supervised   fall prevention program maintained   nonskid shoes/slippers when out of bed   room organization consistent   safety round/check completed   clutter free environment maintained  Taken 1/30/2025 1000 by Jayden Harrell RN  Safety Promotion/Fall Prevention:   activity supervised   fall prevention program maintained   nonskid shoes/slippers when out of bed   room organization consistent   safety round/check completed   clutter free environment maintained  Taken 1/30/2025 0800 by Jayden Harrell RN  Safety Promotion/Fall Prevention:   activity supervised   fall prevention program maintained   nonskid shoes/slippers when out of bed   room organization consistent   safety round/check completed   clutter free environment maintained  Intervention: Prevent Skin Injury  Recent Flowsheet Documentation  Taken  1/30/2025 1800 by Jayden Harrell RN  Body Position: position changed independently  Taken 1/30/2025 1600 by Jayden Harrell RN  Body Position: position changed independently  Taken 1/30/2025 1400 by Jayden Harrell RN  Body Position: position changed independently  Skin Protection: transparent dressing maintained  Taken 1/30/2025 1200 by Jayden Harrell RN  Body Position: position changed independently  Taken 1/30/2025 1000 by Jayden Harrell RN  Body Position: position changed independently  Taken 1/30/2025 0800 by Jayden Harrell RN  Body Position: position changed independently  Skin Protection: transparent dressing maintained  Intervention: Prevent and Manage VTE (Venous Thromboembolism) Risk  Recent Flowsheet Documentation  Taken 1/30/2025 1400 by Jayden Harrell RN  VTE Prevention/Management:   bilateral   SCDs (sequential compression devices) on  Taken 1/30/2025 0800 by Jayden Harrell RN  VTE Prevention/Management:   bilateral   SCDs (sequential compression devices) on  Intervention: Prevent Infection  Recent Flowsheet Documentation  Taken 1/30/2025 1800 by Jayden Harrell RN  Infection Prevention: single patient room provided  Taken 1/30/2025 1600 by Jayden Harrell RN  Infection Prevention: single patient room provided  Taken 1/30/2025 1400 by Jayden Harrell RN  Infection Prevention: single patient room provided  Taken 1/30/2025 1200 by Jayden Harrell RN  Infection Prevention: single patient room provided  Taken 1/30/2025 1000 by Jayden Harrell RN  Infection Prevention: single patient room provided  Taken 1/30/2025 0800 by Jayden Harrell RN  Infection Prevention: single patient room provided  Goal: Optimal Comfort and Wellbeing  Intervention: Provide Person-Centered Care  Recent Flowsheet Documentation  Taken 1/30/2025 1400 by Jayden Harrell RN  Trust Relationship/Rapport:   thoughts/feelings acknowledged   reassurance provided   questions  encouraged   questions answered   empathic listening provided   emotional support provided   choices provided   care explained  Taken 1/30/2025 0800 by Jayden Harrell RN  Trust Relationship/Rapport:   care explained   choices provided   emotional support provided   empathic listening provided   questions answered   thoughts/feelings acknowledged   reassurance provided   questions encouraged   Goal Outcome Evaluation:

## 2025-02-01 NOTE — OUTREACH NOTE
Prep Survey      Flowsheet Row Responses   [facility] facility patient discharged from? [place]   Is LACE score < 7 ? No   Eligibility Not Eligible   What are the reasons patient is not eligible? Behavioral Health   Does the patient have one of the following disease processes/diagnoses(primary or secondary)? Other   Prep survey completed? Yes            DOROTHY LARSEN - Registered Nurse

## 2025-03-03 ENCOUNTER — HOSPITAL ENCOUNTER (EMERGENCY)
Facility: HOSPITAL | Age: 70
Discharge: HOME | End: 2025-03-03
Attending: EMERGENCY MEDICINE | Admitting: EMERGENCY MEDICINE
Payer: MEDICAID

## 2025-03-03 VITALS
OXYGEN SATURATION: 97 % | RESPIRATION RATE: 18 BRPM | SYSTOLIC BLOOD PRESSURE: 162 MMHG | TEMPERATURE: 98 F | HEART RATE: 69 BPM | DIASTOLIC BLOOD PRESSURE: 88 MMHG

## 2025-03-03 DIAGNOSIS — M79.671 BILATERAL FOOT PAIN: Primary | ICD-10-CM

## 2025-03-03 DIAGNOSIS — M79.89 FOOT SWELLING: ICD-10-CM

## 2025-03-03 DIAGNOSIS — E79.0 ELEVATED URIC ACID IN BLOOD: ICD-10-CM

## 2025-03-03 DIAGNOSIS — M79.672 BILATERAL FOOT PAIN: Primary | ICD-10-CM

## 2025-03-03 LAB
ALBUMIN SERPL-MCNC: 4.2 G/DL (ref 3.5–5.2)
ALBUMIN/GLOB SERPL: 1.4 G/DL
ALP SERPL-CCNC: 88 U/L (ref 39–117)
ALT SERPL W P-5'-P-CCNC: 10 U/L (ref 1–41)
ANION GAP SERPL CALCULATED.3IONS-SCNC: 13 MMOL/L (ref 5–15)
AST SERPL-CCNC: 29 U/L (ref 1–40)
BASOPHILS # BLD AUTO: 0.04 10*3/MM3 (ref 0–0.2)
BASOPHILS NFR BLD AUTO: 0.5 % (ref 0–1.5)
BILIRUB SERPL-MCNC: 0.4 MG/DL (ref 0–1.2)
BUN SERPL-MCNC: 9 MG/DL (ref 8–23)
BUN/CREAT SERPL: 11.4 (ref 7–25)
CALCIUM SPEC-SCNC: 9.7 MG/DL (ref 8.6–10.5)
CHLORIDE SERPL-SCNC: 102 MMOL/L (ref 98–107)
CO2 SERPL-SCNC: 25 MMOL/L (ref 22–29)
CREAT SERPL-MCNC: 0.79 MG/DL (ref 0.76–1.27)
DEPRECATED RDW RBC AUTO: 42.1 FL (ref 37–54)
EGFRCR SERPLBLD CKD-EPI 2021: 96.8 ML/MIN/1.73
EOSINOPHIL # BLD AUTO: 0.13 10*3/MM3 (ref 0–0.4)
EOSINOPHIL NFR BLD AUTO: 1.7 % (ref 0.3–6.2)
ERYTHROCYTE [DISTWIDTH] IN BLOOD BY AUTOMATED COUNT: 13 % (ref 12.3–15.4)
GLOBULIN UR ELPH-MCNC: 3 GM/DL
GLUCOSE SERPL-MCNC: 95 MG/DL (ref 65–99)
HCT VFR BLD AUTO: 39.3 % (ref 37.5–51)
HGB BLD-MCNC: 13 G/DL (ref 13–17.7)
IMM GRANULOCYTES # BLD AUTO: 0.03 10*3/MM3 (ref 0–0.05)
IMM GRANULOCYTES NFR BLD AUTO: 0.4 % (ref 0–0.5)
LYMPHOCYTES # BLD AUTO: 2.12 10*3/MM3 (ref 0.7–3.1)
LYMPHOCYTES NFR BLD AUTO: 27.5 % (ref 19.6–45.3)
MCH RBC QN AUTO: 29.5 PG (ref 26.6–33)
MCHC RBC AUTO-ENTMCNC: 33.1 G/DL (ref 31.5–35.7)
MCV RBC AUTO: 89.1 FL (ref 79–97)
MONOCYTES # BLD AUTO: 0.85 10*3/MM3 (ref 0.1–0.9)
MONOCYTES NFR BLD AUTO: 11 % (ref 5–12)
NEUTROPHILS NFR BLD AUTO: 4.54 10*3/MM3 (ref 1.7–7)
NEUTROPHILS NFR BLD AUTO: 58.9 % (ref 42.7–76)
NRBC BLD AUTO-RTO: 0 /100 WBC (ref 0–0.2)
PLATELET # BLD AUTO: 365 10*3/MM3 (ref 140–450)
PMV BLD AUTO: 8.9 FL (ref 6–12)
POTASSIUM SERPL-SCNC: 3.8 MMOL/L (ref 3.5–5.2)
PROT SERPL-MCNC: 7.2 G/DL (ref 6–8.5)
RBC # BLD AUTO: 4.41 10*6/MM3 (ref 4.14–5.8)
SODIUM SERPL-SCNC: 140 MMOL/L (ref 136–145)
URATE SERPL-MCNC: 7.6 MG/DL (ref 3.4–7)
WBC NRBC COR # BLD AUTO: 7.71 10*3/MM3 (ref 3.4–10.8)

## 2025-03-03 PROCEDURE — 99283 EMERGENCY DEPT VISIT LOW MDM: CPT

## 2025-03-03 PROCEDURE — 80053 COMPREHEN METABOLIC PANEL: CPT | Performed by: PHYSICIAN ASSISTANT

## 2025-03-03 PROCEDURE — 36415 COLL VENOUS BLD VENIPUNCTURE: CPT

## 2025-03-03 PROCEDURE — 84550 ASSAY OF BLOOD/URIC ACID: CPT | Performed by: PHYSICIAN ASSISTANT

## 2025-03-03 PROCEDURE — 85025 COMPLETE CBC W/AUTO DIFF WBC: CPT | Performed by: PHYSICIAN ASSISTANT

## 2025-03-03 RX ADMIN — IBUPROFEN 600 MG: 200 TABLET, FILM COATED ORAL at 22:30

## 2025-03-04 NOTE — ED PROVIDER NOTES
MD ATTESTATION NOTE     SHARED VISIT: This visit was performed by BOTH a physician and an APC. The substantive portion of the medical decision making was performed by this attesting physician who made or approved the management plan and takes responsibility for patient management. All studies in the APC note (if performed) were independently interpreted by me.  The LUBA and I have discussed this patient's history, physical exam, and treatment plan. I have reviewed the documentation and personally had a face to face interaction with the patient. I affirm the documentation and agree with the treatment and plan. I provided a substantive portion of the care of the patient.  I personally performed the physical exam in its entirety, and below are my findings.      Brief HPI: Patient complains of acute foot pain and swelling.  Symptoms began last night.  He complains of a burning sensation in the soles of his feet.  Denies recent injury, chest pain, shortness of breath, fever, calf pain, or weakness in his legs.  He has a history of alcohol abuse and peripheral neuropathy.  Denies history of gout.    PHYSICAL EXAM    GENERAL: Awake, alert, oriented x 3.  Nontoxic-appearing elderly male.  Resting comfortably in no acute distress  HENT: nares patent  EYES: no scleral icterus  CV: regular rhythm, normal rate, equal bilateral pedal pulses  RESPIRATORY: normal effort, CTAB  ABDOMEN: soft, nontender  MUSCULOSKELETAL: There is mild diffuse tenderness minimal swelling of both feet.  No calf tenderness.  NEURO: alert, moves all extremities, follows commands  PSYCH:  calm, cooperative  SKIN: warm, dry    Vital signs and nursing notes reviewed.        Plan: Patient complains of acute foot pain and swelling.  He is afebrile.  Feet do not appear cellulitic.  Will obtain labs for further evaluation.    MDM: Patient presented to the ED with bilateral foot pain and swelling.  Uric acid was mildly elevated.  Labs are otherwise unremarkable.   Patient did not have any evidence of cellulitis.  His pain is suggestive of neuropathy.  Patient was advised to take NSAIDs and follow-up with his PCP.    ED Course as of 03/06/25 0938   Mon Mar 03, 2025   2220 Discussed lab results with the patient.  His uric acid is slightly elevated.  It could be related to gout but he could also have some neuropathy given his history of alcohol abuse.  Recommended he try NSAIDs for next few days and follow-up with a PCP.  Patient understands and agrees plan of care. []   2230 Uric Acid(!): 7.6 [WH]   2230 WBC: 7.71 [WH]   2230 Hemoglobin: 13.0 [WH]   2230 Glucose: 95 [WH]   2230 BUN: 9 []      ED Course User Index  [AH] Allison Zhang PA  [] Obed Cunningham MD Holland, William D, MD  03/03/25 2231       Obed Cunningham MD  03/06/25 0938

## 2025-03-04 NOTE — DISCHARGE INSTRUCTIONS
Although you are being discharged from the ED today, I encourage you to return for worsening symptoms. Things can, and do, change such that treatment at home with medication may not be adequate. Specifically I recommend returning for severe or intractable pain, development of redness or warmth, fever, chills, or any other worsening or alarming symptoms.     Rest. Take NSAIDs (ibuprofen or aleve) as needed for pain. Elevate legs.   Follow up with primary care provider for further management and to have blood pressure rechecked.  Take your medications as prescribed.

## 2025-03-04 NOTE — ED PROVIDER NOTES
EMERGENCY DEPARTMENT ENCOUNTER  Room Number:  03/03  PCP: Provider, No Known  Independent Historians: Patient      HPI:  Chief Complaint: had concerns including Foot Swelling and Pain.     A complete HPI/ROS/PMH/PSH/SH/FH are unobtainable due to: None    Chronic or social conditions impacting patient care (Social Determinants of Health): None      Context: Pro Mueller is a 70 y.o. male with a medical history of abuse disorder, neuropathy who presents to the ED c/o acute bilateral foot swelling and pain.  Patient states he noticed it in the middle of the night as it awoke him from his sleep and has been consistent since then.  Patient states he has never had anything like this happen before.  Patient denies any fever or chills.  Patient denies any known history of gout or cellulitis.  Patient states he can bear weight and walk but it is painful to do so.  Patient denies any known history of CHF.  He does admit to a history of hypertension.  He denies any chest pain or shortness of breath.  He denies any calf pain bilaterally.      Review of prior external notes (non-ED) -and- Review of prior external test results outside of this encounter:  Patient was admitted to the hospital 1/28/2025-1/31/2025 for alcohol dependence and withdrawal.     PAST MEDICAL HISTORY  Active Ambulatory Problems     Diagnosis Date Noted    Fall 08/08/2016    Alcoholism in recovery 08/08/2016    Atopic rhinitis 08/08/2016    Mixed anxiety depressive disorder 08/08/2016    Genital herpes simplex 08/08/2016    Hyperlipidemia 08/08/2016    Hypertension 08/08/2016    Hypothyroidism 08/08/2016    Insomnia 08/08/2016    Panic disorder without agoraphobia 08/08/2016    Persistent insomnia 08/08/2016    Vitamin D deficiency 08/08/2016    Chronic low back pain 11/11/2016    Neuropathy involving both lower extremities 10/25/2018    Abnormal EKG 01/03/2019    Left shoulder pain 11/19/2019    Nausea and vomiting 01/11/2020    Pancytopenia 01/14/2020     Left ventricular diastolic dysfunction 01/16/2021    Tremor 10/06/2021    C5 cervical fracture 11/09/2021    Syncope and collapse 01/07/2022    Neck pain 01/07/2022    Alcoholic intoxication with complication 03/13/2022    Withdrawal symptoms, alcohol 07/01/2022    Transaminitis 07/01/2022    Lumbar facet arthropathy 07/20/2022    Alcohol dependence 09/11/2022    Midline low back pain with right-sided sciatica 09/15/2022    Spondylolisthesis at L4-L5 level 09/15/2022    Lumbar canal stenosis 09/15/2022    Alcohol cessation counseling 09/15/2022    Need for assistance due to reduced mobility 09/15/2022    Impaired mobility and ADLs 09/15/2022    Alcohol withdrawal syndrome without complication 02/18/2023    Facial laceration 02/18/2023    Orthostatic hypotension 02/18/2023    Acute bacterial conjunctivitis of right eye 03/02/2023    Visit for suture removal 03/02/2023    Renal calculi 03/14/2023    Hepatic steatosis 03/15/2023    Alcohol withdrawal syndrome with complication 04/14/2023    Macrocytosis without anemia 04/14/2023    Lumbosacral spondylosis without myelopathy 05/05/2023    Elevated LFTs 05/13/2023    Alcohol-induced acute pancreatitis, unspecified complication status 06/23/2023    Pancreatitis 03/06/2024    Generalized abdominal pain 03/07/2024    Alcohol dependence with withdrawal 03/07/2024    Acute alcohol intoxication in patient with alcoholism with blood alcohol level 0.08 to 0.29, with unspecified complication 09/20/2024    Calculus of gallbladder with cholecystitis without biliary obstruction 09/20/2024    Alcoholic liver disease 12/10/2024    Iron deficiency anemia 12/10/2024    Folate deficiency anemia 12/10/2024    PVC's (premature ventricular contractions) 12/10/2024    Weakness 12/10/2024    Lumbar degenerative disc disease 01/29/2025     Resolved Ambulatory Problems     Diagnosis Date Noted    Impacted cerumen 08/08/2016    Influenza 08/08/2016    Motion sickness 08/08/2016    Seasonal  allergic rhinitis 08/08/2016    Upper respiratory tract infection 08/08/2016    Alcohol withdrawal 11/04/2016    Headache 11/05/2016    Gastritis 11/05/2016    Olecranon bursitis of right elbow 04/05/2017    Sciatica of left side 06/12/2018    Syncope and collapse 01/02/2019    Alcoholic ketoacidosis 01/12/2020    Hyponatremia 01/13/2020    Hypokalemia 01/13/2020    Alcohol dependence with uncomplicated withdrawal 01/14/2020    Nephrolithiasis 02/12/2020    Hypomagnesemia 01/16/2021    Non-traumatic rhabdomyolysis 01/18/2021    Urine retention 01/21/2021    Epigastric pain 06/23/2023    Dehydration 02/24/2024    Metabolic acidosis 09/20/2024    Hypothermia 12/02/2024     Past Medical History:   Diagnosis Date    Alcohol abuse     Allergic 1970    Anxiety     Arthritis     Depression     Disease of thyroid gland     Elevated cholesterol     Encounter for removal of sutures     GERD (gastroesophageal reflux disease)     Headache, tension-type     Kidney stone     Low back pain 2019    Migraine     Olecranon bursitis, right elbow     Peripheral neuropathy     Sleep apnea          PAST SURGICAL HISTORY  Past Surgical History:   Procedure Laterality Date    CHOLECYSTECTOMY N/A 9/22/2024    Procedure: CHOLECYSTECTOMY LAPAROSCOPIC WITH DAVINCI ROBOT with cholangiogram, possible open;  Surgeon: Toro Noguera MD;  Location: [facility] MAIN OR;  Service: General;  Laterality: N/A;    COLONOSCOPY      CYST REMOVAL      CYSTOSCOPY BLADDER STONE LITHOTRIPSY  02/2022    ERCP N/A 9/23/2024    Procedure: ENDOSCOPIC RETROGRADE CHOLANGIOPANCREATOGRAPHY sphincterotomy, balloon sweep (9-12);  Surgeon: Fara Madrigal MD;  Location: [facility] ENDOSCOPY;  Service: Gastroenterology;  Laterality: N/A;  sphincterotomy hiatial hernia, gallstone removal    EXTRACORPOREAL SHOCK WAVE LITHOTRIPSY (ESWL) Right 2002    SHOULDER ARTHROSCOPY Right 12/17/2019    Procedure: SHOULDER ARTHROSCOPY, decompression, distal clavicle excision;  Surgeon: Laurent  "Aj BELTRÁN MD;  Location: [facility] OR [facility];  Service: Orthopedics    TONSILLECTOMY           FAMILY HISTORY  Family History   Problem Relation Age of Onset    Alzheimer's disease Mother     Mental illness Mother     Pancreatic cancer Father     Hearing loss Father     Arthritis Maternal Grandmother     Malig Hyperthermia Neg Hx          SOCIAL HISTORY  Social History     Socioeconomic History    Marital status: Single   Tobacco Use    Smoking status: Former     Current packs/day: 0.00     Average packs/day: 0.5 packs/day for 15.0 years (7.5 ttl pk-yrs)     Types: Cigarettes     Start date: 1990     Quit date: 2005     Years since quittin.1    Smokeless tobacco: Never    Tobacco comments:     caffeine - 3 cans of coke daily    Vaping Use    Vaping status: Never Used   Substance and Sexual Activity    Alcohol use: Yes     Alcohol/week: 7.0 standard drinks of alcohol     Types: 7 Shots of liquor per week     Comment: .5 liter vodka    Drug use: Yes     Types: Methamphetamines     Comment: \"once in a rare while\"    Sexual activity: Yes     Partners: Female     Birth control/protection: Condom         ALLERGIES  Penicillins      REVIEW OF SYSTEMS  Review of Systems   Constitutional:  Negative for chills and fever.   Respiratory:  Negative for shortness of breath.    Cardiovascular:  Negative for chest pain and leg swelling.   Gastrointestinal:  Negative for nausea and vomiting.   Musculoskeletal:  Positive for arthralgias and joint swelling.   Skin:  Negative for color change.   Neurological:  Negative for numbness.   Psychiatric/Behavioral:  Negative for confusion.      Included in HPI  All systems reviewed and negative except for those discussed in HPI.      PHYSICAL EXAM    I have reviewed the triage vital signs and nursing notes.    ED Triage Vitals [25 1812]   Temp Heart Rate Resp BP SpO2   98.3 °F (36.8 °C) 80 16 166/96 98 %      Temp src Heart Rate Source Patient Position BP Location FiO2 (%)   -- -- " -- -- --       Physical Exam  GENERAL: alert, no acute distress  SKIN: Warm, dry  HENT: Normocephalic, atraumatic  EYES: no scleral icterus  CV: regular rhythm, regular rate  RESPIRATORY: normal effort, lungs clear  ABDOMEN: soft, nontender, nondistended  MUSCULOSKELETAL: no deformity. Mild pedal edema with possible slight warmth. No notable erythema or rash. Tender to palpation bilateral feet and ankles with full range of motion. No leg swelling or calf tenderness bilaterally. 2+ PT and DP pulses bilaterally.   NEURO: alert, moves all extremities, follows commands    LAB RESULTS  Recent Results (from the past 24 hours)   Comprehensive Metabolic Panel    Collection Time: 03/03/25  9:16 PM    Specimen: Blood   Result Value Ref Range    Glucose 95 65 - 99 mg/dL    BUN 9 8 - 23 mg/dL    Creatinine 0.79 0.76 - 1.27 mg/dL    Sodium 140 136 - 145 mmol/L    Potassium 3.8 3.5 - 5.2 mmol/L    Chloride 102 98 - 107 mmol/L    CO2 25.0 22.0 - 29.0 mmol/L    Calcium 9.7 8.6 - 10.5 mg/dL    Total Protein 7.2 6.0 - 8.5 g/dL    Albumin 4.2 3.5 - 5.2 g/dL    ALT (SGPT) 10 1 - 41 U/L    AST (SGOT) 29 1 - 40 U/L    Alkaline Phosphatase 88 39 - 117 U/L    Total Bilirubin 0.4 0.0 - 1.2 mg/dL    Globulin 3.0 gm/dL    A/G Ratio 1.4 g/dL    BUN/Creatinine Ratio 11.4 7.0 - 25.0    Anion Gap 13.0 5.0 - 15.0 mmol/L    eGFR 96.8 >60.0 mL/min/1.73   Uric Acid    Collection Time: 03/03/25  9:16 PM    Specimen: Blood   Result Value Ref Range    Uric Acid 7.6 (H) 3.4 - 7.0 mg/dL   CBC Auto Differential    Collection Time: 03/03/25  9:16 PM    Specimen: Blood   Result Value Ref Range    WBC 7.71 3.40 - 10.80 10*3/mm3    RBC 4.41 4.14 - 5.80 10*6/mm3    Hemoglobin 13.0 13.0 - 17.7 g/dL    Hematocrit 39.3 37.5 - 51.0 %    MCV 89.1 79.0 - 97.0 fL    MCH 29.5 26.6 - 33.0 pg    MCHC 33.1 31.5 - 35.7 g/dL    RDW 13.0 12.3 - 15.4 %    RDW-SD 42.1 37.0 - 54.0 fl    MPV 8.9 6.0 - 12.0 fL    Platelets 365 140 - 450 10*3/mm3    Neutrophil % 58.9 42.7 - 76.0 %     Lymphocyte % 27.5 19.6 - 45.3 %    Monocyte % 11.0 5.0 - 12.0 %    Eosinophil % 1.7 0.3 - 6.2 %    Basophil % 0.5 0.0 - 1.5 %    Immature Grans % 0.4 0.0 - 0.5 %    Neutrophils, Absolute 4.54 1.70 - 7.00 10*3/mm3    Lymphocytes, Absolute 2.12 0.70 - 3.10 10*3/mm3    Monocytes, Absolute 0.85 0.10 - 0.90 10*3/mm3    Eosinophils, Absolute 0.13 0.00 - 0.40 10*3/mm3    Basophils, Absolute 0.04 0.00 - 0.20 10*3/mm3    Immature Grans, Absolute 0.03 0.00 - 0.05 10*3/mm3    nRBC 0.0 0.0 - 0.2 /100 WBC       MEDICATIONS GIVEN IN ER  Medications   ibuprofen (ADVIL,MOTRIN) tablet 600 mg (600 mg Oral Given 3/3/25 2230)         ORDERS PLACED DURING THIS VISIT:  Orders Placed This Encounter   Procedures    Comprehensive Metabolic Panel    Uric Acid    CBC Auto Differential    CBC & Differential         OUTPATIENT MEDICATION MANAGEMENT:  No current Epic-ordered facility-administered medications on file.     No current [facility]-ordered outpatient medications on file.         PROGRESS, DATA ANALYSIS, CONSULTS, AND MEDICAL DECISION MAKING  All labs have been independently interpreted by me.    Discussion below represents my analysis of pertinent findings related to patient's condition, differential diagnosis, treatment plan and final disposition.    Differential diagnosis includes but is not limited to cellulitis, CHF, autoimmune arthritis, gout, neuropathy, dependent edema.      ED Course as of 03/05/25 1847   Mon Mar 03, 2025   2220 Discussed lab results with the patient.  His uric acid is slightly elevated.  It could be related to gout but he could also have some neuropathy given his history of alcohol abuse.  Recommended he try NSAIDs for next few days and follow-up with a PCP.  Patient understands and agrees plan of care. []   2230 Uric Acid(!): 7.6 [WH]   2230 WBC: 7.71 [WH]   2230 Hemoglobin: 13.0 [WH]   2230 Glucose: 95 [WH]   2230 BUN: 9 [WH]      ED Course User Index  [AH] Allison Zhang PA  [] Obed Cunningham MD              AS OF 00:07 EST VITALS:    BP - 162/88  HR - 69  TEMP - 98 °F (36.7 °C) (Oral)  O2 SATS - 97%    DIAGNOSIS  Final diagnoses:   Bilateral foot pain   Foot swelling   Elevated uric acid in blood         DISPOSITION  ED Disposition       ED Disposition   Discharge    Condition   Stable    Comment   --                Please note that portions of this document were completed with a voice recognition program.    Note Disclaimer: At [facility], we believe that sharing information builds trust and better relationships. You are receiving this note because you recently visited [facility]. It is possible you will see health information before a provider has talked with you about it. This kind of information can be easy to misunderstand. To help you fully understand what it means for your health, we urge you to discuss this note with your provider.         Allison Zhang PA  03/05/25 0360

## 2025-03-13 ENCOUNTER — TELEPHONE (OUTPATIENT)
Dept: CARDIOLOGY | Facility: CLINIC | Age: 70
End: 2025-03-13
Payer: MEDICAID

## 2025-03-13 NOTE — TELEPHONE ENCOUNTER
I called to reach out to patient to see if he had monitor or if he turned it in but was there was no answer. I will reach out to G as he has followed with them so he may have an appointment set up.     BERONICA Diaz

## 2025-03-21 ENCOUNTER — HOSPITAL ENCOUNTER (EMERGENCY)
Facility: HOSPITAL | Age: 70
Discharge: HOME | End: 2025-03-21
Attending: EMERGENCY MEDICINE
Payer: MEDICARE

## 2025-03-21 VITALS
DIASTOLIC BLOOD PRESSURE: 97 MMHG | TEMPERATURE: 98.3 F | SYSTOLIC BLOOD PRESSURE: 183 MMHG | RESPIRATION RATE: 18 BRPM | HEART RATE: 73 BPM | OXYGEN SATURATION: 93 %

## 2025-03-21 DIAGNOSIS — E83.42 HYPOMAGNESEMIA: ICD-10-CM

## 2025-03-21 DIAGNOSIS — G44.209 ACUTE NON INTRACTABLE TENSION-TYPE HEADACHE: ICD-10-CM

## 2025-03-21 DIAGNOSIS — F10.10 ALCOHOL ABUSE: ICD-10-CM

## 2025-03-21 DIAGNOSIS — I10 POORLY-CONTROLLED HYPERTENSION: Primary | ICD-10-CM

## 2025-03-21 DIAGNOSIS — Z91.148 NONCOMPLIANCE WITH MEDICATION REGIMEN: ICD-10-CM

## 2025-03-21 DIAGNOSIS — R11.2 NAUSEA AND VOMITING, UNSPECIFIED VOMITING TYPE: ICD-10-CM

## 2025-03-21 LAB
ALBUMIN SERPL-MCNC: 4.4 G/DL (ref 3.5–5.2)
ALBUMIN/GLOB SERPL: 1.6 G/DL
ALP SERPL-CCNC: 90 U/L (ref 39–117)
ALT SERPL W P-5'-P-CCNC: 11 U/L (ref 1–41)
ANION GAP SERPL CALCULATED.3IONS-SCNC: 14.6 MMOL/L (ref 5–15)
AST SERPL-CCNC: 37 U/L (ref 1–40)
BASOPHILS # BLD AUTO: 0.03 10*3/MM3 (ref 0–0.2)
BASOPHILS NFR BLD AUTO: 0.6 % (ref 0–1.5)
BILIRUB SERPL-MCNC: 0.6 MG/DL (ref 0–1.2)
BUN SERPL-MCNC: 8 MG/DL (ref 8–23)
BUN/CREAT SERPL: 10.5 (ref 7–25)
CALCIUM SPEC-SCNC: 9.1 MG/DL (ref 8.6–10.5)
CHLORIDE SERPL-SCNC: 100 MMOL/L (ref 98–107)
CO2 SERPL-SCNC: 20.4 MMOL/L (ref 22–29)
CREAT SERPL-MCNC: 0.76 MG/DL (ref 0.76–1.27)
DEPRECATED RDW RBC AUTO: 41 FL (ref 37–54)
EGFRCR SERPLBLD CKD-EPI 2021: 97.9 ML/MIN/1.73
EOSINOPHIL # BLD AUTO: 0.04 10*3/MM3 (ref 0–0.4)
EOSINOPHIL NFR BLD AUTO: 0.8 % (ref 0.3–6.2)
ERYTHROCYTE [DISTWIDTH] IN BLOOD BY AUTOMATED COUNT: 13.3 % (ref 12.3–15.4)
ETHANOL BLD-MCNC: <10 MG/DL (ref 0–10)
ETHANOL UR QL: <0.01 %
GEN 5 1HR TROPONIN T REFLEX: 11 NG/L
GLOBULIN UR ELPH-MCNC: 2.7 GM/DL
GLUCOSE SERPL-MCNC: 111 MG/DL (ref 65–99)
HCT VFR BLD AUTO: 38.8 % (ref 37.5–51)
HGB BLD-MCNC: 13.2 G/DL (ref 13–17.7)
HOLD SPECIMEN: NORMAL
HOLD SPECIMEN: NORMAL
IMM GRANULOCYTES # BLD AUTO: 0.01 10*3/MM3 (ref 0–0.05)
IMM GRANULOCYTES NFR BLD AUTO: 0.2 % (ref 0–0.5)
LIPASE SERPL-CCNC: 55 U/L (ref 13–60)
LYMPHOCYTES # BLD AUTO: 1.24 10*3/MM3 (ref 0.7–3.1)
LYMPHOCYTES NFR BLD AUTO: 23.3 % (ref 19.6–45.3)
MAGNESIUM SERPL-MCNC: 1.4 MG/DL (ref 1.6–2.4)
MCH RBC QN AUTO: 29.1 PG (ref 26.6–33)
MCHC RBC AUTO-ENTMCNC: 34 G/DL (ref 31.5–35.7)
MCV RBC AUTO: 85.5 FL (ref 79–97)
MONOCYTES # BLD AUTO: 0.42 10*3/MM3 (ref 0.1–0.9)
MONOCYTES NFR BLD AUTO: 7.9 % (ref 5–12)
NEUTROPHILS NFR BLD AUTO: 3.59 10*3/MM3 (ref 1.7–7)
NEUTROPHILS NFR BLD AUTO: 67.2 % (ref 42.7–76)
PLATELET # BLD AUTO: 140 10*3/MM3 (ref 140–450)
PMV BLD AUTO: 9 FL (ref 6–12)
POTASSIUM SERPL-SCNC: 3.6 MMOL/L (ref 3.5–5.2)
PROT SERPL-MCNC: 7.1 G/DL (ref 6–8.5)
QT INTERVAL: 444 MS
QTC INTERVAL: 437 MS
RBC # BLD AUTO: 4.54 10*6/MM3 (ref 4.14–5.8)
SODIUM SERPL-SCNC: 135 MMOL/L (ref 136–145)
T4 FREE SERPL-MCNC: 0.91 NG/DL (ref 0.92–1.68)
TROPONIN T NUMERIC DELTA: 1 NG/L
TROPONIN T SERPL HS-MCNC: 10 NG/L
TSH SERPL DL<=0.05 MIU/L-ACNC: 5.71 UIU/ML (ref 0.27–4.2)
WBC NRBC COR # BLD AUTO: 5.33 10*3/MM3 (ref 3.4–10.8)
WHOLE BLOOD HOLD COAG: NORMAL
WHOLE BLOOD HOLD SPECIMEN: NORMAL

## 2025-03-21 PROCEDURE — 93010 ELECTROCARDIOGRAM REPORT: CPT | Performed by: INTERNAL MEDICINE

## 2025-03-21 PROCEDURE — 82077 ASSAY SPEC XCP UR&BREATH IA: CPT | Performed by: EMERGENCY MEDICINE

## 2025-03-21 PROCEDURE — 25010000002 ONDANSETRON PER 1 MG: Performed by: EMERGENCY MEDICINE

## 2025-03-21 PROCEDURE — 99284 EMERGENCY DEPT VISIT MOD MDM: CPT

## 2025-03-21 PROCEDURE — 84443 ASSAY THYROID STIM HORMONE: CPT | Performed by: EMERGENCY MEDICINE

## 2025-03-21 PROCEDURE — 84439 ASSAY OF FREE THYROXINE: CPT | Performed by: EMERGENCY MEDICINE

## 2025-03-21 PROCEDURE — 83735 ASSAY OF MAGNESIUM: CPT | Performed by: EMERGENCY MEDICINE

## 2025-03-21 PROCEDURE — 93005 ELECTROCARDIOGRAM TRACING: CPT | Performed by: EMERGENCY MEDICINE

## 2025-03-21 PROCEDURE — 25810000003 LACTATED RINGERS SOLUTION: Performed by: EMERGENCY MEDICINE

## 2025-03-21 PROCEDURE — 83690 ASSAY OF LIPASE: CPT | Performed by: EMERGENCY MEDICINE

## 2025-03-21 PROCEDURE — 96374 THER/PROPH/DIAG INJ IV PUSH: CPT

## 2025-03-21 PROCEDURE — 36415 COLL VENOUS BLD VENIPUNCTURE: CPT

## 2025-03-21 PROCEDURE — 25010000002 LORAZEPAM PER 2 MG: Performed by: EMERGENCY MEDICINE

## 2025-03-21 PROCEDURE — 96375 TX/PRO/DX INJ NEW DRUG ADDON: CPT

## 2025-03-21 PROCEDURE — 80053 COMPREHEN METABOLIC PANEL: CPT | Performed by: EMERGENCY MEDICINE

## 2025-03-21 PROCEDURE — 84484 ASSAY OF TROPONIN QUANT: CPT | Performed by: EMERGENCY MEDICINE

## 2025-03-21 PROCEDURE — 85025 COMPLETE CBC W/AUTO DIFF WBC: CPT | Performed by: EMERGENCY MEDICINE

## 2025-03-21 RX ORDER — FAMOTIDINE 10 MG/ML
20 INJECTION, SOLUTION INTRAVENOUS ONCE
Status: COMPLETED | OUTPATIENT
Start: 2025-03-21 | End: 2025-03-21

## 2025-03-21 RX ORDER — SODIUM CHLORIDE 0.9 % (FLUSH) 0.9 %
10 SYRINGE (ML) INJECTION AS NEEDED
Status: DISCONTINUED | OUTPATIENT
Start: 2025-03-21 | End: 2025-03-21 | Stop reason: HOSPADM

## 2025-03-21 RX ORDER — LORAZEPAM 2 MG/ML
1 INJECTION INTRAMUSCULAR ONCE
Status: COMPLETED | OUTPATIENT
Start: 2025-03-21 | End: 2025-03-21

## 2025-03-21 RX ORDER — CLONIDINE HYDROCHLORIDE 0.1 MG/1
0.1 TABLET ORAL ONCE
Status: COMPLETED | OUTPATIENT
Start: 2025-03-21 | End: 2025-03-21

## 2025-03-21 RX ORDER — FAMOTIDINE 20 MG/1
20 TABLET, FILM COATED ORAL 2 TIMES DAILY
Qty: 30 TABLET | Refills: 0 | Status: SHIPPED | OUTPATIENT
Start: 2025-03-21

## 2025-03-21 RX ORDER — MAGNESIUM OXIDE 400 MG/1
400 TABLET ORAL DAILY
Qty: 7 TABLET | Refills: 0 | Status: SHIPPED | OUTPATIENT
Start: 2025-03-21

## 2025-03-21 RX ORDER — ONDANSETRON 4 MG/1
4 TABLET, ORALLY DISINTEGRATING ORAL EVERY 6 HOURS PRN
Qty: 15 TABLET | Refills: 0 | Status: SHIPPED | OUTPATIENT
Start: 2025-03-21

## 2025-03-21 RX ORDER — BENAZEPRIL HYDROCHLORIDE 20 MG/1
20 TABLET ORAL DAILY
Qty: 30 TABLET | Refills: 0 | Status: SHIPPED | OUTPATIENT
Start: 2025-03-21

## 2025-03-21 RX ORDER — ONDANSETRON 2 MG/ML
4 INJECTION INTRAMUSCULAR; INTRAVENOUS ONCE
Status: COMPLETED | OUTPATIENT
Start: 2025-03-21 | End: 2025-03-21

## 2025-03-21 RX ADMIN — ONDANSETRON 4 MG: 2 INJECTION, SOLUTION INTRAMUSCULAR; INTRAVENOUS at 14:14

## 2025-03-21 RX ADMIN — SODIUM CHLORIDE, SODIUM LACTATE, POTASSIUM CHLORIDE, CALCIUM CHLORIDE 1000 ML: 20; 30; 600; 310 INJECTION, SOLUTION INTRAVENOUS at 14:10

## 2025-03-21 RX ADMIN — FAMOTIDINE 20 MG: 10 INJECTION INTRAVENOUS at 14:14

## 2025-03-21 RX ADMIN — CLONIDINE HYDROCHLORIDE 0.1 MG: 0.1 TABLET ORAL at 16:42

## 2025-03-21 RX ADMIN — LORAZEPAM 1 MG: 2 INJECTION INTRAMUSCULAR; INTRAVENOUS at 14:14

## 2025-03-21 NOTE — ED PROVIDER NOTES
EMERGENCY DEPARTMENT ENCOUNTER    Room Number:  17/17  PCP: Provider, No Known  Historian: Patient, EMS    I initially evaluated the patient at 1353    HPI:  Chief Complaint: Headache, abdominal pain  A complete HPI/ROS/PMH/PSH/SH/FH are unobtainable due to: Nothing  Context: Pro Mueller is a 70 y.o. male with a medical history of alcohol abuse, hypertension, hyperlipidemia, peripheral neuropathy, GERD, hypothyroidism who presents to the ED c/o acute headache and abdominal pain.  Symptoms began this morning.  Patient complains of upper abdominal discomfort and nausea.  He has had a few episodes of nonbilious nonbloody vomiting.  He also complains of a mild frontal headache.  Denies recent head injury.  He has chronic tingling in both feet secondary to neuropathy.  Per EMS, patient's blood pressure was elevated.  It was 179/100 at triage.  He has been noncompliant with all of his medications, including blood pressure medications, for the past few months.  He does not currently have a PCP.  He drinks 3-4 martinis daily.  Last drink was last night.  He complains of feeling somewhat shaky.  Denies fever, chills, chest pain, shortness of breath, flank pain, vision changes, or weakness in his extremities.            PAST MEDICAL HISTORY  Active Ambulatory Problems     Diagnosis Date Noted    Fall 08/08/2016    Alcoholism in recovery 08/08/2016    Atopic rhinitis 08/08/2016    Mixed anxiety depressive disorder 08/08/2016    Genital herpes simplex 08/08/2016    Hyperlipidemia 08/08/2016    Hypertension 08/08/2016    Hypothyroidism 08/08/2016    Insomnia 08/08/2016    Panic disorder without agoraphobia 08/08/2016    Persistent insomnia 08/08/2016    Vitamin D deficiency 08/08/2016    Chronic low back pain 11/11/2016    Neuropathy involving both lower extremities 10/25/2018    Abnormal EKG 01/03/2019    Left shoulder pain 11/19/2019    Nausea and vomiting 01/11/2020    Pancytopenia 01/14/2020    Left ventricular diastolic  dysfunction 01/16/2021    Tremor 10/06/2021    C5 cervical fracture 11/09/2021    Syncope and collapse 01/07/2022    Neck pain 01/07/2022    Alcoholic intoxication with complication 03/13/2022    Withdrawal symptoms, alcohol 07/01/2022    Transaminitis 07/01/2022    Lumbar facet arthropathy 07/20/2022    Alcohol dependence 09/11/2022    Midline low back pain with right-sided sciatica 09/15/2022    Spondylolisthesis at L4-L5 level 09/15/2022    Lumbar canal stenosis 09/15/2022    Alcohol cessation counseling 09/15/2022    Need for assistance due to reduced mobility 09/15/2022    Impaired mobility and ADLs 09/15/2022    Alcohol withdrawal syndrome without complication 02/18/2023    Facial laceration 02/18/2023    Orthostatic hypotension 02/18/2023    Acute bacterial conjunctivitis of right eye 03/02/2023    Visit for suture removal 03/02/2023    Renal calculi 03/14/2023    Hepatic steatosis 03/15/2023    Alcohol withdrawal syndrome with complication 04/14/2023    Macrocytosis without anemia 04/14/2023    Lumbosacral spondylosis without myelopathy 05/05/2023    Elevated LFTs 05/13/2023    Alcohol-induced acute pancreatitis, unspecified complication status 06/23/2023    Pancreatitis 03/06/2024    Generalized abdominal pain 03/07/2024    Alcohol dependence with withdrawal 03/07/2024    Acute alcohol intoxication in patient with alcoholism with blood alcohol level 0.08 to 0.29, with unspecified complication 09/20/2024    Calculus of gallbladder with cholecystitis without biliary obstruction 09/20/2024    Alcoholic liver disease 12/10/2024    Iron deficiency anemia 12/10/2024    Folate deficiency anemia 12/10/2024    PVC's (premature ventricular contractions) 12/10/2024    Weakness 12/10/2024    Lumbar degenerative disc disease 01/29/2025     Resolved Ambulatory Problems     Diagnosis Date Noted    Impacted cerumen 08/08/2016    Influenza 08/08/2016    Motion sickness 08/08/2016    Seasonal allergic rhinitis 08/08/2016     Upper respiratory tract infection 08/08/2016    Alcohol withdrawal 11/04/2016    Headache 11/05/2016    Gastritis 11/05/2016    Olecranon bursitis of right elbow 04/05/2017    Sciatica of left side 06/12/2018    Syncope and collapse 01/02/2019    Alcoholic ketoacidosis 01/12/2020    Hyponatremia 01/13/2020    Hypokalemia 01/13/2020    Alcohol dependence with uncomplicated withdrawal 01/14/2020    Nephrolithiasis 02/12/2020    Hypomagnesemia 01/16/2021    Non-traumatic rhabdomyolysis 01/18/2021    Urine retention 01/21/2021    Epigastric pain 06/23/2023    Dehydration 02/24/2024    Metabolic acidosis 09/20/2024    Hypothermia 12/02/2024     Past Medical History:   Diagnosis Date    Alcohol abuse     Allergic 1970    Anxiety     Arthritis     Depression     Disease of thyroid gland     Elevated cholesterol     Encounter for removal of sutures     GERD (gastroesophageal reflux disease)     Headache, tension-type     Kidney stone     Low back pain 2019    Migraine     Olecranon bursitis, right elbow     Peripheral neuropathy     Sleep apnea          PAST SURGICAL HISTORY  Past Surgical History:   Procedure Laterality Date    CHOLECYSTECTOMY N/A 9/22/2024    Procedure: CHOLECYSTECTOMY LAPAROSCOPIC WITH DAVINCI ROBOT with cholangiogram, possible open;  Surgeon: Toro Noguera MD;  Location: [facility] MAIN OR;  Service: General;  Laterality: N/A;    COLONOSCOPY      CYST REMOVAL      CYSTOSCOPY BLADDER STONE LITHOTRIPSY  02/2022    ERCP N/A 9/23/2024    Procedure: ENDOSCOPIC RETROGRADE CHOLANGIOPANCREATOGRAPHY sphincterotomy, balloon sweep (9-12);  Surgeon: Fara Madrigal MD;  Location: [facility] ENDOSCOPY;  Service: Gastroenterology;  Laterality: N/A;  sphincterotomy hiatial hernia, gallstone removal    EXTRACORPOREAL SHOCK WAVE LITHOTRIPSY (ESWL) Right 2002    SHOULDER ARTHROSCOPY Right 12/17/2019    Procedure: SHOULDER ARTHROSCOPY, decompression, distal clavicle excision;  Surgeon: Aj Mancilla MD;  Location: [facility]  "OR OSC;  Service: Orthopedics    TONSILLECTOMY           FAMILY HISTORY  Family History   Problem Relation Age of Onset    Alzheimer's disease Mother     Mental illness Mother     Pancreatic cancer Father     Hearing loss Father     Arthritis Maternal Grandmother     Malig Hyperthermia Neg Hx          SOCIAL HISTORY  Social History     Socioeconomic History    Marital status: Single   Tobacco Use    Smoking status: Former     Current packs/day: 0.00     Average packs/day: 0.5 packs/day for 15.0 years (7.5 ttl pk-yrs)     Types: Cigarettes     Start date: 1990     Quit date: 2005     Years since quittin.2    Smokeless tobacco: Never    Tobacco comments:     caffeine - 3 cans of coke daily    Vaping Use    Vaping status: Never Used   Substance and Sexual Activity    Alcohol use: Yes     Alcohol/week: 7.0 standard drinks of alcohol     Types: 7 Shots of liquor per week     Comment: .5 liter vodka    Drug use: Yes     Types: Methamphetamines     Comment: \"once in a rare while\"    Sexual activity: Yes     Partners: Female     Birth control/protection: Condom         ALLERGIES  Penicillins    REVIEW OF SYSTEMS  Review of Systems  Included in HPI  All systems reviewed and negative except for those discussed in HPI.      PHYSICAL EXAM  ED Triage Vitals [25 1344]   Temp Heart Rate Resp BP SpO2   98.3 °F (36.8 °C) 76 18 179/100 96 %      Temp src Heart Rate Source Patient Position BP Location FiO2 (%)   Tympanic Monitor Sitting Right arm --       Physical Exam      GENERAL: Awake, alert, oriented x 3.  Nontoxic-appearing elderly male.  He is mildly tremulous  HENT: NCAT, nares patent, no sinus tenderness, no temporal artery tenderness  EYES: PERRL, EOMI  CV: regular rhythm, normal rate  RESPIRATORY: normal effort, clear to auscultation bilaterally  ABDOMEN: soft, nondistended, nontender  MUSCULOSKELETAL: Extremities are nontender with full range of motion  NEURO: Speech is normal.  No facial droop.  Normal " strength in all extremities.  Follows commands.  PSYCH:  calm, cooperative  SKIN: warm, dry    Vital signs and nursing notes reviewed.          LAB RESULTS  Recent Results (from the past 24 hours)   Comprehensive Metabolic Panel    Collection Time: 03/21/25  2:07 PM    Specimen: Blood   Result Value Ref Range    Glucose 111 (H) 65 - 99 mg/dL    BUN 8 8 - 23 mg/dL    Creatinine 0.76 0.76 - 1.27 mg/dL    Sodium 135 (L) 136 - 145 mmol/L    Potassium 3.6 3.5 - 5.2 mmol/L    Chloride 100 98 - 107 mmol/L    CO2 20.4 (L) 22.0 - 29.0 mmol/L    Calcium 9.1 8.6 - 10.5 mg/dL    Total Protein 7.1 6.0 - 8.5 g/dL    Albumin 4.4 3.5 - 5.2 g/dL    ALT (SGPT) 11 1 - 41 U/L    AST (SGOT) 37 1 - 40 U/L    Alkaline Phosphatase 90 39 - 117 U/L    Total Bilirubin 0.6 0.0 - 1.2 mg/dL    Globulin 2.7 gm/dL    A/G Ratio 1.6 g/dL    BUN/Creatinine Ratio 10.5 7.0 - 25.0    Anion Gap 14.6 5.0 - 15.0 mmol/L    eGFR 97.9 >60.0 mL/min/1.73   Magnesium    Collection Time: 03/21/25  2:07 PM    Specimen: Blood   Result Value Ref Range    Magnesium 1.4 (L) 1.6 - 2.4 mg/dL   High Sensitivity Troponin T    Collection Time: 03/21/25  2:07 PM    Specimen: Blood   Result Value Ref Range    HS Troponin T 10 <22 ng/L   Green Top (Gel)    Collection Time: 03/21/25  2:07 PM   Result Value Ref Range    Extra Tube Hold for add-ons.    Lavender Top    Collection Time: 03/21/25  2:07 PM   Result Value Ref Range    Extra Tube hold for add-on    Gold Top - SST    Collection Time: 03/21/25  2:07 PM   Result Value Ref Range    Extra Tube Hold for add-ons.    Light Blue Top    Collection Time: 03/21/25  2:07 PM   Result Value Ref Range    Extra Tube Hold for add-ons.    CBC Auto Differential    Collection Time: 03/21/25  2:07 PM    Specimen: Blood   Result Value Ref Range    WBC 5.33 3.40 - 10.80 10*3/mm3    RBC 4.54 4.14 - 5.80 10*6/mm3    Hemoglobin 13.2 13.0 - 17.7 g/dL    Hematocrit 38.8 37.5 - 51.0 %    MCV 85.5 79.0 - 97.0 fL    MCH 29.1 26.6 - 33.0 pg    MCHC  34.0 31.5 - 35.7 g/dL    RDW 13.3 12.3 - 15.4 %    RDW-SD 41.0 37.0 - 54.0 fl    MPV 9.0 6.0 - 12.0 fL    Platelets 140 140 - 450 10*3/mm3    Neutrophil % 67.2 42.7 - 76.0 %    Lymphocyte % 23.3 19.6 - 45.3 %    Monocyte % 7.9 5.0 - 12.0 %    Eosinophil % 0.8 0.3 - 6.2 %    Basophil % 0.6 0.0 - 1.5 %    Immature Grans % 0.2 0.0 - 0.5 %    Neutrophils, Absolute 3.59 1.70 - 7.00 10*3/mm3    Lymphocytes, Absolute 1.24 0.70 - 3.10 10*3/mm3    Monocytes, Absolute 0.42 0.10 - 0.90 10*3/mm3    Eosinophils, Absolute 0.04 0.00 - 0.40 10*3/mm3    Basophils, Absolute 0.03 0.00 - 0.20 10*3/mm3    Immature Grans, Absolute 0.01 0.00 - 0.05 10*3/mm3   Lipase    Collection Time: 03/21/25  2:08 PM    Specimen: Blood   Result Value Ref Range    Lipase 55 13 - 60 U/L   TSH    Collection Time: 03/21/25  2:08 PM    Specimen: Blood   Result Value Ref Range    TSH 5.710 (H) 0.270 - 4.200 uIU/mL   T4, Free    Collection Time: 03/21/25  2:08 PM    Specimen: Blood   Result Value Ref Range    Free T4 0.91 (L) 0.92 - 1.68 ng/dL   Ethanol    Collection Time: 03/21/25  2:08 PM    Specimen: Blood   Result Value Ref Range    Ethanol <10 0 - 10 mg/dL    Ethanol % <0.010 %   ECG 12 Lead Syncope    Collection Time: 03/21/25  2:36 PM   Result Value Ref Range    QT Interval 444 ms    QTC Interval 437 ms   High Sensitivity Troponin T 1Hr    Collection Time: 03/21/25  3:10 PM    Specimen: Blood   Result Value Ref Range    HS Troponin T 11 <22 ng/L    Troponin T Numeric Delta 1 Abnormal if >/=3 ng/L       Ordered the above labs and reviewed the results.        RADIOLOGY  No Radiology Exams Resulted Within Past 24 Hours    Ordered the above noted radiological studies. Reviewed by me in PACS.            PROCEDURES  Procedures        OUTPATIENT MEDICATION MANAGEMENT:  Current Facility-Administered Medications Ordered in Epic   Medication Dose Route Frequency Provider Last Rate Last Admin    sodium chloride 0.9 % flush 10 mL  10 mL Intravenous PRN Octavio  Obed MELCHOR MD         Current Outpatient Medications Ordered in Epic   Medication Sig Dispense Refill    benazepril (Lotensin) 20 MG tablet Take 1 tablet by mouth Daily. 30 tablet 0    famotidine (PEPCID) 20 MG tablet Take 1 tablet by mouth 2 (Two) Times a Day. 30 tablet 0    magnesium oxide (MAG-OX) 400 MG tablet Take 1 tablet by mouth Daily. 7 tablet 0    ondansetron ODT (ZOFRAN-ODT) 4 MG disintegrating tablet Place 1 tablet on the tongue Every 6 (Six) Hours As Needed for Nausea or Vomiting. 15 tablet 0           MEDICATIONS GIVEN IN ER  Medications   sodium chloride 0.9 % flush 10 mL (has no administration in time range)   lactated ringers bolus 1,000 mL (0 mL Intravenous Stopped 3/21/25 1539)   ondansetron (ZOFRAN) injection 4 mg (4 mg Intravenous Given 3/21/25 1414)   LORazepam (ATIVAN) injection 1 mg (1 mg Intravenous Given 3/21/25 1414)   famotidine (PEPCID) injection 20 mg (20 mg Intravenous Given 3/21/25 1414)   cloNIDine (CATAPRES) tablet 0.1 mg (0.1 mg Oral Given 3/21/25 1642)                   MEDICAL DECISION MAKING, PROGRESS, and CONSULTS    All labs have been independently reviewed by me.  All radiology studies have been reviewed by me and I have also reviewed the radiology report.   EKG's independently viewed and interpreted by me.  Discussion below represents my analysis of pertinent findings related to patient's condition, differential diagnosis, treatment plan and final disposition.      Additional sources:    - Discussed/ obtained information from independent historians: None    - External (non-ED) record review: Patient was last admitted here in January 2025 for alcohol withdrawal syndrome.  Echocardiogram done in December 2024 showed an EF of greater than 70%.    -Prescription drug monitoring program review:     N/A    - Chronic or social conditions impacting patient care (Social Determinants of Health): Health Care System (Health Coverage, Provider Availability, Provider Linguistic and  Cultural Competency, Quality of Care)  Patient does not currently have a primary care provider        Orders placed during this visit:  Orders Placed This Encounter   Procedures    [place] Draw    Comprehensive Metabolic Panel    Magnesium    High Sensitivity Troponin T    CBC Auto Differential    Lipase    TSH    T4, Free    Ethanol    High Sensitivity Troponin T 1Hr    NPO Diet NPO Type: Strict NPO    Undress & Gown    Continuous Pulse Oximetry    Vital Signs    Orthostatic Blood Pressure    Oxygen Therapy- Nasal Cannula; Titrate 1-6 LPM Per SpO2; 90 - 95%    POC Glucose Once    ECG 12 Lead Syncope    Insert Peripheral IV    CBC & Differential    Green Top (Gel)    Lavender Top    Gold Top - SST    Light Blue Top         Additional orders considered but not ordered:          Differential diagnosis includes, but is not limited to:    Differential diagnosis for headache includes but is not limited to:  - subarachnoid hemorrhage  - intracranial mass  - stroke  - RCVS  - meningitis  - glaucoma  - giant cell arteritis  - CO poisoning  - cerebral venous sinus thrombosis  - migraine        Independent interpretation of labs, radiology studies, and discussions with consultants:  ED Course as of 03/21/25 1737   Fri Mar 21, 2025   1352 BP: 179/100  Blood pressure was 162/88 at ED visit 2 weeks ago [WH]   1352 Temp: 98.3 °F (36.8 °C) [WH]   1352 Heart Rate: 76 [WH]   1352 Resp: 18 [WH]   1352 SpO2: 96 % [WH]   1352 Device (Oxygen Therapy): room air [WH]   1447 BP: 175/97 [WH]   1510 Ethanol: <10 [WH]   1510 Free T4(!): 0.91 [WH]   1510 TSH Baseline(!): 5.710 [WH]   1510 Lipase: 55 [WH]   1510 Magnesium(!): 1.4 [WH]   1510 Glucose(!): 111 [WH]   1510 BUN: 8 [WH]   1510 Creatinine: 0.76 [WH]   1510 Sodium(!): 135 [WH]   1510 CO2(!): 20.4 [WH]   1510 Anion Gap: 14.6 [WH]   1510 ALT (SGPT): 11 [WH]   1510 AST (SGOT): 37 [WH]   1510 Alkaline Phosphatase: 90 [WH]   1510 Total Bilirubin: 0.6 [WH]   1510 HS Troponin T: 10 [WH]    1510 WBC: 5.33 [WH]   1510 Hemoglobin: 13.2 [WH]   1539 HS Troponin T: 11  Delta +1 [WH]   1634 Patient is feeling better after IV fluids and medications.  Blood pressure is currently 183/98.  Heart rate is in the 70s.  He is no longer tremulous.  Patient will be given prescriptions for blood pressure medication, Pepcid, magnesium, and Zofran.  He will be given the patient connection number as he does not currently have a PCP.  Return precautions were discussed.  All questions were answered. [WH]   1649 MDM: Patient presented to the ED with headache, abdominal discomfort, and elevated blood pressure.  He has been out of his medications for the past few months.  He drinks alcohol daily.  Labs were basically unremarkable.  Troponin was negative x 2.  His symptoms improved with IV and p.o. medications.  He will be restarted on his blood pressure medication.  Magnesium was slightly low and he will be started on magnesium oxide. [WH]      ED Course User Index  [] Obed Cunningham MD         COMPLEXITY OF CARE        DIAGNOSIS  Final diagnoses:   Poorly-controlled hypertension   Hypomagnesemia   Acute non intractable tension-type headache   Nausea and vomiting, unspecified vomiting type   Alcohol abuse   Noncompliance with medication regimen         DISPOSITION  DISCHARGE    Patient discharged in stable condition.    Reviewed implications of results, diagnosis, meds, responsibility to follow up, warning signs and symptoms of possible worsening, potential complications and reasons to return to ER, including worsening/persistent symptoms, chest pain, trouble breathing, vomiting, dizziness, or other concern..    Patient/Family voiced understanding of above instructions.    Discussed plan for discharge, as there is no emergent indication for admission. Patient referred to primary care provider for BP management due to today's BP. Pt/family is agreeable and understands need for follow up and repeat testing.  Pt is aware  that discharge does not mean that nothing is wrong but it indicates no emergency is present that requires admission and they must continue care with follow-up as given below or physician of their choice.     FOLLOW-UP  PATIENT CONNECTION - [place] [address]  [phone]             Medication List        New Prescriptions      benazepril 20 MG tablet  Commonly known as: Lotensin  Take 1 tablet by mouth Daily.     famotidine 20 MG tablet  Commonly known as: PEPCID  Take 1 tablet by mouth 2 (Two) Times a Day.     magnesium oxide 400 MG tablet  Commonly known as: MAG-OX  Take 1 tablet by mouth Daily.     ondansetron ODT 4 MG disintegrating tablet  Commonly known as: ZOFRAN-ODT  Place 1 tablet on the tongue Every 6 (Six) Hours As Needed for Nausea or Vomiting.               Where to Get Your Medications        These medications were sent to [id number] - [place], [place] - [address] AT [facility] - [phone] [facility] [phone] [place] [id number], [facility] [address]      Phone: [phone]   benazepril 20 MG tablet  famotidine 20 MG tablet  magnesium oxide 400 MG tablet  ondansetron ODT 4 MG disintegrating tablet                   Latest Documented Vital Signs:  AS OF 17:37 EDT VITALS:    BP - (!) 183/97  HR - 73  TEMP - 98.3 °F (36.8 °C) (Tympanic)  O2 SATS - 93%            --    Please note that portions of this were completed with a voice recognition program.       Note Disclaimer: At [facility], we believe that sharing information builds trust and better relationships. You are receiving this note because you are receiving care at [facility] or recently visited. It is possible you will see health information before a provider has talked with you about it. This kind of information can be easy to misunderstand. To help you fully understand what it means for your health, we urge you to discuss this note with your  provider.             Obed Cunningham MD  03/21/25 [id number]

## 2025-03-21 NOTE — DISCHARGE INSTRUCTIONS
Take medications as prescribed.  Check and record your blood pressure regularly.  Call the patient connection number for assistance in obtaining a primary care provider for follow-up.  Return to the emergency department if worsening/persistent symptoms or other concern.

## 2025-04-15 NOTE — PROGRESS NOTES
IDENTIFYING INFORMATION: The patient is a 68-year-old white male with a history of alcohol abuse and depression admitted with complaints of nausea and vomiting secondary to alcoholic ketoacidosis    CHIEF COMPLAINT: None given    INFORMANT: Patient and chart    RELIABILITY: Limited next field    HISTORY OF PRESENT ILLNESS: The patient is a 68-year-old white male with a history of alcohol dependence, hypertension, hyperlipidemia, hepatic steatosis, hypothyroidism admitted with nausea and vomiting.  The patient reports that he drinks 3-4 martinis a day.  He had previously been followed at [facility] by Dr. Say Coats but had mistakenly been informed that Dr. Coats had retired.  This is not the case.  The patient reports a history of trials of multiple antidepressant medications but reports that the only medication which was successful in treating his symptoms was Vraylar which is indicative of a probable bipolar diagnosis.  When seen today the patient is denying any suicidal or homicidal ideation and is requesting discharge from the hospital citing a need to vote in tomorrow's primary election.  He denies current suicidal or homicidal ideation or psychotic features.  He exhibits no signs or symptoms of alcohol withdrawal.    PAST PSYCHIATRIC HISTORY: As noted previously the patient been followed by Dr. Say Coats in the past.    PAST MEDICAL HISTORY: As above    MEDICATIONS:   Current Facility-Administered Medications   Medication Dose Route Frequency Provider Last Rate Last Admin   • Cariprazine HCl (VRAYLAR) capsule capsule 1.5 mg  1.5 mg Oral Daily Mariano Cheek III, MD       • dextrose 5 % and sodium chloride 0.9 % infusion  150 mL/hr Intravenous Continuous MayEric MD   Stopped at 05/15/23 0322   • Enoxaparin Sodium (LOVENOX) syringe 40 mg  40 mg Subcutaneous Nightly Freddie Rutherford MD   40 mg at 05/14/23 2014   • folic acid (FOLVITE) tablet 1 mg  1 mg  Oral Daily Freddie Rutherford MD   1 mg at 05/15/23 0925   • levothyroxine (SYNTHROID, LEVOTHROID) tablet 100 mcg  100 mcg Oral Q AM Freddie Rutherford MD   100 mcg at 05/15/23 0529   • lidocaine (LIDODERM) 5 % 1 patch  1 patch Transdermal Q24H Freddie Rutherford MD   1 patch at 05/15/23 0322   • LORazepam (ATIVAN) tablet 0.5 mg  0.5 mg Oral Q2H PRN Shilo Hauser MD        Or   • LORazepam (ATIVAN) injection 0.5 mg  0.5 mg Intravenous Q2H PRN Shilo Hauser MD        Or   • LORazepam (ATIVAN) tablet 1 mg  1 mg Oral Q1H PRN Shilo Hauser MD        Or   • LORazepam (ATIVAN) injection 1 mg  1 mg Intravenous Q1H PRN Shilo Hauser MD   1 mg at 05/14/23 2343    Or   • LORazepam (ATIVAN) injection 1 mg  1 mg Intravenous Q15 Min PRN Shilo Hauser MD        Or   • LORazepam (ATIVAN) injection 1 mg  1 mg Intramuscular Q15 Min PRN Shilo Hauser MD       • magnesium oxide (MAG-OX) tablet 200 mg  200 mg Oral Daily Mike Kaiser MD   200 mg at 05/15/23 1254   • multivitamin with minerals 1 tablet  1 tablet Oral Daily Freddie Rutherford MD   1 tablet at 05/15/23 0925   • ondansetron (ZOFRAN) injection 4 mg  4 mg Intravenous Q6H PRN Freddie Rutherford MD   4 mg at 05/14/23 1454   • oxyCODONE (ROXICODONE) immediate release tablet 5 mg  5 mg Oral Q6H PRN Freddie Rutherford MD   5 mg at 05/14/23 0152   • pantoprazole (PROTONIX) EC tablet 40 mg  40 mg Oral Q AM Freddie Rutherford MD   40 mg at 05/15/23 0529   • potassium & sodium phosphates (PHOS-NAK) oral packet  1 packet Oral 4x Daily With Meals & Nightly Mike Kaiser MD   1 packet at 05/15/23 1254   • potassium phosphate 30 mmol in sodium chloride 0.9 % 500 mL infusion  30 mmol Intravenous Once Mike Kaiser MD   30 mmol at 05/15/23 1259   • sennosides-docusate (PERICOLACE) 8.6-50 MG per tablet 1 tablet  1 tablet Oral BID PRN Freddie Rutherford MD       • sodium bicarbonate tablet 1,300 mg  1,300 mg Oral TID Mike Kaiser MD   1,300 mg at 05/15/23 6887   • sodium  chloride 0.9 % flush 10 mL  10 mL Intravenous PRN Sarah Barrientos PA   10 mL at 05/15/23 0925   • thiamine (B-1) injection 200 mg  200 mg Intravenous Q8H Freddie Rutherford MD   200 mg at 05/15/23 0529    Followed by   • [START ON 5/19/2023] thiamine (VITAMIN B-1) tablet 100 mg  100 mg Oral Daily Freddie Rutherford MD             ALLERGIES: Penicillin    FAMILY HISTORY: Noncontributory    SOCIAL HISTORY: Patient is a retired information technology worker.  He reports alcohol use as noted previously.  He lives alone.    MENTAL STATUS EXAM: Patient is a well-developed well-nourished white male appearing stated age.  He is in no apparent physical distress at the time of examination.  He is awake alert and oriented seers his mood is euthymic his affect full range.  Speech is well coherent there are no gross deficits in memory or cognition noted intelligence is judged to be in the average range based on fund of knowledge, the patient is cooperative throughout the interview.  He denies current suicidal or homicidal ideation or psychotic features.  Judgement and insight are intact.    ASSETS/LIABILITIES: To be assessed    DIAGNOSTIC IMPRESSION: Alcohol use disorder, bipolar disorder unspecified, medical problems described previously    PLAN: I have restarted the patient's Vraylar at a dose of 1.5 mg daily and have instructed him that Dr. Coats has in fact and not retired and should still be available to provide treatment for him.  He is agreeable to follow-up but expresses no interest in any chemical dependence treatment follow-up.   no

## 2025-04-30 ENCOUNTER — APPOINTMENT (OUTPATIENT)
Dept: GENERAL RADIOLOGY | Facility: HOSPITAL | Age: 70
End: 2025-04-30
Payer: MEDICARE

## 2025-04-30 ENCOUNTER — HOSPITAL ENCOUNTER (INPATIENT)
Facility: HOSPITAL | Age: 70
LOS: 2 days | Discharge: HOME | End: 2025-05-02
Attending: EMERGENCY MEDICINE | Admitting: INTERNAL MEDICINE
Payer: MEDICARE

## 2025-04-30 DIAGNOSIS — R07.9 CHEST PAIN, UNSPECIFIED TYPE: Primary | ICD-10-CM

## 2025-04-30 DIAGNOSIS — F10.930 ALCOHOL WITHDRAWAL SYNDROME WITHOUT COMPLICATION: ICD-10-CM

## 2025-04-30 DIAGNOSIS — G62.9 PERIPHERAL POLYNEUROPATHY: ICD-10-CM

## 2025-04-30 DIAGNOSIS — F10.939 ALCOHOL WITHDRAWAL SYNDROME WITH COMPLICATION: ICD-10-CM

## 2025-04-30 LAB
ALBUMIN SERPL-MCNC: 4.6 G/DL (ref 3.5–5.2)
ALBUMIN/GLOB SERPL: 1.5 G/DL
ALP SERPL-CCNC: 84 U/L (ref 39–117)
ALT SERPL W P-5'-P-CCNC: 17 U/L (ref 1–41)
ANION GAP SERPL CALCULATED.3IONS-SCNC: 15.3 MMOL/L (ref 5–15)
APTT PPP: 27.4 SECONDS (ref 22.7–35.4)
AST SERPL-CCNC: 57 U/L (ref 1–40)
BASOPHILS # BLD AUTO: 0.04 10*3/MM3 (ref 0–0.2)
BASOPHILS NFR BLD AUTO: 0.7 % (ref 0–1.5)
BILIRUB SERPL-MCNC: 0.8 MG/DL (ref 0–1.2)
BUN SERPL-MCNC: 7 MG/DL (ref 8–23)
BUN/CREAT SERPL: 10 (ref 7–25)
CALCIUM SPEC-SCNC: 8.9 MG/DL (ref 8.6–10.5)
CHLORIDE SERPL-SCNC: 105 MMOL/L (ref 98–107)
CO2 SERPL-SCNC: 19.7 MMOL/L (ref 22–29)
CREAT SERPL-MCNC: 0.7 MG/DL (ref 0.76–1.27)
DEPRECATED RDW RBC AUTO: 45.6 FL (ref 37–54)
EGFRCR SERPLBLD CKD-EPI 2021: 100.4 ML/MIN/1.73
EOSINOPHIL # BLD AUTO: 0.09 10*3/MM3 (ref 0–0.4)
EOSINOPHIL NFR BLD AUTO: 1.5 % (ref 0.3–6.2)
ERYTHROCYTE [DISTWIDTH] IN BLOOD BY AUTOMATED COUNT: 14.9 % (ref 12.3–15.4)
ETHANOL BLD-MCNC: <10 MG/DL (ref 0–10)
ETHANOL UR QL: <0.01 %
GEN 5 1HR TROPONIN T REFLEX: 9 NG/L
GLOBULIN UR ELPH-MCNC: 3 GM/DL
GLUCOSE SERPL-MCNC: 115 MG/DL (ref 65–99)
HCT VFR BLD AUTO: 40 % (ref 37.5–51)
HGB BLD-MCNC: 13.1 G/DL (ref 13–17.7)
IMM GRANULOCYTES # BLD AUTO: 0 10*3/MM3 (ref 0–0.05)
IMM GRANULOCYTES NFR BLD AUTO: 0 % (ref 0–0.5)
INR PPP: 1.21 (ref 0.9–1.1)
LYMPHOCYTES # BLD AUTO: 1.57 10*3/MM3 (ref 0.7–3.1)
LYMPHOCYTES NFR BLD AUTO: 26.8 % (ref 19.6–45.3)
MCH RBC QN AUTO: 27.6 PG (ref 26.6–33)
MCHC RBC AUTO-ENTMCNC: 32.8 G/DL (ref 31.5–35.7)
MCV RBC AUTO: 84.2 FL (ref 79–97)
MONOCYTES # BLD AUTO: 0.44 10*3/MM3 (ref 0.1–0.9)
MONOCYTES NFR BLD AUTO: 7.5 % (ref 5–12)
NEUTROPHILS NFR BLD AUTO: 3.71 10*3/MM3 (ref 1.7–7)
NEUTROPHILS NFR BLD AUTO: 63.5 % (ref 42.7–76)
NRBC BLD AUTO-RTO: 0 /100 WBC (ref 0–0.2)
PLATELET # BLD AUTO: 164 10*3/MM3 (ref 140–450)
PMV BLD AUTO: 9 FL (ref 6–12)
POTASSIUM SERPL-SCNC: 3.8 MMOL/L (ref 3.5–5.2)
PROT SERPL-MCNC: 7.6 G/DL (ref 6–8.5)
PROTHROMBIN TIME: 15.3 SECONDS (ref 11.7–14.2)
RBC # BLD AUTO: 4.75 10*6/MM3 (ref 4.14–5.8)
SODIUM SERPL-SCNC: 140 MMOL/L (ref 136–145)
TROPONIN T NUMERIC DELTA: -2 NG/L
TROPONIN T SERPL HS-MCNC: 11 NG/L
WBC NRBC COR # BLD AUTO: 5.85 10*3/MM3 (ref 3.4–10.8)

## 2025-04-30 PROCEDURE — 90791 PSYCH DIAGNOSTIC EVALUATION: CPT

## 2025-04-30 PROCEDURE — 71045 X-RAY EXAM CHEST 1 VIEW: CPT

## 2025-04-30 PROCEDURE — 85025 COMPLETE CBC W/AUTO DIFF WBC: CPT | Performed by: EMERGENCY MEDICINE

## 2025-04-30 PROCEDURE — 99285 EMERGENCY DEPT VISIT HI MDM: CPT

## 2025-04-30 PROCEDURE — 85610 PROTHROMBIN TIME: CPT | Performed by: EMERGENCY MEDICINE

## 2025-04-30 PROCEDURE — 25010000002 THIAMINE PER 100 MG: Performed by: INTERNAL MEDICINE

## 2025-04-30 PROCEDURE — 93005 ELECTROCARDIOGRAM TRACING: CPT | Performed by: EMERGENCY MEDICINE

## 2025-04-30 PROCEDURE — 80053 COMPREHEN METABOLIC PANEL: CPT | Performed by: EMERGENCY MEDICINE

## 2025-04-30 PROCEDURE — 25010000002 DIAZEPAM PER 5 MG: Performed by: EMERGENCY MEDICINE

## 2025-04-30 PROCEDURE — 36415 COLL VENOUS BLD VENIPUNCTURE: CPT

## 2025-04-30 PROCEDURE — 93010 ELECTROCARDIOGRAM REPORT: CPT | Performed by: INTERNAL MEDICINE

## 2025-04-30 PROCEDURE — 84484 ASSAY OF TROPONIN QUANT: CPT | Performed by: EMERGENCY MEDICINE

## 2025-04-30 PROCEDURE — 82077 ASSAY SPEC XCP UR&BREATH IA: CPT | Performed by: EMERGENCY MEDICINE

## 2025-04-30 PROCEDURE — 85730 THROMBOPLASTIN TIME PARTIAL: CPT | Performed by: EMERGENCY MEDICINE

## 2025-04-30 RX ORDER — ACETAMINOPHEN 325 MG/1
650 TABLET ORAL EVERY 4 HOURS PRN
Status: DISCONTINUED | OUTPATIENT
Start: 2025-04-30 | End: 2025-05-02 | Stop reason: HOSPADM

## 2025-04-30 RX ORDER — PANTOPRAZOLE SODIUM 40 MG/10ML
40 INJECTION, POWDER, LYOPHILIZED, FOR SOLUTION INTRAVENOUS EVERY 12 HOURS SCHEDULED
Status: DISCONTINUED | OUTPATIENT
Start: 2025-04-30 | End: 2025-05-02

## 2025-04-30 RX ORDER — LISINOPRIL 20 MG/1
20 TABLET ORAL
Status: DISCONTINUED | OUTPATIENT
Start: 2025-05-01 | End: 2025-05-02 | Stop reason: HOSPADM

## 2025-04-30 RX ORDER — BISACODYL 10 MG
10 SUPPOSITORY, RECTAL RECTAL DAILY PRN
Status: DISCONTINUED | OUTPATIENT
Start: 2025-04-30 | End: 2025-05-02 | Stop reason: HOSPADM

## 2025-04-30 RX ORDER — THIAMINE HYDROCHLORIDE 100 MG/ML
200 INJECTION, SOLUTION INTRAMUSCULAR; INTRAVENOUS EVERY 8 HOURS SCHEDULED
Status: DISCONTINUED | OUTPATIENT
Start: 2025-04-30 | End: 2025-05-02

## 2025-04-30 RX ORDER — MIDAZOLAM HYDROCHLORIDE 1 MG/ML
4 INJECTION, SOLUTION INTRAMUSCULAR; INTRAVENOUS
Status: DISCONTINUED | OUTPATIENT
Start: 2025-04-30 | End: 2025-05-02 | Stop reason: HOSPADM

## 2025-04-30 RX ORDER — FOLIC ACID 1 MG/1
1 TABLET ORAL DAILY
Status: DISCONTINUED | OUTPATIENT
Start: 2025-04-30 | End: 2025-05-02 | Stop reason: HOSPADM

## 2025-04-30 RX ORDER — ONDANSETRON 2 MG/ML
4 INJECTION INTRAMUSCULAR; INTRAVENOUS EVERY 6 HOURS PRN
Status: DISCONTINUED | OUTPATIENT
Start: 2025-04-30 | End: 2025-05-02 | Stop reason: HOSPADM

## 2025-04-30 RX ORDER — ACETAMINOPHEN 650 MG/1
650 SUPPOSITORY RECTAL EVERY 4 HOURS PRN
Status: DISCONTINUED | OUTPATIENT
Start: 2025-04-30 | End: 2025-05-02 | Stop reason: HOSPADM

## 2025-04-30 RX ORDER — AMOXICILLIN 250 MG
2 CAPSULE ORAL 2 TIMES DAILY PRN
Status: DISCONTINUED | OUTPATIENT
Start: 2025-04-30 | End: 2025-05-02 | Stop reason: HOSPADM

## 2025-04-30 RX ORDER — ALUMINA, MAGNESIA, AND SIMETHICONE 2400; 2400; 240 MG/30ML; MG/30ML; MG/30ML
30 SUSPENSION ORAL ONCE
Status: COMPLETED | OUTPATIENT
Start: 2025-04-30 | End: 2025-04-30

## 2025-04-30 RX ORDER — LIDOCAINE HYDROCHLORIDE 20 MG/ML
15 SOLUTION OROPHARYNGEAL ONCE
Status: COMPLETED | OUTPATIENT
Start: 2025-04-30 | End: 2025-04-30

## 2025-04-30 RX ORDER — MIDAZOLAM HYDROCHLORIDE 1 MG/ML
2 INJECTION, SOLUTION INTRAMUSCULAR; INTRAVENOUS
Status: DISCONTINUED | OUTPATIENT
Start: 2025-04-30 | End: 2025-05-02 | Stop reason: HOSPADM

## 2025-04-30 RX ORDER — ACETAMINOPHEN 160 MG/5ML
650 SOLUTION ORAL EVERY 4 HOURS PRN
Status: DISCONTINUED | OUTPATIENT
Start: 2025-04-30 | End: 2025-05-02 | Stop reason: HOSPADM

## 2025-04-30 RX ORDER — MIDAZOLAM HYDROCHLORIDE 5 MG/ML
4 INJECTION, SOLUTION INTRAMUSCULAR; INTRAVENOUS
Status: DISCONTINUED | OUTPATIENT
Start: 2025-04-30 | End: 2025-05-02 | Stop reason: HOSPADM

## 2025-04-30 RX ORDER — SODIUM CHLORIDE 0.9 % (FLUSH) 0.9 %
10 SYRINGE (ML) INJECTION AS NEEDED
Status: DISCONTINUED | OUTPATIENT
Start: 2025-04-30 | End: 2025-05-02 | Stop reason: HOSPADM

## 2025-04-30 RX ORDER — ONDANSETRON 4 MG/1
4 TABLET, ORALLY DISINTEGRATING ORAL EVERY 6 HOURS PRN
Status: DISCONTINUED | OUTPATIENT
Start: 2025-04-30 | End: 2025-05-02 | Stop reason: HOSPADM

## 2025-04-30 RX ORDER — BISACODYL 5 MG/1
5 TABLET, DELAYED RELEASE ORAL DAILY PRN
Status: DISCONTINUED | OUTPATIENT
Start: 2025-04-30 | End: 2025-05-02 | Stop reason: HOSPADM

## 2025-04-30 RX ORDER — DIAZEPAM 10 MG/2ML
2.5 INJECTION, SOLUTION INTRAMUSCULAR; INTRAVENOUS ONCE
Status: COMPLETED | OUTPATIENT
Start: 2025-04-30 | End: 2025-04-30

## 2025-04-30 RX ORDER — PANTOPRAZOLE SODIUM 40 MG/10ML
40 INJECTION, POWDER, LYOPHILIZED, FOR SOLUTION INTRAVENOUS ONCE
Status: COMPLETED | OUTPATIENT
Start: 2025-04-30 | End: 2025-04-30

## 2025-04-30 RX ORDER — CLONIDINE HYDROCHLORIDE 0.1 MG/1
0.1 TABLET ORAL EVERY 4 HOURS PRN
Status: DISCONTINUED | OUTPATIENT
Start: 2025-04-30 | End: 2025-05-02 | Stop reason: HOSPADM

## 2025-04-30 RX ORDER — LORAZEPAM 1 MG/1
2 TABLET ORAL
Status: DISCONTINUED | OUTPATIENT
Start: 2025-04-30 | End: 2025-05-02 | Stop reason: HOSPADM

## 2025-04-30 RX ORDER — LORAZEPAM 1 MG/1
1 TABLET ORAL
Status: DISCONTINUED | OUTPATIENT
Start: 2025-04-30 | End: 2025-05-02 | Stop reason: HOSPADM

## 2025-04-30 RX ORDER — NITROGLYCERIN 0.4 MG/1
0.4 TABLET SUBLINGUAL
Status: DISCONTINUED | OUTPATIENT
Start: 2025-04-30 | End: 2025-05-02 | Stop reason: HOSPADM

## 2025-04-30 RX ORDER — POLYETHYLENE GLYCOL 3350 17 G/17G
17 POWDER, FOR SOLUTION ORAL DAILY PRN
Status: DISCONTINUED | OUTPATIENT
Start: 2025-04-30 | End: 2025-05-02 | Stop reason: HOSPADM

## 2025-04-30 RX ADMIN — DIAZEPAM 2.5 MG: 5 INJECTION INTRAMUSCULAR; INTRAVENOUS at 16:27

## 2025-04-30 RX ADMIN — THIAMINE HYDROCHLORIDE 200 MG: 100 INJECTION, SOLUTION INTRAMUSCULAR; INTRAVENOUS at 17:28

## 2025-04-30 RX ADMIN — PANTOPRAZOLE SODIUM 40 MG: 40 INJECTION, POWDER, FOR SOLUTION INTRAVENOUS at 16:23

## 2025-04-30 RX ADMIN — LIDOCAINE HYDROCHLORIDE 15 ML: 20 SOLUTION ORAL at 16:25

## 2025-04-30 RX ADMIN — FOLIC ACID 1 MG: 1 TABLET ORAL at 17:27

## 2025-04-30 RX ADMIN — THIAMINE HYDROCHLORIDE 200 MG: 100 INJECTION, SOLUTION INTRAMUSCULAR; INTRAVENOUS at 20:28

## 2025-04-30 RX ADMIN — PANTOPRAZOLE SODIUM 40 MG: 40 INJECTION, POWDER, FOR SOLUTION INTRAVENOUS at 20:28

## 2025-04-30 RX ADMIN — LORAZEPAM 2 MG: 1 TABLET ORAL at 18:57

## 2025-04-30 RX ADMIN — LORAZEPAM 2 MG: 1 TABLET ORAL at 23:47

## 2025-04-30 RX ADMIN — ALUMINUM HYDROXIDE, MAGNESIUM HYDROXIDE, AND DIMETHICONE 30 ML: 400; 400; 40 SUSPENSION ORAL at 16:25

## 2025-04-30 RX ADMIN — NITROGLYCERIN 0.4 MG: 0.4 TABLET SUBLINGUAL at 15:08

## 2025-04-30 RX ADMIN — NITROGLYCERIN 0.4 MG: 0.4 TABLET SUBLINGUAL at 14:56

## 2025-04-30 NOTE — CONSULTS
"Patient seen by [facility] d/t ETOH. Patient interviewed alone in ED room 35 but is being admitted to [address] [place] [address]. Introduced self and role. Patient agreed to participate in evaluation. Patient is A/OX4. Patient diaphoretic and tremulous but was calm and pleasant during evaluation. Patient well known to [facility] with consults dating back to 2016 and last consult in January, 2025.     The patient is a 70 y.o.  male living alone.   Mu-ism/Spiritual: The patient voiced if anything he is more into Sabianism.   Children: None  Occupation: Retired. The patient voiced he was previously a  and retired in October, 2024.   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 The patient voiced he also enjoys photography/. The patient voiced he does not drink ETOH when using his telescope and camera because he does not want to damage them. The patient was encouraged to consider the damage he is causing to his body d/t his ETOH use and that his body/health is much more important than material objects.   Legal: Denies  : No  Support System: Two good friends, Sister.   Hx of Violence: Denies  Hx of abuse/Trauma: childhood trauma   Feel Safe at Home: Yes    The patient presented to the ED on 4/30/25 with complaints of chest pain. Patient admitted with chest pain and alcohol withdrawal.     The patient voiced he drinks ETOH daily and typically has 2-5 drinks/day but voiced it can vary quite a bit depending on his \"to do list.\" The patient voiced he does not drink ETOH when using his telescope or camera. The patient voiced he drinks vodka martinis which have 1.5 shots/drink. The patient voiced he typically goes through a 1.75L every 3-4 days. The patient denied any blackouts or memory loss from ETOH use. The patient voiced his last drink was last night. BAL negative. The patient denied any recent PETER treatment. The patient was last seen in January, 2025 " "and the patient voiced he has not done any PETER treatment since. The patient voiced a history of AA but voiced he did not like how \"Quaker based\" it is. The patient voiced he did one inpatient rehab but could not remember the name of the facility and voiced it was years ago. The patient denied history of withdrawals or seizures from ETOH withdrawal. The patient voiced his longest period of sobriety has been \"a couple of days.\" The patient denied any other substance use. No UDS collected.     The patient has a mental health history of anxiety, depression, and panic attacks. The patient is not currently on any mental health medications. The patient voiced oast use of SSRI's but reported he did not find them beneficial but to admit to abusing ETOH while taking them. The patient was encouraged to consider the role ETOH played and educated the patient the medications have no chance of being effective if he is abusing ETOH daily. The patient had an inpatient psychiatric hospitalization in [facility]. The patient previously saw a psychiatrist through [facility] but no longer goes because he feels like he got over his depression. The patient does not currently have any outpatient mental health providers.     The patient rated anxiety and depression 2/10. The patient denied SI, wish to be dead, HI, or hallucinations. The patient voiced sleep has \"always been bad.\" The patient voiced good appetite.     The patient voiced he is not sure if he wants to quit ETOH. Patient encouraged to consider the effects ETOH on his health. Patient given the [facility] handout. [facility] will follow and provide additional resources as needed.      "

## 2025-04-30 NOTE — ED PROVIDER NOTES
EMERGENCY DEPARTMENT ENCOUNTER  Room Number:  35/35  PCP: Provider, No Known  Independent Historians: Patient    HPI:  Chief Complaint: had concerns including Chest Pain.     A complete HPI/ROS/PMH/PSH/SH/FH are unobtainable due to: None    Chronic or social conditions impacting patient care (Social Determinants of Health): None      Context: Pro Mueller is a 70 y.o. male with a medical history of hypertension alcoholism as well as hyperlipidemia who presents to the ED c/o acute substernal chest pressure for 2 hours.  No clear exacerbating or relieving factors.  Began at rest.  No dyspnea.  Some diaphoresis and nausea.  No prior history of CAD.  Patient is a daily alcohol consumer.  He also stopped smoking several years ago.  Patient received aspirin and nitro by EMS with some improvement in his discomfort.  He is currently 5 out of 10 discomfort.    Review of prior external notes (non-ED) -and- Review of prior external test results outside of this encounter:  Hospital discharge summary 1/31/2025 reviewed: Patient admitted for evaluation treatment of alcohol dependence with acute withdrawal.      PAST MEDICAL HISTORY  Active Ambulatory Problems     Diagnosis Date Noted    Fall 08/08/2016    Alcoholism in recovery 08/08/2016    Atopic rhinitis 08/08/2016    Mixed anxiety depressive disorder 08/08/2016    Genital herpes simplex 08/08/2016    Hyperlipidemia 08/08/2016    Hypertension 08/08/2016    Hypothyroidism 08/08/2016    Insomnia 08/08/2016    Panic disorder without agoraphobia 08/08/2016    Persistent insomnia 08/08/2016    Vitamin D deficiency 08/08/2016    Chronic low back pain 11/11/2016    Neuropathy involving both lower extremities 10/25/2018    Abnormal EKG 01/03/2019    Left shoulder pain 11/19/2019    Nausea and vomiting 01/11/2020    Pancytopenia 01/14/2020    Left ventricular diastolic dysfunction 01/16/2021    Tremor 10/06/2021    C5 cervical fracture 11/09/2021    Syncope and collapse 01/07/2022     Neck pain 01/07/2022    Alcoholic intoxication with complication 03/13/2022    Withdrawal symptoms, alcohol 07/01/2022    Transaminitis 07/01/2022    Lumbar facet arthropathy 07/20/2022    Alcohol dependence 09/11/2022    Midline low back pain with right-sided sciatica 09/15/2022    Spondylolisthesis at L4-L5 level 09/15/2022    Lumbar canal stenosis 09/15/2022    Alcohol cessation counseling 09/15/2022    Need for assistance due to reduced mobility 09/15/2022    Impaired mobility and ADLs 09/15/2022    Alcohol withdrawal syndrome without complication 02/18/2023    Facial laceration 02/18/2023    Orthostatic hypotension 02/18/2023    Acute bacterial conjunctivitis of right eye 03/02/2023    Visit for suture removal 03/02/2023    Renal calculi 03/14/2023    Hepatic steatosis 03/15/2023    Alcohol withdrawal syndrome with complication 04/14/2023    Macrocytosis without anemia 04/14/2023    Lumbosacral spondylosis without myelopathy 05/05/2023    Elevated LFTs 05/13/2023    Alcohol-induced acute pancreatitis, unspecified complication status 06/23/2023    Pancreatitis 03/06/2024    Generalized abdominal pain 03/07/2024    Alcohol dependence with withdrawal 03/07/2024    Acute alcohol intoxication in patient with alcoholism with blood alcohol level 0.08 to 0.29, with unspecified complication 09/20/2024    Calculus of gallbladder with cholecystitis without biliary obstruction 09/20/2024    Alcoholic liver disease 12/10/2024    Iron deficiency anemia 12/10/2024    Folate deficiency anemia 12/10/2024    PVC's (premature ventricular contractions) 12/10/2024    Weakness 12/10/2024    Lumbar degenerative disc disease 01/29/2025     Resolved Ambulatory Problems     Diagnosis Date Noted    Impacted cerumen 08/08/2016    Influenza 08/08/2016    Motion sickness 08/08/2016    Seasonal allergic rhinitis 08/08/2016    Upper respiratory tract infection 08/08/2016    Alcohol withdrawal 11/04/2016    Headache 11/05/2016    Gastritis  11/05/2016    Olecranon bursitis of right elbow 04/05/2017    Sciatica of left side 06/12/2018    Syncope and collapse 01/02/2019    Alcoholic ketoacidosis 01/12/2020    Hyponatremia 01/13/2020    Hypokalemia 01/13/2020    Alcohol dependence with uncomplicated withdrawal 01/14/2020    Nephrolithiasis 02/12/2020    Hypomagnesemia 01/16/2021    Non-traumatic rhabdomyolysis 01/18/2021    Urine retention 01/21/2021    Epigastric pain 06/23/2023    Dehydration 02/24/2024    Metabolic acidosis 09/20/2024    Hypothermia 12/02/2024     Past Medical History:   Diagnosis Date    Alcohol abuse     Allergic 1970    Anxiety     Arthritis     Depression     Disease of thyroid gland     Elevated cholesterol     Encounter for removal of sutures     GERD (gastroesophageal reflux disease)     Headache, tension-type     Kidney stone     Low back pain 2019    Migraine     Olecranon bursitis, right elbow     Peripheral neuropathy     Sleep apnea          PAST SURGICAL HISTORY  Past Surgical History:   Procedure Laterality Date    CHOLECYSTECTOMY N/A 9/22/2024    Procedure: CHOLECYSTECTOMY LAPAROSCOPIC WITH DAVINCI ROBOT with cholangiogram, possible open;  Surgeon: Toro Noguera MD;  [place] [facility] [place];  Service: General;  Laterality: N/A;    COLONOSCOPY      CYST REMOVAL      CYSTOSCOPY BLADDER STONE LITHOTRIPSY  02/2022    ERCP N/A 9/23/2024    Procedure: ENDOSCOPIC RETROGRADE CHOLANGIOPANCREATOGRAPHY sphincterotomy, balloon sweep (9-12);  Surgeon: Fara Madrigal MD;  [place] [facility] [place];  Service: Gastroenterology;  Laterality: N/A;  sphincterotomy hiatial hernia, gallstone removal    EXTRACORPOREAL SHOCK WAVE LITHOTRIPSY (ESWL) Right 2002    SHOULDER ARTHROSCOPY Right 12/17/2019    Procedure: SHOULDER ARTHROSCOPY, decompression, distal clavicle excision;  Surgeon: Aj Mancilla MD;  [place] [facility] [place] [facility];  Service: Orthopedics    TONSILLECTOMY           FAMILY HISTORY  Family History   Problem Relation Age of  "Onset    Alzheimer's disease Mother     Mental illness Mother     Pancreatic cancer Father     Hearing loss Father     Arthritis Maternal Grandmother     Malig Hyperthermia Neg Hx          SOCIAL HISTORY  Social History     Socioeconomic History    Marital status: Single   Tobacco Use    Smoking status: Former     Current packs/day: 0.00     Average packs/day: 0.5 packs/day for 15.0 years (7.5 ttl pk-yrs)     Types: Cigarettes     Start date: 1990     Quit date: 2005     Years since quittin.3    Smokeless tobacco: Never    Tobacco comments:     caffeine - 3 cans of coke daily    Vaping Use    Vaping status: Never Used   Substance and Sexual Activity    Alcohol use: Yes     Alcohol/week: 7.0 standard drinks of alcohol     Types: 7 Shots of liquor per week     Comment: .5 liter vodka    Drug use: Yes     Types: Methamphetamines     Comment: \"once in a rare while\"    Sexual activity: Yes     Partners: Female     Birth control/protection: Condom         ALLERGIES  Penicillins      REVIEW OF SYSTEMS  Review of Systems  Included in HPI  All systems reviewed and negative except for those discussed in HPI.      PHYSICAL EXAM    I have reviewed the triage vital signs and nursing notes.    ED Triage Vitals   Temp Pulse Resp BP SpO2   -- -- -- -- --      Temp src Heart Rate Source Patient Position BP Location FiO2 (%)   -- -- -- -- --       Physical Exam    Physical Exam   Constitutional: No distress.  Nontoxic  HENT:  Head: Normocephalic and atraumatic.   Oropharynx: Mucous membranes are moist.   Eyes: . No scleral icterus. No conjunctival pallor.  Neck: Normal range of motion. Neck supple.   Cardiovascular: Pink warm and well perfused throughout.  Regular  Pulmonary/Chest: No respiratory distress.  No tachypnea or increased work of breathing appreciated.  Speaks in full sentences  Abdominal: Soft. There is no tenderness. There is no rebound and no guarding.   Musculoskeletal: Moves all extremities equally.  2+ " pitting edema bilateral lower extremities.  Mainly ankles and feet.  Neurological: Alert and oriented.  No acute focal deficit appreciated.  Patient does have a tremor that he reports is chronic for 1 year.  Skin: Skin is pink, warm, and dry.   Psychiatric: Mood and affect normal.   Nursing note and vitals reviewed.             LAB RESULTS  Recent Results (from the past 24 hours)   High Sensitivity Troponin T    Collection Time: 04/30/25  2:44 PM    Specimen: Blood   Result Value Ref Range    HS Troponin T 11 <22 ng/L   Comprehensive Metabolic Panel    Collection Time: 04/30/25  2:44 PM    Specimen: Blood   Result Value Ref Range    Glucose 115 (H) 65 - 99 mg/dL    BUN 7 (L) 8 - 23 mg/dL    Creatinine 0.70 (L) 0.76 - 1.27 mg/dL    Sodium 140 136 - 145 mmol/L    Potassium 3.8 3.5 - 5.2 mmol/L    Chloride 105 98 - 107 mmol/L    CO2 19.7 (L) 22.0 - 29.0 mmol/L    Calcium 8.9 8.6 - 10.5 mg/dL    Total Protein 7.6 6.0 - 8.5 g/dL    Albumin 4.6 3.5 - 5.2 g/dL    ALT (SGPT) 17 1 - 41 U/L    AST (SGOT) 57 (H) 1 - 40 U/L    Alkaline Phosphatase 84 39 - 117 U/L    Total Bilirubin 0.8 0.0 - 1.2 mg/dL    Globulin 3.0 gm/dL    A/G Ratio 1.5 g/dL    BUN/Creatinine Ratio 10.0 7.0 - 25.0    Anion Gap 15.3 (H) 5.0 - 15.0 mmol/L    eGFR 100.4 >60.0 mL/min/1.73   Protime-INR    Collection Time: 04/30/25  2:44 PM    Specimen: Blood   Result Value Ref Range    Protime 15.3 (H) 11.7 - 14.2 Seconds    INR 1.21 (H) 0.90 - 1.10   aPTT    Collection Time: 04/30/25  2:44 PM    Specimen: Blood   Result Value Ref Range    PTT 27.4 22.7 - 35.4 seconds   Ethanol    Collection Time: 04/30/25  2:44 PM    Specimen: Blood   Result Value Ref Range    Ethanol <10 0 - 10 mg/dL    Ethanol % <0.010 %   CBC Auto Differential    Collection Time: 04/30/25  2:44 PM    Specimen: Blood   Result Value Ref Range    WBC 5.85 3.40 - 10.80 10*3/mm3    RBC 4.75 4.14 - 5.80 10*6/mm3    Hemoglobin 13.1 13.0 - 17.7 g/dL    Hematocrit 40.0 37.5 - 51.0 %    MCV 84.2 79.0  - 97.0 fL    MCH 27.6 26.6 - 33.0 pg    MCHC 32.8 31.5 - 35.7 g/dL    RDW 14.9 12.3 - 15.4 %    RDW-SD 45.6 37.0 - 54.0 fl    MPV 9.0 6.0 - 12.0 fL    Platelets 164 140 - 450 10*3/mm3    Neutrophil % 63.5 42.7 - 76.0 %    Lymphocyte % 26.8 19.6 - 45.3 %    Monocyte % 7.5 5.0 - 12.0 %    Eosinophil % 1.5 0.3 - 6.2 %    Basophil % 0.7 0.0 - 1.5 %    Immature Grans % 0.0 0.0 - 0.5 %    Neutrophils, Absolute 3.71 1.70 - 7.00 10*3/mm3    Lymphocytes, Absolute 1.57 0.70 - 3.10 10*3/mm3    Monocytes, Absolute 0.44 0.10 - 0.90 10*3/mm3    Eosinophils, Absolute 0.09 0.00 - 0.40 10*3/mm3    Basophils, Absolute 0.04 0.00 - 0.20 10*3/mm3    Immature Grans, Absolute 0.00 0.00 - 0.05 10*3/mm3    nRBC 0.0 0.0 - 0.2 /100 WBC   ECG 12 Lead Chest Pain    Collection Time: 04/30/25  2:46 PM   Result Value Ref Range    QT Interval 432 ms    QTC Interval 476 ms         RADIOLOGY  XR Chest 1 View  Result Date: 4/30/2025  XR CHEST 1 VW-  HISTORY: Male who is 70 years-old, chest pain  TECHNIQUE: Frontal view of the chest  COMPARISON: 5/11/2024  FINDINGS: The heart size is borderline. Aorta is tortuous. Pulmonary vasculature is unremarkable. No focal pulmonary consolidation, pleural effusion, or pneumothorax. Right hemidiaphragm is mildly elevated. No acute osseous process.      No focal pulmonary consolidation. Borderline heart size. With tortuous aorta. Follow-up as clinically indicated.  This report was finalized on 4/30/2025 3:18 PM by Dr. Jarod Devries M.D on Workstation: [id number]          MEDICATIONS GIVEN IN ER  Medications   sodium chloride 0.9 % flush 10 mL (has no administration in time range)   nitroglycerin (NITROSTAT) SL tablet 0.4 mg (0.4 mg Sublingual Given 4/30/25 3756)   pantoprazole (PROTONIX) injection 40 mg (has no administration in time range)   aluminum-magnesium hydroxide-simethicone (MAALOX MAX) 400-400-40 MG/5ML suspension 30 mL (has no administration in time range)   Lidocaine Viscous HCl (XYLOCAINE) 2 %  solution 15 mL (has no administration in time range)   diazePAM (VALIUM) injection 2.5 mg (has no administration in time range)         ORDERS PLACED DURING THIS VISIT:  Orders Placed This Encounter   Procedures    XR Chest 1 View    High Sensitivity Troponin T    Comprehensive Metabolic Panel    Protime-INR    aPTT    Ethanol    CBC Auto Differential    High Sensitivity Troponin T 1Hr    Monitor Blood Pressure    Continuous Pulse Oximetry    [facility] (on-call MD unless specified) Details    ECG 12 Lead Chest Pain    Insert Peripheral IV    Inpatient Admission    CBC & Differential         OUTPATIENT MEDICATION MANAGEMENT:  Current Facility-Administered Medications Ordered in Epic   Medication Dose Route Frequency Provider Last Rate Last Admin    aluminum-magnesium hydroxide-simethicone (MAALOX MAX) 400-400-40 MG/5ML suspension 30 mL  30 mL Oral Once Ky Langston MD        diazePAM (VALIUM) injection 2.5 mg  2.5 mg Intravenous Once Ky Langston MD        Lidocaine Viscous HCl (XYLOCAINE) 2 % solution 15 mL  15 mL Mouth/Throat Once Ky Langston MD        nitroglycerin (NITROSTAT) SL tablet 0.4 mg  0.4 mg Sublingual Q5 Min PRN Ky Langston MD   0.4 mg at 04/30/25 1508    pantoprazole (PROTONIX) injection 40 mg  40 mg Intravenous Once Ky Langston MD        sodium chloride 0.9 % flush 10 mL  10 mL Intravenous PRN Ky Langston MD         Current Outpatient Medications Ordered in Epic   Medication Sig Dispense Refill    benazepril (Lotensin) 20 MG tablet Take 1 tablet by mouth Daily. 30 tablet 0    famotidine (PEPCID) 20 MG tablet Take 1 tablet by mouth 2 (Two) Times a Day. 30 tablet 0    magnesium oxide (MAG-OX) 400 MG tablet Take 1 tablet by mouth Daily. 7 tablet 0    ondansetron ODT (ZOFRAN-ODT) 4 MG disintegrating tablet Place 1 tablet on the tongue Every 6 (Six) Hours As Needed for Nausea or Vomiting. 15 tablet 0         PROCEDURES  Procedures            PROGRESS, DATA ANALYSIS, CONSULTS, AND MEDICAL DECISION  MAKING  All labs have been independently interpreted by me.  All radiology studies have been reviewed by me. All EKGs have been independently viewed and interpreted by me.  Discussion below represents my analysis of pertinent findings related to patient's condition, differential diagnosis, treatment plan and final disposition.    Differential diagnosis:   My differential diagnosis for chest pain includes but is not limited to:  Muscle strain, costochondritis, myositis, pleurisy, rib fracture, intercostal neuritis, herpes zoster, tumor, myocardial infarction, coronary syndrome, unstable angina, angina, aortic dissection, mitral valve prolapse, pericarditis, palpitations, pulmonary embolus, pneumonia, pneumothorax, lung cancer, GERD, esophagitis, esophageal spasm      Clinical Scores: HEART Score: 3                  ED Course as of 04/30/25 1607   Wed Apr 30, 2025   1448 EKG           EKG time: 1446  Rhythm/Rate: Sinus, 75  P waves and OK: TOBI within normal limits  QRS, axis: IVCD consistent with right bundle branch block  ST and T waves: ST/T wave repolarization abnormality without clear evidence of STEMI    Interpreted Contemporaneously by me, independently viewed  Comparison: 3/21/2025-similar   [RS]   1510 WBC: 5.85 [RS]   1510 Hemoglobin: 13.1 [RS]   1510 INR(!): 1.21 [RS]   1510 Immature Grans, Absolute: 0.00 [RS]   1538 HS Troponin T: 11 [RS]   1538 Ethanol: <10 [RS]   1538 BUN(!): 7 [RS]   1538 Creatinine(!): 0.70 [RS]   1538 Sodium: 140 [RS]   1538 Potassium: 3.8 [RS]   1538 RADIOLOGY      Study: Single view chest  Findings: No pneumothorax or large focal infiltrate  I independently viewed and interpreted these images contemporaneously with treatment.    [RS]   1554 Elevated CIWA score.  Patient reports has not had any alcohol today.  Feeling little bit better after nitroglycerin.  Possible GI source given the patient's alcohol abuse.  Good candidate for admission for further evaluation treatment.  Patient  agreeable. [RS]   8896 CONSULT        Provider: Dr. [facility]    Discussion: Reviewed patient history, ED presentation evaluation as well as therapies initiated in the ED.  Agreeable to accept patient for full admission with telemetry.    Agreeable c treatment and planned disposition.         [RS]      ED Course User Index  [RS] yK Langston MD         Prescription drug monitoring program review:     AS OF 16:07 EDT VITALS:    BP - 156/86  HR - 73  TEMP - 98 °F (36.7 °C) (Oral)  O2 SATS - 92%    COMPLEXITY OF CARE  The patient requires admission.      DIAGNOSIS  Final diagnoses:   Chest pain, unspecified type   Alcohol withdrawal syndrome with complication         DISPOSITION  ED Disposition       ED Disposition   Decision to Admit    Condition   --    Comment   Level of Care: Telemetry [5]   Diagnosis: Chest pain [312421]   Admitting Physician: KISHA GUEVARA [[id number]]   Certification: I Certify That Inpatient Hospital Services Are Medically Necessary For Greater Than 2 Midnights                    ADMISSION    Discussed treatment plan and reason for admission with pt/family and admitting physician.  Pt/family voiced understanding of the plan for admission for further testing/treatment as needed.       Please note that portions of this document were completed with a voice recognition program.    Note Disclaimer: At [facility], we believe that sharing information builds trust and better relationships. You are receiving this note because you recently visited [facility]. It is possible you will see health information before a provider has talked with you about it. This kind of information can be easy to misunderstand. To help you fully understand what it means for your health, we urge you to discuss this note with your provider.         Ky Langston MD  04/30/25 9415

## 2025-04-30 NOTE — ED NOTES
Nursing report ED to floor  Pro Mueller  70 y.o.  male    HPI :  HPI  Stated Reason for Visit: Daily drinker, unsure of amount. Last drink was last night froilan 324 aspirin and 1 nitro by ems  History Obtained From: patient    Chief Complaint  Chief Complaint   Patient presents with    Chest Pain       Admitting doctor:   Juanita Guevara MD    Admitting diagnosis:   The primary encounter diagnosis was Chest pain, unspecified type. A diagnosis of Alcohol withdrawal syndrome with complication was also pertinent to this visit.    Code status:   Current Code Status       Date Active Code Status Order ID Comments User Context       Prior            Allergies:   Penicillins    Isolation:   No active isolations    Intake and Output  No intake or output data in the 24 hours ending 04/30/25 1620    Weight:   There were no vitals filed for this visit.    Most recent vitals:   Vitals:    04/30/25 1434 04/30/25 1442 04/30/25 1521 04/30/25 1533   BP: 168/90 178/91 156/86    Pulse: 82 73 73    Resp: 20      Temp: 98 °F (36.7 °C)      TempSrc: Oral      SpO2: 95% 95%  92%       Active LDAs/IV Access:   Lines, Drains & Airways       Active LDAs       Name Placement date Placement time Site Days    Peripheral IV 04/30/25 1438 18 G Left Antecubital 04/30/25  1438  Antecubital  less than 1                    Labs (abnormal labs have a star):   Labs Reviewed   COMPREHENSIVE METABOLIC PANEL - Abnormal; Notable for the following components:       Result Value    Glucose 115 (*)     BUN 7 (*)     Creatinine 0.70 (*)     CO2 19.7 (*)     AST (SGOT) 57 (*)     Anion Gap 15.3 (*)     All other components within normal limits    Narrative:     GFR Categories in Chronic Kidney Disease (CKD)      GFR Category          GFR (mL/min/1.73)    Interpretation  G1                     90 or greater         Normal or high (1)  G2                      60-89                Mild decrease (1)  G3a                   45-59                Mild to moderate  decrease  G3b                   30-44                Moderate to severe decrease  G4                    15-29                Severe decrease  G5                    14 or less           Kidney failure          (1)In the absence of evidence of kidney disease, neither GFR category G1 or G2 fulfill the criteria for CKD.    eGFR calculation 2021 CKD-EPI creatinine equation, which does not include race as a factor   PROTIME-INR - Abnormal; Notable for the following components:    Protime 15.3 (*)     INR 1.21 (*)     All other components within normal limits   TROPONIN - Normal    Narrative:     High Sensitive Troponin T Reference Range:  <14.0 ng/L- Negative Female for AMI  <22.0 ng/L- Negative Male for AMI  >=14 - Abnormal Female indicating possible myocardial injury.  >=22 - Abnormal Male indicating possible myocardial injury.   Clinicians would have to utilize clinical acumen, EKG, Troponin, and serial changes to determine if it is an Acute Myocardial Infarction or myocardial injury due to an underlying chronic condition.        APTT - Normal   CBC WITH AUTO DIFFERENTIAL - Normal   HIGH SENSITIVITIY TROPONIN T 1HR - Normal    Narrative:     High Sensitive Troponin T Reference Range:  <14.0 ng/L- Negative Female for AMI  <22.0 ng/L- Negative Male for AMI  >=14 - Abnormal Female indicating possible myocardial injury.  >=22 - Abnormal Male indicating possible myocardial injury.   Clinicians would have to utilize clinical acumen, EKG, Troponin, and serial changes to determine if it is an Acute Myocardial Infarction or myocardial injury due to an underlying chronic condition.        ETHANOL   CBC AND DIFFERENTIAL    Narrative:     The following orders were created for panel order CBC & Differential.  Procedure                               Abnormality         Status                     ---------                               -----------         ------                     CBC Auto Differential[[id number]]        Normal               Final result                 Please view results for these tests on the individual orders.       EKG:   ECG 12 Lead Chest Pain   Preliminary Result   HEART RATE=73  bpm   RR Gzctozhp=340  ms   PA Psavzlgl=642  ms   P Horizontal Axis=-12  deg   P Front Axis=17  deg   QRSD Mjwjicsc=796  ms   QT Twjeubbq=704  ms   CRfV=607  ms   QRS Axis=-42  deg   T Wave Axis=54  deg   - ABNORMAL ECG -   Sinus rhythm   Incomplete right bundle branch block   Inferior infarct, old   Date and Time of Study:2025 14:46:07          Meds given in ED:   Medications   sodium chloride 0.9 % flush 10 mL (has no administration in time range)   nitroglycerin (NITROSTAT) SL tablet 0.4 mg (0.4 mg Sublingual Given 25 6468)   pantoprazole (PROTONIX) injection 40 mg (has no administration in time range)   aluminum-magnesium hydroxide-simethicone (MAALOX MAX) 400-400-40 MG/5ML suspension 30 mL (has no administration in time range)   Lidocaine Viscous HCl (XYLOCAINE) 2 % solution 15 mL (has no administration in time range)   diazePAM (VALIUM) injection 2.5 mg (has no administration in time range)       Imaging results:  XR Chest 1 View  Result Date: 2025  No focal pulmonary consolidation. Borderline heart size. With tortuous aorta. Follow-up as clinically indicated.  This report was finalized on 2025 3:18 PM by Dr. Jarod Devries M.D on Workstation: [id number]        Ambulatory status:   - standby    Social issues:   Social History     Socioeconomic History    Marital status: Single   Tobacco Use    Smoking status: Former     Current packs/day: 0.00     Average packs/day: 0.5 packs/day for 15.0 years (7.5 ttl pk-yrs)     Types: Cigarettes     Start date: 1990     Quit date: 2005     Years since quittin.3    Smokeless tobacco: Never    Tobacco comments:     caffeine - 3 cans of coke daily    Vaping Use    Vaping status: Never Used   Substance and Sexual Activity    Alcohol use: Yes     Alcohol/week: 7.0 standard  "drinks of alcohol     Types: 7 Shots of liquor per week     Comment: .5 liter vodka    Drug use: Yes     Types: Methamphetamines     Comment: \"once in a rare while\"    Sexual activity: Yes     Partners: Female     Birth control/protection: Condom       Peripheral Neurovascular  Peripheral Neurovascular (Adult)  Peripheral Neurovascular WDL: WDL    Neuro Cognitive  Neuro Cognitive (Adult)  Cognitive/Neuro/Behavioral WDL: WDL    Learning  Learning Assessment  Learning Readiness and Ability: no barriers identified    Respiratory  Respiratory  Airway WDL: WDL  Respiratory WDL  Respiratory WDL: WDL    Abdominal Pain       Pain Assessments  Pain (Adult)  (0-10) Pain Rating: Rest: 4    NIH Stroke Scale       Douglas Donald RN  04/30/25 16:20 EDT   "

## 2025-04-30 NOTE — PLAN OF CARE
Problem: Adult Inpatient Plan of Care  Goal: Absence of Hospital-Acquired Illness or Injury  Intervention: Identify and Manage Fall Risk  Recent Flowsheet Documentation  Taken 4/30/2025 1800 by Addie Vallejo RN  Safety Promotion/Fall Prevention:   safety round/check completed   room organization consistent   assistive device/personal items within reach   activity supervised     Problem: Adult Inpatient Plan of Care  Goal: Absence of Hospital-Acquired Illness or Injury  Intervention: Prevent Skin Injury  Recent Flowsheet Documentation  Taken 4/30/2025 1800 by Addie Vallejo RN  Body Position:   turned   lower extremity elevated   supine     Problem: Adult Inpatient Plan of Care  Goal: Optimal Comfort and Wellbeing  Intervention: Provide Person-Centered Care  Recent Flowsheet Documentation  Taken 4/30/2025 1800 by Addie Vallejo RN  Trust Relationship/Rapport: care explained   Goal Outcome Evaluation:  Plan of Care Reviewed With: patient        Progress: no change  Outcome Evaluation: New admission from ER coming from EMS from home AO x 4 complain chest pain received  per EMS and nitroglicerin x1 and on the ER x2 valuim and protonix sinus rhythm romm air continent edema bilateral leg  foot and ankle+2 Allergy to PNC CXRay normal  good appetite will continue moniotr.

## 2025-05-01 ENCOUNTER — APPOINTMENT (OUTPATIENT)
Dept: CARDIOLOGY | Facility: HOSPITAL | Age: 70
End: 2025-05-01
Payer: MEDICARE

## 2025-05-01 ENCOUNTER — APPOINTMENT (OUTPATIENT)
Dept: NUCLEAR MEDICINE | Facility: HOSPITAL | Age: 70
End: 2025-05-01
Payer: MEDICARE

## 2025-05-01 ENCOUNTER — APPOINTMENT (OUTPATIENT)
Dept: CT IMAGING | Facility: HOSPITAL | Age: 70
End: 2025-05-01
Payer: MEDICARE

## 2025-05-01 PROBLEM — D63.8 ANEMIA, CHRONIC DISEASE: Status: ACTIVE | Noted: 2025-05-01

## 2025-05-01 PROBLEM — F39 MOOD DISORDER: Status: ACTIVE | Noted: 2025-05-01

## 2025-05-01 PROBLEM — K27.9 PEPTIC ULCER DISEASE: Status: ACTIVE | Noted: 2025-05-01

## 2025-05-01 PROBLEM — F10.10 ALCOHOL ABUSE: Status: ACTIVE | Noted: 2025-05-01

## 2025-05-01 LAB
ANION GAP SERPL CALCULATED.3IONS-SCNC: 11.5 MMOL/L (ref 5–15)
BH CV LOWER VASCULAR LEFT COMMON FEMORAL AUGMENT: NORMAL
BH CV LOWER VASCULAR LEFT COMMON FEMORAL COMPETENT: NORMAL
BH CV LOWER VASCULAR LEFT COMMON FEMORAL COMPRESS: NORMAL
BH CV LOWER VASCULAR LEFT COMMON FEMORAL PHASIC: NORMAL
BH CV LOWER VASCULAR LEFT COMMON FEMORAL SPONT: NORMAL
BH CV LOWER VASCULAR LEFT DISTAL FEMORAL COMPRESS: NORMAL
BH CV LOWER VASCULAR LEFT GASTRONEMIUS COMPRESS: NORMAL
BH CV LOWER VASCULAR LEFT GREATER SAPH AK COMPRESS: NORMAL
BH CV LOWER VASCULAR LEFT GREATER SAPH BK COMPRESS: NORMAL
BH CV LOWER VASCULAR LEFT LESSER SAPH COMPRESS: NORMAL
BH CV LOWER VASCULAR LEFT MID FEMORAL AUGMENT: NORMAL
BH CV LOWER VASCULAR LEFT MID FEMORAL COMPETENT: NORMAL
BH CV LOWER VASCULAR LEFT MID FEMORAL COMPRESS: NORMAL
BH CV LOWER VASCULAR LEFT MID FEMORAL PHASIC: NORMAL
BH CV LOWER VASCULAR LEFT MID FEMORAL SPONT: NORMAL
BH CV LOWER VASCULAR LEFT PERONEAL COMPRESS: NORMAL
BH CV LOWER VASCULAR LEFT POPLITEAL AUGMENT: NORMAL
BH CV LOWER VASCULAR LEFT POPLITEAL COMPETENT: NORMAL
BH CV LOWER VASCULAR LEFT POPLITEAL COMPRESS: NORMAL
BH CV LOWER VASCULAR LEFT POPLITEAL PHASIC: NORMAL
BH CV LOWER VASCULAR LEFT POPLITEAL SPONT: NORMAL
BH CV LOWER VASCULAR LEFT POSTERIOR TIBIAL COMPRESS: NORMAL
BH CV LOWER VASCULAR LEFT PROFUNDA FEMORAL COMPRESS: NORMAL
BH CV LOWER VASCULAR LEFT PROXIMAL FEMORAL COMPRESS: NORMAL
BH CV LOWER VASCULAR LEFT SAPHENOFEMORAL JUNCTION COMPRESS: NORMAL
BH CV LOWER VASCULAR RIGHT COMMON FEMORAL AUGMENT: NORMAL
BH CV LOWER VASCULAR RIGHT COMMON FEMORAL COMPETENT: NORMAL
BH CV LOWER VASCULAR RIGHT COMMON FEMORAL COMPRESS: NORMAL
BH CV LOWER VASCULAR RIGHT COMMON FEMORAL PHASIC: NORMAL
BH CV LOWER VASCULAR RIGHT COMMON FEMORAL SPONT: NORMAL
BH CV LOWER VASCULAR RIGHT DISTAL FEMORAL COMPRESS: NORMAL
BH CV LOWER VASCULAR RIGHT GASTRONEMIUS COMPRESS: NORMAL
BH CV LOWER VASCULAR RIGHT GREATER SAPH AK COMPRESS: NORMAL
BH CV LOWER VASCULAR RIGHT GREATER SAPH BK COMPRESS: NORMAL
BH CV LOWER VASCULAR RIGHT LESSER SAPH COMPRESS: NORMAL
BH CV LOWER VASCULAR RIGHT MID FEMORAL AUGMENT: NORMAL
BH CV LOWER VASCULAR RIGHT MID FEMORAL COMPETENT: NORMAL
BH CV LOWER VASCULAR RIGHT MID FEMORAL COMPRESS: NORMAL
BH CV LOWER VASCULAR RIGHT MID FEMORAL PHASIC: NORMAL
BH CV LOWER VASCULAR RIGHT MID FEMORAL SPONT: NORMAL
BH CV LOWER VASCULAR RIGHT PERONEAL COMPRESS: NORMAL
BH CV LOWER VASCULAR RIGHT POPLITEAL AUGMENT: NORMAL
BH CV LOWER VASCULAR RIGHT POPLITEAL COMPETENT: NORMAL
BH CV LOWER VASCULAR RIGHT POPLITEAL COMPRESS: NORMAL
BH CV LOWER VASCULAR RIGHT POPLITEAL PHASIC: NORMAL
BH CV LOWER VASCULAR RIGHT POPLITEAL SPONT: NORMAL
BH CV LOWER VASCULAR RIGHT POSTERIOR TIBIAL COMPRESS: NORMAL
BH CV LOWER VASCULAR RIGHT PROFUNDA FEMORAL COMPRESS: NORMAL
BH CV LOWER VASCULAR RIGHT PROXIMAL FEMORAL COMPRESS: NORMAL
BH CV LOWER VASCULAR RIGHT SAPHENOFEMORAL JUNCTION COMPRESS: NORMAL
BH CV REST NUCLEAR ISOTOPE DOSE: 11.2 MCI
BH CV STRESS COMMENTS STAGE 1: NORMAL
BH CV STRESS DOSE REGADENOSON STAGE 1: 0.4
BH CV STRESS DURATION MIN STAGE 1: 0
BH CV STRESS DURATION SEC STAGE 1: 10
BH CV STRESS NUCLEAR ISOTOPE DOSE: 31.1 MCI
BH CV STRESS PROTOCOL 1: NORMAL
BH CV STRESS RECOVERY BP: NORMAL MMHG
BH CV STRESS RECOVERY HR: 68 BPM
BH CV STRESS STAGE 1: 1
BUN SERPL-MCNC: 8 MG/DL (ref 8–23)
BUN/CREAT SERPL: 11.1 (ref 7–25)
CALCIUM SPEC-SCNC: 8.6 MG/DL (ref 8.6–10.5)
CHLORIDE SERPL-SCNC: 105 MMOL/L (ref 98–107)
CO2 SERPL-SCNC: 22.5 MMOL/L (ref 22–29)
CREAT SERPL-MCNC: 0.72 MG/DL (ref 0.76–1.27)
D DIMER PPP FEU-MCNC: 1.48 MCGFEU/ML (ref 0–0.68)
DEPRECATED RDW RBC AUTO: 43.7 FL (ref 37–54)
EGFRCR SERPLBLD CKD-EPI 2021: 99.5 ML/MIN/1.73
ERYTHROCYTE [DISTWIDTH] IN BLOOD BY AUTOMATED COUNT: 14.5 % (ref 12.3–15.4)
FOLATE SERPL-MCNC: >20 NG/ML (ref 4.78–24.2)
GLUCOSE SERPL-MCNC: 92 MG/DL (ref 65–99)
HCT VFR BLD AUTO: 36.8 % (ref 37.5–51)
HGB BLD-MCNC: 12.2 G/DL (ref 13–17.7)
MAGNESIUM SERPL-MCNC: 1.7 MG/DL (ref 1.6–2.4)
MAXIMAL PREDICTED HEART RATE: 152 BPM
MCH RBC QN AUTO: 27.6 PG (ref 26.6–33)
MCHC RBC AUTO-ENTMCNC: 33.2 G/DL (ref 31.5–35.7)
MCV RBC AUTO: 83.3 FL (ref 79–97)
PERCENT MAX PREDICTED HR: 44.74 %
PLATELET # BLD AUTO: 141 10*3/MM3 (ref 140–450)
PMV BLD AUTO: 8.8 FL (ref 6–12)
POTASSIUM SERPL-SCNC: 3.5 MMOL/L (ref 3.5–5.2)
QT INTERVAL: 432 MS
QTC INTERVAL: 476 MS
RBC # BLD AUTO: 4.42 10*6/MM3 (ref 4.14–5.8)
RPR SER QL: NORMAL
SODIUM SERPL-SCNC: 139 MMOL/L (ref 136–145)
SPECT HRT GATED+EF W RNC IV: 70 %
STRESS BASELINE BP: NORMAL MMHG
STRESS BASELINE HR: 63 BPM
STRESS PERCENT HR: 53 %
STRESS POST PEAK BP: NORMAL MMHG
STRESS POST PEAK HR: 68 BPM
STRESS TARGET HR: 129 BPM
T4 FREE SERPL-MCNC: 1.09 NG/DL (ref 0.92–1.68)
TSH SERPL DL<=0.05 MIU/L-ACNC: 14.3 UIU/ML (ref 0.27–4.2)
VIT B12 BLD-MCNC: <150 PG/ML (ref 211–946)
WBC NRBC COR # BLD AUTO: 4.11 10*3/MM3 (ref 3.4–10.8)

## 2025-05-01 PROCEDURE — 97162 PT EVAL MOD COMPLEX 30 MIN: CPT

## 2025-05-01 PROCEDURE — 82746 ASSAY OF FOLIC ACID SERUM: CPT | Performed by: INTERNAL MEDICINE

## 2025-05-01 PROCEDURE — 93018 CV STRESS TEST I&R ONLY: CPT | Performed by: INTERNAL MEDICINE

## 2025-05-01 PROCEDURE — 86592 SYPHILIS TEST NON-TREP QUAL: CPT | Performed by: INTERNAL MEDICINE

## 2025-05-01 PROCEDURE — A9502 TC99M TETROFOSMIN: HCPCS

## 2025-05-01 PROCEDURE — 80048 BASIC METABOLIC PNL TOTAL CA: CPT | Performed by: INTERNAL MEDICINE

## 2025-05-01 PROCEDURE — 71275 CT ANGIOGRAPHY CHEST: CPT

## 2025-05-01 PROCEDURE — 78452 HT MUSCLE IMAGE SPECT MULT: CPT | Performed by: INTERNAL MEDICINE

## 2025-05-01 PROCEDURE — 25010000002 CYANOCOBALAMIN PER 1000 MCG: Performed by: INTERNAL MEDICINE

## 2025-05-01 PROCEDURE — 86340 INTRINSIC FACTOR ANTIBODY: CPT | Performed by: INTERNAL MEDICINE

## 2025-05-01 PROCEDURE — 78452 HT MUSCLE IMAGE SPECT MULT: CPT

## 2025-05-01 PROCEDURE — 84439 ASSAY OF FREE THYROXINE: CPT | Performed by: INTERNAL MEDICINE

## 2025-05-01 PROCEDURE — 25010000002 REGADENOSON 0.4 MG/5ML SOLUTION

## 2025-05-01 PROCEDURE — 25510000001 IOPAMIDOL PER 1 ML: Performed by: INTERNAL MEDICINE

## 2025-05-01 PROCEDURE — 85027 COMPLETE CBC AUTOMATED: CPT | Performed by: INTERNAL MEDICINE

## 2025-05-01 PROCEDURE — 93970 EXTREMITY STUDY: CPT

## 2025-05-01 PROCEDURE — 34310000005 TECHNETIUM TETROFOSMIN KIT

## 2025-05-01 PROCEDURE — 93017 CV STRESS TEST TRACING ONLY: CPT

## 2025-05-01 PROCEDURE — 84443 ASSAY THYROID STIM HORMONE: CPT | Performed by: INTERNAL MEDICINE

## 2025-05-01 PROCEDURE — 83735 ASSAY OF MAGNESIUM: CPT | Performed by: INTERNAL MEDICINE

## 2025-05-01 PROCEDURE — 99222 1ST HOSP IP/OBS MODERATE 55: CPT | Performed by: INTERNAL MEDICINE

## 2025-05-01 PROCEDURE — 25010000002 THIAMINE PER 100 MG: Performed by: INTERNAL MEDICINE

## 2025-05-01 PROCEDURE — 36415 COLL VENOUS BLD VENIPUNCTURE: CPT | Performed by: INTERNAL MEDICINE

## 2025-05-01 PROCEDURE — 85379 FIBRIN DEGRADATION QUANT: CPT | Performed by: INTERNAL MEDICINE

## 2025-05-01 PROCEDURE — 97110 THERAPEUTIC EXERCISES: CPT

## 2025-05-01 PROCEDURE — 93970 EXTREMITY STUDY: CPT | Performed by: SURGERY

## 2025-05-01 PROCEDURE — 84481 FREE ASSAY (FT-3): CPT | Performed by: INTERNAL MEDICINE

## 2025-05-01 PROCEDURE — 82607 VITAMIN B-12: CPT | Performed by: INTERNAL MEDICINE

## 2025-05-01 RX ORDER — REGADENOSON 0.08 MG/ML
0.4 INJECTION, SOLUTION INTRAVENOUS
Status: COMPLETED | OUTPATIENT
Start: 2025-05-01 | End: 2025-05-01

## 2025-05-01 RX ORDER — METOPROLOL TARTRATE 25 MG/1
25 TABLET, FILM COATED ORAL EVERY 12 HOURS SCHEDULED
Status: DISCONTINUED | OUTPATIENT
Start: 2025-05-01 | End: 2025-05-02 | Stop reason: HOSPADM

## 2025-05-01 RX ORDER — CYANOCOBALAMIN 1000 UG/ML
1000 INJECTION, SOLUTION INTRAMUSCULAR; SUBCUTANEOUS DAILY
Status: DISCONTINUED | OUTPATIENT
Start: 2025-05-01 | End: 2025-05-02 | Stop reason: HOSPADM

## 2025-05-01 RX ORDER — GABAPENTIN 300 MG/1
300 CAPSULE ORAL EVERY 12 HOURS SCHEDULED
Status: DISCONTINUED | OUTPATIENT
Start: 2025-05-01 | End: 2025-05-02 | Stop reason: HOSPADM

## 2025-05-01 RX ORDER — IOPAMIDOL 755 MG/ML
100 INJECTION, SOLUTION INTRAVASCULAR
Status: COMPLETED | OUTPATIENT
Start: 2025-05-01 | End: 2025-05-01

## 2025-05-01 RX ORDER — MAGNESIUM OXIDE 400 MG/1
400 TABLET ORAL DAILY
Status: DISCONTINUED | OUTPATIENT
Start: 2025-05-01 | End: 2025-05-02 | Stop reason: HOSPADM

## 2025-05-01 RX ADMIN — IOPAMIDOL 95 ML: 755 INJECTION, SOLUTION INTRAVENOUS at 13:39

## 2025-05-01 RX ADMIN — REGADENOSON 0.4 MG: 0.08 INJECTION, SOLUTION INTRAVENOUS at 11:32

## 2025-05-01 RX ADMIN — TETROFOSMIN 1 DOSE: 1.38 INJECTION, POWDER, LYOPHILIZED, FOR SOLUTION INTRAVENOUS at 11:32

## 2025-05-01 RX ADMIN — PANTOPRAZOLE SODIUM 40 MG: 40 INJECTION, POWDER, FOR SOLUTION INTRAVENOUS at 09:39

## 2025-05-01 RX ADMIN — GABAPENTIN 300 MG: 300 CAPSULE ORAL at 20:18

## 2025-05-01 RX ADMIN — CYANOCOBALAMIN 1000 MCG: 1000 INJECTION, SOLUTION INTRAMUSCULAR; SUBCUTANEOUS at 15:03

## 2025-05-01 RX ADMIN — LISINOPRIL 20 MG: 20 TABLET ORAL at 09:38

## 2025-05-01 RX ADMIN — LORAZEPAM 2 MG: 1 TABLET ORAL at 04:58

## 2025-05-01 RX ADMIN — THIAMINE HYDROCHLORIDE 200 MG: 100 INJECTION, SOLUTION INTRAMUSCULAR; INTRAVENOUS at 15:03

## 2025-05-01 RX ADMIN — TETROFOSMIN 1 DOSE: 1.38 INJECTION, POWDER, LYOPHILIZED, FOR SOLUTION INTRAVENOUS at 11:30

## 2025-05-01 RX ADMIN — PANTOPRAZOLE SODIUM 40 MG: 40 INJECTION, POWDER, FOR SOLUTION INTRAVENOUS at 20:18

## 2025-05-01 RX ADMIN — METOPROLOL TARTRATE 25 MG: 25 TABLET, FILM COATED ORAL at 20:19

## 2025-05-01 RX ADMIN — GABAPENTIN 300 MG: 300 CAPSULE ORAL at 10:12

## 2025-05-01 RX ADMIN — THIAMINE HYDROCHLORIDE 200 MG: 100 INJECTION, SOLUTION INTRAMUSCULAR; INTRAVENOUS at 20:18

## 2025-05-01 RX ADMIN — LORAZEPAM 1 MG: 1 TABLET ORAL at 20:22

## 2025-05-01 RX ADMIN — FOLIC ACID 1 MG: 1 TABLET ORAL at 09:38

## 2025-05-01 RX ADMIN — METOPROLOL TARTRATE 25 MG: 25 TABLET, FILM COATED ORAL at 10:12

## 2025-05-01 RX ADMIN — THIAMINE HYDROCHLORIDE 200 MG: 100 INJECTION, SOLUTION INTRAMUSCULAR; INTRAVENOUS at 04:48

## 2025-05-01 RX ADMIN — MAGNESIUM OXIDE TAB 400 MG (240 MG ELEMENTAL MG) 400 MG: 400 (240 MG) TAB at 10:12

## 2025-05-01 NOTE — PLAN OF CARE
Problem: Adult Inpatient Plan of Care  Goal: Absence of Hospital-Acquired Illness or Injury  Intervention: Identify and Manage Fall Risk  Recent Flowsheet Documentation  Taken 5/1/2025 1800 by Addie Vallejo RN  Safety Promotion/Fall Prevention:   safety round/check completed   room organization consistent   assistive device/personal items within reach   activity supervised  Taken 5/1/2025 1459 by Addie Vallejo RN  Safety Promotion/Fall Prevention:   room organization consistent   safety round/check completed   activity supervised   assistive device/personal items within reach  Taken 5/1/2025 0942 by Addie Vallejo RN  Safety Promotion/Fall Prevention:   room organization consistent   safety round/check completed   activity supervised   assistive device/personal items within reach     Problem: Adult Inpatient Plan of Care  Goal: Absence of Hospital-Acquired Illness or Injury  Intervention: Prevent Skin Injury  Recent Flowsheet Documentation  Taken 5/1/2025 1800 by Addie Vallejo RN  Body Position:   lower extremity elevated   right   tilted  Taken 5/1/2025 1459 by Addie Vallejo RN  Body Position:   lower extremity elevated   supine  Taken 5/1/2025 0942 by Addie Vallejo RN  Body Position: lower extremity elevated     Problem: Adult Inpatient Plan of Care  Goal: Absence of Hospital-Acquired Illness or Injury  Intervention: Prevent Infection  Recent Flowsheet Documentation  Taken 5/1/2025 1800 by Addie Vallejo RN  Infection Prevention: single patient room provided  Taken 5/1/2025 1459 by Addie Vallejo RN  Infection Prevention: single patient room provided   Goal Outcome Evaluation:  Plan of Care Reviewed With: patient        Progress: improving  Outcome Evaluation: Patient AO x 4 VSS, today have stress test  EF= 70%, Duplex BLE negative, CT chest negative for PE, able to ambulated to the toilet  assistant x 1 stand by, good appetite last CIWA =2, will continue moniotr.

## 2025-05-01 NOTE — PROGRESS NOTES
[facility] follow up d/t EtOH; this writer reviewed chart and spoke with MARTI Ahn. Per RN, patient required 2 doses of p.o. Ativan overnight; he seemed to sleep.  RN reports no SI, HI, and/or hallucinations; withdrawal symptoms include hand tremors, anxiety, and some sweating.  Last CIWA 11 at 05:00.  Per EMR, patient unsure if he wants to quit drinking alcohol; he has AODA treatment resources.  No further needs/concerns noted at this time per RN and/or medical team; [facility] to continue following.

## 2025-05-01 NOTE — CONSULTS
[facility]  Cardiology Consult Note    Patient Identification:  Name: Pro Mueller  Age: 70 y.o.  Sex: male  :  1955  MRN: [id number]             Requesting Physician: Dr Bah    Reason for Consultation / Chief Complaint: chest pain    History of Present Illness:   This is a 70 year old male with hypertension, hyperlipidemia, alcohol dependence. He presented to ER via EMS with Sudden onset chest pressure while sitting on couch watching tv. Improved with ntg. Troponin negative x2.  ECG SR, incomplete RBBB, no significant change from previous. He was admitted for further management     Echo 2024 EF 72%, grade I LV dd.     Comorbid cardiac risk factors:     Past Medical History:  Past Medical History:   Diagnosis Date    Alcohol abuse     Alcohol withdrawal 2016    Alcoholic ketoacidosis 2020    Allergic 1970    Anxiety     Arthritis     Atopic rhinitis 2016    Depression     Disease of thyroid gland     Elevated cholesterol     Encounter for removal of sutures     Genital herpes simplex 2016    GERD (gastroesophageal reflux disease)     Headache, tension-type     Hyperlipidemia     Hypertension     Hypothyroidism     Kidney stone     Low back pain     Migraine     Motion sickness     Nephrolithiasis 2020    Olecranon bursitis, right elbow     Panic disorder without agoraphobia 2016    Peripheral neuropathy     Sleep apnea     Syncope and collapse 2019    Vitamin D deficiency 2016    Withdrawal symptoms, alcohol      Past Surgical History:  Past Surgical History:   Procedure Laterality Date    CHOLECYSTECTOMY N/A 2024    Procedure: CHOLECYSTECTOMY LAPAROSCOPIC WITH DAVINCI ROBOT with cholangiogram, possible open;  Surgeon: Toro Noguera MD;  Location: [facility];  Service: General;  Laterality: N/A;    COLONOSCOPY      CYST REMOVAL      CYSTOSCOPY BLADDER STONE LITHOTRIPSY  2022    ERCP N/A 2024    Procedure:  ENDOSCOPIC RETROGRADE CHOLANGIOPANCREATOGRAPHY sphincterotomy, balloon sweep (9-12);  Surgeon: Fara Madrigal MD;  Location: [facility] ENDOSCOPY;  Service: Gastroenterology;  Laterality: N/A;  sphincterotomy hiatial hernia, gallstone removal    EXTRACORPOREAL SHOCK WAVE LITHOTRIPSY (ESWL) Right     SHOULDER ARTHROSCOPY Right 2019    Procedure: SHOULDER ARTHROSCOPY, decompression, distal clavicle excision;  Surgeon: Aj Mancilla MD;  Location: [facility] OR [facility] [place];  Service: Orthopedics    TONSILLECTOMY        Allergies:  Allergies   Allergen Reactions    Penicillins Anaphylaxis and Seizure     Seizure. childhood     Home Meds:  Medications Prior to Admission   Medication Sig Dispense Refill Last Dose/Taking    benazepril (Lotensin) 20 MG tablet Take 1 tablet by mouth Daily. 30 tablet 0 Taking    famotidine (PEPCID) 20 MG tablet Take 1 tablet by mouth 2 (Two) Times a Day. 30 tablet 0 Taking    magnesium oxide (MAG-OX) 400 MG tablet Take 1 tablet by mouth Daily. 7 tablet 0 Taking    ondansetron ODT (ZOFRAN-ODT) 4 MG disintegrating tablet Place 1 tablet on the tongue Every 6 (Six) Hours As Needed for Nausea or Vomiting. 15 tablet 0 Taking As Needed     Current Meds:   [unfilled]  Social History:   Social History     Tobacco Use    Smoking status: Former     Current packs/day: 0.00     Average packs/day: 0.5 packs/day for 15.0 years (7.5 ttl pk-yrs)     Types: Cigarettes     Start date: 1990     Quit date: 2005     Years since quittin.3    Smokeless tobacco: Never    Tobacco comments:     caffeine - 3 cans of coke daily    Substance Use Topics    Alcohol use: Yes     Alcohol/week: 7.0 standard drinks of alcohol     Types: 7 Shots of liquor per week     Comment: .5 liter vodka      Family History:  Family History   Problem Relation Age of Onset    Alzheimer's disease Mother     Mental illness Mother     Pancreatic cancer Father     Hearing loss Father     Arthritis Maternal Grandmother     Bill  "Hyperthermia Neg Hx       Review of Systems  Constitutional: No wt loss, fever   Gastrointestinal: No nausea , abdominal pain  Behavioral/Psych: No insomnia or anxiety   Cardiovascular ----positive for chest pressure. All other systems reviewed and are negative            Constitutional:  Temp:  [97.9 °F (36.6 °C)-98.5 °F (36.9 °C)] 98.4 °F (36.9 °C)  Heart Rate:  [65-82] 67  Resp:  [20] 20  BP: (156-179)/() 166/100    /100 (BP Location: Right arm, Patient Position: Lying)   Pulse 72   Temp 98.4 °F (36.9 °C) (Oral)   Resp 20   Ht 182.9 cm (72.01\")   Wt 93.7 kg (206 lb 7.7 oz)   SpO2 96%   BMI 28.00 kg/m²   General appearance: No acute changes   Neck: Trachea midline; NECK, supple, no thyromegaly or lymphadenopathy   Lungs: Normal size and shape, normal breath sounds, equal distribution of air, no rales and rhonchi   CV: S1-S2 regular, no murmurs, no rub, no gallop   Abdomen: Soft, nontender; no masses , no abnormal abdominal sounds   Extremities: No deformity , normal color , no peripheral edema   Skin: Normal temperature, turgor and texture; no rash, ulcers                Cardiographics  ECG:     Telemetry:    Echocardiogram:     Imaging  Chest X-ray:     Lab Review   Results from last 7 days   Lab Units 04/30/25  1549 04/30/25  1444   HSTROP T ng/L 9 11         Results from last 7 days   Lab Units 05/01/25  0349   SODIUM mmol/L 139   POTASSIUM mmol/L 3.5   BUN mg/dL 8   CREATININE mg/dL 0.72*   CALCIUM mg/dL 8.6     @[id number]@  Results from last 7 days   Lab Units 05/01/25  0349 04/30/25  1444   WBC 10*3/mm3 4.11 5.85   HEMOGLOBIN g/dL 12.2* 13.1   HEMATOCRIT % 36.8* 40.0   PLATELETS 10*3/mm3 141 164     Results from last 7 days   Lab Units 04/30/25  1444   INR  1.21*   APTT seconds 27.4         Assessment:  Atypical chest pain  Hypertension  Hyperlipidemia  Alcohol dependence    Recommendations / Plan:   70-year-old male with a history of hypertension hyperlipidemia alcohol dependence " presented with sudden onset of chest pressure while sitting on the couch watching TV, was improved with sublingual nitroglycerin troponins has been negative    Echo as well as stress test has been negative    Will use with as needed nitroglycerin    Specificity and sensitivity of the stress test/ cardiac workup has been explained. Pt has been explained if  Symptoms continue please go to ER, and further w/p will be required.    Also explained this does not rule out coronary artery disease or the future events, continue to emphasize on risk reductions for coronary artery disease    Pt also advised to contact PCP for other causes of symptoms      Labs/tests ordered for grabiel Escalante MD  5/1/2025, 10:48 EDT      EMR Dragon/Transcription:   Dictated utilizing Dragon dictation

## 2025-05-01 NOTE — H&P
HISTORY AND PHYSICAL   [facility]        Date of Admission: 2025  Patient Identification:  Name: Pro Mueller  Age: 70 y.o.  Sex: male  :  1955  MRN: [id number]                     Primary Care Physician: Provider, No Known    Chief Complaint:  70 year old gentleman presented to the emergency room with chest pain which started earlier today; it started at rest and has now resolved; he denies shortness of breath; he has a history of alcohol dependence and last had a drink yesterday; he has a past history of alcohol withdrawal and is tremulous now;     History of Present Illness:   As above    Past Medical History:  Past Medical History:   Diagnosis Date    Alcohol abuse     Alcohol withdrawal 2016    Alcoholic ketoacidosis 2020    Allergic 1970    Anxiety     Arthritis     Atopic rhinitis 2016    Depression     Disease of thyroid gland     Elevated cholesterol     Encounter for removal of sutures     Genital herpes simplex 2016    GERD (gastroesophageal reflux disease)     Headache, tension-type     Hyperlipidemia     Hypertension     Hypothyroidism     Kidney stone     Low back pain     Migraine     Motion sickness     Nephrolithiasis 2020    Olecranon bursitis, right elbow     Panic disorder without agoraphobia 2016    Peripheral neuropathy     Sleep apnea     Syncope and collapse 2019    Vitamin D deficiency 2016    Withdrawal symptoms, alcohol      Past Surgical History:  Past Surgical History:   Procedure Laterality Date    CHOLECYSTECTOMY N/A 2024    Procedure: CHOLECYSTECTOMY LAPAROSCOPIC WITH DAVINCI ROBOT with cholangiogram, possible open;  Surgeon: Toro Noguera MD;  Location: [facility];  Service: General;  Laterality: N/A;    COLONOSCOPY      CYST REMOVAL      CYSTOSCOPY BLADDER STONE LITHOTRIPSY  2022    ERCP N/A 2024    Procedure: ENDOSCOPIC RETROGRADE CHOLANGIOPANCREATOGRAPHY sphincterotomy, balloon  sweep (9-12);  Surgeon: Fara Madrigal MD;  Location: [facility] ENDOSCOPY;  Service: Gastroenterology;  Laterality: N/A;  sphincterotomy hiatial hernia, gallstone removal    EXTRACORPOREAL SHOCK WAVE LITHOTRIPSY (ESWL) Right 2002    SHOULDER ARTHROSCOPY Right 12/17/2019    Procedure: SHOULDER ARTHROSCOPY, decompression, distal clavicle excision;  Surgeon: Aj Mancilla MD;  Location:  [facility];  Service: Orthopedics    TONSILLECTOMY        Home Meds:  Medications Prior to Admission   Medication Sig Dispense Refill Last Dose/Taking    benazepril (Lotensin) 20 MG tablet Take 1 tablet by mouth Daily. 30 tablet 0     famotidine (PEPCID) 20 MG tablet Take 1 tablet by mouth 2 (Two) Times a Day. 30 tablet 0     magnesium oxide (MAG-OX) 400 MG tablet Take 1 tablet by mouth Daily. 7 tablet 0     ondansetron ODT (ZOFRAN-ODT) 4 MG disintegrating tablet Place 1 tablet on the tongue Every 6 (Six) Hours As Needed for Nausea or Vomiting. 15 tablet 0        Allergies:  Allergies   Allergen Reactions    Penicillins Anaphylaxis and Seizure     Seizure. childhood     Immunizations:  Immunization History   Administered Date(s) Administered    COVID-19 (PFIZER) Purple Cap Monovalent 03/19/2021, 04/09/2021, 10/28/2021    Flu Vaccine Intradermal Quad 18-64YR 10/23/2020    Flu Vaccine Quad PF >36MO 11/06/2016    Fluzone (or Fluarix & Flulaval for VFC) >6mos 12/06/2019, 10/23/2020, 10/05/2021    Hepatitis A 08/20/2019    Influenza, Unspecified 11/26/2018    PEDS-Pneumococcal Conjugate (PCV7) 07/21/2017    Pneumococcal Conjugate 13-Valent (PCV13) 07/21/2017    Pneumococcal Polysaccharide (PPSV23) 05/05/2023    Pneumococcal, Unspecified 07/21/2017    Td, Not Adsorbed 11/26/2018    Tdap 07/28/2021    Zostavax 07/21/2017    flucelvax quad pfs =>4 YRS 11/26/2018     Social History:   Social History     Social History Narrative    Not on file     Social History     Socioeconomic History    Marital status: Single   Tobacco Use    Smoking  "status: Former     Current packs/day: 0.00     Average packs/day: 0.5 packs/day for 15.0 years (7.5 ttl pk-yrs)     Types: Cigarettes     Start date: 1990     Quit date: 2005     Years since quittin.3    Smokeless tobacco: Never    Tobacco comments:     caffeine - 3 cans of coke daily    Vaping Use    Vaping status: Never Used   Substance and Sexual Activity    Alcohol use: Yes     Alcohol/week: 7.0 standard drinks of alcohol     Types: 7 Shots of liquor per week     Comment: .5 liter vodka    Drug use: Yes     Types: Methamphetamines     Comment: \"once in a rare while\"    Sexual activity: Yes     Partners: Female     Birth control/protection: Condom       Family History:  Family History   Problem Relation Age of Onset    Alzheimer's disease Mother     Mental illness Mother     Pancreatic cancer Father     Hearing loss Father     Arthritis Maternal Grandmother     Malig Hyperthermia Neg Hx         Review of Systems  See history of present illness and past medical history.  Patient denies headache, dizziness, syncope, falls, trauma, change in vision, change in hearing, change in taste, changes in weight, changes in appetite, focal weakness, numbness, or paresthesia.  Patient denies chest pain, palpitations, dyspnea, orthopnea, PND, cough, sinus pressure, rhinorrhea, epistaxis, hemoptysis, nausea, vomiting,hematemesis, diarrhea, constipation or hematochezia.  Denies cold or heat intolerance, polydipsia, polyuria, polyphagia. Denies hematuria, pyuria, dysuria, hesitancy, frequency or urgency. Denies consumption of raw and under cooked meats foods or change in water source.  Denies fever, chills, sweats, night sweats.       Objective:  T Max 24 hrs: Temp (24hrs), Av.3 °F (36.8 °C), Min:98 °F (36.7 °C), Max:98.5 °F (36.9 °C)    Vitals Ranges:   Temp:  [98 °F (36.7 °C)-98.5 °F (36.9 °C)] 98.5 °F (36.9 °C)  Heart Rate:  [65-82] 66  Resp:  [20] 20  BP: (156-179)/(85-96) 177/85      Exam:  /85 (BP " "Location: Right arm, Patient Position: Lying)   Pulse 66   Temp 98.5 °F (36.9 °C) (Oral)   Resp 20   Ht 182.9 cm (72.01\")   Wt 93.8 kg (206 lb 12.7 oz)   SpO2 98%   BMI 28.04 kg/m²     General Appearance:    Alert, cooperative, no distress, appears stated age; tremulous   Head:    Normocephalic, without obvious abnormality, atraumatic   Eyes:    PERRL, conjunctivae/corneas clear, EOM's intact, both eyes   Ears:    Normal external ear canals, both ears   Nose:   Nares normal, septum midline, mucosa normal, no drainage    or sinus tenderness   Throat:   Lips, mucosa, and tongue normal   Neck:   Supple, symmetrical, trachea midline, no adenopathy;     thyroid:  no enlargement/tenderness/nodules; no carotid    bruit or JVD   Back:     Symmetric, no curvature, ROM normal, no CVA tenderness   Lungs:     Clear to auscultation bilaterally, respirations unlabored   Chest Wall:    No tenderness or deformity    Heart:    Regular rate and rhythm, S1 and S2 normal, no murmur, rub   or gallop   Abdomen:     Soft, nontender, bowel sounds active all four quadrants,     no masses, no hepatomegaly, no splenomegaly   Extremities:   Extremities normal, atraumatic, no cyanosis or edema                       .    Data Review:  Labs in chart were reviewed.  WBC   Date Value Ref Range Status   04/30/2025 5.85 3.40 - 10.80 10*3/mm3 Final     Hemoglobin   Date Value Ref Range Status   04/30/2025 13.1 13.0 - 17.7 g/dL Final     Hematocrit   Date Value Ref Range Status   04/30/2025 40.0 37.5 - 51.0 % Final     Platelets   Date Value Ref Range Status   04/30/2025 164 140 - 450 10*3/mm3 Final     Sodium   Date Value Ref Range Status   04/30/2025 140 136 - 145 mmol/L Final     Potassium   Date Value Ref Range Status   04/30/2025 3.8 3.5 - 5.2 mmol/L Final     Chloride   Date Value Ref Range Status   04/30/2025 105 98 - 107 mmol/L Final     CO2   Date Value Ref Range Status   04/30/2025 19.7 (L) 22.0 - 29.0 mmol/L Final     BUN   Date Value " Ref Range Status   04/30/2025 7 (L) 8 - 23 mg/dL Final     Creatinine   Date Value Ref Range Status   04/30/2025 0.70 (L) 0.76 - 1.27 mg/dL Final     Glucose   Date Value Ref Range Status   04/30/2025 115 (H) 65 - 99 mg/dL Final     Calcium   Date Value Ref Range Status   04/30/2025 8.9 8.6 - 10.5 mg/dL Final     AST (SGOT)   Date Value Ref Range Status   04/30/2025 57 (H) 1 - 40 U/L Final     ALT (SGPT)   Date Value Ref Range Status   04/30/2025 17 1 - 41 U/L Final     Alkaline Phosphatase   Date Value Ref Range Status   04/30/2025 84 39 - 117 U/L Final                Imaging Results (All)       Procedure Component Value Units Date/Time    XR Chest 1 View [[id number]] Collected: 04/30/25 1516     Updated: 04/30/25 1521    Narrative:      XR CHEST 1 VW-     HISTORY: Male who is 70 years-old, chest pain     TECHNIQUE: Frontal view of the chest     COMPARISON: 5/11/2024     FINDINGS: The heart size is borderline. Aorta is tortuous. Pulmonary  vasculature is unremarkable. No focal pulmonary consolidation, pleural  effusion, or pneumothorax. Right hemidiaphragm is mildly elevated. No  acute osseous process.       Impression:      No focal pulmonary consolidation. Borderline heart size.  With tortuous aorta. Follow-up as clinically indicated.     This report was finalized on 4/30/2025 3:18 PM by Dr. Jarod Devries M.D on Workstation: [id number]                 Assessment:  Active Hospital Problems    Diagnosis  POA    **Chest pain [R07.9]  Yes      Resolved Hospital Problems   No resolved problems to display.   Alcohol dependence with withdrawal  Hyperglycemia  Hyperlipidemia  Hypothyroidism  Sleep apnea  hypertension    Plan:  Monitor on telemetry  Troponin is negative  Will hold off on workup for chest pain for now due to alcohol withdrawal  Echo was done four months ago  Appreciate help from access center  Ppi  Trend labs  Dw patient and ed provider  Patient is full code and sister is castillo Hernandezej Guevara,  MD  4/30/2025  20:08 EDT

## 2025-05-01 NOTE — PROGRESS NOTES
Name: Pro Mueller ADMIT: 2025   : 1955  PCP: Provider, No Known    MRN: [id number] LOS: 1 days   AGE/SEX: 70 y.o. male  ROOM: [facility]     Subjective   Subjective   Resolved chest pain.  No shortness of breath.  No palpitation.  No cough.  No wheeze.  No hemoptysis.  Positive ankle edema and foot pain.  No fever or chills.  Decreased anxiety and tremors.  No hallucinations or delusions.  No headache.  No loss of consciousness.  No seizures.  No focal neurological symptoms    Review of Systems  GI.  No abdominal pain.  No nausea or vomiting.  Normal bowel movement yesterday without fresh bright blood per rectum or melena.  .  No dysuria or hematuria.     Objective   Objective   Vital Signs  Temp:  [97.9 °F (36.6 °C)-98.5 °F (36.9 °C)] 98.4 °F (36.9 °C)  Heart Rate:  [65-82] 67  Resp:  [20] 20  BP: (156-179)/() 166/100  SpO2:  [92 %-98 %] 96 %  on   ;   Device (Oxygen Therapy): room air    Intake/Output Summary (Last 24 hours) at 2025 0914  Last data filed at 2025 0556  Gross per 24 hour   Intake 240 ml   Output 400 ml   Net -160 ml     Body mass index is 28 kg/m².      25  1755 25  0556   Weight: 93.8 kg (206 lb 12.7 oz) 93.7 kg (206 lb 7.7 oz)     Physical Exam  General.  Middle-aged gentleman.  He is alert and oriented x 4.  In no apparent pain/distress/diaphoresis.  Normal mood and affect.  Eyes.  Pupils equal round and reactive.  Intact extraocular musculature.  No pallor or jaundice.  Oral cavity.  Moist mucous membrane with no tongue lesions or throat lesions  Neck.  Supple.  No JVD.  No lymphadenopathy or thyromegaly.  Cardiovascular.  Regular rate and rhythm with no gallops or murmurs.  Chest.  Clear to auscultation bilaterally with no added sounds  Abdomen.  Soft lax.  No tenderness.  No organomegaly.  No guarding or rebound  Extremities.  Trace bilateral ankle edema.  Intact distal pulses.  Wound on the dorsum of both feet.  No clubbing or cyanosis.  Positive mild  "upper extremity tremors.  CNS.  No acute focal neurological deficits.      Results Review:      Results from last 7 days   Lab Units 05/01/25  0349 04/30/25  1444   SODIUM mmol/L 139 140   POTASSIUM mmol/L 3.5 3.8   CHLORIDE mmol/L 105 105   CO2 mmol/L 22.5 19.7*   BUN mg/dL 8 7*   CREATININE mg/dL 0.72* 0.70*   GLUCOSE mg/dL 92 115*   CALCIUM mg/dL 8.6 8.9   AST (SGOT) U/L  --  57*   ALT (SGPT) U/L  --  17     Estimated Creatinine Clearance: 116.7 mL/min (A) (by C-G formula based on SCr of 0.72 mg/dL (L)).          Results from last 7 days   Lab Units 04/30/25  1549 04/30/25  1444   HSTROP T ng/L 9 11                       Invalid input(s): \"LDLCALC\"  Results from last 7 days   Lab Units 05/01/25  0349 04/30/25  1444   WBC 10*3/mm3 4.11 5.85   HEMOGLOBIN g/dL 12.2* 13.1   HEMATOCRIT % 36.8* 40.0   PLATELETS 10*3/mm3 141 164   MCV fL 83.3 84.2   MCH pg 27.6 27.6   MCHC g/dL 33.2 32.8   RDW % 14.5 14.9   RDW-SD fl 43.7 45.6   MPV fL 8.8 9.0   NEUTROPHIL % %  --  63.5   LYMPHOCYTE % %  --  26.8   MONOCYTES % %  --  7.5   EOSINOPHIL % %  --  1.5   BASOPHIL % %  --  0.7   IMM GRAN % %  --  0.0   NEUTROS ABS 10*3/mm3  --  3.71   LYMPHS ABS 10*3/mm3  --  1.57   MONOS ABS 10*3/mm3  --  0.44   EOS ABS 10*3/mm3  --  0.09   BASOS ABS 10*3/mm3  --  0.04   IMMATURE GRANS (ABS) 10*3/mm3  --  0.00   NRBC /100 WBC  --  0.0     Results from last 7 days   Lab Units 04/30/25  1444   INR  1.21*   APTT seconds 27.4                                       Imaging:  Imaging Results (Last 24 Hours)       Procedure Component Value Units Date/Time    XR Chest 1 View [[id number]] Collected: 04/30/25 1516     Updated: 04/30/25 1521    Narrative:      XR CHEST 1 VW-     HISTORY: Male who is 70 years-old, chest pain     TECHNIQUE: Frontal view of the chest     COMPARISON: 5/11/2024     FINDINGS: The heart size is borderline. Aorta is tortuous. Pulmonary  vasculature is unremarkable. No focal pulmonary consolidation, pleural  effusion, or " pneumothorax. Right hemidiaphragm is mildly elevated. No  acute osseous process.       Impression:      No focal pulmonary consolidation. Borderline heart size.  With tortuous aorta. Follow-up as clinically indicated.     This report was finalized on 4/30/2025 3:18 PM by Dr. Jarod Devries M.D on Workstation: [id number]                  I reviewed the patient's new clinical results / labs / tests / procedures      Assessment/Plan     Active Hospital Problems    Diagnosis  POA    **Chest pain [R07.9]  Yes    Alcohol abuse [F10.10]  Yes    Peptic ulcer disease [K27.9]  Yes    Mood disorder [F39]  Yes    Anemia, chronic disease [D63.8]  Yes    DDD (degenerative disc disease), lumbar [M51.369]  Yes    Alcoholic liver disease [K70.9]  Yes    Nephrolithiasis [N20.0]  Yes    Alcohol withdrawal [F10.939]  Yes    Hyperlipidemia [E78.5]  Yes    Hypothyroidism (acquired) [E03.9]  Yes      Resolved Hospital Problems   No resolved problems to display.           Atypical chest pain in a patient with a history of PVCs/hypertension with orthostasis/diastolic dysfunction.    EKG with Q waves in the inferior leads and right bundle branch block with no acute ischemia (old changes).  Patient last echo on 12/20/2024 revealing a normal ejection fraction and grade 1 diastolic dysfunction with no significant valvular disease.  Troponins are negative.  Chest x-ray without acute disease.  Blood pressure suboptimally controlled.  Plan check D-dimer and if positive check CTA of the chest.  Consult cardiology.  Continue lisinopril and add beta-blockers for better blood pressure control.  Alcohol abuse/alcohol withdrawal in a patient with with a history of alcoholic pancreatitis and alcoholic liver disease and peptic ulcer disease.  Continue CIWA and detoxification.  Slowly improving.  Continue IV Protonix.  Benign GI examination.  Mood disorder/history of anxiety and depression panic disorder.  Continue as needed benzos.  SSRI at  discharge.  Hypothyroidism.  On no treatment.  Check TSH.  Dyslipidemia.  On no treatment.  Will check lipids.  Nephrolithiasis.  Asymptomatic.  Chronic anemia.  Baseline hemoglobin between 11 and 13.  No overt active bleed.  Hemoglobin is stable.  Will monitor.  DDD of the L-spine/peripheral neuropathy.  Will check TSH/B12/folate and initiate Neurontin.   VTE prophylaxis.  Lovenox.      Discussed my findings and plan of treatment with the patient/nurses at multidisciplinary rounds  Disposition.  Anticipate discharge home in 1 to 2 days.          Estrellita Bah MD  [facility]  05/01/25  09:14 EDT

## 2025-05-01 NOTE — THERAPY EVALUATION
Patient Name: Pro Mueller  : 1955    MRN: [id number]                              Today's Date: 2025       Admit Date: 2025    Visit Dx:     ICD-10-CM ICD-9-CM   1. Chest pain, unspecified type  R07.9 786.50   2. Alcohol withdrawal syndrome with complication  F10.939 291.81     Patient Active Problem List   Diagnosis    Fall    Alcoholism in recovery    Atopic rhinitis    Mixed anxiety depressive disorder    Genital herpes simplex    Hyperlipidemia    Hypertension    Hypothyroidism (acquired)    Insomnia    Panic disorder without agoraphobia    Persistent insomnia    Vitamin D deficiency    Alcohol withdrawal    Chronic low back pain    Neuropathy involving both lower extremities    Abnormal EKG    Left shoulder pain    Nausea and vomiting    Pancytopenia    Nephrolithiasis    Left ventricular diastolic dysfunction    Tremor    C5 cervical fracture    Syncope and collapse    Neck pain    Alcoholic intoxication with complication    Withdrawal symptoms, alcohol    Transaminitis    Lumbar facet arthropathy    Alcohol dependence    Midline low back pain with right-sided sciatica    Spondylolisthesis at L4-L5 level    Lumbar canal stenosis    Alcohol cessation counseling    Need for assistance due to reduced mobility    Impaired mobility and ADLs    Alcohol withdrawal syndrome without complication    Facial laceration    Orthostatic hypotension    Acute bacterial conjunctivitis of right eye    Visit for suture removal    Renal calculi    Hepatic steatosis    Alcohol withdrawal syndrome with complication    Macrocytosis without anemia    Lumbosacral spondylosis without myelopathy    Elevated LFTs    Alcohol-induced acute pancreatitis, unspecified complication status    Pancreatitis    Generalized abdominal pain    Alcohol dependence with withdrawal    Acute alcohol intoxication in patient with alcoholism with blood alcohol level 0.08 to 0.29, with unspecified complication    Calculus of gallbladder with  cholecystitis without biliary obstruction    Alcoholic liver disease    Iron deficiency anemia    Folate deficiency anemia    PVC's (premature ventricular contractions)    Weakness    DDD (degenerative disc disease), lumbar    Chest pain    Alcohol abuse    Peptic ulcer disease    Mood disorder    Anemia, chronic disease     Past Medical History:   Diagnosis Date    Alcohol abuse     Alcohol withdrawal 11/04/2016    Alcoholic ketoacidosis 01/12/2020    Allergic 1970    Anxiety     Arthritis     Atopic rhinitis 08/08/2016    Depression     Disease of thyroid gland     Elevated cholesterol     Encounter for removal of sutures     Genital herpes simplex 08/08/2016    GERD (gastroesophageal reflux disease)     Headache, tension-type     Hyperlipidemia     Hypertension     Hypothyroidism     Kidney stone     Low back pain 2019    Migraine     Motion sickness     Nephrolithiasis 02/12/2020    Olecranon bursitis, right elbow     Panic disorder without agoraphobia 08/08/2016    Peripheral neuropathy     Sleep apnea     Syncope and collapse 01/02/2019    Vitamin D deficiency 08/08/2016    Withdrawal symptoms, alcohol      Past Surgical History:   Procedure Laterality Date    CHOLECYSTECTOMY N/A 9/22/2024    Procedure: CHOLECYSTECTOMY LAPAROSCOPIC WITH DAVINCI ROBOT with cholangiogram, possible open;  Surgeon: Toro Noguera MD;  Location: [facility] MAIN OR;  Service: General;  Laterality: N/A;    COLONOSCOPY      CYST REMOVAL      CYSTOSCOPY BLADDER STONE LITHOTRIPSY  02/2022    ERCP N/A 9/23/2024    Procedure: ENDOSCOPIC RETROGRADE CHOLANGIOPANCREATOGRAPHY sphincterotomy, balloon sweep (9-12);  Surgeon: Fara Madrigal MD;  Location: [facility] ENDOSCOPY;  Service: Gastroenterology;  Laterality: N/A;  sphincterotomy hiatial hernia, gallstone removal    EXTRACORPOREAL SHOCK WAVE LITHOTRIPSY (ESWL) Right 2002    SHOULDER ARTHROSCOPY Right 12/17/2019    Procedure: SHOULDER ARTHROSCOPY, decompression, distal clavicle excision;   Surgeon: Aj Mancilla MD;  [place] [facility] [place] [facility] [place]  Service: [place]opedics    TONSILLECTOMY        General Information       Row Name 05/01/25 1200          Physical Therapy Time and Intention    Document Type evaluation  -AR     Mode of Treatment physical therapy  -AR       Row Name 05/01/25 1200          General Information    Patient Profile Reviewed yes  -AR     Prior Level of Function independent:  uses RW to get mail [place], occasionally uses inside his home. gets groceries delivered, no assist  -AR     Existing Precautions/Restrictions fall  -AR     Barriers to Rehab none identified  -AR       Row Name 05/01/25 1200          Living Environment    Current Living Arrangements home  -AR     People in Home alone  -AR       Row Name 05/01/25 1200          Cognition    Orientation Status (Cognition) oriented x 3  -AR       Row Name 05/01/25 1200          Safety Issues/Impairments Affecting Functional Mobility    Impairments Affecting Function (Mobility) balance;pain;endurance/activity tolerance  -AR               User Key  (r) = Recorded By, (t) = Taken By, (c) = Cosigned By      Initials Name Provider Type    AR Beata Martinez PT Physical Therapist                   Mobility       Row Name 05/01/25 1203          Bed Mobility    Bed Mobility supine-sit;sit-supine  -AR     Supine-Sit Scott (Bed Mobility) modified independence  -AR     Sit-Supine [place] (Bed Mobility) modified independence  -AR     Assistive Device (Bed Mobility) head of bed elevated;bed rails  -AR       Row Name 05/01/25 1203          Sit-Stand Transfer    Sit-Stand Scott (Transfers) standby assist  -AR     Assistive Device (Sit-Stand Transfers) walker, front-wheeled  -AR       Row Name 05/01/25 1203          Gait/Stairs (Locomotion)    Scott Level (Gait) contact guard  -AR     Assistive Device (Gait) walker, front-wheeled  -AR     Patient was able to Ambulate yes  -AR     Distance in  Feet (Gait) 175  -AR     Deviations/Abnormal Patterns (Gait) gait speed decreased;festinating/shuffling  -AR     Bilateral Gait Deviations forward flexed posture;heel strike decreased  -AR               User Key  (r) = Recorded By, (t) = Taken By, (c) = Cosigned By      Initials Name Provider Type    AR Beata Martinez, PT Physical Therapist                   Obj/Interventions       Row Name 05/01/25 1204          Range of Motion Comprehensive    Comment, General Range of Motion B LE WFL  -AR       Row Name 05/01/25 1204          Strength Comprehensive (MMT)    Comment, General Manual Muscle Testing (MMT) Assessment B LE 4+/5  -AR       Row Name 05/01/25 1204          Balance    Balance Assessment standing dynamic balance  -AR     Dynamic Standing Balance contact guard  -AR     Position/Device Used, Standing Balance walker, rolling  -AR               User Key  (r) = Recorded By, (t) = Taken By, (c) = Cosigned By      Initials Name Provider Type    AR Beata Martinez, PT Physical Therapist                   Goals/Plan       Row Name 05/01/25 1207          Transfer Goal 1 (PT)    Activity/Assistive Device (Transfer Goal 1, PT) sit-to-stand/stand-to-sit;bed-to-chair/chair-to-bed  -AR     [place] Level/Cues Needed (Transfer Goal 1, PT) standby assist  -AR     Time Frame (Transfer Goal 1, PT) 1 week  -AR       Row Name 05/01/25 1207          Gait Training Goal 1 (PT)    Activity/Assistive Device (Gait Training Goal 1, PT) gait (walking locomotion)  -AR     [place] Level (Gait Training Goal 1, PT) standby assist  -AR     Distance (Gait Training Goal 1, PT) 200  -AR     Time Frame (Gait Training Goal 1, PT) 1 week  -AR       Row Name 05/01/25 1207          Therapy Assessment/Plan (PT)    Planned Therapy Interventions (PT) balance training;bed mobility training;gait training;home exercise program;neuromuscular re-education;transfer training;strengthening;patient/family education  -AR               User Key  (r)  = Recorded By, (t) = Taken By, (c) = Cosigned By      Initials Name Provider Type    AR Beata Martinez, PT Physical Therapist                   Clinical Impression       Row Name 05/01/25 1204          Pain    Pretreatment Pain Rating 0/10 - no pain  -AR     Posttreatment Pain Rating 0/10 - no pain  -AR       Row Name 05/01/25 1204          Plan of Care Review    Plan of Care Reviewed With patient  -AR     Outcome Evaluation Pt admitted with chest pain. H/o etoh abuse, ciwa scoring. Pt reports living home alone, uses RW rarely inside his home but sometimes to get the mail [place].  Denies recent falls.  Today pt able to stand up w/ SBA using RW.  Ambulated 175' w/ RW and CGA, few cues for posture.  Recommend sittingin chair for meals and ambulating in room/sanders w/ staff.  Anticipate DC to home with use of his RW.  -AR       Row Name 05/01/25 1204          Therapy Assessment/Plan (PT)    Rehab Potential (PT) good  -AR     Criteria for Skilled Interventions Met (PT) yes  -AR     Therapy Frequency (PT) 3 times/wk  -AR       Row Name 05/01/25 1204          Vital Signs    O2 Delivery Pre Treatment room air  -AR       Row Name 05/01/25 1204          Positioning and Restraints    Pre-Treatment Position in bed  -AR     Post Treatment Position --  transport  -AR               User Key  (r) = Recorded By, (t) = Taken By, (c) = Cosigned By      Initials Name Provider Type    AR Beata Martinez, PT Physical Therapist                   Outcome Measures       Row Name 05/01/25 1207 05/01/25 0942       How much help from another person do you currently need...    Turning from your back to your side while in flat bed without using bedrails? 4  -AR 3  -MM    Moving from lying on back to sitting on the side of a flat bed without bedrails? 3  -AR 3  -MM    Moving to and from a bed to a chair (including a wheelchair)? 3  -AR 3  -MM    Standing up from a chair using your arms (e.g., wheelchair, bedside  chair)? 3  -AR 3  -MM    Climbing 3-5 steps with a railing? 3  -AR 2  -MM    To walk in hospital room? 3  -AR 3  -MM    AM-PAC 6 Clicks Score (PT) 19  -AR 17  -MM    Highest Level of Mobility Goal 6 --> Walk 10 steps or more  -AR 5 --> Static standing  -MM      Row Name 05/01/25 1207          Functional Assessment    Outcome Measure Options AM-PAC 6 Clicks Basic Mobility (PT)  -AR               User Key  (r) = Recorded By, (t) = Taken By, (c) = Cosigned By      Initials Name Provider Type    AR Beata Martinez, PT Physical Therapist    Addie Magana, RN Registered Nurse                                 Physical Therapy Education       Title: PT OT SLP Therapies (In Progress)       Topic: Physical Therapy (In Progress)       Point: Mobility training (In Progress)       Learning Progress Summary            Patient Acceptance, E, NR by AR at 5/1/2025 1207                      Point: Home exercise program (In Progress)       Learning Progress Summary            Patient Acceptance, E, NR by AR at 5/1/2025 1207                      Point: Body mechanics (In Progress)       Learning Progress Summary            Patient Acceptance, E, NR by AR at 5/1/2025 1207                      Point: Precautions (In Progress)       Learning Progress Summary            Patient Acceptance, E, NR by AR at 5/1/2025 1207                                      User Key       Initials Effective Dates Name Provider Type Discipline    AR 06/16/21 -  Beata Martinez, PT Physical Therapist PT                  PT Recommendation and Plan  Planned Therapy Interventions (PT): balance training, bed mobility training, gait training, home exercise program, neuromuscular re-education, transfer training, strengthening, patient/family education  Outcome Evaluation: Pt admitted with chest pain. H/o etoh abuse, ciwa scoring. Pt reports living home alone, uses RW rarely inside his home but sometimes to get the mail [place].  Denies  recent falls.  Today pt able to stand up w/ SBA using RW.  Ambulated 175' w/ RW and CGA, few cues for posture.  Recommend sittingin chair for meals and ambulating in room/sanders w/ staff.  Anticipate DC to home with use of his RW.     Time Calculation:         PT Charges       Row Name 05/01/25 1159             Time Calculation    Start Time 1106  -AR      Stop Time 1135  -AR      Time Calculation (min) 29 min  -AR      PT Received On 05/01/25  -AR      PT - Next Appointment 05/05/25  -AR      PT Goal Re-Cert Due Date 05/08/25  -AR                User Key  (r) = Recorded By, (t) = Taken By, (c) = Cosigned By      Initials Name Provider Type    AR Beata Martinez, PT Physical Therapist                  Therapy Charges for Today       Code Description Service Date Service Provider Modifiers Qty    62202780069 HC PT EVAL MOD COMPLEXITY 3 5/1/2025 Beata Martinez, PT GP 1    29729074222 HC PT THER PROC EA 15 MIN 5/1/2025 Beata Martinez, PT GP 1            PT G-Codes  Outcome Measure Options: AM-PAC 6 Clicks Basic Mobility (PT)  AM-PAC 6 Clicks Score (PT): 19  PT Discharge Summary  Anticipated Discharge Disposition (PT): home with assist    Beata Martinez PT  5/1/2025

## 2025-05-01 NOTE — CASE MANAGEMENT/SOCIAL WORK
Discharge Planning Assessment  [facility]     Patient Name: Pro Mueller  MRN: [id number]  Today's Date: 5/1/2025    Admit Date: 4/30/2025    Plan: Home, family to transport.   Discharge Needs Assessment       Row Name 05/01/25 1526       Living Environment    People in Home alone    Current Living Arrangements home    Family Caregiver if Needed sibling(s)    Quality of Family Relationships helpful;involved;supportive    Able to Return to Prior Arrangements yes       Transition Planning    Patient/Family Anticipates Transition to home with family    Patient/Family Anticipated Services at Transition     Transportation Anticipated car, drives self;family or friend will provide       Discharge Needs Assessment    Readmission Within the Last 30 Days no previous admission in last 30 days    Equipment Currently Used at Home walker, rolling    Concerns to be Addressed discharge planning    Equipment Needed After Discharge none                   Discharge Plan       Row Name 05/01/25 1526       Plan    Plan Home, family to transport.    Patient/Family in Agreement with Plan yes    Plan Comments CCP met with pt at the bedside, introduced self and role of CCP. Face sheet information and pharmacy verified. Pt lives alone in a 2-story home with 1STE. Pt can still drive; however, his car has been in the shop. Pt is IADL's and has a walker at home. Pt denies having a living will. Pt is enrolled in meds to beds and denies trouble affording or managing his medications. Pt denies HH history and has been to SNF in the past. DC plan is to return home, family to transport. Danica RN/CCP                    Expected Discharge Date and Time       Expected Discharge Date Expected Discharge Time    May 3, 2025            Demographic Summary       Row Name 05/01/25 1526       General Information    Admission Type inpatient    Arrived From home    Required Notices Provided Important Message from Medicare    Referral Source  admission list;case finding    Reason for Consult discharge planning    Preferred Language English       Contact Information    Permission Granted to Share Info With ;family/designee                   Functional Status       Row Name 05/01/25 1526       Functional Status    Usual Activity Tolerance good    Current Activity Tolerance moderate       Assessment of Health Literacy    How often do you have someone help you read hospital materials? Never    How often do you have problems learning about your medical condition because of difficulty understanding written information? Never    How often do you have a problem understanding what is told to you about your medical condition? Never    How confident are you filling out medical forms by yourself? Extremely    Health Literacy Excellent       Functional Status, IADL    Medications independent    Meal Preparation independent    Housekeeping independent    Laundry independent    Shopping independent                               Solange Llanes, MARTI

## 2025-05-01 NOTE — PROGRESS NOTES
Labs noted.  The B12 level is low.  Folic acid is normal.  RPR is pending.  TSH is elevated at 14.3.  D-dimer was positive.  Plan check a lower extremity venous ultrasound and CTA of the chest.  Check intrinsic factor and start vitamin B12.  Check free T4 and total and free T3.

## 2025-05-01 NOTE — PLAN OF CARE
Problem: Adult Inpatient Plan of Care  Goal: Plan of Care Review  Outcome: Progressing  Flowsheets  Taken 5/1/2025 0039 by Jimy Chou RN  Outcome Evaluation: ciwa scoring, last drink 4/29  Taken 4/30/2025 1829 by Addie Vallejo RN  Progress: no change  Plan of Care Reviewed With: patient     Problem: Adult Inpatient Plan of Care  Goal: Absence of Hospital-Acquired Illness or Injury  Intervention: Prevent Skin Injury  Recent Flowsheet Documentation  Taken 4/30/2025 2341 by Jimy Chou RN  Body Position:   position changed independently   supine  Taken 4/30/2025 2035 by Jimy Chou RN  Body Position: supine     Problem: Alcohol Withdrawal  Goal: Alcohol Withdrawal Symptom Control  Outcome: Progressing   Goal Outcome Evaluation:              Outcome Evaluation: ciwa scoring, last drink 4/29

## 2025-05-01 NOTE — PLAN OF CARE
Goal Outcome Evaluation:  Plan of Care Reviewed With: patient           Outcome Evaluation: Pt admitted with chest pain. H/o etoh abuse, ciwa scoring. Pt reports living home alone, uses RW rarely inside his home but sometimes to get the mail [place].  Denies recent falls.  Today pt able to stand up w/ SBA using RW.  Ambulated 175' w/ RW and CGA, few cues for posture.  Recommend sittingin chair for meals and ambulating in room/sanders w/ staff.  Anticipate DC to home with use of his RW.    Anticipated Discharge Disposition (PT): home with assist

## 2025-05-02 ENCOUNTER — READMISSION MANAGEMENT (OUTPATIENT)
Dept: CALL CENTER | Facility: HOSPITAL | Age: 70
End: 2025-05-02
Payer: MEDICAID

## 2025-05-02 ENCOUNTER — APPOINTMENT (OUTPATIENT)
Dept: CARDIOLOGY | Facility: HOSPITAL | Age: 70
End: 2025-05-02
Payer: MEDICARE

## 2025-05-02 VITALS
HEIGHT: 72 IN | HEART RATE: 69 BPM | WEIGHT: 204 LBS | BODY MASS INDEX: 27.63 KG/M2 | SYSTOLIC BLOOD PRESSURE: 109 MMHG | TEMPERATURE: 99 F | RESPIRATION RATE: 18 BRPM | DIASTOLIC BLOOD PRESSURE: 64 MMHG | OXYGEN SATURATION: 97 %

## 2025-05-02 PROBLEM — E53.8 VITAMIN B12 DEFICIENCY: Status: ACTIVE | Noted: 2025-05-02

## 2025-05-02 PROBLEM — G62.9 PERIPHERAL NEUROPATHY: Status: ACTIVE | Noted: 2025-05-02

## 2025-05-02 PROBLEM — R07.9 CHEST PAIN: Status: RESOLVED | Noted: 2025-04-30 | Resolved: 2025-05-02

## 2025-05-02 LAB
ALBUMIN SERPL-MCNC: 4.1 G/DL (ref 3.5–5.2)
ALBUMIN/GLOB SERPL: 1.7 G/DL
ALP SERPL-CCNC: 70 U/L (ref 39–117)
ALT SERPL W P-5'-P-CCNC: 17 U/L (ref 1–41)
ANION GAP SERPL CALCULATED.3IONS-SCNC: 12 MMOL/L (ref 5–15)
AORTIC DIMENSIONLESS INDEX: 0.71 (DI)
ASCENDING AORTA: 3.5 CM
AST SERPL-CCNC: 44 U/L (ref 1–40)
AV MEAN PRESS GRAD SYS DOP V1V2: 4.6 MMHG
AV VMAX SYS DOP: 143.7 CM/SEC
BASOPHILS # BLD AUTO: 0.04 10*3/MM3 (ref 0–0.2)
BASOPHILS NFR BLD AUTO: 0.6 % (ref 0–1.5)
BH CV ECHO MEAS - AO MAX PG: 8.3 MMHG
BH CV ECHO MEAS - AO ROOT DIAM: 3.6 CM
BH CV ECHO MEAS - AO V2 VTI: 29.2 CM
BH CV ECHO MEAS - AVA(I,D): 2.45 CM2
BH CV ECHO MEAS - EDV(CUBED): 99.2 ML
BH CV ECHO MEAS - EDV(MOD-SP2): 97 ML
BH CV ECHO MEAS - EDV(MOD-SP4): 100 ML
BH CV ECHO MEAS - EF(MOD-SP2): 74.2 %
BH CV ECHO MEAS - EF(MOD-SP4): 55 %
BH CV ECHO MEAS - ESV(CUBED): 29.7 ML
BH CV ECHO MEAS - ESV(MOD-SP2): 25 ML
BH CV ECHO MEAS - ESV(MOD-SP4): 45 ML
BH CV ECHO MEAS - FS: 33.1 %
BH CV ECHO MEAS - IVS/LVPW: 0.9 CM
BH CV ECHO MEAS - IVSD: 0.97 CM
BH CV ECHO MEAS - LAT PEAK E' VEL: 6 CM/SEC
BH CV ECHO MEAS - LV DIASTOLIC VOL/BSA (35-75): 46.5 CM2
BH CV ECHO MEAS - LV MASS(C)D: 165.5 GRAMS
BH CV ECHO MEAS - LV MAX PG: 2.9 MMHG
BH CV ECHO MEAS - LV MEAN PG: 1.39 MMHG
BH CV ECHO MEAS - LV SYSTOLIC VOL/BSA (12-30): 20.9 CM2
BH CV ECHO MEAS - LV V1 MAX: 85 CM/SEC
BH CV ECHO MEAS - LV V1 VTI: 20.7 CM
BH CV ECHO MEAS - LVIDD: 4.6 CM
BH CV ECHO MEAS - LVIDS: 3.1 CM
BH CV ECHO MEAS - LVOT AREA: 3.5 CM2
BH CV ECHO MEAS - LVOT DIAM: 2.1 CM
BH CV ECHO MEAS - LVPWD: 1.08 CM
BH CV ECHO MEAS - MED PEAK E' VEL: 5.5 CM/SEC
BH CV ECHO MEAS - MV A DUR: 0.15 SEC
BH CV ECHO MEAS - MV A MAX VEL: 101.5 CM/SEC
BH CV ECHO MEAS - MV DEC SLOPE: 173.3 CM/SEC2
BH CV ECHO MEAS - MV DEC TIME: 0.39 SEC
BH CV ECHO MEAS - MV E MAX VEL: 59.8 CM/SEC
BH CV ECHO MEAS - MV E/A: 0.59
BH CV ECHO MEAS - MV MAX PG: 4.8 MMHG
BH CV ECHO MEAS - MV MEAN PG: 1.17 MMHG
BH CV ECHO MEAS - MV P1/2T: 103.6 MSEC
BH CV ECHO MEAS - MV V2 VTI: 26.6 CM
BH CV ECHO MEAS - MVA(P1/2T): 2.12 CM2
BH CV ECHO MEAS - MVA(VTI): 2.7 CM2
BH CV ECHO MEAS - PA ACC TIME: 0.11 SEC
BH CV ECHO MEAS - PULM A REVS DUR: 0.12 SEC
BH CV ECHO MEAS - PULM A REVS VEL: 22.2 CM/SEC
BH CV ECHO MEAS - PULM DIAS VEL: 24.6 CM/SEC
BH CV ECHO MEAS - PULM S/D: 2.6
BH CV ECHO MEAS - PULM SYS VEL: 65.2 CM/SEC
BH CV ECHO MEAS - RV MAX PG: 1.64 MMHG
BH CV ECHO MEAS - RV V1 MAX: 63.9 CM/SEC
BH CV ECHO MEAS - RV V1 VTI: 13 CM
BH CV ECHO MEAS - SV(LVOT): 71.7 ML
BH CV ECHO MEAS - SV(MOD-SP2): 72 ML
BH CV ECHO MEAS - SV(MOD-SP4): 55 ML
BH CV ECHO MEAS - SVI(LVOT): 33.4 ML/M2
BH CV ECHO MEAS - SVI(MOD-SP2): 33.5 ML/M2
BH CV ECHO MEAS - SVI(MOD-SP4): 25.6 ML/M2
BH CV ECHO MEAS - TAPSE (>1.6): 1.43 CM
BH CV ECHO MEASUREMENTS AVERAGE E/E' RATIO: 10.4
BH CV XLRA - RV BASE: 2.7 CM
BH CV XLRA - RV LENGTH: 6 CM
BH CV XLRA - RV MID: 2.8 CM
BH CV XLRA - TDI S': 11.1 CM/SEC
BILIRUB SERPL-MCNC: 0.8 MG/DL (ref 0–1.2)
BUN SERPL-MCNC: 9 MG/DL (ref 8–23)
BUN/CREAT SERPL: 9 (ref 7–25)
CALCIUM SPEC-SCNC: 8.8 MG/DL (ref 8.6–10.5)
CHLORIDE SERPL-SCNC: 103 MMOL/L (ref 98–107)
CHOLEST SERPL-MCNC: 299 MG/DL (ref 0–200)
CO2 SERPL-SCNC: 22 MMOL/L (ref 22–29)
CREAT SERPL-MCNC: 1 MG/DL (ref 0.76–1.27)
DEPRECATED RDW RBC AUTO: 44.8 FL (ref 37–54)
EGFRCR SERPLBLD CKD-EPI 2021: 82 ML/MIN/1.73
EOSINOPHIL # BLD AUTO: 0.16 10*3/MM3 (ref 0–0.4)
EOSINOPHIL NFR BLD AUTO: 2.4 % (ref 0.3–6.2)
ERYTHROCYTE [DISTWIDTH] IN BLOOD BY AUTOMATED COUNT: 14.7 % (ref 12.3–15.4)
GLOBULIN UR ELPH-MCNC: 2.4 GM/DL
GLUCOSE SERPL-MCNC: 98 MG/DL (ref 65–99)
HCT VFR BLD AUTO: 42.1 % (ref 37.5–51)
HDLC SERPL-MCNC: 52 MG/DL (ref 40–60)
HGB BLD-MCNC: 13.4 G/DL (ref 13–17.7)
IMM GRANULOCYTES # BLD AUTO: 0.03 10*3/MM3 (ref 0–0.05)
IMM GRANULOCYTES NFR BLD AUTO: 0.5 % (ref 0–0.5)
LDLC SERPL CALC-MCNC: 225 MG/DL (ref 0–100)
LDLC/HDLC SERPL: 4.28 {RATIO}
LEFT ATRIUM VOLUME INDEX: 20.1 ML/M2
LV EF BIPLANE MOD: 66.7 %
LYMPHOCYTES # BLD AUTO: 1.49 10*3/MM3 (ref 0.7–3.1)
LYMPHOCYTES NFR BLD AUTO: 22.7 % (ref 19.6–45.3)
MCH RBC QN AUTO: 27 PG (ref 26.6–33)
MCHC RBC AUTO-ENTMCNC: 31.8 G/DL (ref 31.5–35.7)
MCV RBC AUTO: 84.9 FL (ref 79–97)
MONOCYTES # BLD AUTO: 0.45 10*3/MM3 (ref 0.1–0.9)
MONOCYTES NFR BLD AUTO: 6.9 % (ref 5–12)
NEUTROPHILS NFR BLD AUTO: 4.39 10*3/MM3 (ref 1.7–7)
NEUTROPHILS NFR BLD AUTO: 66.9 % (ref 42.7–76)
NRBC BLD AUTO-RTO: 0 /100 WBC (ref 0–0.2)
PLATELET # BLD AUTO: 170 10*3/MM3 (ref 140–450)
PMV BLD AUTO: 9.3 FL (ref 6–12)
POTASSIUM SERPL-SCNC: 3.6 MMOL/L (ref 3.5–5.2)
PROT SERPL-MCNC: 6.5 G/DL (ref 6–8.5)
RBC # BLD AUTO: 4.96 10*6/MM3 (ref 4.14–5.8)
SINUS: 4 CM
SODIUM SERPL-SCNC: 137 MMOL/L (ref 136–145)
STJ: 3.9 CM
T3 SERPL-MCNC: 121 NG/DL (ref 71–180)
T3FREE SERPL-MCNC: 3.1 PG/ML (ref 2–4.4)
TRIGL SERPL-MCNC: 123 MG/DL (ref 0–150)
VLDLC SERPL-MCNC: 22 MG/DL (ref 5–40)
WBC NRBC COR # BLD AUTO: 6.56 10*3/MM3 (ref 3.4–10.8)

## 2025-05-02 PROCEDURE — 97165 OT EVAL LOW COMPLEX 30 MIN: CPT

## 2025-05-02 PROCEDURE — 85025 COMPLETE CBC W/AUTO DIFF WBC: CPT | Performed by: INTERNAL MEDICINE

## 2025-05-02 PROCEDURE — 80053 COMPREHEN METABOLIC PANEL: CPT | Performed by: INTERNAL MEDICINE

## 2025-05-02 PROCEDURE — 25010000002 CYANOCOBALAMIN PER 1000 MCG: Performed by: INTERNAL MEDICINE

## 2025-05-02 PROCEDURE — 80061 LIPID PANEL: CPT | Performed by: INTERNAL MEDICINE

## 2025-05-02 PROCEDURE — 93306 TTE W/DOPPLER COMPLETE: CPT | Performed by: INTERNAL MEDICINE

## 2025-05-02 PROCEDURE — 99232 SBSQ HOSP IP/OBS MODERATE 35: CPT | Performed by: NURSE PRACTITIONER

## 2025-05-02 PROCEDURE — 93306 TTE W/DOPPLER COMPLETE: CPT

## 2025-05-02 PROCEDURE — 25010000002 THIAMINE PER 100 MG: Performed by: INTERNAL MEDICINE

## 2025-05-02 RX ORDER — GABAPENTIN 300 MG/1
300 CAPSULE ORAL EVERY 12 HOURS SCHEDULED
Qty: 12 CAPSULE | Refills: 0 | Status: SHIPPED | OUTPATIENT
Start: 2025-05-02

## 2025-05-02 RX ORDER — ATORVASTATIN CALCIUM 20 MG/1
40 TABLET, FILM COATED ORAL NIGHTLY
Status: DISCONTINUED | OUTPATIENT
Start: 2025-05-02 | End: 2025-05-02 | Stop reason: HOSPADM

## 2025-05-02 RX ORDER — LEVOTHYROXINE SODIUM 25 UG/1
25 TABLET ORAL
Status: DISCONTINUED | OUTPATIENT
Start: 2025-05-02 | End: 2025-05-02 | Stop reason: HOSPADM

## 2025-05-02 RX ORDER — LORAZEPAM 1 MG/1
1 TABLET ORAL EVERY 8 HOURS PRN
Qty: 10 TABLET | Refills: 0 | Status: SHIPPED | OUTPATIENT
Start: 2025-05-02 | End: 2025-05-02

## 2025-05-02 RX ORDER — ATORVASTATIN CALCIUM 40 MG/1
40 TABLET, FILM COATED ORAL NIGHTLY
Qty: 90 TABLET | Refills: 3 | Status: SHIPPED | OUTPATIENT
Start: 2025-05-02 | End: 2025-05-02

## 2025-05-02 RX ORDER — LANOLIN ALCOHOL/MO/W.PET/CERES
100 CREAM (GRAM) TOPICAL DAILY
Qty: 30 TABLET | Refills: 3 | Status: SHIPPED | OUTPATIENT
Start: 2025-05-02

## 2025-05-02 RX ORDER — FOLIC ACID 1 MG/1
1 TABLET ORAL DAILY
Qty: 30 TABLET | Refills: 3 | Status: SHIPPED | OUTPATIENT
Start: 2025-05-02

## 2025-05-02 RX ORDER — CYANOCOBALAMIN 1000 UG/ML
1000 INJECTION, SOLUTION INTRAMUSCULAR; SUBCUTANEOUS WEEKLY
Qty: 60 ML | Refills: 3 | Status: SHIPPED | OUTPATIENT
Start: 2025-05-02

## 2025-05-02 RX ORDER — PAROXETINE 20 MG/1
20 TABLET, FILM COATED ORAL DAILY
Status: DISCONTINUED | OUTPATIENT
Start: 2025-05-02 | End: 2025-05-02 | Stop reason: HOSPADM

## 2025-05-02 RX ORDER — LEVOTHYROXINE SODIUM 25 UG/1
25 TABLET ORAL
Qty: 30 TABLET | Refills: 3 | Status: SHIPPED | OUTPATIENT
Start: 2025-05-02

## 2025-05-02 RX ORDER — PANTOPRAZOLE SODIUM 40 MG/1
40 TABLET, DELAYED RELEASE ORAL
Status: DISCONTINUED | OUTPATIENT
Start: 2025-05-02 | End: 2025-05-02 | Stop reason: HOSPADM

## 2025-05-02 RX ORDER — FOLIC ACID 1 MG/1
1 TABLET ORAL DAILY
Qty: 30 TABLET | Refills: 3 | Status: SHIPPED | OUTPATIENT
Start: 2025-05-02 | End: 2025-05-02

## 2025-05-02 RX ORDER — LANOLIN ALCOHOL/MO/W.PET/CERES
100 CREAM (GRAM) TOPICAL DAILY
Qty: 30 TABLET | Refills: 3 | Status: SHIPPED | OUTPATIENT
Start: 2025-05-02 | End: 2025-05-02

## 2025-05-02 RX ORDER — CYANOCOBALAMIN 1000 UG/ML
1000 INJECTION, SOLUTION INTRAMUSCULAR; SUBCUTANEOUS WEEKLY
Qty: 60 ML | Refills: 3 | Status: SHIPPED | OUTPATIENT
Start: 2025-05-02 | End: 2025-05-02

## 2025-05-02 RX ORDER — PAROXETINE 20 MG/1
20 TABLET, FILM COATED ORAL DAILY
Qty: 30 TABLET | Refills: 3 | Status: SHIPPED | OUTPATIENT
Start: 2025-05-02

## 2025-05-02 RX ORDER — PAROXETINE 20 MG/1
20 TABLET, FILM COATED ORAL DAILY
Qty: 30 TABLET | Refills: 3 | Status: SHIPPED | OUTPATIENT
Start: 2025-05-02 | End: 2025-05-02

## 2025-05-02 RX ORDER — ATORVASTATIN CALCIUM 40 MG/1
40 TABLET, FILM COATED ORAL NIGHTLY
Qty: 90 TABLET | Refills: 3 | Status: SHIPPED | OUTPATIENT
Start: 2025-05-02

## 2025-05-02 RX ORDER — LORAZEPAM 1 MG/1
1 TABLET ORAL EVERY 8 HOURS PRN
Qty: 10 TABLET | Refills: 0 | Status: SHIPPED | OUTPATIENT
Start: 2025-05-02 | End: 2025-05-05

## 2025-05-02 RX ORDER — LEVOTHYROXINE SODIUM 25 UG/1
25 TABLET ORAL
Qty: 30 TABLET | Refills: 3 | Status: SHIPPED | OUTPATIENT
Start: 2025-05-02 | End: 2025-05-02

## 2025-05-02 RX ADMIN — FOLIC ACID 1 MG: 1 TABLET ORAL at 09:00

## 2025-05-02 RX ADMIN — PAROXETINE HYDROCHLORIDE HEMIHYDRATE 20 MG: 20 TABLET, FILM COATED ORAL at 09:00

## 2025-05-02 RX ADMIN — METOPROLOL TARTRATE 25 MG: 25 TABLET, FILM COATED ORAL at 09:00

## 2025-05-02 RX ADMIN — Medication 100 MG: at 10:05

## 2025-05-02 RX ADMIN — THIAMINE HYDROCHLORIDE 200 MG: 100 INJECTION, SOLUTION INTRAMUSCULAR; INTRAVENOUS at 04:35

## 2025-05-02 RX ADMIN — CYANOCOBALAMIN 1000 MCG: 1000 INJECTION, SOLUTION INTRAMUSCULAR; SUBCUTANEOUS at 09:00

## 2025-05-02 RX ADMIN — PANTOPRAZOLE SODIUM 40 MG: 40 INJECTION, POWDER, FOR SOLUTION INTRAVENOUS at 09:00

## 2025-05-02 RX ADMIN — LISINOPRIL 20 MG: 20 TABLET ORAL at 09:00

## 2025-05-02 RX ADMIN — PANTOPRAZOLE SODIUM 40 MG: 40 TABLET, DELAYED RELEASE ORAL at 10:05

## 2025-05-02 RX ADMIN — MAGNESIUM OXIDE TAB 400 MG (240 MG ELEMENTAL MG) 400 MG: 400 (240 MG) TAB at 09:00

## 2025-05-02 RX ADMIN — GABAPENTIN 300 MG: 300 CAPSULE ORAL at 09:00

## 2025-05-02 RX ADMIN — LORAZEPAM 2 MG: 1 TABLET ORAL at 09:01

## 2025-05-02 NOTE — THERAPY EVALUATION
Patient Name: Pro Mueller  : 1955    MRN: [id number]                              Today's Date: 2025       Admit Date: 2025    Visit Dx:     ICD-10-CM ICD-9-CM   1. Chest pain, unspecified type  R07.9 786.50   2. Alcohol withdrawal syndrome with complication  F10.939 291.81   3. Peripheral polyneuropathy  G62.9 356.9   4. Alcohol withdrawal syndrome without complication  F10.930 291.81     Patient Active Problem List   Diagnosis    Fall    Alcoholism in recovery    Atopic rhinitis    Mixed anxiety depressive disorder    Genital herpes simplex    Hyperlipidemia    Hypertension    Hypothyroidism (acquired)    Insomnia    Panic disorder without agoraphobia    Persistent insomnia    Vitamin D deficiency    Chronic low back pain    Neuropathy involving both lower extremities    Abnormal EKG    Left shoulder pain    Nausea and vomiting    Pancytopenia    Nephrolithiasis    Left ventricular diastolic dysfunction    Tremor    C5 cervical fracture    Syncope and collapse    Neck pain    Alcoholic intoxication with complication    Withdrawal symptoms, alcohol    Transaminitis    Lumbar facet arthropathy    Alcohol dependence    Midline low back pain with right-sided sciatica    Spondylolisthesis at L4-L5 level    Lumbar canal stenosis    Alcohol cessation counseling    Need for assistance due to reduced mobility    Impaired mobility and ADLs    Alcohol withdrawal syndrome without complication    Facial laceration    Orthostatic hypotension    Acute bacterial conjunctivitis of right eye    Visit for suture removal    Renal calculi    Hepatic steatosis    Alcohol withdrawal syndrome with complication    Macrocytosis without anemia    Lumbosacral spondylosis without myelopathy    Elevated LFTs    Alcohol-induced acute pancreatitis, unspecified complication status    Pancreatitis    Generalized abdominal pain    Alcohol dependence with withdrawal    Acute alcohol intoxication in patient with alcoholism with  blood alcohol level 0.08 to 0.29, with unspecified complication    Calculus of gallbladder with cholecystitis without biliary obstruction    Alcoholic liver disease    Iron deficiency anemia    Folate deficiency anemia    PVC's (premature ventricular contractions)    Weakness    DDD (degenerative disc disease), lumbar    Alcohol abuse    Peptic ulcer disease    Mood disorder    Anemia, chronic disease    Peripheral neuropathy    Vitamin B12 deficiency     Past Medical History:   Diagnosis Date    Alcohol abuse     Alcohol withdrawal 11/04/2016    Alcoholic ketoacidosis 01/12/2020    Allergic 1970    Anxiety     Arthritis     Atopic rhinitis 08/08/2016    Depression     Disease of thyroid gland     Elevated cholesterol     Encounter for removal of sutures     Genital herpes simplex 08/08/2016    GERD (gastroesophageal reflux disease)     Headache, tension-type     Hyperlipidemia     Hypertension     Hypothyroidism     Kidney stone     Low back pain 2019    Migraine     Motion sickness     Nephrolithiasis 02/12/2020    Olecranon bursitis, right elbow     Panic disorder without agoraphobia 08/08/2016    Peripheral neuropathy     Sleep apnea     Syncope and collapse 01/02/2019    Vitamin D deficiency 08/08/2016    Withdrawal symptoms, alcohol      Past Surgical History:   Procedure Laterality Date    CHOLECYSTECTOMY N/A 9/22/2024    Procedure: CHOLECYSTECTOMY LAPAROSCOPIC WITH DAVINCI ROBOT with cholangiogram, possible open;  Surgeon: Toro Noguera MD;  Location: [facility] MAIN OR;  Service: General;  Laterality: N/A;    COLONOSCOPY      CYST REMOVAL      CYSTOSCOPY BLADDER STONE LITHOTRIPSY  02/2022    ERCP N/A 9/23/2024    Procedure: ENDOSCOPIC RETROGRADE CHOLANGIOPANCREATOGRAPHY sphincterotomy, balloon sweep (9-12);  Surgeon: Fara Madrigal MD;  Location: [facility] ENDOSCOPY;  Service: Gastroenterology;  Laterality: N/A;  sphincterotomy hiatial hernia, gallstone removal    EXTRACORPOREAL SHOCK WAVE LITHOTRIPSY (ESWL)  Right 2002    SHOULDER ARTHROSCOPY Right 12/17/2019    Procedure: SHOULDER ARTHROSCOPY, decompression, distal clavicle excision;  Surgeon: Aj Mancilla MD;  Location: [facility] OR [facility];  Service: Orthopedics    TONSILLECTOMY        General Information       Row Name 05/02/25 0914          OT Time and Intention    Document Type discharge evaluation/summary  -     Mode of Treatment occupational therapy;individual therapy  -     Patient Effort good  -       Row Name 05/02/25 0914          General Information    Patient Profile Reviewed yes  -     Prior Level of Function independent:;ADL's  uses a walker at times, especially to get the mail  -     Existing Precautions/Restrictions no known precautions/restrictions  -       Row Name 05/02/25 0914          Occupational Profile    Environmental Supports and Barriers (Occupational Profile) Pt has a shower chair  -       Row Name 05/02/25 0914          Living Environment    Current Living Arrangements home  -     People in Home alone  -       Row Name 05/02/25 0914          Cognition    Orientation Status (Cognition) oriented x 4  -               User Key  (r) = Recorded By, (t) = Taken By, (c) = Cosigned By      Initials Name Provider Type     Regi Damico, OTR/L, CSRS Occupational Therapist                     Mobility/ADL's       Row Name 05/02/25 0915          Bed Mobility    Supine-Sit [place] (Bed Mobility) independent  -     Sit-Supine [place] (Bed Mobility) independent  -       Row Name 05/02/25 0915          Sit-Stand Transfer    Sit-Stand [place] (Transfers) independent  -       Row Name 05/02/25 0915          Functional Mobility    Functional Mobility- Ind. Level independent  -     Functional Mobility- Device walker, front-wheeled  -     Functional Mobility- Comment 2 laps around nsg unit (~400 ft) to assess household distances for ADLs.  -       Row Name 05/02/25 0915          Activities of Daily Living     BADL Assessment/Intervention lower body dressing;grooming;toileting  -[facility] Name 05/02/25 0915          Lower Body Dressing Assessment/Training    [place] Level (Lower Body Dressing) don;socks;independent  -[facility] Name 05/02/25 0915          Grooming Assessment/Training    [place] Level (Grooming) independent  -[facility] Name 05/02/25 0915          Toileting Assessment/Training    [place] Level (Toileting) independent  -               User Key  (r) = Recorded By, (t) = Taken By, (c) = Cosigned By      Initials Name Provider Type     Regi Damico, OTR/L, CSRS Occupational Therapist                   Obj/Interventions       [facility] Name 05/02/25 0915          Range of Motion Comprehensive    General Range of Motion bilateral upper extremity ROM WNL  -[facility] Name 05/02/25 0915          Strength Comprehensive (MMT)    General Manual Muscle Testing (MMT) Assessment no strength deficits identified  -SM       Row Name 05/02/25 0915          Motor Skills    Motor Skills functional endurance  -     Functional Endurance WFL  -SM       Row Name 05/02/25 0915          Balance    Comment, Balance independent  -               User Key  (r) = Recorded By, (t) = Taken By, (c) = Cosigned By      Initials Name Provider Type     Regi Damico, OTR/L, CSRS Occupational Therapist                   Goals/Plan    No documentation.                  Clinical Impression       [facility] Name 05/02/25 0916          Pain Assessment    Pretreatment Pain Rating 0/10 - no pain  -     Posttreatment Pain Rating 0/10 - no pain  -SM       Row Name 05/02/25 0916          Plan of Care Review    Plan of Care Reviewed With patient  -     Outcome Evaluation Pt is a 70 y.o male admitted to [facility] on 4/30 with chest pain, etoh abuse. Pt lives alone. Uses a walker intermittently. Today he is independent with ADLs and functional mobility. Pt is motivated to perform mobility and completed 2 laps in sanders along with  standing ADL in his room. He appears at his baseline. No further need for OT at this time.  -       Row Name 05/02/25 0916          Therapy Assessment/Plan (OT)    Therapy Frequency (OT) evaluation only  -       Row Name 05/02/25 0916          Therapy Plan Review/Discharge Plan (OT)    Anticipated Discharge Disposition (OT) home  -       Row Name 05/02/25 0916          Positioning and Restraints    Pre-Treatment Position in bed  -     Post Treatment Position bed  -     In Bed fowlers;call light within reach;encouraged to call for assist  -               User Key  (r) = Recorded By, (t) = Taken By, (c) = Cosigned By      Initials Name Provider Type    Regi Wilson OTR/L, NEETA Occupational Therapist                   Outcome Measures       Row Name 05/02/25 0917          How much help from another is currently needed...    Putting on and taking off regular lower body clothing? 4  -SM     Bathing (including washing, rinsing, and drying) 4  -SM     Toileting (which includes using toilet bed pan or urinal) 4  -SM     Putting on and taking off regular upper body clothing 4  -SM     Taking care of personal grooming (such as brushing teeth) 4  -SM     Eating meals 4  -SM     AM-PAC 6 Clicks Score (OT) 24  -       Row Name 05/02/25 0917          Functional Assessment    Outcome Measure Options AM-PAC 6 Clicks Daily Activity (OT)  -               User Key  (r) = Recorded By, (t) = Taken By, (c) = Cosigned By      Initials Name Provider Type    Regi Wilson OTR/L, CSRS Occupational Therapist                    Occupational Therapy Education       Title: PT OT SLP Therapies (In Progress)       Topic: Occupational Therapy (In Progress)       Point: ADL training (Done)       Learning Progress Summary            Patient Acceptance, E, VU by CHAI at 5/2/2025 0917    Comment: OT goals, POC, mobility recommendations while admitted                                      User Key       Initials Effective  Dates Name Provider Type Discipline     04/24/25 -  Regi Damico, OTR/L, CSRS Occupational Therapist OT                  OT Recommendation and Plan  Therapy Frequency (OT): evaluation only  Plan of Care Review  Plan of Care Reviewed With: patient  Outcome Evaluation: Pt is a 70 y.o male admitted to [facility] on 4/30 with chest pain, etoh abuse. Pt lives alone. Uses a walker intermittently. Today he is independent with ADLs and functional mobility. Pt is motivated to perform mobility and completed 2 laps in sanders along with standing ADL in his room. He appears at his baseline. No further need for OT at this time.     Time Calculation:   Evaluation Complexity (OT)  Review Occupational Profile/Medical/Therapy History Complexity: brief/low complexity  Assessment, Occupational Performance/Identification of Deficit Complexity: 1-3 performance deficits  Clinical Decision Making Complexity (OT): problem focused assessment/low complexity  Overall Complexity of Evaluation (OT): low complexity     Time Calculation- OT       Row Name 05/02/25 0918             Time Calculation- OT    OT Start Time 0806  -      OT Stop Time 0821  -      OT Time Calculation (min) 15 min  -      OT Received On 05/02/25  -         Untimed Charges    OT Eval/Re-eval Minutes 15  -SM         Total Minutes    Untimed Charges Total Minutes 15  -SM       Total Minutes 15  -SM                User Key  (r) = Recorded By, (t) = Taken By, (c) = Cosigned By      Initials Name Provider Type     Regi Damico, OTR/L, CSRS Occupational Therapist                  Therapy Charges for Today       Code Description Service Date Service Provider Modifiers Qty    37694360899 HC OT EVAL LOW COMPLEXITY 3 5/2/2025 Regi Damico OTR/LNEETA GO 1                 KUMAR Patterson/L, CSRS  5/2/2025

## 2025-05-02 NOTE — CASE MANAGEMENT/SOCIAL WORK
Case Management Discharge Note      Final Note: Home via [employer]. No additional CCP needs.         Selected Continued Care - Admitted Since 4/30/2025       Destination    No services have been selected for the patient.                Durable Medical Equipment    No services have been selected for the patient.                Dialysis/Infusion    No services have been selected for the patient.                Home Medical Care    No services have been selected for the patient.                Therapy    No services have been selected for the patient.                Community Resources    No services have been selected for the patient.                Community & DME    No services have been selected for the patient.                    Transportation Services  Private: Car  Taxi: [place]    Final Discharge Disposition Code: 01 - home or self-care

## 2025-05-02 NOTE — PROGRESS NOTES
[facility]  Cardiology Progress Note    Patient Identification:  Name: Pro Mueller  Age: 70 y.o.  Sex: male  :  1955  MRN: [id number]                 Follow Up / Chief Complaint: follow up for cp     Interval History: stress test yesterday was negative.       Subjective: Resting in bed. No cp or shob      Objective:    Past Medical History:  Past Medical History:   Diagnosis Date    Alcohol abuse     Alcohol withdrawal 2016    Alcoholic ketoacidosis 2020    Allergic 1970    Anxiety     Arthritis     Atopic rhinitis 2016    Depression     Disease of thyroid gland     Elevated cholesterol     Encounter for removal of sutures     Genital herpes simplex 2016    GERD (gastroesophageal reflux disease)     Headache, tension-type     Hyperlipidemia     Hypertension     Hypothyroidism     Kidney stone     Low back pain     Migraine     Motion sickness     Nephrolithiasis 2020    Olecranon bursitis, right elbow     Panic disorder without agoraphobia 2016    Peripheral neuropathy     Sleep apnea     Syncope and collapse 2019    Vitamin D deficiency 2016    Withdrawal symptoms, alcohol      Past Surgical History:  Past Surgical History:   Procedure Laterality Date    CHOLECYSTECTOMY N/A 2024    Procedure: CHOLECYSTECTOMY LAPAROSCOPIC WITH DAVINCI ROBOT with cholangiogram, possible open;  Surgeon: Toro Noguera MD;  Location: [facility] MAIN OR;  Service: General;  Laterality: N/A;    COLONOSCOPY      CYST REMOVAL      CYSTOSCOPY BLADDER STONE LITHOTRIPSY  2022    ERCP N/A 2024    Procedure: ENDOSCOPIC RETROGRADE CHOLANGIOPANCREATOGRAPHY sphincterotomy, balloon sweep ();  Surgeon: Fara Madrigal MD;  Location: [facility] ENDOSCOPY;  Service: Gastroenterology;  Laterality: N/A;  sphincterotomy hiatial hernia, gallstone removal    EXTRACORPOREAL SHOCK WAVE LITHOTRIPSY (ESWL) Right     SHOULDER ARTHROSCOPY Right 2019    Procedure:  SHOULDER ARTHROSCOPY, decompression, distal clavicle excision;  Surgeon: Aj Mancilla MD;  Location: [facility] [place] [facility];  Service: Orthopedics    TONSILLECTOMY          Social History:   Social History     Tobacco Use    Smoking status: Former     Current packs/day: 0.00     Average packs/day: 0.5 packs/day for 15.0 years (7.5 ttl pk-yrs)     Types: Cigarettes     Start date: 1990     Quit date: 2005     Years since quittin.3    Smokeless tobacco: Never    Tobacco comments:     caffeine - 3 cans of coke daily    Substance Use Topics    Alcohol use: Yes     Alcohol/week: 7.0 standard drinks of alcohol     Types: 7 Shots of liquor per week     Comment: .5 liter vodka      Family History:  Family History   Problem Relation Age of Onset    Alzheimer's disease Mother     Mental illness Mother     Pancreatic cancer Father     Hearing loss Father     Arthritis Maternal Grandmother     Malig Hyperthermia Neg Hx           Allergies:  Allergies   Allergen Reactions    Penicillins Anaphylaxis and Seizure     Seizure. childhood     Scheduled Meds:  atorvastatin, 40 mg, Nightly  cyanocobalamin, 1,000 mcg, Daily  folic acid, 1 mg, Daily  gabapentin, 300 mg, Q12H  levothyroxine, 25 mcg, Q AM  lisinopril, 20 mg, Q24H  magnesium oxide, 400 mg, Daily  metoprolol tartrate, 25 mg, Q12H  pantoprazole, 40 mg, BID AC  PARoxetine, 20 mg, Daily  thiamine, 100 mg, Daily            INTAKE AND OUTPUT:    Intake/Output Summary (Last 24 hours) at 2025 0989  Last data filed at 2025 0440  Gross per 24 hour   Intake 360 ml   Output 200 ml   Net 160 ml       Review of Systems:   GI: no n/v  Cardiac: no cp  Pulmonary: no shob    Constitutional:  Temp:  [97.2 °F (36.2 °C)-98.4 °F (36.9 °C)] 97.9 °F (36.6 °C)  Heart Rate:  [60-72] 60  Resp:  [18-20] 18  BP: (124-142)/() 124/75      Physical Exam:  General:  Alert, cooperative, appears in no acute distress  Respiratory: Clear to auscultation.  Normal respiratory effort and  rate.  Cardiovascular: S1S2 Regular rate and rhythm. No murmur, rub or gallop.   Gastrointestinal: soft, non tender. Bowel sounds present.   Extremities: [place] x4. No pretibial pitting edema. Adequate musculoskeletal strength.   Neuro: AAO x3 CN II-XII grossly intact        Cardiographics       ECG:     Echocardiogram:   5/2/25  Interpretation Summary         Left ventricular ejection fraction appears to be 66 - 70%.    Left ventricular diastolic function is consistent with (grade Ia w/high LAP) impaired relaxation.    There is calcification of the aortic valve.    Mild dilation of the aortic root is present. Mild dilation of the sinuses of Valsalva is present.        5/1/25  Interpretation Summary         Findings consistent with a normal ECG stress test.    Myocardial perfusion imaging indicates a normal myocardial perfusion study with no evidence of ischemia. Impressions are consistent with a low risk study.    Left ventricular ejection fraction is normal (Calculated EF = 70%).          Lab Review   Results from last 7 days   Lab Units 04/30/25  1549 04/30/25  1444   HSTROP T ng/L 9 11     Results from last 7 days   Lab Units 05/01/25  0349   MAGNESIUM mg/dL 1.7     Results from last 7 days   Lab Units 05/02/25  0336   SODIUM mmol/L 137   POTASSIUM mmol/L 3.6   BUN mg/dL 9   CREATININE mg/dL 1.00   CALCIUM mg/dL 8.8     @[id number]@  Results from last 7 days   Lab Units 05/02/25  0336 05/01/25  0349 04/30/25  1444   WBC 10*3/mm3 6.56 4.11 5.85   HEMOGLOBIN g/dL 13.4 12.2* 13.1   HEMATOCRIT % 42.1 36.8* 40.0   PLATELETS 10*3/mm3 170 141 164     Results from last 7 days   Lab Units 04/30/25  1444   INR  1.21*   APTT seconds 27.4         Assessment:  1.  Atypical chest pain  2.  Hypertension  3.  Hyperlipidemia  4.  Alcohol dependence      Plan:  -Blood pressure and heart rate stable.    -Stress test was a negative study.  Echo revealed EF 70% with LV function consistent with grade 1 with high LAP.    It is okay for  "him to be discharged from our perspective.  We will sign off at this time.  Please call with any concerns.  Our office will call with an appointment time.      )5/2/2025  BERONICA Diaz      EMR Dragon/Transcription:   \"Dictated utilizing Dragon dictation\".     "

## 2025-05-02 NOTE — DISCHARGE SUMMARY
Patient Name: Pro Mueller  : 1955  MRN: [id number]    Date of Admission: 2025  Date of Discharge:  2025  Primary Care Physician: Provider, No Known      Discharge Diagnoses     Active Hospital Problems    Diagnosis  POA    Peripheral neuropathy [G62.9]  Yes    Vitamin B12 deficiency [E53.8]  Yes    Alcohol abuse [F10.10]  Yes    Peptic ulcer disease [K27.9]  Yes    Mood disorder [F39]  Yes    Anemia, chronic disease [D63.8]  Yes    DDD (degenerative disc disease), lumbar [M51.369]  Yes    Alcoholic liver disease [K70.9]  Yes    Nephrolithiasis [N20.0]  Yes    Hyperlipidemia [E78.5]  Yes    Hypothyroidism (acquired) [E03.9]  Yes      Resolved Hospital Problems    Diagnosis Date Resolved POA    **Chest pain [R07.9] 2025 Yes    Alcohol withdrawal [F10.939] 2025 Yes        Hospital Course     Brief admission history and physical.  Please see the H&P for full details.  This is a pleasant 70 years old gentleman who has a past history of hypertension/diastolic dysfunction/orthostasis/alcohol abuse/alcoholic pancreatitis and alcoholic liver disease/mood disorder/degenerative disc disease of the lumbar spine/dyslipidemia who presents to the emergency department with retrosternal atypical chest pain.  Positive tremor otherwise no significant symptomatology.  His physical examination on admission included an afebrile patient with stable vital signs except a blood pressure of 177/85.  Positive upper extremity tremors.  Hospital course.  Initial ER evaluation included a CBC that was normal.  Ethanol level was negative.  PTT normal.  INR 1.2.  CMP normal except random blood sugar of 115, CO2 of 19.7, AST 57, anion gap 15.3.  Troponins x 3 was negative.  Chest x-ray with no acute disease and borderline cardiomegaly.  Magnesium was normal.  Breathing D-dimer was positive.  An EKG with old Q waves in the inferior leads but no acute ischemia.  Hospital course will be summarized in a problem-oriented  manner as below.  1.  Atypical chest pain in a patient with a history of PVCs/hypertension with orthostasis/diastolic dysfunction.  Patient chest pain is spontaneously resolved.  EKG had an old inferior Q waves and right bundle lexy block but no acute ischemia.  Troponins were negative.  Chest x-ray was without acute disease.  CTA of the chest revealed no PE with coronary calcification.  A 2D echo revealed a normal ejection fraction with a grade 1A diastolic dysfunction and mild dilatation of the aortic root and no significant valvular disease.  Cardiology was consulted and they performed a nuclear stress test that was essentially unremarkable.  Lower extremity venous ultrasound was negative for DVT.  He remained chest pain-free throughout the hospital stay.  Scheduled cardiology okayed the discharge.  Blood pressure was suboptimal on presentation and he was maintained on his lisinopril and a low-dose beta-blockers was added that led to a good blood pressure control by the time of discharge.  He will follow-up with cardiology as an outpatient.  2.  Alcohol abuse/alcohol withdrawal in a patient with a history of alcoholic pancreatitis and alcoholic liver disease with peptic ulcer disease.  He was placed on CIWA protocol and detoxification.  His withdrawal symptoms improved.  CIWA was between 3 and 6 at the time of discharge.  GI examination remained benign.  He was switched to IV Protonix from p.o. Pepcid during hospitalization.  At the time of discharge he was given a short course of Ativan with continuation of the thiamine and folic acid.  He was counseled against alcohol.  Access center followed up with the patient during his hospital stay.  Liver function test remained stable with benign GI examination during this admission.  He was switched back to p.o. Pepcid at the time of discharge.  3.  Mood disorder with anxiety and depression and panic disorder.  This remained stable during this admission while on  benzos.  At the time of discharge he was given a prescription for Paxil.  4.  History of hypothyroidism/subclinical hypothyroidism..  He was noticed not to be on any medications.  TSH was elevated to more than 10 but free T4 was normal and total and free T3 were normal.  He was started on low-dose Synthroid at the time of discharge.  5.  Dyslipidemia.  He was on no treatment at the time of presentation.  LDL was suboptimal at 224.  Lipitor was initiated at the time of discharge.  This needs to be followed up with primary MD.  6.  Nephrolithiasis.  Remained asymptomatic during this hospital stay.  7.  Chronic anemia/B12 deficiency anemia.  Hb remained stable between 11 and 13.  There was no active bleed.  Intrinsic factor was ordered and result is pending at the time of discharge.  He was started on IM B12 during this admission and at the time of discharge.  Follow-up as an outpatient with primary MD.  8.  Peripheral neuropathy.  Patient clinically with symptomatic peripheral neuropathy of the lower extremity.  This is mostly secondary to alcohol use/B12 deficiency/degenerative disc disease of the lumbar spine.  B12 supplementations were ordered.  Synthroid was started.  Neurontin was added and this has improved his symptomatology.      At the time of discharge he was chest pain-free and hemodynamically stable.  Cardiology okayed the discharge.  Follow-up with primary MD and cardiology.  Counseled against alcohol.    Consultants     Consult Orders (all) (From admission, onward)       Start     Ordered    05/01/25 1050  Inpatient Case Management  Consult  Once        Provider:  (Not yet assigned)    05/01/25 1050    05/01/25 0911  Inpatient Cardiology Consult  Once        Specialty:  Cardiology  Provider:  Saul Abebe MD    05/01/25 0912    04/30/25 1624  Inpatient consult to Access Center  Once        Provider:  Mariano Cheek III, MD    04/30/25 1623    04/30/25 1555  A (on-call  MD unless specified) Details  Once        Specialty:  Hospitalist  Provider:  Juanita Guevara MD    04/30/25 1558                  Procedures     Imaging Results (All)       Procedure Component Value Units Date/Time    CT Angiogram Chest [[id number]] Collected: 05/01/25 1452     Updated: 05/01/25 1509    Narrative:      CT ANGIOGRAM CHEST PULMONARY EMBOLISM     TECHNIQUE: Radiation dose reduction techniques were utilized, including  automated exposure control and exposure modulation based on body size. 2  mm images were obtained through the chest after the administration of IV  contrast. 3D reformat images were created. Multiple coronal, sagittal,  and 3-D reconstruction images were obtained.     HISTORY: Chest pain and positive D-dimer.     COMPARISON: CT chest 7/19/2023.           FINDINGS: Nondilated main pulmonary artery. Pulmonary arteries are  patent.     Heart is normal in size. Small volume pericardial fluid. Severe coronary  artery calcifications. Ectatic ascending thoracic aorta (39 mm). No  mediastinal or hilar lymphadenopathy. Decompressed esophagus.     Trachea and main bronchi are patent. No bronchiectasis or bronchial wall  thickening. No emphysematous changes. Right middle lobe, lingular and  bilateral lower lobe linear atelectasis/scarring. No pleural effusion.                Impression:      1. No evidence of pulmonary thromboembolism.  2. Severe coronary artery calcifications.        This report was finalized on 5/1/2025 3:06 PM by Dr. Gerber Archer M.D on Workstation: [id number]       XR Chest 1 View [[id number]] Collected: 04/30/25 1516     Updated: 04/30/25 1521    Narrative:      XR CHEST 1 VW-     HISTORY: Male who is 70 years-old, chest pain     TECHNIQUE: Frontal view of the chest     COMPARISON: 5/11/2024     FINDINGS: The heart size is borderline. Aorta is tortuous. Pulmonary  vasculature is unremarkable. No focal pulmonary consolidation, pleural  effusion, or pneumothorax. Right  hemidiaphragm is mildly elevated. No  acute osseous process.       Impression:      No focal pulmonary consolidation. Borderline heart size.  With tortuous aorta. Follow-up as clinically indicated.     This report was finalized on 4/30/2025 3:18 PM by Dr. Jarod Devries M.D on Workstation: [id number]               Pertinent Labs     Results from last 7 days   Lab Units 05/02/25  0336 05/01/25  0349 04/30/25  1444   WBC 10*3/mm3 6.56 4.11 5.85   HEMOGLOBIN g/dL 13.4 12.2* 13.1   PLATELETS 10*3/mm3 170 141 164     Results from last 7 days   Lab Units 05/02/25  0336 05/01/25  0349 04/30/25  1444   SODIUM mmol/L 137 139 140   POTASSIUM mmol/L 3.6 3.5 3.8   CHLORIDE mmol/L 103 105 105   CO2 mmol/L 22.0 22.5 19.7*   BUN mg/dL 9 8 7*   CREATININE mg/dL 1.00 0.72* 0.70*   GLUCOSE mg/dL 98 92 115*   Estimated Creatinine Clearance: 92.5 mL/min (by C-G formula based on SCr of 1 mg/dL).  Results from last 7 days   Lab Units 05/02/25  0336 04/30/25  1444   ALBUMIN g/dL 4.1 4.6   BILIRUBIN mg/dL 0.8 0.8   ALK PHOS U/L 70 84   AST (SGOT) U/L 44* 57*   ALT (SGPT) U/L 17 17     Results from last 7 days   Lab Units 05/02/25  0336 05/01/25  0349 04/30/25  1444   CALCIUM mg/dL 8.8 8.6 8.9   ALBUMIN g/dL 4.1  --  4.6   MAGNESIUM mg/dL  --  1.7  --        Results from last 7 days   Lab Units 05/01/25  0918 04/30/25  1549 04/30/25  1444   HSTROP T ng/L  --  9 11   D DIMER QUANT MCGFEU/mL 1.48*  --   --        Results from last 7 days   Lab Units 05/02/25  0336   CHOLESTEROL mg/dL 299*   TRIGLYCERIDES mg/dL 123   HDL CHOL mg/dL 52   LDL CHOL mg/dL 225*         Imaging Results (Last 24 Hours)       Procedure Component Value Units Date/Time    CT Angiogram Chest [[id number]] Collected: 05/01/25 1452     Updated: 05/01/25 1509    Narrative:      CT ANGIOGRAM CHEST PULMONARY EMBOLISM     TECHNIQUE: Radiation dose reduction techniques were utilized, including  automated exposure control and exposure modulation based on body size. 2  mm images  were obtained through the chest after the administration of IV  contrast. 3D reformat images were created. Multiple coronal, sagittal,  and 3-D reconstruction images were obtained.     HISTORY: Chest pain and positive D-dimer.     COMPARISON: CT chest 7/19/2023.           FINDINGS: Nondilated main pulmonary artery. Pulmonary arteries are  patent.     Heart is normal in size. Small volume pericardial fluid. Severe coronary  artery calcifications. Ectatic ascending thoracic aorta (39 mm). No  mediastinal or hilar lymphadenopathy. Decompressed esophagus.     Trachea and main bronchi are patent. No bronchiectasis or bronchial wall  thickening. No emphysematous changes. Right middle lobe, lingular and  bilateral lower lobe linear atelectasis/scarring. No pleural effusion.                Impression:      1. No evidence of pulmonary thromboembolism.  2. Severe coronary artery calcifications.        This report was finalized on 5/1/2025 3:06 PM by Dr. Gerber Archer M.D on Workstation: [id number]               Test Results Pending at Discharge     Pending Labs       Order Current Status    Intrinsic Factor Ab In process              Discharge Exam   Physical Exam  Vitals.  Temperature 97.9 a pulse of 60 respirate rate of 18 blood pressure 124/75.  General.  Middle-aged gentleman.  He is alert and oriented x 4.  In no apparent pain/distress/diaphoresis.  Normal mood and affect.  Eyes.  Pupils equal round and reactive.  Intact extraocular musculature.  No pallor or jaundice.  Oral cavity.  Moist mucous membrane with no tongue lesions or throat lesions  Neck.  Supple.  No JVD.  No lymphadenopathy or thyromegaly.  Cardiovascular.  Regular rate and rhythm with no gallops or murmurs.  Chest.  Clear to auscultation bilaterally with no added sounds  Abdomen.  Soft lax.  No tenderness.  No organomegaly.  No guarding or rebound  Extremities.  Trace bilateral ankle edema.  Intact distal pulses.  Wound on the dorsum of both feet.  No  clubbing or cyanosis.  Positive mild upper extremity tremors.  CNS.  No acute focal neurological deficits.  Discharge Details        Discharge Medications        New Medications        Instructions Start Date   atorvastatin 40 MG tablet  Commonly known as: LIPITOR   40 mg, Oral, Nightly      cyanocobalamin 1000 MCG/ML injection   1,000 mcg, Intramuscular, Weekly      folic acid 1 MG tablet  Commonly known as: FOLVITE   1 mg, Oral, Daily      gabapentin 300 MG capsule  Commonly known as: NEURONTIN   300 mg, Oral, Every 12 Hours Scheduled      levothyroxine 25 MCG tablet  Commonly known as: SYNTHROID, LEVOTHROID   25 mcg, Oral, Every Early Morning      LORazepam 1 MG tablet  Commonly known as: ATIVAN   1 mg, Oral, Every 8 Hours PRN      PARoxetine 20 MG tablet  Commonly known as: PAXIL   20 mg, Oral, Daily      thiamine 100 MG tablet  Commonly known as: VITAMIN B1   100 mg, Oral, Daily             Continue These Medications        Instructions Start Date   benazepril 20 MG tablet  Commonly known as: Lotensin   20 mg, Oral, Daily      famotidine 20 MG tablet  Commonly known as: PEPCID   20 mg, Oral, 2 Times Daily      magnesium oxide 400 MG tablet  Commonly known as: MAG-OX   400 mg, Oral, Daily      ondansetron ODT 4 MG disintegrating tablet  Commonly known as: ZOFRAN-ODT   4 mg, Translingual, Every 6 Hours PRN               Allergies   Allergen Reactions    Penicillins Anaphylaxis and Seizure     Seizure. childhood         Discharge Disposition:  Condition: Stable    Diet:   Diet Order   Procedures    Diet: Cardiac; Healthy Heart (2-3 Na+); Fluid Consistency: Thin (IDDSI 0)       Activity:   Activity Instructions       Activity as Tolerated              Counseling : Abstain from alcohol and follow-up with outpatient alcohol detoxification centers    CODE STATUS:    Code Status and Medical Interventions: CPR (Attempt to Resuscitate); Full   Ordered at: 04/30/25 1208     Code Status (Patient has no pulse and is not  breathing):    CPR (Attempt to Resuscitate)     Medical Interventions (Patient has pulse or is breathing):    Full       Future Appointments   Date Time Provider Department Center   5/15/2025  1:00 PM Thomas Escalante MD [facility]     Additional Instructions for the Follow-ups that You Need to Schedule       Call MD With Problems / Concerns   As directed      Instructions: Call MD or return to ER if headache/dizziness/loss of consciousness/focal neurological symptoms/seizures/mentation changes/recurrence of chest pain/shortness of breath/palpitation/ankle edema/nausea or vomiting    Order Comments: Instructions: Call MD or return to ER if headache/dizziness/loss of consciousness/focal neurological symptoms/seizures/mentation changes/recurrence of chest pain/shortness of breath/palpitation/ankle edema/nausea or vomiting         Discharge Follow-up with PCP   As directed       Currently Documented PCP:    Provider, No Known    PCP Phone Number:    [phone]     Follow Up Details: 1 week.  Atypical chest pain/hypertension/diastolic dysfunction/alcohol abuse/alcohol withdrawal/alcoholic liver disease/mood disorder/peripheral neuropathy/vitamin B12 deficiency/hypothyroidism/dyslipidemia/nephrolithiasis        Discharge Follow-up with Specified Provider: Cardiology; 2 Weeks   As directed      To: Cardiology   Follow Up: 2 Weeks   Follow Up Details: Chest pain/hypertension/diastolic dysfunction               Follow-up Information       Provider, No Known .    Why: 1 week.  Atypical chest pain/hypertension/diastolic dysfunction/alcohol abuse/alcohol withdrawal/alcoholic liver disease/mood disorder/peripheral neuropathy/vitamin B12 deficiency/hypothyroidism/dyslipidemia/nephrolithiasis  Contact information:  [place] [address]  [phone]               Thomas Escalante MD. Schedule an appointment as soon as possible for a visit in 2 week(s).    Specialty: Cardiology  Contact  information:  [address] [id number]  [phone]                               Time Spent on Discharge:  Greater than 30 minutes      Estrellita Bah MD  [facility]  05/02/25  10:44 EDT

## 2025-05-02 NOTE — PLAN OF CARE
Problem: Adult Inpatient Plan of Care  Goal: Plan of Care Review  Outcome: Progressing  Flowsheets  Taken 5/2/2025 0107 by Jimy Chou RN  Progress: improving  Outcome Evaluation: last ciwa was 2, will monitor, ao x4,  Taken 5/1/2025 1204 by Beata Martinez PT  Plan of Care Reviewed With: patient  Goal: Patient-Specific Goal (Individualized)  Outcome: Progressing  Goal: Absence of Hospital-Acquired Illness or Injury  Outcome: Progressing  Intervention: Identify and Manage Fall Risk  Recent Flowsheet Documentation  Taken 5/2/2025 0009 by Jimy Chou RN  Safety Promotion/Fall Prevention: safety round/check completed  Taken 5/1/2025 2000 by Jimy Chou RN  Safety Promotion/Fall Prevention: safety round/check completed  Intervention: Prevent Skin Injury  Recent Flowsheet Documentation  Taken 5/2/2025 0009 by Jimy Chou RN  Body Position:   position changed independently   supine  Taken 5/1/2025 2000 by Jimy Chou RN  Body Position:   supine   position changed independently  Intervention: Prevent and Manage VTE (Venous Thromboembolism) Risk  Recent Flowsheet Documentation  Taken 5/2/2025 0009 by Jimy Chou RN  VTE Prevention/Management:   SCDs (sequential compression devices) off   patient refused intervention  Taken 5/1/2025 2000 by Jimy Chou RN  VTE Prevention/Management:   SCDs (sequential compression devices) off   patient refused intervention  Intervention: Prevent Infection  Recent Flowsheet Documentation  Taken 5/1/2025 2000 by Jimy Chou RN  Infection Prevention: single patient room provided  Goal: Optimal Comfort and Wellbeing  Outcome: Progressing  Intervention: Provide Person-Centered Care  Recent Flowsheet Documentation  Taken 5/1/2025 2000 by Jimy Chou RN  Trust Relationship/Rapport:   care explained   emotional support provided   questions answered  Goal: Readiness for Transition of Care  Outcome: Progressing     Problem:  Skin Injury Risk Increased  Goal: Skin Health and Integrity  Outcome: Progressing  Intervention: Optimize Skin Protection  Recent Flowsheet Documentation  Taken 5/2/2025 0009 by Jimy Chou RN  Activity Management: activity encouraged  Head of Bed (HOB) Positioning: HOB at 30 degrees  Taken 5/1/2025 2000 by Jimy Chou RN  Activity Management: activity encouraged  Pressure Reduction Techniques: frequent weight shift encouraged  Head of Bed (HOB) Positioning: HOB at 30-45 degrees  Pressure Reduction Devices: pressure-redistributing mattress utilized     Problem: Alcohol Withdrawal  Goal: Alcohol Withdrawal Symptom Control  Outcome: Progressing  Goal: Optimal Neurologic Function  Outcome: Progressing  Goal: Readiness for Change Identified  Outcome: Progressing     Problem: Chest Pain  Goal: Resolution of Chest Pain Symptoms  Outcome: Progressing  Intervention: Manage Acute Chest Pain  Recent Flowsheet Documentation  Taken 5/1/2025 2000 by Jimy Chou RN  Chest Pain Intervention: (resolved) other (see comments)     Problem: Comorbidity Management  Goal: Blood Pressure in Desired Range  Outcome: Progressing  Intervention: Maintain Blood Pressure Management  Recent Flowsheet Documentation  Taken 5/2/2025 0009 by Jimy Chou RN  Medication Review/Management: medications reviewed  Taken 5/1/2025 2000 by Jimy Chou RN  Medication Review/Management: medications reviewed     Problem: Fall Injury Risk  Goal: Absence of Fall and Fall-Related Injury  Outcome: Progressing  Intervention: Identify and Manage Contributors  Recent Flowsheet Documentation  Taken 5/2/2025 0009 by Jimy Chou RN  Medication Review/Management: medications reviewed  Taken 5/1/2025 2000 by Jimy Chou RN  Medication Review/Management: medications reviewed  Intervention: Promote Injury-Free Environment  Recent Flowsheet Documentation  Taken 5/2/2025 0009 by Jimy Chou RN  Safety  Promotion/Fall Prevention: safety round/check completed  Taken 5/1/2025 2000 by Jimy Chou, RN  Safety Promotion/Fall Prevention: safety round/check completed   Goal Outcome Evaluation:           Progress: improving  Outcome Evaluation: last ciwa was 2, will monitor, ao x4,

## 2025-05-02 NOTE — CASE MANAGEMENT/SOCIAL WORK
Continued Stay Note  [facility]     Patient Name: Pro Mueller  MRN: [id number]  Today's Date: 5/2/2025    Admit Date: 4/30/2025    Plan: Home via Lyft   Discharge Plan       Row Name 05/02/25 1548       Plan    Plan Home via Lyft    Patient/Family in Agreement with Plan yes    Plan Comments RN notified CCP pt needs Lyft home. Lyft transport arranged. Danica KIDD/CCP    Final Discharge Disposition Code 01 - home or self-care    Final Note Home via Lyft. No additional CCP needs.      Row Name 05/02/25 1406       Plan    Final Discharge Disposition Code 01 - home or self-care    Final Note Home, no additional CCP needs.                   Discharge Codes    No documentation.                 Expected Discharge Date and Time       Expected Discharge Date Expected Discharge Time    May 2, 2025               Solaneg Llanes RN

## 2025-05-02 NOTE — PLAN OF CARE
Goal Outcome Evaluation:  Plan of Care Reviewed With: patient           Outcome Evaluation: Pt is a 70 y.o male admitted to [facility] on 4/30 with chest pain, etoh abuse. Pt lives alone. Uses a walker intermittently. Today he is independent with ADLs and functional mobility. Pt is motivated to perform mobility and completed 2 laps in sanders along with standing ADL in his room. He appears at his baseline. No further need for OT at this time.    Anticipated Discharge Disposition (OT): home

## 2025-05-02 NOTE — CASE MANAGEMENT/SOCIAL WORK
Case Management Discharge Note      Final Note: Home, no additional CCP needs.         Selected Continued Care - Admitted Since 4/30/2025       Destination    No services have been selected for the patient.                Durable Medical Equipment    No services have been selected for the patient.                Dialysis/Infusion    No services have been selected for the patient.                Home Medical Care    No services have been selected for the patient.                Therapy    No services have been selected for the patient.                Community Resources    No services have been selected for the patient.                Community & DME    No services have been selected for the patient.                    Transportation Services  Private: Car    Final Discharge Disposition Code: 01 - home or self-care

## 2025-05-03 LAB — IF BLOCK AB SER-ACNC: 17.2 AU/ML (ref 0–1.1)

## 2025-05-03 NOTE — OUTREACH NOTE
Prep Survey      Flowsheet Row Responses   [facility] patient discharged from? [place]   Is LACE score < 7 ? No   Eligibility Readm Mgmt   Discharge diagnosis Atypical Chest pain   Does the patient have one of the following disease processes/diagnoses(primary or secondary)? Other   Does the patient have Home health ordered? No   Is there a DME ordered? No   Prep survey completed? Yes            Sammie PAUL - Registered Nurse

## 2025-05-06 ENCOUNTER — READMISSION MANAGEMENT (OUTPATIENT)
Dept: CALL CENTER | Facility: HOSPITAL | Age: 70
End: 2025-05-06
Payer: MEDICAID

## 2025-05-06 NOTE — OUTREACH NOTE
Medical Week 1 Survey      Flowsheet Row Responses   [facility] patient discharged from? [place]   Does the patient have one of the following disease processes/diagnoses(primary or secondary)? Other   Week 1 attempt successful? No   Unsuccessful attempts Attempt 1            Cherelle Caldwell Registered Nurse

## 2025-05-08 ENCOUNTER — HOSPITAL ENCOUNTER (EMERGENCY)
Facility: HOSPITAL | Age: 70
Discharge: HOME | End: 2025-05-08
Attending: EMERGENCY MEDICINE
Payer: MEDICAID

## 2025-05-08 VITALS
RESPIRATION RATE: 16 BRPM | HEART RATE: 78 BPM | TEMPERATURE: 98.4 F | OXYGEN SATURATION: 95 % | DIASTOLIC BLOOD PRESSURE: 78 MMHG | SYSTOLIC BLOOD PRESSURE: 148 MMHG

## 2025-05-08 DIAGNOSIS — F10.930 ALCOHOL WITHDRAWAL SYNDROME WITHOUT COMPLICATION: Primary | ICD-10-CM

## 2025-05-08 DIAGNOSIS — F10.29 ALCOHOL DEPENDENCE WITH UNSPECIFIED ALCOHOL-INDUCED DISORDER: ICD-10-CM

## 2025-05-08 LAB
ALBUMIN SERPL-MCNC: 4.5 G/DL (ref 3.5–5.2)
ALBUMIN/GLOB SERPL: 1.5 G/DL
ALP SERPL-CCNC: 82 U/L (ref 39–117)
ALT SERPL W P-5'-P-CCNC: 26 U/L (ref 1–41)
ANION GAP SERPL CALCULATED.3IONS-SCNC: 16.6 MMOL/L (ref 5–15)
AST SERPL-CCNC: 75 U/L (ref 1–40)
BASOPHILS # BLD AUTO: 0.03 10*3/MM3 (ref 0–0.2)
BASOPHILS NFR BLD AUTO: 0.4 % (ref 0–1.5)
BILIRUB SERPL-MCNC: 1.3 MG/DL (ref 0–1.2)
BUN SERPL-MCNC: 10 MG/DL (ref 8–23)
BUN/CREAT SERPL: 13.3 (ref 7–25)
CALCIUM SPEC-SCNC: 8.8 MG/DL (ref 8.6–10.5)
CHLORIDE SERPL-SCNC: 100 MMOL/L (ref 98–107)
CO2 SERPL-SCNC: 19.4 MMOL/L (ref 22–29)
CREAT SERPL-MCNC: 0.75 MG/DL (ref 0.76–1.27)
DEPRECATED RDW RBC AUTO: 47.3 FL (ref 37–54)
EGFRCR SERPLBLD CKD-EPI 2021: 98.3 ML/MIN/1.73
EOSINOPHIL # BLD AUTO: 0.1 10*3/MM3 (ref 0–0.4)
EOSINOPHIL NFR BLD AUTO: 1.5 % (ref 0.3–6.2)
ERYTHROCYTE [DISTWIDTH] IN BLOOD BY AUTOMATED COUNT: 15.4 % (ref 12.3–15.4)
ETHANOL BLD-MCNC: <10 MG/DL (ref 0–10)
ETHANOL UR QL: <0.01 %
GLOBULIN UR ELPH-MCNC: 3.1 GM/DL
GLUCOSE SERPL-MCNC: 104 MG/DL (ref 65–99)
HCT VFR BLD AUTO: 40.9 % (ref 37.5–51)
HGB BLD-MCNC: 13.2 G/DL (ref 13–17.7)
IMM GRANULOCYTES # BLD AUTO: 0.02 10*3/MM3 (ref 0–0.05)
IMM GRANULOCYTES NFR BLD AUTO: 0.3 % (ref 0–0.5)
LIPASE SERPL-CCNC: 60 U/L (ref 13–60)
LYMPHOCYTES # BLD AUTO: 1.27 10*3/MM3 (ref 0.7–3.1)
LYMPHOCYTES NFR BLD AUTO: 18.8 % (ref 19.6–45.3)
MCH RBC QN AUTO: 27.4 PG (ref 26.6–33)
MCHC RBC AUTO-ENTMCNC: 32.3 G/DL (ref 31.5–35.7)
MCV RBC AUTO: 85 FL (ref 79–97)
MONOCYTES # BLD AUTO: 0.91 10*3/MM3 (ref 0.1–0.9)
MONOCYTES NFR BLD AUTO: 13.5 % (ref 5–12)
NEUTROPHILS NFR BLD AUTO: 4.42 10*3/MM3 (ref 1.7–7)
NEUTROPHILS NFR BLD AUTO: 65.5 % (ref 42.7–76)
NRBC BLD AUTO-RTO: 0 /100 WBC (ref 0–0.2)
PLATELET # BLD AUTO: 202 10*3/MM3 (ref 140–450)
PMV BLD AUTO: 8.5 FL (ref 6–12)
POTASSIUM SERPL-SCNC: 4.3 MMOL/L (ref 3.5–5.2)
PROT SERPL-MCNC: 7.6 G/DL (ref 6–8.5)
RBC # BLD AUTO: 4.81 10*6/MM3 (ref 4.14–5.8)
SODIUM SERPL-SCNC: 136 MMOL/L (ref 136–145)
WBC NRBC COR # BLD AUTO: 6.75 10*3/MM3 (ref 3.4–10.8)

## 2025-05-08 PROCEDURE — 96374 THER/PROPH/DIAG INJ IV PUSH: CPT

## 2025-05-08 PROCEDURE — 83690 ASSAY OF LIPASE: CPT | Performed by: EMERGENCY MEDICINE

## 2025-05-08 PROCEDURE — 25010000002 ONDANSETRON PER 1 MG: Performed by: PHYSICIAN ASSISTANT

## 2025-05-08 PROCEDURE — 25010000002 DIAZEPAM PER 5 MG: Performed by: EMERGENCY MEDICINE

## 2025-05-08 PROCEDURE — 96376 TX/PRO/DX INJ SAME DRUG ADON: CPT

## 2025-05-08 PROCEDURE — 82077 ASSAY SPEC XCP UR&BREATH IA: CPT | Performed by: EMERGENCY MEDICINE

## 2025-05-08 PROCEDURE — 25810000003 SODIUM CHLORIDE 0.9 % SOLUTION: Performed by: PHYSICIAN ASSISTANT

## 2025-05-08 PROCEDURE — 96361 HYDRATE IV INFUSION ADD-ON: CPT

## 2025-05-08 PROCEDURE — 85025 COMPLETE CBC W/AUTO DIFF WBC: CPT | Performed by: EMERGENCY MEDICINE

## 2025-05-08 PROCEDURE — 96375 TX/PRO/DX INJ NEW DRUG ADDON: CPT

## 2025-05-08 PROCEDURE — 99283 EMERGENCY DEPT VISIT LOW MDM: CPT

## 2025-05-08 PROCEDURE — 80053 COMPREHEN METABOLIC PANEL: CPT | Performed by: EMERGENCY MEDICINE

## 2025-05-08 RX ORDER — FAMOTIDINE 20 MG/1
20 TABLET, FILM COATED ORAL 2 TIMES DAILY
Qty: 20 TABLET | Refills: 0 | Status: SHIPPED | OUTPATIENT
Start: 2025-05-08

## 2025-05-08 RX ORDER — DIAZEPAM 10 MG/2ML
5 INJECTION, SOLUTION INTRAMUSCULAR; INTRAVENOUS ONCE
Status: COMPLETED | OUTPATIENT
Start: 2025-05-08 | End: 2025-05-08

## 2025-05-08 RX ORDER — ONDANSETRON 4 MG/1
4 TABLET, ORALLY DISINTEGRATING ORAL 4 TIMES DAILY PRN
Qty: 20 TABLET | Refills: 0 | Status: SHIPPED | OUTPATIENT
Start: 2025-05-08

## 2025-05-08 RX ORDER — ONDANSETRON 2 MG/ML
4 INJECTION INTRAMUSCULAR; INTRAVENOUS ONCE
Status: COMPLETED | OUTPATIENT
Start: 2025-05-08 | End: 2025-05-08

## 2025-05-08 RX ADMIN — DIAZEPAM 5 MG: 5 INJECTION INTRAMUSCULAR; INTRAVENOUS at 14:58

## 2025-05-08 RX ADMIN — DIAZEPAM 5 MG: 5 INJECTION INTRAMUSCULAR; INTRAVENOUS at 16:48

## 2025-05-08 RX ADMIN — ONDANSETRON 4 MG: 2 INJECTION, SOLUTION INTRAMUSCULAR; INTRAVENOUS at 15:55

## 2025-05-08 RX ADMIN — SODIUM CHLORIDE 1000 ML: 9 INJECTION, SOLUTION INTRAVENOUS at 15:53

## 2025-05-08 NOTE — ED PROVIDER NOTES
EMERGENCY DEPARTMENT ENCOUNTER      Room Number:  02/02  PCP: Provider, No Known  Independent Historians: Patient  Patient Care Team:  Provider, No Known as PCP - Say Roger MD (Psychiatry)       HPI:  Chief Complaint: Headache, vomiting    A complete HPI/ROS/PMH/PSH/SH/FH are unobtainable due to: None    Chronic or social conditions impacting patient care (Social Determinants of Health): None      Context: Pro Mueller is a 70 y.o. male with a PMH significant for alcoholism, panic disorder, depression, anxiety who presents to the ED c/o acute, nausea, vomiting since the early hours of this morning.  He reports multiple episodes of vomiting through the afternoon which finally brought him to the ER.  He is a chronic daily drinker of alcohol and reports that he drinks 4-5 martinis per day.  He has not had a drink of alcohol since yesterday evening.  He does report that his symptoms feel similar to previous alcohol withdrawal symptoms.  He is somewhat tremulous on exam but denies hallucinations, chills, abdominal pain.  He is not interested in alcohol cessation help.  No suicidal or homicidal ideation      Upon review of prior external notes (non-ED) -and- Review of prior external test results outside of this encounter it appears the patient was evaluated in the office with family medicine for colon cancer screening on 5/5/2023.  The patient had a normal troponin and UDS on 5/4/2025.      PAST MEDICAL HISTORY  Active Ambulatory Problems     Diagnosis Date Noted    Fall 08/08/2016    Alcoholism in recovery 08/08/2016    Atopic rhinitis 08/08/2016    Mixed anxiety depressive disorder 08/08/2016    Genital herpes simplex 08/08/2016    Hyperlipidemia 08/08/2016    Hypertension 08/08/2016    Hypothyroidism (acquired) 08/08/2016    Insomnia 08/08/2016    Panic disorder without agoraphobia 08/08/2016    Persistent insomnia 08/08/2016    Vitamin D deficiency 08/08/2016    Chronic low back pain 11/11/2016     Neuropathy involving both lower extremities 10/25/2018    Abnormal EKG 01/03/2019    Left shoulder pain 11/19/2019    Nausea and vomiting 01/11/2020    Pancytopenia 01/14/2020    Nephrolithiasis 02/12/2020    Left ventricular diastolic dysfunction 01/16/2021    Tremor 10/06/2021    C5 cervical fracture 11/09/2021    Syncope and collapse 01/07/2022    Neck pain 01/07/2022    Alcoholic intoxication with complication 03/13/2022    Withdrawal symptoms, alcohol 07/01/2022    Transaminitis 07/01/2022    Lumbar facet arthropathy 07/20/2022    Alcohol dependence 09/11/2022    Midline low back pain with right-sided sciatica 09/15/2022    Spondylolisthesis at L4-L5 level 09/15/2022    Lumbar canal stenosis 09/15/2022    Alcohol cessation counseling 09/15/2022    Need for assistance due to reduced mobility 09/15/2022    Impaired mobility and ADLs 09/15/2022    Alcohol withdrawal syndrome without complication 02/18/2023    Facial laceration 02/18/2023    Orthostatic hypotension 02/18/2023    Acute bacterial conjunctivitis of right eye 03/02/2023    Visit for suture removal 03/02/2023    Renal calculi 03/14/2023    Hepatic steatosis 03/15/2023    Alcohol withdrawal syndrome with complication 04/14/2023    Macrocytosis without anemia 04/14/2023    Lumbosacral spondylosis without myelopathy 05/05/2023    Elevated LFTs 05/13/2023    Alcohol-induced acute pancreatitis, unspecified complication status 06/23/2023    Pancreatitis 03/06/2024    Generalized abdominal pain 03/07/2024    Alcohol dependence with withdrawal 03/07/2024    Acute alcohol intoxication in patient with alcoholism with blood alcohol level 0.08 to 0.29, with unspecified complication 09/20/2024    Calculus of gallbladder with cholecystitis without biliary obstruction 09/20/2024    Alcoholic liver disease 12/10/2024    Iron deficiency anemia 12/10/2024    Folate deficiency anemia 12/10/2024    PVC's (premature ventricular contractions) 12/10/2024    Weakness  12/10/2024    DDD (degenerative disc disease), lumbar 01/29/2025    Alcohol abuse 05/01/2025    Peptic ulcer disease 05/01/2025    Mood disorder 05/01/2025    Anemia, chronic disease 05/01/2025    Peripheral neuropathy 05/02/2025    Vitamin B12 deficiency 05/02/2025     Resolved Ambulatory Problems     Diagnosis Date Noted    Impacted cerumen 08/08/2016    Influenza 08/08/2016    Motion sickness 08/08/2016    Seasonal allergic rhinitis 08/08/2016    Upper respiratory tract infection 08/08/2016    Alcohol withdrawal 11/04/2016    Headache 11/05/2016    Gastritis 11/05/2016    Olecranon bursitis of right elbow 04/05/2017    Sciatica of left side 06/12/2018    Syncope and collapse 01/02/2019    Alcoholic ketoacidosis 01/12/2020    Hyponatremia 01/13/2020    Hypokalemia 01/13/2020    Alcohol dependence with uncomplicated withdrawal 01/14/2020    Hypomagnesemia 01/16/2021    Non-traumatic rhabdomyolysis 01/18/2021    Urine retention 01/21/2021    Epigastric pain 06/23/2023    Dehydration 02/24/2024    Metabolic acidosis 09/20/2024    Hypothermia 12/02/2024    Chest pain 04/30/2025     Past Medical History:   Diagnosis Date    Allergic 1970    Anxiety     Arthritis     Depression     Disease of thyroid gland     Elevated cholesterol     Encounter for removal of sutures     GERD (gastroesophageal reflux disease)     Headache, tension-type     Hypothyroidism     Kidney stone     Low back pain 2019    Migraine     Olecranon bursitis, right elbow     Sleep apnea          PAST SURGICAL HISTORY  Past Surgical History:   Procedure Laterality Date    CHOLECYSTECTOMY N/A 9/22/2024    Procedure: CHOLECYSTECTOMY LAPAROSCOPIC WITH DAVINCI ROBOT with cholangiogram, possible open;  Surgeon: Toro Noguera MD;  Location: [facility] OR;  Service: General;  Laterality: N/A;    COLONOSCOPY      CYST REMOVAL      CYSTOSCOPY BLADDER STONE LITHOTRIPSY  02/2022    ERCP N/A 9/23/2024    Procedure: ENDOSCOPIC RETROGRADE  "CHOLANGIOPANCREATOGRAPHY sphincterotomy, balloon sweep (9-12);  Surgeon: Fara Madrigal MD;  Location: [facility] ENDOSCOPY;  Service: Gastroenterology;  Laterality: N/A;  sphincterotomy hiatial hernia, gallstone removal    EXTRACORPOREAL SHOCK WAVE LITHOTRIPSY (ESWL) Right 2002    SHOULDER ARTHROSCOPY Right 2019    Procedure: SHOULDER ARTHROSCOPY, decompression, distal clavicle excision;  Surgeon: Aj Mancilla MD;  Location: [facility] OR [facility];  Service: Orthopedics    TONSILLECTOMY           FAMILY HISTORY  Family History   Problem Relation Age of Onset    Alzheimer's disease Mother     Mental illness Mother     Pancreatic cancer Father     Hearing loss Father     Arthritis Maternal Grandmother     Malig Hyperthermia Neg Hx          SOCIAL HISTORY  Social History     Socioeconomic History    Marital status:    Tobacco Use    Smoking status: Former     Current packs/day: 0.00     Average packs/day: 0.5 packs/day for 15.0 years (7.5 ttl pk-yrs)     Types: Cigarettes     Start date: 1990     Quit date: 2005     Years since quittin.3    Smokeless tobacco: Never    Tobacco comments:     caffeine - 3 cans of coke daily    Vaping Use    Vaping status: Never Used   Substance and Sexual Activity    Alcohol use: Yes     Alcohol/week: 7.0 standard drinks of alcohol     Types: 7 Shots of liquor per week     Comment: .5 liter vodka    Drug use: Yes     Types: Methamphetamines     Comment: \"once in a rare while\"    Sexual activity: Yes     Partners: Female     Birth control/protection: Condom         ALLERGIES  Penicillins      REVIEW OF SYSTEMS  Included in HPI  All systems reviewed and negative except for those discussed in HPI.      PHYSICAL EXAM    I have reviewed the triage vital signs and nursing notes.    ED Triage Vitals [25 1430]   Temp Heart Rate Resp BP SpO2   98.2 °F (36.8 °C) 91 20 (!) 182/99 93 %      Temp src Heart Rate Source Patient Position BP Location FiO2 (%)   -- -- -- -- -- "       Physical Exam  Constitutional:       General: He is not in acute distress.     Appearance: He is well-developed.      Comments: Somewhat disheveled in appearance   HENT:      Head: Normocephalic and atraumatic.   Eyes:      General: No scleral icterus.     Conjunctiva/sclera: Conjunctivae normal.   Neck:      Trachea: No tracheal deviation.   Cardiovascular:      Rate and Rhythm: Normal rate and regular rhythm.   Pulmonary:      Effort: Pulmonary effort is normal.      Breath sounds: Normal breath sounds.   Abdominal:      Palpations: Abdomen is soft.      Tenderness: There is no abdominal tenderness. There is no guarding.   Musculoskeletal:         General: No deformity.      Cervical back: Normal range of motion.   Lymphadenopathy:      Cervical: No cervical adenopathy.   Skin:     General: Skin is warm and dry.   Neurological:      Mental Status: He is alert and oriented to person, place, and time.      Sensory: Sensation is intact.      Comments: Mildly tremulous on exam   Psychiatric:         Behavior: Behavior normal.         Thought Content: Thought content does not include homicidal or suicidal ideation. Thought content does not include homicidal or suicidal plan.         Vital signs and nursing notes reviewed.      PPE: I wore a surgical mask throughout my encounters with the pt. I performed hand hygiene on entry into the pt room and upon exit.     LAB RESULTS  Recent Results (from the past 24 hours)   Comprehensive Metabolic Panel    Collection Time: 05/10/25  9:07 PM    Specimen: Blood   Result Value Ref Range    Glucose 91 65 - 99 mg/dL    BUN 8 8 - 23 mg/dL    Creatinine 0.77 0.76 - 1.27 mg/dL    Sodium 137 136 - 145 mmol/L    Potassium 3.8 3.5 - 5.2 mmol/L    Chloride 99 98 - 107 mmol/L    CO2 22.6 22.0 - 29.0 mmol/L    Calcium 8.9 8.6 - 10.5 mg/dL    Total Protein 7.3 6.0 - 8.5 g/dL    Albumin 4.4 3.5 - 5.2 g/dL    ALT (SGPT) 27 1 - 41 U/L    AST (SGOT) 86 (H) 1 - 40 U/L    Alkaline Phosphatase  86 39 - 117 U/L    Total Bilirubin 1.0 0.0 - 1.2 mg/dL    Globulin 2.9 gm/dL    A/G Ratio 1.5 g/dL    BUN/Creatinine Ratio 10.4 7.0 - 25.0    Anion Gap 15.4 (H) 5.0 - 15.0 mmol/L    eGFR 97.5 >60.0 mL/min/1.73   Ethanol    Collection Time: 05/10/25  9:07 PM    Specimen: Blood   Result Value Ref Range    Ethanol <10 0 - 10 mg/dL    Ethanol % <0.010 %   Uric Acid    Collection Time: 05/10/25  9:07 PM    Specimen: Blood   Result Value Ref Range    Uric Acid 7.4 (H) 3.4 - 7.0 mg/dL   BNP    Collection Time: 05/10/25  9:07 PM    Specimen: Blood   Result Value Ref Range    proBNP <36.0 0.0 - 900.0 pg/mL   CBC Auto Differential    Collection Time: 05/10/25  9:07 PM    Specimen: Blood   Result Value Ref Range    WBC 5.79 3.40 - 10.80 10*3/mm3    RBC 4.54 4.14 - 5.80 10*6/mm3    Hemoglobin 12.7 (L) 13.0 - 17.7 g/dL    Hematocrit 39.0 37.5 - 51.0 %    MCV 85.9 79.0 - 97.0 fL    MCH 28.0 26.6 - 33.0 pg    MCHC 32.6 31.5 - 35.7 g/dL    RDW 15.6 (H) 12.3 - 15.4 %    RDW-SD 49.0 37.0 - 54.0 fl    MPV 9.4 6.0 - 12.0 fL    Platelets 176 140 - 450 10*3/mm3    Neutrophil % 62.2 42.7 - 76.0 %    Lymphocyte % 25.9 19.6 - 45.3 %    Monocyte % 8.5 5.0 - 12.0 %    Eosinophil % 2.2 0.3 - 6.2 %    Basophil % 0.9 0.0 - 1.5 %    Immature Grans % 0.3 0.0 - 0.5 %    Neutrophils, Absolute 3.60 1.70 - 7.00 10*3/mm3    Lymphocytes, Absolute 1.50 0.70 - 3.10 10*3/mm3    Monocytes, Absolute 0.49 0.10 - 0.90 10*3/mm3    Eosinophils, Absolute 0.13 0.00 - 0.40 10*3/mm3    Basophils, Absolute 0.05 0.00 - 0.20 10*3/mm3    Immature Grans, Absolute 0.02 0.00 - 0.05 10*3/mm3    nRBC 0.0 0.0 - 0.2 /100 WBC         RADIOLOGY  XR Ankle 2 View Bilateral  XR Ankle 2 View Bilateral, XR Foot 2 View Bilateral  Result Date: 5/10/2025  2 VIEWS BILATERAL FEET; 2 VIEWS BILATERAL ANKLES  HISTORY: Swelling and pain  COMPARISON: None available.  FINDINGS: Left foot: No acute fracture or subluxation of the left foot is seen. There is an old little toe metatarsal fracture.  There is soft tissue swelling seen overlying the dorsum of the foot. No soft tissue gas is seen. No aggressive osseous abnormalities are seen.  Right foot: No acute fracture or subluxation of the right foot is seen. There is soft tissue swelling seen overlying the dorsum of the foot, although not as significant as on the left. No aggressive osseous abnormalities are seen.  Left ankle: No acute fracture or subluxation of the left ankle is seen. There is diffuse soft tissue swelling seen about the ankle. No aggressive osseous abnormalities are seen. Degenerative changes are noted at the tibiotalar joint.  Right foot: No acute fracture or subluxation of the right ankle is seen. There is diffuse soft tissue swelling about the ankle.      No acute fracture or subluxation identified.  This report was finalized on 5/10/2025 9:59 PM by Dr. Hilda Hampton M.D on Workstation: [id number]          MEDICATIONS GIVEN IN ER  Medications   diazePAM (VALIUM) injection 5 mg (5 mg Intravenous Given 5/8/25 1458)   sodium chloride 0.9 % bolus 1,000 mL (0 mL Intravenous Stopped 5/8/25 1920)   ondansetron (ZOFRAN) injection 4 mg (4 mg Intravenous Given 5/8/25 1555)   diazePAM (VALIUM) injection 5 mg (5 mg Intravenous Given 5/8/25 1648)         ORDERS PLACED DURING THIS VISIT:  Orders Placed This Encounter   Procedures    Comprehensive Metabolic Panel    Lipase    Ethanol    CBC Auto Differential    Monitor Blood Pressure    Continuous Pulse Oximetry    CBC & Differential         OUTPATIENT MEDICATION MANAGEMENT:  No current Epic-ordered facility-administered medications on file.     Current Outpatient Medications Ordered in Epic   Medication Sig Dispense Refill    atorvastatin (LIPITOR) 40 MG tablet Take 1 tablet by mouth Every Night. 90 tablet 3    benazepril (Lotensin) 20 MG tablet Take 1 tablet by mouth Daily. 30 tablet 0    colchicine 0.6 MG tablet Take 1 tablet by mouth 2 (Two) Times a Day for 4 days. 8 tablet 0     cyanocobalamin 1000 MCG/ML injection Inject 1 mL into the appropriate muscle as directed by prescriber 1 (One) Time Per Week. 60 mL 3    famotidine (PEPCID) 20 MG tablet Take 1 tablet by mouth 2 (Two) Times a Day. 20 tablet 0    folic acid (FOLVITE) 1 MG tablet Take 1 tablet by mouth Daily. 30 tablet 3    gabapentin (NEURONTIN) 300 MG capsule Take 1 capsule by mouth Every 12 (Twelve) Hours. 12 capsule 0    levothyroxine (SYNTHROID, LEVOTHROID) 25 MCG tablet Take 1 tablet by mouth Every Morning. 30 tablet 3    magnesium oxide (MAG-OX) 400 MG tablet Take 1 tablet by mouth Daily. 7 tablet 0    ondansetron ODT (ZOFRAN-ODT) 4 MG disintegrating tablet Place 1 tablet on the tongue 4 (Four) Times a Day As Needed for Nausea. 20 tablet 0    PARoxetine (PAXIL) 20 MG tablet Take 1 tablet by mouth Daily. 30 tablet 3    thiamine (VITAMIN B1) 100 MG tablet Take 1 tablet by mouth Daily. 30 tablet 3             PROGRESS, DATA ANALYSIS, CONSULTS, AND MEDICAL DECISION MAKING  All labs have been independently interpreted by me.  All radiology studies have been reviewed by me. All EKG's have been independently viewed and interpreted by me.  Discussion below represents my analysis of pertinent findings related to patient's condition, differential diagnosis, treatment plan and final disposition.      DIFFERENTIAL DIAGNOSIS INCLUDE BUT NOT LIMITED TO:     Alcohol withdrawal, alcohol abuse, alcohol intoxication    Clinical Scores: N/A      ED Course as of 05/11/25 0554   Thu May 08, 2025   0165 Patient presentation and evaluation consistent with alcohol dependence and mild alcohol withdrawal symptoms today.  His workup is very reassuring and his symptoms have improved significantly throughout ED course with administration of benzodiazepines.  We spoke about the option for discharging with a short course of benzodiazepines at home but he verbalizes several times throughout ED course that he does not wish to stop drinking alcohol.  No  indication for admission at this time as well.  Plan to treat with antiemetics and discharge home at this time considering that his workup is reassuring and he is not tolerating p.o. intake.  Patient agreeable with all questions answered. [DC]      ED Course User Index  [DC] Shilo Guillermo, PA         4058 I rechecked the patient.  I discussed the patient's labs, radiology findings (including all incidental findings), diagnosis, and plan for discharge.  A repeat exam reveals no new worrisome changes from my initial exam findings.  The patient understands that the fact that they are being discharged does not denote that nothing is abnormal, it indicates that no clinical emergency is present and that they must follow-up as directed in order to properly maintain their health.  Follow-up instructions (specifically listed below) and return to ER precautions were given at this time.  I specifically instructed the patient to follow-up with their PCP.  The patient understands and agrees with the plan, and is ready for discharge.  All questions answered.         AS OF 05:54 EDT VITALS:    BP - 148/78  HR - 78  TEMP - 98.4 °F (36.9 °C) (Oral)  O2 SATS - 95%    COMPLEXITY OF CARE  Admission was considered but after careful review of the patient's presentation, physical examination, diagnostic results, and response to treatment the patient may be safely discharged with outpatient follow-up.      DIAGNOSIS  Final diagnoses:   Alcohol withdrawal syndrome without complication   Alcohol dependence with unspecified alcohol-induced disorder         DISPOSITION  ED Disposition       ED Disposition   Discharge    Condition   Stable    Comment   --                Please note that portions of this document were completed with a voice recognition program.    Note Disclaimer: At [facility], we believe that sharing information builds trust and better relationships. You are receiving this note because you recently visited [facility]. It is possible you will see health information before a provider has talked with you about it. This kind of information can be easy to misunderstand. To help you fully understand what it means for your health, we urge you to discuss this note with your provider.         Shilo Guillermo PA  05/11/25 0554

## 2025-05-08 NOTE — ED NOTES
Pt to Er from home via ems. Pt c/o headache and vomiting starting this morning. Pt also reports being a daily drinker, last drink was last evening approx 3 to 4 martinis.

## 2025-05-08 NOTE — ED PROVIDER NOTES
SHARED VISIT: This visit was performed by BOTH a physician and an APC. The substantive portion of the medical decision making was performed by this attesting physician who made or approved the management plan and takes responsibility for patient management. All studies in the APC note (if performed) were independently interpreted by me.      The LUBA and I have discussed this patient's history, physical exam, and treatment plan.  I have reviewed the documentation and personally had a face to face interaction with the patient. I affirm the documentation and agree with the treatment and plan.  The attached note describes my personal findings.       I provided a substantive portion of the care of the patient.  I personally performed the physical exam in its entirety, and below are my findings.        History  70-year-old male with history of alcohol abuse presents with shakiness and vomiting.  He states that his last drink was last night.  He has had similar problems in the past which he felt related to alcohol withdrawal.  Patient denies suicidal thoughts.  Denies abdominal pain.    Physical Exam  Vital Signs reviewed  GENERAL: Alert male in no obvious distress.  He is somewhat tremulous and hypertensive with blood pressure 182/99.  Pulse is 91.  HENT: nares patent  EYES: no scleral icterus  CV: regular rhythm, regular rate-no murmur, pulse in the 90s  RESPIRATORY: normal effort, clear to auscultation bilaterally-O2 sats upper 90s room air  ABDOMEN: soft, nontender to palpation  MUSCULOSKELETAL: no deformity  NEURO: Strength sensation and coordination are grossly intact.  Speech and mentation are unremarkable  SKIN: warm, dry      Assessment and Data Review    ED Course as of 05/08/25 1813   Thu May 08, 2025   1738 Patient presentation and evaluation consistent with alcohol dependence and mild alcohol withdrawal symptoms today.  His workup is very reassuring and his symptoms have improved significantly throughout ED  course with administration of benzodiazepines.  We spoke about the option for discharging with a short course of benzodiazepines at home but he verbalizes several times throughout ED course that he does not wish to stop drinking alcohol.  No indication for admission at this time as well.  Plan to treat with antiemetics and discharge home at this time considering that his workup is reassuring and he is not tolerating p.o. intake.  Patient agreeable with all questions answered. [DC]      ED Course User Index  [DC] Shilo Guillermo, PA       I did discuss treatment and evaluation of this patient with ONEIDA Guillermo.    I did independently interpret and evaluate ED testing which is notable for the following:    CBC without evidence to suggest significant infection or anemia  Chemistries show mild metabolic acidosis with bicarb of 19 and anion gap of 16.6.  This could be consistent with alcoholic ketoacidosis.  LFTs are fairly unremarkable other than AST of 75 a total bili of 1.3 which represent mild elevations.  Serum alcohol level is normal.  Serum lipase is normal.    Will go ahead and give another 5 mg of IV Valium in addition to the fluids and Zofran that have been given prior to my evaluation.     Shilo Hauser MD  05/08/25 [id number]

## 2025-05-08 NOTE — ED NOTES
Pt reports he is a daily drinker, about a liter a day of vodka, no water intake,last meal was yesterday, pt reports he is going through withdraws, tremors visible when entering room.   Pt reports he lives alone and he orders food out typically, cannot cook for himself, has a walker and uses it sometimes when he is around his house, per EMS his house is full of flys and garbage. He has some family in [place] but does not talk to them often.

## 2025-05-10 ENCOUNTER — HOSPITAL ENCOUNTER (EMERGENCY)
Facility: HOSPITAL | Age: 70
Discharge: HOME | End: 2025-05-10
Attending: EMERGENCY MEDICINE
Payer: MEDICAID

## 2025-05-10 ENCOUNTER — APPOINTMENT (OUTPATIENT)
Dept: GENERAL RADIOLOGY | Facility: HOSPITAL | Age: 70
End: 2025-05-10
Payer: MEDICAID

## 2025-05-10 VITALS
RESPIRATION RATE: 16 BRPM | OXYGEN SATURATION: 94 % | WEIGHT: 190 LBS | TEMPERATURE: 99.2 F | BODY MASS INDEX: 25.73 KG/M2 | DIASTOLIC BLOOD PRESSURE: 84 MMHG | HEART RATE: 76 BPM | SYSTOLIC BLOOD PRESSURE: 165 MMHG | HEIGHT: 72 IN

## 2025-05-10 DIAGNOSIS — M10.9 GOUTY ARTHROPATHY: Primary | ICD-10-CM

## 2025-05-10 DIAGNOSIS — R60.0 PEDAL EDEMA: ICD-10-CM

## 2025-05-10 LAB
ALBUMIN SERPL-MCNC: 4.4 G/DL (ref 3.5–5.2)
ALBUMIN/GLOB SERPL: 1.5 G/DL
ALP SERPL-CCNC: 86 U/L (ref 39–117)
ALT SERPL W P-5'-P-CCNC: 27 U/L (ref 1–41)
ANION GAP SERPL CALCULATED.3IONS-SCNC: 15.4 MMOL/L (ref 5–15)
AST SERPL-CCNC: 86 U/L (ref 1–40)
BASOPHILS # BLD AUTO: 0.05 10*3/MM3 (ref 0–0.2)
BASOPHILS NFR BLD AUTO: 0.9 % (ref 0–1.5)
BILIRUB SERPL-MCNC: 1 MG/DL (ref 0–1.2)
BUN SERPL-MCNC: 8 MG/DL (ref 8–23)
BUN/CREAT SERPL: 10.4 (ref 7–25)
CALCIUM SPEC-SCNC: 8.9 MG/DL (ref 8.6–10.5)
CHLORIDE SERPL-SCNC: 99 MMOL/L (ref 98–107)
CO2 SERPL-SCNC: 22.6 MMOL/L (ref 22–29)
CREAT SERPL-MCNC: 0.77 MG/DL (ref 0.76–1.27)
DEPRECATED RDW RBC AUTO: 49 FL (ref 37–54)
EGFRCR SERPLBLD CKD-EPI 2021: 97.5 ML/MIN/1.73
EOSINOPHIL # BLD AUTO: 0.13 10*3/MM3 (ref 0–0.4)
EOSINOPHIL NFR BLD AUTO: 2.2 % (ref 0.3–6.2)
ERYTHROCYTE [DISTWIDTH] IN BLOOD BY AUTOMATED COUNT: 15.6 % (ref 12.3–15.4)
ETHANOL BLD-MCNC: <10 MG/DL (ref 0–10)
ETHANOL UR QL: <0.01 %
GLOBULIN UR ELPH-MCNC: 2.9 GM/DL
GLUCOSE SERPL-MCNC: 91 MG/DL (ref 65–99)
HCT VFR BLD AUTO: 39 % (ref 37.5–51)
HGB BLD-MCNC: 12.7 G/DL (ref 13–17.7)
IMM GRANULOCYTES # BLD AUTO: 0.02 10*3/MM3 (ref 0–0.05)
IMM GRANULOCYTES NFR BLD AUTO: 0.3 % (ref 0–0.5)
LYMPHOCYTES # BLD AUTO: 1.5 10*3/MM3 (ref 0.7–3.1)
LYMPHOCYTES NFR BLD AUTO: 25.9 % (ref 19.6–45.3)
MCH RBC QN AUTO: 28 PG (ref 26.6–33)
MCHC RBC AUTO-ENTMCNC: 32.6 G/DL (ref 31.5–35.7)
MCV RBC AUTO: 85.9 FL (ref 79–97)
MONOCYTES # BLD AUTO: 0.49 10*3/MM3 (ref 0.1–0.9)
MONOCYTES NFR BLD AUTO: 8.5 % (ref 5–12)
NEUTROPHILS NFR BLD AUTO: 3.6 10*3/MM3 (ref 1.7–7)
NEUTROPHILS NFR BLD AUTO: 62.2 % (ref 42.7–76)
NRBC BLD AUTO-RTO: 0 /100 WBC (ref 0–0.2)
NT-PROBNP SERPL-MCNC: <36 PG/ML (ref 0–900)
PLATELET # BLD AUTO: 176 10*3/MM3 (ref 140–450)
PMV BLD AUTO: 9.4 FL (ref 6–12)
POTASSIUM SERPL-SCNC: 3.8 MMOL/L (ref 3.5–5.2)
PROT SERPL-MCNC: 7.3 G/DL (ref 6–8.5)
RBC # BLD AUTO: 4.54 10*6/MM3 (ref 4.14–5.8)
SODIUM SERPL-SCNC: 137 MMOL/L (ref 136–145)
URATE SERPL-MCNC: 7.4 MG/DL (ref 3.4–7)
WBC NRBC COR # BLD AUTO: 5.79 10*3/MM3 (ref 3.4–10.8)

## 2025-05-10 PROCEDURE — 99283 EMERGENCY DEPT VISIT LOW MDM: CPT

## 2025-05-10 PROCEDURE — 73600 X-RAY EXAM OF ANKLE: CPT

## 2025-05-10 PROCEDURE — 83880 ASSAY OF NATRIURETIC PEPTIDE: CPT | Performed by: EMERGENCY MEDICINE

## 2025-05-10 PROCEDURE — 85025 COMPLETE CBC W/AUTO DIFF WBC: CPT | Performed by: EMERGENCY MEDICINE

## 2025-05-10 PROCEDURE — 82077 ASSAY SPEC XCP UR&BREATH IA: CPT | Performed by: EMERGENCY MEDICINE

## 2025-05-10 PROCEDURE — 84550 ASSAY OF BLOOD/URIC ACID: CPT | Performed by: EMERGENCY MEDICINE

## 2025-05-10 PROCEDURE — 80053 COMPREHEN METABOLIC PANEL: CPT | Performed by: EMERGENCY MEDICINE

## 2025-05-10 PROCEDURE — 73620 X-RAY EXAM OF FOOT: CPT

## 2025-05-10 RX ORDER — SODIUM CHLORIDE 0.9 % (FLUSH) 0.9 %
10 SYRINGE (ML) INJECTION AS NEEDED
Status: DISCONTINUED | OUTPATIENT
Start: 2025-05-10 | End: 2025-05-11 | Stop reason: HOSPADM

## 2025-05-10 RX ORDER — HYDROCODONE BITARTRATE AND ACETAMINOPHEN 5; 325 MG/1; MG/1
1 TABLET ORAL ONCE
Refills: 0 | Status: COMPLETED | OUTPATIENT
Start: 2025-05-10 | End: 2025-05-10

## 2025-05-10 RX ORDER — COLCHICINE 0.6 MG/1
0.6 TABLET ORAL 2 TIMES DAILY
Qty: 8 TABLET | Refills: 0 | Status: SHIPPED | OUTPATIENT
Start: 2025-05-10 | End: 2025-05-14

## 2025-05-10 RX ORDER — IBUPROFEN 400 MG/1
400 TABLET, FILM COATED ORAL ONCE
Status: COMPLETED | OUTPATIENT
Start: 2025-05-10 | End: 2025-05-10

## 2025-05-10 RX ADMIN — IBUPROFEN 400 MG: 400 TABLET, FILM COATED ORAL at 21:12

## 2025-05-10 RX ADMIN — HYDROCODONE BITARTRATE AND ACETAMINOPHEN 1 TABLET: 5; 325 TABLET ORAL at 22:14

## 2025-05-11 NOTE — ED PROVIDER NOTES
EMERGENCY DEPARTMENT ENCOUNTER  Room Number:  28/28  PCP: Provider, No Known  Independent Historians: Patient and EMS      HPI:  Chief Complaint: Feet and ankle swelling, pain    A complete HPI/ROS/PMH/PSH/SH/FH are unobtainable due to: None    Chronic or social conditions impacting patient care (Social Determinants of Health): Lack of social support      Context: Pro Mueller is a 70 y.o. male with a medical history of hypertension and alcoholic liver disease who presents to the ED c/o acute pain and swelling in the bilateral feet and ankles.  Patient denies any falls or injuries to explain this pain.  He says the pain began this morning when he woke.  It is made it difficult for him to ambulate or do his normal activities at home.  He lives at home by himself.  He called for an ambulance to his house because of this pain and swelling problem.  He denies fevers.  Denies chest pain or difficulties breathing.  Denies abdominal pain or nausea vomiting or diarrhea.  Last alcohol use was reportedly yesterday evening.      Review of prior external notes (non-ED) -and- Review of prior external test results outside of this encounter: I independently reviewed the hospitalist discharge summary from May 2, 2025.  At that time, patient had evaluation for chest pain and alcohol withdrawal concerns.'s were negative.  CTA of the chest revealed no PE.  A 2D echo revealed normal ejection fraction.  Lower extremity venous ultrasound was negative for DVT at that time.  Cardiology was consulted as well and they conducted a nuclear stress test which was essentially unremarkable.  I independently reviewed the duplex venous ultrasound study from May 1, 2025 which was negative for DVT bilaterally.    Prescription drug monitoring program review: [facility] reviewed by Jimy Grijalva MD       PAST MEDICAL HISTORY  Active Ambulatory Problems     Diagnosis Date Noted    Fall 08/08/2016    Alcoholism in recovery 08/08/2016    Atopic rhinitis  08/08/2016    Mixed anxiety depressive disorder 08/08/2016    Genital herpes simplex 08/08/2016    Hyperlipidemia 08/08/2016    Hypertension 08/08/2016    Hypothyroidism (acquired) 08/08/2016    Insomnia 08/08/2016    Panic disorder without agoraphobia 08/08/2016    Persistent insomnia 08/08/2016    Vitamin D deficiency 08/08/2016    Chronic low back pain 11/11/2016    Neuropathy involving both lower extremities 10/25/2018    Abnormal EKG 01/03/2019    Left shoulder pain 11/19/2019    Nausea and vomiting 01/11/2020    Pancytopenia 01/14/2020    Nephrolithiasis 02/12/2020    Left ventricular diastolic dysfunction 01/16/2021    Tremor 10/06/2021    C5 cervical fracture 11/09/2021    Syncope and collapse 01/07/2022    Neck pain 01/07/2022    Alcoholic intoxication with complication 03/13/2022    Withdrawal symptoms, alcohol 07/01/2022    Transaminitis 07/01/2022    Lumbar facet arthropathy 07/20/2022    Alcohol dependence 09/11/2022    Midline low back pain with right-sided sciatica 09/15/2022    Spondylolisthesis at L4-L5 level 09/15/2022    Lumbar canal stenosis 09/15/2022    Alcohol cessation counseling 09/15/2022    Need for assistance due to reduced mobility 09/15/2022    Impaired mobility and ADLs 09/15/2022    Alcohol withdrawal syndrome without complication 02/18/2023    Facial laceration 02/18/2023    Orthostatic hypotension 02/18/2023    Acute bacterial conjunctivitis of right eye 03/02/2023    Visit for suture removal 03/02/2023    Renal calculi 03/14/2023    Hepatic steatosis 03/15/2023    Alcohol withdrawal syndrome with complication 04/14/2023    Macrocytosis without anemia 04/14/2023    Lumbosacral spondylosis without myelopathy 05/05/2023    Elevated LFTs 05/13/2023    Alcohol-induced acute pancreatitis, unspecified complication status 06/23/2023    Pancreatitis 03/06/2024    Generalized abdominal pain 03/07/2024    Alcohol dependence with withdrawal 03/07/2024    Acute alcohol intoxication in patient  with alcoholism with blood alcohol level 0.08 to 0.29, with unspecified complication 09/20/2024    Calculus of gallbladder with cholecystitis without biliary obstruction 09/20/2024    Alcoholic liver disease 12/10/2024    Iron deficiency anemia 12/10/2024    Folate deficiency anemia 12/10/2024    PVC's (premature ventricular contractions) 12/10/2024    Weakness 12/10/2024    DDD (degenerative disc disease), lumbar 01/29/2025    Alcohol abuse 05/01/2025    Peptic ulcer disease 05/01/2025    Mood disorder 05/01/2025    Anemia, chronic disease 05/01/2025    Peripheral neuropathy 05/02/2025    Vitamin B12 deficiency 05/02/2025     Resolved Ambulatory Problems     Diagnosis Date Noted    Impacted cerumen 08/08/2016    Influenza 08/08/2016    Motion sickness 08/08/2016    Seasonal allergic rhinitis 08/08/2016    Upper respiratory tract infection 08/08/2016    Alcohol withdrawal 11/04/2016    Headache 11/05/2016    Gastritis 11/05/2016    Olecranon bursitis of right elbow 04/05/2017    Sciatica of left side 06/12/2018    Syncope and collapse 01/02/2019    Alcoholic ketoacidosis 01/12/2020    Hyponatremia 01/13/2020    Hypokalemia 01/13/2020    Alcohol dependence with uncomplicated withdrawal 01/14/2020    Hypomagnesemia 01/16/2021    Non-traumatic rhabdomyolysis 01/18/2021    Urine retention 01/21/2021    Epigastric pain 06/23/2023    Dehydration 02/24/2024    Metabolic acidosis 09/20/2024    Hypothermia 12/02/2024    Chest pain 04/30/2025     Past Medical History:   Diagnosis Date    Allergic 1970    Anxiety     Arthritis     Depression     Disease of thyroid gland     Elevated cholesterol     Encounter for removal of sutures     GERD (gastroesophageal reflux disease)     Headache, tension-type     Hypothyroidism     Kidney stone     Low back pain 2019    Migraine     Olecranon bursitis, right elbow     Sleep apnea          PAST SURGICAL HISTORY  Past Surgical History:   Procedure Laterality Date    CHOLECYSTECTOMY N/A  "2024    Procedure: CHOLECYSTECTOMY LAPAROSCOPIC WITH DAVINCI ROBOT with cholangiogram, possible open;  Surgeon: Toro Noguera MD;  Location: [facility] MAIN OR;  Service: General;  Laterality: N/A;    COLONOSCOPY      CYST REMOVAL      CYSTOSCOPY BLADDER STONE LITHOTRIPSY  2022    ERCP N/A 2024    Procedure: ENDOSCOPIC RETROGRADE CHOLANGIOPANCREATOGRAPHY sphincterotomy, balloon sweep (9-12);  Surgeon: Fara Madrigal MD;  Location: [facility] ENDOSCOPY;  Service: Gastroenterology;  Laterality: N/A;  sphincterotomy hiatial hernia, gallstone removal    EXTRACORPOREAL SHOCK WAVE LITHOTRIPSY (ESWL) Right 2002    SHOULDER ARTHROSCOPY Right 2019    Procedure: SHOULDER ARTHROSCOPY, decompression, distal clavicle excision;  Surgeon: Aj Mancilla MD;  Location: [facility] OR OSC;  Service: Orthopedics    TONSILLECTOMY           FAMILY HISTORY  Family History   Problem Relation Age of Onset    Alzheimer's disease Mother     Mental illness Mother     Pancreatic cancer Father     Hearing loss Father     Arthritis Maternal Grandmother     Malig Hyperthermia Neg Hx          SOCIAL HISTORY  Social History     Socioeconomic History    Marital status:    Tobacco Use    Smoking status: Former     Current packs/day: 0.00     Average packs/day: 0.5 packs/day for 15.0 years (7.5 ttl pk-yrs)     Types: Cigarettes     Start date: 1990     Quit date: 2005     Years since quittin.3    Smokeless tobacco: Never    Tobacco comments:     caffeine - 3 cans of coke daily    Vaping Use    Vaping status: Never Used   Substance and Sexual Activity    Alcohol use: Yes     Alcohol/week: 7.0 standard drinks of alcohol     Types: 7 Shots of liquor per week     Comment: .5 liter vodka    Drug use: Yes     Types: Methamphetamines     Comment: \"once in a rare while\"    Sexual activity: Yes     Partners: Female     Birth control/protection: Condom         ALLERGIES  Penicillins      REVIEW OF SYSTEMS  Review of " Systems  Included in HPI  All systems reviewed and negative except for those discussed in HPI.      PHYSICAL EXAM    I have reviewed the triage vital signs and nursing notes.    ED Triage Vitals [05/10/25 2029]   Temp Heart Rate Resp BP SpO2   99.2 °F (37.3 °C) 95 18 160/75 95 %      Temp src Heart Rate Source Patient Position BP Location FiO2 (%)   Oral Monitor Lying Right arm --       Physical Exam  GENERAL: alert, no acute distress, unkempt  SKIN: Warm, dry, no rashes  HENT: Normocephalic, atraumatic  EYES: no scleral icterus, normal conjunctivae  CV: regular rhythm, regular rate, normal perfusion  RESPIRATORY: normal effort, lungs clear bilaterally  ABDOMEN: soft, nondistended, nontender  MUSCULOSKELETAL: The bilateral feet and ankles are edematous symmetrically.  There is no erythema.  There are no open wounds or lesions.  Mild tenderness to palpation is noted circumferentially around the feet and ankles.  Both feet are warm and well-perfused.  Sensation is intact.  NEURO: alert, moves all extremities, follows commands      LAB RESULTS  Recent Results (from the past 24 hours)   Comprehensive Metabolic Panel    Collection Time: 05/10/25  9:07 PM    Specimen: Blood   Result Value Ref Range    Glucose 91 65 - 99 mg/dL    BUN 8 8 - 23 mg/dL    Creatinine 0.77 0.76 - 1.27 mg/dL    Sodium 137 136 - 145 mmol/L    Potassium 3.8 3.5 - 5.2 mmol/L    Chloride 99 98 - 107 mmol/L    CO2 22.6 22.0 - 29.0 mmol/L    Calcium 8.9 8.6 - 10.5 mg/dL    Total Protein 7.3 6.0 - 8.5 g/dL    Albumin 4.4 3.5 - 5.2 g/dL    ALT (SGPT) 27 1 - 41 U/L    AST (SGOT) 86 (H) 1 - 40 U/L    Alkaline Phosphatase 86 39 - 117 U/L    Total Bilirubin 1.0 0.0 - 1.2 mg/dL    Globulin 2.9 gm/dL    A/G Ratio 1.5 g/dL    BUN/Creatinine Ratio 10.4 7.0 - 25.0    Anion Gap 15.4 (H) 5.0 - 15.0 mmol/L    eGFR 97.5 >60.0 mL/min/1.73   Ethanol    Collection Time: 05/10/25  9:07 PM    Specimen: Blood   Result Value Ref Range    Ethanol <10 0 - 10 mg/dL    Ethanol  % <0.010 %   Uric Acid    Collection Time: 05/10/25  9:07 PM    Specimen: Blood   Result Value Ref Range    Uric Acid 7.4 (H) 3.4 - 7.0 mg/dL   BNP    Collection Time: 05/10/25  9:07 PM    Specimen: Blood   Result Value Ref Range    proBNP <36.0 0.0 - 900.0 pg/mL   CBC Auto Differential    Collection Time: 05/10/25  9:07 PM    Specimen: Blood   Result Value Ref Range    WBC 5.79 3.40 - 10.80 10*3/mm3    RBC 4.54 4.14 - 5.80 10*6/mm3    Hemoglobin 12.7 (L) 13.0 - 17.7 g/dL    Hematocrit 39.0 37.5 - 51.0 %    MCV 85.9 79.0 - 97.0 fL    MCH 28.0 26.6 - 33.0 pg    MCHC 32.6 31.5 - 35.7 g/dL    RDW 15.6 (H) 12.3 - 15.4 %    RDW-SD 49.0 37.0 - 54.0 fl    MPV 9.4 6.0 - 12.0 fL    Platelets 176 140 - 450 10*3/mm3    Neutrophil % 62.2 42.7 - 76.0 %    Lymphocyte % 25.9 19.6 - 45.3 %    Monocyte % 8.5 5.0 - 12.0 %    Eosinophil % 2.2 0.3 - 6.2 %    Basophil % 0.9 0.0 - 1.5 %    Immature Grans % 0.3 0.0 - 0.5 %    Neutrophils, Absolute 3.60 1.70 - 7.00 10*3/mm3    Lymphocytes, Absolute 1.50 0.70 - 3.10 10*3/mm3    Monocytes, Absolute 0.49 0.10 - 0.90 10*3/mm3    Eosinophils, Absolute 0.13 0.00 - 0.40 10*3/mm3    Basophils, Absolute 0.05 0.00 - 0.20 10*3/mm3    Immature Grans, Absolute 0.02 0.00 - 0.05 10*3/mm3    nRBC 0.0 0.0 - 0.2 /100 WBC         RADIOLOGY  XR Ankle 2 View Bilateral  XR Ankle 2 View Bilateral, XR Foot 2 View Bilateral  Result Date: 5/10/2025  2 VIEWS BILATERAL FEET; 2 VIEWS BILATERAL ANKLES  HISTORY: Swelling and pain  COMPARISON: None available.  FINDINGS: Left foot: No acute fracture or subluxation of the left foot is seen. There is an old little toe metatarsal fracture. There is soft tissue swelling seen overlying the dorsum of the foot. No soft tissue gas is seen. No aggressive osseous abnormalities are seen.  Right foot: No acute fracture or subluxation of the right foot is seen. There is soft tissue swelling seen overlying the dorsum of the foot, although not as significant as on the left. No  aggressive osseous abnormalities are seen.  Left ankle: No acute fracture or subluxation of the left ankle is seen. There is diffuse soft tissue swelling seen about the ankle. No aggressive osseous abnormalities are seen. Degenerative changes are noted at the tibiotalar joint.  Right foot: No acute fracture or subluxation of the right ankle is seen. There is diffuse soft tissue swelling about the ankle.      No acute fracture or subluxation identified.  This report was finalized on 5/10/2025 9:59 PM by Dr. Hilda Hampton M.D on Workstation: [id number]          MEDICATIONS GIVEN IN ER  Medications   sodium chloride 0.9 % flush 10 mL (has no administration in time range)   HYDROcodone-acetaminophen (NORCO) 5-325 MG per tablet 1 tablet (has no administration in time range)   ibuprofen (ADVIL,MOTRIN) tablet 400 mg (400 mg Oral Given 5/10/25 2112)         ORDERS PLACED DURING THIS VISIT:  Orders Placed This Encounter   Procedures    XR Ankle 2 View Bilateral    XR Foot 2 View Bilateral    Comprehensive Metabolic Panel    Ethanol    Urine Drug Screen - Urine, Clean Catch    Uric Acid    BNP    CBC Auto Differential    Monitor Blood Pressure    Insert Peripheral IV    CBC & Differential         OUTPATIENT MEDICATION MANAGEMENT:  Current Facility-Administered Medications Ordered in Epic   Medication Dose Route Frequency Provider Last Rate Last Admin    HYDROcodone-acetaminophen (NORCO) 5-325 MG per tablet 1 tablet  1 tablet Oral Once Jimy Grijalva MD        sodium chloride 0.9 % flush 10 mL  10 mL Intravenous PRN Jimy Grijalva MD         Current Outpatient Medications Ordered in Epic   Medication Sig Dispense Refill    atorvastatin (LIPITOR) 40 MG tablet Take 1 tablet by mouth Every Night. 90 tablet 3    benazepril (Lotensin) 20 MG tablet Take 1 tablet by mouth Daily. 30 tablet 0    colchicine 0.6 MG tablet Take 1 tablet by mouth 2 (Two) Times a Day for 4 days. 8 tablet 0    cyanocobalamin 1000 MCG/ML injection  Inject 1 mL into the appropriate muscle as directed by prescriber 1 (One) Time Per Week. 60 mL 3    famotidine (PEPCID) 20 MG tablet Take 1 tablet by mouth 2 (Two) Times a Day. 20 tablet 0    folic acid (FOLVITE) 1 MG tablet Take 1 tablet by mouth Daily. 30 tablet 3    gabapentin (NEURONTIN) 300 MG capsule Take 1 capsule by mouth Every 12 (Twelve) Hours. 12 capsule 0    levothyroxine (SYNTHROID, LEVOTHROID) 25 MCG tablet Take 1 tablet by mouth Every Morning. 30 tablet 3    magnesium oxide (MAG-OX) 400 MG tablet Take 1 tablet by mouth Daily. 7 tablet 0    ondansetron ODT (ZOFRAN-ODT) 4 MG disintegrating tablet Place 1 tablet on the tongue 4 (Four) Times a Day As Needed for Nausea. 20 tablet 0    PARoxetine (PAXIL) 20 MG tablet Take 1 tablet by mouth Daily. 30 tablet 3    thiamine (VITAMIN B1) 100 MG tablet Take 1 tablet by mouth Daily. 30 tablet 3         PROCEDURES  Procedures      PROGRESS, DATA ANALYSIS, CONSULTS, AND MEDICAL DECISION MAKING  All labs have been independently interpreted by me.  All radiology studies have been reviewed by me. All EKG's have been independently viewed and interpreted by me.  Discussion below represents my analysis of pertinent findings related to patient's condition, differential diagnosis, treatment plan and final disposition.    Differential diagnosis includes but is not limited to gout, arthritis, peripheral edema, CHF, DVT, cellulitis.    Clinical Scores:                   ED Course as of 05/10/25 2212   Sat May 10, 2025   2145 Uric Acid(!): 7.4 [MARIE]   2145 CO2: 22.6 [MARIE]   2146 I independently interpreted the x-ray of the bilateral ankles and my findings are: No acute fracture or dislocation evident. [MARIE]   2211 I reviewed the test results with the patient at the bedside.  Uric acid level is somewhat elevated.  His clinical presentation is most suggestive of a gouty arthropathy.  However there is really no other worrisome features on physical exam.  He is afebrile and  "nontoxic-appearing.  I do not detect any need for admission or further diagnostic workup right now.  I think he would benefit from colchicine therapy which I am happy to prescribe.  I did encourage him to abstain from alcohol.  I also urged him to follow-up with a local PCP for ongoing care needs.  I will provide him with the patient connection care line number and urged him to call on Monday for scheduling an appointment.  He is agreeable with this plan as outlined.  Will review with him usual \"return to ER \"instructions prior to discharge. [MAREI]      ED Course User Index  [MARIE] Jimy Grijalva MD       AS OF 22:12 EDT VITALS:    BP - 160/75  HR - 95  TEMP - 99.2 °F (37.3 °C) (Oral)  O2 SATS - 95%    COMPLEXITY OF CARE  Admission was considered but after careful review of the patient's presentation, physical examination, diagnostic results, and response to treatment the patient may be safely discharged with outpatient follow-up.      DIAGNOSIS  Final diagnoses:   Gouty arthropathy   Pedal edema         DISPOSITION  ED Disposition       ED Disposition   Discharge    Condition   Stable    Comment   --                Please note that portions of this document were completed with a voice recognition program.    Note Disclaimer: At [facility], we believe that sharing information builds trust and better relationships. You are receiving this note because you recently visited [facility]. It is possible you will see health information before a provider has talked with you about it. This kind of information can be easy to misunderstand. To help you fully understand what it means for your health, we urge you to discuss this note with your provider.         Jimy Grijalva MD  05/10/25 2213    "

## 2025-05-11 NOTE — DISCHARGE INSTRUCTIONS
Rest and elevate your feet and legs as we discussed.  Use your walker at home to help with ambulation when necessary.  Must follow-up with local PCP for further evaluation and ongoing care needs.  Please abstain from alcohol as we discussed.  Please return to the emergency room for any worsening pain, swelling, redness, fevers or any other concerns.

## 2025-05-14 ENCOUNTER — READMISSION MANAGEMENT (OUTPATIENT)
Dept: CALL CENTER | Facility: HOSPITAL | Age: 70
End: 2025-05-14
Payer: MEDICAID

## 2025-05-14 NOTE — OUTREACH NOTE
Medical Week 2 Survey      Flowsheet Row Responses   [facility] patient discharged from? [place]   Does the patient have one of the following disease processes/diagnoses(primary or secondary)? Other   Week 2 attempt successful? No   Unsuccessful attempts Attempt 1            ANGELINA HOLCOMB - Registered Nurse

## 2025-05-20 ENCOUNTER — READMISSION MANAGEMENT (OUTPATIENT)
Dept: CALL CENTER | Facility: HOSPITAL | Age: 70
End: 2025-05-20
Payer: MEDICAID

## 2025-05-20 NOTE — OUTREACH NOTE
Medical Week 3 Survey      Flowsheet Row Responses   [facility] patient discharged from? [place]   Does the patient have one of the following disease processes/diagnoses(primary or secondary)? Other   Week 3 attempt successful? No   Unsuccessful attempts Attempt 1            Wendi Caldwell Registered Nurse

## 2025-05-29 ENCOUNTER — READMISSION MANAGEMENT (OUTPATIENT)
Dept: CALL CENTER | Facility: HOSPITAL | Age: 70
End: 2025-05-29
Payer: MEDICAID

## 2025-05-29 ENCOUNTER — APPOINTMENT (OUTPATIENT)
Dept: GENERAL RADIOLOGY | Facility: HOSPITAL | Age: 70
End: 2025-05-29
Payer: MEDICARE

## 2025-05-29 ENCOUNTER — HOSPITAL ENCOUNTER (EMERGENCY)
Facility: HOSPITAL | Age: 70
Discharge: HOME | End: 2025-05-29
Attending: EMERGENCY MEDICINE

## 2025-05-29 VITALS
OXYGEN SATURATION: 96 % | HEART RATE: 81 BPM | BODY MASS INDEX: 25.73 KG/M2 | DIASTOLIC BLOOD PRESSURE: 111 MMHG | HEIGHT: 72 IN | WEIGHT: 190 LBS | RESPIRATION RATE: 17 BRPM | SYSTOLIC BLOOD PRESSURE: 192 MMHG | TEMPERATURE: 98.2 F

## 2025-05-29 DIAGNOSIS — R60.0 PERIPHERAL EDEMA: Primary | ICD-10-CM

## 2025-05-29 LAB
ALBUMIN SERPL-MCNC: 4.4 G/DL (ref 3.5–5.2)
ALBUMIN/GLOB SERPL: 1.5 G/DL
ALP SERPL-CCNC: 87 U/L (ref 39–117)
ALT SERPL W P-5'-P-CCNC: 23 U/L (ref 1–41)
ANION GAP SERPL CALCULATED.3IONS-SCNC: 10.9 MMOL/L (ref 5–15)
AST SERPL-CCNC: 52 U/L (ref 1–40)
BASOPHILS # BLD AUTO: 0.06 10*3/MM3 (ref 0–0.2)
BASOPHILS NFR BLD AUTO: 1 % (ref 0–1.5)
BILIRUB SERPL-MCNC: 0.4 MG/DL (ref 0–1.2)
BUN SERPL-MCNC: 10 MG/DL (ref 8–23)
BUN/CREAT SERPL: 12 (ref 7–25)
CALCIUM SPEC-SCNC: 9.2 MG/DL (ref 8.6–10.5)
CHLORIDE SERPL-SCNC: 106 MMOL/L (ref 98–107)
CO2 SERPL-SCNC: 24.1 MMOL/L (ref 22–29)
CREAT SERPL-MCNC: 0.83 MG/DL (ref 0.76–1.27)
DEPRECATED RDW RBC AUTO: 53.6 FL (ref 37–54)
EGFRCR SERPLBLD CKD-EPI 2021: 95.3 ML/MIN/1.73
EOSINOPHIL # BLD AUTO: 0.23 10*3/MM3 (ref 0–0.4)
EOSINOPHIL NFR BLD AUTO: 3.8 % (ref 0.3–6.2)
ERYTHROCYTE [DISTWIDTH] IN BLOOD BY AUTOMATED COUNT: 16.4 % (ref 12.3–15.4)
GLOBULIN UR ELPH-MCNC: 2.9 GM/DL
GLUCOSE SERPL-MCNC: 101 MG/DL (ref 65–99)
HCT VFR BLD AUTO: 40.5 % (ref 37.5–51)
HGB BLD-MCNC: 12.5 G/DL (ref 13–17.7)
IMM GRANULOCYTES # BLD AUTO: 0.03 10*3/MM3 (ref 0–0.05)
IMM GRANULOCYTES NFR BLD AUTO: 0.5 % (ref 0–0.5)
LYMPHOCYTES # BLD AUTO: 2.02 10*3/MM3 (ref 0.7–3.1)
LYMPHOCYTES NFR BLD AUTO: 33.4 % (ref 19.6–45.3)
MCH RBC QN AUTO: 27.4 PG (ref 26.6–33)
MCHC RBC AUTO-ENTMCNC: 30.9 G/DL (ref 31.5–35.7)
MCV RBC AUTO: 88.8 FL (ref 79–97)
MONOCYTES # BLD AUTO: 0.54 10*3/MM3 (ref 0.1–0.9)
MONOCYTES NFR BLD AUTO: 8.9 % (ref 5–12)
NEUTROPHILS NFR BLD AUTO: 3.17 10*3/MM3 (ref 1.7–7)
NEUTROPHILS NFR BLD AUTO: 52.4 % (ref 42.7–76)
NRBC BLD AUTO-RTO: 0 /100 WBC (ref 0–0.2)
NT-PROBNP SERPL-MCNC: 49.1 PG/ML (ref 0–900)
PLATELET # BLD AUTO: 277 10*3/MM3 (ref 140–450)
PMV BLD AUTO: 8.7 FL (ref 6–12)
POTASSIUM SERPL-SCNC: 4.3 MMOL/L (ref 3.5–5.2)
PROT SERPL-MCNC: 7.3 G/DL (ref 6–8.5)
RBC # BLD AUTO: 4.56 10*6/MM3 (ref 4.14–5.8)
SODIUM SERPL-SCNC: 141 MMOL/L (ref 136–145)
TROPONIN T SERPL HS-MCNC: 12 NG/L
WBC NRBC COR # BLD AUTO: 6.05 10*3/MM3 (ref 3.4–10.8)

## 2025-05-29 PROCEDURE — 83880 ASSAY OF NATRIURETIC PEPTIDE: CPT | Performed by: NURSE PRACTITIONER

## 2025-05-29 PROCEDURE — 85025 COMPLETE CBC W/AUTO DIFF WBC: CPT | Performed by: NURSE PRACTITIONER

## 2025-05-29 PROCEDURE — 71045 X-RAY EXAM CHEST 1 VIEW: CPT

## 2025-05-29 PROCEDURE — 80053 COMPREHEN METABOLIC PANEL: CPT | Performed by: NURSE PRACTITIONER

## 2025-05-29 PROCEDURE — 84484 ASSAY OF TROPONIN QUANT: CPT | Performed by: NURSE PRACTITIONER

## 2025-05-29 PROCEDURE — 93005 ELECTROCARDIOGRAM TRACING: CPT | Performed by: NURSE PRACTITIONER

## 2025-05-29 PROCEDURE — 99284 EMERGENCY DEPT VISIT MOD MDM: CPT

## 2025-05-29 RX ORDER — SODIUM CHLORIDE 0.9 % (FLUSH) 0.9 %
10 SYRINGE (ML) INJECTION AS NEEDED
Status: DISCONTINUED | OUTPATIENT
Start: 2025-05-29 | End: 2025-05-29 | Stop reason: HOSPADM

## 2025-05-29 NOTE — ED NOTES
Patient to ED via EMS from home c/o bilateral foot swelling that began this morning. Patient denies injury.

## 2025-05-29 NOTE — ED PROVIDER NOTES
MD ATTESTATION NOTE    The LUBA and I have discussed this patient's history, physical exam, and treatment plan.  I have reviewed the documentation and personally had a face to face interaction with the patient. I affirm the documentation and agree with the treatment and plan.  The attached note describes my personal findings.        SHARED APC FACE TO FACE: I performed a substantive part of the MDM during the patient's E/M visit. I personally evaluated and examined the patient. I personally made or approved the documented management plan and acknowledge its risk of complications.      Brief HPI: Patient presents for evaluation of lower extremity edema.  Patient states that just started however he has been seen for this in the past.  Patient has had no chest pain or shortness of breath.  No abdominal pain.  No fevers or chills.  No burning with urination.    PHYSICAL EXAM  ED Triage Vitals [05/29/25 1324]   Temp Heart Rate Resp BP SpO2   98.2 °F (36.8 °C) 74 18 164/82 96 %      Temp src Heart Rate Source Patient Position BP Location FiO2 (%)   Oral Monitor -- -- --         GENERAL: no acute distress  HENT: nares patent  EYES: no scleral icterus  CV: regular rhythm, normal rate  RESPIRATORY: normal effort  ABDOMEN: soft  MUSCULOSKELETAL: no deformity.  Bilateral pedal edema  NEURO: alert, moves all extremities, follows commands  PSYCH:  calm, cooperative  SKIN: warm, dry    Vital signs and nursing notes reviewed.    ED Course as of 05/29/25 1623   Thu May 29, 2025   1426 proBNP: 49.1 []   1426 Hemoglobin(!): 12.5  12.7 2 weeks ago []      ED Course User Index  [] Carolyn Car APRN         Plan: discharge       Hossein Ramirez MD  05/29/25 7837

## 2025-05-29 NOTE — ED PROVIDER NOTES
EMERGENCY DEPARTMENT ENCOUNTER  Room Number:  06/06  PCP: Provider, No Known  Independent Historians: Patient      HPI:  Chief Complaint: had concerns including Foot Swelling.     A complete HPI/ROS/PMH/PSH/SH/FH are unobtainable due to:     Chronic or social conditions impacting patient care (Social Determinants of Health):       Context: Pro Mueller is a pleasant afebrile 70 y.o.  male with a medical history of GERD, hypertension, migraine, alcohol abuse who presents to the ED c/o acute bilateral foot swelling    Complaining of bilateral foot swelling that he really noticed this morning  Denies any orthopnea or shortness of breath  Does Not take any medication denies any kidney issues, states urinating normally  States this is never happened previously  Reports he is otherwise feeling well  Of note patient seen in the ED here on 5/10/2025 by Dr. Grijalva for pedal edema at that time    Review of prior external notes (non-ED) -and- Review of prior external test results outside of this encounter:  5/4/2025 patient seen at [facility], patient complaining of abdominal pain, intoxicated with vodka, medications include amlodipine, atorvastatin, folic acid and levothyroxine      Prescription drug monitoring program review:         PAST MEDICAL HISTORY  Active Ambulatory Problems     Diagnosis Date Noted    Fall 08/08/2016    Alcoholism in recovery 08/08/2016    Atopic rhinitis 08/08/2016    Mixed anxiety depressive disorder 08/08/2016    Genital herpes simplex 08/08/2016    Hyperlipidemia 08/08/2016    Hypertension 08/08/2016    Hypothyroidism (acquired) 08/08/2016    Insomnia 08/08/2016    Panic disorder without agoraphobia 08/08/2016    Persistent insomnia 08/08/2016    Vitamin D deficiency 08/08/2016    Chronic low back pain 11/11/2016    Neuropathy involving both lower extremities 10/25/2018    Abnormal EKG 01/03/2019    Left shoulder pain 11/19/2019    Nausea and vomiting 01/11/2020    Pancytopenia  01/14/2020    Nephrolithiasis 02/12/2020    Left ventricular diastolic dysfunction 01/16/2021    Tremor 10/06/2021    C5 cervical fracture 11/09/2021    Syncope and collapse 01/07/2022    Neck pain 01/07/2022    Alcoholic intoxication with complication 03/13/2022    Withdrawal symptoms, alcohol 07/01/2022    Transaminitis 07/01/2022    Lumbar facet arthropathy 07/20/2022    Alcohol dependence 09/11/2022    Midline low back pain with right-sided sciatica 09/15/2022    Spondylolisthesis at L4-L5 level 09/15/2022    Lumbar canal stenosis 09/15/2022    Alcohol cessation counseling 09/15/2022    Need for assistance due to reduced mobility 09/15/2022    Impaired mobility and ADLs 09/15/2022    Alcohol withdrawal syndrome without complication 02/18/2023    Facial laceration 02/18/2023    Orthostatic hypotension 02/18/2023    Acute bacterial conjunctivitis of right eye 03/02/2023    Visit for suture removal 03/02/2023    Renal calculi 03/14/2023    Hepatic steatosis 03/15/2023    Alcohol withdrawal syndrome with complication 04/14/2023    Macrocytosis without anemia 04/14/2023    Lumbosacral spondylosis without myelopathy 05/05/2023    Elevated LFTs 05/13/2023    Alcohol-induced acute pancreatitis, unspecified complication status 06/23/2023    Pancreatitis 03/06/2024    Generalized abdominal pain 03/07/2024    Alcohol dependence with withdrawal 03/07/2024    Acute alcohol intoxication in patient with alcoholism with blood alcohol level 0.08 to 0.29, with unspecified complication 09/20/2024    Calculus of gallbladder with cholecystitis without biliary obstruction 09/20/2024    Alcoholic liver disease 12/10/2024    Iron deficiency anemia 12/10/2024    Folate deficiency anemia 12/10/2024    PVC's (premature ventricular contractions) 12/10/2024    Weakness 12/10/2024    DDD (degenerative disc disease), lumbar 01/29/2025    Alcohol abuse 05/01/2025    Peptic ulcer disease 05/01/2025    Mood disorder 05/01/2025    Anemia, chronic  disease 05/01/2025    Peripheral neuropathy 05/02/2025    Vitamin B12 deficiency 05/02/2025     Resolved Ambulatory Problems     Diagnosis Date Noted    Impacted cerumen 08/08/2016    Influenza 08/08/2016    Motion sickness 08/08/2016    Seasonal allergic rhinitis 08/08/2016    Upper respiratory tract infection 08/08/2016    Alcohol withdrawal 11/04/2016    Headache 11/05/2016    Gastritis 11/05/2016    Olecranon bursitis of right elbow 04/05/2017    Sciatica of left side 06/12/2018    Syncope and collapse 01/02/2019    Alcoholic ketoacidosis 01/12/2020    Hyponatremia 01/13/2020    Hypokalemia 01/13/2020    Alcohol dependence with uncomplicated withdrawal 01/14/2020    Hypomagnesemia 01/16/2021    Non-traumatic rhabdomyolysis 01/18/2021    Urine retention 01/21/2021    Epigastric pain 06/23/2023    Dehydration 02/24/2024    Metabolic acidosis 09/20/2024    Hypothermia 12/02/2024    Chest pain 04/30/2025     Past Medical History:   Diagnosis Date    Allergic 1970    Anxiety     Arthritis     Depression     Disease of thyroid gland     Elevated cholesterol     Encounter for removal of sutures     GERD (gastroesophageal reflux disease)     Headache, tension-type     Hypothyroidism     Kidney stone     Low back pain 2019    Migraine     Olecranon bursitis, right elbow     Sleep apnea          PAST SURGICAL HISTORY  Past Surgical History:   Procedure Laterality Date    CHOLECYSTECTOMY N/A 9/22/2024    Procedure: CHOLECYSTECTOMY LAPAROSCOPIC WITH DAVINCI ROBOT with cholangiogram, possible open;  Surgeon: Toro Noguera MD;  Location: [facility] OR;  Service: General;  Laterality: N/A;    COLONOSCOPY      CYST REMOVAL      CYSTOSCOPY BLADDER STONE LITHOTRIPSY  02/2022    ERCP N/A 9/23/2024    Procedure: ENDOSCOPIC RETROGRADE CHOLANGIOPANCREATOGRAPHY sphincterotomy, balloon sweep (9-12);  Surgeon: Fara Madrigal MD;  Location: [facility] ENDOSCOPY;  Service: Gastroenterology;  Laterality: N/A;  sphincterotomy hiatial  "hernia, gallstone removal    EXTRACORPOREAL SHOCK WAVE LITHOTRIPSY (ESWL) Right 2002    SHOULDER ARTHROSCOPY Right 2019    Procedure: SHOULDER ARTHROSCOPY, decompression, distal clavicle excision;  Surgeon: Aj Mancilla MD;  Location: [facility] OR [facility];  Service: Orthopedics    TONSILLECTOMY           FAMILY HISTORY  Family History   Problem Relation Age of Onset    Alzheimer's disease Mother     Mental illness Mother     Pancreatic cancer Father     Hearing loss Father     Arthritis Maternal Grandmother     Malig Hyperthermia Neg Hx          SOCIAL HISTORY  Social History     Socioeconomic History    Marital status:    Tobacco Use    Smoking status: Former     Current packs/day: 0.00     Average packs/day: 0.5 packs/day for 15.0 years (7.5 ttl pk-yrs)     Types: Cigarettes     Start date: 1990     Quit date: 2005     Years since quittin.4    Smokeless tobacco: Never    Tobacco comments:     caffeine - 3 cans of coke daily    Vaping Use    Vaping status: Never Used   Substance and Sexual Activity    Alcohol use: Yes     Alcohol/week: 7.0 standard drinks of alcohol     Types: 7 Shots of liquor per week     Comment: .5 liter vodka    Drug use: Yes     Types: Methamphetamines     Comment: \"once in a rare while\"    Sexual activity: Yes     Partners: Female     Birth control/protection: Condom         ALLERGIES  Penicillins      REVIEW OF SYSTEMS  Review of Systems  Included in HPI  All systems reviewed and negative except for those discussed in HPI.      PHYSICAL EXAM    I have reviewed the triage vital signs and nursing notes.    ED Triage Vitals [25 1324]   Temp Heart Rate Resp BP SpO2   98.2 °F (36.8 °C) 74 18 164/82 96 %      Temp src Heart Rate Source Patient Position BP Location FiO2 (%)   Oral Monitor -- -- --       Physical Exam  GENERAL: unkempt appearance, alert, no acute distress  SKIN: Warm, dry  HENT: Normocephalic, atraumatic  EYES: no scleral icterus  CV: regular rhythm, " regular rate, 2+ pedal edema  RESPIRATORY: normal effort, lungs clear  ABDOMEN: soft, nontender, nondistended  MUSCULOSKELETAL: no deformity, active ROM to all 4 extremities  NEURO: alert, moves all extremities, follows commands            LAB RESULTS  Recent Results (from the past 24 hours)   ECG 12 Lead QT Measurement    Collection Time: 05/29/25  1:45 PM   Result Value Ref Range    QT Interval 411 ms    QTC Interval 446 ms   Comprehensive Metabolic Panel    Collection Time: 05/29/25  1:50 PM    Specimen: Arm, Left; Blood   Result Value Ref Range    Glucose 101 (H) 65 - 99 mg/dL    BUN 10.0 8.0 - 23.0 mg/dL    Creatinine 0.83 0.76 - 1.27 mg/dL    Sodium 141 136 - 145 mmol/L    Potassium 4.3 3.5 - 5.2 mmol/L    Chloride 106 98 - 107 mmol/L    CO2 24.1 22.0 - 29.0 mmol/L    Calcium 9.2 8.6 - 10.5 mg/dL    Total Protein 7.3 6.0 - 8.5 g/dL    Albumin 4.4 3.5 - 5.2 g/dL    ALT (SGPT) 23 1 - 41 U/L    AST (SGOT) 52 (H) 1 - 40 U/L    Alkaline Phosphatase 87 39 - 117 U/L    Total Bilirubin 0.4 0.0 - 1.2 mg/dL    Globulin 2.9 gm/dL    A/G Ratio 1.5 g/dL    BUN/Creatinine Ratio 12.0 7.0 - 25.0    Anion Gap 10.9 5.0 - 15.0 mmol/L    eGFR 95.3 >60.0 mL/min/1.73   BNP    Collection Time: 05/29/25  1:50 PM    Specimen: Arm, Left; Blood   Result Value Ref Range    proBNP 49.1 0.0 - 900.0 pg/mL   High Sensitivity Troponin T    Collection Time: 05/29/25  1:50 PM    Specimen: Arm, Left; Blood   Result Value Ref Range    HS Troponin T 12 <22 ng/L   CBC Auto Differential    Collection Time: 05/29/25  1:50 PM    Specimen: Arm, Left; Blood   Result Value Ref Range    WBC 6.05 3.40 - 10.80 10*3/mm3    RBC 4.56 4.14 - 5.80 10*6/mm3    Hemoglobin 12.5 (L) 13.0 - 17.7 g/dL    Hematocrit 40.5 37.5 - 51.0 %    MCV 88.8 79.0 - 97.0 fL    MCH 27.4 26.6 - 33.0 pg    MCHC 30.9 (L) 31.5 - 35.7 g/dL    RDW 16.4 (H) 12.3 - 15.4 %    RDW-SD 53.6 37.0 - 54.0 fl    MPV 8.7 6.0 - 12.0 fL    Platelets 277 140 - 450 10*3/mm3    Neutrophil % 52.4 42.7 -  76.0 %    Lymphocyte % 33.4 19.6 - 45.3 %    Monocyte % 8.9 5.0 - 12.0 %    Eosinophil % 3.8 0.3 - 6.2 %    Basophil % 1.0 0.0 - 1.5 %    Immature Grans % 0.5 0.0 - 0.5 %    Neutrophils, Absolute 3.17 1.70 - 7.00 10*3/mm3    Lymphocytes, Absolute 2.02 0.70 - 3.10 10*3/mm3    Monocytes, Absolute 0.54 0.10 - 0.90 10*3/mm3    Eosinophils, Absolute 0.23 0.00 - 0.40 10*3/mm3    Basophils, Absolute 0.06 0.00 - 0.20 10*3/mm3    Immature Grans, Absolute 0.03 0.00 - 0.05 10*3/mm3    nRBC 0.0 0.0 - 0.2 /100 WBC         RADIOLOGY  XR Chest 1 View  Result Date: 5/29/2025  XR CHEST 1 VW-  HISTORY: 70-year-old male with peripheral edema.  FINDINGS: No evidence for pneumonia, effusions, or CHF.  This report was finalized on 5/29/2025 2:16 PM by Dr. Lyudmila Mora M.D on Workstation: [id number]          MEDICATIONS GIVEN IN ER  Medications   sodium chloride 0.9 % flush 10 mL (has no administration in time range)         ORDERS PLACED DURING THIS VISIT:  Orders Placed This Encounter   Procedures    XR Chest 1 View    Comprehensive Metabolic Panel    BNP    High Sensitivity Troponin T    Urinalysis With Microscopic If Indicated (No Culture) - Urine, Clean Catch    CBC Auto Differential    ECG 12 Lead QT Measurement    Insert Peripheral IV    CBC & Differential         OUTPATIENT MEDICATION MANAGEMENT:  Current Facility-Administered Medications Ordered in Epic   Medication Dose Route Frequency Provider Last Rate Last Admin    sodium chloride 0.9 % flush 10 mL  10 mL Intravenous PRN Carolyn Car APRN         Current Outpatient Medications Ordered in Epic   Medication Sig Dispense Refill    atorvastatin (LIPITOR) 40 MG tablet Take 1 tablet by mouth Every Night. 90 tablet 3    benazepril (Lotensin) 20 MG tablet Take 1 tablet by mouth Daily. 30 tablet 0    cyanocobalamin 1000 MCG/ML injection Inject 1 mL into the appropriate muscle as directed by prescriber 1 (One) Time Per Week. 60 mL 3    famotidine (PEPCID) 20 MG tablet Take 1 tablet  by mouth 2 (Two) Times a Day. 20 tablet 0    folic acid (FOLVITE) 1 MG tablet Take 1 tablet by mouth Daily. 30 tablet 3    gabapentin (NEURONTIN) 300 MG capsule Take 1 capsule by mouth Every 12 (Twelve) Hours. 12 capsule 0    levothyroxine (SYNTHROID, LEVOTHROID) 25 MCG tablet Take 1 tablet by mouth Every Morning. 30 tablet 3    magnesium oxide (MAG-OX) 400 MG tablet Take 1 tablet by mouth Daily. 7 tablet 0    ondansetron ODT (ZOFRAN-ODT) 4 MG disintegrating tablet Place 1 tablet on the tongue 4 (Four) Times a Day As Needed for Nausea. 20 tablet 0    PARoxetine (PAXIL) 20 MG tablet Take 1 tablet by mouth Daily. 30 tablet 3    thiamine (VITAMIN B1) 100 MG tablet Take 1 tablet by mouth Daily. 30 tablet 3         PROCEDURES  Procedures            PROGRESS, DATA ANALYSIS, CONSULTS, AND MEDICAL DECISION MAKING  All labs have been independently interpreted by me.  All radiology studies have been reviewed by me. All EKG's have been independently viewed and interpreted by me.  Discussion below represents my analysis of pertinent findings related to patient's condition, differential diagnosis, treatment plan and final disposition.    Differential diagnosis includes but is not limited to nephrotic syndrome, chf, malingering, venous insufficiency.    Clinical Scores:                                       ED Course as of 05/29/25 1558   Thu May 29, 2025   1426 proBNP: 49.1 []   1426 Hemoglobin(!): 12.5  12.7 2 weeks ago []      ED Course User Index  [] Carolyn Car APRN             AS OF 15:58 EDT VITALS:    BP - (!) 192/111  HR - 81  TEMP - 98.2 °F (36.8 °C) (Oral)  O2 SATS - 96%    COMPLEXITY OF CARE  Admission was considered but after careful review of the patient's presentation, physical examination, diagnostic results, and response to treatment the patient may be safely discharged with outpatient follow-up.      DIAGNOSIS  Final diagnoses:   Peripheral edema         DISPOSITION  ED Disposition       ED Disposition    Discharge    Condition   Stable    Comment   --                Please note that portions of this document were completed with a voice recognition program.    Note Disclaimer: At [facility], we believe that sharing information builds trust and better relationships. You are receiving this note because you recently visited [facility]. It is possible you will see health information before a provider has talked with you about it. This kind of information can be easy to misunderstand. To help you fully understand what it means for your health, we urge you to discuss this note with your provider.         Carolyn Car, APRN  05/29/25 1558

## 2025-05-29 NOTE — OUTREACH NOTE
Medical Week 3 Survey      Flowsheet Row Responses   [facility] patient discharged from? [place]   Does the patient have one of the following disease processes/diagnoses(primary or secondary)? Other   Week 3 attempt successful? Yes   Call start time 1641   Revoke Readmitted   Discharge diagnosis Atypical Chest pain   Meds reviewed with patient/caregiver? Yes   Is the patient having any side effects they believe may be caused by any medication additions or changes? No   Does the patient have all medications ordered at discharge? N/A   Does the patient have a primary care provider?  No   Does the patient have an appointment with their PCP within 7 days of discharge? No            ANGELINA HOLCOMB - Registered Nurse

## 2025-05-30 LAB
QT INTERVAL: 411 MS
QTC INTERVAL: 446 MS

## 2025-06-09 ENCOUNTER — APPOINTMENT (OUTPATIENT)
Dept: GENERAL RADIOLOGY | Facility: HOSPITAL | Age: 70
End: 2025-06-09
Payer: COMMERCIAL

## 2025-06-09 LAB
ALBUMIN SERPL-MCNC: 4.2 G/DL (ref 3.5–5.2)
ALBUMIN/GLOB SERPL: 1.4 G/DL
ALP SERPL-CCNC: 81 U/L (ref 39–117)
ALT SERPL W P-5'-P-CCNC: 22 U/L (ref 1–41)
ANION GAP SERPL CALCULATED.3IONS-SCNC: 15 MMOL/L (ref 5–15)
AST SERPL-CCNC: 51 U/L (ref 1–40)
BASOPHILS # BLD AUTO: 0.06 10*3/MM3 (ref 0–0.2)
BASOPHILS NFR BLD AUTO: 1 % (ref 0–1.5)
BILIRUB SERPL-MCNC: 0.5 MG/DL (ref 0–1.2)
BILIRUB UR QL STRIP: NEGATIVE
BUN SERPL-MCNC: 9 MG/DL (ref 8–23)
BUN/CREAT SERPL: 10.2 (ref 7–25)
CALCIUM SPEC-SCNC: 9 MG/DL (ref 8.6–10.5)
CHLORIDE SERPL-SCNC: 103 MMOL/L (ref 98–107)
CLARITY UR: CLEAR
CO2 SERPL-SCNC: 22 MMOL/L (ref 22–29)
COLOR UR: YELLOW
CREAT SERPL-MCNC: 0.88 MG/DL (ref 0.76–1.27)
DEPRECATED RDW RBC AUTO: 52.8 FL (ref 37–54)
EGFRCR SERPLBLD CKD-EPI 2021: 93.7 ML/MIN/1.73
EOSINOPHIL # BLD AUTO: 0.14 10*3/MM3 (ref 0–0.4)
EOSINOPHIL NFR BLD AUTO: 2.2 % (ref 0.3–6.2)
ERYTHROCYTE [DISTWIDTH] IN BLOOD BY AUTOMATED COUNT: 16.7 % (ref 12.3–15.4)
ETHANOL BLD-MCNC: 326 MG/DL (ref 0–10)
ETHANOL UR QL: 0.33 %
GLOBULIN UR ELPH-MCNC: 3.1 GM/DL
GLUCOSE SERPL-MCNC: 99 MG/DL (ref 65–99)
GLUCOSE UR STRIP-MCNC: NEGATIVE MG/DL
HCT VFR BLD AUTO: 38.2 % (ref 37.5–51)
HGB BLD-MCNC: 12.2 G/DL (ref 13–17.7)
HGB UR QL STRIP.AUTO: NEGATIVE
HOLD SPECIMEN: NORMAL
HOLD SPECIMEN: NORMAL
IMM GRANULOCYTES # BLD AUTO: 0.01 10*3/MM3 (ref 0–0.05)
IMM GRANULOCYTES NFR BLD AUTO: 0.2 % (ref 0–0.5)
KETONES UR QL STRIP: NEGATIVE
LEUKOCYTE ESTERASE UR QL STRIP.AUTO: NEGATIVE
LYMPHOCYTES # BLD AUTO: 2.78 10*3/MM3 (ref 0.7–3.1)
LYMPHOCYTES NFR BLD AUTO: 44.3 % (ref 19.6–45.3)
MAGNESIUM SERPL-MCNC: 1.9 MG/DL (ref 1.6–2.4)
MCH RBC QN AUTO: 27.8 PG (ref 26.6–33)
MCHC RBC AUTO-ENTMCNC: 31.9 G/DL (ref 31.5–35.7)
MCV RBC AUTO: 87 FL (ref 79–97)
MONOCYTES # BLD AUTO: 0.79 10*3/MM3 (ref 0.1–0.9)
MONOCYTES NFR BLD AUTO: 12.6 % (ref 5–12)
NEUTROPHILS NFR BLD AUTO: 2.5 10*3/MM3 (ref 1.7–7)
NEUTROPHILS NFR BLD AUTO: 39.7 % (ref 42.7–76)
NITRITE UR QL STRIP: NEGATIVE
NRBC BLD AUTO-RTO: 0 /100 WBC (ref 0–0.2)
PH UR STRIP.AUTO: 7 [PH] (ref 5–8)
PLATELET # BLD AUTO: 224 10*3/MM3 (ref 140–450)
PMV BLD AUTO: 9.1 FL (ref 6–12)
POTASSIUM SERPL-SCNC: 3.6 MMOL/L (ref 3.5–5.2)
PROT SERPL-MCNC: 7.3 G/DL (ref 6–8.5)
PROT UR QL STRIP: NEGATIVE
RBC # BLD AUTO: 4.39 10*6/MM3 (ref 4.14–5.8)
SODIUM SERPL-SCNC: 140 MMOL/L (ref 136–145)
SP GR UR STRIP: <=1.005 (ref 1–1.03)
TROPONIN T SERPL HS-MCNC: 9 NG/L
UROBILINOGEN UR QL STRIP: NORMAL
WBC NRBC COR # BLD AUTO: 6.28 10*3/MM3 (ref 3.4–10.8)
WHOLE BLOOD HOLD COAG: NORMAL
WHOLE BLOOD HOLD SPECIMEN: NORMAL

## 2025-06-09 PROCEDURE — 83735 ASSAY OF MAGNESIUM: CPT

## 2025-06-09 PROCEDURE — 93005 ELECTROCARDIOGRAM TRACING: CPT | Performed by: EMERGENCY MEDICINE

## 2025-06-09 PROCEDURE — 82077 ASSAY SPEC XCP UR&BREATH IA: CPT

## 2025-06-09 PROCEDURE — 93010 ELECTROCARDIOGRAM REPORT: CPT | Performed by: INTERNAL MEDICINE

## 2025-06-09 PROCEDURE — 81003 URINALYSIS AUTO W/O SCOPE: CPT

## 2025-06-09 PROCEDURE — 36415 COLL VENOUS BLD VENIPUNCTURE: CPT

## 2025-06-09 PROCEDURE — 71045 X-RAY EXAM CHEST 1 VIEW: CPT

## 2025-06-09 PROCEDURE — 84484 ASSAY OF TROPONIN QUANT: CPT

## 2025-06-09 PROCEDURE — 80053 COMPREHEN METABOLIC PANEL: CPT

## 2025-06-09 PROCEDURE — 85025 COMPLETE CBC W/AUTO DIFF WBC: CPT

## 2025-06-09 PROCEDURE — 93005 ELECTROCARDIOGRAM TRACING: CPT

## 2025-06-09 PROCEDURE — 99284 EMERGENCY DEPT VISIT MOD MDM: CPT

## 2025-06-09 RX ORDER — SODIUM CHLORIDE 0.9 % (FLUSH) 0.9 %
10 SYRINGE (ML) INJECTION AS NEEDED
Status: DISCONTINUED | OUTPATIENT
Start: 2025-06-09 | End: 2025-06-10 | Stop reason: HOSPADM

## 2025-06-10 ENCOUNTER — APPOINTMENT (OUTPATIENT)
Dept: CT IMAGING | Facility: HOSPITAL | Age: 70
End: 2025-06-10
Payer: MEDICAID

## 2025-06-10 ENCOUNTER — HOSPITAL ENCOUNTER (EMERGENCY)
Facility: HOSPITAL | Age: 70
Discharge: HOME | End: 2025-06-10
Attending: EMERGENCY MEDICINE | Admitting: EMERGENCY MEDICINE
Payer: COMMERCIAL

## 2025-06-10 VITALS
DIASTOLIC BLOOD PRESSURE: 53 MMHG | SYSTOLIC BLOOD PRESSURE: 104 MMHG | RESPIRATION RATE: 16 BRPM | OXYGEN SATURATION: 96 % | HEART RATE: 59 BPM | TEMPERATURE: 98.7 F

## 2025-06-10 DIAGNOSIS — S00.83XA TRAUMATIC ECCHYMOSIS OF CHIN, INITIAL ENCOUNTER: ICD-10-CM

## 2025-06-10 DIAGNOSIS — F10.920 ALCOHOLIC INTOXICATION WITHOUT COMPLICATION: ICD-10-CM

## 2025-06-10 DIAGNOSIS — R60.0 PEDAL EDEMA: Primary | ICD-10-CM

## 2025-06-10 LAB
AMMONIA BLD-SCNC: 13 UMOL/L (ref 16–60)
CK SERPL-CCNC: 110 U/L (ref 20–200)
GEN 5 1HR TROPONIN T REFLEX: 10 NG/L
NT-PROBNP SERPL-MCNC: 116 PG/ML (ref 0–900)
QT INTERVAL: 454 MS
QTC INTERVAL: 486 MS
TROPONIN T NUMERIC DELTA: 1 NG/L

## 2025-06-10 PROCEDURE — 82550 ASSAY OF CK (CPK): CPT | Performed by: EMERGENCY MEDICINE

## 2025-06-10 PROCEDURE — 82140 ASSAY OF AMMONIA: CPT | Performed by: EMERGENCY MEDICINE

## 2025-06-10 PROCEDURE — 84484 ASSAY OF TROPONIN QUANT: CPT | Performed by: EMERGENCY MEDICINE

## 2025-06-10 PROCEDURE — 83880 ASSAY OF NATRIURETIC PEPTIDE: CPT | Performed by: EMERGENCY MEDICINE

## 2025-06-10 PROCEDURE — 70450 CT HEAD/BRAIN W/O DYE: CPT

## 2025-06-10 RX ORDER — ACETAMINOPHEN 500 MG
1000 TABLET ORAL ONCE
Status: COMPLETED | OUTPATIENT
Start: 2025-06-10 | End: 2025-06-10

## 2025-06-10 RX ADMIN — ACETAMINOPHEN 1000 MG: 500 TABLET, FILM COATED ORAL at 01:56

## 2025-06-10 NOTE — ED PROVIDER NOTES
EMERGENCY DEPARTMENT ENCOUNTER  Room Number:  LELA/LELA  PCP: Provider, No Known  Independent Historians: Patient, EMS      HPI:  Chief Complaint: had concerns including Alcohol Intoxication.     A complete HPI/ROS/PMH/PSH/SH/FH are unobtainable due to: Nothing      Context: Pro Mueller is a 70 y.o. male with a medical history of alcohol abuse who presents to the ED c/o acute bilateral leg swelling.  He states that this has been very painful and has worsened over the last several days.  He states that today he could barely walk secondary to pain.  He denies history of congestive heart failure or DVT.  He denies chest pain or shortness of breath.  He states that he does drink daily but he has not drink alcohol for the last few days.  When I informed him that his blood alcohol level was elevated today he seemed surprised.  He was noted to have some bruising on his chin as well as a superficial laceration.  I inquired regarding recent falls and he was frustrated that he has been asked that question by multiple emergency department staff today.  He states that he has not had any recent falls.  He does complain of a mild headache.  He denies taking any blood thinner medications.  He denies abdominal pain, vomiting, black or bloody stool.  He states that he had a little bit of diarrhea 1 day last week which is an anomaly for him.      Review of prior external notes (non-ED) -and- Review of prior external test results outside of this encounter: I reviewed recent ED visit from 5/29/2025.  Patient was seen for bilateral feet swelling.  He had been seen on 5/10/2025 for the same complaint.  He was reportedly recently taking amlodipine for his blood pressure.        PAST MEDICAL HISTORY  Active Ambulatory Problems     Diagnosis Date Noted    Fall 08/08/2016    Alcoholism in recovery 08/08/2016    Atopic rhinitis 08/08/2016    Mixed anxiety depressive disorder 08/08/2016    Genital herpes simplex 08/08/2016    Hyperlipidemia  08/08/2016    Hypertension 08/08/2016    Hypothyroidism (acquired) 08/08/2016    Insomnia 08/08/2016    Panic disorder without agoraphobia 08/08/2016    Persistent insomnia 08/08/2016    Vitamin D deficiency 08/08/2016    Chronic low back pain 11/11/2016    Neuropathy involving both lower extremities 10/25/2018    Abnormal EKG 01/03/2019    Left shoulder pain 11/19/2019    Nausea and vomiting 01/11/2020    Pancytopenia 01/14/2020    Nephrolithiasis 02/12/2020    Left ventricular diastolic dysfunction 01/16/2021    Tremor 10/06/2021    C5 cervical fracture 11/09/2021    Syncope and collapse 01/07/2022    Neck pain 01/07/2022    Alcoholic intoxication with complication 03/13/2022    Withdrawal symptoms, alcohol 07/01/2022    Transaminitis 07/01/2022    Lumbar facet arthropathy 07/20/2022    Alcohol dependence 09/11/2022    Midline low back pain with right-sided sciatica 09/15/2022    Spondylolisthesis at L4-L5 level 09/15/2022    Lumbar canal stenosis 09/15/2022    Alcohol cessation counseling 09/15/2022    Need for assistance due to reduced mobility 09/15/2022    Impaired mobility and ADLs 09/15/2022    Alcohol withdrawal syndrome without complication 02/18/2023    Facial laceration 02/18/2023    Orthostatic hypotension 02/18/2023    Acute bacterial conjunctivitis of right eye 03/02/2023    Visit for suture removal 03/02/2023    Renal calculi 03/14/2023    Hepatic steatosis 03/15/2023    Alcohol withdrawal syndrome with complication 04/14/2023    Macrocytosis without anemia 04/14/2023    Lumbosacral spondylosis without myelopathy 05/05/2023    Elevated LFTs 05/13/2023    Alcohol-induced acute pancreatitis, unspecified complication status 06/23/2023    Pancreatitis 03/06/2024    Generalized abdominal pain 03/07/2024    Alcohol dependence with withdrawal 03/07/2024    Acute alcohol intoxication in patient with alcoholism with blood alcohol level 0.08 to 0.29, with unspecified complication 09/20/2024    Calculus of  gallbladder with cholecystitis without biliary obstruction 09/20/2024    Alcoholic liver disease 12/10/2024    Iron deficiency anemia 12/10/2024    Folate deficiency anemia 12/10/2024    PVC's (premature ventricular contractions) 12/10/2024    Weakness 12/10/2024    DDD (degenerative disc disease), lumbar 01/29/2025    Alcohol abuse 05/01/2025    Peptic ulcer disease 05/01/2025    Mood disorder 05/01/2025    Anemia, chronic disease 05/01/2025    Peripheral neuropathy 05/02/2025    Vitamin B12 deficiency 05/02/2025     Resolved Ambulatory Problems     Diagnosis Date Noted    Impacted cerumen 08/08/2016    Influenza 08/08/2016    Motion sickness 08/08/2016    Seasonal allergic rhinitis 08/08/2016    Upper respiratory tract infection 08/08/2016    Alcohol withdrawal 11/04/2016    Headache 11/05/2016    Gastritis 11/05/2016    Olecranon bursitis of right elbow 04/05/2017    Sciatica of left side 06/12/2018    Syncope and collapse 01/02/2019    Alcoholic ketoacidosis 01/12/2020    Hyponatremia 01/13/2020    Hypokalemia 01/13/2020    Alcohol dependence with uncomplicated withdrawal 01/14/2020    Hypomagnesemia 01/16/2021    Non-traumatic rhabdomyolysis 01/18/2021    Urine retention 01/21/2021    Epigastric pain 06/23/2023    Dehydration 02/24/2024    Metabolic acidosis 09/20/2024    Hypothermia 12/02/2024    Chest pain 04/30/2025     Past Medical History:   Diagnosis Date    Allergic 1970    Anxiety     Arthritis     Depression     Disease of thyroid gland     Elevated cholesterol     Encounter for removal of sutures     GERD (gastroesophageal reflux disease)     Headache, tension-type     Hypothyroidism     Kidney stone     Low back pain 2019    Migraine     Olecranon bursitis, right elbow     Sleep apnea          PAST SURGICAL HISTORY  Past Surgical History:   Procedure Laterality Date    CHOLECYSTECTOMY N/A 9/22/2024    Procedure: CHOLECYSTECTOMY LAPAROSCOPIC WITH DAVINCI ROBOT with cholangiogram, possible open;   "Surgeon: Toro Noguera MD;  Location: [facility] MAIN OR;  Service: General;  Laterality: N/A;    COLONOSCOPY      CYST REMOVAL      CYSTOSCOPY BLADDER STONE LITHOTRIPSY  2022    ERCP N/A 2024    Procedure: ENDOSCOPIC RETROGRADE CHOLANGIOPANCREATOGRAPHY sphincterotomy, balloon sweep (9-12);  Surgeon: Fara Madrigal MD;  Location: [facility] ENDOSCOPY;  Service: Gastroenterology;  Laterality: N/A;  sphincterotomy hiatial hernia, gallstone removal    EXTRACORPOREAL SHOCK WAVE LITHOTRIPSY (ESWL) Right 2002    SHOULDER ARTHROSCOPY Right 2019    Procedure: SHOULDER ARTHROSCOPY, decompression, distal clavicle excision;  Surgeon: Aj Mancilla MD;  Location: [facility] OR OSC;  Service: Orthopedics    TONSILLECTOMY           FAMILY HISTORY  Family History   Problem Relation Age of Onset    Alzheimer's disease Mother     Mental illness Mother     Pancreatic cancer Father     Hearing loss Father     Arthritis Maternal Grandmother     Malig Hyperthermia Neg Hx          SOCIAL HISTORY  Social History     Socioeconomic History    Marital status:    Tobacco Use    Smoking status: Former     Current packs/day: 0.00     Average packs/day: 0.5 packs/day for 15.0 years (7.5 ttl pk-yrs)     Types: Cigarettes     Start date: 1990     Quit date: 2005     Years since quittin.4    Smokeless tobacco: Never    Tobacco comments:     caffeine - 3 cans of coke daily    Vaping Use    Vaping status: Never Used   Substance and Sexual Activity    Alcohol use: Yes     Alcohol/week: 7.0 standard drinks of alcohol     Types: 7 Shots of liquor per week     Comment: .5 liter vodka    Drug use: Yes     Types: Methamphetamines     Comment: \"once in a rare while\"    Sexual activity: Yes     Partners: Female     Birth control/protection: Condom         ALLERGIES  Penicillins      REVIEW OF SYSTEMS  Review of all 14 systems is negative other than stated in the HPI above.      PHYSICAL EXAM    I have reviewed the triage vital " signs and nursing notes.    ED Triage Vitals [06/09/25 2150]   Temp Heart Rate Resp BP SpO2   98.7 °F (37.1 °C) 71 18 144/84 95 %      Temp src Heart Rate Source Patient Position BP Location FiO2 (%)   Oral -- -- -- --         GENERAL: awake and alert, no acute distress, pleasant and cooperative  HENT: Normocephalic, atraumatic.  Mandible was nontender, normal occlusion.  EYES: no scleral icterus, pupils 3 mm tract bilaterally  CV: regular rhythm, regular rate, no murmur  RESPIRATORY: normal effort, lungs clear to auscultation bilaterally  ABDOMEN: soft, nondistended, nontender throughout  MUSCULOSKELETAL: no deformity, mild edema of the feet bilaterally without erythema or warmth.  Mild point tenderness of the feet bilaterally.  2+ DP and PT pulses bilateral.  NEURO: alert, moves all extremities, follows commands, cranial nerves II through XII intact, speech clear  PSYCH: calm, cooperative  SKIN: Warm, dry.  There is a 2 cm superficial laceration on the lower chin with surrounding ecchymosis.          LAB RESULTS  Recent Results (from the past 24 hours)   ECG 12 Lead Altered Mental Status    Collection Time: 06/09/25 10:01 PM   Result Value Ref Range    QT Interval 454 ms    QTC Interval 486 ms   Comprehensive Metabolic Panel    Collection Time: 06/09/25 10:11 PM    Specimen: Arm, Right; Blood   Result Value Ref Range    Glucose 99 65 - 99 mg/dL    BUN 9.0 8.0 - 23.0 mg/dL    Creatinine 0.88 0.76 - 1.27 mg/dL    Sodium 140 136 - 145 mmol/L    Potassium 3.6 3.5 - 5.2 mmol/L    Chloride 103 98 - 107 mmol/L    CO2 22.0 22.0 - 29.0 mmol/L    Calcium 9.0 8.6 - 10.5 mg/dL    Total Protein 7.3 6.0 - 8.5 g/dL    Albumin 4.2 3.5 - 5.2 g/dL    ALT (SGPT) 22 1 - 41 U/L    AST (SGOT) 51 (H) 1 - 40 U/L    Alkaline Phosphatase 81 39 - 117 U/L    Total Bilirubin 0.5 0.0 - 1.2 mg/dL    Globulin 3.1 gm/dL    A/G Ratio 1.4 g/dL    BUN/Creatinine Ratio 10.2 7.0 - 25.0    Anion Gap 15.0 5.0 - 15.0 mmol/L    eGFR 93.7 >60.0 mL/min/1.73    High Sensitivity Troponin T    Collection Time: 06/09/25 10:11 PM    Specimen: Arm, Right; Blood   Result Value Ref Range    HS Troponin T 9 <22 ng/L   Magnesium    Collection Time: 06/09/25 10:11 PM    Specimen: Arm, Right; Blood   Result Value Ref Range    Magnesium 1.9 1.6 - 2.4 mg/dL   Ethanol    Collection Time: 06/09/25 10:11 PM    Specimen: Arm, Right; Blood   Result Value Ref Range    Ethanol 326 (H) 0 - 10 mg/dL    Ethanol % 0.326 %   Green Top (Gel)    Collection Time: 06/09/25 10:11 PM   Result Value Ref Range    Extra Tube Hold for add-ons.    Lavender Top    Collection Time: 06/09/25 10:11 PM   Result Value Ref Range    Extra Tube hold for add-on    Gold Top - SST    Collection Time: 06/09/25 10:11 PM   Result Value Ref Range    Extra Tube Hold for add-ons.    Light Blue Top    Collection Time: 06/09/25 10:11 PM   Result Value Ref Range    Extra Tube Hold for add-ons.    CBC Auto Differential    Collection Time: 06/09/25 10:11 PM    Specimen: Arm, Right; Blood   Result Value Ref Range    WBC 6.28 3.40 - 10.80 10*3/mm3    RBC 4.39 4.14 - 5.80 10*6/mm3    Hemoglobin 12.2 (L) 13.0 - 17.7 g/dL    Hematocrit 38.2 37.5 - 51.0 %    MCV 87.0 79.0 - 97.0 fL    MCH 27.8 26.6 - 33.0 pg    MCHC 31.9 31.5 - 35.7 g/dL    RDW 16.7 (H) 12.3 - 15.4 %    RDW-SD 52.8 37.0 - 54.0 fl    MPV 9.1 6.0 - 12.0 fL    Platelets 224 140 - 450 10*3/mm3    Neutrophil % 39.7 (L) 42.7 - 76.0 %    Lymphocyte % 44.3 19.6 - 45.3 %    Monocyte % 12.6 (H) 5.0 - 12.0 %    Eosinophil % 2.2 0.3 - 6.2 %    Basophil % 1.0 0.0 - 1.5 %    Immature Grans % 0.2 0.0 - 0.5 %    Neutrophils, Absolute 2.50 1.70 - 7.00 10*3/mm3    Lymphocytes, Absolute 2.78 0.70 - 3.10 10*3/mm3    Monocytes, Absolute 0.79 0.10 - 0.90 10*3/mm3    Eosinophils, Absolute 0.14 0.00 - 0.40 10*3/mm3    Basophils, Absolute 0.06 0.00 - 0.20 10*3/mm3    Immature Grans, Absolute 0.01 0.00 - 0.05 10*3/mm3    nRBC 0.0 0.0 - 0.2 /100 WBC   Urinalysis With Microscopic If Indicated  (No Culture) - Urine, Clean Catch    Collection Time: 06/09/25 10:34 PM    Specimen: Urine, Clean Catch   Result Value Ref Range    Color, UA Yellow Yellow, Straw    Appearance, UA Clear Clear    pH, UA 7.0 5.0 - 8.0    Specific Gravity, UA <=1.005 1.005 - 1.030    Glucose, UA Negative Negative    Ketones, UA Negative Negative    Bilirubin, UA Negative Negative    Blood, UA Negative Negative    Protein, UA Negative Negative    Leuk Esterase, UA Negative Negative    Nitrite, UA Negative Negative    Urobilinogen, UA 1.0 E.U./dL 0.2 - 1.0 E.U./dL   High Sensitivity Troponin T 1Hr    Collection Time: 06/10/25 12:49 AM    Specimen: Blood   Result Value Ref Range    HS Troponin T 10 <22 ng/L    Troponin T Numeric Delta 1 Abnormal if >/=3 ng/L   CK    Collection Time: 06/10/25 12:49 AM    Specimen: Blood   Result Value Ref Range    Creatine Kinase 110 20 - 200 U/L   BNP    Collection Time: 06/10/25 12:49 AM    Specimen: Blood   Result Value Ref Range    proBNP 116.0 0.0 - 900.0 pg/mL   Ammonia    Collection Time: 06/10/25  1:39 AM    Specimen: Blood   Result Value Ref Range    Ammonia 13 (L) 16 - 60 umol/L       The above labs were ordered by me and independently reviewed by me.     RADIOLOGY  CT Head Without Contrast  Result Date: 6/10/2025  CT OF THE HEAD WITHOUT CONTRAST  HISTORY: Headache  COMPARISON: December 2, 2021  TECHNIQUE: Axial CT imaging was obtained through the brain. No IV contrast was administered.  FINDINGS: No acute intracranial hemorrhage is seen. There is diffuse atrophy. There is periventricular and deep white matter microangiopathic change. There is no midline shift or mass effect. Paranasal sinuses appear clear. There is some trace fluid within the right mastoid air cells.      No acute intracranial findings.  Radiation dose reduction techniques were utilized, including automated exposure control and exposure modulation based on body size.   This report was finalized on 6/10/2025 2:47 AM by Dr. Stevens  JENNI Hampton on Workstation: [id number]      XR Chest 1 View  Result Date: 6/9/2025  SINGLE VIEW OF THE CHEST  HISTORY: Weakness and dizziness  COMPARISON: May 29, 2025  FINDINGS: Heart size is within normal limits. No pneumothorax, pleural effusion, or acute infiltrate is seen.      No acute findings.  This report was finalized on 6/9/2025 11:05 PM by Dr. Hilda Hampton M.D on Workstation: [id number]        The above radiology studies were ordered by me.  See ED course for independent interpretations.     MEDICATIONS GIVEN IN ER  Medications   sodium chloride 0.9 % flush 10 mL (has no administration in time range)   acetaminophen (TYLENOL) tablet 1,000 mg (1,000 mg Oral Given 6/10/25 0156)         ORDERS PLACED DURING THIS VISIT:  Orders Placed This Encounter   Procedures    XR Chest 1 View    CT Head Without Contrast    [place] Draw    Comprehensive Metabolic Panel    High Sensitivity Troponin T    Magnesium    Urinalysis With Microscopic If Indicated (No Culture) - Urine, Clean Catch    Ethanol    CBC Auto Differential    High Sensitivity Troponin T 1Hr    CK    Ammonia    BNP    NPO Diet NPO Type: Strict NPO    Undress & Gown    Continuous Pulse Oximetry    Vital Signs    Orthostatic Blood Pressure    Oxygen Therapy- Nasal Cannula; Titrate 1-6 LPM Per SpO2; 90 - 95%    POC Glucose Once    ECG 12 Lead Altered Mental Status    Insert Peripheral IV    Fall Precautions    CBC & Differential    Green Top (Gel)    Lavender Top    Gold Top - SST    Light Blue Top         OUTPATIENT MEDICATION MANAGEMENT:  Current Facility-Administered Medications Ordered in Epic   Medication Dose Route Frequency Provider Last Rate Last Admin    sodium chloride 0.9 % flush 10 mL  10 mL Intravenous PRN Yohan Iverson MD         Current Outpatient Medications Ordered in Epic   Medication Sig Dispense Refill    atorvastatin (LIPITOR) 40 MG tablet Take 1 tablet by mouth Every Night. 90 tablet 3    benazepril (Lotensin) 20  MG tablet Take 1 tablet by mouth Daily. 30 tablet 0    cyanocobalamin 1000 MCG/ML injection Inject 1 mL into the appropriate muscle as directed by prescriber 1 (One) Time Per Week. 60 mL 3    famotidine (PEPCID) 20 MG tablet Take 1 tablet by mouth 2 (Two) Times a Day. 20 tablet 0    folic acid (FOLVITE) 1 MG tablet Take 1 tablet by mouth Daily. 30 tablet 3    gabapentin (NEURONTIN) 300 MG capsule Take 1 capsule by mouth Every 12 (Twelve) Hours. 12 capsule 0    levothyroxine (SYNTHROID, LEVOTHROID) 25 MCG tablet Take 1 tablet by mouth Every Morning. 30 tablet 3    magnesium oxide (MAG-OX) 400 MG tablet Take 1 tablet by mouth Daily. 7 tablet 0    ondansetron ODT (ZOFRAN-ODT) 4 MG disintegrating tablet Place 1 tablet on the tongue 4 (Four) Times a Day As Needed for Nausea. 20 tablet 0    PARoxetine (PAXIL) 20 MG tablet Take 1 tablet by mouth Daily. 30 tablet 3    thiamine (VITAMIN B1) 100 MG tablet Take 1 tablet by mouth Daily. 30 tablet 3         PROCEDURES  Procedures            PROGRESS, DATA ANALYSIS, CONSULTS, AND MEDICAL DECISION MAKING  All labs have been independently interpreted by me.  All radiology studies have been reviewed by me. All EKG's have been independently viewed and interpreted by me.  Discussion below represents my analysis of pertinent findings related to patient's condition, differential diagnosis, treatment plan and final disposition.    Differential diagnosis includes but is not limited to:  Hypoalbuminemia  CHF  DVT  Venous insufficiency  Adverse effects of medication    Clinical Scores:                  ED Course as of 06/10/25 0656   Tue Mitesh 10, 2025   0118 Ethanol(!): 326 [JR]   0118 WBC: 6.28 [JR]   0118 Creatinine: 0.88 [JR]   0124 EKG independently interpreted by me:  Time: 2201  Sinus rhythm, 69  Normal NH  RBBB  No acute ischemic changes, inferior Q waves present [JR]   0124 Chest x-ray independently interpreted in PACS demonstrates no infiltrate. [JR]   0150 Albumin: 4.2 [JR]   0235  CT brain without contrast independently interpreted in PACS demonstrates no acute intracranial hemorrhage. [JR]   0459 Patient ambulated independently here.  He is clinically sober and appropriate for discharge home with instructions to follow-up with a primary care provider regarding his lower extremity edema.  I recommended Tylenol as needed for pain. [JR]      ED Course User Index  [JR] Yohan Iverson MD             AS OF 06:56 EDT VITALS:    BP - 104/53  HR - 59  TEMP - 98.7 °F (37.1 °C) (Oral)  O2 SATS - 96%    COMPLEXITY OF CARE        Chronic or social conditions impacting patient care (Social Determinants of Health):     DIAGNOSIS  Final diagnoses:   Pedal edema   Alcoholic intoxication without complication   Traumatic ecchymosis of chin, initial encounter           DISPOSITION  DISCHARGE    Patient discharged in stable condition.    Reviewed implications of results, diagnosis, meds, responsibility to follow up, warning signs and symptoms of possible worsening, potential complications and reasons to return to ER.    Patient/Family voiced understanding of above instructions.    Discussed plan for discharge, as there is no emergent indication for admission. Patient referred to primary care provider for BP management due to today's BP. Pt/family is agreeable and understands need for follow up and repeat testing.  Pt is aware that discharge does not mean that nothing is wrong but it indicates no emergency is present that requires admission and they must continue care with follow-up as given below or physician of their choice.     FOLLOW-UP  PATIENT CONNECTION - [facility] [id number]  [phone]  Call in 1 day           Medication List      No changes were made to your prescriptions during this visit.             Prescription drug monitoring program review:           Please note that portions of this document were completed with a voice recognition program.    Note Disclaimer: At [facility], we believe that sharing information builds trust and better relationships. You are receiving this note because you recently visited [facility]. It is possible you will see health information before a provider has talked with you about it. This kind of information can be easy to misunderstand. To help you fully understand what it means for your health, we urge you to discuss this note with your provider.         Yohan Iverson MD  06/10/25 0658

## 2025-08-16 ENCOUNTER — HOSPITAL ENCOUNTER (EMERGENCY)
Facility: HOSPITAL | Age: 70
Discharge: HOME | End: 2025-08-16
Attending: EMERGENCY MEDICINE
Payer: COMMERCIAL

## 2025-08-16 ENCOUNTER — APPOINTMENT (OUTPATIENT)
Dept: CT IMAGING | Facility: HOSPITAL | Age: 70
End: 2025-08-16
Payer: COMMERCIAL

## 2025-08-16 VITALS
WEIGHT: 220 LBS | BODY MASS INDEX: 29.8 KG/M2 | OXYGEN SATURATION: 94 % | TEMPERATURE: 97.9 F | HEART RATE: 65 BPM | SYSTOLIC BLOOD PRESSURE: 113 MMHG | DIASTOLIC BLOOD PRESSURE: 69 MMHG | RESPIRATION RATE: 18 BRPM | HEIGHT: 72 IN

## 2025-08-16 DIAGNOSIS — R51.9 ACUTE NONINTRACTABLE HEADACHE, UNSPECIFIED HEADACHE TYPE: Primary | ICD-10-CM

## 2025-08-16 PROCEDURE — 96375 TX/PRO/DX INJ NEW DRUG ADDON: CPT

## 2025-08-16 PROCEDURE — 25010000002 PROCHLORPERAZINE 10 MG/2ML SOLUTION: Performed by: PHYSICIAN ASSISTANT

## 2025-08-16 PROCEDURE — 70450 CT HEAD/BRAIN W/O DYE: CPT

## 2025-08-16 PROCEDURE — 99284 EMERGENCY DEPT VISIT MOD MDM: CPT

## 2025-08-16 PROCEDURE — 25010000002 DIPHENHYDRAMINE PER 50 MG: Performed by: PHYSICIAN ASSISTANT

## 2025-08-16 PROCEDURE — 25010000002 KETOROLAC TROMETHAMINE PER 15 MG: Performed by: EMERGENCY MEDICINE

## 2025-08-16 PROCEDURE — 96374 THER/PROPH/DIAG INJ IV PUSH: CPT

## 2025-08-16 RX ORDER — PROCHLORPERAZINE EDISYLATE 5 MG/ML
5 INJECTION INTRAMUSCULAR; INTRAVENOUS ONCE
Status: COMPLETED | OUTPATIENT
Start: 2025-08-16 | End: 2025-08-16

## 2025-08-16 RX ORDER — KETOROLAC TROMETHAMINE 15 MG/ML
15 INJECTION, SOLUTION INTRAMUSCULAR; INTRAVENOUS ONCE
Status: COMPLETED | OUTPATIENT
Start: 2025-08-16 | End: 2025-08-16

## 2025-08-16 RX ORDER — DIPHENHYDRAMINE HYDROCHLORIDE 50 MG/ML
12.5 INJECTION, SOLUTION INTRAMUSCULAR; INTRAVENOUS ONCE
Status: COMPLETED | OUTPATIENT
Start: 2025-08-16 | End: 2025-08-16

## 2025-08-16 RX ORDER — BUTALBITAL, ACETAMINOPHEN AND CAFFEINE 50; 325; 40 MG/1; MG/1; MG/1
1 TABLET ORAL ONCE
Status: COMPLETED | OUTPATIENT
Start: 2025-08-16 | End: 2025-08-16

## 2025-08-16 RX ADMIN — KETOROLAC TROMETHAMINE 15 MG: 15 INJECTION INTRAMUSCULAR at 05:29

## 2025-08-16 RX ADMIN — BUTALBITAL, ACETAMINOPHEN AND CAFFEINE 1 TABLET: 325; 50; 40 TABLET ORAL at 05:28

## 2025-08-16 RX ADMIN — DIPHENHYDRAMINE HYDROCHLORIDE 12.5 MG: 50 INJECTION, SOLUTION INTRAMUSCULAR; INTRAVENOUS at 06:08

## 2025-08-16 RX ADMIN — PROCHLORPERAZINE EDISYLATE 5 MG: 5 INJECTION INTRAMUSCULAR; INTRAVENOUS at 06:07

## 2025-08-23 ENCOUNTER — HOSPITAL ENCOUNTER (EMERGENCY)
Facility: HOSPITAL | Age: 70
Discharge: HOME | End: 2025-08-23
Attending: EMERGENCY MEDICINE
Payer: COMMERCIAL

## 2025-08-23 ENCOUNTER — APPOINTMENT (OUTPATIENT)
Dept: GENERAL RADIOLOGY | Facility: HOSPITAL | Age: 70
End: 2025-08-23
Payer: COMMERCIAL

## (undated) DEVICE — BLD SHAVER BONECUTTER 4MM 13CM

## (undated) DEVICE — EXTENSION SET, MALE LUER LOCK ADAPTER WITH RETRACTABLE COLLAR

## (undated) DEVICE — CATH IV INSYTE AUTOGARD 14G 1 1/2IN ORNG

## (undated) DEVICE — ERBE NESSY®PLATE 170 SPLIT; 168CM²; CABLE 3M: Brand: ERBE

## (undated) DEVICE — TUBING, SUCTION, 1/4" X 10', STRAIGHT: Brand: MEDLINE

## (undated) DEVICE — DEV LK WIREGUIDE FUSN OLYMP SCP

## (undated) DEVICE — GLV SURG TRIUMPH PF LTX 7.5 STRL

## (undated) DEVICE — COVER,C-ARM,41X74: Brand: MEDLINE

## (undated) DEVICE — SOL ISO/ALC RUB 70PCT 4OZ

## (undated) DEVICE — SENSR O2 OXIMAX FNGR A/ 18IN NONSTR

## (undated) DEVICE — COLUMN DRAPE

## (undated) DEVICE — ARM DRAPE

## (undated) DEVICE — GOWN,NON-REINFORCED,SIRUS,SET IN SLV,XL: Brand: MEDLINE

## (undated) DEVICE — ADAPT CLN BIOGUARD AIR/H2O DISP

## (undated) DEVICE — SYR LL TP 10ML STRL

## (undated) DEVICE — LAPAROVUE VISIBILITY SYSTEM LAPAROSCOPIC SOLUTIONS: Brand: LAPAROVUE

## (undated) DEVICE — SUT ETHLN 4/0 PS2 PLSTC 1667G

## (undated) DEVICE — CANN O2 ETCO2 FITS ALL CONN CO2 SMPL A/ 7IN DISP LF

## (undated) DEVICE — KT ORCA ORCAPOD DISP STRL

## (undated) DEVICE — SPHINCTEROTOME: Brand: HYDRATOME RX 44

## (undated) DEVICE — Device

## (undated) DEVICE — ANTIBACTERIAL UNDYED BRAIDED (POLYGLACTIN 910), SYNTHETIC ABSORBABLE SURGICAL SUTURE: Brand: COATED VICRYL

## (undated) DEVICE — TUBING, SUCTION, 1/4" X 20', STRAIGHT: Brand: MEDLINE INDUSTRIES, INC.

## (undated) DEVICE — DRAPE,REIN 53X77,STERILE: Brand: MEDLINE

## (undated) DEVICE — TROC BLADLES AIRSEAL/OPTI THRD 8X120MM 1P/U

## (undated) DEVICE — DRAPE,U/ SHT,SPLIT,PLAS,STERIL: Brand: MEDLINE

## (undated) DEVICE — DRSNG WND GZ CURAD OIL EMULSION 3X3IN STRL

## (undated) DEVICE — ABL ASP APOLLO RF XL 90D

## (undated) DEVICE — DRSNG WND BORDR/ADHS NONADHR/GZ LF 2X2IN STRL

## (undated) DEVICE — EPIDURAL TRAY: Brand: MEDLINE INDUSTRIES, INC.

## (undated) DEVICE — GLV SURG BIOGEL LTX PF 8 1/2

## (undated) DEVICE — ADHS LIQ MASTISOL 2/3ML

## (undated) DEVICE — TISSUE RETRIEVAL SYSTEM: Brand: INZII RETRIEVAL SYSTEM

## (undated) DEVICE — POSTN ARMSLV LAT/TRACTION DISP

## (undated) DEVICE — THE STERILE LIGHT HANDLE COVER IS USED WITH STERIS SURGICAL LIGHTING AND VISUALIZATION SYSTEMS.

## (undated) DEVICE — CANN TRPL DAM 7X7MM NO VLV

## (undated) DEVICE — SOL NACL 0.9PCT 1000ML

## (undated) DEVICE — STPCK 3WY D201 DISCOFIX

## (undated) DEVICE — LN SMPL CO2 SHTRM SD STREAM W/M LUER

## (undated) DEVICE — GLV SURG TRIUMPH CLASSIC PF LTX 8.5 STRL

## (undated) DEVICE — GLV SURG BIOGEL LTX PF 6 1/2

## (undated) DEVICE — SKIN PREP TRAY W/CHG: Brand: MEDLINE INDUSTRIES, INC.

## (undated) DEVICE — ARM SLING: Brand: DEROYAL

## (undated) DEVICE — APPL CHLORAPREP HI/LITE 26ML ORNG

## (undated) DEVICE — TUBING SET, GRAVITY, 4-SPIKE

## (undated) DEVICE — BLADELESS OBTURATOR: Brand: WECK VISTA

## (undated) DEVICE — LOU LAP CHOLE: Brand: MEDLINE INDUSTRIES, INC.

## (undated) DEVICE — GLV SURG TRIUMPH CLASSIC PF LTX 7 STRL

## (undated) DEVICE — SYR LUERLOK 20CC BX/50

## (undated) DEVICE — SINGLE USE DISTAL COVER MAJ-2315: Brand: SINGLE USE DISTAL COVER

## (undated) DEVICE — APPL CHLORAPREP W/TINT 26ML ORNG

## (undated) DEVICE — CATH URETRL SOF/FLEX OPN/END 4F 70CM

## (undated) DEVICE — ST TBG AIRSEAL BIF FLTR W/ACT/CHARCOAL/FLTR

## (undated) DEVICE — SYR LUERLOK 30CC

## (undated) DEVICE — SEAL

## (undated) DEVICE — JACKSON-PRATT 100CC BULB RESERVOIR: Brand: CARDINAL HEALTH

## (undated) DEVICE — BLCK/BITE BLOX W/DENTL/RIM W/STRAP 54F

## (undated) DEVICE — SUT MNCRYL PLS ANTIB UD 4/0 PS2 18IN

## (undated) DEVICE — PK ARTHSCP SHLDR TOWER 40

## (undated) DEVICE — RETRIEVAL BALLOON CATHETER: Brand: EXTRACTOR™ PRO RX